# Patient Record
Sex: MALE | Race: WHITE | NOT HISPANIC OR LATINO | Employment: OTHER | URBAN - METROPOLITAN AREA
[De-identification: names, ages, dates, MRNs, and addresses within clinical notes are randomized per-mention and may not be internally consistent; named-entity substitution may affect disease eponyms.]

---

## 2017-05-08 ENCOUNTER — OFFICE VISIT (OUTPATIENT)
Dept: ORTHOPEDIC SURGERY | Facility: CLINIC | Age: 72
End: 2017-05-08

## 2017-05-08 VITALS
SYSTOLIC BLOOD PRESSURE: 128 MMHG | BODY MASS INDEX: 23.79 KG/M2 | WEIGHT: 157 LBS | HEART RATE: 75 BPM | HEIGHT: 68 IN | DIASTOLIC BLOOD PRESSURE: 81 MMHG

## 2017-05-08 DIAGNOSIS — M19.072 LOCALIZED, PRIMARY OSTEOARTHRITIS OF THE ANKLE AND FOOT, LEFT: Primary | ICD-10-CM

## 2017-05-08 PROCEDURE — 99213 OFFICE O/P EST LOW 20 MIN: CPT | Performed by: ORTHOPAEDIC SURGERY

## 2017-10-31 ENCOUNTER — HOSPITAL ENCOUNTER (INPATIENT)
Facility: HOSPITAL | Age: 72
LOS: 1 days | Discharge: HOME | End: 2017-11-03
Attending: EMERGENCY MEDICINE | Admitting: HOSPITALIST

## 2017-10-31 ENCOUNTER — APPOINTMENT (OUTPATIENT)
Dept: GENERAL RADIOLOGY | Facility: HOSPITAL | Age: 72
End: 2017-10-31

## 2017-10-31 DIAGNOSIS — D62 ACUTE BLOOD LOSS ANEMIA: ICD-10-CM

## 2017-10-31 DIAGNOSIS — K92.2 ACUTE UPPER GI BLEED: Primary | ICD-10-CM

## 2017-10-31 DIAGNOSIS — R53.1 GENERALIZED WEAKNESS: ICD-10-CM

## 2017-10-31 PROBLEM — M54.9 CHRONIC BACK PAIN: Status: ACTIVE | Noted: 2017-10-31

## 2017-10-31 PROBLEM — G47.33 OSA (OBSTRUCTIVE SLEEP APNEA): Status: ACTIVE | Noted: 2017-10-31

## 2017-10-31 PROBLEM — D64.9 ANEMIA: Chronic | Status: ACTIVE | Noted: 2017-10-31

## 2017-10-31 PROBLEM — G89.29 CHRONIC BACK PAIN: Status: ACTIVE | Noted: 2017-10-31

## 2017-10-31 PROBLEM — J44.9 COPD (CHRONIC OBSTRUCTIVE PULMONARY DISEASE): Status: ACTIVE | Noted: 2017-10-31

## 2017-10-31 PROBLEM — K22.70 BARRETT'S ESOPHAGUS: Chronic | Status: ACTIVE | Noted: 2017-10-31

## 2017-10-31 PROBLEM — K21.9 GERD (GASTROESOPHAGEAL REFLUX DISEASE): Chronic | Status: ACTIVE | Noted: 2017-10-31

## 2017-10-31 LAB
ABO GROUP BLD: NORMAL
ABO GROUP BLD: NORMAL
ALBUMIN SERPL-MCNC: 4 G/DL (ref 3.2–4.8)
ALBUMIN/GLOB SERPL: 1.4 G/DL (ref 1.5–2.5)
ALP SERPL-CCNC: 44 U/L (ref 25–100)
ALT SERPL W P-5'-P-CCNC: 12 U/L (ref 7–40)
ANION GAP SERPL CALCULATED.3IONS-SCNC: 5 MMOL/L (ref 3–11)
AST SERPL-CCNC: 17 U/L (ref 0–33)
BACTERIA UR QL AUTO: ABNORMAL /HPF
BASOPHILS # BLD AUTO: 0.04 10*3/MM3 (ref 0–0.2)
BASOPHILS NFR BLD AUTO: 0.4 % (ref 0–1)
BILIRUB SERPL-MCNC: 0.2 MG/DL (ref 0.3–1.2)
BILIRUB UR QL STRIP: ABNORMAL
BLD GP AB SCN SERPL QL: NEGATIVE
BNP SERPL-MCNC: 77 PG/ML (ref 0–100)
BUN BLD-MCNC: 8 MG/DL (ref 9–23)
BUN/CREAT SERPL: 8.9 (ref 7–25)
CALCIUM SPEC-SCNC: 9 MG/DL (ref 8.7–10.4)
CHLORIDE SERPL-SCNC: 105 MMOL/L (ref 99–109)
CLARITY UR: CLEAR
CO2 SERPL-SCNC: 26 MMOL/L (ref 20–31)
COLOR UR: YELLOW
CREAT BLD-MCNC: 0.9 MG/DL (ref 0.6–1.3)
D-LACTATE SERPL-SCNC: 1.9 MMOL/L (ref 0.5–2)
DEPRECATED RDW RBC AUTO: 47.6 FL (ref 37–54)
ELLIPTOCYTES BLD QL SMEAR: NORMAL
EOSINOPHIL # BLD AUTO: 0.21 10*3/MM3 (ref 0–0.3)
EOSINOPHIL NFR BLD AUTO: 2.3 % (ref 0–3)
ERYTHROCYTE [DISTWIDTH] IN BLOOD BY AUTOMATED COUNT: 20.7 % (ref 11.3–14.5)
GFR SERPL CREATININE-BSD FRML MDRD: 83 ML/MIN/1.73
GLOBULIN UR ELPH-MCNC: 2.8 GM/DL
GLUCOSE BLD-MCNC: 120 MG/DL (ref 70–100)
GLUCOSE UR STRIP-MCNC: NEGATIVE MG/DL
HBA1C MFR BLD: 5.6 % (ref 4.8–5.6)
HCT VFR BLD AUTO: 28.6 % (ref 38.9–50.9)
HGB BLD-MCNC: 8 G/DL (ref 13.1–17.5)
HGB UR QL STRIP.AUTO: NEGATIVE
HOLD SPECIMEN: NORMAL
HOLD SPECIMEN: NORMAL
HYALINE CASTS UR QL AUTO: ABNORMAL /LPF
HYPOCHROMIA BLD QL: NORMAL
IMM GRANULOCYTES # BLD: 0.04 10*3/MM3 (ref 0–0.03)
IMM GRANULOCYTES NFR BLD: 0.4 % (ref 0–0.6)
KETONES UR QL STRIP: NEGATIVE
LEUKOCYTE ESTERASE UR QL STRIP.AUTO: ABNORMAL
LYMPHOCYTES # BLD AUTO: 1.29 10*3/MM3 (ref 0.6–4.8)
LYMPHOCYTES NFR BLD AUTO: 14 % (ref 24–44)
MCH RBC QN AUTO: 18.3 PG (ref 27–31)
MCHC RBC AUTO-ENTMCNC: 28 G/DL (ref 32–36)
MCV RBC AUTO: 65.3 FL (ref 80–99)
MICROCYTES BLD QL: NORMAL
MONOCYTES # BLD AUTO: 0.74 10*3/MM3 (ref 0–1)
MONOCYTES NFR BLD AUTO: 8 % (ref 0–12)
NEUTROPHILS # BLD AUTO: 6.91 10*3/MM3 (ref 1.5–8.3)
NEUTROPHILS NFR BLD AUTO: 74.9 % (ref 41–71)
NITRITE UR QL STRIP: NEGATIVE
PH UR STRIP.AUTO: 6 [PH] (ref 5–8)
PLAT MORPH BLD: NORMAL
PLATELET # BLD AUTO: 473 10*3/MM3 (ref 150–450)
PMV BLD AUTO: 9.3 FL (ref 6–12)
POTASSIUM BLD-SCNC: 3.9 MMOL/L (ref 3.5–5.5)
PROT SERPL-MCNC: 6.8 G/DL (ref 5.7–8.2)
PROT UR QL STRIP: NEGATIVE
RBC # BLD AUTO: 4.38 10*6/MM3 (ref 4.2–5.76)
RBC # UR: ABNORMAL /HPF
REF LAB TEST METHOD: ABNORMAL
RH BLD: POSITIVE
RH BLD: POSITIVE
SODIUM BLD-SCNC: 136 MMOL/L (ref 132–146)
SP GR UR STRIP: 1.02 (ref 1–1.03)
SQUAMOUS #/AREA URNS HPF: ABNORMAL /HPF
TROPONIN I SERPL-MCNC: 0 NG/ML (ref 0–0.07)
UROBILINOGEN UR QL STRIP: ABNORMAL
WBC MORPH BLD: NORMAL
WBC NRBC COR # BLD: 9.23 10*3/MM3 (ref 3.5–10.8)
WBC UR QL AUTO: ABNORMAL /HPF
WHOLE BLOOD HOLD SPECIMEN: NORMAL
WHOLE BLOOD HOLD SPECIMEN: NORMAL

## 2017-10-31 PROCEDURE — 84484 ASSAY OF TROPONIN QUANT: CPT

## 2017-10-31 PROCEDURE — 94760 N-INVAS EAR/PLS OXIMETRY 1: CPT

## 2017-10-31 PROCEDURE — 86900 BLOOD TYPING SEROLOGIC ABO: CPT

## 2017-10-31 PROCEDURE — 25010000002 CEFTRIAXONE PER 250 MG: Performed by: NURSE PRACTITIONER

## 2017-10-31 PROCEDURE — 83605 ASSAY OF LACTIC ACID: CPT | Performed by: EMERGENCY MEDICINE

## 2017-10-31 PROCEDURE — G0378 HOSPITAL OBSERVATION PER HR: HCPCS

## 2017-10-31 PROCEDURE — 86901 BLOOD TYPING SEROLOGIC RH(D): CPT

## 2017-10-31 PROCEDURE — 83540 ASSAY OF IRON: CPT | Performed by: INTERNAL MEDICINE

## 2017-10-31 PROCEDURE — 82607 VITAMIN B-12: CPT | Performed by: INTERNAL MEDICINE

## 2017-10-31 PROCEDURE — 87040 BLOOD CULTURE FOR BACTERIA: CPT | Performed by: EMERGENCY MEDICINE

## 2017-10-31 PROCEDURE — 83036 HEMOGLOBIN GLYCOSYLATED A1C: CPT | Performed by: NURSE PRACTITIONER

## 2017-10-31 PROCEDURE — 99220 PR INITIAL OBSERVATION CARE/DAY 70 MINUTES: CPT | Performed by: HOSPITALIST

## 2017-10-31 PROCEDURE — 83550 IRON BINDING TEST: CPT | Performed by: INTERNAL MEDICINE

## 2017-10-31 PROCEDURE — 80053 COMPREHEN METABOLIC PANEL: CPT | Performed by: EMERGENCY MEDICINE

## 2017-10-31 PROCEDURE — 94640 AIRWAY INHALATION TREATMENT: CPT

## 2017-10-31 PROCEDURE — 93005 ELECTROCARDIOGRAM TRACING: CPT | Performed by: EMERGENCY MEDICINE

## 2017-10-31 PROCEDURE — 71010 HC CHEST PA OR AP: CPT

## 2017-10-31 PROCEDURE — 82746 ASSAY OF FOLIC ACID SERUM: CPT | Performed by: INTERNAL MEDICINE

## 2017-10-31 PROCEDURE — 86900 BLOOD TYPING SEROLOGIC ABO: CPT | Performed by: EMERGENCY MEDICINE

## 2017-10-31 PROCEDURE — 86901 BLOOD TYPING SEROLOGIC RH(D): CPT | Performed by: EMERGENCY MEDICINE

## 2017-10-31 PROCEDURE — 85025 COMPLETE CBC W/AUTO DIFF WBC: CPT | Performed by: EMERGENCY MEDICINE

## 2017-10-31 PROCEDURE — 86850 RBC ANTIBODY SCREEN: CPT | Performed by: EMERGENCY MEDICINE

## 2017-10-31 PROCEDURE — 82728 ASSAY OF FERRITIN: CPT | Performed by: INTERNAL MEDICINE

## 2017-10-31 PROCEDURE — 85007 BL SMEAR W/DIFF WBC COUNT: CPT | Performed by: EMERGENCY MEDICINE

## 2017-10-31 PROCEDURE — 86923 COMPATIBILITY TEST ELECTRIC: CPT

## 2017-10-31 PROCEDURE — 99284 EMERGENCY DEPT VISIT MOD MDM: CPT

## 2017-10-31 PROCEDURE — 81001 URINALYSIS AUTO W/SCOPE: CPT | Performed by: EMERGENCY MEDICINE

## 2017-10-31 PROCEDURE — 83880 ASSAY OF NATRIURETIC PEPTIDE: CPT | Performed by: EMERGENCY MEDICINE

## 2017-10-31 RX ORDER — CEFTRIAXONE SODIUM 1 G/50ML
1 INJECTION, SOLUTION INTRAVENOUS EVERY 24 HOURS
Status: DISCONTINUED | OUTPATIENT
Start: 2017-10-31 | End: 2017-11-02

## 2017-10-31 RX ORDER — CARBIDOPA AND LEVODOPA 25; 100 MG/1; MG/1
1 TABLET, EXTENDED RELEASE ORAL EVERY 8 HOURS
Status: DISCONTINUED | OUTPATIENT
Start: 2017-10-31 | End: 2017-11-02

## 2017-10-31 RX ORDER — CITALOPRAM 20 MG/1
10 TABLET ORAL DAILY
Status: DISCONTINUED | OUTPATIENT
Start: 2017-10-31 | End: 2017-11-01

## 2017-10-31 RX ORDER — FAMOTIDINE 10 MG/ML
20 INJECTION, SOLUTION INTRAVENOUS ONCE
Status: COMPLETED | OUTPATIENT
Start: 2017-10-31 | End: 2017-10-31

## 2017-10-31 RX ORDER — GUAIFENESIN 600 MG/1
1200 TABLET, EXTENDED RELEASE ORAL EVERY 12 HOURS
Status: DISCONTINUED | OUTPATIENT
Start: 2017-10-31 | End: 2017-11-03 | Stop reason: HOSPADM

## 2017-10-31 RX ORDER — SODIUM CHLORIDE 0.9 % (FLUSH) 0.9 %
1-10 SYRINGE (ML) INJECTION AS NEEDED
Status: DISCONTINUED | OUTPATIENT
Start: 2017-10-31 | End: 2017-11-03 | Stop reason: HOSPADM

## 2017-10-31 RX ORDER — BUDESONIDE AND FORMOTEROL FUMARATE DIHYDRATE 80; 4.5 UG/1; UG/1
2 AEROSOL RESPIRATORY (INHALATION)
Status: DISCONTINUED | OUTPATIENT
Start: 2017-10-31 | End: 2017-11-03 | Stop reason: HOSPADM

## 2017-10-31 RX ORDER — SODIUM CHLORIDE 0.9 % (FLUSH) 0.9 %
10 SYRINGE (ML) INJECTION AS NEEDED
Status: DISCONTINUED | OUTPATIENT
Start: 2017-10-31 | End: 2017-10-31

## 2017-10-31 RX ORDER — MONTELUKAST SODIUM 10 MG/1
10 TABLET ORAL NIGHTLY
Status: DISCONTINUED | OUTPATIENT
Start: 2017-10-31 | End: 2017-11-03 | Stop reason: HOSPADM

## 2017-10-31 RX ORDER — SENNA AND DOCUSATE SODIUM 50; 8.6 MG/1; MG/1
2 TABLET, FILM COATED ORAL 2 TIMES DAILY PRN
Status: DISCONTINUED | OUTPATIENT
Start: 2017-10-31 | End: 2017-11-03 | Stop reason: HOSPADM

## 2017-10-31 RX ORDER — PANTOPRAZOLE SODIUM 40 MG/10ML
40 INJECTION, POWDER, LYOPHILIZED, FOR SOLUTION INTRAVENOUS
Status: DISCONTINUED | OUTPATIENT
Start: 2017-11-01 | End: 2017-10-31

## 2017-10-31 RX ORDER — ONDANSETRON 2 MG/ML
4 INJECTION INTRAMUSCULAR; INTRAVENOUS EVERY 6 HOURS PRN
Status: DISCONTINUED | OUTPATIENT
Start: 2017-10-31 | End: 2017-11-03 | Stop reason: HOSPADM

## 2017-10-31 RX ORDER — MAGNESIUM SULFATE HEPTAHYDRATE 40 MG/ML
4 INJECTION, SOLUTION INTRAVENOUS AS NEEDED
Status: DISCONTINUED | OUTPATIENT
Start: 2017-10-31 | End: 2017-11-03 | Stop reason: HOSPADM

## 2017-10-31 RX ORDER — CALCIUM CARBONATE 200(500)MG
2 TABLET,CHEWABLE ORAL 2 TIMES DAILY PRN
Status: DISCONTINUED | OUTPATIENT
Start: 2017-10-31 | End: 2017-11-03 | Stop reason: HOSPADM

## 2017-10-31 RX ORDER — BENZONATATE 100 MG/1
100 CAPSULE ORAL 3 TIMES DAILY PRN
Status: DISCONTINUED | OUTPATIENT
Start: 2017-10-31 | End: 2017-11-03 | Stop reason: HOSPADM

## 2017-10-31 RX ORDER — MAGNESIUM SULFATE HEPTAHYDRATE 40 MG/ML
2 INJECTION, SOLUTION INTRAVENOUS AS NEEDED
Status: DISCONTINUED | OUTPATIENT
Start: 2017-10-31 | End: 2017-11-03 | Stop reason: HOSPADM

## 2017-10-31 RX ORDER — POTASSIUM CHLORIDE 750 MG/1
40 CAPSULE, EXTENDED RELEASE ORAL AS NEEDED
Status: DISCONTINUED | OUTPATIENT
Start: 2017-10-31 | End: 2017-11-03 | Stop reason: HOSPADM

## 2017-10-31 RX ORDER — PANTOPRAZOLE SODIUM 40 MG/1
40 TABLET, DELAYED RELEASE ORAL ONCE
Status: COMPLETED | OUTPATIENT
Start: 2017-10-31 | End: 2017-10-31

## 2017-10-31 RX ORDER — ONDANSETRON 4 MG/1
4 TABLET, FILM COATED ORAL EVERY 6 HOURS PRN
Status: DISCONTINUED | OUTPATIENT
Start: 2017-10-31 | End: 2017-11-03 | Stop reason: HOSPADM

## 2017-10-31 RX ORDER — BISACODYL 10 MG
10 SUPPOSITORY, RECTAL RECTAL DAILY PRN
Status: DISCONTINUED | OUTPATIENT
Start: 2017-10-31 | End: 2017-11-03 | Stop reason: HOSPADM

## 2017-10-31 RX ORDER — POTASSIUM CHLORIDE 1.5 G/1.77G
40 POWDER, FOR SOLUTION ORAL AS NEEDED
Status: DISCONTINUED | OUTPATIENT
Start: 2017-10-31 | End: 2017-11-03 | Stop reason: HOSPADM

## 2017-10-31 RX ORDER — POTASSIUM CHLORIDE 7.45 MG/ML
10 INJECTION INTRAVENOUS
Status: DISCONTINUED | OUTPATIENT
Start: 2017-10-31 | End: 2017-11-03 | Stop reason: HOSPADM

## 2017-10-31 RX ORDER — PANTOPRAZOLE SODIUM 40 MG/1
40 TABLET, DELAYED RELEASE ORAL
Status: DISCONTINUED | OUTPATIENT
Start: 2017-11-01 | End: 2017-11-03 | Stop reason: HOSPADM

## 2017-10-31 RX ORDER — ACETAMINOPHEN 325 MG/1
650 TABLET ORAL EVERY 4 HOURS PRN
Status: DISCONTINUED | OUTPATIENT
Start: 2017-10-31 | End: 2017-11-03 | Stop reason: HOSPADM

## 2017-10-31 RX ORDER — HEPARIN SODIUM 5000 [USP'U]/ML
5000 INJECTION, SOLUTION INTRAVENOUS; SUBCUTANEOUS EVERY 8 HOURS SCHEDULED
Status: CANCELLED | OUTPATIENT
Start: 2017-10-31

## 2017-10-31 RX ADMIN — CEFTRIAXONE SODIUM 1 G: 1 INJECTION, SOLUTION INTRAVENOUS at 20:49

## 2017-10-31 RX ADMIN — BUDESONIDE AND FORMOTEROL FUMARATE DIHYDRATE 2 PUFF: 80; 4.5 AEROSOL RESPIRATORY (INHALATION) at 20:58

## 2017-10-31 RX ADMIN — PANTOPRAZOLE SODIUM 40 MG: 40 TABLET, DELAYED RELEASE ORAL at 15:32

## 2017-10-31 RX ADMIN — FAMOTIDINE 20 MG: 10 INJECTION, SOLUTION INTRAVENOUS at 15:32

## 2017-10-31 RX ADMIN — MONTELUKAST SODIUM 10 MG: 10 TABLET, FILM COATED ORAL at 20:48

## 2017-10-31 RX ADMIN — CITALOPRAM 10 MG: 20 TABLET, FILM COATED ORAL at 20:49

## 2017-10-31 RX ADMIN — CARBIDOPA AND LEVODOPA 1 TABLET: 25; 100 TABLET, EXTENDED RELEASE ORAL at 20:49

## 2017-10-31 RX ADMIN — DOXYCYCLINE 100 MG: 100 INJECTION, POWDER, LYOPHILIZED, FOR SOLUTION INTRAVENOUS at 21:48

## 2017-10-31 RX ADMIN — GUAIFENESIN 1200 MG: 600 TABLET, EXTENDED RELEASE ORAL at 20:49

## 2017-11-01 ENCOUNTER — ANESTHESIA EVENT (OUTPATIENT)
Dept: GASTROENTEROLOGY | Facility: HOSPITAL | Age: 72
End: 2017-11-01

## 2017-11-01 ENCOUNTER — ANESTHESIA (OUTPATIENT)
Dept: GASTROENTEROLOGY | Facility: HOSPITAL | Age: 72
End: 2017-11-01

## 2017-11-01 LAB
ANION GAP SERPL CALCULATED.3IONS-SCNC: 6 MMOL/L (ref 3–11)
BUN BLD-MCNC: 6 MG/DL (ref 9–23)
BUN/CREAT SERPL: 7.5 (ref 7–25)
CALCIUM SPEC-SCNC: 8.2 MG/DL (ref 8.7–10.4)
CHLORIDE SERPL-SCNC: 106 MMOL/L (ref 99–109)
CO2 SERPL-SCNC: 26 MMOL/L (ref 20–31)
CREAT BLD-MCNC: 0.8 MG/DL (ref 0.6–1.3)
DEPRECATED RDW RBC AUTO: 48.3 FL (ref 37–54)
ERYTHROCYTE [DISTWIDTH] IN BLOOD BY AUTOMATED COUNT: 21 % (ref 11.3–14.5)
FERRITIN SERPL-MCNC: 8 NG/ML (ref 22–322)
FOLATE SERPL-MCNC: >24 NG/ML (ref 3.2–20)
GFR SERPL CREATININE-BSD FRML MDRD: 95 ML/MIN/1.73
GLUCOSE BLD-MCNC: 96 MG/DL (ref 70–100)
HCT VFR BLD AUTO: 27.8 % (ref 38.9–50.9)
HCT VFR BLD AUTO: 28.2 % (ref 38.9–50.9)
HGB BLD-MCNC: 7.6 G/DL (ref 13.1–17.5)
HGB BLD-MCNC: 7.7 G/DL (ref 13.1–17.5)
INR PPP: 1.01
IRON 24H UR-MRATE: 11 MCG/DL (ref 50–175)
IRON SATN MFR SERPL: 3 % (ref 20–50)
MAGNESIUM SERPL-MCNC: 1.9 MG/DL (ref 1.3–2.7)
MCH RBC QN AUTO: 17.8 PG (ref 27–31)
MCHC RBC AUTO-ENTMCNC: 27.3 G/DL (ref 32–36)
MCV RBC AUTO: 65.3 FL (ref 80–99)
PHOSPHATE SERPL-MCNC: 3.8 MG/DL (ref 2.4–5.1)
PLATELET # BLD AUTO: 412 10*3/MM3 (ref 150–450)
PMV BLD AUTO: 9.4 FL (ref 6–12)
POTASSIUM BLD-SCNC: 4 MMOL/L (ref 3.5–5.5)
PROTHROMBIN TIME: 11 SECONDS (ref 9.6–11.5)
RBC # BLD AUTO: 4.26 10*6/MM3 (ref 4.2–5.76)
RETICS/RBC NFR AUTO: 2.43 % (ref 0.5–1.5)
SODIUM BLD-SCNC: 138 MMOL/L (ref 132–146)
TIBC SERPL-MCNC: 357 MCG/DL (ref 250–450)
VIT B12 BLD-MCNC: 442 PG/ML (ref 211–911)
WBC NRBC COR # BLD: 6.43 10*3/MM3 (ref 3.5–10.8)

## 2017-11-01 PROCEDURE — 85610 PROTHROMBIN TIME: CPT | Performed by: HOSPITALIST

## 2017-11-01 PROCEDURE — 85018 HEMOGLOBIN: CPT | Performed by: HOSPITALIST

## 2017-11-01 PROCEDURE — 0DB48ZX EXCISION OF ESOPHAGOGASTRIC JUNCTION, VIA NATURAL OR ARTIFICIAL OPENING ENDOSCOPIC, DIAGNOSTIC: ICD-10-PCS | Performed by: INTERNAL MEDICINE

## 2017-11-01 PROCEDURE — 94640 AIRWAY INHALATION TREATMENT: CPT

## 2017-11-01 PROCEDURE — 85027 COMPLETE CBC AUTOMATED: CPT | Performed by: NURSE PRACTITIONER

## 2017-11-01 PROCEDURE — 25010000002 CEFTRIAXONE PER 250 MG: Performed by: NURSE PRACTITIONER

## 2017-11-01 PROCEDURE — 80048 BASIC METABOLIC PNL TOTAL CA: CPT | Performed by: NURSE PRACTITIONER

## 2017-11-01 PROCEDURE — 85045 AUTOMATED RETICULOCYTE COUNT: CPT | Performed by: INTERNAL MEDICINE

## 2017-11-01 PROCEDURE — G0378 HOSPITAL OBSERVATION PER HR: HCPCS

## 2017-11-01 PROCEDURE — 84100 ASSAY OF PHOSPHORUS: CPT | Performed by: HOSPITALIST

## 2017-11-01 PROCEDURE — 94760 N-INVAS EAR/PLS OXIMETRY 1: CPT

## 2017-11-01 PROCEDURE — 0DB68ZX EXCISION OF STOMACH, VIA NATURAL OR ARTIFICIAL OPENING ENDOSCOPIC, DIAGNOSTIC: ICD-10-PCS | Performed by: INTERNAL MEDICINE

## 2017-11-01 PROCEDURE — 83735 ASSAY OF MAGNESIUM: CPT | Performed by: HOSPITALIST

## 2017-11-01 PROCEDURE — 85014 HEMATOCRIT: CPT | Performed by: HOSPITALIST

## 2017-11-01 PROCEDURE — 25010000002 PROPOFOL 10 MG/ML EMULSION: Performed by: NURSE ANESTHETIST, CERTIFIED REGISTERED

## 2017-11-01 PROCEDURE — 99226 PR SBSQ OBSERVATION CARE/DAY 35 MINUTES: CPT | Performed by: HOSPITALIST

## 2017-11-01 PROCEDURE — 88305 TISSUE EXAM BY PATHOLOGIST: CPT | Performed by: INTERNAL MEDICINE

## 2017-11-01 RX ORDER — EPHEDRINE SULFATE 50 MG/ML
5 INJECTION, SOLUTION INTRAVENOUS ONCE AS NEEDED
Status: DISCONTINUED | OUTPATIENT
Start: 2017-11-01 | End: 2017-11-01 | Stop reason: HOSPADM

## 2017-11-01 RX ORDER — SODIUM CHLORIDE, SODIUM LACTATE, POTASSIUM CHLORIDE, CALCIUM CHLORIDE 600; 310; 30; 20 MG/100ML; MG/100ML; MG/100ML; MG/100ML
9 INJECTION, SOLUTION INTRAVENOUS CONTINUOUS
Status: DISCONTINUED | OUTPATIENT
Start: 2017-11-01 | End: 2017-11-03 | Stop reason: HOSPADM

## 2017-11-01 RX ORDER — SODIUM CHLORIDE 0.9 % (FLUSH) 0.9 %
1-10 SYRINGE (ML) INJECTION AS NEEDED
Status: DISCONTINUED | OUTPATIENT
Start: 2017-11-01 | End: 2017-11-01

## 2017-11-01 RX ORDER — PEG-3350, SODIUM SULFATE, SODIUM CHLORIDE, POTASSIUM CHLORIDE, SODIUM ASCORBATE AND ASCORBIC ACID 7.5-2.691G
1000 KIT ORAL ONCE
Status: DISCONTINUED | OUTPATIENT
Start: 2017-11-01 | End: 2017-11-01

## 2017-11-01 RX ORDER — ONDANSETRON 2 MG/ML
4 INJECTION INTRAMUSCULAR; INTRAVENOUS ONCE AS NEEDED
Status: DISCONTINUED | OUTPATIENT
Start: 2017-11-01 | End: 2017-11-01 | Stop reason: HOSPADM

## 2017-11-01 RX ORDER — PEG-3350, SODIUM SULFATE, SODIUM CHLORIDE, POTASSIUM CHLORIDE, SODIUM ASCORBATE AND ASCORBIC ACID 7.5-2.691G
1000 KIT ORAL ONCE
Status: COMPLETED | OUTPATIENT
Start: 2017-11-01 | End: 2017-11-01

## 2017-11-01 RX ORDER — FENTANYL CITRATE 50 UG/ML
50 INJECTION, SOLUTION INTRAMUSCULAR; INTRAVENOUS
Status: DISCONTINUED | OUTPATIENT
Start: 2017-11-01 | End: 2017-11-01 | Stop reason: HOSPADM

## 2017-11-01 RX ORDER — FAMOTIDINE 20 MG/1
20 TABLET, FILM COATED ORAL ONCE
Status: COMPLETED | OUTPATIENT
Start: 2017-11-01 | End: 2017-11-01

## 2017-11-01 RX ORDER — LIDOCAINE HYDROCHLORIDE 10 MG/ML
0.5 INJECTION, SOLUTION EPIDURAL; INFILTRATION; INTRACAUDAL; PERINEURAL ONCE AS NEEDED
Status: DISCONTINUED | OUTPATIENT
Start: 2017-11-01 | End: 2017-11-01

## 2017-11-01 RX ORDER — LIDOCAINE HYDROCHLORIDE 10 MG/ML
INJECTION, SOLUTION INFILTRATION; PERINEURAL AS NEEDED
Status: DISCONTINUED | OUTPATIENT
Start: 2017-11-01 | End: 2017-11-01 | Stop reason: SURG

## 2017-11-01 RX ORDER — FAMOTIDINE 10 MG/ML
20 INJECTION, SOLUTION INTRAVENOUS ONCE
Status: DISCONTINUED | OUTPATIENT
Start: 2017-11-01 | End: 2017-11-01

## 2017-11-01 RX ORDER — PROPOFOL 10 MG/ML
VIAL (ML) INTRAVENOUS AS NEEDED
Status: DISCONTINUED | OUTPATIENT
Start: 2017-11-01 | End: 2017-11-01 | Stop reason: SURG

## 2017-11-01 RX ORDER — PEG-3350, SODIUM SULFATE, SODIUM CHLORIDE, POTASSIUM CHLORIDE, SODIUM ASCORBATE AND ASCORBIC ACID 7.5-2.691G
1000 KIT ORAL ONCE
Status: COMPLETED | OUTPATIENT
Start: 2017-11-02 | End: 2017-11-02

## 2017-11-01 RX ADMIN — MONTELUKAST SODIUM 10 MG: 10 TABLET, FILM COATED ORAL at 21:30

## 2017-11-01 RX ADMIN — DOXYCYCLINE 100 MG: 100 INJECTION, POWDER, LYOPHILIZED, FOR SOLUTION INTRAVENOUS at 08:07

## 2017-11-01 RX ADMIN — BUDESONIDE AND FORMOTEROL FUMARATE DIHYDRATE 2 PUFF: 80; 4.5 AEROSOL RESPIRATORY (INHALATION) at 20:44

## 2017-11-01 RX ADMIN — GUAIFENESIN 1200 MG: 600 TABLET, EXTENDED RELEASE ORAL at 21:31

## 2017-11-01 RX ADMIN — DOXYCYCLINE 100 MG: 100 INJECTION, POWDER, LYOPHILIZED, FOR SOLUTION INTRAVENOUS at 21:30

## 2017-11-01 RX ADMIN — CEFTRIAXONE SODIUM 1 G: 1 INJECTION, SOLUTION INTRAVENOUS at 21:30

## 2017-11-01 RX ADMIN — POLYETHYLENE GLYCOL 3350, SODIUM SULFATE, SODIUM CHLORIDE, POTASSIUM CHLORIDE, ASCORBIC ACID, SODIUM ASCORBATE 1000 ML: KIT at 16:26

## 2017-11-01 RX ADMIN — PROPOFOL 50 MG: 10 INJECTION, EMULSION INTRAVENOUS at 10:42

## 2017-11-01 RX ADMIN — CARBIDOPA AND LEVODOPA 1 TABLET: 25; 100 TABLET, EXTENDED RELEASE ORAL at 05:41

## 2017-11-01 RX ADMIN — PROPOFOL 50 MG: 10 INJECTION, EMULSION INTRAVENOUS at 10:46

## 2017-11-01 RX ADMIN — ONDANSETRON 4 MG: 4 TABLET, FILM COATED ORAL at 03:23

## 2017-11-01 RX ADMIN — FAMOTIDINE 20 MG: 20 TABLET, FILM COATED ORAL at 13:10

## 2017-11-01 RX ADMIN — LIDOCAINE HYDROCHLORIDE 50 MG: 10 INJECTION, SOLUTION INFILTRATION; PERINEURAL at 10:42

## 2017-11-01 RX ADMIN — ACETAMINOPHEN 650 MG: 325 TABLET ORAL at 03:23

## 2017-11-01 RX ADMIN — CARBIDOPA AND LEVODOPA 1 TABLET: 25; 100 TABLET, EXTENDED RELEASE ORAL at 21:34

## 2017-11-01 RX ADMIN — PANTOPRAZOLE SODIUM 40 MG: 40 TABLET, DELAYED RELEASE ORAL at 17:42

## 2017-11-01 RX ADMIN — CARBIDOPA AND LEVODOPA 1 TABLET: 25; 100 TABLET, EXTENDED RELEASE ORAL at 15:03

## 2017-11-01 RX ADMIN — PANTOPRAZOLE SODIUM 40 MG: 40 TABLET, DELAYED RELEASE ORAL at 05:42

## 2017-11-01 RX ADMIN — GUAIFENESIN 1200 MG: 600 TABLET, EXTENDED RELEASE ORAL at 08:07

## 2017-11-01 NOTE — PLAN OF CARE
Problem: Patient Care Overview (Adult)  Goal: Plan of Care Review  Outcome: Ongoing (interventions implemented as appropriate)    11/01/17 0347   Coping/Psychosocial Response Interventions   Plan Of Care Reviewed With patient   Patient Care Overview   Progress no change   Outcome Evaluation   Outcome Summary/Follow up Plan Pt rested well. Complained of a mild headache with some nausea early this AM; Medicated. No other complaints. O2 sats greater than 94% on RA.          Problem: Pneumonia (Adult)  Goal: Signs and Symptoms of Listed Potential Problems Will be Absent or Manageable (Pneumonia)  Outcome: Ongoing (interventions implemented as appropriate)

## 2017-11-01 NOTE — PROGRESS NOTES
[facility] Medicine Services  PROGRESS NOTE    Patient Name: Flaco Roque II  : 1945  MRN: [id number]    Date of Admission: 10/31/2017  Length of Stay: 0  Primary Care Physician: Tayo Hendrix MD    Subjective   Subjective     CC: F/U SOA    HPI:  Patient seen this morning. He still has some SOA and fatigue.     Review of Systems  Gen-no fevers, no chills  CV-no chest pain, no palpitations  Resp-+cough, +dyspnea  GI-no N/V/D, no abd pain    Otherwise ROS is negative except as mentioned in the HPI.    Objective   Objective     Vital Signs:   Temp:  [96.8 °F (36 °C)-99.2 °F (37.3 °C)] 97 °F (36.1 °C)  Heart Rate:  [65-94] 65  Resp:  [14-20] 18  BP: ()/(46-77) 111/67        Physical Exam:  Gen-no acute distress  CV-RRR, S1 S2 normal, no m/r/g  Resp-CTAB, no wheezes or rales noted  Abd-soft, NT, ND, +BS  Ext-no edema  Neuro-A&Ox3, no focal deficits  Psych-appropriate mood      Results Reviewed:  I have personally reviewed current lab, radiology, and data and agree.      Results from last 7 days  Lab Units 17  0543 10/31/17  1352   WBC 10*3/mm3 6.43 9.23   HEMOGLOBIN g/dL 7.6* 8.0*   HEMATOCRIT % 27.8* 28.6*   PLATELETS 10*3/mm3 412 473*   INR  1.01  --        Results from last 7 days  Lab Units 17  0543 10/31/17  1353   SODIUM mmol/L 138 136   POTASSIUM mmol/L 4.0 3.9   CHLORIDE mmol/L 106 105   CO2 mmol/L 26.0 26.0   BUN mg/dL 6* 8*   CREATININE mg/dL 0.80 0.90   GLUCOSE mg/dL 96 120*   CALCIUM mg/dL 8.2* 9.0   ALT (SGPT) U/L  --  12   AST (SGOT) U/L  --  17     BNP   Date Value Ref Range Status   10/31/2017 77.0 0.0 - 100.0 pg/mL Final     No results found for: PHART    Microbiology Results Abnormal     Procedure Component Value - Date/Time    Blood Culture - Blood, [[id number]]  (Normal) Collected:  10/31/17 1425    Lab Status:  Preliminary result Specimen:  Blood from Arm, Left Updated:  17 0308     Blood Culture No growth at less than 24 hours     Blood Culture - Blood, [[id number]]  (Normal) Collected:  10/31/17 1432    Lab Status:  Preliminary result Specimen:  Blood from Arm, Right Updated:  11/01/17 0308     Blood Culture No growth at less than 24 hours          Imaging Results (last 24 hours)     Procedure Component Value Units Date/Time    XR Chest 1 View [[id number]] Collected:  10/31/17 1430     Updated:  10/31/17 1642    Narrative:       EXAMINATION: XR CHEST 1 VW-10/31/2017:      INDICATION: SOA, triage protocol.      COMPARISON: Chest x-ray 07/15/2016.     FINDINGS: Cardiac size borderline enlarged. Pulmonary vascularity within  normal limits. Background chronic lung changes, however, no focal  consolidative opacification. Moderate hiatal hernia with increased  prominence from prior noted overlying the midline and lower  cardiomediastinal structures. No pneumothorax or significant pleural  effusion.       Impression:       Background chronic lung changes without focal consolidative  opacification or acute parenchymal disease. Moderate hiatal hernia with  increased prominence from prior comparison.     D:  10/31/2017  E:  10/31/2017                      I have reviewed the medications.    Assessment/Plan   Assessment / Plan     Hospital Problem List     * (Principal)PNA (pneumonia)    Acute upper GI bleed    ABRIL (obstructive sleep apnea)    COPD (chronic obstructive pulmonary disease)    Chris's esophagus (Chronic)    Overview Signed 10/31/2017  4:06 PM by YAMILET Espinoza     Last EGD 1 year ago with Dr Kaye          GERD (gastroesophageal reflux disease) (Chronic)    Anemia (Chronic)             Brief Hospital Course to date:  Flaco Roque II is a 72 y.o. male with hx of COPD and GI bleed presents with worsening SOA and fatigue the for 2 days. He has had a 2 week hx of cough and SOA and took a course of Augmenting for PNA. On admission he was found to be anemic with +FOBT.      Assessment & Plan:  --Continue to monitor H&H closely. Transfuse  if Hb drops below 7.0.  --GI performed EGD showing ulceration and scarring but no active bleeding. Plan for colonoscopy in AM.   --Continue Rocephin and Doxycycline for now although CXR only shows chronic changes. Will consider d/c or de-escalation in 24 hours based on cultures. He is on room air.   --Outpatient follow up for continued pain pump management. Currently his pain is well controlled.     DVT Prophylaxis:  SCDs/TEDs (GI bleed)    CODE STATUS: Full Code    Disposition: I expect the patient to be discharged home in ~2 days    Marisabel Rice MD  11/01/17  1:09 PM

## 2017-11-01 NOTE — CONSULTS
[facility] Gastroenterology    Referring Provider: No ref. provider found    Reason for Consultation: GI bleed     Chief complaint  Weakness, short of air    History of present illness:  Flaco Roque II is a 72 y.o. male who is admitted with GIB.  Describes weakness and SOA.  H/O PUD. Is on Carafate and Protonix. Dark stool but started iron last week.  Strong family hx of colon cancer in father and 2 uncles.  Last colonoscopy about 3 yrs ago by Dr Kaye. Has constipation improved with fiber.  No chest pain or syncope.  Denies NSAIDS .Had UGI bleed earlier this yr at was treated at [facility]  Allergies:  Review of patient's allergies indicates no known allergies.    Scheduled Meds:    budesonide-formoterol 2 puff Inhalation BID - RT   carbidopa-levodopa ER 1 tablet Oral Q8H   ceftriaxone 1 g Intravenous Q24H   And      doxycycline 100 mg Intravenous Q12H   guaiFENesin 1,200 mg Oral Q12H   montelukast 10 mg Oral Nightly   pantoprazole 40 mg Oral BID AC        Infusions:       PRN Meds:  •  acetaminophen  •  albuterol  •  benzonatate  •  bisacodyl  •  calcium carbonate  •  magnesium sulfate **OR** magnesium sulfate **OR** magnesium sulfate  •  ondansetron **OR** ondansetron  •  potassium chloride **OR** potassium chloride **OR** potassium chloride  •  sennosides-docusate sodium  •  sodium chloride    Home Meds:  Prescriptions Prior to Admission   Medication Sig Dispense Refill Last Dose   • alendronate (FOSAMAX) 70 MG tablet Take 70 mg by mouth every 7 days.   Taking   • budesonide-formoterol (SYMBICORT) 80-4.5 MCG/ACT inhaler Inhale 2 puffs 2 (two) times a day.   Taking   • citalopram (CeleXA) 10 MG tablet Take 10 mg by mouth daily.   Taking   • fluticasone (FLONASE) 50 MCG/ACT nasal spray 2 sprays into each nostril daily. Administer 2 sprays in each nostril for each dose.   Taking   • gabapentin (NEURONTIN) 600 MG tablet Take 600 mg by mouth 3 (three) times a day.   Taking   • Glucosamine-Chondroitin (GLUCOSAMINE CHONDR  COMPLEX PO) Take 1 tablet by mouth daily.   Taking   • metaxalone (SKELAXIN) 800 MG tablet Take 800 mg by mouth 3 (three) times a day.   Taking   • montelukast (SINGULAIR) 10 MG tablet Take 10 mg by mouth every night.   Taking   • Multiple Vitamins-Minerals (MULTIVITAMIN ADULT PO) Take 1 tablet by mouth daily.   Taking   • oxybutynin XL (DITROPAN-XL) 5 MG 24 hr tablet Take 5 mg by mouth daily.   Taking   • pantoprazole (PROTONIX) 40 MG EC tablet Take 40 mg by mouth daily.   Taking   • tamsulosin (FLOMAX) 0.4 MG capsule 24 hr capsule Take 1 capsule by mouth every night.   Taking       ROS: Review of Systems   Constitutional: Negative for activity change and unexpected weight change.   Respiratory: Positive for shortness of breath. Negative for chest tightness.    Cardiovascular: Negative for chest pain and leg swelling.   Gastrointestinal: Positive for constipation. Negative for abdominal pain.       PAST MED HX: Pt  has a past medical history of Chris's esophagus; BPH (benign prostatic hyperplasia); Chronic low back pain; COPD (chronic obstructive pulmonary disease); GERD (gastroesophageal reflux disease); Injury of back; Lung abscess; Osteoarthritis; Osteoporosis; Rotator cuff tear, left; and Sleep apnea.  PAST SURG HX: Pt  has a past surgical history that includes Back surgery; Total hip arthroplasty (Left); Arthrodesis midtarsal / tarsometatarsal / tarsal navicular-cuneiform (Left, 05/10/2016); Cataract extraction; Back surgery; Gallbladder surgery; and Spine surgery. uro lift  FAM HX: family history includes Arthritis in his mother; Cancer in his father; Diabetes in his mother. Colon cancer in 2 uncles  SOC HX: Pt  reports that he quit smoking about 15 years ago. His smoking use included Cigarettes. He has a 50.00 pack-year smoking history. He has never used smokeless tobacco. He reports that he drinks about 1.2 oz of alcohol per week  He reports that he does not use illicit drugs.    /62  Pulse 74   "Temp 99.2 °F (37.3 °C) (Oral)   Resp 16  Ht 68\" (172.7 cm)  Wt 165 lb (74.8 kg)  SpO2 96%  BMI 25.09 kg/m2    Physical Exam  Wt Readings from Last 3 Encounters:   10/31/17 165 lb (74.8 kg)   05/08/17 157 lb (71.2 kg)   07/14/16 160 lb (72.6 kg)   ,body mass index is 25.09 kg/(m^2).    General Well developed; well nourished; no acute distress.   ENT Good dentition.  Oral mucosa pink & moist without thrush or lesions.    Neck Neck supple; trachea midline. No thyromegaly  Resp CTA; no rhonchi, rales, or wheezes.  Respiration effort normal  CV RRR; ; no M/R/G. No lower extremity edema  GI Abd soft, NT, ND, normal active bowel sounds.  No HSM.  No abd hernia  Skin No rash; no lesions; no bruises.  Skin turgor normal  Musc No clubbing; no cyanosis.    Psych Oriented to time, place, and person.  Appropriate affect      Results Review:   I reviewed the patient's new clinical results.    Lab Results   Component Value Date    WBC 6.43 11/01/2017    HGB 7.6 (L) 11/01/2017    HCT 27.8 (L) 11/01/2017    MCV 65.3 (L) 11/01/2017     11/01/2017       Lab Results   Component Value Date    GLUCOSE 96 11/01/2017    BUN 6 (L) 11/01/2017    CREATININE 0.80 11/01/2017    EGFRIFNONA 95 11/01/2017    BCR 7.5 11/01/2017    CO2 26.0 11/01/2017    CALCIUM 8.2 (L) 11/01/2017    ALBUMIN 4.00 10/31/2017    LABIL2 1.4 (L) 10/31/2017    AST 17 10/31/2017    ALT 12 10/31/2017       ASSESSMENTS/PLANS    Anemia acute/chronic blood loss  H/O PUD    Will plan EGD for today.  If neg will need colonoscopy in am.  Need recors from [facility].    I discussed the patients findings and my recommendations with patient    Porter Capellan MD  11/01/17  8:20 AM  "

## 2017-11-01 NOTE — ANESTHESIA PREPROCEDURE EVALUATION
Anesthesia Evaluation     Patient summary reviewed and Nursing notes reviewed   no history of anesthetic complications:  NPO Solid Status: > 8 hours  NPO Liquid Status: > 8 hours     Airway   Mallampati: II  TM distance: >3 FB  Neck ROM: full  no difficulty expected  Dental - normal exam     Pulmonary - normal exam    breath sounds clear to auscultation  (+) COPD mild, sleep apnea,   Cardiovascular - normal exam    ECG reviewed  Rhythm: regular  Rate: normal        Neuro/Psych- negative ROS  GI/Hepatic/Renal/Endo    (+)  GERD well controlled,     ROS Comment: Chris's esophagitis, GI bleed    Musculoskeletal     Abdominal    Substance History      OB/GYN          Other   (+) arthritis                                     Anesthesia Plan    ASA 3     general     intravenous induction   Anesthetic plan and risks discussed with patient.    Plan discussed with CRNA.

## 2017-11-01 NOTE — POST-PROCEDURE NOTE
EGD  GERD with ulceration and scarring.  5cm HH  No ulcers or bleeding site seen.    Colonoscopy in am

## 2017-11-01 NOTE — PLAN OF CARE
Problem: Patient Care Overview (Adult)  Goal: Plan of Care Review  Outcome: Ongoing (interventions implemented as appropriate)    11/01/17 8944   Coping/Psychosocial Response Interventions   Plan Of Care Reviewed With patient   Patient Care Overview   Progress improving   Outcome Evaluation   Outcome Summary/Follow up Plan verbalizes understanding plan of care. compledted egd today, colonoscopy tomorrow. H/H remain stable.         Problem: Pneumonia (Adult)  Goal: Signs and Symptoms of Listed Potential Problems Will be Absent or Manageable (Pneumonia)  Outcome: Ongoing (interventions implemented as appropriate)

## 2017-11-01 NOTE — ANESTHESIA POSTPROCEDURE EVALUATION
Patient: Flaco Roque II    Procedure Summary     Date Anesthesia Start Anesthesia Stop Room / Location    11/01/17 1038 1054  [facility] /  [facility]       Procedure Diagnosis Surgeon Provider    ESOPHAGOGASTRODUODENOSCOPY (N/A Esophagus) Acute upper GI bleed  (Acute upper GI bleed [K92.2]) MD Jason Mcclelland MD          Anesthesia Type: general  Last vitals  BP   128/77 (11/01/17 0922)   Temp   96.8 °F (36 °C) (11/01/17 0915)   Pulse   73 (11/01/17 0915)   Resp   14 (11/01/17 0915)     SpO2   97 % (11/01/17 0915)     Post Anesthesia Care and Evaluation    Patient location during evaluation: PACU  Patient participation: complete - patient participated  Level of consciousness: awake and alert  Pain score: 0  Pain management: adequate  Airway patency: patent  Anesthetic complications: No anesthetic complications  PONV Status: none  Cardiovascular status: hemodynamically stable and acceptable  Respiratory status: nonlabored ventilation, acceptable and nasal cannula  Hydration status: acceptable

## 2017-11-02 ENCOUNTER — ANESTHESIA (OUTPATIENT)
Dept: GASTROENTEROLOGY | Facility: HOSPITAL | Age: 72
End: 2017-11-02

## 2017-11-02 ENCOUNTER — ANESTHESIA EVENT (OUTPATIENT)
Dept: GASTROENTEROLOGY | Facility: HOSPITAL | Age: 72
End: 2017-11-02

## 2017-11-02 LAB
CYTO UR: NORMAL
HCT VFR BLD AUTO: 27.9 % (ref 38.9–50.9)
HCT VFR BLD AUTO: 28.7 % (ref 38.9–50.9)
HCT VFR BLD AUTO: 32 % (ref 38.9–50.9)
HGB BLD-MCNC: 7.6 G/DL (ref 13.1–17.5)
HGB BLD-MCNC: 7.9 G/DL (ref 13.1–17.5)
HGB BLD-MCNC: 9 G/DL (ref 13.1–17.5)
LAB AP CASE REPORT: NORMAL
LAB AP CLINICAL INFORMATION: NORMAL
Lab: NORMAL
PATH REPORT.FINAL DX SPEC: NORMAL
PATH REPORT.GROSS SPEC: NORMAL

## 2017-11-02 PROCEDURE — 85018 HEMOGLOBIN: CPT | Performed by: INTERNAL MEDICINE

## 2017-11-02 PROCEDURE — 85018 HEMOGLOBIN: CPT | Performed by: HOSPITALIST

## 2017-11-02 PROCEDURE — 0DJD8ZZ INSPECTION OF LOWER INTESTINAL TRACT, VIA NATURAL OR ARTIFICIAL OPENING ENDOSCOPIC: ICD-10-PCS | Performed by: INTERNAL MEDICINE

## 2017-11-02 PROCEDURE — 25010000002 PROPOFOL 1000 MG/ML EMULSION: Performed by: NURSE ANESTHETIST, CERTIFIED REGISTERED

## 2017-11-02 PROCEDURE — 94760 N-INVAS EAR/PLS OXIMETRY 1: CPT

## 2017-11-02 PROCEDURE — 94640 AIRWAY INHALATION TREATMENT: CPT

## 2017-11-02 PROCEDURE — 25010000002 ONDANSETRON PER 1 MG: Performed by: NURSE PRACTITIONER

## 2017-11-02 PROCEDURE — 86900 BLOOD TYPING SEROLOGIC ABO: CPT

## 2017-11-02 PROCEDURE — 85014 HEMATOCRIT: CPT | Performed by: INTERNAL MEDICINE

## 2017-11-02 PROCEDURE — 25010000002 NA FERRIC GLUC CPLX PER 12.5 MG: Performed by: INTERNAL MEDICINE

## 2017-11-02 PROCEDURE — 99226 PR SBSQ OBSERVATION CARE/DAY 35 MINUTES: CPT | Performed by: HOSPITALIST

## 2017-11-02 PROCEDURE — 36430 TRANSFUSION BLD/BLD COMPNT: CPT

## 2017-11-02 PROCEDURE — 85014 HEMATOCRIT: CPT | Performed by: HOSPITALIST

## 2017-11-02 PROCEDURE — P9016 RBC LEUKOCYTES REDUCED: HCPCS

## 2017-11-02 RX ORDER — SODIUM CHLORIDE 9 MG/ML
INJECTION, SOLUTION INTRAVENOUS CONTINUOUS PRN
Status: DISCONTINUED | OUTPATIENT
Start: 2017-11-02 | End: 2017-11-02 | Stop reason: SURG

## 2017-11-02 RX ORDER — SODIUM CHLORIDE 0.9 % (FLUSH) 0.9 %
1-10 SYRINGE (ML) INJECTION AS NEEDED
Status: DISCONTINUED | OUTPATIENT
Start: 2017-11-02 | End: 2017-11-02 | Stop reason: HOSPADM

## 2017-11-02 RX ORDER — LIDOCAINE HYDROCHLORIDE 10 MG/ML
INJECTION, SOLUTION INFILTRATION; PERINEURAL AS NEEDED
Status: DISCONTINUED | OUTPATIENT
Start: 2017-11-02 | End: 2017-11-02 | Stop reason: SURG

## 2017-11-02 RX ORDER — CARBIDOPA AND LEVODOPA 25; 100 MG/1; MG/1
1 TABLET, EXTENDED RELEASE ORAL EVERY 8 HOURS
Status: DISCONTINUED | OUTPATIENT
Start: 2017-11-02 | End: 2017-11-03 | Stop reason: HOSPADM

## 2017-11-02 RX ORDER — SODIUM CHLORIDE, SODIUM LACTATE, POTASSIUM CHLORIDE, CALCIUM CHLORIDE 600; 310; 30; 20 MG/100ML; MG/100ML; MG/100ML; MG/100ML
9 INJECTION, SOLUTION INTRAVENOUS CONTINUOUS
Status: CANCELLED | OUTPATIENT
Start: 2017-11-02

## 2017-11-02 RX ORDER — FAMOTIDINE 20 MG/1
20 TABLET, FILM COATED ORAL ONCE
Status: CANCELLED | OUTPATIENT
Start: 2017-11-02 | End: 2017-11-02

## 2017-11-02 RX ORDER — FENTANYL CITRATE 50 UG/ML
50 INJECTION, SOLUTION INTRAMUSCULAR; INTRAVENOUS
Status: DISCONTINUED | OUTPATIENT
Start: 2017-11-02 | End: 2017-11-02

## 2017-11-02 RX ORDER — ONDANSETRON 2 MG/ML
4 INJECTION INTRAMUSCULAR; INTRAVENOUS ONCE AS NEEDED
Status: DISCONTINUED | OUTPATIENT
Start: 2017-11-02 | End: 2017-11-02

## 2017-11-02 RX ORDER — FERROUS SULFATE 325(65) MG
325 TABLET ORAL
Status: DISCONTINUED | OUTPATIENT
Start: 2017-11-03 | End: 2017-11-03 | Stop reason: HOSPADM

## 2017-11-02 RX ORDER — LIDOCAINE HYDROCHLORIDE 10 MG/ML
0.5 INJECTION, SOLUTION EPIDURAL; INFILTRATION; INTRACAUDAL; PERINEURAL ONCE AS NEEDED
Status: CANCELLED | OUTPATIENT
Start: 2017-11-02

## 2017-11-02 RX ADMIN — BUDESONIDE AND FORMOTEROL FUMARATE DIHYDRATE 2 PUFF: 80; 4.5 AEROSOL RESPIRATORY (INHALATION) at 09:33

## 2017-11-02 RX ADMIN — BUDESONIDE AND FORMOTEROL FUMARATE DIHYDRATE 2 PUFF: 80; 4.5 AEROSOL RESPIRATORY (INHALATION) at 20:43

## 2017-11-02 RX ADMIN — PROPOFOL 125 MCG/KG/MIN: 10 INJECTION, EMULSION INTRAVENOUS at 11:57

## 2017-11-02 RX ADMIN — DOXYCYCLINE 100 MG: 100 INJECTION, POWDER, LYOPHILIZED, FOR SOLUTION INTRAVENOUS at 08:45

## 2017-11-02 RX ADMIN — LIDOCAINE HYDROCHLORIDE 100 MG: 10 INJECTION, SOLUTION INFILTRATION; PERINEURAL at 11:57

## 2017-11-02 RX ADMIN — SODIUM CHLORIDE: 9 INJECTION, SOLUTION INTRAVENOUS at 11:55

## 2017-11-02 RX ADMIN — CARBIDOPA AND LEVODOPA 1 TABLET: 25; 100 TABLET, EXTENDED RELEASE ORAL at 17:09

## 2017-11-02 RX ADMIN — POLYETHYLENE GLYCOL 3350, SODIUM SULFATE, SODIUM CHLORIDE, POTASSIUM CHLORIDE, ASCORBIC ACID, SODIUM ASCORBATE 1000 ML: KIT at 04:12

## 2017-11-02 RX ADMIN — GUAIFENESIN 1200 MG: 600 TABLET, EXTENDED RELEASE ORAL at 21:23

## 2017-11-02 RX ADMIN — ONDANSETRON 4 MG: 2 INJECTION INTRAMUSCULAR; INTRAVENOUS at 06:38

## 2017-11-02 RX ADMIN — PANTOPRAZOLE SODIUM 40 MG: 40 TABLET, DELAYED RELEASE ORAL at 17:08

## 2017-11-02 RX ADMIN — ONDANSETRON 4 MG: 2 INJECTION INTRAMUSCULAR; INTRAVENOUS at 23:18

## 2017-11-02 RX ADMIN — PANTOPRAZOLE SODIUM 40 MG: 40 TABLET, DELAYED RELEASE ORAL at 08:45

## 2017-11-02 RX ADMIN — CARBIDOPA AND LEVODOPA 1 TABLET: 25; 100 TABLET, EXTENDED RELEASE ORAL at 04:12

## 2017-11-02 RX ADMIN — MONTELUKAST SODIUM 10 MG: 10 TABLET, FILM COATED ORAL at 21:23

## 2017-11-02 RX ADMIN — CARBIDOPA AND LEVODOPA 1 TABLET: 25; 100 TABLET, EXTENDED RELEASE ORAL at 09:13

## 2017-11-02 RX ADMIN — SODIUM CHLORIDE 250 MG: 9 INJECTION, SOLUTION INTRAVENOUS at 21:23

## 2017-11-02 RX ADMIN — GUAIFENESIN 1200 MG: 600 TABLET, EXTENDED RELEASE ORAL at 08:44

## 2017-11-02 RX ADMIN — MAGNESIUM SULFATE HEPTAHYDRATE 4 G: 40 INJECTION, SOLUTION INTRAVENOUS at 16:33

## 2017-11-02 RX ADMIN — SODIUM CHLORIDE: 9 INJECTION, SOLUTION INTRAVENOUS at 12:36

## 2017-11-02 NOTE — PROGRESS NOTES
[facility] Medicine Services  PROGRESS NOTE    Patient Name: Flaco Roque II  : 1945  MRN: [id number]    Date of Admission: 10/31/2017  Length of Stay: 0  Primary Care Physician: Tayo Hendrix MD    Subjective   Subjective     CC: F/U SOA    HPI:  Patient seen this morning. No issues overnight. Going for colonoscopy today. SOA still present but no cough.    Review of Systems  Gen-no fevers, no chills  CV-no chest pain, no palpitations  Resp-no cough, +dyspnea  GI-no N/V/D, no abd pain    Otherwise ROS is negative except as mentioned in the HPI.    Objective   Objective     Vital Signs:   Temp:  [97 °F (36.1 °C)-98.4 °F (36.9 °C)] 98.2 °F (36.8 °C)  Heart Rate:  [65-90] 79  Resp:  [14-19] 18  BP: ()/(46-83) 118/75        Physical Exam:  Gen-no acute distress  CV-RRR, S1 S2 normal, no m/r/g  Resp-CTAB, no wheezes or rales noted  Abd-soft, NT, ND, +BS  Ext-no edema  Neuro-A&Ox3, no focal deficits  Psych-appropriate mood      Results Reviewed:  I have personally reviewed current lab, radiology, and data and agree.      Results from last 7 days  Lab Units 17  0841 17  2355 17  1551 17  0543 10/31/17  1352   WBC 10*3/mm3  --   --   --  6.43 9.23   HEMOGLOBIN g/dL 7.9* 7.6* 7.7* 7.6* 8.0*   HEMATOCRIT % 28.7* 27.9* 28.2* 27.8* 28.6*   PLATELETS 10*3/mm3  --   --   --  412 473*   INR   --   --   --  1.01  --        Results from last 7 days  Lab Units 17  0543 10/31/17  1353   SODIUM mmol/L 138 136   POTASSIUM mmol/L 4.0 3.9   CHLORIDE mmol/L 106 105   CO2 mmol/L 26.0 26.0   BUN mg/dL 6* 8*   CREATININE mg/dL 0.80 0.90   GLUCOSE mg/dL 96 120*   CALCIUM mg/dL 8.2* 9.0   ALT (SGPT) U/L  --  12   AST (SGOT) U/L  --  17     BNP   Date Value Ref Range Status   10/31/2017 77.0 0.0 - 100.0 pg/mL Final     No results found for: PHART    Microbiology Results Abnormal     Procedure Component Value - Date/Time    Blood Culture - Blood, [[id number]]  (Normal)  Collected:  10/31/17 1425    Lab Status:  Preliminary result Specimen:  Blood from Arm, Left Updated:  11/01/17 1501     Blood Culture No growth at 24 hours    Blood Culture - Blood, [[id number]]  (Normal) Collected:  10/31/17 1432    Lab Status:  Preliminary result Specimen:  Blood from Arm, Right Updated:  11/01/17 1501     Blood Culture No growth at 24 hours          Imaging Results (last 24 hours)     Procedure Component Value Units Date/Time    XR Chest 1 View [[id number]] Collected:  10/31/17 1430     Updated:  11/02/17 0924    Narrative:       EXAMINATION: XR CHEST 1 VW-10/31/2017:      INDICATION: SOA, triage protocol.      COMPARISON: Chest x-ray 07/15/2016.     FINDINGS: Cardiac size borderline enlarged. Pulmonary vascularity within  normal limits. Background chronic lung changes, however, no focal  consolidative opacification. Moderate hiatal hernia with increased  prominence from prior noted overlying the midline and lower  cardiomediastinal structures. No pneumothorax or significant pleural  effusion.       Impression:       Background chronic lung changes without focal consolidative  opacification or acute parenchymal disease. Moderate hiatal hernia with  increased prominence from prior comparison.     D:  10/31/2017  E:  10/31/2017     This report was finalized on 11/2/2017 9:22 AM by Dr. Jeremy Gordon.                I have reviewed the medications.    Assessment/Plan   Assessment / Plan     Hospital Problem List     * (Principal)Acute upper GI bleed    ABRIL (obstructive sleep apnea)    COPD (chronic obstructive pulmonary disease)    Chris's esophagus (Chronic)    Overview Signed 10/31/2017  4:06 PM by YAMILET Espinoza     Last EGD 1 year ago with Dr Kaye          GERD (gastroesophageal reflux disease) (Chronic)    Anemia (Chronic)             Brief Hospital Course to date:  Flaco Roque II is a 72 y.o. male with hx of COPD and GI bleed presents with worsening SOA and fatigue for 2 days. He has  had a 2 week hx of cough and SOA and took a course of Augmentin for PNA diagnosed by his PCP. On admission he was found to be anemic (more so from baseline) with +FOBT.      Assessment & Plan:  --Continue to monitor H&H closely. Stable but as he is still symptomatic I think he would benefit from a unit of blood. Transfuse 1 unit today.  --GI performed EGD showing ulceration and scarring with esophagitis but no active bleeding. Plan for colonoscopy today.  --Initially started on Rocephin and Doxycycline, however CXR only shows chronic changes, no active infiltrates, and I feel his SOA is more related to anemia. Remains on room air. Stop ATBx today.  --Outpatient follow up for continued pain pump management. Currently his pain is well controlled.     DVT Prophylaxis:  SCDs/TEDs (GI bleed)    CODE STATUS: Full Code    Disposition: I expect the patient to be discharged home in 1 day    Marisabel Rice MD  11/02/17  10:24 AM

## 2017-11-02 NOTE — PROGRESS NOTES
Continued Stay Note   Jaime     Patient Name: Flaco Roque II  MRN: [id number]  Today's Date: 11/2/2017    Admit Date: 10/31/2017          Discharge Plan       11/02/17 1428    Case Management/Social Work Plan    Plan HOME    Patient/Family In Agreement With Plan yes    Additional Comments Met with pt and wife at bedside to f/u DCP.  Goal remains to return home with wife upon DC.  No immediate needs identified/voiced.  CM will cont to follow.              Discharge Codes     None        Expected Discharge Date and Time     Expected Discharge Date Expected Discharge Time    Nov 3, 2017             Ariadne Roberson

## 2017-11-02 NOTE — ANESTHESIA POSTPROCEDURE EVALUATION
"Patient: Flaco Roque II    Procedure Summary     Date Anesthesia Start Anesthesia Stop Room / Location    11/02/17 1155   [facility]       Procedure Diagnosis Surgeon Provider    COLONOSCOPY (N/A ) Acute upper GI bleed  (Acute upper GI bleed [K92.2]) MD Jason Mcclelland MD          Anesthesia Type: general  Last vitals  BP   141/73 (11/02/17 1125)   Temp   97.4 °F (36.3 °C) (11/02/17 1125)   Pulse   80 (11/02/17 1125)   Resp   16 (11/02/17 1125)     SpO2   95 % (11/01/17 2044)     Post Anesthesia Care and Evaluation    Patient location during evaluation: PACU  Patient participation: complete - patient participated  Level of consciousness: awake and alert  Pain score: 0  Pain management: adequate  Airway patency: patent  Anesthetic complications: No anesthetic complications  PONV Status: none  Cardiovascular status: hemodynamically stable and acceptable  Respiratory status: nonlabored ventilation, acceptable and nasal cannula  Hydration status: acceptable    Comments: /66  Pulse 77  Temp 97.5 °F (36.3 °C) (Temporal Artery )   Resp 16  Ht 68\" (172.7 cm)  Wt 165 lb (74.8 kg)  SpO2 92%  BMI 25.09 kg/m2        "

## 2017-11-02 NOTE — PLAN OF CARE
Problem: Patient Care Overview (Adult)  Goal: Plan of Care Review  Outcome: Ongoing (interventions implemented as appropriate)    11/02/17 0311   Coping/Psychosocial Response Interventions   Plan Of Care Reviewed With patient   Patient Care Overview   Progress improving   Outcome Evaluation   Outcome Summary/Follow up Plan H/H remains stable. Colonoscopy in morning. No compliants         Problem: Pneumonia (Adult)  Goal: Signs and Symptoms of Listed Potential Problems Will be Absent or Manageable (Pneumonia)  Outcome: Ongoing (interventions implemented as appropriate)

## 2017-11-02 NOTE — PLAN OF CARE
Problem: Patient Care Overview (Adult)  Goal: Plan of Care Review  Outcome: Ongoing (interventions implemented as appropriate)    11/02/17 1650   Coping/Psychosocial Response Interventions   Plan Of Care Reviewed With patient   Patient Care Overview   Progress improving   Outcome Evaluation   Outcome Summary/Follow up Plan pt on room air and steve well sats above 92%, 1 unit of PRBC given today, iron gtt X1,        Goal: Adult Individualization and Mutuality  Outcome: Ongoing (interventions implemented as appropriate)  Goal: Discharge Needs Assessment  Outcome: Ongoing (interventions implemented as appropriate)    Problem: Pneumonia (Adult)  Goal: Signs and Symptoms of Listed Potential Problems Will be Absent or Manageable (Pneumonia)  Outcome: Ongoing (interventions implemented as appropriate)    Problem: GI Endoscopy (Adult)  Goal: Signs and Symptoms of Listed Potential Problems Will be Absent or Manageable (GI Endoscopy)  Outcome: Ongoing (interventions implemented as appropriate)

## 2017-11-02 NOTE — ANESTHESIA PREPROCEDURE EVALUATION
Anesthesia Evaluation     Patient summary reviewed and Nursing notes reviewed   no history of anesthetic complications:  NPO Solid Status: > 8 hours  NPO Liquid Status: > 8 hours     Airway   Mallampati: II  TM distance: >3 FB  Neck ROM: full  Dental - normal exam     Pulmonary - normal exam    breath sounds clear to auscultation  (+) COPD mild, sleep apnea,   Cardiovascular - normal exam  Exercise tolerance: good (4-7 METS)    ECG reviewed  Rhythm: regular  Rate: normal        Neuro/Psych    ROS Comment: Parkinson's disease  GI/Hepatic/Renal/Endo    (+)  GERD well controlled,     ROS Comment: Chris's esophagitis, GI bleed    Musculoskeletal     (+) back pain,       ROS comment: Pain pump  Abdominal    Substance History - negative use     OB/GYN          Other   (+) arthritis       Other Comment: anemia                                  Anesthesia Plan    ASA 2     general   (Labs/studies reviewed)  intravenous induction   Anesthetic plan and risks discussed with patient.    Plan discussed with CRNA.

## 2017-11-02 NOTE — POST-PROCEDURE NOTE
Colon-  Pan diverticulosis.  Normal mucosa .  No mass lesions.  Tortuous colon.        Rec PO iron on DC.  Needs OPT capsule endoscopy

## 2017-11-03 VITALS
HEART RATE: 67 BPM | OXYGEN SATURATION: 93 % | DIASTOLIC BLOOD PRESSURE: 70 MMHG | TEMPERATURE: 98.5 F | BODY MASS INDEX: 25.01 KG/M2 | HEIGHT: 68 IN | RESPIRATION RATE: 18 BRPM | WEIGHT: 165 LBS | SYSTOLIC BLOOD PRESSURE: 125 MMHG

## 2017-11-03 PROBLEM — K92.2 ACUTE UPPER GI BLEED: Status: RESOLVED | Noted: 2017-10-31 | Resolved: 2017-11-03

## 2017-11-03 LAB
ABO + RH BLD: NORMAL
ANION GAP SERPL CALCULATED.3IONS-SCNC: 3 MMOL/L (ref 3–11)
BH BB BLOOD EXPIRATION DATE: NORMAL
BH BB BLOOD TYPE BARCODE: 6200
BH BB DISPENSE STATUS: NORMAL
BH BB PRODUCT CODE: NORMAL
BH BB UNIT NUMBER: NORMAL
BUN BLD-MCNC: 5 MG/DL (ref 9–23)
BUN/CREAT SERPL: 7.1 (ref 7–25)
CALCIUM SPEC-SCNC: 8.3 MG/DL (ref 8.7–10.4)
CHLORIDE SERPL-SCNC: 105 MMOL/L (ref 99–109)
CO2 SERPL-SCNC: 28 MMOL/L (ref 20–31)
CREAT BLD-MCNC: 0.7 MG/DL (ref 0.6–1.3)
DEPRECATED RDW RBC AUTO: 51.7 FL (ref 37–54)
ERYTHROCYTE [DISTWIDTH] IN BLOOD BY AUTOMATED COUNT: 21.4 % (ref 11.3–14.5)
GFR SERPL CREATININE-BSD FRML MDRD: 111 ML/MIN/1.73
GLUCOSE BLD-MCNC: 98 MG/DL (ref 70–100)
HCT VFR BLD AUTO: 29.7 % (ref 38.9–50.9)
HCT VFR BLD AUTO: 34 % (ref 38.9–50.9)
HGB BLD-MCNC: 8.3 G/DL (ref 13.1–17.5)
HGB BLD-MCNC: 9.6 G/DL (ref 13.1–17.5)
MAGNESIUM SERPL-MCNC: 2.3 MG/DL (ref 1.3–2.7)
MCH RBC QN AUTO: 18.9 PG (ref 27–31)
MCHC RBC AUTO-ENTMCNC: 28.2 G/DL (ref 32–36)
MCV RBC AUTO: 67.1 FL (ref 80–99)
PLATELET # BLD AUTO: 428 10*3/MM3 (ref 150–450)
PMV BLD AUTO: 9.8 FL (ref 6–12)
POTASSIUM BLD-SCNC: 3.4 MMOL/L (ref 3.5–5.5)
RBC # BLD AUTO: 5.07 10*6/MM3 (ref 4.2–5.76)
SODIUM BLD-SCNC: 136 MMOL/L (ref 132–146)
UNIT  ABO: NORMAL
UNIT  RH: NORMAL
WBC NRBC COR # BLD: 9.04 10*3/MM3 (ref 3.5–10.8)

## 2017-11-03 PROCEDURE — 80048 BASIC METABOLIC PNL TOTAL CA: CPT | Performed by: HOSPITALIST

## 2017-11-03 PROCEDURE — 94799 UNLISTED PULMONARY SVC/PX: CPT

## 2017-11-03 PROCEDURE — 99232 SBSQ HOSP IP/OBS MODERATE 35: CPT | Performed by: INTERNAL MEDICINE

## 2017-11-03 PROCEDURE — 25010000002 ONDANSETRON PER 1 MG: Performed by: NURSE PRACTITIONER

## 2017-11-03 PROCEDURE — 85027 COMPLETE CBC AUTOMATED: CPT | Performed by: HOSPITALIST

## 2017-11-03 PROCEDURE — 94640 AIRWAY INHALATION TREATMENT: CPT

## 2017-11-03 PROCEDURE — 99239 HOSP IP/OBS DSCHRG MGMT >30: CPT | Performed by: NURSE PRACTITIONER

## 2017-11-03 PROCEDURE — 83735 ASSAY OF MAGNESIUM: CPT | Performed by: HOSPITALIST

## 2017-11-03 RX ORDER — CARBIDOPA AND LEVODOPA 25; 100 MG/1; MG/1
1 TABLET, EXTENDED RELEASE ORAL EVERY 8 HOURS
Status: ON HOLD
Start: 2017-11-03 | End: 2018-09-10

## 2017-11-03 RX ORDER — RIBOFLAVIN (VITAMIN B2) 100 MG
100 TABLET ORAL DAILY
Qty: 30 TABLET | Refills: 0 | Status: SHIPPED | OUTPATIENT
Start: 2017-11-03 | End: 2018-09-07

## 2017-11-03 RX ORDER — FERROUS SULFATE 325(65) MG
325 TABLET ORAL
Start: 2017-11-04 | End: 2019-04-10

## 2017-11-03 RX ADMIN — ONDANSETRON 4 MG: 2 INJECTION INTRAMUSCULAR; INTRAVENOUS at 09:53

## 2017-11-03 RX ADMIN — CARBIDOPA AND LEVODOPA 1 TABLET: 25; 100 TABLET, EXTENDED RELEASE ORAL at 09:53

## 2017-11-03 RX ADMIN — BUDESONIDE AND FORMOTEROL FUMARATE DIHYDRATE 2 PUFF: 80; 4.5 AEROSOL RESPIRATORY (INHALATION) at 08:36

## 2017-11-03 RX ADMIN — PANTOPRAZOLE SODIUM 40 MG: 40 TABLET, DELAYED RELEASE ORAL at 08:50

## 2017-11-03 RX ADMIN — POTASSIUM CHLORIDE 40 MEQ: 750 CAPSULE, EXTENDED RELEASE ORAL at 06:25

## 2017-11-03 RX ADMIN — FERROUS SULFATE TAB 325 MG (65 MG ELEMENTAL FE) 325 MG: 325 (65 FE) TAB at 08:50

## 2017-11-03 RX ADMIN — POTASSIUM CHLORIDE 40 MEQ: 750 CAPSULE, EXTENDED RELEASE ORAL at 15:12

## 2017-11-03 RX ADMIN — CARBIDOPA AND LEVODOPA 1 TABLET: 25; 100 TABLET, EXTENDED RELEASE ORAL at 02:27

## 2017-11-03 RX ADMIN — GUAIFENESIN 1200 MG: 600 TABLET, EXTENDED RELEASE ORAL at 08:51

## 2017-11-03 NOTE — PROGRESS NOTES
Continued Stay Note   Jaime     Patient Name: Flaco Roque II  MRN: [id number]  Today's Date: 11/3/2017    Admit Date: 10/31/2017          Discharge Plan       11/03/17 1003    Case Management/Social Work Plan    Plan Home    Patient/Family In Agreement With Plan yes    Additional Comments Plan remains home. Pt is currently on oxygen. He states he has previously tried CPAP and oxygen at home and he cannot wear it and does not want oxygen at home. Denies any further needs. CM will cont to follow.               Discharge Codes     None        Expected Discharge Date and Time     Expected Discharge Date Expected Discharge Time    Nov 3, 2017             Tata Hinojosa

## 2017-11-03 NOTE — PLAN OF CARE
Problem: Patient Care Overview (Adult)  Goal: Plan of Care Review  Outcome: Ongoing (interventions implemented as appropriate)    11/03/17 0418   Coping/Psychosocial Response Interventions   Plan Of Care Reviewed With patient   Patient Care Overview   Progress improving   Outcome Evaluation   Outcome Summary/Follow up Plan Pt on room air mostly, 2L at times during sleep. Recieved IV iron. No complaints of pain. Had mild nausea with vomiting during the night. Pt stated that if he eats past 7p, he throws up.          Problem: Pneumonia (Adult)  Goal: Signs and Symptoms of Listed Potential Problems Will be Absent or Manageable (Pneumonia)  Outcome: Ongoing (interventions implemented as appropriate)

## 2017-11-03 NOTE — PROGRESS NOTES
"GI Daily Progress Note  Subjective     Laurence Roque II is a 72 y.o. male who was admitted with Acute upper GI bleed. Had melena but had been on iron pills for last week.  Has some regurgitation. He is on Protonix 40 BID and Carafate TID    Objective     /70 (BP Location: Right arm, Patient Position: Lying)  Pulse 67  Temp 98.5 °F (36.9 °C) (Oral)   Resp 18  Ht 68\" (172.7 cm)  Wt 165 lb (74.8 kg)  SpO2 93%  BMI 25.09 kg/m2    Intake/Output last 3 shifts:  I/O last 3 completed shifts:  In: 2490 [P.O.:880; I.V.:1000; Blood:610]  Out: 1525 [Urine:1525]  Intake/Output this shift:  I/O this shift:  In: -   Out: 300 [Urine:300]      Physical Exam  Wt Readings from Last 3 Encounters:   10/31/17 165 lb (74.8 kg)   05/08/17 157 lb (71.2 kg)   07/14/16 160 lb (72.6 kg)   ,body mass index is 25.09 kg/(m^2).,@FLOWAMB(6)@,@FLOWAMB(5)@,@FLOWAMB(8)@   CONSTITUTIONAL:  Resp CTA; no rhonchi, rales, or wheezes.  Respiration effort normal  CV RRR; no M/R/G. No lower extremity edema  GI Abd soft, NT, ND, normal active bowel sounds.    Psych:     DATA:Results for LAURENCE ROQUE II (MRN [id number]) as of 11/3/2017 09:48   Ref. Range 11/1/2017 15:51 11/1/2017 23:55 11/2/2017 08:41 11/2/2017 16:19 11/2/2017 19:38 11/2/2017 23:39 11/3/2017 04:38   Hemoglobin Latest Ref Range: 13.1 - 17.5 g/dL 7.7 (L) 7.6 (L) 7.9 (L)  9.0 (L) 8.3 (L) 9.6 (L)   Hematocrit Latest Ref Range: 38.9 - 50.9 % 28.2 (L) 27.9 (L) 28.7 (L)  32.0 (L) 29.7 (L) 34.0 (L)     Results for LAURENCE ROQUE II (MRN [id number]) as of 11/3/2017 09:48   Ref. Range 10/31/2017 13:52   Iron Latest Ref Range: 50 - 175 mcg/dL 11 (L)   Ferritin Latest Ref Range: 22.00 - 322.00 ng/mL 8.00 (L)   Iron Saturation Latest Ref Range: 20 - 50 % 3 (L)   TIBC Latest Ref Range: 250 - 450 mcg/dL 357   Folate Latest Ref Range: 3.20 - 20.00 ng/mL >24.00 (H)     Assessment/Plan     #1 KIMBERLY  #2 GERD    EGD showed large HH with scarring  Bx did not show ulceration.  No bleeding site. "  Colon showed diverticulosis but no bleeding.  Suspect melena was from iron and he remains iiron def.  Had infusion yesterday. Ok for DC on home meds. Cont iron for 3 mo with Vit C.  FU [facility] in 3-4 weeks        Principal Problem:    Acute upper GI bleed  Active Problems:    ABRIL (obstructive sleep apnea)    COPD (chronic obstructive pulmonary disease)    Chris's esophagus    GERD (gastroesophageal reflux disease)    Anemia       LOS: 1 day     Porter Capellan MD  11/03/17  9:50 AM

## 2017-11-03 NOTE — DISCHARGE SUMMARY
[facility] Medicine Services  DISCHARGE SUMMARY    Patient Name: Flaco Roque II  : 1945  MRN: [id number]    Date of Admission: 10/31/2017  Date of Discharge:    Length of Stay: 1  Primary Care Physician: Tayo Hendrix MD    Consults     Date and Time Order Name Status Description    10/31/2017 2027 Inpatient Consult to Gastroenterology Completed         Hospital Course     Presenting Problem:   Acute upper GI bleed [K92.2]  Acute upper GI bleed [K92.2]    Active Hospital Problems (** Indicates Principal Problem)    Diagnosis Date Noted   • ABRIL (obstructive sleep apnea) [G47.33] 10/31/2017   • COPD (chronic obstructive pulmonary disease) [J44.9] 10/31/2017   • Chris's esophagus [K22.70] 10/31/2017   • GERD (gastroesophageal reflux disease) [K21.9] 10/31/2017   • Anemia [D64.9] 10/31/2017      Resolved Hospital Problems    Diagnosis Date Noted Date Resolved   • **Acute upper GI bleed [K92.2] 10/31/2017 2017      Hospital Course:  Flaco Roque II is a 72 y.o. male with past medical history of COPD.  He presented to [facility] ED on 10/31/17 with complaints of shortness of air and cough that began 2 weeks prior.  He saw his PCP and was started on Augmentin for pneumonia and Bactrim for UTI.  He presented to ED due to worsening of symptoms.  In ED, chest x-ray showed chronic lung changes, hemoglobin 8.0 down from 11.3 on 17.  Patient was admitted at [facility] for pneumonia in  but also had an upper GI bleed.  Patient reports receiving a total of 6 units of blood at that time.  Patient was admitted the hospital medicine service for further evaluation and management.  Patient was started on IV Rocephin and doxycycline but discontinued due to no active infiltrates.  It was felt that shortness of air was more related to anemia.  Gastroenterology was consulted and did an EGD showing large hiatal hernia and scarring of esophagitis but no active bleeding.   Patient also had colonoscopy that showed diverticulosis but no bleeding.  Suspect melena was from iron.  He received 2 units PRBCs this admission.  He has remained stable and is ready for discharge home today.  He will be discharged on iron for 3 months with vitamin C and will follow-up with [facility] [place] 3-4 weeks.  Discharge plans and instructions were reviewed with patient and he verbalized an understanding.      Procedure(s):  COLONOSCOPY   11/02 1122 COLONOSCOPY  11/01 1031 UPPER GI ENDOSCOPY   Day of Discharge     HPI:   Patient is resting in bed without any new complaints or issues.  He feels well and is ready to go home.  Denies chest pain, shortness of breath or abdominal pain.      Review of Systems  Gen- No fevers, chills  CV- No chest pain, palpitations  Resp- No cough, dyspnea  GI- No N/V/D, abd pain    Otherwise ROS is negative except as mentioned in the HPI.    Vital Signs:   Temp:  [97.7 °F (36.5 °C)-98.5 °F (36.9 °C)] 98.5 °F (36.9 °C)  Heart Rate:  [67-92] 67  Resp:  [16-20] 18  BP: (121-133)/(66-88) 125/70     Physical Exam:  Constitutional: No acute distress, awake, alert  Eyes: PERRLA, sclerae anicteric, no conjunctival injection  HENT: NCAT, mucous membranes moist  Neck: Supple, no thyromegaly, no lymphadenopathy, trachea midline  Respiratory: Clear to auscultation bilaterally, nonlabored respirations   Cardiovascular: RRR, no murmurs, rubs, or gallops, palpable pedal pulses bilaterally  Gastrointestinal: Positive bowel sounds, soft, nontender, nondistended  Musculoskeletal: No bilateral ankle edema, no clubbing or cyanosis to extremities  Psychiatric: Appropriate affect, cooperative  Neurologic: Oriented x 3, strength symmetric in all extremities, Cranial Nerves grossly intact to confrontation, speech clear  Skin: No rashes      Pertinent  and/or Most Recent Results         Results from last 7 days  Lab Units 11/03/17  0438 11/02/17  2339 11/02/17  1938 11/02/17  0841 11/01/17  2349  11/01/17  1551 11/01/17  0543 10/31/17  1353 10/31/17  1352   WBC 10*3/mm3 9.04  --   --   --   --   --  6.43  --  9.23   HEMOGLOBIN g/dL 9.6* 8.3* 9.0* 7.9* 7.6* 7.7* 7.6*  --  8.0*   HEMATOCRIT % 34.0* 29.7* 32.0* 28.7* 27.9* 28.2* 27.8*  --  28.6*   PLATELETS 10*3/mm3 428  --   --   --   --   --  412  --  473*   SODIUM mmol/L 136  --   --   --   --   --  138 136  --    POTASSIUM mmol/L 3.4*  --   --   --   --   --  4.0 3.9  --    CHLORIDE mmol/L 105  --   --   --   --   --  106 105  --    CO2 mmol/L 28.0  --   --   --   --   --  26.0 26.0  --    BUN mg/dL 5*  --   --   --   --   --  6* 8*  --    CREATININE mg/dL 0.70  --   --   --   --   --  0.80 0.90  --    GLUCOSE mg/dL 98  --   --   --   --   --  96 120*  --    CALCIUM mg/dL 8.3*  --   --   --   --   --  8.2* 9.0  --        Results from last 7 days  Lab Units 11/01/17  0543 10/31/17  1353   BILIRUBIN mg/dL  --  0.2*   ALK PHOS U/L  --  44   ALT (SGPT) U/L  --  12   AST (SGOT) U/L  --  17   PROTIME Seconds 11.0  --    INR  1.01  --      Lab Results   Component Value Date    HGBA1C 5.60 10/31/2017           Invalid input(s): TG, LDLREALC    Results from last 7 days  Lab Units 10/31/17  1352   HEMOGLOBIN A1C % 5.60   BNP pg/mL 77.0     Brief Urine Lab Results  (Last result in the past 365 days)      Color   Clarity   Blood   Leuk Est   Nitrite   Protein   CREAT   Urine HCG        10/31/17 1514 Yellow Clear Negative Trace(A) Negative Negative               Microbiology Results Abnormal     Procedure Component Value - Date/Time    Blood Culture - Blood, [[id number]]  (Normal) Collected:  10/31/17 1425    Lab Status:  Preliminary result Specimen:  Blood from Arm, Left Updated:  11/02/17 1501     Blood Culture No growth at 2 days    Blood Culture - Blood, [[id number]]  (Normal) Collected:  10/31/17 1432    Lab Status:  Preliminary result Specimen:  Blood from Arm, Right Updated:  11/02/17 1501     Blood Culture No growth at 2 days          Imaging Results (all)      Procedure Component Value Units Date/Time    XR Chest 1 View [[id number]] Collected:  10/31/17 1430     Updated:  11/02/17 0924    Narrative:       EXAMINATION: XR CHEST 1 VW-10/31/2017:      INDICATION: SOA, triage protocol.      COMPARISON: Chest x-ray 07/15/2016.     FINDINGS: Cardiac size borderline enlarged. Pulmonary vascularity within  normal limits. Background chronic lung changes, however, no focal  consolidative opacification. Moderate hiatal hernia with increased  prominence from prior noted overlying the midline and lower  cardiomediastinal structures. No pneumothorax or significant pleural  effusion.       Impression:       Background chronic lung changes without focal consolidative  opacification or acute parenchymal disease. Moderate hiatal hernia with  increased prominence from prior comparison.     D:  10/31/2017  E:  10/31/2017     This report was finalized on 11/2/2017 9:22 AM by Dr. Jeremy Gordon.                 Order Current Status    Blood Culture - Blood, Preliminary result    Blood Culture - Blood, Preliminary result        Discharge Details      Flaco Roque II   Home Medication Instructions THIAGO:[id number]    Printed on:11/03/17 5588   Medication Information                      budesonide-formoterol (SYMBICORT) 80-4.5 MCG/ACT inhaler  Inhale 2 puffs 2 (two) times a day.             carbidopa-levodopa ER (SINEMET CR)  MG per tablet  Take 1 tablet by mouth Every 8 (Eight) Hours.             ferrous sulfate 325 (65 FE) MG tablet  Take 1 tablet by mouth Daily With Breakfast.             fluticasone (FLONASE) 50 MCG/ACT nasal spray  2 sprays into each nostril daily. Administer 2 sprays in each nostril for each dose.             montelukast (SINGULAIR) 10 MG tablet  Take 10 mg by mouth every night.             Multiple Vitamins-Minerals (MULTIVITAMIN ADULT PO)  Take 1 tablet by mouth daily.             pantoprazole (PROTONIX) 40 MG EC tablet  Take 40 mg by mouth daily.                  Discharge Disposition:  Home or Self Care    Discharge Diet:  Diet Instructions     Diet: Regular       Discharge Diet:  Regular                 Discharge Activity:  Activity Instructions     Activity as Tolerated                     Special Instructions:    Future Appointments  Date Time Provider Department [place]   5/7/2018 8:40 AM Juhi Calle MD [facility] None       Additional Instructions for the Follow-ups that You Need to Schedule     Discharge Follow-up with PCP    As directed    Follow Up Details:  within 1 week for hospital follow up       Discharge Follow-up with Specialty    As directed    Specialty:  [facility] [place] [facility]   Follow Up:  3 Weeks                 Time Spent on Discharge:  35 minutes    Leola Coyle, APRN  11/03/17  1:30 PM

## 2017-11-05 LAB
BACTERIA SPEC AEROBE CULT: NORMAL
BACTERIA SPEC AEROBE CULT: NORMAL

## 2017-11-29 ENCOUNTER — OFFICE VISIT (OUTPATIENT)
Dept: GASTROENTEROLOGY | Facility: CLINIC | Age: 72
End: 2017-11-29

## 2017-11-29 VITALS
BODY MASS INDEX: 23.89 KG/M2 | HEIGHT: 68 IN | DIASTOLIC BLOOD PRESSURE: 68 MMHG | SYSTOLIC BLOOD PRESSURE: 124 MMHG | HEART RATE: 72 BPM | TEMPERATURE: 97.3 F | OXYGEN SATURATION: 91 % | WEIGHT: 157.6 LBS

## 2017-11-29 DIAGNOSIS — D50.9 IRON DEFICIENCY ANEMIA, UNSPECIFIED IRON DEFICIENCY ANEMIA TYPE: Primary | ICD-10-CM

## 2017-11-29 PROCEDURE — 99213 OFFICE O/P EST LOW 20 MIN: CPT | Performed by: INTERNAL MEDICINE

## 2017-11-29 RX ORDER — ALENDRONATE SODIUM 70 MG/1
TABLET ORAL WEEKLY
COMMUNITY
End: 2019-04-10

## 2017-11-29 NOTE — PROGRESS NOTES
PCP: Tayo Hendrix MD    Chief Complaint   Patient presents with   • Follow-up     Hosp       History of Present Illness:   HPI  Mr. Roque returns to the office for a follow-up visit from a recent hospitalization.  The patient had an upper endoscopy and colonoscopy performed by Dr. Capellan earlier this month.  He states that at this time he is feeling well.  The patient has improved energy.  Mr. Roque had his iron levels rechecked and apparently there was improvement.  He denies any signs of gastrointestinal bleeding.  The patient has a good appetite at this time.  He denies any abdominal pain.  There is no history of nausea or vomiting.  Past Medical History:   Diagnosis Date   • Chris's esophagus     Last EGD 1 year ago with Dr Kaye    • BPH (benign prostatic hyperplasia)    • Chronic low back pain    • COPD (chronic obstructive pulmonary disease)    • GERD (gastroesophageal reflux disease)    • Injury of back    • Lung abscess    • Osteoarthritis    • Osteoporosis    • Rotator cuff tear, left    • Sleep apnea        Past Surgical History:   Procedure Laterality Date   • ARTHRODESIS MIDTARSAL / TARSOMETATARSAL / TARSAL NAVICULAR-CUNEIFORM Left 05/10/2016   • BACK SURGERY     • BACK SURGERY      low back   • CATARACT EXTRACTION     • COLONOSCOPY N/A 11/2/2017    Procedure: COLONOSCOPY;  Surgeon: Porter Capellan MD;  Location:  [facility];  Service:    • ENDOSCOPY N/A 11/1/2017    Procedure: ESOPHAGOGASTRODUODENOSCOPY;  Surgeon: Porter Capellan MD;  Location:  [facility];  Service:    • ENDOSCOPY  11/02/2017    DR PORTER CAPELLAN   • GALLBLADDER SURGERY     • SPINE SURGERY     • TOTAL HIP ARTHROPLASTY Left          Current Outpatient Prescriptions:   •  Alendronate Sodium (FOSAMAX PO), Take  by mouth 1 (One) Time Per Week., Disp: , Rfl:   •  AMITRIPTYLINE HCL PO, Take 2 tablets by mouth Every Night., Disp: , Rfl:   •  ascorbic acid (VITAMIN C) 100 MG tablet, Take 1 tablet by mouth Daily., Disp: 30  tablet, Rfl: 0  •  budesonide-formoterol (SYMBICORT) 80-4.5 MCG/ACT inhaler, Inhale 2 puffs 2 (two) times a day., Disp: , Rfl:   •  carbidopa-levodopa ER (SINEMET CR)  MG per tablet, Take 1 tablet by mouth Every 8 (Eight) Hours., Disp: , Rfl:   •  ferrous sulfate 325 (65 FE) MG tablet, Take 1 tablet by mouth Daily With Breakfast., Disp: , Rfl:   •  fluticasone (FLONASE) 50 MCG/ACT nasal spray, 2 sprays into each nostril daily. Administer 2 sprays in each nostril for each dose., Disp: , Rfl:   •  montelukast (SINGULAIR) 10 MG tablet, Take 10 mg by mouth every night., Disp: , Rfl:   •  Multiple Vitamins-Minerals (MULTIVITAMIN ADULT PO), Take 1 tablet by mouth daily., Disp: , Rfl:   •  pantoprazole (PROTONIX) 40 MG EC tablet, Take 40 mg by mouth daily., Disp: , Rfl:     No Known Allergies    Family History   Problem Relation Age of Onset   • Arthritis Mother    • Diabetes Mother    • Colon cancer Father        Social History     Social History   • Marital status:      Spouse name: N/A   • Number of children: N/A   • Years of education: N/A     Occupational History   • Retired       Social History Main Topics   • Smoking status: Former Smoker     Packs/day: 2.00     Years: 25.00     Types: Cigarettes     Quit date: 12/14/2001   • Smokeless tobacco: Never Used   • Alcohol use 1.2 oz/week     2 Cans of beer per week   • Drug use: No   • Sexual activity: Defer     Other Topics Concern   • Not on file     Social History Narrative     and lives with wife    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       Review of Systems   Constitutional: Negative for activity change, appetite change, fatigue, fever and unexpected weight change.   HENT: Negative for hearing loss, trouble swallowing and voice change.    Eyes: Negative for visual disturbance.   Respiratory: Negative for cough, choking, chest tightness and shortness of breath.    Cardiovascular: Negative for chest pain.    Gastrointestinal: Negative for abdominal distention, abdominal pain, anal bleeding, blood in stool, constipation, diarrhea, nausea, rectal pain and vomiting.   Endocrine: Negative for polydipsia and polyphagia.   Genitourinary: Negative.    Musculoskeletal: Negative for gait problem and joint swelling.   Skin: Negative for color change and rash.   Allergic/Immunologic: Negative for food allergies.   Neurological: Negative for dizziness, seizures and speech difficulty.   Hematological: Negative for adenopathy.   Psychiatric/Behavioral: Negative for confusion.       Vitals:    11/29/17 1037   BP: 124/68   Pulse: 72   Temp: 97.3 °F (36.3 °C)   SpO2: 91%       Physical Exam   Constitutional: He is oriented to person, place, and time. He appears well-nourished. No distress.   HENT:   Head: Atraumatic.   Mouth/Throat: Oropharynx is clear and moist. No oropharyngeal exudate.   Eyes: EOM are normal. No scleral icterus.   Neck: Normal range of motion. Neck supple. No thyromegaly present.   Cardiovascular: Normal rate and regular rhythm.  Exam reveals no gallop.    Murmur heard.  Pulmonary/Chest: Effort normal and breath sounds normal. He has no wheezes. He has no rales.   Abdominal: Soft. Bowel sounds are normal. There is no tenderness. There is no rebound and no guarding.   Musculoskeletal: He exhibits no edema or tenderness.   Increased MCP joints   Lymphadenopathy:     He has no cervical adenopathy.   Neurological: He is alert and oriented to person, place, and time. He exhibits normal muscle tone.   Essential tremor   Skin: Skin is dry. No erythema.   Psychiatric: He has a normal mood and affect. His behavior is normal. Thought content normal.   Vitals reviewed.      Flaco was seen today for follow-up.    Diagnoses and all orders for this visit:    Iron deficiency anemia, unspecified iron deficiency anemia type  The had an endoscopic workup that was unrevealing except for hiatal hernia and esophagitis.  There was no  evidence for an active GI bleeding source.  The patient has improved with increased energy.      Plan: Follow up in the office as needed.      I spent over 50%  of the office visit counseling and answering questions from the patient.

## 2018-03-30 ENCOUNTER — OFFICE VISIT (OUTPATIENT)
Dept: ORTHOPEDIC SURGERY | Facility: CLINIC | Age: 73
End: 2018-03-30

## 2018-03-30 VITALS
HEART RATE: 78 BPM | BODY MASS INDEX: 23.49 KG/M2 | DIASTOLIC BLOOD PRESSURE: 80 MMHG | WEIGHT: 155 LBS | SYSTOLIC BLOOD PRESSURE: 135 MMHG | HEIGHT: 68 IN

## 2018-03-30 DIAGNOSIS — M20.62 ACQUIRED DEFORMITY OF LEFT TOE: Primary | ICD-10-CM

## 2018-03-30 DIAGNOSIS — G62.9 NEUROPATHY: ICD-10-CM

## 2018-03-30 DIAGNOSIS — Z98.890 HISTORY OF ORTHOPEDIC SURGERY: ICD-10-CM

## 2018-03-30 PROCEDURE — 99214 OFFICE O/P EST MOD 30 MIN: CPT | Performed by: PHYSICIAN ASSISTANT

## 2018-03-30 NOTE — PROGRESS NOTES
[facility] [place] [facility] Note    Subjective     Patient: Flaco Roque II  : 1945    Primary Care Provider: Tayo Hendrix MD    Requesting Provider: As above    Pain of the Left Foot      History    Chief Complaint: Left foot pain    History of Present Illness: This is a very pleasant 72-year-old male patient of Dr. Calle's here to discuss his left second and third toe pain as well as left lateral foot pain.  He is status post left triple fusion and first TMT fusion in 2016 with Dr. Calle.  He reports that his done very well.  He reports that the toes have been bothering him since he had surgery.  He reports they have progressively deformed over the past 2 years.  Pain is worse at the tip of the toes but also has pain at the DIP joints.  He also complains of pain over the fifth metatarsal head and base laterally where there are pressure sores.    Current Outpatient Prescriptions on File Prior to Visit   Medication Sig Dispense Refill   • Alendronate Sodium (FOSAMAX PO) Take  by mouth 1 (One) Time Per Week.     • AMITRIPTYLINE HCL PO Take 2 tablets by mouth Every Night.     • ascorbic acid (VITAMIN C) 100 MG tablet Take 1 tablet by mouth Daily. 30 tablet 0   • budesonide-formoterol (SYMBICORT) 80-4.5 MCG/ACT inhaler Inhale 2 puffs 2 (two) times a day.     • carbidopa-levodopa ER (SINEMET CR)  MG per tablet Take 1 tablet by mouth Every 8 (Eight) Hours.     • ferrous sulfate 325 (65 FE) MG tablet Take 1 tablet by mouth Daily With Breakfast.     • fluticasone (FLONASE) 50 MCG/ACT nasal spray 2 sprays into each nostril daily. Administer 2 sprays in each nostril for each dose.     • montelukast (SINGULAIR) 10 MG tablet Take 10 mg by mouth every night.     • Multiple Vitamins-Minerals (MULTIVITAMIN ADULT PO) Take 1 tablet by mouth daily.     • pantoprazole (PROTONIX) 40 MG EC tablet Take 40 mg by mouth daily.       No current facility-administered medications on file prior to visit.        No Known Allergies   Past Medical History:   Diagnosis Date   • Chris's esophagus     Last EGD 1 year ago with Dr Kaye    • BPH (benign prostatic hyperplasia)    • Chronic low back pain    • COPD (chronic obstructive pulmonary disease)    • GERD (gastroesophageal reflux disease)    • Injury of back    • Lung abscess    • Osteoarthritis    • Osteoporosis    • Rotator cuff tear, left    • Sleep apnea      Past Surgical History:   Procedure Laterality Date   • ARTHRODESIS MIDTARSAL / TARSOMETATARSAL / TARSAL NAVICULAR-CUNEIFORM Left 05/10/2016   • BACK SURGERY     • BACK SURGERY      low back   • CATARACT EXTRACTION     • COLONOSCOPY N/A 11/2/2017    Procedure: COLONOSCOPY;  Surgeon: Luis Eduardo Mayres MD;  Location:  [facility];  Service:    • ENDOSCOPY N/A 11/1/2017    Procedure: ESOPHAGOGASTRODUODENOSCOPY;  Surgeon: Luis Eduardo Mayers MD;  Location:  [facility];  Service:    • ENDOSCOPY  11/02/2017    DR LUIS EDUARDO MAYERS   • GALLBLADDER SURGERY     • SPINE SURGERY     • TOTAL HIP ARTHROPLASTY Left      Family History   Problem Relation Age of Onset   • Arthritis Mother    • Diabetes Mother    • Colon cancer Father       Social History     Social History   • Marital status:      Spouse name: N/A   • Number of children: N/A   • Years of education: N/A     Occupational History   • Retired       Social History Main Topics   • Smoking status: Former Smoker     Packs/day: 2.00     Years: 25.00     Types: Cigarettes     Quit date: 12/14/2001   • Smokeless tobacco: Never Used   • Alcohol use 1.2 oz/week     2 Cans of beer per week   • Drug use: No   • Sexual activity: Defer     Other Topics Concern   • Not on file     Social History Narrative     and lives with wife    Retired supervisor at [employer]    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        Review of Systems   Constitutional: Negative.    HENT: Negative.    Eyes: Negative.    Respiratory: Negative.    Cardiovascular: Negative.    Gastrointestinal: Negative.  "   Endocrine: Negative.    Genitourinary: Negative.    Musculoskeletal: Positive for arthralgias, back pain and gait problem.   Skin: Negative.    Allergic/Immunologic: Negative.    Hematological: Negative.    Psychiatric/Behavioral: Negative.        The following portions of the patient's history were reviewed and updated as appropriate: allergies, current medications, past family history, past medical history, past social history, past surgical history and problem list.      Objective      Physical Exam  /80   Pulse 78   Ht 172.7 cm (67.99\")   Wt 70.3 kg (155 lb)   BMI 23.57 kg/m²     Body mass index is 23.57 kg/m².    GENERAL: Body habitus: normal weight for height    Lower extremity edema: Left: none; Right: none    Varicose veins:  Left: moderate; Right: moderate    Gait: antalgic     Mental Status:  awake and alert; oriented to person, place, and time    Voice:  clear  SKIN:  Normal    Hair Growth:  Right:diminished; Left:  diminished  HEENT: Head: Normocephalic, atraumatic,  without obvious abnormality.  eye: normal external eye, no icterus   PULM:  Repiratory effort normal    Ortho Exam  V:  Dorsalis Pedis:  Right: 2+; Left:2+    Posterior Tibial: Right:2+; Left:2+    Capillary Refill:  Brisk  MSK:      Ankle:  Right: non tender; Left:  non tender      Foot:  Right:  non tender; Left:  tender Over the base and the head of the fifth metatarsal laterally where there is a pressure sore.  There is no sign of infection or drainage.  Scab over.  Claw toes 2-3 and 4 with callus at the tips of the second and third.  Tender at the tips as well as the DIP joint.      NEURO: [place] 5.07 monofilament test: Absent on the left and patchy on the right    Lower extremity sensation: intact     Calf Atrophy:none    Motor Function: all 5/5          Medical Decision Making    Data Review:   ordered and reviewed x-rays today    Assessment:  1. Acquired deformity of left toe    2. History of orthopedic surgery "    3. Neuropathy        Plan:  1.  Left claw toes with neuropathy.  Patient is status post left triple fusion and first TMT fusion with Dr. Ramos 2016.  X-rays today show intact hardware with solid fusions and no evidence of hardware failure.  Patient complains of pain in his second third and fourth toes.  He has pain at the tips and at the DIP joint.  He reports increased deformity since 2016.  He reports it is painful enough he would like to discuss with Dr. Hess surgical options.  I will have him return to see her for repeat evaluation and further discussion of surgical treatment options.    2.  Patient has pressure sores on the base and the head of the fifth metatarsal.  There are scab today with no evidence of infection.  I told him that these are areas of pressure points in his shoe.  He has a very thin feet.  I would recommend that he get some wider shoes.  He'll return to see Dr. Hess next week as above      Awilda Ross PA-C  03/30/18  12:52 PM

## 2018-04-06 ENCOUNTER — OFFICE VISIT (OUTPATIENT)
Dept: ORTHOPEDIC SURGERY | Facility: CLINIC | Age: 73
End: 2018-04-06

## 2018-04-06 VITALS
HEART RATE: 106 BPM | SYSTOLIC BLOOD PRESSURE: 119 MMHG | WEIGHT: 154.98 LBS | BODY MASS INDEX: 23.49 KG/M2 | HEIGHT: 68 IN | DIASTOLIC BLOOD PRESSURE: 73 MMHG

## 2018-04-06 DIAGNOSIS — M21.962 FOOT DEFORMITY, ACQUIRED, LEFT: Primary | ICD-10-CM

## 2018-04-06 DIAGNOSIS — Z79.899 POLYPHARMACY: ICD-10-CM

## 2018-04-06 PROCEDURE — 99213 OFFICE O/P EST LOW 20 MIN: CPT | Performed by: ORTHOPAEDIC SURGERY

## 2018-04-06 NOTE — PROGRESS NOTES
NEW PATIENT    Patient: Flaco Roque II  : 1945    Primary Care Provider: Tayo Hendrix MD    Requesting Provider: As above    Follow-up (1 week - Acquired deformity of left toe -pain)      History    Chief Complaint: left foot pain and deformity    History of Present Illness: He returns for follow-up of his left foot problems.  In 2016 I did a triple arthrodesis and first tarsometatarsal arthrodesis.  That has gone on to heal with good pain relief.  He has claw toes especially 2 and 3 and 4 on the left foot, he reports they're extremely painful.  He also has pain over the fifth metatarsal head laterally.  He does have some pain laterally over the base of the fifth metatarsal.  He would like to proceed with correction of the claw toes.  He reports that he's just miserable with pain in those toes.    Current Outpatient Prescriptions on File Prior to Visit   Medication Sig Dispense Refill   • Alendronate Sodium (FOSAMAX PO) Take  by mouth 1 (One) Time Per Week.     • AMITRIPTYLINE HCL PO Take 2 tablets by mouth Every Night.     • ascorbic acid (VITAMIN C) 100 MG tablet Take 1 tablet by mouth Daily. 30 tablet 0   • budesonide-formoterol (SYMBICORT) 80-4.5 MCG/ACT inhaler Inhale 2 puffs 2 (two) times a day.     • carbidopa-levodopa ER (SINEMET CR)  MG per tablet Take 1 tablet by mouth Every 8 (Eight) Hours.     • ferrous sulfate 325 (65 FE) MG tablet Take 1 tablet by mouth Daily With Breakfast.     • fluticasone (FLONASE) 50 MCG/ACT nasal spray 2 sprays into each nostril daily. Administer 2 sprays in each nostril for each dose.     • montelukast (SINGULAIR) 10 MG tablet Take 10 mg by mouth every night.     • Multiple Vitamins-Minerals (MULTIVITAMIN ADULT PO) Take 1 tablet by mouth daily.     • pantoprazole (PROTONIX) 40 MG EC tablet Take 40 mg by mouth daily.       No current facility-administered medications on file prior to visit.       No Known Allergies   Past Medical History:   Diagnosis Date    • Chris's esophagus     Last EGD 1 year ago with Dr Kaye    • BPH (benign prostatic hyperplasia)    • Chronic low back pain    • COPD (chronic obstructive pulmonary disease)    • GERD (gastroesophageal reflux disease)    • Injury of back    • Lung abscess    • Osteoarthritis    • Osteoporosis    • Rotator cuff tear, left    • Sleep apnea      Past Surgical History:   Procedure Laterality Date   • ARTHRODESIS MIDTARSAL / TARSOMETATARSAL / TARSAL NAVICULAR-CUNEIFORM Left 05/10/2016   • BACK SURGERY     • BACK SURGERY      low back   • CATARACT EXTRACTION     • COLONOSCOPY N/A 11/2/2017    Procedure: COLONOSCOPY;  Surgeon: Luis Eduardo Mayers MD;  Location:  [facility];  Service:    • ENDOSCOPY N/A 11/1/2017    Procedure: ESOPHAGOGASTRODUODENOSCOPY;  Surgeon: Luis Eduardo Mayers MD;  Location:  [facility];  Service:    • ENDOSCOPY  11/02/2017    DR LUIS EDUARDO MAYERS   • GALLBLADDER SURGERY     • SPINE SURGERY     • TOTAL HIP ARTHROPLASTY Left      Family History   Problem Relation Age of Onset   • Arthritis Mother    • Diabetes Mother    • Colon cancer Father       Social History     Social History   • Marital status:      Spouse name: N/A   • Number of children: N/A   • Years of education: N/A     Occupational History   • Retired       Social History Main Topics   • Smoking status: Former Smoker     Packs/day: 2.00     Years: 25.00     Types: Cigarettes     Quit date: 12/14/2001   • Smokeless tobacco: Never Used   • Alcohol use 1.2 oz/week     2 Cans of beer per week   • Drug use: No   • Sexual activity: Defer     Other Topics Concern   • Not on file     Social History Narrative     and lives with wife    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       The following portions of the patient's history were reviewed and updated as appropriate: allergies, current medications, past family history, past medical history,  "past social history, past surgical history and problem list.    Physical Exam:   /73   Pulse 106   Ht 172.7 cm (67.99\")   Wt 70.3 kg (154 lb 15.7 oz)   BMI 23.57 kg/m²   GENERAL: Body habitus: Very thin    Lower extremity edema: Left: none; Right: none    Varicose veins:  Left: mild; Right: mild    Gait: antalgic     Mental Status:  awake and alert; oriented to person, place, and time    Voice:  clear  SKIN:  warm and dry    Hair Growth:  Right:normal; Left:  normal  NAILS: Toenails: thick    CV:  Dorsalis Pedis:  Right: 2+; Left:2+    Posterior Tibial: Right:2+; Left:2+    Capillary Refill:  Brisk  MSK:  Left foot has significant fixed flexion deformity at the PIP joint of toes 2, 3, 4.  They claw into the ground when he walks.  The DIP joints are reasonably straight.  He has some fixed flexion of the great toe DIP joint, about 20°, but he reports it does not hurt.  He has mild flexion of the fifth toe PIP joint, but he reports no pain there.  He reports pain over the lateral fifth metatarsal head.  He has a pressure point here and over the lateral base of fifth metatarsal, but no open wounds.  Hindfoot incisions are healed, arch is in very good alignment, hindfoot is stiff as expected from the surgery.    Right foot has similar but not as severe deformity.           Medical Decision Making    Data Review:   reviewed radiology images and reviewed radiology results    Assessment and Plan/ Diagnosis/Treatment options:   1. Foot deformity, acquired, left  He has healed following the hindfoot fusion.  He reports the claw toes are making him miserable.  He has very thin atrophic fat pads on both feet.  He is at risk for ulcerations.  What I would do for the claw toes on the left foot is excise the PIP joints of toes 2, 3, 4.  I would do metatarsal capsulotomy's for those toes, and I would do open flexor tenotomies.  For the fifth metatarsal head I would do a Chevron osteotomy.  I would not recommend surgery on " the base of the fifth metatarsal, there is really nothing we can do for that type of pressure point.  I would do this as an outpatient, but in the main OR because of his medical risk factors.  I explained the pins in the toes and how I remove them in the office.  I explained the goal is stiffer, straighter toes with less pain.  I explained I cannot make the toes normal, I cannot make the multiple.  I explained that they will sometimes angle side to side and sometimes even been backwards after surgery due to scarring, the goal is less pain.  He will be in a postop shoe at least 6 weeks.  I reminded him that all foot and ankle surgery swells for a year or so. We discussed the risks including but not limited to: death, infection, neurovascular damage, strokes, heart attacks, blood clots, chronic pain, deformity, stiffness, malunion, nonunion, hardware failure, need for further surgery,  amputation, etc.  Questions were asked and answered in detail.

## 2018-05-07 ENCOUNTER — OFFICE VISIT (OUTPATIENT)
Dept: ORTHOPEDIC SURGERY | Facility: CLINIC | Age: 73
End: 2018-05-07

## 2018-05-07 VITALS
DIASTOLIC BLOOD PRESSURE: 83 MMHG | HEART RATE: 77 BPM | SYSTOLIC BLOOD PRESSURE: 140 MMHG | WEIGHT: 146.16 LBS | BODY MASS INDEX: 22.15 KG/M2 | HEIGHT: 68 IN

## 2018-05-07 DIAGNOSIS — M21.962 FOOT DEFORMITY, ACQUIRED, LEFT: Primary | ICD-10-CM

## 2018-05-07 PROCEDURE — 99213 OFFICE O/P EST LOW 20 MIN: CPT | Performed by: ORTHOPAEDIC SURGERY

## 2018-05-07 NOTE — PROGRESS NOTES
ESTABLISHED PATIENT    Patient: Flaco Roque II  : 1945    Primary Care Provider: Tayo Hendrix MD    Requesting Provider: As above    Follow-up (13 months - Left Foot deformity)      History    Chief Complaint: left foot deformity    History of Present Illness: he is here for left forefoot deformity, s/p left triple fusion    Current Outpatient Prescriptions on File Prior to Visit   Medication Sig Dispense Refill   • Alendronate Sodium (FOSAMAX PO) Take  by mouth 1 (One) Time Per Week.     • budesonide-formoterol (SYMBICORT) 80-4.5 MCG/ACT inhaler Inhale 2 puffs 2 (two) times a day.     • carbidopa-levodopa ER (SINEMET CR)  MG per tablet Take 1 tablet by mouth Every 8 (Eight) Hours.     • ferrous sulfate 325 (65 FE) MG tablet Take 1 tablet by mouth Daily With Breakfast.     • fluticasone (FLONASE) 50 MCG/ACT nasal spray 2 sprays into each nostril daily. Administer 2 sprays in each nostril for each dose.     • montelukast (SINGULAIR) 10 MG tablet Take 10 mg by mouth every night.     • Multiple Vitamins-Minerals (MULTIVITAMIN ADULT PO) Take 1 tablet by mouth daily.     • pantoprazole (PROTONIX) 40 MG EC tablet Take 40 mg by mouth daily.     • ascorbic acid (VITAMIN C) 100 MG tablet Take 1 tablet by mouth Daily. 30 tablet 0   • [DISCONTINUED] AMITRIPTYLINE HCL PO Take 2 tablets by mouth Every Night.       No current facility-administered medications on file prior to visit.       No Known Allergies   Past Medical History:   Diagnosis Date   • Chris's esophagus     Last EGD 1 year ago with Dr Kaye    • BPH (benign prostatic hyperplasia)    • Chronic low back pain    • COPD (chronic obstructive pulmonary disease)    • GERD (gastroesophageal reflux disease)    • Injury of back    • Lung abscess    • Osteoarthritis    • Osteoporosis    • Rotator cuff tear, left    • Sleep apnea      Past Surgical History:   Procedure Laterality Date   • ARTHRODESIS MIDTARSAL / TARSOMETATARSAL / TARSAL  NAVICULAR-CUNEIFORM Left 05/10/2016   • BACK SURGERY     • BACK SURGERY      low back   • CATARACT EXTRACTION     • COLONOSCOPY N/A 11/2/2017    Procedure: COLONOSCOPY;  Surgeon: Luis Eduardo Mayers MD;  Location:  [facility];  Service:    • ENDOSCOPY N/A 11/1/2017    Procedure: ESOPHAGOGASTRODUODENOSCOPY;  Surgeon: Luis Eduardo Mayers MD;  Location:  [facility];  Service:    • ENDOSCOPY  11/02/2017    DR LUIS EDUARDO MAYERS   • FOOT SURGERY     • GALLBLADDER SURGERY     • SPINE SURGERY     • TOTAL HIP ARTHROPLASTY Left      Family History   Problem Relation Age of Onset   • Arthritis Mother    • Diabetes Mother    • Osteoarthritis Mother    • Colon cancer Father    • Cancer Father       Social History     Social History   • Marital status:      Spouse name: N/A   • Number of children: N/A   • Years of education: N/A     Occupational History   • Retired       Social History Main Topics   • Smoking status: Former Smoker     Packs/day: 2.00     Years: 25.00     Types: Cigarettes     Start date: 1965     Quit date: 2005   • Smokeless tobacco: Never Used   • Alcohol use 1.2 oz/week     2 Cans of beer per week   • Drug use: No   • Sexual activity: Defer     Other Topics Concern   • Not on file     Social History Narrative     and lives with wife    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    HENT: Negative.    Eyes: Negative.    Respiratory: Negative.    Cardiovascular: Negative.    Gastrointestinal: Positive for constipation.   Endocrine: Negative.    Genitourinary: Negative.    Musculoskeletal: Positive for arthralgias.   Skin: Negative.    Allergic/Immunologic: Positive for environmental allergies.   Neurological: Positive for tremors.   Hematological: Bruises/bleeds easily.   Psychiatric/Behavioral: Negative.        The following portions of the patient's history were reviewed and updated as appropriate: allergies, current medications,  "past family history, past medical history, past social history, past surgical history and problem list.    Physical Exam:   /83   Pulse 77   Ht 171.5 cm (67.5\")   Wt 66.3 kg (146 lb 2.6 oz)   BMI 22.55 kg/m²   GENERAL: Body habitus: normal weight for height    Lower extremity edema: Left: none; Right: none    Gait: antalgic     Mental Status:  awake and alert; oriented to person, place, and time  MSK:   Left:  very tender over lateral 5th metatarsal head, claw toes 2,3,4, increased angle of DIP of great toe, now impinges on 2nd toe        Medical Decision Making    Data Review:   none    Assessment/Plan/Diagnosis/Treatment Options:   1. Foot deformity, acquired, left  He has increased valgus and flexion of the great toe DIP joint, we discussed all in detail.  I will add DIP fusion to the surgical procedure.  We again discussed the procedure on the small toes, the 5ht metatarsal osteotomy.  He is wearing some soft shoes, and that is good.  We discussed the risks including but not limited to: death, infection, neurovascular damage, strokes, heart attacks, blood clots, chronic pain, deformity, stiffness, malunion, nonunion, hardware failure, need for further surgery,  amputation, etc.  Questions were asked and answered in detail.                               "

## 2018-06-13 ENCOUNTER — TELEPHONE (OUTPATIENT)
Dept: ORTHOPEDIC SURGERY | Facility: CLINIC | Age: 73
End: 2018-06-13

## 2018-06-20 ENCOUNTER — APPOINTMENT (OUTPATIENT)
Dept: PREADMISSION TESTING | Facility: HOSPITAL | Age: 73
End: 2018-06-20

## 2018-06-20 VITALS — HEIGHT: 68 IN | WEIGHT: 153.66 LBS | BODY MASS INDEX: 23.29 KG/M2

## 2018-06-20 DIAGNOSIS — M21.962 FOOT DEFORMITY, ACQUIRED, LEFT: ICD-10-CM

## 2018-06-20 DIAGNOSIS — Z79.899 POLYPHARMACY: ICD-10-CM

## 2018-06-20 LAB
ANION GAP SERPL CALCULATED.3IONS-SCNC: 7 MMOL/L (ref 3–11)
BASOPHILS # BLD AUTO: 0.04 10*3/MM3 (ref 0–0.2)
BASOPHILS NFR BLD AUTO: 0.4 % (ref 0–1)
BILIRUB UR QL STRIP: NEGATIVE
BUN BLD-MCNC: 13 MG/DL (ref 9–23)
BUN/CREAT SERPL: 14 (ref 7–25)
CALCIUM SPEC-SCNC: 9 MG/DL (ref 8.7–10.4)
CHLORIDE SERPL-SCNC: 103 MMOL/L (ref 99–109)
CLARITY UR: CLEAR
CO2 SERPL-SCNC: 31 MMOL/L (ref 20–31)
COLOR UR: YELLOW
CREAT BLD-MCNC: 0.93 MG/DL (ref 0.6–1.3)
DEPRECATED RDW RBC AUTO: 50.7 FL (ref 37–54)
EOSINOPHIL # BLD AUTO: 0.14 10*3/MM3 (ref 0–0.3)
EOSINOPHIL NFR BLD AUTO: 1.6 % (ref 0–3)
ERYTHROCYTE [DISTWIDTH] IN BLOOD BY AUTOMATED COUNT: 17 % (ref 11.3–14.5)
GFR SERPL CREATININE-BSD FRML MDRD: 80 ML/MIN/1.73
GLUCOSE BLD-MCNC: 100 MG/DL (ref 70–100)
GLUCOSE UR STRIP-MCNC: NEGATIVE MG/DL
HBA1C MFR BLD: 5.6 % (ref 4.8–5.6)
HCT VFR BLD AUTO: 43.1 % (ref 38.9–50.9)
HGB BLD-MCNC: 13.1 G/DL (ref 13.1–17.5)
HGB UR QL STRIP.AUTO: NEGATIVE
IMM GRANULOCYTES # BLD: 0.03 10*3/MM3 (ref 0–0.03)
IMM GRANULOCYTES NFR BLD: 0.3 % (ref 0–0.6)
KETONES UR QL STRIP: NEGATIVE
LEUKOCYTE ESTERASE UR QL STRIP.AUTO: NEGATIVE
LYMPHOCYTES # BLD AUTO: 1.65 10*3/MM3 (ref 0.6–4.8)
LYMPHOCYTES NFR BLD AUTO: 18.5 % (ref 24–44)
MCH RBC QN AUTO: 24.8 PG (ref 27–31)
MCHC RBC AUTO-ENTMCNC: 30.4 G/DL (ref 32–36)
MCV RBC AUTO: 81.6 FL (ref 80–99)
MONOCYTES # BLD AUTO: 0.85 10*3/MM3 (ref 0–1)
MONOCYTES NFR BLD AUTO: 9.5 % (ref 0–12)
NEUTROPHILS # BLD AUTO: 6.24 10*3/MM3 (ref 1.5–8.3)
NEUTROPHILS NFR BLD AUTO: 70 % (ref 41–71)
NITRITE UR QL STRIP: NEGATIVE
PH UR STRIP.AUTO: 6 [PH] (ref 5–8)
PLATELET # BLD AUTO: 229 10*3/MM3 (ref 150–450)
PMV BLD AUTO: 11.5 FL (ref 6–12)
POTASSIUM BLD-SCNC: 3.8 MMOL/L (ref 3.5–5.5)
PROT UR QL STRIP: NEGATIVE
RBC # BLD AUTO: 5.28 10*6/MM3 (ref 4.2–5.76)
SODIUM BLD-SCNC: 141 MMOL/L (ref 132–146)
SP GR UR STRIP: 1.01 (ref 1–1.03)
UROBILINOGEN UR QL STRIP: NORMAL
WBC NRBC COR # BLD: 8.92 10*3/MM3 (ref 3.5–10.8)

## 2018-06-20 PROCEDURE — 85025 COMPLETE CBC W/AUTO DIFF WBC: CPT | Performed by: ORTHOPAEDIC SURGERY

## 2018-06-20 PROCEDURE — 36415 COLL VENOUS BLD VENIPUNCTURE: CPT

## 2018-06-20 PROCEDURE — 93010 ELECTROCARDIOGRAM REPORT: CPT | Performed by: INTERNAL MEDICINE

## 2018-06-20 PROCEDURE — 93005 ELECTROCARDIOGRAM TRACING: CPT

## 2018-06-20 PROCEDURE — 80048 BASIC METABOLIC PNL TOTAL CA: CPT | Performed by: ORTHOPAEDIC SURGERY

## 2018-06-20 PROCEDURE — 83036 HEMOGLOBIN GLYCOSYLATED A1C: CPT | Performed by: ORTHOPAEDIC SURGERY

## 2018-06-20 PROCEDURE — 81003 URINALYSIS AUTO W/O SCOPE: CPT | Performed by: ORTHOPAEDIC SURGERY

## 2018-06-20 RX ORDER — AMITRIPTYLINE HYDROCHLORIDE 50 MG/1
100 TABLET, FILM COATED ORAL NIGHTLY
COMMUNITY
End: 2019-04-10

## 2018-06-20 RX ORDER — TURMERIC ROOT EXTRACT 500 MG
500 TABLET ORAL DAILY
COMMUNITY
End: 2018-09-07

## 2018-06-20 NOTE — DISCHARGE INSTRUCTIONS

## 2018-07-02 RX ORDER — ONDANSETRON 4 MG/1
4 TABLET, FILM COATED ORAL EVERY 6 HOURS PRN
Qty: 30 TABLET | Refills: 0 | Status: SHIPPED | OUTPATIENT
Start: 2018-07-02 | End: 2018-09-07

## 2018-07-02 RX ORDER — HYDROCODONE BITARTRATE AND ACETAMINOPHEN 7.5; 325 MG/1; MG/1
1-2 TABLET ORAL EVERY 6 HOURS PRN
Qty: 60 TABLET | Refills: 0 | Status: SHIPPED | OUTPATIENT
Start: 2018-07-02 | End: 2018-09-11 | Stop reason: HOSPADM

## 2018-07-02 RX ORDER — OXYCODONE HYDROCHLORIDE AND ACETAMINOPHEN 5; 325 MG/1; MG/1
1-2 TABLET ORAL EVERY 6 HOURS PRN
Qty: 60 TABLET | Refills: 0 | Status: ON HOLD | OUTPATIENT
Start: 2018-07-02 | End: 2018-07-03

## 2018-07-03 ENCOUNTER — ANESTHESIA EVENT (OUTPATIENT)
Dept: PERIOP | Facility: HOSPITAL | Age: 73
End: 2018-07-03

## 2018-07-03 ENCOUNTER — HOSPITAL ENCOUNTER (OUTPATIENT)
Facility: HOSPITAL | Age: 73
Setting detail: HOSPITAL OUTPATIENT SURGERY
Discharge: HOME | End: 2018-07-05
Attending: ORTHOPAEDIC SURGERY | Admitting: ANESTHESIOLOGY

## 2018-07-03 ENCOUNTER — APPOINTMENT (OUTPATIENT)
Dept: GENERAL RADIOLOGY | Facility: HOSPITAL | Age: 73
End: 2018-07-03

## 2018-07-03 ENCOUNTER — ANESTHESIA (OUTPATIENT)
Dept: PERIOP | Facility: HOSPITAL | Age: 73
End: 2018-07-03

## 2018-07-03 VITALS
RESPIRATION RATE: 16 BRPM | OXYGEN SATURATION: 97 % | HEART RATE: 83 BPM | SYSTOLIC BLOOD PRESSURE: 122 MMHG | DIASTOLIC BLOOD PRESSURE: 76 MMHG | TEMPERATURE: 98.3 F

## 2018-07-03 DIAGNOSIS — M21.962 FOOT DEFORMITY, ACQUIRED, LEFT: ICD-10-CM

## 2018-07-03 PROCEDURE — 99212-NC PR NO CHARGE CBC OFFICE OUTPATIENT VISIT 10 MINUTES: Performed by: ORTHOPAEDIC SURGERY

## 2018-07-03 PROCEDURE — 73030 X-RAY EXAM OF SHOULDER: CPT

## 2018-07-03 PROCEDURE — 71100 X-RAY EXAM RIBS UNI 2 VIEWS: CPT

## 2018-07-03 RX ORDER — LIDOCAINE HYDROCHLORIDE 10 MG/ML
0.5 INJECTION, SOLUTION EPIDURAL; INFILTRATION; INTRACAUDAL; PERINEURAL ONCE AS NEEDED
Status: COMPLETED | OUTPATIENT
Start: 2018-07-03 | End: 2018-07-03

## 2018-07-03 RX ORDER — FAMOTIDINE 10 MG/ML
20 INJECTION, SOLUTION INTRAVENOUS ONCE
Status: DISCONTINUED | OUTPATIENT
Start: 2018-07-03 | End: 2018-07-03

## 2018-07-03 RX ORDER — CEFAZOLIN SODIUM 2 G/100ML
2 INJECTION, SOLUTION INTRAVENOUS ONCE
Status: DISCONTINUED | OUTPATIENT
Start: 2018-07-03 | End: 2018-07-13 | Stop reason: HOSPADM

## 2018-07-03 RX ORDER — FAMOTIDINE 20 MG/1
20 TABLET, FILM COATED ORAL ONCE
Status: COMPLETED | OUTPATIENT
Start: 2018-07-03 | End: 2018-07-03

## 2018-07-03 RX ORDER — SODIUM CHLORIDE, SODIUM LACTATE, POTASSIUM CHLORIDE, CALCIUM CHLORIDE 600; 310; 30; 20 MG/100ML; MG/100ML; MG/100ML; MG/100ML
9 INJECTION, SOLUTION INTRAVENOUS CONTINUOUS
Status: DISCONTINUED | OUTPATIENT
Start: 2018-07-03 | End: 2018-07-13 | Stop reason: HOSPADM

## 2018-07-03 RX ORDER — SODIUM CHLORIDE 0.9 % (FLUSH) 0.9 %
1-10 SYRINGE (ML) INJECTION AS NEEDED
Status: DISCONTINUED | OUTPATIENT
Start: 2018-07-03 | End: 2018-07-13 | Stop reason: HOSPADM

## 2018-07-03 RX ADMIN — LIDOCAINE HYDROCHLORIDE 0.5 ML: 10 INJECTION, SOLUTION EPIDURAL; INFILTRATION; INTRACAUDAL; PERINEURAL at 06:15

## 2018-07-03 RX ADMIN — SODIUM CHLORIDE, POTASSIUM CHLORIDE, SODIUM LACTATE AND CALCIUM CHLORIDE 9 ML/HR: 600; 310; 30; 20 INJECTION, SOLUTION INTRAVENOUS at 06:15

## 2018-07-03 RX ADMIN — FAMOTIDINE 20 MG: 20 TABLET ORAL at 06:30

## 2018-07-03 NOTE — ANESTHESIA PREPROCEDURE EVALUATION
Anesthesia Evaluation     NPO Solid Status: > 8 hours  NPO Liquid Status: > 8 hours           Airway   Mallampati: II  TM distance: >3 FB  Neck ROM: full  Possible difficult intubation  Dental      Pulmonary    (+) a smoker (quit 16 years ago) Former, COPD,   Cardiovascular     ECG reviewed  Rhythm: regular  Rate: normal    (-) pacemaker, hypertension, angina, murmur, cardiac stents, CABG      Neuro/Psych  (-) seizures, TIA  GI/Hepatic/Renal/Endo    (-) liver disease, no renal disease, diabetes    Musculoskeletal     Abdominal    Substance History      OB/GYN          Other        ROS/Med Hx Other: Parkinson's disease  Patient was up last night in the dark, fell and hit a trunk.  His shoulder hurts today and he feels he may have hurt it.                    Anesthesia Plan    ASA 3     general   (GA  Popliteal nerve block and catheter)  intravenous induction   Anesthetic plan and risks discussed with patient.    Plan discussed with CRNA.

## 2018-07-03 NOTE — PROGRESS NOTES
THe patient fell this morning at home, and has significant left shoulder and rib pain.  No obvious fracture on X-rays today.  However, he has had shoulder problems previously (left) and I do not think we should proceed with surgery.  I do not want to make the arm worse. I also do not want to put him to sleep, given the rib pain.  My concern would be increased atelectasis and possible pneumonia risk.  He has seen Dr Mortensen for this shoulder previously, and I recommend he call the office to make an appointment to be seen this week.  We will cancel surgery.  (also, he will not be able to keep partial weight off the left foot with left shoulder pain.) discussed with patient and wife in detail.  We will re-schedule the surgery when his arm is less painful.   I gave him the surgical prescription for lortab for the pain.

## 2018-07-03 NOTE — H&P
Patient Care Team:      Chief complaint  Painful claw toes, left    Subjective:    Patient is a 72 y.o.male presents with a history of a left triple arthrodesis in  2016.  He has developed painful left 2,3, and 4th claw toes, in addition to a painful 5th metatarsal head.   He was taking his Parkinsons medicine this morning and fell tearing skin on left arm and injuring left shoulder.  He has pain and difficulty raising arm.  Also has left rib pain  Review of Systems:  General ROS: fell this morning, injured left chest wall and shoulder, tore skin on left arm   Unable to lift left arm, wife had to dress him this moring  Cardiovascular ROS: no chest pain or dyspnea on exertion  Respiratory ROS: no cough, shortness of breath, or wheezing      Allergies: No Known Allergies       Latex: neg  Contrast Dye neg    Home Meds    Prescriptions Prior to Admission   Medication Sig Dispense Refill Last Dose   • alendronate (FOSAMAX) 70 MG tablet Take  by mouth 1 (One) Time Per Week.   6/25/2018   • amitriptyline (ELAVIL) 50 MG tablet Take 50 mg by mouth Every Night.   7/2/2018 at 2000   • ascorbic acid (VITAMIN C) 100 MG tablet Take 1 tablet by mouth Daily. 30 tablet 0 Past Week at Unknown time   • budesonide-formoterol (SYMBICORT) 80-4.5 MCG/ACT inhaler Inhale 2 puffs 3 (Three) Times a Day.   7/3/2018 at 0400   • carbidopa-levodopa ER (SINEMET CR)  MG per tablet Take 1 tablet by mouth Every 8 (Eight) Hours.   7/3/2018 at 0400   • montelukast (SINGULAIR) 10 MG tablet Take 10 mg by mouth every night.   7/2/2018 at 2000   • Multiple Vitamins-Minerals (MULTIVITAMIN ADULT PO) Take 1 tablet by mouth daily.   Past Week at Unknown time   • pantoprazole (PROTONIX) 40 MG EC tablet Take 40 mg by mouth daily.   7/2/2018 at 2000   • Turmeric 500 MG tablet Take 500 mg by mouth Daily.   7/2/2018 at 0800   • ferrous sulfate 325 (65 FE) MG tablet Take 1 tablet by mouth Daily With Breakfast.   More than a month at Unknown time   •  HYDROcodone-acetaminophen (NORCO) 7.5-325 MG per tablet Take 1-2 tablets by mouth Every 6 (Six) Hours As Needed for Moderate Pain  (as needed for pain). 60 tablet 0 More than a month at Unknown time   • ondansetron (ZOFRAN) 4 MG tablet Take 1 tablet by mouth Every 6 (Six) Hours As Needed for Nausea or Vomiting. 30 tablet 0 More than a month at Unknown time     PMH:   Past Medical History:   Diagnosis Date   • Chris's esophagus     Last EGD 1 year ago with Dr Kaye    • BPH (benign prostatic hyperplasia)    • Chronic low back pain    • COPD (chronic obstructive pulmonary disease) (CMS/HCC)    • Foot pain    • GERD (gastroesophageal reflux disease)    • History of transfusion    • Injury of back    • Lung abscess (CMS/HCC)    • Osteoarthritis    • Osteoporosis    • Rotator cuff tear, left    • Sleep apnea      PSH:    Past Surgical History:   Procedure Laterality Date   • ARTHRODESIS MIDTARSAL / TARSOMETATARSAL / TARSAL NAVICULAR-CUNEIFORM Left 05/10/2016   • BACK SURGERY     • BACK SURGERY      low back   • CATARACT EXTRACTION     • COLONOSCOPY N/A 11/2/2017    Procedure: COLONOSCOPY;  Surgeon: Luis Eduardo Mayers MD;  Location:  [facility];  Service:    • ENDOSCOPY N/A 11/1/2017    Procedure: ESOPHAGOGASTRODUODENOSCOPY;  Surgeon: Luis Eduardo Mayers MD;  Location:  [facility];  Service:    • ENDOSCOPY  11/02/2017    DR LUIS EDUARDO MAYERS   • FOOT SURGERY     • GALLBLADDER SURGERY     • PAIN PUMP INSERTION/REVISION     • SPINE SURGERY     • TOTAL HIP ARTHROPLASTY Left      Immunization History: pneumo up to date   Flu  2017   Tetanus 1 month ago  Social History:   Tobacco quit 15 years   Alcohol occasional      Physical Exam:/76 (BP Location: Right arm, Patient Position: Lying)   Pulse 83   Temp 98.3 °F (36.8 °C) (Temporal Artery )   Resp 16   SpO2 97%       General Appearance:    Alert, cooperative, no distress, appears stated age   Head:    Normocephalic, without obvious abnormality, atraumatic   Lungs:      Clear to auscultation bilaterally, respirations unlabored    Heart: Regular rate and rhythm, S1 and S2 normal, no murmur, rub    or gallop    Abdomen:    Soft without tenderness   Breast Exam:    deferred   Genitalia:    deferred   Extremities:   Extremities normal, atraumatic, no cyanosis or edema  Few skin tears left arm, unable to lift left arm    Skin:   Skin color, texture, turgor normal, no rashes or lesions   Neurologic:   Grossly intact         Cancer Patient: __ yes __no __unknown; If yes, clinical stage T:__ N:__M:__, stage group    Impression: left foot deformity    Plan: left, excision PIP 2,3,4, tenotomies 2,3,4, 5th metatarsal osteotomy  Zayda Nunez PA-C 7/3/2018 6:48 AM

## 2018-07-04 PROCEDURE — G0378 HOSPITAL OBSERVATION PER HR: HCPCS

## 2018-07-05 PROCEDURE — G0378 HOSPITAL OBSERVATION PER HR: HCPCS

## 2018-07-06 PROCEDURE — G0378 HOSPITAL OBSERVATION PER HR: HCPCS

## 2018-07-07 PROCEDURE — G0378 HOSPITAL OBSERVATION PER HR: HCPCS

## 2018-07-08 PROCEDURE — G0378 HOSPITAL OBSERVATION PER HR: HCPCS

## 2018-07-09 PROCEDURE — G0378 HOSPITAL OBSERVATION PER HR: HCPCS

## 2018-07-10 PROCEDURE — G0378 HOSPITAL OBSERVATION PER HR: HCPCS

## 2018-07-11 PROCEDURE — G0378 HOSPITAL OBSERVATION PER HR: HCPCS

## 2018-07-12 PROCEDURE — G0378 HOSPITAL OBSERVATION PER HR: HCPCS

## 2018-09-07 ENCOUNTER — APPOINTMENT (OUTPATIENT)
Dept: OTHER | Facility: HOSPITAL | Age: 73
End: 2018-09-07
Attending: ORTHOPAEDIC SURGERY

## 2018-09-07 ENCOUNTER — APPOINTMENT (OUTPATIENT)
Dept: PREADMISSION TESTING | Facility: HOSPITAL | Age: 73
End: 2018-09-07

## 2018-09-07 ENCOUNTER — ANESTHESIA EVENT (OUTPATIENT)
Dept: PERIOP | Facility: HOSPITAL | Age: 73
End: 2018-09-07

## 2018-09-07 VITALS — BODY MASS INDEX: 22.65 KG/M2 | HEIGHT: 68 IN | WEIGHT: 149.47 LBS

## 2018-09-07 LAB
ABO GROUP BLD: NORMAL
BLD GP AB SCN SERPL QL: NEGATIVE
DEPRECATED RDW RBC AUTO: 52.5 FL (ref 37–54)
ERYTHROCYTE [DISTWIDTH] IN BLOOD BY AUTOMATED COUNT: 17.5 % (ref 11.3–14.5)
HBA1C MFR BLD: 5.4 % (ref 4.8–5.6)
HCT VFR BLD AUTO: 37.9 % (ref 38.9–50.9)
HGB BLD-MCNC: 11.4 G/DL (ref 13.1–17.5)
MCH RBC QN AUTO: 24.8 PG (ref 27–31)
MCHC RBC AUTO-ENTMCNC: 30.1 G/DL (ref 32–36)
MCV RBC AUTO: 82.4 FL (ref 80–99)
PLATELET # BLD AUTO: 359 10*3/MM3 (ref 150–450)
PMV BLD AUTO: 10 FL (ref 6–12)
RBC # BLD AUTO: 4.6 10*6/MM3 (ref 4.2–5.76)
RH BLD: POSITIVE
WBC NRBC COR # BLD: 9.72 10*3/MM3 (ref 3.5–10.8)

## 2018-09-07 PROCEDURE — 36415 COLL VENOUS BLD VENIPUNCTURE: CPT

## 2018-09-07 PROCEDURE — 86850 RBC ANTIBODY SCREEN: CPT | Performed by: ORTHOPAEDIC SURGERY

## 2018-09-07 PROCEDURE — 85027 COMPLETE CBC AUTOMATED: CPT | Performed by: ANESTHESIOLOGY

## 2018-09-07 PROCEDURE — 86900 BLOOD TYPING SEROLOGIC ABO: CPT | Performed by: ORTHOPAEDIC SURGERY

## 2018-09-07 PROCEDURE — 86901 BLOOD TYPING SEROLOGIC RH(D): CPT | Performed by: ORTHOPAEDIC SURGERY

## 2018-09-07 PROCEDURE — 83036 HEMOGLOBIN GLYCOSYLATED A1C: CPT | Performed by: ANESTHESIOLOGY

## 2018-09-07 RX ORDER — IPRATROPIUM BROMIDE AND ALBUTEROL SULFATE 2.5; .5 MG/3ML; MG/3ML
3 SOLUTION RESPIRATORY (INHALATION) 3 TIMES DAILY
COMMUNITY

## 2018-09-07 RX ORDER — FAMOTIDINE 10 MG/ML
20 INJECTION, SOLUTION INTRAVENOUS ONCE
Status: CANCELLED | OUTPATIENT
Start: 2018-09-07 | End: 2018-09-07

## 2018-09-07 NOTE — DISCHARGE INSTRUCTIONS
The following information and instructions were given:    NPO after MN except sips of water with routine prescribed medication (except blood thinner, diabetes, or weight reducing medication) unless otherwise instructed by your physician.  Do not eat, drink, smoke or chew gum after midnight the night before surgery. This also includes no mints.    DO NOT shave for two days before your procedure.  Do not wear makeup.      DO NOT wear fingernail polish (gel/regular) and/or acrylic/artificial nails on the day of surgery.   If a patient had recent manicure and would rather not remove polish or artificial nails, then the minimum requirement is that the polish/artificial nails must be removed from the middle finger on each hand.      If patient is having surgery/procedure on an upper extremity, then the patient was instructed that fingernail polish/artificial fingernails must be removed for surgery.  NO EXCEPTIONS.      If patient is having surgery/procedure on a lower extremity, then the patient was instructed that toenail polish on both extremities must be removed for surgery.  NO EXCEPTIONS.    Remove all jewelry (advised to go to jeweler if unable to remove).  Jewelry, especially rings, can no longer be taped for surgery.    Leave anything you consider valuable at home.    Leave your suitcase in the car until after your surgery.    Bring the following with you (if applicable)       -picture ID and insurance cards       -Co-pay/deductible required by insurance       -Medications in the original bottles (not a list) including all over-the-counter  medications if not brought to PAT       -Copy of advance directive, living will or power of  documents if not  brought to [facility]       -CPAP or BIPAP mask and tubing (do not bring machine)       -Skin prep instructions sheet       -PAT Pass    Education booklet, brochure, handout or binder given to patient.    Pain Control After Surgery handout given to  patient.    Respirex use (handout given to patient) and pneumonia prevention.    Signs and Symptoms of infection discussed.    DVT Prevention education given.  Stressing the importance of ambulation.    Patient to apply Chlorhexadine wipes to surgical area (as instructed) the night before procedure and the AM of procedure.    When applicable for ERAS patients (colon, orthropedic), patients were instructed to drink 20 ounces of Gatorade or G2 for diabetics (or until full) the morning of surgery.  The Gatorade or G2 must be consumed at least 3 hours before surgery start time.  No RED Gatorade or G2.  Appropriated ERAS handout given to patient during PAT visit.

## 2018-09-07 NOTE — PAT
Patient to apply Chlorhexadine wipes  to surgical area (as instructed) the night before procedure and the AM of procedure. Wipes provided.    Patient instructed to drink 20 ounces (or until full) of Gatorade or 20 ounces of G2 (if diabetic) and complete 3 hours before your surgery start time. (NO RED Gatorade or G2)    Patient verbalized understanding.

## 2018-09-10 ENCOUNTER — APPOINTMENT (OUTPATIENT)
Dept: GENERAL RADIOLOGY | Facility: HOSPITAL | Age: 73
End: 2018-09-10

## 2018-09-10 ENCOUNTER — HOSPITAL ENCOUNTER (INPATIENT)
Facility: HOSPITAL | Age: 73
LOS: 1 days | Discharge: HOME | End: 2018-09-11
Attending: ORTHOPAEDIC SURGERY | Admitting: ORTHOPAEDIC SURGERY

## 2018-09-10 ENCOUNTER — ANESTHESIA (OUTPATIENT)
Dept: PERIOP | Facility: HOSPITAL | Age: 73
End: 2018-09-10

## 2018-09-10 DIAGNOSIS — Z74.09 IMPAIRED MOBILITY AND ADLS: ICD-10-CM

## 2018-09-10 DIAGNOSIS — Z78.9 IMPAIRED MOBILITY AND ADLS: ICD-10-CM

## 2018-09-10 DIAGNOSIS — Z74.09 IMPAIRED FUNCTIONAL MOBILITY, BALANCE, GAIT, AND ENDURANCE: Primary | ICD-10-CM

## 2018-09-10 PROBLEM — M19.019 ARTHROPATHY OF SHOULDER REGION: Status: ACTIVE | Noted: 2018-09-10

## 2018-09-10 PROBLEM — Z96.612 STATUS POST REVERSE TOTAL SHOULDER REPLACEMENT, LEFT: Status: ACTIVE | Noted: 2018-09-10

## 2018-09-10 PROCEDURE — 25010000003 CEFAZOLIN IN DEXTROSE 2-4 GM/100ML-% SOLUTION: Performed by: ORTHOPAEDIC SURGERY

## 2018-09-10 PROCEDURE — L3670 SO ACRO/CLAV CAN WEB PRE OTS: HCPCS | Performed by: ORTHOPAEDIC SURGERY

## 2018-09-10 PROCEDURE — C1776 JOINT DEVICE (IMPLANTABLE): HCPCS | Performed by: ORTHOPAEDIC SURGERY

## 2018-09-10 PROCEDURE — 25010000002 TOBRAMYCIN PER 80 MG: Performed by: ORTHOPAEDIC SURGERY

## 2018-09-10 PROCEDURE — 0RRK00Z REPLACEMENT OF LEFT SHOULDER JOINT WITH REVERSE BALL AND SOCKET SYNTHETIC SUBSTITUTE, OPEN APPROACH: ICD-10-PCS | Performed by: ORTHOPAEDIC SURGERY

## 2018-09-10 PROCEDURE — 25010000002 ROPIVACAINE HCL-NACL 0.2-0.9 % SOLUTION: Performed by: NURSE ANESTHETIST, CERTIFIED REGISTERED

## 2018-09-10 PROCEDURE — C1713 ANCHOR/SCREW BN/BN,TIS/BN: HCPCS | Performed by: ORTHOPAEDIC SURGERY

## 2018-09-10 PROCEDURE — 25010000002 DEXAMETHASONE PER 1 MG: Performed by: NURSE ANESTHETIST, CERTIFIED REGISTERED

## 2018-09-10 PROCEDURE — 25010000002 PROPOFOL 10 MG/ML EMULSION: Performed by: NURSE ANESTHETIST, CERTIFIED REGISTERED

## 2018-09-10 PROCEDURE — 25010000002 FENTANYL CITRATE (PF) 100 MCG/2ML SOLUTION: Performed by: NURSE ANESTHETIST, CERTIFIED REGISTERED

## 2018-09-10 PROCEDURE — 94799 UNLISTED PULMONARY SVC/PX: CPT

## 2018-09-10 PROCEDURE — 73020 X-RAY EXAM OF SHOULDER: CPT

## 2018-09-10 DEVICE — IMPLANTABLE DEVICE: Type: IMPLANTABLE DEVICE | Site: SHOULDER | Status: FUNCTIONAL

## 2018-09-10 DEVICE — LINER HUM UNIVERS REVERS MD 39  PLS6MM: Type: IMPLANTABLE DEVICE | Site: SHOULDER | Status: FUNCTIONAL

## 2018-09-10 DEVICE — CUP SUT UNIVERS REVERS 39 PLS2 LT: Type: IMPLANTABLE DEVICE | Site: SHOULDER | Status: FUNCTIONAL

## 2018-09-10 DEVICE — SCRW GLEN UNIVERS REVERS PERIPH 4.5X36MM: Type: IMPLANTABLE DEVICE | Site: SHOULDER | Status: FUNCTIONAL

## 2018-09-10 DEVICE — SCRW GLEN UNIVERS REVERS PERIPH 4.5X30MM: Type: IMPLANTABLE DEVICE | Site: SHOULDER | Status: FUNCTIONAL

## 2018-09-10 DEVICE — SCRW GLEN UNIVERS REVERS CENTRL NL 6.5X20MM: Type: IMPLANTABLE DEVICE | Site: SHOULDER | Status: FUNCTIONAL

## 2018-09-10 DEVICE — GLENOSPHERE UNIVERS REVERS M/39 PLS4 LAT: Type: IMPLANTABLE DEVICE | Site: SHOULDER | Status: FUNCTIONAL

## 2018-09-10 RX ORDER — CARBIDOPA AND LEVODOPA 25; 100 MG/1; MG/1
1 TABLET, EXTENDED RELEASE ORAL 4 TIMES DAILY
Status: DISCONTINUED | OUTPATIENT
Start: 2018-09-10 | End: 2018-09-10

## 2018-09-10 RX ORDER — LIDOCAINE HYDROCHLORIDE 10 MG/ML
INJECTION, SOLUTION EPIDURAL; INFILTRATION; INTRACAUDAL; PERINEURAL AS NEEDED
Status: DISCONTINUED | OUTPATIENT
Start: 2018-09-10 | End: 2018-09-10 | Stop reason: SURG

## 2018-09-10 RX ORDER — ACETAMINOPHEN 500 MG
1000 TABLET ORAL ONCE
Status: COMPLETED | OUTPATIENT
Start: 2018-09-10 | End: 2018-09-10

## 2018-09-10 RX ORDER — OXYCODONE HYDROCHLORIDE AND ACETAMINOPHEN 5; 325 MG/1; MG/1
2 TABLET ORAL EVERY 4 HOURS PRN
Status: DISCONTINUED | OUTPATIENT
Start: 2018-09-10 | End: 2018-09-11 | Stop reason: HOSPADM

## 2018-09-10 RX ORDER — OXYCODONE HYDROCHLORIDE AND ACETAMINOPHEN 5; 325 MG/1; MG/1
1 TABLET ORAL EVERY 4 HOURS PRN
Status: DISCONTINUED | OUTPATIENT
Start: 2018-09-10 | End: 2018-09-11 | Stop reason: HOSPADM

## 2018-09-10 RX ORDER — ATRACURIUM BESYLATE 10 MG/ML
INJECTION, SOLUTION INTRAVENOUS AS NEEDED
Status: DISCONTINUED | OUTPATIENT
Start: 2018-09-10 | End: 2018-09-10 | Stop reason: SURG

## 2018-09-10 RX ORDER — ONDANSETRON 4 MG/1
4 TABLET, FILM COATED ORAL EVERY 6 HOURS PRN
Status: DISCONTINUED | OUTPATIENT
Start: 2018-09-10 | End: 2018-09-11 | Stop reason: HOSPADM

## 2018-09-10 RX ORDER — CEFAZOLIN SODIUM 2 G/100ML
2 INJECTION, SOLUTION INTRAVENOUS ONCE
Status: COMPLETED | OUTPATIENT
Start: 2018-09-10 | End: 2018-09-10

## 2018-09-10 RX ORDER — BUDESONIDE AND FORMOTEROL FUMARATE DIHYDRATE 80; 4.5 UG/1; UG/1
2 AEROSOL RESPIRATORY (INHALATION)
Status: DISCONTINUED | OUTPATIENT
Start: 2018-09-10 | End: 2018-09-11 | Stop reason: HOSPADM

## 2018-09-10 RX ORDER — LIDOCAINE HYDROCHLORIDE 10 MG/ML
0.5 INJECTION, SOLUTION EPIDURAL; INFILTRATION; INTRACAUDAL; PERINEURAL ONCE AS NEEDED
Status: COMPLETED | OUTPATIENT
Start: 2018-09-10 | End: 2018-09-10

## 2018-09-10 RX ORDER — FENTANYL CITRATE 50 UG/ML
INJECTION, SOLUTION INTRAMUSCULAR; INTRAVENOUS AS NEEDED
Status: DISCONTINUED | OUTPATIENT
Start: 2018-09-10 | End: 2018-09-10 | Stop reason: SURG

## 2018-09-10 RX ORDER — LABETALOL HYDROCHLORIDE 5 MG/ML
10 INJECTION, SOLUTION INTRAVENOUS EVERY 4 HOURS PRN
Status: DISCONTINUED | OUTPATIENT
Start: 2018-09-10 | End: 2018-09-11 | Stop reason: HOSPADM

## 2018-09-10 RX ORDER — SODIUM CHLORIDE 9 MG/ML
INJECTION, SOLUTION INTRAVENOUS AS NEEDED
Status: DISCONTINUED | OUTPATIENT
Start: 2018-09-10 | End: 2018-09-10 | Stop reason: HOSPADM

## 2018-09-10 RX ORDER — PREGABALIN 75 MG/1
75 CAPSULE ORAL ONCE
Status: COMPLETED | OUTPATIENT
Start: 2018-09-10 | End: 2018-09-10

## 2018-09-10 RX ORDER — MONTELUKAST SODIUM 10 MG/1
10 TABLET ORAL NIGHTLY
Status: DISCONTINUED | OUTPATIENT
Start: 2018-09-10 | End: 2018-09-11 | Stop reason: HOSPADM

## 2018-09-10 RX ORDER — ROPIVACAINE IN 0.9% SOD CHL/PF 0.2% 545ML
6 ELASTOMERIC PUMP, HI VARIABLE RATE INJECTION CONTINUOUS
Status: DISCONTINUED | OUTPATIENT
Start: 2018-09-10 | End: 2018-09-11 | Stop reason: HOSPADM

## 2018-09-10 RX ORDER — FAMOTIDINE 20 MG/1
20 TABLET, FILM COATED ORAL ONCE
Status: COMPLETED | OUTPATIENT
Start: 2018-09-10 | End: 2018-09-10

## 2018-09-10 RX ORDER — AMITRIPTYLINE HYDROCHLORIDE 50 MG/1
100 TABLET, FILM COATED ORAL NIGHTLY
Status: DISCONTINUED | OUTPATIENT
Start: 2018-09-10 | End: 2018-09-11 | Stop reason: HOSPADM

## 2018-09-10 RX ORDER — MULTIPLE VITAMINS W/ MINERALS TAB 9MG-400MCG
1 TAB ORAL DAILY
Status: DISCONTINUED | OUTPATIENT
Start: 2018-09-10 | End: 2018-09-11 | Stop reason: HOSPADM

## 2018-09-10 RX ORDER — PANTOPRAZOLE SODIUM 40 MG/1
40 TABLET, DELAYED RELEASE ORAL
Status: DISCONTINUED | OUTPATIENT
Start: 2018-09-10 | End: 2018-09-11 | Stop reason: HOSPADM

## 2018-09-10 RX ORDER — PROPOFOL 10 MG/ML
VIAL (ML) INTRAVENOUS AS NEEDED
Status: DISCONTINUED | OUTPATIENT
Start: 2018-09-10 | End: 2018-09-10 | Stop reason: SURG

## 2018-09-10 RX ORDER — CEFAZOLIN SODIUM 2 G/100ML
2 INJECTION, SOLUTION INTRAVENOUS EVERY 8 HOURS
Status: COMPLETED | OUTPATIENT
Start: 2018-09-10 | End: 2018-09-10

## 2018-09-10 RX ORDER — SODIUM CHLORIDE 0.9 % (FLUSH) 0.9 %
1-10 SYRINGE (ML) INJECTION AS NEEDED
Status: DISCONTINUED | OUTPATIENT
Start: 2018-09-10 | End: 2018-09-10 | Stop reason: HOSPADM

## 2018-09-10 RX ORDER — DOCUSATE SODIUM 100 MG/1
100 CAPSULE, LIQUID FILLED ORAL 2 TIMES DAILY PRN
Status: DISCONTINUED | OUTPATIENT
Start: 2018-09-10 | End: 2018-09-11 | Stop reason: HOSPADM

## 2018-09-10 RX ORDER — NALOXONE HCL 0.4 MG/ML
0.1 VIAL (ML) INJECTION
Status: DISCONTINUED | OUTPATIENT
Start: 2018-09-10 | End: 2018-09-11 | Stop reason: HOSPADM

## 2018-09-10 RX ORDER — BUPIVACAINE HYDROCHLORIDE 2.5 MG/ML
INJECTION, SOLUTION EPIDURAL; INFILTRATION; INTRACAUDAL AS NEEDED
Status: DISCONTINUED | OUTPATIENT
Start: 2018-09-10 | End: 2018-09-10 | Stop reason: SURG

## 2018-09-10 RX ORDER — DEXAMETHASONE SODIUM PHOSPHATE 4 MG/ML
INJECTION, SOLUTION INTRA-ARTICULAR; INTRALESIONAL; INTRAMUSCULAR; INTRAVENOUS; SOFT TISSUE AS NEEDED
Status: DISCONTINUED | OUTPATIENT
Start: 2018-09-10 | End: 2018-09-10 | Stop reason: SURG

## 2018-09-10 RX ORDER — ONDANSETRON 2 MG/ML
4 INJECTION INTRAMUSCULAR; INTRAVENOUS EVERY 6 HOURS PRN
Status: DISCONTINUED | OUTPATIENT
Start: 2018-09-10 | End: 2018-09-11 | Stop reason: HOSPADM

## 2018-09-10 RX ORDER — HYDROMORPHONE HYDROCHLORIDE 1 MG/ML
0.5 INJECTION, SOLUTION INTRAMUSCULAR; INTRAVENOUS; SUBCUTANEOUS
Status: DISCONTINUED | OUTPATIENT
Start: 2018-09-10 | End: 2018-09-11 | Stop reason: HOSPADM

## 2018-09-10 RX ORDER — TOBRAMYCIN SULFATE 40 MG/ML
VIAL (ML) INJECTION AS NEEDED
Status: DISCONTINUED | OUTPATIENT
Start: 2018-09-10 | End: 2018-09-10 | Stop reason: HOSPADM

## 2018-09-10 RX ORDER — ASPIRIN 325 MG
325 TABLET, DELAYED RELEASE (ENTERIC COATED) ORAL DAILY
Status: DISCONTINUED | OUTPATIENT
Start: 2018-09-11 | End: 2018-09-11 | Stop reason: HOSPADM

## 2018-09-10 RX ORDER — CELECOXIB 200 MG/1
200 CAPSULE ORAL ONCE
Status: COMPLETED | OUTPATIENT
Start: 2018-09-10 | End: 2018-09-10

## 2018-09-10 RX ORDER — SODIUM CHLORIDE, SODIUM LACTATE, POTASSIUM CHLORIDE, CALCIUM CHLORIDE 600; 310; 30; 20 MG/100ML; MG/100ML; MG/100ML; MG/100ML
9 INJECTION, SOLUTION INTRAVENOUS CONTINUOUS
Status: DISCONTINUED | OUTPATIENT
Start: 2018-09-10 | End: 2018-09-11 | Stop reason: HOSPADM

## 2018-09-10 RX ADMIN — CELECOXIB 200 MG: 200 CAPSULE ORAL at 06:18

## 2018-09-10 RX ADMIN — PREGABALIN 75 MG: 75 CAPSULE ORAL at 06:15

## 2018-09-10 RX ADMIN — FAMOTIDINE 20 MG: 20 TABLET, FILM COATED ORAL at 06:15

## 2018-09-10 RX ADMIN — LIDOCAINE HYDROCHLORIDE 0.5 ML: 10 INJECTION, SOLUTION EPIDURAL; INFILTRATION; INTRACAUDAL; PERINEURAL at 06:15

## 2018-09-10 RX ADMIN — CARBIDOPA AND LEVODOPA 1 TABLET: 25; 100 TABLET ORAL at 22:41

## 2018-09-10 RX ADMIN — Medication 6 ML/HR: at 08:40

## 2018-09-10 RX ADMIN — BUDESONIDE AND FORMOTEROL FUMARATE DIHYDRATE 2 PUFF: 80; 4.5 AEROSOL RESPIRATORY (INHALATION) at 21:00

## 2018-09-10 RX ADMIN — AMITRIPTYLINE HYDROCHLORIDE 100 MG: 50 TABLET, FILM COATED ORAL at 22:41

## 2018-09-10 RX ADMIN — CARBIDOPA AND LEVODOPA 1 TABLET: 25; 100 TABLET ORAL at 18:03

## 2018-09-10 RX ADMIN — MONTELUKAST SODIUM 10 MG: 10 TABLET, FILM COATED ORAL at 22:41

## 2018-09-10 RX ADMIN — FENTANYL CITRATE 50 MCG: 50 INJECTION, SOLUTION INTRAMUSCULAR; INTRAVENOUS at 07:18

## 2018-09-10 RX ADMIN — ATRACURIUM BESYLATE 50 MG: 10 INJECTION, SOLUTION INTRAVENOUS at 07:45

## 2018-09-10 RX ADMIN — DEXAMETHASONE SODIUM PHOSPHATE 4 MG: 4 INJECTION, SOLUTION INTRAMUSCULAR; INTRAVENOUS at 08:00

## 2018-09-10 RX ADMIN — MULTIPLE VITAMINS W/ MINERALS TAB 1 TABLET: TAB ORAL at 12:14

## 2018-09-10 RX ADMIN — PROPOFOL 100 MG: 10 INJECTION, EMULSION INTRAVENOUS at 07:45

## 2018-09-10 RX ADMIN — MUPIROCIN 10 APPLICATION: 20 OINTMENT TOPICAL at 06:15

## 2018-09-10 RX ADMIN — PANTOPRAZOLE SODIUM 40 MG: 40 TABLET, DELAYED RELEASE ORAL at 12:15

## 2018-09-10 RX ADMIN — LIDOCAINE HYDROCHLORIDE 50 MG: 10 INJECTION, SOLUTION EPIDURAL; INFILTRATION; INTRACAUDAL; PERINEURAL at 07:45

## 2018-09-10 RX ADMIN — BUPIVACAINE HYDROCHLORIDE 12 ML: 2.5 INJECTION, SOLUTION EPIDURAL; INFILTRATION; INTRACAUDAL; PERINEURAL at 07:20

## 2018-09-10 RX ADMIN — FENTANYL CITRATE 50 MCG: 50 INJECTION, SOLUTION INTRAMUSCULAR; INTRAVENOUS at 07:14

## 2018-09-10 RX ADMIN — SODIUM CHLORIDE, POTASSIUM CHLORIDE, SODIUM LACTATE AND CALCIUM CHLORIDE: 600; 310; 30; 20 INJECTION, SOLUTION INTRAVENOUS at 07:37

## 2018-09-10 RX ADMIN — CEFAZOLIN SODIUM 2 G: 2 INJECTION, SOLUTION INTRAVENOUS at 22:42

## 2018-09-10 RX ADMIN — ACETAMINOPHEN 1000 MG: 500 TABLET, FILM COATED ORAL at 06:15

## 2018-09-10 RX ADMIN — CEFAZOLIN SODIUM 2 G: 2 INJECTION, SOLUTION INTRAVENOUS at 16:09

## 2018-09-10 RX ADMIN — CEFAZOLIN SODIUM 2 G: 2 INJECTION, SOLUTION INTRAVENOUS at 07:37

## 2018-09-10 NOTE — ANESTHESIA POSTPROCEDURE EVALUATION
Patient: Flaco Roque II    Procedure Summary     Date:  09/10/18 [facility]    Anesthesia Start:  0737 Anesthesia Stop:  0854    Procedure:  LEFT REVERSE TOTAL SHOULDER ARTHROPLASTY (Left Shoulder) Diagnosis:      Surgeon:  Abel Brennan MD Provider:  Keshav Cm MD    Anesthesia Type:  general, regional ASA Status:  3          Anesthesia Type: general, regional  Last vitals  BP   133/80 (09/10/18 0849)   Temp   97.7 °F (36.5 °C) (09/10/18 0849)   Pulse   68 (09/10/18 0849)   Resp   16 (09/10/18 0849)     SpO2   97 % (09/10/18 0849)     Post Anesthesia Care and Evaluation    Patient location during evaluation: PACU  Patient participation: complete - patient participated  Level of consciousness: awake and alert  Pain score: 0  Pain management: adequate  Airway patency: patent  Anesthetic complications: No anesthetic complications  PONV Status: none  Cardiovascular status: hemodynamically stable and acceptable  Respiratory status: nonlabored ventilation, acceptable and nasal cannula  Hydration status: acceptable

## 2018-09-10 NOTE — ANESTHESIA PROCEDURE NOTES
Airway  Urgency: elective    Date/Time: 9/10/2018 7:47 AM  Airway not difficult    General Information and Staff    Patient location during procedure: OR  CRNA: JESSICA DE LA FUENTE    Indications and Patient Condition  Indications for airway management: airway protection    Preoxygenated: yes  MILS not maintained throughout  Mask difficulty assessment: 1 - vent by mask    Final Airway Details  Final airway type: endotracheal airway      Successful airway: ETT  Cuffed: yes   Successful intubation technique: direct laryngoscopy  Endotracheal tube insertion site: oral  Blade: Valenzuela  Blade size: 2  ETT size: 7.5 mm  Cormack-Lehane Classification: grade I - full view of glottis  Placement verified by: chest auscultation and capnometry   Measured from: lips  ETT to lips (cm): 20  Number of attempts at approach: 1    Additional Comments  Negative epigastric sounds, Breath sound equal bilaterally with symmetric chest rise and fall

## 2018-09-10 NOTE — OP NOTE
Date; September 10, 2018    Preoperative diagnosis; left shoulder rotator cuff arthropathy    Postoperative diagnosis; same    Procedure; reverse total shoulder arthroplasty    Surgeon; Abel Brennan M.D.    Assistant; Stacy Weinberg physician assistant medically required    Anesthesia; general with endotracheal airway and interscalene block with catheter    Complications; none noted    Estimated blood loss; 200 mL    Implants; Arthrex; size 10 humeral stem, +6 polyethylene, posterior offset metaphyseal post, 39+4 glenoid sphere    Indications; 72-year-old gentleman with severely advanced rotator cuff arthropathy.  Deficiency of the superior posterior rotator cuff and superior migration of the proximal humerus.  Secondary degenerative changes.  Pseudoparalysis on exam.  Failed conservative treatment.  Counseled regarding his options.  Has chosen reverse shoulder arthroplasty.  Acknowledge the intrinsic risks.  Full informed consent was reviewed.    Procedure performed; identified and marked.  Consent confirmed.  After the induction of anesthesia the patient was carefully padded and positioned in the modified beachchair position at 40°.  Cervical spine stabilized.  Meticulous prep and drape.  Surgical timeout.  We utilized a pneumatic arm taylor, Ioban and togas.  Deltopectoral approach the shoulder was employed.  Meticulous hemostasis.  The subacromial, subdeltoid and subcoracoid spaces were bluntly developed and adhesions released.  Deep retractors were placed.  There was complete deficiency of the superior and posterior cuff.  Upper half the subscapularis was torn.  There were advanced degenerative changes on the superior aspect of the humeral head.  Standard shallow humeral head cut was made at 135°.  We turned our attention directly to the glenoid.  Circumferential labrum ectomy.  Good exposure.  We utilized the guide and central pin with a good inferior central placement in line with the glenoid axis.  We  performed our sequential reaming for the inset baseplate.  It was impacted firmly with a good press fit.  Was carefully secured with a central compression screw and inferior and superior locking screws.  The glenoid sphere was subsequently impacted with +4 lateralization.  The Lambert taper was carefully checked and fully engaged.  We turned our attention back to the humerus.  We reamed and broached until good axial stability was obtained.  We trialed and selected the appropriate polyethylene liner which provided an impingement free range of motion movement, good soft tissue tension and stability throughout a full range of motion movement.  We completed the procedure with copious irrigation with antibiotic infused saline.  We left the needle in the joint to inject postoperative antibiotics.  We had no significant rotator cuff or subscapularis to close.  We closed the deltopectoral interval as well as the subcutaneous and subcuticular layers.  We injected 40 mg of gentamicin diluted in saline into the joint for postoperative antibiotic prophylaxis.  We applied a adhesive covering dressing.  Sling was placed.  Anesthesia reversed and stable to the recovery room.    Stable to the PACU

## 2018-09-10 NOTE — H&P
Patient Name: Flaco Roque II  MRN: [id number]  : 1945  DOS: 9/10/2018    Attending: Abel Brennan MD    Primary Care Provider: Tayo Hendrix MD      Chief complaint:  Left shoulder pain    Subjective   Patient is a 72 y.o. male presented for left reverse total shoulder arthroplasty by Dr. Brennan under [place]. He tolerated surgery well and is admitted for further medical management. He reports a fall on  injuring the left shoulder. Has had severe pain and limited ROM since.    When seen postop he is doing well. His pain is well controlled. He denies nausea, shortness of breath or chest pain. No hx of DVT or PE.    He has chronic pain and indwelling morphine pump. He is followed by Dr. Kingston, pain management.      ( Above is noted/ agree. Doing well when seen later in his room. Good pain control. No sob, dysphagia, hoarseness. No n/vom. )wy    Allergies:  No Known Allergies    Meds:  Prescriptions Prior to Admission   Medication Sig Dispense Refill Last Dose   • alendronate (FOSAMAX) 70 MG tablet Take  by mouth 1 (One) Time Per Week.   2018 at Unknown time   • amitriptyline (ELAVIL) 50 MG tablet Take 100 mg by mouth Every Night.   2018 at Unknown time   • budesonide-formoterol (SYMBICORT) 80-4.5 MCG/ACT inhaler Inhale 2 puffs 2 (Two) Times a Day.   9/10/2018 at Unknown time   • carbidopa-levodopa ER (SINEMET CR)  MG per tablet Take 1 tablet by mouth Every 8 (Eight) Hours. (Patient taking differently: Take 1 tablet by mouth 4 (Four) Times a Day.)   9/10/2018 at Unknown time   • ferrous sulfate 325 (65 FE) MG tablet Take 1 tablet by mouth Daily With Breakfast.   2018 at Unknown time   • ipratropium-albuterol (DUO-NEB) 0.5-2.5 mg/3 ml nebulizer Take 3 mL by nebulization Every 4 (Four) Hours As Needed for Wheezing.   2018 at Unknown time   • montelukast (SINGULAIR) 10 MG tablet Take 10 mg by mouth every night.   2018 at Unknown time   • Multiple Vitamins-Minerals  (MULTIVITAMIN ADULT PO) Take 1 tablet by mouth daily.   9/9/2018 at Unknown time   • pantoprazole (PROTONIX) 40 MG EC tablet Take 40 mg by mouth daily.   9/9/2018 at Unknown time   • HYDROcodone-acetaminophen (NORCO) 7.5-325 MG per tablet Take 1-2 tablets by mouth Every 6 (Six) Hours As Needed for Moderate Pain  (as needed for pain). (Patient taking differently: Take 1-2 tablets by mouth Every 6 (Six) Hours As Needed for Moderate Pain  (as needed for pain). Patient states he does not use it) 60 tablet 0 More than a month at Unknown time       History:   Past Medical History:   Diagnosis Date   • Chris's esophagus     Last EGD 1 year ago with Dr Kaye    • BPH (benign prostatic hyperplasia)    • Chronic low back pain    • COPD (chronic obstructive pulmonary disease) (CMS/HCC)    • Foot pain    • GERD (gastroesophageal reflux disease)    • History of transfusion    • Injury of back    • Lung abscess (CMS/HCC)    • Osteoarthritis    • Osteoporosis    • Parkinson disease (CMS/HCC)    • Rotator cuff tear, left    • Sleep apnea      Past Surgical History:   Procedure Laterality Date   • ARTHRODESIS MIDTARSAL / TARSOMETATARSAL / TARSAL NAVICULAR-CUNEIFORM Left 05/10/2016   • BACK SURGERY     • BACK SURGERY      low back   • CATARACT EXTRACTION     • COLONOSCOPY N/A 11/2/2017    Procedure: COLONOSCOPY;  Surgeon: Luis Eduardo Mayers MD;  Location:  [facility];  Service:    • ENDOSCOPY N/A 11/1/2017    Procedure: ESOPHAGOGASTRODUODENOSCOPY;  Surgeon: Luis Eduardo Mayers MD;  Location:  [facility];  Service:    • ENDOSCOPY  11/02/2017    DR LUIS EDUARDO MAYERS   • FOOT SURGERY     • GALLBLADDER SURGERY     • KNEE ARTHROSCOPY Bilateral    • PAIN PUMP INSERTION/REVISION     • SPINE SURGERY     • TOTAL HIP ARTHROPLASTY Left    • ULNAR NERVE TRANSPOSITION       Family History   Problem Relation Age of Onset   • Arthritis Mother    • Diabetes Mother    • Osteoarthritis Mother    • Colon cancer Father    • Cancer Father      Social  "History   Substance Use Topics   • Smoking status: Former Smoker     Packs/day: 2.00     Years: 25.00     Types: Cigarettes     Start date: 1965     Quit date: 2005   • Smokeless tobacco: Never Used   • Alcohol use No   He is  with 2 children. He is retired from [employer].     Review of Systems  All systems were reviewed and negative except for:  Respiratory: positive for  shortness of air  Gastrointestinal: postitive for  constipation    Vital Signs  /77   Pulse 60   Temp 97.7 °F (36.5 °C) (Oral)   Resp 16   Ht 172.7 cm (68\")   Wt 67.6 kg (149 lb)   SpO2 96%   BMI 22.66 kg/m²     Physical Exam:    General Appearance:    Alert, cooperative, in no acute distress   Head:    Normocephalic, without obvious abnormality, atraumatic   Eyes:            Lids and lashes normal, conjunctivae and sclerae normal, no   icterus, no pallor, corneas clear, PERRLA   Ears:    Ears appear intact with no abnormalities noted   Neck:   No adenopathy, supple, trachea midline, no thyromegaly, no     carotid bruit, no JVD   Lungs:     Clear to auscultation,respirations regular, even and                   unlabored    Heart:    Regular rhythm and normal rate, normal S1 and S2, no            murmur, no gallop, no rub, no click   Abdomen:     Normal bowel sounds, no masses, no organomegaly, soft        non-tender, non-distended, no guarding, no rebound                 tenderness   Genitalia:    Deferred   Extremities:   LUE sling. Covaderm CDI. Nerve block present. Good cap refill and movement left digits.    Pulses:   Pulses palpable and equal bilaterally   Skin:   No bleeding, bruising or rash   Neurologic:   Cranial nerves 2 - 12 grossly intact, sensation intact (some numbness right hand)      I reviewed the patient's new clinical results.         Results from last 7 days  Lab Units 09/07/18  0852   WBC 10*3/mm3 9.72   HEMOGLOBIN g/dL 11.4*   HEMATOCRIT % 37.9*   PLATELETS 10*3/mm3 359       "     Invalid input(s): LABALBU, PROT  Lab Results   Component Value Date    HGBA1C 5.40 09/07/2018       Assessment and Plan:   Principal Problem:    Status post reverse total shoulder replacement, left  Active Problems:    Arthropathy of shoulder region    ABRIL (obstructive sleep apnea)    Chronic back pain    COPD (chronic obstructive pulmonary disease) (CMS/[facility])    Anemia      Plan  1. PT/OT- LUE sling, pendulum exercises  2. Pain control-prns, interscalene nerve block   3. IS-encourage  4. DVT proph- mechs/ASA  5. Bowel regimen  6. Resume home medications per Dr. Brnenan  7. Monitor post-op labs  8. DC planning for home, likely tomorrow    COPD, ABRIL  - Monitor O2 sats  - Continue home inhaler and Singulair     Anemia  - CBC in AM    I have personally performed the evaluation on this patient. My history is consistent  with HPI obtained. My exam findings are listed above. I have personally reviewed and discussed the above formulated treatment plan with pt and AH. YAMILET. wy.      YAMILET Craig  09/10/18  12:15 PM

## 2018-09-10 NOTE — ANESTHESIA PREPROCEDURE EVALUATION
Anesthesia Evaluation                  Airway   Mallampati: II  Dental      Pulmonary    (+) COPD,   Cardiovascular         Neuro/Psych  GI/Hepatic/Renal/Endo      Musculoskeletal     Abdominal    Substance History      OB/GYN          Other                        Anesthesia Plan    ASA 3     general and regional     intravenous induction   Anesthetic plan, all risks, benefits, and alternatives have been provided, discussed and informed consent has been obtained with: patient.

## 2018-09-10 NOTE — ANESTHESIA PROCEDURE NOTES
Peripheral Block    Patient location during procedure: pre-op  Reason for block: at surgeon's request and post-op pain management  Performed by  CRNA: JESSICA DE LA FUENTE  Assisted by: NIKOLAY YUSUF  Preanesthetic Checklist  Completed: patient identified, site marked, surgical consent, pre-op evaluation, timeout performed, IV checked, risks and benefits discussed and monitors and equipment checked  Prep:  Pt Position: right lateral decubitus  Sterile barriers:cap, gloves, mask and sterile barriers  Prep: ChloraPrep  Patient monitoring: blood pressure monitoring, continuous pulse oximetry and EKG  Procedure  Sedation:yes  Performed under: local infiltration  Guidance:ultrasound guided  Images:still images obtained    Laterality:left  Block Type:interscalene  Injection Technique:catheter  Needle Type:Tuohy and echogenic  Needle Gauge:18 G  Resistance on Injection: none  Catheter Size:20 G (20g)  Cath Depth at skin: 12 cm  Medications  Local Injected:bupivacaine 0.25% Local Amount Injected:15mL  Post Assessment  Injection Assessment: negative aspiration for heme, no paresthesia on injection and incremental injection  Patient Tolerance:comfortable throughout block  Complications:no  Additional Notes  Procedure:                 The pt was placed in semifowlers position with a slight tilt of the thorax contralateral to the insertion site.  The Insertion Site was prepped and draped in sterile fashion.  The pt was anesthetized with  IV Sedation( see meds) and  Skin and cutaneous tissue was infiltrated and anesthetized with 1% Lidocaine 3 mls via a 25g needle.  Utilizing ultrasound guidance, a BBraun 2 inch 18 g Contiplex echogenic touhy needle was advanced in-plane.  Hydro dissection of tissue was achieved with Normal saline. Major vessels(carotid and Internal Jugular) where visualized as the brachial plexus was approached at the approximate level of C-7/ T-1.  Cervical 5 and Branches of Cervical 6 nerve roots where  visualized and the needle tip was placed posterior at the level of C-6 roots.  LA spread was visualized and injection was made incrementally every 5 mls with aspiration. Injection pressure was normal or little, there was no intraneural injection, no vascular injection.      The BBraun 20 g wire stylet  catheter was then placed under US guidance on the posterior aspect of the Brachial Plexus.  Location of catheter was confirmed with NS injection visualized with US . The tuohy was then removed and the skin was sealed with Skin AFix at catheter insertion site.  Skin was prepped with mastisol and the labeled catheter  was secured with steristrips and a CHG tegaderm. Thank You.

## 2018-09-10 NOTE — H&P
Pre-Op H&P  Flaco Roque II  [id number]  1945    Chief complaint: Left shoulder pain    HPI:    Patient is a 72 y.o.male who presents with left shoulder post traumatic osteoarthritis and here today for left total shoulder arthroplasty.   Pt was here 7/3/18 for left foot surgery with Dr. Hess, but surgery was cancelled due to fall and left shoulder injury.      Review of Systems:  General ROS: negative for chills, fever or skin lesions;  No changes since last office visit  Cardiovascular ROS: no chest pain or dyspnea on exertion  Respiratory ROS: no cough,+Baseline shortness of breath, or wheezing.  +COPD    Allergies: No Known Allergies    Home Meds:    No current facility-administered medications on file prior to encounter.      Current Outpatient Prescriptions on File Prior to Encounter   Medication Sig Dispense Refill   • alendronate (FOSAMAX) 70 MG tablet Take  by mouth 1 (One) Time Per Week.     • amitriptyline (ELAVIL) 50 MG tablet Take 100 mg by mouth Every Night.     • budesonide-formoterol (SYMBICORT) 80-4.5 MCG/ACT inhaler Inhale 2 puffs 2 (Two) Times a Day.     • carbidopa-levodopa ER (SINEMET CR)  MG per tablet Take 1 tablet by mouth Every 8 (Eight) Hours. (Patient taking differently: Take 1 tablet by mouth 4 (Four) Times a Day.)     • ferrous sulfate 325 (65 FE) MG tablet Take 1 tablet by mouth Daily With Breakfast.     • montelukast (SINGULAIR) 10 MG tablet Take 10 mg by mouth every night.     • Multiple Vitamins-Minerals (MULTIVITAMIN ADULT PO) Take 1 tablet by mouth daily.     • pantoprazole (PROTONIX) 40 MG EC tablet Take 40 mg by mouth daily.     • HYDROcodone-acetaminophen (NORCO) 7.5-325 MG per tablet Take 1-2 tablets by mouth Every 6 (Six) Hours As Needed for Moderate Pain  (as needed for pain). (Patient taking differently: Take 1-2 tablets by mouth Every 6 (Six) Hours As Needed for Moderate Pain  (as needed for pain). Patient states he does not use it) 60 tablet 0       PMH:  "  Past Medical History:   Diagnosis Date   • Chris's esophagus     Last EGD 1 year ago with Dr Kaye    • BPH (benign prostatic hyperplasia)    • Chronic low back pain    • COPD (chronic obstructive pulmonary disease) (CMS/[facility])    • Foot pain    • GERD (gastroesophageal reflux disease)    • History of transfusion    • Injury of back    • Lung abscess (CMS/HCC)    • Osteoarthritis    • Osteoporosis    • Parkinson disease (CMS/HCC)    • Rotator cuff tear, left    • Sleep apnea      PSH:    Past Surgical History:   Procedure Laterality Date   • ARTHRODESIS MIDTARSAL / TARSOMETATARSAL / TARSAL NAVICULAR-CUNEIFORM Left 05/10/2016   • BACK SURGERY     • BACK SURGERY      low back   • CATARACT EXTRACTION     • COLONOSCOPY N/A 11/2/2017    Procedure: COLONOSCOPY;  Surgeon: Luis Eduardo Mayers MD;  Location:  [facility];  Service:    • ENDOSCOPY N/A 11/1/2017    Procedure: ESOPHAGOGASTRODUODENOSCOPY;  Surgeon: Luis Eduardo Mayers MD;  Location:  [facility];  Service:    • ENDOSCOPY  11/02/2017    DR LUIS EDUARDO MAYERS   • FOOT SURGERY     • GALLBLADDER SURGERY     • KNEE ARTHROSCOPY Bilateral    • PAIN PUMP INSERTION/REVISION     • SPINE SURGERY     • TOTAL HIP ARTHROPLASTY Left    • ULNAR NERVE TRANSPOSITION         Social History:   Tobacco:   History   Smoking Status   • Former Smoker   • Packs/day: 2.00   • Years: 25.00   • Types: Cigarettes   • Start date: 1965   • Quit date: 2005   Smokeless Tobacco   • Never Used      Alcohol:     History   Alcohol Use No       Vitals:           /91 (BP Location: Right arm, Patient Position: Lying)   Pulse 78   Temp 97.5 °F (36.4 °C) (Tympanic)   Resp 18   Ht 172.7 cm (68\")   Wt 67.6 kg (149 lb)   SpO2 100%   BMI 22.66 kg/m²     Physical Exam:  General Appearance:    Alert, cooperative, no distress, appears stated age   Head:    Normocephalic, without obvious abnormality, atraumatic   Lungs:     Clear to auscultation bilaterally, respirations unlabored    Heart:   Regular " rate and rhythm, S1 and S2 normal, no murmur, rub    or gallop    Abdomen:    Soft, non-tender.  +bowel sounds   Breast Exam:    deferred   Genitalia:    deferred   Extremities:   Extremities normal, atraumatic, no cyanosis or edema   Skin:   Skin color, texture, turgor normal, no rashes or lesions   Neurologic:   Grossly intact   Results Review  I reviewed the patient's new clinical results.    Cancer Staging (if applicable)  Cancer Patient: __ yes __no __unknown; If yes, clinical stage T:__ N:__M:__, stage group or __N/A    Impression: Left shoulder post-traumatic osteoarthritis    Plan: Left reverse total shoulder arthroplasty    Marcie Olmstead, APRJOSE ANTONIO   9/10/2018   6:38 AM

## 2018-09-10 NOTE — BRIEF OP NOTE
TOTAL SHOULDER REVERSE ARTHROPLASTY  Progress Note    Flaco CROOKS Mya BARRIOS  9/10/2018    Pre-op Diagnosis:   Rotator cuff arthropathy left shoulder       Post-Op Diagnosis Codes:   Same    Procedure/CPT® Codes:      Procedure(s):  LEFT SHOULDER REVERSE,TOTAL SHOULDER ARTHROPLASTY    Surgeon(s):  Abel Brennan MD    Anesthesia: General with Block    Staff:   Circulator: Sj Milner RN  Scrub Person: Michelle Marrero    Estimated Blood Loss: 200ml    Urine Voided: * No values recorded between 9/10/2018 12:00 AM and 9/10/2018  7:25 AM *    Specimens:                None      Drains:      Findings: Please see operative note    Complications: None noted      Abel Brennan MD     Date: 9/10/2018  Time: 7:25 AM

## 2018-09-11 VITALS
BODY MASS INDEX: 22.55 KG/M2 | SYSTOLIC BLOOD PRESSURE: 131 MMHG | OXYGEN SATURATION: 94 % | RESPIRATION RATE: 16 BRPM | HEART RATE: 79 BPM | DIASTOLIC BLOOD PRESSURE: 82 MMHG | TEMPERATURE: 98.4 F | HEIGHT: 68 IN | WEIGHT: 148.81 LBS

## 2018-09-11 PROBLEM — G89.18 ACUTE POSTOPERATIVE PAIN: Status: ACTIVE | Noted: 2018-09-11

## 2018-09-11 PROBLEM — D72.829 LEUKOCYTOSIS: Status: ACTIVE | Noted: 2018-09-11

## 2018-09-11 LAB
ANION GAP SERPL CALCULATED.3IONS-SCNC: 9 MMOL/L (ref 3–11)
BASOPHILS # BLD AUTO: 0.06 10*3/MM3 (ref 0–0.2)
BASOPHILS NFR BLD AUTO: 0.4 % (ref 0–1)
BUN BLD-MCNC: 10 MG/DL (ref 9–23)
BUN/CREAT SERPL: 16.1 (ref 7–25)
CALCIUM SPEC-SCNC: 8.3 MG/DL (ref 8.7–10.4)
CHLORIDE SERPL-SCNC: 100 MMOL/L (ref 99–109)
CO2 SERPL-SCNC: 26 MMOL/L (ref 20–31)
CREAT BLD-MCNC: 0.62 MG/DL (ref 0.6–1.3)
DEPRECATED RDW RBC AUTO: 52.7 FL (ref 37–54)
EOSINOPHIL # BLD AUTO: 0.19 10*3/MM3 (ref 0–0.3)
EOSINOPHIL NFR BLD AUTO: 1.2 % (ref 0–3)
ERYTHROCYTE [DISTWIDTH] IN BLOOD BY AUTOMATED COUNT: 17.5 % (ref 11.3–14.5)
GFR SERPL CREATININE-BSD FRML MDRD: 128 ML/MIN/1.73
GLUCOSE BLD-MCNC: 91 MG/DL (ref 70–100)
HCT VFR BLD AUTO: 37.7 % (ref 38.9–50.9)
HGB BLD-MCNC: 11.4 G/DL (ref 13.1–17.5)
IMM GRANULOCYTES # BLD: 0.05 10*3/MM3 (ref 0–0.03)
IMM GRANULOCYTES NFR BLD: 0.3 % (ref 0–0.6)
LYMPHOCYTES # BLD AUTO: 1.52 10*3/MM3 (ref 0.6–4.8)
LYMPHOCYTES NFR BLD AUTO: 9.6 % (ref 24–44)
MCH RBC QN AUTO: 24.8 PG (ref 27–31)
MCHC RBC AUTO-ENTMCNC: 30.2 G/DL (ref 32–36)
MCV RBC AUTO: 82.1 FL (ref 80–99)
MONOCYTES # BLD AUTO: 1.56 10*3/MM3 (ref 0–1)
MONOCYTES NFR BLD AUTO: 9.9 % (ref 0–12)
NEUTROPHILS # BLD AUTO: 12.5 10*3/MM3 (ref 1.5–8.3)
NEUTROPHILS NFR BLD AUTO: 78.9 % (ref 41–71)
PLATELET # BLD AUTO: 353 10*3/MM3 (ref 150–450)
PMV BLD AUTO: 10.3 FL (ref 6–12)
POTASSIUM BLD-SCNC: 3.9 MMOL/L (ref 3.5–5.5)
RBC # BLD AUTO: 4.59 10*6/MM3 (ref 4.2–5.76)
SODIUM BLD-SCNC: 135 MMOL/L (ref 132–146)
WBC NRBC COR # BLD: 15.83 10*3/MM3 (ref 3.5–10.8)

## 2018-09-11 PROCEDURE — 97110 THERAPEUTIC EXERCISES: CPT | Performed by: OCCUPATIONAL THERAPIST

## 2018-09-11 PROCEDURE — 97161 PT EVAL LOW COMPLEX 20 MIN: CPT

## 2018-09-11 PROCEDURE — 97166 OT EVAL MOD COMPLEX 45 MIN: CPT | Performed by: OCCUPATIONAL THERAPIST

## 2018-09-11 PROCEDURE — 94640 AIRWAY INHALATION TREATMENT: CPT

## 2018-09-11 PROCEDURE — 80048 BASIC METABOLIC PNL TOTAL CA: CPT | Performed by: NURSE PRACTITIONER

## 2018-09-11 PROCEDURE — 25010000002 HYDROMORPHONE PER 4 MG: Performed by: ORTHOPAEDIC SURGERY

## 2018-09-11 PROCEDURE — 97535 SELF CARE MNGMENT TRAINING: CPT | Performed by: OCCUPATIONAL THERAPIST

## 2018-09-11 PROCEDURE — 85025 COMPLETE CBC W/AUTO DIFF WBC: CPT | Performed by: ORTHOPAEDIC SURGERY

## 2018-09-11 RX ORDER — PSEUDOEPHEDRINE HCL 30 MG
100 TABLET ORAL 2 TIMES DAILY PRN
Qty: 60 CAPSULE | Refills: 0 | Status: SHIPPED | OUTPATIENT
Start: 2018-09-11 | End: 2019-04-10

## 2018-09-11 RX ORDER — OXYCODONE HYDROCHLORIDE AND ACETAMINOPHEN 5; 325 MG/1; MG/1
1 TABLET ORAL EVERY 4 HOURS PRN
Start: 2018-09-11 | End: 2018-09-20

## 2018-09-11 RX ORDER — ROPIVACAINE IN 0.9% SOD CHL/PF 0.2% 545ML
6 ELASTOMERIC PUMP, HI VARIABLE RATE INJECTION CONTINUOUS
Status: ON HOLD
Start: 2018-09-11 | End: 2019-04-23

## 2018-09-11 RX ADMIN — MULTIPLE VITAMINS W/ MINERALS TAB 1 TABLET: TAB ORAL at 09:54

## 2018-09-11 RX ADMIN — OXYCODONE HYDROCHLORIDE AND ACETAMINOPHEN 1 TABLET: 5; 325 TABLET ORAL at 06:05

## 2018-09-11 RX ADMIN — PANTOPRAZOLE SODIUM 40 MG: 40 TABLET, DELAYED RELEASE ORAL at 06:05

## 2018-09-11 RX ADMIN — Medication 5 MG: at 02:31

## 2018-09-11 RX ADMIN — CARBIDOPA AND LEVODOPA 1 TABLET: 25; 100 TABLET ORAL at 09:54

## 2018-09-11 RX ADMIN — HYDROMORPHONE HYDROCHLORIDE 0.5 MG: 1 INJECTION, SOLUTION INTRAMUSCULAR; INTRAVENOUS; SUBCUTANEOUS at 04:09

## 2018-09-11 RX ADMIN — OXYCODONE HYDROCHLORIDE AND ACETAMINOPHEN 2 TABLET: 5; 325 TABLET ORAL at 10:40

## 2018-09-11 RX ADMIN — ASPIRIN 325 MG: 325 TABLET, DELAYED RELEASE ORAL at 09:53

## 2018-09-11 RX ADMIN — BUDESONIDE AND FORMOTEROL FUMARATE DIHYDRATE 2 PUFF: 80; 4.5 AEROSOL RESPIRATORY (INHALATION) at 08:22

## 2018-09-11 NOTE — NURSING NOTE
Acute Pain Service:  Peripheral nerve catheter and disposable infusion device teaching completed with patient and spouse.  Video demonstration, handout and bracelet provided with [facility] on call central phone number.  Instructed to call with any questions or concerns.  Patient verbalized understanding.  Service will continue to follow until catheter DC'd.  Please contact patient at [phone] if needed.

## 2018-09-11 NOTE — PROGRESS NOTES
Jaime    Acute pain service Inpatient Progress Note    Patient Name: Flaco Roque II  :  1945  MRN:  [id number]        Acute Pain  Service Inpatient Progress Note:    Analgesia:Good  Pain Score:3/10  LOC: alert and awake  Side Effects:None  Catheter Site:clean, dry and dressing intact  Cath type: peripheral nerve cath with ON Q  Infusion rate: 6ml/hr  Catheter Plan:Catheter to remain Insitu and Patient to be discharged home

## 2018-09-11 NOTE — PLAN OF CARE
Problem: Pain, Acute (Adult)  Goal: Identify Related Risk Factors and Signs and Symptoms  Outcome: Ongoing (interventions implemented as appropriate)   09/11/18 5095   Pain, Acute (Adult)   Related Risk Factors (Acute Pain) patient perception;positioning;surgery   Signs and Symptoms (Acute Pain) verbalization of pain descriptors

## 2018-09-11 NOTE — THERAPY DISCHARGE NOTE
Acute Care - Physical Therapy Initial Eval/Discharge   Jaime     Patient Name: Flaco Roque II  : 1945  MRN: [id number]  Today's Date: 2018   Onset of Illness/Injury or Date of Surgery: 09/10/18  Date of Referral to PT: 09/10/18  Referring Physician: Mika      Admit Date: 9/10/2018    Visit Dx:    ICD-10-CM ICD-9-CM   1. Impaired functional mobility, balance, gait, and endurance Z74.09 V49.89     Patient Active Problem List   Diagnosis   • ABRIL (obstructive sleep apnea)   • Chronic back pain   • COPD (chronic obstructive pulmonary disease) (CMS/[facility])   • Chris's esophagus   • GERD (gastroesophageal reflux disease)   • Anemia   • Foot deformity, acquired, left   • Arthropathy of shoulder region   • Status post reverse total shoulder replacement, left     Past Medical History:   Diagnosis Date   • Chris's esophagus     Last EGD 1 year ago with Dr Kaye    • BPH (benign prostatic hyperplasia)    • Chronic low back pain    • COPD (chronic obstructive pulmonary disease) (CMS/[facility])    • Foot pain    • GERD (gastroesophageal reflux disease)    • History of transfusion    • Injury of back    • Lung abscess (CMS/[facility])    • Osteoarthritis    • Osteoporosis    • Parkinson disease (CMS/[facility])    • Rotator cuff tear, left    • Sleep apnea      Past Surgical History:   Procedure Laterality Date   • ARTHRODESIS MIDTARSAL / TARSOMETATARSAL / TARSAL NAVICULAR-CUNEIFORM Left 05/10/2016   • BACK SURGERY     • BACK SURGERY      low back   • CATARACT EXTRACTION     • COLONOSCOPY N/A 2017    Procedure: COLONOSCOPY;  Surgeon: Luis Eduardo Capellan MD;  Location:  [facility];  Service:    • ENDOSCOPY N/A 2017    Procedure: ESOPHAGOGASTRODUODENOSCOPY;  Surgeon: Luis Eduardo Capellan MD;  Location:  [facility];  Service:    • ENDOSCOPY  2017    DR LUIS EDUARDO CAPELLAN   • FOOT SURGERY     • GALLBLADDER SURGERY     • KNEE ARTHROSCOPY Bilateral    • PAIN PUMP INSERTION/REVISION     • SPINE SURGERY     • TOTAL HIP  ARTHROPLASTY Left    • TOTAL SHOULDER ARTHROPLASTY W/ DISTAL CLAVICLE EXCISION Left 9/10/2018    Procedure: REVERSE TOTAL SHOULDER ARTHROPLASTY LEFT;  Surgeon: Abel Brennan MD;  Location: [facility];  Service: Orthopedics   • ULNAR NERVE TRANSPOSITION            PT ASSESSMENT (last 12 hours)      Physical Therapy Evaluation     Row Name 09/11/18 0911          PT Evaluation Time/Intention    Subjective Information no complaints  -SC     Document Type evaluation  -SC     Mode of Treatment physical therapy  -SC     Patient Effort good  -SC     Symptoms Noted During/After Treatment fatigue  -SC     Row Name 09/11/18 0911          General Information    Patient Profile Reviewed? yes  -SC     Onset of Illness/Injury or Date of Surgery 09/10/18  -SC     Referring Physician Mika  -SC     Patient Observations alert;cooperative;agree to therapy  -SC     General Observations of Patient up in chair, Nerve cath intact  -SC     Prior Level of Function independent:;all household mobility;gait  -SC     Equipment Currently Used at Home none  -SC     Pertinent History of Current Functional Problem hx of shoulder DJD s/p Rev -TSA Left  -SC     Existing Precautions/Restrictions non-weight bearing  -SC     Risks Reviewed patient:;increased discomfort;change in vital signs;increased drainage  -SC     Benefits Reviewed patient:;improve function;increase independence;increase strength  -SC     Barriers to Rehab none identified  -SC     Row Name 09/11/18 0911          Relationship/Environment    Primary Source of Support/Comfort spouse  -SC     Row Name 09/11/18 0911          Resource/Environmental Concerns    Current Living Arrangements home/apartment/condo  -SC     Row Name 09/11/18 0911          Home Main Entrance    Number of Stairs, Main Entrance one  -SC     Row Name 09/11/18 0911          Cognitive Assessment/Intervention- PT/OT    Orientation Status (Cognition) oriented x 4  -SC     Follows Commands (Cognition) WNL  -SC     Row  Name 09/11/18 0911          Safety Issues, Functional Mobility    Impairments Affecting Function (Mobility) balance  -SC     Row Name 09/11/18 0911          Mobility Assessment/Treatment    Extremity Weight-bearing Status left upper extremity  -SC     Left Upper Extremity (Weight-bearing Status) non weight-bearing (NWB)  -SC     Row Name 09/11/18 0911          Bed Mobility Assessment/Treatment    Comment (Bed Mobility) uic  -SC     Row Name 09/11/18 0911          Transfer Assessment/Treatment    Transfer Assessment/Treatment sit-stand transfer;stand-sit transfer  -SC     Comment (Transfers) cues for hand placement  -SC     Sit-Stand [place] (Transfers) supervision;verbal cues  -SC     Stand-Sit [place] (Transfers) supervision;verbal cues  -SC     Row Name 09/11/18 0911          Sit-Stand Transfer    Assistive Device (Sit-Stand Transfers) other (see comments)   used IV pole on standing  -[facility] Name 09/11/18 0911          Gait/Stairs Assessment/Training    Gait/Stairs Assessment/Training gait/ambulation independence  -SC     [place] Level (Gait) supervision  -SC     Assistive Device (Gait) --   occational use of railing   -SC     Distance in Feet (Gait) 200  -SC     Pattern (Gait) swing-through  -SC     Deviations/Abnormal Patterns (Gait) krunal decreased  -SC     Bilateral Gait Deviations forward flexed posture  -SC     Comment (Gait/Stairs) Patient demonstrated slow careful gait with occational use of railing in hallway.  Recommended he use his cane at home  -SC     Row Name 09/11/18 0911          General ROM    LT Upper Ext --   in sling  -SC     GENERAL ROM COMMENTS other jts wfl  -SC     Row Name 09/11/18 0911          MMT (Manual Muscle Testing)    Left Hand --   moving fingers  -SC     General MMT Comments other extremities 4+/5  -SC     Row Name 09/11/18 0911          Motor Assessment/Intervention    Additional Documentation Balance (Group);Balance Interventions (Group);Therapeutic  Exercise (Group);Therapeutic Exercise Interventions (Group)  -SC     Row Name 09/11/18 0911          Therapeutic Exercise    Comment (Therapeutic Exercise) spirometer x10  -SC     Row Name 09/11/18 0911          Balance    Balance dynamic standing balance  -SC     Row Name 09/11/18 0911          Dynamic Standing Balance    Level of [place], Reaches Outside Midline (Standing, Dynamic Balance) supervision   recommend cane use  -SC     Row Name 09/11/18 0911          Sensory Assessment/Intervention    Sensory General Assessment light touch sensation deficits identified   L UE 2 to nerve cath  -SC     Row Name 09/11/18 0911          Pain Assessment    Additional Documentation Pain Scale: Numbers Pre/Post-Treatment (Group)  -SC     Row Name 09/11/18 0911          Pain Scale: Numbers Pre/Post-Treatment    Pain Scale: Numbers, Pretreatment 0/10 - no pain  -SC     Pain Scale: Numbers, Post-Treatment 0/10 - no pain  -SC     Pain Location - Side Left  -SC     Pain Location shoulder  -SC     Row Name             Wound 09/10/18 0713 Left shoulder incision    Wound - Properties Group Date first assessed: 09/10/18  - Time first assessed: 0713  -JA Side: Left  -JA Location: shoulder  -JA Type: incision  -JA    Row Name 09/11/18 0911          Coping    Observed Emotional State calm;cooperative;pleasant  -SC     Verbalized Emotional State acceptance  -SC     Row Name 09/11/18 0911          Plan of Care Review    Plan of Care Reviewed With patient  -SC     Row Name 09/11/18 0911          Physical Therapy Clinical Impression    Date of Referral to PT 09/10/18  -SC     PT Diagnosis (PT Clinical Impression) impaired mobility  -SC     Patient/Family Goals Statement (PT Clinical Impression) go home  -SC     Criteria for Skilled Interventions Met (PT Clinical Impression) yes;treatment indicated  -SC     Pathology/Pathophysiology Noted (Describe Specifically for Each System) musculoskeletal  -SC     Impairments Found (describe  specific impairments) gait, locomotion, and balance  -SC     Rehab Potential (PT Clinical Summary) good, to achieve stated therapy goals  -SC     Care Plan Review (PT) risks/benefits reviewed;care plan/treatment goals reviewed;evaluation/treatment results reviewed  -SC     Row Name 09/11/18 0911          Physical Therapy Goals    Transfer Goal Selection (PT) transfer, PT goal 1  -SC     Gait Training Goal Selection (PT) gait training, PT goal 1  -SC     Row Name 09/11/18 0911          Transfer Goal 1 (PT)    Activity/Assistive Device (Transfer Goal 1, PT) sit-to-stand/stand-to-sit  -SC     [place] Level/Cues Needed (Transfer Goal 1, PT) supervision required  -SC     Time Frame (Transfer Goal 1, PT) by discharge  -SC     Progress/Outcome (Transfer Goal 1, PT) goal met  -[facility] Name 09/11/18 0911          Gait Training Goal 1 (PT)    Activity/Assistive Device (Gait Training Goal 1, PT) gait (walking locomotion)   rail assist or cane  -SC     [place] Level (Gait Training Goal 1, PT) supervision required  -SC     Distance (Gait Goal 1, PT) 200  -SC     Time Frame (Gait Training Goal 1, PT) by discharge  -SC     Progress/Outcome (Gait Training Goal 1, PT) goal met  -[facility] Name 09/11/18 0911          Patient Education Goal (PT)    Activity (Patient Education Goal, PT) safety with mobility  -SC     [place]/Cues/Accuracy (Memory Goal 2, PT) independent;demonstrates adequately;verbalizes understanding  -SC     Time Frame (Patient Education Goal, PT) by discharge  -SC     Progress/Outcome (Patient Education Goal, PT) goal met  -[facility] Name 09/11/18 0911          Positioning and Restraints    Pre-Treatment Position sitting in chair/recliner  -SC     Post Treatment Position chair  -SC     In Chair sitting;call light within reach;encouraged to call for assist;exit alarm on  -[facility] Name 09/11/18 0911          Living Environment    Home Accessibility stairs to enter home  -SC       User Key  (r) =  Recorded By, (t) = Taken By, (c) = Cosigned By    Initials Name Provider Type    SC Day Chinchilla, PT Physical Therapist    Sj Martin RN Registered Nurse          Physical Therapy Education     Title: PT OT SLP Therapies (Done)     Topic: Physical Therapy (Done)     Point: Mobility training (Done)    Learning Progress Summary     Learner Status Readiness Method Response Comment Documented by    Patient Done SUGEY Yang reviewed saffety with mobility SC 09/11/18 1204          Point: Home exercise program (Done)    Learning Progress Summary     Learner Status Readiness Method Response Comment Documented by    Patient Done SUGEY Yang reviewed saffety with mobility SC 09/11/18 1204          Point: Body mechanics (Done)    Learning Progress Summary     Learner Status Readiness Method Response Comment Documented by    Patient Done SUGEY Yang reviewed saffety with mobility SC 09/11/18 1204          Point: Precautions (Done)    Learning Progress Summary     Learner Status Readiness Method Response Comment Documented by    Patient Done SUGEY Yang reviewed saffety with mobility SC 09/11/18 1204                      User Key     Initials Effective Dates Name Provider Type Discipline    SC 06/19/15 -  Day Chinchilla, PT Physical Therapist PT                PT Recommendation and Plan  Anticipated Discharge Disposition (PT): home with assist  Planned Therapy Interventions (PT Eval): gait training, home exercise program, patient/family education, strengthening, transfer training  Outcome Summary/Treatment Plan (PT)  Anticipated Discharge Disposition (PT): home with assist  Plan of Care Reviewed With: patient  Progress: improving  Outcome Summary: Patient demonstrated safe ambulation in bess today. Recommended he use his cane at home          Outcome Measures     Row Name 09/11/18 0911             How much help from another person do you currently need...    Turning from your back to your side while in  flat bed without using bedrails? 4  -SC      Moving from lying on back to sitting on the side of a flat bed without bedrails? 3  -SC      Moving to and from a bed to a chair (including a wheelchair)? 3  -SC      Standing up from a chair using your arms (e.g., wheelchair, bedside chair)? 3  -SC      Climbing 3-5 steps with a railing? 3  -SC      To walk in hospital room? 3  -SC      AM-PAC 6 Clicks Score 19  -SC         Functional Assessment    Outcome Measure Options AM-PAC 6 Clicks Basic Mobility (PT)  -SC        User Key  (r) = Recorded By, (t) = Taken By, (c) = Cosigned By    Initials Name Provider Type    SC Day Chinchilla PT Physical Therapist           Time Calculation:         PT Charges     Row Name 09/11/18 1206             Time Calculation    Start Time 0911  -SC      PT Received On 09/11/18  -SC        User Key  (r) = Recorded By, (t) = Taken By, (c) = Cosigned By    Initials Name Provider Type    SC Day Chinchilla PT Physical Therapist        Therapy Suggested Charges     Code   Minutes Charges    None           Therapy Charges for Today     Code Description Service Date Service Provider Modifiers Qty    [id number] HC PT EVAL LOW COMPLEXITY 4 9/11/2018 Day Chinchilla PT GP 1          PT G-Codes  Outcome Measure Options: AM-PAC 6 Clicks Basic Mobility (PT)  AM-PAC 6 Clicks Score: 19    PT Discharge Summary  Anticipated Discharge Disposition (PT): home with assist  Reason for Discharge: All goals achieved  Outcomes Achieved: Able to achieve all goals within established timeline  Discharge Destination: Home with assist    Day Chinchilla PT  9/11/2018

## 2018-09-11 NOTE — PROGRESS NOTES
Subjective:  The patient rested comfortably overnight with an expected amount of postoperative pain.  No events overnight. The patient denies any chest pain/shortness of breath/fever/chills or any other systemic symptoms.    Medications/Allergies:  Scheduled Meds:  amitriptyline 100 mg Oral Nightly   aspirin 325 mg Oral Daily   budesonide-formoterol 2 puff Inhalation BID - RT   carbidopa-levodopa 1 tablet Oral 4x Daily   montelukast 10 mg Oral Nightly   multivitamin with minerals 1 tablet Oral Daily   pantoprazole 40 mg Oral Q AM     Continuous Infusions:  lactated ringers 9 mL/hr    Ropivacaine HCl-NaCl 6 mL/hr Last Rate: 6 mL/hr (09/10/18 0840)     PRN Meds:.docusate sodium  •  HYDROmorphone **AND** naloxone  •  labetalol  •  melatonin  •  ondansetron **OR** ondansetron  •  ondansetron  •  oxyCODONE-acetaminophen  •  oxyCODONE-acetaminophen  •  sodium chloride  Patient has no known allergies.    Objective:  Vital Signs   Temp:  [97.6 °F (36.4 °C)-98.4 °F (36.9 °C)] 98.4 °F (36.9 °C)  Heart Rate:  [60-96] 79  Resp:  [15-16] 16  BP: (110-142)/(60-82) 131/82    Results Review:   I reviewed the patient's new clinical results.    Results from last 7 days  Lab Units 09/11/18  0725   WBC 10*3/mm3 15.83*   HEMOGLOBIN g/dL 11.4*   HEMATOCRIT % 37.7*   PLATELETS 10*3/mm3 353       Results from last 7 days  Lab Units 09/11/18  0725   SODIUM mmol/L 135   POTASSIUM mmol/L 3.9   CHLORIDE mmol/L 100   CO2 mmol/L 26.0   BUN mg/dL 10   CREATININE mg/dL 0.62   GLUCOSE mg/dL 91   CALCIUM mg/dL 8.3*           Results from last 7 days  Lab Units 09/11/18  0725   SODIUM mmol/L 135   POTASSIUM mmol/L 3.9   CHLORIDE mmol/L 100   CO2 mmol/L 26.0   BUN mg/dL 10   CREATININE mg/dL 0.62   CALCIUM mg/dL 8.3*   GLUCOSE mg/dL 91       Imaging Results (last 24 hours)     Procedure Component Value Units Date/Time    XR Shoulder 1 View Left [[id number]] Collected:  09/10/18 0951     Updated:  09/10/18 1351    Narrative:       EXAMINATION: XR  SHOULDER 1 VW LEFT- 09/10/2018     INDICATION: Shoulder pain, prior x-ray, rotator cuff tear / impingement  suspected      COMPARISON: NONE     FINDINGS: AP view of the left shoulder demonstrates a prosthetic device  which appears well-positioned in one projection with no fracture,  loosening or dislocation.       Impression:       Expected postoperative finding.     D:  09/10/2018  E:  09/10/2018     This report was finalized on 9/10/2018 1:49 PM by Dr. Keshav Kennedy MD.             Physical Exam:  General: comfortable  Vascular: 2+ radial pulses; digits are pink and well perfused  Operative Shoulder Exam: sling is in place  Neurologic: sensation to light touch is intact distally, wrist flexion/wrist extension/hand intrinsics intact, Intact sensation over the deltoid  Dermatologic: surgical dressing is well appearing - clean, dry, and intact; no obvious signs or symptoms of infection or DVT    Assessment/Plan:  The patient is comfortable and does desire to be discharged home today.  We will make arrangements for discharge.  I appreciate the medical management of the internal medicine team.  The patient is okay for discharge once cleared by the medical team.    POD #: 1  Pain control - well controlled  Dressing - clean and intact.  Okay for RN to replace prior to discharge if dressing is saturated  Sling - in place  PT/OT - sling teaching, non-weight bearing, elbow/wrist/hand exercises only, pendulums for showering on POD #3 at home and for dressing  Anesthesia - RN will arrange with anesthesia team for OnQ prior to discharge    I discussed the patients findings and my recommendations with patient    SHARON Bowman  09/11/18  9:35 AM

## 2018-09-11 NOTE — THERAPY DISCHARGE NOTE
Acute Care - Occupational Therapy Initial Eval/Discharge   Jaime     Patient Name: Flaco Roque II  : 1945  MRN: [id number]  Today's Date: 2018  Onset of Illness/Injury or Date of Surgery: 09/10/18  Date of Referral to OT: 18  Referring Physician: MD Mika      Admit Date: 9/10/2018       ICD-10-CM ICD-9-CM   1. Impaired functional mobility, balance, gait, and endurance Z74.09 V49.89   2. Impaired mobility and ADLs Z74.09 799.89     Patient Active Problem List   Diagnosis   • ABRIL (obstructive sleep apnea)   • Chronic back pain   • COPD (chronic obstructive pulmonary disease) (CMS/HCC)   • Chris's esophagus   • GERD (gastroesophageal reflux disease)   • Anemia   • Foot deformity, acquired, left   • Arthropathy of shoulder region   • Status post reverse total shoulder replacement, left     Past Medical History:   Diagnosis Date   • Chris's esophagus     Last EGD 1 year ago with Dr Kaye    • BPH (benign prostatic hyperplasia)    • Chronic low back pain    • COPD (chronic obstructive pulmonary disease) (CMS/HCC)    • Foot pain    • GERD (gastroesophageal reflux disease)    • History of transfusion    • Injury of back    • Lung abscess (CMS/HCC)    • Osteoarthritis    • Osteoporosis    • Parkinson disease (CMS/[facility])    • Rotator cuff tear, left    • Sleep apnea      Past Surgical History:   Procedure Laterality Date   • ARTHRODESIS MIDTARSAL / TARSOMETATARSAL / TARSAL NAVICULAR-CUNEIFORM Left 05/10/2016   • BACK SURGERY     • BACK SURGERY      low back   • CATARACT EXTRACTION     • COLONOSCOPY N/A 2017    Procedure: COLONOSCOPY;  Surgeon: Luis Eduardo Capellan MD;  Location:  [facility];  Service:    • ENDOSCOPY N/A 2017    Procedure: ESOPHAGOGASTRODUODENOSCOPY;  Surgeon: Luis Eduardo Capellan MD;  Location:  [facility];  Service:    • ENDOSCOPY  2017    DR LUIS EDUARDO CAPELLAN   • FOOT SURGERY     • GALLBLADDER SURGERY     • KNEE ARTHROSCOPY Bilateral    • PAIN PUMP  INSERTION/REVISION     • SPINE SURGERY     • TOTAL HIP ARTHROPLASTY Left    • TOTAL SHOULDER ARTHROPLASTY W/ DISTAL CLAVICLE EXCISION Left 9/10/2018    Procedure: REVERSE TOTAL SHOULDER ARTHROPLASTY LEFT;  Surgeon: Abel Brennan MD;  Location: [facility];  Service: Orthopedics   • ULNAR NERVE TRANSPOSITION            OT ASSESSMENT FLOWSHEET (last 72 hours)      Occupational Therapy Evaluation     Row Name 09/11/18 1114                   OT Evaluation Time/Intention    Subjective Information no complaints  -ST        Document Type evaluation;therapy note (daily note);discharge evaluation/summary  -ST        Mode of Treatment individual therapy;occupational therapy  -ST        Patient Effort good  -ST        Symptoms Noted During/After Treatment fatigue  -ST           General Information    Patient Profile Reviewed? yes  -ST        Onset of Illness/Injury or Date of Surgery 09/10/18  -ST        Referring Physician MD Mika  -ST        Patient Observations alert;cooperative;agree to therapy  -ST        Patient/Family Observations wife arrived for teaching/education  -ST        General Observations of Patient UIC upon arrival; on-Q running on 6 and sling intact on L UE  -ST        Prior Level of Function independent:;all household mobility;transfer;bed mobility;feeding;grooming;min assist:;dressing;bathing;mod assist:;home management;cooking;cleaning  -ST        Equipment Currently Used at Home none  -ST        Pertinent History of Current Functional Problem Pt with fall in July injuring L shoulder resulting in L shoulder RCT arthropathy. Pt is now POD 1 and s/p L reverse TSA. Noted that pt has PD and L foot deformity with claw deformity that may impact mobility and balance.   -ST        Existing Precautions/Restrictions shoulder;non-weight bearing;other (see comments)   sling, on-Q, PD  -ST        Risks Reviewed patient and family:;LOB;nausea/vomiting;dizziness;increased discomfort;change in vital signs  -ST         Benefits Reviewed patient and family:;improve function;increase independence;increase strength;increase balance;decrease pain;increase knowledge  -ST        Barriers to Rehab medically complex;previous functional deficit  -ST           Relationship/Environment    Primary Source of Support/Comfort spouse  -ST        Concerns About Impact on Relationships wife 24/7  -ST           Resource/Environmental Concerns    Current Living Arrangements home/apartment/condo  -ST           Cognitive Assessment/Interventions    Additional Documentation Cognitive Assessment/Intervention (Group)  -ST           Cognitive Assessment/Intervention- PT/OT    Affect/Mental Status (Cognitive) WNL  -ST        Orientation Status (Cognition) oriented x 4  -ST        Follows Commands (Cognition) WNL  -ST        Cognitive Function (Cognitive) safety deficit;memory deficit;attention deficit;executive function deficit  -ST        Attention Deficit (Cognitive) distractible in quiet environment;perseverates on recent task  -ST        Executive Function Deficit (Cognition) planning/decision making  -ST        Safety Deficit (Cognitive) mild deficit  -ST        Personal Safety Interventions fall prevention program maintained;gait belt;nonskid shoes/slippers when out of bed  -ST           Safety Issues, Functional Mobility    Impairments Affecting Function (Mobility) balance  -ST           Mobility Assessment/Treatment    Extremity Weight-bearing Status left upper extremity  -ST        Left Upper Extremity (Weight-bearing Status) non weight-bearing (NWB)  -ST           Bed Mobility Assessment/Treatment    Comment (Bed Mobility) UIC  -ST           Functional Mobility    Functional Mobility- Comment PT completed mobility   -ST           Sit-Stand Transfer    Sit-Stand [place] (Transfers) contact guard  -ST           Stand-Sit Transfer    Stand-Sit [place] (Transfers) supervision  -ST           ADL Assessment/Intervention    32178 - OT Self  Care/Mgmt Minutes 17  -ST        BADL Assessment/Intervention bathing;upper body dressing;lower body dressing  -ST           Bathing Assessment/Intervention    Bathing [place] Level upper body;set up  -ST        Assistive Devices (Bathing) one hand technique  -        Comment (Bathing) simulated axilla care in pendulum style technique for maintaining safety and ROM restrictions per MD protocol   -           Upper Body Dressing Assessment/Training    Upper Body Dressing [place] Level don;doff;front opening garment;pull-over garment;other (see comments)  -ST        Assistive Devices (Upper Body Dressing) one hand technique  -        Upper Body Dressing Position unsupported sitting  -ST        Comment (Upper Body Dressing) OT completed extensive teaching and education on sling donning/doffing and adjustment for comfort and safety for providing optimum support for shoulder; wife present to complete appropriate teach-back with pt verbalizing strap management  -           Lower Body Dressing Assessment/Training    Lower Body Dressing [place] Level shoes/slippers;pants/bottoms;minimum assist (75% patient effort)  -ST        Assistive Devices (Lower Body Dressing) one hand technique  -ST        Lower Body Dressing Position unsupported sitting;unsupported standing  -ST           BADL Safety/Performance    Impairments, BADL Safety/Performance balance  -ST           General ROM    GENERAL ROM COMMENTS L UE n/t d/t sx precautions; RUE fxnl for ADLs  -ST           MMT (Manual Muscle Testing)    General MMT Comments L UE n/t d/t sx precautions; RUE fxnl for ADLs  -ST           Motor Assessment/Interventions    Additional Documentation Balance (Group);Therapeutic Exercise (Group);Therapeutic Exercise Interventions (Group)  -ST           Therapeutic Exercise    72379 - OT Therapeutic Exercise Minutes 12  -ST           Therapeutic Exercise    Upper Extremity Range of Motion (Therapeutic Exercise) wrist  flexion/extension, left;forearm supination/pronation, left;elbow flexion/extension, left  -ST        Hand (Therapeutic Exercise) thumb finger opposition, left;finger flexion/extension, left  -ST        Exercise Type (Therapeutic Exercise) AROM (active range of motion)  -ST        Position (Therapeutic Exercise) seated  -ST        Sets/Reps (Therapeutic Exercise) 1X10  -ST        Comment (Therapeutic Exercise) completed HEP per MD preference/protocol; pt's wife present and pt/wife able to complete appropriate teach-back for correct technique and safety   -ST           Dynamic Standing Balance    Level of [place], Reaches Outside Midline (Standing, Dynamic Balance) supervision  -ST           Sensory Assessment/Intervention    Sensory General Assessment light touch sensation deficits identified  -ST           Light Touch Sensation Assessment    Left Upper Extremity: Light Touch Sensation Assessment mild impairment, 75% or more correct responses  -ST        Comment, Left Upper Extremity: Light Touch Sensation Assessment d/t nerve cath   -ST           Positioning and Restraints    Pre-Treatment Position sitting in chair/recliner  -ST        Post Treatment Position chair  -ST        In Chair notified nsg;reclined;call light within reach;encouraged to call for assist;with family/caregiver;ABHIJEET elevated  -ST           Pain Scale: Numbers Pre/Post-Treatment    Pain Scale: Numbers, Pretreatment 1/10  -ST        Pain Scale: Numbers, Post-Treatment 4/10  -ST        Pain Location - Side Left  -ST        Pain Location shoulder  -ST           Wound 09/10/18 0713 Left shoulder incision    Wound - Properties Group Date first assessed: 09/10/18  -JA Time first assessed: 0713  -JA Side: Left  -JA Location: shoulder  -JA Type: incision  -JA       Clinical Impression (OT)    Date of Referral to OT 09/11/18  -ST        OT Diagnosis impaired ADLs, OT to complete hand, wrist, elbow f/e, pronation/supination LUE  -ST        Prognosis  (OT Eval) good  -ST        Patient/Family Goals Statement (OT Eval) return home  -ST        Criteria for Skilled Therapeutic Interventions Met (OT Eval) yes  -ST        Rehab Potential (OT Eval) good, to achieve stated therapy goals  -ST        Therapy Frequency (OT Eval) evaluation only  -ST        Care Plan Review (OT) evaluation/treatment results reviewed;care plan/treatment goals reviewed;risks/benefits reviewed;current/potential barriers reviewed;patient/other agree to care plan  -ST        Care Plan Review, Other Participant (OT Eval) family  -ST        Anticipated Discharge Disposition (OT) home with assist  -ST           Discharge Summary (Occupational Therapy)    Additional Documentation Discharge Summary, OT Eval (Group)  -ST           Discharge Summary, OT Eval    Reason for Discharge (OT Discharge Summary) patient discharged from this facility  -ST        Outcomes Achieved Upon Discharge (OT Discharge Summary) discharge from facility occurred on same date as evaluation  -ST           Living Environment    Home Accessibility --   walk-in shower  -ST          User Key  (r) = Recorded By, (t) = Taken By, (c) = Cosigned By    Initials Name Effective Dates    Edie Velez OTR 06/10/18 -     Sj Martin RN 06/16/16 -           Occupational Therapy Education     Title: PT OT SLP Therapies (Done)     Topic: Occupational Therapy (Done)     Point: ADL training (Done)     Description: Instruct learner(s) on proper safety adaptation and remediation techniques during self care or transfers.   Instruct in proper use of assistive devices.   Learning Progress Summary     Learner Status Readiness Method Response Comment Documented by    Patient Done Acceptance E,TB,D,H VU,DU role of OT, benefits of activity, transfers, LBD/LBB, axilla care, nile techniques, sling donning/doffing and fit, nerve cath management and safety with UBD, home safety, home/act. mods, ADL retraining ST 09/11/18 1341    Family  Done Acceptance E,TB,NIKHIL,H VU,DU role of OT, benefits of activity, transfers, LBD/LBB, axilla care, nile techniques, sling donning/doffing and fit, nerve cath management and safety with UBD, home safety, home/act. mods, ADL retraining ST 09/11/18 1341          Point: Home exercise program (Done)     Description: Instruct learner(s) on appropriate technique for monitoring, assisting and/or progressing therapeutic exercises/activities.   Learning Progress Summary     Learner Status Readiness Method Response Comment Documented by    Patient Done Acceptance E,TB,NIKHIL,H VU,DU role of OT, benefits of activity, transfers, LBD/LBB, axilla care, nile techniques, sling donning/doffing and fit, nerve cath management and safety with UBD, home safety, home/act. mods, ADL retraining ST 09/11/18 1341    Family Done Acceptance E,TB,NIKHIL,H VU,DU role of OT, benefits of activity, transfers, LBD/LBB, axilla care, nile techniques, sling donning/doffing and fit, nerve cath management and safety with UBD, home safety, home/act. mods, ADL retraining ST 09/11/18 1341          Point: Precautions (Done)     Description: Instruct learner(s) on prescribed precautions during self-care and functional transfers.   Learning Progress Summary     Learner Status Readiness Method Response Comment Documented by    Patient Done Acceptance E,TB,NIKHIL,H VU,DU role of OT, benefits of activity, transfers, LBD/LBB, axilla care, nile techniques, sling donning/doffing and fit, nerve cath management and safety with UBD, home safety, home/act. mods, ADL retraining ST 09/11/18 1341    Family Done Acceptance E,TB,NIKHIL,H VU,DU role of OT, benefits of activity, transfers, LBD/LBB, axilla care, nile techniques, sling donning/doffing and fit, nerve cath management and safety with UBD, home safety, home/act. mods, ADL retraining ST 09/11/18 1341          Point: Body mechanics (Done)     Description: Instruct learner(s) on proper positioning and spine alignment during self-care,  functional mobility activities and/or exercises.   Learning Progress Summary     Learner Status Readiness Method Response Comment Documented by    Patient Done Acceptance DARRIAN,JAY JAY,NIKHIL,SUGEY VAZQUEZ role of OT, benefits of activity, transfers, LBD/LBB, axilla care, nile techniques, sling donning/doffing and fit, nerve cath management and safety with UBD, home safety, home/act. mods, ADL retraining ST 09/11/18 1341    Family Done Acceptance E,NIKHIL GOYAL,SUGEY VAZQUEZ role of OT, benefits of activity, transfers, LBD/LBB, axilla care, nile techniques, sling donning/doffing and fit, nerve cath management and safety with UBD, home safety, home/act. mods, ADL retraining ST 09/11/18 1341                      User Key     Initials Effective Dates Name Provider Type Discipline    ST 06/10/18 -  Edie Castillo OTR Occupational Therapist OT                OT Recommendation and Plan  Outcome Summary/Treatment Plan (OT)  Anticipated Discharge Disposition (OT): home with assist  Reason for Discharge (OT Discharge Summary): patient discharged from this facility  Therapy Frequency (OT Eval): evaluation only  Plan of Care Review  Plan of Care Reviewed With: patient  Plan of Care Reviewed With: patient  Outcome Summary: Pt s/p L reverse TSA and now with good overall pain control. Pt and wife with good teach-back and understanding of HEP ROM precautions and protocol, shoulder safety, ADL retraining, nile dressing techniques, nerve cath safety and management, home safety and axilla care. Plan for pt to d/c home this p.m. with family assist.                Outcome Measures     Row Name 09/11/18 1114 09/11/18 0911          How much help from another person do you currently need...    Turning from your back to your side while in flat bed without using bedrails?  -- 4  -SC     Moving from lying on back to sitting on the side of a flat bed without bedrails?  -- 3  -SC     Moving to and from a bed to a chair (including a wheelchair)?  -- 3  -SC     Standing up  from a chair using your arms (e.g., wheelchair, bedside chair)?  -- 3  -SC     Climbing 3-5 steps with a railing?  -- 3  -SC     To walk in hospital room?  -- 3  -SC     AM-PAC 6 Clicks Score  -- 19  -SC        How much help from another is currently needed...    Putting on and taking off regular lower body clothing? 2  -ST  --     Bathing (including washing, rinsing, and drying) 2  -ST  --     Toileting (which includes using toilet bed pan or urinal) 2  -ST  --     Putting on and taking off regular upper body clothing 2  -ST  --     Taking care of personal grooming (such as brushing teeth) 3  -ST  --     Eating meals 3  -ST  --     Score 14  -ST  --        Functional Assessment    Outcome Measure Options AM-PAC 6 Clicks Daily Activity (OT)  -ST AM-PAC 6 Clicks Basic Mobility (PT)  -SC       User Key  (r) = Recorded By, (t) = Taken By, (c) = Cosigned By    Initials Name Provider Type    SC Day Chinchilla, PT Physical Therapist    Edie Velez OTR Occupational Therapist          Time Calculation:         Time Calculation- OT     Row Name 09/11/18 1114             Time Calculation- OT    OT Start Time 1114  -ST      OT Received On 09/11/18  -ST         Timed Charges    69964 - OT Therapeutic Exercise Minutes 12  -ST      78537 - OT Self Care/Mgmt Minutes 17  -ST        User Key  (r) = Recorded By, (t) = Taken By, (c) = Cosigned By    Initials Name Provider Type    Edie Velez OTR Occupational Therapist        Therapy Suggested Charges     Code   Minutes Charges    15848 (CPT®) Hc Ot Neuromusc Re Education Ea 15 Min      89303 (CPT®) Hc Ot Ther Proc Ea 15 Min 12 1    57476 (CPT®) Hc Ot Therapeutic Act Ea 15 Min      55640 (CPT®) Hc Ot Manual Therapy Ea 15 Min      95535 (CPT®) Hc Ot Iontophoresis Ea 15 Min      72262 (CPT®) Hc Ot Elec Stim Ea-Per 15 Min      22207 (CPT®) Hc Ot Ultrasound Ea 15 Min      66765 (CPT®) Hc Ot Self Care/Mgmt/Train Ea 15 Min 17 1    Total  29 2        Therapy Charges for  Today     Code Description Service Date Service Provider Modifiers Qty    93073454769 HC OT THER PROC EA 15 MIN 9/11/2018 Edie Castillo OTR GO 1    07247451449 HC OT SELF CARE/MGMT/TRAIN EA 15 MIN 9/11/2018 Edie Castillo OTR GO 1    22400133249 HC OT EVAL MOD COMPLEXITY 3 9/11/2018 Edie Castillo OTR GO 1               OT Discharge Summary  Anticipated Discharge Disposition (OT): home with assist  Reason for Discharge: Discharge from facility  Outcomes Achieved: Discharge from facility occurred on same date as evluation  Discharge Destination: Home with assist    CHARISSA Deutsch  9/11/2018

## 2018-09-11 NOTE — PLAN OF CARE
Problem: Fall Risk (Adult)  Intervention: Monitor/Assist with Self Care   09/11/18 1120   Activity   Activity Assistance Provided assistance, 1 person   Daily Care Interventions   Self-Care Promotion independence encouraged;BADL personal objects within reach;BADL personal routines maintained;meal setup provided

## 2018-09-11 NOTE — DISCHARGE SUMMARY
Patient Name: Flaco Roque II  MRN: [id number]  : 1945  DOS: 2018    Attending: No att. providers found    Primary Care Provider: Tayo Hendrix MD    Date of Admission:.9/10/2018  5:27 AM    Date of Discharge:  2018    Discharge Diagnosis: Principal Problem:    Status post reverse total shoulder replacement, left  Active Problems:    Arthropathy of shoulder region    ABRIL (obstructive sleep apnea)    Chronic back pain    COPD (chronic obstructive pulmonary disease) (CMS/HCC)    Anemia    Leukocytosis, likely reactive    Acute postoperative pain      Hospital Course  Patient is a 72 y.o. male presented for left reverse total shoulder arthroplasty by Dr. Brennan under [place]. He tolerated surgery well and was admitted for further medical management. He reports a fall on  injuring the left shoulder. Has had severe pain and limited ROM since.     He has chronic pain and indwelling morphine pump. He is followed by Dr. Kingston, pain management.     Patient was provided pain medications as needed for pain control, along with interscalene nerve block infusion of Ropivacaine.    Adjustments were made to pain medications to optimize postop pain management. Risks and benefits of opiate medications discussed with patient.    The patient was seen by OT and was taught exercises for his left arm.  The patient used an IS for atelectasis prophylaxis and mechanicals for DVT prophylaxis.  Home medications were resumed as appropriate, and labs were monitored and remained fairly stable.     With the progress he has made, he is ready for DC home today.    The patient will have an On Q pump ( instructed on it during this admit)  Discussed with patient regarding plan and he shows understanding and agreement.          Procedures Performed  Date; September 10, 2018     Preoperative diagnosis; left shoulder rotator cuff arthropathy     Postoperative diagnosis; same     Procedure; reverse total shoulder  "arthroplasty     Surgeon; Abel Brennan M.D.       Pertinent Test Results:    I reviewed the patient's new clinical results.     Results from last 7 days  Lab Units 18  0725 18  0852   WBC 10*3/mm3 15.83* 9.72   HEMOGLOBIN g/dL 11.4* 11.4*   HEMATOCRIT % 37.7* 37.9*   PLATELETS 10*3/mm3 353 359       Results from last 7 days  Lab Units 18  0725   SODIUM mmol/L 135   POTASSIUM mmol/L 3.9   CHLORIDE mmol/L 100   CO2 mmol/L 26.0   BUN mg/dL 10   CREATININE mg/dL 0.62   CALCIUM mg/dL 8.3*   GLUCOSE mg/dL 91     I reviewed the patient's new imaging including images and reports.      Physical therapy: Patient demonstrated safe ambulation in bess today. Recommended he use his cane at home    Discharge Assessment:    Vital Signs  /82 (BP Location: Right arm, Patient Position: Lying)   Pulse 79   Temp 98.4 °F (36.9 °C) (Oral)   Resp 16   Ht 172.7 cm (67.99\")   Wt 67.5 kg (148 lb 13 oz)   SpO2 94%   BMI 22.63 kg/m²   Temp (24hrs), Av.9 °F (36.6 °C), Min:97.6 °F (36.4 °C), Max:98.4 °F (36.9 °C)      General Appearance:    Alert, cooperative, in no acute distress   Lungs:     Clear to auscultation,respirations regular, even and                   unlabored    Heart:    Regular rhythm and normal rate, normal S1 and S2   Abdomen:     Normal bowel sounds, no masses, no organomegaly, soft        non-tender, non-distended, no guarding, no rebound                 tenderness   Extremities:   LUE sling. Covaderm CDI. Nerve block present. Good cap refill and movement left digits.    Pulses:   Pulses palpable and equal bilaterally   Skin:   No bleeding, bruising or rash   Neurologic:   Cranial nerves 2 - 12 grossly intact, sensation intact       Discharge Disposition: Home    Discharge Medications     Discharge Medications      New Medications      Instructions Start Date   docusate sodium 100 MG capsule   100 mg, Oral, 2 Times Daily PRN      oxyCODONE-acetaminophen 5-325 MG per tablet  Commonly known " as:  PERCOCET   1 tablet, Oral, Every 4 Hours PRN      Ropivacaine HCl-NaCl 0.2-0.9 %  Commonly known as:  NAROPIN   6 mL/hr, Peripheral Nerve, Continuous         Continue These Medications      Instructions Start Date   amitriptyline 50 MG tablet  Commonly known as:  ELAVIL   100 mg, Oral, Nightly      budesonide-formoterol 80-4.5 MCG/ACT inhaler  Commonly known as:  SYMBICORT   2 puffs, Inhalation, 2 Times Daily - RT      ferrous sulfate 325 (65 FE) MG tablet   325 mg, Oral, Daily With Breakfast      FOSAMAX 70 MG tablet  Generic drug:  alendronate   Oral, Weekly      ipratropium-albuterol 0.5-2.5 mg/3 ml nebulizer  Commonly known as:  DUO-NEB   3 mL, Nebulization, Every 4 Hours PRN      montelukast 10 MG tablet  Commonly known as:  SINGULAIR   10 mg, Oral, Nightly      MULTIVITAMIN ADULT PO   1 tablet, Oral, Daily      pantoprazole 40 MG EC tablet  Commonly known as:  PROTONIX   40 mg, Oral, Daily      SINEMET  MG per tablet  Generic drug:  carbidopa-levodopa   1 tablet, Oral, 4 Times Daily         Stop These Medications    HYDROcodone-acetaminophen 7.5-325 MG per tablet  Commonly known as:  NORCO            Discharge Diet: regular diet    Activity at Discharge: LUE sling, pendulum exercises    Follow-up Appointments  Dr. Brennan per his orders    Discharge took over 30 min.    YAMILET Craig  09/11/18  3:28 PM

## 2018-09-11 NOTE — PLAN OF CARE
Problem: Fall Risk (Adult)  Goal: Identify Related Risk Factors and Signs and Symptoms  Outcome: Ongoing (interventions implemented as appropriate)   09/11/18 0650   Fall Risk (Adult)   Related Risk Factors (Fall Risk) culprit medication(s);history of falls   Signs and Symptoms (Fall Risk) presence of risk factors

## 2018-09-11 NOTE — PLAN OF CARE
Problem: Patient Care Overview  Goal: Plan of Care Review  Outcome: Outcome(s) achieved Date Met: 09/11/18 09/11/18 1205   Coping/Psychosocial   Plan of Care Reviewed With patient   Plan of Care Review   Progress improving   OTHER   Outcome Summary Patient demonstrated safe ambulation in bess today. Recommended he use his cane at home

## 2018-09-12 ENCOUNTER — DOCUMENTATION (OUTPATIENT)
Dept: SOCIAL WORK | Facility: HOSPITAL | Age: 73
End: 2018-09-12

## 2018-09-12 NOTE — PROGRESS NOTES
FERNANDO Sandoval    Nerve Cath Post Op Call    Patient Name: Flaco Roque II  :  1945  MRN:  [id number]  Date of Discharge: 2018    Nerve Cath Post Op Call:    Analgesia:Excellent  Pain Score:0/10  Side Effects:None  Catheter Site:clean  Patient Controlled ON Q pump infusion rate: 6ml/hr  Catheter Plan:Will continue with plan at home without changes and The patient was instructed to call ON CALL Anesthesia provider for any questions or problems

## 2018-09-13 NOTE — PROGRESS NOTES
FERNANDO Sandoval    Nerve Cath Post Op Call    Patient Name: Flaco Roque II  :  1945  MRN:  [id number]  Date of Discharge: 2018    Nerve Cath Post Op Call:    Catheter Plan:The patient was instructed to call ON CALL Anesthesia provider for any questions or problems and Patient called/No answer/Message left to call [facility] pain service for any questions or complaints

## 2018-09-14 NOTE — PROGRESS NOTES
FERNANDO Sandoval    Nerve Cath Post Op Call    Patient Name: Flaco Roque II  :  1945  MRN:  [id number]  Date of Discharge: 2018    Nerve Cath Post Op Call:    Analgesia:Excellent  Pain Score:0/10  Side Effects:None  Catheter Site:clean  Catheter Plan:Patient/Family member report nerve catheter previously discontinued, tip intact    Patient Happy with ON Q use  No  Pain medication taken

## 2019-04-10 ENCOUNTER — APPOINTMENT (OUTPATIENT)
Dept: PREADMISSION TESTING | Facility: HOSPITAL | Age: 74
End: 2019-04-10

## 2019-04-10 ENCOUNTER — OFFICE VISIT (OUTPATIENT)
Dept: ORTHOPEDIC SURGERY | Facility: CLINIC | Age: 74
End: 2019-04-10

## 2019-04-10 VITALS — WEIGHT: 150.57 LBS | HEART RATE: 85 BPM | OXYGEN SATURATION: 98 % | HEIGHT: 68 IN | BODY MASS INDEX: 22.82 KG/M2

## 2019-04-10 VITALS — BODY MASS INDEX: 22.85 KG/M2 | WEIGHT: 150.79 LBS | HEIGHT: 68 IN

## 2019-04-10 DIAGNOSIS — M21.962 DEFORMITY OF LEFT FOOT: Primary | ICD-10-CM

## 2019-04-10 DIAGNOSIS — M21.962 DEFORMITY OF LEFT FOOT: ICD-10-CM

## 2019-04-10 LAB
ANION GAP SERPL CALCULATED.3IONS-SCNC: 10 MMOL/L
BASOPHILS # BLD AUTO: 0.05 10*3/MM3 (ref 0–0.2)
BASOPHILS NFR BLD AUTO: 0.6 % (ref 0–1.5)
BUN BLD-MCNC: 10 MG/DL (ref 8–23)
BUN/CREAT SERPL: 14.3 (ref 7–25)
C3 FRG RBC-MCNC: NORMAL
CALCIUM SPEC-SCNC: 9.1 MG/DL (ref 8.6–10.5)
CHLORIDE SERPL-SCNC: 103 MMOL/L (ref 98–107)
CO2 SERPL-SCNC: 24 MMOL/L (ref 22–29)
CREAT BLD-MCNC: 0.7 MG/DL (ref 0.76–1.27)
DEPRECATED RDW RBC AUTO: 49.2 FL (ref 37–54)
ELLIPTOCYTES BLD QL SMEAR: NORMAL
EOSINOPHIL # BLD AUTO: 0.15 10*3/MM3 (ref 0–0.4)
EOSINOPHIL NFR BLD AUTO: 1.8 % (ref 0.3–6.2)
ERYTHROCYTE [DISTWIDTH] IN BLOOD BY AUTOMATED COUNT: 19.2 % (ref 12.3–15.4)
GFR SERPL CREATININE-BSD FRML MDRD: 111 ML/MIN/1.73
GLUCOSE BLD-MCNC: 111 MG/DL (ref 65–99)
HBA1C MFR BLD: 5.4 % (ref 4.8–5.6)
HCT VFR BLD AUTO: 35.7 % (ref 37.5–51)
HGB BLD-MCNC: 10.5 G/DL (ref 13–17.7)
HYPOCHROMIA BLD QL: NORMAL
IMM GRANULOCYTES # BLD AUTO: 0.01 10*3/MM3 (ref 0–0.05)
IMM GRANULOCYTES NFR BLD AUTO: 0.1 % (ref 0–0.5)
LYMPHOCYTES # BLD AUTO: 1.82 10*3/MM3 (ref 0.7–3.1)
LYMPHOCYTES NFR BLD AUTO: 21.6 % (ref 19.6–45.3)
MCH RBC QN AUTO: 20.5 PG (ref 26.6–33)
MCHC RBC AUTO-ENTMCNC: 29.4 G/DL (ref 31.5–35.7)
MCV RBC AUTO: 69.9 FL (ref 79–97)
MICROCYTES BLD QL: NORMAL
MONOCYTES # BLD AUTO: 0.75 10*3/MM3 (ref 0.1–0.9)
MONOCYTES NFR BLD AUTO: 8.9 % (ref 5–12)
NEUTROPHILS # BLD AUTO: 5.63 10*3/MM3 (ref 1.4–7)
NEUTROPHILS NFR BLD AUTO: 67 % (ref 42.7–76)
PLAT MORPH BLD: NORMAL
PLATELET # BLD AUTO: 465 10*3/MM3 (ref 140–450)
PMV BLD AUTO: 10.1 FL (ref 6–12)
POTASSIUM BLD-SCNC: 4.7 MMOL/L (ref 3.5–5.2)
RBC # BLD AUTO: 5.11 10*6/MM3 (ref 4.14–5.8)
SODIUM BLD-SCNC: 137 MMOL/L (ref 136–145)
TARGETS BLD QL SMEAR: NORMAL
WBC MORPH BLD: NORMAL
WBC NRBC COR # BLD: 8.41 10*3/MM3 (ref 3.4–10.8)

## 2019-04-10 PROCEDURE — 85007 BL SMEAR W/DIFF WBC COUNT: CPT | Performed by: ORTHOPAEDIC SURGERY

## 2019-04-10 PROCEDURE — 83036 HEMOGLOBIN GLYCOSYLATED A1C: CPT | Performed by: ORTHOPAEDIC SURGERY

## 2019-04-10 PROCEDURE — 99213 OFFICE O/P EST LOW 20 MIN: CPT | Performed by: ORTHOPAEDIC SURGERY

## 2019-04-10 PROCEDURE — 80048 BASIC METABOLIC PNL TOTAL CA: CPT | Performed by: ORTHOPAEDIC SURGERY

## 2019-04-10 PROCEDURE — 93005 ELECTROCARDIOGRAM TRACING: CPT

## 2019-04-10 PROCEDURE — 93010 ELECTROCARDIOGRAM REPORT: CPT | Performed by: INTERNAL MEDICINE

## 2019-04-10 PROCEDURE — 36415 COLL VENOUS BLD VENIPUNCTURE: CPT

## 2019-04-10 PROCEDURE — 85025 COMPLETE CBC W/AUTO DIFF WBC: CPT | Performed by: ORTHOPAEDIC SURGERY

## 2019-04-10 NOTE — PROGRESS NOTES
ESTABLISHED PATIENT    Patient: Flaco Roque II  : 1945    Primary Care Provider: Tayo Hendrix MD    Requesting Provider: As above    Follow-up and Pain of the Left Foot      History    Chief Complaint: Left foot pain    History of Present Illness: He returns for follow-up of his left forefoot deformity.  His wife is with him.  We had originally scheduled surgery on the left foot, but he fell and broke his shoulder.  He had undergo a shoulder replacement.  He is now here wishing to pursue surgery on the left foot.  He has significant pain with all activities.  He has pain in shoes.  He reports the worst pain is under the first metatarsal head and laterally over the fifth metatarsal head, he also has rubbing and pain from hammertoes 2, 3, 4.    Current Outpatient Medications on File Prior to Visit   Medication Sig Dispense Refill   • budesonide-formoterol (SYMBICORT) 80-4.5 MCG/ACT inhaler Inhale 2 puffs 2 (Two) Times a Day.     • carbidopa-levodopa (SINEMET)  MG per tablet Take 1 tablet by mouth 4 (Four) Times a Day.     • ipratropium-albuterol (DUO-NEB) 0.5-2.5 mg/3 ml nebulizer Take 3 mL by nebulization Every 4 (Four) Hours As Needed for Wheezing.     • montelukast (SINGULAIR) 10 MG tablet Take 10 mg by mouth every night.     • Multiple Vitamins-Minerals (MULTIVITAMIN ADULT PO) Take 1 tablet by mouth daily.     • pantoprazole (PROTONIX) 40 MG EC tablet Take 40 mg by mouth daily.     • Ropivacaine HCl-NaCl (NAROPIN) 0.2-0.9 % 12 mg/hr by Peripheral Nerve route Continuous.     • [DISCONTINUED] alendronate (FOSAMAX) 70 MG tablet Take  by mouth 1 (One) Time Per Week.     • [DISCONTINUED] amitriptyline (ELAVIL) 50 MG tablet Take 100 mg by mouth Every Night.     • [DISCONTINUED] docusate sodium 100 MG capsule Take 100 mg by mouth 2 (Two) Times a Day As Needed for Constipation. 60 capsule 0   • [DISCONTINUED] ferrous sulfate 325 (65 FE) MG tablet Take 1 tablet by mouth Daily With Breakfast.        No current facility-administered medications on file prior to visit.       No Known Allergies   Past Medical History:   Diagnosis Date   • Chris's esophagus     Last EGD 1 year ago with Dr Kaye    • BPH (benign prostatic hyperplasia)    • Chronic low back pain    • COPD (chronic obstructive pulmonary disease) (CMS/[facility])    • Foot pain    • GERD (gastroesophageal reflux disease)    • History of transfusion    • Injury of back    • Lung abscess (CMS/[facility])    • Osteoarthritis    • Osteoporosis    • Parkinson disease (CMS/[facility])    • Rotator cuff tear, left    • Sleep apnea      Past Surgical History:   Procedure Laterality Date   • ARTHRODESIS MIDTARSAL / TARSOMETATARSAL / TARSAL NAVICULAR-CUNEIFORM Left 05/10/2016   • BACK SURGERY     • BACK SURGERY      low back   • CATARACT EXTRACTION     • COLONOSCOPY N/A 11/2/2017    Procedure: COLONOSCOPY;  Surgeon: Luis Eduardo Capellan MD;  Location:  [facility];  Service:    • ENDOSCOPY N/A 11/1/2017    Procedure: ESOPHAGOGASTRODUODENOSCOPY;  Surgeon: Luis Eduardo Capellan MD;  Location:  [facility];  Service:    • ENDOSCOPY  11/02/2017    DR LUIS EDUARDO CAPELLAN   • FOOT SURGERY     • GALLBLADDER SURGERY     • KNEE ARTHROSCOPY Bilateral    • PAIN PUMP INSERTION/REVISION     • SPINE SURGERY     • TOTAL HIP ARTHROPLASTY Left    • TOTAL SHOULDER ARTHROPLASTY W/ DISTAL CLAVICLE EXCISION Left 9/10/2018    Procedure: REVERSE TOTAL SHOULDER ARTHROPLASTY LEFT;  Surgeon: Abel Brennan MD;  Location:  [facility];  Service: Orthopedics   • ULNAR NERVE TRANSPOSITION       Family History   Problem Relation Age of Onset   • Arthritis Mother    • Diabetes Mother    • Osteoarthritis Mother    • Colon cancer Father    • Cancer Father       Social History     Socioeconomic History   • Marital status:      Spouse name: Not on file   • Number of children: Not on file   • Years of education: Not on file   • Highest education level: Not on file   Occupational History   • Occupation: Retired   "  Tobacco Use   • Smoking status: Former Smoker     Packs/day: 2.00     Years: 25.00     Pack years: 50.00     Types: Cigarettes     Start date:      Last attempt to quit:      Years since quittin.2   • Smokeless tobacco: Never Used   Substance and Sexual Activity   • Alcohol use: No   • Drug use: No   • Sexual activity: Defer   Social History Narrative     and lives with wife    Retired supervisor at [employer]    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        Review of Systems   Constitutional: Negative.    HENT: Negative.    Eyes: Negative.    Respiratory: Negative.    Cardiovascular: Negative.    Gastrointestinal: Negative.    Endocrine: Negative.    Genitourinary: Negative.    Musculoskeletal: Positive for arthralgias and joint swelling.   Skin: Negative.    Allergic/Immunologic: Negative.    Neurological: Negative.    Hematological: Negative.    Psychiatric/Behavioral: Negative.        The following portions of the patient's history were reviewed and updated as appropriate: allergies, current medications, past family history, past medical history, past social history, past surgical history and problem list.    Physical Exam:   Pulse 85   Ht 172.7 cm (67.99\")   Wt 68.3 kg (150 lb 9.2 oz)   SpO2 98%   BMI 22.90 kg/m²   GENERAL: Body habitus: Thin    Lower extremity edema: Left: none; Right: none    Gait: antalgic and broad based     Mental Status:  awake and alert; oriented to person, place, and time  MSK:  Tibia:  Right:  non tender; Left:  non tender        Ankle:  Right: non tender; Left:  non tender        Foot:  Right:  non tender; Left:  Very thin padding on the left foot, tender to palpation under first metatarsal head and lateral fifth metatarsal head, tender over flexed rigid DIP joint of the great toe, tender over PIP joints of toes 2, 3, 4 with fixed contracture, some fixed dorsiflexion contracture of metatarsal phalangeal joints 2, 3, 4.  He has a healing blister on the lateral aspect of the " fifth metatarsal head, the skin is epithelialized, no signs of infection he has a callus under the first metatarsal head    NEURO Sensation:  Patchy decrease in sensation both feet     Medical Decision Making    Data Review:   none    Assessment/Plan/Diagnosis/Treatment Options:   1. Deformity of left foot  He has now recovered from his shoulder replacement.  He had injured it right before we were going to do surgery in July almost a year ago.  He now is much improved.  He would like to proceed with the left forefoot surgery.  What we will do is a DIP fusion of the great toe, excised PIP joints 2 through 4, do flexor tenotomies toes 2 through 4, do metatarsal capsulotomies 2 through 4 and extensor tendon lengthenings 2 through 4, and a chevron osteotomy on the fifth metatarsal.  I again explained the pins in the toes.  He will be allowed to weight-bear in the boot.  Because of his history of falling and medical problems we will do this as a 23-hour admit.  I think that safer.  He understands the plan and the pins in the toes, the goal is stiffer straighter toes with less pain.  His wife was with him and questions were asked and answered in great detail. We discussed the risks including but not limited to: death, infection, neurovascular damage, strokes, heart attacks, blood clots, chronic pain, deformity, stiffness, malunion, nonunion, hardware failure, need for further surgery,  amputation, etc.  Questions were asked and answered in detail.  We also discussed what would happen if we do nothing.     - Case Request; Standing  - ceFAZolin (ANCEF) 2 g in sodium chloride 0.9 % 100 mL IVPB  - CBC and Differential; Future  - Basic metabolic panel; Future  - Hemoglobin A1c; Future  - ECG 12 Lead; Future  - Case Request

## 2019-04-10 NOTE — DISCHARGE INSTRUCTIONS
Reason for Call:  Form, our goal is to have forms completed with 72 hours, however, some forms may require a visit or additional information.    Type of letter, form or note:      Who is the form from?: [facility] (if other please explain)    Where did the form come from: form was faxed in    What [place] was the [place] [facility]    Where the form was placed: [place]     What number is listed as a contact on the form?: [phone]       Additional comments:     Call taken on 1/19/2017 at 10:57 AM by Irma Quiroz         The following information and instructions were given:    Do not eat, drink, smoke or chew gum after midnight the night before surgery. This also includes no mints.  Take all routine, prescribed medications including heart and blood pressure medicines with a sip of water unless otherwise instructed by your physician.   Do NOT take diabetic medication unless instructed by your physician.    If you were instructed to drink 20 ounces (or until full) of Gatorade or G2 (if diabetic) the morning of surgery, the Gatorade or G2 must be completed 1 hour before arrival time on the day of surgery .  No RED Gatorade or G2.      DO NOT shave for two days before your procedure.  Do not wear makeup.      DO NOT wear fingernail polish (gel/regular) and/or acrylic/artificial nails on the day of surgery.   If you have had a recent manicure and would rather not remove polish or artificial nails, then the minimum requirement is that the polish/artificial nails must be removed from the middle finger on each hand.      If you are having surgery/procedure on an upper extremity, then fingernail polish/artificial fingernails must be removed for surgery.  NO EXCEPTIONS.      If you are having surgery/procedure on a lower extremity, then toenail polish on both extremities must be removed for surgery.  NO EXCEPTIONS.    Remove all jewelry (advise to go to jeweler if unable to remove).  Jewelry, especially rings, can no longer be taped for surgery.    Leave anything you consider valuable at home.    Leave your suitcase in the car until after your surgery.    Bring the following with you the day of your procedure (when applicable)       -picture ID and insurance cards       -Co-pay/deductible required by insurance       -Medications in the original bottles (not a list) including all over-the-counter medications if not brought to PAT       -Copy of advance directive, living will or power of  documents if not brought to PAT       -CPAP or  BIPAP mask and tubing (do not bring machine)       -Skin prep instruction(s) sheet       -PAT Pass    Education booklet, brochure, handout or binder related to procedure given to patient.    When applicable, an ERAS booklet was given to patient.    Pain Control After Surgery handout given to patient.    Respirex use (handout given to patient) and pneumonia prevention education provided.    Signs and Symptoms of infection discussed.    DVT Prevention education given.  Stressing the importance of ambulation.    Please apply Chlorhexadine wipes to surgical area (if instructed) the night before procedure and the AM of procedure and document date/time of applications on skin prep instruction sheet.

## 2019-04-10 NOTE — H&P (VIEW-ONLY)
ESTABLISHED PATIENT    Patient: Flaco Roque II  : 1945    Primary Care Provider: Tayo Hendrix MD    Requesting Provider: As above    Follow-up and Pain of the Left Foot      History    Chief Complaint: Left foot pain    History of Present Illness: He returns for follow-up of his left forefoot deformity.  His wife is with him.  We had originally scheduled surgery on the left foot, but he fell and broke his shoulder.  He had undergo a shoulder replacement.  He is now here wishing to pursue surgery on the left foot.  He has significant pain with all activities.  He has pain in shoes.  He reports the worst pain is under the first metatarsal head and laterally over the fifth metatarsal head, he also has rubbing and pain from hammertoes 2, 3, 4.    Current Outpatient Medications on File Prior to Visit   Medication Sig Dispense Refill   • budesonide-formoterol (SYMBICORT) 80-4.5 MCG/ACT inhaler Inhale 2 puffs 2 (Two) Times a Day.     • carbidopa-levodopa (SINEMET)  MG per tablet Take 1 tablet by mouth 4 (Four) Times a Day.     • ipratropium-albuterol (DUO-NEB) 0.5-2.5 mg/3 ml nebulizer Take 3 mL by nebulization Every 4 (Four) Hours As Needed for Wheezing.     • montelukast (SINGULAIR) 10 MG tablet Take 10 mg by mouth every night.     • Multiple Vitamins-Minerals (MULTIVITAMIN ADULT PO) Take 1 tablet by mouth daily.     • pantoprazole (PROTONIX) 40 MG EC tablet Take 40 mg by mouth daily.     • Ropivacaine HCl-NaCl (NAROPIN) 0.2-0.9 % 12 mg/hr by Peripheral Nerve route Continuous.     • [DISCONTINUED] alendronate (FOSAMAX) 70 MG tablet Take  by mouth 1 (One) Time Per Week.     • [DISCONTINUED] amitriptyline (ELAVIL) 50 MG tablet Take 100 mg by mouth Every Night.     • [DISCONTINUED] docusate sodium 100 MG capsule Take 100 mg by mouth 2 (Two) Times a Day As Needed for Constipation. 60 capsule 0   • [DISCONTINUED] ferrous sulfate 325 (65 FE) MG tablet Take 1 tablet by mouth Daily With Breakfast.        No current facility-administered medications on file prior to visit.       No Known Allergies   Past Medical History:   Diagnosis Date   • Chris's esophagus     Last EGD 1 year ago with Dr Kaye    • BPH (benign prostatic hyperplasia)    • Chronic low back pain    • COPD (chronic obstructive pulmonary disease) (CMS/[facility])    • Foot pain    • GERD (gastroesophageal reflux disease)    • History of transfusion    • Injury of back    • Lung abscess (CMS/[facility])    • Osteoarthritis    • Osteoporosis    • Parkinson disease (CMS/[facility])    • Rotator cuff tear, left    • Sleep apnea      Past Surgical History:   Procedure Laterality Date   • ARTHRODESIS MIDTARSAL / TARSOMETATARSAL / TARSAL NAVICULAR-CUNEIFORM Left 05/10/2016   • BACK SURGERY     • BACK SURGERY      low back   • CATARACT EXTRACTION     • COLONOSCOPY N/A 11/2/2017    Procedure: COLONOSCOPY;  Surgeon: Luis Eduardo Capellan MD;  Location:  [facility];  Service:    • ENDOSCOPY N/A 11/1/2017    Procedure: ESOPHAGOGASTRODUODENOSCOPY;  Surgeon: Luis Eduardo Capellan MD;  Location:  [facility];  Service:    • ENDOSCOPY  11/02/2017    DR LUIS EDUARDO CAPELLAN   • FOOT SURGERY     • GALLBLADDER SURGERY     • KNEE ARTHROSCOPY Bilateral    • PAIN PUMP INSERTION/REVISION     • SPINE SURGERY     • TOTAL HIP ARTHROPLASTY Left    • TOTAL SHOULDER ARTHROPLASTY W/ DISTAL CLAVICLE EXCISION Left 9/10/2018    Procedure: REVERSE TOTAL SHOULDER ARTHROPLASTY LEFT;  Surgeon: Abel Brennan MD;  Location:  [facility];  Service: Orthopedics   • ULNAR NERVE TRANSPOSITION       Family History   Problem Relation Age of Onset   • Arthritis Mother    • Diabetes Mother    • Osteoarthritis Mother    • Colon cancer Father    • Cancer Father       Social History     Socioeconomic History   • Marital status:      Spouse name: Not on file   • Number of children: Not on file   • Years of education: Not on file   • Highest education level: Not on file   Occupational History   • Occupation: Retired   "  Tobacco Use   • Smoking status: Former Smoker     Packs/day: 2.00     Years: 25.00     Pack years: 50.00     Types: Cigarettes     Start date:      Last attempt to quit:      Years since quittin.2   • Smokeless tobacco: Never Used   Substance and Sexual Activity   • Alcohol use: No   • Drug use: No   • Sexual activity: Defer   Social History Narrative     and lives with wife    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    HENT: Negative.    Eyes: Negative.    Respiratory: Negative.    Cardiovascular: Negative.    Gastrointestinal: Negative.    Endocrine: Negative.    Genitourinary: Negative.    Musculoskeletal: Positive for arthralgias and joint swelling.   Skin: Negative.    Allergic/Immunologic: Negative.    Neurological: Negative.    Hematological: Negative.    Psychiatric/Behavioral: Negative.        The following portions of the patient's history were reviewed and updated as appropriate: allergies, current medications, past family history, past medical history, past social history, past surgical history and problem list.    Physical Exam:   Pulse 85   Ht 172.7 cm (67.99\")   Wt 68.3 kg (150 lb 9.2 oz)   SpO2 98%   BMI 22.90 kg/m²   GENERAL: Body habitus: Thin    Lower extremity edema: Left: none; Right: none    Gait: antalgic and broad based     Mental Status:  awake and alert; oriented to person, place, and time  MSK:  Tibia:  Right:  non tender; Left:  non tender        Ankle:  Right: non tender; Left:  non tender        Foot:  Right:  non tender; Left:  Very thin padding on the left foot, tender to palpation under first metatarsal head and lateral fifth metatarsal head, tender over flexed rigid DIP joint of the great toe, tender over PIP joints of toes 2, 3, 4 with fixed contracture, some fixed dorsiflexion contracture of metatarsal phalangeal joints 2, 3, 4.  He has a healing blister on the lateral aspect of the " fifth metatarsal head, the skin is epithelialized, no signs of infection he has a callus under the first metatarsal head    NEURO Sensation:  Patchy decrease in sensation both feet     Medical Decision Making    Data Review:   none    Assessment/Plan/Diagnosis/Treatment Options:   1. Deformity of left foot  He has now recovered from his shoulder replacement.  He had injured it right before we were going to do surgery in July almost a year ago.  He now is much improved.  He would like to proceed with the left forefoot surgery.  What we will do is a DIP fusion of the great toe, excised PIP joints 2 through 4, do flexor tenotomies toes 2 through 4, do metatarsal capsulotomies 2 through 4 and extensor tendon lengthenings 2 through 4, and a chevron osteotomy on the fifth metatarsal.  I again explained the pins in the toes.  He will be allowed to weight-bear in the boot.  Because of his history of falling and medical problems we will do this as a 23-hour admit.  I think that safer.  He understands the plan and the pins in the toes, the goal is stiffer straighter toes with less pain.  His wife was with him and questions were asked and answered in great detail. We discussed the risks including but not limited to: death, infection, neurovascular damage, strokes, heart attacks, blood clots, chronic pain, deformity, stiffness, malunion, nonunion, hardware failure, need for further surgery,  amputation, etc.  Questions were asked and answered in detail.  We also discussed what would happen if we do nothing.     - Case Request; Standing  - ceFAZolin (ANCEF) 2 g in sodium chloride 0.9 % 100 mL IVPB  - CBC and Differential; Future  - Basic metabolic panel; Future  - Hemoglobin A1c; Future  - ECG 12 Lead; Future  - Case Request

## 2019-04-22 RX ORDER — OXYCODONE HYDROCHLORIDE AND ACETAMINOPHEN 5; 325 MG/1; MG/1
1-2 TABLET ORAL EVERY 6 HOURS PRN
Qty: 60 TABLET | Refills: 0 | Status: ON HOLD | OUTPATIENT
Start: 2019-04-22 | End: 2019-04-26

## 2019-04-22 RX ORDER — HYDROCODONE BITARTRATE AND ACETAMINOPHEN 7.5; 325 MG/1; MG/1
1-2 TABLET ORAL EVERY 6 HOURS PRN
Qty: 60 TABLET | Refills: 0 | Status: SHIPPED | OUTPATIENT
Start: 2019-04-22 | End: 2019-05-10

## 2019-04-22 RX ORDER — ONDANSETRON 4 MG/1
4 TABLET, FILM COATED ORAL EVERY 6 HOURS PRN
Qty: 30 TABLET | Refills: 0 | Status: SHIPPED | OUTPATIENT
Start: 2019-04-22 | End: 2019-05-10

## 2019-04-23 ENCOUNTER — ANESTHESIA EVENT (OUTPATIENT)
Dept: PERIOP | Facility: HOSPITAL | Age: 74
End: 2019-04-23

## 2019-04-23 ENCOUNTER — APPOINTMENT (OUTPATIENT)
Dept: GENERAL RADIOLOGY | Facility: HOSPITAL | Age: 74
End: 2019-04-23

## 2019-04-23 ENCOUNTER — HOSPITAL ENCOUNTER (OUTPATIENT)
Facility: HOSPITAL | Age: 74
Discharge: HOME | End: 2019-04-24
Attending: ORTHOPAEDIC SURGERY | Admitting: ORTHOPAEDIC SURGERY

## 2019-04-23 ENCOUNTER — ANESTHESIA (OUTPATIENT)
Dept: PERIOP | Facility: HOSPITAL | Age: 74
End: 2019-04-23

## 2019-04-23 DIAGNOSIS — M21.962 DEFORMITY OF LEFT FOOT: ICD-10-CM

## 2019-04-23 DIAGNOSIS — Z74.09 IMPAIRED FUNCTIONAL MOBILITY, BALANCE, GAIT, AND ENDURANCE: Primary | ICD-10-CM

## 2019-04-23 PROBLEM — M21.969 FOOT DEFORMITY: Status: ACTIVE | Noted: 2019-04-23

## 2019-04-23 PROBLEM — Z98.890 S/P FOOT SURGERY, LEFT: Status: ACTIVE | Noted: 2019-04-23

## 2019-04-23 PROCEDURE — 63710000001 MONTELUKAST 10 MG TABLET: Performed by: NURSE PRACTITIONER

## 2019-04-23 PROCEDURE — 25010000003 CEFAZOLIN IN DEXTROSE 2-4 GM/100ML-% SOLUTION: Performed by: ORTHOPAEDIC SURGERY

## 2019-04-23 PROCEDURE — 28285 REPAIR OF HAMMERTOE: CPT | Performed by: ORTHOPAEDIC SURGERY

## 2019-04-23 PROCEDURE — C1713 ANCHOR/SCREW BN/BN,TIS/BN: HCPCS | Performed by: ORTHOPAEDIC SURGERY

## 2019-04-23 PROCEDURE — 76000 FLUOROSCOPY <1 HR PHYS/QHP: CPT

## 2019-04-23 PROCEDURE — G0378 HOSPITAL OBSERVATION PER HR: HCPCS

## 2019-04-23 PROCEDURE — C1769 GUIDE WIRE: HCPCS | Performed by: ORTHOPAEDIC SURGERY

## 2019-04-23 PROCEDURE — A9270 NON-COVERED ITEM OR SERVICE: HCPCS | Performed by: NURSE PRACTITIONER

## 2019-04-23 PROCEDURE — 28270 RELEASE OF FOOT CONTRACTURE: CPT | Performed by: ORTHOPAEDIC SURGERY

## 2019-04-23 PROCEDURE — 25010000002 BUPRENORPHINE PER 0.1 MG: Performed by: ANESTHESIOLOGY

## 2019-04-23 PROCEDURE — 25010000002 PHENYLEPHRINE PER 1 ML: Performed by: NURSE ANESTHETIST, CERTIFIED REGISTERED

## 2019-04-23 PROCEDURE — 28308 INCISION OF METATARSAL: CPT | Performed by: ORTHOPAEDIC SURGERY

## 2019-04-23 PROCEDURE — 25010000002 FENTANYL CITRATE (PF) 100 MCG/2ML SOLUTION: Performed by: NURSE ANESTHETIST, CERTIFIED REGISTERED

## 2019-04-23 PROCEDURE — 25010000002 DEXAMETHASONE SODIUM PHOSPHATE 10 MG/ML SOLUTION: Performed by: ANESTHESIOLOGY

## 2019-04-23 PROCEDURE — 28755 FUSION OF BIG TOE JOINT: CPT | Performed by: ORTHOPAEDIC SURGERY

## 2019-04-23 PROCEDURE — 63710000001 CARBIDOPA-LEVODOPA 25-100 MG TABLET: Performed by: NURSE PRACTITIONER

## 2019-04-23 PROCEDURE — 25010000002 DEXAMETHASONE PER 1 MG: Performed by: NURSE ANESTHETIST, CERTIFIED REGISTERED

## 2019-04-23 PROCEDURE — 25010000002 ONDANSETRON PER 1 MG: Performed by: NURSE ANESTHETIST, CERTIFIED REGISTERED

## 2019-04-23 PROCEDURE — 97161 PT EVAL LOW COMPLEX 20 MIN: CPT

## 2019-04-23 PROCEDURE — 25010000002 PROPOFOL 10 MG/ML EMULSION: Performed by: NURSE ANESTHETIST, CERTIFIED REGISTERED

## 2019-04-23 DEVICE — SCRW CANN SHRT THRD 1/3 4X46MM: Type: IMPLANTABLE DEVICE | Site: TOE FIRST | Status: FUNCTIONAL

## 2019-04-23 DEVICE — KWIRE SMOTH DBL/TROC SS 9X.045IN: Type: IMPLANTABLE DEVICE | Site: FOOT | Status: FUNCTIONAL

## 2019-04-23 RX ORDER — DIPHENOXYLATE HYDROCHLORIDE AND ATROPINE SULFATE 2.5; .025 MG/1; MG/1
1 TABLET ORAL DAILY
Status: DISCONTINUED | OUTPATIENT
Start: 2019-04-23 | End: 2019-04-24 | Stop reason: HOSPADM

## 2019-04-23 RX ORDER — DEXAMETHASONE SODIUM PHOSPHATE 10 MG/ML
INJECTION, SOLUTION INTRAMUSCULAR; INTRAVENOUS
Status: DISCONTINUED | OUTPATIENT
Start: 2019-04-23 | End: 2019-04-23 | Stop reason: SURG

## 2019-04-23 RX ORDER — BUPRENORPHINE HYDROCHLORIDE 0.32 MG/ML
INJECTION INTRAMUSCULAR; INTRAVENOUS
Status: DISCONTINUED | OUTPATIENT
Start: 2019-04-23 | End: 2019-04-23 | Stop reason: SURG

## 2019-04-23 RX ORDER — NALOXONE HCL 0.4 MG/ML
0.4 VIAL (ML) INJECTION
Status: DISCONTINUED | OUTPATIENT
Start: 2019-04-23 | End: 2019-04-24 | Stop reason: HOSPADM

## 2019-04-23 RX ORDER — BUDESONIDE AND FORMOTEROL FUMARATE DIHYDRATE 80; 4.5 UG/1; UG/1
2 AEROSOL RESPIRATORY (INHALATION)
Status: DISCONTINUED | OUTPATIENT
Start: 2019-04-23 | End: 2019-04-24 | Stop reason: HOSPADM

## 2019-04-23 RX ORDER — BUPIVACAINE HYDROCHLORIDE 2.5 MG/ML
INJECTION, SOLUTION EPIDURAL; INFILTRATION; INTRACAUDAL
Status: DISCONTINUED | OUTPATIENT
Start: 2019-04-23 | End: 2019-04-23 | Stop reason: SURG

## 2019-04-23 RX ORDER — PROMETHAZINE HYDROCHLORIDE 25 MG/ML
12.5 INJECTION, SOLUTION INTRAMUSCULAR; INTRAVENOUS EVERY 4 HOURS PRN
Status: DISCONTINUED | OUTPATIENT
Start: 2019-04-23 | End: 2019-04-24 | Stop reason: HOSPADM

## 2019-04-23 RX ORDER — OXYCODONE HYDROCHLORIDE AND ACETAMINOPHEN 5; 325 MG/1; MG/1
2 TABLET ORAL EVERY 4 HOURS PRN
Status: DISCONTINUED | OUTPATIENT
Start: 2019-04-23 | End: 2019-04-24 | Stop reason: HOSPADM

## 2019-04-23 RX ORDER — FENTANYL CITRATE 50 UG/ML
50 INJECTION, SOLUTION INTRAMUSCULAR; INTRAVENOUS
Status: DISCONTINUED | OUTPATIENT
Start: 2019-04-23 | End: 2019-04-23 | Stop reason: HOSPADM

## 2019-04-23 RX ORDER — LABETALOL HYDROCHLORIDE 5 MG/ML
10 INJECTION, SOLUTION INTRAVENOUS EVERY 4 HOURS PRN
Status: DISCONTINUED | OUTPATIENT
Start: 2019-04-23 | End: 2019-04-24 | Stop reason: HOSPADM

## 2019-04-23 RX ORDER — ONDANSETRON 2 MG/ML
4 INJECTION INTRAMUSCULAR; INTRAVENOUS EVERY 6 HOURS PRN
Status: DISCONTINUED | OUTPATIENT
Start: 2019-04-23 | End: 2019-04-24 | Stop reason: HOSPADM

## 2019-04-23 RX ORDER — SODIUM CHLORIDE 0.9 % (FLUSH) 0.9 %
3 SYRINGE (ML) INJECTION EVERY 12 HOURS SCHEDULED
Status: DISCONTINUED | OUTPATIENT
Start: 2019-04-23 | End: 2019-04-23 | Stop reason: HOSPADM

## 2019-04-23 RX ORDER — PROPOFOL 10 MG/ML
VIAL (ML) INTRAVENOUS AS NEEDED
Status: DISCONTINUED | OUTPATIENT
Start: 2019-04-23 | End: 2019-04-23 | Stop reason: SURG

## 2019-04-23 RX ORDER — FAMOTIDINE 20 MG/1
20 TABLET, FILM COATED ORAL
Status: COMPLETED | OUTPATIENT
Start: 2019-04-23 | End: 2019-04-23

## 2019-04-23 RX ORDER — DIPHENHYDRAMINE HCL 25 MG
25 CAPSULE ORAL NIGHTLY PRN
Status: DISCONTINUED | OUTPATIENT
Start: 2019-04-23 | End: 2019-04-24 | Stop reason: HOSPADM

## 2019-04-23 RX ORDER — HYDROCODONE BITARTRATE AND ACETAMINOPHEN 7.5; 325 MG/1; MG/1
2 TABLET ORAL EVERY 4 HOURS PRN
Status: DISCONTINUED | OUTPATIENT
Start: 2019-04-23 | End: 2019-04-24 | Stop reason: HOSPADM

## 2019-04-23 RX ORDER — ONDANSETRON 2 MG/ML
4 INJECTION INTRAMUSCULAR; INTRAVENOUS ONCE AS NEEDED
Status: COMPLETED | OUTPATIENT
Start: 2019-04-23 | End: 2019-04-23

## 2019-04-23 RX ORDER — IPRATROPIUM BROMIDE AND ALBUTEROL SULFATE 2.5; .5 MG/3ML; MG/3ML
3 SOLUTION RESPIRATORY (INHALATION) EVERY 4 HOURS PRN
Status: DISCONTINUED | OUTPATIENT
Start: 2019-04-23 | End: 2019-04-24 | Stop reason: HOSPADM

## 2019-04-23 RX ORDER — HYDROCODONE BITARTRATE AND ACETAMINOPHEN 7.5; 325 MG/1; MG/1
1 TABLET ORAL EVERY 4 HOURS PRN
Status: DISCONTINUED | OUTPATIENT
Start: 2019-04-23 | End: 2019-04-24 | Stop reason: HOSPADM

## 2019-04-23 RX ORDER — SODIUM CHLORIDE 0.9 % (FLUSH) 0.9 %
3-10 SYRINGE (ML) INJECTION AS NEEDED
Status: DISCONTINUED | OUTPATIENT
Start: 2019-04-23 | End: 2019-04-23 | Stop reason: HOSPADM

## 2019-04-23 RX ORDER — LIDOCAINE HYDROCHLORIDE 10 MG/ML
0.5 INJECTION, SOLUTION EPIDURAL; INFILTRATION; INTRACAUDAL; PERINEURAL ONCE AS NEEDED
Status: COMPLETED | OUTPATIENT
Start: 2019-04-23 | End: 2019-04-23

## 2019-04-23 RX ORDER — CEFAZOLIN SODIUM 2 G/100ML
2 INJECTION, SOLUTION INTRAVENOUS ONCE
Status: COMPLETED | OUTPATIENT
Start: 2019-04-23 | End: 2019-04-23

## 2019-04-23 RX ORDER — MONTELUKAST SODIUM 10 MG/1
10 TABLET ORAL NIGHTLY
Status: DISCONTINUED | OUTPATIENT
Start: 2019-04-23 | End: 2019-04-24 | Stop reason: HOSPADM

## 2019-04-23 RX ORDER — LIDOCAINE HYDROCHLORIDE 10 MG/ML
INJECTION, SOLUTION EPIDURAL; INFILTRATION; INTRACAUDAL; PERINEURAL AS NEEDED
Status: DISCONTINUED | OUTPATIENT
Start: 2019-04-23 | End: 2019-04-23 | Stop reason: SURG

## 2019-04-23 RX ORDER — DIPHENHYDRAMINE HYDROCHLORIDE 50 MG/ML
25 INJECTION INTRAMUSCULAR; INTRAVENOUS NIGHTLY PRN
Status: DISCONTINUED | OUTPATIENT
Start: 2019-04-23 | End: 2019-04-24 | Stop reason: HOSPADM

## 2019-04-23 RX ORDER — PANTOPRAZOLE SODIUM 40 MG/1
40 TABLET, DELAYED RELEASE ORAL EVERY MORNING
Status: DISCONTINUED | OUTPATIENT
Start: 2019-04-24 | End: 2019-04-24 | Stop reason: HOSPADM

## 2019-04-23 RX ORDER — DEXTROSE, SODIUM CHLORIDE, AND POTASSIUM CHLORIDE 5; .45; .15 G/100ML; G/100ML; G/100ML
50 INJECTION INTRAVENOUS CONTINUOUS
Status: DISCONTINUED | OUTPATIENT
Start: 2019-04-23 | End: 2019-04-24 | Stop reason: HOSPADM

## 2019-04-23 RX ORDER — FENTANYL CITRATE 50 UG/ML
INJECTION, SOLUTION INTRAMUSCULAR; INTRAVENOUS AS NEEDED
Status: DISCONTINUED | OUTPATIENT
Start: 2019-04-23 | End: 2019-04-23 | Stop reason: SURG

## 2019-04-23 RX ORDER — DEXAMETHASONE SODIUM PHOSPHATE 4 MG/ML
INJECTION, SOLUTION INTRA-ARTICULAR; INTRALESIONAL; INTRAMUSCULAR; INTRAVENOUS; SOFT TISSUE AS NEEDED
Status: DISCONTINUED | OUTPATIENT
Start: 2019-04-23 | End: 2019-04-23 | Stop reason: SURG

## 2019-04-23 RX ORDER — OXYCODONE HYDROCHLORIDE AND ACETAMINOPHEN 5; 325 MG/1; MG/1
1 TABLET ORAL EVERY 4 HOURS PRN
Status: DISCONTINUED | OUTPATIENT
Start: 2019-04-23 | End: 2019-04-24 | Stop reason: HOSPADM

## 2019-04-23 RX ORDER — CEFAZOLIN SODIUM 2 G/100ML
2 INJECTION, SOLUTION INTRAVENOUS EVERY 8 HOURS
Status: COMPLETED | OUTPATIENT
Start: 2019-04-23 | End: 2019-04-24

## 2019-04-23 RX ORDER — SODIUM CHLORIDE, SODIUM LACTATE, POTASSIUM CHLORIDE, CALCIUM CHLORIDE 600; 310; 30; 20 MG/100ML; MG/100ML; MG/100ML; MG/100ML
9 INJECTION, SOLUTION INTRAVENOUS CONTINUOUS PRN
Status: DISCONTINUED | OUTPATIENT
Start: 2019-04-23 | End: 2019-04-23 | Stop reason: HOSPADM

## 2019-04-23 RX ADMIN — PHENYLEPHRINE HYDROCHLORIDE 80 MCG: 10 INJECTION INTRAVENOUS at 08:30

## 2019-04-23 RX ADMIN — LIDOCAINE HYDROCHLORIDE 50 MG: 10 INJECTION, SOLUTION EPIDURAL; INFILTRATION; INTRACAUDAL; PERINEURAL at 07:53

## 2019-04-23 RX ADMIN — POTASSIUM CHLORIDE, DEXTROSE MONOHYDRATE AND SODIUM CHLORIDE 50 ML/HR: 150; 5; 450 INJECTION, SOLUTION INTRAVENOUS at 12:47

## 2019-04-23 RX ADMIN — CEFAZOLIN SODIUM 2 G: 2 INJECTION, SOLUTION INTRAVENOUS at 23:50

## 2019-04-23 RX ADMIN — DEXAMETHASONE SODIUM PHOSPHATE 2 MG: 10 INJECTION INTRAMUSCULAR; INTRAVENOUS at 07:25

## 2019-04-23 RX ADMIN — BUPRENORPHINE HYDROCHLORIDE 0.3 MG: 0.32 INJECTION INTRAMUSCULAR; INTRAVENOUS at 07:25

## 2019-04-23 RX ADMIN — CEFAZOLIN SODIUM 2 G: 2 INJECTION, SOLUTION INTRAVENOUS at 07:49

## 2019-04-23 RX ADMIN — LIDOCAINE HYDROCHLORIDE 0.3 ML: 10 INJECTION, SOLUTION EPIDURAL; INFILTRATION; INTRACAUDAL; PERINEURAL at 06:42

## 2019-04-23 RX ADMIN — PHENYLEPHRINE HYDROCHLORIDE 80 MCG: 10 INJECTION INTRAVENOUS at 08:00

## 2019-04-23 RX ADMIN — MONTELUKAST SODIUM 10 MG: 10 TABLET, COATED ORAL at 20:41

## 2019-04-23 RX ADMIN — SODIUM CHLORIDE, POTASSIUM CHLORIDE, SODIUM LACTATE AND CALCIUM CHLORIDE 9 ML/HR: 600; 310; 30; 20 INJECTION, SOLUTION INTRAVENOUS at 06:42

## 2019-04-23 RX ADMIN — BUPIVACAINE HYDROCHLORIDE 20 ML: 2.5 INJECTION, SOLUTION EPIDURAL; INFILTRATION; INTRACAUDAL; PERINEURAL at 07:15

## 2019-04-23 RX ADMIN — CARBIDOPA AND LEVODOPA 1 TABLET: 25; 100 TABLET ORAL at 17:53

## 2019-04-23 RX ADMIN — SODIUM CHLORIDE, POTASSIUM CHLORIDE, SODIUM LACTATE AND CALCIUM CHLORIDE: 600; 310; 30; 20 INJECTION, SOLUTION INTRAVENOUS at 07:49

## 2019-04-23 RX ADMIN — CEFAZOLIN SODIUM 2 G: 2 INJECTION, SOLUTION INTRAVENOUS at 15:20

## 2019-04-23 RX ADMIN — CARBIDOPA AND LEVODOPA 1 TABLET: 25; 100 TABLET ORAL at 20:41

## 2019-04-23 RX ADMIN — FAMOTIDINE 20 MG: 20 TABLET ORAL at 06:42

## 2019-04-23 RX ADMIN — FENTANYL CITRATE 50 MCG: 50 INJECTION INTRAMUSCULAR; INTRAVENOUS at 10:41

## 2019-04-23 RX ADMIN — FENTANYL CITRATE 50 MCG: 50 INJECTION INTRAMUSCULAR; INTRAVENOUS at 10:46

## 2019-04-23 RX ADMIN — FENTANYL CITRATE 50 MCG: 50 INJECTION INTRAMUSCULAR; INTRAVENOUS at 10:36

## 2019-04-23 RX ADMIN — ONDANSETRON 4 MG: 2 INJECTION INTRAMUSCULAR; INTRAVENOUS at 09:57

## 2019-04-23 RX ADMIN — PHENYLEPHRINE HYDROCHLORIDE 80 MCG: 10 INJECTION INTRAVENOUS at 08:15

## 2019-04-23 RX ADMIN — PROPOFOL 200 MG: 10 INJECTION, EMULSION INTRAVENOUS at 07:53

## 2019-04-23 RX ADMIN — FENTANYL CITRATE 100 MCG: 50 INJECTION, SOLUTION INTRAMUSCULAR; INTRAVENOUS at 07:14

## 2019-04-23 RX ADMIN — DEXAMETHASONE SODIUM PHOSPHATE 8 MG: 4 INJECTION, SOLUTION INTRAMUSCULAR; INTRAVENOUS at 08:00

## 2019-04-23 RX ADMIN — BUPIVACAINE HYDROCHLORIDE 20 ML: 2.5 INJECTION, SOLUTION EPIDURAL; INFILTRATION; INTRACAUDAL; PERINEURAL at 07:25

## 2019-04-23 NOTE — ANESTHESIA PROCEDURE NOTES
Peripheral Block      Patient reassessed immediately prior to procedure    Patient location during procedure: post-op  Reason for block: at surgeon's request and post-op pain management  Performed by  CRNA: Parveen Denton CRNA  Preanesthetic Checklist  Completed: patient identified, site marked, surgical consent, pre-op evaluation, timeout performed, IV checked, risks and benefits discussed and monitors and equipment checked  Prep:  Sterile barriers:cap, gloves, mask and sterile barriers  Prep: ChloraPrep  Patient monitoring: blood pressure monitoring, continuous pulse oximetry and EKG  Procedure    Guidance:ultrasound guided  Images:still images obtained    Laterality:left  Block Type:adductor canal block  Injection Technique:single-shot  Needle Type:Tuohy and echogenic  Needle Gauge:18 G    Catheter Size:20 G (20g)  Medications Used: buprenorphine (BUPRENEX) injection, 0.3 mg  dexamethasone sodium phosphate injection, 2 mg  bupivacaine PF (MARCAINE) 0.25 % injection, 20 mL  Med admintered at 4/23/2019 7:25 AM  Post Assessment  Injection Assessment: negative aspiration for heme, incremental injection and no paresthesia on injection  Patient Tolerance:comfortable throughout block  Complications:no  Additional Notes  Procedure:             The pt was placed in the Supine position.  The Insertion site was  prepped and Draped in sterile fashion.  The pt was anesthetized with  IV Sedation( see meds).  Skin and cutaneous tissue was infiltrated and anesthetized with 1% Lidocaine 3 mls via a 25g needle.  A BBraun 4 inch 18g echogenic needle was then  inserted approximately midline, mid-thigh and advanced In-plane with Ultrasound guidance.  Normal Saline PSF was utilized for hydrodissection of tissue.  The Vastus medialis and Sartorius muscle where visualized and the needle tip was placed in the adductor canal,  lateral to the femoral artery.  LA injection spread was visualized, injection was incremental 1-5ml, injection  pressure was normal or little, no intraneural injection, no vascular injection.  LA dose was injected thru the needle(see dose above).  A BBraun 20g wire stylet catheter was placed via the needle with ultrasound visualization and confirmation with NS fluid bolus. The labeled Catheter was then secured to skin at insertion site with skin afix and steristrips to curled catheter and CHG transparent dressing.  Thank you.

## 2019-04-23 NOTE — ANESTHESIA PROCEDURE NOTES
Peripheral Block      Patient reassessed immediately prior to procedure    Patient location during procedure: pre-op  Reason for block: at surgeon's request and post-op pain management  Performed by  CRNA: Osiris Washington CRNA  Assisted by: Parveen Denton CRNA  Preanesthetic Checklist  Completed: patient identified, site marked, surgical consent, pre-op evaluation, timeout performed, IV checked, risks and benefits discussed and monitors and equipment checked  Prep:  Sterile barriers:cap, gloves, mask and sterile barriers  Prep: ChloraPrep  Patient monitoring: blood pressure monitoring, continuous pulse oximetry and EKG  Procedure  Sedation:yes    Guidance:ultrasound guided  Images:still images obtained    Laterality:left  Block Type:popliteal  Injection Technique:catheter  Needle Type:echogenic  Needle Gauge:18 G    Catheter Size:20 G  Cath Depth at skin: 10 cm  Medications Used: bupivacaine PF (MARCAINE) 0.25 % injection, 20 mL  Med admintered at 4/23/2019 7:15 AM  Post Assessment  Injection Assessment: negative aspiration for heme, no paresthesia on injection and incremental injection  Patient Tolerance:comfortable throughout block  Complications:no  Additional Notes  Procedure:                                                         The pt was placed in  lateral position.  The Insertion site was  prepped and Draped in sterile fashion.  The pt was anesthetized with  IV Sedation( see meds).  Skin and cutaneous tissue was infiltrated and anesthetized with 1% Lidocaine 3 mls via a 25g needle.  A BBraun 4 inch 18g echogenic needle was then  inserted approximately 3 cm proximal to the popliteal jasmin a at the lateral mid biceps femoris and advanced In-plane with Ultrasound guidance.  Normal Saline PSF was utilized for hydrodissection of tissue.  The popliteal artery was visualized and the common peroneal and tibial bifurcation was located.  LA injection spread was visualized, injection was incremental 1-5ml,  injection pressure was normal or little, no intraneural injection, no vascular injection.  .  A BBraun 20g wire stylet catheter was placed via the needle with ultrasound visualization and confirmation with NS fluid bolus. The labeled Catheter was then secured at insertions site with skin afix,  mastisol, steristrips  and a CHG transparent dressing was placed over. Thank you

## 2019-04-23 NOTE — PLAN OF CARE
Problem: Patient Care Overview  Goal: Plan of Care Review  Outcome: Ongoing (interventions implemented as appropriate)   04/23/19 7320   OTHER   Outcome Summary Pt ambulated 20 feet with CGA and RW, limited by increased bleeding from incision during mobility, RN aware. PT will follow to increase strength and progress functional mobility with WB status. Plan is home with assist at discharge   Coping/Psychosocial   Plan of Care Reviewed With patient;spouse

## 2019-04-23 NOTE — ANESTHESIA PREPROCEDURE EVALUATION
Anesthesia Evaluation     Patient summary reviewed and Nursing notes reviewed   NPO Solid Status: > 8 hours  NPO Liquid Status: > 2 hours           Airway   Mallampati: I  TM distance: >3 FB  Neck ROM: full  No difficulty expected  Dental - normal exam     Pulmonary    (+) a smoker Former Abstained day of surgery, COPD, sleep apnea, decreased breath sounds,   Cardiovascular     Rhythm: regular  Rate: normal        Neuro/Psych    ROS Comment: PARKINSON'S  GI/Hepatic/Renal/Endo    (+)  GERD,      Musculoskeletal     Abdominal   (-) obese    Abdomen: soft.   Substance History      OB/GYN          Other   (+) blood dyscrasia, arthritis     ROS/Med Hx Other: ANEMIA                  Anesthesia Plan    ASA 3     general with block     intravenous induction   Anesthetic plan, all risks, benefits, and alternatives have been provided, discussed and informed consent has been obtained with: patient.    Plan discussed with CRNA.

## 2019-04-23 NOTE — H&P
Patient Name: Flaco Roque II  MRN: [id number]  : 1945  DOS: 2019    Attending: Juhi Hess MD    Primary Care Provider: Tayo Hendrix MD      Chief complaint:  Left foot pain    Subjective   Patient is a 73 y.o. male presented for scheduled surgery listed below by Dr. Hess.    He underwent surgery under GA. He tolerated surgery well and is admitted for further medical management.     Operative procedure:   1. Great toe DIP fusion  2. Toes 2,3,4 PIP excision  3. Toes 2,3 metatarsal capsulotomy, with extensor tendon lengthening  4. Toes 2,3,4 open flexor tenotomies  5. Chevron osteotomy 5th metatarsal    He is known to us from previous shoulder surgery in 2018; which he recovered well.  He has chronic pain and indwelling morphine pump. He is followed by Dr. Kingston, pain management.     When seen postop he is doing ok. He denies pain. He did have some bleeding from the toe area after ambulating with PT. He denies nausea, dizziness, shortness of breath, palpitations or chest pain. No hx of DVT or PE.    (Above is noted, agree.  Doing well when seen in his room early evening.  Good pain control.  No nausea vomiting or shortness breath.  His COPD is stable and controlled on twice daily Symbicort and nebulized bronchodilators twice daily.  He tells me he does not like to his pain medication as it causes him constipation.)wy    Allergies:  No Known Allergies    Meds:  Medications Prior to Admission   Medication Sig Dispense Refill Last Dose   • budesonide-formoterol (SYMBICORT) 80-4.5 MCG/ACT inhaler Inhale 2 puffs 2 (Two) Times a Day.   2019 at 0430   • carbidopa-levodopa (SINEMET)  MG per tablet Take 1 tablet by mouth 4 (Four) Times a Day.   2019 at 0430   • COENZYME Q10 PO Take 1 tablet by mouth Daily.   2019 at 0800   • ipratropium-albuterol (DUO-NEB) 0.5-2.5 mg/3 ml nebulizer Take 3 mL by nebulization Every 4 (Four) Hours As Needed for Wheezing.   2019 at  2100   • montelukast (SINGULAIR) 10 MG tablet Take 10 mg by mouth every night.   4/23/2019 at 0430   • Multiple Vitamins-Minerals (MULTIVITAMIN ADULT PO) Take 1 tablet by mouth daily.   4/22/2019 at 0700   • pantoprazole (PROTONIX) 40 MG EC tablet Take 40 mg by mouth daily.   4/23/2019 at 0430   • HYDROcodone-acetaminophen (NORCO) 7.5-325 MG per tablet Take 1-2 tablets by mouth Every 6 (Six) Hours As Needed for Moderate Pain  (as needed for post surgical pain). 60 tablet 0 More than a month at Unknown time   • ondansetron (ZOFRAN) 4 MG tablet Take 1 tablet by mouth Every 6 (Six) Hours As Needed for Nausea or Vomiting. 30 tablet 0 More than a month at Unknown time   • oxyCODONE-acetaminophen (PERCOCET) 5-325 MG per tablet Take 1-2 tablets by mouth Every 6 (Six) Hours As Needed for Other (as needed for post surgical pain). 60 tablet 0 More than a month at Unknown time   • Ropivacaine HCl-NaCl (NAROPIN) 0.2-0.9 % 12 mg/hr by Peripheral Nerve route Continuous.   Taking       History:   Past Medical History:   Diagnosis Date   • Chris's esophagus     Last EGD 1 year ago with Dr Kaye    • BPH (benign prostatic hyperplasia)    • Chronic low back pain    • COPD (chronic obstructive pulmonary disease) (CMS/HCC)    • Foot pain    • GERD (gastroesophageal reflux disease)    • History of transfusion    • Injury of back    • Lung abscess (CMS/HCC)    • Osteoarthritis    • Osteoporosis    • Parkinson disease (CMS/HCC)    • Rotator cuff tear, left    • Sleep apnea      Past Surgical History:   Procedure Laterality Date   • ARTHRODESIS MIDTARSAL / TARSOMETATARSAL / TARSAL NAVICULAR-CUNEIFORM Left 05/10/2016   • BACK SURGERY     • BACK SURGERY      low back   • CATARACT EXTRACTION     • COLONOSCOPY N/A 11/2/2017    Procedure: COLONOSCOPY;  Surgeon: Porter Capellan MD;  Location:  [facility];  Service:    • ENDOSCOPY N/A 11/1/2017    Procedure: ESOPHAGOGASTRODUODENOSCOPY;  Surgeon: Porter Capellan MD;  Location:  [facility]  "[facility];  Service:    • ENDOSCOPY  2017    DR LUIS EDUARDO MAYERS   • FOOT SURGERY     • GALLBLADDER SURGERY     • KNEE ARTHROSCOPY Bilateral    • PAIN PUMP INSERTION/REVISION     • SPINE SURGERY     • TOTAL HIP ARTHROPLASTY Left    • TOTAL SHOULDER ARTHROPLASTY W/ DISTAL CLAVICLE EXCISION Left 9/10/2018    Procedure: REVERSE TOTAL SHOULDER ARTHROPLASTY LEFT;  Surgeon: Abel Brennan MD;  Location: [facility];  Service: Orthopedics   • ULNAR NERVE TRANSPOSITION       Family History   Problem Relation Age of Onset   • Arthritis Mother    • Diabetes Mother    • Osteoarthritis Mother    • Colon cancer Father    • Cancer Father      Social History     Tobacco Use   • Smoking status: Former Smoker     Packs/day: 2.00     Years: 25.00     Pack years: 50.00     Types: Cigarettes     Start date:      Last attempt to quit:      Years since quittin.3   • Smokeless tobacco: Never Used   Substance Use Topics   • Alcohol use: No   • Drug use: No   He is  with 2 children. He is retired from [employer].    Review of Systems  All systems were reviewed and negative except for:  Respiratory: positive for  shortness of air    Vital Signs  Visit Vitals  /62 (BP Location: Right arm, Patient Position: Lying)   Pulse 82   Temp 97.7 °F (36.5 °C) (Oral)   Resp 16   Ht 172.7 cm (68\")   Wt 68 kg (150 lb)   SpO2 98%   BMI 22.81 kg/m²       Physical Exam:    General Appearance:    Alert, cooperative, in no acute distress   Head:    Normocephalic, without obvious abnormality, atraumatic   Eyes:            Lids and lashes normal, conjunctivae and sclerae normal, no   icterus, no pallor, corneas clear   Ears:    Ears appear intact with no abnormalities noted   Neck:   No adenopathy, supple, trachea midline, no thyromegaly   Lungs:     Clear to auscultation,respirations regular, even and                   unlabored    Heart:    Regular rhythm and normal rate, normal S1 and S2, no            " murmur, no gallop   Abdomen:     Normal bowel sounds, no masses, no organomegaly, soft        non-tender, non-distended, no guarding, no rebound                 tenderness   Genitalia:    Deferred   Extremities:   Left foot dressing with bloody drainage on ACE and gauze around toes. Pins in toes. Nerve block present   Pulses:   Pulses palpable and equal bilaterally   Skin:   No bleeding, bruising or rash   Neurologic:   Cranial nerves 2 - 12 grossly intact      I reviewed the patient's new clinical results.     Results for ALURENCE SALEEM II (MRN [id number]) as of 4/23/2019 14:06   Ref. Range 4/10/2019 15:38   Glucose Latest Ref Range: 65 - 99 mg/dL 111 (H)   Sodium Latest Ref Range: 136 - 145 mmol/L 137   Potassium Latest Ref Range: 3.5 - 5.2 mmol/L 4.7   CO2 Latest Ref Range: 22.0 - 29.0 mmol/L 24.0   Chloride Latest Ref Range: 98 - 107 mmol/L 103   Anion Gap Latest Units: mmol/L 10.0   Creatinine Latest Ref Range: 0.76 - 1.27 mg/dL 0.70 (L)   BUN Latest Ref Range: 8 - 23 mg/dL 10   BUN/Creatinine Ratio Latest Ref Range: 7.0 - 25.0  14.3   Calcium Latest Ref Range: 8.6 - 10.5 mg/dL 9.1   eGFR Non  Am Latest Ref Range: >60 mL/min/1.73 111   Hemoglobin A1C Latest Ref Range: 4.80 - 5.60 % 5.40   WBC Latest Ref Range: 3.40 - 10.80 10*3/mm3 8.41   RBC Latest Ref Range: 4.14 - 5.80 10*6/mm3 5.11   Hemoglobin Latest Ref Range: 13.0 - 17.7 g/dL 10.5 (L)   Hematocrit Latest Ref Range: 37.5 - 51.0 % 35.7 (L)   RDW Latest Ref Range: 12.3 - 15.4 % 19.2 (H)   MCV Latest Ref Range: 79.0 - 97.0 fL 69.9 (L)   MCH Latest Ref Range: 26.6 - 33.0 pg 20.5 (L)   MCHC Latest Ref Range: 31.5 - 35.7 g/dL 29.4 (L)   MPV Latest Ref Range: 6.0 - 12.0 fL 10.1   Platelets Latest Ref Range: 140 - 450 10*3/mm3 465 (H)     Assessment and Plan:     S/P foot surgery, left    Deformity of left foot    ABRIL (obstructive sleep apnea)    COPD (chronic obstructive pulmonary disease) (CMS/HCC)    Anemia      Plan  1. PT/OT- weight bear on left  heel in boot  2. Pain control-prns, popliteal nerve block   3. IS-encourage  4. DVT proph- mechs/Lovenox  5. Bowel regimen  6. Resume home medications as appropriate  7. Monitor post-op labs  8. DC planning for home    COPD, ABRIL  - Monitor O2 sats  - Continue home inhaler and Singulair     Anemia  - CBC in AM      YAMILET Craig  04/23/19  2:07 PM   I have personally performed the evaluation on this patient. My history is consistent  with HPI obtained. My exam findings are listed above. I have personally reviewed and discussed the above formulated treatment plan with patient, family and .Gabriel

## 2019-04-23 NOTE — INTERVAL H&P NOTE
"H&P reviewed. The patient was examined and there are no changes to the H&P.   /78 (BP Location: Right arm, Patient Position: Lying)   Pulse 97   Temp 97.8 °F (36.6 °C) (Temporal)   Resp 18   Ht 172.7 cm (68\")   Wt 68 kg (150 lb)   SpO2 96%   BMI 22.81 kg/m²   Lungs clear   Heart regular rhythm without murmur, left foot deformity  Immunizations   pneumo up to date   Flu 2018  Tetanus 2018  Tobacco quit 19 years   ETOH occasional  Patient has no known allergies.  No allergy to latex or contrast dye  Zayda Nunez PA-C  4/23/19  06:54  "

## 2019-04-23 NOTE — ANESTHESIA POSTPROCEDURE EVALUATION
Patient: Flaco Roque II    Procedure Summary     Date:  04/23/19 [facility]    Anesthesia Start:  0749 Anesthesia Stop:      Procedure:  left foot excise PIP joints 2,3,4, tenotomies 2,3,4, metatarsal capsulotomy 2,3,4, chevron osteotomy 5th metatarsal, great toe DIP fusion LEFT (Left Foot) Diagnosis:       Deformity of left foot      (Deformity of left foot [M21.962])    Surgeon:  Juhi Calle MD Provider:  Reyes Tee MD    Anesthesia Type:  general with block ASA Status:  3          Anesthesia Type: general with block  Last vitals  BP   146/78 (04/23/19 0635)   Temp   97.8 °F (36.6 °C) (04/23/19 0635)   Pulse   97 (04/23/19 0635)   Resp   18 (04/23/19 0635)     SpO2   96 % (04/23/19 0635)     Post Anesthesia Care and Evaluation    Patient location during evaluation: PACU  Patient participation: complete - patient participated  Level of consciousness: awake and alert  Pain score: 0  Pain management: adequate  Airway patency: patent  Anesthetic complications: No anesthetic complications  PONV Status: none  Cardiovascular status: hemodynamically stable and acceptable  Respiratory status: nonlabored ventilation, acceptable and nasal cannula  Hydration status: acceptable

## 2019-04-23 NOTE — PLAN OF CARE
Problem: Patient Care Overview  Goal: Plan of Care Review  Outcome: Ongoing (interventions implemented as appropriate)  Patient doing well post-op.  No reports of pain.  Bleeding noted to operative foot upon ambulation with therapy.  Patient placed back in chair and leg elevated on pillows.  Arrow pump infusing at 6ml/hr.  VSS.  Continuous pulse ox in place monitoring heart rate.  Will continue to monitor.

## 2019-04-23 NOTE — BRIEF OP NOTE
Orthopedics left foot excise PIP joints 2,3,4, tenotomies 2,3,4, metatarsal capsulotomy 2,3,4, chevron osteotomy 5th metatarsal, great toe DIP fusion LEFT  Brief Op Note    Flaco Weaverkim BARRIOS  4/23/2019    Pre-op Diagnosis:   Deformity of left foot [M21.962] claw toes 1-5, bunionette    Post-op Diagnosis:  same       Post-Op Diagnosis Codes:     * Deformity of left foot [M21.962]    Procedure(s):  left foot excise PIP joints 2,3,4, tenotomies 2,3,4, metatarsal capsulotomy 2,3,4, chevron osteotomy 5th metatarsal, great toe DIP fusion LEFT    Surgeon(s):  Juhi Calle MD    Anesthesia:  General with Block    Staff:   Circulator: Sj Milner RN  Scrub Person: Symone Wolf  Nursing Assistant: Luanne Coburn  Assistant: Kimberly Neely PA-C    Estimated Blood Loss:5cc      Specimens: none      Drains:  none    Complications:  None    Tourniquet:: 92min      Dressing:soft, boot    Disposition:rr stable    Juhi Calle MD     Date: 4/23/2019  Time: 9:51 AM

## 2019-04-23 NOTE — OP NOTE
Operative Report    04/23/19  9:53 AM    Preoperative diagnosis: Left foot:  1. 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 Claw toes 2,3,4 with fixed contracture PIP joints    3. Contracture MTPJ 2,3,4    4. Flexor tendon contracture 2,3,4   5. Bunionette deformity 5th metatarsal    Postoperative diagnosis: Same    Anesthesia: Gen. with blocks for postop pain control    Surgeon: Juhi Calle M.D.    Assistant: chanell HERRERA, present for the entire procedure including prepping, draping, retraction, closure, dressing.    Operative procedure:   1. Great toe DIP fusion  2. Toes 2,3,4 PIP excision  3. Toes 2,3 metatarsal capsulotomy, with extensor tendon lengthening  4. Toes 2,3,4 open flexor tenotomies  5. Chevron osteotomy 5th metatarsal      Operative indications: This is a very pleasant 73-year-old gentleman with progressive pain and deformity in the left forefoot.  He is status post a left triple arthrodesis and midfoot arthrodesis with good result.  He has had progressive claw toe deformity toes 1-3 and 4.  He has a painful bunionette deformity.  He has tried extensive conservative treatment including orthotics padding etc.  He also has very fragile skin and thin fat pad.  He has pain with all activities.  He has fixed flexion contracture of the great toe DIP joint of about 60 degrees, PIP contractures 60 degrees toes 2, 3, 4.  Metatarsal phalangeal joint contractures and flexor tendon contractures.    Operative procedure: The patient was taken to the operating room where general anesthesia was induced without difficulty.  He was given preoperative antibiotics and blocks.  The left leg was prepped and draped in the usual sterile fashion with a well-padded tourniquet on the thigh.  The appropriate timeout was called.  The leg was elevated, wrapped with an Esmarch, and tourniquet inflated to 350 mmHg.  Tourniquet time was 90 minutes.  I started with the contracted PIP joints of  toes 2, 3, 4.  I made an elliptical incision over each joint and carried this down through soft tissue bluntly.  The distal aspect of the proximal phalanx and the base of the middle phalanx were removed, enough bone was removed until the toes could be placed in neutral.  I then proceeded to the contracted metatarsal phalangeal joints.  I made a 3 cm incision in the second webspace and carried this down through soft tissue bluntly.  Any hemostasis was made as needed throughout the case with electrocautery.  Neurovascular structures were protected.  I Z-lengthening extensor tendons and then did a complete capsulotomy.  I evaluated the fourth toe, I felt that it did not need a metatarsal capsulotomy at this time.  We went to the plantar surface of the toes 2, 3, 4 and it made a small transverse incision.  This is carried down through soft tissue bluntly.  The flexor tendon sheath was opened and on each toe open flexor tenotomy was carried out.  The toes could then be placed in neutral.  They were all pinned in neutral than with 0.045 K wires.  The incisions were irrigated with antibiotic solution.  The extensor tendons were repaired under the appropriate tension.  Incisions were closed with Monocryl and nylon.  I then addressed the fifth toe.  I made a 3 cm longitudinal incision on the lateral aspect of the fifth metatarsal phalangeal joint.  This was carried down through soft tissue bluntly.  At the level of the PIP joint I excised bone to correct that joint flexion, I then excised the lateral prominence of the fifth metatarsal head.  I created a 60 degrees chevron osteotomy and translated it medially 3 mm and impacted it.  The toe was then pinned in neutral the 0.045 K wire.  The remaining bone prominence was removed.  Everything was smoothed with a rasp and rondure.  The capsule was closed snugly with Monocryl.  Incision was then closed with nylon.  Attention was then directed to the great toe.  I made a 5 cm  incision dorsally over the DIP joint.  This is carried down through soft tissue bluntly.  The extensor tendon was retracted and protected.  This joint had a rigid flexion deformity.  I used a small oscillating saw to resect the distal aspect of the proximal phalanx.  I then resected the base of the distal phalanx.  Care was taken to make flush surfaces for the arthrodesis.  The toe was then pinned in neutral with the guidewire for the 4.0 cannulated screw system.  The appropriate length screw was then placed it was countersunk.  C arm in multiple planes showed anatomic alignment for walking and shoewear and excellent bone to bone contact.  Incisions were irrigated copiously with antibiotic solution.  They were closed in layers with Monocryl and nylon.  Tourniquet was released prior to final closure.  The foot was dressed sterilely.  He was placed in a tall boot.  He was awakened extubated and transferred to recovery room in stable condition.  Postop plan will be admission for observation.    Estimated blood loss: 5 cc    Specimens: None    Drains: None    Complications: None    Juhi Calle MD  04/23/19  9:53 AM

## 2019-04-23 NOTE — DISCHARGE INSTRUCTIONS
1. Weight bear on heel in boot  2. Elevate operated foot over heart  3. Change the dressing every 4 days, thin layer Bactroban cream (in chart) gauze and ace bandage, do not get the foot wet.    4. Call the office if any problems: [phone]  5. Take the Zofran with the pain meds if you have nausea/vomiting  6. Take 81mg aspirin to help prevent blood clots

## 2019-04-23 NOTE — PROGRESS NOTES
Orthopedic Foot/Ankle Progress Note    Subjective     Post-Op: s/p left foot reconstruction   Systemic or Specific Complaints: some blood on dressing- common, mild pain  Objective     Vital signs in last 24 hours:  Temp:  [97.7 °F (36.5 °C)-98.3 °F (36.8 °C)] 98.3 °F (36.8 °C)  Heart Rate:  [59-97] 81  Resp:  [16-18] 18  BP: (100-146)/(58-78) 136/62    Neurovascular: Left toes pink, decrease motor and sensory due to block   Wound: Some old blood on dressing         Data Review  Lab Results (last 24 hours)     ** No results found for the last 24 hours. **            Assessment/Plan     Status post- stable, elevate, wt bear on heel.             Juhi Calle MD    Date: 4/23/2019  Time: 4:51 PM

## 2019-04-23 NOTE — THERAPY EVALUATION
Acute Care - Physical Therapy Initial Evaluation   Jaime     Patient Name: Flaco Roque II  : 1945  MRN: [id number]  Today's Date: 2019   Onset of Illness/Injury or Date of Surgery: 19  Date of Referral to PT: 19  Referring Physician: MD Jimena      Admit Date: 2019    Visit Dx:     ICD-10-CM ICD-9-CM   1. Impaired functional mobility, balance, gait, and endurance Z74.09 V49.89   2. Deformity of left foot M21.962 736.70     Patient Active Problem List   Diagnosis   • ABRIL (obstructive sleep apnea)   • Chronic back pain   • COPD (chronic obstructive pulmonary disease) (CMS/HCC)   • Chris's esophagus   • GERD (gastroesophageal reflux disease)   • Anemia   • Foot deformity, acquired, left   • Arthropathy of shoulder region   • Status post reverse total shoulder replacement, left   • Leukocytosis, likely reactive   • Acute postoperative pain   • Deformity of left foot   • Foot deformity   • S/P foot surgery, left     Past Medical History:   Diagnosis Date   • Chris's esophagus     Last EGD 1 year ago with Dr Kaye    • BPH (benign prostatic hyperplasia)    • Chronic low back pain    • COPD (chronic obstructive pulmonary disease) (CMS/HCC)    • Foot pain    • GERD (gastroesophageal reflux disease)    • History of transfusion    • Injury of back    • Lung abscess (CMS/HCC)    • Osteoarthritis    • Osteoporosis    • Parkinson disease (CMS/HCC)    • Rotator cuff tear, left    • Sleep apnea      Past Surgical History:   Procedure Laterality Date   • ARTHRODESIS MIDTARSAL / TARSOMETATARSAL / TARSAL NAVICULAR-CUNEIFORM Left 05/10/2016   • BACK SURGERY     • BACK SURGERY      low back   • CATARACT EXTRACTION     • COLONOSCOPY N/A 2017    Procedure: COLONOSCOPY;  Surgeon: Porter Capellan MD;  Location:  [facility];  Service:    • ENDOSCOPY N/A 2017    Procedure: ESOPHAGOGASTRODUODENOSCOPY;  Surgeon: Porter Capellan MD;  Location:  [facility];  Service:    •  ENDOSCOPY  11/02/2017    DR LUIS EDUARDO MAYERS   • FOOT SURGERY     • GALLBLADDER SURGERY     • KNEE ARTHROSCOPY Bilateral    • PAIN PUMP INSERTION/REVISION     • SPINE SURGERY     • TOTAL HIP ARTHROPLASTY Left    • TOTAL SHOULDER ARTHROPLASTY W/ DISTAL CLAVICLE EXCISION Left 9/10/2018    Procedure: REVERSE TOTAL SHOULDER ARTHROPLASTY LEFT;  Surgeon: Abel Brennan MD;  Location: [facility] OR;  Service: Orthopedics   • ULNAR NERVE TRANSPOSITION          PT ASSESSMENT (last 12 hours)      Physical Therapy Evaluation     Row Name 04/23/19 1350          PT Evaluation Time/Intention    Subjective Information  no complaints  -MJ     Document Type  evaluation  -MJ     Mode of Treatment  physical therapy  -MJ     Patient Effort  good  -MJ     Symptoms Noted During/After Treatment  other (see comments)  (Significant)  Increased bleeding from incision  -MJ     Comment  RN present and aware  -MJ     Row Name 04/23/19 1350          General Information    Patient Profile Reviewed?  yes  -MJ     Onset of Illness/Injury or Date of Surgery  04/23/19  -     Referring Physician  MD Jimena  -     Patient Observations  alert;cooperative;agree to therapy  -MJ     General Observations of Patient  Pt supine in bed, L boot donned, popliteal nerve catheter, IV, L LE elevated  -MJ     Prior Level of Function  min assist:;all household mobility;community mobility;gait;transfer;bed mobility;cooking;cleaning;driving;shopping Constant foot pain  -MJ     Equipment Currently Used at Home  walker, rolling;commode, bedside;shower chair rolling knee walker  -MJ     Pertinent History of Current Functional Problem  Pt presents for surgical management of L foot pain and dysfunction that failed to improve with conservative management  -MJ     Existing Precautions/Restrictions  fall;other (see comments)  (Significant)  WB through L heel with boot; popliteal nerve catheter  -MJ     Risks Reviewed  patient and family:;LOB;increased discomfort  -MJ      Benefits Reviewed  patient and family:;improve function;increase independence;increase strength;increase balance;decrease pain  -MJ     Barriers to Rehab  previous functional deficit  -MJ     Row Name 04/23/19 1350          Relationship/Environment    Lives With  spouse  -MJ     Family Caregiver if Needed  spouse  -MJ     Concerns About Impact on Relationships  Wife to assist at discharge  -MJ     Row Name 04/23/19 1350          Resource/Environmental Concerns    Current Living Arrangements  home/apartment/condo  -MJ     Row Name 04/23/19 1350          Home Main Entrance    Number of Stairs, Main Entrance  one  -MJ     Row Name 04/23/19 1350          Cognitive Assessment/Intervention- PT/OT    Orientation Status (Cognition)  oriented x 4  -MJ     Follows Commands (Cognition)  WFL  -MJ     Row Name 04/23/19 1350          Safety Issues, Functional Mobility    Impairments Affecting Function (Mobility)  balance;sensation/sensory awareness  -     Row Name 04/23/19 1350          Mobility Assessment/Treatment    Extremity Weight-bearing Status  left lower extremity  -     Left Lower Extremity (Weight-bearing Status)  touch down weight-bearing (TDWB)  (Significant)  WB through L heel with boot  -     Row Name 04/23/19 1350          Bed Mobility Assessment/Treatment    Bed Mobility Assessment/Treatment  supine-sit  -MJ     Supine-Sit [place] (Bed Mobility)  supervision;verbal cues  -     Bed Mobility, Safety Issues  decreased use of legs for bridging/pushing  -     Assistive Device (Bed Mobility)  bed rails;head of bed elevated  -     Comment (Bed Mobility)  Verbal cues to move LEs off EOB and to use UEs to push trunk to sitting  -     Row Name 04/23/19 1350          Transfer Assessment/Treatment    Transfer Assessment/Treatment  sit-stand transfer;stand-sit transfer  -     Comment (Transfers)  Verbal cues to push up from bed with UEs to stand and to reach back for chair to lower into sitting. Cues to  step L LE out prior to t/f. Cues to WB through L heel  -MJ     Sit-Stand [place] (Transfers)  contact guard;verbal cues  -MJ     Stand-Sit [place] (Transfers)  contact guard;verbal cues  -MJ     Row Name 04/23/19 1350          Sit-Stand Transfer    Assistive Device (Sit-Stand Transfers)  walker, front-wheeled  -MJ     Row Name 04/23/19 1350          Stand-Sit Transfer    Assistive Device (Stand-Sit Transfers)  walker, front-wheeled  -MJ     Row Name 04/23/19 1350          Gait/Stairs Assessment/Training    10299 - Gait Training Minutes   5  -MJ     [place] Level (Gait)  contact guard;verbal cues  -MJ     Assistive Device (Gait)  walker, front-wheeled  -MJ     Distance in Feet (Gait)  20  -MJ     Pattern (Gait)  step-to  -MJ     Deviations/Abnormal Patterns (Gait)  left sided deviations;antalgic;bilateral deviations;krunal decreased;stride length decreased  -MJ     Bilateral Gait Deviations  forward flexed posture;weight shift ability decreased  -     Comment (Gait/Stairs)  Pt demo step to gait pattern at slow pace. Pt appropriately weight bears through L heel, does report some numbness in that leg. After 20 feet pt with increased bleeding from L toes, RN called and chair brought up behind him. Pt did not bump into anything or allow any part of the foot on the ground besides the heel during mobility  -     Row Name 04/23/19 1350          General ROM    GENERAL ROM COMMENTS  L LE AROM impaired 25% due to numbness (L ankle NT)  -     Row Name 04/23/19 1350          MMT (Manual Muscle Testing)    General MMT Comments  L LE grossly 3+/5; R LE 4/5  -[facility] Name 04/23/19 1350          Sensory Assessment/Intervention    Sensory General Assessment  light touch sensation deficits identified  -     Row Name 04/23/19 1350          Light Touch Sensation Assessment    Left Lower Extremity: Light Touch Sensation Assessment  mild impairment, 75% or more correct responses  -[facility] Name 04/23/19 2592           Pain Assessment    Additional Documentation  Pain Scale: Numbers Pre/Post-Treatment (Group)  -MJ     Marilia Name 04/23/19 5510          Pain Scale: Numbers Pre/Post-Treatment    Pain Scale: Numbers, Pretreatment  0/10 - no pain  -     Pain Scale: Numbers, Post-Treatment  0/10 - no pain  -MJ     Marilia Name             Wound 04/23/19 1011 Left foot incision    Wound - Properties Group Date first assessed: 04/23/19  -JA Time first assessed: 1011  -JA Side: Left  -JA Location: foot  -JA Type: incision  -JA    Row Name 04/23/19 0970          Plan of Care Review    Plan of Care Reviewed With  patient;spouse  -MAYCOL Capellan Name 04/23/19 6300          Physical Therapy Clinical Impression    Date of Referral to PT  04/23/19  -     PT Diagnosis (PT Clinical Impression)  s/p L great toe DIP fusion; toes 2,3,4 PIP excision; toes 2,3 metatarsal capsulotomy with extensor tencdon lengthening; toes 2,3,4 open tedon flexor tenotomies  -     Criteria for Skilled Interventions Met (PT Clinical Impression)  yes;treatment indicated  -     Care Plan Review (PT)  evaluation/treatment results reviewed;care plan/treatment goals reviewed;risks/benefits reviewed;patient/other agree to care plan  -     Care Plan Review, Other Participant (PT Clinical Impression)  spouse  -MAYCOL Capellan Name 04/23/19 1614          Physical Therapy Goals    Bed Mobility Goal Selection (PT)  bed mobility, PT goal 1  -MJ     Transfer Goal Selection (PT)  transfer, PT goal 1  -MJ     Gait Training Goal Selection (PT)  gait training, PT goal 1  -MJ     Stairs Goal Selection (PT)  stairs, PT goal 1  -MJ     Additional Documentation  Stairs Goal Selection (PT) (Row)  -     Marilia Name 04/23/19 2580          Bed Mobility Goal 1 (PT)    Activity/Assistive Device (Bed Mobility Goal 1, PT)  sit to supine/supine to sit  -     [place] Level/Cues Needed (Bed Mobility Goal 1, PT)  conditional independence  -     Time Frame (Bed Mobility Goal 1, PT)  5  days;long term goal (LTG)  -MJ     Progress/Outcomes (Bed Mobility Goal 1, PT)  goal ongoing  -MJ     Row Name 04/23/19 1350          Transfer Goal 1 (PT)    Activity/Assistive Device (Transfer Goal 1, PT)  sit-to-stand/stand-to-sit;walker, rolling  -MJ     [place] Level/Cues Needed (Transfer Goal 1, PT)  conditional independence  -MJ     Time Frame (Transfer Goal 1, PT)  5 days;long term goal (LTG)  -MJ     Progress/Outcome (Transfer Goal 1, PT)  goal ongoing  -MJ     Row Name 04/23/19 1350          Gait Training Goal 1 (PT)    Activity/Assistive Device (Gait Training Goal 1, PT)  gait (walking locomotion);walker, rolling  -MJ     [place] Level (Gait Training Goal 1, PT)  conditional independence  -MJ     Distance (Gait Goal 1, PT)  150 feet  -MJ     Time Frame (Gait Training Goal 1, PT)  5 days;long term goal (LTG)  -MJ     Barriers (Gait Training Goal 1, PT)  WB through L heel with boot  -MJ     Progress/Outcome (Gait Training Goal 1, PT)  goal ongoing  -MJ     Row Name 04/23/19 2320          Stairs Goal 1 (PT)    Activity/Assistive Device (Stairs Goal 1, PT)  ascending stairs;descending stairs;walker, rolling  -MJ     [place] Level/Cues Needed (Stairs Goal 1, PT)  contact guard assist  -MJ     Number of Stairs (Stairs Goal 1, PT)  1  -MJ     Time Frame (Stairs Goal 1, PT)  5 days;long term goal (LTG)  -MJ     Barriers (Stairs Goal 1, PT)  WB through L heel with boot  -MJ     Progress/Outcome (Stairs Goal 1, PT)  goal ongoing  -MJ     Row Name 04/23/19 8640          Positioning and Restraints    Pre-Treatment Position  in bed  -MJ     Post Treatment Position  chair  -MJ     In Chair  notified nsg;reclined;call light within reach;encouraged to call for assist;exit alarm on;with family/caregiver;LLE elevated boot donned  -MJ     Row Name 04/23/19 9000          Living Environment    Home Accessibility  stairs to enter home walk-in shower  -MJ       User Key  (r) = Recorded By, (t) = Taken By, (c) =  Cosigned By    Initials Name Provider Type    Sj Martin RN Registered Nurse    Jameson Alcala, PT Physical Therapist        Physical Therapy Education     Title: PT OT SLP Therapies (In Progress)     Topic: Physical Therapy (In Progress)     Point: Mobility training (Done)     Learning Progress Summary           Patient Acceptance, E,D, VU,NR by  at 4/23/2019  1:50 PM    Comment:  Reviewed WB through L heel with boot donned, safety with mobility, gait mechanics, effects of nerve block, equipment for home use   Significant Other Acceptance, E,D, VU,NR by  at 4/23/2019  1:50 PM    Comment:  Reviewed WB through L heel with boot donned, safety with mobility, gait mechanics, effects of nerve block, equipment for home use                   Point: Body mechanics (Done)     Learning Progress Summary           Patient Acceptance, E,D, VU,NR by  at 4/23/2019  1:50 PM    Comment:  Reviewed WB through L heel with boot donned, safety with mobility, gait mechanics, effects of nerve block, equipment for home use   Significant Other Acceptance, E,D, VU,NR by  at 4/23/2019  1:50 PM    Comment:  Reviewed WB through L heel with boot donned, safety with mobility, gait mechanics, effects of nerve block, equipment for home use                   Point: Precautions (Done)     Learning Progress Summary           Patient Acceptance, E,D, VU,NR by  at 4/23/2019  1:50 PM    Comment:  Reviewed WB through L heel with boot donned, safety with mobility, gait mechanics, effects of nerve block, equipment for home use   Significant Other Acceptance, E,D, VU,NR by  at 4/23/2019  1:50 PM    Comment:  Reviewed WB through L heel with boot donned, safety with mobility, gait mechanics, effects of nerve block, equipment for home use                               User Key     Initials Effective Dates Name Provider Type Discipline     04/03/18 -  Jameson العراقي, [place] Physical Therapist PT              PT Recommendation and  Plan  Anticipated Discharge Disposition (PT): home with assist  Planned Therapy Interventions (PT Eval): balance training, bed mobility training, gait training, home exercise program, patient/family education, stair training, strengthening, transfer training  Therapy Frequency (PT Clinical Impression): daily  Outcome Summary/Treatment Plan (PT)  Anticipated Equipment Needs at Discharge (PT): (none)  Anticipated Discharge Disposition (PT): home with assist  Plan of Care Reviewed With: patient, spouse  Outcome Summary: Pt ambulated 20 feet with CGA and RW, limited by increased bleeding from incision during mobility, RN aware. PT will follow to increase strength and progress functional mobility with WB status. Plan is home with assist at discharge  Outcome Measures     Row Name 04/23/19 1350             How much help from another person do you currently need...    Turning from your back to your side while in flat bed without using bedrails?  3  -MJ      Moving from lying on back to sitting on the side of a flat bed without bedrails?  3  -MJ      Moving to and from a bed to a chair (including a wheelchair)?  3  -MJ      Standing up from a chair using your arms (e.g., wheelchair, bedside chair)?  3  -MJ      Climbing 3-5 steps with a railing?  2  -MJ      To walk in hospital room?  3  -MJ      AM-PAC 6 Clicks Score  17  -MJ         Functional Assessment    Outcome Measure Options  AM-PAC 6 Clicks Basic Mobility (PT)  -MJ        User Key  (r) = Recorded By, (t) = Taken By, (c) = Cosigned By    Initials Name Provider Type    Jameson Alcala, PT Physical Therapist         Time Calculation:   PT Charges     Row Name 04/23/19 1350             Time Calculation    Start Time  1350  -MJ      PT Received On  04/23/19  -      PT Goal Re-Cert Due Date  05/03/19  -         Time Calculation- PT    Total Timed Code Minutes- PT  5 minute(s)  -MJ         Timed Charges    08708 - Gait Training Minutes   5  -MJ        User Key  (r) =  Recorded By, (t) = Taken By, (c) = Cosigned By    Initials Name Provider Type     Jameson العراقي, PT Physical Therapist        Therapy Charges for Today     Code Description Service Date Service Provider Modifiers Qty    46063787667 HC PT EVAL LOW COMPLEXITY 4 4/23/2019 Jameson العراقي, PT GP 1    34622520847 HC PT THER SUPP EA 15 MIN 4/23/2019 Jameson العراقي, PT GP 2          PT G-Codes  Outcome Measure Options: AM-PAC 6 Clicks Basic Mobility (PT)  AM-PAC 6 Clicks Score: 17      Jameson العراقي PT  4/23/2019

## 2019-04-24 VITALS
HEIGHT: 68 IN | BODY MASS INDEX: 22.73 KG/M2 | TEMPERATURE: 97.7 F | RESPIRATION RATE: 16 BRPM | OXYGEN SATURATION: 98 % | HEART RATE: 85 BPM | SYSTOLIC BLOOD PRESSURE: 119 MMHG | WEIGHT: 150 LBS | DIASTOLIC BLOOD PRESSURE: 62 MMHG

## 2019-04-24 LAB
ANION GAP SERPL CALCULATED.3IONS-SCNC: 9 MMOL/L
BUN BLD-MCNC: 11 MG/DL (ref 8–23)
BUN/CREAT SERPL: 15.7 (ref 7–25)
CALCIUM SPEC-SCNC: 8.3 MG/DL (ref 8.6–10.5)
CHLORIDE SERPL-SCNC: 103 MMOL/L (ref 98–107)
CO2 SERPL-SCNC: 25 MMOL/L (ref 22–29)
CREAT BLD-MCNC: 0.7 MG/DL (ref 0.76–1.27)
DEPRECATED RDW RBC AUTO: 47.7 FL (ref 37–54)
ERYTHROCYTE [DISTWIDTH] IN BLOOD BY AUTOMATED COUNT: 18.6 % (ref 12.3–15.4)
GFR SERPL CREATININE-BSD FRML MDRD: 111 ML/MIN/1.73
GLUCOSE BLD-MCNC: 121 MG/DL (ref 65–99)
HCT VFR BLD AUTO: 30.1 % (ref 37.5–51)
HGB BLD-MCNC: 8.6 G/DL (ref 13–17.7)
MCH RBC QN AUTO: 20 PG (ref 26.6–33)
MCHC RBC AUTO-ENTMCNC: 28.6 G/DL (ref 31.5–35.7)
MCV RBC AUTO: 69.8 FL (ref 79–97)
PLATELET # BLD AUTO: 299 10*3/MM3 (ref 140–450)
PMV BLD AUTO: 9.8 FL (ref 6–12)
POTASSIUM BLD-SCNC: 4.1 MMOL/L (ref 3.5–5.2)
RBC # BLD AUTO: 4.31 10*6/MM3 (ref 4.14–5.8)
SODIUM BLD-SCNC: 137 MMOL/L (ref 136–145)
WBC NRBC COR # BLD: 12.65 10*3/MM3 (ref 3.4–10.8)

## 2019-04-24 PROCEDURE — 97116 GAIT TRAINING THERAPY: CPT

## 2019-04-24 PROCEDURE — A9270 NON-COVERED ITEM OR SERVICE: HCPCS | Performed by: NURSE PRACTITIONER

## 2019-04-24 PROCEDURE — 85027 COMPLETE CBC AUTOMATED: CPT | Performed by: NURSE PRACTITIONER

## 2019-04-24 PROCEDURE — 80048 BASIC METABOLIC PNL TOTAL CA: CPT | Performed by: NURSE PRACTITIONER

## 2019-04-24 PROCEDURE — 63710000001 BUDESONIDE-FORMOTEROL 80-4.5 MCG/ACT AEROSOL 6.9 G INHALER: Performed by: NURSE PRACTITIONER

## 2019-04-24 PROCEDURE — 63710000001 MULTIVITAMIN TABLET: Performed by: ORTHOPAEDIC SURGERY

## 2019-04-24 PROCEDURE — G0378 HOSPITAL OBSERVATION PER HR: HCPCS

## 2019-04-24 PROCEDURE — 94640 AIRWAY INHALATION TREATMENT: CPT

## 2019-04-24 PROCEDURE — 99024 POSTOP FOLLOW-UP VISIT: CPT | Performed by: ORTHOPAEDIC SURGERY

## 2019-04-24 PROCEDURE — 25010000002 ENOXAPARIN PER 10 MG: Performed by: ORTHOPAEDIC SURGERY

## 2019-04-24 PROCEDURE — 63710000001 CARBIDOPA-LEVODOPA 25-100 MG TABLET: Performed by: NURSE PRACTITIONER

## 2019-04-24 PROCEDURE — 63710000001 PANTOPRAZOLE 40 MG TABLET DELAYED-RELEASE: Performed by: NURSE PRACTITIONER

## 2019-04-24 PROCEDURE — 63710000001 MAGNESIUM CITRATE 1.745 GM/30ML SOLUTION 300 ML BOTTLE: Performed by: NURSE PRACTITIONER

## 2019-04-24 PROCEDURE — 25010000003 CEFAZOLIN IN DEXTROSE 2-4 GM/100ML-% SOLUTION: Performed by: ORTHOPAEDIC SURGERY

## 2019-04-24 PROCEDURE — A9270 NON-COVERED ITEM OR SERVICE: HCPCS | Performed by: ORTHOPAEDIC SURGERY

## 2019-04-24 RX ORDER — MAGNESIUM CARB/ALUMINUM HYDROX 105-160MG
296 TABLET,CHEWABLE ORAL ONCE
Status: COMPLETED | OUTPATIENT
Start: 2019-04-24 | End: 2019-04-24

## 2019-04-24 RX ORDER — DOCUSATE SODIUM 100 MG/1
100 CAPSULE, LIQUID FILLED ORAL 2 TIMES DAILY
Qty: 60 CAPSULE | Refills: 0 | Status: SHIPPED | OUTPATIENT
Start: 2019-04-24 | End: 2019-09-09

## 2019-04-24 RX ADMIN — BUDESONIDE AND FORMOTEROL FUMARATE DIHYDRATE 2 PUFF: 80; 4.5 AEROSOL RESPIRATORY (INHALATION) at 08:19

## 2019-04-24 RX ADMIN — PANTOPRAZOLE SODIUM 40 MG: 40 TABLET, DELAYED RELEASE ORAL at 05:29

## 2019-04-24 RX ADMIN — Medication 1 TABLET: at 08:55

## 2019-04-24 RX ADMIN — Medication 296 ML: at 09:49

## 2019-04-24 RX ADMIN — CEFAZOLIN SODIUM 2 G: 2 INJECTION, SOLUTION INTRAVENOUS at 08:55

## 2019-04-24 RX ADMIN — ENOXAPARIN SODIUM 40 MG: 40 INJECTION SUBCUTANEOUS at 08:55

## 2019-04-24 RX ADMIN — CARBIDOPA AND LEVODOPA 1 TABLET: 25; 100 TABLET ORAL at 11:51

## 2019-04-24 RX ADMIN — CARBIDOPA AND LEVODOPA 1 TABLET: 25; 100 TABLET ORAL at 08:55

## 2019-04-24 NOTE — PLAN OF CARE
Problem: Patient Care Overview  Goal: Plan of Care Review  Outcome: Ongoing (interventions implemented as appropriate)   04/24/19 1036   OTHER   Outcome Summary Pt increased gait distance to 40 feet with CGA and RW, maintaing WB status through L heel with boot. No bleeding noted this date. Pt progressed to stair training x1 step to mimic home environment. Pt is discharging home today with assist, made good progress toward goals during inpatient stay   Coping/Psychosocial   Plan of Care Reviewed With patient   Plan of Care Review   Progress improving

## 2019-04-24 NOTE — THERAPY DISCHARGE NOTE
Acute Care - Physical Therapy Treatment Note/Discharge  [facility]     Patient Name: Flaco Roque II  : 1945  MRN: [id number]  Today's Date: 2019  Onset of Illness/Injury or Date of Surgery: 19  Date of Referral to PT: 19  Referring Physician: MD Jimena    Admit Date: 2019    Visit Dx:    ICD-10-CM ICD-9-CM   1. Impaired functional mobility, balance, gait, and endurance Z74.09 V49.89   2. Deformity of left foot M21.962 736.70     Patient Active Problem List   Diagnosis   • ABRIL (obstructive sleep apnea)   • Chronic back pain   • COPD (chronic obstructive pulmonary disease) (CMS/HCC)   • Chris's esophagus   • GERD (gastroesophageal reflux disease)   • Anemia   • Foot deformity, acquired, left   • Arthropathy of shoulder region   • Status post reverse total shoulder replacement, left   • Leukocytosis, likely reactive   • Acute postoperative pain   • Deformity of left foot   • Foot deformity   • S/P foot surgery, left       Physical Therapy Education     Title: PT OT SLP Therapies (In Progress)     Topic: Physical Therapy (Done)     Point: Mobility training (Done)     Learning Progress Summary           Patient Acceptance, E,D, VU by  at 2019 10:36 AM    Comment:  Reviewed WB status, gait mechanics, stair sequencing, equipment for home use    Acceptance, E,D, VU,NR by  at 2019  1:50 PM    Comment:  Reviewed WB through L heel with boot donned, safety with mobility, gait mechanics, effects of nerve block, equipment for home use   Significant Other Acceptance, E,D, VU,NR by  at 2019  1:50 PM    Comment:  Reviewed WB through L heel with boot donned, safety with mobility, gait mechanics, effects of nerve block, equipment for home use                   Point: Home exercise program (Done)     Learning Progress Summary           Patient Acceptance, E,D, VU by  at 2019 10:36 AM    Comment:  Reviewed WB status, gait mechanics, stair sequencing, equipment for  home use                   Point: Body mechanics (Done)     Learning Progress Summary           Patient Acceptance, E,D, VU by  at 4/24/2019 10:36 AM    Comment:  Reviewed WB status, gait mechanics, stair sequencing, equipment for home use    Acceptance, E,D, VU,NR by  at 4/23/2019  1:50 PM    Comment:  Reviewed WB through L heel with boot donned, safety with mobility, gait mechanics, effects of nerve block, equipment for home use   Significant Other Acceptance, E,D, VU,NR by  at 4/23/2019  1:50 PM    Comment:  Reviewed WB through L heel with boot donned, safety with mobility, gait mechanics, effects of nerve block, equipment for home use                   Point: Precautions (Done)     Learning Progress Summary           Patient Acceptance, E,D, VU by  at 4/24/2019 10:36 AM    Comment:  Reviewed WB status, gait mechanics, stair sequencing, equipment for home use    Acceptance, E,D, VU,NR by  at 4/23/2019  1:50 PM    Comment:  Reviewed WB through L heel with boot donned, safety with mobility, gait mechanics, effects of nerve block, equipment for home use   Significant Other Acceptance, E,D, VU,NR by  at 4/23/2019  1:50 PM    Comment:  Reviewed WB through L heel with boot donned, safety with mobility, gait mechanics, effects of nerve block, equipment for home use                               User Key     Initials Effective Dates Name Provider Type Discipline     04/03/18 -  Jameson العراقي PT Physical Therapist PT              Rehab Goal Summary     Row Name 04/24/19 1036             Physical Therapy Goals    Bed Mobility Goal Selection (PT)  bed mobility, PT goal 1  -MJ      Transfer Goal Selection (PT)  transfer, PT goal 1  -MJ      Gait Training Goal Selection (PT)  gait training, PT goal 1  -MJ      Stairs Goal Selection (PT)  stairs, PT goal 1  -MJ         Bed Mobility Goal 1 (PT)    Activity/Assistive Device (Bed Mobility Goal 1, PT)  sit to supine/supine to sit  -MJ      [place] Level/Cues  Needed (Bed Mobility Goal 1, PT)  conditional independence  -MJ      Time Frame (Bed Mobility Goal 1, PT)  5 days;long term goal (LTG)  -MJ      Progress/Outcomes (Bed Mobility Goal 1, PT)  goal partially met achieved supine to sit  -MJ         Transfer Goal 1 (PT)    Activity/Assistive Device (Transfer Goal 1, PT)  sit-to-stand/stand-to-sit;walker, rolling  -MJ      [place] Level/Cues Needed (Transfer Goal 1, PT)  conditional independence  -MJ      Time Frame (Transfer Goal 1, PT)  5 days;long term goal (LTG)  -MJ      Progress/Outcome (Transfer Goal 1, PT)  goal not met;discharged from facility  -MJ         Gait Training Goal 1 (PT)    Activity/Assistive Device (Gait Training Goal 1, PT)  gait (walking locomotion);walker, rolling  -MJ      [place] Level (Gait Training Goal 1, PT)  conditional independence  -MJ      Distance (Gait Goal 1, PT)  150 feet  -MJ      Time Frame (Gait Training Goal 1, PT)  5 days;long term goal (LTG)  -MJ      Barriers (Gait Training Goal 1, PT)  WB through L heel with boot  -MJ      Progress/Outcome (Gait Training Goal 1, PT)  goal not met;discharged from facility  -MJ         Stairs Goal 1 (PT)    Activity/Assistive Device (Stairs Goal 1, PT)  ascending stairs;descending stairs;walker, rolling  -MJ      [place] Level/Cues Needed (Stairs Goal 1, PT)  contact guard assist  -MJ      Number of Stairs (Stairs Goal 1, PT)  1  -MJ      Time Frame (Stairs Goal 1, PT)  5 days;long term goal (LTG)  -MJ      Barriers (Stairs Goal 1, PT)  WB through L heel with boot  -MJ      Progress/Outcome (Stairs Goal 1, PT)  goal met  -MJ        User Key  (r) = Recorded By, (t) = Taken By, (c) = Cosigned By    Initials Name Provider Type Discipline    Jameson Alcala, PT Physical Therapist PT        Therapy Treatment  Rehabilitation Treatment Summary     Row Name 04/24/19 1036             Treatment Time/Intention    Discipline  physical therapist  -MJ      Document Type  therapy note (daily  note)  -MJ      Subjective Information  no complaints  -MJ      Mode of Treatment  physical therapy  -MJ      Patient/Family Observations  Pt supine in bed, boot to L foot, popliteal nerve catheter  -MJ      Care Plan Review  patient/other agree to care plan  -MJ      Patient Effort  good  -MJ      Existing Precautions/Restrictions  fall;other (see comments)  (Significant)  WB through L heel with boot; popliteal nerve catheter  -MJ      Recorded by [MJ] Jameson العراقي, PT 04/24/19 1100      Row Name 04/24/19 1036             Cognitive Assessment/Intervention- PT/OT    Orientation Status (Cognition)  oriented x 4  -MJ      Follows Commands (Cognition)  WFL  -MJ      Recorded by [MJ] Jameson العراقي, PT 04/24/19 1100      Row Name 04/24/19 1036             Safety Issues, Functional Mobility    Impairments Affecting Function (Mobility)  balance  -MJ      Recorded by [MJ] Jameson العراقي, PT 04/24/19 1100      Row Name 04/24/19 1036             Mobility Assessment/Intervention    Extremity Weight-bearing Status  left lower extremity  -MJ      Left Lower Extremity (Weight-bearing Status)  touch down weight-bearing (TDWB)  (Significant)  WB through L heel with boot  -MJ      Recorded by [MJ] Jameson العراقي, PT 04/24/19 1100      Row Name 04/24/19 1036             Bed Mobility Assessment/Treatment    Supine-Sit [place] (Bed Mobility)  conditional independence  -MJ      Bed Mobility, Safety Issues  decreased use of legs for bridging/pushing  -MJ      Assistive Device (Bed Mobility)  bed rails;head of bed elevated  -MJ      Recorded by [MJ] Jameson العراقي, PT 04/24/19 1100      Row Name 04/24/19 1036             Transfer Assessment/Treatment    Transfer Assessment/Treatment  sit-stand transfer;stand-sit transfer  -MJ      Comment (Transfers)  Verbal cues for correct hand placement and to WB through L heel   -MJ      Recorded by [MJ] Jameson العراقي, PT 04/24/19 1100      Row Name 04/24/19 1036             Sit-Stand Transfer     Sit-Stand [place] (Transfers)  supervision;verbal cues  -MJ      Assistive Device (Sit-Stand Transfers)  walker, front-wheeled  -MJ      Recorded by [MJ] Jameson العراقي, PT 04/24/19 1100      Row Name 04/24/19 1036             Stand-Sit Transfer    Stand-Sit [place] (Transfers)  supervision;verbal cues  -MJ      Assistive Device (Stand-Sit Transfers)  walker, front-wheeled  -MJ      Recorded by [MJ] Jameson العراقي, PT 04/24/19 1100      Row Name 04/24/19 1036             Gait/Stairs Assessment/Training    00754 - Gait Training Minutes   15  -MJ      [place] Level (Gait)  stand by assist;verbal cues  -MJ      Assistive Device (Gait)  walker, front-wheeled  -MJ      Distance in Feet (Gait)  40  -MJ      Pattern (Gait)  step-to  -MJ      Deviations/Abnormal Patterns (Gait)  left sided deviations;antalgic;bilateral deviations;krunal decreased;stride length decreased  -MJ      Bilateral Gait Deviations  forward flexed posture;weight shift ability decreased  -MJ      Negotiation (Stairs)  stairs independence;stairs assistive device;number of steps;ascending technique;descending technique  -MJ      [place] Level (Stairs)  contact guard;verbal cues  -MJ      Assistive Device (Stairs)  walker, front-wheeled  -MJ      Handrail Location (Stairs)  none  -MJ      Number of Steps (Stairs)  1  -MJ      Ascending Technique (Stairs)  step-to-step  -MJ      Descending Technique (Stairs)  step-to-step  -MJ      Stairs, Safety Issues  sequencing ability decreased;weight-shifting ability decreased  -MJ      Stairs, Impairments  impaired balance  -MJ      Comment (Gait/Stairs)  Pt demo step to gait pattern at slow pace, maintaining WB status through heel of boot. Cues for upright posture. Pt requires a few standing rest breaks due to shoulder fatigue. Pt performed 1 step backward with RW. Cues to ascend with R LE, while weight bearing through L heel and to descend forward with L LE  -MJ      Recorded by [MJ] Malcom  Jameson, PT 04/24/19 1100      Row Name 04/24/19 1036             Positioning and Restraints    Pre-Treatment Position  in bed  -MJ      Post Treatment Position  chair  -MJ      In Chair  notified nsg;reclined;call light within reach;encouraged to call for assist;exit alarm on  -MJ      Recorded by [MAYCOL] Jameson العراقي, PT 04/24/19 1100      Row Name 04/24/19 1036             Pain Scale: Numbers Pre/Post-Treatment    Pain Scale: Numbers, Pretreatment  0/10 - no pain  -MJ      Pain Scale: Numbers, Post-Treatment  0/10 - no pain  -MJ      Recorded by [MAYCOL] Jameson العراقي, PT 04/24/19 1100      Row Name                Wound 04/23/19 1011 Left foot incision    Wound - Properties Group Date first assessed: 04/23/19 [JA] Time first assessed: 1011 [JA] Side: Left [JA] Location: foot [JA] Type: incision [JA] Recorded by:  [JA] Sj Milner RN 04/23/19 1011    Row Name 04/24/19 1036             Plan of Care Review    Plan of Care Reviewed With  patient  -MJ      Recorded by [MAYCOL] Jameson العراقي, PT 04/24/19 1100      Row Name 04/24/19 1036             Outcome Summary/Treatment Plan (PT)    Daily Summary of Progress (PT)  progress toward functional goals is good  -MJ      Recorded by [MAYCOL] Jameson العراقي, PT 04/24/19 1100        User Key  (r) = Recorded By, (t) = Taken By, (c) = Cosigned By    Initials Name Effective Dates Discipline    JA Sj Milner RN 06/16/16 -  Nurse    Jameson Alcala, PT 04/03/18 -  PT        Wound 04/23/19 1011 Left foot incision (Active)   Dressing Appearance dried drainage 4/24/2019  8:55 AM   Drainage Characteristics/Odor sanguineous 4/24/2019  8:55 AM   Drainage Amount moderate 4/24/2019  8:55 AM   Dressing Care, Wound elastic bandage 4/24/2019  8:55 AM       PT Recommendation and Plan  Anticipated Discharge Disposition (PT): home with assist  Planned Therapy Interventions (PT Eval): balance training, bed mobility training, gait training, home exercise program, patient/family education, stair  training, strengthening, transfer training  Therapy Frequency (PT Clinical Impression): daily  Outcome Summary/Treatment Plan (PT)  Daily Summary of Progress (PT): progress toward functional goals is good  Anticipated Equipment Needs at Discharge (PT): (none)  Anticipated Discharge Disposition (PT): home with assist  Plan of Care Reviewed With: patient  Progress: improving  Outcome Summary: Pt increased gait distance to 40 feet with CGA and RW, maintaing WB status through L heel with boot. No bleeding noted this date. Pt progressed to stair training x1 step to mimic home environment. Pt is discharging home today with assist, made good progress toward goals during inpatient stay    Outcome Measures     Row Name 04/24/19 1036 04/23/19 1350          How much help from another person do you currently need...    Turning from your back to your side while in flat bed without using bedrails?  4  -MJ  3  -MJ     Moving from lying on back to sitting on the side of a flat bed without bedrails?  4  -MJ  3  -MJ     Moving to and from a bed to a chair (including a wheelchair)?  3  -MJ  3  -MJ     Standing up from a chair using your arms (e.g., wheelchair, bedside chair)?  3  -MJ  3  -MJ     Climbing 3-5 steps with a railing?  3  -MJ  2  -MJ     To walk in hospital room?  3  -MJ  3  -MJ     AM-PAC 6 Clicks Score  20  -MJ  17  -MJ        Functional Assessment    Outcome Measure Options  AM-PAC 6 Clicks Basic Mobility (PT)  -MJ  AM-PAC 6 Clicks Basic Mobility (PT)  -MJ       User Key  (r) = Recorded By, (t) = Taken By, (c) = Cosigned By    Initials Name Provider Type    Jameson Alcala, PT Physical Therapist           Time Calculation:   PT Charges     Row Name 04/24/19 1036             Time Calculation    Start Time  1036  -MJ      PT Received On  04/24/19  -      PT Goal Re-Cert Due Date  05/03/19  -         Time Calculation- PT    Total Timed Code Minutes- PT  15 minute(s)  -         Timed Charges    23709 - Gait Training  Minutes   15  -MJ        User Key  (r) = Recorded By, (t) = Taken By, (c) = Cosigned By    Initials Name Provider Type    Jameson Alcala, PT Physical Therapist        Therapy Charges for Today     Code Description Service Date Service Provider Modifiers Qty    36991790870 HC PT EVAL LOW COMPLEXITY 4 4/23/2019 Jameson العراقي, PT GP 1    90743010016 HC PT THER SUPP EA 15 MIN 4/23/2019 Jameson العراقي, PT GP 2    66899858609 HC GAIT TRAINING EA 15 MIN 4/24/2019 Jameson العراقي, PT GP 1          PT G-Codes  Outcome Measure Options: AM-PAC 6 Clicks Basic Mobility (PT)  AM-PAC 6 Clicks Score: 20    PT Discharge Summary  Anticipated Discharge Disposition (PT): home with assist  Reason for Discharge: Discharge from facility  Outcomes Achieved: Patient able to partially acheive established goals  Discharge Destination: Home with assist    Jameson العراقي PT  4/24/2019

## 2019-04-24 NOTE — PLAN OF CARE
Problem: Patient Care Overview  Goal: Plan of Care Review   04/24/19 0356   OTHER   Outcome Summary Pt has rested well around nursing care. Arrow pump remains @ 6ml/hr, pt has no c/o pain. Dried bloody drainage noted to operative foot, has remained elevated on pillows throughout the night. Pt has numbness r/t nerve cath only. Voiding spontaneously. VSS, room air. No noted issues throughout this shift.    Coping/Psychosocial   Plan of Care Reviewed With patient   Plan of Care Review   Progress no change

## 2019-04-24 NOTE — DISCHARGE SUMMARY
Patient Name: Flaco Roque II  MRN: [id number]  : 1945  DOS: 2019    Attending: Juhi Hess MD    Primary Care Provider: Tayo Hendrix MD    Date of Admission:.2019  5:32 AM    Date of Discharge:  2019    Discharge Diagnosis:     S/P foot surgery, left    Deformity of left foot    ABRIL (obstructive sleep apnea)    COPD (chronic obstructive pulmonary disease) (CMS/HCC)    Anemia    Leukocytosis, likely reactive    Acute postoperative pain      Hospital Course  Patient is a 73 y.o. male presented for scheduled surgery listed below by Dr. Hess.     He underwent surgery under GA. He tolerated surgery well and was admitted for further medical management.      He is known to us from previous shoulder surgery in 2018; which he recovered well.  He has chronic pain and indwelling morphine pump. He is followed by Dr. Kingston, pain management.     Patient was provided pain medications as needed for pain control, along with popliteal nerve block infusion of Ropivacaine.    Adjustments were made to pain medications to optimize postop pain management. Risks and benefits of opiate medications discussed with patient.    He was seen by PT has progressed well over his stay.    He used an IS for atelectasis prophylaxis and Lovenox along with mechanicals for DVT prophylaxis.  Home medications were resumed as appropriate, and labs were monitored and remained fairly stable.     With the progress he has made, he is ready for DC home today.    He will have home health for dressing changes.    He will have an On Q pump ( instructed on it during this admit)  Discussed with patient regarding plan and he shows understanding and agreement.         Procedures Performed  19 9:53 AM     Preoperative diagnosis: Left foot:  1. 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 Claw toes 2,3,4 with fixed contracture PIP joints    3. Contracture MTPJ 2,3,4    4. Flexor tendon  "contracture 2,3,4   5. Bunionette deformity 5th metatarsal     Postoperative diagnosis: Same     Anesthesia: Gen. with blocks for postop pain control     Surgeon: Juhi Calle M.D.    Operative procedure:   1. Great toe DIP fusion  2. Toes 2,3,4 PIP excision  3. Toes 2,3 metatarsal capsulotomy, with extensor tendon lengthening  4. Toes 2,3,4 open flexor tenotomies  5. Chevron osteotomy 5th metatarsal      Pertinent Test Results:    I reviewed the patient's new clinical results.   Results from last 7 days   Lab Units 19  0417   WBC 10*3/mm3 12.65*   HEMOGLOBIN g/dL 8.6*   HEMATOCRIT % 30.1*   PLATELETS 10*3/mm3 299   Results for LAURENCE SALEEM II (MRN [id number]) as of 2019 11:03   Ref. Range 4/10/2019 15:38   Hemoglobin Latest Ref Range: 13.0 - 17.7 g/dL 10.5 (L)   Hematocrit Latest Ref Range: 37.5 - 51.0 % 35.7 (L)     Results from last 7 days   Lab Units 19  0417   SODIUM mmol/L 137   POTASSIUM mmol/L 4.1   CHLORIDE mmol/L 103   CO2 mmol/L 25.0   BUN mg/dL 11   CREATININE mg/dL 0.70*   CALCIUM mg/dL 8.3*   GLUCOSE mg/dL 121*     I reviewed the patient's new imaging including images and reports.      Physical therapy: Pt ambulated 20 feet with CGA and RW, limited by increased bleeding from incision during mobility, RN aware. PT will follow to increase strength and progress functional mobility with WB status. Plan is home with assist at discharge      Discharge Assessment:    Vital Signs  Visit Vitals  /62 (BP Location: Right arm, Patient Position: Sitting)   Pulse 85   Temp 97.7 °F (36.5 °C) (Oral)   Resp 16   Ht 172.7 cm (68\")   Wt 68 kg (150 lb)   SpO2 98%   BMI 22.81 kg/m²     Temp (24hrs), Av.9 °F (36.6 °C), Min:97.6 °F (36.4 °C), Max:98.3 °F (36.8 °C)      General Appearance:    Alert, cooperative, in no acute distress   Lungs:     Clear to auscultation,respirations regular, even and                   unlabored    Heart:    Regular rhythm and normal rate, normal S1 and S2 "   Abdomen:     Normal bowel sounds, no masses, no organomegaly, soft        non-tender, non-distended, no guarding, no rebound                 tenderness   Extremities:   Left foot dressing with bloody drainage on ACE and gauze around toes. Pins in toes. Nerve block present   Pulses:   Pulses palpable and equal bilaterally   Skin:   No bleeding, bruising or rash   Neurologic:   Cranial nerves 2 - 12 grossly intact, sensation intact       Discharge Disposition: Home    Discharge Medications     Discharge Medications      New Medications      Instructions Start Date   docusate sodium 100 MG capsule  Commonly known as:  COLACE   100 mg, Oral, 2 Times Daily         Continue These Medications      Instructions Start Date   budesonide-formoterol 80-4.5 MCG/ACT inhaler  Commonly known as:  SYMBICORT   2 puffs, Inhalation, 2 Times Daily - RT      COENZYME Q10 PO   1 tablet, Oral, Daily      HYDROcodone-acetaminophen 7.5-325 MG per tablet  Commonly known as:  NORCO   1-2 tablets, Oral, Every 6 Hours PRN      ipratropium-albuterol 0.5-2.5 mg/3 ml nebulizer  Commonly known as:  DUO-NEB   3 mL, Nebulization, Every 4 Hours PRN      montelukast 10 MG tablet  Commonly known as:  SINGULAIR   10 mg, Oral, Nightly      MULTIVITAMIN ADULT PO   1 tablet, Oral, Daily      ondansetron 4 MG tablet  Commonly known as:  ZOFRAN   4 mg, Oral, Every 6 Hours PRN      oxyCODONE-acetaminophen 5-325 MG per tablet  Commonly known as:  PERCOCET   1-2 tablets, Oral, Every 6 Hours PRN      pantoprazole 40 MG EC tablet  Commonly known as:  PROTONIX   40 mg, Oral, Daily      SINEMET  MG per tablet  Generic drug:  carbidopa-levodopa   1 tablet, Oral, 4 Times Daily             Discharge Diet: Regular diet    Activity at Discharge: weight bear on left heel in boot    Follow-up Appointments  Dr. Hess per her orders      YAMILET Craig  04/24/19  11:03 AM     I have personally performed the evaluation on this patient. My history is consistent   with HPI obtained. My exam findings are listed above. I have personally reviewed and discussed the above formulated treatment plan with pt and AH. Gabriel.

## 2019-04-24 NOTE — PROGRESS NOTES
Jaime    Acute pain service Inpatient Progress Note    Patient Name: Flaco Roque II  :  1945  MRN:  [id number]        Acute Pain  Service Inpatient Progress Note:    Analgesia:Excellent  Pain Score:1/10  LOC: alert and awake  Resp Status: room air  Cardiac: VS stable  Side Effects:None  Catheter Site:clean, dressing intact and dry  Cath type: peripheral nerve cath with ON Q  Infusion rate: 6ml/hr  Catheter Plan:Catheter to remain Insitu and Continue catheter infusion rate unchanged

## 2019-04-24 NOTE — NURSING NOTE
Acute Pain Service:  Peripheral nerve catheter and disposable infusion device teaching completed with patient. He has had a peripheral nerve catheter and disposable infusion pump in the past. Discussion,  handout and bracelet provided with [facility] on call central phone number.  Instructed to call with any questions or concerns.  Patient verbalized understanding.  Service will continue to follow until catheter DC'd.  Please contact patient at H: [phone] or C: [phone] if needed.

## 2019-04-24 NOTE — PROGRESS NOTES
Discharge Planning Assessment  [facility]     Patient Name: lFaco Roque II  MRN: [id number]  Today's Date: 4/24/2019    Admit Date: 4/23/2019    Discharge Needs Assessment     Row Name 04/24/19 1222       Living Environment    Lives With  spouse Pt resides in [facility]    Name(s) of Who Lives With Patient  wife- Cheryl    Current Living Arrangements  home/apartment/condo    Primary Care Provided by  self    Provides Primary Care For  no one    Family Caregiver if Needed  spouse    Family Caregiver Names  Cheryl    Quality of Family Relationships  helpful;involved;supportive    Able to Return to Prior Arrangements  yes       Resource/Environmental Concerns    Resource/Environmental Concerns  none       Transition Planning    Patient/Family Anticipates Transition to  home with help/services    Patient/Family Anticipated Services at Transition  home health care       Discharge Needs Assessment    Readmission Within the Last 30 Days  no previous admission in last 30 days    Concerns to be Addressed  discharge planning    Equipment Currently Used at Home  none    Equipment Needed After Discharge  walker, rolling;shower chair;other (see comments) knee scooter, elevated toilet seat    Outpatient/Agency/Support Group Needs  homecare agency    Discharge Facility/Level of Care Needs  home with home health    Offered/Gave Vendor List  yes    Patient's Choice of Community Agency(s)  [facility]        Discharge Plan     Row Name 04/24/19 1210       Plan    Plan  home with  [facility] home [facility]    Plan Comments  CM spoke with pt at bedside. Pt resides in [facility] with his wife. Pt has a rolling walker, knee scooter, shower chair and elevated toilet seat at home. [facility] however will be put on hold following current surgery. Pt will have [facility] for skilled nursing, for dressing changes.  CM called referral to Howard with [facility]. Pt  denies additional discharge needs     Final Discharge Disposition Code  06 - home with home health care        Destination      No service coordination in this encounter.      Durable Medical Equipment      No service coordination in this encounter.      Dialysis/Infusion      No service coordination in this encounter.      Home Medical Care - Selection Complete      Service Provider Request Status Selected Services Address Phone Number Fax Number    [facility] Selected Home Health Services [address] , [facility] [address] [phone]      Therapy      No service coordination in this encounter.      Community Resources      No service coordination in this encounter.        Expected Discharge Date and Time     Expected Discharge Date Expected Discharge Time    Apr 24, 2019         Demographic Summary     Row Name 04/24/19 1216       General Information    Required Notices Provided  Observation Status Notice    Referral Source  admission list    Reason for Consult  discharge planning    Preferred Language  English    General Information Comments  PCP- Tayo Hnedrix       Contact Information    Permission Granted to Share Info With      Contact Information Comments  [phone]        Functional Status     Row Name 04/24/19 1217       Functional Status    Usual Activity Tolerance  good    Current Activity Tolerance  moderate       Functional Status, IADL    Medications  independent    Meal Preparation  independent    Housekeeping  independent    Laundry  independent    Shopping  independent       Employment/    Employment/ Comments  Pt confirms he has Medicare and [employer], denies concerns or disruption in coverage. Pt confirms he has prescription drug coverage and has no issues obtaining or affording current medications.         Psychosocial    No documentation.       Abuse/Neglect    No documentation.       Legal    No documentation.        Substance Abuse    No documentation.       Patient Forms    No documentation.           Deysi Laird

## 2019-04-25 ENCOUNTER — TELEPHONE (OUTPATIENT)
Dept: ORTHOPEDIC SURGERY | Facility: CLINIC | Age: 74
End: 2019-04-25

## 2019-04-25 NOTE — TELEPHONE ENCOUNTER
Called patient back- he is having quite a bit of pain- he has not been elevating enough to keep his foot higher than his heart. He is taking the percocet- 2 pills every 6 hours. I explained about how to elevate properly. He is going to try this and see if it helps. He understood. He will call back if he continues to not get any relief.     Anita

## 2019-04-25 NOTE — TELEPHONE ENCOUNTER
This patient had surgery with Dr. Calle on Tuesday 4/23/2019. He was discharged from the hospital yesterday and during the night he woke up with extreme pain throughout his entire foot. He was wondering what he needs to do. He can be called back at [phone]. Thanks.

## 2019-04-25 NOTE — PROGRESS NOTES
FERNANDO Sandoval    Nerve Cath Post Op Call    Patient Name: Flaco Roque II  :  1945  MRN:  [id number]  Date of Discharge: 2019    Nerve Cath Post Op Call:    Catheter Plan:Patient called/No answer/Message left to call [facility] pain service for any questions or complaints

## 2019-04-26 ENCOUNTER — APPOINTMENT (OUTPATIENT)
Dept: GENERAL RADIOLOGY | Facility: HOSPITAL | Age: 74
End: 2019-04-26

## 2019-04-26 ENCOUNTER — HOSPITAL ENCOUNTER (INPATIENT)
Facility: HOSPITAL | Age: 74
LOS: 11 days | Discharge: HOME | End: 2019-05-07
Attending: EMERGENCY MEDICINE | Admitting: INTERNAL MEDICINE

## 2019-04-26 ENCOUNTER — APPOINTMENT (OUTPATIENT)
Dept: CT IMAGING | Facility: HOSPITAL | Age: 74
End: 2019-04-26

## 2019-04-26 DIAGNOSIS — J96.01 ACUTE RESPIRATORY FAILURE WITH HYPOXIA (HCC): ICD-10-CM

## 2019-04-26 DIAGNOSIS — D50.9 IRON DEFICIENCY ANEMIA, UNSPECIFIED IRON DEFICIENCY ANEMIA TYPE: Primary | ICD-10-CM

## 2019-04-26 DIAGNOSIS — K59.03 THERAPEUTIC OPIOID INDUCED CONSTIPATION: ICD-10-CM

## 2019-04-26 DIAGNOSIS — J18.9 PNEUMONIA DUE TO INFECTIOUS ORGANISM, UNSPECIFIED LATERALITY, UNSPECIFIED PART OF LUNG: ICD-10-CM

## 2019-04-26 DIAGNOSIS — Z74.09 IMPAIRED FUNCTIONAL MOBILITY, BALANCE, GAIT, AND ENDURANCE: ICD-10-CM

## 2019-04-26 DIAGNOSIS — T40.2X5A THERAPEUTIC OPIOID INDUCED CONSTIPATION: ICD-10-CM

## 2019-04-26 DIAGNOSIS — A41.9 SEPSIS, DUE TO UNSPECIFIED ORGANISM: ICD-10-CM

## 2019-04-26 DIAGNOSIS — J18.9 PNEUMONIA OF BOTH LOWER LOBES DUE TO INFECTIOUS ORGANISM: ICD-10-CM

## 2019-04-26 DIAGNOSIS — J44.9 CHRONIC OBSTRUCTIVE PULMONARY DISEASE, UNSPECIFIED COPD TYPE (HCC): ICD-10-CM

## 2019-04-26 PROBLEM — G20 PARKINSON DISEASE (HCC): Status: ACTIVE | Noted: 2019-04-26

## 2019-04-26 PROBLEM — G20.A1 PARKINSON DISEASE: Status: ACTIVE | Noted: 2019-04-26

## 2019-04-26 PROBLEM — E86.1 HYPOTENSION DUE TO HYPOVOLEMIA: Status: ACTIVE | Noted: 2019-04-26

## 2019-04-26 PROBLEM — N17.9 AKI (ACUTE KIDNEY INJURY) (HCC): Status: ACTIVE | Noted: 2019-04-26

## 2019-04-26 PROBLEM — I95.89 HYPOTENSION DUE TO HYPOVOLEMIA: Status: ACTIVE | Noted: 2019-04-26

## 2019-04-26 PROBLEM — K92.2 GI BLEED: Status: ACTIVE | Noted: 2019-04-26

## 2019-04-26 LAB
ABO GROUP BLD: NORMAL
ALBUMIN SERPL-MCNC: 3.1 G/DL (ref 3.5–5.2)
ALBUMIN/GLOB SERPL: 1 G/DL
ALP SERPL-CCNC: 55 U/L (ref 39–117)
ALT SERPL W P-5'-P-CCNC: 9 U/L (ref 1–41)
ANION GAP SERPL CALCULATED.3IONS-SCNC: 11 MMOL/L
ARTERIAL PATENCY WRIST A: ABNORMAL
AST SERPL-CCNC: 27 U/L (ref 1–40)
ATMOSPHERIC PRESS: ABNORMAL MMHG
BACTERIA UR QL AUTO: ABNORMAL /HPF
BASE EXCESS BLDA CALC-SCNC: -2.3 MMOL/L (ref 0–2)
BASOPHILS # BLD MANUAL: 0 10*3/MM3 (ref 0–0.2)
BASOPHILS NFR BLD AUTO: 0 % (ref 0–1.5)
BDY SITE: ABNORMAL
BILIRUB SERPL-MCNC: 0.5 MG/DL (ref 0.2–1.2)
BILIRUB UR QL STRIP: NEGATIVE
BLD GP AB SCN SERPL QL: NEGATIVE
BODY TEMPERATURE: 37 C
BUN BLD-MCNC: 19 MG/DL (ref 8–23)
BUN/CREAT SERPL: 14 (ref 7–25)
CA-I SERPL ISE-MCNC: 1.22 MMOL/L (ref 1.12–1.32)
CALCIUM SPEC-SCNC: 8.5 MG/DL (ref 8.6–10.5)
CHLORIDE SERPL-SCNC: 99 MMOL/L (ref 98–107)
CLARITY UR: ABNORMAL
CO2 BLDA-SCNC: 23.7 MMOL/L (ref 23–27)
CO2 SERPL-SCNC: 25 MMOL/L (ref 22–29)
COHGB MFR BLD: 2.2 % (ref 0–2)
COLOR UR: ABNORMAL
CREAT BLD-MCNC: 1.36 MG/DL (ref 0.76–1.27)
CRP SERPL-MCNC: 3.42 MG/DL (ref 0–0.5)
D-LACTATE SERPL-SCNC: 1.9 MMOL/L (ref 0.5–2)
DEPRECATED RDW RBC AUTO: 48.1 FL (ref 37–54)
EOSINOPHIL # BLD MANUAL: 0 10*3/MM3 (ref 0–0.4)
EOSINOPHIL NFR BLD MANUAL: 0 % (ref 0.3–6.2)
ERYTHROCYTE [DISTWIDTH] IN BLOOD BY AUTOMATED COUNT: 19 % (ref 12.3–15.4)
GFR SERPL CREATININE-BSD FRML MDRD: 51 ML/MIN/1.73
GLOBULIN UR ELPH-MCNC: 3 GM/DL
GLUCOSE BLD-MCNC: 116 MG/DL (ref 65–99)
GLUCOSE UR STRIP-MCNC: NEGATIVE MG/DL
HCO3 BLDA-SCNC: 22.5 MMOL/L (ref 20–26)
HCT VFR BLD AUTO: 29.8 % (ref 37.5–51)
HCT VFR BLD CALC: 23.6 %
HGB BLD-MCNC: 8.5 G/DL (ref 13–17.7)
HGB BLDA-MCNC: 7.7 G/DL (ref 13.5–17.5)
HGB UR QL STRIP.AUTO: NEGATIVE
HOLD SPECIMEN: NORMAL
HOLD SPECIMEN: NORMAL
HOROWITZ INDEX BLD+IHG-RTO: 80 %
HYALINE CASTS UR QL AUTO: ABNORMAL /LPF
KETONES UR QL STRIP: ABNORMAL
LEUKOCYTE ESTERASE UR QL STRIP.AUTO: ABNORMAL
LYMPHOCYTES # BLD MANUAL: 0.2 10*3/MM3 (ref 0.7–3.1)
LYMPHOCYTES NFR BLD MANUAL: 1 % (ref 19.6–45.3)
LYMPHOCYTES NFR BLD MANUAL: 3 % (ref 5–12)
MACROCYTES BLD QL SMEAR: ABNORMAL
MAGNESIUM SERPL-MCNC: 3 MG/DL (ref 1.6–2.4)
MCH RBC QN AUTO: 20 PG (ref 26.6–33)
MCHC RBC AUTO-ENTMCNC: 28.5 G/DL (ref 31.5–35.7)
MCV RBC AUTO: 70.3 FL (ref 79–97)
METHGB BLD QL: 0.7 % (ref 0–1.5)
MICROCYTES BLD QL: ABNORMAL
MODALITY: ABNORMAL
MONOCYTES # BLD AUTO: 0.61 10*3/MM3 (ref 0.1–0.9)
NEUTROPHILS # BLD AUTO: 19.4 10*3/MM3 (ref 1.7–7)
NEUTROPHILS NFR BLD MANUAL: 77 % (ref 42.7–76)
NEUTS BAND NFR BLD MANUAL: 19 % (ref 0–5)
NITRITE UR QL STRIP: NEGATIVE
NOTE: ABNORMAL
NT-PROBNP SERPL-MCNC: 220 PG/ML (ref 5–900)
OXYHGB MFR BLDV: 92.3 % (ref 94–99)
PCO2 BLDA: 37.6 MM HG
PCO2 TEMP ADJ BLD: 37.6 MM HG (ref 35–48)
PH BLDA: 7.39 PH UNITS (ref 7.35–7.45)
PH UR STRIP.AUTO: 6.5 [PH] (ref 5–8)
PH, TEMP CORRECTED: 7.39 PH UNITS
PHOSPHATE SERPL-MCNC: 2.8 MG/DL (ref 2.5–4.5)
PLAT MORPH BLD: NORMAL
PLATELET # BLD AUTO: 422 10*3/MM3 (ref 140–450)
PMV BLD AUTO: 11 FL (ref 6–12)
PO2 BLDA: 72.7 MM HG (ref 83–108)
PO2 TEMP ADJ BLD: 72.7 MM HG (ref 83–108)
POTASSIUM BLD-SCNC: 4.5 MMOL/L (ref 3.5–5.2)
PROT SERPL-MCNC: 6.1 G/DL (ref 6–8.5)
PROT UR QL STRIP: ABNORMAL
RBC # BLD AUTO: 4.24 10*6/MM3 (ref 4.14–5.8)
RBC # UR: ABNORMAL /HPF
REF LAB TEST METHOD: ABNORMAL
RH BLD: POSITIVE
SODIUM BLD-SCNC: 135 MMOL/L (ref 136–145)
SP GR UR STRIP: 1.02 (ref 1–1.03)
SQUAMOUS #/AREA URNS HPF: ABNORMAL /HPF
T&S EXPIRATION DATE: NORMAL
UROBILINOGEN UR QL STRIP: ABNORMAL
VENTILATOR MODE: ABNORMAL
WBC MORPH BLD: NORMAL
WBC NRBC COR # BLD: 20.21 10*3/MM3 (ref 3.4–10.8)
WBC UR QL AUTO: ABNORMAL /HPF
WHOLE BLOOD HOLD SPECIMEN: NORMAL
WHOLE BLOOD HOLD SPECIMEN: NORMAL

## 2019-04-26 PROCEDURE — 83605 ASSAY OF LACTIC ACID: CPT | Performed by: EMERGENCY MEDICINE

## 2019-04-26 PROCEDURE — 86923 COMPATIBILITY TEST ELECTRIC: CPT

## 2019-04-26 PROCEDURE — 94799 UNLISTED PULMONARY SVC/PX: CPT

## 2019-04-26 PROCEDURE — 86850 RBC ANTIBODY SCREEN: CPT | Performed by: EMERGENCY MEDICINE

## 2019-04-26 PROCEDURE — 86901 BLOOD TYPING SEROLOGIC RH(D): CPT | Performed by: EMERGENCY MEDICINE

## 2019-04-26 PROCEDURE — 74176 CT ABD & PELVIS W/O CONTRAST: CPT

## 2019-04-26 PROCEDURE — 80053 COMPREHEN METABOLIC PANEL: CPT | Performed by: EMERGENCY MEDICINE

## 2019-04-26 PROCEDURE — 82330 ASSAY OF CALCIUM: CPT | Performed by: EMERGENCY MEDICINE

## 2019-04-26 PROCEDURE — 85007 BL SMEAR W/DIFF WBC COUNT: CPT | Performed by: EMERGENCY MEDICINE

## 2019-04-26 PROCEDURE — 25010000002 VANCOMYCIN 10 G RECONSTITUTED SOLUTION: Performed by: EMERGENCY MEDICINE

## 2019-04-26 PROCEDURE — 83735 ASSAY OF MAGNESIUM: CPT | Performed by: EMERGENCY MEDICINE

## 2019-04-26 PROCEDURE — 36600 WITHDRAWAL OF ARTERIAL BLOOD: CPT

## 2019-04-26 PROCEDURE — 73630 X-RAY EXAM OF FOOT: CPT

## 2019-04-26 PROCEDURE — 85025 COMPLETE CBC W/AUTO DIFF WBC: CPT | Performed by: EMERGENCY MEDICINE

## 2019-04-26 PROCEDURE — 81001 URINALYSIS AUTO W/SCOPE: CPT | Performed by: EMERGENCY MEDICINE

## 2019-04-26 PROCEDURE — 25010000002 PIPERACILLIN SOD-TAZOBACTAM PER 1 G: Performed by: NURSE PRACTITIONER

## 2019-04-26 PROCEDURE — 83880 ASSAY OF NATRIURETIC PEPTIDE: CPT | Performed by: EMERGENCY MEDICINE

## 2019-04-26 PROCEDURE — 87040 BLOOD CULTURE FOR BACTERIA: CPT | Performed by: EMERGENCY MEDICINE

## 2019-04-26 PROCEDURE — 84100 ASSAY OF PHOSPHORUS: CPT | Performed by: EMERGENCY MEDICINE

## 2019-04-26 PROCEDURE — 25010000002 ONDANSETRON PER 1 MG: Performed by: EMERGENCY MEDICINE

## 2019-04-26 PROCEDURE — 25010000002 PIPERACILLIN SOD-TAZOBACTAM PER 1 G: Performed by: EMERGENCY MEDICINE

## 2019-04-26 PROCEDURE — 94640 AIRWAY INHALATION TREATMENT: CPT

## 2019-04-26 PROCEDURE — 85027 COMPLETE CBC AUTOMATED: CPT | Performed by: NURSE PRACTITIONER

## 2019-04-26 PROCEDURE — 86900 BLOOD TYPING SEROLOGIC ABO: CPT | Performed by: EMERGENCY MEDICINE

## 2019-04-26 PROCEDURE — 99291 CRITICAL CARE FIRST HOUR: CPT | Performed by: INTERNAL MEDICINE

## 2019-04-26 PROCEDURE — 82805 BLOOD GASES W/O2 SATURATION: CPT

## 2019-04-26 PROCEDURE — 71045 X-RAY EXAM CHEST 1 VIEW: CPT

## 2019-04-26 PROCEDURE — 82272 OCCULT BLD FECES 1-3 TESTS: CPT | Performed by: NURSE PRACTITIONER

## 2019-04-26 PROCEDURE — 99285 EMERGENCY DEPT VISIT HI MDM: CPT

## 2019-04-26 PROCEDURE — 86140 C-REACTIVE PROTEIN: CPT | Performed by: EMERGENCY MEDICINE

## 2019-04-26 PROCEDURE — 99024 POSTOP FOLLOW-UP VISIT: CPT | Performed by: ORTHOPAEDIC SURGERY

## 2019-04-26 RX ORDER — OXYCODONE HYDROCHLORIDE AND ACETAMINOPHEN 5; 325 MG/1; MG/1
1 TABLET ORAL EVERY 4 HOURS PRN
Status: DISCONTINUED | OUTPATIENT
Start: 2019-04-26 | End: 2019-05-07 | Stop reason: HOSPADM

## 2019-04-26 RX ORDER — PANTOPRAZOLE SODIUM 40 MG/10ML
40 INJECTION, POWDER, LYOPHILIZED, FOR SOLUTION INTRAVENOUS EVERY 12 HOURS
Status: DISCONTINUED | OUTPATIENT
Start: 2019-04-26 | End: 2019-04-30

## 2019-04-26 RX ORDER — IPRATROPIUM BROMIDE AND ALBUTEROL SULFATE 2.5; .5 MG/3ML; MG/3ML
3 SOLUTION RESPIRATORY (INHALATION)
Status: DISCONTINUED | OUTPATIENT
Start: 2019-04-26 | End: 2019-05-07 | Stop reason: HOSPADM

## 2019-04-26 RX ORDER — METAXALONE 800 MG/1
800 TABLET ORAL 3 TIMES DAILY PRN
COMMUNITY
End: 2019-05-10

## 2019-04-26 RX ORDER — BUDESONIDE AND FORMOTEROL FUMARATE DIHYDRATE 80; 4.5 UG/1; UG/1
2 AEROSOL RESPIRATORY (INHALATION)
Status: DISCONTINUED | OUTPATIENT
Start: 2019-04-26 | End: 2019-05-06

## 2019-04-26 RX ORDER — SODIUM CHLORIDE, SODIUM LACTATE, POTASSIUM CHLORIDE, CALCIUM CHLORIDE 600; 310; 30; 20 MG/100ML; MG/100ML; MG/100ML; MG/100ML
50 INJECTION, SOLUTION INTRAVENOUS CONTINUOUS
Status: DISCONTINUED | OUTPATIENT
Start: 2019-04-26 | End: 2019-04-30

## 2019-04-26 RX ORDER — OXYCODONE HYDROCHLORIDE AND ACETAMINOPHEN 5; 325 MG/1; MG/1
1 TABLET ORAL EVERY 6 HOURS PRN
Status: CANCELLED | OUTPATIENT
Start: 2019-04-26 | End: 2019-05-06

## 2019-04-26 RX ORDER — MUPIROCIN CALCIUM 20 MG/G
CREAM TOPICAL EVERY 12 HOURS SCHEDULED
Status: DISCONTINUED | OUTPATIENT
Start: 2019-04-26 | End: 2019-04-27

## 2019-04-26 RX ORDER — AMITRIPTYLINE HYDROCHLORIDE 50 MG/1
50 TABLET, FILM COATED ORAL NIGHTLY
COMMUNITY
End: 2020-08-12

## 2019-04-26 RX ORDER — ONDANSETRON 2 MG/ML
4 INJECTION INTRAMUSCULAR; INTRAVENOUS ONCE
Status: COMPLETED | OUTPATIENT
Start: 2019-04-26 | End: 2019-04-26

## 2019-04-26 RX ORDER — IPRATROPIUM BROMIDE AND ALBUTEROL SULFATE 2.5; .5 MG/3ML; MG/3ML
3 SOLUTION RESPIRATORY (INHALATION) EVERY 6 HOURS PRN
Status: DISCONTINUED | OUTPATIENT
Start: 2019-04-26 | End: 2019-05-07 | Stop reason: HOSPADM

## 2019-04-26 RX ORDER — SODIUM CHLORIDE 0.9 % (FLUSH) 0.9 %
10 SYRINGE (ML) INJECTION AS NEEDED
Status: DISCONTINUED | OUTPATIENT
Start: 2019-04-26 | End: 2019-05-07 | Stop reason: HOSPADM

## 2019-04-26 RX ORDER — PANTOPRAZOLE SODIUM 40 MG/10ML
80 INJECTION, POWDER, LYOPHILIZED, FOR SOLUTION INTRAVENOUS ONCE
Status: COMPLETED | OUTPATIENT
Start: 2019-04-26 | End: 2019-04-26

## 2019-04-26 RX ADMIN — ONDANSETRON 4 MG: 2 INJECTION INTRAMUSCULAR; INTRAVENOUS at 13:08

## 2019-04-26 RX ADMIN — CARBIDOPA AND LEVODOPA 1 TABLET: 25; 100 TABLET ORAL at 20:47

## 2019-04-26 RX ADMIN — IPRATROPIUM BROMIDE AND ALBUTEROL SULFATE 3 ML: 2.5; .5 SOLUTION RESPIRATORY (INHALATION) at 20:11

## 2019-04-26 RX ADMIN — VANCOMYCIN HYDROCHLORIDE 1250 MG: 10 INJECTION, POWDER, LYOPHILIZED, FOR SOLUTION INTRAVENOUS at 14:57

## 2019-04-26 RX ADMIN — SODIUM CHLORIDE, POTASSIUM CHLORIDE, SODIUM LACTATE AND CALCIUM CHLORIDE 50 ML/HR: 600; 310; 30; 20 INJECTION, SOLUTION INTRAVENOUS at 17:59

## 2019-04-26 RX ADMIN — TAZOBACTAM SODIUM AND PIPERACILLIN SODIUM 3.38 G: 375; 3 INJECTION, SOLUTION INTRAVENOUS at 21:27

## 2019-04-26 RX ADMIN — PANTOPRAZOLE SODIUM 40 MG: 40 INJECTION, POWDER, FOR SOLUTION INTRAVENOUS at 20:48

## 2019-04-26 RX ADMIN — OXYCODONE HYDROCHLORIDE AND ACETAMINOPHEN 1 TABLET: 5; 325 TABLET ORAL at 17:59

## 2019-04-26 RX ADMIN — BUDESONIDE AND FORMOTEROL FUMARATE DIHYDRATE 2 PUFF: 80; 4.5 AEROSOL RESPIRATORY (INHALATION) at 20:11

## 2019-04-26 RX ADMIN — SODIUM CHLORIDE 1000 ML: 9 INJECTION, SOLUTION INTRAVENOUS at 14:45

## 2019-04-26 RX ADMIN — CARBIDOPA AND LEVODOPA 1 TABLET: 25; 100 TABLET ORAL at 17:59

## 2019-04-26 RX ADMIN — OXYCODONE HYDROCHLORIDE AND ACETAMINOPHEN 1 TABLET: 5; 325 TABLET ORAL at 22:16

## 2019-04-26 RX ADMIN — TAZOBACTAM SODIUM AND PIPERACILLIN SODIUM 3.38 G: 375; 3 INJECTION, SOLUTION INTRAVENOUS at 14:28

## 2019-04-26 RX ADMIN — PANTOPRAZOLE SODIUM 80 MG: 40 INJECTION, POWDER, FOR SOLUTION INTRAVENOUS at 13:07

## 2019-04-26 RX ADMIN — SODIUM CHLORIDE 2000 ML: 9 INJECTION, SOLUTION INTRAVENOUS at 12:30

## 2019-04-27 LAB
DEPRECATED RDW RBC AUTO: 48.3 FL (ref 37–54)
DEPRECATED RDW RBC AUTO: 50.8 FL (ref 37–54)
DEPRECATED RDW RBC AUTO: 50.9 FL (ref 37–54)
DEPRECATED RDW RBC AUTO: 51.1 FL (ref 37–54)
ERYTHROCYTE [DISTWIDTH] IN BLOOD BY AUTOMATED COUNT: 18.8 % (ref 12.3–15.4)
ERYTHROCYTE [DISTWIDTH] IN BLOOD BY AUTOMATED COUNT: 19.2 % (ref 12.3–15.4)
FERRITIN SERPL-MCNC: 40.43 NG/ML (ref 30–400)
HCT VFR BLD AUTO: 23.8 % (ref 37.5–51)
HCT VFR BLD AUTO: 26.5 % (ref 37.5–51)
HCT VFR BLD AUTO: 28.4 % (ref 37.5–51)
HCT VFR BLD AUTO: 28.6 % (ref 37.5–51)
HEMOCCULT STL QL: POSITIVE
HGB BLD-MCNC: 6.9 G/DL (ref 13–17.7)
HGB BLD-MCNC: 7.8 G/DL (ref 13–17.7)
HGB BLD-MCNC: 8.1 G/DL (ref 13–17.7)
HGB BLD-MCNC: 8.3 G/DL (ref 13–17.7)
IRON 24H UR-MRATE: 12 MCG/DL (ref 59–158)
IRON SATN MFR SERPL: 4 % (ref 20–50)
MCH RBC QN AUTO: 20.1 PG (ref 26.6–33)
MCH RBC QN AUTO: 20.6 PG (ref 26.6–33)
MCH RBC QN AUTO: 20.8 PG (ref 26.6–33)
MCH RBC QN AUTO: 20.9 PG (ref 26.6–33)
MCHC RBC AUTO-ENTMCNC: 28.5 G/DL (ref 31.5–35.7)
MCHC RBC AUTO-ENTMCNC: 29 G/DL (ref 31.5–35.7)
MCHC RBC AUTO-ENTMCNC: 29 G/DL (ref 31.5–35.7)
MCHC RBC AUTO-ENTMCNC: 29.4 G/DL (ref 31.5–35.7)
MCV RBC AUTO: 69.4 FL (ref 79–97)
MCV RBC AUTO: 71 FL (ref 79–97)
MCV RBC AUTO: 71.7 FL (ref 79–97)
MCV RBC AUTO: 72.1 FL (ref 79–97)
PLATELET # BLD AUTO: 249 10*3/MM3 (ref 140–450)
PLATELET # BLD AUTO: 272 10*3/MM3 (ref 140–450)
PLATELET # BLD AUTO: 313 10*3/MM3 (ref 140–450)
PLATELET # BLD AUTO: 321 10*3/MM3 (ref 140–450)
PMV BLD AUTO: 10.1 FL (ref 6–12)
PMV BLD AUTO: 10.5 FL (ref 6–12)
PMV BLD AUTO: 10.6 FL (ref 6–12)
PMV BLD AUTO: 9.6 FL (ref 6–12)
RBC # BLD AUTO: 3.43 10*6/MM3 (ref 4.14–5.8)
RBC # BLD AUTO: 3.73 10*6/MM3 (ref 4.14–5.8)
RBC # BLD AUTO: 3.94 10*6/MM3 (ref 4.14–5.8)
RBC # BLD AUTO: 3.99 10*6/MM3 (ref 4.14–5.8)
TIBC SERPL-MCNC: 323 MCG/DL (ref 298–536)
TRANSFERRIN SERPL-MCNC: 217 MG/DL (ref 200–360)
WBC NRBC COR # BLD: 11.2 10*3/MM3 (ref 3.4–10.8)
WBC NRBC COR # BLD: 12.64 10*3/MM3 (ref 3.4–10.8)
WBC NRBC COR # BLD: 12.9 10*3/MM3 (ref 3.4–10.8)
WBC NRBC COR # BLD: 13.37 10*3/MM3 (ref 3.4–10.8)

## 2019-04-27 PROCEDURE — 85027 COMPLETE CBC AUTOMATED: CPT | Performed by: NURSE PRACTITIONER

## 2019-04-27 PROCEDURE — 83540 ASSAY OF IRON: CPT | Performed by: NURSE PRACTITIONER

## 2019-04-27 PROCEDURE — P9016 RBC LEUKOCYTES REDUCED: HCPCS

## 2019-04-27 PROCEDURE — 86900 BLOOD TYPING SEROLOGIC ABO: CPT

## 2019-04-27 PROCEDURE — 84466 ASSAY OF TRANSFERRIN: CPT | Performed by: NURSE PRACTITIONER

## 2019-04-27 PROCEDURE — 94799 UNLISTED PULMONARY SVC/PX: CPT

## 2019-04-27 PROCEDURE — 97116 GAIT TRAINING THERAPY: CPT

## 2019-04-27 PROCEDURE — 99024 POSTOP FOLLOW-UP VISIT: CPT | Performed by: ORTHOPAEDIC SURGERY

## 2019-04-27 PROCEDURE — 25010000002 PIPERACILLIN SOD-TAZOBACTAM PER 1 G: Performed by: NURSE PRACTITIONER

## 2019-04-27 PROCEDURE — 82728 ASSAY OF FERRITIN: CPT | Performed by: NURSE PRACTITIONER

## 2019-04-27 PROCEDURE — 36430 TRANSFUSION BLD/BLD COMPNT: CPT

## 2019-04-27 PROCEDURE — 99233 SBSQ HOSP IP/OBS HIGH 50: CPT | Performed by: INTERNAL MEDICINE

## 2019-04-27 PROCEDURE — 97162 PT EVAL MOD COMPLEX 30 MIN: CPT

## 2019-04-27 PROCEDURE — 99222 1ST HOSP IP/OBS MODERATE 55: CPT | Performed by: INTERNAL MEDICINE

## 2019-04-27 RX ORDER — MUPIROCIN CALCIUM 20 MG/G
CREAM TOPICAL ONCE
Status: DISCONTINUED | OUTPATIENT
Start: 2019-04-27 | End: 2019-04-27 | Stop reason: SDUPTHER

## 2019-04-27 RX ADMIN — IPRATROPIUM BROMIDE AND ALBUTEROL SULFATE 3 ML: 2.5; .5 SOLUTION RESPIRATORY (INHALATION) at 11:33

## 2019-04-27 RX ADMIN — BUDESONIDE AND FORMOTEROL FUMARATE DIHYDRATE 2 PUFF: 80; 4.5 AEROSOL RESPIRATORY (INHALATION) at 08:29

## 2019-04-27 RX ADMIN — OXYCODONE HYDROCHLORIDE AND ACETAMINOPHEN 1 TABLET: 5; 325 TABLET ORAL at 08:08

## 2019-04-27 RX ADMIN — CARBIDOPA AND LEVODOPA 1 TABLET: 25; 100 TABLET ORAL at 21:16

## 2019-04-27 RX ADMIN — CARBIDOPA AND LEVODOPA 1 TABLET: 25; 100 TABLET ORAL at 08:08

## 2019-04-27 RX ADMIN — OXYCODONE HYDROCHLORIDE AND ACETAMINOPHEN 1 TABLET: 5; 325 TABLET ORAL at 16:58

## 2019-04-27 RX ADMIN — SODIUM CHLORIDE, POTASSIUM CHLORIDE, SODIUM LACTATE AND CALCIUM CHLORIDE 50 ML/HR: 600; 310; 30; 20 INJECTION, SOLUTION INTRAVENOUS at 05:00

## 2019-04-27 RX ADMIN — IPRATROPIUM BROMIDE AND ALBUTEROL SULFATE 3 ML: 2.5; .5 SOLUTION RESPIRATORY (INHALATION) at 19:39

## 2019-04-27 RX ADMIN — OXYCODONE HYDROCHLORIDE AND ACETAMINOPHEN 1 TABLET: 5; 325 TABLET ORAL at 12:28

## 2019-04-27 RX ADMIN — PANTOPRAZOLE SODIUM 40 MG: 40 INJECTION, POWDER, FOR SOLUTION INTRAVENOUS at 21:16

## 2019-04-27 RX ADMIN — TAZOBACTAM SODIUM AND PIPERACILLIN SODIUM 3.38 G: 375; 3 INJECTION, SOLUTION INTRAVENOUS at 05:00

## 2019-04-27 RX ADMIN — IPRATROPIUM BROMIDE AND ALBUTEROL SULFATE 3 ML: 2.5; .5 SOLUTION RESPIRATORY (INHALATION) at 15:39

## 2019-04-27 RX ADMIN — TAZOBACTAM SODIUM AND PIPERACILLIN SODIUM 3.38 G: 375; 3 INJECTION, SOLUTION INTRAVENOUS at 13:43

## 2019-04-27 RX ADMIN — BUDESONIDE AND FORMOTEROL FUMARATE DIHYDRATE 2 PUFF: 80; 4.5 AEROSOL RESPIRATORY (INHALATION) at 19:39

## 2019-04-27 RX ADMIN — IPRATROPIUM BROMIDE AND ALBUTEROL SULFATE 3 ML: 2.5; .5 SOLUTION RESPIRATORY (INHALATION) at 08:29

## 2019-04-27 RX ADMIN — PANTOPRAZOLE SODIUM 40 MG: 40 INJECTION, POWDER, FOR SOLUTION INTRAVENOUS at 08:08

## 2019-04-27 RX ADMIN — TAZOBACTAM SODIUM AND PIPERACILLIN SODIUM 3.38 G: 375; 3 INJECTION, SOLUTION INTRAVENOUS at 21:16

## 2019-04-27 RX ADMIN — CARBIDOPA AND LEVODOPA 1 TABLET: 25; 100 TABLET ORAL at 12:31

## 2019-04-27 RX ADMIN — OXYCODONE HYDROCHLORIDE AND ACETAMINOPHEN 1 TABLET: 5; 325 TABLET ORAL at 03:04

## 2019-04-27 RX ADMIN — MUPIROCIN: 2 CREAM TOPICAL at 17:04

## 2019-04-27 RX ADMIN — CARBIDOPA AND LEVODOPA 1 TABLET: 25; 100 TABLET ORAL at 17:06

## 2019-04-28 ENCOUNTER — APPOINTMENT (OUTPATIENT)
Dept: GENERAL RADIOLOGY | Facility: HOSPITAL | Age: 74
End: 2019-04-28

## 2019-04-28 LAB
ABO + RH BLD: NORMAL
ALBUMIN SERPL-MCNC: 2.8 G/DL (ref 3.5–5.2)
ALBUMIN/GLOB SERPL: 1.2 G/DL
ALP SERPL-CCNC: 66 U/L (ref 39–117)
ALT SERPL W P-5'-P-CCNC: 5 U/L (ref 1–41)
ANION GAP SERPL CALCULATED.3IONS-SCNC: 13 MMOL/L
AST SERPL-CCNC: 25 U/L (ref 1–40)
BASOPHILS # BLD AUTO: 0.02 10*3/MM3 (ref 0–0.2)
BASOPHILS NFR BLD AUTO: 0.2 % (ref 0–1.5)
BH BB BLOOD EXPIRATION DATE: NORMAL
BH BB BLOOD TYPE BARCODE: 6200
BH BB DISPENSE STATUS: NORMAL
BH BB PRODUCT CODE: NORMAL
BH BB UNIT NUMBER: NORMAL
BILIRUB SERPL-MCNC: 0.6 MG/DL (ref 0.2–1.2)
BUN BLD-MCNC: 8 MG/DL (ref 8–23)
BUN/CREAT SERPL: 11.9 (ref 7–25)
CALCIUM SPEC-SCNC: 8.1 MG/DL (ref 8.6–10.5)
CHLORIDE SERPL-SCNC: 99 MMOL/L (ref 98–107)
CO2 SERPL-SCNC: 20 MMOL/L (ref 22–29)
CREAT BLD-MCNC: 0.67 MG/DL (ref 0.76–1.27)
DEPRECATED RDW RBC AUTO: 50.8 FL (ref 37–54)
EOSINOPHIL # BLD AUTO: 0.08 10*3/MM3 (ref 0–0.4)
EOSINOPHIL NFR BLD AUTO: 0.7 % (ref 0.3–6.2)
ERYTHROCYTE [DISTWIDTH] IN BLOOD BY AUTOMATED COUNT: 19.4 % (ref 12.3–15.4)
GFR SERPL CREATININE-BSD FRML MDRD: 116 ML/MIN/1.73
GLOBULIN UR ELPH-MCNC: 2.4 GM/DL
GLUCOSE BLD-MCNC: 93 MG/DL (ref 65–99)
HCT VFR BLD AUTO: 27.1 % (ref 37.5–51)
HGB BLD-MCNC: 8.1 G/DL (ref 13–17.7)
IMM GRANULOCYTES # BLD AUTO: 0.04 10*3/MM3 (ref 0–0.05)
IMM GRANULOCYTES NFR BLD AUTO: 0.3 % (ref 0–0.5)
LYMPHOCYTES # BLD AUTO: 0.72 10*3/MM3 (ref 0.7–3.1)
LYMPHOCYTES NFR BLD AUTO: 5.9 % (ref 19.6–45.3)
MCH RBC QN AUTO: 21 PG (ref 26.6–33)
MCHC RBC AUTO-ENTMCNC: 29.9 G/DL (ref 31.5–35.7)
MCV RBC AUTO: 70.4 FL (ref 79–97)
MONOCYTES # BLD AUTO: 1.13 10*3/MM3 (ref 0.1–0.9)
MONOCYTES NFR BLD AUTO: 9.3 % (ref 5–12)
NEUTROPHILS # BLD AUTO: 10.22 10*3/MM3 (ref 1.7–7)
NEUTROPHILS NFR BLD AUTO: 83.6 % (ref 42.7–76)
PLATELET # BLD AUTO: 306 10*3/MM3 (ref 140–450)
PMV BLD AUTO: 10.6 FL (ref 6–12)
POTASSIUM BLD-SCNC: 4.2 MMOL/L (ref 3.5–5.2)
PROT SERPL-MCNC: 5.2 G/DL (ref 6–8.5)
RBC # BLD AUTO: 3.85 10*6/MM3 (ref 4.14–5.8)
SODIUM BLD-SCNC: 132 MMOL/L (ref 136–145)
UNIT  ABO: NORMAL
UNIT  RH: NORMAL
WBC NRBC COR # BLD: 12.21 10*3/MM3 (ref 3.4–10.8)

## 2019-04-28 PROCEDURE — 99231 SBSQ HOSP IP/OBS SF/LOW 25: CPT | Performed by: INTERNAL MEDICINE

## 2019-04-28 PROCEDURE — 93010 ELECTROCARDIOGRAM REPORT: CPT | Performed by: INTERNAL MEDICINE

## 2019-04-28 PROCEDURE — 94799 UNLISTED PULMONARY SVC/PX: CPT

## 2019-04-28 PROCEDURE — 93005 ELECTROCARDIOGRAM TRACING: CPT | Performed by: INTERNAL MEDICINE

## 2019-04-28 PROCEDURE — 80053 COMPREHEN METABOLIC PANEL: CPT | Performed by: INTERNAL MEDICINE

## 2019-04-28 PROCEDURE — 85025 COMPLETE CBC W/AUTO DIFF WBC: CPT | Performed by: INTERNAL MEDICINE

## 2019-04-28 PROCEDURE — 71045 X-RAY EXAM CHEST 1 VIEW: CPT

## 2019-04-28 PROCEDURE — 97530 THERAPEUTIC ACTIVITIES: CPT

## 2019-04-28 PROCEDURE — 99233 SBSQ HOSP IP/OBS HIGH 50: CPT | Performed by: INTERNAL MEDICINE

## 2019-04-28 PROCEDURE — 97110 THERAPEUTIC EXERCISES: CPT

## 2019-04-28 PROCEDURE — 25010000002 PIPERACILLIN SOD-TAZOBACTAM PER 1 G: Performed by: NURSE PRACTITIONER

## 2019-04-28 RX ORDER — ACETAMINOPHEN 325 MG/1
650 TABLET ORAL EVERY 6 HOURS PRN
Status: DISCONTINUED | OUTPATIENT
Start: 2019-04-28 | End: 2019-05-07 | Stop reason: HOSPADM

## 2019-04-28 RX ADMIN — PANTOPRAZOLE SODIUM 40 MG: 40 INJECTION, POWDER, FOR SOLUTION INTRAVENOUS at 08:34

## 2019-04-28 RX ADMIN — TAZOBACTAM SODIUM AND PIPERACILLIN SODIUM 3.38 G: 375; 3 INJECTION, SOLUTION INTRAVENOUS at 06:02

## 2019-04-28 RX ADMIN — CARBIDOPA AND LEVODOPA 1 TABLET: 25; 100 TABLET ORAL at 18:10

## 2019-04-28 RX ADMIN — CARBIDOPA AND LEVODOPA 1 TABLET: 25; 100 TABLET ORAL at 08:34

## 2019-04-28 RX ADMIN — BUDESONIDE AND FORMOTEROL FUMARATE DIHYDRATE 2 PUFF: 80; 4.5 AEROSOL RESPIRATORY (INHALATION) at 07:38

## 2019-04-28 RX ADMIN — BUDESONIDE AND FORMOTEROL FUMARATE DIHYDRATE 2 PUFF: 80; 4.5 AEROSOL RESPIRATORY (INHALATION) at 19:06

## 2019-04-28 RX ADMIN — OXYCODONE HYDROCHLORIDE AND ACETAMINOPHEN 1 TABLET: 5; 325 TABLET ORAL at 08:34

## 2019-04-28 RX ADMIN — ACETAMINOPHEN 650 MG: 325 TABLET, FILM COATED ORAL at 17:07

## 2019-04-28 RX ADMIN — IPRATROPIUM BROMIDE AND ALBUTEROL SULFATE 3 ML: 2.5; .5 SOLUTION RESPIRATORY (INHALATION) at 16:14

## 2019-04-28 RX ADMIN — OXYCODONE HYDROCHLORIDE AND ACETAMINOPHEN 1 TABLET: 5; 325 TABLET ORAL at 14:23

## 2019-04-28 RX ADMIN — IPRATROPIUM BROMIDE AND ALBUTEROL SULFATE 3 ML: 2.5; .5 SOLUTION RESPIRATORY (INHALATION) at 12:44

## 2019-04-28 RX ADMIN — ACETAMINOPHEN 650 MG: 325 TABLET, FILM COATED ORAL at 00:46

## 2019-04-28 RX ADMIN — TAZOBACTAM SODIUM AND PIPERACILLIN SODIUM 3.38 G: 375; 3 INJECTION, SOLUTION INTRAVENOUS at 14:19

## 2019-04-28 RX ADMIN — IPRATROPIUM BROMIDE AND ALBUTEROL SULFATE 3 ML: 2.5; .5 SOLUTION RESPIRATORY (INHALATION) at 19:06

## 2019-04-28 RX ADMIN — CARBIDOPA AND LEVODOPA 1 TABLET: 25; 100 TABLET ORAL at 20:20

## 2019-04-28 RX ADMIN — TAZOBACTAM SODIUM AND PIPERACILLIN SODIUM 3.38 G: 375; 3 INJECTION, SOLUTION INTRAVENOUS at 21:53

## 2019-04-28 RX ADMIN — PANTOPRAZOLE SODIUM 40 MG: 40 INJECTION, POWDER, FOR SOLUTION INTRAVENOUS at 20:20

## 2019-04-28 RX ADMIN — IPRATROPIUM BROMIDE AND ALBUTEROL SULFATE 3 ML: 2.5; .5 SOLUTION RESPIRATORY (INHALATION) at 07:38

## 2019-04-28 RX ADMIN — SODIUM CHLORIDE, POTASSIUM CHLORIDE, SODIUM LACTATE AND CALCIUM CHLORIDE 50 ML/HR: 600; 310; 30; 20 INJECTION, SOLUTION INTRAVENOUS at 18:10

## 2019-04-28 RX ADMIN — CARBIDOPA AND LEVODOPA 1 TABLET: 25; 100 TABLET ORAL at 11:56

## 2019-04-28 RX ADMIN — SODIUM CHLORIDE, POTASSIUM CHLORIDE, SODIUM LACTATE AND CALCIUM CHLORIDE 50 ML/HR: 600; 310; 30; 20 INJECTION, SOLUTION INTRAVENOUS at 00:07

## 2019-04-29 LAB
ANION GAP SERPL CALCULATED.3IONS-SCNC: 10 MMOL/L
BASOPHILS # BLD AUTO: 0.01 10*3/MM3 (ref 0–0.2)
BASOPHILS NFR BLD AUTO: 0.1 % (ref 0–1.5)
BUN BLD-MCNC: 5 MG/DL (ref 8–23)
BUN/CREAT SERPL: 10.6 (ref 7–25)
CALCIUM SPEC-SCNC: 8 MG/DL (ref 8.6–10.5)
CHLORIDE SERPL-SCNC: 98 MMOL/L (ref 98–107)
CO2 SERPL-SCNC: 24 MMOL/L (ref 22–29)
CREAT BLD-MCNC: 0.47 MG/DL (ref 0.76–1.27)
DEPRECATED RDW RBC AUTO: 48.4 FL (ref 37–54)
EOSINOPHIL # BLD AUTO: 0.13 10*3/MM3 (ref 0–0.4)
EOSINOPHIL NFR BLD AUTO: 1.2 % (ref 0.3–6.2)
ERYTHROCYTE [DISTWIDTH] IN BLOOD BY AUTOMATED COUNT: 19.3 % (ref 12.3–15.4)
GFR SERPL CREATININE-BSD FRML MDRD: >150 ML/MIN/1.73
GLUCOSE BLD-MCNC: 108 MG/DL (ref 65–99)
HCT VFR BLD AUTO: 23.5 % (ref 37.5–51)
HCT VFR BLD AUTO: 26.9 % (ref 37.5–51)
HGB BLD-MCNC: 7.3 G/DL (ref 13–17.7)
HGB BLD-MCNC: 8.1 G/DL (ref 13–17.7)
IMM GRANULOCYTES # BLD AUTO: 0.03 10*3/MM3 (ref 0–0.05)
IMM GRANULOCYTES NFR BLD AUTO: 0.3 % (ref 0–0.5)
LYMPHOCYTES # BLD AUTO: 0.79 10*3/MM3 (ref 0.7–3.1)
LYMPHOCYTES NFR BLD AUTO: 7.3 % (ref 19.6–45.3)
MCH RBC QN AUTO: 21.2 PG (ref 26.6–33)
MCHC RBC AUTO-ENTMCNC: 31.1 G/DL (ref 31.5–35.7)
MCV RBC AUTO: 68.3 FL (ref 79–97)
MONOCYTES # BLD AUTO: 1.12 10*3/MM3 (ref 0.1–0.9)
MONOCYTES NFR BLD AUTO: 10.4 % (ref 5–12)
NEUTROPHILS # BLD AUTO: 8.74 10*3/MM3 (ref 1.7–7)
NEUTROPHILS NFR BLD AUTO: 80.7 % (ref 42.7–76)
PLATELET # BLD AUTO: 315 10*3/MM3 (ref 140–450)
PMV BLD AUTO: 10.1 FL (ref 6–12)
POTASSIUM BLD-SCNC: 3.6 MMOL/L (ref 3.5–5.2)
RBC # BLD AUTO: 3.44 10*6/MM3 (ref 4.14–5.8)
SODIUM BLD-SCNC: 132 MMOL/L (ref 136–145)
WBC NRBC COR # BLD: 10.82 10*3/MM3 (ref 3.4–10.8)

## 2019-04-29 PROCEDURE — 80048 BASIC METABOLIC PNL TOTAL CA: CPT | Performed by: INTERNAL MEDICINE

## 2019-04-29 PROCEDURE — 97530 THERAPEUTIC ACTIVITIES: CPT

## 2019-04-29 PROCEDURE — 85014 HEMATOCRIT: CPT | Performed by: INTERNAL MEDICINE

## 2019-04-29 PROCEDURE — 94799 UNLISTED PULMONARY SVC/PX: CPT

## 2019-04-29 PROCEDURE — 99232 SBSQ HOSP IP/OBS MODERATE 35: CPT | Performed by: INTERNAL MEDICINE

## 2019-04-29 PROCEDURE — 85025 COMPLETE CBC W/AUTO DIFF WBC: CPT | Performed by: INTERNAL MEDICINE

## 2019-04-29 PROCEDURE — 25010000002 PIPERACILLIN SOD-TAZOBACTAM PER 1 G: Performed by: NURSE PRACTITIONER

## 2019-04-29 PROCEDURE — 99024 POSTOP FOLLOW-UP VISIT: CPT | Performed by: ORTHOPAEDIC SURGERY

## 2019-04-29 PROCEDURE — 85018 HEMOGLOBIN: CPT | Performed by: INTERNAL MEDICINE

## 2019-04-29 RX ORDER — MAGNESIUM SULFATE HEPTAHYDRATE 40 MG/ML
2 INJECTION, SOLUTION INTRAVENOUS AS NEEDED
Status: DISCONTINUED | OUTPATIENT
Start: 2019-04-29 | End: 2019-05-07 | Stop reason: HOSPADM

## 2019-04-29 RX ORDER — POTASSIUM CHLORIDE 7.45 MG/ML
10 INJECTION INTRAVENOUS
Status: DISCONTINUED | OUTPATIENT
Start: 2019-04-29 | End: 2019-05-07 | Stop reason: HOSPADM

## 2019-04-29 RX ORDER — MAGNESIUM SULFATE HEPTAHYDRATE 40 MG/ML
4 INJECTION, SOLUTION INTRAVENOUS AS NEEDED
Status: DISCONTINUED | OUTPATIENT
Start: 2019-04-29 | End: 2019-05-07 | Stop reason: HOSPADM

## 2019-04-29 RX ORDER — POTASSIUM CHLORIDE 750 MG/1
40 CAPSULE, EXTENDED RELEASE ORAL AS NEEDED
Status: DISCONTINUED | OUTPATIENT
Start: 2019-04-29 | End: 2019-05-07 | Stop reason: HOSPADM

## 2019-04-29 RX ORDER — POTASSIUM CHLORIDE 1.5 G/1.77G
40 POWDER, FOR SOLUTION ORAL AS NEEDED
Status: DISCONTINUED | OUTPATIENT
Start: 2019-04-29 | End: 2019-05-07 | Stop reason: HOSPADM

## 2019-04-29 RX ADMIN — OXYCODONE HYDROCHLORIDE AND ACETAMINOPHEN 1 TABLET: 5; 325 TABLET ORAL at 16:09

## 2019-04-29 RX ADMIN — BUDESONIDE AND FORMOTEROL FUMARATE DIHYDRATE 2 PUFF: 80; 4.5 AEROSOL RESPIRATORY (INHALATION) at 20:01

## 2019-04-29 RX ADMIN — IPRATROPIUM BROMIDE AND ALBUTEROL SULFATE 3 ML: 2.5; .5 SOLUTION RESPIRATORY (INHALATION) at 12:35

## 2019-04-29 RX ADMIN — POTASSIUM CHLORIDE 40 MEQ: 750 CAPSULE, EXTENDED RELEASE ORAL at 14:05

## 2019-04-29 RX ADMIN — CARBIDOPA AND LEVODOPA 1 TABLET: 25; 100 TABLET ORAL at 12:25

## 2019-04-29 RX ADMIN — CARBIDOPA AND LEVODOPA 1 TABLET: 25; 100 TABLET ORAL at 08:00

## 2019-04-29 RX ADMIN — ACETAMINOPHEN 650 MG: 325 TABLET, FILM COATED ORAL at 10:09

## 2019-04-29 RX ADMIN — TAZOBACTAM SODIUM AND PIPERACILLIN SODIUM 3.38 G: 375; 3 INJECTION, SOLUTION INTRAVENOUS at 22:59

## 2019-04-29 RX ADMIN — TAZOBACTAM SODIUM AND PIPERACILLIN SODIUM 3.38 G: 375; 3 INJECTION, SOLUTION INTRAVENOUS at 06:02

## 2019-04-29 RX ADMIN — POTASSIUM CHLORIDE 40 MEQ: 750 CAPSULE, EXTENDED RELEASE ORAL at 09:21

## 2019-04-29 RX ADMIN — PANTOPRAZOLE SODIUM 40 MG: 40 INJECTION, POWDER, FOR SOLUTION INTRAVENOUS at 20:33

## 2019-04-29 RX ADMIN — IPRATROPIUM BROMIDE AND ALBUTEROL SULFATE 3 ML: 2.5; .5 SOLUTION RESPIRATORY (INHALATION) at 20:01

## 2019-04-29 RX ADMIN — TAZOBACTAM SODIUM AND PIPERACILLIN SODIUM 3.38 G: 375; 3 INJECTION, SOLUTION INTRAVENOUS at 14:05

## 2019-04-29 RX ADMIN — CARBIDOPA AND LEVODOPA 1 TABLET: 25; 100 TABLET ORAL at 17:15

## 2019-04-29 RX ADMIN — BUDESONIDE AND FORMOTEROL FUMARATE DIHYDRATE 2 PUFF: 80; 4.5 AEROSOL RESPIRATORY (INHALATION) at 08:00

## 2019-04-29 RX ADMIN — PANTOPRAZOLE SODIUM 40 MG: 40 INJECTION, POWDER, FOR SOLUTION INTRAVENOUS at 08:00

## 2019-04-29 RX ADMIN — IPRATROPIUM BROMIDE AND ALBUTEROL SULFATE 3 ML: 2.5; .5 SOLUTION RESPIRATORY (INHALATION) at 08:00

## 2019-04-29 RX ADMIN — IPRATROPIUM BROMIDE AND ALBUTEROL SULFATE 3 ML: 2.5; .5 SOLUTION RESPIRATORY (INHALATION) at 16:46

## 2019-04-29 RX ADMIN — CARBIDOPA AND LEVODOPA 1 TABLET: 25; 100 TABLET ORAL at 20:33

## 2019-04-30 ENCOUNTER — APPOINTMENT (OUTPATIENT)
Dept: GENERAL RADIOLOGY | Facility: HOSPITAL | Age: 74
End: 2019-04-30

## 2019-04-30 PROBLEM — M21.969 FOOT DEFORMITY: Status: RESOLVED | Noted: 2019-04-23 | Resolved: 2019-04-30

## 2019-04-30 PROBLEM — K22.70 BARRETT'S ESOPHAGUS: Chronic | Status: RESOLVED | Noted: 2017-10-31 | Resolved: 2019-04-30

## 2019-04-30 PROBLEM — Z96.612 STATUS POST REVERSE TOTAL SHOULDER REPLACEMENT, LEFT: Status: RESOLVED | Noted: 2018-09-10 | Resolved: 2019-04-30

## 2019-04-30 PROBLEM — K21.9 GERD (GASTROESOPHAGEAL REFLUX DISEASE): Chronic | Status: RESOLVED | Noted: 2017-10-31 | Resolved: 2019-04-30

## 2019-04-30 PROBLEM — M21.962 FOOT DEFORMITY, ACQUIRED, LEFT: Status: RESOLVED | Noted: 2018-04-06 | Resolved: 2019-04-30

## 2019-04-30 PROBLEM — M54.9 CHRONIC BACK PAIN: Status: RESOLVED | Noted: 2017-10-31 | Resolved: 2019-04-30

## 2019-04-30 PROBLEM — M19.019 ARTHROPATHY OF SHOULDER REGION: Status: RESOLVED | Noted: 2018-09-10 | Resolved: 2019-04-30

## 2019-04-30 PROBLEM — G89.29 CHRONIC BACK PAIN: Status: RESOLVED | Noted: 2017-10-31 | Resolved: 2019-04-30

## 2019-04-30 LAB
ABO + RH BLD: NORMAL
ANION GAP SERPL CALCULATED.3IONS-SCNC: 11 MMOL/L
BH BB BLOOD EXPIRATION DATE: NORMAL
BH BB BLOOD TYPE BARCODE: 6200
BH BB DISPENSE STATUS: NORMAL
BH BB PRODUCT CODE: NORMAL
BH BB UNIT NUMBER: NORMAL
BUN BLD-MCNC: 4 MG/DL (ref 8–23)
BUN/CREAT SERPL: 7.7 (ref 7–25)
CALCIUM SPEC-SCNC: 8.1 MG/DL (ref 8.6–10.5)
CHLORIDE SERPL-SCNC: 99 MMOL/L (ref 98–107)
CO2 SERPL-SCNC: 23 MMOL/L (ref 22–29)
CREAT BLD-MCNC: 0.52 MG/DL (ref 0.76–1.27)
DEPRECATED RDW RBC AUTO: 49.2 FL (ref 37–54)
ERYTHROCYTE [DISTWIDTH] IN BLOOD BY AUTOMATED COUNT: 19.5 % (ref 12.3–15.4)
GFR SERPL CREATININE-BSD FRML MDRD: >150 ML/MIN/1.73
GLUCOSE BLD-MCNC: 112 MG/DL (ref 65–99)
HCT VFR BLD AUTO: 24.7 % (ref 37.5–51)
HGB BLD-MCNC: 7.6 G/DL (ref 13–17.7)
MAGNESIUM SERPL-MCNC: 1.7 MG/DL (ref 1.6–2.4)
MCH RBC QN AUTO: 21 PG (ref 26.6–33)
MCHC RBC AUTO-ENTMCNC: 30.8 G/DL (ref 31.5–35.7)
MCV RBC AUTO: 68.2 FL (ref 79–97)
PLATELET # BLD AUTO: 369 10*3/MM3 (ref 140–450)
PMV BLD AUTO: 9.9 FL (ref 6–12)
POTASSIUM BLD-SCNC: 3.8 MMOL/L (ref 3.5–5.2)
RBC # BLD AUTO: 3.62 10*6/MM3 (ref 4.14–5.8)
SODIUM BLD-SCNC: 133 MMOL/L (ref 136–145)
UNIT  ABO: NORMAL
UNIT  RH: NORMAL
WBC NRBC COR # BLD: 9.27 10*3/MM3 (ref 3.4–10.8)

## 2019-04-30 PROCEDURE — 25010000002 PIPERACILLIN SOD-TAZOBACTAM PER 1 G: Performed by: NURSE PRACTITIONER

## 2019-04-30 PROCEDURE — 25010000002 NA FERRIC GLUC CPLX PER 12.5 MG: Performed by: PHYSICIAN ASSISTANT

## 2019-04-30 PROCEDURE — 85027 COMPLETE CBC AUTOMATED: CPT | Performed by: INTERNAL MEDICINE

## 2019-04-30 PROCEDURE — 71045 X-RAY EXAM CHEST 1 VIEW: CPT

## 2019-04-30 PROCEDURE — 80048 BASIC METABOLIC PNL TOTAL CA: CPT | Performed by: INTERNAL MEDICINE

## 2019-04-30 PROCEDURE — 99024 POSTOP FOLLOW-UP VISIT: CPT | Performed by: ORTHOPAEDIC SURGERY

## 2019-04-30 PROCEDURE — 83735 ASSAY OF MAGNESIUM: CPT | Performed by: INTERNAL MEDICINE

## 2019-04-30 PROCEDURE — 97116 GAIT TRAINING THERAPY: CPT

## 2019-04-30 PROCEDURE — 94799 UNLISTED PULMONARY SVC/PX: CPT

## 2019-04-30 PROCEDURE — 97110 THERAPEUTIC EXERCISES: CPT

## 2019-04-30 PROCEDURE — 99232 SBSQ HOSP IP/OBS MODERATE 35: CPT | Performed by: PHYSICIAN ASSISTANT

## 2019-04-30 PROCEDURE — 99232 SBSQ HOSP IP/OBS MODERATE 35: CPT | Performed by: INTERNAL MEDICINE

## 2019-04-30 RX ORDER — PANTOPRAZOLE SODIUM 40 MG/1
40 TABLET, DELAYED RELEASE ORAL
Status: DISCONTINUED | OUTPATIENT
Start: 2019-05-01 | End: 2019-05-07 | Stop reason: HOSPADM

## 2019-04-30 RX ORDER — BISACODYL 10 MG
10 SUPPOSITORY, RECTAL RECTAL DAILY
Status: DISCONTINUED | OUTPATIENT
Start: 2019-04-30 | End: 2019-05-01

## 2019-04-30 RX ORDER — TAMSULOSIN HYDROCHLORIDE 0.4 MG/1
0.4 CAPSULE ORAL DAILY
Status: DISCONTINUED | OUTPATIENT
Start: 2019-04-30 | End: 2019-05-07 | Stop reason: HOSPADM

## 2019-04-30 RX ADMIN — Medication 10 MG: at 17:10

## 2019-04-30 RX ADMIN — CARBIDOPA AND LEVODOPA 1 TABLET: 25; 100 TABLET ORAL at 11:49

## 2019-04-30 RX ADMIN — POLYETHYLENE GLYCOL 3350 17 G: 17 POWDER, FOR SOLUTION ORAL at 17:11

## 2019-04-30 RX ADMIN — TAMSULOSIN HYDROCHLORIDE 0.4 MG: 0.4 CAPSULE ORAL at 11:49

## 2019-04-30 RX ADMIN — IPRATROPIUM BROMIDE AND ALBUTEROL SULFATE 3 ML: 2.5; .5 SOLUTION RESPIRATORY (INHALATION) at 20:12

## 2019-04-30 RX ADMIN — SODIUM CHLORIDE, POTASSIUM CHLORIDE, SODIUM LACTATE AND CALCIUM CHLORIDE 50 ML/HR: 600; 310; 30; 20 INJECTION, SOLUTION INTRAVENOUS at 06:10

## 2019-04-30 RX ADMIN — IPRATROPIUM BROMIDE AND ALBUTEROL SULFATE 3 ML: 2.5; .5 SOLUTION RESPIRATORY (INHALATION) at 15:39

## 2019-04-30 RX ADMIN — MAGNESIUM SULFATE HEPTAHYDRATE 4 G: 40 INJECTION, SOLUTION INTRAVENOUS at 08:21

## 2019-04-30 RX ADMIN — TAZOBACTAM SODIUM AND PIPERACILLIN SODIUM 3.38 G: 375; 3 INJECTION, SOLUTION INTRAVENOUS at 13:57

## 2019-04-30 RX ADMIN — BUDESONIDE AND FORMOTEROL FUMARATE DIHYDRATE 2 PUFF: 80; 4.5 AEROSOL RESPIRATORY (INHALATION) at 07:07

## 2019-04-30 RX ADMIN — SODIUM CHLORIDE 125 MG: 9 INJECTION, SOLUTION INTRAVENOUS at 17:10

## 2019-04-30 RX ADMIN — TAZOBACTAM SODIUM AND PIPERACILLIN SODIUM 3.38 G: 375; 3 INJECTION, SOLUTION INTRAVENOUS at 06:09

## 2019-04-30 RX ADMIN — CARBIDOPA AND LEVODOPA 1 TABLET: 25; 100 TABLET ORAL at 20:26

## 2019-04-30 RX ADMIN — CARBIDOPA AND LEVODOPA 1 TABLET: 25; 100 TABLET ORAL at 17:10

## 2019-04-30 RX ADMIN — MUPIROCIN: 20 OINTMENT TOPICAL at 08:31

## 2019-04-30 RX ADMIN — IPRATROPIUM BROMIDE AND ALBUTEROL SULFATE 3 ML: 2.5; .5 SOLUTION RESPIRATORY (INHALATION) at 07:07

## 2019-04-30 RX ADMIN — IPRATROPIUM BROMIDE AND ALBUTEROL SULFATE 3 ML: 2.5; .5 SOLUTION RESPIRATORY (INHALATION) at 12:09

## 2019-04-30 RX ADMIN — BUDESONIDE AND FORMOTEROL FUMARATE DIHYDRATE 2 PUFF: 80; 4.5 AEROSOL RESPIRATORY (INHALATION) at 20:12

## 2019-04-30 RX ADMIN — CARBIDOPA AND LEVODOPA 1 TABLET: 25; 100 TABLET ORAL at 08:21

## 2019-04-30 RX ADMIN — OXYCODONE HYDROCHLORIDE AND ACETAMINOPHEN 1 TABLET: 5; 325 TABLET ORAL at 10:55

## 2019-04-30 RX ADMIN — TAZOBACTAM SODIUM AND PIPERACILLIN SODIUM 3.38 G: 375; 3 INJECTION, SOLUTION INTRAVENOUS at 21:43

## 2019-04-30 RX ADMIN — PANTOPRAZOLE SODIUM 40 MG: 40 INJECTION, POWDER, FOR SOLUTION INTRAVENOUS at 08:21

## 2019-05-01 LAB
ANION GAP SERPL CALCULATED.3IONS-SCNC: 14 MMOL/L
BACTERIA SPEC AEROBE CULT: NORMAL
BACTERIA SPEC AEROBE CULT: NORMAL
BUN BLD-MCNC: 5 MG/DL (ref 8–23)
BUN/CREAT SERPL: 9.8 (ref 7–25)
CALCIUM SPEC-SCNC: 8.2 MG/DL (ref 8.6–10.5)
CHLORIDE SERPL-SCNC: 97 MMOL/L (ref 98–107)
CO2 SERPL-SCNC: 21 MMOL/L (ref 22–29)
CREAT BLD-MCNC: 0.51 MG/DL (ref 0.76–1.27)
DEPRECATED RDW RBC AUTO: 49.9 FL (ref 37–54)
ERYTHROCYTE [DISTWIDTH] IN BLOOD BY AUTOMATED COUNT: 19.7 % (ref 12.3–15.4)
GFR SERPL CREATININE-BSD FRML MDRD: >150 ML/MIN/1.73
GLUCOSE BLD-MCNC: 102 MG/DL (ref 65–99)
HCT VFR BLD AUTO: 25.1 % (ref 37.5–51)
HGB BLD-MCNC: 7.7 G/DL (ref 13–17.7)
MAGNESIUM SERPL-MCNC: 1.9 MG/DL (ref 1.6–2.4)
MCH RBC QN AUTO: 21 PG (ref 26.6–33)
MCHC RBC AUTO-ENTMCNC: 30.7 G/DL (ref 31.5–35.7)
MCV RBC AUTO: 68.6 FL (ref 79–97)
PLATELET # BLD AUTO: 390 10*3/MM3 (ref 140–450)
PMV BLD AUTO: 9.5 FL (ref 6–12)
POTASSIUM BLD-SCNC: 4.4 MMOL/L (ref 3.5–5.2)
RBC # BLD AUTO: 3.66 10*6/MM3 (ref 4.14–5.8)
SODIUM BLD-SCNC: 132 MMOL/L (ref 136–145)
WBC NRBC COR # BLD: 10.55 10*3/MM3 (ref 3.4–10.8)

## 2019-05-01 PROCEDURE — 97116 GAIT TRAINING THERAPY: CPT

## 2019-05-01 PROCEDURE — 25010000002 NA FERRIC GLUC CPLX PER 12.5 MG

## 2019-05-01 PROCEDURE — 99232 SBSQ HOSP IP/OBS MODERATE 35: CPT | Performed by: PHYSICIAN ASSISTANT

## 2019-05-01 PROCEDURE — 87205 SMEAR GRAM STAIN: CPT | Performed by: INTERNAL MEDICINE

## 2019-05-01 PROCEDURE — 83735 ASSAY OF MAGNESIUM: CPT | Performed by: INTERNAL MEDICINE

## 2019-05-01 PROCEDURE — 25010000002 PIPERACILLIN SOD-TAZOBACTAM PER 1 G: Performed by: NURSE PRACTITIONER

## 2019-05-01 PROCEDURE — 97110 THERAPEUTIC EXERCISES: CPT

## 2019-05-01 PROCEDURE — 94799 UNLISTED PULMONARY SVC/PX: CPT

## 2019-05-01 PROCEDURE — 85027 COMPLETE CBC AUTOMATED: CPT | Performed by: INTERNAL MEDICINE

## 2019-05-01 PROCEDURE — 87070 CULTURE OTHR SPECIMN AEROBIC: CPT | Performed by: INTERNAL MEDICINE

## 2019-05-01 PROCEDURE — 99024 POSTOP FOLLOW-UP VISIT: CPT | Performed by: ORTHOPAEDIC SURGERY

## 2019-05-01 PROCEDURE — 99233 SBSQ HOSP IP/OBS HIGH 50: CPT | Performed by: INTERNAL MEDICINE

## 2019-05-01 PROCEDURE — 80048 BASIC METABOLIC PNL TOTAL CA: CPT | Performed by: INTERNAL MEDICINE

## 2019-05-01 RX ADMIN — SODIUM CHLORIDE 125 MG: 9 INJECTION, SOLUTION INTRAVENOUS at 17:44

## 2019-05-01 RX ADMIN — CARBIDOPA AND LEVODOPA 1 TABLET: 25; 100 TABLET ORAL at 12:09

## 2019-05-01 RX ADMIN — CARBIDOPA AND LEVODOPA 1 TABLET: 25; 100 TABLET ORAL at 17:44

## 2019-05-01 RX ADMIN — POLYETHYLENE GLYCOL 3350 17 G: 17 POWDER, FOR SOLUTION ORAL at 08:29

## 2019-05-01 RX ADMIN — IPRATROPIUM BROMIDE AND ALBUTEROL SULFATE 3 ML: 2.5; .5 SOLUTION RESPIRATORY (INHALATION) at 11:40

## 2019-05-01 RX ADMIN — TAZOBACTAM SODIUM AND PIPERACILLIN SODIUM 3.38 G: 375; 3 INJECTION, SOLUTION INTRAVENOUS at 20:00

## 2019-05-01 RX ADMIN — BUDESONIDE AND FORMOTEROL FUMARATE DIHYDRATE 2 PUFF: 80; 4.5 AEROSOL RESPIRATORY (INHALATION) at 08:03

## 2019-05-01 RX ADMIN — CARBIDOPA AND LEVODOPA 1 TABLET: 25; 100 TABLET ORAL at 08:29

## 2019-05-01 RX ADMIN — TAZOBACTAM SODIUM AND PIPERACILLIN SODIUM 3.38 G: 375; 3 INJECTION, SOLUTION INTRAVENOUS at 13:45

## 2019-05-01 RX ADMIN — IPRATROPIUM BROMIDE AND ALBUTEROL SULFATE 3 ML: 2.5; .5 SOLUTION RESPIRATORY (INHALATION) at 16:09

## 2019-05-01 RX ADMIN — IPRATROPIUM BROMIDE AND ALBUTEROL SULFATE 3 ML: 2.5; .5 SOLUTION RESPIRATORY (INHALATION) at 08:03

## 2019-05-01 RX ADMIN — CARBIDOPA AND LEVODOPA 1 TABLET: 25; 100 TABLET ORAL at 19:59

## 2019-05-01 RX ADMIN — IPRATROPIUM BROMIDE AND ALBUTEROL SULFATE 3 ML: 2.5; .5 SOLUTION RESPIRATORY (INHALATION) at 18:49

## 2019-05-01 RX ADMIN — BUDESONIDE AND FORMOTEROL FUMARATE DIHYDRATE 2 PUFF: 80; 4.5 AEROSOL RESPIRATORY (INHALATION) at 18:49

## 2019-05-01 RX ADMIN — TAZOBACTAM SODIUM AND PIPERACILLIN SODIUM 3.38 G: 375; 3 INJECTION, SOLUTION INTRAVENOUS at 06:20

## 2019-05-01 RX ADMIN — PANTOPRAZOLE SODIUM 40 MG: 40 TABLET, DELAYED RELEASE ORAL at 08:29

## 2019-05-01 RX ADMIN — Medication 10 MG: at 08:30

## 2019-05-01 RX ADMIN — TAMSULOSIN HYDROCHLORIDE 0.4 MG: 0.4 CAPSULE ORAL at 08:29

## 2019-05-01 NOTE — PLAN OF CARE
Problem: Patient Care Overview  Goal: Plan of Care Review   05/01/19 2893   OTHER   Outcome Summary Pt calm and cooperative, alert and oriented. Pt has been up to chair, O2 @ 6L NC, sputum sent for culture. Iron IVPB administered, BM today, VSS

## 2019-05-01 NOTE — PROGRESS NOTES
[facility] Medicine Services  PROGRESS NOTE    Patient Name: Flaco Roque II  : 1945  MRN: [id number]    Date of Admission: 2019  Length of Stay: 5  Primary Care Physician: Tayo Hendrix MD    Subjective   Subjective     CC:  F/u respiratory failure and hypovolemia    HPI:  Patient resting in bed. On HFNC this morning. Continues to cough up significant amount of sputum. Son at bedside. Has not had BM in a couple days so does not know if melena resolved.    Review of Systems  Gen- No fevers, chills  CV- No chest pain, palpitations  Resp- + for cough and dypsnea  GI- No N/V/D, abd pain      Otherwise ROS is negative except as mentioned in the HPI.    Objective   Objective     Vital Signs:   Temp:  [97.8 °F (36.6 °C)-98.5 °F (36.9 °C)] 97.8 °F (36.6 °C)  Heart Rate:  [] 103  Resp:  [14-20] 16  BP: (106-146)/(59-76) 119/66        Physical Exam:  Constitutional: No acute distress, awake, alert  HENT: NCAT, mucous membranes moist  Respiratory: Clear to auscultation bilaterally, respiratory effort normal   Cardiovascular: RRR, no murmurs, rubs, or gallops, palpable pedal pulses bilaterally  Gastrointestinal: Positive bowel sounds, soft, nontender, nondistended  Musculoskeletal: left foot in post-op boot  Psychiatric: Appropriate affect, cooperative  Neurologic: Oriented x 3, strength symmetric in all extremities, Cranial Nerves grossly intact to confrontation, speech clear  Skin: No rashes      Results Reviewed:  I have personally reviewed current lab, radiology, and data and agree.    Results from last 7 days   Lab Units 19  1205 19  0439   WBC 10*3/mm3 10.55 9.27  --  10.82*   HEMOGLOBIN g/dL 7.7* 7.6* 8.1* 7.3*   HEMATOCRIT % 25.1* 24.7* 26.9* 23.5*   PLATELETS 10*3/mm3 390 369  --  315     Results from last 7 days   Lab Units 19  0455 19  0439 19  0554 19  1229   SODIUM mmol/L 132*  133* 132* 132* 135*   POTASSIUM mmol/L 4.4 3.8 3.6 4.2 4.5   CHLORIDE mmol/L 97* 99 98 99 99   CO2 mmol/L 21.0* 23.0 24.0 20.0* 25.0   BUN mg/dL 5* 4* 5* 8 19   CREATININE mg/dL 0.51* 0.52* 0.47* 0.67* 1.36*   GLUCOSE mg/dL 102* 112* 108* 93 116*   CALCIUM mg/dL 8.2* 8.1* 8.0* 8.1* 8.5*   ALT (SGPT) U/L  --   --   --  5 9   AST (SGOT) U/L  --   --   --  25 27     Estimated Creatinine Clearance: 79.1 mL/min (A) (by C-G formula based on SCr of 0.51 mg/dL (L)).    No results found for: BNP    Microbiology Results Abnormal     Procedure Component Value - Date/Time    Blood Culture - Blood, Arm, Left [[id number]] Collected:  04/26/19 1320    Lab Status:  Preliminary result Specimen:  Blood from Arm, Left Updated:  04/30/19 1401     Blood Culture No growth at 4 days    Blood Culture - Blood, Arm, Right [[id number]] Collected:  04/26/19 1310    Lab Status:  Preliminary result Specimen:  Blood from Arm, Right Updated:  04/30/19 1401     Blood Culture No growth at 4 days          Imaging Results (last 24 hours)     Procedure Component Value Units Date/Time    XR Chest 1 View [[id number]] Collected:  04/30/19 1013     Updated:  04/30/19 2151    Narrative:       EXAMINATION: XR CHEST 1 VW-04/30/2019:      INDICATION: Pneumonia; J18.1-Lobar pneumonia, unspecified organism;  A41.9-Sepsis, unspecified organism; J96.01-Acute respiratory failure  with hypoxia; Z74.09-Other reduced mobility.      COMPARISON: 04/28/2019.     FINDINGS: The heart is enlarged. The vasculature appears upper limits of  normal. There is coarse bilateral pulmonary interstitial disease,  slightly improved in appearance overall compared to yesterday's study.  There is significantly improved aeration of the left base, with patchy  remaining atelectasis, and re-aeration of the lateral left lower lobe.  No pneumothorax or effusion is seen.           Impression:       Extensive but improving pulmonary interstitial disease,  significantly improved left basilar  atelectasis compared to 04/28/2019  study. No new chest pathology.     D:  04/30/2019  E:  04/30/2019     This report was finalized on 4/30/2019 9:48 PM by DR. Vlad Blake MD.                  I have reviewed the medications:    Current Facility-Administered Medications:   •  acetaminophen (TYLENOL) tablet 650 mg, 650 mg, Oral, Q6H PRN, Milton Sow, APRN, 650 mg at 04/29/19 1009  •  budesonide-formoterol (SYMBICORT) 80-4.5 MCG/ACT inhaler 2 puff, 2 puff, Inhalation, BID - RT, Sunshine Loyd APRN, 2 puff at 05/01/19 0803  •  carbidopa-levodopa (SINEMET)  MG per tablet 1 tablet, 1 tablet, Oral, 4x Daily, Sunshine Loyd APRN, 1 tablet at 05/01/19 1209  •  ipratropium-albuterol (DUO-NEB) nebulizer solution 3 mL, 3 mL, Nebulization, Q6H PRN, Sunshine Loyd APRN  •  ipratropium-albuterol (DUO-NEB) nebulizer solution 3 mL, 3 mL, Nebulization, 4x Daily - RT, Sunshine Loyd APRN, 3 mL at 05/01/19 1140  •  Magnesium Sulfate 2 gram Bolus, followed by 8 gram infusion (total Mg dose 10 grams)- Mg less than or equal to 1mg/dL, 2 g, Intravenous, PRN **OR** Magnesium Sulfate 2 gram / 50mL Infusion (GIVE X 3 BAGS TO EQUAL 6GM TOTAL DOSE) - Mg 1.1 - 1.5 mg/dl, 2 g, Intravenous, PRN **OR** Magnesium Sulfate 4 gram infusion- Mg 1.6-1.9 mg/dL, 4 g, Intravenous, PRN, Marjorie Simon MD, Last Rate: 25 mL/hr at 04/30/19 0821, 4 g at 04/30/19 0821  •  mupirocin (BACTROBAN) 2 % ointment, , Topical, Q4 Days, Juhi Calle MD  •  oxyCODONE-acetaminophen (PERCOCET) 5-325 MG per tablet 1 tablet, 1 tablet, Oral, Q4H PRN, Simon Pereira MD, 1 tablet at 04/30/19 1055  •  pantoprazole (PROTONIX) EC tablet 40 mg, 40 mg, Oral, QAM AC, Marjorie Simon MD, 40 mg at 05/01/19 0829  •  piperacillin-tazobactam (ZOSYN) 3.375 g in iso-osmotic dextrose 50 ml (premix), 3.375 g, Intravenous, Q8H, Sunshine Loyd, APRN, 3.375 g at 05/01/19 0620  •  polyethylene glycol 3350 powder (packet), 17 g, Oral, Daily, Eduardo,  SHARON Bush, 17 g at 05/01/19 0829  •  potassium chloride (MICRO-K) CR capsule 40 mEq, 40 mEq, Oral, PRN, 40 mEq at 04/29/19 1405 **OR** potassium chloride (KLOR-CON) packet 40 mEq, 40 mEq, Oral, PRN **OR** potassium chloride 10 mEq in 100 mL IVPB, 10 mEq, Intravenous, Q1H PRN, Marjorie Simon MD  •  sodium chloride 0.9 % flush 10 mL, 10 mL, Intravenous, PRN, Alfonzo Monzon MD  •  tamsulosin (FLOMAX) 24 hr capsule 0.4 mg, 0.4 mg, Oral, Daily, Marjorie Simon MD, 0.4 mg at 05/01/19 0829      Assessment/Plan   Assessment / Plan     Active Hospital Problems    Diagnosis POA   • **Hypotension due to hypovolemia [I95.89, E86.1] Yes   • ROSALIA (acute kidney injury) (CMS/[facility]) [N17.9] Yes   • Parkinson disease (CMS/[facility]) [G20] Yes   • GI bleed [K92.2] Yes   • S/P foot surgery, left [Z98.890] Not Applicable   • Anemia [D64.9] Yes   • ABRIL (obstructive sleep apnea) [G47.33] Yes   • COPD (chronic obstructive pulmonary disease) (CMS/[facility]) [J44.9] Yes          Brief Hospital Course to date:  Flaco Roque II is a 73 y.o. male with PMHx significant for COPD, ABRIL, chronic pain, GERD, parkinson's disease and recent left foot surgery (d/c on 4/24) who presented with vomiting and diarrhea. Initially admitted to the ICU for hypotension and hypoxia.    Acute Hypoxic Respiratory Failure  LLL PNA  COPD  - continue HFNC, wean to NC as able, may need to go home with oxygen  - continue Zosyn for possible aspiration pneumonia  - continue duonbs and symbicort  - will get sputum culture  - if no improvement consider CT chest    Melena:  Acute blood Loss Anemia  - GI following, defer EGD for now given degree of hypoxia  - IV Iron  - transfuse PRN Hgb <7, s/p 1unit PRBCs on 4/27    Hypotension  ROSALIA  - resolved with hydration    Parkinson's Disease:  - Sinemet    S/p Left Foot Surgery  - Dr. Calle following    DVT Prophylaxis: Mechanical given GIB    Disposition: I expect the patient to be discharged TBD    CODE STATUS:   Code  Status and Medical Interventions:   Ordered at: 04/26/19 2111     Code Status:    CPR     Medical Interventions (Level of Support Prior to Arrest):    Full         Electronically signed by Yanira Salazar DO, 05/01/19, 12:46 PM.

## 2019-05-01 NOTE — PROGRESS NOTES
Continued Stay Note  [facility]     Patient Name: Flaco Roque II  MRN: [id number]  Today's Date: 5/1/2019    Admit Date: 4/26/2019    Discharge Plan     Row Name 05/01/19 0928       Plan    Plan  Home w/ HH    Patient/Family in Agreement with Plan  yes    Plan Comments  Per Howard w/ [facility], they were supposed to see patient for HH/SN- wound care, prior to patient being admitted to [facility]. Updated HH orders will be placed once patient closer to being medically ready for dc. May also benefit from HH/PT and SN for O2 instruction if unable to wean prior to dc. CM following.         Discharge Codes    No documentation.       Expected Discharge Date and Time     Expected Discharge Date Expected Discharge Time    May 4, 2019             Jo-Ann Greco RN

## 2019-05-01 NOTE — PLAN OF CARE
Problem: Patient Care Overview  Goal: Plan of Care Review  Outcome: Ongoing (interventions implemented as appropriate)   05/01/19 0526   Coping/Psychosocial   Plan of Care Reviewed With patient   OTHER   Outcome Summary pt tranfered from the unit tonight, VSS, on tele NSR, pain managable, on high flow NC to maintain 02 sats, will continue to monitor for changes.    Plan of Care Review   Progress no change       Problem: Gastrointestinal Bleeding (Adult)  Goal: Signs and Symptoms of Listed Potential Problems Will be Absent, Minimized or Managed (Gastrointestinal Bleeding)  Outcome: Ongoing (interventions implemented as appropriate)      Problem: Pain, Chronic (Adult)  Goal: Identify Related Risk Factors and Signs and Symptoms  Outcome: Ongoing (interventions implemented as appropriate)      Problem: Skin Injury Risk (Adult)  Goal: Identify Related Risk Factors and Signs and Symptoms  Outcome: Ongoing (interventions implemented as appropriate)

## 2019-05-01 NOTE — PLAN OF CARE
Problem: Patient Care Overview  Goal: Plan of Care Review  Outcome: Ongoing (interventions implemented as appropriate)   05/01/19 1109   Coping/Psychosocial   Plan of Care Reviewed With patient   OTHER   Outcome Summary patient ambulated 84 feet with rolling walker for support, distance limited by fatigue. O2 dropped to 87% on 6 liters per NC, recovered once sitting <1 minute to 91%. Nsg notified and aware.   Plan of Care Review   Progress improving   Coping/Psychosocial   Patient Agreement with Plan of Care agrees

## 2019-05-01 NOTE — PROGRESS NOTES
Orthopedic  Progress Note    Subjective     Post-Operative Day: 8, s/p left forefoot recon    Systemic or Specific Complaints: no pain in foot, feeling better  Objective     Vital signs in last 24 hours:  Temp:  [97.9 °F (36.6 °C)-98.7 °F (37.1 °C)] 98.4 °F (36.9 °C)  Heart Rate:  [79-97] 79  Resp:  [14-22] 16  BP: (106-146)/(59-88) 110/61    Neurovascular: Left foot toes pink   Wound: Pin sites clean left foot, dressing intact         Data Review  Lab Results (last 24 hours)     Procedure Component Value Units Date/Time    Basic Metabolic Panel [[id number]]  (Abnormal) Collected:  05/01/19 0526    Specimen:  Blood Updated:  05/01/19 0705     Glucose 102 mg/dL      BUN 5 mg/dL      Creatinine 0.51 mg/dL      Sodium 132 mmol/L      Potassium 4.4 mmol/L      Chloride 97 mmol/L      CO2 21.0 mmol/L      Calcium 8.2 mg/dL      eGFR Non African Amer >150 mL/min/1.73      BUN/Creatinine Ratio 9.8     Anion Gap 14.0 mmol/L     Narrative:       GFR Normal >60  Chronic Kidney Disease <60  Kidney Failure <15    Magnesium [[id number]]  (Normal) Collected:  05/01/19 0526    Specimen:  Blood Updated:  05/01/19 0705     Magnesium 1.9 mg/dL     CBC (No Diff) [[id number]]  (Abnormal) Collected:  05/01/19 0526    Specimen:  Blood Updated:  05/01/19 0643     WBC 10.55 10*3/mm3      RBC 3.66 10*6/mm3      Hemoglobin 7.7 g/dL      Hematocrit 25.1 %      MCV 68.6 fL      MCH 21.0 pg      MCHC 30.7 g/dL      RDW 19.7 %      RDW-SD 49.9 fl      MPV 9.5 fL      Platelets 390 10*3/mm3     Blood Culture - Blood, Arm, Left [[id number]] Collected:  04/26/19 1320    Specimen:  Blood from Arm, Left Updated:  04/30/19 1401     Blood Culture No growth at 4 days    Blood Culture - Blood, Arm, Right [[id number]] Collected:  04/26/19 1310    Specimen:  Blood from Arm, Right Updated:  04/30/19 1401     Blood Culture No growth at 4 days            Assessment/Plan     Status post- improving with respect to pneumonia, but HCT is still low.    -foot is stable,  continue wt bear on heel- change dressing every 4-5 mehnaz Calle MD    Date: 5/1/2019  Time: 10:44 AM

## 2019-05-01 NOTE — THERAPY TREATMENT NOTE
Acute Care - Physical Therapy Treatment Note  [facility]     Patient Name: Flaco Roque II  : 1945  MRN: [id number]  Today's Date: 2019  Onset of Illness/Injury or Date of Surgery: 19  Date of Referral to PT: 19  Referring Physician: MD Luc    Admit Date: 2019    Visit Dx:    ICD-10-CM ICD-9-CM   1. Pneumonia of both lower lobes due to infectious organism (CMS/[facility]) J18.1 483.8   2. Sepsis, due to unspecified organism (CMS/[facility]) A41.9 038.9     995.91   3. Acute respiratory failure with hypoxia (CMS/[facility]) J96.01 518.81   4. Impaired functional mobility, balance, gait, and endurance Z74.09 V49.89     Patient Active Problem List   Diagnosis   • ABRIL (obstructive sleep apnea)   • COPD (chronic obstructive pulmonary disease) (CMS/[facility])   • Anemia   • Leukocytosis, likely reactive   • Acute postoperative pain   • Deformity of left foot   • S/P foot surgery, left   • Hypotension due to hypovolemia   • ROSALIA (acute kidney injury) (CMS/[facility])   • Parkinson disease (CMS/[facility])   • GI bleed       Therapy Treatment    Rehabilitation Treatment Summary     Row Name 19 1019             Treatment Time/Intention    Discipline  physical therapy assistant  -AS      Document Type  therapy note (daily note)  -AS      Subjective Information  complains of;dyspnea  -AS      Mode of Treatment  physical therapy  -AS      Patient/Family Observations  no family at bedside, patient supine and agreed to therapy. on high flow mask but nsg changed to 6L per NC.  -AS      Patient Effort  good  -AS      Existing Precautions/Restrictions  fall;other (see comments);oxygen therapy device and L/min WBAT L LE (through heel)  -AS      Recorded by [AS] Dina Coon, PTA 19 1109      Row Name 19 1019             Vital Signs    Pre SpO2 (%)  93  -AS      O2 Delivery Pre Treatment  supplemental O2  -AS      Intra SpO2 (%)  87  -AS      O2 Delivery Intra Treatment  supplemental O2  -AS      Post SpO2 (%)   91  -AS      O2 Delivery Post Treatment  supplemental O2  -AS      Pre Patient Position  Supine  -AS      Intra Patient Position  Sitting  -AS      Post Patient Position  Sitting  -AS      Recorded by [AS] Dina Coon, RAVI 05/01/19 1109      Row Name 05/01/19 1019             Cognitive Assessment/Intervention- PT/OT    Affect/Mental Status (Cognitive)  WFL  -AS      Orientation Status (Cognition)  oriented x 4  -AS      Follows Commands (Cognition)  follows one step commands;over 90% accuracy;verbal cues/prompting required  -AS      Safety Deficit (Cognitive)  mild deficit;awareness of need for assistance;insight into deficits/self awareness;safety precautions follow-through/compliance  -AS      Personal Safety Interventions  fall prevention program maintained;gait belt;nonskid shoes/slippers when out of bed;other (see comments) exit alarm  -AS      Recorded by [AS] Dina Coon PTA 05/01/19 1109      Row Name 05/01/19 1019             Bed Mobility Assessment/Treatment    Supine-Sit [place] (Bed Mobility)  set up;supervision  -AS      Bed Mobility, Safety Issues  decreased use of legs for bridging/pushing  -AS      Assistive Device (Bed Mobility)  bed rails;head of bed elevated  -AS      Comment (Bed Mobility)  patient demonstrated safe technique  -AS      Recorded by [AS] Dina Coon PTA 05/01/19 1109      Row Name 05/01/19 1019             Sit-Stand Transfer    Sit-Stand [place] (Transfers)  verbal cues;minimum assist (75% patient effort)  -AS      Assistive Device (Sit-Stand Transfers)  walker, front-wheeled  -AS      Recorded by [AS] Dina Coon PTA 05/01/19 1109      Row Name 05/01/19 1019             Stand-Sit Transfer    Stand-Sit [place] (Transfers)  verbal cues;minimum assist (75% patient effort)  -AS      Assistive Device (Stand-Sit Transfers)  walker, front-wheeled  -AS      Recorded by [AS] Dina Coon PTA 05/01/19 1109      Row Name 05/01/19 1019              Gait/Stairs Assessment/Training    11201 - Gait Training Minutes   15  -AS      Gait/Stairs Assessment/Training  gait/ambulation assistive device  -AS      [place] Level (Gait)  verbal cues;1 person to manage equipment;contact guard  -AS      Assistive Device (Gait)  walker, front-wheeled  -AS      Distance in Feet (Gait)  84  -AS      Pattern (Gait)  step-to  -AS      Comment (Gait/Stairs)  patient ambulated 84 feet with rolling walker for support, distance limited by fatigue. O2 dropped to 87% on 6 liters per NC, recovered once sitting <1 minute to 91%. Nsg notified and aware.  -AS      Recorded by [AS] Dina Coon, [facility] 05/01/19 1109      Row Name 05/01/19 1019             Motor Skills Assessment/Interventions    Additional Documentation  Therapeutic Exercise (Group)  -AS      Recorded by [AS] Dina Coon [facility] 05/01/19 1109      Row Name 05/01/19 1019             Therapeutic Exercise    19043 - PT Therapeutic Exercise Minutes  8  -AS      Recorded by [AS] Dina Coon [facility] 05/01/19 1109      Row Name 05/01/19 1019             Therapeutic Exercise    Lower Extremity Range of Motion (Therapeutic Exercise)  hip flexion/extension, bilateral;knee flexion/extension, bilateral  -AS      Exercise Type (Therapeutic Exercise)  AROM (active range of motion)  -AS      Position (Therapeutic Exercise)  seated  -AS      Sets/Reps (Therapeutic Exercise)  1/10  -AS      Recorded by [AS] Dina Coon [facility] 05/01/19 1109      Row Name 05/01/19 1019             Positioning and Restraints    Pre-Treatment Position  in bed  -AS      Post Treatment Position  chair  -AS      In Chair  reclined;call light within reach;encouraged to call for assist;exit alarm on;LLE elevated  -AS      Recorded by [AS] Dina Coon [facility] 05/01/19 1109      Row Name 05/01/19 1019             Pain Scale: Numbers Pre/Post-Treatment    Pain Scale: Numbers, Pretreatment  0/10 - no pain  -AS      Pain Scale:  Numbers, Post-Treatment  0/10 - no pain  -AS      Recorded by [AS] PascualDago muñoznghia Corey, PTA 05/01/19 1109      Row Name                Wound 04/23/19 1011 Left foot incision    Wound - Properties Group Date first assessed: 04/23/19 [JA] Time first assessed: 1011 [JA] Side: Left [JA] Location: foot [JA] Type: incision [JA] Recorded by:  [JA] Sj Milner RN 04/23/19 1011      User Key  (r) = Recorded By, (t) = Taken By, (c) = Cosigned By    Initials Name Effective Dates Discipline    AS Dina Coon, PTA 06/22/15 -  PT    Sj Martin RN 06/16/16 -  Nurse          Wound 04/23/19 1011 Left foot incision (Active)   Dressing Appearance dry;intact 5/1/2019 10:20 AM   Closure FUENTES 5/1/2019 10:20 AM   Base clean;red 5/1/2019  6:00 AM   Periwound dry;ecchymotic;redness 5/1/2019  6:00 AM   Drainage Amount none 5/1/2019 10:20 AM   Periwound Care, Wound dry periwound area maintained 4/30/2019  8:00 PM           Physical Therapy Education     Title: PT OT SLP Therapies (In Progress)     Topic: Physical Therapy (In Progress)     Point: Mobility training (In Progress)     Learning Progress Summary           Patient Acceptance, E, NR by AS at 5/1/2019 11:09 AM    Acceptance, E,D, VU,NR by SHANNAN at 4/30/2019 10:10 AM    Acceptance, E, NR by LETICIA at 4/29/2019  1:35 PM    Acceptance, E,D, VU,NR by SHANNAN at 4/28/2019  8:20 AM    Acceptance, E,D, NR by SHANNAN at 4/27/2019 10:31 AM   Family Acceptance, E,D, NR by SHANNAN at 4/27/2019 10:31 AM                   Point: Home exercise program (In Progress)     Learning Progress Summary           Patient Acceptance, E, NR by AS at 5/1/2019 11:09 AM    Acceptance, E,D, VU,NR by SHANNAN at 4/30/2019 10:10 AM    Acceptance, E, NR by LETICIA at 4/29/2019  1:35 PM    Acceptance, E,D, VU,NR by SHANNAN at 4/28/2019  8:20 AM                   Point: Body mechanics (In Progress)     Learning Progress Summary           Patient Acceptance, E, NR by AS at 5/1/2019 11:09 AM    Acceptance, E,D, VU,NR by SHANNAN at  4/30/2019 10:10 AM    Acceptance, E, NR by KR at 4/29/2019  1:35 PM    Acceptance, E,D, VU,NR by LS at 4/28/2019  8:20 AM    Acceptance, E,D, NR by LS at 4/27/2019 10:31 AM   Family Acceptance, E,D, NR by LS at 4/27/2019 10:31 AM                   Point: Precautions (In Progress)     Learning Progress Summary           Patient Acceptance, E, NR by AS at 5/1/2019 11:09 AM    Acceptance, E,D, VU,NR by LS at 4/30/2019 10:10 AM    Acceptance, E, NR by KR at 4/29/2019  1:35 PM    Acceptance, E,D, VU,NR by LS at 4/28/2019  8:20 AM    Acceptance, E,D, NR by LS at 4/27/2019 10:31 AM   Family Acceptance, E,D, NR by LS at 4/27/2019 10:31 AM                               User Key     Initials Effective Dates Name Provider Type Discipline    AS 06/22/15 -  Dina Coon, PTA Physical Therapy Assistant PT     06/19/15 -  Delia Crandall, PT Physical Therapist PT    KR 04/03/18 -  Malathi Mchugh, PT Physical Therapist PT                PT Recommendation and Plan     Plan of Care Reviewed With: patient  Progress: improving  Outcome Summary: patient ambulated 84 feet with rolling walker for support, distance limited by fatigue. O2 dropped to 87% on 6 liters per NC, recovered once sitting <1 minute to 91%. Nsg notified and aware.  Outcome Measures     Row Name 05/01/19 1019 04/30/19 1010 04/29/19 1335       How much help from another person do you currently need...    Turning from your back to your side while in flat bed without using bedrails?  4  -AS  3  -LS  3  -KR    Moving from lying on back to sitting on the side of a flat bed without bedrails?  4  -AS  3  -LS  3  -KR    Moving to and from a bed to a chair (including a wheelchair)?  3  -AS  3  -LS  3  -KR    Standing up from a chair using your arms (e.g., wheelchair, bedside chair)?  3  -AS  3  -LS  3  -KR    Climbing 3-5 steps with a railing?  2  -AS  3  -LS  2  -KR    To walk in hospital room?  3  -AS  3  -LS  3  -KR    AM-PAC 6 Clicks Score  19  -AS  18  -LS  17   -KR       Functional Assessment    Outcome Measure Options  AM-PAC 6 Clicks Basic Mobility (PT)  -AS  AM-PAC 6 Clicks Basic Mobility (PT)  -LS  AM-PAC 6 Clicks Basic Mobility (PT)  -KR      User Key  (r) = Recorded By, (t) = Taken By, (c) = Cosigned By    Initials Name Provider Type    AS Dina Coon, RAVI Physical Therapy Assistant    Delia Chavez, PT Physical Therapist    KR Malathi Mchugh, PT Physical Therapist         Time Calculation:   PT Charges     Row Name 05/01/19 1019             Time Calculation    Start Time  1019  -AS      PT Received On  05/01/19  -AS      PT Goal Re-Cert Due Date  05/07/19  -AS         Timed Charges    42560 - PT Therapeutic Exercise Minutes  8  -AS      14748 - Gait Training Minutes   15  -AS        User Key  (r) = Recorded By, (t) = Taken By, (c) = Cosigned By    Initials Name Provider Type    AS Dina Coon, RAVI Physical Therapy Assistant        Therapy Charges for Today     Code Description Service Date Service Provider Modifiers Qty    32596024030 HC GAIT TRAINING EA 15 MIN 5/1/2019 Dina Coon, PTA GP 1    22523286553 HC PT THER PROC EA 15 MIN 5/1/2019 Dina Coon, PTA GP 1    20984377632 HC PT THER SUPP EA 15 MIN 5/1/2019 Dina Coon, RAVI GP 2          PT G-Codes  Outcome Measure Options: AM-PAC 6 Clicks Basic Mobility (PT)  AM-PAC 6 Clicks Score: 19    Dina Coon PTA  5/1/2019

## 2019-05-01 NOTE — PROGRESS NOTES
"GI Daily Progress Note  Subjective:    Chief Complaint:  Follow up melena     No bowel movement after Dulocolax suppository but feels he will have a bowel movement soon.   H&H is unchanged.   Still complaining of significant dyspnea.       Objective:    /66 (BP Location: Left arm, Patient Position: Sitting)   Pulse 103   Temp 97.8 °F (36.6 °C) (Axillary)   Resp 16   Ht 172.7 cm (68\")   Wt 68 kg (150 lb)   SpO2 91%   BMI 22.81 kg/m²     Physical Exam   Constitutional: He is oriented to person, place, and time.   Cardiovascular: Normal rate and regular rhythm.   Pulmonary/Chest: Effort normal. No respiratory distress.   Abdominal: Soft. Bowel sounds are normal. He exhibits no distension. There is no tenderness.   Neurological: He is alert and oriented to person, place, and time.   Skin: There is pallor.   Psychiatric: His behavior is normal.   Nursing note and vitals reviewed.      Lab  Lab Results   Component Value Date    WBC 10.55 05/01/2019    HGB 7.7 (L) 05/01/2019    HGB 7.6 (L) 04/30/2019    HGB 8.1 (L) 04/29/2019    MCV 68.6 (L) 05/01/2019     05/01/2019    INR 1.01 11/01/2017       Lab Results   Component Value Date    GLUCOSE 102 (H) 05/01/2019    BUN 5 (L) 05/01/2019    CREATININE 0.51 (L) 05/01/2019    EGFRIFNONA >150 05/01/2019    BCR 9.8 05/01/2019    CO2 21.0 (L) 05/01/2019    CALCIUM 8.2 (L) 05/01/2019    ALBUMIN 2.80 (L) 04/28/2019    ALKPHOS 66 04/28/2019    BILITOT 0.6 04/28/2019    ALT 5 04/28/2019    AST 25 04/28/2019       Assessment:    Microcytic anemia  Iron deficiency  Melena, resolved  Acute hypoxic respiratory failure     Plan:    >>> If no bowel movement today, will consider addition of Relistor.   He is s/p recent left foot surgery and has been on po narcotics though has weaned his use.    >>> IV Iron  >>> BID PPI     SHARON Lamb  05/01/19  1:51 PM    "

## 2019-05-02 ENCOUNTER — APPOINTMENT (OUTPATIENT)
Dept: CT IMAGING | Facility: HOSPITAL | Age: 74
End: 2019-05-02

## 2019-05-02 LAB
ANION GAP SERPL CALCULATED.3IONS-SCNC: 12 MMOL/L
BASOPHILS # BLD AUTO: 0.07 10*3/MM3 (ref 0–0.2)
BASOPHILS NFR BLD AUTO: 0.7 % (ref 0–1.5)
BUN BLD-MCNC: 5 MG/DL (ref 8–23)
BUN/CREAT SERPL: 9.1 (ref 7–25)
CALCIUM SPEC-SCNC: 8.8 MG/DL (ref 8.6–10.5)
CHLORIDE SERPL-SCNC: 98 MMOL/L (ref 98–107)
CO2 SERPL-SCNC: 23 MMOL/L (ref 22–29)
CREAT BLD-MCNC: 0.55 MG/DL (ref 0.76–1.27)
DEPRECATED RDW RBC AUTO: 49.9 FL (ref 37–54)
EOSINOPHIL # BLD AUTO: 0.4 10*3/MM3 (ref 0–0.4)
EOSINOPHIL NFR BLD AUTO: 3.9 % (ref 0.3–6.2)
ERYTHROCYTE [DISTWIDTH] IN BLOOD BY AUTOMATED COUNT: 19.8 % (ref 12.3–15.4)
GFR SERPL CREATININE-BSD FRML MDRD: 146 ML/MIN/1.73
GLUCOSE BLD-MCNC: 103 MG/DL (ref 65–99)
HCT VFR BLD AUTO: 27.9 % (ref 37.5–51)
HGB BLD-MCNC: 8.2 G/DL (ref 13–17.7)
IMM GRANULOCYTES # BLD AUTO: 0.06 10*3/MM3 (ref 0–0.05)
IMM GRANULOCYTES NFR BLD AUTO: 0.6 % (ref 0–0.5)
LYMPHOCYTES # BLD AUTO: 1.36 10*3/MM3 (ref 0.7–3.1)
LYMPHOCYTES NFR BLD AUTO: 13.2 % (ref 19.6–45.3)
MCH RBC QN AUTO: 20.4 PG (ref 26.6–33)
MCHC RBC AUTO-ENTMCNC: 29.4 G/DL (ref 31.5–35.7)
MCV RBC AUTO: 69.6 FL (ref 79–97)
MONOCYTES # BLD AUTO: 1.23 10*3/MM3 (ref 0.1–0.9)
MONOCYTES NFR BLD AUTO: 11.9 % (ref 5–12)
NEUTROPHILS # BLD AUTO: 7.27 10*3/MM3 (ref 1.7–7)
NEUTROPHILS NFR BLD AUTO: 70.3 % (ref 42.7–76)
PLATELET # BLD AUTO: 431 10*3/MM3 (ref 140–450)
PMV BLD AUTO: 10 FL (ref 6–12)
POTASSIUM BLD-SCNC: 4.1 MMOL/L (ref 3.5–5.2)
RBC # BLD AUTO: 4.01 10*6/MM3 (ref 4.14–5.8)
SODIUM BLD-SCNC: 133 MMOL/L (ref 136–145)
WBC NRBC COR # BLD: 10.33 10*3/MM3 (ref 3.4–10.8)

## 2019-05-02 PROCEDURE — 0 IOPAMIDOL PER 1 ML: Performed by: INTERNAL MEDICINE

## 2019-05-02 PROCEDURE — 99233 SBSQ HOSP IP/OBS HIGH 50: CPT | Performed by: INTERNAL MEDICINE

## 2019-05-02 PROCEDURE — 99024 POSTOP FOLLOW-UP VISIT: CPT | Performed by: ORTHOPAEDIC SURGERY

## 2019-05-02 PROCEDURE — 99232 SBSQ HOSP IP/OBS MODERATE 35: CPT | Performed by: NURSE PRACTITIONER

## 2019-05-02 PROCEDURE — 25010000002 PIPERACILLIN SOD-TAZOBACTAM PER 1 G: Performed by: NURSE PRACTITIONER

## 2019-05-02 PROCEDURE — 71275 CT ANGIOGRAPHY CHEST: CPT

## 2019-05-02 PROCEDURE — 25010000002 METHYLNALTREXONE 12 MG/0.6ML SOLUTION: Performed by: NURSE PRACTITIONER

## 2019-05-02 PROCEDURE — 80048 BASIC METABOLIC PNL TOTAL CA: CPT | Performed by: INTERNAL MEDICINE

## 2019-05-02 PROCEDURE — 94799 UNLISTED PULMONARY SVC/PX: CPT

## 2019-05-02 PROCEDURE — 25010000002 NA FERRIC GLUC CPLX PER 12.5 MG: Performed by: NURSE PRACTITIONER

## 2019-05-02 PROCEDURE — 85025 COMPLETE CBC W/AUTO DIFF WBC: CPT | Performed by: INTERNAL MEDICINE

## 2019-05-02 RX ADMIN — IPRATROPIUM BROMIDE AND ALBUTEROL SULFATE 3 ML: 2.5; .5 SOLUTION RESPIRATORY (INHALATION) at 16:26

## 2019-05-02 RX ADMIN — IOPAMIDOL 72.5 ML: 755 INJECTION, SOLUTION INTRAVENOUS at 17:45

## 2019-05-02 RX ADMIN — POLYETHYLENE GLYCOL 3350 17 G: 17 POWDER, FOR SOLUTION ORAL at 08:29

## 2019-05-02 RX ADMIN — TAZOBACTAM SODIUM AND PIPERACILLIN SODIUM 3.38 G: 375; 3 INJECTION, SOLUTION INTRAVENOUS at 14:00

## 2019-05-02 RX ADMIN — IPRATROPIUM BROMIDE AND ALBUTEROL SULFATE 3 ML: 2.5; .5 SOLUTION RESPIRATORY (INHALATION) at 13:06

## 2019-05-02 RX ADMIN — SODIUM CHLORIDE 250 MG: 9 INJECTION, SOLUTION INTRAVENOUS at 18:21

## 2019-05-02 RX ADMIN — CARBIDOPA AND LEVODOPA 1 TABLET: 25; 100 TABLET ORAL at 21:21

## 2019-05-02 RX ADMIN — BUDESONIDE AND FORMOTEROL FUMARATE DIHYDRATE 2 PUFF: 80; 4.5 AEROSOL RESPIRATORY (INHALATION) at 07:26

## 2019-05-02 RX ADMIN — TAZOBACTAM SODIUM AND PIPERACILLIN SODIUM 3.38 G: 375; 3 INJECTION, SOLUTION INTRAVENOUS at 21:21

## 2019-05-02 RX ADMIN — PANTOPRAZOLE SODIUM 40 MG: 40 TABLET, DELAYED RELEASE ORAL at 08:29

## 2019-05-02 RX ADMIN — CARBIDOPA AND LEVODOPA 1 TABLET: 25; 100 TABLET ORAL at 08:29

## 2019-05-02 RX ADMIN — BUDESONIDE AND FORMOTEROL FUMARATE DIHYDRATE 2 PUFF: 80; 4.5 AEROSOL RESPIRATORY (INHALATION) at 19:35

## 2019-05-02 RX ADMIN — CARBIDOPA AND LEVODOPA 1 TABLET: 25; 100 TABLET ORAL at 18:21

## 2019-05-02 RX ADMIN — TAZOBACTAM SODIUM AND PIPERACILLIN SODIUM 3.38 G: 375; 3 INJECTION, SOLUTION INTRAVENOUS at 05:35

## 2019-05-02 RX ADMIN — TAMSULOSIN HYDROCHLORIDE 0.4 MG: 0.4 CAPSULE ORAL at 08:29

## 2019-05-02 RX ADMIN — IPRATROPIUM BROMIDE AND ALBUTEROL SULFATE 3 ML: 2.5; .5 SOLUTION RESPIRATORY (INHALATION) at 07:26

## 2019-05-02 RX ADMIN — METHYLNALTREXONE BROMIDE 12 MG: 12 INJECTION, SOLUTION SUBCUTANEOUS at 14:00

## 2019-05-02 RX ADMIN — IPRATROPIUM BROMIDE AND ALBUTEROL SULFATE 3 ML: 2.5; .5 SOLUTION RESPIRATORY (INHALATION) at 19:35

## 2019-05-02 RX ADMIN — CARBIDOPA AND LEVODOPA 1 TABLET: 25; 100 TABLET ORAL at 11:57

## 2019-05-02 NOTE — PLAN OF CARE
Problem: Patient Care Overview  Goal: Plan of Care Review   05/02/19 1631   OTHER   Outcome Summary Pt has been calm and cooperative this shift, did have to return to the high flow NC d/t low saturations levels, pt on 6L O2 per NC @ 1610. CT with Angiogram w/wo contrast ordered. LLE remains in boot, elevated. No s/s of infection. VSS

## 2019-05-02 NOTE — PLAN OF CARE
Problem: Patient Care Overview  Goal: Plan of Care Review  Outcome: Ongoing (interventions implemented as appropriate)   05/01/19 1109 05/01/19 1833 05/02/19 0524   Coping/Psychosocial   Plan of Care Reviewed With --  --  patient   OTHER   Outcome Summary --  Pt calm and cooperative, alert and oriented. Pt has been up to chair, O2 @ 6L NC, sputum sent for culture. , BM today, VSS --    Plan of Care Review   Progress --  --  no change   Coping/Psychosocial   Patient Agreement with Plan of Care agrees --  --        Problem: Gastrointestinal Bleeding (Adult)  Goal: Signs and Symptoms of Listed Potential Problems Will be Absent, Minimized or Managed (Gastrointestinal Bleeding)  Outcome: Ongoing (interventions implemented as appropriate)      Problem: Pain, Chronic (Adult)  Goal: Identify Related Risk Factors and Signs and Symptoms  Outcome: Ongoing (interventions implemented as appropriate)      Problem: Skin Injury Risk (Adult)  Goal: Identify Related Risk Factors and Signs and Symptoms  Outcome: Ongoing (interventions implemented as appropriate)

## 2019-05-02 NOTE — PROGRESS NOTES
[facility]  PROGRESS NOTE    Patient Name: Flaco Roque II  : 1945  MRN: [id number]    Date of Admission: 2019  Length of Stay: 6  Primary Care Physician: Tayo Hendrix MD    Subjective   Subjective     CC:  F/u respiratory failure and hypovolemia    HPI:  Patient resting in bed, weaned to NC yesterday, now back on HF this morning. Denies chest pain. Continues to cough of purulent appearing sputum. No wheezing.    Review of Systems  Gen- No fevers, chills  CV- No chest pain, palpitations  Resp- + for cough and dypsnea  GI- No N/V/D, abd pain    Otherwise ROS is negative except as mentioned in the HPI.    Objective   Objective     Vital Signs:   Temp:  [97.8 °F (36.6 °C)-98.5 °F (36.9 °C)] 97.8 °F (36.6 °C)  Heart Rate:  [] 79  Resp:  [16-20] 20  BP: (106-130)/(50-79) 113/73        Physical Exam:  Constitutional: No acute distress, awake, alert  HENT: NCAT, mucous membranes moist  Respiratory: Clear to auscultation bilaterally, poor air movement, on HFNC  Cardiovascular: RRR, no murmurs, rubs, or gallops, palpable pedal pulses bilaterally  Gastrointestinal: Positive bowel sounds, soft, nontender, nondistended  Musculoskeletal: left foot in post-op boot  Psychiatric: Appropriate affect, cooperative  Neurologic: Oriented x 3, strength symmetric in all extremities, Cranial Nerves grossly intact to confrontation, speech clear  Skin: No rashes      Results Reviewed:  I have personally reviewed current lab, radiology, and data and agree.    Results from last 7 days   Lab Units 19  0557 19  0519  0455   WBC 10*3/mm3 10.33 10.55 9.27   HEMOGLOBIN g/dL 8.2* 7.7* 7.6*   HEMATOCRIT % 27.9* 25.1* 24.7*   PLATELETS 10*3/mm3 431 390 369     Results from last 7 days   Lab Units 19  0557 19  0526 19  0455  19  0554 19  1229   SODIUM mmol/L 133* 132* 133*   < > 132* 135*   POTASSIUM mmol/L 4.1 4.4 3.8   < > 4.2  4.5   CHLORIDE mmol/L 98 97* 99   < > 99 99   CO2 mmol/L 23.0 21.0* 23.0   < > 20.0* 25.0   BUN mg/dL 5* 5* 4*   < > 8 19   CREATININE mg/dL 0.55* 0.51* 0.52*   < > 0.67* 1.36*   GLUCOSE mg/dL 103* 102* 112*   < > 93 116*   CALCIUM mg/dL 8.8 8.2* 8.1*   < > 8.1* 8.5*   ALT (SGPT) U/L  --   --   --   --  5 9   AST (SGOT) U/L  --   --   --   --  25 27    < > = values in this interval not displayed.     Estimated Creatinine Clearance: 79.1 mL/min (A) (by C-G formula based on SCr of 0.55 mg/dL (L)).    No results found for: BNP    Microbiology Results Abnormal     Procedure Component Value - Date/Time    Respiratory Culture - Sputum, Cough [[id number]] Collected:  05/01/19 1750    Lab Status:  Preliminary result Specimen:  Sputum from Cough Updated:  05/02/19 1034     Respiratory Culture Scant growth (1+) Normal Respiratory Yolanda     Gram Stain Few (2+) WBCs per low power field      Occasional Epithelial cells per low power field      Few (2+) Gram positive cocci      Few (2+) Gram variable bacilli    Blood Culture - Blood, Arm, Left [[id number]] Collected:  04/26/19 1320    Lab Status:  Final result Specimen:  Blood from Arm, Left Updated:  05/01/19 1401     Blood Culture No growth at 5 days    Blood Culture - Blood, Arm, Right [[id number]] Collected:  04/26/19 1310    Lab Status:  Final result Specimen:  Blood from Arm, Right Updated:  05/01/19 1401     Blood Culture No growth at 5 days          Imaging Results (last 24 hours)     ** No results found for the last 24 hours. **               I have reviewed the medications:    Current Facility-Administered Medications:   •  acetaminophen (TYLENOL) tablet 650 mg, 650 mg, Oral, Q6H PRN, Milton Sow, APRN, 650 mg at 04/29/19 1009  •  budesonide-formoterol (SYMBICORT) 80-4.5 MCG/ACT inhaler 2 puff, 2 puff, Inhalation, BID - RT, Sunshine Loyd, YAMILET, 2 puff at 05/02/19 0726  •  carbidopa-levodopa (SINEMET)  MG per tablet 1 tablet, 1 tablet, Oral, 4x Daily, Evert,  YAMILET Muñoz, 1 tablet at 05/02/19 1157  •  ipratropium-albuterol (DUO-NEB) nebulizer solution 3 mL, 3 mL, Nebulization, Q6H PRN, Sunshine Loyd APRN  •  ipratropium-albuterol (DUO-NEB) nebulizer solution 3 mL, 3 mL, Nebulization, 4x Daily - RT, Sunshine Loyd APRN, 3 mL at 05/02/19 0726  •  Magnesium Sulfate 2 gram Bolus, followed by 8 gram infusion (total Mg dose 10 grams)- Mg less than or equal to 1mg/dL, 2 g, Intravenous, PRN **OR** Magnesium Sulfate 2 gram / 50mL Infusion (GIVE X 3 BAGS TO EQUAL 6GM TOTAL DOSE) - Mg 1.1 - 1.5 mg/dl, 2 g, Intravenous, PRN **OR** Magnesium Sulfate 4 gram infusion- Mg 1.6-1.9 mg/dL, 4 g, Intravenous, PRN, Marjorie Simon MD, Last Rate: 25 mL/hr at 04/30/19 0821, 4 g at 04/30/19 0821  •  methylnaltrexone (RELISTOR) injection 12 mg, 12 mg, Subcutaneous, Once, Betty Felipe APRN  •  mupirocin (BACTROBAN) 2 % ointment, , Topical, Q4 Days, Juhi Calle MD  •  oxyCODONE-acetaminophen (PERCOCET) 5-325 MG per tablet 1 tablet, 1 tablet, Oral, Q4H PRN, Simon Pereira MD, 1 tablet at 04/30/19 1055  •  pantoprazole (PROTONIX) EC tablet 40 mg, 40 mg, Oral, QAM AC, Marjorie Simon MD, 40 mg at 05/02/19 0829  •  piperacillin-tazobactam (ZOSYN) 3.375 g in iso-osmotic dextrose 50 ml (premix), 3.375 g, Intravenous, Q8H, Sunshine Loyd, APRN, 3.375 g at 05/02/19 0535  •  polyethylene glycol 3350 powder (packet), 17 g, Oral, Daily, Rachelle Clark PA, 17 g at 05/02/19 0829  •  potassium chloride (MICRO-K) CR capsule 40 mEq, 40 mEq, Oral, PRN, 40 mEq at 04/29/19 1405 **OR** potassium chloride (KLOR-CON) packet 40 mEq, 40 mEq, Oral, PRN **OR** potassium chloride 10 mEq in 100 mL IVPB, 10 mEq, Intravenous, Q1H PRN, Marjorie Simon MD  •  sodium chloride 0.9 % flush 10 mL, 10 mL, Intravenous, PRN, Alfonzo Monzon MD  •  tamsulosin (FLOMAX) 24 hr capsule 0.4 mg, 0.4 mg, Oral, Daily, Marjorie Simon MD, 0.4 mg at 05/02/19 0829      Assessment/Plan    Assessment / Plan     Active Hospital Problems    Diagnosis POA   • **Hypotension due to hypovolemia [I95.89, E86.1] Yes   • ROSALIA (acute kidney injury) (CMS/[facility]) [N17.9] Yes   • Parkinson disease (CMS/[facility]) [G20] Yes   • GI bleed [K92.2] Yes   • S/P foot surgery, left [Z98.890] Not Applicable   • Anemia [D64.9] Yes   • ABRIL (obstructive sleep apnea) [G47.33] Yes   • COPD (chronic obstructive pulmonary disease) (CMS/[facility]) [J44.9] Yes          Brief Hospital Course to date:  Flaco Roque II is a 73 y.o. male with PMHx significant for COPD, ABRIL, chronic pain, GERD, parkinson's disease and recent left foot surgery (d/c on 4/24) who presented with vomiting and diarrhea. Initially admitted to the ICU for hypotension and hypoxia.    Acute Hypoxic Respiratory Failure  LLL PNA  COPD  - on HFNC this morning, was weaned to NC overnight  - continue Zosyn for possible aspiration pneumonia  - continue duonbs and symbicort  - sputum cultures pending  - will get CT angiogram chest today    Melena:  Acute blood Loss Anemia  - GI following, defer EGD for now given degree of hypoxia  - IV Iron   - transfuse PRN Hgb <7, s/p 1unit PRBCs on 4/27    Hypotension  ROSALIA  - resolved with hydration    Parkinson's Disease:  - Sinemet    S/p Left Foot Surgery  - Dr. Calle following    DVT Prophylaxis: Mechanical given GIB    Disposition: I expect the patient to be discharged TBD    CODE STATUS:   Code Status and Medical Interventions:   Ordered at: 04/26/19 2111     Code Status:    CPR     Medical Interventions (Level of Support Prior to Arrest):    Full         Electronically signed by Yanira Salazar DO, 05/02/19, 1:02 PM.

## 2019-05-02 NOTE — PROGRESS NOTES
"GI Daily Progress Note  Subjective:    Chief Complaint:  Follow up melena     Pt had a BM yesterday after Dulocolax suppository, but he reports of lots of straining and having hard/lumpy stools, taking \"45-min\" before stool evacuation. Denies rectal bleeding or melena    H&H improving after IV iron yesterday.   Still complaining of significant dyspnea, but improving.       Objective:    /73 (BP Location: Left arm, Patient Position: Lying)   Pulse 79   Temp 97.8 °F (36.6 °C) (Oral)   Resp 20   Ht 172.7 cm (68\")   Wt 68 kg (150 lb)   SpO2 93%   BMI 22.81 kg/m²     Physical Exam   Constitutional: He is oriented to person, place, and time.   Cardiovascular: Normal rate and regular rhythm.   Pulmonary/Chest: Effort normal. No respiratory distress.   Abdominal: Soft. Bowel sounds are normal. He exhibits no distension. There is no tenderness.   Neurological: He is alert and oriented to person, place, and time.   Skin: There is pallor.   Psychiatric: His behavior is normal.   Nursing note and vitals reviewed.      budesonide-formoterol 2 puff Inhalation BID - RT   carbidopa-levodopa 1 tablet Oral 4x Daily   ferric gluconate 250 mg Intravenous Once   ipratropium-albuterol 3 mL Nebulization 4x Daily - RT   mupirocin  Topical Q4 Days   pantoprazole 40 mg Oral QAM AC   piperacillin-tazobactam 3.375 g Intravenous Q8H   polyethylene glycol 17 g Oral Daily   tamsulosin 0.4 mg Oral Daily     •  acetaminophen  •  ipratropium-albuterol  •  magnesium sulfate **OR** magnesium sulfate **OR** magnesium sulfate  •  oxyCODONE-acetaminophen  •  potassium chloride **OR** potassium chloride **OR** potassium chloride  •  sodium chloride    Lab  Lab Results   Component Value Date    WBC 10.33 05/02/2019    HGB 8.2 (L) 05/02/2019    HGB 7.7 (L) 05/01/2019    HGB 7.6 (L) 04/30/2019    MCV 69.6 (L) 05/02/2019     05/02/2019    INR 1.01 11/01/2017     Lab Results   Component Value Date    LABIRON 4 (L) 04/27/2019    LABIRON 3 " (L) 10/31/2017     Lab Results   Component Value Date    IRON 12 (L) 04/27/2019    TIBC 323 04/27/2019    FERRITIN 40.43 04/27/2019     Lab Results   Component Value Date    GLUCOSE 103 (H) 05/02/2019    BUN 5 (L) 05/02/2019    CREATININE 0.55 (L) 05/02/2019    EGFRIFNONA 146 05/02/2019    BCR 9.1 05/02/2019    CO2 23.0 05/02/2019    CALCIUM 8.8 05/02/2019    ALBUMIN 2.80 (L) 04/28/2019    ALKPHOS 66 04/28/2019    BILITOT 0.6 04/28/2019    ALT 5 04/28/2019    AST 25 04/28/2019     Wt Readings from Last 3 Encounters:   04/26/19 68 kg (150 lb)   04/23/19 68 kg (150 lb)   04/10/19 68.4 kg (150 lb 12.7 oz)       Assessment:    Microcytic anemia  Iron deficiency  Melena, resolved  Acute hypoxic respiratory failure   Pain pump in placed for over 10 yrs for chronic back pain    Plan:    >>>Relistor 12 mg SQ x 1.   He is s/p recent left foot surgery and has been on po narcotics though has weaned his use.    >>> Repeat IV Iron today  >>> BID PPI     YAMILET Morris  05/02/19  12:50 PM

## 2019-05-02 NOTE — PROGRESS NOTES
Orthopedic  Progress Note    Subjective     Post-Operative Day: 9, s/p left forefoot recon    Systemic or Specific Complaints: feeling better, going for chest CT today  Objective     Vital signs in last 24 hours:  Temp:  [97.8 °F (36.6 °C)-98.5 °F (36.9 °C)] 98.5 °F (36.9 °C)  Heart Rate:  [] 92  Resp:  [16-20] 20  BP: (106-130)/(50-79) 109/79    Neurovascular: Left foot toes pink, war,mm   Wound: Left foot pin sites clean, incisions clean         Data Review  Lab Results (last 24 hours)     Procedure Component Value Units Date/Time    Basic Metabolic Panel [[id number]]  (Abnormal) Collected:  05/02/19 0557    Specimen:  Blood Updated:  05/02/19 0735     Glucose 103 mg/dL      BUN 5 mg/dL      Creatinine 0.55 mg/dL      Sodium 133 mmol/L      Potassium 4.1 mmol/L      Chloride 98 mmol/L      CO2 23.0 mmol/L      Calcium 8.8 mg/dL      eGFR Non African Amer 146 mL/min/1.73      BUN/Creatinine Ratio 9.1     Anion Gap 12.0 mmol/L     Narrative:       GFR Normal >60  Chronic Kidney Disease <60  Kidney Failure <15    CBC & Differential [[id number]] Collected:  05/02/19 0557    Specimen:  Blood Updated:  05/02/19 0648    Narrative:       The following orders were created for panel order CBC & Differential.  Procedure                               Abnormality         Status                     ---------                               -----------         ------                     Scan Slide[[id number]]                                                                  CBC Auto Differential[[id number]]        Abnormal            Final result                 Please view results for these tests on the individual orders.    CBC Auto Differential [[id number]]  (Abnormal) Collected:  05/02/19 0557    Specimen:  Blood Updated:  05/02/19 0648     WBC 10.33 10*3/mm3      RBC 4.01 10*6/mm3      Hemoglobin 8.2 g/dL      Hematocrit 27.9 %      MCV 69.6 fL      MCH 20.4 pg      MCHC 29.4 g/dL      RDW 19.8 %      RDW-SD 49.9 fl      MPV  10.0 fL      Platelets 431 10*3/mm3      Neutrophil % 70.3 %      Lymphocyte % 13.2 %      Monocyte % 11.9 %      Eosinophil % 3.9 %      Basophil % 0.7 %      Immature Grans % 0.6 %      Neutrophils, Absolute 7.27 10*3/mm3      Lymphocytes, Absolute 1.36 10*3/mm3      Monocytes, Absolute 1.23 10*3/mm3      Eosinophils, Absolute 0.40 10*3/mm3      Basophils, Absolute 0.07 10*3/mm3      Immature Grans, Absolute 0.06 10*3/mm3     Respiratory Culture - Sputum, Cough [[id number]] Collected:  05/01/19 1750    Specimen:  Sputum from Cough Updated:  05/01/19 2150     Gram Stain Few (2+) WBCs per low power field      Occasional Epithelial cells per low power field      Few (2+) Gram positive cocci      Few (2+) Gram variable bacilli    Blood Culture - Blood, Arm, Left [[id number]] Collected:  04/26/19 1320    Specimen:  Blood from Arm, Left Updated:  05/01/19 1401     Blood Culture No growth at 5 days    Blood Culture - Blood, Arm, Right [[id number]] Collected:  04/26/19 1310    Specimen:  Blood from Arm, Right Updated:  05/01/19 1401     Blood Culture No growth at 5 days            Assessment/Plan     Status post- foot is stable, lungs improving but not cleared, CT today           Juhi Calle MD    Date: 5/2/2019  Time: 10:28 AM

## 2019-05-03 LAB
ANION GAP SERPL CALCULATED.3IONS-SCNC: 13 MMOL/L
BACTERIA SPEC RESP CULT: NORMAL
BASOPHILS # BLD AUTO: 0.08 10*3/MM3 (ref 0–0.2)
BASOPHILS NFR BLD AUTO: 0.8 % (ref 0–1.5)
BUN BLD-MCNC: 5 MG/DL (ref 8–23)
BUN/CREAT SERPL: 9.8 (ref 7–25)
CALCIUM SPEC-SCNC: 8.9 MG/DL (ref 8.6–10.5)
CHLORIDE SERPL-SCNC: 99 MMOL/L (ref 98–107)
CO2 SERPL-SCNC: 23 MMOL/L (ref 22–29)
CREAT BLD-MCNC: 0.51 MG/DL (ref 0.76–1.27)
DEPRECATED RDW RBC AUTO: 51.4 FL (ref 37–54)
EOSINOPHIL # BLD AUTO: 0.42 10*3/MM3 (ref 0–0.4)
EOSINOPHIL NFR BLD AUTO: 4 % (ref 0.3–6.2)
ERYTHROCYTE [DISTWIDTH] IN BLOOD BY AUTOMATED COUNT: 20.3 % (ref 12.3–15.4)
GFR SERPL CREATININE-BSD FRML MDRD: >150 ML/MIN/1.73
GLUCOSE BLD-MCNC: 103 MG/DL (ref 65–99)
GRAM STN SPEC: NORMAL
HCT VFR BLD AUTO: 29.7 % (ref 37.5–51)
HGB BLD-MCNC: 8.7 G/DL (ref 13–17.7)
IMM GRANULOCYTES # BLD AUTO: 0.09 10*3/MM3 (ref 0–0.05)
IMM GRANULOCYTES NFR BLD AUTO: 0.9 % (ref 0–0.5)
LYMPHOCYTES # BLD AUTO: 1.23 10*3/MM3 (ref 0.7–3.1)
LYMPHOCYTES NFR BLD AUTO: 11.7 % (ref 19.6–45.3)
MCH RBC QN AUTO: 20.7 PG (ref 26.6–33)
MCHC RBC AUTO-ENTMCNC: 29.3 G/DL (ref 31.5–35.7)
MCV RBC AUTO: 70.5 FL (ref 79–97)
MONOCYTES # BLD AUTO: 1.05 10*3/MM3 (ref 0.1–0.9)
MONOCYTES NFR BLD AUTO: 10 % (ref 5–12)
NEUTROPHILS # BLD AUTO: 7.74 10*3/MM3 (ref 1.7–7)
NEUTROPHILS NFR BLD AUTO: 73.5 % (ref 42.7–76)
NT-PROBNP SERPL-MCNC: 216.5 PG/ML (ref 5–900)
PLATELET # BLD AUTO: 497 10*3/MM3 (ref 140–450)
PMV BLD AUTO: 10.2 FL (ref 6–12)
POTASSIUM BLD-SCNC: 4 MMOL/L (ref 3.5–5.2)
RBC # BLD AUTO: 4.21 10*6/MM3 (ref 4.14–5.8)
SODIUM BLD-SCNC: 135 MMOL/L (ref 136–145)
WBC NRBC COR # BLD: 10.52 10*3/MM3 (ref 3.4–10.8)

## 2019-05-03 PROCEDURE — 99233 SBSQ HOSP IP/OBS HIGH 50: CPT | Performed by: INTERNAL MEDICINE

## 2019-05-03 PROCEDURE — 97530 THERAPEUTIC ACTIVITIES: CPT

## 2019-05-03 PROCEDURE — 85025 COMPLETE CBC W/AUTO DIFF WBC: CPT | Performed by: INTERNAL MEDICINE

## 2019-05-03 PROCEDURE — 25010000002 FUROSEMIDE PER 20 MG: Performed by: INTERNAL MEDICINE

## 2019-05-03 PROCEDURE — 83880 ASSAY OF NATRIURETIC PEPTIDE: CPT | Performed by: INTERNAL MEDICINE

## 2019-05-03 PROCEDURE — 80048 BASIC METABOLIC PNL TOTAL CA: CPT | Performed by: INTERNAL MEDICINE

## 2019-05-03 PROCEDURE — 94799 UNLISTED PULMONARY SVC/PX: CPT

## 2019-05-03 PROCEDURE — 99024 POSTOP FOLLOW-UP VISIT: CPT | Performed by: ORTHOPAEDIC SURGERY

## 2019-05-03 PROCEDURE — 25010000002 PIPERACILLIN SOD-TAZOBACTAM PER 1 G: Performed by: NURSE PRACTITIONER

## 2019-05-03 PROCEDURE — 25010000002 ONDANSETRON PER 1 MG: Performed by: PHYSICIAN ASSISTANT

## 2019-05-03 RX ORDER — FUROSEMIDE 10 MG/ML
40 INJECTION INTRAMUSCULAR; INTRAVENOUS ONCE
Status: COMPLETED | OUTPATIENT
Start: 2019-05-03 | End: 2019-05-03

## 2019-05-03 RX ORDER — ONDANSETRON 2 MG/ML
4 INJECTION INTRAMUSCULAR; INTRAVENOUS EVERY 6 HOURS PRN
Status: DISCONTINUED | OUTPATIENT
Start: 2019-05-03 | End: 2019-05-07 | Stop reason: HOSPADM

## 2019-05-03 RX ADMIN — TAZOBACTAM SODIUM AND PIPERACILLIN SODIUM 3.38 G: 375; 3 INJECTION, SOLUTION INTRAVENOUS at 06:19

## 2019-05-03 RX ADMIN — FUROSEMIDE 40 MG: 10 INJECTION, SOLUTION INTRAMUSCULAR; INTRAVENOUS at 08:24

## 2019-05-03 RX ADMIN — IPRATROPIUM BROMIDE AND ALBUTEROL SULFATE 3 ML: 2.5; .5 SOLUTION RESPIRATORY (INHALATION) at 12:07

## 2019-05-03 RX ADMIN — ONDANSETRON 4 MG: 2 INJECTION INTRAMUSCULAR; INTRAVENOUS at 21:43

## 2019-05-03 RX ADMIN — TAZOBACTAM SODIUM AND PIPERACILLIN SODIUM 3.38 G: 375; 3 INJECTION, SOLUTION INTRAVENOUS at 21:17

## 2019-05-03 RX ADMIN — IPRATROPIUM BROMIDE AND ALBUTEROL SULFATE 3 ML: 2.5; .5 SOLUTION RESPIRATORY (INHALATION) at 19:35

## 2019-05-03 RX ADMIN — IPRATROPIUM BROMIDE AND ALBUTEROL SULFATE 3 ML: 2.5; .5 SOLUTION RESPIRATORY (INHALATION) at 15:37

## 2019-05-03 RX ADMIN — BUDESONIDE AND FORMOTEROL FUMARATE DIHYDRATE 2 PUFF: 80; 4.5 AEROSOL RESPIRATORY (INHALATION) at 19:35

## 2019-05-03 RX ADMIN — POLYETHYLENE GLYCOL 3350 17 G: 17 POWDER, FOR SOLUTION ORAL at 08:24

## 2019-05-03 RX ADMIN — CARBIDOPA AND LEVODOPA 1 TABLET: 25; 100 TABLET ORAL at 12:30

## 2019-05-03 RX ADMIN — OXYCODONE HYDROCHLORIDE AND ACETAMINOPHEN 1 TABLET: 5; 325 TABLET ORAL at 12:30

## 2019-05-03 RX ADMIN — CARBIDOPA AND LEVODOPA 1 TABLET: 25; 100 TABLET ORAL at 08:24

## 2019-05-03 RX ADMIN — CARBIDOPA AND LEVODOPA 1 TABLET: 25; 100 TABLET ORAL at 21:17

## 2019-05-03 RX ADMIN — IPRATROPIUM BROMIDE AND ALBUTEROL SULFATE 3 ML: 2.5; .5 SOLUTION RESPIRATORY (INHALATION) at 07:09

## 2019-05-03 RX ADMIN — TAMSULOSIN HYDROCHLORIDE 0.4 MG: 0.4 CAPSULE ORAL at 08:23

## 2019-05-03 RX ADMIN — BUDESONIDE AND FORMOTEROL FUMARATE DIHYDRATE 2 PUFF: 80; 4.5 AEROSOL RESPIRATORY (INHALATION) at 07:10

## 2019-05-03 RX ADMIN — TAZOBACTAM SODIUM AND PIPERACILLIN SODIUM 3.38 G: 375; 3 INJECTION, SOLUTION INTRAVENOUS at 14:48

## 2019-05-03 RX ADMIN — PANTOPRAZOLE SODIUM 40 MG: 40 TABLET, DELAYED RELEASE ORAL at 08:23

## 2019-05-03 RX ADMIN — CARBIDOPA AND LEVODOPA 1 TABLET: 25; 100 TABLET ORAL at 17:38

## 2019-05-03 NOTE — PROGRESS NOTES
Orthopedic  Progress Note    Subjective     Post-Operative Day: 10, s/p left forefoot recon    Systemic or Specific Complaints: feeling better, no pain in toe  Objective     Vital signs in last 24 hours:  Temp:  [98 °F (36.7 °C)-98.5 °F (36.9 °C)] 98 °F (36.7 °C)  Heart Rate:  [] 93  Resp:  [16-20] 18  BP: (101-124)/(64-68) 101/68    Neurovascular: Left foot n-v intact   Wound: Dressing changed left foot, no erythema, no sign of infection, incisions healing, pin sites clean         Data Review  Lab Results (last 24 hours)     Procedure Component Value Units Date/Time    Respiratory Culture - Sputum, Cough [[id number]] Collected:  05/01/19 1750    Specimen:  Sputum from Cough Updated:  05/03/19 0855     Respiratory Culture Scant growth (1+) Normal Respiratory Yolanda     Gram Stain Few (2+) WBCs per low power field      Occasional Epithelial cells per low power field      Few (2+) Gram positive cocci      Few (2+) Gram variable bacilli    BNP [[id number]]  (Normal) Collected:  05/03/19 0623    Specimen:  Blood Updated:  05/03/19 0814     proBNP 216.5 pg/mL     Narrative:       Among patients with dyspnea, NT-proBNP is highly sensitive for the detection of acute congestive heart failure. In addition NT-proBNP of <300 pg/ml effectively rules out acute congestive heart failure with 99% negative predictive value.    Basic Metabolic Panel [[id number]]  (Abnormal) Collected:  05/03/19 0623    Specimen:  Blood Updated:  05/03/19 0653     Glucose 103 mg/dL      BUN 5 mg/dL      Creatinine 0.51 mg/dL      Sodium 135 mmol/L      Potassium 4.0 mmol/L      Chloride 99 mmol/L      CO2 23.0 mmol/L      Calcium 8.9 mg/dL      eGFR Non African Amer >150 mL/min/1.73      BUN/Creatinine Ratio 9.8     Anion Gap 13.0 mmol/L     Narrative:       GFR Normal >60  Chronic Kidney Disease <60  Kidney Failure <15    CBC & Differential [[id number]] Collected:  05/03/19 0531    Specimen:  Blood Updated:  05/03/19 0649    Narrative:       The  following orders were created for panel order CBC & Differential.  Procedure                               Abnormality         Status                     ---------                               -----------         ------                     Scan Slide[[id number]]                                                                  CBC Auto Differential[[id number]]        Abnormal            Final result                 Please view results for these tests on the individual orders.    CBC Auto Differential [[id number]]  (Abnormal) Collected:  05/03/19 0531    Specimen:  Blood Updated:  05/03/19 0649     WBC 10.52 10*3/mm3      RBC 4.21 10*6/mm3      Hemoglobin 8.7 g/dL      Hematocrit 29.7 %      MCV 70.5 fL      MCH 20.7 pg      MCHC 29.3 g/dL      RDW 20.3 %      RDW-SD 51.4 fl      MPV 10.2 fL      Platelets 497 10*3/mm3      Neutrophil % 73.5 %      Lymphocyte % 11.7 %      Monocyte % 10.0 %      Eosinophil % 4.0 %      Basophil % 0.8 %      Immature Grans % 0.9 %      Neutrophils, Absolute 7.74 10*3/mm3      Lymphocytes, Absolute 1.23 10*3/mm3      Monocytes, Absolute 1.05 10*3/mm3      Eosinophils, Absolute 0.42 10*3/mm3      Basophils, Absolute 0.08 10*3/mm3      Immature Grans, Absolute 0.09 10*3/mm3             Assessment/Plan     Status post- foot doing well, lungs improving.  I change the dressing.  I will see him again on Monday, call ortho doctor on call if any problems,  Discussed with patient and wife           Juhi Calle MD    Date: 5/3/2019  Time: 3:11 PM

## 2019-05-03 NOTE — PROGRESS NOTES
[facility] Medicine Services  PROGRESS NOTE    Patient Name: Flaco Roque II  : 1945  MRN: [id number]    Date of Admission: 2019  Length of Stay: 7  Primary Care Physician: Tayo Hendrix MD    Subjective   Subjective     CC:  F/u respiratory failure and hypovolemia    HPI:  Patient weaned to NC overnight, feels better this morning. Breathing improved.    Review of Systems  Gen- No fevers, chills  CV- No chest pain, palpitations  Resp- + for cough and dypsnea  GI- No N/V/D, abd pain    Otherwise ROS is negative except as mentioned in the HPI.    Objective   Objective     Vital Signs:   Temp:  [98 °F (36.7 °C)-98.5 °F (36.9 °C)] 98 °F (36.7 °C)  Heart Rate:  [] 93  Resp:  [16-20] 18  BP: (101-124)/(64-68) 101/68        Physical Exam:  Constitutional: No acute distress, awake, alert  HENT: NCAT, mucous membranes moist  Respiratory: Clear to auscultation bilaterally, poor air movement,weaned to nasal cannula  Cardiovascular: RRR, no murmurs, rubs, or gallops, palpable pedal pulses bilaterally  Gastrointestinal: Positive bowel sounds, soft, nontender, nondistended  Musculoskeletal: left foot in post-op boot  Psychiatric: Appropriate affect, cooperative  Neurologic: Oriented x 3, strength symmetric in all extremities, Cranial Nerves grossly intact to confrontation, speech clear  Skin: No rashes      Results Reviewed:  I have personally reviewed current lab, radiology, and data and agree.    Results from last 7 days   Lab Units 19  0531 19  0557 19  0526   WBC 10*3/mm3 10.52 10.33 10.55   HEMOGLOBIN g/dL 8.7* 8.2* 7.7*   HEMATOCRIT % 29.7* 27.9* 25.1*   PLATELETS 10*3/mm3 497* 431 390     Results from last 7 days   Lab Units 19  0623 19  0557 19  0526  19  0554   SODIUM mmol/L 135* 133* 132*   < > 132*   POTASSIUM mmol/L 4.0 4.1 4.4   < > 4.2   CHLORIDE mmol/L 99 98 97*   < > 99   CO2 mmol/L 23.0 23.0 21.0*   < > 20.0*   BUN  mg/dL 5* 5* 5*   < > 8   CREATININE mg/dL 0.51* 0.55* 0.51*   < > 0.67*   GLUCOSE mg/dL 103* 103* 102*   < > 93   CALCIUM mg/dL 8.9 8.8 8.2*   < > 8.1*   ALT (SGPT) U/L  --   --   --   --  5   AST (SGOT) U/L  --   --   --   --  25    < > = values in this interval not displayed.     Estimated Creatinine Clearance: 79.1 mL/min (A) (by C-G formula based on SCr of 0.51 mg/dL (L)).    No results found for: BNP    Microbiology Results Abnormal     Procedure Component Value - Date/Time    Respiratory Culture - Sputum, Cough [[id number]] Collected:  05/01/19 1750    Lab Status:  Final result Specimen:  Sputum from Cough Updated:  05/03/19 0855     Respiratory Culture Scant growth (1+) Normal Respiratory Yolanda     Gram Stain Few (2+) WBCs per low power field      Occasional Epithelial cells per low power field      Few (2+) Gram positive cocci      Few (2+) Gram variable bacilli    Blood Culture - Blood, Arm, Left [[id number]] Collected:  04/26/19 1320    Lab Status:  Final result Specimen:  Blood from Arm, Left Updated:  05/01/19 1401     Blood Culture No growth at 5 days    Blood Culture - Blood, Arm, Right [[id number]] Collected:  04/26/19 1310    Lab Status:  Final result Specimen:  Blood from Arm, Right Updated:  05/01/19 1401     Blood Culture No growth at 5 days          Imaging Results (last 24 hours)     Procedure Component Value Units Date/Time    CT Angiogram Chest With & Without Contrast [[id number]] Collected:  05/02/19 1719     Updated:  05/02/19 1728    Narrative:       EXAMINATION: CT ANGIOGRAM CHEST W WO CONTRAST-      INDICATION: Pneumonia in diseases classified elsewhere     TECHNIQUE: Spiral acquisition 3 mm post-IV contrast images the chest  with sagittal and coronal 10 mm 2-D reconstructions.     The radiation dose reduction device was turned on for each scan per the  ALARA (As Low as Reasonably Achievable) protocol.     COMPARISON: Angiographic chest CT scan 07/14/2016. Portable chest  04/30/2018      FINDINGS: There is good contrast opacification of the pulmonary  arteries. No filling defects are identified to suggest pulmonary embolic  disease. There is no evidence of thoracic aortic aneurysm or dissection,  no mediastinal adenopathy or other mass, and only very small pericardial  effusion. There are very small pleural effusions. There is a large  hiatal hernia. Lung window images show diffuse pulmonary interstitial  disease, most of which is finely reticular or groundglass disease  similar to the 07/14/2016 exam but more extensive and bilateral. Overall  pattern resembles interstitial edema. There is much denser disease in  the right lower lobe, resembling pneumonia. There is discoid atelectasis  in the left base.     Included images of the upper abdomen show no significant abnormalities  of the included portions of the spleen, pancreas, adrenal glands, or  kidneys. There is a small right lobe liver cyst.             Impression:       1. No evidence of pulmonary embolus.  2. Extensive and diffuse pulmonary interstitial disease as described,  some of which may be chronic, and generally increased since 2016.  Overall pattern resembles interstitial edema.  3. Extensive right lower lung disease suggesting pneumonia. Milder left  basilar disease more typical for discoid atelectasis.  4. Very small pleural effusions and minimal pericardial effusion.     5 large hiatal hernia.        This report was finalized on 5/2/2019 5:24 PM by DR. Vlad Blake MD.                  I have reviewed the medications:    Current Facility-Administered Medications:   •  acetaminophen (TYLENOL) tablet 650 mg, 650 mg, Oral, Q6H PRN, Milton Sow, APRN, 650 mg at 04/29/19 1009  •  budesonide-formoterol (SYMBICORT) 80-4.5 MCG/ACT inhaler 2 puff, 2 puff, Inhalation, BID - RT, Sunshine Loyd, APRN, 2 puff at 05/03/19 0710  •  carbidopa-levodopa (SINEMET)  MG per tablet 1 tablet, 1 tablet, Oral, 4x Daily, Sunshine Loyd, APRJOSE ANTONIO, 1  tablet at 05/03/19 1230  •  ipratropium-albuterol (DUO-NEB) nebulizer solution 3 mL, 3 mL, Nebulization, Q6H PRN, Sunshine Loyd APRN  •  ipratropium-albuterol (DUO-NEB) nebulizer solution 3 mL, 3 mL, Nebulization, 4x Daily - RT, Sunshine Loyd, YAMILET, 3 mL at 05/03/19 1207  •  Magnesium Sulfate 2 gram Bolus, followed by 8 gram infusion (total Mg dose 10 grams)- Mg less than or equal to 1mg/dL, 2 g, Intravenous, PRN **OR** Magnesium Sulfate 2 gram / 50mL Infusion (GIVE X 3 BAGS TO EQUAL 6GM TOTAL DOSE) - Mg 1.1 - 1.5 mg/dl, 2 g, Intravenous, PRN **OR** Magnesium Sulfate 4 gram infusion- Mg 1.6-1.9 mg/dL, 4 g, Intravenous, PRN, Marjorie Simon MD, Last Rate: 25 mL/hr at 04/30/19 0821, 4 g at 04/30/19 0821  •  mupirocin (BACTROBAN) 2 % ointment, , Topical, Q4 Days, Juhi Calle MD  •  oxyCODONE-acetaminophen (PERCOCET) 5-325 MG per tablet 1 tablet, 1 tablet, Oral, Q4H PRN, Simon Pereira MD, 1 tablet at 05/03/19 1230  •  pantoprazole (PROTONIX) EC tablet 40 mg, 40 mg, Oral, QAM AC, Marjorie Simon MD, 40 mg at 05/03/19 0823  •  piperacillin-tazobactam (ZOSYN) 3.375 g in iso-osmotic dextrose 50 ml (premix), 3.375 g, Intravenous, Q8H, Sunshine Loyd APRN, 3.375 g at 05/03/19 0619  •  polyethylene glycol 3350 powder (packet), 17 g, Oral, Daily, Rachelle Clark PA, 17 g at 05/03/19 0824  •  potassium chloride (MICRO-K) CR capsule 40 mEq, 40 mEq, Oral, PRN, 40 mEq at 04/29/19 1405 **OR** potassium chloride (KLOR-CON) packet 40 mEq, 40 mEq, Oral, PRN **OR** potassium chloride 10 mEq in 100 mL IVPB, 10 mEq, Intravenous, Q1H PRN, Marjorie Simon MD  •  sodium chloride 0.9 % flush 10 mL, 10 mL, Intravenous, PRN, Alfonzo Monzon MD  •  tamsulosin (FLOMAX) 24 hr capsule 0.4 mg, 0.4 mg, Oral, Daily, Marjorie Simon MD, 0.4 mg at 05/03/19 0823      Assessment/Plan   Assessment / Plan     Active Hospital Problems    Diagnosis POA   • **Hypotension due to hypovolemia [I95.89, E86.1]  Yes   • ROSALIA (acute kidney injury) (CMS/HCC) [N17.9] Yes   • Parkinson disease (CMS/HCC) [G20] Yes   • GI bleed [K92.2] Yes   • S/P foot surgery, left [Z98.890] Not Applicable   • Anemia [D64.9] Yes   • ABRIL (obstructive sleep apnea) [G47.33] Yes   • COPD (chronic obstructive pulmonary disease) (CMS/HCC) [J44.9] Yes          Brief Hospital Course to date:  Flaco Roque II is a 73 y.o. male with PMHx significant for COPD, ABRIL, chronic pain, GERD, parkinson's disease and recent left foot surgery (d/c on 4/24) who presented with vomiting and diarrhea. Initially admitted to the ICU for hypotension and hypoxia.    Acute Hypoxic Respiratory Failure  LLL PNA  COPD  - weaned to 6L NC this morning, continue to wean as able  - CT reviewed, negative for PE. Shows extensive and diffuse pulmonary interstitial disease, some of which is chronic. Overall concerning for increased edema. BNP WNL. Will trial one time dose of IV lasix. If no improvement will have pulmonary re-evaluate.   - continue Zosyn for possible aspiration pneumonia  - continue duonbs and symbicort  - sputum cultures pending     Melena:  Acute blood Loss Anemia  - GI following, defer EGD for now given degree of hypoxia  - IV Iron   - continue BID PPI  - transfuse PRN Hgb <7, s/p 1unit PRBCs on 4/27  - BM with relistor    Hypotension  ROSALIA  - resolved with hydration    Parkinson's Disease:  - Sinemet    S/p Left Foot Surgery  - Dr. Calle following    DVT Prophylaxis: Mechanical given GIB    Disposition: I expect the patient to be discharged TBD    CODE STATUS:   Code Status and Medical Interventions:   Ordered at: 04/26/19 2111     Code Status:    CPR     Medical Interventions (Level of Support Prior to Arrest):    Full         Electronically signed by Yanira Salazar DO, 05/03/19, 12:59 PM.

## 2019-05-03 NOTE — PROGRESS NOTES
Clinical Nutrition     Nutrition Assessment  Reason for Visit:   [facility]      Patient Name: Flaco Roque II  YOB: 1945  MRN: [id number]  Date of Encounter: 05/03/19 1:42 PM  Admission date: 4/26/2019    Nutrition Assessment   Assessment       Hospital Problem List    Hypotension due to hypovolemia    ABRIL (obstructive sleep apnea)    COPD (chronic obstructive pulmonary disease) (CMS/[facility])    Anemia    S/P foot surgery, left    ROSALIA (acute kidney injury) (CMS/[facility])    Parkinson disease (CMS/[facility])    GI bleed      PMH: He  has a past medical history of 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   PSxH: He  has a past surgical history that includes Back surgery; Total hip arthroplasty (Left); Arthrodesis midtarsal / tarsometatarsal / tarsal navicular-cuneiform (Left, 05/10/2016); Cataract extraction; Back surgery; Gallbladder surgery; Spine surgery; Esophagogastroduodenoscopy (N/A, 11/1/2017); Colonoscopy (N/A, 11/2/2017); Esophagogastroduodenoscopy (11/02/2017); Foot surgery; Pain Pump Insertion/Revision; Knee arthroscopy (Bilateral); Ulnar nerve transposition; Total shoulder arthroplasty w/ distal clavicle excision (Left, 9/10/2018); and Bunionectomy (Left, 4/23/2019).       Reported/Observed/Food/Nutrition Related History:     Pt reported not eating much, reported never eating a lot, pt unsure if he is consuming his typical amount. Pt reported he was drinking boost breeze supplements however is becoming burnt out of them- willing to try boost  "plus BID.      Anthropometrics     Height: 172.7 cm (68\")  Last filed wt: Weight: 68 kg (150 lb) (04/26/19 1223)  Weight Method: Stated    BMI: BMI (Calculated): 22.8  Normal: 18.5-24.9kg/m2    Ideal Body Weight (IBW) (kg): 70.89    Medications reviewed   Pertinent:      Applicable medical tests/Procedures since admission:      Current Nutrition Prescription     PO: Diet Soft Texture; [employer]  Orders Placed This Encounter      Dietary Nutrition Supplements Boost Breeze (Clear Liquid) 4x per day    Intake:  Per nursing documentation pt consuming 46% of 7 meal (4/30-5/2)         Nutrition Diagnosis     5/3  Problem Inadequate oral intake   Etiology Clinical condition- appetite   Signs/Symptoms 46% of 7 meals        Nutrition Intervention   1.  Follow treatment progress, Care plan reviewed, Adjusted supplement, Encourage intake   2. Will change supplement from boost breeze to boost plus     Goal:   General: Nutrition support treatment  PO: Increase intake    Monitoring/Evaluation:   Per protocol, PO intake, Supplement intake, Symptoms      Will Continue to follow per protocol      Dina Noel  Time Spent: 25 minutes      "

## 2019-05-03 NOTE — PLAN OF CARE
Problem: Patient Care Overview  Goal: Plan of Care Review  Outcome: Ongoing (interventions implemented as appropriate)   05/03/19 0509   Coping/Psychosocial   Plan of Care Reviewed With patient   OTHER   Outcome Summary pt calm and cooperative, VSS, 6L NC, voids well, no S/S of infections, will continue to monitor for changes.   Plan of Care Review   Progress no change       Problem: Gastrointestinal Bleeding (Adult)  Goal: Signs and Symptoms of Listed Potential Problems Will be Absent, Minimized or Managed (Gastrointestinal Bleeding)  Outcome: Ongoing (interventions implemented as appropriate)      Problem: Pain, Chronic (Adult)  Goal: Identify Related Risk Factors and Signs and Symptoms  Outcome: Ongoing (interventions implemented as appropriate)      Problem: Skin Injury Risk (Adult)  Goal: Identify Related Risk Factors and Signs and Symptoms  Outcome: Ongoing (interventions implemented as appropriate)

## 2019-05-03 NOTE — THERAPY TREATMENT NOTE
Acute Care - Physical Therapy Treatment Note  [facility]     Patient Name: Flaco Roque II  : 1945  MRN: [id number]  Today's Date: 5/3/2019  Onset of Illness/Injury or Date of Surgery: 19  Date of Referral to PT: 19  Referring Physician: MD Luc    Admit Date: 2019    Visit Dx:    ICD-10-CM ICD-9-CM   1. Iron deficiency anemia, unspecified iron deficiency anemia type D50.9 280.9   2. Pneumonia of both lower lobes due to infectious organism (CMS/[facility]) J18.1 483.8   3. Sepsis, due to unspecified organism (CMS/[facility]) A41.9 038.9     995.91   4. Acute respiratory failure with hypoxia (CMS/[facility]) J96.01 518.81   5. Impaired functional mobility, balance, gait, and endurance Z74.09 V49.89   6. Therapeutic opioid induced constipation K59.03 564.09    T40.2X5A E935.2     Patient Active Problem List   Diagnosis   • ABRIL (obstructive sleep apnea)   • COPD (chronic obstructive pulmonary disease) (CMS/[facility])   • Anemia   • Leukocytosis, likely reactive   • Acute postoperative pain   • Deformity of left foot   • S/P foot surgery, left   • Hypotension due to hypovolemia   • ROSALIA (acute kidney injury) (CMS/[facility])   • Parkinson disease (CMS/H[facility])   • GI bleed       Therapy Treatment    Rehabilitation Treatment Summary     Row Name 19 1057             Treatment Time/Intention    Discipline  physical therapist  -LM      Document Type  therapy note (daily note)  -LM      Subjective Information  no complaints  -LM      Mode of Treatment  physical therapy;individual therapy  -LM      Patient/Family Observations  Family arrived nursing home through session. Pt received supine in bed, O2 via nasal cannula, telemetry.  -LM      Care Plan Review  care plan/treatment goals reviewed;patient/other agree to care plan  -LM      Therapy Frequency (PT Clinical Impression)  daily  -LM      Patient Effort  good  -LM      Existing Precautions/Restrictions  fall;oxygen therapy device and L/min  (Significant)  LLE WBAT through  heel with boot, monitor O2 sats  -LM      Recorded by [LM] Kamryn Murray, PT 05/03/19 1353      Row Name 05/03/19 1057             Vital Signs    Pre SpO2 (%)  94  -LM      O2 Delivery Pre Treatment  supplemental O2 6L  -LM      Intra SpO2 (%)  84  -LM      O2 Delivery Intra Treatment  supplemental O2  -LM      Post SpO2 (%)  93  -LM      O2 Delivery Post Treatment  supplemental O2  -LM      Pre Patient Position  Supine  -LM      Intra Patient Position  Standing  -LM      Post Patient Position  Sitting  -LM      Recorded by [LM] Kamryn Murray, PT 05/03/19 1353      Row Name 05/03/19 1057             Cognitive Assessment/Intervention    Additional Documentation  Cognitive Assessment/Intervention (Group)  -LM      Recorded by [LM] Kamryn Murray, PT 05/03/19 1353      Row Name 05/03/19 1057             Cognitive Assessment/Intervention- PT/OT    Affect/Mental Status (Cognitive)  WFL  -LM      Orientation Status (Cognition)  oriented x 4  -LM      Follows Commands (Cognition)  WFL  -LM      Safety Deficit (Cognitive)  mild deficit;safety precautions awareness;insight into deficits/self awareness;awareness of need for assistance  -LM      Recorded by [LM] Kamryn Murray, PT 05/03/19 1353      Row Name 05/03/19 1057             Safety Issues, Functional Mobility    Safety Issues Affecting Function (Mobility)  awareness of need for assistance;insight into deficits/self awareness;safety precaution awareness;safety precautions follow-through/compliance  -LM      Impairments Affecting Function (Mobility)  balance;shortness of breath;endurance/activity tolerance  -LM      Recorded by [LM] Kamryn Murray, PT 05/03/19 1353      Row Name 05/03/19 1057             Mobility Assessment/Intervention    Extremity Weight-bearing Status  left lower extremity  -LM      Left Lower Extremity (Weight-bearing Status)  weight-bearing as tolerated (WBAT)  (Significant)  through heel  -LM      Recorded by [LM] Kamryn Murray, PT  05/03/19 1353      Row Name 05/03/19 1057             Bed Mobility Assessment/Treatment    Supine-Sit [place] (Bed Mobility)  conditional independence  -LM      Assistive Device (Bed Mobility)  head of bed elevated  -LM      Comment (Bed Mobility)  Pt demonstrated good safety.  -LM      Recorded by [LM] Terri Kamryn KAVITA, PT 05/03/19 1353      Row Name 05/03/19 1057             Transfer Assessment/Treatment    Transfer Assessment/Treatment  sit-stand transfer;stand-sit transfer  -LM      Comment (Transfers)  Verbal cues for correct hand placement, reviewed WB status.  -LM      Recorded by [LM] Terri Kamryn KAVITA, PT 05/03/19 1353      Row Name 05/03/19 1057             Sit-Stand Transfer    Sit-Stand [place] (Transfers)  contact guard;verbal cues  -LM      Assistive Device (Sit-Stand Transfers)  walker, front-wheeled  -LM      Recorded by [LM] Terri Kamryn KAVITA, PT 05/03/19 1353      Row Name 05/03/19 1057             Stand-Sit Transfer    Stand-Sit [place] (Transfers)  contact guard;verbal cues  -LM      Assistive Device (Stand-Sit Transfers)  walker, front-wheeled  -LM      Recorded by [LM] Terri Kamryn KAVITA, PT 05/03/19 1353      Row Name 05/03/19 1057             Gait/Stairs Assessment/Training    32723 - Gait Training Minutes   8  -LM      [place] Level (Gait)  contact guard;verbal cues  -LM      Assistive Device (Gait)  walker, front-wheeled  -LM      Distance in Feet (Gait)  100  -LM      Pattern (Gait)  step-to  -LM      Deviations/Abnormal Patterns (Gait)  left sided deviations;antalgic;bilateral deviations;krunal decreased;stride length decreased  -LM      Comment (Gait/Stairs)  Pt ambulated with step-to pattern at slow pace, able to maintain weight-bearing status through heel only LLE. Cues for upright posture, forward gaze, and remaining within middle of RW. Occasional standing rest breaks to monitor O2 saturation. Lowest O2 desat to 84% immediately after ambulation. Limited by fatigue.   -LM      Recorded by [LM] Kamryn Murray, PT 05/03/19 1353      Row Name 05/03/19 1057             Therapeutic Exercise    78779 - PT Therapeutic Activity Minutes  12  -LM      Recorded by [LM] Kamryn Murray, PT 05/03/19 1353      Row Name 05/03/19 1057             Positioning and Restraints    Pre-Treatment Position  in bed  -LM      Post Treatment Position  chair  -LM      In Chair  notified nsg;reclined;sitting;call light within reach;encouraged to call for assist;exit alarm on;with family/caregiver;legs elevated  -LM      Recorded by [LM] Kamryn Murray, PT 05/03/19 1353      Row Name 05/03/19 1057             Pain Scale: Numbers Pre/Post-Treatment    Pain Scale: Numbers, Pretreatment  0/10 - no pain  -LM      Pain Scale: Numbers, Post-Treatment  0/10 - no pain  -LM      Recorded by [LM] Kamryn Murray, PT 05/03/19 1353      Row Name                Wound 04/23/19 1011 Left foot incision    Wound - Properties Group Date first assessed: 04/23/19 [JA] Time first assessed: 1011 [JA] Side: Left [JA] Location: foot [JA] Type: incision [JA] Recorded by:  [JA] Sj Milner RN 04/23/19 1011    Row Name 05/03/19 1057             Plan of Care Review    Plan of Care Reviewed With  patient  -LM      Recorded by [LM] Kamryn Murray, PT 05/03/19 1353      Row Name 05/03/19 1057             Outcome Summary/Treatment Plan (PT)    Daily Summary of Progress (PT)  progress toward functional goals is good  -LM      Recorded by [LM] Kamryn Murray, PT 05/03/19 1353        User Key  (r) = Recorded By, (t) = Taken By, (c) = Cosigned By    Initials Name Effective Dates Discipline    Sj Martin RN 06/16/16 -  Nurse    Kamryn Cho, PT 04/03/18 -  PT          Wound 04/23/19 1011 Left foot incision (Active)   Dressing Appearance dry;intact 5/3/2019 12:00 PM   Closure FUENTES 5/3/2019  8:00 AM   Base clean;red 5/3/2019  5:56 AM   Periwound dry;ecchymotic;redness 5/3/2019  5:56 AM   Drainage Amount none  5/3/2019  8:00 AM           Physical Therapy Education     Title: PT OT SLP Therapies (In Progress)     Topic: Physical Therapy (In Progress)     Point: Mobility training (In Progress)     Learning Progress Summary           Patient Acceptance, E,D, VU,NR by LM at 5/3/2019 10:57 AM    Comment:  Reviewed benefits of activity, indications for taking standing/sitting rest breaks with ambulation, safety with mobility, pursed-lip breathing.    Acceptance, E, NR by AS at 5/1/2019 11:09 AM    Acceptance, E,D, VU,NR by LS at 4/30/2019 10:10 AM    Acceptance, E, NR by KR at 4/29/2019  1:35 PM    Acceptance, E,D, VU,NR by LS at 4/28/2019  8:20 AM    Acceptance, E,D, NR by LS at 4/27/2019 10:31 AM   Family Acceptance, E,D, NR by LS at 4/27/2019 10:31 AM                   Point: Home exercise program (Done)     Learning Progress Summary           Patient Acceptance, E,D, VU,NR by LM at 5/3/2019 10:57 AM    Comment:  Reviewed benefits of activity, indications for taking standing/sitting rest breaks with ambulation, safety with mobility, pursed-lip breathing.    Acceptance, E, NR by AS at 5/1/2019 11:09 AM    Acceptance, E,D, VU,NR by LS at 4/30/2019 10:10 AM    Acceptance, E, NR by KR at 4/29/2019  1:35 PM    Acceptance, E,D, VU,NR by LS at 4/28/2019  8:20 AM                   Point: Body mechanics (In Progress)     Learning Progress Summary           Patient Acceptance, E,D, VU,NR by LM at 5/3/2019 10:57 AM    Comment:  Reviewed benefits of activity, indications for taking standing/sitting rest breaks with ambulation, safety with mobility, pursed-lip breathing.    Acceptance, E, NR by AS at 5/1/2019 11:09 AM    Acceptance, E,D, VU,NR by LS at 4/30/2019 10:10 AM    Acceptance, E, NR by KR at 4/29/2019  1:35 PM    Acceptance, E,D, VU,NR by LS at 4/28/2019  8:20 AM    Acceptance, E,D, NR by LS at 4/27/2019 10:31 AM   Family Acceptance, E,D, NR by SHANNAN at 4/27/2019 10:31 AM                   Point: Precautions (In Progress)      Learning Progress Summary           Patient Acceptance, E,D, VU,NR by LM at 5/3/2019 10:57 AM    Comment:  Reviewed benefits of activity, indications for taking standing/sitting rest breaks with ambulation, safety with mobility, pursed-lip breathing.    Acceptance, E, NR by AS at 5/1/2019 11:09 AM    Acceptance, E,D, VU,NR by LS at 4/30/2019 10:10 AM    Acceptance, E, NR by KR at 4/29/2019  1:35 PM    Acceptance, E,D, VU,NR by LS at 4/28/2019  8:20 AM    Acceptance, E,D, NR by LS at 4/27/2019 10:31 AM   Family Acceptance, E,D, NR by LS at 4/27/2019 10:31 AM                               User Key     Initials Effective Dates Name Provider Type Discipline    AS 06/22/15 -  Dina Coon, PTA Physical Therapy Assistant PT    LS 06/19/15 -  Delia Crandall, PT Physical Therapist PT    KR 04/03/18 -  Malathi Mchugh, PT Physical Therapist PT    LM 04/03/18 -  Kamryn Murray, PT Physical Therapist PT                PT Recommendation and Plan  Therapy Frequency (PT Clinical Impression): daily  Outcome Summary/Treatment Plan (PT)  Daily Summary of Progress (PT): progress toward functional goals is good  Plan of Care Reviewed With: patient  Outcome Summary: Pt increased ambulation distance to 100 feet with CGA, able to maintain WB status throughout. Pt desat to 84% on 6L via NC during activity, able to recover quickly >90% at rest. Pt demonstrated improved independence with bed mobility. Will continue to progress with mobility as able.   Outcome Measures     Row Name 05/03/19 1057 05/01/19 1019          How much help from another person do you currently need...    Turning from your back to your side while in flat bed without using bedrails?  4  -LM  4  -AS     Moving from lying on back to sitting on the side of a flat bed without bedrails?  4  -LM  4  -AS     Moving to and from a bed to a chair (including a wheelchair)?  3  -LM  3  -AS     Standing up from a chair using your arms (e.g., wheelchair, bedside chair)?  3   -LM  3  -AS     Climbing 3-5 steps with a railing?  2  -LM  2  -AS     To walk in hospital room?  3  -LM  3  -AS     AM-PAC 6 Clicks Score  19  -LM  19  -AS        Functional Assessment    Outcome Measure Options  AM-PAC 6 Clicks Basic Mobility (PT)  -LM  AM-PAC 6 Clicks Basic Mobility (PT)  -AS       User Key  (r) = Recorded By, (t) = Taken By, (c) = Cosigned By    Initials Name Provider Type    AS Dina Coon, PTA Physical Therapy Assistant    Kamryn Cho, PT Physical Therapist         Time Calculation:   PT Charges     Row Name 05/03/19 1057             Time Calculation    Start Time  1057  -LM      PT Received On  05/03/19  -LM      PT Goal Re-Cert Due Date  05/07/19  -LM         Time Calculation- PT    Total Timed Code Minutes- PT  20 minute(s)  -LM         Timed Charges    26906 - Gait Training Minutes   8  -LM      11288 - PT Therapeutic Activity Minutes  12  -LM        User Key  (r) = Recorded By, (t) = Taken By, (c) = Cosigned By    Initials Name Provider Type    Kamryn Cho, PT Physical Therapist        Therapy Charges for Today     Code Description Service Date Service Provider Modifiers Qty    28679316702 HC PT THERAPEUTIC ACT EA 15 MIN 5/3/2019 Kamryn Murray, PT GP 1    24114174281 HC PT THER SUPP EA 15 MIN 5/3/2019 Kamryn Murray, PT GP 1          PT G-Codes  Outcome Measure Options: AM-PAC 6 Clicks Basic Mobility (PT)  AM-PAC 6 Clicks Score: 19    Kamryn Murray PT  5/3/2019

## 2019-05-03 NOTE — PLAN OF CARE
Problem: Patient Care Overview  Goal: Plan of Care Review  Outcome: Ongoing (interventions implemented as appropriate)   05/03/19 5714   Coping/Psychosocial   Plan of Care Reviewed With patient   OTHER   Outcome Summary Patient doing better today. Maintaining O2 sat >90% on 6L nasal cannula. Will desat with exertion, however he easily recovers. Walked with therapy today and sat up in the chair most of the afternoon. Dr. Hess changed dressing on left foot. VSS. Will continue to monitor.   Plan of Care Review   Progress improving

## 2019-05-03 NOTE — PROGRESS NOTES
Continued Stay Note  [facility]     Patient Name: Flaco Roque II  MRN: [id number]  Today's Date: 5/3/2019    Admit Date: 4/26/2019    Discharge Plan     Row Name 05/03/19 0920       Plan    Plan  Home w/     Patient/Family in Agreement with Plan  yes    Plan Comments  Patient on 6L of O2, CM will arrange home oxygen closer to dc if unable to wean. Plan remains to return home w/ [facility] for SN, possibly PT, when medically ready. Resume orders will be placed once patient's needs are determined. CM following.     Final Discharge Disposition Code  06 - home with home health care        Discharge Codes    No documentation.       Expected Discharge Date and Time     Expected Discharge Date Expected Discharge Time    May 4, 2019             Jo-Ann Greco RN

## 2019-05-03 NOTE — PLAN OF CARE
Problem: Patient Care Overview  Goal: Plan of Care Review  Outcome: Ongoing (interventions implemented as appropriate)   05/03/19 7467   Coping/Psychosocial   Plan of Care Reviewed With patient   OTHER   Outcome Summary Pt increased ambulation distance to 100 feet with CGA, able to maintain WB status throughout. Pt desat to 84% on 6L via NC during activity, able to recover quickly >90% at rest. Pt demonstrated improved independence with bed mobility. Will continue to progress with mobility as able.

## 2019-05-04 PROCEDURE — 25010000002 METHYLNALTREXONE 12 MG/0.6ML SOLUTION: Performed by: INTERNAL MEDICINE

## 2019-05-04 PROCEDURE — 94799 UNLISTED PULMONARY SVC/PX: CPT

## 2019-05-04 PROCEDURE — 25010000002 ONDANSETRON PER 1 MG: Performed by: PHYSICIAN ASSISTANT

## 2019-05-04 PROCEDURE — 99233 SBSQ HOSP IP/OBS HIGH 50: CPT | Performed by: INTERNAL MEDICINE

## 2019-05-04 PROCEDURE — 25010000002 FUROSEMIDE PER 20 MG: Performed by: INTERNAL MEDICINE

## 2019-05-04 PROCEDURE — 97110 THERAPEUTIC EXERCISES: CPT

## 2019-05-04 PROCEDURE — 25010000002 PIPERACILLIN SOD-TAZOBACTAM PER 1 G: Performed by: NURSE PRACTITIONER

## 2019-05-04 PROCEDURE — 97116 GAIT TRAINING THERAPY: CPT

## 2019-05-04 RX ORDER — FUROSEMIDE 10 MG/ML
40 INJECTION INTRAMUSCULAR; INTRAVENOUS ONCE
Status: COMPLETED | OUTPATIENT
Start: 2019-05-04 | End: 2019-05-04

## 2019-05-04 RX ORDER — BISACODYL 10 MG
10 SUPPOSITORY, RECTAL RECTAL DAILY PRN
Status: DISCONTINUED | OUTPATIENT
Start: 2019-05-04 | End: 2019-05-07 | Stop reason: HOSPADM

## 2019-05-04 RX ORDER — GUAIFENESIN 600 MG/1
1200 TABLET, EXTENDED RELEASE ORAL EVERY 12 HOURS SCHEDULED
Status: DISCONTINUED | OUTPATIENT
Start: 2019-05-04 | End: 2019-05-07 | Stop reason: HOSPADM

## 2019-05-04 RX ADMIN — TAMSULOSIN HYDROCHLORIDE 0.4 MG: 0.4 CAPSULE ORAL at 08:28

## 2019-05-04 RX ADMIN — TAZOBACTAM SODIUM AND PIPERACILLIN SODIUM 3.38 G: 375; 3 INJECTION, SOLUTION INTRAVENOUS at 06:44

## 2019-05-04 RX ADMIN — GUAIFENESIN 1200 MG: 600 TABLET, EXTENDED RELEASE ORAL at 10:31

## 2019-05-04 RX ADMIN — Medication 10 MG: at 17:04

## 2019-05-04 RX ADMIN — FUROSEMIDE 40 MG: 10 INJECTION, SOLUTION INTRAMUSCULAR; INTRAVENOUS at 10:31

## 2019-05-04 RX ADMIN — PANTOPRAZOLE SODIUM 40 MG: 40 TABLET, DELAYED RELEASE ORAL at 08:28

## 2019-05-04 RX ADMIN — CARBIDOPA AND LEVODOPA 1 TABLET: 25; 100 TABLET ORAL at 08:28

## 2019-05-04 RX ADMIN — IPRATROPIUM BROMIDE AND ALBUTEROL SULFATE 3 ML: 2.5; .5 SOLUTION RESPIRATORY (INHALATION) at 13:07

## 2019-05-04 RX ADMIN — TAZOBACTAM SODIUM AND PIPERACILLIN SODIUM 3.38 G: 375; 3 INJECTION, SOLUTION INTRAVENOUS at 20:41

## 2019-05-04 RX ADMIN — IPRATROPIUM BROMIDE AND ALBUTEROL SULFATE 3 ML: 2.5; .5 SOLUTION RESPIRATORY (INHALATION) at 15:30

## 2019-05-04 RX ADMIN — CARBIDOPA AND LEVODOPA 1 TABLET: 25; 100 TABLET ORAL at 20:41

## 2019-05-04 RX ADMIN — METHYLNALTREXONE BROMIDE 12 MG: 12 INJECTION, SOLUTION SUBCUTANEOUS at 15:11

## 2019-05-04 RX ADMIN — BUDESONIDE AND FORMOTEROL FUMARATE DIHYDRATE 2 PUFF: 80; 4.5 AEROSOL RESPIRATORY (INHALATION) at 19:14

## 2019-05-04 RX ADMIN — ONDANSETRON 4 MG: 2 INJECTION INTRAMUSCULAR; INTRAVENOUS at 16:18

## 2019-05-04 RX ADMIN — CARBIDOPA AND LEVODOPA 1 TABLET: 25; 100 TABLET ORAL at 17:04

## 2019-05-04 RX ADMIN — TAZOBACTAM SODIUM AND PIPERACILLIN SODIUM 3.38 G: 375; 3 INJECTION, SOLUTION INTRAVENOUS at 13:58

## 2019-05-04 RX ADMIN — IPRATROPIUM BROMIDE AND ALBUTEROL SULFATE 3 ML: 2.5; .5 SOLUTION RESPIRATORY (INHALATION) at 19:14

## 2019-05-04 RX ADMIN — CARBIDOPA AND LEVODOPA 1 TABLET: 25; 100 TABLET ORAL at 12:06

## 2019-05-04 RX ADMIN — POLYETHYLENE GLYCOL 3350 17 G: 17 POWDER, FOR SOLUTION ORAL at 08:28

## 2019-05-04 RX ADMIN — BUDESONIDE AND FORMOTEROL FUMARATE DIHYDRATE 2 PUFF: 80; 4.5 AEROSOL RESPIRATORY (INHALATION) at 07:36

## 2019-05-04 RX ADMIN — GUAIFENESIN 1200 MG: 600 TABLET, EXTENDED RELEASE ORAL at 20:41

## 2019-05-04 RX ADMIN — IPRATROPIUM BROMIDE AND ALBUTEROL SULFATE 3 ML: 2.5; .5 SOLUTION RESPIRATORY (INHALATION) at 07:36

## 2019-05-04 NOTE — PROGRESS NOTES
[facility] Medicine Services  PROGRESS NOTE    Patient Name: Flaco Roque II  : 1945  MRN: [id number]    Date of Admission: 2019  Length of Stay: 8  Primary Care Physician: Tayo Hendrix MD    Subjective   Subjective     CC:  F/u respiratory failure and hypovolemia    HPI:  Patient sitting up in bed. Still hasn't had bowel movement. Breathing is unchanged, having pain in right side.    Review of Systems  Gen- No fevers, chills  CV- No chest pain, palpitations  Resp- + for cough and dypsnea  GI- No N/V/D, abd pain    Otherwise ROS is negative except as mentioned in the HPI.    Objective   Objective     Vital Signs:   Temp:  [98.1 °F (36.7 °C)-98.4 °F (36.9 °C)] 98.4 °F (36.9 °C)  Heart Rate:  [] 88  Resp:  [16-20] 20  BP: ()/(46-70) 116/69        Physical Exam:  Constitutional: No acute distress, awake, alert  HENT: NCAT, mucous membranes moist  Respiratory: fine crackles in bases, poor air movement,weaned to nasal cannula, still on 6L  Cardiovascular: RRR, no murmurs, rubs, or gallops, palpable pedal pulses bilaterally  Gastrointestinal: Positive bowel sounds, soft, nontender, nondistended  Musculoskeletal: left foot in post-op boot  Psychiatric: Appropriate affect, cooperative  Neurologic: Oriented x 3, strength symmetric in all extremities, Cranial Nerves grossly intact to confrontation, speech clear  Skin: No rashes      Results Reviewed:  I have personally reviewed current lab, radiology, and data and agree.    Results from last 7 days   Lab Units 19  0531 19  0557 19  0526   WBC 10*3/mm3 10.52 10.33 10.55   HEMOGLOBIN g/dL 8.7* 8.2* 7.7*   HEMATOCRIT % 29.7* 27.9* 25.1*   PLATELETS 10*3/mm3 497* 431 390     Results from last 7 days   Lab Units 19  0623 19  0557 19  0526  19  0554   SODIUM mmol/L 135* 133* 132*   < > 132*   POTASSIUM mmol/L 4.0 4.1 4.4   < > 4.2   CHLORIDE mmol/L 99 98 97*   < > 99   CO2  mmol/L 23.0 23.0 21.0*   < > 20.0*   BUN mg/dL 5* 5* 5*   < > 8   CREATININE mg/dL 0.51* 0.55* 0.51*   < > 0.67*   GLUCOSE mg/dL 103* 103* 102*   < > 93   CALCIUM mg/dL 8.9 8.8 8.2*   < > 8.1*   ALT (SGPT) U/L  --   --   --   --  5   AST (SGOT) U/L  --   --   --   --  25    < > = values in this interval not displayed.     Estimated Creatinine Clearance: 79.1 mL/min (A) (by C-G formula based on SCr of 0.51 mg/dL (L)).    No results found for: BNP    Microbiology Results Abnormal     Procedure Component Value - Date/Time    Respiratory Culture - Sputum, Cough [[id number]] Collected:  05/01/19 1750    Lab Status:  Final result Specimen:  Sputum from Cough Updated:  05/03/19 0855     Respiratory Culture Scant growth (1+) Normal Respiratory Yolanda     Gram Stain Few (2+) WBCs per low power field      Occasional Epithelial cells per low power field      Few (2+) Gram positive cocci      Few (2+) Gram variable bacilli    Blood Culture - Blood, Arm, Left [[id number]] Collected:  04/26/19 1320    Lab Status:  Final result Specimen:  Blood from Arm, Left Updated:  05/01/19 1401     Blood Culture No growth at 5 days    Blood Culture - Blood, Arm, Right [[id number]] Collected:  04/26/19 1310    Lab Status:  Final result Specimen:  Blood from Arm, Right Updated:  05/01/19 1401     Blood Culture No growth at 5 days          Imaging Results (last 24 hours)     ** No results found for the last 24 hours. **               I have reviewed the medications:    Current Facility-Administered Medications:   •  acetaminophen (TYLENOL) tablet 650 mg, 650 mg, Oral, Q6H PRN, Milton Sow APRN, 650 mg at 04/29/19 1009  •  budesonide-formoterol (SYMBICORT) 80-4.5 MCG/ACT inhaler 2 puff, 2 puff, Inhalation, BID - RT, Sunshine Loyd APRN, 2 puff at 05/04/19 0736  •  carbidopa-levodopa (SINEMET)  MG per tablet 1 tablet, 1 tablet, Oral, 4x Daily, Sunshine Loyd, YAMILET, 1 tablet at 05/04/19 1209  •  guaiFENesin (MUCINEX) 12 hr tablet 1,200  mg, 1,200 mg, Oral, Q12H, Ynaira Salazar, DO, 1,200 mg at 05/04/19 1031  •  ipratropium-albuterol (DUO-NEB) nebulizer solution 3 mL, 3 mL, Nebulization, Q6H PRN, Sunshine Loyd APRN  •  ipratropium-albuterol (DUO-NEB) nebulizer solution 3 mL, 3 mL, Nebulization, 4x Daily - RT, Sunshine Loyd, APRN, 3 mL at 05/04/19 0736  •  Magnesium Sulfate 2 gram Bolus, followed by 8 gram infusion (total Mg dose 10 grams)- Mg less than or equal to 1mg/dL, 2 g, Intravenous, PRN **OR** Magnesium Sulfate 2 gram / 50mL Infusion (GIVE X 3 BAGS TO EQUAL 6GM TOTAL DOSE) - Mg 1.1 - 1.5 mg/dl, 2 g, Intravenous, PRN **OR** Magnesium Sulfate 4 gram infusion- Mg 1.6-1.9 mg/dL, 4 g, Intravenous, PRN, Marjorie Simon MD, Last Rate: 25 mL/hr at 04/30/19 0821, 4 g at 04/30/19 0821  •  mupirocin (BACTROBAN) 2 % ointment, , Topical, Q4 Days, Juhi Calle MD  •  ondansetron (ZOFRAN) injection 4 mg, 4 mg, Intravenous, Q6H PRN, Dalton Almodovar PA-C, 4 mg at 05/03/19 2143  •  oxyCODONE-acetaminophen (PERCOCET) 5-325 MG per tablet 1 tablet, 1 tablet, Oral, Q4H PRN, Simon Pereira MD, 1 tablet at 05/03/19 1230  •  pantoprazole (PROTONIX) EC tablet 40 mg, 40 mg, Oral, QA AC, Marjorie Simon MD, 40 mg at 05/04/19 0828  •  piperacillin-tazobactam (ZOSYN) 3.375 g in iso-osmotic dextrose 50 ml (premix), 3.375 g, Intravenous, Q8H, Sunshine Loyd, APRN, 3.375 g at 05/04/19 0644  •  polyethylene glycol 3350 powder (packet), 17 g, Oral, Daily, Rachelle Clark PA, 17 g at 05/04/19 0828  •  potassium chloride (MICRO-K) CR capsule 40 mEq, 40 mEq, Oral, PRN, 40 mEq at 04/29/19 1405 **OR** potassium chloride (KLOR-CON) packet 40 mEq, 40 mEq, Oral, PRN **OR** potassium chloride 10 mEq in 100 mL IVPB, 10 mEq, Intravenous, Q1H PRN, Marjorie Simon MD  •  sodium chloride 0.9 % flush 10 mL, 10 mL, Intravenous, PRN, Alfonzo Monzon MD  •  tamsulosin (FLOMAX) 24 hr capsule 0.4 mg, 0.4 mg, Oral, Daily, Marjorie Simon MD,  0.4 mg at 05/04/19 0828      Assessment/Plan   Assessment / Plan     Active Hospital Problems    Diagnosis POA   • **Hypotension due to hypovolemia [I95.89, E86.1] Yes   • ROSALIA (acute kidney injury) (CMS/[facility]) [N17.9] Yes   • Parkinson disease (CMS/[facility]) [G20] Yes   • GI bleed [K92.2] Yes   • S/P foot surgery, left [Z98.890] Not Applicable   • Anemia [D64.9] Yes   • ABRIL (obstructive sleep apnea) [G47.33] Yes   • COPD (chronic obstructive pulmonary disease) (CMS/[facility]) [J44.9] Yes          Brief Hospital Course to date:  Flaco Roque II is a 73 y.o. male with PMHx significant for COPD, ABRIL, chronic pain, GERD, parkinson's disease and recent left foot surgery (d/c on 4/24) who presented with vomiting and diarrhea. Initially admitted to the ICU for hypotension and hypoxia.    Acute Hypoxic Respiratory Failure  LLL PNA  COPD  - still on 6L NC, wean as able  - CT reviewed, negative for PE. Shows extensive and diffuse pulmonary interstitial disease, some of which is chronic. Overall concerning for increased edema. BNP WNL. Repeat IV lasix today. If no improvement will have pulmonary re-evaluate.   - continue Zosyn for possible aspiration pneumonia  - continue duonbs and symbicort  - final sputum culture pending  - Echo ordered today  - add mucinex     Melena:  Acute blood Loss Anemia  - GI following, defer EGD for now given degree of hypoxia  - IV Iron   - continue BID PPI  - transfuse PRN Hgb <7, s/p 1unit PRBCs on 4/27  - BM with relistor    Hypotension  ROSALIA  - resolved with hydration    Parkinson's Disease:  - Sinemet    S/p Left Foot Surgery  - Dr. Calle following    DVT Prophylaxis: Mechanical given GIB    Disposition: I expect the patient to be discharged TBD    CODE STATUS:   Code Status and Medical Interventions:   Ordered at: 04/26/19 2111     Code Status:    CPR     Medical Interventions (Level of Support Prior to Arrest):    Full         Electronically signed by Yanira Salazar DO, 05/04/19, 12:30 PM.

## 2019-05-04 NOTE — PLAN OF CARE
Problem: Patient Care Overview  Goal: Plan of Care Review  Outcome: Ongoing (interventions implemented as appropriate)   05/04/19 3270   Coping/Psychosocial   Plan of Care Reviewed With patient   OTHER   Outcome Summary pt. ambulated 100 feet with step to gait pattern, 1 standing rest break due to SOA. SaO2 88-94% on 6 liter per NC.   Plan of Care Review   Progress improving

## 2019-05-04 NOTE — THERAPY TREATMENT NOTE
Acute Care - Physical Therapy Treatment Note  [facility]     Patient Name: Flaco Roque II  : 1945  MRN: [id number]  Today's Date: 2019  Onset of Illness/Injury or Date of Surgery: 19  Date of Referral to PT: 19  Referring Physician: MD Luc    Admit Date: 2019    Visit Dx:    ICD-10-CM ICD-9-CM   1. Iron deficiency anemia, unspecified iron deficiency anemia type D50.9 280.9   2. Pneumonia of both lower lobes due to infectious organism (CMS/[facility]) J18.1 483.8   3. Sepsis, due to unspecified organism (CMS/[facility]) A41.9 038.9     995.91   4. Acute respiratory failure with hypoxia (CMS/[facility]) J96.01 518.81   5. Impaired functional mobility, balance, gait, and endurance Z74.09 V49.89   6. Therapeutic opioid induced constipation K59.03 564.09    T40.2X5A E935.2     Patient Active Problem List   Diagnosis   • ABRIL (obstructive sleep apnea)   • COPD (chronic obstructive pulmonary disease) (CMS/[facility])   • Anemia   • Leukocytosis, likely reactive   • Acute postoperative pain   • Deformity of left foot   • S/P foot surgery, left   • Hypotension due to hypovolemia   • ROSALIA (acute kidney injury) (CMS/[facility])   • Parkinson disease (CMS/HCC)   • GI bleed       Therapy Treatment    Rehabilitation Treatment Summary     Row Name 19 0830             Treatment Time/Intention    Discipline  physical therapy assistant  -AS      Document Type  therapy note (daily note)  -AS      Subjective Information  no complaints  -AS      Mode of Treatment  physical therapy  -AS      Patient/Family Observations  no family present, patient agreed to therapy.  -AS      Patient Effort  good  -AS      Existing Precautions/Restrictions  fall;oxygen therapy device and L/min;other (see comments) LLE WBAT through heel with boot  -AS      Recorded by [AS] Dina Coon, RAVI 19 0978      Row Name 19 0895             Cognitive Assessment/Intervention- PT/OT    Affect/Mental Status (Cognitive)  WFL  -AS       Orientation Status (Cognition)  oriented x 4  -AS      Follows Commands (Cognition)  WFL  -AS      Safety Deficit (Cognitive)  mild deficit;safety precautions awareness;safety precautions follow-through/compliance  -AS      Personal Safety Interventions  fall prevention program maintained;gait belt;nonskid shoes/slippers when out of bed;other (see comments) exit alarm  -AS      Recorded by [AS] Dina Coon, PTA 05/04/19 0922      Row Name 05/04/19 0830             Bed Mobility Assessment/Treatment    Supine-Sit [place] (Bed Mobility)  conditional independence  -AS      Comment (Bed Mobility)  patient demonstrated safe technique  -AS      Recorded by [AS] Dina Coon, PTA 05/04/19 0922      Row Name 05/04/19 0830             Sit-Stand Transfer    Sit-Stand [place] (Transfers)  verbal cues;contact guard  -AS      Assistive Device (Sit-Stand Transfers)  walker, front-wheeled  -AS      Recorded by [AS] Dina Coon, [facility] 05/04/19 0922      Row Name 05/04/19 0830             Stand-Sit Transfer    Stand-Sit [place] (Transfers)  verbal cues;contact guard  -AS      Assistive Device (Stand-Sit Transfers)  walker, front-wheeled  -AS      Recorded by [AS] Dina Coon, PTA 05/04/19 0922      Row Name 05/04/19 0830             Gait/Stairs Assessment/Training    53019 - Gait Training Minutes   15  -AS      Gait/Stairs Assessment/Training  gait/ambulation assistive device  -AS      [place] Level (Gait)  verbal cues;contact guard  -AS      Assistive Device (Gait)  walker, front-wheeled  -AS      Distance in Feet (Gait)  100  -AS      Pattern (Gait)  step-to  -AS      Deviations/Abnormal Patterns (Gait)  left sided deviations;antalgic;bilateral deviations;krunal decreased;gait speed decreased  -AS      Comment (Gait/Stairs)  patient ambulated 100 feet with step to gait pattern, 1 standing rest break due to SOA. SaO2 88-94% on 6 liter per NC.  -AS      Recorded by [AS] Dina Coon  [place], [facility] 05/04/19 0922      Row Name 05/04/19 0830             Motor Skills Assessment/Interventions    Additional Documentation  Therapeutic Exercise (Group)  -AS      Recorded by [AS] Dina Coon, [facility] 05/04/19 0922      Row Name 05/04/19 0830             Therapeutic Exercise    16580 - PT Therapeutic Exercise Minutes  8  -AS      Recorded by [AS] Dina Coon, [facility] 05/04/19 0922      Row Name 05/04/19 0830             Therapeutic Exercise    Upper Extremity Range of Motion (Therapeutic Exercise)  shoulder flexion/extension, bilateral;shoulder abduction/adduction, bilateral;elbow flexion/extension, bilateral  -AS      Lower Extremity Range of Motion (Therapeutic Exercise)  hip flexion/extension, bilateral;knee flexion/extension, bilateral  -AS      Exercise Type (Therapeutic Exercise)  AROM (active range of motion)  -AS      Position (Therapeutic Exercise)  seated  -AS      Sets/Reps (Therapeutic Exercise)  1/10  -AS      Recorded by [AS] Dina Coon, [facility] 05/04/19 0922      Row Name 05/04/19 0830             Positioning and Restraints    Pre-Treatment Position  in bed  -AS      Post Treatment Position  chair  -AS      In Chair  reclined;call light within reach;encouraged to call for assist;exit alarm on  -AS      Recorded by [AS] Dina Coon, [facility] 05/04/19 0922      Row Name 05/04/19 0830             Pain Scale: Numbers Pre/Post-Treatment    Pain Scale: Numbers, Pretreatment  0/10 - no pain  -AS      Pain Scale: Numbers, Post-Treatment  0/10 - no pain  -AS      Recorded by [AS] Dina Coon, [facility] 05/04/19 0922      Row Name                Wound 04/23/19 1011 Left foot incision    Wound - Properties Group Date first assessed: 04/23/19 [JA] Time first assessed: 1011 [JA] Side: Left [JA] Location: foot [JA] Type: incision [JA] Recorded by:  [JA] Sj Milner RN 04/23/19 1011      User Key  (r) = Recorded By, (t) = Taken By, (c) = Cosigned By    Initials Name Effective Dates  Discipline    AS Dina Coon, RAVI 06/22/15 -  PT    Sj Martin RN 06/16/16 -  Nurse          Wound 04/23/19 1011 Left foot incision (Active)   Dressing Appearance dry;intact 5/4/2019  8:30 AM   Closure FUENTES 5/4/2019  8:30 AM   Base clean 5/3/2019  3:27 PM   Drainage Amount none 5/4/2019  8:30 AM   Dressing Care, Wound gauze;elastic bandage 5/4/2019  8:30 AM           Physical Therapy Education     Title: PT OT SLP Therapies (In Progress)     Topic: Physical Therapy (In Progress)     Point: Mobility training (In Progress)     Learning Progress Summary           Patient Acceptance, E, NR by AS at 5/4/2019  9:22 AM    Acceptance, E,D, VU,NR by LM at 5/3/2019 10:57 AM    Comment:  Reviewed benefits of activity, indications for taking standing/sitting rest breaks with ambulation, safety with mobility, pursed-lip breathing.    Acceptance, E, NR by AS at 5/1/2019 11:09 AM    Acceptance, E,D, VU,NR by LS at 4/30/2019 10:10 AM    Acceptance, E, NR by KR at 4/29/2019  1:35 PM    Acceptance, E,D, VU,NR by LS at 4/28/2019  8:20 AM    Acceptance, E,D, NR by LS at 4/27/2019 10:31 AM   Family Acceptance, E,D, NR by LS at 4/27/2019 10:31 AM                   Point: Home exercise program (In Progress)     Learning Progress Summary           Patient Acceptance, E, NR by AS at 5/4/2019  9:22 AM    Acceptance, E,D, VU,NR by LM at 5/3/2019 10:57 AM    Comment:  Reviewed benefits of activity, indications for taking standing/sitting rest breaks with ambulation, safety with mobility, pursed-lip breathing.    Acceptance, E, NR by AS at 5/1/2019 11:09 AM    Acceptance, E,D, VU,NR by LS at 4/30/2019 10:10 AM    Acceptance, E, NR by KR at 4/29/2019  1:35 PM    Acceptance, E,D, VU,NR by LS at 4/28/2019  8:20 AM                   Point: Body mechanics (In Progress)     Learning Progress Summary           Patient Acceptance, E, NR by AS at 5/4/2019  9:22 AM    Acceptance, NIKHIL COLMENARES VU,NR by SALMA at 5/3/2019 10:57 AM    Comment:   Reviewed benefits of activity, indications for taking standing/sitting rest breaks with ambulation, safety with mobility, pursed-lip breathing.    Acceptance, E, NR by AS at 5/1/2019 11:09 AM    Acceptance, E,D, VU,NR by LS at 4/30/2019 10:10 AM    Acceptance, E, NR by KR at 4/29/2019  1:35 PM    Acceptance, E,D, VU,NR by LS at 4/28/2019  8:20 AM    Acceptance, E,D, NR by LS at 4/27/2019 10:31 AM   Family Acceptance, E,D, NR by LS at 4/27/2019 10:31 AM                   Point: Precautions (In Progress)     Learning Progress Summary           Patient Acceptance, E, NR by AS at 5/4/2019  9:22 AM    Acceptance, E,D, VU,NR by LM at 5/3/2019 10:57 AM    Comment:  Reviewed benefits of activity, indications for taking standing/sitting rest breaks with ambulation, safety with mobility, pursed-lip breathing.    Acceptance, E, NR by AS at 5/1/2019 11:09 AM    Acceptance, E,D, VU,NR by LS at 4/30/2019 10:10 AM    Acceptance, E, NR by KR at 4/29/2019  1:35 PM    Acceptance, E,D, VU,NR by LS at 4/28/2019  8:20 AM    Acceptance, E,D, NR by LS at 4/27/2019 10:31 AM   Family Acceptance, E,D, NR by LS at 4/27/2019 10:31 AM                               User Key     Initials Effective Dates Name Provider Type Discipline    AS 06/22/15 -  Dina Coon, PTA Physical Therapy Assistant PT     06/19/15 -  Delia Crandall, PT Physical Therapist PT    KR 04/03/18 -  Malathi Mchugh, PT Physical Therapist PT     04/03/18 -  Kamryn Murray, PT Physical Therapist PT                PT Recommendation and Plan     Plan of Care Reviewed With: patient  Progress: improving  Outcome Summary: pt. ambulated 100 feet with step to gait pattern, 1 standing rest break due to SOA. SaO2 88-94% on 6 liter per NC.  Outcome Measures     Row Name 05/03/19 1057 05/01/19 1019          How much help from another person do you currently need...    Turning from your back to your side while in flat bed without using bedrails?  4  -LM  4  -AS     Moving  from lying on back to sitting on the side of a flat bed without bedrails?  4  -LM  4  -AS     Moving to and from a bed to a chair (including a wheelchair)?  3  -LM  3  -AS     Standing up from a chair using your arms (e.g., wheelchair, bedside chair)?  3  -LM  3  -AS     Climbing 3-5 steps with a railing?  2  -LM  2  -AS     To walk in hospital room?  3  -LM  3  -AS     AM-PAC 6 Clicks Score  19  -LM  19  -AS        Functional Assessment    Outcome Measure Options  AM-PAC 6 Clicks Basic Mobility (PT)  -LM  AM-PAC 6 Clicks Basic Mobility (PT)  -AS       User Key  (r) = Recorded By, (t) = Taken By, (c) = Cosigned By    Initials Name Provider Type    AS Dina Coon PTA Physical Therapy Assistant    Kamryn Cho, PT Physical Therapist         Time Calculation:   PT Charges     Row Name 05/04/19 0830             Time Calculation    Start Time  0830  -AS      PT Received On  05/04/19  -AS      PT Goal Re-Cert Due Date  05/07/19  -AS         Timed Charges    50400 - PT Therapeutic Exercise Minutes  8  -AS      22301 - Gait Training Minutes   15  -AS        User Key  (r) = Recorded By, (t) = Taken By, (c) = Cosigned By    Initials Name Provider Type    AS Dina Coon PTA Physical Therapy Assistant        Therapy Charges for Today     Code Description Service Date Service Provider Modifiers Qty    89990143969 HC PT THER PROC EA 15 MIN 5/4/2019 Dina Coon, RAVI GP 1    60981675793 HC GAIT TRAINING EA 15 MIN 5/4/2019 Dina Coon PTA GP 1          PT G-Codes  Outcome Measure Options: AM-PAC 6 Clicks Basic Mobility (PT)  AM-PAC 6 Clicks Score: 19    Dina Coon PTA  5/4/2019

## 2019-05-05 ENCOUNTER — APPOINTMENT (OUTPATIENT)
Dept: CARDIOLOGY | Facility: HOSPITAL | Age: 74
End: 2019-05-05

## 2019-05-05 LAB
ANION GAP SERPL CALCULATED.3IONS-SCNC: 13 MMOL/L
BASOPHILS # BLD AUTO: 0.06 10*3/MM3 (ref 0–0.2)
BASOPHILS NFR BLD AUTO: 0.6 % (ref 0–1.5)
BH CV ECHO MEAS - AO MAX PG (FULL): 4.8 MMHG
BH CV ECHO MEAS - AO MAX PG: 8.1 MMHG
BH CV ECHO MEAS - AO MEAN PG (FULL): 2.9 MMHG
BH CV ECHO MEAS - AO MEAN PG: 4.7 MMHG
BH CV ECHO MEAS - AO ROOT AREA (BSA CORRECTED): 2
BH CV ECHO MEAS - AO ROOT AREA: 10.8 CM^2
BH CV ECHO MEAS - AO ROOT DIAM: 3.7 CM
BH CV ECHO MEAS - AO V2 MAX: 142.5 CM/SEC
BH CV ECHO MEAS - AO V2 MEAN: 102.6 CM/SEC
BH CV ECHO MEAS - AO V2 VTI: 27.5 CM
BH CV ECHO MEAS - AVA(I,A): 2.7 CM^2
BH CV ECHO MEAS - AVA(I,D): 2.7 CM^2
BH CV ECHO MEAS - AVA(V,A): 2.6 CM^2
BH CV ECHO MEAS - AVA(V,D): 2.6 CM^2
BH CV ECHO MEAS - BSA(HAYCOCK): 1.8 M^2
BH CV ECHO MEAS - BSA: 1.8 M^2
BH CV ECHO MEAS - BZI_BMI: 22.8 KILOGRAMS/M^2
BH CV ECHO MEAS - BZI_METRIC_HEIGHT: 172.7 CM
BH CV ECHO MEAS - BZI_METRIC_WEIGHT: 68 KG
BH CV ECHO MEAS - EDV(CUBED): 121.5 ML
BH CV ECHO MEAS - EDV(MOD-SP2): 69 ML
BH CV ECHO MEAS - EDV(MOD-SP4): 111 ML
BH CV ECHO MEAS - EDV(TEICH): 115.7 ML
BH CV ECHO MEAS - EF(CUBED): 79.1 %
BH CV ECHO MEAS - EF(MOD-BP): 65 %
BH CV ECHO MEAS - EF(MOD-SP2): 60.9 %
BH CV ECHO MEAS - EF(MOD-SP4): 70.3 %
BH CV ECHO MEAS - EF(TEICH): 71.3 %
BH CV ECHO MEAS - ESV(CUBED): 25.3 ML
BH CV ECHO MEAS - ESV(MOD-SP2): 27 ML
BH CV ECHO MEAS - ESV(MOD-SP4): 33 ML
BH CV ECHO MEAS - ESV(TEICH): 33.2 ML
BH CV ECHO MEAS - FS: 40.7 %
BH CV ECHO MEAS - IVS/LVPW: 1
BH CV ECHO MEAS - IVSD: 0.98 CM
BH CV ECHO MEAS - LAD MAJOR: 5.1 CM
BH CV ECHO MEAS - LAT PEAK E' VEL: 8.6 CM/SEC
BH CV ECHO MEAS - LV DIASTOLIC VOL/BSA (35-75): 61.4 ML/M^2
BH CV ECHO MEAS - LV MASS(C)D: 172.4 GRAMS
BH CV ECHO MEAS - LV MASS(C)DI: 95.3 GRAMS/M^2
BH CV ECHO MEAS - LV MAX PG: 3.4 MMHG
BH CV ECHO MEAS - LV MEAN PG: 1.8 MMHG
BH CV ECHO MEAS - LV SYSTOLIC VOL/BSA (12-30): 18.2 ML/M^2
BH CV ECHO MEAS - LV V1 MAX: 91.8 CM/SEC
BH CV ECHO MEAS - LV V1 MEAN: 62.8 CM/SEC
BH CV ECHO MEAS - LV V1 VTI: 18.1 CM
BH CV ECHO MEAS - LVIDD: 5 CM
BH CV ECHO MEAS - LVIDS: 2.9 CM
BH CV ECHO MEAS - LVLD AP2: 7.9 CM
BH CV ECHO MEAS - LVLD AP4: 7.7 CM
BH CV ECHO MEAS - LVLS AP2: 6.4 CM
BH CV ECHO MEAS - LVLS AP4: 6 CM
BH CV ECHO MEAS - LVOT AREA (M): 4.2 CM^2
BH CV ECHO MEAS - LVOT AREA: 4.1 CM^2
BH CV ECHO MEAS - LVOT DIAM: 2.3 CM
BH CV ECHO MEAS - LVPWD: 0.96 CM
BH CV ECHO MEAS - MED PEAK E' VEL: 7.5 CM/SEC
BH CV ECHO MEAS - MV MAX PG: 3.2 MMHG
BH CV ECHO MEAS - MV MEAN PG: 1.7 MMHG
BH CV ECHO MEAS - MV V2 MAX: 89.6 CM/SEC
BH CV ECHO MEAS - MV V2 MEAN: 63.4 CM/SEC
BH CV ECHO MEAS - MV V2 VTI: 28.6 CM
BH CV ECHO MEAS - MVA(VTI): 2.6 CM^2
BH CV ECHO MEAS - PA ACC SLOPE: 629 CM/SEC^2
BH CV ECHO MEAS - PA ACC TIME: 0.14 SEC
BH CV ECHO MEAS - PA MAX PG: 3.7 MMHG
BH CV ECHO MEAS - PA PR(ACCEL): 17.2 MMHG
BH CV ECHO MEAS - PA V2 MAX: 95.7 CM/SEC
BH CV ECHO MEAS - RVSP: 20 MMHG
BH CV ECHO MEAS - SI(AO): 163.5 ML/M^2
BH CV ECHO MEAS - SI(CUBED): 53.2 ML/M^2
BH CV ECHO MEAS - SI(LVOT): 40.7 ML/M^2
BH CV ECHO MEAS - SI(MOD-SP2): 23.2 ML/M^2
BH CV ECHO MEAS - SI(MOD-SP4): 43.1 ML/M^2
BH CV ECHO MEAS - SI(TEICH): 45.6 ML/M^2
BH CV ECHO MEAS - SV(AO): 295.8 ML
BH CV ECHO MEAS - SV(CUBED): 96.2 ML
BH CV ECHO MEAS - SV(LVOT): 73.7 ML
BH CV ECHO MEAS - SV(MOD-SP2): 42 ML
BH CV ECHO MEAS - SV(MOD-SP4): 78 ML
BH CV ECHO MEAS - SV(TEICH): 82.5 ML
BH CV ECHO MEAS - TAPSE (>1.6): 3.1 CM2
BH CV ECHO MEAS - TR MAX PG: 17 MMHG
BH CV ECHO MEAS - TR MAX VEL: 202.2 CM/SEC
BH CV VAS BP RIGHT ARM: NORMAL MMHG
BH CV XLRA - RV BASE: 2 CM
BH CV XLRA - RV LENGTH: 6.8 CM
BH CV XLRA - RV MID: 2 CM
BH CV XLRA - TDI S': 15.2 CM/SEC
BUN BLD-MCNC: 8 MG/DL (ref 8–23)
BUN/CREAT SERPL: 12.5 (ref 7–25)
CALCIUM SPEC-SCNC: 8.6 MG/DL (ref 8.6–10.5)
CHLORIDE SERPL-SCNC: 94 MMOL/L (ref 98–107)
CO2 SERPL-SCNC: 25 MMOL/L (ref 22–29)
CREAT BLD-MCNC: 0.64 MG/DL (ref 0.76–1.27)
DEPRECATED RDW RBC AUTO: 52.6 FL (ref 37–54)
EOSINOPHIL # BLD AUTO: 0.29 10*3/MM3 (ref 0–0.4)
EOSINOPHIL NFR BLD AUTO: 2.9 % (ref 0.3–6.2)
ERYTHROCYTE [DISTWIDTH] IN BLOOD BY AUTOMATED COUNT: 21.9 % (ref 12.3–15.4)
GFR SERPL CREATININE-BSD FRML MDRD: 123 ML/MIN/1.73
GLUCOSE BLD-MCNC: 95 MG/DL (ref 65–99)
HCT VFR BLD AUTO: 29.1 % (ref 37.5–51)
HGB BLD-MCNC: 8.6 G/DL (ref 13–17.7)
IMM GRANULOCYTES # BLD AUTO: 0.07 10*3/MM3 (ref 0–0.05)
IMM GRANULOCYTES NFR BLD AUTO: 0.7 % (ref 0–0.5)
LEFT ATRIUM VOLUME INDEX: 37.6 ML/M^2
LEFT ATRIUM VOLUME: 68 ML
LYMPHOCYTES # BLD AUTO: 1.16 10*3/MM3 (ref 0.7–3.1)
LYMPHOCYTES NFR BLD AUTO: 11.6 % (ref 19.6–45.3)
MAXIMAL PREDICTED HEART RATE: 147 BPM
MCH RBC QN AUTO: 21.2 PG (ref 26.6–33)
MCHC RBC AUTO-ENTMCNC: 29.6 G/DL (ref 31.5–35.7)
MCV RBC AUTO: 71.9 FL (ref 79–97)
MONOCYTES # BLD AUTO: 1.03 10*3/MM3 (ref 0.1–0.9)
MONOCYTES NFR BLD AUTO: 10.3 % (ref 5–12)
NEUTROPHILS # BLD AUTO: 7.39 10*3/MM3 (ref 1.7–7)
NEUTROPHILS NFR BLD AUTO: 73.9 % (ref 42.7–76)
PLATELET # BLD AUTO: 409 10*3/MM3 (ref 140–450)
PMV BLD AUTO: 9.6 FL (ref 6–12)
POTASSIUM BLD-SCNC: 3.9 MMOL/L (ref 3.5–5.2)
RBC # BLD AUTO: 4.05 10*6/MM3 (ref 4.14–5.8)
SODIUM BLD-SCNC: 132 MMOL/L (ref 136–145)
STRESS TARGET HR: 125 BPM
WBC NRBC COR # BLD: 10 10*3/MM3 (ref 3.4–10.8)

## 2019-05-05 PROCEDURE — 93306 TTE W/DOPPLER COMPLETE: CPT

## 2019-05-05 PROCEDURE — 94799 UNLISTED PULMONARY SVC/PX: CPT

## 2019-05-05 PROCEDURE — 93306 TTE W/DOPPLER COMPLETE: CPT | Performed by: INTERNAL MEDICINE

## 2019-05-05 PROCEDURE — 85025 COMPLETE CBC W/AUTO DIFF WBC: CPT | Performed by: INTERNAL MEDICINE

## 2019-05-05 PROCEDURE — 99233 SBSQ HOSP IP/OBS HIGH 50: CPT | Performed by: INTERNAL MEDICINE

## 2019-05-05 PROCEDURE — 97116 GAIT TRAINING THERAPY: CPT

## 2019-05-05 PROCEDURE — 25010000002 PIPERACILLIN SOD-TAZOBACTAM PER 1 G: Performed by: NURSE PRACTITIONER

## 2019-05-05 PROCEDURE — 80048 BASIC METABOLIC PNL TOTAL CA: CPT | Performed by: INTERNAL MEDICINE

## 2019-05-05 RX ADMIN — CARBIDOPA AND LEVODOPA 1 TABLET: 25; 100 TABLET ORAL at 08:27

## 2019-05-05 RX ADMIN — GUAIFENESIN 1200 MG: 600 TABLET, EXTENDED RELEASE ORAL at 08:27

## 2019-05-05 RX ADMIN — IPRATROPIUM BROMIDE AND ALBUTEROL SULFATE 3 ML: 2.5; .5 SOLUTION RESPIRATORY (INHALATION) at 20:09

## 2019-05-05 RX ADMIN — GUAIFENESIN 1200 MG: 600 TABLET, EXTENDED RELEASE ORAL at 21:36

## 2019-05-05 RX ADMIN — IPRATROPIUM BROMIDE AND ALBUTEROL SULFATE 3 ML: 2.5; .5 SOLUTION RESPIRATORY (INHALATION) at 12:48

## 2019-05-05 RX ADMIN — IPRATROPIUM BROMIDE AND ALBUTEROL SULFATE 3 ML: 2.5; .5 SOLUTION RESPIRATORY (INHALATION) at 15:20

## 2019-05-05 RX ADMIN — BUDESONIDE AND FORMOTEROL FUMARATE DIHYDRATE 2 PUFF: 80; 4.5 AEROSOL RESPIRATORY (INHALATION) at 20:09

## 2019-05-05 RX ADMIN — CARBIDOPA AND LEVODOPA 1 TABLET: 25; 100 TABLET ORAL at 11:48

## 2019-05-05 RX ADMIN — CARBIDOPA AND LEVODOPA 1 TABLET: 25; 100 TABLET ORAL at 17:49

## 2019-05-05 RX ADMIN — TAMSULOSIN HYDROCHLORIDE 0.4 MG: 0.4 CAPSULE ORAL at 08:27

## 2019-05-05 RX ADMIN — BUDESONIDE AND FORMOTEROL FUMARATE DIHYDRATE 2 PUFF: 80; 4.5 AEROSOL RESPIRATORY (INHALATION) at 07:49

## 2019-05-05 RX ADMIN — POLYETHYLENE GLYCOL 3350 17 G: 17 POWDER, FOR SOLUTION ORAL at 08:27

## 2019-05-05 RX ADMIN — TAZOBACTAM SODIUM AND PIPERACILLIN SODIUM 3.38 G: 375; 3 INJECTION, SOLUTION INTRAVENOUS at 21:36

## 2019-05-05 RX ADMIN — PANTOPRAZOLE SODIUM 40 MG: 40 TABLET, DELAYED RELEASE ORAL at 08:27

## 2019-05-05 RX ADMIN — TAZOBACTAM SODIUM AND PIPERACILLIN SODIUM 3.38 G: 375; 3 INJECTION, SOLUTION INTRAVENOUS at 13:42

## 2019-05-05 RX ADMIN — IPRATROPIUM BROMIDE AND ALBUTEROL SULFATE 3 ML: 2.5; .5 SOLUTION RESPIRATORY (INHALATION) at 07:49

## 2019-05-05 RX ADMIN — CARBIDOPA AND LEVODOPA 1 TABLET: 25; 100 TABLET ORAL at 21:36

## 2019-05-05 RX ADMIN — TAZOBACTAM SODIUM AND PIPERACILLIN SODIUM 3.38 G: 375; 3 INJECTION, SOLUTION INTRAVENOUS at 06:27

## 2019-05-05 NOTE — PROGRESS NOTES
[facility] Medicine Services  PROGRESS NOTE    Patient Name: Flaco Roque II  : 1945  MRN: [id number]    Date of Admission: 2019  Length of Stay: 9  Primary Care Physician: Tayo Hendrix MD    Subjective   Subjective     CC:  F/u respiratory failure and hypovolemia    HPI:  Patient resting in bed. Says he feels like his breathing is much improved today despite still requiring 6L O2. Cough improved but not bringing as much with each cough.    Review of Systems  Gen- No fevers, chills  CV- No chest pain, palpitations  Resp- + for cough and dypsnea  GI- No N/V/D, abd pain    Otherwise ROS is negative except as mentioned in the HPI.    Objective   Objective     Vital Signs:   Temp:  [97.9 °F (36.6 °C)-98.4 °F (36.9 °C)] 97.9 °F (36.6 °C)  Heart Rate:  [] 80  Resp:  [16-20] 18  BP: (103-122)/(63-79) 107/71        Physical Exam:  Constitutional: No acute distress, awake, alert  HENT: NCAT, mucous membranes moist  Respiratory: fine crackles in bases, poor air movement,weaned to nasal cannula, still on 6L  Cardiovascular: RRR, no murmurs, rubs, or gallops, palpable pedal pulses bilaterally  Gastrointestinal: Positive bowel sounds, soft, nontender, nondistended  Musculoskeletal: left foot in post-op boot  Psychiatric: Appropriate affect, cooperative  Neurologic: Oriented x 3, strength symmetric in all extremities, Cranial Nerves grossly intact to confrontation, speech clear  Skin: No rashes      Results Reviewed:  I have personally reviewed current lab, radiology, and data and agree.    Results from last 7 days   Lab Units 19  0649 19  0531 19  0557   WBC 10*3/mm3 10.00 10.52 10.33   HEMOGLOBIN g/dL 8.6* 8.7* 8.2*   HEMATOCRIT % 29.1* 29.7* 27.9*   PLATELETS 10*3/mm3 409 497* 431     Results from last 7 days   Lab Units 19  0649 19  0623 19  0557   SODIUM mmol/L 132* 135* 133*   POTASSIUM mmol/L 3.9 4.0 4.1   CHLORIDE mmol/L 94* 99 98    CO2 mmol/L 25.0 23.0 23.0   BUN mg/dL 8 5* 5*   CREATININE mg/dL 0.64* 0.51* 0.55*   GLUCOSE mg/dL 95 103* 103*   CALCIUM mg/dL 8.6 8.9 8.8     Estimated Creatinine Clearance: 79.1 mL/min (A) (by C-G formula based on SCr of 0.64 mg/dL (L)).    No results found for: BNP    Microbiology Results Abnormal     Procedure Component Value - Date/Time    Respiratory Culture - Sputum, Cough [[id number]] Collected:  05/01/19 1750    Lab Status:  Final result Specimen:  Sputum from Cough Updated:  05/03/19 0855     Respiratory Culture Scant growth (1+) Normal Respiratory Yolanda     Gram Stain Few (2+) WBCs per low power field      Occasional Epithelial cells per low power field      Few (2+) Gram positive cocci      Few (2+) Gram variable bacilli    Blood Culture - Blood, Arm, Left [[id number]] Collected:  04/26/19 1320    Lab Status:  Final result Specimen:  Blood from Arm, Left Updated:  05/01/19 1401     Blood Culture No growth at 5 days    Blood Culture - Blood, Arm, Right [[id number]] Collected:  04/26/19 1310    Lab Status:  Final result Specimen:  Blood from Arm, Right Updated:  05/01/19 1401     Blood Culture No growth at 5 days          Imaging Results (last 24 hours)     ** No results found for the last 24 hours. **               I have reviewed the medications:    Current Facility-Administered Medications:   •  acetaminophen (TYLENOL) tablet 650 mg, 650 mg, Oral, Q6H PRN, Milton Sow, APRN, 650 mg at 04/29/19 1009  •  bisacodyl (DULCOLAX) suppository 10 mg, 10 mg, Rectal, Daily PRN, Yanira Salazar, DO, 10 mg at 05/04/19 1704  •  budesonide-formoterol (SYMBICORT) 80-4.5 MCG/ACT inhaler 2 puff, 2 puff, Inhalation, BID - RT, Sunshine Loyd APRN, 2 puff at 05/05/19 0734  •  carbidopa-levodopa (SINEMET)  MG per tablet 1 tablet, 1 tablet, Oral, 4x Daily, Sunshine Loyd, APRN, 1 tablet at 05/05/19 0827  •  guaiFENesin (MUCINEX) 12 hr tablet 1,200 mg, 1,200 mg, Oral, Q12H, Yanira Salazar, , 1,200 mg at  05/05/19 0827  •  ipratropium-albuterol (DUO-NEB) nebulizer solution 3 mL, 3 mL, Nebulization, Q6H PRN, Sunshine Loyd APRN  •  ipratropium-albuterol (DUO-NEB) nebulizer solution 3 mL, 3 mL, Nebulization, 4x Daily - RT, Sunshine Loyd, APRN, 3 mL at 05/05/19 0749  •  Magnesium Sulfate 2 gram Bolus, followed by 8 gram infusion (total Mg dose 10 grams)- Mg less than or equal to 1mg/dL, 2 g, Intravenous, PRN **OR** Magnesium Sulfate 2 gram / 50mL Infusion (GIVE X 3 BAGS TO EQUAL 6GM TOTAL DOSE) - Mg 1.1 - 1.5 mg/dl, 2 g, Intravenous, PRN **OR** Magnesium Sulfate 4 gram infusion- Mg 1.6-1.9 mg/dL, 4 g, Intravenous, PRN, Marjorie Simon MD, Last Rate: 25 mL/hr at 04/30/19 0821, 4 g at 04/30/19 0821  •  mupirocin (BACTROBAN) 2 % ointment, , Topical, Q4 Days, Juhi Calle MD  •  ondansetron (ZOFRAN) injection 4 mg, 4 mg, Intravenous, Q6H PRN, Dalton Almodovar PA-C, 4 mg at 05/04/19 1618  •  oxyCODONE-acetaminophen (PERCOCET) 5-325 MG per tablet 1 tablet, 1 tablet, Oral, Q4H PRN, Simon Pereira MD, 1 tablet at 05/03/19 1230  •  pantoprazole (PROTONIX) EC tablet 40 mg, 40 mg, Oral, QAM AC, Marjorie Simon MD, 40 mg at 05/05/19 0827  •  piperacillin-tazobactam (ZOSYN) 3.375 g in iso-osmotic dextrose 50 ml (premix), 3.375 g, Intravenous, Q8H, Sunshine Loyd, APRN, 3.375 g at 05/05/19 0627  •  polyethylene glycol 3350 powder (packet), 17 g, Oral, Daily, Rachelle Clark PA, 17 g at 05/05/19 0827  •  potassium chloride (MICRO-K) CR capsule 40 mEq, 40 mEq, Oral, PRN, 40 mEq at 04/29/19 1405 **OR** potassium chloride (KLOR-CON) packet 40 mEq, 40 mEq, Oral, PRN **OR** potassium chloride 10 mEq in 100 mL IVPB, 10 mEq, Intravenous, Q1H PRN, Marjorie Simon MD  •  sodium chloride 0.9 % flush 10 mL, 10 mL, Intravenous, PRN, Alfonzo Monzon MD  •  tamsulosin (FLOMAX) 24 hr capsule 0.4 mg, 0.4 mg, Oral, Daily, Marjorie Simon MD, 0.4 mg at 05/05/19 0827      Assessment/Plan   Assessment /  Plan     Active Hospital Problems    Diagnosis POA   • **Hypotension due to hypovolemia [I95.89, E86.1] Yes   • ROSALIA (acute kidney injury) (CMS/[facility]) [N17.9] Yes   • Parkinson disease (CMS/[facility]) [G20] Yes   • GI bleed [K92.2] Yes   • S/P foot surgery, left [Z98.890] Not Applicable   • Anemia [D64.9] Yes   • ABRIL (obstructive sleep apnea) [G47.33] Yes   • COPD (chronic obstructive pulmonary disease) (CMS/[facility]) [J44.9] Yes          Brief Hospital Course to date:  Flaco Roque II is a 73 y.o. male with PMHx significant for COPD, ABRIL, chronic pain, GERD, parkinson's disease and recent left foot surgery (d/c on 4/24) who presented with vomiting and diarrhea. Initially admitted to the ICU for hypotension and hypoxia.    Acute Hypoxic Respiratory Failure  LLL PNA  COPD  - still on 6L NC,have been unable to wean. Was not on oxygen prior to admission.  - CT reviewed, negative for PE. Shows extensive and diffuse pulmonary interstitial disease, some of which is chronic. Overall concerning for increased edema. BNP WNL.   - s/p IV lasix x2, still unable to wean O2 despite aggressive pulmonary toilet, Abx and lasix. Will have pulmonary evaluate  - continue duonbs and symbicort  - final sputum culture pending  - Echo ordered and pending  - continue mucinex, OPEP and IS     Melena:  Acute blood Loss Anemia  - GI following, defer EGD for now given degree of hypoxia  - IV Iron   - continue BID PPI  - transfuse PRN Hgb <7, s/p 1unit PRBCs on 4/27  - BM with relistor    Hypotension  ROSALIA  - resolved with hydration    Parkinson's Disease:  - Sinemet    S/p Left Foot Surgery  - Dr. Calle following    DVT Prophylaxis: Mechanical given GIB    Disposition: I expect the patient to be discharged TBD    CODE STATUS:   Code Status and Medical Interventions:   Ordered at: 04/26/19 2111     Code Status:    CPR     Medical Interventions (Level of Support Prior to Arrest):    Full         Electronically signed by Yanira Salazar DO, 05/05/19,  11:15 AM.

## 2019-05-05 NOTE — THERAPY TREATMENT NOTE
Acute Care - Physical Therapy Treatment Note  [facility]     Patient Name: Flaco Roque II  : 1945  MRN: [id number]  Today's Date: 2019  Onset of Illness/Injury or Date of Surgery: 19  Date of Referral to PT: 19  Referring Physician: MD Luc    Admit Date: 2019    Visit Dx:    ICD-10-CM ICD-9-CM   1. Iron deficiency anemia, unspecified iron deficiency anemia type D50.9 280.9   2. Pneumonia of both lower lobes due to infectious organism (CMS/HCC) J18.1 483.8   3. Sepsis, due to unspecified organism (CMS/HCC) A41.9 038.9     995.91   4. Acute respiratory failure with hypoxia (CMS/HCC) J96.01 518.81   5. Impaired functional mobility, balance, gait, and endurance Z74.09 V49.89   6. Therapeutic opioid induced constipation K59.03 564.09    T40.2X5A E935.2     Patient Active Problem List   Diagnosis   • ABRIL (obstructive sleep apnea)   • COPD (chronic obstructive pulmonary disease) (CMS/[facility])   • Anemia   • Leukocytosis, likely reactive   • Acute postoperative pain   • Deformity of left foot   • S/P foot surgery, left   • Hypotension due to hypovolemia   • ROSALIA (acute kidney injury) (CMS/[facility])   • Parkinson disease (CMS/HCC)   • GI bleed       Therapy Treatment    Rehabilitation Treatment Summary     Row Name 19 0936             Treatment Time/Intention    Discipline  physical therapist  -SC      Document Type  therapy note (daily note)  -SC      Subjective Information  no complaints  -SC      Mode of Treatment  physical therapy  -SC      Therapy Frequency (PT Clinical Impression)  daily  -SC      Patient Effort  good  -SC      Existing Precautions/Restrictions  fall;oxygen therapy device and L/min  -SC      Treatment Considerations/Comments  NEEDS O2  (Significant)   -SC      Patient Response to Treatment  good effort  -SC      Recorded by [SC] Day Chinchilla, PT 19 1012      Row Name 19 0936             Vital Signs    Posttreatment Heart Rate (beats/min)  108  -SC       O2 Delivery Intra Treatment  supplemental O2  -SC      Post SpO2 (%)  93  -SC      Recorded by [SC] Day Chinchilla, PT 05/05/19 1012      Row Name 05/05/19 0936             Cognitive Assessment/Intervention- PT/OT    Affect/Mental Status (Cognitive)  WFL  -SC      Orientation Status (Cognition)  oriented x 4  -SC      Follows Commands (Cognition)  WFL  -SC      Personal Safety Interventions  gait belt;fall prevention program maintained  -SC      Recorded by [SC] Day Chinchilla, PT 05/05/19 1012      Row Name 05/05/19 0936             Safety Issues, Functional Mobility    Impairments Affecting Function (Mobility)  balance  -SC      Recorded by [SC] Day Chinchilla, PT 05/05/19 1012      Row Name 05/05/19 0936             Mobility Assessment/Intervention    Extremity Weight-bearing Status  left lower extremity  -SC      Left Lower Extremity (Weight-bearing Status)  weight-bearing as tolerated (WBAT)  (Significant)  HEEL WT WITH BOOT  -SC      Recorded by [SC] Day Chinchilla, PT 05/05/19 1012      Row Name 05/05/19 0936             Bed Mobility Assessment/Treatment    Bed Mobility Assessment/Treatment  sit-supine  -SC      Supine-Sit [place] (Bed Mobility)  conditional independence  -SC      Sit-Supine [place] (Bed Mobility)  conditional independence  -SC      Assistive Device (Bed Mobility)  bed rails  -SC      Recorded by [SC] Day Chinchilla, PT 05/05/19 1012      Row Name 05/05/19 0936             Transfer Assessment/Treatment    Comment (Transfers)  demonstrate good technique  -SC      Recorded by [SC] Day Chinchilla, PT 05/05/19 1012      Row Name 05/05/19 0936             Sit-Stand Transfer    Sit-Stand [place] (Transfers)  verbal cues;supervision  -SC      Assistive Device (Sit-Stand Transfers)  walker, front-wheeled  -SC      Recorded by [SC] Day Chinchilla, PT 05/05/19 1012      Row Name 05/05/19 0936             Stand-Sit Transfer    Stand-Sit [place] (Transfers)   supervision;verbal cues  -SC      Assistive Device (Stand-Sit Transfers)  walker, front-wheeled  -SC      Recorded by [SC] Day Chinchilla, PT 05/05/19 1012      Row Name 05/05/19 0936             Gait/Stairs Assessment/Training    72654 - Gait Training Minutes   24  -SC      Gait/Stairs Assessment/Training  gait/ambulation independence  -SC      [place] Level (Gait)  verbal cues;contact guard  -SC      Assistive Device (Gait)  walker, front-wheeled  -SC      Distance in Feet (Gait)  120  -SC      Pattern (Gait)  step-to  -SC      Comment (Gait/Stairs)  repeted cues to avoid L leg full wt bearig. Heel wt reminders needed and patient able to demonstrate good technique  -SC      Recorded by [SC] Day Chinchilla, PT 05/05/19 1012      Row Name 05/05/19 0936             Therapeutic Exercise    Comment (Therapeutic Exercise)  spirometer with cues for technique x10  -SC      Recorded by [SC] Day Chincihlla, PT 05/05/19 1012      Row Name 05/05/19 0936             Balance    Balance  dynamic standing balance  -SC      Recorded by [SC] Day Chinchilla, PT 05/05/19 1012      Row Name 05/05/19 0936             Dynamic Standing Balance    Level of [place], Reaches Outside Midline (Standing, Dynamic Balance)  contact guard assist  -SC      Time Able to Maintain Position, Reaches Outside Midline (Standing, Dynamic Balance)  more than 5 minutes  -SC      Assistive Device Utilized (Supported Standing, Dynamic Balance)  walker, rolling  -SC      Recorded by [SC] Day Chinchilla, PT 05/05/19 1012      Row Name 05/05/19 0936             Positioning and Restraints    Pre-Treatment Position  in bed  -SC      Post Treatment Position  bed  -SC      In Bed  supine;notified nsg;exit alarm on;encouraged to call for assist  -SC      Recorded by [SC] Day Chinchilla, PT 05/05/19 1012      Row Name 05/05/19 0936             Pain Assessment    Additional Documentation  Pain Scale: FACES Pre/Post-Treatment (Group)  -SC      Recorded  by [SC] Day Chinchilla, PT 05/05/19 1012      Row Name 05/05/19 0936             Pain Scale: Numbers Pre/Post-Treatment    Pain Location - Side  Right  -SC      Pain Location  chest  -SC      Pre/Post Treatment Pain Comment  during spirometer use  -SC      Recorded by [SC] Day Chinchilla, PT 05/05/19 1012      Row Name 05/05/19 0936             Pain Scale: FACES Pre/Post-Treatment    Pain: FACES Scale, Pretreatment  2-->hurts little bit  -SC      Pain: FACES Scale, Post-Treatment  2-->hurts little bit  -SC      Recorded by [SC] Day Chinchilla, PT 05/05/19 1012      Row Name                Wound 04/23/19 1011 Left foot incision    Wound - Properties Group Date first assessed: 04/23/19 [JA] Time first assessed: 1011 [JA] Side: Left [JA] Location: foot [JA] Type: incision [JA] Recorded by:  [JA] Sj Milner, RN 04/23/19 1011    Row Name 05/05/19 0936             Coping    Observed Emotional State  calm;cooperative  -SC      Verbalized Emotional State  acceptance  -SC      Recorded by [SC] Day Chinchilla, PT 05/05/19 1012      Row Name 05/05/19 0936             Outcome Summary/Treatment Plan (PT)    Daily Summary of Progress (PT)  progress toward functional goals as expected  -SC      Recorded by [SC] Day Chinchilla, PT 05/05/19 1012        User Key  (r) = Recorded By, (t) = Taken By, (c) = Cosigned By    Initials Name Effective Dates Discipline    SC Day Chinchilla, PT 06/19/15 -  PT    Sj Martin, RN 06/16/16 -  Nurse          Wound 04/23/19 1011 Left foot incision (Active)   Dressing Appearance dry;intact 5/5/2019  8:10 AM   Closure FUENTES 5/5/2019  8:10 AM   Drainage Amount none 5/5/2019  8:10 AM   Dressing Care, Wound gauze;elastic bandage 5/5/2019  8:10 AM           Physical Therapy Education     Title: PT OT SLP Therapies (In Progress)     Topic: Physical Therapy (In Progress)     Point: Mobility training (In Progress)     Learning Progress Summary           Patient Wyatt, NIKHIL COLMENARES, MIGUEL,DU,NR by SC  at 5/5/2019  9:36 AM    Comment:  Reviewed heel wt bearing    Acceptance, E, NR by AS at 5/4/2019  9:22 AM    Acceptance, E,D, VU,NR by LM at 5/3/2019 10:57 AM    Comment:  Reviewed benefits of activity, indications for taking standing/sitting rest breaks with ambulation, safety with mobility, pursed-lip breathing.    Acceptance, E, NR by AS at 5/1/2019 11:09 AM    Acceptance, E,D, VU,NR by LS at 4/30/2019 10:10 AM    Acceptance, E, NR by KR at 4/29/2019  1:35 PM    Acceptance, E,D, VU,NR by LS at 4/28/2019  8:20 AM    Acceptance, E,D, NR by LS at 4/27/2019 10:31 AM   Family Acceptance, E,D, NR by LS at 4/27/2019 10:31 AM                   Point: Home exercise program (Done)     Learning Progress Summary           Patient Eager, E,D, VU,DU,NR by SC at 5/5/2019  9:36 AM    Comment:  Reviewed heel wt bearing    Acceptance, E, NR by AS at 5/4/2019  9:22 AM    Acceptance, E,D, VU,NR by LM at 5/3/2019 10:57 AM    Comment:  Reviewed benefits of activity, indications for taking standing/sitting rest breaks with ambulation, safety with mobility, pursed-lip breathing.    Acceptance, E, NR by AS at 5/1/2019 11:09 AM    Acceptance, E,D, VU,NR by LS at 4/30/2019 10:10 AM    Acceptance, E, NR by KR at 4/29/2019  1:35 PM    Acceptance, E,D, VU,NR by LS at 4/28/2019  8:20 AM                   Point: Body mechanics (In Progress)     Learning Progress Summary           Patient Eager, E,D, VU,DU,NR by SC at 5/5/2019  9:36 AM    Comment:  Reviewed heel wt bearing    Acceptance, E, NR by AS at 5/4/2019  9:22 AM    Acceptance, E,D, VU,NR by LM at 5/3/2019 10:57 AM    Comment:  Reviewed benefits of activity, indications for taking standing/sitting rest breaks with ambulation, safety with mobility, pursed-lip breathing.    Acceptance, E, NR by AS at 5/1/2019 11:09 AM    Acceptance, NIKHIL COLMENARES VUNR by SHANNAN at 4/30/2019 10:10 AM    Acceptance, MERCED COLMENARES by LETICIA at 4/29/2019  1:35 PM    Acceptance, NIKHIL COLMENARES VU, NR by SHANNAN at 4/28/2019  8:20 AM    Acceptance,  E,D, NR by LS at 4/27/2019 10:31 AM   Family Acceptance, E,D, NR by LS at 4/27/2019 10:31 AM                   Point: Precautions (In Progress)     Learning Progress Summary           Patient Eager, E,D, VU,DU,NR by SC at 5/5/2019  9:36 AM    Comment:  Reviewed heel wt bearing    Acceptance, E, NR by AS at 5/4/2019  9:22 AM    Acceptance, E,D, VU,NR by LM at 5/3/2019 10:57 AM    Comment:  Reviewed benefits of activity, indications for taking standing/sitting rest breaks with ambulation, safety with mobility, pursed-lip breathing.    Acceptance, E, NR by AS at 5/1/2019 11:09 AM    Acceptance, E,D, VU,NR by LS at 4/30/2019 10:10 AM    Acceptance, E, NR by KR at 4/29/2019  1:35 PM    Acceptance, E,D, VU,NR by LS at 4/28/2019  8:20 AM    Acceptance, E,D, NR by LS at 4/27/2019 10:31 AM   Family Acceptance, E,D, NR by LS at 4/27/2019 10:31 AM                               User Key     Initials Effective Dates Name Provider Type Discipline    SC 06/19/15 -  Day Chinchilla, PT Physical Therapist PT    AS 06/22/15 -  Dina Coon, PTA Physical Therapy Assistant PT     06/19/15 -  Delia Crandall, PT Physical Therapist PT    KR 04/03/18 -  Malathi Mchugh, PT Physical Therapist PT    LM 04/03/18 -  Kamryn Murray, PT Physical Therapist PT                PT Recommendation and Plan  Therapy Frequency (PT Clinical Impression): daily  Outcome Summary/Treatment Plan (PT)  Daily Summary of Progress (PT): progress toward functional goals as expected  Outcome Summary: Patient ambulating 120 feet with walker. Sats remains stable on 6 L oxygen. Good participating  Outcome Measures     Row Name 05/05/19 0936 05/03/19 1057          How much help from another person do you currently need...    Turning from your back to your side while in flat bed without using bedrails?  4  -SC  4  -LM     Moving from lying on back to sitting on the side of a flat bed without bedrails?  4  -SC  4  -LM     Moving to and from a bed to a chair  (including a wheelchair)?  3  -SC  3  -LM     Standing up from a chair using your arms (e.g., wheelchair, bedside chair)?  3  -SC  3  -LM     Climbing 3-5 steps with a railing?  3  -SC  2  -LM     To walk in hospital room?  3  -SC  3  -LM     AM-PAC 6 Clicks Score  20  -SC  19  -LM        Functional Assessment    Outcome Measure Options  AM-PAC 6 Clicks Basic Mobility (PT)  -SC  AM-PAC 6 Clicks Basic Mobility (PT)  -LM       User Key  (r) = Recorded By, (t) = Taken By, (c) = Cosigned By    Initials Name Provider Type    SC Day Chinchilla, PT Physical Therapist    LM Kamryn Murray, PT Physical Therapist         Time Calculation:   PT Charges     Row Name 05/05/19 0936             Time Calculation    Start Time  0936  -SC      PT Received On  05/05/19  -SC      PT Goal Re-Cert Due Date  05/07/19  -SC         Time Calculation- PT    Total Timed Code Minutes- PT  24 minute(s)  -SC         Timed Charges    29593 - Gait Training Minutes   24  -SC        User Key  (r) = Recorded By, (t) = Taken By, (c) = Cosigned By    Initials Name Provider Type    SC Day Chinchilla A, PT Physical Therapist        Therapy Charges for Today     Code Description Service Date Service Provider Modifiers Qty    23850534468 HC GAIT TRAINING EA 15 MIN 5/5/2019 Day Chinchilla A, PT GP 2    30117264424 HC PT THER SUPP EA 15 MIN 5/5/2019 Johnson Chinchillaon A, PT GP 2          PT G-Codes  Outcome Measure Options: AM-PAC 6 Clicks Basic Mobility (PT)  AM-PAC 6 Clicks Score: 20    Day NOGUEIRA Renée, PT  5/5/2019

## 2019-05-05 NOTE — PLAN OF CARE
Problem: Patient Care Overview  Goal: Plan of Care Review  Outcome: Ongoing (interventions implemented as appropriate)   05/05/19 6323   OTHER   Outcome Summary Patient ambulating 120 feet with walker. SATs remains stable on 6 L oxygen. Good participating   Coping/Psychosocial   Patient Agreement with Plan of Care agrees

## 2019-05-06 PROBLEM — N17.9 AKI (ACUTE KIDNEY INJURY): Status: RESOLVED | Noted: 2019-04-26 | Resolved: 2019-05-06

## 2019-05-06 PROBLEM — J18.9 PNEUMONIA DUE TO INFECTIOUS ORGANISM: Status: ACTIVE | Noted: 2019-05-06

## 2019-05-06 PROBLEM — J96.01 ACUTE RESPIRATORY FAILURE WITH HYPOXIA: Status: ACTIVE | Noted: 2019-05-06

## 2019-05-06 LAB
ANION GAP SERPL CALCULATED.3IONS-SCNC: 11 MMOL/L
BASOPHILS # BLD AUTO: 0.05 10*3/MM3 (ref 0–0.2)
BASOPHILS NFR BLD AUTO: 0.5 % (ref 0–1.5)
BUN BLD-MCNC: 8 MG/DL (ref 8–23)
BUN/CREAT SERPL: 12.5 (ref 7–25)
CALCIUM SPEC-SCNC: 9 MG/DL (ref 8.6–10.5)
CHLORIDE SERPL-SCNC: 97 MMOL/L (ref 98–107)
CO2 SERPL-SCNC: 27 MMOL/L (ref 22–29)
CREAT BLD-MCNC: 0.64 MG/DL (ref 0.76–1.27)
DEPRECATED RDW RBC AUTO: 53.8 FL (ref 37–54)
EOSINOPHIL # BLD AUTO: 0.35 10*3/MM3 (ref 0–0.4)
EOSINOPHIL NFR BLD AUTO: 3.7 % (ref 0.3–6.2)
ERYTHROCYTE [DISTWIDTH] IN BLOOD BY AUTOMATED COUNT: 22.6 % (ref 12.3–15.4)
GFR SERPL CREATININE-BSD FRML MDRD: 123 ML/MIN/1.73
GLUCOSE BLD-MCNC: 107 MG/DL (ref 65–99)
HCT VFR BLD AUTO: 27.8 % (ref 37.5–51)
HGB BLD-MCNC: 8.4 G/DL (ref 13–17.7)
IMM GRANULOCYTES # BLD AUTO: 0.08 10*3/MM3 (ref 0–0.05)
IMM GRANULOCYTES NFR BLD AUTO: 0.9 % (ref 0–0.5)
LYMPHOCYTES # BLD AUTO: 1.35 10*3/MM3 (ref 0.7–3.1)
LYMPHOCYTES NFR BLD AUTO: 14.4 % (ref 19.6–45.3)
MCH RBC QN AUTO: 21.6 PG (ref 26.6–33)
MCHC RBC AUTO-ENTMCNC: 30.2 G/DL (ref 31.5–35.7)
MCV RBC AUTO: 71.6 FL (ref 79–97)
MONOCYTES # BLD AUTO: 0.92 10*3/MM3 (ref 0.1–0.9)
MONOCYTES NFR BLD AUTO: 9.8 % (ref 5–12)
NEUTROPHILS # BLD AUTO: 6.62 10*3/MM3 (ref 1.7–7)
NEUTROPHILS NFR BLD AUTO: 70.7 % (ref 42.7–76)
PLATELET # BLD AUTO: 397 10*3/MM3 (ref 140–450)
PMV BLD AUTO: 9.7 FL (ref 6–12)
POTASSIUM BLD-SCNC: 4 MMOL/L (ref 3.5–5.2)
RBC # BLD AUTO: 3.88 10*6/MM3 (ref 4.14–5.8)
SODIUM BLD-SCNC: 135 MMOL/L (ref 136–145)
WBC NRBC COR # BLD: 9.37 10*3/MM3 (ref 3.4–10.8)

## 2019-05-06 PROCEDURE — 85025 COMPLETE CBC W/AUTO DIFF WBC: CPT | Performed by: INTERNAL MEDICINE

## 2019-05-06 PROCEDURE — 97110 THERAPEUTIC EXERCISES: CPT

## 2019-05-06 PROCEDURE — 25010000002 METHYLPREDNISOLONE PER 40 MG: Performed by: INTERNAL MEDICINE

## 2019-05-06 PROCEDURE — 25010000002 PIPERACILLIN SOD-TAZOBACTAM PER 1 G: Performed by: INTERNAL MEDICINE

## 2019-05-06 PROCEDURE — 97116 GAIT TRAINING THERAPY: CPT

## 2019-05-06 PROCEDURE — 99233 SBSQ HOSP IP/OBS HIGH 50: CPT | Performed by: INTERNAL MEDICINE

## 2019-05-06 PROCEDURE — 99024 POSTOP FOLLOW-UP VISIT: CPT | Performed by: ORTHOPAEDIC SURGERY

## 2019-05-06 PROCEDURE — 25010000002 PIPERACILLIN SOD-TAZOBACTAM PER 1 G: Performed by: NURSE PRACTITIONER

## 2019-05-06 PROCEDURE — 94799 UNLISTED PULMONARY SVC/PX: CPT

## 2019-05-06 PROCEDURE — 80048 BASIC METABOLIC PNL TOTAL CA: CPT | Performed by: INTERNAL MEDICINE

## 2019-05-06 RX ORDER — METHYLPREDNISOLONE SODIUM SUCCINATE 40 MG/ML
40 INJECTION, POWDER, LYOPHILIZED, FOR SOLUTION INTRAMUSCULAR; INTRAVENOUS EVERY 12 HOURS
Status: DISCONTINUED | OUTPATIENT
Start: 2019-05-06 | End: 2019-05-07 | Stop reason: HOSPADM

## 2019-05-06 RX ORDER — BUDESONIDE AND FORMOTEROL FUMARATE DIHYDRATE 80; 4.5 UG/1; UG/1
2 AEROSOL RESPIRATORY (INHALATION)
Status: DISCONTINUED | OUTPATIENT
Start: 2019-05-07 | End: 2019-05-07 | Stop reason: HOSPADM

## 2019-05-06 RX ADMIN — CARBIDOPA AND LEVODOPA 1 TABLET: 25; 100 TABLET ORAL at 08:37

## 2019-05-06 RX ADMIN — TAMSULOSIN HYDROCHLORIDE 0.4 MG: 0.4 CAPSULE ORAL at 08:37

## 2019-05-06 RX ADMIN — GUAIFENESIN 1200 MG: 600 TABLET, EXTENDED RELEASE ORAL at 20:29

## 2019-05-06 RX ADMIN — PANTOPRAZOLE SODIUM 40 MG: 40 TABLET, DELAYED RELEASE ORAL at 08:37

## 2019-05-06 RX ADMIN — TAZOBACTAM SODIUM AND PIPERACILLIN SODIUM 3.38 G: 375; 3 INJECTION, SOLUTION INTRAVENOUS at 14:45

## 2019-05-06 RX ADMIN — TAZOBACTAM SODIUM AND PIPERACILLIN SODIUM 3.38 G: 375; 3 INJECTION, SOLUTION INTRAVENOUS at 05:57

## 2019-05-06 RX ADMIN — IPRATROPIUM BROMIDE AND ALBUTEROL SULFATE 3 ML: 2.5; .5 SOLUTION RESPIRATORY (INHALATION) at 20:58

## 2019-05-06 RX ADMIN — TAZOBACTAM SODIUM AND PIPERACILLIN SODIUM 3.38 G: 375; 3 INJECTION, SOLUTION INTRAVENOUS at 20:29

## 2019-05-06 RX ADMIN — GUAIFENESIN 1200 MG: 600 TABLET, EXTENDED RELEASE ORAL at 08:37

## 2019-05-06 RX ADMIN — BUDESONIDE AND FORMOTEROL FUMARATE DIHYDRATE 2 PUFF: 80; 4.5 AEROSOL RESPIRATORY (INHALATION) at 21:00

## 2019-05-06 RX ADMIN — CARBIDOPA AND LEVODOPA 1 TABLET: 25; 100 TABLET ORAL at 18:21

## 2019-05-06 RX ADMIN — IPRATROPIUM BROMIDE AND ALBUTEROL SULFATE 3 ML: 2.5; .5 SOLUTION RESPIRATORY (INHALATION) at 15:57

## 2019-05-06 RX ADMIN — IPRATROPIUM BROMIDE AND ALBUTEROL SULFATE 3 ML: 2.5; .5 SOLUTION RESPIRATORY (INHALATION) at 07:15

## 2019-05-06 RX ADMIN — BUDESONIDE AND FORMOTEROL FUMARATE DIHYDRATE 2 PUFF: 80; 4.5 AEROSOL RESPIRATORY (INHALATION) at 07:16

## 2019-05-06 RX ADMIN — METHYLPREDNISOLONE SODIUM SUCCINATE 40 MG: 40 INJECTION, POWDER, FOR SOLUTION INTRAMUSCULAR; INTRAVENOUS at 14:45

## 2019-05-06 RX ADMIN — CARBIDOPA AND LEVODOPA 1 TABLET: 25; 100 TABLET ORAL at 12:12

## 2019-05-06 RX ADMIN — CARBIDOPA AND LEVODOPA 1 TABLET: 25; 100 TABLET ORAL at 20:30

## 2019-05-06 NOTE — THERAPY TREATMENT NOTE
Acute Care - Physical Therapy Treatment Note  [facility]     Patient Name: Flaco Roque II  : 1945  MRN: [id number]  Today's Date: 2019  Onset of Illness/Injury or Date of Surgery: 19  Date of Referral to PT: 19  Referring Physician: MD Luc    Admit Date: 2019    Visit Dx:    ICD-10-CM ICD-9-CM   1. Iron deficiency anemia, unspecified iron deficiency anemia type D50.9 280.9   2. Pneumonia of both lower lobes due to infectious organism (CMS/[facility]) J18.1 483.8   3. Sepsis, due to unspecified organism (CMS/[facility]) A41.9 038.9     995.91   4. Acute respiratory failure with hypoxia (CMS/[facility]) J96.01 518.81   5. Impaired functional mobility, balance, gait, and endurance Z74.09 V49.89   6. Therapeutic opioid induced constipation K59.03 564.09    T40.2X5A E935.2     Patient Active Problem List   Diagnosis   • ABRIL (obstructive sleep apnea)   • COPD (chronic obstructive pulmonary disease) (CMS/[facility])   • Anemia   • Leukocytosis, likely reactive   • Acute postoperative pain   • Deformity of left foot   • S/P foot surgery, left   • Hypotension due to hypovolemia   • ROSALIA (acute kidney injury) (CMS/[facility])   • Parkinson disease (CMS/H[facility])   • GI bleed       Therapy Treatment    Rehabilitation Treatment Summary     Row Name 19 0948             Treatment Time/Intention    Discipline  physical therapist  -      Document Type  therapy note (daily note)  -MJ      Subjective Information  no complaints  -MJ      Mode of Treatment  physical therapy  -MJ      Patient/Family Observations  Pt supine in bed  -      Care Plan Review  patient/other agree to care plan  -      Patient Effort  excellent  -      Existing Precautions/Restrictions  fall;oxygen therapy device and L/min;brace worn when out of bed;other (see comments)  (Significant)  weight bear through L heel with boot  -MJ      Recorded by [MJ] Jameson العراقي, PT 19 1027      Row Name 19 0962             Vital Signs    Pre SpO2 (%)   96  -MJ      O2 Delivery Pre Treatment  supplemental O2 6L  -MJ      Post SpO2 (%)  98  -MJ      O2 Delivery Post Treatment  supplemental O2  -MJ      Pre Patient Position  Supine  -MJ      Post Patient Position  Sitting  -MJ      Recorded by [MJ] Jameson العراقي, PT 05/06/19 1027      Row Name 05/06/19 0948             Cognitive Assessment/Intervention- PT/OT    Affect/Mental Status (Cognitive)  WFL  -MJ      Orientation Status (Cognition)  oriented x 4  -MJ      Follows Commands (Cognition)  WFL  -MJ      Recorded by [MJ] Jameson العراقي, PT 05/06/19 1027      Row Name 05/06/19 0948             Safety Issues, Functional Mobility    Impairments Affecting Function (Mobility)  balance  -MJ      Recorded by [MJ] Jameson العراقي, PT 05/06/19 1027      Row Name 05/06/19 0948             Mobility Assessment/Intervention    Extremity Weight-bearing Status  left lower extremity  -MJ      Left Lower Extremity (Weight-bearing Status)  weight-bearing as tolerated (WBAT)  (Significant)  through L heel with boot  -MJ      Recorded by [MJ] Jameson العراقي, PT 05/06/19 1027      Row Name 05/06/19 0948             Bed Mobility Assessment/Treatment    Supine-Sit [place] (Bed Mobility)  conditional independence  -MJ      Sit-Supine [place] (Bed Mobility)  not tested Pt UIC  -MJ      Bed Mobility, Safety Issues  decreased use of legs for bridging/pushing  -MJ      Assistive Device (Bed Mobility)  bed rails;head of bed elevated  -MJ      Comment (Bed Mobility)  Boot donned prior to OOB activity  -MJ      Recorded by [MJ] Jameson العراقي, PT 05/06/19 1027      Row Name 05/06/19 0948             Transfer Assessment/Treatment    Transfer Assessment/Treatment  sit-stand transfer;stand-sit transfer  -MJ      Comment (Transfers)  Pt demo proper sequencing without verbal cues and with good safety awareness  -MJ      Recorded by [MJ] Jameson العراقي, PT 05/06/19 1027      Row Name 05/06/19 0948             Sit-Stand Transfer    Sit-Stand  [place] (Transfers)  supervision  -MJ      Assistive Device (Sit-Stand Transfers)  walker, front-wheeled  -MJ      Recorded by [MJ] Jameson العراقي, PT 05/06/19 1027      Row Name 05/06/19 0948             Stand-Sit Transfer    Stand-Sit [place] (Transfers)  supervision  -MJ      Assistive Device (Stand-Sit Transfers)  walker, front-wheeled  -MJ      Recorded by [MJ] Jameson العراقي, PT 05/06/19 1027      Row Name 05/06/19 0948             Gait/Stairs Assessment/Training    56740 - Gait Training Minutes   15  -MJ      [place] Level (Gait)  contact guard;verbal cues  -MJ      Assistive Device (Gait)  walker, front-wheeled  -MJ      Distance in Feet (Gait)  120  -MJ      Pattern (Gait)  step-to;step-through  -MJ      Deviations/Abnormal Patterns (Gait)  left sided deviations;antalgic;bilateral deviations;krunal decreased;stride length decreased  -MJ      Bilateral Gait Deviations  weight shift ability decreased  -MJ      Comment (Gait/Stairs)  Pt demo step to gait pattern with moments of progression to step through maintaining WB status through heel. Cues for upright posture and to relax shoulders. Gait limited by fatigue  -MJ      Recorded by [MJ] Jameson العراقي, PT 05/06/19 1027      Row Name 05/06/19 0948             Therapeutic Exercise    29033 - PT Therapeutic Exercise Minutes  9  -MJ      Recorded by [MJ] Jameson العراقي, PT 05/06/19 1027      Row Name 05/06/19 0948             Therapeutic Exercise    Lower Extremity (Therapeutic Exercise)  gluteal sets;LAQ (long arc quad), left;quad sets, left;SLR (straight leg raise), left  -MJ      Lower Extremity Range of Motion (Therapeutic Exercise)  hip flexion/extension, left;hip abduction/adduction, left;ankle dorsiflexion/plantar flexion, bilateral  -MJ      Position (Therapeutic Exercise)  seated  -MJ      Sets/Reps (Therapeutic Exercise)  15x each  -MJ      Comment (Therapeutic Exercise)  Cues for technique  -MJ      Recorded by [MJ] Jameson العراقي, PT 05/06/19  1027      Row Name 05/06/19 0948             Positioning and Restraints    Pre-Treatment Position  in bed  -      Post Treatment Position  chair  -      In Chair  notified nsg;reclined;call light within reach;encouraged to call for assist;exit alarm on;LLE elevated boot donned  -MJ      Recorded by [MAYCOL] Jameson العراقي, PT 05/06/19 1027      Row Name                Wound 04/23/19 1011 Left foot incision    Wound - Properties Group Date first assessed: 04/23/19 [JA] Time first assessed: 1011 [JA] Side: Left [JA] Location: foot [JA] Type: incision [JA] Recorded by:  [STEPHANIE] Sj Milner RN 04/23/19 1011      User Key  (r) = Recorded By, (t) = Taken By, (c) = Cosigned By    Initials Name Effective Dates Discipline    Sj Martin, RN 06/16/16 -  Nurse    Jameson Alcala, PT 04/03/18 -  PT          Wound 04/23/19 1011 Left foot incision (Active)   Dressing Appearance dry;intact 5/6/2019  8:00 AM   Closure FUENTES 5/6/2019  8:00 AM   Base clean 5/6/2019  8:00 AM   Drainage Amount none 5/6/2019  8:00 AM           Physical Therapy Education     Title: PT OT SLP Therapies (In Progress)     Topic: Physical Therapy (In Progress)     Point: Mobility training (In Progress)     Learning Progress Summary           Patient Acceptance, E,D, VU,NR by MAYCOL at 5/6/2019  9:48 AM    Comment:  Reviewed WB status, HEP, gait mechanics    Eager, E,D, VU,DU,NR by SC at 5/5/2019  9:36 AM    Comment:  Reviewed heel wt bearing    Acceptance, E, NR by AS at 5/4/2019  9:22 AM    Acceptance, E,D, VU,NR by SALMA at 5/3/2019 10:57 AM    Comment:  Reviewed benefits of activity, indications for taking standing/sitting rest breaks with ambulation, safety with mobility, pursed-lip breathing.    Acceptance, E, NR by AS at 5/1/2019 11:09 AM    Acceptance, E,D, VU,NR by SHANNAN at 4/30/2019 10:10 AM    Acceptance, E, NR by LETICIA at 4/29/2019  1:35 PM    Acceptance, E,D, VU,NR by SHANNAN at 4/28/2019  8:20 AM    Acceptance, NIKHIL COLMENARES, NR by SHANNAN at 4/27/2019 10:31 AM    Family Acceptance, E,D, NR by SHANNAN at 4/27/2019 10:31 AM                   Point: Home exercise program (Done)     Learning Progress Summary           Patient Acceptance, E,D, VU,NR by MAYCOL at 5/6/2019  9:48 AM    Comment:  Reviewed WB status, HEP, gait mechanics    Eager, E,D, VU,DU,NR by SC at 5/5/2019  9:36 AM    Comment:  Reviewed heel wt bearing    Acceptance, E, NR by AS at 5/4/2019  9:22 AM    Acceptance, E,D, VU,NR by SALMA at 5/3/2019 10:57 AM    Comment:  Reviewed benefits of activity, indications for taking standing/sitting rest breaks with ambulation, safety with mobility, pursed-lip breathing.    Acceptance, E, NR by AS at 5/1/2019 11:09 AM    Acceptance, E,D, VU,NR by SHANNAN at 4/30/2019 10:10 AM    Acceptance, E, NR by LETICIA at 4/29/2019  1:35 PM    Acceptance, E,D, VU,NR by SHANNAN at 4/28/2019  8:20 AM                   Point: Body mechanics (In Progress)     Learning Progress Summary           Patient Acceptance, E,D, VU,NR by MAYCOL at 5/6/2019  9:48 AM    Comment:  Reviewed WB status, HEP, gait mechanics    Eager, E,D, VU,DU,NR by SC at 5/5/2019  9:36 AM    Comment:  Reviewed heel wt bearing    Acceptance, E, NR by AS at 5/4/2019  9:22 AM    Acceptance, E,D, VU,NR by SALMA at 5/3/2019 10:57 AM    Comment:  Reviewed benefits of activity, indications for taking standing/sitting rest breaks with ambulation, safety with mobility, pursed-lip breathing.    Acceptance, E, NR by AS at 5/1/2019 11:09 AM    Acceptance, E,D, VU,NR by SHANNAN at 4/30/2019 10:10 AM    Acceptance, E, NR by LETICIA at 4/29/2019  1:35 PM    Acceptance, E,D, VU,NR by SHANNAN at 4/28/2019  8:20 AM    Acceptance, E,D, NR by SHANNAN at 4/27/2019 10:31 AM   Family Acceptance, E,D, NR by SHANNAN at 4/27/2019 10:31 AM                   Point: Precautions (In Progress)     Learning Progress Summary           Patient Acceptance, E,D, VU,NR by  at 5/6/2019  9:48 AM    Comment:  Reviewed WB status, HEP, gait mechanics    Eager, NIKHIL COLMENARES VU, DU, NR by SC at 5/5/2019  9:36 AM    Comment:   Reviewed heel wt bearing    Acceptance, E, NR by AS at 5/4/2019  9:22 AM    Acceptance, E,D, VU,NR by LM at 5/3/2019 10:57 AM    Comment:  Reviewed benefits of activity, indications for taking standing/sitting rest breaks with ambulation, safety with mobility, pursed-lip breathing.    Acceptance, E, NR by AS at 5/1/2019 11:09 AM    Acceptance, E,D, VU,NR by LS at 4/30/2019 10:10 AM    Acceptance, E, NR by KR at 4/29/2019  1:35 PM    Acceptance, E,D, VU,NR by LS at 4/28/2019  8:20 AM    Acceptance, E,D, NR by LS at 4/27/2019 10:31 AM   Family Acceptance, E,D, NR by LS at 4/27/2019 10:31 AM                               User Key     Initials Effective Dates Name Provider Type Discipline    SC 06/19/15 -  Day Chinchilla, PT Physical Therapist PT    AS 06/22/15 -  Dina Coon, PTA Physical Therapy Assistant PT    LS 06/19/15 -  Delia Crandall, PT Physical Therapist PT    MJ 04/03/18 -  Jameson العراقي, PT Physical Therapist PT    KR 04/03/18 -  Malathi Mchugh, PT Physical Therapist PT    LM 04/03/18 -  Kamryn Murray, PT Physical Therapist PT                PT Recommendation and Plan     Plan of Care Reviewed With: patient  Progress: improving  Outcome Summary: Pt ambulated 120 feet with CGA, RW and boot, maintaining WB status throughout. Pt progressed ther ex. Will progress to stair training tomorrow and continue to progress mobility as able.  Outcome Measures     Row Name 05/06/19 0948 05/05/19 0936 05/03/19 1057       How much help from another person do you currently need...    Turning from your back to your side while in flat bed without using bedrails?  4  -MJ  4  -SC  4  -LM    Moving from lying on back to sitting on the side of a flat bed without bedrails?  4  -MJ  4  -SC  4  -LM    Moving to and from a bed to a chair (including a wheelchair)?  3  -MJ  3  -SC  3  -LM    Standing up from a chair using your arms (e.g., wheelchair, bedside chair)?  3  -MJ  3  -SC  3  -LM    Climbing 3-5 steps with a  railing?  3  -MJ  3  -SC  2  -LM    To walk in hospital room?  3  -MJ  3  -SC  3  -LM    AM-PAC 6 Clicks Score  20  -MJ  20  -SC  19  -LM       Functional Assessment    Outcome Measure Options  AM-PAC 6 Clicks Basic Mobility (PT)  -MJ  AM-PAC 6 Clicks Basic Mobility (PT)  -SC  AM-PAC 6 Clicks Basic Mobility (PT)  -LM      User Key  (r) = Recorded By, (t) = Taken By, (c) = Cosigned By    Initials Name Provider Type    SC Day Chinchilla, PT Physical Therapist    Jameson Alcala, PT Physical Therapist    LM Kamryn Murray, PT Physical Therapist         Time Calculation:   PT Charges     Row Name 05/06/19 0948             Time Calculation    Start Time  0948  -MJ      PT Received On  05/06/19  -      PT Goal Re-Cert Due Date  05/07/19  -         Time Calculation- PT    Total Timed Code Minutes- PT  24 minute(s)  -         Timed Charges    97129 - PT Therapeutic Exercise Minutes  9  -MJ      07861 - Gait Training Minutes   15  -MJ        User Key  (r) = Recorded By, (t) = Taken By, (c) = Cosigned By    Initials Name Provider Type    Jameson Alcala, PT Physical Therapist        Therapy Charges for Today     Code Description Service Date Service Provider Modifiers Qty    10934112122 HC PT THER PROC EA 15 MIN 5/6/2019 Jameson العراقي, PT GP 1    50651875725 HC GAIT TRAINING EA 15 MIN 5/6/2019 Jameson العراقي, PT GP 1          PT G-Codes  Outcome Measure Options: AM-PAC 6 Clicks Basic Mobility (PT)  AM-PAC 6 Clicks Score: 20    Jameson العراقي PT  5/6/2019

## 2019-05-06 NOTE — PROGRESS NOTES
Constipation resolved. Hgb stable. Still on high flow oxygen.    Will defer EGD.    Will sign off. Please call with overt bleeding or any other concerns.

## 2019-05-06 NOTE — PROGRESS NOTES
Orthopedic  Progress Note    Subjective     Post-Operative Day: 13 days post left forefoot recon    Systemic or Specific Complaints: no problems with foot, weaning oxygen  Objective     Vital signs in last 24 hours:  Temp:  [97.8 °F (36.6 °C)-98.7 °F (37.1 °C)] 98.7 °F (37.1 °C)  Heart Rate:  [77-89] 81  Resp:  [16-18] 16  BP: (107-121)/(59-68) 116/62    Neurovascular: Left foot toes pink   Wound: Left foot pin sites clean         Data Review  Lab Results (last 24 hours)     Procedure Component Value Units Date/Time    CBC & Differential [[id number]] Collected:  05/06/19 0420    Specimen:  Blood Updated:  05/06/19 0514    Narrative:       The following orders were created for panel order CBC & Differential.  Procedure                               Abnormality         Status                     ---------                               -----------         ------                     Scan Slide[[id number]]                                                                  CBC Auto Differential[[id number]]        Abnormal            Final result                 Please view results for these tests on the individual orders.    CBC Auto Differential [[id number]]  (Abnormal) Collected:  05/06/19 0420    Specimen:  Blood Updated:  05/06/19 0514     WBC 9.37 10*3/mm3      RBC 3.88 10*6/mm3      Hemoglobin 8.4 g/dL      Hematocrit 27.8 %      MCV 71.6 fL      MCH 21.6 pg      MCHC 30.2 g/dL      RDW 22.6 %      RDW-SD 53.8 fl      MPV 9.7 fL      Platelets 397 10*3/mm3      Neutrophil % 70.7 %      Lymphocyte % 14.4 %      Monocyte % 9.8 %      Eosinophil % 3.7 %      Basophil % 0.5 %      Immature Grans % 0.9 %      Neutrophils, Absolute 6.62 10*3/mm3      Lymphocytes, Absolute 1.35 10*3/mm3      Monocytes, Absolute 0.92 10*3/mm3      Eosinophils, Absolute 0.35 10*3/mm3      Basophils, Absolute 0.05 10*3/mm3      Immature Grans, Absolute 0.08 10*3/mm3     Basic Metabolic Panel [[id number]]  (Abnormal) Collected:  05/06/19 0420     Specimen:  Blood Updated:  05/06/19 0507     Glucose 107 mg/dL      BUN 8 mg/dL      Creatinine 0.64 mg/dL      Sodium 135 mmol/L      Potassium 4.0 mmol/L      Chloride 97 mmol/L      CO2 27.0 mmol/L      Calcium 9.0 mg/dL      eGFR Non African Amer 123 mL/min/1.73      BUN/Creatinine Ratio 12.5     Anion Gap 11.0 mmol/L     Narrative:       GFR Normal >60  Chronic Kidney Disease <60  Kidney Failure <15            Assessment/Plan     Status post- left forefoot recon doing well- if he is still here Friday we will remove sutures, otherwise he has an appointment Friday in my office           Juhi Calle MD    Date: 5/6/2019  Time: 6:59 PM

## 2019-05-06 NOTE — PROGRESS NOTES
" PULMONARY NOTE     Hospital Day: 10    Mr. Flaco Roque II, 73 y.o. male is followed for:   Hypoxia and pneumonia        SUBJECTIVE     73-year-old male admitted on 4/26 initially to the ICU and seen by pulmonary at that time.  He had pulmonary infiltrates consistent with pneumonia as well as hypotension with vomiting and diarrhea.  He was recently discharged 2 days prior after left foot surgery.  He also has a history of COPD, chronic pain, GERD, and Parkinson's disease.  He was able to be weaned to high flow nasal cannula and was transferred to telemetry on 4/30.  Cultures have been negative and he has been treated with broad-spectrum antimicrobial therapy consisting of Zosyn and vancomycin.  Zosyn is ongoing.    Interval history:    He is comfortable at rest.  Sputum is mostly clear where as previously it was purulent.  He denies any pleurisy or hemoptysis.  CT angiogram on 5/2 revealed no evidence of pulmonary embolus but evidence of interstitial infiltrates more extensive in the right lower lobe.  Currently on 6 L at rest but with oxygen saturations of 100%.    The patient's relevant past medical, surgical and social history were reviewed and updated in Epic as appropriate.        OBJECTIVE     Vital Sign Min/Max for last 24 hours  Temp  Min: 97.8 °F (36.6 °C)  Max: 98.7 °F (37.1 °C)   BP  Min: 107/59  Max: 121/65   Pulse  Min: 77  Max: 91   Resp  Min: 16  Max: 18   SpO2  Min: 92 %  Max: 98 %   No Data Recorded   No Data Recorded      Intake/Output Summary (Last 24 hours) at 5/6/2019 1539  Last data filed at 5/6/2019 0900  Gross per 24 hour   Intake 100 ml   Output 875 ml   Net -775 ml      Flowsheet Rows      First Filed Value   Admission Height  172.7 cm (68\") Documented at 04/26/2019 1223   Admission Weight  68 kg (150 lb) Documented at 04/26/2019 1223        Body mass index is 22.81 kg/m².     04/26/19  1223 05/05/19  1253   Weight: 68 kg (150 lb) 68 kg (150 lb)             Objective:  General " "Appearance:  In no acute distress.    Vital signs: (most recent): Blood pressure 107/59, pulse 87, temperature 97.8 °F (36.6 °C), temperature source Oral, resp. rate 16, height 172.7 cm (67.99\"), weight 68 kg (150 lb), SpO2 92 %.    HEENT: Normal HEENT exam.    Lungs:  Normal effort and normal respiratory rate.  He is not in respiratory distress.  There are rales and decreased breath sounds.  No wheezes or rhonchi.  (Bibasilar crackles)  Heart: Normal rate.  Regular rhythm.  S1 normal and S2 normal.  No murmur, gallop or friction rub.   Chest: Symmetric chest wall expansion.   Abdomen: Abdomen is soft and non-distended.  Bowel sounds are normal.   There is no abdominal tenderness.   There is no mass. There is no splenomegaly. There is no hepatomegaly.   Extremities: There is no deformity or dependent edema.    Neurological: Patient is alert and oriented to person, place and time.    Pupils:  Pupils are equal, round, and reactive to light.    Skin:  Warm and dry.                      I reviewed the patient's results and images, including reviewing images and reports.    Medications reviewed.      Scheduled Meds:    budesonide-formoterol 2 puff Inhalation BID - RT   carbidopa-levodopa 1 tablet Oral 4x Daily   guaiFENesin 1,200 mg Oral Q12H   ipratropium-albuterol 3 mL Nebulization 4x Daily - RT   methylPREDNISolone sodium succinate 40 mg Intravenous Q12H   mupirocin  Topical Q4 Days   pantoprazole 40 mg Oral QAM AC   piperacillin-tazobactam 3.375 g Intravenous Q8H   polyethylene glycol 17 g Oral Daily   tamsulosin 0.4 mg Oral Daily         Assessment/Plan   ASSESSMENT      Active Hospital Problems    Diagnosis   • **Hypotension due to hypovolemia   • Pneumonia due to infectious organism   • Acute respiratory failure with hypoxia (CMS/HCC)   • COPD (chronic obstructive pulmonary disease) (CMS/HCC)   • Parkinson disease (CMS/HCC)   • GI bleed   • S/P foot surgery, left   • Anemia   • ABRIL (obstructive sleep apnea) "         73-year-old male with underlying COPD presents with acute pneumonia and hypoxemic respiratory failure.  Has continued evidence of interstitial infiltrates by CT angiogram but no evidence of thromboembolic disease.  He has improved.  He still has desaturation with exercise but today on 6 L his oxygen saturations were 100% at rest.  He has been placed on a brief steroid taper.        RECOMMENDATIONS     1. Agree with brief steroid taper.  2. Complete empiric antibiotics  3. O2 wean  4. Suspect he will need oxygen on discharge  5. Bronchodilators  6. Mobilize  7. Will follow         I discussed the patient's findings and my recommendations with patient and nursing staff     High level of risk due to:  severe exacerbation of chronic illness and illness with threat to life or bodily function.    Alf Edwards MD  Pulmonary and Critical Care Medicine

## 2019-05-06 NOTE — PLAN OF CARE
Problem: Patient Care Overview  Goal: Plan of Care Review  Outcome: Ongoing (interventions implemented as appropriate)   05/06/19 2730   Coping/Psychosocial   Plan of Care Reviewed With patient   OTHER   Outcome Summary Pt ambulated 120 feet with CGA, RW and boot, maintaining WB status throughout. Pt progressed ther ex. Will progress to stair training tomorrow and continue to progress mobility as able.   Plan of Care Review   Progress improving

## 2019-05-06 NOTE — PLAN OF CARE
"Problem: Patient Care Overview  Goal: Plan of Care Review  Outcome: Ongoing (interventions implemented as appropriate)   05/06/19 8592   Coping/Psychosocial   Plan of Care Reviewed With patient   OTHER   Outcome Summary received report from previous shift. patient AAO and VSS. resting comfortably in bed watching TV in No Apparent Distress and no complaints of pain or discomfort. patient monitored and maintaining O2 SATS of 96% on 6L NC. patient had a good night with >6hrs sleep/rest. patient requested boot/walking splint be removed to get \"air\" for his foot. will reapply. will continue to monitor patient   Plan of Care Review   Progress improving       Problem: Skin Injury Risk (Adult)  Goal: Identify Related Risk Factors and Signs and Symptoms  Outcome: Ongoing (interventions implemented as appropriate)    Goal: Skin Health and Integrity  Outcome: Ongoing (interventions implemented as appropriate)      Problem: Fall Risk (Adult)  Goal: Identify Related Risk Factors and Signs and Symptoms  Outcome: Ongoing (interventions implemented as appropriate)    Goal: Absence of Fall  Outcome: Ongoing (interventions implemented as appropriate)        "

## 2019-05-06 NOTE — PLAN OF CARE
Problem: Patient Care Overview  Goal: Plan of Care Review  Outcome: Ongoing (interventions implemented as appropriate)   05/06/19 4924   Coping/Psychosocial   Plan of Care Reviewed With patient   OTHER   Outcome Summary Patient doing well this shift. Worked well with therapy. Was able to wean O2 from 6L to 3L nasal cannula. On 3L patient is maintaing O2 sat 90-95%. Will continue to wean as the patient tolerates. IV abx and steroids continued. VSS. Will continue to monitor.    Plan of Care Review   Progress improving

## 2019-05-06 NOTE — PROGRESS NOTES
[facility] Medicine Services  PROGRESS NOTE    Patient Name: Flaco Roque II  : 1945  MRN: [id number]    Date of Admission: 2019  Length of Stay: 10  Primary Care Physician: Tayo Hendrix MD    Subjective   Subjective     CC: f/u SOA    HPI: Up in bed without family in room. Feels a great deal better than he did upon arrival but still SOA.    Review of Systems  Gen- No fevers, chills  CV- No chest pain, palpitations  GI- No N/V/D, abd pain    Otherwise ROS is negative except as mentioned in the HPI.    Objective   Objective     Vital Signs:   Temp:  [97.8 °F (36.6 °C)-98.6 °F (37 °C)] 97.8 °F (36.6 °C)  Heart Rate:  [77-91] 87  Resp:  [16-20] 16  BP: (107-121)/(59-69) 107/59        Physical Exam:  Constitutional: No acute distress, awake, alert, cachectic  HENT: NCAT, mucous membranes moist  Respiratory: Normal WOB, poor air entry  Cardiovascular: RRR, no murmurs, rubs, or gallops, palpable pedal pulses bilaterally  Gastrointestinal: Positive bowel sounds, soft, nontender, nondistended  Musculoskeletal: Left foot in boot and dressed, pins in place in toes  Psychiatric: Appropriate affect, cooperative  Neurologic: Oriented x 3, strength symmetric in all extremities, Cranial Nerves grossly intact to confrontation, speech clear  Skin: No rashes    Results Reviewed:  I have personally reviewed current lab, radiology, and data and agree.    Results from last 7 days   Lab Units 19  0649 19  0531   WBC 10*3/mm3 9.37 10.00 10.52   HEMOGLOBIN g/dL 8.4* 8.6* 8.7*   HEMATOCRIT % 27.8* 29.1* 29.7*   PLATELETS 10*3/mm3 397 409 497*     Results from last 7 days   Lab Units 19  0649 19  0623   SODIUM mmol/L 135* 132* 135*   POTASSIUM mmol/L 4.0 3.9 4.0   CHLORIDE mmol/L 97* 94* 99   CO2 mmol/L 27.0 25.0 23.0   BUN mg/dL 8 8 5*   CREATININE mg/dL 0.64* 0.64* 0.51*   GLUCOSE mg/dL 107* 95 103*   CALCIUM mg/dL 9.0 8.6 8.9      Estimated Creatinine Clearance: 79.1 mL/min (A) (by C-G formula based on SCr of 0.64 mg/dL (L)).    No results found for: BNP    Microbiology Results Abnormal     Procedure Component Value - Date/Time    Respiratory Culture - Sputum, Cough [[id number]] Collected:  05/01/19 1750    Lab Status:  Final result Specimen:  Sputum from Cough Updated:  05/03/19 0855     Respiratory Culture Scant growth (1+) Normal Respiratory Yolanda     Gram Stain Few (2+) WBCs per low power field      Occasional Epithelial cells per low power field      Few (2+) Gram positive cocci      Few (2+) Gram variable bacilli    Blood Culture - Blood, Arm, Left [[id number]] Collected:  04/26/19 1320    Lab Status:  Final result Specimen:  Blood from Arm, Left Updated:  05/01/19 1401     Blood Culture No growth at 5 days    Blood Culture - Blood, Arm, Right [[id number]] Collected:  04/26/19 1310    Lab Status:  Final result Specimen:  Blood from Arm, Right Updated:  05/01/19 1401     Blood Culture No growth at 5 days        Personally reviewed CTA chest with R>L airspace disease. Agree with interpretation.    Results for orders placed during the hospital encounter of 04/26/19   Adult Transthoracic Echo Complete W/ Cont if Necessary Per Protocol    Narrative · Left ventricular systolic function is normal.  · Estimated EF appears to be in the range of 66 - 70%.          I have reviewed the medications:    Current Facility-Administered Medications:   •  acetaminophen (TYLENOL) tablet 650 mg, 650 mg, Oral, Q6H PRN, Milton Sow, APRN, 650 mg at 04/29/19 1009  •  bisacodyl (DULCOLAX) suppository 10 mg, 10 mg, Rectal, Daily PRN, Yanira Salazar, DO, 10 mg at 05/04/19 1704  •  budesonide-formoterol (SYMBICORT) 80-4.5 MCG/ACT inhaler 2 puff, 2 puff, Inhalation, BID - RT, Sunshine Loyd APRN, 2 puff at 05/06/19 0716  •  carbidopa-levodopa (SINEMET)  MG per tablet 1 tablet, 1 tablet, Oral, 4x Daily, Sunshine Loyd APRN, 1 tablet at 05/06/19  1212  •  guaiFENesin (MUCINEX) 12 hr tablet 1,200 mg, 1,200 mg, Oral, Q12H, Yanira Salazar DO, 1,200 mg at 05/06/19 0837  •  ipratropium-albuterol (DUO-NEB) nebulizer solution 3 mL, 3 mL, Nebulization, Q6H PRN, Sunshine Loyd, APRN  •  ipratropium-albuterol (DUO-NEB) nebulizer solution 3 mL, 3 mL, Nebulization, 4x Daily - RT, Sunshine Loyd W, APRN, 3 mL at 05/06/19 0715  •  Magnesium Sulfate 2 gram Bolus, followed by 8 gram infusion (total Mg dose 10 grams)- Mg less than or equal to 1mg/dL, 2 g, Intravenous, PRN **OR** Magnesium Sulfate 2 gram / 50mL Infusion (GIVE X 3 BAGS TO EQUAL 6GM TOTAL DOSE) - Mg 1.1 - 1.5 mg/dl, 2 g, Intravenous, PRN **OR** Magnesium Sulfate 4 gram infusion- Mg 1.6-1.9 mg/dL, 4 g, Intravenous, PRN, Marjorie Simon MD, Last Rate: 25 mL/hr at 04/30/19 0821, 4 g at 04/30/19 0821  •  mupirocin (BACTROBAN) 2 % ointment, , Topical, Q4 Days, Juhi Calle MD  •  ondansetron (ZOFRAN) injection 4 mg, 4 mg, Intravenous, Q6H PRN, Dalton Almodovar PA-C, 4 mg at 05/04/19 1618  •  oxyCODONE-acetaminophen (PERCOCET) 5-325 MG per tablet 1 tablet, 1 tablet, Oral, Q4H PRN, Yanira Salazar DO, 1 tablet at 05/03/19 1230  •  pantoprazole (PROTONIX) EC tablet 40 mg, 40 mg, Oral, QAM AC, Marjorie Simon MD, 40 mg at 05/06/19 0837  •  piperacillin-tazobactam (ZOSYN) 3.375 g in iso-osmotic dextrose 50 ml (premix), 3.375 g, Intravenous, Q8H, Sunshine Loyd, APRN, Last Rate: 12.5 mL/hr at 05/06/19 0557, 3.375 g at 05/06/19 0557  •  polyethylene glycol 3350 powder (packet), 17 g, Oral, Daily, Rachelle Clark PA, 17 g at 05/05/19 0827  •  potassium chloride (MICRO-K) CR capsule 40 mEq, 40 mEq, Oral, PRN, 40 mEq at 04/29/19 1405 **OR** potassium chloride (KLOR-CON) packet 40 mEq, 40 mEq, Oral, PRN **OR** potassium chloride 10 mEq in 100 mL IVPB, 10 mEq, Intravenous, Q1H PRN, Marjorie Simon MD  •  sodium chloride 0.9 % flush 10 mL, 10 mL, Intravenous, PRN, Alfonzo Monzon MD  •   tamsulosin (FLOMAX) 24 hr capsule 0.4 mg, 0.4 mg, Oral, Daily, Marjorie Simon MD, 0.4 mg at 05/06/19 0837      Assessment/Plan   Assessment / Plan     Active Hospital Problems    Diagnosis POA   • **Hypotension due to hypovolemia [I95.89, E86.1] Yes   • ROSALIA (acute kidney injury) (CMS/[facility]) [N17.9] Yes   • Parkinson disease (CMS/[facility]) [G20] Yes   • GI bleed [K92.2] Yes   • S/P foot surgery, left [Z98.890] Not Applicable   • Anemia [D64.9] Yes   • ABRIL (obstructive sleep apnea) [G47.33] Yes   • COPD (chronic obstructive pulmonary disease) (CMS/[facility]) [J44.9] Yes       Brief Hospital Course to date:  Flaco Roque II is a 73 y.o. male with PMHx significant for COPD, ABRIL, chronic pain, GERD, parkinson's disease and recent left foot surgery (d/c on 4/24) who presented with vomiting and diarrhea. Initially admitted to the ICU for hypotension and hypoxia.     Acute Hypoxic Respiratory Failure  LLL PNA  COPD  - Remains on 6L NC, have been unable to wean. Was not on oxygen prior to admission but suspect he will need lifelong O2 at d/c.  - CT reviewed, negative for PE. Shows extensive and diffuse pulmonary interstitial disease, some of which is chronic but acute process likely infectious in nature. Will prolong course of abx given appearance on CT and failure to improve. Add steroids.  - S/P IV lasix x2, still unable to wean O2 despite aggressive pulmonary toilet/  - continue duonbs and symbicort  - Echo unremarkable.  - Continue mucinex, OPEP and IS     Melena:  Acute blood Loss Anemia  - GI following, defer EGD for now given degree of hypoxia  - IV Iron   - continue BID PPI  - transfuse PRN Hgb <7, s/p 1unit PRBCs on 4/27  - BM with relistor     Hypotension  ROSALIA  - resolved with hydration     Parkinson's Disease:  - Sinemet     S/p Left Foot Surgery  - Dr. Calle following     DVT Prophylaxis: Mechanical given GIB     Disposition: I expect the patient to be discharged once hypoxia improved.      CODE STATUS:   Code  Status and Medical Interventions:   Ordered at: 04/26/19 2111     Code Status:    CPR     Medical Interventions (Level of Support Prior to Arrest):    Full         Electronically signed by Awilda Dickson II, DO, 05/06/19, 1:29 PM.

## 2019-05-06 NOTE — PROGRESS NOTES
Continued Stay Note   [place]     Patient Name: Flaco Roque II  MRN: [id number]  Today's Date: 5/6/2019    Admit Date: 4/26/2019    Discharge Plan     Row Name 05/06/19 1645       Plan    Plan  Home with     Patient/Family in Agreement with Plan  yes    Plan Comments  Case mgt con't to follow. D/C plan is still to return home,wife is available to assist with care,discussed that he will likely need home O2. case mgt will f/u in am for final d/c arrangements        Discharge Codes    No documentation.       Expected Discharge Date and Time     Expected Discharge Date Expected Discharge Time    May 4, 2019             Sonja C Kellerman, RN

## 2019-05-07 ENCOUNTER — TELEPHONE (OUTPATIENT)
Dept: ORTHOPEDIC SURGERY | Facility: CLINIC | Age: 74
End: 2019-05-07

## 2019-05-07 VITALS
HEART RATE: 96 BPM | BODY MASS INDEX: 22.73 KG/M2 | OXYGEN SATURATION: 93 % | WEIGHT: 150 LBS | DIASTOLIC BLOOD PRESSURE: 67 MMHG | HEIGHT: 68 IN | TEMPERATURE: 97.9 F | RESPIRATION RATE: 16 BRPM | SYSTOLIC BLOOD PRESSURE: 126 MMHG

## 2019-05-07 PROBLEM — D62 ACUTE BLOOD LOSS ANEMIA: Status: ACTIVE | Noted: 2017-10-31

## 2019-05-07 PROCEDURE — 25010000002 METHYLPREDNISOLONE PER 40 MG: Performed by: INTERNAL MEDICINE

## 2019-05-07 PROCEDURE — 97116 GAIT TRAINING THERAPY: CPT

## 2019-05-07 PROCEDURE — 94799 UNLISTED PULMONARY SVC/PX: CPT

## 2019-05-07 PROCEDURE — 25010000002 PIPERACILLIN SOD-TAZOBACTAM PER 1 G: Performed by: INTERNAL MEDICINE

## 2019-05-07 PROCEDURE — 99239 HOSP IP/OBS DSCHRG MGMT >30: CPT | Performed by: INTERNAL MEDICINE

## 2019-05-07 RX ORDER — TAMSULOSIN HYDROCHLORIDE 0.4 MG/1
0.4 CAPSULE ORAL DAILY
Qty: 30 CAPSULE | Refills: 0 | Status: SHIPPED | OUTPATIENT
Start: 2019-05-08 | End: 2020-08-12

## 2019-05-07 RX ORDER — PREDNISONE 20 MG/1
40 TABLET ORAL DAILY
Qty: 10 TABLET | Refills: 0 | Status: SHIPPED | OUTPATIENT
Start: 2019-05-07 | End: 2019-07-28

## 2019-05-07 RX ADMIN — TAZOBACTAM SODIUM AND PIPERACILLIN SODIUM 3.38 G: 375; 3 INJECTION, SOLUTION INTRAVENOUS at 06:15

## 2019-05-07 RX ADMIN — TAMSULOSIN HYDROCHLORIDE 0.4 MG: 0.4 CAPSULE ORAL at 08:07

## 2019-05-07 RX ADMIN — PANTOPRAZOLE SODIUM 40 MG: 40 TABLET, DELAYED RELEASE ORAL at 06:15

## 2019-05-07 RX ADMIN — IPRATROPIUM BROMIDE AND ALBUTEROL SULFATE 3 ML: 2.5; .5 SOLUTION RESPIRATORY (INHALATION) at 06:35

## 2019-05-07 RX ADMIN — METHYLPREDNISOLONE SODIUM SUCCINATE 40 MG: 40 INJECTION, POWDER, FOR SOLUTION INTRAMUSCULAR; INTRAVENOUS at 03:07

## 2019-05-07 RX ADMIN — IPRATROPIUM BROMIDE AND ALBUTEROL SULFATE 3 ML: 2.5; .5 SOLUTION RESPIRATORY (INHALATION) at 10:38

## 2019-05-07 RX ADMIN — BUDESONIDE AND FORMOTEROL FUMARATE DIHYDRATE 2 PUFF: 80; 4.5 AEROSOL RESPIRATORY (INHALATION) at 06:35

## 2019-05-07 RX ADMIN — CARBIDOPA AND LEVODOPA 1 TABLET: 25; 100 TABLET ORAL at 12:12

## 2019-05-07 RX ADMIN — GUAIFENESIN 1200 MG: 600 TABLET, EXTENDED RELEASE ORAL at 08:07

## 2019-05-07 RX ADMIN — CARBIDOPA AND LEVODOPA 1 TABLET: 25; 100 TABLET ORAL at 08:07

## 2019-05-07 NOTE — DISCHARGE INSTRUCTIONS
[facility] will provide you with home oxygen.     [facility] will continue to provide wound care for your left foot.

## 2019-05-07 NOTE — THERAPY TREATMENT NOTE
Acute Care - Physical Therapy Treatment Note  [facility]     Patient Name: Flaco Roque II  : 1945  MRN: [id number]  Today's Date: 2019  Onset of Illness/Injury or Date of Surgery: 19  Date of Referral to PT: 19  Referring Physician: MD Luc    Admit Date: 2019    Visit Dx:    ICD-10-CM ICD-9-CM   1. Iron deficiency anemia, unspecified iron deficiency anemia type D50.9 280.9   2. Pneumonia of both lower lobes due to infectious organism (CMS/[facility]) J18.1 483.8   3. Sepsis, due to unspecified organism (CMS/[facility]) A41.9 038.9     995.91   4. Acute respiratory failure with hypoxia (CMS/[facility]) J96.01 518.81   5. Impaired functional mobility, balance, gait, and endurance Z74.09 V49.89   6. Therapeutic opioid induced constipation K59.03 564.09    T40.2X5A E935.2     Patient Active Problem List   Diagnosis   • ABRIL (obstructive sleep apnea)   • COPD (chronic obstructive pulmonary disease) (CMS/[facility])   • Anemia   • Leukocytosis, likely reactive   • Acute postoperative pain   • Deformity of left foot   • S/P foot surgery, left   • Hypotension due to hypovolemia   • Parkinson disease (CMS/[facility])   • GI bleed   • Pneumonia due to infectious organism   • Acute respiratory failure with hypoxia (CMS/[facility])       Therapy Treatment    Rehabilitation Treatment Summary     Row Name 19 0835             Treatment Time/Intention    Patient/Family Observations  pt supine in bed  -EJ      Care Plan Review  patient/other agree to care plan;evaluation/treatment results reviewed;care plan/treatment goals reviewed;risks/benefits reviewed  -EJ      Existing Precautions/Restrictions  fall;oxygen therapy device and L/min;other (see comments)  (Significant)  Weight bearing through L heel with boot  -EJ      Patient Response to Treatment  good effort  -EJ      Recorded by [EJ] Zayda Sam PT 19 0932      Row Name 19 0835             Vital Signs    Pre SpO2 (%)  91  -EJ      O2 Delivery Pre  Treatment  supplemental O2  -EJ      O2 Delivery Intra Treatment  supplemental O2  -EJ      O2 Delivery Post Treatment  supplemental O2  -EJ      Pre Patient Position  Supine  -EJ      Intra Patient Position  Standing  -EJ      Post Patient Position  Sitting  -EJ      Recorded by [EJ] Zayda Sam, PT 05/07/19 0932      Row Name 05/07/19 0835             Cognitive Assessment/Intervention- PT/OT    Affect/Mental Status (Cognitive)  WFL  -EJ      Orientation Status (Cognition)  oriented x 4  -EJ      Follows Commands (Cognition)  WFL  -EJ      Cognitive Function (Cognitive)  safety deficit  -EJ      Safety Deficit (Cognitive)  mild deficit  -EJ      Personal Safety Interventions  fall prevention program maintained;gait belt;nonskid shoes/slippers when out of bed  -EJ      Recorded by [EJ] Zayda Sam, PT 05/07/19 0932      Row Name 05/07/19 0835             Safety Issues, Functional Mobility    Impairments Affecting Function (Mobility)  balance  -EJ      Recorded by [EJ] Zayda Sam, PT 05/07/19 0932      Row Name 05/07/19 0835             Mobility Assessment/Intervention    Extremity Weight-bearing Status  left lower extremity  -EJ      Left Lower Extremity (Weight-bearing Status)  weight-bearing as tolerated (WBAT)  (Significant)  through L heel with boot   -EJ      Recorded by [EJ] Zayda Sam, PT 05/07/19 0932      Row Name 05/07/19 0835             Bed Mobility Assessment/Treatment    Bed Mobility Assessment/Treatment  supine-sit  -EJ      Supine-Sit [place] (Bed Mobility)  conditional independence  -EJ      Sit-Supine [place] (Bed Mobility)  not tested  -EJ      Assistive Device (Bed Mobility)  bed rails;head of bed elevated  -EJ      Comment (Bed Mobility)  boot donned prior to OOB activity  -EJ      Recorded by [EJ] Zayda Sam, PT 05/07/19 0932      Row Name 05/07/19 0835             Transfer Assessment/Treatment    Transfer Assessment/Treatment  sit-stand  transfer;stand-sit transfer  -EJ      Recorded by [EJ] Zayda Sma, PT 05/07/19 0932      Row Name 05/07/19 0835             Sit-Stand Transfer    Sit-Stand [place] (Transfers)  supervision  -EJ      Assistive Device (Sit-Stand Transfers)  walker, front-wheeled  -EJ      Recorded by [EJ] Zayda Sam, PT 05/07/19 0932      Row Name 05/07/19 0835             Stand-Sit Transfer    Stand-Sit [place] (Transfers)  supervision  -EJ      Assistive Device (Stand-Sit Transfers)  walker, front-wheeled  -EJ      Recorded by [EJ] Zayda Sam, PT 05/07/19 0932      Row Name 05/07/19 0835             Gait/Stairs Assessment/Training    28961 - Gait Training Minutes   25  -EJ      Gait/Stairs Assessment/Training  gait/ambulation independence  -EJ      [place] Level (Gait)  contact guard  -EJ      Assistive Device (Gait)  walker, front-wheeled  -EJ      Distance in Feet (Gait)  150  -EJ      Pattern (Gait)  step-to  -EJ      Deviations/Abnormal Patterns (Gait)  left sided deviations;antalgic;bilateral deviations;krunal decreased;stride length decreased  -EJ      Bilateral Gait Deviations  weight shift ability decreased  -EJ      Negotiation (Stairs)  stairs independence  -EJ      [place] Level (Stairs)  contact guard  -EJ      Handrail Location (Stairs)  none  -EJ      Number of Steps (Stairs)  1, 2x  -EJ      Ascending Technique (Stairs)  step-to-step  -EJ      Descending Technique (Stairs)  step-to-step  -EJ      Comment (Gait/Stairs)  Pt demos step to gait pattern with occasional moment of progress toward step through pattern. Appears to have more difficulty maintaining NWB through heel and balance with step through. Pt cued for step to gait. PT demos stairs for pt then pt performs 2x.  -EJ      Recorded by [EJ] Zayda Sam, PT 05/07/19 0932      Row Name 05/07/19 0835             Therapeutic Exercise    Therapeutic Exercise  supine, lower extremities  -EJ      Additional  Documentation  Therapeutic Exercise (Row)  -EJ      96971 - PT Therapeutic Exercise Minutes  9  -EJ      Recorded by [EJ] Zayda Sam, PT 05/07/19 0932      Row Name 05/07/19 0835             Therapeutic Exercise    Lower Extremity (Therapeutic Exercise)  gluteal sets;LAQ (long arc quad), bilateral;SLR (straight leg raise), left  -EJ      Lower Extremity Range of Motion (Therapeutic Exercise)  hip abduction/adduction, left  -EJ      Exercise Type (Therapeutic Exercise)  AROM (active range of motion)  -EJ      Position (Therapeutic Exercise)  seated  -EJ      Sets/Reps (Therapeutic Exercise)  20x each  -EJ      Comment (Therapeutic Exercise)  cues for technique  -EJ      Recorded by [EJ] Zayda Sam, PT 05/07/19 0932      Row Name 05/07/19 0835             Static Standing Balance    Level of [place] (Supported Standing, Static Balance)  independent  -EJ      Recorded by [EJ] Zayda Sam, PT 05/07/19 0932      Row Name 05/07/19 0835             Positioning and Restraints    Pre-Treatment Position  in bed  -EJ      Post Treatment Position  bsc  -EJ      On BS commode  notified nsg;sitting;call light within reach;encouraged to call for assist  -EJ      Recorded by [EJ] Zayda Sam, PT 05/07/19 0932      Row Name                Wound 04/23/19 1011 Left foot incision    Wound - Properties Group Date first assessed: 04/23/19 [JA] Time first assessed: 1011 [JA] Side: Left [JA] Location: foot [JA] Type: incision [JA] Recorded by:  [JA] Sj Milner RN 04/23/19 1011      User Key  (r) = Recorded By, (t) = Taken By, (c) = Cosigned By    Initials Name Effective Dates Discipline    EJ Zayda Sam, PT 11/20/18 -  PT    Sj Martin, RN 06/16/16 -  Nurse          Wound 04/23/19 1011 Left foot incision (Active)   Dressing Appearance dry;intact;no drainage 5/7/2019  8:07 AM   Closure FUENTES 5/6/2019  8:00 PM   Base clean 5/6/2019  8:00 PM   Drainage Amount none 5/7/2019  8:07 AM    Dressing Care, Wound elastic bandage;gauze 5/7/2019  8:07 AM       Rehab Goal Summary     Row Name 05/07/19 0835             Physical Therapy Goals    Bed Mobility Goal Selection (PT)  bed mobility, PT goal 1  -EJ      Transfer Goal Selection (PT)  transfer, PT goal 1  -EJ      Gait Training Goal Selection (PT)  gait training, PT goal 1  -EJ      Stairs Goal Selection (PT)  stairs, PT goal 1  -EJ         Bed Mobility Goal 1 (PT)    Activity/Assistive Device (Bed Mobility Goal 1, PT)  sit to supine/supine to sit  -EJ      [place] Level/Cues Needed (Bed Mobility Goal 1, PT)  independent  -EJ      Time Frame (Bed Mobility Goal 1, PT)  2 weeks  -EJ      Progress/Outcomes (Bed Mobility Goal 1, PT)  goal partially met  -EJ         Transfer Goal 1 (PT)    Activity/Assistive Device (Transfer Goal 1, PT)  sit-to-stand/stand-to-sit;walker, rolling  -EJ      [place] Level/Cues Needed (Transfer Goal 1, PT)  supervision required  -EJ      Time Frame (Transfer Goal 1, PT)  2 weeks  -EJ      Progress/Outcome (Transfer Goal 1, PT)  continuing progress toward goal  -EJ         Gait Training Goal 1 (PT)    Activity/Assistive Device (Gait Training Goal 1, PT)  gait (walking locomotion);walker, rolling  -EJ      [place] Level (Gait Training Goal 1, PT)  supervision required  -EJ      Distance (Gait Goal 1, PT)  100  -EJ      Time Frame (Gait Training Goal 1, PT)  2 weeks  -EJ      Progress/Outcome (Gait Training Goal 1, PT)  goal ongoing;continuing progress toward goal  -EJ         Stairs Goal 1 (PT)    Activity/Assistive Device (Stairs Goal 1, PT)  ascending stairs;descending stairs  -EJ      [place] Level/Cues Needed (Stairs Goal 1, PT)  contact guard assist  -EJ      Number of Stairs (Stairs Goal 1, PT)  1  -EJ      Time Frame (Stairs Goal 1, PT)  2 weeks  -EJ      Progress/Outcome (Stairs Goal 1, PT)  goal met  -EJ        User Key  (r) = Recorded By, (t) = Taken By, (c) = Cosigned By    Initials Name Provider  Type Discipline    Zayda Carpio PT Physical Therapist PT          Physical Therapy Education     Title: PT OT SLP Therapies (In Progress)     Topic: Physical Therapy (In Progress)     Point: Mobility training (In Progress)     Learning Progress Summary           Patient Acceptance, E,TB, VU,DU by MOUSTAPHA at 5/7/2019  8:35 AM    Acceptance, E,D, VU,NR by MAYCOL at 5/6/2019  9:48 AM    Comment:  Reviewed WB status, HEP, gait mechanics    Eager, E,D, VU,DU,NR by SC at 5/5/2019  9:36 AM    Comment:  Reviewed heel wt bearing    Acceptance, E, NR by AS at 5/4/2019  9:22 AM    Acceptance, E,D, VU,NR by SALMA at 5/3/2019 10:57 AM    Comment:  Reviewed benefits of activity, indications for taking standing/sitting rest breaks with ambulation, safety with mobility, pursed-lip breathing.    Acceptance, E, NR by AS at 5/1/2019 11:09 AM    Acceptance, E,D, VU,NR by SHANNAN at 4/30/2019 10:10 AM    Acceptance, E, NR by LETICIA at 4/29/2019  1:35 PM    Acceptance, E,D, VU,NR by SHANNAN at 4/28/2019  8:20 AM    Acceptance, E,D, NR by LS at 4/27/2019 10:31 AM   Family Acceptance, E,D, NR by LS at 4/27/2019 10:31 AM                   Point: Home exercise program (Done)     Learning Progress Summary           Patient Acceptance, E,TB, VU,DU by MOUSTAPHA at 5/7/2019  8:35 AM    Acceptance, E,D, VU,NR by MAYCOL at 5/6/2019  9:48 AM    Comment:  Reviewed WB status, HEP, gait mechanics    Eager, E,D, VU,DU,NR by SC at 5/5/2019  9:36 AM    Comment:  Reviewed heel wt bearing    Acceptance, E, NR by AS at 5/4/2019  9:22 AM    Acceptance, E,D, VU,NR by SALMA at 5/3/2019 10:57 AM    Comment:  Reviewed benefits of activity, indications for taking standing/sitting rest breaks with ambulation, safety with mobility, pursed-lip breathing.    Acceptance, E, NR by AS at 5/1/2019 11:09 AM    Acceptance, E,D, VU,NR by SHANNAN at 4/30/2019 10:10 AM    Acceptance, DARRIAN NR by LETICIA at 4/29/2019  1:35 PM    Acceptance, NIKHIL COLMENARES, MIGUEL,NR by SHANNAN at 4/28/2019  8:20 AM                   Point: Body mechanics  (In Progress)     Learning Progress Summary           Patient Acceptance, E,TB, VU,DU by MOUSTAPHA at 5/7/2019  8:35 AM    Acceptance, E,D, VU,NR by MAYCOL at 5/6/2019  9:48 AM    Comment:  Reviewed WB status, HEP, gait mechanics    Eager, E,D, VU,DU,NR by SC at 5/5/2019  9:36 AM    Comment:  Reviewed heel wt bearing    Acceptance, E, NR by AS at 5/4/2019  9:22 AM    Acceptance, E,D, VU,NR by SALMA at 5/3/2019 10:57 AM    Comment:  Reviewed benefits of activity, indications for taking standing/sitting rest breaks with ambulation, safety with mobility, pursed-lip breathing.    Acceptance, E, NR by AS at 5/1/2019 11:09 AM    Acceptance, E,D, VU,NR by SHANNAN at 4/30/2019 10:10 AM    Acceptance, E, NR by LETICIA at 4/29/2019  1:35 PM    Acceptance, E,D, VU,NR by SHANNAN at 4/28/2019  8:20 AM    Acceptance, E,D, NR by SHANNAN at 4/27/2019 10:31 AM   Family Acceptance, E,D, NR by LS at 4/27/2019 10:31 AM                   Point: Precautions (In Progress)     Learning Progress Summary           Patient Acceptance, E,TB, VU,DU by MOUSTAPHA at 5/7/2019  8:35 AM    Acceptance, E,D, VU,NR by MAYCOL at 5/6/2019  9:48 AM    Comment:  Reviewed WB status, HEP, gait mechanics    Eager, E,D, VU,DU,NR by SC at 5/5/2019  9:36 AM    Comment:  Reviewed heel wt bearing    Acceptance, E, NR by AS at 5/4/2019  9:22 AM    Acceptance, E,D, VU,NR by SALMA at 5/3/2019 10:57 AM    Comment:  Reviewed benefits of activity, indications for taking standing/sitting rest breaks with ambulation, safety with mobility, pursed-lip breathing.    Acceptance, E, NR by AS at 5/1/2019 11:09 AM    Acceptance, E,D, VU,NR by SHANNAN at 4/30/2019 10:10 AM    Acceptance, E, NR by LETICIA at 4/29/2019  1:35 PM    Acceptance, E,D, VU,NR by SHANNAN at 4/28/2019  8:20 AM    Acceptance, NIKHIL COLMENARES, NR by LS at 4/27/2019 10:31 AM   Family Acceptance, ED, NR by SHANNAN at 4/27/2019 10:31 AM                               User Key     Initials Effective Dates Name Provider Type Discipline    SC 06/19/15 -  Day Chinchilla, PT Physical Therapist  PT    EJ 11/20/18 -  Zayda Sam, PT Physical Therapist PT    AS 06/22/15 -  Dina Coon, PTA Physical Therapy Assistant PT    LS 06/19/15 -  Delia Crandall, PT Physical Therapist PT    MJ 04/03/18 -  Jameson العراقي, PT Physical Therapist PT    KR 04/03/18 -  Malathi Mchugh, PT Physical Therapist PT    LM 04/03/18 -  Kamryn Murray, PT Physical Therapist PT                PT Recommendation and Plan     Plan of Care Reviewed With: patient  Progress: improving  Outcome Summary: Pt ambulates 150 ft in bess and up/down 1 step with RWx, boot donned, maintaining weightbearing through heel throughout. Pt tolerates ther ex. Pt expects to d/c home today, possibly with supplemental oxygen.  Outcome Measures     Row Name 05/07/19 0835 05/06/19 0948 05/05/19 0936       How much help from another person do you currently need...    Turning from your back to your side while in flat bed without using bedrails?  4  -EJ  4  -MJ  4  -SC    Moving from lying on back to sitting on the side of a flat bed without bedrails?  4  -EJ  4  -MJ  4  -SC    Moving to and from a bed to a chair (including a wheelchair)?  3  -EJ  3  -MJ  3  -SC    Standing up from a chair using your arms (e.g., wheelchair, bedside chair)?  4  -EJ  3  -MJ  3  -SC    Climbing 3-5 steps with a railing?  3  -EJ  3  -MJ  3  -SC    To walk in hospital room?  3  -EJ  3  -MJ  3  -SC    AM-PAC 6 Clicks Score  21  -EJ  20  -MJ  20  -SC       Functional Assessment    Outcome Measure Options  AM-PAC 6 Clicks Basic Mobility (PT)  -EJ  AM-PAC 6 Clicks Basic Mobility (PT)  -MJ  AM-PAC 6 Clicks Basic Mobility (PT)  -SC      User Key  (r) = Recorded By, (t) = Taken By, (c) = Cosigned By    Initials Name Provider Type    Day Freeman, PT Physical Therapist    EJ Zayda Sam, PT Physical Therapist    MJ Jameson العراقي, PT Physical Therapist         Time Calculation:   PT Charges     Row Name 05/07/19 0835             Time Calculation    Start Time  0835  -       PT Received On  05/07/19  -EJ      PT Goal Re-Cert Due Date  05/17/19  -EJ         Time Calculation- PT    Total Timed Code Minutes- PT  34 minute(s)  -EJ         Timed Charges    93305 - PT Therapeutic Exercise Minutes  9  -EJ      35850 - Gait Training Minutes   25  -EJ        User Key  (r) = Recorded By, (t) = Taken By, (c) = Cosigned By    Initials Name Provider Type     Zayda Sam, PT Physical Therapist        Therapy Charges for Today     Code Description Service Date Service Provider Modifiers Qty    89977259240 HC GAIT TRAINING EA 15 MIN 5/7/2019 Zayda Sam PT GP 2          PT G-Codes  Outcome Measure Options: AM-PAC 6 Clicks Basic Mobility (PT)  AM-PAC 6 Clicks Score: 21    Zayda Anthony, PT  5/7/2019

## 2019-05-07 NOTE — PLAN OF CARE
Problem: Patient Care Overview  Goal: Plan of Care Review  Outcome: Ongoing (interventions implemented as appropriate)   05/07/19 0316   Coping/Psychosocial   Plan of Care Reviewed With patient   OTHER   Outcome Summary patient had a good night. rested/slept >7hrs. no complaints of pain or discomfort. Oxygen decreased from 2 to 1.5 lpm to wean from O2. O2 sats have remained greater than90% on 2lpm and will monitor for saturation when sleeping on lower rate. VSS. IV antibiotics and steroid regimen continues. will continue to monitor patient   Plan of Care Review   Progress improving   Coping/Psychosocial   Patient Agreement with Plan of Care agrees       Problem: Skin Injury Risk (Adult)  Goal: Identify Related Risk Factors and Signs and Symptoms  Outcome: Ongoing (interventions implemented as appropriate)    Goal: Skin Health and Integrity  Outcome: Ongoing (interventions implemented as appropriate)      Problem: Fall Risk (Adult)  Goal: Identify Related Risk Factors and Signs and Symptoms  Outcome: Ongoing (interventions implemented as appropriate)    Goal: Absence of Fall  Outcome: Ongoing (interventions implemented as appropriate)

## 2019-05-07 NOTE — TELEPHONE ENCOUNTER
Howard from  home care calling to see if patient needs to perform wound care today on patient. He is in hospital and Dr. Hess went to see him yesterday. He also has an appointment on Friday. Her number is [phone]

## 2019-05-07 NOTE — PROGRESS NOTES
Case Management Discharge Note    Final Note: Discussed home O2 with Mr Roque,he is in agreement with plan. Arrangements made for [facility] to provide home O2 concentrator and a portable O2 tank for transport home. [facility] notifed of d/c today. they had a referral to see him for wound care prior to this admission.     05/07/19 1117  --  --  --  --  nasal cannula  93   05/07/19 1113  --  --  --  --  room air  84 Abnormal    05/07/19 1041                     Destination      No service has been selected for the patient.      Durable Medical Equipment      No service has been selected for the patient.      Dialysis/Infusion      No service has been selected for the patient.      Home Medical Care      No service has been selected for the patient.      Therapy      No service has been selected for the patient.      Community Resources      No service has been selected for the patient.             Final Discharge Disposition Code: 06 - home with home health care

## 2019-05-07 NOTE — PROGRESS NOTES
"                  Clinical Nutrition     Nutrition Assessment  Reason for Visit:   Follow-up protocol      Patient Name: Flaco Roque II  YOB: 1945  MRN: [id number]  Date of Encounter: 05/07/19 1:37 PM  Admission date: 4/26/2019    Nutrition Assessment   Assessment       Hospital Problem List    Hypotension due to hypovolemia    ABRIL (obstructive sleep apnea)    COPD (chronic obstructive pulmonary disease) (CMS/[facility])    Acute blood loss anemia    S/P foot surgery, left    Parkinson disease (CMS/[facility])    GI bleed    Pneumonia due to infectious organism    Acute respiratory failure with hypoxia (CMS/[facility])      PMH: He  has a past medical history of 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   PSxH: He  has a past surgical history that includes Back surgery; Total hip arthroplasty (Left); Arthrodesis midtarsal / tarsometatarsal / tarsal navicular-cuneiform (Left, 05/10/2016); Cataract extraction; Back surgery; Gallbladder surgery; Spine surgery; Esophagogastroduodenoscopy (N/A, 11/1/2017); Colonoscopy (N/A, 11/2/2017); Esophagogastroduodenoscopy (11/02/2017); Foot surgery; Pain Pump Insertion/Revision; Knee arthroscopy (Bilateral); Ulnar nerve transposition; Total shoulder arthroplasty w/ distal clavicle excision (Left, 9/10/2018); and Bunionectomy (Left, 4/23/2019).       Reported/Observed/Food/Nutrition Related History:         Anthropometrics     Height: 172.7 cm (67.99\")  Last filed wt: Weight: 68 kg (150 lb) (05/05/19 1253)  Weight Method: Stated    BMI: " BMI (Calculated): 22.8  Normal: 18.5-24.9kg/m2    Ideal Body Weight (IBW) (kg): 70.87    Medications reviewed   Yes    Applicable medical tests/Procedures since admission:      Current Nutrition Prescription     PO: Diet Soft Texture; [employer]  Orders Placed This Encounter  Supplements: Boost Plus 2x/day  Boost Breeze 1x/day    Intake:  Per nursing documentation pt consuming 81% of 4 meals         Nutrition Diagnosis     5/7  Problem Nutrition appropriate for condition   Etiology Hypotension, hypovolemia   Signs/Symptoms 81% of 4 meals        Nutrition Intervention   1.  Follow treatment progress, Care plan reviewed       Goal:   General: Nutrition support treatment  PO: Maintain intake    Monitoring/Evaluation:   Per protocol, PO intake, Supplement intake, Symptoms      Will Continue to follow per protocol      Ariadne Jimenez RD  Time Spent: 20 minutes

## 2019-05-07 NOTE — NURSING NOTE
Attempted to wean pt to RA. Upon removal of 2 L O2 via NC, pt dropped to 84% on RA. Pt returned to 93% on 2 L O2 via NC.

## 2019-05-07 NOTE — PLAN OF CARE
Problem: Patient Care Overview  Goal: Plan of Care Review  Outcome: Ongoing (interventions implemented as appropriate)   05/07/19 0853   Coping/Psychosocial   Plan of Care Reviewed With patient   OTHER   Outcome Summary Pt ambulates 150 ft in bess and up/down 1 step with RWx, boot donned, maintaining weightbearing through heel throughout. Pt tolerates ther ex. Pt expects to d/c home today, possibly with supplemental oxygen.   Plan of Care Review   Progress improving

## 2019-05-07 NOTE — PLAN OF CARE
Problem: Skin Injury Risk (Adult)  Goal: Skin Health and Integrity  Outcome: Outcome(s) achieved Date Met: 05/07/19 05/07/19 1312   Skin Injury Risk (Adult)   Skin Health and Integrity achieves outcome

## 2019-05-07 NOTE — PLAN OF CARE
Problem: Gastrointestinal Bleeding (Adult)  Goal: Signs and Symptoms of Listed Potential Problems Will be Absent, Minimized or Managed (Gastrointestinal Bleeding)  Outcome: Outcome(s) achieved Date Met: 05/07/19 05/07/19 1304   Goal/Outcome Evaluation   Problems Assessed (GI Bleeding) all   Problems Present (GI Bleeding) hypoxia/hypoxemia       Problem: Pain, Chronic (Adult)  Goal: Acceptable Pain/Comfort Level and Functional Ability  Outcome: Outcome(s) achieved Date Met: 05/07/19 05/07/19 1304   Pain, Chronic (Adult)   Acceptable Pain/Comfort Level and Functional Ability achieves outcome       Problem: Skin Injury Risk (Adult)  Goal: Identify Related Risk Factors and Signs and Symptoms  Outcome: Outcome(s) achieved Date Met: 05/07/19 05/07/19 1304   Skin Injury Risk (Adult)   Related Risk Factors (Skin Injury Risk) infection;mobility impaired     Goal: Skin Health and Integrity  Outcome: Ongoing (interventions implemented as appropriate)   05/07/19 1304   Skin Injury Risk (Adult)   Skin Health and Integrity making progress toward outcome       Problem: Fall Risk (Adult)  Goal: Identify Related Risk Factors and Signs and Symptoms  Outcome: Outcome(s) achieved Date Met: 05/07/19 05/07/19 1304   Fall Risk (Adult)   Related Risk Factors (Fall Risk) gait/mobility problems;environment unfamiliar   Signs and Symptoms (Fall Risk) presence of risk factors     Goal: Absence of Fall  Outcome: Outcome(s) achieved Date Met: 05/07/19 05/07/19 1304   Fall Risk (Adult)   Absence of Fall achieves outcome

## 2019-05-07 NOTE — PLAN OF CARE
Problem: Patient Care Overview  Goal: Plan of Care Review  Outcome: Outcome(s) achieved Date Met: 05/07/19 05/07/19 6371   Coping/Psychosocial   Plan of Care Reviewed With patient   OTHER   Outcome Summary Attempted to wean pt off O2. Pt sats 84% on RA. Oxygen saturation increased to 93% once placed back on 2 L NC. CM notified and O2 ordered for home use. NSR to sinus tach on telemetry. Other vitals WNL. Pt voids spontaneously. Tolerates ambulation with assist X 1 and walker. AFO on when ambulatory. LLE elevated while pt in bed or chair. Pt denies pain, numbness, and tingling. Ace wrap is clean, dry, and intact. Dressing change is due 05/08. HH notified by  of pt's D/C today so that HH may be resumed. Pt to be D/C'd home once home oxygen arrives.    Plan of Care Review   Progress improving

## 2019-05-07 NOTE — DISCHARGE INSTR - ACTIVITY
Keep your left foot elevated.     Wear your brace when up.     Use ice as needed for pain and swelling.

## 2019-05-07 NOTE — DISCHARGE SUMMARY
[facility] Medicine Services  DISCHARGE SUMMARY    Patient Name: Flaco Roque II  : 1945  MRN: [id number]    Date of Admission: 2019  Date of Discharge: 2019  Primary Care Physician: Tayo Hendrix MD    Consults     Date and Time Order Name Status Description    2019 1115 Inpatient Pulmonology Consult Completed     2019 0030 Inpatient Orthopedic Surgery Consult      2019 0030 Inpatient Gastroenterology Consult Completed           Hospital Course     Presenting Problem:   GI bleed [K92.2]    Active Hospital Problems    Diagnosis  POA   • **Hypotension due to hypovolemia [I95.89, E86.1]  Yes   • Pneumonia due to infectious organism [J18.9]  Yes   • Acute respiratory failure with hypoxia (CMS/[facility]) [J96.01]  Yes   • Parkinson disease (CMS/[facility]) [G20]  Yes   • GI bleed [K92.2]  Yes   • S/P foot surgery, left [Z98.890]  Not Applicable   • Anemia [D64.9]  Yes   • ABRIL (obstructive sleep apnea) [G47.33]  Yes   • COPD (chronic obstructive pulmonary disease) (CMS/[facility]) [J44.9]  Yes      Resolved Hospital Problems    Diagnosis Date Resolved POA   • ROSALIA (acute kidney injury) (CMS/[facility]) [N17.9] 2019 Yes          Hospital Course:  Flaco Roque II is a 73 y.o. male admitted initially to ICU with hypotension and acute hypoxic respiratory failure due to bacterial pneumonia.    Acute hypoxic respiratory failure due to bacterial pneumonia w/ underlying lung disease: 74 y/o male admitted with above. Upon arrival patient was hypotensive so he did require brief stay in ICU. He was initially placed on broad spectrum IV antibiotics and resuscitated with IV fluids to which he responded well. He was ultimately transferred to floor but did remain significantly hypoxic. He was started on IV steroids in addition to abx, nebs, pulmonary toilet and he ultimately did respond well to this. He had completed course of antibiotics prior to discharge but will continue PO  prednisone for 5 more days upon discharge. He will continue supplemental O2 until directed otherwise. He will follow up with his PCP in 1 week and will establish with [facility] in 2 months.    Mild acute kidney injury: Patient was noted to have elevated creatinine to 1.36 upon arrival which was likely due to hypotension and volume depletion. This normalized with improvement in his blood pressure and after IV fluids.    Acute blood loss anemia due to suspected upper GI bleed: Patient had reported melena w/ hemoglobin of 6.9 upon arrival. He was started on BID protonix and transfused packed red blood cells and responded appropriately w/ stabilization of his hemoglobin. GI evaluated the patient who deferred EGD due to his poor respiratory status and due to no further evidence of blood loss. He will continue BID PPI and can follow up as needed as outpatient should this recur.    Discharge Follow Up Recommendations for labs/diagnostics:   Dr. Hess in 3 days.   PCP in 1-2 weeks   [facility] in 2 months to establish as new patient.    Day of Discharge     HPI: Up in bed. Feels a great deal better. Wants to go home.    Review of Systems  Gen- No fevers, chills  CV- No chest pain, palpitations  Resp- No cough, dyspnea  GI- No N/V/D, abd pain    Otherwise ROS is negative except as mentioned in the HPI.    Vital Signs:   Temp:  [97.5 °F (36.4 °C)-98.7 °F (37.1 °C)] 97.9 °F (36.6 °C)  Heart Rate:  [71-96] 96  Resp:  [16-18] 16  BP: (107-134)/(59-73) 126/67     Physical Exam:  Constitutional: No acute distress, awake, alert, looks much better  HENT: NCAT, mucous membranes moist  Respiratory: Normal WOB, poor air entry  Cardiovascular: RRR, no murmurs, rubs, or gallops, palpable pedal pulses bilaterally  Gastrointestinal: Positive bowel sounds, soft, nontender, nondistended  Musculoskeletal: Left foot dressed with boot in place, sutures noted.  Psychiatric: Appropriate affect, cooperative  Neurologic:  Oriented x 3, strength symmetric in all extremities, Cranial Nerves grossly intact to confrontation, speech clear  Skin: No rashes    Pertinent  and/or Most Recent Results     Results from last 7 days   Lab Units 05/06/19  0420 05/05/19  0649 05/03/19  0623 05/03/19  0531 05/02/19  0557 05/01/19  0526   WBC 10*3/mm3 9.37 10.00  --  10.52 10.33 10.55   HEMOGLOBIN g/dL 8.4* 8.6*  --  8.7* 8.2* 7.7*   HEMATOCRIT % 27.8* 29.1*  --  29.7* 27.9* 25.1*   PLATELETS 10*3/mm3 397 409  --  497* 431 390   SODIUM mmol/L 135* 132* 135*  --  133* 132*   POTASSIUM mmol/L 4.0 3.9 4.0  --  4.1 4.4   CHLORIDE mmol/L 97* 94* 99  --  98 97*   CO2 mmol/L 27.0 25.0 23.0  --  23.0 21.0*   BUN mg/dL 8 8 5*  --  5* 5*   CREATININE mg/dL 0.64* 0.64* 0.51*  --  0.55* 0.51*   GLUCOSE mg/dL 107* 95 103*  --  103* 102*   CALCIUM mg/dL 9.0 8.6 8.9  --  8.8 8.2*           Invalid input(s): PROT, LABALBU        Invalid input(s): TG, LDLCALC, LDLREALC        Brief Urine Lab Results  (Last result in the past 365 days)      Color   Clarity   Blood   Leuk Est   Nitrite   Protein   CREAT   Urine HCG        04/26/19 1259 Dark Yellow Cloudy Negative Trace Negative Trace               Microbiology Results Abnormal     Procedure Component Value - Date/Time    Respiratory Culture - Sputum, Cough [[id number]] Collected:  05/01/19 1750    Lab Status:  Final result Specimen:  Sputum from Cough Updated:  05/03/19 0808     Respiratory Culture Scant growth (1+) Normal Respiratory Yolanda     Gram Stain Few (2+) WBCs per low power field      Occasional Epithelial cells per low power field      Few (2+) Gram positive cocci      Few (2+) Gram variable bacilli    Blood Culture - Blood, Arm, Left [[id number]] Collected:  04/26/19 1320    Lab Status:  Final result Specimen:  Blood from Arm, Left Updated:  05/01/19 1401     Blood Culture No growth at 5 days    Blood Culture - Blood, Arm, Right [[id number]] Collected:  04/26/19 1310    Lab Status:  Final result Specimen:  Blood  from Arm, Right Updated:  05/01/19 1401     Blood Culture No growth at 5 days          Imaging Results (all)     Procedure Component Value Units Date/Time    CT Angiogram Chest With & Without Contrast [[id number]] Collected:  05/02/19 1719     Updated:  05/02/19 1728    Narrative:       EXAMINATION: CT ANGIOGRAM CHEST W WO CONTRAST-      INDICATION: Pneumonia in diseases classified elsewhere     TECHNIQUE: Spiral acquisition 3 mm post-IV contrast images the chest  with sagittal and coronal 10 mm 2-D reconstructions.     The radiation dose reduction device was turned on for each scan per the  ALARA (As Low as Reasonably Achievable) protocol.     COMPARISON: Angiographic chest CT scan 07/14/2016. Portable chest  04/30/2018     FINDINGS: There is good contrast opacification of the pulmonary  arteries. No filling defects are identified to suggest pulmonary embolic  disease. There is no evidence of thoracic aortic aneurysm or dissection,  no mediastinal adenopathy or other mass, and only very small pericardial  effusion. There are very small pleural effusions. There is a large  hiatal hernia. Lung window images show diffuse pulmonary interstitial  disease, most of which is finely reticular or groundglass disease  similar to the 07/14/2016 exam but more extensive and bilateral. Overall  pattern resembles interstitial edema. There is much denser disease in  the right lower lobe, resembling pneumonia. There is discoid atelectasis  in the left base.     Included images of the upper abdomen show no significant abnormalities  of the included portions of the spleen, pancreas, adrenal glands, or  kidneys. There is a small right lobe liver cyst.             Impression:       1. No evidence of pulmonary embolus.  2. Extensive and diffuse pulmonary interstitial disease as described,  some of which may be chronic, and generally increased since 2016.  Overall pattern resembles interstitial edema.  3. Extensive right lower lung disease  suggesting pneumonia. Milder left  basilar disease more typical for discoid atelectasis.  4. Very small pleural effusions and minimal pericardial effusion.     5 large hiatal hernia.        This report was finalized on 5/2/2019 5:24 PM by DR. Vlad Blake MD.       XR Chest 1 View [[id number]] Collected:  04/30/19 1013     Updated:  04/30/19 2151    Narrative:       EXAMINATION: XR CHEST 1 VW-04/30/2019:      INDICATION: Pneumonia; J18.1-Lobar pneumonia, unspecified organism;  A41.9-Sepsis, unspecified organism; J96.01-Acute respiratory failure  with hypoxia; Z74.09-Other reduced mobility.      COMPARISON: 04/28/2019.     FINDINGS: The heart is enlarged. The vasculature appears upper limits of  normal. There is coarse bilateral pulmonary interstitial disease,  slightly improved in appearance overall compared to yesterday's study.  There is significantly improved aeration of the left base, with patchy  remaining atelectasis, and re-aeration of the lateral left lower lobe.  No pneumothorax or effusion is seen.           Impression:       Extensive but improving pulmonary interstitial disease,  significantly improved left basilar atelectasis compared to 04/28/2019  study. No new chest pathology.     D:  04/30/2019  E:  04/30/2019     This report was finalized on 4/30/2019 9:48 PM by DR. Vlad Blake MD.       XR Chest 1 View [[id number]] Collected:  04/28/19 1246     Updated:  04/28/19 1826    Narrative:          EXAMINATION: XR CHEST 1 VW - 04/28/2019     INDICATION: J18.1-Lobar pneumonia, unspecified organism; A41.9-Sepsis,  unspecified organism; J96.01-Acute respiratory failure with hypoxia;  Z74.09-Other reduced mobility.      COMPARISON: 04/26/2019     FINDINGS: Patient is rotated to the left creating cardiac prominence in  the left hemithorax, however, grossly unchanged cardiac silhouette from  prior. Increase in bibasilar predominant pulmonary opacifications  superimposed upon chronic change. No pneumothorax or  significant  effusion component.           Impression:       Patient is noted to be rotated with increased opacifications  in the bibasilar segments primarily of airspace disease  increase/worsening superimposed upon chronic change.     DICTATED:   04/28/2019  EDITED/ls :   04/28/2019      This report was finalized on 4/28/2019 6:23 PM by Dr. Jeremy Gordon.       XR Foot 3+ View Left [[id number]] Collected:  04/26/19 1951     Updated:  04/27/19 0939    Narrative:          EXAMINATION: XR FOOT 3 VIEWS LEFT - 04/26/2019      INDICATION: Post-op exam.      COMPARISON: 03/30/2018     FINDINGS: Postoperative appearance of the left foot with interval  partially threaded screw placed at the great toe level distal phalanx  and proximal phalanx arthrodesis along with stable appearance of the  midfoot and forefoot hardware. K wires placed in the second through  fifth digits with postoperative changes of the interphalangeal joints.            Impression:       Postoperative appearance with multiple K wires second  through fifth digits and partially threaded screw within the distal and  proximal phalanx great toe along with expected postsurgical changes.     DICTATED:   04/26/2019  EDITED/ls :   04/26/2019      This report was finalized on 4/27/2019 9:36 AM by Dr. Jeremy Gordon.       CT Abdomen Pelvis Without Contrast [[id number]] Collected:  04/26/19 1502     Updated:  04/26/19 1841    Narrative:       EXAMINATION: CT ABDOMEN PELVIS WO CONTRAST- 04/26/2019      INDICATION: Abdominal pain, low back pain, vomiting, and melena      TECHNIQUE: CT abdomen and pelvis without intravenous contrast  administration     The radiation dose reduction device was turned on for each scan per the  ALARA (As Low as Reasonably Achievable) protocol.     COMPARISON: Chest x-ray earlier same day     FINDINGS: Lung bases demonstrate confluent opacifications throughout the  visualized lung fields greatest within the lower lobes right greater  than left  consistent with acute airspace disease upon chronic changes.  No significant effusion. Liver without focal lesion. Gallbladder  surgically absent. No gross biliary dilatation. Pancreas and spleen  grossly unremarkable. Adrenals without distinct nodule. Kidneys without  hydronephrosis or hydroureter. No bulky retroperitoneal adenopathy.  Atherosclerotic nonaneurysmal abdominal aorta. GI tract evaluation  demonstrates large nearly entirely intrathoracic stomach hiatal hernia  configuration with fluid distention. No mechanical obstructive findings  with sigmoid diverticulosis however no evidence for acute  diverticulitis. No free fluid or intra-abdominal free air. Pelvic  viscera grossly unremarkable without bulky pelvic adenopathy or free  fluid. Degenerative changes of the spine without aggressive osseous  findings demonstrating levoscoliotic curvature of the lumbar spine and  degenerative changes of the lumbar spine along with left hip  arthroplasty in place. No soft tissue body wall findings of concern with  medical electronic device left lower quadrant soft tissues.       Impression:       1. Confluent opacifications throughout the bilateral lungs indicating  acute bronchopneumonia without significant effusion.  2. No mechanical obstructive findings of the bowel with large hiatal  hernia with essentially intrathoracic stomach.  3. Sigmoid diverticulosis without evidence for acute diverticulitis. No  free fluid or intraabdominal free air.     D:  04/26/2019  E:  04/26/2019     This report was finalized on 4/26/2019 6:38 PM by Dr. Jeremy Gordon.       XR Chest 1 View [[id number]] Collected:  04/26/19 1349     Updated:  04/26/19 1741    Narrative:       EXAMINATION: XR CHEST 1 VW- 04/26/2019      INDICATION: low sats      COMPARISON: NONE     FINDINGS: Portable chest reveals increased markings seen within the mid  and lower lung fields bilaterally with increased pulmonary vascularity  bilaterally. Degenerative changes  seen within the spine. Vascular  calcifications seen within the thoracic aorta. There is tortuosity of  the thoracic aorta. No definite pleural effusion or pneumothorax.  Moderate size hiatal hernia.       Impression:       Increased pulmonary vascularity bilaterally with borderline  enlargement of the heart. Increased markings seen at the lung bases.     D:  04/26/2019  E:  04/26/2019     This report was finalized on 4/26/2019 5:38 PM by Dr. Zita Pretty MD.                       Results for orders placed during the hospital encounter of 04/26/19   Adult Transthoracic Echo Complete W/ Cont if Necessary Per Protocol    Narrative · Left ventricular systolic function is normal.  · Estimated EF appears to be in the range of 66 - 70%.            Discharge Details        Discharge Medications      New Medications      Instructions Start Date   predniSONE 20 MG tablet  Commonly known as:  DELTASONE   40 mg, Oral, Daily      tamsulosin 0.4 MG capsule 24 hr capsule  Commonly known as:  FLOMAX   0.4 mg, Oral, Daily   Start Date:  5/8/2019        Continue These Medications      Instructions Start Date   amitriptyline 50 MG tablet  Commonly known as:  ELAVIL    mg, Oral, Nightly      budesonide-formoterol 80-4.5 MCG/ACT inhaler  Commonly known as:  SYMBICORT   2 puffs, Inhalation, 2 Times Daily - RT      COENZYME Q10 PO   1 tablet, Oral, Daily      docusate sodium 100 MG capsule  Commonly known as:  COLACE   100 mg, Oral, 2 Times Daily      HYDROcodone-acetaminophen 7.5-325 MG per tablet  Commonly known as:  NORCO   1-2 tablets, Oral, Every 6 Hours PRN      ipratropium-albuterol 0.5-2.5 mg/3 ml nebulizer  Commonly known as:  DUO-NEB   3 mL, Nebulization, Every 4 Hours PRN      metaxalone 800 MG tablet  Commonly known as:  SKELAXIN   800 mg, Oral, 3 Times Daily PRN      montelukast 10 MG tablet  Commonly known as:  SINGULAIR   10 mg, Oral, Nightly      MULTIVITAMIN ADULT PO   1 tablet, Oral, Daily      ondansetron  4 MG tablet  Commonly known as:  ZOFRAN   4 mg, Oral, Every 6 Hours PRN      pantoprazole 40 MG EC tablet  Commonly known as:  PROTONIX   40 mg, Oral, 2 Times Daily      SINEMET  MG per tablet  Generic drug:  carbidopa-levodopa   1 tablet, Oral, 4 Times Daily             No Known Allergies      Discharge Disposition:  Home or Self Care    Discharge Diet:  Diet Order   Procedures   • Diet Soft Texture; [employer]         Discharge Activity:   Activity Instructions     Keep your left foot elevated.     Wear your brace when up.     Use ice as needed for pain and swelling.                      CODE STATUS:    Code Status and Medical Interventions:   Ordered at: 04/26/19 2111     Code Status:    CPR     Medical Interventions (Level of Support Prior to Arrest):    Full         Future Appointments   Date Time Provider Department Center   5/10/2019 10:20 AM Juhi Calle MD [facility] None       Additional Instructions for the Follow-ups that You Need to Schedule     Discharge Follow-up with PCP   As directed       Currently Documented PCP:    Tayo Hendrix MD    PCP Phone Number:    [phone]     Follow Up Details:  1-2 week hospital follow up         Discharge Follow-up with Specialty: [facility]; 6 Weeks   As directed      Specialty:  [facility]    Follow Up:  6 Weeks               Time Spent on Discharge:  41 minutes    Electronically signed by Awilda Dickson II, DO, 05/07/19, 11:18 AM.

## 2019-05-08 ENCOUNTER — READMISSION MANAGEMENT (OUTPATIENT)
Dept: CALL CENTER | Facility: HOSPITAL | Age: 74
End: 2019-05-08

## 2019-05-08 NOTE — OUTREACH NOTE
Prep Survey      Responses   Facility patient discharged from?  [facility]   Is patient eligible?  Yes   Discharge diagnosis  Pneumonia,    Hypotension,   Acute blood loss anemia due to suspected upper GI bleed   Does the patient have one of the following disease processes/diagnoses(primary or secondary)?  COPD/Pneumonia   Does the patient have Home health ordered?  Yes   What is the Home health agency?   [facility]    Is there a DME ordered?  Yes   What DME was ordered?  [employer] to provide home O2 concentrator    Prep survey completed?  Yes          Jazmín Hayden RN

## 2019-05-10 ENCOUNTER — READMISSION MANAGEMENT (OUTPATIENT)
Dept: CALL CENTER | Facility: HOSPITAL | Age: 74
End: 2019-05-10

## 2019-05-10 ENCOUNTER — OFFICE VISIT (OUTPATIENT)
Dept: ORTHOPEDIC SURGERY | Facility: CLINIC | Age: 74
End: 2019-05-10

## 2019-05-10 DIAGNOSIS — Z98.890 POST-OPERATIVE STATE: Primary | ICD-10-CM

## 2019-05-10 DIAGNOSIS — M21.962 FOOT DEFORMITY, ACQUIRED, LEFT: ICD-10-CM

## 2019-05-10 PROCEDURE — 99024 POSTOP FOLLOW-UP VISIT: CPT | Performed by: ORTHOPAEDIC SURGERY

## 2019-05-10 NOTE — OUTREACH NOTE
COPD/PN Week 1 Survey      Responses   Facility patient discharged from?  [facility]   Does the patient have one of the following disease processes/diagnoses(primary or secondary)?  COPD/Pneumonia   Is there a successful TCM telephone encounter documented?  No   Was the primary reason for admission:  COPD exacerbation   Week 1 attempt successful?  Yes   Call start time  1222   Call end time  1225   Discharge diagnosis  Pneumonia,    Hypotension,   Acute blood loss anemia due to suspected upper GI bleed   Is patient permission given to speak with other caregiver?  No   Meds reviewed with patient/caregiver?  Yes   Is the patient having any side effects they believe may be caused by any medication additions or changes?  No   Does the patient have all medications ordered at discharge?  Yes   Is the patient taking all medications as directed (includes completed medication regime)?  Yes   Does the patient have a primary care provider?   Yes   Comments regarding PCP  Dr. Hendrix- May 16th 2019 10 45 am    Has the patient kept scheduled appointments due by today?  N/A   What is the Home health agency?   [facility]    Has home health visited the patient within 72 hours of discharge?  Yes   What DME was ordered?  [employer] to provide home O2 concentrator    Has all DME been delivered?  Yes   Did the patient receive a copy of their discharge instructions?  Yes   Nursing interventions  Reviewed instructions with patient, Educated on MyChart   What is the patient's perception of their health status since discharge?  Improving   Nursing Interventions  Nurse provided patient education   Are the patient's immunizations up to date?   Yes   Nursing interventions  Educated on importance of maintaining up to date immunizations as advised by provider   Is the patient/caregiver able to teach back the hierarchy of who to call/visit for symptoms/problems? PCP, Specialist, Home health nurse, Urgent Care, ED, 911   Yes   Is the patient able to teach back COPD zones?  Yes   Nursing interventions  Education provided on various zones   Patient reports what zone on this call?  Green Zone   Green Zone  Appetite is good, Sleeping well, Usual amount of phlegm/mucus without difficulty coughing up, Usual activity and exercise level, Breathing without shortness of breath, Reports doing well   Green Zone interventions:  Take daily medications, Continue regular exercise/diet plan, Do not smoke, Use oxygen as prescribed, Avoid indoor/outdoor triggers   Week 1 call completed?  Yes          Roxanne Ch RN

## 2019-05-10 NOTE — PROGRESS NOTES
Post-op of the Left Foot (3 weeks post/left foot excise PIP joints 2,3,4, tenotomies 2,3,4, metatarsal capsulotomy 2,3,4, chevron osteotomy 5th metatarsal, great toe DIP fusion LEFT 4/23/19)      Flaco Roque II is 2 weeks status post left forefoot correction, fusion great toe DIP, correct claw toes 2,3,4, 4/23/19. He reports no fever, chills.  He reports pain is well controlled.  He is not on meds for DVT prophylaxis as he is weight bearing.  They have been weight bearing in boot.  He was admitted to the ICU Friday 4/26/19 with pneumonia and GI bleed.  I followed him in the hospital, foot remained stable and healing.  He is now at home on home oxygen    Past Surgical History:   Procedure Laterality Date   • ARTHRODESIS MIDTARSAL / TARSOMETATARSAL / TARSAL NAVICULAR-CUNEIFORM Left 05/10/2016   • BACK SURGERY     • BACK SURGERY      low back   • BUNIONECTOMY Left 4/23/2019    Procedure: left foot excise PIP joints 2,3,4, tenotomies 2,3,4, metatarsal capsulotomy 2,3,4, chevron osteotomy 5th metatarsal, great toe DIP fusion LEFT;  Surgeon: Juhi Calle MD;  Location:  [facility];  Service: Orthopedics   • CATARACT EXTRACTION     • COLONOSCOPY N/A 11/2/2017    Procedure: COLONOSCOPY;  Surgeon: Luis Eduardo Capellan MD;  Location:  [facility];  Service:    • ENDOSCOPY N/A 11/1/2017    Procedure: ESOPHAGOGASTRODUODENOSCOPY;  Surgeon: Luis Eduardo Capellan MD;  Location:  [facility];  Service:    • ENDOSCOPY  11/02/2017    DR LUIS EDUARDO CAPELLAN   • FOOT SURGERY     • GALLBLADDER SURGERY     • KNEE ARTHROSCOPY Bilateral    • PAIN PUMP INSERTION/REVISION     • SPINE SURGERY     • TOTAL HIP ARTHROPLASTY Left    • TOTAL SHOULDER ARTHROPLASTY W/ DISTAL CLAVICLE EXCISION Left 9/10/2018    Procedure: REVERSE TOTAL SHOULDER ARTHROPLASTY LEFT;  Surgeon: Abel Brennan MD;  Location:  [facility];  Service: Orthopedics   • ULNAR NERVE TRANSPOSITION         There were no vitals taken for this visit.        No erythema, no drainage, no  sign of infection, normal post op swelling. Left foto pin sites clean    ordered and reviewed x-rays today    Assessment and Plan:   1. Foot deformity, acquired, left  His foot is doing well, and lungs improving.  We removed sutures today, and replaced dressing, continue wt bearing in boot.  I will see him in 4 weeks to likely remove the pins, xray at that time

## 2019-05-15 ENCOUNTER — TELEPHONE (OUTPATIENT)
Dept: ORTHOPEDIC SURGERY | Facility: CLINIC | Age: 74
End: 2019-05-15

## 2019-05-15 NOTE — TELEPHONE ENCOUNTER
Patient states that the pin is getting caught on the covers and hung up on his clothing, so we will bring him in tomorrow when he is here in [place] for [place] to take a look at.     Briana

## 2019-05-15 NOTE — TELEPHONE ENCOUNTER
PATIENT CALLED AND SAID PIN IN TOE WAS COMING OUT AND WOULD LIKE TO HAVE IT LOOKED AT TOMORROW IF POSSIBLE BECAUSE HE WILL BE IN [place]. PLEASE CALL [phone].

## 2019-05-16 ENCOUNTER — OFFICE VISIT (OUTPATIENT)
Dept: ORTHOPEDIC SURGERY | Facility: CLINIC | Age: 74
End: 2019-05-16

## 2019-05-16 DIAGNOSIS — Z98.890 POST-OPERATIVE STATE: Primary | ICD-10-CM

## 2019-05-16 PROCEDURE — 99024 POSTOP FOLLOW-UP VISIT: CPT | Performed by: PHYSICIAN ASSISTANT

## 2019-05-16 NOTE — PROGRESS NOTES
[facility] Note    Subjective     Patient: Flaco Roque II  : 1945    Primary Care Provider: Tayo Hendrix MD    Requesting Provider: As above    Post-op Follow-up (1 week follow up - 4 weeks post/left foot excise PIP joints 2,3,4, tenotomies 2,3,4, metatarsal capsulotomy 2,3,4, chevron osteotomy 5th metatarsal, great toe DIP fusion LEFT 19)      History    History of Present Illness: Patient returns today because he reports that the pins are getting caught on his pants while putting them on and caught on the bed sheets.  Otherwise, he is doing well he is weightbearing in a boot with very little pain.    Current Outpatient Medications on File Prior to Visit   Medication Sig Dispense Refill   • amitriptyline (ELAVIL) 50 MG tablet Take  mg by mouth Every Night.     • budesonide-formoterol (SYMBICORT) 80-4.5 MCG/ACT inhaler Inhale 2 puffs 2 (Two) Times a Day.     • carbidopa-levodopa (SINEMET)  MG per tablet Take 1 tablet by mouth 4 (Four) Times a Day.     • COENZYME Q10 PO Take 1 tablet by mouth Daily.     • docusate sodium (COLACE) 100 MG capsule Take 1 capsule by mouth 2 (Two) Times a Day. 60 capsule 0   • ipratropium-albuterol (DUO-NEB) 0.5-2.5 mg/3 ml nebulizer Take 3 mL by nebulization Every 4 (Four) Hours As Needed for Wheezing.     • montelukast (SINGULAIR) 10 MG tablet Take 10 mg by mouth every night.     • Multiple Vitamins-Minerals (MULTIVITAMIN ADULT PO) Take 1 tablet by mouth daily.     • pantoprazole (PROTONIX) 40 MG EC tablet Take 40 mg by mouth 2 (Two) Times a Day.     • predniSONE (DELTASONE) 20 MG tablet Take 2 tablets by mouth Daily. 10 tablet 0   • tamsulosin (FLOMAX) 0.4 MG capsule 24 hr capsule Take 1 capsule by mouth Daily. 30 capsule 0     No current facility-administered medications on file prior to visit.       No Known Allergies   Past Medical History:   Diagnosis Date   • Arthropathy of shoulder region 9/10/2018   • Chris's  esophagus     Last EGD 1 year ago with Dr Kaye    • BPH (benign prostatic hyperplasia)    • Chronic back pain 10/31/2017   • Chronic low back pain    • COPD (chronic obstructive pulmonary disease) (CMS/[facility])    • Foot pain    • GERD (gastroesophageal reflux disease)    • History of transfusion    • Injury of back    • Lung abscess (CMS/[facility])    • Osteoarthritis    • Osteoporosis    • Parkinson disease (CMS/[facility])    • Rotator cuff tear, left    • Sleep apnea    • Status post reverse total shoulder replacement, 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     Past Surgical History:   Procedure Laterality Date   • ARTHRODESIS MIDTARSAL / TARSOMETATARSAL / TARSAL NAVICULAR-CUNEIFORM Left 05/10/2016   • BACK SURGERY     • BACK SURGERY      low back   • BUNIONECTOMY Left 4/23/2019    Procedure: left foot excise PIP joints 2,3,4, tenotomies 2,3,4, metatarsal capsulotomy 2,3,4, chevron osteotomy 5th metatarsal, great toe DIP fusion LEFT;  Surgeon: Juhi Calle MD;  Location:  [facility];  Service: Orthopedics   • CATARACT EXTRACTION     • COLONOSCOPY N/A 11/2/2017    Procedure: COLONOSCOPY;  Surgeon: Luis Eduardo Capellan MD;  Location: [facility];  Service:    • ENDOSCOPY N/A 11/1/2017    Procedure: ESOPHAGOGASTRODUODENOSCOPY;  Surgeon: Luis Eduardo Capellan MD;  Location:  [facility];  Service:    • ENDOSCOPY  11/02/2017    DR LUIS EDUARDO CAPELLAN   • FOOT SURGERY     • GALLBLADDER SURGERY     • KNEE ARTHROSCOPY Bilateral    • PAIN PUMP INSERTION/REVISION     • SPINE SURGERY     • TOTAL HIP ARTHROPLASTY Left    • TOTAL SHOULDER ARTHROPLASTY W/ DISTAL CLAVICLE EXCISION Left 9/10/2018    Procedure: REVERSE TOTAL SHOULDER ARTHROPLASTY LEFT;  Surgeon: Abel Brennan MD;  Location:  [facility];  Service: Orthopedics   • ULNAR NERVE TRANSPOSITION       Family History   Problem Relation Age of Onset   • Arthritis Mother    • Diabetes Mother    • Osteoarthritis Mother    • Colon cancer Father    • Cancer Father       Social History     Socioeconomic History   • Marital  status:      Spouse name: Not on file   • Number of children: Not on file   • Years of education: Not on file   • Highest education level: Not on file   Occupational History   • Occupation: Retired    Tobacco Use   • Smoking status: Former Smoker     Packs/day: 2.00     Years: 25.00     Pack years: 50.00     Types: Cigarettes     Start date:      Last attempt to quit:      Years since quittin.3   • Smokeless tobacco: Never Used   Substance and Sexual Activity   • Alcohol use: No   • Drug use: No   • Sexual activity: Defer   Social History Narrative     and lives with wife    Retired supervisor at [employer]    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        Review of Systems    The following portions of the patient's history were reviewed and updated as appropriate: allergies, current medications, past family history, past medical history, past social history, past surgical history and problem list.      Objective      Physical Exam  There were no vitals taken for this visit.    There is no height or weight on file to calculate BMI.    Ortho Exam  Left foot exam:  Incisions are healing well with minimal postop swelling  Pin sites are clean  Pinballs on toes 2 and 4 have slid down exposing the tip of the pin.    Medical Decision Making    Data Review:   none    Assessment:  1. Post-operative state        Plan:  Patient returns today early secondary to the pinballs sliding down and exposing the tip of the pin.  The pins themselves have not backed out.  Otherwise he is doing very well.  I have returned to the pins to to the proper position so they will need no longer catch on clothing and bed sheets.  He will return as scheduled to see Dr. Calle or sooner if needed.      Awilda Ross PA-C  19  11:04 AM

## 2019-05-17 ENCOUNTER — READMISSION MANAGEMENT (OUTPATIENT)
Dept: CALL CENTER | Facility: HOSPITAL | Age: 74
End: 2019-05-17

## 2019-05-17 NOTE — OUTREACH NOTE
COPD/PN Week 2 Survey      Responses   Facility patient discharged from?  [facility]   Does the patient have one of the following disease processes/diagnoses(primary or secondary)?  COPD/Pneumonia   Was the primary reason for admission:  COPD exacerbation   Week 2 attempt successful?  Yes   Call start time  1236   Call end time  1239   Discharge diagnosis  Pneumonia,    Hypotension,   Acute blood loss anemia due to suspected upper GI bleed   Meds reviewed with patient/caregiver?  Yes   Is the patient having any side effects they believe may be caused by any medication additions or changes?  No   Does the patient have all medications ordered at discharge?  Yes   Is the patient taking all medications as directed (includes completed medication regime)?  Yes   Does the patient have a primary care provider?   Yes   Does the patient have an appointment with their PCP or pulmonologist within 7 days of discharge?  Yes   Comments regarding PCP  Dr. Hendrix- May 16th 2019 10 45 am    Has the patient kept scheduled appointments due by today?  Yes   What is the Home health agency?   [facility]    Has home health visited the patient within 72 hours of discharge?  Yes   What DME was ordered?  [employer] to provide home O2 concentrator    Has all DME been delivered?  Yes   Psychosocial issues?  No   Did the patient receive a copy of their discharge instructions?  Yes   Nursing interventions  Reviewed instructions with patient, Educated on MyChart   What is the patient's perception of their health status since discharge?  Improving   Nursing Interventions  Nurse provided patient education   Are the patient's immunizations up to date?   Yes   Nursing interventions  Educated on importance of maintaining up to date immunizations as advised by provider   Is the patient/caregiver able to teach back the hierarchy of who to call/visit for symptoms/problems? PCP, Specialist, Home health nurse, Urgent Care, ED, 911   Yes   Is the patient able to teach back COPD zones?  Yes   Nursing interventions  Education provided on various zones   Patient reports what zone on this call?  Green Zone   Green Zone  Appetite is good, Sleeping well, Usual amount of phlegm/mucus without difficulty coughing up, Usual activity and exercise level, Breathing without shortness of breath, Reports doing well   Green Zone interventions:  Take daily medications, Continue regular exercise/diet plan, Do not smoke, Use oxygen as prescribed, Avoid indoor/outdoor triggers   Is the patient/caregiver able to teach back signs and symptoms of worsening condition:  Fever/chills, Shortness of breath, Chest pain   Is the patient/caregiver able to teach back importance of completing antibiotic course of treatment?  Yes   Week 2 call completed?  Yes          Javier Ovalle RN

## 2019-05-24 ENCOUNTER — READMISSION MANAGEMENT (OUTPATIENT)
Dept: CALL CENTER | Facility: HOSPITAL | Age: 74
End: 2019-05-24

## 2019-05-24 NOTE — OUTREACH NOTE
COPD/PN Week 3 Survey      Responses   Facility patient discharged from?  [place]   Does the patient have one of the following disease processes/diagnoses(primary or secondary)?  COPD/Pneumonia   Was the primary reason for admission:  COPD exacerbation   Week 3 attempt successful?  Yes   Call start time  1648   Call end time  1649   Discharge diagnosis  Pneumonia,    Hypotension,   Acute blood loss anemia due to suspected upper GI bleed   Is patient permission given to speak with other caregiver?  No   Meds reviewed with patient/caregiver?  Yes   Is the patient having any side effects they believe may be caused by any medication additions or changes?  No   Does the patient have all medications ordered at discharge?  Yes   Is the patient taking all medications as directed (includes completed medication regime)?  Yes   Does the patient have a primary care provider?   Yes   Does the patient have an appointment with their PCP or pulmonologist within 7 days of discharge?  Yes   Comments regarding PCP  Dr. Hendrix- May 16th 2019 10 45 am    Has the patient kept scheduled appointments due by today?  Yes   What is the Home health agency?   [facility]    Has home health visited the patient within 72 hours of discharge?  Yes   What DME was ordered?  [employer] to provide home O2 concentrator    Has all DME been delivered?  Yes   Psychosocial issues?  No   Comments   Will seek medical treatment if develops worsening SOB, chest pain, fever, chills.    Did the patient receive a copy of their discharge instructions?  Yes   Nursing interventions  Reviewed instructions with patient, Educated on MyChart   What is the patient's perception of their health status since discharge?  Improving   Nursing Interventions  Nurse provided patient education   Are the patient's immunizations up to date?   Yes   Nursing interventions  Educated on importance of maintaining up to date immunizations as advised by provider    Is the patient/caregiver able to teach back the hierarchy of who to call/visit for symptoms/problems? PCP, Specialist, Home health nurse, Urgent Care, ED, 911  Yes   Is the patient/caregiver able to teach back signs and symptoms of worsening condition:  Fever/chills, Shortness of breath, Chest pain   Is the patient/caregiver able to teach back importance of completing antibiotic course of treatment?  Yes   Week 3 call completed?  Yes          Javier Ovalle RN

## 2019-06-02 ENCOUNTER — READMISSION MANAGEMENT (OUTPATIENT)
Dept: CALL CENTER | Facility: HOSPITAL | Age: 74
End: 2019-06-02

## 2019-06-02 NOTE — OUTREACH NOTE
COPD/PN Week 4 Survey      Responses   Facility patient discharged from?  [place]   Does the patient have one of the following disease processes/diagnoses(primary or secondary)?  COPD/Pneumonia   Week 4 attempt successful?  Yes   Call start time  1724   Call end time  1726   Meds reviewed with patient/caregiver?  Yes   Is the patient taking all medications as directed (includes completed medication regime)?  Yes   Has the patient kept scheduled appointments due by today?  Yes   Is the patient still receiving Home Health Services?  No   What is the patient's perception of their health status since discharge?  Improving   Is the patient able to teach back COPD zones?  Yes   Patient reports what zone on this call?  Green Zone   Week 4 call completed?  Yes   Would the patient like one additional call?  No   Graduated  Yes   Did the patient feel the follow up calls were helpful during their recovery period?  Yes   Was the number of calls appropriate?  Yes   Wrap up additional comments  He feels well, no new issues          Sophia Canales RN

## 2019-06-05 ENCOUNTER — OFFICE VISIT (OUTPATIENT)
Dept: PULMONOLOGY | Facility: CLINIC | Age: 74
End: 2019-06-05

## 2019-06-05 VITALS
HEIGHT: 68 IN | HEART RATE: 61 BPM | BODY MASS INDEX: 22.2 KG/M2 | WEIGHT: 146.5 LBS | SYSTOLIC BLOOD PRESSURE: 110 MMHG | DIASTOLIC BLOOD PRESSURE: 58 MMHG | OXYGEN SATURATION: 93 % | TEMPERATURE: 97.3 F

## 2019-06-05 DIAGNOSIS — G47.33 OSA (OBSTRUCTIVE SLEEP APNEA): ICD-10-CM

## 2019-06-05 DIAGNOSIS — J18.9 PNEUMONIA DUE TO INFECTIOUS ORGANISM, UNSPECIFIED LATERALITY, UNSPECIFIED PART OF LUNG: ICD-10-CM

## 2019-06-05 DIAGNOSIS — J44.9 CHRONIC OBSTRUCTIVE PULMONARY DISEASE, UNSPECIFIED COPD TYPE (HCC): Primary | ICD-10-CM

## 2019-06-05 DIAGNOSIS — J96.01 ACUTE RESPIRATORY FAILURE WITH HYPOXIA (HCC): ICD-10-CM

## 2019-06-05 PROCEDURE — 99214 OFFICE O/P EST MOD 30 MIN: CPT | Performed by: NURSE PRACTITIONER

## 2019-06-05 PROCEDURE — 94375 RESPIRATORY FLOW VOLUME LOOP: CPT | Performed by: NURSE PRACTITIONER

## 2019-06-05 PROCEDURE — 94618 PULMONARY STRESS TESTING: CPT | Performed by: NURSE PRACTITIONER

## 2019-06-05 NOTE — PROGRESS NOTES
[facility] Pulmonary Follow up    CHIEF COMPLAINT    Hospital follow-up    HISTORY OF PRESENT ILLNESS    Flaco Roque II is a 73 y.o.male here today for a hospital follow-up.  He was hospitalized at [facility] on 4/26 to 5/7 for hypotension and acute hypoxic respiratory failure due to bacterial pneumonia.  He was placed on IV antibiotics and responded well to IV fluids.  He remained hypoxic while in the hospital and was started on IV steroids and nebulizers.  He responded well to these and was ultimately discharged on oxygen and a prednisone taper.  It was felt that his hypotension was related to a suspected upper GI bleed however due to his poor respiratory status they deferred an EGD and was discharged on a PPI twice a day.  He had had foot surgery 2 days prior to this hospitalization.    He states overall that he is almost back to normal from a pulmonary standpoint.  We were asked to see him while he was hospitalized as well.    He denies being diagnosed with asthma or having any recurrent pneumonias or bronchitis as a child.  He denies any exposure to TB.  He denies any family history of lung cancer.  He denies any chemical or environmental exposures in the past.    He is up-to-date on his current vaccinations.    He has a history of obstructive sleep apnea however he was trialed on CPAP therapy but was intolerant to this.  He states that he has nonrestorative sleep.  He does have some daytime somnolence and fatigue.    He was a heavy smoker with a 50 pack history.  He quit in 2001.  He occasionally drinks beer.    He denies fever, chills, hemoptysis, night sweats, weight loss, chest pain or palpitations.  He denies any lower extremity edema.  He has noticed an increase in his sputum since his hospitalization however it is clear to light-colored.  He denies any allergy or sinus symptoms.  He remains on Protonix twice a day and denies reflux symptoms.  He denies any wheezing.    He was discharged  home with oxygen but has been off all oxygen for the last 2 weeks.  Prior to his hospitalization he was very active and was participating in a local gym 7 days a week.  He states that due to his foot surgery he has been unable to work out frequently but hopes to be released to work out soon.  He follows up with his foot doctor this week.      He denies any shortness of breath prior to his hospitalization.  He denies any shortness of breath with any exertion currently.  He has been on his Symbicort 82 puffs twice a day for a couple of years.  He does not seem to notice any difference in his breathing since being on this inhaler.  He does not have a rescue inhaler at home currently.    Patient Active Problem List   Diagnosis   • ABRIL (obstructive sleep apnea)   • COPD (chronic obstructive pulmonary disease) (CMS/[facility])   • Acute blood loss anemia   • Foot deformity, acquired, left   • Leukocytosis, likely reactive   • Acute postoperative pain   • Deformity of left foot   • S/P foot surgery, left   • Hypotension due to hypovolemia   • Parkinson disease (CMS/[facility])   • GI bleed   • Pneumonia due to infectious organism   • Acute respiratory failure with hypoxia (CMS/[facility])       No Known Allergies    Current Outpatient Medications:   •  amitriptyline (ELAVIL) 50 MG tablet, Take  mg by mouth Every Night., Disp: , Rfl:   •  budesonide-formoterol (SYMBICORT) 80-4.5 MCG/ACT inhaler, Inhale 2 puffs 2 (Two) Times a Day., Disp: , Rfl:   •  carbidopa-levodopa (SINEMET)  MG per tablet, Take 1 tablet by mouth 4 (Four) Times a Day., Disp: , Rfl:   •  COENZYME Q10 PO, Take 1 tablet by mouth Daily., Disp: , Rfl:   •  docusate sodium (COLACE) 100 MG capsule, Take 1 capsule by mouth 2 (Two) Times a Day., Disp: 60 capsule, Rfl: 0  •  ipratropium-albuterol (DUO-NEB) 0.5-2.5 mg/3 ml nebulizer, Take 3 mL by nebulization Every 4 (Four) Hours As Needed for Wheezing., Disp: , Rfl:   •  montelukast (SINGULAIR) 10 MG tablet, Take 10 mg by  mouth every night., Disp: , Rfl:   •  Multiple Vitamins-Minerals (MULTIVITAMIN ADULT PO), Take 1 tablet by mouth daily., Disp: , Rfl:   •  pantoprazole (PROTONIX) 40 MG EC tablet, Take 40 mg by mouth 2 (Two) Times a Day., Disp: , Rfl:   •  predniSONE (DELTASONE) 20 MG tablet, Take 2 tablets by mouth Daily., Disp: 10 tablet, Rfl: 0  •  tamsulosin (FLOMAX) 0.4 MG capsule 24 hr capsule, Take 1 capsule by mouth Daily., Disp: 30 capsule, Rfl: 0  MEDICATION LIST AND ALLERGIES REVIEWED.    Social History     Tobacco Use   • Smoking status: Former Smoker     Packs/day: 2.00     Years: 25.00     Pack years: 50.00     Types: Cigarettes     Start date:      Last attempt to quit:      Years since quittin.4   • Smokeless tobacco: Never Used   Substance Use Topics   • Alcohol use: No   • Drug use: No       FAMILY AND SOCIAL HISTORY REVIEWED.    Review of Systems   Constitutional: Negative for activity change, appetite change, fatigue, fever and unexpected weight change.   HENT: Positive for congestion. Negative for postnasal drip, rhinorrhea, sinus pressure, sore throat and voice change.    Eyes: Negative for visual disturbance.   Respiratory: Positive for cough. Negative for chest tightness, shortness of breath and wheezing.    Cardiovascular: Negative for chest pain, palpitations and leg swelling.   Gastrointestinal: Negative for abdominal distention, abdominal pain, nausea and vomiting.   Endocrine: Negative for cold intolerance and heat intolerance.   Genitourinary: Negative for difficulty urinating and urgency.   Musculoskeletal: Negative for arthralgias, back pain and neck pain.   Skin: Negative for color change and pallor.   Allergic/Immunologic: Negative for environmental allergies and food allergies.   Neurological: Negative for dizziness, syncope, weakness and light-headedness.   Hematological: Negative for adenopathy. Does not bruise/bleed easily.   Psychiatric/Behavioral: Negative for agitation and  "behavioral problems.   .    /58 (BP Location: Left arm, Patient Position: Sitting)   Pulse 61   Temp 97.3 °F (36.3 °C)   Ht 172.5 cm (67.9\")   Wt 66.5 kg (146 lb 8 oz)   SpO2 93% Comment: RESTING AT ROOM AIR  BMI 22.34 kg/m²     Immunization History   Administered Date(s) Administered   • Flu Vaccine High Dose PF 65YR+ 09/01/2017, 09/23/2018   • Pneumococcal Conjugate 13-Valent (PCV13) 02/23/2015   • Pneumococcal Polysaccharide (PPSV23) 08/22/2011, 09/05/2016   • Pneumococcal, Unspecified 09/14/2016   • TD Preservative Free 05/08/2018   • Zostavax 07/01/2013       Physical Exam   Constitutional: He is oriented to person, place, and time. He appears well-developed and well-nourished.   HENT:   Head: Normocephalic and atraumatic.   Eyes: Pupils are equal, round, and reactive to light.   Neck: Normal range of motion. Neck supple. No thyromegaly present.   Cardiovascular: Normal rate, regular rhythm, normal heart sounds and intact distal pulses. Exam reveals no gallop and no friction rub.   No murmur heard.  Pulmonary/Chest: Effort normal and breath sounds normal. No respiratory distress. He has no wheezes. He has no rales. He exhibits no tenderness.   Abdominal: Soft. Bowel sounds are normal. There is no tenderness.   Musculoskeletal: Normal range of motion.   Lymphadenopathy:     He has no cervical adenopathy.   Neurological: He is alert and oriented to person, place, and time.   Skin: Skin is warm and dry. Capillary refill takes less than 2 seconds. He is not diaphoretic.   Psychiatric: He has a normal mood and affect. His behavior is normal.   Nursing note and vitals reviewed.        RESULTS    PFTS in the office today, read by me.  FVC 3.04 79% predicted, FEV1 2.21 76% predicted, FEV1/FVC 73% predicted, no obstruction noted.    6-minute walk test: Patient ambulated 450 feet for entire testing he did not desaturate below 95% for entire walk, he had very minimal dyspnea throughout the test.  He does not " qualify for oxygen with activity at this time.    Chest PA/lateral: Reviewed by me in the office today, appears to have some chronic interstitial lung disease improved when compared to previous exam, official report pending.    Ct Angiogram Chest With & Without Contrast    Result Date: 5/2/2019  1. No evidence of pulmonary embolus. 2. Extensive and diffuse pulmonary interstitial disease as described, some of which may be chronic, and generally increased since 2016. Overall pattern resembles interstitial edema. 3. Extensive right lower lung disease suggesting pneumonia. Milder left basilar disease more typical for discoid atelectasis. 4. Very small pleural effusions and minimal pericardial effusion.  5 large hiatal hernia.   This report was finalized on 5/2/2019 5:24 PM by DR. Vlad Blake MD.      PROBLEM LIST    Problem List Items Addressed This Visit        Respiratory    ABRIL (obstructive sleep apnea)    COPD (chronic obstructive pulmonary disease) (CMS/HCC) - Primary    Relevant Orders    XR Chest PA & Lateral    Spirometry Without Bronchodilator (Completed)    Walking Oximetry (Completed)    Pneumonia due to infectious organism    Relevant Orders    CT Chest Without Contrast    Acute respiratory failure with hypoxia (CMS/[facility])            DISCUSSION    Mr. Roque was here for follow-up after he was hospitalized at [facility] for pneumonia.  He seems to have improved since his hospitalization.  He has been off oxygen for 2 weeks and denies any shortness of breath with exertion.  We reviewed his 6-minute walk test and he does not qualify for oxygen with activity.  His oxygenation remained above 95% throughout the testing.    He did not have PFT scheduled today and due to time constraints we could only do a spirometry, he currently does not have any obstruction noted on his PFTs.  I would like to perform a full PFTs upon return to the office.  He will remain on Symbicort 80 2 puffs twice a day for  now.    I would like to get a repeat CT scan of the chest 6 weeks after his initial CT scan for follow-up of his pneumonia. This will be due towards the end of this month.  He is agreeable to have this CT scan completed I did advise him to have this performed at a [facility] [place], operated by [facility] facility so that we can review his images and compare to his previous studies.  We will call and get this set up for him.  We also reviewed his CT scan that was performed in May at the hospital which revealed some extensive and diffuse pulmonary interstitial disease that has gradually increased since 2016.       Based on his history of obstructive sleep apnea and being intolerant to CPAP I will order an overnight oximetry to be completed to see if he is oxygenating well at night on room air.  I will call him after I receive the results of this.    I did encourage him once he is able to continue exercising to resume this.    He will follow-up in 3 months or sooner with full PFTs.  I spent 25 minutes with the patient and family. I spent > 50% percent of this time counseling and discussing diagnosis, prognosis, diagnostic testing, evaluation, current status, treatment options and management.    Mary Lou Oseguera, YAMILET  06/05/201911:58 AM  Electronically signed     Please note that portions of this note were completed with a voice recognition program. Efforts were made to edit the dictations, but occasionally words are mistranscribed.      CC: Tayo Hendrix MD

## 2019-06-10 ENCOUNTER — OFFICE VISIT (OUTPATIENT)
Dept: ORTHOPEDIC SURGERY | Facility: CLINIC | Age: 74
End: 2019-06-10

## 2019-06-10 DIAGNOSIS — Z09 SURGERY FOLLOW-UP: Primary | ICD-10-CM

## 2019-06-10 PROCEDURE — 99024 POSTOP FOLLOW-UP VISIT: CPT | Performed by: ORTHOPAEDIC SURGERY

## 2019-06-10 NOTE — PROGRESS NOTES
Post-op (3.5 weeks - status post/left foot excise PIP joints 2,3,4, tenotomies 2,3,4, metatarsal capsulotomy 2,3,4, chevron osteotomy 5th metatarsal, great toe DIP fusion LEFT 4/23/19)      Flaco Roque II is 6 weeks status post left foot correction, 4/23/19. He reports no fever, chills.  He reports pain is well controlled.  They have been taking aspirin for DVT prophylaxis.  They have been weight bearing in boot.      Past Surgical History:   Procedure Laterality Date   • ARTHRODESIS MIDTARSAL / TARSOMETATARSAL / TARSAL NAVICULAR-CUNEIFORM Left 05/10/2016   • BACK SURGERY     • BACK SURGERY      low back   • BUNIONECTOMY Left 4/23/2019    Procedure: left foot excise PIP joints 2,3,4, tenotomies 2,3,4, metatarsal capsulotomy 2,3,4, chevron osteotomy 5th metatarsal, great toe DIP fusion LEFT;  Surgeon: Juhi Calle MD;  Location:  [facility] OR;  Service: Orthopedics   • CATARACT EXTRACTION     • COLONOSCOPY N/A 11/2/2017    Procedure: COLONOSCOPY;  Surgeon: Luis Eduardo Capellan MD;  Location:  [facility] ENDOSCOPY;  Service:    • ENDOSCOPY N/A 11/1/2017    Procedure: ESOPHAGOGASTRODUODENOSCOPY;  Surgeon: Luis Eduardo Capellan MD;  Location:  [facility] ENDOSCOPY;  Service:    • ENDOSCOPY  11/02/2017    DR LUIS EDUARDO CAPELLAN   • FOOT SURGERY     • GALLBLADDER SURGERY     • KNEE ARTHROSCOPY Bilateral    • PAIN PUMP INSERTION/REVISION     • SPINE SURGERY     • TOTAL HIP ARTHROPLASTY Left    • TOTAL SHOULDER ARTHROPLASTY W/ DISTAL CLAVICLE EXCISION Left 9/10/2018    Procedure: REVERSE TOTAL SHOULDER ARTHROPLASTY LEFT;  Surgeon: Abel Brennan MD;  Location: [facility] OR;  Service: Orthopedics   • ULNAR NERVE TRANSPOSITION         There were no vitals taken for this visit.        No erythema, no drainage, no sign of infection, normal post op swelling. Good alignment left foot, pin sites clean    ordered and reviewed x-rays today    Assessment and Plan:   1. Surgery follow-up  Doing well, he may get it wet but not soak it until all scabs are gone,  I remove the pins.  He may walk in the boot.  In about 3 weeks he may graduate to a wide comfortable shoe.  I will see him in 8 to 12 weeks for standing 2 views of the foot  - XR Foot 2 View Left

## 2019-06-11 DIAGNOSIS — R06.02 SHORTNESS OF BREATH: ICD-10-CM

## 2019-06-11 DIAGNOSIS — J96.01 ACUTE RESPIRATORY FAILURE WITH HYPOXIA (HCC): Primary | ICD-10-CM

## 2019-06-14 ENCOUNTER — TELEPHONE (OUTPATIENT)
Dept: ORTHOPEDIC SURGERY | Facility: CLINIC | Age: 74
End: 2019-06-14

## 2019-06-14 ENCOUNTER — OFFICE VISIT (OUTPATIENT)
Dept: ORTHOPEDIC SURGERY | Facility: CLINIC | Age: 74
End: 2019-06-14

## 2019-06-14 DIAGNOSIS — Z09 SURGERY FOLLOW-UP: Primary | ICD-10-CM

## 2019-06-14 PROCEDURE — 99024 POSTOP FOLLOW-UP VISIT: CPT | Performed by: ORTHOPAEDIC SURGERY

## 2019-06-14 RX ORDER — AMOXICILLIN 500 MG/1
CAPSULE ORAL
COMMUNITY
Start: 2019-06-13 | End: 2019-07-28

## 2019-06-14 NOTE — TELEPHONE ENCOUNTER
"Patient is concerned about the pins in his toes. He said they're bothering him and his toes are \"going crooked\" again. Please return his call at [phone].  "

## 2019-06-14 NOTE — TELEPHONE ENCOUNTER
I called Mr. Roque and he stated that since the pins were removed it feels like rubber in his toes.  He also said that they are starting to turn again.  Please advise.  Thanks.  Naya

## 2019-06-14 NOTE — PROGRESS NOTES
"Post-op (4 day f/u-  7 weeks status post/left foot excise PIP joints 2,3,4, tenotomies 2,3,4, metatarsal capsulotomy 2,3,4, chevron osteotomy 5th metatarsal, great toe DIP fusion LEFT 4/23/19)      Flaco Roque II is 7 weeks status post left foot correct toes 2, 3, 4 and great toe DIP fusion, 4/23/2019. He reports no fever, chills.  He is here early because he was worried that the toes were \"floppy.\".      Past Surgical History:   Procedure Laterality Date   • ARTHRODESIS MIDTARSAL / TARSOMETATARSAL / TARSAL NAVICULAR-CUNEIFORM Left 05/10/2016   • BACK SURGERY     • BACK SURGERY      low back   • BUNIONECTOMY Left 4/23/2019    Procedure: left foot excise PIP joints 2,3,4, tenotomies 2,3,4, metatarsal capsulotomy 2,3,4, chevron osteotomy 5th metatarsal, great toe DIP fusion LEFT;  Surgeon: Juhi Calle MD;  Location:  [facility] OR;  Service: Orthopedics   • CATARACT EXTRACTION     • COLONOSCOPY N/A 11/2/2017    Procedure: COLONOSCOPY;  Surgeon: Luis Eduardo Capellan MD;  Location:  [facility] ENDOSCOPY;  Service:    • ENDOSCOPY N/A 11/1/2017    Procedure: ESOPHAGOGASTRODUODENOSCOPY;  Surgeon: Luis Eduardo Capellan MD;  Location:  [facility] ENDOSCOPY;  Service:    • ENDOSCOPY  11/02/2017    DR LUIS EDUARDO CAPELLAN   • FOOT SURGERY     • GALLBLADDER SURGERY     • KNEE ARTHROSCOPY Bilateral    • PAIN PUMP INSERTION/REVISION     • SPINE SURGERY     • TOTAL HIP ARTHROPLASTY Left    • TOTAL SHOULDER ARTHROPLASTY W/ DISTAL CLAVICLE EXCISION Left 9/10/2018    Procedure: REVERSE TOTAL SHOULDER ARTHROPLASTY LEFT;  Surgeon: Abel Brennan MD;  Location:  [facility] OR;  Service: Orthopedics   • ULNAR NERVE TRANSPOSITION         There were no vitals taken for this visit.        No erythema, no drainage, no sign of infection, normal post op swelling.  Excellent alignment left foot, the second, third, fourth toes are in the a little \"floppy\" I explained this is normal.  We removed bone in order to straighten the toes.  They are nice and straight.  I explained " that they will be like this, its normal.  The goal is to straighten them so they did not hurt in his shoes.  I explained that we cannot give him normal toes that bend and move normally.  The trade off is stiffer straighter toes, they are not totally stiff.  He understands, he was just worried.  He feels much better.    none    Assessment and Plan:   There are no diagnoses linked to this encounter.    I reassured him that the toes are normal for the procedure.  He had just been worried.  They are in excellent alignment.  Continue walking in the boot.  I will see him as already scheduled with standing 2 views of the foot

## 2019-06-17 ENCOUNTER — TELEPHONE (OUTPATIENT)
Dept: PULMONOLOGY | Facility: CLINIC | Age: 74
End: 2019-06-17

## 2019-06-17 ENCOUNTER — HOSPITAL ENCOUNTER (OUTPATIENT)
Dept: CT IMAGING | Facility: HOSPITAL | Age: 74
Discharge: HOME | End: 2019-06-17
Admitting: NURSE PRACTITIONER

## 2019-06-17 DIAGNOSIS — R06.02 SHORTNESS OF BREATH: ICD-10-CM

## 2019-06-17 PROCEDURE — 71250 CT THORAX DX C-: CPT

## 2019-06-25 ENCOUNTER — APPOINTMENT (OUTPATIENT)
Dept: CT IMAGING | Facility: HOSPITAL | Age: 74
End: 2019-06-25

## 2019-07-01 ENCOUNTER — TELEPHONE (OUTPATIENT)
Dept: ORTHOPEDIC SURGERY | Facility: CLINIC | Age: 74
End: 2019-07-01

## 2019-07-01 NOTE — TELEPHONE ENCOUNTER
Patient said he stumped his toe and it is a little pink and a little swollen. He said he can hardly walk and is in pain.

## 2019-07-01 NOTE — TELEPHONE ENCOUNTER
Talked to patient. He is in [place] and will not be back until Thursday. He is going to come in Friday morning to see Dr Calle.

## 2019-07-02 NOTE — PAT
Dwan Dubin is a 43 y.o. female that presents today to follow up post  LGBP  preformed on 7/2/2018. Pt says pain level is at a 0 on a scale from 1-10. Pt is drinking  64-75 oz of fluid daily. Pt is currently eating chicken,fish,turkey . Pt says the amount of time spent exercising is cardio 3-4 days per week. Body mass index is 19.66 kg/m². 1. Have you been to the ER, urgent care clinic since your last visit? Hospitalized since your last visit? No    2. Have you seen or consulted any other health care providers outside of the [address] [place] [address] since your last visit? Include any pap smears or colon screening.  No The following information and instructions were given:    NPO after MN except sips of water with routine prescribed medication (except blood thinner, diabetes, or weight reducing medication) unless otherwise instructed by your physician.  Do not eat, drink, smoke or chew gum after midnight the night before surgery. This also includes no mints.    DO NOT shave for two days before your procedure.  Do not wear makeup.      DO NOT wear fingernail polish (gel/regular) and/or acrylic/artificial nails on the day of surgery.   If a patient had recent manicure and would rather not remove polish or artificial nails, then the minimum requirement is that the polish/artificial nails must be removed from the middle finger on each hand.      If patient is having surgery/procedure on an upper extremity, then the patient was instructed that fingernail polish/artificial fingernails must be removed for surgery.  NO EXCEPTIONS.      If patient is having surgery/procedure on a lower extremity, then the patient was instructed that toenail polish on both extremities must be removed for surgery.  NO EXCEPTIONS.    Remove all jewelry (advised to go to jeweler if unable to remove).  Jewelry, especially rings, can no longer be taped for surgery.    Leave anything you consider valuable at home.    Leave your suitcase in the car until after your surgery.    Bring the following with you (if applicable)       -picture ID and insurance cards       -Co-pay/deductible required by insurance       -Medications in the original bottles (not a list) including all over-the-counter  medications if not brought to PAT       -Copy of advance directive, living will or power of  documents if not  brought to [facility]       -CPAP or BIPAP mask and tubing (do not bring machine)       -Skin prep instructions sheet       -PAT Pass    Education booklet, brochure, handout or binder given to patient.    Pain Control After Surgery handout given to  patient.    Respirex use (handout given to patient) and pneumonia prevention.    Signs and Symptoms of infection discussed.    DVT Prevention education given.  Stressing the importance of ambulation.    Patient to apply Chlorhexadine wipes to surgical area (as instructed) the night before procedure and the AM of procedure.    When applicable for ERAS patients (colon, orthropedic), patients were instructed to drink 20 ounces of Gatorade or G2 for diabetics (or until full) the morning of surgery.  The Gatorade or G2 must be consumed at least 3 hours before surgery start time.  No RED Gatorade or G2.  Appropriated ERAS handout given to patient during PAT visit.        Info given regarding wipes and Gatorade patient did not know surgery start time, instructed him to ask when they call with arrival time.

## 2019-07-05 ENCOUNTER — OFFICE VISIT (OUTPATIENT)
Dept: ORTHOPEDIC SURGERY | Facility: CLINIC | Age: 74
End: 2019-07-05

## 2019-07-05 DIAGNOSIS — Z09 SURGERY FOLLOW-UP: Primary | ICD-10-CM

## 2019-07-05 PROCEDURE — 99024 POSTOP FOLLOW-UP VISIT: CPT | Performed by: ORTHOPAEDIC SURGERY

## 2019-07-05 NOTE — PROGRESS NOTES
[facility] Orth[facility]edic Surgery Clinic Note    Subjective     Post-op (10 weeks status post/left foot excise PIP joints 2,3,4, tenotomies 2,3,4, metatarsal capsulotomy 2,3,4, chevron osteotomy 5th metatarsal, great toe DIP fusion LEFT 4/23/19)       ESTIVEN Roque II is a 73 y.o. male. ***         Objective      Physical Exam  There were no vitals taken for this visit.    There is no height or weight on file to calculate BMI.        Ortho Exam  ***      Assessment:  1. Surgery follow-up        Plan:  1. ***      Evette Villasenor MA  07/05/19  9:26 AM

## 2019-07-05 NOTE — PROGRESS NOTES
Post-op (10 weeks status post/left foot excise PIP joints 2,3,4, tenotomies 2,3,4, metatarsal capsulotomy 2,3,4, chevron osteotomy 5th metatarsal, great toe DIP fusion LEFT 4/23/19)      Flaco Roque II is 10 weeks status post left toe reconstruction, 1 through 5, 4/23/2019. He reports no fever, chills.  He reports pain is well controlled.  They have been taking aspirin for DVT prophylaxis.  They have been weight bearing in boot.  He is here early because he was in [place] and stubbed his toe on the escalator, it was the left great toe.  He wanted to make sure nothing happened to the surgery.    Past Surgical History:   Procedure Laterality Date   • ARTHRODESIS MIDTARSAL / TARSOMETATARSAL / TARSAL NAVICULAR-CUNEIFORM Left 05/10/2016   • BACK SURGERY     • BACK SURGERY      low back   • BUNIONECTOMY Left 4/23/2019    Procedure: left foot excise PIP joints 2,3,4, tenotomies 2,3,4, metatarsal capsulotomy 2,3,4, chevron osteotomy 5th metatarsal, great toe DIP fusion LEFT;  Surgeon: Juhi Calle MD;  Location:  [facility] OR;  Service: Orthopedics   • CATARACT EXTRACTION     • COLONOSCOPY N/A 11/2/2017    Procedure: COLONOSCOPY;  Surgeon: Luis Eduardo Capellan MD;  Location:  [facility] ENDOSCOPY;  Service:    • ENDOSCOPY N/A 11/1/2017    Procedure: ESOPHAGOGASTRODUODENOSCOPY;  Surgeon: Luis Eduardo Capellan MD;  Location:  [facility];  Service:    • ENDOSCOPY  11/02/2017    DR LUIS EDUARDO CAPELLAN   • FOOT SURGERY     • GALLBLADDER SURGERY     • KNEE ARTHROSCOPY Bilateral    • PAIN PUMP INSERTION/REVISION     • SPINE SURGERY     • TOTAL HIP ARTHROPLASTY Left    • TOTAL SHOULDER ARTHROPLASTY W/ DISTAL CLAVICLE EXCISION Left 9/10/2018    Procedure: REVERSE TOTAL SHOULDER ARTHROPLASTY LEFT;  Surgeon: Abel Brennan MD;  Location:  [facility] OR;  Service: Orthopedics   • ULNAR NERVE TRANSPOSITION         There were no vitals taken for this visit.        No erythema, no drainage, no sign of infection, normal post op swelling.  Left great  toe has ecchymosis and slight swelling, all incisions are healed, alignment of all toes is good, no change in alignment of the great toe    ordered and reviewed x-rays today    Assessment and Plan:   1. Surgery follow-up  No sign on x-ray or clinically of any significant damage to the great toe fusion.  Continue to weight-bear in the boot and I will see him as already scheduled with standing 2 views of the foot  - XR Foot 2 View Left

## 2019-07-28 ENCOUNTER — APPOINTMENT (OUTPATIENT)
Dept: GENERAL RADIOLOGY | Facility: HOSPITAL | Age: 74
End: 2019-07-28

## 2019-07-28 ENCOUNTER — HOSPITAL ENCOUNTER (INPATIENT)
Facility: HOSPITAL | Age: 74
LOS: 4 days | Discharge: HOME HEALTH | End: 2019-08-01
Attending: EMERGENCY MEDICINE | Admitting: INTERNAL MEDICINE

## 2019-07-28 DIAGNOSIS — Z74.09 IMPAIRED MOBILITY AND ADLS: ICD-10-CM

## 2019-07-28 DIAGNOSIS — Z78.9 IMPAIRED MOBILITY AND ADLS: ICD-10-CM

## 2019-07-28 DIAGNOSIS — J96.01 ACUTE HYPOXEMIC RESPIRATORY FAILURE (HCC): ICD-10-CM

## 2019-07-28 DIAGNOSIS — Z74.09 IMPAIRED FUNCTIONAL MOBILITY, BALANCE, GAIT, AND ENDURANCE: ICD-10-CM

## 2019-07-28 DIAGNOSIS — J44.9 CHRONIC OBSTRUCTIVE PULMONARY DISEASE, UNSPECIFIED COPD TYPE (HCC): ICD-10-CM

## 2019-07-28 DIAGNOSIS — J18.9 PNEUMONIA OF LEFT LUNG DUE TO INFECTIOUS ORGANISM, UNSPECIFIED PART OF LUNG: Primary | ICD-10-CM

## 2019-07-28 PROBLEM — A41.9 SEPSIS (HCC): Status: ACTIVE | Noted: 2019-07-28

## 2019-07-28 PROBLEM — E87.20 LACTIC ACIDOSIS: Status: ACTIVE | Noted: 2019-07-28

## 2019-07-28 PROBLEM — D64.9 ANEMIA: Status: ACTIVE | Noted: 2019-07-28

## 2019-07-28 PROBLEM — E87.6 HYPOKALEMIA: Status: ACTIVE | Noted: 2019-07-28

## 2019-07-28 LAB
ALBUMIN SERPL-MCNC: 3.4 G/DL (ref 3.5–5.2)
ALBUMIN/GLOB SERPL: 1 G/DL
ALP SERPL-CCNC: 19 U/L (ref 39–117)
ALT SERPL W P-5'-P-CCNC: <5 U/L (ref 1–41)
ANION GAP SERPL CALCULATED.3IONS-SCNC: 13 MMOL/L (ref 5–15)
AST SERPL-CCNC: 14 U/L (ref 1–40)
BASOPHILS # BLD AUTO: 0.02 10*3/MM3 (ref 0–0.2)
BASOPHILS NFR BLD AUTO: 0.1 % (ref 0–1.5)
BILIRUB SERPL-MCNC: 0.7 MG/DL (ref 0.2–1.2)
BUN BLD-MCNC: 16 MG/DL (ref 8–23)
BUN/CREAT SERPL: 20.8 (ref 7–25)
CALCIUM SPEC-SCNC: 8.5 MG/DL (ref 8.6–10.5)
CHLORIDE SERPL-SCNC: 99 MMOL/L (ref 98–107)
CO2 SERPL-SCNC: 23 MMOL/L (ref 22–29)
CREAT BLD-MCNC: 0.77 MG/DL (ref 0.76–1.27)
D-LACTATE SERPL-SCNC: 2.5 MMOL/L (ref 0.5–2)
D-LACTATE SERPL-SCNC: 3 MMOL/L (ref 0.5–2)
DEPRECATED RDW RBC AUTO: 48.3 FL (ref 37–54)
EOSINOPHIL # BLD AUTO: 0.01 10*3/MM3 (ref 0–0.4)
EOSINOPHIL NFR BLD AUTO: 0.1 % (ref 0.3–6.2)
ERYTHROCYTE [DISTWIDTH] IN BLOOD BY AUTOMATED COUNT: 18.7 % (ref 12.3–15.4)
FERRITIN SERPL-MCNC: 47.97 NG/ML (ref 30–400)
GFR SERPL CREATININE-BSD FRML MDRD: 99 ML/MIN/1.73
GLOBULIN UR ELPH-MCNC: 3.4 GM/DL
GLUCOSE BLD-MCNC: 153 MG/DL (ref 65–99)
HCT VFR BLD AUTO: 32.5 % (ref 37.5–51)
HGB BLD-MCNC: 9.3 G/DL (ref 13–17.7)
HOLD SPECIMEN: NORMAL
IMM GRANULOCYTES # BLD AUTO: 0.04 10*3/MM3 (ref 0–0.05)
IMM GRANULOCYTES NFR BLD AUTO: 0.3 % (ref 0–0.5)
IRON 24H UR-MRATE: 13 MCG/DL (ref 59–158)
IRON SATN MFR SERPL: 3 % (ref 20–50)
LYMPHOCYTES # BLD AUTO: 0.44 10*3/MM3 (ref 0.7–3.1)
LYMPHOCYTES NFR BLD AUTO: 3 % (ref 19.6–45.3)
MCH RBC QN AUTO: 20.6 PG (ref 26.6–33)
MCHC RBC AUTO-ENTMCNC: 28.6 G/DL (ref 31.5–35.7)
MCV RBC AUTO: 71.9 FL (ref 79–97)
MONOCYTES # BLD AUTO: 0.65 10*3/MM3 (ref 0.1–0.9)
MONOCYTES NFR BLD AUTO: 4.5 % (ref 5–12)
NEUTROPHILS # BLD AUTO: 13.41 10*3/MM3 (ref 1.7–7)
NEUTROPHILS NFR BLD AUTO: 92 % (ref 42.7–76)
NRBC BLD AUTO-RTO: 0 /100 WBC (ref 0–0.2)
NT-PROBNP SERPL-MCNC: 307.5 PG/ML (ref 5–900)
PLATELET # BLD AUTO: 278 10*3/MM3 (ref 140–450)
PMV BLD AUTO: 11.1 FL (ref 6–12)
POTASSIUM BLD-SCNC: 3.2 MMOL/L (ref 3.5–5.2)
PROCALCITONIN SERPL-MCNC: 11.04 NG/ML (ref 0.1–0.25)
PROT SERPL-MCNC: 6.8 G/DL (ref 6–8.5)
RBC # BLD AUTO: 4.52 10*6/MM3 (ref 4.14–5.8)
RETICS # AUTO: 0.05 10*6/MM3 (ref 0.02–0.13)
RETICS/RBC NFR AUTO: 1.21 % (ref 0.7–1.9)
SODIUM BLD-SCNC: 135 MMOL/L (ref 136–145)
TIBC SERPL-MCNC: 422 MCG/DL (ref 298–536)
TRANSFERRIN SERPL-MCNC: 283 MG/DL (ref 200–360)
TROPONIN T SERPL-MCNC: <0.01 NG/ML (ref 0–0.03)
WBC NRBC COR # BLD: 14.57 10*3/MM3 (ref 3.4–10.8)
WHOLE BLOOD HOLD SPECIMEN: NORMAL
WHOLE BLOOD HOLD SPECIMEN: NORMAL

## 2019-07-28 PROCEDURE — 82728 ASSAY OF FERRITIN: CPT | Performed by: NURSE PRACTITIONER

## 2019-07-28 PROCEDURE — 93005 ELECTROCARDIOGRAM TRACING: CPT | Performed by: EMERGENCY MEDICINE

## 2019-07-28 PROCEDURE — 87040 BLOOD CULTURE FOR BACTERIA: CPT | Performed by: EMERGENCY MEDICINE

## 2019-07-28 PROCEDURE — 25010000002 LEVOFLOXACIN PER 250 MG: Performed by: EMERGENCY MEDICINE

## 2019-07-28 PROCEDURE — 25010000002 METHYLPREDNISOLONE PER 125 MG: Performed by: EMERGENCY MEDICINE

## 2019-07-28 PROCEDURE — 83540 ASSAY OF IRON: CPT | Performed by: NURSE PRACTITIONER

## 2019-07-28 PROCEDURE — 84145 PROCALCITONIN (PCT): CPT | Performed by: EMERGENCY MEDICINE

## 2019-07-28 PROCEDURE — 83880 ASSAY OF NATRIURETIC PEPTIDE: CPT | Performed by: EMERGENCY MEDICINE

## 2019-07-28 PROCEDURE — 84466 ASSAY OF TRANSFERRIN: CPT | Performed by: NURSE PRACTITIONER

## 2019-07-28 PROCEDURE — 85045 AUTOMATED RETICULOCYTE COUNT: CPT | Performed by: NURSE PRACTITIONER

## 2019-07-28 PROCEDURE — 99223 1ST HOSP IP/OBS HIGH 75: CPT | Performed by: HOSPITALIST

## 2019-07-28 PROCEDURE — 80053 COMPREHEN METABOLIC PANEL: CPT | Performed by: EMERGENCY MEDICINE

## 2019-07-28 PROCEDURE — 94640 AIRWAY INHALATION TREATMENT: CPT

## 2019-07-28 PROCEDURE — 83605 ASSAY OF LACTIC ACID: CPT | Performed by: EMERGENCY MEDICINE

## 2019-07-28 PROCEDURE — 99285 EMERGENCY DEPT VISIT HI MDM: CPT

## 2019-07-28 PROCEDURE — 82607 VITAMIN B-12: CPT | Performed by: NURSE PRACTITIONER

## 2019-07-28 PROCEDURE — 84484 ASSAY OF TROPONIN QUANT: CPT | Performed by: EMERGENCY MEDICINE

## 2019-07-28 PROCEDURE — 85025 COMPLETE CBC W/AUTO DIFF WBC: CPT | Performed by: EMERGENCY MEDICINE

## 2019-07-28 PROCEDURE — 82746 ASSAY OF FOLIC ACID SERUM: CPT | Performed by: NURSE PRACTITIONER

## 2019-07-28 PROCEDURE — 94799 UNLISTED PULMONARY SVC/PX: CPT

## 2019-07-28 PROCEDURE — 71045 X-RAY EXAM CHEST 1 VIEW: CPT

## 2019-07-28 RX ORDER — SODIUM CHLORIDE 0.9 % (FLUSH) 0.9 %
10 SYRINGE (ML) INJECTION AS NEEDED
Status: DISCONTINUED | OUTPATIENT
Start: 2019-07-28 | End: 2019-08-01 | Stop reason: HOSPADM

## 2019-07-28 RX ORDER — ONDANSETRON 4 MG/1
4 TABLET, FILM COATED ORAL EVERY 6 HOURS PRN
Status: DISCONTINUED | OUTPATIENT
Start: 2019-07-28 | End: 2019-08-01 | Stop reason: HOSPADM

## 2019-07-28 RX ORDER — AMITRIPTYLINE HYDROCHLORIDE 50 MG/1
50 TABLET, FILM COATED ORAL NIGHTLY
Status: DISCONTINUED | OUTPATIENT
Start: 2019-07-28 | End: 2019-08-01 | Stop reason: HOSPADM

## 2019-07-28 RX ORDER — TAMSULOSIN HYDROCHLORIDE 0.4 MG/1
0.4 CAPSULE ORAL DAILY
Status: DISCONTINUED | OUTPATIENT
Start: 2019-07-29 | End: 2019-08-01 | Stop reason: HOSPADM

## 2019-07-28 RX ORDER — SODIUM CHLORIDE 0.9 % (FLUSH) 0.9 %
3 SYRINGE (ML) INJECTION EVERY 12 HOURS SCHEDULED
Status: DISCONTINUED | OUTPATIENT
Start: 2019-07-28 | End: 2019-08-01 | Stop reason: HOSPADM

## 2019-07-28 RX ORDER — ONDANSETRON 2 MG/ML
4 INJECTION INTRAMUSCULAR; INTRAVENOUS EVERY 6 HOURS PRN
Status: DISCONTINUED | OUTPATIENT
Start: 2019-07-28 | End: 2019-08-01 | Stop reason: HOSPADM

## 2019-07-28 RX ORDER — SODIUM CHLORIDE 9 MG/ML
100 INJECTION, SOLUTION INTRAVENOUS ONCE
Status: COMPLETED | OUTPATIENT
Start: 2019-07-28 | End: 2019-07-28

## 2019-07-28 RX ORDER — ACETAMINOPHEN 325 MG/1
650 TABLET ORAL EVERY 4 HOURS PRN
Status: DISCONTINUED | OUTPATIENT
Start: 2019-07-28 | End: 2019-08-01 | Stop reason: HOSPADM

## 2019-07-28 RX ORDER — BUDESONIDE AND FORMOTEROL FUMARATE DIHYDRATE 80; 4.5 UG/1; UG/1
2 AEROSOL RESPIRATORY (INHALATION)
Status: DISCONTINUED | OUTPATIENT
Start: 2019-07-28 | End: 2019-08-01 | Stop reason: HOSPADM

## 2019-07-28 RX ORDER — IPRATROPIUM BROMIDE AND ALBUTEROL SULFATE 2.5; .5 MG/3ML; MG/3ML
3 SOLUTION RESPIRATORY (INHALATION) ONCE
Status: COMPLETED | OUTPATIENT
Start: 2019-07-28 | End: 2019-07-28

## 2019-07-28 RX ORDER — METHYLPREDNISOLONE SODIUM SUCCINATE 125 MG/2ML
125 INJECTION, POWDER, LYOPHILIZED, FOR SOLUTION INTRAMUSCULAR; INTRAVENOUS ONCE
Status: COMPLETED | OUTPATIENT
Start: 2019-07-28 | End: 2019-07-28

## 2019-07-28 RX ORDER — MONTELUKAST SODIUM 10 MG/1
10 TABLET ORAL NIGHTLY
Status: DISCONTINUED | OUTPATIENT
Start: 2019-07-28 | End: 2019-08-01 | Stop reason: HOSPADM

## 2019-07-28 RX ORDER — BISACODYL 10 MG
10 SUPPOSITORY, RECTAL RECTAL DAILY PRN
Status: DISCONTINUED | OUTPATIENT
Start: 2019-07-28 | End: 2019-08-01 | Stop reason: HOSPADM

## 2019-07-28 RX ORDER — MULTIPLE VITAMINS W/ MINERALS TAB 9MG-400MCG
1 TAB ORAL DAILY
Status: DISCONTINUED | OUTPATIENT
Start: 2019-07-29 | End: 2019-08-01 | Stop reason: HOSPADM

## 2019-07-28 RX ORDER — IPRATROPIUM BROMIDE AND ALBUTEROL SULFATE 2.5; .5 MG/3ML; MG/3ML
3 SOLUTION RESPIRATORY (INHALATION)
Status: DISCONTINUED | OUTPATIENT
Start: 2019-07-28 | End: 2019-08-01 | Stop reason: HOSPADM

## 2019-07-28 RX ORDER — SODIUM CHLORIDE 0.9 % (FLUSH) 0.9 %
3-10 SYRINGE (ML) INJECTION AS NEEDED
Status: DISCONTINUED | OUTPATIENT
Start: 2019-07-28 | End: 2019-08-01 | Stop reason: HOSPADM

## 2019-07-28 RX ORDER — DOCUSATE SODIUM 100 MG/1
100 CAPSULE, LIQUID FILLED ORAL 2 TIMES DAILY
Status: DISCONTINUED | OUTPATIENT
Start: 2019-07-28 | End: 2019-08-01 | Stop reason: HOSPADM

## 2019-07-28 RX ORDER — POTASSIUM CHLORIDE 1.5 G/1.77G
40 POWDER, FOR SOLUTION ORAL AS NEEDED
Status: DISCONTINUED | OUTPATIENT
Start: 2019-07-28 | End: 2019-08-01 | Stop reason: HOSPADM

## 2019-07-28 RX ORDER — IPRATROPIUM BROMIDE AND ALBUTEROL SULFATE 2.5; .5 MG/3ML; MG/3ML
3 SOLUTION RESPIRATORY (INHALATION) EVERY 4 HOURS PRN
Status: DISCONTINUED | OUTPATIENT
Start: 2019-07-28 | End: 2019-08-01 | Stop reason: HOSPADM

## 2019-07-28 RX ORDER — LEVOFLOXACIN 5 MG/ML
750 INJECTION, SOLUTION INTRAVENOUS EVERY 24 HOURS
Status: DISCONTINUED | OUTPATIENT
Start: 2019-07-29 | End: 2019-07-31

## 2019-07-28 RX ORDER — POTASSIUM CHLORIDE 750 MG/1
40 CAPSULE, EXTENDED RELEASE ORAL AS NEEDED
Status: DISCONTINUED | OUTPATIENT
Start: 2019-07-28 | End: 2019-08-01 | Stop reason: HOSPADM

## 2019-07-28 RX ORDER — LEVOFLOXACIN 5 MG/ML
750 INJECTION, SOLUTION INTRAVENOUS ONCE
Status: COMPLETED | OUTPATIENT
Start: 2019-07-28 | End: 2019-07-28

## 2019-07-28 RX ADMIN — CARBIDOPA AND LEVODOPA 1 TABLET: 25; 100 TABLET ORAL at 21:42

## 2019-07-28 RX ADMIN — IPRATROPIUM BROMIDE AND ALBUTEROL SULFATE 3 ML: 2.5; .5 SOLUTION RESPIRATORY (INHALATION) at 23:23

## 2019-07-28 RX ADMIN — METHYLPREDNISOLONE SODIUM SUCCINATE 125 MG: 125 INJECTION, POWDER, FOR SOLUTION INTRAMUSCULAR; INTRAVENOUS at 19:20

## 2019-07-28 RX ADMIN — Medication 10 MG: at 21:42

## 2019-07-28 RX ADMIN — BUDESONIDE AND FORMOTEROL FUMARATE DIHYDRATE 2 PUFF: 80; 4.5 AEROSOL RESPIRATORY (INHALATION) at 23:23

## 2019-07-28 RX ADMIN — MONTELUKAST SODIUM 10 MG: 10 TABLET, COATED ORAL at 21:43

## 2019-07-28 RX ADMIN — IPRATROPIUM BROMIDE AND ALBUTEROL SULFATE 3 ML: .5; 3 SOLUTION RESPIRATORY (INHALATION) at 17:28

## 2019-07-28 RX ADMIN — DOCUSATE SODIUM 100 MG: 100 CAPSULE, LIQUID FILLED ORAL at 21:43

## 2019-07-28 RX ADMIN — AMITRIPTYLINE HYDROCHLORIDE 50 MG: 50 TABLET, FILM COATED ORAL at 21:43

## 2019-07-28 RX ADMIN — SODIUM CHLORIDE 100 ML/HR: 9 INJECTION, SOLUTION INTRAVENOUS at 21:46

## 2019-07-28 RX ADMIN — POTASSIUM CHLORIDE 40 MEQ: 750 CAPSULE, EXTENDED RELEASE ORAL at 21:42

## 2019-07-28 RX ADMIN — LEVOFLOXACIN 750 MG: 5 INJECTION, SOLUTION INTRAVENOUS at 19:21

## 2019-07-29 LAB
ANION GAP SERPL CALCULATED.3IONS-SCNC: 11 MMOL/L (ref 5–15)
BASOPHILS # BLD AUTO: 0 10*3/MM3 (ref 0–0.2)
BASOPHILS NFR BLD AUTO: 0 % (ref 0–1.5)
BUN BLD-MCNC: 11 MG/DL (ref 8–23)
BUN/CREAT SERPL: 19.3 (ref 7–25)
CALCIUM SPEC-SCNC: 8.2 MG/DL (ref 8.6–10.5)
CHLORIDE SERPL-SCNC: 102 MMOL/L (ref 98–107)
CO2 SERPL-SCNC: 23 MMOL/L (ref 22–29)
CREAT BLD-MCNC: 0.57 MG/DL (ref 0.76–1.27)
DEPRECATED RDW RBC AUTO: 47.8 FL (ref 37–54)
EOSINOPHIL # BLD AUTO: 0.09 10*3/MM3 (ref 0–0.4)
EOSINOPHIL NFR BLD AUTO: 1.8 % (ref 0.3–6.2)
ERYTHROCYTE [DISTWIDTH] IN BLOOD BY AUTOMATED COUNT: 18.6 % (ref 12.3–15.4)
FOLATE SERPL-MCNC: 14.4 NG/ML (ref 4.78–24.2)
GFR SERPL CREATININE-BSD FRML MDRD: 140 ML/MIN/1.73
GLUCOSE BLD-MCNC: 190 MG/DL (ref 65–99)
HCT VFR BLD AUTO: 31.3 % (ref 37.5–51)
HGB BLD-MCNC: 8.9 G/DL (ref 13–17.7)
IMM GRANULOCYTES # BLD AUTO: 0.01 10*3/MM3 (ref 0–0.05)
IMM GRANULOCYTES NFR BLD AUTO: 0.2 % (ref 0–0.5)
L PNEUMO1 AG UR QL IA: NEGATIVE
LYMPHOCYTES # BLD AUTO: 0.23 10*3/MM3 (ref 0.7–3.1)
LYMPHOCYTES NFR BLD AUTO: 4.5 % (ref 19.6–45.3)
MCH RBC QN AUTO: 20.4 PG (ref 26.6–33)
MCHC RBC AUTO-ENTMCNC: 28.4 G/DL (ref 31.5–35.7)
MCV RBC AUTO: 71.8 FL (ref 79–97)
MONOCYTES # BLD AUTO: 0.1 10*3/MM3 (ref 0.1–0.9)
MONOCYTES NFR BLD AUTO: 2 % (ref 5–12)
NEUTROPHILS # BLD AUTO: 4.69 10*3/MM3 (ref 1.7–7)
NEUTROPHILS NFR BLD AUTO: 91.5 % (ref 42.7–76)
NRBC BLD AUTO-RTO: 0 /100 WBC (ref 0–0.2)
PLATELET # BLD AUTO: 271 10*3/MM3 (ref 140–450)
PMV BLD AUTO: 10.9 FL (ref 6–12)
POTASSIUM BLD-SCNC: 3.7 MMOL/L (ref 3.5–5.2)
RBC # BLD AUTO: 4.36 10*6/MM3 (ref 4.14–5.8)
S PNEUM AG SPEC QL LA: NEGATIVE
SODIUM BLD-SCNC: 136 MMOL/L (ref 136–145)
VIT B12 BLD-MCNC: 434 PG/ML (ref 211–946)
WBC NRBC COR # BLD: 5.12 10*3/MM3 (ref 3.4–10.8)

## 2019-07-29 PROCEDURE — 87205 SMEAR GRAM STAIN: CPT | Performed by: NURSE PRACTITIONER

## 2019-07-29 PROCEDURE — 25010000002 LEVOFLOXACIN PER 250 MG: Performed by: NURSE PRACTITIONER

## 2019-07-29 PROCEDURE — 87899 AGENT NOS ASSAY W/OPTIC: CPT | Performed by: NURSE PRACTITIONER

## 2019-07-29 PROCEDURE — 94799 UNLISTED PULMONARY SVC/PX: CPT

## 2019-07-29 PROCEDURE — 80048 BASIC METABOLIC PNL TOTAL CA: CPT | Performed by: NURSE PRACTITIONER

## 2019-07-29 PROCEDURE — 87070 CULTURE OTHR SPECIMN AEROBIC: CPT | Performed by: NURSE PRACTITIONER

## 2019-07-29 PROCEDURE — 85025 COMPLETE CBC W/AUTO DIFF WBC: CPT | Performed by: NURSE PRACTITIONER

## 2019-07-29 PROCEDURE — 25010000002 ENOXAPARIN PER 10 MG: Performed by: NURSE PRACTITIONER

## 2019-07-29 PROCEDURE — 99233 SBSQ HOSP IP/OBS HIGH 50: CPT | Performed by: INTERNAL MEDICINE

## 2019-07-29 RX ORDER — PANTOPRAZOLE SODIUM 40 MG/1
40 TABLET, DELAYED RELEASE ORAL 2 TIMES DAILY
COMMUNITY
End: 2020-08-12

## 2019-07-29 RX ADMIN — MONTELUKAST SODIUM 10 MG: 10 TABLET, COATED ORAL at 21:23

## 2019-07-29 RX ADMIN — TAMSULOSIN HYDROCHLORIDE 0.4 MG: 0.4 CAPSULE ORAL at 08:33

## 2019-07-29 RX ADMIN — CARBIDOPA AND LEVODOPA 1 TABLET: 25; 100 TABLET ORAL at 17:16

## 2019-07-29 RX ADMIN — IPRATROPIUM BROMIDE AND ALBUTEROL SULFATE 3 ML: 2.5; .5 SOLUTION RESPIRATORY (INHALATION) at 18:53

## 2019-07-29 RX ADMIN — IPRATROPIUM BROMIDE AND ALBUTEROL SULFATE 3 ML: 2.5; .5 SOLUTION RESPIRATORY (INHALATION) at 23:09

## 2019-07-29 RX ADMIN — CARBIDOPA AND LEVODOPA 1 TABLET: 25; 100 TABLET ORAL at 21:23

## 2019-07-29 RX ADMIN — IPRATROPIUM BROMIDE AND ALBUTEROL SULFATE 3 ML: 2.5; .5 SOLUTION RESPIRATORY (INHALATION) at 15:26

## 2019-07-29 RX ADMIN — CARBIDOPA AND LEVODOPA 1 TABLET: 25; 100 TABLET ORAL at 11:50

## 2019-07-29 RX ADMIN — SODIUM CHLORIDE, PRESERVATIVE FREE 3 ML: 5 INJECTION INTRAVENOUS at 08:32

## 2019-07-29 RX ADMIN — IPRATROPIUM BROMIDE AND ALBUTEROL SULFATE 3 ML: 2.5; .5 SOLUTION RESPIRATORY (INHALATION) at 12:29

## 2019-07-29 RX ADMIN — BUDESONIDE AND FORMOTEROL FUMARATE DIHYDRATE 2 PUFF: 80; 4.5 AEROSOL RESPIRATORY (INHALATION) at 18:53

## 2019-07-29 RX ADMIN — DOCUSATE SODIUM 100 MG: 100 CAPSULE, LIQUID FILLED ORAL at 08:32

## 2019-07-29 RX ADMIN — ENOXAPARIN SODIUM 40 MG: 40 INJECTION SUBCUTANEOUS at 05:52

## 2019-07-29 RX ADMIN — IPRATROPIUM BROMIDE AND ALBUTEROL SULFATE 3 ML: 2.5; .5 SOLUTION RESPIRATORY (INHALATION) at 02:44

## 2019-07-29 RX ADMIN — IPRATROPIUM BROMIDE AND ALBUTEROL SULFATE 3 ML: 2.5; .5 SOLUTION RESPIRATORY (INHALATION) at 07:27

## 2019-07-29 RX ADMIN — POTASSIUM CHLORIDE 40 MEQ: 750 CAPSULE, EXTENDED RELEASE ORAL at 02:39

## 2019-07-29 RX ADMIN — CARBIDOPA AND LEVODOPA 1 TABLET: 25; 100 TABLET ORAL at 08:31

## 2019-07-29 RX ADMIN — AMITRIPTYLINE HYDROCHLORIDE 50 MG: 50 TABLET, FILM COATED ORAL at 21:23

## 2019-07-29 RX ADMIN — LEVOFLOXACIN 750 MG: 5 INJECTION, SOLUTION INTRAVENOUS at 17:15

## 2019-07-29 RX ADMIN — BUDESONIDE AND FORMOTEROL FUMARATE DIHYDRATE 2 PUFF: 80; 4.5 AEROSOL RESPIRATORY (INHALATION) at 07:28

## 2019-07-29 RX ADMIN — MULTIPLE VITAMINS W/ MINERALS TAB 1 TABLET: TAB ORAL at 08:32

## 2019-07-30 LAB
ANION GAP SERPL CALCULATED.3IONS-SCNC: 10 MMOL/L (ref 5–15)
BUN BLD-MCNC: 15 MG/DL (ref 8–23)
BUN/CREAT SERPL: 31.9 (ref 7–25)
CALCIUM SPEC-SCNC: 8.6 MG/DL (ref 8.6–10.5)
CHLORIDE SERPL-SCNC: 105 MMOL/L (ref 98–107)
CO2 SERPL-SCNC: 20 MMOL/L (ref 22–29)
CREAT BLD-MCNC: 0.47 MG/DL (ref 0.76–1.27)
D-LACTATE SERPL-SCNC: 1.3 MMOL/L (ref 0.5–2)
DEPRECATED RDW RBC AUTO: 49.8 FL (ref 37–54)
ERYTHROCYTE [DISTWIDTH] IN BLOOD BY AUTOMATED COUNT: 18.8 % (ref 12.3–15.4)
GFR SERPL CREATININE-BSD FRML MDRD: >150 ML/MIN/1.73
GLUCOSE BLD-MCNC: 101 MG/DL (ref 65–99)
HCT VFR BLD AUTO: 31.6 % (ref 37.5–51)
HGB BLD-MCNC: 8.5 G/DL (ref 13–17.7)
MAGNESIUM SERPL-MCNC: 2.2 MG/DL (ref 1.6–2.4)
MCH RBC QN AUTO: 20.1 PG (ref 26.6–33)
MCHC RBC AUTO-ENTMCNC: 26.9 G/DL (ref 31.5–35.7)
MCV RBC AUTO: 74.9 FL (ref 79–97)
PLATELET # BLD AUTO: 277 10*3/MM3 (ref 140–450)
PMV BLD AUTO: 11 FL (ref 6–12)
POTASSIUM BLD-SCNC: 4.3 MMOL/L (ref 3.5–5.2)
PROCALCITONIN SERPL-MCNC: 3.6 NG/ML (ref 0.1–0.25)
RBC # BLD AUTO: 4.22 10*6/MM3 (ref 4.14–5.8)
SODIUM BLD-SCNC: 135 MMOL/L (ref 136–145)
WBC NRBC COR # BLD: 11.92 10*3/MM3 (ref 3.4–10.8)

## 2019-07-30 PROCEDURE — 25010000002 ENOXAPARIN PER 10 MG: Performed by: NURSE PRACTITIONER

## 2019-07-30 PROCEDURE — 85027 COMPLETE CBC AUTOMATED: CPT | Performed by: INTERNAL MEDICINE

## 2019-07-30 PROCEDURE — 99233 SBSQ HOSP IP/OBS HIGH 50: CPT | Performed by: INTERNAL MEDICINE

## 2019-07-30 PROCEDURE — 94799 UNLISTED PULMONARY SVC/PX: CPT

## 2019-07-30 PROCEDURE — 80048 BASIC METABOLIC PNL TOTAL CA: CPT | Performed by: INTERNAL MEDICINE

## 2019-07-30 PROCEDURE — 97161 PT EVAL LOW COMPLEX 20 MIN: CPT

## 2019-07-30 PROCEDURE — 83605 ASSAY OF LACTIC ACID: CPT | Performed by: NURSE PRACTITIONER

## 2019-07-30 PROCEDURE — 83735 ASSAY OF MAGNESIUM: CPT

## 2019-07-30 PROCEDURE — 84145 PROCALCITONIN (PCT): CPT | Performed by: INTERNAL MEDICINE

## 2019-07-30 PROCEDURE — 25010000002 LEVOFLOXACIN PER 250 MG: Performed by: NURSE PRACTITIONER

## 2019-07-30 RX ORDER — GUAIFENESIN AND DEXTROMETHORPHAN HYDROBROMIDE 600; 30 MG/1; MG/1
1 TABLET, EXTENDED RELEASE ORAL 2 TIMES DAILY
Status: DISCONTINUED | OUTPATIENT
Start: 2019-07-30 | End: 2019-08-01 | Stop reason: HOSPADM

## 2019-07-30 RX ADMIN — MULTIPLE VITAMINS W/ MINERALS TAB 1 TABLET: TAB ORAL at 09:11

## 2019-07-30 RX ADMIN — IPRATROPIUM BROMIDE AND ALBUTEROL SULFATE 3 ML: 2.5; .5 SOLUTION RESPIRATORY (INHALATION) at 22:51

## 2019-07-30 RX ADMIN — BUDESONIDE AND FORMOTEROL FUMARATE DIHYDRATE 2 PUFF: 80; 4.5 AEROSOL RESPIRATORY (INHALATION) at 06:58

## 2019-07-30 RX ADMIN — MONTELUKAST SODIUM 10 MG: 10 TABLET, COATED ORAL at 22:02

## 2019-07-30 RX ADMIN — IPRATROPIUM BROMIDE AND ALBUTEROL SULFATE 3 ML: 2.5; .5 SOLUTION RESPIRATORY (INHALATION) at 10:47

## 2019-07-30 RX ADMIN — IPRATROPIUM BROMIDE AND ALBUTEROL SULFATE 3 ML: 2.5; .5 SOLUTION RESPIRATORY (INHALATION) at 18:28

## 2019-07-30 RX ADMIN — ENOXAPARIN SODIUM 40 MG: 40 INJECTION SUBCUTANEOUS at 05:58

## 2019-07-30 RX ADMIN — AMITRIPTYLINE HYDROCHLORIDE 50 MG: 50 TABLET, FILM COATED ORAL at 22:02

## 2019-07-30 RX ADMIN — GUAIFENESIN AND DEXTROMETHORPHAN HYDROBROMIDE 1 TABLET: 600; 30 TABLET, EXTENDED RELEASE ORAL at 10:06

## 2019-07-30 RX ADMIN — IPRATROPIUM BROMIDE AND ALBUTEROL SULFATE 3 ML: 2.5; .5 SOLUTION RESPIRATORY (INHALATION) at 03:20

## 2019-07-30 RX ADMIN — CARBIDOPA AND LEVODOPA 1 TABLET: 25; 100 TABLET ORAL at 22:02

## 2019-07-30 RX ADMIN — LEVOFLOXACIN 750 MG: 5 INJECTION, SOLUTION INTRAVENOUS at 16:23

## 2019-07-30 RX ADMIN — CARBIDOPA AND LEVODOPA 1 TABLET: 25; 100 TABLET ORAL at 09:11

## 2019-07-30 RX ADMIN — TAMSULOSIN HYDROCHLORIDE 0.4 MG: 0.4 CAPSULE ORAL at 09:11

## 2019-07-30 RX ADMIN — IPRATROPIUM BROMIDE AND ALBUTEROL SULFATE 3 ML: 2.5; .5 SOLUTION RESPIRATORY (INHALATION) at 06:58

## 2019-07-30 RX ADMIN — CARBIDOPA AND LEVODOPA 1 TABLET: 25; 100 TABLET ORAL at 17:53

## 2019-07-30 RX ADMIN — IPRATROPIUM BROMIDE AND ALBUTEROL SULFATE 3 ML: 2.5; .5 SOLUTION RESPIRATORY (INHALATION) at 15:41

## 2019-07-30 RX ADMIN — SODIUM CHLORIDE, PRESERVATIVE FREE 3 ML: 5 INJECTION INTRAVENOUS at 09:12

## 2019-07-30 RX ADMIN — SODIUM CHLORIDE, PRESERVATIVE FREE 3 ML: 5 INJECTION INTRAVENOUS at 22:03

## 2019-07-30 RX ADMIN — GUAIFENESIN AND DEXTROMETHORPHAN HYDROBROMIDE 1 TABLET: 600; 30 TABLET, EXTENDED RELEASE ORAL at 22:02

## 2019-07-30 RX ADMIN — BUDESONIDE AND FORMOTEROL FUMARATE DIHYDRATE 2 PUFF: 80; 4.5 AEROSOL RESPIRATORY (INHALATION) at 18:28

## 2019-07-31 LAB
BACTERIA SPEC RESP CULT: NORMAL
DEPRECATED RDW RBC AUTO: 47.8 FL (ref 37–54)
ERYTHROCYTE [DISTWIDTH] IN BLOOD BY AUTOMATED COUNT: 18.8 % (ref 12.3–15.4)
GRAM STN SPEC: NORMAL
HCT VFR BLD AUTO: 35.6 % (ref 37.5–51)
HGB BLD-MCNC: 10.1 G/DL (ref 13–17.7)
MCH RBC QN AUTO: 20.4 PG (ref 26.6–33)
MCHC RBC AUTO-ENTMCNC: 28.4 G/DL (ref 31.5–35.7)
MCV RBC AUTO: 71.9 FL (ref 79–97)
PLATELET # BLD AUTO: 298 10*3/MM3 (ref 140–450)
PMV BLD AUTO: 10.2 FL (ref 6–12)
RBC # BLD AUTO: 4.95 10*6/MM3 (ref 4.14–5.8)
WBC NRBC COR # BLD: 9.25 10*3/MM3 (ref 3.4–10.8)

## 2019-07-31 PROCEDURE — 85027 COMPLETE CBC AUTOMATED: CPT | Performed by: INTERNAL MEDICINE

## 2019-07-31 PROCEDURE — 94799 UNLISTED PULMONARY SVC/PX: CPT

## 2019-07-31 PROCEDURE — 97530 THERAPEUTIC ACTIVITIES: CPT

## 2019-07-31 PROCEDURE — 25010000002 ONDANSETRON PER 1 MG: Performed by: NURSE PRACTITIONER

## 2019-07-31 PROCEDURE — 97165 OT EVAL LOW COMPLEX 30 MIN: CPT

## 2019-07-31 PROCEDURE — 25010000002 ENOXAPARIN PER 10 MG: Performed by: NURSE PRACTITIONER

## 2019-07-31 PROCEDURE — 99233 SBSQ HOSP IP/OBS HIGH 50: CPT | Performed by: INTERNAL MEDICINE

## 2019-07-31 RX ORDER — LEVOFLOXACIN 750 MG/1
750 TABLET ORAL EVERY 24 HOURS
Status: DISCONTINUED | OUTPATIENT
Start: 2019-07-31 | End: 2019-08-01 | Stop reason: HOSPADM

## 2019-07-31 RX ADMIN — SODIUM CHLORIDE, PRESERVATIVE FREE 3 ML: 5 INJECTION INTRAVENOUS at 20:26

## 2019-07-31 RX ADMIN — CARBIDOPA AND LEVODOPA 1 TABLET: 25; 100 TABLET ORAL at 07:55

## 2019-07-31 RX ADMIN — BUDESONIDE AND FORMOTEROL FUMARATE DIHYDRATE 2 PUFF: 80; 4.5 AEROSOL RESPIRATORY (INHALATION) at 07:25

## 2019-07-31 RX ADMIN — ONDANSETRON 4 MG: 2 INJECTION INTRAMUSCULAR; INTRAVENOUS at 08:55

## 2019-07-31 RX ADMIN — IPRATROPIUM BROMIDE AND ALBUTEROL SULFATE 3 ML: 2.5; .5 SOLUTION RESPIRATORY (INHALATION) at 23:00

## 2019-07-31 RX ADMIN — DOCUSATE SODIUM 100 MG: 100 CAPSULE, LIQUID FILLED ORAL at 20:26

## 2019-07-31 RX ADMIN — AMITRIPTYLINE HYDROCHLORIDE 50 MG: 50 TABLET, FILM COATED ORAL at 20:25

## 2019-07-31 RX ADMIN — IPRATROPIUM BROMIDE AND ALBUTEROL SULFATE 3 ML: 2.5; .5 SOLUTION RESPIRATORY (INHALATION) at 07:25

## 2019-07-31 RX ADMIN — LEVOFLOXACIN 750 MG: 750 TABLET, FILM COATED ORAL at 12:07

## 2019-07-31 RX ADMIN — TAMSULOSIN HYDROCHLORIDE 0.4 MG: 0.4 CAPSULE ORAL at 07:55

## 2019-07-31 RX ADMIN — CARBIDOPA AND LEVODOPA 1 TABLET: 25; 100 TABLET ORAL at 17:01

## 2019-07-31 RX ADMIN — IPRATROPIUM BROMIDE AND ALBUTEROL SULFATE 3 ML: 2.5; .5 SOLUTION RESPIRATORY (INHALATION) at 18:23

## 2019-07-31 RX ADMIN — IPRATROPIUM BROMIDE AND ALBUTEROL SULFATE 3 ML: 2.5; .5 SOLUTION RESPIRATORY (INHALATION) at 12:52

## 2019-07-31 RX ADMIN — GUAIFENESIN AND DEXTROMETHORPHAN HYDROBROMIDE 1 TABLET: 600; 30 TABLET, EXTENDED RELEASE ORAL at 20:25

## 2019-07-31 RX ADMIN — ENOXAPARIN SODIUM 40 MG: 40 INJECTION SUBCUTANEOUS at 05:46

## 2019-07-31 RX ADMIN — IPRATROPIUM BROMIDE AND ALBUTEROL SULFATE 3 ML: 2.5; .5 SOLUTION RESPIRATORY (INHALATION) at 16:05

## 2019-07-31 RX ADMIN — GUAIFENESIN AND DEXTROMETHORPHAN HYDROBROMIDE 1 TABLET: 600; 30 TABLET, EXTENDED RELEASE ORAL at 07:55

## 2019-07-31 RX ADMIN — MULTIPLE VITAMINS W/ MINERALS TAB 1 TABLET: TAB ORAL at 07:55

## 2019-07-31 RX ADMIN — CARBIDOPA AND LEVODOPA 1 TABLET: 25; 100 TABLET ORAL at 11:05

## 2019-07-31 RX ADMIN — BUDESONIDE AND FORMOTEROL FUMARATE DIHYDRATE 2 PUFF: 80; 4.5 AEROSOL RESPIRATORY (INHALATION) at 18:23

## 2019-07-31 RX ADMIN — MONTELUKAST SODIUM 10 MG: 10 TABLET, COATED ORAL at 20:25

## 2019-07-31 RX ADMIN — CARBIDOPA AND LEVODOPA 1 TABLET: 25; 100 TABLET ORAL at 20:25

## 2019-08-01 VITALS
RESPIRATION RATE: 18 BRPM | DIASTOLIC BLOOD PRESSURE: 70 MMHG | HEIGHT: 68 IN | TEMPERATURE: 97.6 F | SYSTOLIC BLOOD PRESSURE: 111 MMHG | WEIGHT: 154.98 LBS | OXYGEN SATURATION: 93 % | HEART RATE: 83 BPM | BODY MASS INDEX: 23.49 KG/M2

## 2019-08-01 PROBLEM — J18.9 PNEUMONIA DUE TO INFECTIOUS ORGANISM: Status: RESOLVED | Noted: 2019-05-06 | Resolved: 2019-08-01

## 2019-08-01 PROBLEM — J96.01 ACUTE RESPIRATORY FAILURE WITH HYPOXIA (HCC): Status: RESOLVED | Noted: 2019-05-06 | Resolved: 2019-08-01

## 2019-08-01 PROBLEM — E87.20 LACTIC ACIDOSIS: Status: RESOLVED | Noted: 2019-07-28 | Resolved: 2019-08-01

## 2019-08-01 PROBLEM — A41.9 SEPSIS (HCC): Status: RESOLVED | Noted: 2019-07-28 | Resolved: 2019-08-01

## 2019-08-01 PROBLEM — E87.6 HYPOKALEMIA: Status: RESOLVED | Noted: 2019-07-28 | Resolved: 2019-08-01

## 2019-08-01 PROCEDURE — 94799 UNLISTED PULMONARY SVC/PX: CPT

## 2019-08-01 PROCEDURE — 25010000002 ENOXAPARIN PER 10 MG: Performed by: NURSE PRACTITIONER

## 2019-08-01 PROCEDURE — 99239 HOSP IP/OBS DSCHRG MGMT >30: CPT | Performed by: NURSE PRACTITIONER

## 2019-08-01 RX ORDER — LEVOFLOXACIN 750 MG/1
750 TABLET ORAL EVERY 24 HOURS
Qty: 3 TABLET | Refills: 0 | Status: SHIPPED | OUTPATIENT
Start: 2019-08-01 | End: 2019-08-04

## 2019-08-01 RX ADMIN — LEVOFLOXACIN 750 MG: 750 TABLET, FILM COATED ORAL at 10:59

## 2019-08-01 RX ADMIN — BUDESONIDE AND FORMOTEROL FUMARATE DIHYDRATE 2 PUFF: 80; 4.5 AEROSOL RESPIRATORY (INHALATION) at 07:25

## 2019-08-01 RX ADMIN — IPRATROPIUM BROMIDE AND ALBUTEROL SULFATE 3 ML: 2.5; .5 SOLUTION RESPIRATORY (INHALATION) at 07:25

## 2019-08-01 RX ADMIN — ENOXAPARIN SODIUM 40 MG: 40 INJECTION SUBCUTANEOUS at 05:03

## 2019-08-01 RX ADMIN — TAMSULOSIN HYDROCHLORIDE 0.4 MG: 0.4 CAPSULE ORAL at 07:14

## 2019-08-01 RX ADMIN — CARBIDOPA AND LEVODOPA 1 TABLET: 25; 100 TABLET ORAL at 07:14

## 2019-08-01 RX ADMIN — GUAIFENESIN AND DEXTROMETHORPHAN HYDROBROMIDE 1 TABLET: 600; 30 TABLET, EXTENDED RELEASE ORAL at 07:14

## 2019-08-01 RX ADMIN — ONDANSETRON HYDROCHLORIDE 4 MG: 4 TABLET, FILM COATED ORAL at 05:02

## 2019-08-01 RX ADMIN — CARBIDOPA AND LEVODOPA 1 TABLET: 25; 100 TABLET ORAL at 10:58

## 2019-08-01 RX ADMIN — IPRATROPIUM BROMIDE AND ALBUTEROL SULFATE 3 ML: 2.5; .5 SOLUTION RESPIRATORY (INHALATION) at 03:44

## 2019-08-01 RX ADMIN — MULTIPLE VITAMINS W/ MINERALS TAB 1 TABLET: TAB ORAL at 07:15

## 2019-08-01 NOTE — PLAN OF CARE
Problem: Patient Care Overview  Goal: Plan of Care Review  Outcome: Ongoing (interventions implemented as appropriate)   08/01/19 0454   Coping/Psychosocial   Plan of Care Reviewed With patient   Plan of Care Review   Progress improving       Problem: Pain, Chronic (Adult)  Goal: Acceptable Pain/Comfort Level and Functional Ability  Outcome: Ongoing (interventions implemented as appropriate)   08/01/19 0454   Pain, Chronic (Adult)   Acceptable Pain/Comfort Level and Functional Ability making progress toward outcome

## 2019-08-01 NOTE — PLAN OF CARE
Problem: Patient Care Overview  Goal: Plan of Care Review  Outcome: Ongoing (interventions implemented as appropriate)   08/01/19 0832   Coping/Psychosocial   Plan of Care Reviewed With patient   Plan of Care Review   Progress improving   OTHER   Outcome Summary possible D/C home today       Problem: Sepsis/Septic Shock (Adult)  Goal: Signs and Symptoms of Listed Potential Problems Will be Absent, Minimized or Managed (Sepsis/Septic Shock)  Outcome: Ongoing (interventions implemented as appropriate)   08/01/19 0832   Goal/Outcome Evaluation   Problems Assessed (Sepsis) all   Problems Present (Sepsis) none       Problem: Chronic Obstructive Pulmonary Disease (Adult)  Goal: Signs and Symptoms of Listed Potential Problems Will be Absent, Minimized or Managed (Chronic Obstructive Pulmonary Disease)  Outcome: Ongoing (interventions implemented as appropriate)   08/01/19 0832   Goal/Outcome Evaluation   Problems Assessed (Chronic Obstructive Pulmonary Disease (COPD)) all   Problems Present (COPD, Bronch/Emphy) respiratory compromise

## 2019-08-01 NOTE — DISCHARGE SUMMARY
[facility] Medicine Services  DISCHARGE SUMMARY    Patient Name: Flaco Roque II  : 1945  MRN: [id number]    Date of Admission: 2019  Date of Discharge:  2019  Primary Care Physician: Tayo Hendrix MD    Consults     No orders found from 2019 to 2019.          Hospital Course     Presenting Problem:   Pneumonia of left lung due to infectious organism, unspecified part of lung [J18.9]    Active Hospital Problems    Diagnosis  POA   • Anemia [D64.9]  Yes   • Parkinson disease (CMS/[facility]) [G20]  Yes   • COPD (chronic obstructive pulmonary disease) (CMS/[facility]) [J44.9]  Yes   • ABRIL (obstructive sleep apnea) [G47.33]  Yes      Resolved Hospital Problems    Diagnosis Date Resolved POA   • **Acute respiratory failure with hypoxia (CMS/[facility]) [J96.01] 2019 Yes   • Sepsis (CMS/[facility]) [A41.9] 2019 Yes   • Hypokalemia [E87.6] 2019 Yes   • Lactic acidosis [E87.2] 2019 Yes   • Pneumonia due to infectious organism [J18.9] 2019 Yes          Hospital Course:  Flaco Roque II is a 73 y.o. male with a history of COPD, ABRIL, GERD, chronic back pain, Parkinson disease, presents to the ED with complaints of worsening shortness of air.  Patient also reported a productive cough with brown sputum for the  three days, fatigue, constipation with no bowel movement for four days, and myalgias.  He denies fever, chest pain, nausea, vomiting, diarrhea, abdominal pain, or any other complaints at this time.  Patient states that his dyspnea is worse with exertion, and he had difficulty walking to his mailbox today which prompted him to come to the ED today.  Upon arrival to the ED his oxygen saturation was 85% on room air.  Patient was started on Levaquin and Solu-Medrol in the ED, and was admitted to the Hospitalist for further evaluation and management.  Chest x-ray showed left lower lobe pneumonia.  Sending patient home on Levaquin for 3 more days.  Patient  is getting home O2 set up patient to follow-up with primary care first available and is stable and ready for discharge.    Discharge Follow Up Recommendations for labs/diagnostics:  PCP first available.    Day of Discharge     HPI:   Patient was sitting up in bed with no complaints of pain.  Patient stated that he feels better and feels like his breathing has improved significantly.  Patient's wife is going to pick him up.  Discussed discharge plan with him.  He said he was getting home O2.    Review of Systems     Gen- No fevers, chills  CV- No chest pain, palpitations  Resp- No cough, dyspnea  GI- No N/V/D, abd pain      Otherwise ROS is negative except as mentioned in the HPI.    Vital Signs:   Temp:  [97.6 °F (36.4 °C)-98.1 °F (36.7 °C)] 97.6 °F (36.4 °C)  Heart Rate:  [68-95] 83  Resp:  [16-20] 18  BP: (110-113)/(70-75) 111/70     Physical Exam:  Constitutional: No acute distress, awake, alert  HENT: NCAT, mucous membranes moist  Respiratory:Coarse breath sounds, respiratory effort normal, O2 sat 92% on @2L NC.   Cardiovascular: RRR, no murmurs, rubs, or gallops, palpable pedal pulses bilaterally  Gastrointestinal: Positive bowel sounds, + BM,soft, nontender, nondistended  Musculoskeletal: No bilateral ankle edema  Psychiatric: Appropriate affect, cooperative, pleasant  Neurologic: Oriented x 3, strength symmetric in all extremities, WHITE, Cranial Nerves grossly intact to confrontation, speech clear  Skin: No rashes    Pertinent  and/or Most Recent Results     Results from last 7 days   Lab Units 07/31/19  0841 07/30/19  0447 07/29/19  0720 07/28/19  1715   WBC 10*3/mm3 9.25 11.92* 5.12 14.57*   HEMOGLOBIN g/dL 10.1* 8.5* 8.9* 9.3*   HEMATOCRIT % 35.6* 31.6* 31.3* 32.5*   PLATELETS 10*3/mm3 298 277 271 278   SODIUM mmol/L  --  135* 136 135*   POTASSIUM mmol/L  --  4.3 3.7 3.2*   CHLORIDE mmol/L  --  105 102 99   CO2 mmol/L  --  20.0* 23.0 23.0   BUN mg/dL  --  15 11 16   CREATININE mg/dL  --  0.47* 0.57* 0.77    GLUCOSE mg/dL  --  101* 190* 153*   CALCIUM mg/dL  --  8.6 8.2* 8.5*     Results from last 7 days   Lab Units 07/28/19  1715   BILIRUBIN mg/dL 0.7   ALK PHOS U/L 19*   ALT (SGPT) U/L <5   AST (SGOT) U/L 14           Invalid input(s): TG, LDLCALC, LDLREALC  Results from last 7 days   Lab Units 07/30/19  0629 07/30/19  0447 07/28/19  2108 07/28/19  1715   PROBNP pg/mL  --   --   --  307.5   TROPONIN T ng/mL  --   --   --  <0.010   PROCALCITONIN ng/mL  --  3.60*  --  11.04*   LACTATE mmol/L 1.3  --  3.0* 2.5*       Brief Urine Lab Results  (Last result in the past 365 days)      Color   Clarity   Blood   Leuk Est   Nitrite   Protein   CREAT   Urine HCG        04/26/19 1259 Dark Yellow Cloudy Negative Trace Negative Trace               Microbiology Results Abnormal     Procedure Component Value - Date/Time    Blood Culture - Blood, Hand, Left [[id number]] Collected:  07/28/19 1715    Lab Status:  Preliminary result Specimen:  Blood from Hand, Left Updated:  07/31/19 1730     Blood Culture No growth at 3 days    Blood Culture - Blood, Arm, Left [[id number]] Collected:  07/28/19 1710    Lab Status:  Preliminary result Specimen:  Blood from Arm, Left Updated:  07/31/19 1730     Blood Culture No growth at 3 days    Respiratory Culture - Sputum, Cough [[id number]] Collected:  07/29/19 1011    Lab Status:  Final result Specimen:  Sputum from Cough Updated:  07/31/19 1252     Respiratory Culture Scant growth (1+) Normal Respiratory Yolanda     Gram Stain Rare (1+) Epithelial cells per low power field      Few (2+) WBCs per low power field      Few (2+) Mixed bacterial morphotypes seen on Gram Stain    S. Pneumo Ag Urine or CSF - Urine, Urine, Clean Catch [[id number]]  (Normal) Collected:  07/29/19 0246    Lab Status:  Final result Specimen:  Urine, Clean Catch Updated:  07/29/19 1820     Strep Pneumo Ag Negative    Legionella Antigen, Urine - Urine, Urine, Clean Catch [[id number]]  (Normal) Collected:  07/29/19 0246    Lab Status:   Final result Specimen:  Urine, Clean Catch Updated:  07/29/19 1820     LEGIONELLA ANTIGEN, URINE Negative          Imaging Results (all)     Procedure Component Value Units Date/Time    XR Chest 1 View [[id number]] Collected:  07/28/19 1741     Updated:  07/28/19 1829    Narrative:          EXAMINATION: XR CHEST 1 VW - 07/28/2019     INDICATION: Shortness of air.     COMPARISON: Chest x-ray 06/05/2019.     FINDINGS:      Cardiac silhouette borderline enlarged with mild increase in central  pulmonary vascularity. Patchy opacifications throughout the left lung of  a midlung or perihilar predominance concerning for interstitial edema  pattern versus airspace disease with bronchopneumonia not excluded. No  significant pleural effusion. No pneumothorax. Degenerative changes of  the spine with left shoulder arthroplasty in place.           Impression:          Asymmetric increase in pulmonary lung markings and opacifications in the  left midlung with a borderline cardiomegaly finding concerning for  interstitial edema pattern or pulmonary edema pattern given the central  predominance however airspace disease such as bronchopneumonia not  excluded. No significant pleural effusion.     DICTATED:   07/28/2019  EDITED/ls :   07/28/2019                           Results for orders placed during the hospital encounter of 04/26/19   Adult Transthoracic Echo Complete W/ Cont if Necessary Per Protocol    Narrative · Left ventricular systolic function is normal.  · Estimated EF appears to be in the range of 66 - 70%.           Order Current Status    Blood Culture - Blood, Arm, Left Preliminary result    Blood Culture - Blood, Hand, Left Preliminary result        Discharge Details        Discharge Medications      New Medications      Instructions Start Date   levoFLOXacin 750 MG tablet  Commonly known as:  LEVAQUIN   750 mg, Oral, Every 24 Hours         Continue These Medications      Instructions Start Date   amitriptyline 50 MG  tablet  Commonly known as:  ELAVIL    mg, Oral, Nightly      budesonide-formoterol 80-4.5 MCG/ACT inhaler  Commonly known as:  SYMBICORT   2 puffs, Inhalation, 2 Times Daily - RT      COENZYME Q10 PO   1 tablet, Oral, Daily      docusate sodium 100 MG capsule  Commonly known as:  COLACE   100 mg, Oral, 2 Times Daily      ipratropium-albuterol 0.5-2.5 mg/3 ml nebulizer  Commonly known as:  DUO-NEB   3 mL, Nebulization, Every 4 Hours PRN      montelukast 10 MG tablet  Commonly known as:  SINGULAIR   10 mg, Oral, Nightly      MULTIVITAMIN ADULT PO   1 tablet, Oral, Daily      pantoprazole 40 MG EC tablet  Commonly known as:  PROTONIX   40 mg, Oral, 2 Times Daily      SINEMET  MG per tablet  Generic drug:  carbidopa-levodopa   1.5 tablets, Oral, 4 Times Daily, 1.5 tabs qid      tamsulosin 0.4 MG capsule 24 hr capsule  Commonly known as:  FLOMAX   0.4 mg, Oral, Daily             No Known Allergies      Discharge Disposition:  Home-Health Care Svc    Discharge Diet:  Diet Order   Procedures   • Diet Regular; Cardiac         Discharge Activity:   Activity Instructions     Activity as Tolerated              CODE STATUS:    Code Status and Medical Interventions:   Ordered at: 07/28/19 2015     Level Of Support Discussed With:    Patient     Code Status:    CPR     Medical Interventions (Level of Support Prior to Arrest):    Full         Future Appointments   Date Time Provider Department Center   9/9/2019  9:10 AM Juhi Calle MD [facility] None   10/2/2019  8:50 AM Juhi Calle MD [facility] None       Additional Instructions for the Follow-ups that You Need to Schedule     Ambulatory Referral to Home Health   As directed      Face to Face Visit Date:  7/31/2019    Follow-up Provider for Plan of Care?:  I treated the patient in an acute care facility and will not continue treatment after discharge.    Follow-up Provider:  RACHEAL CARLOS [[id number]]    Reason/Clinical Findings:  s/p resp fail    Describe  mobility limitations that make leaving home difficult:  impaired functional mobility, balance, gait, and endurance    Nursing/Therapeutic Services Requested:  Skilled Nursing Physical Therapy    Skilled nursing orders:  COPD management O2 instruction    PT orders:  Strengthening    Frequency:  1 Week 1         Discharge Follow-up with PCP   As directed       Currently Documented PCP:    Tayo Hendrix MD    PCP Phone Number:    [phone]     Follow Up Details:  PCP first available               Time Spent on Discharge:  35 minutes    Electronically signed by YAMILET Dean, 08/01/19, 12:46 PM.

## 2019-08-01 NOTE — PROGRESS NOTES
Case Management Discharge Note    Final Note: home with [facility]    Destination      No service has been selected for the patient.      Durable Medical Equipment - Selection Complete      Service Provider Request Status Selected Services Address Phone Number Fax Number    [facility] Selected Durable Medical Equipment [address] [address] [phone]      Dialysis/Infusion      No service has been selected for the patient.      Home Medical Care - Selection Complete      Service Provider Request Status Selected Services Address Phone Number Fax Number    [facility] Selected Home Health Services [address] [address] [phone]      Therapy      No service has been selected for the patient.      Community Resources      No service has been selected for the patient.             Final Discharge Disposition Code: 06 - home with home health care

## 2019-08-02 ENCOUNTER — READMISSION MANAGEMENT (OUTPATIENT)
Dept: CALL CENTER | Facility: HOSPITAL | Age: 74
End: 2019-08-02

## 2019-08-02 LAB
BACTERIA SPEC AEROBE CULT: NORMAL
BACTERIA SPEC AEROBE CULT: NORMAL

## 2019-08-02 NOTE — OUTREACH NOTE
Prep Survey      Responses   Facility patient discharged from?  [facility]   Is patient eligible?  Yes   Discharge diagnosis  Acute respiratory failure with hypoxia lower lobe pneumonia sepsis   Does the patient have one of the following disease processes/diagnoses(primary or secondary)?  Sepsis   Does the patient have Home health ordered?  Yes   What is the Home health agency?   [facility]   Is there a DME ordered?  Yes   [facility]   Prep survey completed?  Yes          Araceli Long RN

## 2019-08-03 ENCOUNTER — READMISSION MANAGEMENT (OUTPATIENT)
Dept: CALL CENTER | Facility: HOSPITAL | Age: 74
End: 2019-08-03

## 2019-08-05 ENCOUNTER — READMISSION MANAGEMENT (OUTPATIENT)
Dept: CALL CENTER | Facility: HOSPITAL | Age: 74
End: 2019-08-05

## 2019-08-05 NOTE — OUTREACH NOTE
Sepsis Week 1 Survey      Responses   Facility patient discharged from?  [facility]   Does the patient have one of the following disease processes/diagnoses(primary or secondary)?  Sepsis   Is there a successful TCM telephone encounter documented?  No   Week 1 attempt successful?  Yes   Call start time  0941   Call end time  0945   Meds reviewed with patient/caregiver?  Yes   Is the patient having any side effects they believe may be caused by any medication additions or changes?  No   Does the patient have all medications related to this admission filled (includes all antibiotics, inhalers, nebulizers,steroids,etc.)  Yes   Is the patient taking all medications as directed (includes completed medication regime)?  Yes   Does the patient have a primary care provider?   Yes   Does the patient have an appointment with their PCP within 7 days of discharge?  Yes   Has the patient kept scheduled appointments due by today?  N/A   What is the Home health agency?   [facility]   [facility]   Has all DME been delivered?  Yes   Psychosocial issues?  No   Comments  Patient told  when they called that he does not need them.  He is doing well and goes to the gym everyday.   Did the patient receive a copy of their discharge instructions?  Yes   Nursing interventions  Reviewed instructions with patient   What is the patient's perception of their health status since discharge?  Improving   Nursing interventions  Nurse provided patient education   Is the patient/caregiver able to teach back Sepsis?  S - Shivering,fever or very cold, E - Extreme pain or generalized discomfort (worst ever,especially abdomen), P - Pale or discolored skin, S - Sleepy, difficult to arouse,confused, I -   I feel like I might die-a feeling of hopelessness, S - Short of breath   Nursing interventions  Nurse provided patient education   Is patient/caregiver able to teach back steps to recovery at home?  Set small,  achievable goals for return to baseline health, Rest and regain strength, Talk about feelings with family/friends, Record milestones and struggles in a journal, Learn about sepsis(sepsis.org), Eat a balanced diet, Exercise as tolerated, Make a list of questions for PCP appoinment   Is the patient/caregiver able to teach back signs and symptoms of worsening condition:  Fever, Rapid heart rate (>90), Altered mental status(confusion/coma), High blood glucose without diabetes, Shortness of breath/rapid respiratory rate, Hyperthermia, Edema   Is the patient/caregiver able to teach back the hierarchy of who to call/visit for symptoms/problems? PCP, Specialist, Home health nurse, Urgent Care, ED, 911  Yes   Week 1 call completed?  Yes          Luh Simmons, RN

## 2019-08-13 ENCOUNTER — READMISSION MANAGEMENT (OUTPATIENT)
Dept: CALL CENTER | Facility: HOSPITAL | Age: 74
End: 2019-08-13

## 2019-08-13 NOTE — OUTREACH NOTE
Sepsis Week 2 Survey      Responses   Facility patient discharged from?  [facility]   Does the patient have one of the following disease processes/diagnoses(primary or secondary)?  Sepsis   Week 2 attempt successful?  Yes   Call start time  0803   Call end time  0808   Discharge diagnosis  Acute respiratory failure with hypoxia lower lobe pneumonia sepsis   Medication alerts for this patient  Pt has completed his antibiotics.   Meds reviewed with patient/caregiver?  Yes   Is the patient taking all medications as directed (includes completed medication regime)?  Yes   Comments regarding appointments  Pt has seen pcp. Pt will follow up again 2 weeks.   Has the patient kept scheduled appointments due by today?  Yes   What is the Home health agency?   Pt states he does not need HH.   What is the patient's perception of their health status since discharge?  Improving   Week 2 call completed?  Yes   Wrap up additional comments  Pt continues to feel good with no issues.          Claudia Burns RN

## 2019-08-20 ENCOUNTER — HOSPITAL ENCOUNTER (EMERGENCY)
Facility: HOSPITAL | Age: 74
Discharge: HOME | End: 2019-08-20
Attending: EMERGENCY MEDICINE | Admitting: EMERGENCY MEDICINE

## 2019-08-20 ENCOUNTER — APPOINTMENT (OUTPATIENT)
Dept: GENERAL RADIOLOGY | Facility: HOSPITAL | Age: 74
End: 2019-08-20

## 2019-08-20 ENCOUNTER — READMISSION MANAGEMENT (OUTPATIENT)
Dept: CALL CENTER | Facility: HOSPITAL | Age: 74
End: 2019-08-20

## 2019-08-20 VITALS
OXYGEN SATURATION: 95 % | WEIGHT: 152 LBS | DIASTOLIC BLOOD PRESSURE: 64 MMHG | HEIGHT: 68 IN | HEART RATE: 72 BPM | RESPIRATION RATE: 18 BRPM | SYSTOLIC BLOOD PRESSURE: 122 MMHG | TEMPERATURE: 98.4 F | BODY MASS INDEX: 23.04 KG/M2

## 2019-08-20 DIAGNOSIS — J40 BRONCHITIS: Primary | ICD-10-CM

## 2019-08-20 DIAGNOSIS — J44.9 CHRONIC OBSTRUCTIVE PULMONARY DISEASE, UNSPECIFIED COPD TYPE (HCC): ICD-10-CM

## 2019-08-20 LAB
ALBUMIN SERPL-MCNC: 3.7 G/DL (ref 3.5–5.2)
ALBUMIN/GLOB SERPL: 1.2 G/DL
ALP SERPL-CCNC: 17 U/L (ref 39–117)
ALT SERPL W P-5'-P-CCNC: <5 U/L (ref 1–41)
ANION GAP SERPL CALCULATED.3IONS-SCNC: 13 MMOL/L (ref 5–15)
AST SERPL-CCNC: 17 U/L (ref 1–40)
BASOPHILS # BLD AUTO: 0.03 10*3/MM3 (ref 0–0.2)
BASOPHILS NFR BLD AUTO: 0.2 % (ref 0–1.5)
BILIRUB SERPL-MCNC: 0.4 MG/DL (ref 0.2–1.2)
BUN BLD-MCNC: 17 MG/DL (ref 8–23)
BUN/CREAT SERPL: 21.5 (ref 7–25)
CALCIUM SPEC-SCNC: 8.2 MG/DL (ref 8.6–10.5)
CHLORIDE SERPL-SCNC: 100 MMOL/L (ref 98–107)
CO2 SERPL-SCNC: 22 MMOL/L (ref 22–29)
CREAT BLD-MCNC: 0.79 MG/DL (ref 0.76–1.27)
D-LACTATE SERPL-SCNC: 0.7 MMOL/L (ref 0.5–2)
DEPRECATED RDW RBC AUTO: 48.5 FL (ref 37–54)
EOSINOPHIL # BLD AUTO: 0.09 10*3/MM3 (ref 0–0.4)
EOSINOPHIL NFR BLD AUTO: 0.6 % (ref 0.3–6.2)
ERYTHROCYTE [DISTWIDTH] IN BLOOD BY AUTOMATED COUNT: 19 % (ref 12.3–15.4)
GFR SERPL CREATININE-BSD FRML MDRD: 96 ML/MIN/1.73
GLOBULIN UR ELPH-MCNC: 3 GM/DL
GLUCOSE BLD-MCNC: 114 MG/DL (ref 65–99)
HCT VFR BLD AUTO: 32.7 % (ref 37.5–51)
HGB BLD-MCNC: 9.3 G/DL (ref 13–17.7)
HOLD SPECIMEN: NORMAL
HOLD SPECIMEN: NORMAL
IMM GRANULOCYTES # BLD AUTO: 0.08 10*3/MM3 (ref 0–0.05)
IMM GRANULOCYTES NFR BLD AUTO: 0.5 % (ref 0–0.5)
LYMPHOCYTES # BLD AUTO: 1.3 10*3/MM3 (ref 0.7–3.1)
LYMPHOCYTES NFR BLD AUTO: 8.7 % (ref 19.6–45.3)
MCH RBC QN AUTO: 20.4 PG (ref 26.6–33)
MCHC RBC AUTO-ENTMCNC: 28.4 G/DL (ref 31.5–35.7)
MCV RBC AUTO: 71.6 FL (ref 79–97)
MONOCYTES # BLD AUTO: 1.03 10*3/MM3 (ref 0.1–0.9)
MONOCYTES NFR BLD AUTO: 6.9 % (ref 5–12)
NEUTROPHILS # BLD AUTO: 12.4 10*3/MM3 (ref 1.7–7)
NEUTROPHILS NFR BLD AUTO: 83.1 % (ref 42.7–76)
NRBC BLD AUTO-RTO: 0 /100 WBC (ref 0–0.2)
NT-PROBNP SERPL-MCNC: 226.2 PG/ML (ref 5–900)
PLATELET # BLD AUTO: 326 10*3/MM3 (ref 140–450)
PMV BLD AUTO: 10.8 FL (ref 6–12)
POTASSIUM BLD-SCNC: 3.8 MMOL/L (ref 3.5–5.2)
PROCALCITONIN SERPL-MCNC: 0.09 NG/ML (ref 0.1–0.25)
PROT SERPL-MCNC: 6.7 G/DL (ref 6–8.5)
RBC # BLD AUTO: 4.57 10*6/MM3 (ref 4.14–5.8)
SODIUM BLD-SCNC: 135 MMOL/L (ref 136–145)
TROPONIN T SERPL-MCNC: <0.01 NG/ML (ref 0–0.03)
WBC NRBC COR # BLD: 14.93 10*3/MM3 (ref 3.4–10.8)
WHOLE BLOOD HOLD SPECIMEN: NORMAL
WHOLE BLOOD HOLD SPECIMEN: NORMAL

## 2019-08-20 PROCEDURE — 99284 EMERGENCY DEPT VISIT MOD MDM: CPT

## 2019-08-20 PROCEDURE — 87040 BLOOD CULTURE FOR BACTERIA: CPT | Performed by: NURSE PRACTITIONER

## 2019-08-20 PROCEDURE — 96374 THER/PROPH/DIAG INJ IV PUSH: CPT

## 2019-08-20 PROCEDURE — 84145 PROCALCITONIN (PCT): CPT | Performed by: NURSE PRACTITIONER

## 2019-08-20 PROCEDURE — 80053 COMPREHEN METABOLIC PANEL: CPT | Performed by: EMERGENCY MEDICINE

## 2019-08-20 PROCEDURE — 94640 AIRWAY INHALATION TREATMENT: CPT

## 2019-08-20 PROCEDURE — 83605 ASSAY OF LACTIC ACID: CPT | Performed by: NURSE PRACTITIONER

## 2019-08-20 PROCEDURE — 25010000002 METHYLPREDNISOLONE PER 125 MG: Performed by: NURSE PRACTITIONER

## 2019-08-20 PROCEDURE — 71045 X-RAY EXAM CHEST 1 VIEW: CPT

## 2019-08-20 PROCEDURE — 93005 ELECTROCARDIOGRAM TRACING: CPT | Performed by: EMERGENCY MEDICINE

## 2019-08-20 PROCEDURE — 85025 COMPLETE CBC W/AUTO DIFF WBC: CPT | Performed by: EMERGENCY MEDICINE

## 2019-08-20 PROCEDURE — 84484 ASSAY OF TROPONIN QUANT: CPT | Performed by: EMERGENCY MEDICINE

## 2019-08-20 PROCEDURE — 83880 ASSAY OF NATRIURETIC PEPTIDE: CPT | Performed by: EMERGENCY MEDICINE

## 2019-08-20 RX ORDER — METHYLPREDNISOLONE 4 MG/1
TABLET ORAL
Qty: 21 TABLET | Refills: 0 | Status: SHIPPED | OUTPATIENT
Start: 2019-08-20 | End: 2019-09-09

## 2019-08-20 RX ORDER — AZITHROMYCIN 250 MG/1
TABLET, FILM COATED ORAL
Qty: 6 TABLET | Refills: 0 | Status: SHIPPED | OUTPATIENT
Start: 2019-08-20 | End: 2019-09-09

## 2019-08-20 RX ORDER — METHYLPREDNISOLONE SODIUM SUCCINATE 125 MG/2ML
125 INJECTION, POWDER, LYOPHILIZED, FOR SOLUTION INTRAMUSCULAR; INTRAVENOUS ONCE
Status: COMPLETED | OUTPATIENT
Start: 2019-08-20 | End: 2019-08-20

## 2019-08-20 RX ORDER — IPRATROPIUM BROMIDE AND ALBUTEROL SULFATE 2.5; .5 MG/3ML; MG/3ML
3 SOLUTION RESPIRATORY (INHALATION) ONCE
Status: COMPLETED | OUTPATIENT
Start: 2019-08-20 | End: 2019-08-20

## 2019-08-20 RX ORDER — SODIUM CHLORIDE 0.9 % (FLUSH) 0.9 %
10 SYRINGE (ML) INJECTION AS NEEDED
Status: DISCONTINUED | OUTPATIENT
Start: 2019-08-20 | End: 2019-08-20 | Stop reason: HOSPADM

## 2019-08-20 RX ORDER — AZITHROMYCIN 250 MG/1
500 TABLET, FILM COATED ORAL ONCE
Status: COMPLETED | OUTPATIENT
Start: 2019-08-20 | End: 2019-08-20

## 2019-08-20 RX ADMIN — METHYLPREDNISOLONE SODIUM SUCCINATE 125 MG: 125 INJECTION, POWDER, FOR SOLUTION INTRAMUSCULAR; INTRAVENOUS at 19:50

## 2019-08-20 RX ADMIN — SODIUM CHLORIDE 500 ML: 9 INJECTION, SOLUTION INTRAVENOUS at 19:50

## 2019-08-20 RX ADMIN — IPRATROPIUM BROMIDE AND ALBUTEROL SULFATE 3 ML: 2.5; .5 SOLUTION RESPIRATORY (INHALATION) at 19:06

## 2019-08-20 RX ADMIN — AZITHROMYCIN 500 MG: 250 TABLET, FILM COATED ORAL at 21:32

## 2019-08-20 NOTE — ED PROVIDER NOTES
"Subjective   Flaco Roque is a 73 y.o male who presents to the ED with complaints of cough. He reports he began experiencing a cough with an onset two weeks ago. He was seen by Dr. Hendrix, family medicine, today and was instructed to present to the ED after having an abnormal chest X-ray. He was recently diagnosed on 07/28 with pneumonia and states his symptoms feel similar. He also complains of sore throat. He denies shortness of breath, fever, and chills. He has a past medical history of COPD and Parkinson's disease. He currently uses nasal cannula at home as needed. He is a non-smoker. There are no other acute symptoms at this time.        History provided by:  Patient  Cough   Cough characteristics:  Productive  Sputum characteristics: \"dark\"  Severity:  Moderate  Onset quality:  Sudden  Duration:  2 weeks  Timing:  Constant  Progression:  Unchanged  Smoker: no    Associated symptoms: sore throat    Associated symptoms: no chills, no fever and no shortness of breath    Sore throat:     Severity:  Mild    Onset quality:  Sudden    Timing:  Constant    Progression:  Unchanged  Risk factors: recent infection        Review of Systems   Constitutional: Negative for chills and fever.   HENT: Positive for sore throat.    Respiratory: Positive for cough. Negative for shortness of breath.    All other systems reviewed and are negative.      Past Medical History:   Diagnosis Date   • Arthropathy of shoulder region 9/10/2018   • Chris's esophagus     Last EGD 1 year ago with Dr Kaye    • BPH (benign prostatic hyperplasia)    • Chronic back pain 10/31/2017   • Chronic low back pain    • COPD (chronic obstructive pulmonary disease) (CMS/HCC)    • Foot pain    • GERD (gastroesophageal reflux disease)    • History of transfusion    • Injury of back    • Lung abscess (CMS/HCC)    • Osteoarthritis    • Osteoporosis    • Parkinson disease (CMS/HCC)    • Rotator cuff tear, left    • Sleep apnea    • Status post reverse total " shoulder replacement, 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       No Known Allergies    Past Surgical History:   Procedure Laterality Date   • ARTHRODESIS MIDTARSAL / TARSOMETATARSAL / TARSAL NAVICULAR-CUNEIFORM Left 05/10/2016   • BACK SURGERY     • BACK SURGERY      low back   • BUNIONECTOMY Left 2019    Procedure: left foot excise PIP joints 2,3,4, tenotomies 2,3,4, metatarsal capsulotomy 2,3,4, chevron osteotomy 5th metatarsal, great toe DIP fusion LEFT;  Surgeon: Juhi Calle MD;  Location:  [facility];  Service: Orthopedics   • CATARACT EXTRACTION     • COLONOSCOPY N/A 2017    Procedure: COLONOSCOPY;  Surgeon: Luis Eduardo Mayers MD;  Location:  [facility];  Service:    • ENDOSCOPY N/A 2017    Procedure: ESOPHAGOGASTRODUODENOSCOPY;  Surgeon: Luis Eduardo Mayers MD;  Location:  [facility];  Service:    • ENDOSCOPY  2017    DR LUIS EDUARDO MAYERS   • FOOT SURGERY     • GALLBLADDER SURGERY     • KNEE ARTHROSCOPY Bilateral    • PAIN PUMP INSERTION/REVISION     • SPINE SURGERY     • TOTAL HIP ARTHROPLASTY Left    • TOTAL SHOULDER ARTHROPLASTY W/ DISTAL CLAVICLE EXCISION Left 9/10/2018    Procedure: REVERSE TOTAL SHOULDER ARTHROPLASTY LEFT;  Surgeon: Abel Brennan MD;  Location:  [facility];  Service: Orthopedics   • ULNAR NERVE TRANSPOSITION         Family History   Problem Relation Age of Onset   • Arthritis Mother    • Diabetes Mother    • Osteoarthritis Mother    • Colon cancer Father    • Cancer Father        Social History     Socioeconomic History   • Marital status:      Spouse name: Not on file   • Number of children: Not on file   • Years of education: Not on file   • Highest education level: Not on file   Occupational History   • Occupation: Retired    Tobacco Use   • Smoking status: Former Smoker     Packs/day: 2.00     Years: 25.00     Pack years: 50.00     Types: Cigarettes     Start date:      Last attempt to quit:      Years since quittin.6   • Smokeless tobacco: Never Used   Substance  and Sexual Activity   • Alcohol use: No   • Drug use: No   • Sexual activity: Defer   Social History Narrative     and lives with wife    Retired supervisor at [employer]    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         Objective   Physical Exam   Constitutional: He is oriented to person, place, and time. He appears well-developed and well-nourished. No distress.   HENT:   Head: Normocephalic and atraumatic.   Nose: Nose normal.   Eyes: Conjunctivae are normal. No scleral icterus.   Neck: Normal range of motion. Neck supple. No JVD present.   Cardiovascular: Normal rate, regular rhythm and normal heart sounds.   No murmur heard.  No tachycardia.   Pulmonary/Chest: Effort normal. No respiratory distress. He has rhonchi.   Rhonchi in left posterior.   Abdominal: Soft. Bowel sounds are normal. There is no tenderness.   Musculoskeletal: Normal range of motion. He exhibits no edema.   Extremities are atraumatic and unremarkable.   Neurological: He is alert and oriented to person, place, and time.   Skin: Skin is warm and dry.   Psychiatric: He has a normal mood and affect. His behavior is normal.   Nursing note and vitals reviewed.      Procedures         ED Course  ED Course as of Aug 20 2125   Tue Aug 20, 2019   1840 WBC: (!) 14.93 [RS]   1856 WBC: (!) 14.93 [MS]   1856 Hemoglobin: (!) 9.3 [MS]   1856 Hematocrit: (!) 32.7 [MS]   2122 Patient's work-up is reassuring.  Patient's oxygenation is satisfactory.  He has validated together with his wife that he does have oxygen at home readily available with the machine in good repair.  I have discussed chest physiotherapy and use of a pulmonary spirometer.  I have conferred with Dr. Deal.  Will initiate Zithromax by mouth with follow-up outpatient.  Patient and family understand and concur with this outpatient plan of care.    [MS]      ED Course User Index  [MS] Geno Serrano APRN  [RS] Jona Deal MD     Recent Results (from the past 24 hour(s))    Comprehensive Metabolic Panel    Collection Time: 08/20/19  5:55 PM   Result Value Ref Range    Glucose 114 (H) 65 - 99 mg/dL    BUN 17 8 - 23 mg/dL    Creatinine 0.79 0.76 - 1.27 mg/dL    Sodium 135 (L) 136 - 145 mmol/L    Potassium 3.8 3.5 - 5.2 mmol/L    Chloride 100 98 - 107 mmol/L    CO2 22.0 22.0 - 29.0 mmol/L    Calcium 8.2 (L) 8.6 - 10.5 mg/dL    Total Protein 6.7 6.0 - 8.5 g/dL    Albumin 3.70 3.50 - 5.20 g/dL    ALT (SGPT) <5 1 - 41 U/L    AST (SGOT) 17 1 - 40 U/L    Alkaline Phosphatase 17 (L) 39 - 117 U/L    Total Bilirubin 0.4 0.2 - 1.2 mg/dL    eGFR Non African Amer 96 >60 mL/min/1.73    Globulin 3.0 gm/dL    A/G Ratio 1.2 g/dL    BUN/Creatinine Ratio 21.5 7.0 - 25.0    Anion Gap 13.0 5.0 - 15.0 mmol/L   BNP    Collection Time: 08/20/19  5:55 PM   Result Value Ref Range    proBNP 226.2 5.0 - 900.0 pg/mL   Troponin    Collection Time: 08/20/19  5:55 PM   Result Value Ref Range    Troponin T <0.010 0.000 - 0.030 ng/mL   Light Blue Top    Collection Time: 08/20/19  5:55 PM   Result Value Ref Range    Extra Tube hold for add-on    Green Top (Gel)    Collection Time: 08/20/19  5:55 PM   Result Value Ref Range    Extra Tube Hold for add-ons.    Lavender Top    Collection Time: 08/20/19  5:55 PM   Result Value Ref Range    Extra Tube hold for add-on    Gold Top - SST    Collection Time: 08/20/19  5:55 PM   Result Value Ref Range    Extra Tube Hold for add-ons.    CBC Auto Differential    Collection Time: 08/20/19  5:55 PM   Result Value Ref Range    WBC 14.93 (H) 3.40 - 10.80 10*3/mm3    RBC 4.57 4.14 - 5.80 10*6/mm3    Hemoglobin 9.3 (L) 13.0 - 17.7 g/dL    Hematocrit 32.7 (L) 37.5 - 51.0 %    MCV 71.6 (L) 79.0 - 97.0 fL    MCH 20.4 (L) 26.6 - 33.0 pg    MCHC 28.4 (L) 31.5 - 35.7 g/dL    RDW 19.0 (H) 12.3 - 15.4 %    RDW-SD 48.5 37.0 - 54.0 fl    MPV 10.8 6.0 - 12.0 fL    Platelets 326 140 - 450 10*3/mm3    Neutrophil % 83.1 (H) 42.7 - 76.0 %    Lymphocyte % 8.7 (L) 19.6 - 45.3 %    Monocyte % 6.9 5.0 -  "12.0 %    Eosinophil % 0.6 0.3 - 6.2 %    Basophil % 0.2 0.0 - 1.5 %    Immature Grans % 0.5 0.0 - 0.5 %    Neutrophils, Absolute 12.40 (H) 1.70 - 7.00 10*3/mm3    Lymphocytes, Absolute 1.30 0.70 - 3.10 10*3/mm3    Monocytes, Absolute 1.03 (H) 0.10 - 0.90 10*3/mm3    Eosinophils, Absolute 0.09 0.00 - 0.40 10*3/mm3    Basophils, Absolute 0.03 0.00 - 0.20 10*3/mm3    Immature Grans, Absolute 0.08 (H) 0.00 - 0.05 10*3/mm3    nRBC 0.0 0.0 - 0.2 /100 WBC   Procalcitonin    Collection Time: 08/20/19  5:55 PM   Result Value Ref Range    Procalcitonin 0.09 (L) 0.10 - 0.25 ng/mL   Lactic Acid, Plasma    Collection Time: 08/20/19  7:48 PM   Result Value Ref Range    Lactate 0.7 0.5 - 2.0 mmol/L     Note: In addition to lab results from this visit, the labs listed above may include labs taken at another facility or during a different encounter within the last 24 hours. Please correlate lab times with ED admission and discharge times for further clarification of the services performed during this visit.    XR Chest 1 View   Preliminary Result   Improving aeration with decreased opacifications left   midlung from prior comparison 07/28/2019 along with interstitial   prominence opacities noted in the background may represent trace   interstitial edema pattern versus residual airspace disease without new   consolidative component or pleural effusion and a persistent moderate   hiatal hernia projecting over the lower chest.                Vitals:    08/20/19 1725 08/20/19 1906 08/20/19 2000 08/20/19 2100   BP: 130/61  114/60 115/63   BP Location: Left arm      Patient Position: Sitting      Pulse: 50 60 62 66   Resp: 18 18 18 18   Temp: 98 °F (36.7 °C)      TempSrc: Oral      SpO2: 94% 92% 93% 94%   Weight: 68.9 kg (152 lb)      Height: 172.7 cm (68\")        Medications   sodium chloride 0.9 % flush 10 mL (not administered)   azithromycin (ZITHROMAX) tablet 500 mg (not administered)   sodium chloride 0.9 % bolus 500 mL (0 mL " Intravenous Stopped 8/20/19 2020)   methylPREDNISolone sodium succinate (SOLU-Medrol) injection 125 mg (125 mg Intravenous Given 8/20/19 1950)   ipratropium-albuterol (DUO-NEB) nebulizer solution 3 mL (3 mL Nebulization Given 8/20/19 1906)     ECG/EMG Results (last 24 hours)     Procedure Component Value Units Date/Time    ECG 12 Lead [[id number]] Collected:  08/20/19 1759     Updated:  08/20/19 1829        ECG 12 Lead                           MDM    Final diagnoses:   Bronchitis   Chronic obstructive pulmonary disease, unspecified COPD type (CMS/[facility])       Documentation assistance provided by mallika Jones.  Information recorded by the scribe was done at my direction and has been verified and validated by me.     Cristo Jones  08/20/19 1914       Geno Serrano APRN  08/20/19 2125

## 2019-08-20 NOTE — OUTREACH NOTE
Sepsis Week 3 Survey      Responses   Facility patient discharged from?  [facility]   Does the patient have one of the following disease processes/diagnoses(primary or secondary)?  Sepsis   Week 3 attempt successful?  No   Unsuccessful attempts  Attempt 1          Cheri Richards RN

## 2019-08-21 ENCOUNTER — READMISSION MANAGEMENT (OUTPATIENT)
Dept: CALL CENTER | Facility: HOSPITAL | Age: 74
End: 2019-08-21

## 2019-08-21 NOTE — OUTREACH NOTE
Sepsis Week 3 Survey      Responses   Facility patient discharged from?  [facility]   Does the patient have one of the following disease processes/diagnoses(primary or secondary)?  Sepsis   Week 3 attempt successful?  Yes   Call start time  1605   Call end time  1611   Discharge diagnosis  Acute respiratory failure with hypoxia lower lobe pneumonia sepsis   Meds reviewed with patient/caregiver?  Yes   Is the patient having any side effects they believe may be caused by any medication additions or changes?  No   Does the patient have all medications related to this admission filled (includes all antibiotics, inhalers, nebulizers,steroids,etc.)  Yes   Prescription comments  Prednisone reordered and Azithromycin added in ED on 8/21/19   Is the patient taking all medications as directed (includes completed medication regime)?  Yes   Does the patient have a primary care provider?   Yes   Does the patient have an appointment with their PCP within 7 days of discharge?  Yes   Has the patient kept scheduled appointments due by today?  Yes   Has home health visited the patient within 72 hours of discharge?  N/A   Psychosocial issues?  No   Comments  pt states had symptoms of pneumonia which had returned and was treated in ED on 8/21/19   Did the patient receive a copy of their discharge instructions?  Yes   Nursing interventions  Reviewed instructions with patient   What is the patient's perception of their health status since discharge?  Improving   Nursing interventions  Nurse provided patient education   Is the patient/caregiver able to teach back Sepsis?  S - Shivering,fever or very cold, E - Extreme pain or generalized discomfort (worst ever,especially abdomen), P - Pale or discolored skin, S - Sleepy, difficult to arouse,confused, I -   I feel like I might die-a feeling of hopelessness, S - Short of breath   Nursing interventions  Nurse provided reassurance to patient, Nurse provided patient education   Is  patient/caregiver able to teach back steps to recovery at home?  Set small, achievable goals for return to baseline health   Is the patient/caregiver able to teach back signs and symptoms of worsening condition:  Fever, Rapid heart rate (>90), Altered mental status(confusion/coma), Hyperthermia, Shortness of breath/rapid respiratory rate, Edema   Is the patient/caregiver able to teach back the hierarchy of who to call/visit for symptoms/problems? PCP, Specialist, Home health nurse, Urgent Care, ED, 911  Yes   Week 3 call completed?  Yes          Fernanda Whitney RN

## 2019-08-21 NOTE — DISCHARGE INSTRUCTIONS
Medications as directed.  Follow-up with your primary care provider to monitor your recovery.  Continue to turn, cough and deep breathe using your incentive spirometer 6 times a day.  Thank you

## 2019-08-25 LAB
BACTERIA SPEC AEROBE CULT: NORMAL
BACTERIA SPEC AEROBE CULT: NORMAL

## 2019-08-28 ENCOUNTER — READMISSION MANAGEMENT (OUTPATIENT)
Dept: CALL CENTER | Facility: HOSPITAL | Age: 74
End: 2019-08-28

## 2019-08-28 NOTE — OUTREACH NOTE
Sepsis Week 4 Survey      Responses   Facility patient discharged from?  [facility]   Does the patient have one of the following disease processes/diagnoses(primary or secondary)?  Sepsis   Week 4 attempt successful?  Yes   Call start time  1743   Call end time  1746   Discharge diagnosis  Acute respiratory failure with hypoxia lower lobe pneumonia sepsis   Is patient permission given to speak with other caregiver?  No   Meds reviewed with patient/caregiver?  Yes   Is the patient taking all medications as directed (includes completed medication regime)?  Yes   Has the patient kept scheduled appointments due by today?  Yes   Is the patient still receiving Home Health Services?  No   Psychosocial issues?  No   What is the patient's perception of their health status since discharge?  Improving   Nursing interventions  Nurse provided patient education   Is the patient/caregiver able to teach back Sepsis?  S - Shivering,fever or very cold, E - Extreme pain or generalized discomfort (worst ever,especially abdomen), P - Pale or discolored skin, S - Sleepy, difficult to arouse,confused, I -   I feel like I might die-a feeling of hopelessness, S - Short of breath   Nursing interventions  Nurse provided reassurance to patient, Nurse provided patient education   Is patient/caregiver able to teach back steps to recovery at home?  Set small, achievable goals for return to baseline health, Rest and regain strength, Eat a balanced diet, Exercise as tolerated   Is the patient/caregiver able to teach back signs and symptoms of worsening condition:  Fever, Rapid heart rate (>90), Altered mental status(confusion/coma), Hyperthermia, Shortness of breath/rapid respiratory rate, Edema   Is the patient/caregiver able to teach back the hierarchy of who to call/visit for symptoms/problems? PCP, Specialist, Home health nurse, Urgent Care, ED, 911  Yes   Week 4 call completed?  Yes   Would the patient like one additional call?  No   Graduated   Yes   Did the patient feel the follow up calls were helpful during their recovery period?  Yes   Was the number of calls appropriate?  Yes          Araceli Vogel RN

## 2019-09-09 ENCOUNTER — OFFICE VISIT (OUTPATIENT)
Dept: ORTHOPEDIC SURGERY | Facility: CLINIC | Age: 74
End: 2019-09-09

## 2019-09-09 VITALS — OXYGEN SATURATION: 98 % | BODY MASS INDEX: 23.02 KG/M2 | HEART RATE: 40 BPM | HEIGHT: 68 IN | WEIGHT: 151.9 LBS

## 2019-09-09 DIAGNOSIS — Z09 SURGERY FOLLOW-UP: Primary | ICD-10-CM

## 2019-09-09 PROCEDURE — 99212 OFFICE O/P EST SF 10 MIN: CPT | Performed by: ORTHOPAEDIC SURGERY

## 2019-09-09 NOTE — PROGRESS NOTES
"Follow-up (9 week f/u- 19 weeks status post/left foot excise PIP joints 2,3,4, tenotomies 2,3,4, metatarsal capsulotomy 2,3,4, chevron osteotomy 5th metatarsal, great toe DIP fusion LEFT 4/23/19)      Flaco Roque II is 5 months status post left forefoot recon, 4/23/19. He reports no fever, chills.  He reports pain is resolved.  They have been taking off meds now for DVT prophylaxis.  They have been weight bearing as tolerated.      Past Surgical History:   Procedure Laterality Date   • ARTHRODESIS MIDTARSAL / TARSOMETATARSAL / TARSAL NAVICULAR-CUNEIFORM Left 05/10/2016   • BACK SURGERY     • BACK SURGERY      low back   • BUNIONECTOMY Left 4/23/2019    Procedure: left foot excise PIP joints 2,3,4, tenotomies 2,3,4, metatarsal capsulotomy 2,3,4, chevron osteotomy 5th metatarsal, great toe DIP fusion LEFT;  Surgeon: Juhi Calle MD;  Location:  [facility];  Service: Orthopedics   • CATARACT EXTRACTION     • COLONOSCOPY N/A 11/2/2017    Procedure: COLONOSCOPY;  Surgeon: Luis Eduardo Capellan MD;  Location:  [facility];  Service:    • ENDOSCOPY N/A 11/1/2017    Procedure: ESOPHAGOGASTRODUODENOSCOPY;  Surgeon: Luis Eduardo Capellan MD;  Location:  [facility];  Service:    • ENDOSCOPY  11/02/2017    DR LUIS EDUARDO CAPELLAN   • FOOT SURGERY     • GALLBLADDER SURGERY     • KNEE ARTHROSCOPY Bilateral    • PAIN PUMP INSERTION/REVISION     • SPINE SURGERY     • TOTAL HIP ARTHROPLASTY Left    • TOTAL SHOULDER ARTHROPLASTY W/ DISTAL CLAVICLE EXCISION Left 9/10/2018    Procedure: REVERSE TOTAL SHOULDER ARTHROPLASTY LEFT;  Surgeon: Abel Brennan MD;  Location:  [facility];  Service: Orthopedics   • ULNAR NERVE TRANSPOSITION         Pulse (!) 40   Ht 172.7 cm (67.99\")   Wt 68.9 kg (151 lb 14.4 oz)   SpO2 98%   BMI 23.10 kg/m²         Good alignment, no erythema, no drainage, no sign of infection, normal post op swelling left foot, small callus under 1st metatarsal head, we discussed callus care    ordered and reviewed x-rays " today    Assessment and Plan:   1. Surgery follow-up  He has done very well, he is happy with the pain relief and position of the toes,  We discussed callus care as above, I will be happy to see him any time  - XR Foot 2 View Left

## 2019-10-28 ENCOUNTER — OFFICE VISIT (OUTPATIENT)
Dept: PULMONOLOGY | Facility: CLINIC | Age: 74
End: 2019-10-28

## 2019-10-28 VITALS
WEIGHT: 151 LBS | DIASTOLIC BLOOD PRESSURE: 60 MMHG | BODY MASS INDEX: 25.16 KG/M2 | HEART RATE: 93 BPM | OXYGEN SATURATION: 95 % | SYSTOLIC BLOOD PRESSURE: 108 MMHG | HEIGHT: 65 IN | TEMPERATURE: 98.1 F

## 2019-10-28 DIAGNOSIS — G47.33 OSA (OBSTRUCTIVE SLEEP APNEA): ICD-10-CM

## 2019-10-28 DIAGNOSIS — R06.02 SHORTNESS OF BREATH: ICD-10-CM

## 2019-10-28 DIAGNOSIS — R94.31 ABNORMAL FINDING ON EKG: ICD-10-CM

## 2019-10-28 DIAGNOSIS — J44.9 CHRONIC OBSTRUCTIVE PULMONARY DISEASE, UNSPECIFIED COPD TYPE (HCC): Primary | ICD-10-CM

## 2019-10-28 DIAGNOSIS — G20 PARKINSON DISEASE (HCC): ICD-10-CM

## 2019-10-28 PROCEDURE — 94060 EVALUATION OF WHEEZING: CPT | Performed by: NURSE PRACTITIONER

## 2019-10-28 PROCEDURE — 99214 OFFICE O/P EST MOD 30 MIN: CPT | Performed by: NURSE PRACTITIONER

## 2019-10-28 RX ORDER — ALBUTEROL SULFATE 90 UG/1
4 AEROSOL, METERED RESPIRATORY (INHALATION) ONCE
Status: COMPLETED | OUTPATIENT
Start: 2019-10-28 | End: 2019-10-28

## 2019-10-28 RX ORDER — BUDESONIDE AND FORMOTEROL FUMARATE DIHYDRATE 160; 4.5 UG/1; UG/1
2 AEROSOL RESPIRATORY (INHALATION) 2 TIMES DAILY
Qty: 1 INHALER | Refills: 5 | Status: SHIPPED | OUTPATIENT
Start: 2019-10-28 | End: 2019-12-20

## 2019-10-28 RX ADMIN — ALBUTEROL SULFATE 4 PUFF: 90 AEROSOL, METERED RESPIRATORY (INHALATION) at 08:25

## 2019-10-28 NOTE — PROGRESS NOTES
[facility] Pulmonary Follow up    CHIEF COMPLAINT    Hospital follow-up    HISTORY OF PRESENT ILLNESS    Flaco Roque II is a 74 y.o.male here today for follow-up after he was treated for pneumonia at the end of July.  He was seen by me in June after a hospital follow-up for pneumonia as well.  He was treated with antibiotics and steroids and discharged home.  He states that he returned to [facility] ED at the end of August for bronchitis.  He was treated with another round of antibiotics and steroids.  He states that he is slowly returned back to baseline.    He continues to take Symbicort 80 2 puffs in the morning and 2 puffs at night.  He does not notice a significant difference in his breathing when using Symbicort.  He continues to use his DuoNeb's every 6 hours.  He denies any significant shortness of air with activity.  He continues to do 5 miles on a stationary bike every morning.    At his last visit I had ordered an overnight oximetry to be performed to rule out nocturnal hypoxemia.  He was found to be less than 88% greater than 5 minutes.  I had started him on 2 L nasal cannula at nighttime.  He states that he has not been wearing his oxygen at night he will wear it occasionally during the day.  He has been intolerant to CPAP in the past as well.    He denies fever, chills, sputum production, hemoptysis, night sweats, weight loss, chest pain or palpitations.  During his ED visit at [facility] at the end of August he was found to have sinus rhythm with frequent PVCs and a prolonged QT.  I do not see any follow-up on this EKG in the chart.    He denies any sinus or allergy symptoms.  He denies reflux symptoms currently.    He received his influenza vaccination last week.  He is up-to-date on his pneumonia vaccinations.    Patient Active Problem List   Diagnosis   • ABRIL (obstructive sleep apnea)   • COPD (chronic obstructive pulmonary disease) (CMS/[facility])   • Acute blood loss  anemia   • Foot deformity, acquired, left   • Leukocytosis, likely reactive   • Acute postoperative pain   • Deformity of left foot   • S/P foot surgery, left   • Hypotension due to hypovolemia   • Parkinson disease (CMS/HCC)   • GI bleed   • Anemia       No Known Allergies    Current Outpatient Medications:   •  amitriptyline (ELAVIL) 50 MG tablet, Take  mg by mouth Every Night., Disp: , Rfl:   •  carbidopa-levodopa (SINEMET)  MG per tablet, Take 1.5 tablets by mouth 4 (Four) Times a Day. 1.5 tabs qid, Disp: , Rfl:   •  ipratropium-albuterol (DUO-NEB) 0.5-2.5 mg/3 ml nebulizer, Take 3 mL by nebulization Every 4 (Four) Hours As Needed for Wheezing., Disp: , Rfl:   •  montelukast (SINGULAIR) 10 MG tablet, Take 10 mg by mouth every night., Disp: , Rfl:   •  Multiple Vitamins-Minerals (MULTIVITAMIN ADULT PO), Take 1 tablet by mouth daily., Disp: , Rfl:   •  pantoprazole (PROTONIX) 40 MG EC tablet, Take 40 mg by mouth 2 (Two) Times a Day., Disp: , Rfl:   •  tamsulosin (FLOMAX) 0.4 MG capsule 24 hr capsule, Take 1 capsule by mouth Daily., Disp: 30 capsule, Rfl: 0  •  budesonide-formoterol (SYMBICORT) 160-4.5 MCG/ACT inhaler, Inhale 2 puffs 2 (Two) Times a Day., Disp: 1 inhaler, Rfl: 5  No current facility-administered medications for this visit.   MEDICATION LIST AND ALLERGIES REVIEWED.    Social History     Tobacco Use   • Smoking status: Former Smoker     Packs/day: 2.00     Years: 25.00     Pack years: 50.00     Types: Cigarettes     Start date:      Last attempt to quit:      Years since quittin.8   • Smokeless tobacco: Never Used   Substance Use Topics   • Alcohol use: No   • Drug use: No       FAMILY AND SOCIAL HISTORY REVIEWED.    Review of Systems   Constitutional: Negative for activity change, appetite change, fatigue, fever and unexpected weight change.   HENT: Negative for congestion, postnasal drip, rhinorrhea, sinus pressure, sore throat and voice change.    Eyes: Negative for visual  "disturbance.   Respiratory: Negative for cough, chest tightness, shortness of breath and wheezing.    Cardiovascular: Negative for chest pain, palpitations and leg swelling.   Gastrointestinal: Negative for abdominal distention, abdominal pain, nausea and vomiting.   Endocrine: Negative for cold intolerance and heat intolerance.   Genitourinary: Negative for difficulty urinating and urgency.   Musculoskeletal: Positive for arthralgias and back pain. Negative for neck pain.   Skin: Negative for color change and pallor.   Allergic/Immunologic: Negative for environmental allergies and food allergies.   Neurological: Negative for dizziness, syncope, weakness and light-headedness.   Hematological: Negative for adenopathy. Does not bruise/bleed easily.   Psychiatric/Behavioral: Negative for agitation and behavioral problems.   .    /60 (BP Location: Left arm, Patient Position: Sitting)   Pulse 93   Temp 98.1 °F (36.7 °C)   Ht 165.1 cm (65\")   Wt 68.5 kg (151 lb)   SpO2 95% Comment: resting at room air  BMI 25.13 kg/m²      Immunization History   Administered Date(s) Administered   • Flu Vaccine High Dose PF 65YR+ 09/01/2017, 09/23/2018, 10/23/2019   • Pneumococcal Conjugate 13-Valent (PCV13) 02/23/2015   • Pneumococcal Polysaccharide (PPSV23) 08/22/2011, 09/05/2016   • Pneumococcal, Unspecified 09/14/2016   • TD Preservative Free 05/08/2018   • Zostavax 07/01/2013       Physical Exam   Constitutional: He is oriented to person, place, and time. He appears well-developed and well-nourished.   HENT:   Head: Normocephalic and atraumatic.   Eyes: Pupils are equal, round, and reactive to light.   Neck: Normal range of motion. Neck supple. No thyromegaly present.   Cardiovascular: Normal rate, normal heart sounds and intact distal pulses. Exam reveals no gallop and no friction rub.   No murmur heard.  Irregular rhythm   Pulmonary/Chest: Effort normal and breath sounds normal. No respiratory distress. He has no " wheezes. He has no rales. He exhibits no tenderness.   Crackles in the left lower lobe   Abdominal: Soft. Bowel sounds are normal. There is no tenderness.   Musculoskeletal: Normal range of motion.   Lymphadenopathy:     He has no cervical adenopathy.   Neurological: He is alert and oriented to person, place, and time.   Skin: Skin is warm and dry. Capillary refill takes less than 2 seconds. He is not diaphoretic.   Psychiatric: He has a normal mood and affect. His behavior is normal.   Nursing note and vitals reviewed.        RESULTS    PFTS in the office today, read by me.  FVC 3.33 96% predicted, FEV1 2.21 89% predicted, FEV1/FVC 66% predicted, mild obstruction with no postbronchodilator response.    PROBLEM LIST    Problem List Items Addressed This Visit        Respiratory    ABRIL (obstructive sleep apnea)    COPD (chronic obstructive pulmonary disease) (CMS/[facility]) - Primary    Relevant Medications    albuterol sulfate HFA (PROVENTIL HFA;VENTOLIN HFA;PROAIR HFA) inhaler 4 puff (Completed)    budesonide-formoterol (SYMBICORT) 160-4.5 MCG/ACT inhaler    Other Relevant Orders    Spirometry With Bronchodilator (Completed)       Nervous and Auditory    Parkinson disease (CMS/[facility])            DISCUSSION    Mr. Roque was here for follow-up after he was seen by me in June for a hospital follow-up.  He was hospitalized again in July for pneumonia and states that he is slowly recovering from this.  He was also seen in August for bronchitis and was treated with antibiotics and steroids.  He states that slowly his breathing has returned back to baseline.    We reviewed his PFTs today in the office and he has some mild obstruction noted he will continue Symbicort 160 2 puffs twice a day.  He will continue to use his DuoNeb's every 6 hours as needed for shortness of breath.    I did encourage him to wear his oxygen at nighttime at 2 L for his obstructive sleep apnea.  He has been intolerant to CPAP in the past.  He states that  he is not able to wear his oxygen that much at night.    He states he has some mild shortness of air with activity and had an abnormal EKG while he was in the ED at [facility] in August.  I am going to refer him to cardiology for this abnormal EKG.    He will follow back up in the office with Dr. Edwards at his next available to discuss possible treatment for his interstitial changes on his CT scan that was performed earlier this year.  He will call with any worsening symptoms.  I spent 25 minutes with the patient. I spent > 50% percent of this time counseling and discussing diagnosis, prognosis, diagnostic testing, evaluation, current status, treatment options and management.    Mary Lou Oseguera, APRN  10/28/65785:05 AM  Electronically signed     Please note that portions of this note were completed with a voice recognition program. Efforts were made to edit the dictations, but occasionally words are mistranscribed.      CC: Tayo Hendrix MD

## 2019-11-12 ENCOUNTER — CONSULT (OUTPATIENT)
Dept: CARDIOLOGY | Facility: CLINIC | Age: 74
End: 2019-11-12

## 2019-11-12 VITALS
HEART RATE: 92 BPM | WEIGHT: 151.2 LBS | BODY MASS INDEX: 22.91 KG/M2 | HEIGHT: 68 IN | SYSTOLIC BLOOD PRESSURE: 108 MMHG | OXYGEN SATURATION: 93 % | DIASTOLIC BLOOD PRESSURE: 64 MMHG

## 2019-11-12 DIAGNOSIS — E78.5 HYPERLIPIDEMIA, UNSPECIFIED HYPERLIPIDEMIA TYPE: Primary | ICD-10-CM

## 2019-11-12 DIAGNOSIS — R06.09 DYSPNEA ON EXERTION: ICD-10-CM

## 2019-11-12 PROCEDURE — 99204 OFFICE O/P NEW MOD 45 MIN: CPT | Performed by: INTERNAL MEDICINE

## 2019-11-12 PROCEDURE — 93000 ELECTROCARDIOGRAM COMPLETE: CPT | Performed by: INTERNAL MEDICINE

## 2019-11-12 NOTE — PROGRESS NOTES
[facility] at [facility]  Consultation History and Physical  Flaco Roque II  1945    VISIT DATE:  11/12/19    PCP:   Tayo Hendrix MD  [address] [place], [place] [id number]      CC:  Shortness of Breath (Consult )      Problem List:  1.  COPD on O2, mild obstruction on last PFTs  2.  Parkinsons    Echocardiogram May 2019  Ejection fraction 66 to 70% indeterminate diastolic function, mild tricuspid regurgitation with RVSP less than 30 5 aortic valve sclerosis without stenosis    History of Present Illness:  Flaco Roque II  Is a 74 y.o. male with pertinent cardiac history detailed above.  He is referred by Lolis WOODARD in our pulmonary office for further evaluation of dyspnea on exertion.  He does have known COPD but recently has had some more symptoms of shortness of breath with less activity.  He also does have a cough which is occasionally productive.  He denies any chest pain or angina symptoms.  He denies history of prior heart catheterization or stress test.  An echo done in May showed normal ejection fraction and no significant valve disease, no evidence of pulmonary hypertension.  He does not get complete relief of his dyspnea with his inhalers.  His EKG in the office show some nonspecific ST changes which have been present on priors.  At present he has no other acute complaints.  His mobility is decent but he does not exert himself heavily due to Parkinson's disease      Patient Active Problem List    Diagnosis Date Noted   • Anemia 07/28/2019   • Hypotension due to hypovolemia 04/26/2019   • Parkinson disease (CMS/[facility]) 04/26/2019   • GI bleed 04/26/2019   • S/P foot surgery, left 04/23/2019   • Deformity of left foot 04/10/2019     Note Last Updated: 4/10/2019     Added automatically from request for surgery [id number]     • Leukocytosis, likely reactive 09/11/2018   • Acute postoperative pain 09/11/2018   • Foot deformity, acquired, left 04/06/2018      Note Last Updated: 2018     Added automatically from request for surgery [id number]     • ABRIL (obstructive sleep apnea) 10/31/2017   • COPD (chronic obstructive pulmonary disease) (CMS/[facility]) 10/31/2017   • Acute blood loss anemia 10/31/2017       No Known Allergies    Social History     Socioeconomic History   • Marital status:      Spouse name: Not on file   • Number of children: Not on file   • Years of education: Not on file   • Highest education level: Not on file   Occupational History   • Occupation: Retired    Tobacco Use   • Smoking status: Former Smoker     Packs/day: 2.00     Years: 25.00     Pack years: 50.00     Types: Cigarettes     Start date:      Last attempt to quit:      Years since quittin.8   • Smokeless tobacco: Never Used   Substance and Sexual Activity   • Alcohol use: Yes     Drinks per session: 1 or 2     Binge frequency: Monthly   • Drug use: No   • Sexual activity: Defer   Social History Narrative     and lives with wife    Retired supervisor at [employer]    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       Family History   Problem Relation Age of Onset   • Arthritis Mother    • Diabetes Mother    • Osteoarthritis Mother    • Colon cancer Father    • Cancer Father        Current Medications:    Current Outpatient Medications:   •  amitriptyline (ELAVIL) 50 MG tablet, Take  mg by mouth Every Night., Disp: , Rfl:   •  budesonide-formoterol (SYMBICORT) 160-4.5 MCG/ACT inhaler, Inhale 2 puffs 2 (Two) Times a Day., Disp: 1 inhaler, Rfl: 5  •  carbidopa-levodopa (SINEMET)  MG per tablet, Take 1.5 tablets by mouth 4 (Four) Times a Day., Disp: , Rfl:   •  ipratropium-albuterol (DUO-NEB) 0.5-2.5 mg/3 ml nebulizer, Take 3 mL by nebulization Every 4 (Four) Hours As Needed for Wheezing., Disp: , Rfl:   •  montelukast (SINGULAIR) 10 MG tablet, Take 10 mg by mouth every night., Disp: , Rfl:   •  Multiple Vitamins-Minerals (MULTIVITAMIN ADULT PO), Take 1 tablet by mouth daily., Disp:  ", Rfl:   •  pantoprazole (PROTONIX) 40 MG EC tablet, Take 40 mg by mouth 2 (Two) Times a Day., Disp: , Rfl:   •  tamsulosin (FLOMAX) 0.4 MG capsule 24 hr capsule, Take 1 capsule by mouth Daily., Disp: 30 capsule, Rfl: 0     Review of Systems   Cardiovascular: Positive for dyspnea on exertion. Negative for chest pain, leg swelling, near-syncope, orthopnea, palpitations and syncope.   Respiratory: Positive for cough, shortness of breath and sputum production. Negative for wheezing.    All other systems reviewed and are negative.      Vitals:    11/12/19 0951   BP: 108/64   BP Location: Left arm   Patient Position: Sitting   Pulse: 92   SpO2: 93%   Weight: 68.6 kg (151 lb 3.2 oz)   Height: 172.7 cm (68\")       Physical Exam   Constitutional: He is oriented to person, place, and time. He appears well-developed and well-nourished.   HENT:   Head: Normocephalic and atraumatic.   Eyes: EOM are normal.   Neck: Neck supple.   No carotid bruits   Cardiovascular: Normal rate, regular rhythm, normal heart sounds and intact distal pulses. Exam reveals no gallop and no friction rub.   No murmur heard.  Pulmonary/Chest: Effort normal and breath sounds normal. He has no wheezes. He has no rales.   Abdominal: Soft.   Musculoskeletal: He exhibits no edema.   Neurological: He is alert and oriented to person, place, and time.   Skin: Skin is warm and dry.       Diagnostic Data:    ECG 12 Lead  Date/Time: 11/12/2019 10:15 AM  Performed by: Von Kilpatrick MD  Authorized by: Von Kilpatrick MD   Comparison: compared with previous ECG from 8/20/2019  Similar to previous ECG  Rhythm: sinus rhythm  Ectopy: unifocal PVCs  Rate: normal  BPM: 92  QRS axis: normal  Other findings: non-specific ST-T wave changes          No results found for: CHLPL, TRIG, HDL, LDLDIRECT  Lab Results   Component Value Date    GLUCOSE 114 (H) 08/20/2019    BUN 17 08/20/2019    CREATININE 0.79 08/20/2019     (L) 08/20/2019    K 3.8 08/20/2019 "     08/20/2019    CO2 22.0 08/20/2019     Lab Results   Component Value Date    HGBA1C 5.40 04/10/2019     Lab Results   Component Value Date    WBC 14.93 (H) 08/20/2019    HGB 9.3 (L) 08/20/2019    HCT 32.7 (L) 08/20/2019     08/20/2019       Assessment:   Diagnosis Plan   1. Hyperlipidemia, unspecified hyperlipidemia type  Lipid Panel   2. Dyspnea on exertion  Stress Test With Myocardial Perfusion One Day       Plan:      1  Dyspnea on exertion  -Echo earlier this year showed ejection fraction 66 to 70% with no evidence of pulmonary hypertension and no significant valve disease  -It may be that his symptoms are predominantly pulmonary however he has not had any prior testing done to evaluate for underlying heart disease we will evaluate with a stress myocardial perfusion study  -Also check lipids to evaluate for any underlying CAD      2.  COPD  -If above is unrevealing we will have him follow back up with pulmonary to see if anything else needs to be altered in his inhaler regimen    Follow-up in 4 weeks            Von Kilpatrick MD

## 2019-12-03 ENCOUNTER — HOSPITAL ENCOUNTER (OUTPATIENT)
Dept: CARDIOLOGY | Facility: HOSPITAL | Age: 74
Discharge: HOME | End: 2019-12-03

## 2019-12-03 LAB
BH CV STRESS BP STAGE 1: NORMAL
BH CV STRESS BP STAGE 3: NORMAL
BH CV STRESS COMMENTS STAGE 1: NORMAL
BH CV STRESS DOSE REGADENOSON STAGE 1: 0.4
BH CV STRESS DURATION MIN STAGE 1: 1
BH CV STRESS DURATION MIN STAGE 2: 1
BH CV STRESS DURATION MIN STAGE 3: 1
BH CV STRESS DURATION MIN STAGE 4: 1
BH CV STRESS DURATION SEC STAGE 1: 10
BH CV STRESS DURATION SEC STAGE 2: 0
BH CV STRESS HR STAGE 1: 98
BH CV STRESS HR STAGE 2: 101
BH CV STRESS HR STAGE 3: 101
BH CV STRESS HR STAGE 4: 104
BH CV STRESS PROTOCOL 1: NORMAL
BH CV STRESS RECOVERY BP: NORMAL MMHG
BH CV STRESS RECOVERY HR: 104 BPM
BH CV STRESS STAGE 1: 1
BH CV STRESS STAGE 2: 2
BH CV STRESS STAGE 3: 3
BH CV STRESS STAGE 4: 4
LV EF NUC BP: 70 %
MAXIMAL PREDICTED HEART RATE: 146 BPM
PERCENT MAX PREDICTED HR: 71.23 %
STRESS BASELINE BP: NORMAL MMHG
STRESS BASELINE HR: 95 BPM
STRESS PERCENT HR: 84 %
STRESS POST PEAK BP: NORMAL MMHG
STRESS POST PEAK HR: 104 BPM
STRESS TARGET HR: 124 BPM

## 2019-12-03 PROCEDURE — 78452 HT MUSCLE IMAGE SPECT MULT: CPT | Performed by: INTERNAL MEDICINE

## 2019-12-03 PROCEDURE — A9500 TC99M SESTAMIBI: HCPCS | Performed by: INTERNAL MEDICINE

## 2019-12-03 PROCEDURE — 93017 CV STRESS TEST TRACING ONLY: CPT

## 2019-12-03 PROCEDURE — 0 TECHNETIUM SESTAMIBI: Performed by: INTERNAL MEDICINE

## 2019-12-03 PROCEDURE — 25010000002 REGADENOSON 0.4 MG/5ML SOLUTION: Performed by: INTERNAL MEDICINE

## 2019-12-03 PROCEDURE — 93018 CV STRESS TEST I&R ONLY: CPT | Performed by: INTERNAL MEDICINE

## 2019-12-03 PROCEDURE — 78452 HT MUSCLE IMAGE SPECT MULT: CPT

## 2019-12-03 RX ADMIN — TECHNETIUM TC 99M SESTAMIBI 1 DOSE: 1 INJECTION INTRAVENOUS at 08:10

## 2019-12-03 RX ADMIN — REGADENOSON 0.4 MG: 0.08 INJECTION, SOLUTION INTRAVENOUS at 10:40

## 2019-12-03 RX ADMIN — TECHNETIUM TC 99M SESTAMIBI 1 DOSE: 1 INJECTION INTRAVENOUS at 10:40

## 2019-12-06 ENCOUNTER — TELEPHONE (OUTPATIENT)
Dept: CARDIOLOGY | Facility: CLINIC | Age: 74
End: 2019-12-06

## 2019-12-06 NOTE — TELEPHONE ENCOUNTER
----- Message from Von Kilpatrick MD sent at 12/4/2019  8:36 AM EST -----  Can we let Mr. Roque know that his stress test did not show any signs of heart blockages.  I think this shortness of breath is more coming from his lung disease.

## 2019-12-06 NOTE — TELEPHONE ENCOUNTER
Called patient to let them know results and recommendations per NSK (see NSK note).    Left voicemail

## 2019-12-20 ENCOUNTER — OFFICE VISIT (OUTPATIENT)
Dept: PULMONOLOGY | Facility: CLINIC | Age: 74
End: 2019-12-20

## 2019-12-20 VITALS
BODY MASS INDEX: 22.49 KG/M2 | HEART RATE: 47 BPM | DIASTOLIC BLOOD PRESSURE: 58 MMHG | SYSTOLIC BLOOD PRESSURE: 108 MMHG | WEIGHT: 148.4 LBS | TEMPERATURE: 97.9 F | HEIGHT: 68 IN | OXYGEN SATURATION: 91 %

## 2019-12-20 DIAGNOSIS — J44.9 CHRONIC OBSTRUCTIVE PULMONARY DISEASE, UNSPECIFIED COPD TYPE (HCC): Primary | ICD-10-CM

## 2019-12-20 DIAGNOSIS — J84.9 INTERSTITIAL LUNG DISEASE (HCC): ICD-10-CM

## 2019-12-20 DIAGNOSIS — G47.33 OSA (OBSTRUCTIVE SLEEP APNEA): ICD-10-CM

## 2019-12-20 DIAGNOSIS — J43.2 CENTRILOBULAR EMPHYSEMA (HCC): ICD-10-CM

## 2019-12-20 PROBLEM — E86.1 HYPOTENSION DUE TO HYPOVOLEMIA: Status: RESOLVED | Noted: 2019-04-26 | Resolved: 2019-12-20

## 2019-12-20 PROBLEM — K92.2 GI BLEED: Status: RESOLVED | Noted: 2019-04-26 | Resolved: 2019-12-20

## 2019-12-20 PROBLEM — J43.9 PULMONARY EMPHYSEMA (HCC): Status: ACTIVE | Noted: 2017-10-31

## 2019-12-20 PROBLEM — I95.89 HYPOTENSION DUE TO HYPOVOLEMIA: Status: RESOLVED | Noted: 2019-04-26 | Resolved: 2019-12-20

## 2019-12-20 PROCEDURE — 99214 OFFICE O/P EST MOD 30 MIN: CPT | Performed by: INTERNAL MEDICINE

## 2019-12-20 PROCEDURE — 94375 RESPIRATORY FLOW VOLUME LOOP: CPT | Performed by: INTERNAL MEDICINE

## 2019-12-20 PROCEDURE — 94729 DIFFUSING CAPACITY: CPT | Performed by: INTERNAL MEDICINE

## 2019-12-20 PROCEDURE — 94726 PLETHYSMOGRAPHY LUNG VOLUMES: CPT | Performed by: INTERNAL MEDICINE

## 2019-12-20 NOTE — PROGRESS NOTES
Subjective:     Chief Complaint:   Chief Complaint   Patient presents with   • COPD       HPI:    Flaco Roque II is a 74 y.o. male here for follow-up of emphysema and interstitial infiltrates    I last saw him when he was hospitalized in May of this year.  He had evidence of emphysema as well as interstitial infiltrates and was treated with antibiotics and steroids.  He has been treated for possible pneumonia since then and an episode of bronchitis in August.  A high resolution CT last summer revealed improvement in the interstitial changes and evidence of emphysema.  He saw our nurse practitioner in October with instructions to follow-up with me.  He has had a subsequent cardiac evaluation which was negative.    He notes that he is overall much improved since earlier this year.    He smoked for 42 years but quit 18 years ago    Further details:    General symptoms:  - no fever  - weight stable  - no edema    Exercise tolerance:  - exercise limitation at 2 flight(s) of stairs    Chest symptoms:  - no chest pain  - mild cough    Sputum:  - expectorates yellow sputum  - denies hemoptysis    Upper airway:  - no sinus congestion or drainage    Recent imaging:  - CXR:  August chronic changes    Current medications are:   Current Outpatient Medications:   •  amitriptyline (ELAVIL) 50 MG tablet, Take  mg by mouth Every Night., Disp: , Rfl:   •  carbidopa-levodopa (SINEMET)  MG per tablet, Take 1.5 tablets by mouth 4 (Four) Times a Day., Disp: , Rfl:   •  ipratropium-albuterol (DUO-NEB) 0.5-2.5 mg/3 ml nebulizer, Take 3 mL by nebulization Every 4 (Four) Hours As Needed for Wheezing., Disp: , Rfl:   •  montelukast (SINGULAIR) 10 MG tablet, Take 10 mg by mouth every night., Disp: , Rfl:   •  Multiple Vitamins-Minerals (MULTIVITAMIN ADULT PO), Take 1 tablet by mouth daily., Disp: , Rfl:   •  pantoprazole (PROTONIX) 40 MG EC tablet, Take 40 mg by mouth 2 (Two) Times a Day., Disp: , Rfl:   •  tamsulosin (FLOMAX)  0.4 MG capsule 24 hr capsule, Take 1 capsule by mouth Daily., Disp: 30 capsule, Rfl: 0  •  umeclidinium-vilanterol (ANORO ELLIPTA) 62.5-25 MCG/INH aerosol powder  inhaler, Inhale 1 puff Daily., Disp: 1 each, Rfl: 11.      The patient's relevant past medical, surgical, family and social history were reviewed and updated in Epic as appropriate.     ROS:    Review of Systems  ROS as documented in patient questionnaire unless as noted otherwise    Objective:    Physical Exam   Constitutional: He is oriented to person, place, and time. He appears well-developed and well-nourished.   HENT:   Head: Normocephalic and atraumatic.   Mouth/Throat: Oropharynx is clear and moist.   Neck: Neck supple. No thyromegaly present.   Cardiovascular: Normal rate and regular rhythm. Exam reveals no gallop and no friction rub.   No murmur heard.  Pulmonary/Chest: Effort normal. No respiratory distress. He has no wheezes. He has no rales.   Decreased BS   Musculoskeletal: He exhibits no edema.   Neurological: He is alert and oriented to person, place, and time.   Skin: Skin is warm and dry.   Psychiatric: He has a normal mood and affect. His behavior is normal.   Vitals reviewed.      Diagnostics:     PFT:  -Borderline airway obstruction but significant air trapping and hyperinflation with a mild decrease in diffusing capacity.  No change over priors.    CXR:  - no additional    Assessment/Plan:    Problem List Items Addressed This Visit        Pulmonary Problems    ABRIL (obstructive sleep apnea)    Overview     CPAP intolerant         Pulmonary emphysema (CMS/HCC) - Primary    Relevant Medications    umeclidinium-vilanterol (ANORO ELLIPTA) 62.5-25 MCG/INH aerosol powder  inhaler    Interstitial lung disease (CMS/HCC)    Relevant Medications    umeclidinium-vilanterol (ANORO ELLIPTA) 62.5-25 MCG/INH aerosol powder  inhaler    Other Relevant Orders    CT Chest Hi Resolution            1. Pulmonary emphysema: Discontinue Symbicort and initiate  Anoro 1 inhalation daily.  He will use nebulized therapy as needed and not on a scheduled basis  2. Question of interstitial lung disease: The pattern on prior CAT scans does not suggest UIP.  It was improving.  This may have been related to an acute pneumonia as well as some interstitial scarring related to his emphysema.  I will repeat his HRCT in 6 months prior to his follow-up visit  3. Hypoxemia: Continue oxygen at night and as needed during the day  4. 6-month follow-up    Level of Risk Moderate due to: two stable chronic illnesses and prescription drug management    Discussed in detail with the patient.  He will call prior to his follow up visit for any new problems.    Signed by  Alf Edwards MD

## 2020-01-27 ENCOUNTER — OFFICE VISIT (OUTPATIENT)
Dept: ORTHOPEDIC SURGERY | Facility: CLINIC | Age: 75
End: 2020-01-27

## 2020-01-27 VITALS — BODY MASS INDEX: 22.13 KG/M2 | OXYGEN SATURATION: 89 % | HEART RATE: 75 BPM | HEIGHT: 68 IN | WEIGHT: 146 LBS

## 2020-01-27 DIAGNOSIS — Z09 SURGERY FOLLOW-UP: Primary | ICD-10-CM

## 2020-01-27 DIAGNOSIS — L97.521 SKIN ULCER OF LEFT FOOT, LIMITED TO BREAKDOWN OF SKIN (HCC): ICD-10-CM

## 2020-01-27 PROCEDURE — 99213 OFFICE O/P EST LOW 20 MIN: CPT | Performed by: ORTHOPAEDIC SURGERY

## 2020-01-27 NOTE — PROGRESS NOTES
ESTABLISHED PATIENT    Patient: Flaco Roque II  : 1945    Primary Care Provider: Tayo Hendrix MD    Requesting Provider: As above    Follow-up (4 month f/u; 9 months status post/left foot excise PIP joints 2,3,4, tenotomies 2,3,4, metatarsal capsulotomy 2,3,4, chevron osteotomy 5th metatarsal, great toe DIP fusion LEFT 19)      History    Chief Complaint: Left foot pain    History of Present Illness: He is here with a new problem he has developed a pressure sore on the lateral base of the left fifth metatarsal and a small scab over the fifth metatarsal head, no signs of infection.  He reports they are both extremely sore.  He is wearing some house slippers.    Current Outpatient Medications on File Prior to Visit   Medication Sig Dispense Refill   • amitriptyline (ELAVIL) 50 MG tablet Take  mg by mouth Every Night.     • carbidopa-levodopa (SINEMET)  MG per tablet Take 1.5 tablets by mouth 4 (Four) Times a Day.     • ipratropium-albuterol (DUO-NEB) 0.5-2.5 mg/3 ml nebulizer Take 3 mL by nebulization Every 4 (Four) Hours As Needed for Wheezing.     • montelukast (SINGULAIR) 10 MG tablet Take 10 mg by mouth every night.     • Multiple Vitamins-Minerals (MULTIVITAMIN ADULT PO) Take 1 tablet by mouth daily.     • pantoprazole (PROTONIX) 40 MG EC tablet Take 40 mg by mouth 2 (Two) Times a Day.     • tamsulosin (FLOMAX) 0.4 MG capsule 24 hr capsule Take 1 capsule by mouth Daily. 30 capsule 0   • umeclidinium-vilanterol (ANORO ELLIPTA) 62.5-25 MCG/INH aerosol powder  inhaler Inhale 1 puff Daily. 1 each 11     No current facility-administered medications on file prior to visit.       No Known Allergies   Past Medical History:   Diagnosis Date   • Arthropathy of shoulder region 9/10/2018   • Chris's esophagus     Last EGD 1 year ago with Dr Kaye    • BPH (benign prostatic hyperplasia)    • Chronic back pain 10/31/2017   • Chronic low back pain    • COPD (chronic obstructive pulmonary  disease) (CMS/[facility])    • Foot pain    • GERD (gastroesophageal reflux disease)    • History of transfusion    • Injury of back    • Lung abscess (CMS/[facility])    • Osteoarthritis    • Osteoporosis    • Parkinson disease (CMS/[facility])    • Rotator cuff tear, left    • Sleep apnea    • Status post reverse total shoulder replacement, 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     Past Surgical History:   Procedure Laterality Date   • ARTHRODESIS MIDTARSAL / TARSOMETATARSAL / TARSAL NAVICULAR-CUNEIFORM Left 05/10/2016   • BACK SURGERY     • BACK SURGERY      low back   • BUNIONECTOMY Left 4/23/2019    Procedure: left foot excise PIP joints 2,3,4, tenotomies 2,3,4, metatarsal capsulotomy 2,3,4, chevron osteotomy 5th metatarsal, great toe DIP fusion LEFT;  Surgeon: Juhi Calle MD;  Location:  [facility];  Service: Orthopedics   • CATARACT EXTRACTION     • COLONOSCOPY N/A 11/2/2017    Procedure: COLONOSCOPY;  Surgeon: Luis Eduardo Mayers MD;  Location:  [facility];  Service:    • ENDOSCOPY N/A 11/1/2017    Procedure: ESOPHAGOGASTRODUODENOSCOPY;  Surgeon: Luis Eduardo Mayers MD;  Location:  [facility];  Service:    • ENDOSCOPY  11/02/2017    DR LUIS EDUARDO MAYERS   • FOOT SURGERY     • GALLBLADDER SURGERY     • KNEE ARTHROSCOPY Bilateral    • PAIN PUMP INSERTION/REVISION     • SPINE SURGERY     • TOTAL HIP ARTHROPLASTY Left    • TOTAL SHOULDER ARTHROPLASTY W/ DISTAL CLAVICLE EXCISION Left 9/10/2018    Procedure: REVERSE TOTAL SHOULDER ARTHROPLASTY LEFT;  Surgeon: Abel Brennan MD;  Location:  [facility];  Service: Orthopedics   • ULNAR NERVE TRANSPOSITION       Family History   Problem Relation Age of Onset   • Arthritis Mother    • Diabetes Mother    • Osteoarthritis Mother    • Colon cancer Father    • Cancer Father       Social History     Socioeconomic History   • Marital status:      Spouse name: Not on file   • Number of children: Not on file   • Years of education: Not on file   • Highest education level: Not on file   Occupational History   •  "Occupation: Retired    Tobacco Use   • Smoking status: Former Smoker     Packs/day: 2.00     Years: 25.00     Pack years: 50.00     Types: Cigarettes     Start date:      Last attempt to quit:      Years since quittin.0   • Smokeless tobacco: Never Used   Substance and Sexual Activity   • Alcohol use: Yes     Drinks per session: 1 or 2     Binge frequency: Monthly   • Drug use: No   • Sexual activity: Defer   Social History Narrative     and lives with wife    Retired supervisor at [employer]    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        Review of Systems   Constitutional: Negative.    HENT: Negative.    Eyes: Negative.    Respiratory: Negative.    Cardiovascular: Negative.    Gastrointestinal: Negative.    Endocrine: Negative.    Genitourinary: Negative.    Musculoskeletal: Positive for arthralgias.   Skin: Negative.    Allergic/Immunologic: Negative.    Neurological: Negative.    Hematological: Negative.    Psychiatric/Behavioral: Negative.        The following portions of the patient's history were reviewed and updated as appropriate: allergies, current medications, past family history, past medical history, past social history, past surgical history and problem list.    Physical Exam:   Pulse 75   Ht 172.7 cm (67.99\")   Wt 66.2 kg (146 lb)   SpO2 (!) 89%   BMI 22.20 kg/m²   GENERAL: Body habitus: normal weight for height    Lower extremity edema: Left: trace; Right: 1+ pitting        Gait: antalgic     Mental Status:  awake and alert; oriented to person, place, and time    Voice:  clear    MSK:          Foot:  Right:  Superficial ulcer over the lateral aspect of the base of the fifth metatarsal, 8 mm in diameter, small scab over the fifth metatarsal head but no open tissue there, both are very tender to palpation, very thin skin with no padding throughout the foot and ankle.;         Medical Decision Making    Data Review:   ordered and reviewed x-rays today    Assessment/Plan/Diagnosis/Treatment " Options:   1. Skin ulcer of left foot, limited to breakdown of skin (CMS/HCC)  He has 2 pressure ulcers, one is a scab and one is an actual open ulcer on the left foot.  They are due to shoe pressure, he has no padding at all.  I want him to put Bactroban on the ulcer daily, gauze, and a loosely wrapped Ace bandage.  I showed him how to do this.  I will see him in 2 weeks for repeat exam.  No signs of any bone involvement on his x-ray today and the alignment from the prior surgery is excellent.

## 2020-02-06 ENCOUNTER — TELEPHONE (OUTPATIENT)
Dept: ORTHOPEDIC SURGERY | Facility: CLINIC | Age: 75
End: 2020-02-06

## 2020-02-06 RX ORDER — CEPHALEXIN 500 MG/1
CAPSULE ORAL
Qty: 84 CAPSULE | Refills: 0 | Status: SHIPPED | OUTPATIENT
Start: 2020-02-06 | End: 2020-03-11

## 2020-02-06 NOTE — TELEPHONE ENCOUNTER
I called the patient in regards to his foot. I let him know that we want to get him in her first thing tomorrow morning and I have placed him on the schedule for 8:40.    Evette

## 2020-02-06 NOTE — TELEPHONE ENCOUNTER
PATIENTS LEFT FOOT IS VERY RED,  SWOLLEN AND PAINFUL .  IT IS HURTING UP HIS LEG.  HE THINKS IT MIGHT BE INFECTED.  HE IS UNABLE TO TOUCH HIS FOOT BECAUSE OF THE PAIN.  HE WANTS TO KNOW WHAT TO DO.

## 2020-02-06 NOTE — TELEPHONE ENCOUNTER
I called the patient to advise that Dr. Hess wanted him to start on Keflex and I called in the prescription to his pharmacy on file. He understood.     Evette

## 2020-02-07 ENCOUNTER — OFFICE VISIT (OUTPATIENT)
Dept: ORTHOPEDIC SURGERY | Facility: CLINIC | Age: 75
End: 2020-02-07

## 2020-02-07 VITALS — HEIGHT: 68 IN | WEIGHT: 145.94 LBS | OXYGEN SATURATION: 98 % | BODY MASS INDEX: 22.12 KG/M2 | HEART RATE: 90 BPM

## 2020-02-07 DIAGNOSIS — L97.521 SKIN ULCER OF LEFT FOOT, LIMITED TO BREAKDOWN OF SKIN (HCC): Primary | ICD-10-CM

## 2020-02-07 PROCEDURE — 99213 OFFICE O/P EST LOW 20 MIN: CPT | Performed by: ORTHOPAEDIC SURGERY

## 2020-02-07 NOTE — PROGRESS NOTES
ESTABLISHED PATIENT    Patient: Flaco Roque II  : 1945    Primary Care Provider: Tayo Hendrix MD    Requesting Provider: As above    Follow-up of the Left Foot (1 week f/u/Skin ulcer of left foot, limited to breakdown of skin)      History    Chief Complaint: Left lateral foot ulcer    History of Present Illness: He returns earlier than expected noting more redness around the lateral fifth metatarsal ulcer.  No fever no chills.  He has had more pain.    Current Outpatient Medications on File Prior to Visit   Medication Sig Dispense Refill   • amitriptyline (ELAVIL) 50 MG tablet Take  mg by mouth Every Night.     • carbidopa-levodopa (SINEMET)  MG per tablet Take 1.5 tablets by mouth 4 (Four) Times a Day.     • cephalexin (KEFLEX) 500 MG capsule Take 1 capsule po QID for 14 days 84 capsule 0   • ipratropium-albuterol (DUO-NEB) 0.5-2.5 mg/3 ml nebulizer Take 3 mL by nebulization Every 4 (Four) Hours As Needed for Wheezing.     • montelukast (SINGULAIR) 10 MG tablet Take 10 mg by mouth every night.     • Multiple Vitamins-Minerals (MULTIVITAMIN ADULT PO) Take 1 tablet by mouth daily.     • mupirocin (BACTROBAN) 2 % ointment Apply  topically to the appropriate area as directed Daily. 30 g 5   • pantoprazole (PROTONIX) 40 MG EC tablet Take 40 mg by mouth 2 (Two) Times a Day.     • tamsulosin (FLOMAX) 0.4 MG capsule 24 hr capsule Take 1 capsule by mouth Daily. 30 capsule 0   • umeclidinium-vilanterol (ANORO ELLIPTA) 62.5-25 MCG/INH aerosol powder  inhaler Inhale 1 puff Daily. 1 each 11     No current facility-administered medications on file prior to visit.       No Known Allergies   Past Medical History:   Diagnosis Date   • Arthropathy of shoulder region 9/10/2018   • Chris's esophagus     Last EGD 1 year ago with Dr Kaye    • BPH (benign prostatic hyperplasia)    • Chronic back pain 10/31/2017   • Chronic low back pain    • COPD (chronic obstructive pulmonary disease) (CMS/[facility])    •  Foot pain    • GERD (gastroesophageal reflux disease)    • History of transfusion    • Injury of back    • Lung abscess (CMS/[facility])    • Osteoarthritis    • Osteoporosis    • Parkinson disease (CMS/[facility])    • Rotator cuff tear, left    • Sleep apnea    • Status post reverse total shoulder replacement, 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     Past Surgical History:   Procedure Laterality Date   • ARTHRODESIS MIDTARSAL / TARSOMETATARSAL / TARSAL NAVICULAR-CUNEIFORM Left 05/10/2016   • BACK SURGERY     • BACK SURGERY      low back   • BUNIONECTOMY Left 4/23/2019    Procedure: left foot excise PIP joints 2,3,4, tenotomies 2,3,4, metatarsal capsulotomy 2,3,4, chevron osteotomy 5th metatarsal, great toe DIP fusion LEFT;  Surgeon: Juhi Calle MD;  Location:  [facility];  Service: Orthopedics   • CATARACT EXTRACTION     • COLONOSCOPY N/A 11/2/2017    Procedure: COLONOSCOPY;  Surgeon: Luis Eduardo Mayers MD;  Location:  [facility];  Service:    • ENDOSCOPY N/A 11/1/2017    Procedure: ESOPHAGOGASTRODUODENOSCOPY;  Surgeon: Luis Eduardo Mayers MD;  Location:  [facility];  Service:    • ENDOSCOPY  11/02/2017    DR LUIS EDUARDO MAYERS   • FOOT SURGERY     • GALLBLADDER SURGERY     • KNEE ARTHROSCOPY Bilateral    • PAIN PUMP INSERTION/REVISION     • SPINE SURGERY     • TOTAL HIP ARTHROPLASTY Left    • TOTAL SHOULDER ARTHROPLASTY W/ DISTAL CLAVICLE EXCISION Left 9/10/2018    Procedure: REVERSE TOTAL SHOULDER ARTHROPLASTY LEFT;  Surgeon: Abel Brennan MD;  Location:  [facility];  Service: Orthopedics   • ULNAR NERVE TRANSPOSITION       Family History   Problem Relation Age of Onset   • Arthritis Mother    • Diabetes Mother    • Osteoarthritis Mother    • Colon cancer Father    • Cancer Father       Social History     Socioeconomic History   • Marital status:      Spouse name: Not on file   • Number of children: Not on file   • Years of education: Not on file   • Highest education level: Not on file   Occupational History   • Occupation: Retired   "  Tobacco Use   • Smoking status: Former Smoker     Packs/day: 2.00     Years: 25.00     Pack years: 50.00     Types: Cigarettes     Start date:      Last attempt to quit:      Years since quittin.1   • Smokeless tobacco: Never Used   Substance and Sexual Activity   • Alcohol use: Yes     Drinks per session: 1 or 2     Binge frequency: Monthly   • Drug use: No   • Sexual activity: Defer   Social History Narrative     and lives with wife    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    HENT: Negative.    Eyes: Negative.    Respiratory: Negative.    Cardiovascular: Negative.    Gastrointestinal: Negative.    Endocrine: Negative.    Genitourinary: Negative.    Musculoskeletal: Positive for arthralgias.   Skin: Negative.    Allergic/Immunologic: Negative.    Neurological: Negative.    Hematological: Negative.    Psychiatric/Behavioral: Negative.        The following portions of the patient's history were reviewed and updated as appropriate: allergies, current medications, past family history, past medical history, past social history, past surgical history and problem list.    Physical Exam:   Pulse 90   Ht 172.7 cm (67.99\")   Wt 66.2 kg (145 lb 15.1 oz)   SpO2 98%   BMI 22.20 kg/m²     Left foot ulcer remains superficial but has a small ring of erythema, no purulence, no fluctuance..              Medical Decision Making    Data Review:   none    Assessment/Plan/Diagnosis/Treatment Options:   1. Skin ulcer of left foot, limited to breakdown of skin (CMS/HCC)  More erythema around the ulcer on the left foot.  There is no fluid nothing to culture.  We started him on Keflex.  Continue to use Bactroban.  Stay out of any shoe that puts pressure on the area.  I will see him again on Monday to see if he is making progress.                            "

## 2020-02-10 ENCOUNTER — OFFICE VISIT (OUTPATIENT)
Dept: ORTHOPEDIC SURGERY | Facility: CLINIC | Age: 75
End: 2020-02-10

## 2020-02-10 ENCOUNTER — LAB (OUTPATIENT)
Dept: LAB | Facility: HOSPITAL | Age: 75
End: 2020-02-10

## 2020-02-10 VITALS — OXYGEN SATURATION: 97 % | HEIGHT: 68 IN | BODY MASS INDEX: 22.12 KG/M2 | HEART RATE: 105 BPM | WEIGHT: 145.94 LBS

## 2020-02-10 DIAGNOSIS — L97.521 SKIN ULCER OF LEFT FOOT, LIMITED TO BREAKDOWN OF SKIN (HCC): ICD-10-CM

## 2020-02-10 DIAGNOSIS — L97.521 SKIN ULCER OF LEFT FOOT, LIMITED TO BREAKDOWN OF SKIN (HCC): Primary | ICD-10-CM

## 2020-02-10 PROCEDURE — 87205 SMEAR GRAM STAIN: CPT

## 2020-02-10 PROCEDURE — 87075 CULTR BACTERIA EXCEPT BLOOD: CPT

## 2020-02-10 PROCEDURE — 99212 OFFICE O/P EST SF 10 MIN: CPT | Performed by: ORTHOPAEDIC SURGERY

## 2020-02-10 PROCEDURE — 87070 CULTURE OTHR SPECIMN AEROBIC: CPT

## 2020-02-11 ENCOUNTER — TELEPHONE (OUTPATIENT)
Dept: ORTHOPEDIC SURGERY | Facility: CLINIC | Age: 75
End: 2020-02-11

## 2020-02-11 NOTE — TELEPHONE ENCOUNTER
I called [facility] in regards to the Rocephin that the patient called in regards. They advised me that the patient is going to be getting IV antibiotics with them. I was unable to get in contact with the patient to advise him of this. I will attempt to contact him again.    Evette

## 2020-02-11 NOTE — TELEPHONE ENCOUNTER
PATIENT REVIEWED AFTER VISIT SUMMARY AND CONCLUDED HE WAS SUPPOSED TO GET A PRESCRIPTION FOR ROCEPHINE 2MG. PATIENT CALLED PHARMACY AND THE MEDICATION WAS NOT THERE. PLEASE CLARIFY IF THIS WAS INCLUDED ON THE AFTER VISIT SUMMARY DUE TO HIM TAKING THIS MEDICATION IN THE PAST OR IF HE NEEDS TO TAKE.

## 2020-02-12 ENCOUNTER — OFFICE VISIT (OUTPATIENT)
Dept: ORTHOPEDIC SURGERY | Facility: CLINIC | Age: 75
End: 2020-02-12

## 2020-02-12 VITALS — WEIGHT: 145.94 LBS | HEART RATE: 105 BPM | OXYGEN SATURATION: 99 % | BODY MASS INDEX: 22.12 KG/M2 | HEIGHT: 68 IN

## 2020-02-12 DIAGNOSIS — L97.521 SKIN ULCER OF LEFT FOOT, LIMITED TO BREAKDOWN OF SKIN (HCC): Primary | ICD-10-CM

## 2020-02-12 LAB
BACTERIA SPEC AEROBE CULT: NORMAL
GRAM STN SPEC: NORMAL
GRAM STN SPEC: NORMAL

## 2020-02-12 PROCEDURE — 99212 OFFICE O/P EST SF 10 MIN: CPT | Performed by: ORTHOPAEDIC SURGERY

## 2020-02-12 RX ORDER — BUDESONIDE AND FORMOTEROL FUMARATE DIHYDRATE 160; 4.5 UG/1; UG/1
AEROSOL RESPIRATORY (INHALATION) EVERY 12 HOURS SCHEDULED
COMMUNITY
End: 2020-02-18

## 2020-02-12 NOTE — PROGRESS NOTES
ESTABLISHED PATIENT    Patient: Flaco Roque II  : 1945    Primary Care Provider: Tayo Hendrix MD    Requesting Provider: As above    Follow-up (2 day follow up for Skin ulcer of left foot)      History    Chief Complaint: left foot ulcer     History of Present Illness: less pain now that he got IV antibiotics at [facility], continuing.      Current Outpatient Medications on File Prior to Visit   Medication Sig Dispense Refill   • amitriptyline (ELAVIL) 50 MG tablet Take  mg by mouth Every Night.     • budesonide-formoterol (SYMBICORT) 160-4.5 MCG/ACT inhaler Every 12 (Twelve) Hours.     • carbidopa-levodopa (SINEMET)  MG per tablet Take 1.5 tablets by mouth 4 (Four) Times a Day.     • cephalexin (KEFLEX) 500 MG capsule Take 1 capsule po QID for 14 days 84 capsule 0   • ipratropium-albuterol (DUO-NEB) 0.5-2.5 mg/3 ml nebulizer Take 3 mL by nebulization Every 4 (Four) Hours As Needed for Wheezing.     • montelukast (SINGULAIR) 10 MG tablet Take 10 mg by mouth every night.     • Multiple Vitamins-Minerals (MULTIVITAMIN ADULT PO) Take 1 tablet by mouth daily.     • mupirocin (BACTROBAN) 2 % ointment Apply  topically to the appropriate area as directed Daily. 30 g 5   • pantoprazole (PROTONIX) 40 MG EC tablet Take 40 mg by mouth 2 (Two) Times a Day.     • tamsulosin (FLOMAX) 0.4 MG capsule 24 hr capsule Take 1 capsule by mouth Daily. 30 capsule 0   • umeclidinium-vilanterol (ANORO ELLIPTA) 62.5-25 MCG/INH aerosol powder  inhaler Inhale 1 puff Daily. 1 each 11     No current facility-administered medications on file prior to visit.       No Known Allergies   Past Medical History:   Diagnosis Date   • Arthropathy of shoulder region 9/10/2018   • Chris's esophagus     Last EGD 1 year ago with Dr Kaye    • BPH (benign prostatic hyperplasia)    • Chronic back pain 10/31/2017   • Chronic low back pain    • COPD (chronic obstructive pulmonary disease) (CMS/HCC)    • Foot pain    • GERD  (gastroesophageal reflux disease)    • History of transfusion    • Injury of back    • Lung abscess (CMS/[facility])    • Osteoarthritis    • Osteoporosis    • Parkinson disease (CMS/HCC)    • Rotator cuff tear, left    • Sleep apnea    • Status post reverse total shoulder replacement, 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     Past Surgical History:   Procedure Laterality Date   • ARTHRODESIS MIDTARSAL / TARSOMETATARSAL / TARSAL NAVICULAR-CUNEIFORM Left 05/10/2016   • BACK SURGERY     • BACK SURGERY      low back   • BUNIONECTOMY Left 4/23/2019    Procedure: left foot excise PIP joints 2,3,4, tenotomies 2,3,4, metatarsal capsulotomy 2,3,4, chevron osteotomy 5th metatarsal, great toe DIP fusion LEFT;  Surgeon: Juhi Calle MD;  Location:  [facility];  Service: Orthopedics   • CATARACT EXTRACTION     • COLONOSCOPY N/A 11/2/2017    Procedure: COLONOSCOPY;  Surgeon: Luis Eduardo Mayers MD;  Location:  [facility];  Service:    • ENDOSCOPY N/A 11/1/2017    Procedure: ESOPHAGOGASTRODUODENOSCOPY;  Surgeon: Luis Eduardo Mayers MD;  Location:  [facility];  Service:    • ENDOSCOPY  11/02/2017    DR LUIS EDUARDO MAYERS   • FOOT SURGERY     • GALLBLADDER SURGERY     • KNEE ARTHROSCOPY Bilateral    • PAIN PUMP INSERTION/REVISION     • SPINE SURGERY     • TOTAL HIP ARTHROPLASTY Left    • TOTAL SHOULDER ARTHROPLASTY W/ DISTAL CLAVICLE EXCISION Left 9/10/2018    Procedure: REVERSE TOTAL SHOULDER ARTHROPLASTY LEFT;  Surgeon: Abel Brennan MD;  Location:  [facility];  Service: Orthopedics   • ULNAR NERVE TRANSPOSITION       Family History   Problem Relation Age of Onset   • Arthritis Mother    • Diabetes Mother    • Osteoarthritis Mother    • Colon cancer Father    • Cancer Father       Social History     Socioeconomic History   • Marital status:      Spouse name: Not on file   • Number of children: Not on file   • Years of education: Not on file   • Highest education level: Not on file   Occupational History   • Occupation: Retired    Tobacco Use   • Smoking  "status: Former Smoker     Packs/day: 2.00     Years: 25.00     Pack years: 50.00     Types: Cigarettes     Start date:      Last attempt to quit:      Years since quittin.1   • Smokeless tobacco: Never Used   Substance and Sexual Activity   • Alcohol use: Yes     Drinks per session: 1 or 2     Binge frequency: Monthly   • Drug use: No   • Sexual activity: Defer   Social History Narrative     and lives with wife    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    HENT: Negative.    Eyes: Negative.    Respiratory: Negative.    Cardiovascular: Negative.    Gastrointestinal: Negative.    Endocrine: Negative.    Genitourinary: Negative.    Musculoskeletal: Positive for arthralgias.   Skin: Negative.    Allergic/Immunologic: Negative.    Neurological: Negative.    Hematological: Negative.    Psychiatric/Behavioral: Negative.        The following portions of the patient's history were reviewed and updated as appropriate: allergies, current medications, past family history, past medical history, past social history, past surgical history and problem list.    Physical Exam:   Pulse 105   Ht 172.7 cm (67.99\")   Wt 66.2 kg (145 lb 15.1 oz)   SpO2 99%   BMI 22.20 kg/m²       Left foot ulcer still has ring of faint erythema, but no purulence, less tender, no fluctuance    Medical Decision Making    Data Review:   reviewed prior lab results- culture only grew rare skin santiago    Assessment/Plan/Diagnosis/Treatment Options:   1. Skin ulcer of left foot, limited to breakdown of skin (CMS/[facility])  Appreciate Dr Beach and [facility] input.  See Ms Cody in 2 weeks, continue Bactroban, no pressure on it.                              "

## 2020-02-12 NOTE — TELEPHONE ENCOUNTER
Mr. Roque came in for his appointment today with Dr. Hess. I advised Briana who was working with Dr. Hess to let the patient know that his Rocephin medication is actually his IV anti-biotics.     Evette

## 2020-02-15 LAB — BACTERIA SPEC ANAEROBE CULT: NORMAL

## 2020-02-17 ENCOUNTER — TRANSCRIBE ORDERS (OUTPATIENT)
Dept: ADMINISTRATIVE | Facility: HOSPITAL | Age: 75
End: 2020-02-17

## 2020-02-17 DIAGNOSIS — L03.116 CELLULITIS OF LEFT FOOT: ICD-10-CM

## 2020-02-17 DIAGNOSIS — L03.115 CELLULITIS OF RIGHT FOOT: Primary | ICD-10-CM

## 2020-02-17 NOTE — PROGRESS NOTES
[facility] at [facility]  Follow Up Visit  Flaco Roque II  1945    VISIT DATE:  02/18/20    PCP:   Tayo Hendrix MD  [address] [place], [place] [id number]      CC:  Shortness of Breath      Problem List:  1. COPD  2. Dyspnea on Exertion  3.  Parkinson's disease    Regadenoson Stress Test With Myocardial Perfusion SPECT (Multi Study) December 2019  · Patient denied chest pain  · There were no ischemic EKG changes with Lexiscan. There were occasional PVCs and periods of ventricular bigeminy  · Myocardial perfusion imaging indicates a normal myocardial perfusion study with no evidence of ischemia. No TID  · Left ventricular ejection fraction is normal (Calculated EF = 70%).  · There is mild coronary artery calcification present  · Impressions are consistent with a low risk study.    Echocardiogram May 2019  · Left ventricular systolic function is normal.  · Estimated EF appears to be in the range of 66 - 70%.  · Indeterminate diastolic function  · Mild tricuspid regurgitation   · Aortic valve sclerosis without stenosis    History of Present Illness:  Flaco Roque II  Is a 74 y.o. male with pertinent cardiac history detailed above.  Following up after stress test.  This was ordered to evaluate dyspnea on exertion.  He does have known COPD.  Stress test showed no ischemia and post recent echo showed normal LV function, indeterminate diastolic function, normal RVSP less than 35.    Since last visit the patient states his dyspnea on exertion seems somewhat improved.  He uses an exercise bike every morning.  Main problem recently has been a painful ulcer on the left side of his foot.  He has seen infectious disease and orthopedics for this.  Has completed a round of IV antibiotics.  He states he had an attempted drainage but not much pus was expressed.  He does have new bilateral lower extremity edema which he states is just new over the last 4 to 5 days.  Denies orthopnea.   Has no known history of peripheral arterial disease.  Nondiabetic.      Patient Active Problem List    Diagnosis Date Noted   • Skin ulcer of left foot, limited to breakdown of skin (CMS/HCC) 2020   • Interstitial lung disease (CMS/HCC) 2019   • Anemia 2019   • Parkinson disease (CMS/HCC) 2019   • S/P foot surgery, left 2019   • Deformity of left foot 04/10/2019     Note Last Updated: 4/10/2019     Added automatically from request for surgery [id number]     • Leukocytosis, likely reactive 2018   • Acute postoperative pain 2018   • Foot deformity, acquired, left 2018     Note Last Updated: 2018     Added automatically from request for surgery [id number]     • ABRIL (obstructive sleep apnea) 10/31/2017     Note Last Updated: 2019     CPAP intolerant     • Pulmonary emphysema (CMS/HCC) 10/31/2017   • Acute blood loss anemia 10/31/2017       No Known Allergies    Social History     Socioeconomic History   • Marital status:      Spouse name: Not on file   • Number of children: Not on file   • Years of education: Not on file   • Highest education level: Not on file   Occupational History   • Occupation: Retired    Tobacco Use   • Smoking status: Former Smoker     Packs/day: 2.00     Years: 25.00     Pack years: 50.00     Types: Cigarettes     Start date:      Last attempt to quit:      Years since quittin.1   • Smokeless tobacco: Never Used   Substance and Sexual Activity   • Alcohol use: Yes     Drinks per session: 1 or 2     Binge frequency: Monthly   • Drug use: No   • Sexual activity: Defer   Social History Narrative     and lives with wife    Retired supervisor at [employer]    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       Family History   Problem Relation Age of Onset   • Arthritis Mother    • Diabetes Mother    • Osteoarthritis Mother    • Colon cancer Father    • Cancer Father        Current Medications:    Current Outpatient Medications:   •   "amitriptyline (ELAVIL) 50 MG tablet, Take  mg by mouth Every Night., Disp: , Rfl:   •  carbidopa-levodopa (SINEMET)  MG per tablet, Take 1.5 tablets by mouth 4 (Four) Times a Day., Disp: , Rfl:   •  cephalexin (KEFLEX) 500 MG capsule, Take 1 capsule po QID for 14 days, Disp: 84 capsule, Rfl: 0  •  ipratropium-albuterol (DUO-NEB) 0.5-2.5 mg/3 ml nebulizer, Take 3 mL by nebulization Every 4 (Four) Hours As Needed for Wheezing., Disp: , Rfl:   •  montelukast (SINGULAIR) 10 MG tablet, Take 10 mg by mouth every night., Disp: , Rfl:   •  Multiple Vitamins-Minerals (MULTIVITAMIN ADULT PO), Take 1 tablet by mouth daily., Disp: , Rfl:   •  mupirocin (BACTROBAN) 2 % ointment, Apply  topically to the appropriate area as directed Daily., Disp: 30 g, Rfl: 5  •  pantoprazole (PROTONIX) 40 MG EC tablet, Take 40 mg by mouth 2 (Two) Times a Day., Disp: , Rfl:   •  tamsulosin (FLOMAX) 0.4 MG capsule 24 hr capsule, Take 1 capsule by mouth Daily., Disp: 30 capsule, Rfl: 0  •  umeclidinium-vilanterol (ANORO ELLIPTA) 62.5-25 MCG/INH aerosol powder  inhaler, Inhale 1 puff Daily., Disp: 1 each, Rfl: 11  •  doxycycline (MONODOX) 100 MG capsule, 2 (Two) Times a Day., Disp: , Rfl:   •  furosemide (LASIX) 20 MG tablet, Take 1 tablet by mouth Daily., Disp: 90 tablet, Rfl: 3     Review of Systems   Cardiovascular: Positive for leg swelling. Negative for chest pain, dyspnea on exertion, irregular heartbeat, near-syncope, palpitations and syncope.   Skin: Positive for poor wound healing (Left-sided foot ulcer).   All other systems reviewed and are negative.      Vitals:    02/18/20 1048   BP: 120/60   BP Location: Left arm   Patient Position: Sitting   Pulse: 93   SpO2: 97%   Weight: 70.3 kg (155 lb)   Height: 172.7 cm (68\")       Physical Exam   Constitutional: He is oriented to person, place, and time. He appears well-developed and well-nourished. No distress.   Neck: Neck supple.   Cardiovascular: Normal rate and regular rhythm.  " Occasional extrasystoles are present.   No murmur heard.  2+ pedal pulses bilaterally   Pulmonary/Chest: Effort normal. He has no wheezes. He has no rales.   Abdominal: Soft.   Musculoskeletal: He exhibits edema (1+ lower extremity edema bilaterally).   Neurological: He is alert and oriented to person, place, and time.       Diagnostic Data:  Procedures  No results found for: CHLPL, TRIG, HDL, LDLDIRECT  Lab Results   Component Value Date    GLUCOSE 114 (H) 08/20/2019    BUN 17 08/20/2019    CREATININE 0.79 08/20/2019     (L) 08/20/2019    K 3.8 08/20/2019     08/20/2019    CO2 22.0 08/20/2019     Lab Results   Component Value Date    HGBA1C 5.40 04/10/2019     Lab Results   Component Value Date    WBC 14.93 (H) 08/20/2019    HGB 9.3 (L) 08/20/2019    HCT 32.7 (L) 08/20/2019     08/20/2019     LIPID PANEL 12/11/2019   Total Cholesterol 103   HDL 63   Triglycerides 71   LDL 26   VLDL Not listed         Assessment:   Diagnosis Plan   1. Ulcer of left foot, unspecified ulcer stage (CMS/HCC)  Doppler Arterial Multi Level Lower Extremity - Bilateral CAR   2. Other specified symptoms and signs involving the circulatory and respiratory systems   Doppler Arterial Multi Level Lower Extremity - Bilateral CAR       Plan:    1  Dyspnea on exertion, with lower extremity edema  -Echo earlier this year showed ejection fraction 66 to 70% with no evidence of pulmonary hypertension and no significant valve disease, indeterminant diastolic dysfunction  -It may be that his symptoms are predominantly pulmonary however currently appears improved  -Stress test with no evidence of ischemia  -Total cholesterol 103 with LDL of 26, can hold off on statin therapy at this time  -We will add Lasix 20 mg daily given his new onset lower extremity edema which might be exacerbating his foot ulcer        2.  COPD  -At present it appears his symptoms are mostly pulmonary in nature without evidence of ischemia on stress test     3.   Foot ulcer  -Patient has palpable pulses bilaterally but will check KAMRYN to look for any PAD  -LDL is very well controlled    Von Kilpatrick MD

## 2020-02-18 ENCOUNTER — OFFICE VISIT (OUTPATIENT)
Dept: CARDIOLOGY | Facility: CLINIC | Age: 75
End: 2020-02-18

## 2020-02-18 VITALS
HEIGHT: 68 IN | DIASTOLIC BLOOD PRESSURE: 60 MMHG | SYSTOLIC BLOOD PRESSURE: 120 MMHG | HEART RATE: 93 BPM | BODY MASS INDEX: 23.49 KG/M2 | WEIGHT: 155 LBS | OXYGEN SATURATION: 97 %

## 2020-02-18 DIAGNOSIS — R09.89 OTHER SPECIFIED SYMPTOMS AND SIGNS INVOLVING THE CIRCULATORY AND RESPIRATORY SYSTEMS: ICD-10-CM

## 2020-02-18 DIAGNOSIS — L97.529 ULCER OF LEFT FOOT, UNSPECIFIED ULCER STAGE (HCC): Primary | ICD-10-CM

## 2020-02-18 PROCEDURE — 99213 OFFICE O/P EST LOW 20 MIN: CPT | Performed by: INTERNAL MEDICINE

## 2020-02-18 RX ORDER — FUROSEMIDE 20 MG/1
20 TABLET ORAL DAILY
Qty: 90 TABLET | Refills: 3 | Status: SHIPPED | OUTPATIENT
Start: 2020-02-18 | End: 2020-08-12

## 2020-02-18 RX ORDER — DOXYCYCLINE 100 MG/1
CAPSULE ORAL 2 TIMES DAILY
COMMUNITY
Start: 2020-02-17 | End: 2020-03-11

## 2020-02-21 ENCOUNTER — HOSPITAL ENCOUNTER (OUTPATIENT)
Dept: NUCLEAR MEDICINE | Facility: HOSPITAL | Age: 75
Discharge: HOME | End: 2020-02-21

## 2020-02-21 DIAGNOSIS — L03.116 CELLULITIS OF LEFT FOOT: ICD-10-CM

## 2020-02-21 DIAGNOSIS — L03.115 CELLULITIS OF RIGHT FOOT: ICD-10-CM

## 2020-02-21 PROCEDURE — 78315 BONE IMAGING 3 PHASE: CPT

## 2020-02-21 PROCEDURE — 0 TECHNETIUM MEDRONATE KIT: Performed by: INTERNAL MEDICINE

## 2020-02-21 PROCEDURE — A9503 TC99M MEDRONATE: HCPCS | Performed by: INTERNAL MEDICINE

## 2020-02-21 RX ORDER — TC 99M MEDRONATE 20 MG/10ML
21.8 INJECTION, POWDER, LYOPHILIZED, FOR SOLUTION INTRAVENOUS
Status: COMPLETED | OUTPATIENT
Start: 2020-02-21 | End: 2020-02-21

## 2020-02-21 RX ADMIN — Medication 21.8 MILLICURIE: at 11:30

## 2020-02-26 ENCOUNTER — OFFICE VISIT (OUTPATIENT)
Dept: ORTHOPEDIC SURGERY | Facility: CLINIC | Age: 75
End: 2020-02-26

## 2020-02-26 VITALS — HEART RATE: 88 BPM | OXYGEN SATURATION: 94 % | WEIGHT: 154.98 LBS | HEIGHT: 68 IN | BODY MASS INDEX: 23.49 KG/M2

## 2020-02-26 DIAGNOSIS — L97.521 SKIN ULCER OF LEFT FOOT, LIMITED TO BREAKDOWN OF SKIN (HCC): Primary | ICD-10-CM

## 2020-02-26 PROCEDURE — 99212 OFFICE O/P EST SF 10 MIN: CPT | Performed by: PHYSICIAN ASSISTANT

## 2020-02-26 NOTE — PROGRESS NOTES
[facility]    Subjective     Patient: Flaco Roque II  : 1945    Primary Care Provider: Tayo Hendrix MD    Requesting Provider: As above    Follow-up of the Left Foot (2 week f/u/Skin ulcer of left foot)      History    Chief Complaint: Follow-up left foot ulcer    History of Present Illness: Patient returns today for follow-up of his left lateral foot ulcer.  He continues to wear the slipper.  He has an appointment with ID today.    Current Outpatient Medications on File Prior to Visit   Medication Sig Dispense Refill   • amitriptyline (ELAVIL) 50 MG tablet Take  mg by mouth Every Night.     • carbidopa-levodopa (SINEMET)  MG per tablet Take 1.5 tablets by mouth 4 (Four) Times a Day.     • cephalexin (KEFLEX) 500 MG capsule Take 1 capsule po QID for 14 days 84 capsule 0   • doxycycline (MONODOX) 100 MG capsule 2 (Two) Times a Day.     • furosemide (LASIX) 20 MG tablet Take 1 tablet by mouth Daily. 90 tablet 3   • ipratropium-albuterol (DUO-NEB) 0.5-2.5 mg/3 ml nebulizer Take 3 mL by nebulization Every 4 (Four) Hours As Needed for Wheezing.     • montelukast (SINGULAIR) 10 MG tablet Take 10 mg by mouth every night.     • Multiple Vitamins-Minerals (MULTIVITAMIN ADULT PO) Take 1 tablet by mouth daily.     • mupirocin (BACTROBAN) 2 % ointment Apply  topically to the appropriate area as directed Daily. 30 g 5   • pantoprazole (PROTONIX) 40 MG EC tablet Take 40 mg by mouth 2 (Two) Times a Day.     • tamsulosin (FLOMAX) 0.4 MG capsule 24 hr capsule Take 1 capsule by mouth Daily. 30 capsule 0   • umeclidinium-vilanterol (ANORO ELLIPTA) 62.5-25 MCG/INH aerosol powder  inhaler Inhale 1 puff Daily. 1 each 11     No current facility-administered medications on file prior to visit.       No Known Allergies   Past Medical History:   Diagnosis Date   • Arthropathy of shoulder region 9/10/2018   • Chris's esophagus     Last EGD 1 year ago with Dr Kaye    • BPH  (benign prostatic hyperplasia)    • Chronic back pain 10/31/2017   • Chronic low back pain    • COPD (chronic obstructive pulmonary disease) (CMS/[facility])    • Foot pain    • GERD (gastroesophageal reflux disease)    • History of transfusion    • Injury of back    • Lung abscess (CMS/[facility])    • Osteoarthritis    • Osteoporosis    • Parkinson disease (CMS/[facility])    • Rotator cuff tear, left    • Sleep apnea    • Status post reverse total shoulder replacement, 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     Past Surgical History:   Procedure Laterality Date   • ARTHRODESIS MIDTARSAL / TARSOMETATARSAL / TARSAL NAVICULAR-CUNEIFORM Left 05/10/2016   • BACK SURGERY     • BACK SURGERY      low back   • BUNIONECTOMY Left 4/23/2019    Procedure: left foot excise PIP joints 2,3,4, tenotomies 2,3,4, metatarsal capsulotomy 2,3,4, chevron osteotomy 5th metatarsal, great toe DIP fusion LEFT;  Surgeon: Juhi Calle MD;  Location:  [facility];  Service: Orthopedics   • CATARACT EXTRACTION     • COLONOSCOPY N/A 11/2/2017    Procedure: COLONOSCOPY;  Surgeon: Luis Eduardo Mayers MD;  Location:  [facility];  Service:    • ENDOSCOPY N/A 11/1/2017    Procedure: ESOPHAGOGASTRODUODENOSCOPY;  Surgeon: Luis Eduardo Mayers MD;  Location:  [facility];  Service:    • ENDOSCOPY  11/02/2017    DR LUIS EDUARDO MAYERS   • FOOT SURGERY     • GALLBLADDER SURGERY     • KNEE ARTHROSCOPY Bilateral    • PAIN PUMP INSERTION/REVISION     • SPINE SURGERY     • TOTAL HIP ARTHROPLASTY Left    • TOTAL SHOULDER ARTHROPLASTY W/ DISTAL CLAVICLE EXCISION Left 9/10/2018    Procedure: REVERSE TOTAL SHOULDER ARTHROPLASTY LEFT;  Surgeon: Abel Brennan MD;  Location:  [facility];  Service: Orthopedics   • ULNAR NERVE TRANSPOSITION       Family History   Problem Relation Age of Onset   • Arthritis Mother    • Diabetes Mother    • Osteoarthritis Mother    • Colon cancer Father    • Cancer Father       Social History     Socioeconomic History   • Marital status:      Spouse name: Not on file   • Number  "of children: Not on file   • Years of education: Not on file   • Highest education level: Not on file   Occupational History   • Occupation: Retired    Tobacco Use   • Smoking status: Former Smoker     Packs/day: 2.00     Years: 25.00     Pack years: 50.00     Types: Cigarettes     Start date:      Last attempt to quit:      Years since quittin.1   • Smokeless tobacco: Never Used   Substance and Sexual Activity   • Alcohol use: Yes     Drinks per session: 1 or 2     Binge frequency: Monthly   • Drug use: No   • Sexual activity: Defer   Social History Narrative     and lives with wife    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    HENT: Negative.    Eyes: Negative.    Respiratory: Negative.    Cardiovascular: Negative.    Gastrointestinal: Negative.    Endocrine: Negative.    Genitourinary: Negative.    Musculoskeletal: Positive for arthralgias.   Skin: Negative.    Allergic/Immunologic: Negative.    Neurological: Negative.    Hematological: Negative.    Psychiatric/Behavioral: Negative.        The following portions of the patient's history were reviewed and updated as appropriate: allergies, current medications, past family history, past medical history, past social history, past surgical history and problem list.      Objective      Physical Exam  Pulse 88   Ht 172.7 cm (67.99\")   Wt 70.3 kg (154 lb 15.7 oz)   SpO2 94%   BMI 23.57 kg/m²     Body mass index is 23.57 kg/m².    Patient is well developed, well nourished and in no acute distress.  Alert and oriented x 3.    Ortho Exam    Left lateral skin ulcer continues to heal.  He has a small ulcer now at the fifth metatarsal head.  With just mild erythema.  No sign of infection in the foot.  Still very tender.  **    *    Medical Decision Making    Data Review:   none    Assessment:  1. Skin ulcer of left foot, limited to breakdown of skin (CMS/[facility])        Plan:  Left " lateral foot skin ulcers continue to heal.  There is what looks to be a new ulcer at the fifth metatarsal head laterally.  Patient will continue with Bactroban dressings and keeping pressure off of it.  He will see ID today.  I will see him back in 2 weeks or sooner if needed.      Awilda Ross PA-C  02/27/20  11:15 AM

## 2020-03-04 ENCOUNTER — HOSPITAL ENCOUNTER (OUTPATIENT)
Dept: INFUSION THERAPY | Facility: HOSPITAL | Age: 75
Discharge: HOME | End: 2020-03-04
Admitting: INTERNAL MEDICINE

## 2020-03-04 ENCOUNTER — TRANSCRIBE ORDERS (OUTPATIENT)
Dept: ADMINISTRATIVE | Facility: HOSPITAL | Age: 75
End: 2020-03-04

## 2020-03-04 VITALS
DIASTOLIC BLOOD PRESSURE: 70 MMHG | WEIGHT: 154.98 LBS | HEART RATE: 87 BPM | BODY MASS INDEX: 23.49 KG/M2 | TEMPERATURE: 97.6 F | OXYGEN SATURATION: 94 % | SYSTOLIC BLOOD PRESSURE: 108 MMHG | RESPIRATION RATE: 16 BRPM | HEIGHT: 68 IN

## 2020-03-04 DIAGNOSIS — M86.172 ACUTE OSTEOMYELITIS OF LEFT ANKLE OR FOOT (HCC): ICD-10-CM

## 2020-03-04 DIAGNOSIS — M86.172 ACUTE OSTEOMYELITIS OF LEFT ANKLE OR FOOT (HCC): Primary | ICD-10-CM

## 2020-03-04 PROCEDURE — C1894 INTRO/SHEATH, NON-LASER: HCPCS

## 2020-03-04 PROCEDURE — C1751 CATH, INF, PER/CENT/MIDLINE: HCPCS

## 2020-03-04 RX ORDER — SODIUM CHLORIDE 0.9 % (FLUSH) 0.9 %
10 SYRINGE (ML) INJECTION AS NEEDED
Status: DISCONTINUED | OUTPATIENT
Start: 2020-03-04 | End: 2020-03-06 | Stop reason: HOSPADM

## 2020-03-04 RX ORDER — SODIUM CHLORIDE 0.9 % (FLUSH) 0.9 %
10 SYRINGE (ML) INJECTION EVERY 12 HOURS SCHEDULED
Status: DISCONTINUED | OUTPATIENT
Start: 2020-03-04 | End: 2020-03-06 | Stop reason: HOSPADM

## 2020-03-04 NOTE — NURSING NOTE
1230-  Here for PICC placement. Denies c/o's, very pleasant. History reviewed.    1310-  PICC placed rt upper arm, occlusive dressing intact, CDI. Pt  Denies c/o's. Discharged to home.

## 2020-03-11 ENCOUNTER — OFFICE VISIT (OUTPATIENT)
Dept: ORTHOPEDIC SURGERY | Facility: CLINIC | Age: 75
End: 2020-03-11

## 2020-03-11 VITALS — BODY MASS INDEX: 23.49 KG/M2 | WEIGHT: 154.98 LBS | HEIGHT: 68 IN

## 2020-03-11 DIAGNOSIS — L97.521 SKIN ULCER OF LEFT FOOT, LIMITED TO BREAKDOWN OF SKIN (HCC): Primary | ICD-10-CM

## 2020-03-11 PROCEDURE — 99212 OFFICE O/P EST SF 10 MIN: CPT | Performed by: PHYSICIAN ASSISTANT

## 2020-03-11 NOTE — PROGRESS NOTES
[facility] Note    Subjective     Patient: Flaco Roque II  : 1945    Primary Care Provider: Tayo Hendrix MD    Requesting Provider: As above    Follow-up (2 week follow up; Skin ulcer of left foot, limited to breakdown of skin )      History    Chief Complaint: Follow-up left foot ulcer    History of Present Illness: Patient returns today for his left lateral foot ulcer.  He is currently on IV antibiotics prescribed by Dr. Beach.  He reports no change.  He denies any fever chills or constitutional symptoms.  He continues in his slipper.    Current Outpatient Medications on File Prior to Visit   Medication Sig Dispense Refill   • amitriptyline (ELAVIL) 50 MG tablet Take  mg by mouth Every Night.     • carbidopa-levodopa (SINEMET)  MG per tablet Take 1.5 tablets by mouth 4 (Four) Times a Day.     • furosemide (LASIX) 20 MG tablet Take 1 tablet by mouth Daily. 90 tablet 3   • ipratropium-albuterol (DUO-NEB) 0.5-2.5 mg/3 ml nebulizer Take 3 mL by nebulization Every 4 (Four) Hours As Needed for Wheezing.     • montelukast (SINGULAIR) 10 MG tablet Take 10 mg by mouth every night.     • Multiple Vitamins-Minerals (MULTIVITAMIN ADULT PO) Take 1 tablet by mouth daily.     • mupirocin (BACTROBAN) 2 % ointment Apply  topically to the appropriate area as directed Daily. 30 g 5   • pantoprazole (PROTONIX) 40 MG EC tablet Take 40 mg by mouth 2 (Two) Times a Day.     • tamsulosin (FLOMAX) 0.4 MG capsule 24 hr capsule Take 1 capsule by mouth Daily. 30 capsule 0   • umeclidinium-vilanterol (ANORO ELLIPTA) 62.5-25 MCG/INH aerosol powder  inhaler Inhale 1 puff Daily. 1 each 11   • [DISCONTINUED] cephalexin (KEFLEX) 500 MG capsule Take 1 capsule po QID for 14 days 84 capsule 0   • [DISCONTINUED] doxycycline (MONODOX) 100 MG capsule 2 (Two) Times a Day.       No current facility-administered medications on file prior to visit.       No Known Allergies   Past Medical History:    Diagnosis Date   • Arthropathy of shoulder region 9/10/2018   • Chris's esophagus     Last EGD 1 year ago with Dr Kaye    • BPH (benign prostatic hyperplasia)    • Chronic back pain 10/31/2017   • Chronic low back pain    • COPD (chronic obstructive pulmonary disease) (CMS/[facility])    • Foot pain    • GERD (gastroesophageal reflux disease)    • History of transfusion    • Injury of back    • Lung abscess (CMS/[facility])    • Osteoarthritis    • Osteoporosis    • Parkinson disease (CMS/[facility])    • Rotator cuff tear, left    • Sleep apnea    • Status post reverse total shoulder replacement, 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     Past Surgical History:   Procedure Laterality Date   • ARTHRODESIS MIDTARSAL / TARSOMETATARSAL / TARSAL NAVICULAR-CUNEIFORM Left 05/10/2016   • BACK SURGERY     • BACK SURGERY      low back   • BUNIONECTOMY Left 4/23/2019    Procedure: left foot excise PIP joints 2,3,4, tenotomies 2,3,4, metatarsal capsulotomy 2,3,4, chevron osteotomy 5th metatarsal, great toe DIP fusion LEFT;  Surgeon: Juhi Calle MD;  Location:  [facility];  Service: Orthopedics   • CATARACT EXTRACTION     • COLONOSCOPY N/A 11/2/2017    Procedure: COLONOSCOPY;  Surgeon: Luis Eduardo Capellan MD;  Location:  [facility];  Service:    • ENDOSCOPY N/A 11/1/2017    Procedure: ESOPHAGOGASTRODUODENOSCOPY;  Surgeon: Luis Eduardo Capellan MD;  Location:  [facility];  Service:    • ENDOSCOPY  11/02/2017    DR LUIS EDUARDO CAPELLAN   • FOOT SURGERY     • GALLBLADDER SURGERY     • KNEE ARTHROSCOPY Bilateral    • PAIN PUMP INSERTION/REVISION     • SPINE SURGERY     • TOTAL HIP ARTHROPLASTY Left    • TOTAL SHOULDER ARTHROPLASTY W/ DISTAL CLAVICLE EXCISION Left 9/10/2018    Procedure: REVERSE TOTAL SHOULDER ARTHROPLASTY LEFT;  Surgeon: Abel Brennan MD;  Location:  [facility];  Service: Orthopedics   • ULNAR NERVE TRANSPOSITION       Family History   Problem Relation Age of Onset   • Arthritis Mother    • Diabetes Mother    • Osteoarthritis Mother    • Colon cancer Father   "  • Cancer Father       Social History     Socioeconomic History   • Marital status:      Spouse name: Not on file   • Number of children: Not on file   • Years of education: Not on file   • Highest education level: Not on file   Occupational History   • Occupation: Retired    Tobacco Use   • Smoking status: Former Smoker     Packs/day: 2.00     Years: 25.00     Pack years: 50.00     Types: Cigarettes     Start date:      Last attempt to quit:      Years since quittin.2   • Smokeless tobacco: Never Used   Substance and Sexual Activity   • Alcohol use: Yes     Drinks per session: 1 or 2     Binge frequency: Monthly   • Drug use: No   • Sexual activity: Defer   Social History Narrative     and lives with wife    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    HENT: Negative.    Eyes: Negative.    Respiratory: Negative.    Cardiovascular: Negative.    Gastrointestinal: Negative.    Endocrine: Negative.    Genitourinary: Negative.    Musculoskeletal: Positive for arthralgias and back pain.   Skin: Negative.    Allergic/Immunologic: Negative.    Neurological: Negative.    Hematological: Negative.    Psychiatric/Behavioral: Negative.        The following portions of the patient's history were reviewed and updated as appropriate: allergies, current medications, past family history, past medical history, past social history, past surgical history and problem list.      Objective      Physical Exam  Ht 172.7 cm (67.99\")   Wt 70.3 kg (154 lb 15.7 oz)   BMI 23.57 kg/m²     Body mass index is 23.57 kg/m².    Patient is well developed, well nourished and in no acute distress.  Alert and oriented x 3.    Ortho Exam  Left foot exam:  Lateral ulcer at the base of the fifth metatarsal is essentially unchanged.  There is mild erythema surrounding it.  It is superficial.  There is no drainage.  Exquisitely tender.  Small ulcer at the head " of the fifth metatarsal is healed.        Medical Decision Making    Data Review:   none    Assessment:  1. Skin ulcer of left foot, limited to breakdown of skin (CMS/[facility])        Plan:  Left lateral foot ulcer.  The ulcer is essentially unchanged from last visit.  He is currently on IV antibiotics per Dr. Beach.  I looked at his bone scan from 2/21/2020 which does light up at the lateral fifth metatarsal as well as the midfoot.  However, there will be some signal that are secondary to the ulcer itself and I spoke with Dr. Calle regarding this.  Recommendation today is that he continue with his his Bactroban dressing changes.  He will return to see me in 2 weeks or sooner if needed.      Awilda Ross PA-C  03/11/20  09:21

## 2020-03-31 ENCOUNTER — OFFICE VISIT (OUTPATIENT)
Dept: ORTHOPEDIC SURGERY | Facility: CLINIC | Age: 75
End: 2020-03-31

## 2020-03-31 VITALS — HEART RATE: 100 BPM | HEIGHT: 68 IN | BODY MASS INDEX: 23.49 KG/M2 | WEIGHT: 154.98 LBS | OXYGEN SATURATION: 98 %

## 2020-03-31 DIAGNOSIS — L97.521 SKIN ULCER OF LEFT FOOT, LIMITED TO BREAKDOWN OF SKIN (HCC): Primary | ICD-10-CM

## 2020-03-31 PROCEDURE — 99212 OFFICE O/P EST SF 10 MIN: CPT | Performed by: ORTHOPAEDIC SURGERY

## 2020-03-31 NOTE — PROGRESS NOTES
ESTABLISHED PATIENT    Patient: Flaco Roque II  : 1945    Primary Care Provider: Tayo Hendrix MD    Requesting Provider: As above    Follow-up (2 week follow up; Skin ulcer of left foot)      History    Chief Complaint: ulcer left ganga    History of Present Illness: continued ulcer lateral base of 5th metatarsal left foot, the ulcer on the 5th metatarsal head is healed    Current Outpatient Medications on File Prior to Visit   Medication Sig Dispense Refill   • amitriptyline (ELAVIL) 50 MG tablet Take  mg by mouth Every Night.     • carbidopa-levodopa (SINEMET)  MG per tablet Take 1.5 tablets by mouth 4 (Four) Times a Day.     • furosemide (LASIX) 20 MG tablet Take 1 tablet by mouth Daily. 90 tablet 3   • ipratropium-albuterol (DUO-NEB) 0.5-2.5 mg/3 ml nebulizer Take 3 mL by nebulization Every 4 (Four) Hours As Needed for Wheezing.     • montelukast (SINGULAIR) 10 MG tablet Take 10 mg by mouth every night.     • Multiple Vitamins-Minerals (MULTIVITAMIN ADULT PO) Take 1 tablet by mouth daily.     • mupirocin (BACTROBAN) 2 % ointment Apply  topically to the appropriate area as directed Daily. 30 g 5   • pantoprazole (PROTONIX) 40 MG EC tablet Take 40 mg by mouth 2 (Two) Times a Day.     • tamsulosin (FLOMAX) 0.4 MG capsule 24 hr capsule Take 1 capsule by mouth Daily. 30 capsule 0   • umeclidinium-vilanterol (ANORO ELLIPTA) 62.5-25 MCG/INH aerosol powder  inhaler Inhale 1 puff Daily. 1 each 11     No current facility-administered medications on file prior to visit.       No Known Allergies   Past Medical History:   Diagnosis Date   • Arthropathy of shoulder region 9/10/2018   • Chris's esophagus     Last EGD 1 year ago with Dr Kaye    • BPH (benign prostatic hyperplasia)    • Chronic back pain 10/31/2017   • Chronic low back pain    • COPD (chronic obstructive pulmonary disease) (CMS/[facility])    • Foot pain    • GERD (gastroesophageal reflux disease)    • History of transfusion    • Injury  of back    • Lung abscess (CMS/[facility])    • Osteoarthritis    • Osteoporosis    • Parkinson disease (CMS/[facility])    • Rotator cuff tear, left    • Sleep apnea    • Status post reverse total shoulder replacement, 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     Past Surgical History:   Procedure Laterality Date   • ARTHRODESIS MIDTARSAL / TARSOMETATARSAL / TARSAL NAVICULAR-CUNEIFORM Left 05/10/2016   • BACK SURGERY     • BACK SURGERY      low back   • BUNIONECTOMY Left 4/23/2019    Procedure: left foot excise PIP joints 2,3,4, tenotomies 2,3,4, metatarsal capsulotomy 2,3,4, chevron osteotomy 5th metatarsal, great toe DIP fusion LEFT;  Surgeon: Juhi Calle MD;  Location:  [facility];  Service: Orthopedics   • CATARACT EXTRACTION     • COLONOSCOPY N/A 11/2/2017    Procedure: COLONOSCOPY;  Surgeon: Luis Eduardo Mayers MD;  Location:  [facility];  Service:    • ENDOSCOPY N/A 11/1/2017    Procedure: ESOPHAGOGASTRODUODENOSCOPY;  Surgeon: Luis Eduardo Mayers MD;  Location:  [facility];  Service:    • ENDOSCOPY  11/02/2017    DR LUIS EDUARDO MAYERS   • FOOT SURGERY     • GALLBLADDER SURGERY     • KNEE ARTHROSCOPY Bilateral    • PAIN PUMP INSERTION/REVISION     • SPINE SURGERY     • TOTAL HIP ARTHROPLASTY Left    • TOTAL SHOULDER ARTHROPLASTY W/ DISTAL CLAVICLE EXCISION Left 9/10/2018    Procedure: REVERSE TOTAL SHOULDER ARTHROPLASTY LEFT;  Surgeon: Abel Brennan MD;  Location:  [facility];  Service: Orthopedics   • ULNAR NERVE TRANSPOSITION       Family History   Problem Relation Age of Onset   • Arthritis Mother    • Diabetes Mother    • Osteoarthritis Mother    • Colon cancer Father    • Cancer Father       Social History     Socioeconomic History   • Marital status:      Spouse name: Not on file   • Number of children: Not on file   • Years of education: Not on file   • Highest education level: Not on file   Occupational History   • Occupation: Retired    Tobacco Use   • Smoking status: Former Smoker     Packs/day: 2.00     Years: 25.00     Pack years:  "50.00     Types: Cigarettes     Start date:      Last attempt to quit:      Years since quittin.2   • Smokeless tobacco: Never Used   Substance and Sexual Activity   • Alcohol use: Yes     Drinks per session: 1 or 2     Binge frequency: Monthly   • Drug use: No   • Sexual activity: Defer   Social History Narrative     and lives with wife    Retired supervisor at [employer]    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        Review of Systems   Constitutional: Negative.    HENT: Negative.    Eyes: Negative.    Respiratory: Positive for shortness of breath.    Cardiovascular: Negative.    Gastrointestinal: Negative.    Endocrine: Negative.    Genitourinary: Negative.    Musculoskeletal: Positive for arthralgias.   Skin: Negative.    Allergic/Immunologic: Negative.    Neurological: Negative.    Hematological: Negative.    Psychiatric/Behavioral: Negative.        The following portions of the patient's history were reviewed and updated as appropriate: allergies, current medications, past family history, past medical history, past social history, past surgical history and problem list.    Physical Exam:   Pulse 100   Ht 172.7 cm (67.99\")   Wt 70.3 kg (154 lb 15.7 oz)   SpO2 98%   BMI 23.57 kg/m²   GENERAL: Body habitus: very thin      MSK: left foot ulcer on lateral base of 5th metatarsal still present, thin area of erythema around it, no purulence, the ulcer on the 5th metatarsal head is healed.        Medical Decision Making    Data Review:   phone conversation with physician    Assessment/Plan/Diagnosis/Treatment Options:   1. Skin ulcer of left foot, limited to breakdown of skin (CMS/HCC)  Ulcer is still present at the base of the 5th metatarsal, pulses are diminished, he has long-standing vascular calcification on xray.  He healed the previous surgeries, but now the presence of the chronic ulcer and diminished pulses make me concerned that his blood flow is much less now.  We will obtain dopplers, I will see " him back Friday.  Discussed with Dr Beach.  Continue Bactroban dressing.  continue soft slipper  - Doppler Arterial Multi Level Lower Extremity - Bilateral CAR; Future

## 2020-04-02 DIAGNOSIS — L97.521 SKIN ULCER OF LEFT FOOT, LIMITED TO BREAKDOWN OF SKIN (HCC): ICD-10-CM

## 2020-04-03 ENCOUNTER — OFFICE VISIT (OUTPATIENT)
Dept: ORTHOPEDIC SURGERY | Facility: CLINIC | Age: 75
End: 2020-04-03

## 2020-04-03 VITALS — OXYGEN SATURATION: 90 % | WEIGHT: 154.98 LBS | HEIGHT: 68 IN | HEART RATE: 90 BPM | BODY MASS INDEX: 23.49 KG/M2

## 2020-04-03 DIAGNOSIS — L97.521 SKIN ULCER OF LEFT FOOT, LIMITED TO BREAKDOWN OF SKIN (HCC): Primary | ICD-10-CM

## 2020-04-03 PROCEDURE — 29580 STRAPPING UNNA BOOT: CPT | Performed by: ORTHOPAEDIC SURGERY

## 2020-04-03 NOTE — PROGRESS NOTES
ESTABLISHED PATIENT    Patient: Flaco Roque II  : 1945    Primary Care Provider: Tayo Hendrix MD    Requesting Provider: As above    Follow-up (3 day follow up - Skin ulcer of left foot, limited to breakdown of skin ; KAMRYN check )      History    Chief Complaint: foot ulcer    History of Present Illness: the KAMRYN's show small vessel disease - worse in the left foot, which I think is a significant factor in the persistence of the ulcer.  I am doubtful that there is a surgical answer for the ulcer- I would like to try an unna boot over the weekend, to see if it makes any difference    Current Outpatient Medications on File Prior to Visit   Medication Sig Dispense Refill   • amitriptyline (ELAVIL) 50 MG tablet Take  mg by mouth Every Night.     • carbidopa-levodopa (SINEMET)  MG per tablet Take 1.5 tablets by mouth 4 (Four) Times a Day.     • furosemide (LASIX) 20 MG tablet Take 1 tablet by mouth Daily. 90 tablet 3   • ipratropium-albuterol (DUO-NEB) 0.5-2.5 mg/3 ml nebulizer Take 3 mL by nebulization Every 4 (Four) Hours As Needed for Wheezing.     • montelukast (SINGULAIR) 10 MG tablet Take 10 mg by mouth every night.     • Multiple Vitamins-Minerals (MULTIVITAMIN ADULT PO) Take 1 tablet by mouth daily.     • mupirocin (BACTROBAN) 2 % ointment Apply  topically to the appropriate area as directed Daily. 30 g 5   • pantoprazole (PROTONIX) 40 MG EC tablet Take 40 mg by mouth 2 (Two) Times a Day.     • tamsulosin (FLOMAX) 0.4 MG capsule 24 hr capsule Take 1 capsule by mouth Daily. 30 capsule 0   • umeclidinium-vilanterol (ANORO ELLIPTA) 62.5-25 MCG/INH aerosol powder  inhaler Inhale 1 puff Daily. 1 each 11     No current facility-administered medications on file prior to visit.       No Known Allergies   Past Medical History:   Diagnosis Date   • Arthropathy of shoulder region 9/10/2018   • Chris's esophagus     Last EGD 1 year ago with Dr Kaye    • BPH (benign prostatic hyperplasia)    •  Chronic back pain 10/31/2017   • Chronic low back pain    • COPD (chronic obstructive pulmonary disease) (CMS/[facility])    • Foot pain    • GERD (gastroesophageal reflux disease)    • History of transfusion    • Injury of back    • Lung abscess (CMS/[facility])    • Osteoarthritis    • Osteoporosis    • Parkinson disease (CMS/[facility])    • Rotator cuff tear, left    • Sleep apnea    • Status post reverse total shoulder replacement, 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     Past Surgical History:   Procedure Laterality Date   • ARTHRODESIS MIDTARSAL / TARSOMETATARSAL / TARSAL NAVICULAR-CUNEIFORM Left 05/10/2016   • BACK SURGERY     • BACK SURGERY      low back   • BUNIONECTOMY Left 4/23/2019    Procedure: left foot excise PIP joints 2,3,4, tenotomies 2,3,4, metatarsal capsulotomy 2,3,4, chevron osteotomy 5th metatarsal, great toe DIP fusion LEFT;  Surgeon: Juhi Calle MD;  Location:  [facility];  Service: Orthopedics   • CATARACT EXTRACTION     • COLONOSCOPY N/A 11/2/2017    Procedure: COLONOSCOPY;  Surgeon: Luis Eduardo Mayers MD;  Location:  [facility];  Service:    • ENDOSCOPY N/A 11/1/2017    Procedure: ESOPHAGOGASTRODUODENOSCOPY;  Surgeon: Luis Eduardo Mayers MD;  Location:  [facility];  Service:    • ENDOSCOPY  11/02/2017    DR LUIS EDUARDO MAYERS   • FOOT SURGERY     • GALLBLADDER SURGERY     • KNEE ARTHROSCOPY Bilateral    • PAIN PUMP INSERTION/REVISION     • SPINE SURGERY     • TOTAL HIP ARTHROPLASTY Left    • TOTAL SHOULDER ARTHROPLASTY W/ DISTAL CLAVICLE EXCISION Left 9/10/2018    Procedure: REVERSE TOTAL SHOULDER ARTHROPLASTY LEFT;  Surgeon: Abel Brennan MD;  Location:  [facility];  Service: Orthopedics   • ULNAR NERVE TRANSPOSITION       Family History   Problem Relation Age of Onset   • Arthritis Mother    • Diabetes Mother    • Osteoarthritis Mother    • Colon cancer Father    • Cancer Father       Social History     Socioeconomic History   • Marital status:      Spouse name: Not on file   • Number of children: Not on file   • Years  "of education: Not on file   • Highest education level: Not on file   Occupational History   • Occupation: Retired    Tobacco Use   • Smoking status: Former Smoker     Packs/day: 2.00     Years: 25.00     Pack years: 50.00     Types: Cigarettes     Start date:      Last attempt to quit:      Years since quittin.2   • Smokeless tobacco: Never Used   Substance and Sexual Activity   • Alcohol use: Yes     Drinks per session: 1 or 2     Binge frequency: Monthly   • Drug use: No   • Sexual activity: Defer   Social History Narrative     and lives with wife    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    HENT: Negative.    Eyes: Negative.    Respiratory: Negative.    Cardiovascular: Negative.    Gastrointestinal: Negative.    Endocrine: Negative.    Genitourinary: Negative.    Musculoskeletal: Positive for arthralgias and joint swelling.   Skin: Negative.    Allergic/Immunologic: Negative.    Neurological: Negative.    Hematological: Negative.    Psychiatric/Behavioral: Negative.        The following portions of the patient's history were reviewed and updated as appropriate: allergies, current medications, past family history, past medical history, past social history, past surgical history and problem list.    Physical Exam:   Pulse 90   Ht 172.7 cm (67.99\")   Wt 70.3 kg (154 lb 15.7 oz)   SpO2 90%   BMI 23.57 kg/m²       Medical Decision Making    Data Review:   phone conversation with physician    Assessment/Plan/Diagnosis/Treatment Options:   1. Skin ulcer of left foot, limited to breakdown of skin (CMS/HCC)  The KAMRYN's show small vessel disease as noted above, I want to try an unna boot over the weekend, I will see him Monday, discussed with Dr Beach    -we put him in the unna boot                        "

## 2020-04-06 ENCOUNTER — OFFICE VISIT (OUTPATIENT)
Dept: ORTHOPEDIC SURGERY | Facility: CLINIC | Age: 75
End: 2020-04-06

## 2020-04-06 VITALS — HEIGHT: 68 IN | OXYGEN SATURATION: 90 % | WEIGHT: 154.98 LBS | BODY MASS INDEX: 23.49 KG/M2 | HEART RATE: 94 BPM

## 2020-04-06 DIAGNOSIS — L97.521 SKIN ULCER OF LEFT FOOT, LIMITED TO BREAKDOWN OF SKIN (HCC): Primary | ICD-10-CM

## 2020-04-06 PROCEDURE — 29580 STRAPPING UNNA BOOT: CPT | Performed by: ORTHOPAEDIC SURGERY

## 2020-04-06 NOTE — PROGRESS NOTES
ESTABLISHED PATIENT    Patient: Flaco Roque II  : 1945    Primary Care Provider: Tayo Hendrix MD    Requesting Provider: As above    Follow-up of the Left Foot (4 day f/u ,Skin ulcer of left foot, limited to breakdown of skin )      History    Chief Complaint: left foot ulcer    History of Present Illness: no fever, no chills, ulcer a little better with unna boot    Current Outpatient Medications on File Prior to Visit   Medication Sig Dispense Refill   • amitriptyline (ELAVIL) 50 MG tablet Take  mg by mouth Every Night.     • carbidopa-levodopa (SINEMET)  MG per tablet Take 1.5 tablets by mouth 4 (Four) Times a Day.     • furosemide (LASIX) 20 MG tablet Take 1 tablet by mouth Daily. 90 tablet 3   • ipratropium-albuterol (DUO-NEB) 0.5-2.5 mg/3 ml nebulizer Take 3 mL by nebulization Every 4 (Four) Hours As Needed for Wheezing.     • montelukast (SINGULAIR) 10 MG tablet Take 10 mg by mouth every night.     • Multiple Vitamins-Minerals (MULTIVITAMIN ADULT PO) Take 1 tablet by mouth daily.     • mupirocin (BACTROBAN) 2 % ointment Apply  topically to the appropriate area as directed Daily. 30 g 5   • pantoprazole (PROTONIX) 40 MG EC tablet Take 40 mg by mouth 2 (Two) Times a Day.     • tamsulosin (FLOMAX) 0.4 MG capsule 24 hr capsule Take 1 capsule by mouth Daily. 30 capsule 0   • umeclidinium-vilanterol (ANORO ELLIPTA) 62.5-25 MCG/INH aerosol powder  inhaler Inhale 1 puff Daily. 1 each 11     No current facility-administered medications on file prior to visit.       No Known Allergies   Past Medical History:   Diagnosis Date   • Arthropathy of shoulder region 9/10/2018   • Chris's esophagus     Last EGD 1 year ago with Dr Kaye    • BPH (benign prostatic hyperplasia)    • Chronic back pain 10/31/2017   • Chronic low back pain    • COPD (chronic obstructive pulmonary disease) (CMS/[facility])    • Foot pain    • GERD (gastroesophageal reflux disease)    • History of transfusion    • Injury of  back    • Lung abscess (CMS/[facility])    • Osteoarthritis    • Osteoporosis    • Parkinson disease (CMS/[facility])    • Rotator cuff tear, left    • Sleep apnea    • Status post reverse total shoulder replacement, 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     Past Surgical History:   Procedure Laterality Date   • ARTHRODESIS MIDTARSAL / TARSOMETATARSAL / TARSAL NAVICULAR-CUNEIFORM Left 05/10/2016   • BACK SURGERY     • BACK SURGERY      low back   • BUNIONECTOMY Left 4/23/2019    Procedure: left foot excise PIP joints 2,3,4, tenotomies 2,3,4, metatarsal capsulotomy 2,3,4, chevron osteotomy 5th metatarsal, great toe DIP fusion LEFT;  Surgeon: Juhi Calle MD;  Location:  [facility];  Service: Orthopedics   • CATARACT EXTRACTION     • COLONOSCOPY N/A 11/2/2017    Procedure: COLONOSCOPY;  Surgeon: Luis Eduardo Mayers MD;  Location:  [facility];  Service:    • ENDOSCOPY N/A 11/1/2017    Procedure: ESOPHAGOGASTRODUODENOSCOPY;  Surgeon: Luis Eduardo Mayers MD;  Location:  [facility];  Service:    • ENDOSCOPY  11/02/2017    DR LUIS EDUARDO MAYERS   • FOOT SURGERY     • GALLBLADDER SURGERY     • KNEE ARTHROSCOPY Bilateral    • PAIN PUMP INSERTION/REVISION     • SPINE SURGERY     • TOTAL HIP ARTHROPLASTY Left    • TOTAL SHOULDER ARTHROPLASTY W/ DISTAL CLAVICLE EXCISION Left 9/10/2018    Procedure: REVERSE TOTAL SHOULDER ARTHROPLASTY LEFT;  Surgeon: Abel Brennan MD;  Location:  [facility];  Service: Orthopedics   • ULNAR NERVE TRANSPOSITION       Family History   Problem Relation Age of Onset   • Arthritis Mother    • Diabetes Mother    • Osteoarthritis Mother    • Colon cancer Father    • Cancer Father       Social History     Socioeconomic History   • Marital status:      Spouse name: Not on file   • Number of children: Not on file   • Years of education: Not on file   • Highest education level: Not on file   Occupational History   • Occupation: Retired    Tobacco Use   • Smoking status: Former Smoker     Packs/day: 2.00     Years: 25.00     Pack years:  "50.00     Types: Cigarettes     Start date:      Last attempt to quit:      Years since quittin.2   • Smokeless tobacco: Never Used   Substance and Sexual Activity   • Alcohol use: Yes     Drinks per session: 1 or 2     Binge frequency: Monthly   • Drug use: No   • Sexual activity: Defer   Social History Narrative     and lives with wife    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    HENT: Negative.    Eyes: Negative.    Respiratory: Negative.    Cardiovascular: Negative.    Gastrointestinal: Negative.    Endocrine: Negative.    Genitourinary: Negative.    Musculoskeletal: Positive for arthralgias.   Skin: Negative.    Allergic/Immunologic: Negative.    Neurological: Negative.    Hematological: Negative.    Psychiatric/Behavioral: Negative.        The following portions of the patient's history were reviewed and updated as appropriate: allergies, current medications, past family history, past medical history, past social history, past surgical history and problem list.    Physical Exam:   Pulse 94   Ht 172.7 cm (67.99\")   Wt 70.3 kg (154 lb 15.7 oz)   SpO2 90%   BMI 23.57 kg/m²               Medical Decision Making    Data Review:   none    Assessment/Plan/Diagnosis/Treatment Options:   1. Skin ulcer of left foot, limited to breakdown of skin (CMS/HCC)  A little better with unna boot, we will try this for one more week.  Given the current pandemic, his fragility, the small blood vessel disease, I think this is the safest option wheighing all choices.  I am not certain that a surgical intervention (partial resection of 5th metatarsal base) would heal.  I will see him in a week, xray of foot 3 views at that time.  Discussed with Dr Beach 4/3/2020    We put him in the unna boot in the office today                        "

## 2020-04-13 ENCOUNTER — ANESTHESIA EVENT (OUTPATIENT)
Dept: PERIOP | Facility: HOSPITAL | Age: 75
End: 2020-04-13

## 2020-04-13 ENCOUNTER — OFFICE VISIT (OUTPATIENT)
Dept: ORTHOPEDIC SURGERY | Facility: CLINIC | Age: 75
End: 2020-04-13

## 2020-04-13 ENCOUNTER — PRE-ADMISSION TESTING (OUTPATIENT)
Dept: PREADMISSION TESTING | Facility: HOSPITAL | Age: 75
End: 2020-04-13

## 2020-04-13 VITALS — BODY MASS INDEX: 23.49 KG/M2 | WEIGHT: 154.98 LBS | HEIGHT: 68 IN | HEART RATE: 55 BPM

## 2020-04-13 VITALS — WEIGHT: 144.18 LBS | BODY MASS INDEX: 21.85 KG/M2 | HEIGHT: 68 IN

## 2020-04-13 DIAGNOSIS — L97.521 SKIN ULCER OF LEFT FOOT, LIMITED TO BREAKDOWN OF SKIN (HCC): Primary | ICD-10-CM

## 2020-04-13 DIAGNOSIS — L97.521 SKIN ULCER OF LEFT FOOT, LIMITED TO BREAKDOWN OF SKIN (HCC): ICD-10-CM

## 2020-04-13 LAB
ANION GAP SERPL CALCULATED.3IONS-SCNC: 14 MMOL/L (ref 5–15)
BASOPHILS # BLD AUTO: 0.08 10*3/MM3 (ref 0–0.2)
BASOPHILS NFR BLD AUTO: 1.1 % (ref 0–1.5)
BUN BLD-MCNC: 17 MG/DL (ref 8–23)
BUN/CREAT SERPL: 19.1 (ref 7–25)
CALCIUM SPEC-SCNC: 9.5 MG/DL (ref 8.6–10.5)
CHLORIDE SERPL-SCNC: 101 MMOL/L (ref 98–107)
CO2 SERPL-SCNC: 26 MMOL/L (ref 22–29)
CREAT BLD-MCNC: 0.89 MG/DL (ref 0.76–1.27)
CRP SERPL-MCNC: 0.23 MG/DL (ref 0–0.5)
DEPRECATED RDW RBC AUTO: 49.5 FL (ref 37–54)
ELLIPTOCYTES BLD QL SMEAR: NORMAL
EOSINOPHIL # BLD AUTO: 0.07 10*3/MM3 (ref 0–0.4)
EOSINOPHIL NFR BLD AUTO: 0.9 % (ref 0.3–6.2)
ERYTHROCYTE [DISTWIDTH] IN BLOOD BY AUTOMATED COUNT: 21.2 % (ref 12.3–15.4)
ERYTHROCYTE [SEDIMENTATION RATE] IN BLOOD: 16 MM/HR (ref 0–20)
GFR SERPL CREATININE-BSD FRML MDRD: 84 ML/MIN/1.73
GLUCOSE BLD-MCNC: 136 MG/DL (ref 65–99)
HCT VFR BLD AUTO: 30.7 % (ref 37.5–51)
HGB BLD-MCNC: 8.1 G/DL (ref 13–17.7)
HYPOCHROMIA BLD QL: NORMAL
IMM GRANULOCYTES # BLD AUTO: 0.02 10*3/MM3 (ref 0–0.05)
IMM GRANULOCYTES NFR BLD AUTO: 0.3 % (ref 0–0.5)
LYMPHOCYTES # BLD AUTO: 0.94 10*3/MM3 (ref 0.7–3.1)
LYMPHOCYTES NFR BLD AUTO: 12.7 % (ref 19.6–45.3)
MCH RBC QN AUTO: 17.8 PG (ref 26.6–33)
MCHC RBC AUTO-ENTMCNC: 26.4 G/DL (ref 31.5–35.7)
MCV RBC AUTO: 67.3 FL (ref 79–97)
MICROCYTES BLD QL: NORMAL
MONOCYTES # BLD AUTO: 0.78 10*3/MM3 (ref 0.1–0.9)
MONOCYTES NFR BLD AUTO: 10.6 % (ref 5–12)
NEUTROPHILS # BLD AUTO: 5.49 10*3/MM3 (ref 1.7–7)
NEUTROPHILS NFR BLD AUTO: 74.4 % (ref 42.7–76)
NRBC BLD AUTO-RTO: 0 /100 WBC (ref 0–0.2)
OVALOCYTES BLD QL SMEAR: NORMAL
PLAT MORPH BLD: NORMAL
PLATELET # BLD AUTO: 494 10*3/MM3 (ref 140–450)
PMV BLD AUTO: 9.9 FL (ref 6–12)
POLYCHROMASIA BLD QL SMEAR: NORMAL
POTASSIUM BLD-SCNC: 4.2 MMOL/L (ref 3.5–5.2)
RBC # BLD AUTO: 4.56 10*6/MM3 (ref 4.14–5.8)
SODIUM BLD-SCNC: 141 MMOL/L (ref 136–145)
WBC MORPH BLD: NORMAL
WBC NRBC COR # BLD: 7.38 10*3/MM3 (ref 3.4–10.8)

## 2020-04-13 PROCEDURE — 93005 ELECTROCARDIOGRAM TRACING: CPT

## 2020-04-13 PROCEDURE — 86140 C-REACTIVE PROTEIN: CPT | Performed by: ORTHOPAEDIC SURGERY

## 2020-04-13 PROCEDURE — 83036 HEMOGLOBIN GLYCOSYLATED A1C: CPT | Performed by: ORTHOPAEDIC SURGERY

## 2020-04-13 PROCEDURE — 85652 RBC SED RATE AUTOMATED: CPT | Performed by: ORTHOPAEDIC SURGERY

## 2020-04-13 PROCEDURE — 93010 ELECTROCARDIOGRAM REPORT: CPT | Performed by: INTERNAL MEDICINE

## 2020-04-13 PROCEDURE — 99214 OFFICE O/P EST MOD 30 MIN: CPT | Performed by: ORTHOPAEDIC SURGERY

## 2020-04-13 PROCEDURE — 36415 COLL VENOUS BLD VENIPUNCTURE: CPT

## 2020-04-13 PROCEDURE — 80048 BASIC METABOLIC PNL TOTAL CA: CPT | Performed by: ORTHOPAEDIC SURGERY

## 2020-04-13 PROCEDURE — 85025 COMPLETE CBC W/AUTO DIFF WBC: CPT | Performed by: ORTHOPAEDIC SURGERY

## 2020-04-13 PROCEDURE — 85007 BL SMEAR W/DIFF WBC COUNT: CPT | Performed by: ORTHOPAEDIC SURGERY

## 2020-04-13 RX ORDER — FAMOTIDINE 10 MG/ML
20 INJECTION, SOLUTION INTRAVENOUS ONCE
Status: CANCELLED | OUTPATIENT
Start: 2020-04-13 | End: 2020-04-13

## 2020-04-13 NOTE — PROGRESS NOTES
ESTABLISHED PATIENT    Patient: Flaco Roque II  : 1945    Primary Care Provider: Tayo Hendrix MD    Requesting Provider: As above    Follow-up (1 week follow up;  Skin ulcer of left foot, limited to breakdown of skin )      History    Chief Complaint: left foot ulcer    History of Present Illness: he returns after 1 week in the unna boot, however he took the wrap off 3 days ago.  Lots of pain, no fever, no chills    Current Outpatient Medications on File Prior to Visit   Medication Sig Dispense Refill   • amitriptyline (ELAVIL) 50 MG tablet Take  mg by mouth Every Night.     • carbidopa-levodopa (SINEMET)  MG per tablet Take 1.5 tablets by mouth 4 (Four) Times a Day.     • furosemide (LASIX) 20 MG tablet Take 1 tablet by mouth Daily. 90 tablet 3   • ipratropium-albuterol (DUO-NEB) 0.5-2.5 mg/3 ml nebulizer Take 3 mL by nebulization Every 4 (Four) Hours As Needed for Wheezing.     • montelukast (SINGULAIR) 10 MG tablet Take 10 mg by mouth every night.     • Multiple Vitamins-Minerals (MULTIVITAMIN ADULT PO) Take 1 tablet by mouth daily.     • mupirocin (BACTROBAN) 2 % ointment Apply  topically to the appropriate area as directed Daily. 30 g 5   • pantoprazole (PROTONIX) 40 MG EC tablet Take 40 mg by mouth 2 (Two) Times a Day.     • tamsulosin (FLOMAX) 0.4 MG capsule 24 hr capsule Take 1 capsule by mouth Daily. 30 capsule 0   • umeclidinium-vilanterol (ANORO ELLIPTA) 62.5-25 MCG/INH aerosol powder  inhaler Inhale 1 puff Daily. 1 each 11     No current facility-administered medications on file prior to visit.       No Known Allergies   Past Medical History:   Diagnosis Date   • Arthropathy of shoulder region 9/10/2018   • Chris's esophagus     Last EGD 1 year ago with Dr Kaye    • BPH (benign prostatic hyperplasia)    • Chronic back pain 10/31/2017   • Chronic low back pain    • COPD (chronic obstructive pulmonary disease) (CMS/[facility])    • Foot pain    • GERD (gastroesophageal reflux  disease)    • History of transfusion    • Injury of back    • Lung abscess (CMS/HCC)    • Osteoarthritis    • Osteoporosis    • Parkinson disease (CMS/HCC)    • Rotator cuff tear, left    • Sleep apnea    • Status post reverse total shoulder replacement, 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     Past Surgical History:   Procedure Laterality Date   • ARTHRODESIS MIDTARSAL / TARSOMETATARSAL / TARSAL NAVICULAR-CUNEIFORM Left 05/10/2016   • BACK SURGERY     • BACK SURGERY      low back   • BUNIONECTOMY Left 4/23/2019    Procedure: left foot excise PIP joints 2,3,4, tenotomies 2,3,4, metatarsal capsulotomy 2,3,4, chevron osteotomy 5th metatarsal, great toe DIP fusion LEFT;  Surgeon: Juhi Calle MD;  Location:  [facility];  Service: Orthopedics   • CATARACT EXTRACTION     • COLONOSCOPY N/A 11/2/2017    Procedure: COLONOSCOPY;  Surgeon: Luis Eduardo Mayers MD;  Location:  [facility];  Service:    • ENDOSCOPY N/A 11/1/2017    Procedure: ESOPHAGOGASTRODUODENOSCOPY;  Surgeon: Luis Eduardo Mayers MD;  Location:  [facility];  Service:    • ENDOSCOPY  11/02/2017    DR LUIS EDUARDO MAYERS   • FOOT SURGERY     • GALLBLADDER SURGERY     • KNEE ARTHROSCOPY Bilateral    • PAIN PUMP INSERTION/REVISION     • SPINE SURGERY     • TOTAL HIP ARTHROPLASTY Left    • TOTAL SHOULDER ARTHROPLASTY W/ DISTAL CLAVICLE EXCISION Left 9/10/2018    Procedure: REVERSE TOTAL SHOULDER ARTHROPLASTY LEFT;  Surgeon: Abel Brennan MD;  Location:  [facility];  Service: Orthopedics   • ULNAR NERVE TRANSPOSITION       Family History   Problem Relation Age of Onset   • Arthritis Mother    • Diabetes Mother    • Osteoarthritis Mother    • Colon cancer Father    • Cancer Father       Social History     Socioeconomic History   • Marital status:      Spouse name: Not on file   • Number of children: Not on file   • Years of education: Not on file   • Highest education level: Not on file   Occupational History   • Occupation: Retired    Tobacco Use   • Smoking status: Former Smoker      "Packs/day: 2.00     Years: 25.00     Pack years: 50.00     Types: Cigarettes     Start date:      Last attempt to quit:      Years since quittin.2   • Smokeless tobacco: Never Used   Substance and Sexual Activity   • Alcohol use: Yes     Drinks per session: 1 or 2     Binge frequency: Monthly   • Drug use: No   • Sexual activity: Defer   Social History Narrative     and lives with wife    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    HENT: Negative.    Eyes: Negative.    Respiratory: Negative.    Cardiovascular: Negative.    Gastrointestinal: Negative.    Endocrine: Negative.    Genitourinary: Negative.    Musculoskeletal: Positive for arthralgias.   Skin: Negative.    Allergic/Immunologic: Negative.    Neurological: Negative.    Hematological: Negative.    Psychiatric/Behavioral: Negative.        The following portions of the patient's history were reviewed and updated as appropriate: allergies, current medications, past family history, past medical history, past social history, past surgical history and problem list.    Physical Exam:   Pulse 55   Ht 172.7 cm (67.99\")   Wt 70.3 kg (154 lb 15.7 oz)   BMI 23.57 kg/m²   GEN- very thin  Gait-antalgic  PULM- no labored breathing  CV-pulses palpable both feet, fair cap refill  FOOT: ulcer does not probe to bone, but is larger than last week, 1cm, no purulence, cap refill fair, pulses are palpable - small vessel disease.  No change in stifness, old incisions healed, motors intact          Medical Decision Making    Data Review:   phone conversation with physician, xray today    Assessment/Plan/Diagnosis/Treatment Options:   1. Skin ulcer of left foot, limited to breakdown of skin (CMS/HCC)  The ulcer is worse- we have exhausted all the conservative treatment that I know.  I recommend proceeding with surgery.  The other option is to continue dressing changes which has not been " successful.  He agrees, he is high risk from a healing and medical standpoint, but I do not have any other good options.  I discussed this with Dr Beach.  MR Roque agrees, he reports being very tired of having the ulcer and pain.  We discussed the risks including but not limited to: death, infection, neurovascular damage, strokes, heart attacks, blood clots, chronic pain, deformity, stiffness, need for  further surgery,  amputation, etc.  Questions asked and answered in detail.  Plan for surgery in the morning.        - XR Foot 3+ View Left

## 2020-04-13 NOTE — PAT
Patient instructed to drink 20 ounces (or until full) of Gatorade and it needs to be completed 1 hour before given arrival time for procedure (NO RED Gatorade)    Patient verbalized understanding.        ekg reviewed and cleared by dr polanco.

## 2020-04-13 NOTE — H&P (VIEW-ONLY)
ESTABLISHED PATIENT    Patient: Flaco Roque II  : 1945    Primary Care Provider: Tayo Hendrix MD    Requesting Provider: As above    Follow-up (1 week follow up;  Skin ulcer of left foot, limited to breakdown of skin )      History    Chief Complaint: left foot ulcer    History of Present Illness: he returns after 1 week in the unna boot, however he took the wrap off 3 days ago.  Lots of pain, no fever, no chills    Current Outpatient Medications on File Prior to Visit   Medication Sig Dispense Refill   • amitriptyline (ELAVIL) 50 MG tablet Take  mg by mouth Every Night.     • carbidopa-levodopa (SINEMET)  MG per tablet Take 1.5 tablets by mouth 4 (Four) Times a Day.     • furosemide (LASIX) 20 MG tablet Take 1 tablet by mouth Daily. 90 tablet 3   • ipratropium-albuterol (DUO-NEB) 0.5-2.5 mg/3 ml nebulizer Take 3 mL by nebulization Every 4 (Four) Hours As Needed for Wheezing.     • montelukast (SINGULAIR) 10 MG tablet Take 10 mg by mouth every night.     • Multiple Vitamins-Minerals (MULTIVITAMIN ADULT PO) Take 1 tablet by mouth daily.     • mupirocin (BACTROBAN) 2 % ointment Apply  topically to the appropriate area as directed Daily. 30 g 5   • pantoprazole (PROTONIX) 40 MG EC tablet Take 40 mg by mouth 2 (Two) Times a Day.     • tamsulosin (FLOMAX) 0.4 MG capsule 24 hr capsule Take 1 capsule by mouth Daily. 30 capsule 0   • umeclidinium-vilanterol (ANORO ELLIPTA) 62.5-25 MCG/INH aerosol powder  inhaler Inhale 1 puff Daily. 1 each 11     No current facility-administered medications on file prior to visit.       No Known Allergies   Past Medical History:   Diagnosis Date   • Arthropathy of shoulder region 9/10/2018   • Chris's esophagus     Last EGD 1 year ago with Dr Kaye    • BPH (benign prostatic hyperplasia)    • Chronic back pain 10/31/2017   • Chronic low back pain    • COPD (chronic obstructive pulmonary disease) (CMS/[facility])    • Foot pain    • GERD (gastroesophageal reflux  disease)    • History of transfusion    • Injury of back    • Lung abscess (CMS/HCC)    • Osteoarthritis    • Osteoporosis    • Parkinson disease (CMS/HCC)    • Rotator cuff tear, left    • Sleep apnea    • Status post reverse total shoulder replacement, 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     Past Surgical History:   Procedure Laterality Date   • ARTHRODESIS MIDTARSAL / TARSOMETATARSAL / TARSAL NAVICULAR-CUNEIFORM Left 05/10/2016   • BACK SURGERY     • BACK SURGERY      low back   • BUNIONECTOMY Left 4/23/2019    Procedure: left foot excise PIP joints 2,3,4, tenotomies 2,3,4, metatarsal capsulotomy 2,3,4, chevron osteotomy 5th metatarsal, great toe DIP fusion LEFT;  Surgeon: Juhi Calle MD;  Location:  [facility];  Service: Orthopedics   • CATARACT EXTRACTION     • COLONOSCOPY N/A 11/2/2017    Procedure: COLONOSCOPY;  Surgeon: Luis Eduardo Mayers MD;  Location:  [facility];  Service:    • ENDOSCOPY N/A 11/1/2017    Procedure: ESOPHAGOGASTRODUODENOSCOPY;  Surgeon: Luis Eduardo Mayers MD;  Location:  [facility];  Service:    • ENDOSCOPY  11/02/2017    DR LUIS EDUARDO MAYERS   • FOOT SURGERY     • GALLBLADDER SURGERY     • KNEE ARTHROSCOPY Bilateral    • PAIN PUMP INSERTION/REVISION     • SPINE SURGERY     • TOTAL HIP ARTHROPLASTY Left    • TOTAL SHOULDER ARTHROPLASTY W/ DISTAL CLAVICLE EXCISION Left 9/10/2018    Procedure: REVERSE TOTAL SHOULDER ARTHROPLASTY LEFT;  Surgeon: Abel Brennan MD;  Location:  [facility];  Service: Orthopedics   • ULNAR NERVE TRANSPOSITION       Family History   Problem Relation Age of Onset   • Arthritis Mother    • Diabetes Mother    • Osteoarthritis Mother    • Colon cancer Father    • Cancer Father       Social History     Socioeconomic History   • Marital status:      Spouse name: Not on file   • Number of children: Not on file   • Years of education: Not on file   • Highest education level: Not on file   Occupational History   • Occupation: Retired    Tobacco Use   • Smoking status: Former Smoker      "Packs/day: 2.00     Years: 25.00     Pack years: 50.00     Types: Cigarettes     Start date:      Last attempt to quit:      Years since quittin.2   • Smokeless tobacco: Never Used   Substance and Sexual Activity   • Alcohol use: Yes     Drinks per session: 1 or 2     Binge frequency: Monthly   • Drug use: No   • Sexual activity: Defer   Social History Narrative     and lives with wife    Retired supervisor at [employer]    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        Review of Systems   Constitutional: Negative.    HENT: Negative.    Eyes: Negative.    Respiratory: Negative.    Cardiovascular: Negative.    Gastrointestinal: Negative.    Endocrine: Negative.    Genitourinary: Negative.    Musculoskeletal: Positive for arthralgias.   Skin: Negative.    Allergic/Immunologic: Negative.    Neurological: Negative.    Hematological: Negative.    Psychiatric/Behavioral: Negative.        The following portions of the patient's history were reviewed and updated as appropriate: allergies, current medications, past family history, past medical history, past social history, past surgical history and problem list.    Physical Exam:   Pulse 55   Ht 172.7 cm (67.99\")   Wt 70.3 kg (154 lb 15.7 oz)   BMI 23.57 kg/m²               Medical Decision Making    Data Review:   phone conversation with physician, xray today    Assessment/Plan/Diagnosis/Treatment Options:   1. Skin ulcer of left foot, limited to breakdown of skin (CMS/HCC)  The ulcer is worse- we have exhausted all the conservative treatment that I know.  I recommend proceeding with surgery.  The other option is to continue dressing changes which has not been successful.  He agrees, he is high risk from a healing and medical standpoint, but I do not have any other good options.  I discussed this with Dr Beach.  MR Roque agrees, he reports being very tired of having the ulcer and pain.  We discussed the risks including but not limited to: death, infection, " neurovascular damage, strokes, heart attacks, blood clots, chronic pain, deformity, stiffness, need for  further surgery,  amputation, etc.  Questions asked and answered in detail.        - XR Foot 3+ View Left

## 2020-04-14 ENCOUNTER — ANESTHESIA (OUTPATIENT)
Dept: PERIOP | Facility: HOSPITAL | Age: 75
End: 2020-04-14

## 2020-04-14 ENCOUNTER — HOSPITAL ENCOUNTER (OUTPATIENT)
Facility: HOSPITAL | Age: 75
Discharge: HOME | End: 2020-04-17
Attending: ORTHOPAEDIC SURGERY | Admitting: ORTHOPAEDIC SURGERY

## 2020-04-14 DIAGNOSIS — L97.521 SKIN ULCER OF LEFT FOOT, LIMITED TO BREAKDOWN OF SKIN (HCC): ICD-10-CM

## 2020-04-14 PROBLEM — Z99.81 ON HOME O2: Status: ACTIVE | Noted: 2020-04-14

## 2020-04-14 PROBLEM — Z98.890 S/P DEBRIDEMENT: Status: ACTIVE | Noted: 2020-04-14

## 2020-04-14 LAB
EST. AVERAGE GLUCOSE BLD GHB EST-MCNC: 103 MG/DL
HBA1C MFR BLD: 5.2 % (ref 4.8–5.6)

## 2020-04-14 PROCEDURE — 87075 CULTR BACTERIA EXCEPT BLOOD: CPT | Performed by: ORTHOPAEDIC SURGERY

## 2020-04-14 PROCEDURE — 25010000002 ONDANSETRON PER 1 MG: Performed by: NURSE ANESTHETIST, CERTIFIED REGISTERED

## 2020-04-14 PROCEDURE — 87206 SMEAR FLUORESCENT/ACID STAI: CPT | Performed by: ORTHOPAEDIC SURGERY

## 2020-04-14 PROCEDURE — 63710000001 TAB-A-VITE/BETA CAROTENE TABLET: Performed by: ORTHOPAEDIC SURGERY

## 2020-04-14 PROCEDURE — A9270 NON-COVERED ITEM OR SERVICE: HCPCS | Performed by: NURSE PRACTITIONER

## 2020-04-14 PROCEDURE — 87116 MYCOBACTERIA CULTURE: CPT | Performed by: ORTHOPAEDIC SURGERY

## 2020-04-14 PROCEDURE — 87186 SC STD MICRODIL/AGAR DIL: CPT | Performed by: ORTHOPAEDIC SURGERY

## 2020-04-14 PROCEDURE — 28122 PARTIAL REMOVAL OF FOOT BONE: CPT | Performed by: PHYSICIAN ASSISTANT

## 2020-04-14 PROCEDURE — 88304 TISSUE EXAM BY PATHOLOGIST: CPT | Performed by: ORTHOPAEDIC SURGERY

## 2020-04-14 PROCEDURE — 87070 CULTURE OTHR SPECIMN AEROBIC: CPT | Performed by: ORTHOPAEDIC SURGERY

## 2020-04-14 PROCEDURE — 28122 PARTIAL REMOVAL OF FOOT BONE: CPT | Performed by: ORTHOPAEDIC SURGERY

## 2020-04-14 PROCEDURE — 25010000002 HYDROMORPHONE 1 MG/ML SOLUTION: Performed by: ORTHOPAEDIC SURGERY

## 2020-04-14 PROCEDURE — 25010000002 DEXAMETHASONE SODIUM PHOSPHATE 10 MG/ML SOLUTION: Performed by: NURSE ANESTHETIST, CERTIFIED REGISTERED

## 2020-04-14 PROCEDURE — 63710000001 TAMSULOSIN 0.4 MG CAPSULE: Performed by: NURSE PRACTITIONER

## 2020-04-14 PROCEDURE — 25010000002 FENTANYL CITRATE (PF) 100 MCG/2ML SOLUTION: Performed by: NURSE ANESTHETIST, CERTIFIED REGISTERED

## 2020-04-14 PROCEDURE — 94799 UNLISTED PULMONARY SVC/PX: CPT

## 2020-04-14 PROCEDURE — 25010000002 VANCOMYCIN: Performed by: ORTHOPAEDIC SURGERY

## 2020-04-14 PROCEDURE — 25010000002 CEFTRIAXONE PER 250 MG: Performed by: ORTHOPAEDIC SURGERY

## 2020-04-14 PROCEDURE — 25010000002 PHENYLEPHRINE PER 1 ML: Performed by: NURSE ANESTHETIST, CERTIFIED REGISTERED

## 2020-04-14 PROCEDURE — 63710000001 AMITRIPTYLINE 50 MG TABLET: Performed by: NURSE PRACTITIONER

## 2020-04-14 PROCEDURE — 88312 SPECIAL STAINS GROUP 1: CPT | Performed by: ORTHOPAEDIC SURGERY

## 2020-04-14 PROCEDURE — 87205 SMEAR GRAM STAIN: CPT | Performed by: ORTHOPAEDIC SURGERY

## 2020-04-14 PROCEDURE — 63710000001 CARBIDOPA-LEVODOPA 25-100 MG TABLET: Performed by: NURSE PRACTITIONER

## 2020-04-14 PROCEDURE — 25010000002 DEXAMETHASONE PER 1 MG: Performed by: NURSE ANESTHETIST, CERTIFIED REGISTERED

## 2020-04-14 PROCEDURE — 63710000001 PANTOPRAZOLE 40 MG TABLET DELAYED-RELEASE: Performed by: NURSE PRACTITIONER

## 2020-04-14 PROCEDURE — 63710000001 MONTELUKAST 10 MG TABLET: Performed by: NURSE PRACTITIONER

## 2020-04-14 PROCEDURE — 25010000002 PROPOFOL 10 MG/ML EMULSION: Performed by: NURSE ANESTHETIST, CERTIFIED REGISTERED

## 2020-04-14 PROCEDURE — 94640 AIRWAY INHALATION TREATMENT: CPT

## 2020-04-14 PROCEDURE — 87176 TISSUE HOMOGENIZATION CULTR: CPT | Performed by: ORTHOPAEDIC SURGERY

## 2020-04-14 PROCEDURE — A9270 NON-COVERED ITEM OR SERVICE: HCPCS | Performed by: ORTHOPAEDIC SURGERY

## 2020-04-14 PROCEDURE — 87102 FUNGUS ISOLATION CULTURE: CPT | Performed by: ORTHOPAEDIC SURGERY

## 2020-04-14 PROCEDURE — 25010000002 VANCOMYCIN PER 500 MG: Performed by: INTERNAL MEDICINE

## 2020-04-14 RX ORDER — MAGNESIUM HYDROXIDE 1200 MG/15ML
LIQUID ORAL AS NEEDED
Status: DISCONTINUED | OUTPATIENT
Start: 2020-04-14 | End: 2020-04-14 | Stop reason: HOSPADM

## 2020-04-14 RX ORDER — IPRATROPIUM BROMIDE AND ALBUTEROL SULFATE 2.5; .5 MG/3ML; MG/3ML
3 SOLUTION RESPIRATORY (INHALATION) 3 TIMES DAILY
Status: DISCONTINUED | OUTPATIENT
Start: 2020-04-14 | End: 2020-04-17 | Stop reason: HOSPADM

## 2020-04-14 RX ORDER — BISACODYL 10 MG
10 SUPPOSITORY, RECTAL RECTAL DAILY PRN
Status: DISCONTINUED | OUTPATIENT
Start: 2020-04-14 | End: 2020-04-17 | Stop reason: HOSPADM

## 2020-04-14 RX ORDER — PANTOPRAZOLE SODIUM 40 MG/1
40 TABLET, DELAYED RELEASE ORAL 2 TIMES DAILY
Status: DISCONTINUED | OUTPATIENT
Start: 2020-04-14 | End: 2020-04-17 | Stop reason: HOSPADM

## 2020-04-14 RX ORDER — LIDOCAINE HYDROCHLORIDE 10 MG/ML
0.5 INJECTION, SOLUTION EPIDURAL; INFILTRATION; INTRACAUDAL; PERINEURAL ONCE AS NEEDED
Status: COMPLETED | OUTPATIENT
Start: 2020-04-14 | End: 2020-04-14

## 2020-04-14 RX ORDER — NALOXONE HCL 0.4 MG/ML
0.4 VIAL (ML) INJECTION
Status: DISCONTINUED | OUTPATIENT
Start: 2020-04-14 | End: 2020-04-17 | Stop reason: HOSPADM

## 2020-04-14 RX ORDER — DIPHENHYDRAMINE HYDROCHLORIDE 50 MG/ML
25 INJECTION INTRAMUSCULAR; INTRAVENOUS NIGHTLY PRN
Status: DISCONTINUED | OUTPATIENT
Start: 2020-04-14 | End: 2020-04-17 | Stop reason: HOSPADM

## 2020-04-14 RX ORDER — MONTELUKAST SODIUM 10 MG/1
10 TABLET ORAL NIGHTLY
Status: DISCONTINUED | OUTPATIENT
Start: 2020-04-14 | End: 2020-04-17 | Stop reason: HOSPADM

## 2020-04-14 RX ORDER — PROPOFOL 10 MG/ML
VIAL (ML) INTRAVENOUS AS NEEDED
Status: DISCONTINUED | OUTPATIENT
Start: 2020-04-14 | End: 2020-04-14 | Stop reason: SURG

## 2020-04-14 RX ORDER — LABETALOL HYDROCHLORIDE 5 MG/ML
10 INJECTION, SOLUTION INTRAVENOUS EVERY 4 HOURS PRN
Status: DISCONTINUED | OUTPATIENT
Start: 2020-04-14 | End: 2020-04-17 | Stop reason: HOSPADM

## 2020-04-14 RX ORDER — ONDANSETRON 2 MG/ML
4 INJECTION INTRAMUSCULAR; INTRAVENOUS ONCE AS NEEDED
Status: DISCONTINUED | OUTPATIENT
Start: 2020-04-14 | End: 2020-04-14 | Stop reason: HOSPADM

## 2020-04-14 RX ORDER — SODIUM CHLORIDE, SODIUM LACTATE, POTASSIUM CHLORIDE, CALCIUM CHLORIDE 600; 310; 30; 20 MG/100ML; MG/100ML; MG/100ML; MG/100ML
9 INJECTION, SOLUTION INTRAVENOUS CONTINUOUS
Status: DISCONTINUED | OUTPATIENT
Start: 2020-04-14 | End: 2020-04-14 | Stop reason: SDUPTHER

## 2020-04-14 RX ORDER — ONDANSETRON 4 MG/1
4 TABLET, FILM COATED ORAL EVERY 6 HOURS PRN
Qty: 30 TABLET | Refills: 0 | Status: SHIPPED | OUTPATIENT
Start: 2020-04-14 | End: 2020-05-27

## 2020-04-14 RX ORDER — OXYCODONE HYDROCHLORIDE AND ACETAMINOPHEN 5; 325 MG/1; MG/1
1-2 TABLET ORAL EVERY 6 HOURS PRN
Qty: 45 TABLET | Refills: 0 | Status: SHIPPED | OUTPATIENT
Start: 2020-04-14 | End: 2020-05-27

## 2020-04-14 RX ORDER — HYDROCODONE BITARTRATE AND ACETAMINOPHEN 7.5; 325 MG/1; MG/1
1 TABLET ORAL EVERY 4 HOURS PRN
Status: DISCONTINUED | OUTPATIENT
Start: 2020-04-14 | End: 2020-04-17 | Stop reason: HOSPADM

## 2020-04-14 RX ORDER — OXYCODONE HYDROCHLORIDE AND ACETAMINOPHEN 5; 325 MG/1; MG/1
1 TABLET ORAL EVERY 4 HOURS PRN
Status: DISCONTINUED | OUTPATIENT
Start: 2020-04-14 | End: 2020-04-17 | Stop reason: HOSPADM

## 2020-04-14 RX ORDER — DEXAMETHASONE SODIUM PHOSPHATE 4 MG/ML
INJECTION, SOLUTION INTRA-ARTICULAR; INTRALESIONAL; INTRAMUSCULAR; INTRAVENOUS; SOFT TISSUE AS NEEDED
Status: DISCONTINUED | OUTPATIENT
Start: 2020-04-14 | End: 2020-04-14 | Stop reason: SURG

## 2020-04-14 RX ORDER — HYDROCODONE BITARTRATE AND ACETAMINOPHEN 7.5; 325 MG/1; MG/1
2 TABLET ORAL EVERY 4 HOURS PRN
Status: DISCONTINUED | OUTPATIENT
Start: 2020-04-14 | End: 2020-04-17 | Stop reason: HOSPADM

## 2020-04-14 RX ORDER — DIPHENHYDRAMINE HCL 25 MG
25 CAPSULE ORAL NIGHTLY PRN
Status: DISCONTINUED | OUTPATIENT
Start: 2020-04-14 | End: 2020-04-17 | Stop reason: HOSPADM

## 2020-04-14 RX ORDER — AMITRIPTYLINE HYDROCHLORIDE 50 MG/1
50 TABLET, FILM COATED ORAL NIGHTLY
Status: DISCONTINUED | OUTPATIENT
Start: 2020-04-14 | End: 2020-04-17 | Stop reason: HOSPADM

## 2020-04-14 RX ORDER — SODIUM CHLORIDE 0.9 % (FLUSH) 0.9 %
10 SYRINGE (ML) INJECTION AS NEEDED
Status: DISCONTINUED | OUTPATIENT
Start: 2020-04-14 | End: 2020-04-14 | Stop reason: HOSPADM

## 2020-04-14 RX ORDER — LIDOCAINE HYDROCHLORIDE 10 MG/ML
INJECTION, SOLUTION EPIDURAL; INFILTRATION; INTRACAUDAL; PERINEURAL AS NEEDED
Status: DISCONTINUED | OUTPATIENT
Start: 2020-04-14 | End: 2020-04-14 | Stop reason: SURG

## 2020-04-14 RX ORDER — OXYCODONE HYDROCHLORIDE AND ACETAMINOPHEN 5; 325 MG/1; MG/1
2 TABLET ORAL EVERY 4 HOURS PRN
Status: DISCONTINUED | OUTPATIENT
Start: 2020-04-14 | End: 2020-04-17 | Stop reason: HOSPADM

## 2020-04-14 RX ORDER — HYDROCODONE BITARTRATE AND ACETAMINOPHEN 7.5; 325 MG/1; MG/1
1-2 TABLET ORAL EVERY 6 HOURS PRN
Qty: 45 TABLET | Refills: 0 | Status: SHIPPED | OUTPATIENT
Start: 2020-04-14 | End: 2020-05-27

## 2020-04-14 RX ORDER — FENTANYL CITRATE 50 UG/ML
50 INJECTION, SOLUTION INTRAMUSCULAR; INTRAVENOUS
Status: DISCONTINUED | OUTPATIENT
Start: 2020-04-14 | End: 2020-04-14 | Stop reason: HOSPADM

## 2020-04-14 RX ORDER — DEXAMETHASONE SODIUM PHOSPHATE 10 MG/ML
INJECTION, SOLUTION INTRAMUSCULAR; INTRAVENOUS
Status: COMPLETED | OUTPATIENT
Start: 2020-04-14 | End: 2020-04-14

## 2020-04-14 RX ORDER — DIPHENOXYLATE HYDROCHLORIDE AND ATROPINE SULFATE 2.5; .025 MG/1; MG/1
1 TABLET ORAL DAILY
Status: DISCONTINUED | OUTPATIENT
Start: 2020-04-14 | End: 2020-04-17 | Stop reason: HOSPADM

## 2020-04-14 RX ORDER — TAMSULOSIN HYDROCHLORIDE 0.4 MG/1
0.4 CAPSULE ORAL DAILY
Status: DISCONTINUED | OUTPATIENT
Start: 2020-04-14 | End: 2020-04-17 | Stop reason: HOSPADM

## 2020-04-14 RX ORDER — FAMOTIDINE 20 MG/1
20 TABLET, FILM COATED ORAL ONCE
Status: DISCONTINUED | OUTPATIENT
Start: 2020-04-14 | End: 2020-04-14 | Stop reason: HOSPADM

## 2020-04-14 RX ORDER — DEXTROSE, SODIUM CHLORIDE, AND POTASSIUM CHLORIDE 5; .45; .15 G/100ML; G/100ML; G/100ML
50 INJECTION INTRAVENOUS CONTINUOUS
Status: DISCONTINUED | OUTPATIENT
Start: 2020-04-14 | End: 2020-04-17 | Stop reason: HOSPADM

## 2020-04-14 RX ORDER — ONDANSETRON 2 MG/ML
4 INJECTION INTRAMUSCULAR; INTRAVENOUS EVERY 6 HOURS PRN
Status: DISCONTINUED | OUTPATIENT
Start: 2020-04-14 | End: 2020-04-17 | Stop reason: HOSPADM

## 2020-04-14 RX ORDER — ONDANSETRON 2 MG/ML
INJECTION INTRAMUSCULAR; INTRAVENOUS AS NEEDED
Status: DISCONTINUED | OUTPATIENT
Start: 2020-04-14 | End: 2020-04-14 | Stop reason: SURG

## 2020-04-14 RX ORDER — PROMETHAZINE HYDROCHLORIDE 25 MG/ML
12.5 INJECTION, SOLUTION INTRAMUSCULAR; INTRAVENOUS EVERY 4 HOURS PRN
Status: DISCONTINUED | OUTPATIENT
Start: 2020-04-14 | End: 2020-04-17 | Stop reason: HOSPADM

## 2020-04-14 RX ORDER — IPRATROPIUM BROMIDE AND ALBUTEROL SULFATE 2.5; .5 MG/3ML; MG/3ML
3 SOLUTION RESPIRATORY (INHALATION) ONCE AS NEEDED
Status: DISCONTINUED | OUTPATIENT
Start: 2020-04-14 | End: 2020-04-14 | Stop reason: HOSPADM

## 2020-04-14 RX ORDER — FENTANYL CITRATE 50 UG/ML
INJECTION, SOLUTION INTRAMUSCULAR; INTRAVENOUS AS NEEDED
Status: DISCONTINUED | OUTPATIENT
Start: 2020-04-14 | End: 2020-04-14 | Stop reason: SURG

## 2020-04-14 RX ORDER — BUPIVACAINE HYDROCHLORIDE 2.5 MG/ML
INJECTION, SOLUTION EPIDURAL; INFILTRATION; INTRACAUDAL
Status: COMPLETED | OUTPATIENT
Start: 2020-04-14 | End: 2020-04-14

## 2020-04-14 RX ORDER — SODIUM CHLORIDE 0.9 % (FLUSH) 0.9 %
10 SYRINGE (ML) INJECTION EVERY 12 HOURS SCHEDULED
Status: DISCONTINUED | OUTPATIENT
Start: 2020-04-14 | End: 2020-04-14 | Stop reason: HOSPADM

## 2020-04-14 RX ADMIN — SODIUM CHLORIDE, POTASSIUM CHLORIDE, SODIUM LACTATE AND CALCIUM CHLORIDE 9 ML/HR: 600; 310; 30; 20 INJECTION, SOLUTION INTRAVENOUS at 11:57

## 2020-04-14 RX ADMIN — CARBIDOPA AND LEVODOPA 1.5 TABLET: 25; 100 TABLET ORAL at 17:19

## 2020-04-14 RX ADMIN — VANCOMYCIN HYDROCHLORIDE 500 MG: 500 INJECTION, POWDER, LYOPHILIZED, FOR SOLUTION INTRAVENOUS at 16:36

## 2020-04-14 RX ADMIN — VANCOMYCIN HYDROCHLORIDE 1 G: 1 INJECTION, POWDER, LYOPHILIZED, FOR SOLUTION INTRAVENOUS at 13:07

## 2020-04-14 RX ADMIN — PHENYLEPHRINE HYDROCHLORIDE 80 MCG: 10 INJECTION INTRAVENOUS at 12:57

## 2020-04-14 RX ADMIN — FENTANYL CITRATE 50 MCG: 50 INJECTION, SOLUTION INTRAMUSCULAR; INTRAVENOUS at 12:45

## 2020-04-14 RX ADMIN — TAMSULOSIN HYDROCHLORIDE 0.4 MG: 0.4 CAPSULE ORAL at 15:26

## 2020-04-14 RX ADMIN — EPHEDRINE SULFATE 5 MG: 50 INJECTION INTRAMUSCULAR; INTRAVENOUS; SUBCUTANEOUS at 13:09

## 2020-04-14 RX ADMIN — HYDROMORPHONE HYDROCHLORIDE 1 MG: 1 INJECTION, SOLUTION INTRAMUSCULAR; INTRAVENOUS; SUBCUTANEOUS at 20:12

## 2020-04-14 RX ADMIN — DEXAMETHASONE SODIUM PHOSPHATE 2 MG: 10 INJECTION INTRAMUSCULAR; INTRAVENOUS at 12:10

## 2020-04-14 RX ADMIN — IPRATROPIUM BROMIDE AND ALBUTEROL SULFATE 3 ML: 2.5; .5 SOLUTION RESPIRATORY (INHALATION) at 19:43

## 2020-04-14 RX ADMIN — LIDOCAINE HYDROCHLORIDE 0.5 ML: 10 INJECTION, SOLUTION EPIDURAL; INFILTRATION; INTRACAUDAL; PERINEURAL at 11:57

## 2020-04-14 RX ADMIN — FENTANYL CITRATE 50 MCG: 50 INJECTION, SOLUTION INTRAMUSCULAR; INTRAVENOUS at 13:11

## 2020-04-14 RX ADMIN — PANTOPRAZOLE SODIUM 40 MG: 40 TABLET, DELAYED RELEASE ORAL at 20:11

## 2020-04-14 RX ADMIN — AMITRIPTYLINE HYDROCHLORIDE 50 MG: 50 TABLET, FILM COATED ORAL at 20:11

## 2020-04-14 RX ADMIN — DEXAMETHASONE SODIUM PHOSPHATE 4 MG: 4 INJECTION, SOLUTION INTRAMUSCULAR; INTRAVENOUS at 12:45

## 2020-04-14 RX ADMIN — MULTIVITAMIN TABLET 1 TABLET: TABLET at 15:26

## 2020-04-14 RX ADMIN — ONDANSETRON 4 MG: 2 INJECTION INTRAMUSCULAR; INTRAVENOUS at 13:10

## 2020-04-14 RX ADMIN — POTASSIUM CHLORIDE, DEXTROSE MONOHYDRATE AND SODIUM CHLORIDE 50 ML/HR: 150; 5; 450 INJECTION, SOLUTION INTRAVENOUS at 15:26

## 2020-04-14 RX ADMIN — IPRATROPIUM BROMIDE AND ALBUTEROL SULFATE 3 ML: 2.5; .5 SOLUTION RESPIRATORY (INHALATION) at 16:37

## 2020-04-14 RX ADMIN — CARBIDOPA AND LEVODOPA 1.5 TABLET: 25; 100 TABLET ORAL at 20:11

## 2020-04-14 RX ADMIN — EPHEDRINE SULFATE 10 MG: 50 INJECTION INTRAMUSCULAR; INTRAVENOUS; SUBCUTANEOUS at 12:54

## 2020-04-14 RX ADMIN — MONTELUKAST SODIUM 10 MG: 10 TABLET, COATED ORAL at 20:12

## 2020-04-14 RX ADMIN — PROPOFOL 100 MG: 10 INJECTION, EMULSION INTRAVENOUS at 12:42

## 2020-04-14 RX ADMIN — BUPIVACAINE HYDROCHLORIDE 30 ML: 2.5 INJECTION, SOLUTION EPIDURAL; INFILTRATION; INTRACAUDAL; PERINEURAL at 12:10

## 2020-04-14 RX ADMIN — EPHEDRINE SULFATE 10 MG: 50 INJECTION INTRAMUSCULAR; INTRAVENOUS; SUBCUTANEOUS at 12:57

## 2020-04-14 RX ADMIN — CEFTRIAXONE 2 G: 1 INJECTION, POWDER, FOR SOLUTION INTRAMUSCULAR; INTRAVENOUS at 13:03

## 2020-04-14 RX ADMIN — PHENYLEPHRINE HYDROCHLORIDE 80 MCG: 10 INJECTION INTRAVENOUS at 13:09

## 2020-04-14 RX ADMIN — LIDOCAINE HYDROCHLORIDE 50 MG: 10 INJECTION, SOLUTION EPIDURAL; INFILTRATION; INTRACAUDAL; PERINEURAL at 12:42

## 2020-04-14 NOTE — H&P
Patient Name: Flaco Roque II  MRN: [id number]  : 1945  DOS: 2020    Attending: Juhi Hess MD    Primary Care Provider: Tayo Hendrix MD      Chief complaint:  chronic ulcer lateral left foot    Subjective   Patient is a 74 y.o. male presented for Irrigation debridement left foot with excision of base of fifth metatarsal and chronic ulcer by Dr. Hess.    He underwent surgery under [facility]. He tolerated surgery well and is admitted for further medical management. He states the ulcer started about a year ago after previous foot surgery. He has been seeing Dr. Beach, [facility], for abx infusions.      He is known to us from previous admissions, most recently 2019 for left foot surgery; which he recovered fairly well.     He has chronic pain and indwelling morphine pump that he had refilled last Friday. He is followed by Dr. Kingston, pain management.   He has empysema and is followed by Dr. Ocampo, pulmonary. He uses home O2.    When seen postop he is doing well. He denies pain. He denies nausea, shortness of breath or chest pain. No hx of DVT or PE.      Allergies:  No Known Allergies    Meds:  Medications Prior to Admission   Medication Sig Dispense Refill Last Dose   • amitriptyline (ELAVIL) 50 MG tablet Take 50 mg by mouth Every Night.   2020 at 1900   • carbidopa-levodopa (SINEMET)  MG per tablet Take 1.5 tablets by mouth 4 (Four) Times a Day.   2020 at 0700   • furosemide (LASIX) 20 MG tablet Take 1 tablet by mouth Daily. 90 tablet 3 2020 at 0700   • ipratropium-albuterol (DUO-NEB) 0.5-2.5 mg/3 ml nebulizer Take 3 mL by nebulization 3 (Three) Times a Day.   2020 at 1900   • montelukast (SINGULAIR) 10 MG tablet Take 10 mg by mouth every night.   2020 at 0700   • Multiple Vitamins-Minerals (MULTIVITAMIN ADULT PO) Take 1 tablet by mouth daily.   2020 at 0700   • pantoprazole (PROTONIX) 40 MG EC tablet Take 40 mg by mouth 2 (Two) Times a Day.    4/14/2020 at 0700   • tamsulosin (FLOMAX) 0.4 MG capsule 24 hr capsule Take 1 capsule by mouth Daily. 30 capsule 0 4/13/2020 at 1900   • umeclidinium-vilanterol (ANORO ELLIPTA) 62.5-25 MCG/INH aerosol powder  inhaler Inhale 1 puff Daily. 1 each 11 4/14/2020 at 0700   • HYDROcodone-acetaminophen (NORCO) 7.5-325 MG per tablet Take 1-2 tablets by mouth Every 6 (Six) Hours As Needed for Moderate Pain  (as needed for pain). 45 tablet 0 More than a month at Unknown time   • mupirocin (BACTROBAN) 2 % ointment Apply  topically to the appropriate area as directed Daily. (Patient taking differently: Apply 1 application topically to the appropriate area as directed Daily.) 30 g 5 pt states doesnt take   • ondansetron (Zofran) 4 MG tablet Take 1 tablet by mouth Every 6 (Six) Hours As Needed for Nausea or Vomiting. 30 tablet 0 More than a month at Unknown time   • oxyCODONE-acetaminophen (PERCOCET) 5-325 MG per tablet Take 1-2 tablets by mouth Every 6 (Six) Hours As Needed for Severe Pain  (as needed for severe pain). 45 tablet 0 More than a month at Unknown time       History:   Past Medical History:   Diagnosis Date   • Arthropathy of shoulder region 9/10/2018   • Chris's esophagus     Last EGD 1 year ago with Dr Kaye    • BPH (benign prostatic hyperplasia)    • Chronic back pain 10/31/2017   • Chronic low back pain    • COPD (chronic obstructive pulmonary disease) (CMS/[facility])    • Foot pain    • GERD (gastroesophageal reflux disease)    • History of transfusion     h/o- no reaction    • Injury of back    • Lung abscess (CMS/HCC)    • Osteoarthritis    • Osteoporosis    • Parkinson disease (CMS/HCC)    • Rotator cuff tear, left    • Sleep apnea     doesnt use machine- cant tolerate    • Status post reverse total shoulder replacement, 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     Past Surgical History:   Procedure Laterality Date   • ARTHRODESIS MIDTARSAL / TARSOMETATARSAL / TARSAL NAVICULAR-CUNEIFORM Left 05/10/2016   • BACK SURGERY     • BACK SURGERY       low back   • BUNIONECTOMY Left 2019    Procedure: left foot excise PIP joints 2,3,4, tenotomies 2,3,4, metatarsal capsulotomy 2,3,4, chevron osteotomy 5th metatarsal, great toe DIP fusion LEFT;  Surgeon: Juhi Calle MD;  Location:  [facility];  Service: Orthopedics   • CATARACT EXTRACTION      bilat cataract     and lasik on right eye only    • CHOLECYSTECTOMY     • COLONOSCOPY N/A 2017    Procedure: COLONOSCOPY;  Surgeon: Luis Eduardo Mayers MD;  Location:  [facility];  Service:    • ENDOSCOPY N/A 2017    Procedure: ESOPHAGOGASTRODUODENOSCOPY;  Surgeon: Luis Eduardo Mayers MD;  Location:  [facility];  Service:    • ENDOSCOPY  2017    DR LUIS EDUARDO MAYERS   • FOOT SURGERY     • KNEE ARTHROSCOPY Bilateral    • PAIN PUMP INSERTION/REVISION     • SPINE SURGERY     • TOTAL HIP ARTHROPLASTY Left    • TOTAL SHOULDER ARTHROPLASTY W/ DISTAL CLAVICLE EXCISION Left 9/10/2018    Procedure: REVERSE TOTAL SHOULDER ARTHROPLASTY LEFT;  Surgeon: Abel Brennan MD;  Location:  [facility];  Service: Orthopedics   • ULNAR NERVE TRANSPOSITION       Family History   Problem Relation Age of Onset   • Arthritis Mother    • Diabetes Mother    • Osteoarthritis Mother    • Colon cancer Father    • Cancer Father      Social History     Tobacco Use   • Smoking status: Former Smoker     Packs/day: 2.00     Years: 42.00     Pack years: 84.00     Types: Cigarettes     Start date:      Last attempt to quit:      Years since quittin.2   • Smokeless tobacco: Never Used   Substance Use Topics   • Alcohol use: Yes     Drinks per session: 1 or 2     Binge frequency: Monthly     Comment: beer occasional    • Drug use: No   He is  with 2 children. He is retired from [employer] and [employer].    Review of Systems  All systems were reviewed and negative except for:  Respiratory: positive for  shortness of air    Vital Signs  Visit Vitals  /68 (BP Location: Right arm, Patient Position: Lying)  "  Pulse 83   Temp 97.7 °F (36.5 °C) (Oral)   Resp 18   Ht 172.7 cm (68\")   Wt 65.8 kg (145 lb)   SpO2 92%   BMI 22.05 kg/m²       Physical Exam:    General Appearance:    Alert, cooperative, in no acute distress   Head:    Normocephalic, without obvious abnormality, atraumatic   Eyes:            Lids and lashes normal, conjunctivae and sclerae normal, no   icterus, no pallor, corneas clear   Ears:    Ears appear intact with no abnormalities noted   Neck:   No adenopathy, supple, trachea midline, no thyromegaly   Lungs:     Clear to auscultation, respirations regular, even and                   unlabored    Heart:    Regular rhythm and normal rate, normal S1 and S2, no            murmur, no gallop   Abdomen:     Normal bowel sounds, no masses, no organomegaly, soft        non-tender, non-distended, no guarding, no rebound                 tenderness   Genitalia:    Deferred   Extremities:   Left foot spline CDI. Minimal movement of toes (baseline per pt) Good cap refill.   Pulses:   Pulses palpable and equal bilaterally   Skin:   No bleeding, bruising or rash   Neurologic:   Cranial nerves 2 - 12 grossly intact, Left toes numb (some baseline numbness, likely worse d/t nerve block shot)      I reviewed the patient's new clinical results.       Results from last 7 days   Lab Units 04/13/20  0957   WBC 10*3/mm3 7.38   HEMOGLOBIN g/dL 8.1*   HEMATOCRIT % 30.7*   PLATELETS 10*3/mm3 494*     Results from last 7 days   Lab Units 04/13/20  0957   SODIUM mmol/L 141   POTASSIUM mmol/L 4.2   CHLORIDE mmol/L 101   CO2 mmol/L 26.0   BUN mg/dL 17   CREATININE mg/dL 0.89   CALCIUM mg/dL 9.5   GLUCOSE mg/dL 136*     Lab Results   Component Value Date    HGBA1C 5.2 04/13/2020       Assessment and Plan:     S/P Irrigation debridement left foot with excision of base of fifth metatarsal and chronic ulcer    Skin ulcer of left foot, limited to breakdown of skin (CMS/HCC)    ABRIL (obstructive sleep apnea)    Pulmonary emphysema (CMS/HCC)    " Parkinson disease (CMS/HCC)    Anemia    On home O2      Plan  1. PT/OT- NWB LLE  2. Pain control-prns, single shot popliteal block   3. IS-encourage  4. DVT proph- mechs/Lovenox  5. Bowel regimen  6. Resume home medications as appropriate  7. Monitor post-op labs  8. Discharge planning     [facility] consult, abx management    Emphysema, ABRIL, Home O2  - O2 PRN  - continue home nebs and inhalers    Anemia  - CBC in AM    Parkinson's  - continue home carbidopa/levadopa       Anita Gan, APRN  04/14/20  15:23

## 2020-04-14 NOTE — ANESTHESIA PROCEDURE NOTES
Airway  Urgency: elective    Date/Time: 4/14/2020 12:43 PM  Airway not difficult    General Information and Staff    Patient location during procedure: OR  CRNA: Kobe Yan CRNA    Indications and Patient Condition  Indications for airway management: airway protection    Preoxygenated: yes  Mask difficulty assessment: 0 - not attempted    Final Airway Details  Final airway type: supraglottic airway      Successful airway: I-gel  Size 4    Number of attempts at approach: 1  Assessment: lips, teeth, and gum same as pre-op and atraumatic intubation    Additional Comments  LMA placed without difficulty, ventilation with assist, equal breath sounds and symmetric chest rise and fall. Atraumatic.

## 2020-04-14 NOTE — BRIEF OP NOTE
Orthopedics I&D left foot  Brief Op Note    Flaco Weaverkim BARRIOS  4/14/2020    Pre-op Diagnosis:   Skin ulcer of left foot, limited to breakdown of skin (CMS/[facility]) [L97.521], possible osteomyelitis    Post-op Diagnosis:  same       Post-Op Diagnosis Codes:     * Skin ulcer of left foot, limited to breakdown of skin (CMS/[facility]) [L97.521]    Procedure(s):  I&D left foot    Surgeon(s):  Juhi Calle MD    Anesthesia:  General with Block    Staff:   Circulator: Zeenat Rey RN  Scrub Person: Basilio Worthington Adrian  Assistant: Awilda Ross PA-C    Estimated Blood Loss:5cc      Specimens: cultures and permanent      Drains:  none    Complications:  None    Tourniquet:: 9min    Dressing:soft    Disposition:rr stable    Juhi Calle MD     Date: 4/14/2020  Time: 12:44

## 2020-04-14 NOTE — INTERVAL H&P NOTE
"Pre-Op H&P (See Recent Office Note Attached for Full H&P)    Chief complaint: Skin ulcer of left foot    Review of Systems:  General ROS:  no fever, chills, rashes, No change since last office visit  Cardiovascular ROS: no chest pain or dyspnea on exertion  Respiratory ROS: no cough, shortness of breath, or wheezing    Meds:    No current facility-administered medications on file prior to encounter.      Current Outpatient Medications on File Prior to Encounter   Medication Sig Dispense Refill   • amitriptyline (ELAVIL) 50 MG tablet Take 50 mg by mouth Every Night.     • carbidopa-levodopa (SINEMET)  MG per tablet Take 1.5 tablets by mouth 4 (Four) Times a Day.     • furosemide (LASIX) 20 MG tablet Take 1 tablet by mouth Daily. 90 tablet 3   • ipratropium-albuterol (DUO-NEB) 0.5-2.5 mg/3 ml nebulizer Take 3 mL by nebulization 3 (Three) Times a Day.     • montelukast (SINGULAIR) 10 MG tablet Take 10 mg by mouth every night.     • Multiple Vitamins-Minerals (MULTIVITAMIN ADULT PO) Take 1 tablet by mouth daily.     • pantoprazole (PROTONIX) 40 MG EC tablet Take 40 mg by mouth 2 (Two) Times a Day.     • tamsulosin (FLOMAX) 0.4 MG capsule 24 hr capsule Take 1 capsule by mouth Daily. 30 capsule 0   • umeclidinium-vilanterol (ANORO ELLIPTA) 62.5-25 MCG/INH aerosol powder  inhaler Inhale 1 puff Daily. 1 each 11   • mupirocin (BACTROBAN) 2 % ointment Apply  topically to the appropriate area as directed Daily. (Patient taking differently: Apply 1 application topically to the appropriate area as directed Daily.) 30 g 5       Vital Signs:  /78 (BP Location: Right arm, Patient Position: Lying)   Pulse 85   Temp 98.6 °F (37 °C) (Temporal)   Ht 172.7 cm (68\")   Wt 65.8 kg (145 lb)   SpO2 100%   BMI 22.05 kg/m²     Physical Exam:    CV:  S1S2 regular rate and rhythm, no murmur               Resp:  Clear to auscultation; respirations regular, even and unlabored    Results Review:     Lab Results   Component Value " Date    WBC 7.38 04/13/2020    HGB 8.1 (L) 04/13/2020    HCT 30.7 (L) 04/13/2020    MCV 67.3 (L) 04/13/2020     (H) 04/13/2020    NEUTROABS 5.49 04/13/2020    GLUCOSE 136 (H) 04/13/2020    BUN 17 04/13/2020    CREATININE 0.89 04/13/2020    EGFRIFNONA 84 04/13/2020     04/13/2020    K 4.2 04/13/2020     04/13/2020    CO2 26.0 04/13/2020    MG 2.2 07/30/2019    PHOS 2.8 04/26/2019    CALCIUM 9.5 04/13/2020    ALBUMIN 3.70 08/20/2019    AST 17 08/20/2019    ALT <5 08/20/2019    BILITOT 0.4 08/20/2019        I reviewed the patient's new clinical results.    Cancer Staging (if applicable)  Cancer Patient: __ yes _x_no __unknown; If yes, clinical stage T:__ N:__M:__, stage group or __N/A    Assessment/Plan:    1. Skin ulcer of left foot, limited to breakdown of skin (CMS/HCC)  The ulcer is worse- we have exhausted all the conservative treatment that I know.  I recommend proceeding with surgery.  The other option is to continue dressing changes which has not been successful.  He agrees, he is high risk from a healing and medical standpoint, but I do not have any other good options.  I discussed this with Dr Beach.  MR Roque agrees, he reports being very tired of having the ulcer and pain.  We discussed the risks including but not limited to: death, infection, neurovascular damage, strokes, heart attacks, blood clots, chronic pain, deformity, stiffness, need for  further surgery,  amputation, etc.  Questions asked and answered in detail.      Marcie Olmstead, APRN  4/14/2020   11:57

## 2020-04-14 NOTE — OP NOTE
Operative Report    04/14/20  13:15    Preoperative diagnosis: chronic ulcer lateral left foot over base of 5th metatarsal    Postoperative diagnosis: Same    Anesthesia: General with blocks for postop pain control    Surgeon: Juhi Calle M.D.    Assistant: Awilda HERRERA, present for the entire procedure including prepping, draping, retraction, closure, dressing.    Operative procedure: Irrigation debridement left foot with excision of base of fifth metatarsal and chronic ulcer    Operative indications: This is a very pleasant 74-year-old gentleman with a chronic ulcer over the lateral base of the fifth metatarsal that developed in January 2020.  He has had extensive conservative treatment.  He has had IV antibiotics, multiple different dressings, pressure relief, and the ulcer has failed to heal.  A bone scan showed a faint increased uptake in the base of the fifth metatarsal, not definitive for osteomyelitis.  The ulcer has failed to heal despite months of treatment.  It is quite painful for him.  He had a similar ulcer on the fifth metatarsal head distally that did heal.  He has small vessel disease but reasonable large vessel blood flow.  He does not have any fevers or chills.  However the painful ulcer has not improved.  We discussed the various options.  At this point we have exhausted all of the conservative treatment that I know.  We plan to proceed with excision of the base of the fifth metatarsal and the ulcer with closure.  Discussed with Dr. Zayda Ruth of infectious disease.    Operative procedure: The patient was taken to the operating room where general anesthesia was induced without difficulty.  He was given preoperative antibiotics.  Preoperative blocks were placed for postop pain control.  The left leg was prepped and draped in the usual sterile fashion with a well-padded tourniquet on the thigh.  The appropriate timeout was called, the leg was elevated and the tourniquet inflated to 350  mmHg tourniquet time was 9 minutes.  I made an elliptical incision along the lateral fifth metatarsal base and excised the ulcer.  The ulcer communicated with the periosteum.  The bone was very soft underneath the ulcer.  I removed all of the soft bone at the base of the fifth metatarsal and excised everything back to healthy-appearing tissue.  Deep cultures were obtained per routine.  When there was no further soft bone and no further abnormal tissue the tourniquet was released.  The incision was irrigated copiously with antibiotic solution using pulsatile lavage.  It was closed loosely with nylon.  The foot was dressed sterilely, he was awakened, extubated and transferred to recovery room in stable condition.  Postop plan will be admission for IV antibiotics and nonweightbearing.    Estimated blood loss: 5 cc    Specimens: Cultures and permanent    Drains: None    Complications: None    Juhi Calle MD  04/14/20  13:15

## 2020-04-14 NOTE — CONSULTS
INFECTIOUS DISEASE CONSULT/INITIAL HOSPITAL VISIT    Flaco Roque II  1945  [id number]    Date of Consult: 4/14/2020    Admission Date: 4/14/2020      Requesting Provider: Juhi Calle MD  Evaluating Physician: Zayda Beach MD    Reason for Consultation: osteomyelitis left 5th MT    History of present illness:    Patient is a 74 y.o. male with emphysema, interstitial lung disease and Parkinsons whom I saw 2 minths ago for left foot swelling, pain and erythema at the site of a nonhealing ulcer. Approximately 1 year ago he underwent  Excision of  PIP joints 2,3,4, tenotomies 2,3,4, metatarsal capsulotomy 2,3,4, chevron osteotomy 5th metatarsal, great toe DIP fusion of the left foot. All wounds healed and he did well until several months ago when he developed a non healing ulcer over the lateral midportion of the 5th MT. Culture of the ulcer was unremarkable.  Inflammatory markers were normal but bone scan showed delayed uptake at the 5th MT  He was treated with a 6 week course of daptomycin and rocephin. He had some improvement in pain and erythema but not resolution. These symptoms became a bit worse after the antibiotics were stopped but he never developed a fulminant cellulitis or abscess       Past Medical History:   Diagnosis Date   • Arthropathy of shoulder region 9/10/2018   • Chris's esophagus     Last EGD 1 year ago with Dr Kaye    • BPH (benign prostatic hyperplasia)    • Chronic back pain 10/31/2017   • Chronic low back pain    • COPD (chronic obstructive pulmonary disease) (CMS/formerly [facility])    • Foot pain    • GERD (gastroesophageal reflux disease)    • History of transfusion     h/o- no reaction    • Injury of back    • Lung abscess (CMS/HCC)    • Osteoarthritis    • Osteoporosis    • Parkinson disease (CMS/formerly [facility])    • Rotator cuff tear, left    • Sleep apnea     doesnt use machine- cant tolerate    • Status post reverse total shoulder replacement, 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       Past Surgical History:    Procedure Laterality Date   • ARTHRODESIS MIDTARSAL / TARSOMETATARSAL / TARSAL NAVICULAR-CUNEIFORM Left 05/10/2016   • BACK SURGERY     • BACK SURGERY      low back   • BUNIONECTOMY Left 2019    Procedure: left foot excise PIP joints 2,3,4, tenotomies 2,3,4, metatarsal capsulotomy 2,3,4, chevron osteotomy 5th metatarsal, great toe DIP fusion LEFT;  Surgeon: Juhi Calle MD;  Location:  [facility];  Service: Orthopedics   • CATARACT EXTRACTION      bilat cataract     and lasik on right eye only    • CHOLECYSTECTOMY     • COLONOSCOPY N/A 2017    Procedure: COLONOSCOPY;  Surgeon: Luis Eduardo Mayers MD;  Location:  [facility];  Service:    • ENDOSCOPY N/A 2017    Procedure: ESOPHAGOGASTRODUODENOSCOPY;  Surgeon: Luis Eduardo Mayers MD;  Location:  [facility];  Service:    • ENDOSCOPY  2017    DR LUIS EDUARDO MAYERS   • FOOT SURGERY     • KNEE ARTHROSCOPY Bilateral    • PAIN PUMP INSERTION/REVISION     • SPINE SURGERY     • TOTAL HIP ARTHROPLASTY Left    • TOTAL SHOULDER ARTHROPLASTY W/ DISTAL CLAVICLE EXCISION Left 9/10/2018    Procedure: REVERSE TOTAL SHOULDER ARTHROPLASTY LEFT;  Surgeon: Abel Brennan MD;  Location:  [facility];  Service: Orthopedics   • ULNAR NERVE TRANSPOSITION         Family History   Problem Relation Age of Onset   • Arthritis Mother    • Diabetes Mother    • Osteoarthritis Mother    • Colon cancer Father    • Cancer Father        Social History     Socioeconomic History   • Marital status:      Spouse name: Not on file   • Number of children: Not on file   • Years of education: Not on file   • Highest education level: Not on file   Occupational History   • Occupation: Retired    Tobacco Use   • Smoking status: Former Smoker     Packs/day: 2.00     Years: 42.00     Pack years: 84.00     Types: Cigarettes     Start date:      Last attempt to quit:      Years since quittin.2   • Smokeless tobacco: Never Used   Substance and Sexual Activity   • Alcohol use: Yes      Drinks per session: 1 or 2     Binge frequency: Monthly     Comment: beer occasional    • Drug use: No   • Sexual activity: Defer   Social History Narrative     and lives with wife    Retired supervisor at [employer]    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       No Known Allergies      Medication:    Current Facility-Administered Medications:   •  bisacodyl (DULCOLAX) suppository 10 mg, 10 mg, Rectal, Daily PRN, Juhi Calle MD  •  [START ON 4/15/2020] cefTRIAXone (ROCEPHIN) 2 g/100 mL 0.9% NS VTB (SHIRLEY), 2 g, Intravenous, Q24H, Zayda Beach MD  •  dextrose 5 % and sodium chloride 0.45 % with KCl 20 mEq/L infusion, 50 mL/hr, Intravenous, Continuous, Juhi Calle MD  •  diphenhydrAMINE (BENADRYL) capsule 25 mg, 25 mg, Oral, Nightly PRN **OR** diphenhydrAMINE (BENADRYL) injection 25 mg, 25 mg, Intravenous, Nightly PRN, Juhi Calle MD  •  [START ON 4/15/2020] enoxaparin (LOVENOX) syringe 40 mg, 40 mg, Subcutaneous, Daily, Juhi Calle MD  •  HYDROcodone-acetaminophen (NORCO) 7.5-325 MG per tablet 1 tablet, 1 tablet, Oral, Q4H PRN, Juhi Calle MD  •  HYDROcodone-acetaminophen (NORCO) 7.5-325 MG per tablet 2 tablet, 2 tablet, Oral, Q4H PRN, Juhi Calle MD  •  HYDROmorphone (DILAUDID) injection 1 mg, 1 mg, Intravenous, Q2H PRN **AND** naloxone (NARCAN) injection 0.4 mg, 0.4 mg, Intravenous, Q5 Min PRN, Juhi Calle MD  •  magnesium hydroxide (MILK OF MAGNESIA) suspension 2400 mg/10mL 10 mL, 10 mL, Oral, Daily PRN, Juhi Calle MD  •  multivitamin (THERAGRAN) tablet 1 tablet, 1 tablet, Oral, Daily, Juhi Calle MD  •  ondansetron (ZOFRAN) injection 4 mg, 4 mg, Intravenous, Q6H PRN, Juhi Calle MD  •  oxyCODONE-acetaminophen (PERCOCET) 5-325 MG per tablet 1 tablet, 1 tablet, Oral, Q4H PRN, Juhi Calle MD  •  oxyCODONE-acetaminophen (PERCOCET) 5-325 MG per tablet 2 tablet, 2 tablet, Oral, Q4H PRN, Juhi Calle MD  •  Pharmacy to dose vancomycin, , Does not apply,  Continuous PRN, Zayda Beach MD  •  promethazine (PHENERGAN) injection 12.5 mg, 12.5 mg, Intramuscular, Q4H PRN, Juhi Calle MD  •  promethazine (PHENERGAN) injection 12.5 mg, 12.5 mg, Intravenous, Q4H PRN, Juhi Calle MD    Antibiotics:  Anti-Infectives (From admission, onward)    Ordered     Dose/Rate Route Frequency Start Stop    04/14/20 1509  cefTRIAXone (ROCEPHIN) 2 g/100 mL 0.9% NS VTB (SHIRLEY)     Ordering Provider:  Zayda Beach MD    2 g  over 30 Minutes Intravenous Every 24 Hours 04/15/20 0900 05/07/20 0859    04/14/20 1509  Pharmacy to dose vancomycin     Ordering Provider:  Zayda Beach MD     Does not apply Continuous PRN 04/14/20 1507 05/06/20 1506    04/14/20 1155  vancomycin 1000 mg/250 mL 0.9% NS IVPB ([facility])     Ordering Provider:  Juhi Calle MD    15 mg/kg × 65.8 kg Intravenous Once 04/14/20 1157 04/14/20 1307    04/14/20 1155  cefTRIAXone (ROCEPHIN) 2 g in sodium chloride 0.9 % 100 mL IVPB     Ordering Provider:  Juhi Calle MD    2 g  200 mL/hr over 30 Minutes Intravenous Once 04/14/20 1157 04/14/20 1303            Review of Systems:  Constitutional-- No Fever, chills or sweats.  Appetite good, and no malaise. No fatigue.  HEENT-- No new vision, hearing or throat complaints.  No epistaxis or oral sores.  Denies odynophagia or dysphagia. No headache, photophobia or neck stiffness.  CV-- No chest pain, palpitation or syncope  Resp-- chronic  SOB is unchanged; denies cough/hemoptysis  GI- No nausea, vomiting, or diarrhea.  No hematochezia, melena, or hematemesis. Denies jaundice or chronic liver disease.  -- No dysuria, hematuria, or flank pain.  Denies hesitancy, urgency or flank pain.  Lymph- no swollen lymph nodes in neck/axilla or groin.   Heme- No active bruising or bleeding; no Hx of DVT or PE.  MS-- erythema and pain of the lateral left foot .  No new back pain.  Neuro-- No acute focal weakness or numbness in the arms or legs.  No  seizures.  Skin--No rashes or lesions      Physical Exam:   Vital Signs  Temp (24hrs), Av.8 °F (36.6 °C), Min:97.5 °F (36.4 °C), Max:98.6 °F (37 °C)    Temp  Min: 97.5 °F (36.4 °C)  Max: 98.6 °F (37 °C)  BP  Min: 132/75  Max: 151/78  Pulse  Min: 73  Max: 85  Resp  Min: 18  Max: 20  SpO2  Min: 92 %  Max: 100 %    GENERAL: Awake and alert, in no acute distress in PACU  HEENT: Normocephalic, atraumatic. EOMI. No conjunctival injection. No icterus. Oropharynx clear without evidence of thrush .  NECK: Supple without nuchal rigidity. No mass.  LYMPH: No cervical lymphadenopathy.  HEART: RRR; No murmur, rubs, gallops.   LUNGS: Clear to auscultation bilaterally without wheezing, rales, rhonchi. Normal respiratory effort. Nonlabored. No dullness.  ABDOMEN: Soft, nontender, nondistended. Positive bowel sounds. No rebound or guarding. NO mass or HSM.  EXT:  No cyanosis, clubbing or edema. No cord.  :  Without Pinto catheter.  MSK: postop dressing not removed from left foot  SKIN: Warm and dry without cutaneous eruptions on Inspection/palpation.    NEURO: Oriented to PPT  PSYCHIATRIC: Normal insight and judgement. Cooperative with PE    Laboratory Data    Results from last 7 days   Lab Units 20  0957   WBC 10*3/mm3 7.38   HEMOGLOBIN g/dL 8.1*   HEMATOCRIT % 30.7*   PLATELETS 10*3/mm3 494*     Results from last 7 days   Lab Units 20  0957   SODIUM mmol/L 141   POTASSIUM mmol/L 4.2   CHLORIDE mmol/L 101   CO2 mmol/L 26.0   BUN mg/dL 17   CREATININE mg/dL 0.89   GLUCOSE mg/dL 136*   CALCIUM mg/dL 9.5         Results from last 7 days   Lab Units 20  0957   SED RATE mm/hr 16     Results from last 7 days   Lab Units 20  0957   CRP mg/dL 0.23                 Estimated Creatinine Clearance: 67.8 mL/min (by C-G formula based on SCr of 0.89 mg/dL).      Microbiology:  No results found for: ACANTHNAEG, AFBCX, BPERTUSSISCX, BLOODCX  No results found for: BCIDPCR, CXREFLEX, CSFCX, CULTURETIS  No results found  for: CULTURES, HSVCX, URCX  No results found for: EYECULTURE, GCCX, LABHSV  No results found for: LEGIONELLA, MRSACX, MUMPSCX, MYCOPLASCX  No results found for: NOCARDIACX, STOOLCX  No results found for: THROATCX, UNSTIMCULT, URINECX, CULTURE, VZVCULTUR  No results found for: VIRALCULTU, WOUNDCX        Radiology:  Imaging Results (Last 72 Hours)     ** No results found for the last 72 hours. **            Impression:       -- Nonhealing ulcer with concern for osteomyelitis left 5th MT S/P debridement; cultures pending  Limited response to 6 week course of daptomycin and rocephin    -- COPD    -- Parkinson's      -- Interstitial Lung Disease    -- ABRIL    PLAN/RECOMMENDATIONS:   Thank you for asking us to see Flaco Roque II, I recommend the following:    -- vancomycin and rocephin while cultures pending    -- if cultures are + I would like to arrange to have him treated at home     -- probiotic    Discussed with Dr Jimena Beach MD  4/14/2020  15:10

## 2020-04-14 NOTE — ANESTHESIA PROCEDURE NOTES
Peripheral Block      Patient reassessed immediately prior to procedure    Patient location during procedure: pre-op  Reason for block: at surgeon's request and post-op pain management  Performed by  Anesthesiologist: Skinny Manriquez MD  CRNA: Kobe Yan CRNA  Preanesthetic Checklist  Completed: patient identified, site marked, surgical consent, pre-op evaluation, timeout performed, IV checked, risks and benefits discussed and monitors and equipment checked  Prep:  Pt Position: supine  Sterile barriers:cap, gloves, mask and sterile barriers  Prep: ChloraPrep  Patient monitoring: blood pressure monitoring, continuous pulse oximetry and EKG  Procedure  Sedation:no  Performed under: local infiltration  Guidance:ultrasound guided  ULTRASOUND INTERPRETATION. Using ultrasound guidance a 20 G gauge needle was placed in close proximity to the nerve, at which point, under ultrasound guidance anesthetic was injected in the area of the nerve and spread of the anesthesia was seen on ultrasound in close proximity thereto.  There were no abnormalities seen on ultrasound; a digital image was taken; and the patient tolerated the procedure with no complications. Images:still images not obtained    Laterality:left  Block Type:popliteal  Injection Technique:single-shot  Needle Type:echogenic  Needle Gauge:18 G  Resistance on Injection: none  Catheter Size:20 G    Medications Used: dexamethasone sodium phosphate injection, 2 mg  bupivacaine PF (MARCAINE) 0.25 % injection, 30 mL      Post Assessment  Injection Assessment: negative aspiration for heme, no paresthesia on injection and incremental injection  Patient Tolerance:comfortable throughout block  Complications:no  Additional Notes  Procedure:                                                         The pt was placed in  lateral position.  The Insertion site was  prepped and Draped in sterile fashion.  The pt was anesthetized with  IV Sedation( see meds).  Skin and cutaneous  tissue was infiltrated and anesthetized with 1% Lidocaine 3 mls via a 25g needle.  A BBraun 4 inch 18g echogenic needle was then  inserted approximately 3 cm proximal to the popliteal jasmin a at the lateral mid biceps femoris and advanced In-plane with Ultrasound guidance.  Normal Saline PSF was utilized for hydrodissection of tissue.  The popliteal artery was visualized and the common peroneal and tibial bifurcation was located.  LA injection spread was visualized, injection was incremental 1-5ml, injection pressure was normal or little, no intraneural injection, no vascular injection. Thank you

## 2020-04-14 NOTE — PLAN OF CARE
Problem: Patient Care Overview  Goal: Plan of Care Review  Outcome: Ongoing (interventions implemented as appropriate)  Flowsheets (Taken 4/14/2020 9164)  Progress: no change  Plan of Care Reviewed With: patient  Outcome Summary: Pt. alert, oriented x 4, and pleasant. VSS. Tolerating PO well. Left foot elevated on pillows, no drainage noted. No c/o pain. IV antibiotics started per orders. Will continue to monitor.

## 2020-04-14 NOTE — PROGRESS NOTES
Orthopedic Foot/Ankle Progress Note    Subjective     Post-Op:Day of Surgery  Procedure(s):  I&D left foot  Laterality:Left      Systemic or Specific Complaints: NO COMPLAINTS  Objective     Vital signs in last 24 hours:  Temp:  [97.5 °F (36.4 °C)-98.6 °F (37 °C)] 97.7 °F (36.5 °C)  Heart Rate:  [73-85] 83  Resp:  [18-20] 18  BP: (132-151)/(64-78) 147/68    Neurovascular: toes pink left foot               Wound: dressing dry    Data Review  Lab Results (last 24 hours)     Procedure Component Value Units Date/Time    Tissue / Bone Culture - Tissue, Foot, Left [[id number]] Collected:  04/14/20 1313    Specimen:  Tissue from Foot, Left Updated:  04/14/20 1521     Gram Stain No WBCs or organisms seen    Fungus Culture - Tissue, Foot, Left [[id number]] Collected:  04/14/20 1313    Specimen:  Tissue from Foot, Left Updated:  04/14/20 1420    AFB Culture - Tissue, Foot, Left [[id number]] Collected:  04/14/20 1313    Specimen:  Tissue from Foot, Left Updated:  04/14/20 1420    Anaerobic Culture 10 Day Incubation - Tissue, Foot, Left [[id number]] Collected:  04/14/20 1313    Specimen:  Tissue from Foot, Left Updated:  04/14/20 1420    Tissue Pathology Exam [[id number]] Collected:  04/14/20 1316    Specimen:  Tissue from Foot, Left Updated:  04/14/20 1357            Assessment/Plan     Status post- stable s/p I&D left foot chronic ulcer, possible osteomyelitis.  Non-wt bearing, IV antibiotics.             Juhi Calle MD    Date: 4/14/2020  Time: 15:38

## 2020-04-14 NOTE — ANESTHESIA PREPROCEDURE EVALUATION
Anesthesia Evaluation                  Airway   Mallampati: I  TM distance: >3 FB  Neck ROM: full  No difficulty expected  Dental      Pulmonary    (+) COPD, sleep apnea,   Cardiovascular     ECG reviewed      ROS comment: Good echo good stress    Neuro/Psych  GI/Hepatic/Renal/Endo    (+)  GERD,      Musculoskeletal     Abdominal    Substance History      OB/GYN          Other   arthritis,                      Anesthesia Plan    ASA 4     general   (Single shot pop)  intravenous induction     Anesthetic plan, all risks, benefits, and alternatives have been provided, discussed and informed consent has been obtained with: patient.    Plan discussed with CRNA.

## 2020-04-14 NOTE — PROGRESS NOTES
Pharmacokinetic Consult - Vancomycin Dosing    Flaco Roque II is a 74 y.o. male who has been consulted for vancomycin dosing for bone/joint infection.    Relevant clinical data and objective history reviewed:  Lab Results   Component Value Date/Time    CREATININE 0.89 04/13/2020 09:57 AM    CREATININE 0.79 08/20/2019 05:55 PM    CREATININE 0.47 (L) 07/30/2019 04:47 AM    CREATININE 0.80 07/14/2016 11:05 AM    BUN 17 04/13/2020 09:57 AM    BUN 17 08/20/2019 05:55 PM    BUN 15 07/30/2019 04:47 AM     Estimated Creatinine Clearance: 67.8 mL/min (by C-G formula based on SCr of 0.89 mg/dL).  No intake/output data recorded.  Lab Results   Component Value Date/Time    WBC 7.38 04/13/2020 09:57 AM    HGB 8.1 (L) 04/13/2020 09:57 AM    HCT 30.7 (L) 04/13/2020 09:57 AM    MCV 67.3 (L) 04/13/2020 09:57 AM     (H) 04/13/2020 09:57 AM     Temp Readings from Last 3 Encounters:   04/14/20 97.7 °F (36.5 °C) (Oral)   03/04/20 97.6 °F (36.4 °C)   12/20/19 97.9 °F (36.6 °C)     Weight: 65.8 kg (145 lb)  Baseline culture/source/susceptibility: Pending.    Assessment/Plan  Patient received a dose of 1000mg (15mg/kg) at 1300 today; will order a onetime dose of 500mg today to complete the loading dose of 1500mg (22mg/kg)  Will initiate maintenance dose at 1000mg IV Every 12 hours starting tomorrow AM  Plan for trough as patient approaches steady state, prior to the 4th dose.  Due to infection severity, will target a trough of 15-20.    Pharmacy will continue to follow the patient’s culture results and clinical progress daily.    Dina Matos [facility]

## 2020-04-14 NOTE — ANESTHESIA POSTPROCEDURE EVALUATION
Patient: Flaco Roque II    Procedure Summary     Date:  04/14/20 [facility] / [facility]:   [facility]    Anesthesia Start:  1236 Anesthesia Stop:      Procedure:  I&D left foot (Left Foot) Diagnosis:       Skin ulcer of left foot, limited to breakdown of skin (CMS/HCC)      (Skin ulcer of left foot, limited to breakdown of skin (CMS/HCC) [L97.521])    Surgeon:  Juhi Calle MD Provider:  Skinny Manriquez MD    Anesthesia Type:  general ASA Status:  4          Anesthesia Type: general    Vitals  No vitals data found for the desired time range.          Post Anesthesia Care and Evaluation    Patient location during evaluation: PACU  Patient participation: complete - patient participated  Level of consciousness: awake and alert  Pain score: 0  Pain management: adequate  Airway patency: patent  Anesthetic complications: No anesthetic complications  PONV Status: none  Cardiovascular status: hemodynamically stable and acceptable  Respiratory status: nonlabored ventilation, acceptable and nasal cannula  Hydration status: acceptable

## 2020-04-15 LAB
ANION GAP SERPL CALCULATED.3IONS-SCNC: 10 MMOL/L (ref 5–15)
BUN BLD-MCNC: 11 MG/DL (ref 8–23)
BUN/CREAT SERPL: 15.3 (ref 7–25)
CALCIUM SPEC-SCNC: 8.2 MG/DL (ref 8.6–10.5)
CHLORIDE SERPL-SCNC: 99 MMOL/L (ref 98–107)
CO2 SERPL-SCNC: 26 MMOL/L (ref 22–29)
CREAT BLD-MCNC: 0.72 MG/DL (ref 0.76–1.27)
DEPRECATED RDW RBC AUTO: 48 FL (ref 37–54)
ERYTHROCYTE [DISTWIDTH] IN BLOOD BY AUTOMATED COUNT: 21.1 % (ref 12.3–15.4)
GFR SERPL CREATININE-BSD FRML MDRD: 107 ML/MIN/1.73
GLUCOSE BLD-MCNC: 172 MG/DL (ref 65–99)
HCT VFR BLD AUTO: 27.8 % (ref 37.5–51)
HGB BLD-MCNC: 7.5 G/DL (ref 13–17.7)
MCH RBC QN AUTO: 17.9 PG (ref 26.6–33)
MCHC RBC AUTO-ENTMCNC: 27 G/DL (ref 31.5–35.7)
MCV RBC AUTO: 66.2 FL (ref 79–97)
PLATELET # BLD AUTO: 416 10*3/MM3 (ref 140–450)
PMV BLD AUTO: 10.6 FL (ref 6–12)
POTASSIUM BLD-SCNC: 4.1 MMOL/L (ref 3.5–5.2)
RBC # BLD AUTO: 4.2 10*6/MM3 (ref 4.14–5.8)
SODIUM BLD-SCNC: 135 MMOL/L (ref 136–145)
WBC NRBC COR # BLD: 8.29 10*3/MM3 (ref 3.4–10.8)

## 2020-04-15 PROCEDURE — 63710000001 TAMSULOSIN 0.4 MG CAPSULE: Performed by: NURSE PRACTITIONER

## 2020-04-15 PROCEDURE — 97162 PT EVAL MOD COMPLEX 30 MIN: CPT

## 2020-04-15 PROCEDURE — 63710000001 TAB-A-VITE/BETA CAROTENE TABLET: Performed by: ORTHOPAEDIC SURGERY

## 2020-04-15 PROCEDURE — 25010000002 HYDROMORPHONE 1 MG/ML SOLUTION: Performed by: ORTHOPAEDIC SURGERY

## 2020-04-15 PROCEDURE — 94799 UNLISTED PULMONARY SVC/PX: CPT

## 2020-04-15 PROCEDURE — 63710000001 CARBIDOPA-LEVODOPA 25-100 MG TABLET: Performed by: NURSE PRACTITIONER

## 2020-04-15 PROCEDURE — 85027 COMPLETE CBC AUTOMATED: CPT | Performed by: NURSE PRACTITIONER

## 2020-04-15 PROCEDURE — 25010000002 CEFTRIAXONE PER 250 MG: Performed by: INTERNAL MEDICINE

## 2020-04-15 PROCEDURE — A9270 NON-COVERED ITEM OR SERVICE: HCPCS | Performed by: ORTHOPAEDIC SURGERY

## 2020-04-15 PROCEDURE — A9270 NON-COVERED ITEM OR SERVICE: HCPCS | Performed by: NURSE PRACTITIONER

## 2020-04-15 PROCEDURE — 63710000001 MONTELUKAST 10 MG TABLET: Performed by: NURSE PRACTITIONER

## 2020-04-15 PROCEDURE — 25010000002 ENOXAPARIN PER 10 MG: Performed by: ORTHOPAEDIC SURGERY

## 2020-04-15 PROCEDURE — 63710000001 AMITRIPTYLINE 50 MG TABLET: Performed by: NURSE PRACTITIONER

## 2020-04-15 PROCEDURE — 63710000001 HYDROCODONE-ACETAMINOPHEN 7.5-325 MG TABLET: Performed by: ORTHOPAEDIC SURGERY

## 2020-04-15 PROCEDURE — 80048 BASIC METABOLIC PNL TOTAL CA: CPT | Performed by: INTERNAL MEDICINE

## 2020-04-15 PROCEDURE — 63710000001 OXYCODONE-ACETAMINOPHEN 5-325 MG TABLET: Performed by: ORTHOPAEDIC SURGERY

## 2020-04-15 PROCEDURE — 25010000002 VANCOMYCIN 10 G RECONSTITUTED SOLUTION: Performed by: INTERNAL MEDICINE

## 2020-04-15 PROCEDURE — 63710000001 PANTOPRAZOLE 40 MG TABLET DELAYED-RELEASE: Performed by: NURSE PRACTITIONER

## 2020-04-15 PROCEDURE — 99024 POSTOP FOLLOW-UP VISIT: CPT | Performed by: ORTHOPAEDIC SURGERY

## 2020-04-15 RX ADMIN — CARBIDOPA AND LEVODOPA 1.5 TABLET: 25; 100 TABLET ORAL at 12:20

## 2020-04-15 RX ADMIN — IPRATROPIUM BROMIDE AND ALBUTEROL SULFATE 3 ML: 2.5; .5 SOLUTION RESPIRATORY (INHALATION) at 20:02

## 2020-04-15 RX ADMIN — SODIUM CHLORIDE 1000 MG: 9 INJECTION, SOLUTION INTRAVENOUS at 05:25

## 2020-04-15 RX ADMIN — CEFTRIAXONE 2 G: 2 INJECTION, POWDER, FOR SOLUTION INTRAMUSCULAR; INTRAVENOUS at 12:20

## 2020-04-15 RX ADMIN — ENOXAPARIN SODIUM 40 MG: 40 INJECTION SUBCUTANEOUS at 08:52

## 2020-04-15 RX ADMIN — AMITRIPTYLINE HYDROCHLORIDE 50 MG: 50 TABLET, FILM COATED ORAL at 20:29

## 2020-04-15 RX ADMIN — SODIUM CHLORIDE 1000 MG: 9 INJECTION, SOLUTION INTRAVENOUS at 17:46

## 2020-04-15 RX ADMIN — PANTOPRAZOLE SODIUM 40 MG: 40 TABLET, DELAYED RELEASE ORAL at 20:29

## 2020-04-15 RX ADMIN — HYDROMORPHONE HYDROCHLORIDE 1 MG: 1 INJECTION, SOLUTION INTRAMUSCULAR; INTRAVENOUS; SUBCUTANEOUS at 00:43

## 2020-04-15 RX ADMIN — MULTIVITAMIN TABLET 1 TABLET: TABLET at 08:51

## 2020-04-15 RX ADMIN — PANTOPRAZOLE SODIUM 40 MG: 40 TABLET, DELAYED RELEASE ORAL at 08:51

## 2020-04-15 RX ADMIN — HYDROCODONE BITARTRATE AND ACETAMINOPHEN 1 TABLET: 7.5; 325 TABLET ORAL at 20:29

## 2020-04-15 RX ADMIN — TAMSULOSIN HYDROCHLORIDE 0.4 MG: 0.4 CAPSULE ORAL at 08:52

## 2020-04-15 RX ADMIN — IPRATROPIUM BROMIDE AND ALBUTEROL SULFATE 3 ML: 2.5; .5 SOLUTION RESPIRATORY (INHALATION) at 07:51

## 2020-04-15 RX ADMIN — CARBIDOPA AND LEVODOPA 1.5 TABLET: 25; 100 TABLET ORAL at 17:46

## 2020-04-15 RX ADMIN — MONTELUKAST SODIUM 10 MG: 10 TABLET, COATED ORAL at 20:29

## 2020-04-15 RX ADMIN — CARBIDOPA AND LEVODOPA 1.5 TABLET: 25; 100 TABLET ORAL at 20:29

## 2020-04-15 RX ADMIN — OXYCODONE HYDROCHLORIDE AND ACETAMINOPHEN 1 TABLET: 5; 325 TABLET ORAL at 14:24

## 2020-04-15 RX ADMIN — CARBIDOPA AND LEVODOPA 1.5 TABLET: 25; 100 TABLET ORAL at 08:51

## 2020-04-15 NOTE — PROGRESS NOTES
Discharge Planning Assessment  [facility]     Patient Name: Flaco Roque II  MRN: [id number]  Today's Date: 4/15/2020    Admit Date: 4/14/2020    Discharge Needs Assessment     Row Name 04/15/20 1414       Living Environment    Lives With  -- pt resides in [facility]    Name(s) of Who Lives With Patient  wife- Cheryl    Current Living Arrangements  home/apartment/condo    Primary Care Provided by  self    Provides Primary Care For  no one    Family Caregiver if Needed  spouse    Family Caregiver Names  Cheryl    Quality of Family Relationships  helpful;involved;supportive    Able to Return to Prior Arrangements  yes       Resource/Environmental Concerns    Resource/Environmental Concerns  none       Transition Planning    Patient/Family Anticipates Transition to  home with family    Transportation Anticipated  family or friend will provide       Discharge Needs Assessment    Readmission Within the Last 30 Days  no previous admission in last 30 days    Concerns to be Addressed  discharge planning    Equipment Currently Used at Home  cane, straight;nebulizer;oxygen;shower chair;walker, rolling;other (see comments) pt has a knee scooter, pt has elevated toilet seats and home oxygen he uses as needed through [employer]    Anticipated Changes Related to Illness  none    Equipment Needed After Discharge  none    Provided Post Acute Provider List?  N/A        Discharge Plan     Row Name 04/15/20 1417       Plan    Plan  home    Provided Post Acute Provider Quality & Resource List?  N/A    Patient/Family in Agreement with Plan  yes    Plan Comments  CM spoke with pt at bedside. Pt resides in [facility] with his wife Cheryl and is independent with use of DME as needed. Pt has a knee scooter, canes, rolling walker, elevated toilet seat, shower chair and home oxygen (used as needed) through [employer].  Pt reports he has had home health services in the past and does not feel he will need services at time of discharge. Pt  has had a PICC line previously and reported to outpt infusion office for antibiotics, pt feels if needed at time of discharge he and his wife could do this at home. CM will follow and assist with discharge needs once needs are established.     Final Discharge Disposition Code  01 - home or self-care        Destination      Coordination has not been started for this encounter.      Durable Medical Equipment      Coordination has not been started for this encounter.      Dialysis/Infusion      Coordination has not been started for this encounter.      Home Medical Care      Coordination has not been started for this encounter.      Therapy      Coordination has not been started for this encounter.      Community Resources      Coordination has not been started for this encounter.          Demographic Summary     Row Name 04/15/20 1410       General Information    Referral Source  admission list    Reason for Consult  discharge planning    Preferred Language  English    General Information Comments  PCP- Tayo Mcbride       Contact Information    Permission Granted to Share Info With          Functional Status     Row Name 04/15/20 1410       Functional Status    Usual Activity Tolerance  good    Current Activity Tolerance  moderate       Functional Status, IADL    Medications  independent    Meal Preparation  independent    Housekeeping  independent    Laundry  independent    Shopping  independent    IADL Comments  pt has mulitple DME and uses as needed       Employment/    Employment/ Comments  Pt has Medicare and [employer], denies concerns or disruption in coverage. Pt has prescription drug coverage and denies issues obtaining or affording current medications..         Psychosocial    No documentation.       Abuse/Neglect    No documentation.       Legal    No documentation.       Substance Abuse    No documentation.       Patient Forms    No documentation.           Deysi Laird,  RN

## 2020-04-15 NOTE — PLAN OF CARE
Problem: Patient Care Overview  Goal: Plan of Care Review  Flowsheets  Taken 4/14/2020 1621 by Emily Conrad RN  Progress: no change  Outcome Summary: Pt. alert, oriented x 4, and pleasant. VSS. Tolerating PO well. Left foot elevated on pillows, no drainage noted. No c/o pain. IV antibiotics started per orders. Will continue to monitor.  Taken 4/14/2020 2015 by Nawaf Zee RN  Plan of Care Reviewed With: patient  Note:   VSS.  Abx given per order.  O2 per nc put on pt while sleeping.  Pain medication given twice this shift.  Pt comfortable but requested medication due to pain as nerve block wore off.  Neuro checks within normal limits.  No c/o pain or discomfort this am.  Will continue to monitor.      Problem: Fall Risk (Adult)  Goal: Identify Related Risk Factors and Signs and Symptoms  Flowsheets (Taken 4/14/2020 1621 by Emily Conrad RN)  Related Risk Factors (Fall Risk): gait/mobility problems;environment unfamiliar  Signs and Symptoms (Fall Risk): presence of risk factors     Problem: Surgery Nonspecified (Adult)  Goal: Signs and Symptoms of Listed Potential Problems Will be Absent, Minimized or Managed (Surgery Nonspecified)  Flowsheets (Taken 4/14/2020 1621 by Emily Conrad RN)  Problems Assessed (Surgery): all  Problems Present (Surgery): pain;situational response

## 2020-04-15 NOTE — THERAPY EVALUATION
Patient Name: Flaco Roque II  : 1945    MRN: [id number]                              Today's Date: 4/15/2020       Admit Date: 2020    Visit Dx:     ICD-10-CM ICD-9-CM   1. Skin ulcer of left foot, limited to breakdown of skin (CMS/[facility]) L97.521 707.15     Patient Active Problem List   Diagnosis   • ABRIL (obstructive sleep apnea)   • Pulmonary emphysema (CMS/[facility])   • Acute blood loss anemia   • Foot deformity, acquired, left   • Leukocytosis, likely reactive   • Acute postoperative pain   • Deformity of left foot   • S/P foot surgery, left   • Parkinson disease (CMS/[facility])   • Anemia   • Interstitial lung disease (CMS/[facility])   • Skin ulcer of left foot, limited to breakdown of skin (CMS/[facility])   • S/P Irrigation debridement left foot with excision of base of fifth metatarsal and chronic ulcer   • On home O2     Past Medical History:   Diagnosis Date   • Arthropathy of shoulder region 9/10/2018   • Chris's esophagus     Last EGD 1 year ago with Dr Kaye    • BPH (benign prostatic hyperplasia)    • Chronic back pain 10/31/2017   • Chronic low back pain    • COPD (chronic obstructive pulmonary disease) (CMS/[facility])    • Foot pain    • GERD (gastroesophageal reflux disease)    • History of transfusion     h/o- no reaction    • Injury of back    • Lung abscess (CMS/[facility])    • Osteoarthritis    • Osteoporosis    • Parkinson disease (CMS/[facility])    • Rotator cuff tear, left    • Sleep apnea     doesnt use machine- cant tolerate    • Status post reverse total shoulder replacement, 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     Past Surgical History:   Procedure Laterality Date   • ARTHRODESIS MIDTARSAL / TARSOMETATARSAL / TARSAL NAVICULAR-CUNEIFORM Left 05/10/2016   • BACK SURGERY     • BACK SURGERY      low back   • BUNIONECTOMY Left 2019    Procedure: left foot excise PIP joints 2,3,4, tenotomies 2,3,4, metatarsal capsulotomy 2,3,4, chevron osteotomy 5th metatarsal, great toe DIP fusion LEFT;  Surgeon: Juhi Calle MD;  Location:   [facility];  Service: Orthopedics   • CATARACT EXTRACTION      bilat cataract     and lasik on right eye only    • CHOLECYSTECTOMY     • COLONOSCOPY N/A 11/2/2017    Procedure: COLONOSCOPY;  Surgeon: Luis Eduardo Mayers MD;  Location:  [facility];  Service:    • ENDOSCOPY N/A 11/1/2017    Procedure: ESOPHAGOGASTRODUODENOSCOPY;  Surgeon: Luis Eduardo Mayers MD;  Location:  [facility];  Service:    • ENDOSCOPY  11/02/2017    DR LUIS EDUARDO MAYERS   • FOOT SURGERY     • KNEE ARTHROSCOPY Bilateral    • LEG DEBRIDEMENT Left 4/14/2020    Procedure: I&D left foot;  Surgeon: Juhi Calle MD;  Location:  [facility];  Service: Orthopedics;  Laterality: Left;   • PAIN PUMP INSERTION/REVISION     • SPINE SURGERY     • TOTAL HIP ARTHROPLASTY Left    • TOTAL SHOULDER ARTHROPLASTY W/ DISTAL CLAVICLE EXCISION Left 9/10/2018    Procedure: REVERSE TOTAL SHOULDER ARTHROPLASTY LEFT;  Surgeon: Abel Brennan MD;  Location:  [facility];  Service: Orthopedics   • ULNAR NERVE TRANSPOSITION       General Information     Row Name 04/15/20 1010          PT Evaluation Time/Intention    Document Type  evaluation  -SC     Mode of Treatment  physical therapy  -SC     Row Name 04/15/20 1010          General Information    Patient Profile Reviewed?  yes  -SC     Prior Level of Function  independent:;gait  -SC     Existing Precautions/Restrictions  non-weight bearing  -SC     Barriers to Rehab  none identified  -SC     Row Name 04/15/20 1010          Relationship/Environment    Lives With  spouse  -SC     Row Name 04/15/20 1010          Resource/Environmental Concerns    Current Living Arrangements  home/apartment/condo  -SC     Row Name 04/15/20 1010          Home Main Entrance    Number of Stairs, Main Entrance  one  -SC     Row Name 04/15/20 1010          Stairs Within Home, Primary    Number of Stairs, Within Home, Primary  none  -SC     Row Name 04/15/20 1010          Cognitive Assessment/Intervention- PT/OT    Orientation Status (Cognition)  oriented  x 4  -SC     Cognitive Assessment/Intervention Comment  alert, following all commands  -SC     Row Name 04/15/20 1010          Safety Issues, Functional Mobility    Impairments Affecting Function (Mobility)  endurance/activity tolerance  -SC     Comment, Safety Issues/Impairments (Mobility)  needs o2 to walk  -SC       User Key  (r) = Recorded By, (t) = Taken By, (c) = Cosigned By    Initials Name Provider Type    SC Day Chinchilla, PT Physical Therapist        Mobility     Row Name 04/15/20 1014          Bed Mobility Assessment/Treatment    Bed Mobility Assessment/Treatment  supine-sit;scooting/bridging  -SC     Scooting/Bridging [place] (Bed Mobility)  independent  -SC     Supine-Sit [place] (Bed Mobility)  independent  -[facility] Name 04/15/20 1014          Transfer Assessment/Treatment    Comment (Transfers)  stood and transrfered to knee scooter with cues. Demonstrated good technique  -[facility] Name 04/15/20 1014          Bed-Chair Transfer    Bed-Chair [place] (Transfers)  supervision;verbal cues  -SC     Row Name 04/15/20 1014          Sit-Stand Transfer    Sit-Stand [place] (Transfers)  supervision;verbal cues  -SC     Assistive Device (Sit-Stand Transfers)  other (see comments) up to knee scooter  -[facility] Name 04/15/20 1014          Gait/Stairs Assessment/Training    Gait/Stairs Assessment/Training  gait/ambulation independence;maintains weight-bearing status  -SC     [place] Level (Gait)  verbal cues;supervision  -SC     Assistive Device (Gait)  walker, knee scooter  -SC     Distance in Feet (Gait)  350  -SC     Pattern (Gait)  step-through  -SC     Deviations/Abnormal Patterns (Gait)  gait speed decreased  -SC     Comment (Gait/Stairs)  demonstrated good control of walker. Ambulated slowly to keep SOA down  -[facility] Name 04/15/20 1014          Mobility Assessment/Intervention    Extremity Weight-bearing Status  left lower extremity  -SC     Right Lower Extremity  (Weight-bearing Status)  non weight-bearing (NWB)  -SC       User Key  (r) = Recorded By, (t) = Taken By, (c) = Cosigned By    Initials Name Provider Type    SC Day Chinchilla, PT Physical Therapist        Obj/Interventions     Row Name 04/15/20 1016          General ROM    GENERAL ROM COMMENTS  wfl  -SC     Row Name 04/15/20 1016          MMT (Manual Muscle Testing)    General MMT Comments  L LE: tib ant 3/5, quads 4/5  -SC     Row Name 04/15/20 1016          Therapeutic Exercise    Lower Extremity Range of Motion (Therapeutic Exercise)  ankle dorsiflexion/plantar flexion, bilateral  -SC     Row Name 04/15/20 1016          Dynamic Standing Balance    Level of [place], Reaches Outside Midline (Standing, Dynamic Balance)  supervision  -SC     Assistive Device Utilized (Supported Standing, Dynamic Balance)  (S) other (see comments) scooter  -SC     Row Name 04/15/20 1016          Sensory Assessment/Intervention    Sensory General Assessment  (S) -- some numbness L ankle  -SC       User Key  (r) = Recorded By, (t) = Taken By, (c) = Cosigned By    Initials Name Provider Type    SC Day Chinchilla, PT Physical Therapist        Goals/Plan     Row Name 04/15/20 1019          Gait Training Goal 1 (PT)    Activity/Assistive Device (Gait Training Goal 1, PT)  gait (walking locomotion);other (see comments) scooter  -SC     [place] Level (Gait Training Goal 1, PT)  conditional independence  -SC     Distance (Gait Goal 1, PT)  500  -SC     Time Frame (Gait Training Goal 1, PT)  long term goal (LTG)  -SC       User Key  (r) = Recorded By, (t) = Taken By, (c) = Cosigned By    Initials Name Provider Type    SC Day Chinchilla, PT Physical Therapist        Clinical Impression     Row Name 04/15/20 1017          Pain Assessment    Additional Documentation  Pain Scale: FACES Pre/Post-Treatment (Group)  -SC     Row Name 04/15/20 1017          Pain Scale: Numbers Pre/Post-Treatment    Pain Location - Side  Left  -SC     Pain  Location  ankle  -SC     Pain Intervention(s)  Repositioned  -[facility] Name 04/15/20 1017          Pain Scale: FACES Pre/Post-Treatment    Pain: FACES Scale, Pretreatment  0-->no hurt  -SC     Pain: FACES Scale, Post-Treatment  0-->no hurt  -SC     Row Name 04/15/20 1017          Plan of Care Review    Plan of Care Reviewed With  patient  -SC     Progress  improving  -SC     Outcome Summary  Patient demonstrated safe use of scooter to ambulate in hallway with ability to control scooter safely. He has all equiptment and will bed going home with help of spouse  -SC     Row Name 04/15/20 1017          Physical Therapy Clinical Impression    Criteria for Skilled Interventions Met (PT Clinical Impression)  yes;treatment indicated  -SC     Rehab Potential (PT Clinical Summary)  good, to achieve stated therapy goals  -SC     Row Name 04/15/20 1017          Vital Signs    Pre SpO2 (%)  98  -SC     O2 Delivery Pre Treatment  supplemental O2  -SC     Intra SpO2 (%)  77 when off o2  -SC     Post SpO2 (%)  92  -SC     O2 Delivery Post Treatment  supplemental O2  -SC     Row Name 04/15/20 1017          Positioning and Restraints    Pre-Treatment Position  in bed  -SC     Post Treatment Position  chair  -SC     In Chair  exit alarm on;notified nsg;reclined;sitting;call light within reach  -SC       User Key  (r) = Recorded By, (t) = Taken By, (c) = Cosigned By    Initials Name Provider Type    Day Freeman, PT Physical Therapist        Outcome Measures     [facility] Name 04/15/20 1020          How much help from another person do you currently need...    Turning from your back to your side while in flat bed without using bedrails?  4  -SC     Moving from lying on back to sitting on the side of a flat bed without bedrails?  4  -SC     Moving to and from a bed to a chair (including a wheelchair)?  3  -SC     Standing up from a chair using your arms (e.g., wheelchair, bedside chair)?  3  -SC     Climbing 3-5 steps with a railing?   2  -SC     To walk in hospital room?  3  -SC     AM-PAC 6 Clicks Score (PT)  19  -SC     Row Name 04/15/20 1020          Functional Assessment    Outcome Measure Options  AM-PAC 6 Clicks Basic Mobility (PT)  -SC       User Key  (r) = Recorded By, (t) = Taken By, (c) = Cosigned By    Initials Name Provider Type    Day Freeman PT Physical Therapist          PT Recommendation and Plan     Outcome Summary/Treatment Plan (PT)  Anticipated Discharge Disposition (PT): home with assist  Plan of Care Reviewed With: patient  Progress: improving  Outcome Summary: Patient demonstrated safe use of scooter to ambulate in hallway with ability to control scooter safely. He has all equiptment and will bed going home with help of spouse     Time Calculation:   PT Charges     Row Name 04/15/20 0933             Time Calculation    Start Time  0933  -SC      PT Received On  04/15/20  -SC      PT Goal Re-Cert Due Date  04/25/20  -SC        User Key  (r) = Recorded By, (t) = Taken By, (c) = Cosigned By    Initials Name Provider Type    SC Day Chinchilla PT Physical Therapist        Therapy Charges for Today     Code Description Service Date Service Provider Modifiers Qty    38704179839 HC PT EVAL MOD COMPLEXITY 4 4/15/2020 Day Chinchilla PT GP 1          PT G-Codes  Outcome Measure Options: AM-PAC 6 Clicks Basic Mobility (PT)  AM-PAC 6 Clicks Score (PT): 19    Day Chinchilla PT  4/15/2020

## 2020-04-15 NOTE — PROGRESS NOTES
"IM progress note      Flaco Roque II  [id number]  1945     LOS: 0 days     Attending: Juhi Calle MD    Primary Care Provider: Tayo Hendrix MD      Chief Complaint/Reason for visit:  chronic ulcer lateral left foot    Subjective   Doing ok. Pain well controlled. Denies f/c/n/v/sob/cp.    Objective     Vital Signs  Visit Vitals  /55 (BP Location: Right arm, Patient Position: Lying)   Pulse 66   Temp 98.2 °F (36.8 °C) (Oral)   Resp 16   Ht 172.7 cm (68\")   Wt 65.8 kg (145 lb)   SpO2 95%   BMI 22.05 kg/m²     Temp (24hrs), Av.9 °F (36.6 °C), Min:97.5 °F (36.4 °C), Max:98.6 °F (37 °C)      Nutrition: PO    Respiratory: RA    Physical Therapy: pending    Physical Exam:     General Appearance:    Alert, cooperative, in no acute distress   Head:    Normocephalic, without obvious abnormality, atraumatic    Lungs:     Normal effort, symmetric chest rise, no crepitus, clear to      auscultation bilaterally             Heart:    Regular rhythm and normal rate, normal S1 and S2   Abdomen:     Normal bowel sounds, no masses, no organomegaly, soft        non-tender, non-distended, no guarding, no rebound                tenderness   Extremities:   Left foot spline CDI. Minimal movement of toes (baseline per pt) Good cap refill.   Pulses:   Pulses palpable and equal bilaterally   Skin:   No bleeding, bruising or rash   Neurologic:    Cranial nerves 2 - 12 grossly intact     Results Review:     I reviewed the patient's new clinical results.   Results from last 7 days   Lab Units 04/15/20  0534 20  0957   WBC 10*3/mm3 8.29 7.38   HEMOGLOBIN g/dL 7.5* 8.1*   HEMATOCRIT % 27.8* 30.7*   PLATELETS 10*3/mm3 416 494*     Results from last 7 days   Lab Units 04/15/20  0534 20  0957   SODIUM mmol/L 135* 141   POTASSIUM mmol/L 4.1 4.2   CHLORIDE mmol/L 99 101   CO2 mmol/L 26.0 26.0   BUN mg/dL 11 17   CREATININE mg/dL 0.72* 0.89   CALCIUM mg/dL 8.2* 9.5   GLUCOSE mg/dL 172* 136*     Microbiology " Results (last 21 days)     Collected Updated Procedure Result Status    04/14/2020 1313 04/14/2020 1420 Fungus Culture - Tissue, Foot, Left [[id number]]   Tissue from Foot, Left    In process Component Value   No component results              04/14/2020 1313 04/15/2020 0758 Tissue / Bone Culture - Tissue, Foot, Left [[id number]]   Tissue from Foot, Left    Preliminary result Component Value   Tissue Culture No growth P   Gram Stain No WBCs or organisms seen P              04/14/2020 1313 04/14/2020 1420 AFB Culture - Tissue, Foot, Left [[id number]]   Tissue from Foot, Left    In process Component Value   No component results              04/14/2020 1313 04/14/2020 1420 Anaerobic Culture 10 Day Incubation - Tissue, Foot, Left [[id number]]   Tissue from Foot, Left    In process Component Value   No component results          I reviewed the patient's new imaging including images and reports.    All medications reviewed.     [START ON 4/16/2020] !Vancomycin Level Draw Needed  Does not apply Once   amitriptyline 50 mg Oral Nightly   carbidopa-levodopa 1.5 tablet Oral 4x Daily   cefTRIAXone 2 g Intravenous Q24H   enoxaparin 40 mg Subcutaneous Daily   ipratropium-albuterol 3 mL Nebulization TID   montelukast 10 mg Oral Nightly   multivitamin 1 tablet Oral Daily   mupirocin  Topical Q24H   pantoprazole 40 mg Oral BID   tamsulosin 0.4 mg Oral Daily   vancomycin 1,000 mg Intravenous Q12H       Assessment/Plan     S/P Irrigation debridement left foot with excision of base of fifth metatarsal and chronic ulcer    Skin ulcer of left foot, limited to breakdown of skin (CMS/HCC)    ABRIL (obstructive sleep apnea)    Pulmonary emphysema (CMS/HCC)    Parkinson disease (CMS/HCC)    Anemia    On home O2      Plan  1. PT/OT- NWB LLE  2. Pain control-prns   3. IS-encouraged  4. DVT proph- mechs/Lovenox  5. Bowel regimen  6. Monitor post-op labs  7. DC planning for home    [facility], Dr. Beach  - continue vanc and rocephin  - probiotics  - follow  cultures     Emphysema, ABRIL, Home O2  - O2 PRN  - continue home nebs and inhalers     Anemia  - Repeat CBC in AM     Parkinson's  - continue home carbidopa/levadopa       Anita Gan, YAMILET  04/15/20  10:16

## 2020-04-15 NOTE — PROGRESS NOTES
[facility] Progress Note    Admission Date: 4/14/2020    Flaco Roque II  1945  [id number]    Date: 4/15/2020    Antibiotics:  Anti-Infectives (From admission, onward)    Ordered     Dose/Rate Route Frequency Start Stop    04/14/20 1535  !Nursing: Vancomycin trough due on 4/16 at 0500; please hold 4/16 0600 dose until notified by pharmacy     Ordering Provider:  Zayda Beach MD     Does not apply Once 04/16/20 0500      04/14/20 1509  cefTRIAXone (ROCEPHIN) 2 g/100 mL 0.9% NS VTB ([facility])  Review   Ordering Provider:  Zayda Beach MD    2 g  over 30 Minutes Intravenous Every 24 Hours 04/15/20 1200 05/07/20 1159    04/14/20 1535  vancomycin 1000 mg/250 mL 0.9% NS IVPB ([facility])  Review   Ordering Provider:  Zayda Beach MD    1,000 mg Intravenous Every 12 Hours 04/15/20 0600 04/25/20 0559    04/14/20 1535  vancomycin 500 mg/100 mL 0.9% NS IVPB (mbp)     Ordering Provider:  Zayda Beach MD    500 mg Intravenous Once 04/14/20 1630 04/14/20 1636    04/14/20 1509  Pharmacy to dose vancomycin  Review   Ordering Provider:  Zayda Beach MD     Does not apply Continuous PRN 04/14/20 1507 05/06/20 1506    04/14/20 1155  vancomycin 1000 mg/250 mL 0.9% NS IVPB ([facility])     Ordering Provider:  Juhi Calle MD    15 mg/kg × 65.8 kg Intravenous Once 04/14/20 1157 04/14/20 1307    04/14/20 1155  cefTRIAXone (ROCEPHIN) 2 g in sodium chloride 0.9 % 100 mL IVPB     Ordering Provider:  Juhi Calle MD    2 g  200 mL/hr over 30 Minutes Intravenous Once 04/14/20 1157 04/14/20 1303          Reason for Consultation: osteomyelitis left 5th MT     History of present illness:    Patient is a 74 y.o. male with emphysema, interstitial lung disease and Parkinsons whom I saw 2 minths ago for left foot swelling, pain and erythema at the site of a nonhealing ulcer. Approximately 1 year ago he underwent  Excision of  PIP joints 2,3,4, tenotomies 2,3,4, metatarsal capsulotomy 2,3,4, chevron osteotomy 5th  metatarsal, great toe DIP fusion of the left foot. All wounds healed and he did well until several months ago when he developed a non healing ulcer over the lateral midportion of the 5th MT. Culture of the ulcer was unremarkable.  Inflammatory markers were normal but bone scan showed delayed uptake at the 5th MT  He was treated with a 6 week course of daptomycin and rocephin. He had some improvement in pain and erythema but not resolution. These symptoms became a bit worse after the antibiotics were stopped but he never developed a fulminant cellulitis or abscess          Constitutional-- No Fever, chills or sweats.  Appetite good, and no malaise. No fatigue.  Heent-- No new vision, hearing or throat complaints.  No epistaxis or oral sores.  Denies odynophagia or dysphagia.  No flashers, floaters or eye pain. No odynophagia or dysphagia. No headache, photophobia or neck stiffness.  CV-- No chest pain, palpitation or syncope  Resp-- No SOB/cough/Hemoptysis  GI- No nausea, vomiting, or diarrhea.  No hematochezia, melena, or hematemesis. Denies jaundice or chronic liver disease.  -- No dysuria, hematuria, or flank pain.  Denies hesitancy, urgency or flank pain.  Lymph- no swollen lymph nodes in neck/axilla or groin.   Heme- No active bruising or bleeding; no Hx of DVT or PE.  MS-- no swelling or pain in the bones or joints of arms/legs.  No new back pain.  Neuro-- No acute focal weakness or numbness in the arms or legs.  No seizures.    Full 12 point review of systems reviewed and negative otherwise for acute complaints,       PE:  Vital Signs  Temp  Min: 97.6 °F (36.4 °C)  Max: 98.8 °F (37.1 °C)  BP  Min: 103/57  Max: 140/68  Pulse  Min: 66  Max: 90  Resp  Min: 14  Max: 18  SpO2  Min: 91 %  Max: 99 %    GENERAL: Awake and alert, in no acute distress.   HEENT: Normocephalic, atraumatic.  PERRL. EOMI. No conjunctival injection. No icterus. Oropharynx clear without evidence of thrush or exudate. No evidence of  peridontal disease.    NECK: Supple without nuchal rigidity. No mass.  LYMPH: No cervical, axillary or inguinal lymphadenopathy.  HEART: RRR; No murmur, rubs, gallops.   LUNGS: Clear to auscultation bilaterally without wheezing, rales, rhonchi. Normal respiratory effort. Nonlabored. No dullness.  ABDOMEN: Soft, nontender, nondistended. Positive bowel sounds. No rebound or guarding. NO mass or HSM.  EXT:  No cyanosis, clubbing or edema. No cord.  : Genitalia generally unremarkable.  Without Pinto catheter.  MSK: FROM without joint effusions noted arms/legs.    SKIN: Warm and dry without cutaneous eruptions on Inspection/palpation.    NEURO: Oriented to PPT. No focal deficits on motor/sensory exam at arms/legs.    Laboratory Data    Results from last 7 days   Lab Units 04/15/20  0534 04/13/20  0957   WBC 10*3/mm3 8.29 7.38   HEMOGLOBIN g/dL 7.5* 8.1*   HEMATOCRIT % 27.8* 30.7*   PLATELETS 10*3/mm3 416 494*     Results from last 7 days   Lab Units 04/15/20  0534   SODIUM mmol/L 135*   POTASSIUM mmol/L 4.1   CHLORIDE mmol/L 99   CO2 mmol/L 26.0   BUN mg/dL 11   CREATININE mg/dL 0.72*   GLUCOSE mg/dL 172*   CALCIUM mg/dL 8.2*         Results from last 7 days   Lab Units 04/13/20  0957   SED RATE mm/hr 16     Results from last 7 days   Lab Units 04/13/20  0957   CRP mg/dL 0.23       Estimated Creatinine Clearance: 75.4 mL/min (A) (by C-G formula based on SCr of 0.72 mg/dL (L)).      Microbiology:  Cultures negative 1 day    Radiology:  Imaging Results (Last 72 Hours)     ** No results found for the last 72 hours. **          I personally reviewed the radiographic studies      Impression:         -- Nonhealing ulcer with concern for osteomyelitis left 5th MT S/P debridement; cultures pending  Limited response to 6 week course of daptomycin and rocephin     -- COPD     -- Parkinson's       -- Interstitial Lung Disease     -- ABRIL     PLAN/RECOMMENDATIONS:   Thank you for asking us to see Flaco Roque II, I recommend  the following:     -- vancomycin and rocephin while cultures pending     -- discussed with the patient that even if cultures are negative he would probably benefit from 1-2 weeks iv antibiotics at home    -- picc    -- home on 4/17 with abx determined by cultures     -- probiotic     Zayda Beach MD  4/15/2020

## 2020-04-15 NOTE — PROGRESS NOTES
Orthopedic  Progress Note    Subjective     Post-Operative Day: 1 Day Post-Op  Procedure(s):  I&D left foot  Laterality:Left      Systemic or Specific Complaints: no complaints  Objective     Vital signs in last 24 hours:  Temp:  [97.5 °F (36.4 °C)-98.6 °F (37 °C)] 98.2 °F (36.8 °C)  Heart Rate:  [66-90] 66  Resp:  [14-20] 16  BP: (121-151)/(55-83) 123/55    Neurovascular: left foot toes pink,\               Wound: left foot dressing dry    Data Review  Lab Results (last 24 hours)     Procedure Component Value Units Date/Time    Basic Metabolic Panel [[id number]]  (Abnormal) Collected:  04/15/20 0534    Specimen:  Blood Updated:  04/15/20 0822     Glucose 172 mg/dL      BUN 11 mg/dL      Creatinine 0.72 mg/dL      Sodium 135 mmol/L      Potassium 4.1 mmol/L      Chloride 99 mmol/L      CO2 26.0 mmol/L      Calcium 8.2 mg/dL      eGFR Non African Amer 107 mL/min/1.73      BUN/Creatinine Ratio 15.3     Anion Gap 10.0 mmol/L     Narrative:       GFR Normal >60  Chronic Kidney Disease <60  Kidney Failure <15      Tissue / Bone Culture - Tissue, Foot, Left [[id number]] Collected:  04/14/20 1313    Specimen:  Tissue from Foot, Left Updated:  04/15/20 0758     Tissue Culture No growth     Gram Stain No WBCs or organisms seen    CBC (No Diff) [[id number]]  (Abnormal) Collected:  04/15/20 0534    Specimen:  Blood Updated:  04/15/20 0645     WBC 8.29 10*3/mm3      RBC 4.20 10*6/mm3      Hemoglobin 7.5 g/dL      Hematocrit 27.8 %      MCV 66.2 fL      MCH 17.9 pg      MCHC 27.0 g/dL      RDW 21.1 %      RDW-SD 48.0 fl      MPV 10.6 fL      Platelets 416 10*3/mm3     Fungus Culture - Tissue, Foot, Left [[id number]] Collected:  04/14/20 1313    Specimen:  Tissue from Foot, Left Updated:  04/14/20 1420    AFB Culture - Tissue, Foot, Left [[id number]] Collected:  04/14/20 1313    Specimen:  Tissue from Foot, Left Updated:  04/14/20 1420    Anaerobic Culture 10 Day Incubation - Tissue, Foot, Left [[id number]] Collected:  04/14/20 1318     Specimen:  Tissue from Foot, Left Updated:  04/14/20 1420    Tissue Pathology Exam [[id number]] Collected:  04/14/20 1316    Specimen:  Tissue from Foot, Left Updated:  04/14/20 1357        Microbiology Results (last 10 days)     Procedure Component Value - Date/Time    Tissue / Bone Culture - Tissue, Foot, Left [[id number]] Collected:  04/14/20 1313    Lab Status:  Preliminary result Specimen:  Tissue from Foot, Left Updated:  04/15/20 0758     Tissue Culture No growth     Gram Stain No WBCs or organisms seen              Assessment/Plan     Status post- I&D left foot- non wt bearing, I will change dressing tomorrow.  Await cultures.            Juhi Calle MD    Date: 4/15/2020  Time: 10:16

## 2020-04-15 NOTE — PLAN OF CARE
Patient alert, oriented and pleasant. VSS. Few complaints of pain. One dose oral Oxycodone 5 mg given. Dressing change supplies at bedside. Dressing remains dry and intact.

## 2020-04-16 LAB
ABO GROUP BLD: NORMAL
ANION GAP SERPL CALCULATED.3IONS-SCNC: 8 MMOL/L (ref 5–15)
BLD GP AB SCN SERPL QL: NEGATIVE
BUN BLD-MCNC: 13 MG/DL (ref 8–23)
BUN/CREAT SERPL: 21 (ref 7–25)
CALCIUM SPEC-SCNC: 8.2 MG/DL (ref 8.6–10.5)
CHLORIDE SERPL-SCNC: 103 MMOL/L (ref 98–107)
CO2 SERPL-SCNC: 26 MMOL/L (ref 22–29)
CREAT BLD-MCNC: 0.62 MG/DL (ref 0.76–1.27)
DEPRECATED RDW RBC AUTO: 48.9 FL (ref 37–54)
ERYTHROCYTE [DISTWIDTH] IN BLOOD BY AUTOMATED COUNT: 21.2 % (ref 12.3–15.4)
GFR SERPL CREATININE-BSD FRML MDRD: 127 ML/MIN/1.73
GLUCOSE BLD-MCNC: 84 MG/DL (ref 65–99)
HCT VFR BLD AUTO: 26.1 % (ref 37.5–51)
HGB BLD-MCNC: 6.9 G/DL (ref 13–17.7)
MCH RBC QN AUTO: 17.6 PG (ref 26.6–33)
MCHC RBC AUTO-ENTMCNC: 26.4 G/DL (ref 31.5–35.7)
MCV RBC AUTO: 66.6 FL (ref 79–97)
PLATELET # BLD AUTO: 328 10*3/MM3 (ref 140–450)
PMV BLD AUTO: 10.8 FL (ref 6–12)
POTASSIUM BLD-SCNC: 4.1 MMOL/L (ref 3.5–5.2)
RBC # BLD AUTO: 3.92 10*6/MM3 (ref 4.14–5.8)
RH BLD: POSITIVE
SODIUM BLD-SCNC: 137 MMOL/L (ref 136–145)
T&S EXPIRATION DATE: NORMAL
VANCOMYCIN TROUGH SERPL-MCNC: 7.6 MCG/ML (ref 5–20)
WBC NRBC COR # BLD: 8.34 10*3/MM3 (ref 3.4–10.8)

## 2020-04-16 PROCEDURE — G0378 HOSPITAL OBSERVATION PER HR: HCPCS

## 2020-04-16 PROCEDURE — 94799 UNLISTED PULMONARY SVC/PX: CPT

## 2020-04-16 PROCEDURE — 86923 COMPATIBILITY TEST ELECTRIC: CPT

## 2020-04-16 PROCEDURE — 99024 POSTOP FOLLOW-UP VISIT: CPT | Performed by: ORTHOPAEDIC SURGERY

## 2020-04-16 PROCEDURE — 86850 RBC ANTIBODY SCREEN: CPT | Performed by: NURSE PRACTITIONER

## 2020-04-16 PROCEDURE — C1751 CATH, INF, PER/CENT/MIDLINE: HCPCS

## 2020-04-16 PROCEDURE — A9270 NON-COVERED ITEM OR SERVICE: HCPCS | Performed by: NURSE PRACTITIONER

## 2020-04-16 PROCEDURE — 25010000002 CEFTRIAXONE PER 250 MG: Performed by: INTERNAL MEDICINE

## 2020-04-16 PROCEDURE — 63710000001 MONTELUKAST 10 MG TABLET: Performed by: NURSE PRACTITIONER

## 2020-04-16 PROCEDURE — 97110 THERAPEUTIC EXERCISES: CPT

## 2020-04-16 PROCEDURE — 97116 GAIT TRAINING THERAPY: CPT

## 2020-04-16 PROCEDURE — 36430 TRANSFUSION BLD/BLD COMPNT: CPT

## 2020-04-16 PROCEDURE — 80048 BASIC METABOLIC PNL TOTAL CA: CPT | Performed by: NURSE PRACTITIONER

## 2020-04-16 PROCEDURE — 63710000001 TAB-A-VITE/BETA CAROTENE TABLET: Performed by: ORTHOPAEDIC SURGERY

## 2020-04-16 PROCEDURE — 80202 ASSAY OF VANCOMYCIN: CPT | Performed by: INTERNAL MEDICINE

## 2020-04-16 PROCEDURE — 63710000001 PANTOPRAZOLE 40 MG TABLET DELAYED-RELEASE: Performed by: NURSE PRACTITIONER

## 2020-04-16 PROCEDURE — 85027 COMPLETE CBC AUTOMATED: CPT | Performed by: NURSE PRACTITIONER

## 2020-04-16 PROCEDURE — 63710000001 HYDROCODONE-ACETAMINOPHEN 7.5-325 MG TABLET: Performed by: ORTHOPAEDIC SURGERY

## 2020-04-16 PROCEDURE — 25010000002 ENOXAPARIN PER 10 MG: Performed by: ORTHOPAEDIC SURGERY

## 2020-04-16 PROCEDURE — 25010000002 VANCOMYCIN 10 G RECONSTITUTED SOLUTION

## 2020-04-16 PROCEDURE — 86901 BLOOD TYPING SEROLOGIC RH(D): CPT | Performed by: NURSE PRACTITIONER

## 2020-04-16 PROCEDURE — 86900 BLOOD TYPING SEROLOGIC ABO: CPT

## 2020-04-16 PROCEDURE — A9270 NON-COVERED ITEM OR SERVICE: HCPCS | Performed by: ORTHOPAEDIC SURGERY

## 2020-04-16 PROCEDURE — 63710000001 LACTOBACILLUS ACIDOPHILUS CAPSULE: Performed by: INTERNAL MEDICINE

## 2020-04-16 PROCEDURE — 63710000001 TAMSULOSIN 0.4 MG CAPSULE: Performed by: NURSE PRACTITIONER

## 2020-04-16 PROCEDURE — C1894 INTRO/SHEATH, NON-LASER: HCPCS

## 2020-04-16 PROCEDURE — 63710000001 AMITRIPTYLINE 50 MG TABLET: Performed by: NURSE PRACTITIONER

## 2020-04-16 PROCEDURE — P9016 RBC LEUKOCYTES REDUCED: HCPCS

## 2020-04-16 PROCEDURE — 25010000002 MICAFUNGIN SODIUM 100 MG RECONSTITUTED SOLUTION: Performed by: INTERNAL MEDICINE

## 2020-04-16 PROCEDURE — A9270 NON-COVERED ITEM OR SERVICE: HCPCS | Performed by: INTERNAL MEDICINE

## 2020-04-16 PROCEDURE — 86900 BLOOD TYPING SEROLOGIC ABO: CPT | Performed by: NURSE PRACTITIONER

## 2020-04-16 PROCEDURE — 63710000001 MUPIROCIN 2 % OINTMENT 22 G TUBE: Performed by: ORTHOPAEDIC SURGERY

## 2020-04-16 PROCEDURE — 63710000001 CARBIDOPA-LEVODOPA 25-100 MG TABLET: Performed by: NURSE PRACTITIONER

## 2020-04-16 RX ORDER — SODIUM CHLORIDE 0.9 % (FLUSH) 0.9 %
10 SYRINGE (ML) INJECTION EVERY 12 HOURS SCHEDULED
Status: DISCONTINUED | OUTPATIENT
Start: 2020-04-16 | End: 2020-04-17 | Stop reason: HOSPADM

## 2020-04-16 RX ORDER — L.ACID,PARA/B.BIFIDUM/S.THERM 8B CELL
1 CAPSULE ORAL DAILY
Status: DISCONTINUED | OUTPATIENT
Start: 2020-04-16 | End: 2020-04-17 | Stop reason: HOSPADM

## 2020-04-16 RX ORDER — SODIUM CHLORIDE 0.9 % (FLUSH) 0.9 %
10 SYRINGE (ML) INJECTION AS NEEDED
Status: DISCONTINUED | OUTPATIENT
Start: 2020-04-16 | End: 2020-04-17 | Stop reason: HOSPADM

## 2020-04-16 RX ORDER — SODIUM CHLORIDE 0.9 % (FLUSH) 0.9 %
20 SYRINGE (ML) INJECTION AS NEEDED
Status: DISCONTINUED | OUTPATIENT
Start: 2020-04-16 | End: 2020-04-17 | Stop reason: HOSPADM

## 2020-04-16 RX ADMIN — MUPIROCIN: 20 OINTMENT TOPICAL at 10:18

## 2020-04-16 RX ADMIN — SODIUM CHLORIDE, PRESERVATIVE FREE 10 ML: 5 INJECTION INTRAVENOUS at 15:44

## 2020-04-16 RX ADMIN — HYDROCODONE BITARTRATE AND ACETAMINOPHEN 1 TABLET: 7.5; 325 TABLET ORAL at 14:44

## 2020-04-16 RX ADMIN — CARBIDOPA AND LEVODOPA 1.5 TABLET: 25; 100 TABLET ORAL at 17:45

## 2020-04-16 RX ADMIN — HYDROCODONE BITARTRATE AND ACETAMINOPHEN 1 TABLET: 7.5; 325 TABLET ORAL at 20:39

## 2020-04-16 RX ADMIN — CARBIDOPA AND LEVODOPA 1.5 TABLET: 25; 100 TABLET ORAL at 20:26

## 2020-04-16 RX ADMIN — CARBIDOPA AND LEVODOPA 1.5 TABLET: 25; 100 TABLET ORAL at 08:38

## 2020-04-16 RX ADMIN — CARBIDOPA AND LEVODOPA 1.5 TABLET: 25; 100 TABLET ORAL at 12:23

## 2020-04-16 RX ADMIN — PANTOPRAZOLE SODIUM 40 MG: 40 TABLET, DELAYED RELEASE ORAL at 20:27

## 2020-04-16 RX ADMIN — IPRATROPIUM BROMIDE AND ALBUTEROL SULFATE 3 ML: 2.5; .5 SOLUTION RESPIRATORY (INHALATION) at 15:11

## 2020-04-16 RX ADMIN — MULTIVITAMIN TABLET 1 TABLET: TABLET at 08:38

## 2020-04-16 RX ADMIN — IPRATROPIUM BROMIDE AND ALBUTEROL SULFATE 3 ML: 2.5; .5 SOLUTION RESPIRATORY (INHALATION) at 07:53

## 2020-04-16 RX ADMIN — VANCOMYCIN HYDROCHLORIDE 1250 MG: 10 INJECTION, POWDER, LYOPHILIZED, FOR SOLUTION INTRAVENOUS at 08:38

## 2020-04-16 RX ADMIN — VANCOMYCIN HYDROCHLORIDE 1250 MG: 10 INJECTION, POWDER, LYOPHILIZED, FOR SOLUTION INTRAVENOUS at 20:26

## 2020-04-16 RX ADMIN — PANTOPRAZOLE SODIUM 40 MG: 40 TABLET, DELAYED RELEASE ORAL at 08:38

## 2020-04-16 RX ADMIN — CEFTRIAXONE 2 G: 2 INJECTION, POWDER, FOR SOLUTION INTRAMUSCULAR; INTRAVENOUS at 14:38

## 2020-04-16 RX ADMIN — AMITRIPTYLINE HYDROCHLORIDE 50 MG: 50 TABLET, FILM COATED ORAL at 20:27

## 2020-04-16 RX ADMIN — IPRATROPIUM BROMIDE AND ALBUTEROL SULFATE 3 ML: 2.5; .5 SOLUTION RESPIRATORY (INHALATION) at 20:48

## 2020-04-16 RX ADMIN — MICAFUNGIN SODIUM 100 MG: 20 INJECTION, POWDER, LYOPHILIZED, FOR SOLUTION INTRAVENOUS at 15:41

## 2020-04-16 RX ADMIN — MONTELUKAST SODIUM 10 MG: 10 TABLET, COATED ORAL at 20:27

## 2020-04-16 RX ADMIN — HYDROCODONE BITARTRATE AND ACETAMINOPHEN 1 TABLET: 7.5; 325 TABLET ORAL at 02:10

## 2020-04-16 RX ADMIN — Medication 1 CAPSULE: at 14:38

## 2020-04-16 RX ADMIN — HYDROCODONE BITARTRATE AND ACETAMINOPHEN 1 TABLET: 7.5; 325 TABLET ORAL at 07:21

## 2020-04-16 RX ADMIN — ENOXAPARIN SODIUM 40 MG: 40 INJECTION SUBCUTANEOUS at 08:39

## 2020-04-16 RX ADMIN — TAMSULOSIN HYDROCHLORIDE 0.4 MG: 0.4 CAPSULE ORAL at 08:38

## 2020-04-16 RX ADMIN — SODIUM CHLORIDE, PRESERVATIVE FREE 10 ML: 5 INJECTION INTRAVENOUS at 20:27

## 2020-04-16 NOTE — PROGRESS NOTES
Continued Stay Note  [facility]     Patient Name: Flaco Roque II  MRN: [id number]  Today's Date: 4/16/2020    Admit Date: 4/14/2020    Discharge Plan     Row Name 04/16/20 1239       Plan    Plan  Home with wife's assistance and IV abx to be infused at [facility]    Provided Post Acute Provider List?  Yes    Post Acute Provider List  Outpatient Therapy    Provided Post Acute Provider Quality & Resource List?  Yes    Delivered To  Patient    Method of Delivery  In person    Patient/Family in Agreement with Plan  yes    Plan Comments  Followed up with Mr. Roque at the bedside for discharge planning.  Mr. Roque will be going home with IV abx.  PICC ordered.  The patient preferred to infuse his IV abx at home.  Referred [facility] to [facility], and copay was over $150 per week.  Mr. Roque is agreeable to going to [facility] office q day for infusion due to high copay for home infusion.  Left message with Zulema at [facility] to notify her of plan.  PICC care and labs will be drawn at [facility] office.  He denies any home health needs.  Mr. Roque has been evaluated by PT and he is non weightbearing and ambulating well with rest breaks on kneewalker.  Requested to patient to please have his wife go to information desk [facility] he comes to [facility] appointments and request a transport chair to bring him to office.  He denies any additional DME needs.  He has a kneewalker at home.   Mr. Roque states that his wife will be available to assist him at home as needed and will be transporting her  home when discharged.    CM will continue to follow.    Final Discharge Disposition Code  01 - home or self-care        Discharge Codes    No documentation.             Amrita Bender RN

## 2020-04-16 NOTE — PROGRESS NOTES
"Pharmacy Consult-Vancomycin Dosing  Flaco Roque II is a  74 y.o. male receiving vancomycin therapy.     Indication: Osteomyelitis  Consulting Provider: ID  ID Consult: yes    Goal Trough: 15-20mcg/ml    Current Antimicrobial Therapy  Vancomycin Pharmacy to Dose  Ceftriaxone 2g IV q24h      Allergies  Allergies as of 04/13/2020    (No Known Allergies)       Labs    Results from last 7 days   Lab Units 04/16/20  0402 04/15/20  0534 04/13/20  0957   BUN mg/dL 13 11 17   CREATININE mg/dL 0.62* 0.72* 0.89       Results from last 7 days   Lab Units 04/16/20  0402 04/15/20  0534 04/13/20  0957   WBC 10*3/mm3 8.34 8.29 7.38       Evaluation of Dosing   Ht - 172.7 cm (68\")  Wt - 65.8 kg (145 lb)    Estimated Creatinine Clearance: 75.4 mL/min (A) (by C-G formula based on SCr of 0.62 mg/dL (L)).    Intake & Output (last 3 days)         04/13 0701 - 04/14 0700 04/14 0701 - 04/15 0700 04/15 0701 - 04/16 0700 04/16 0701 - 04/17 0700    P.O.   840     I.V. (mL/kg)  800 (12.2)      IV Piggyback  250      Total Intake(mL/kg)  1050 (16) 840 (12.8)     Urine (mL/kg/hr)  900 1550 (1)     Blood  5      Total Output  905 1550     Net  +145 -710             Urine Unmeasured Occurrence  2 x              Microbiology and Radiology  Microbiology Results (last 10 days)       Procedure Component Value - Date/Time    Tissue / Bone Culture - Tissue, Foot, Left [[id number]] Collected:  04/14/20 1313    Lab Status:  Preliminary result Specimen:  Tissue from Foot, Left Updated:  04/15/20 0754     Tissue Culture No growth     Gram Stain No WBCs or organisms seen    AFB Culture - Tissue, Foot, Left [[id number]] Collected:  04/14/20 1313    Lab Status:  Preliminary result Specimen:  Tissue from Foot, Left Updated:  04/15/20 1108     AFB Stain No acid fast bacilli seen on concentrated smear            Evaluation of Level    Lab Results   Component Value Date    [facility] 7.60 04/16/2020         Assessment/Plan:     Pharmacy dosing Vancomycin for " osteomyelitis. Goal trough 15-20 mcg/ml.  Patient received a dose of 1000mg (15mg/kg)at 1300 4/14; additional 500mg given to complete the loading dose of 1500mg (22mg/kg)  Maintenance dose was started at Vancomycin 1000mg IV q12h.  Trough on 4/16 was sub therapeutic at 7.6 mcg/mL (~10.5 hr level).  Will increase to Vancomycin 1250 mg IV q12H.    Next trough 4/18 @ 0830 to assess new dosing regimen.  Pharmacy will continue to follow the patient’s culture results and clinical progress daily.    Thanks,  Maricarmen Bain, PharmD  Pharmacy Resident  4/16/2020  07:35

## 2020-04-16 NOTE — PROGRESS NOTES
[facility] Progress Note    Admission Date: 4/14/2020    Flaco Roque II  1945  [id number]    Date: 4/16/2020    Antibiotics:  Anti-Infectives (From admission, onward)    Ordered     Dose/Rate Route Frequency Start Stop    04/16/20 1130  micafungin 100 mg/100 mL 0.9% NS IVPB (mbp)     Ordering Provider:  Zayda Beach MD    100 mg  over 60 Minutes Intravenous Every 24 Hours 04/16/20 1300 05/08/20 1259    04/16/20 0732  vancomycin 1250 mg/250 mL 0.9% NS IVPB ([facility])  Review   Ordering Provider:  Maricarmen Bain RPH    1,250 mg  over 90 Minutes Intravenous Every 12 Hours 04/16/20 0900 04/23/20 0859    04/14/20 1509  cefTRIAXone (ROCEPHIN) 2 g/100 mL 0.9% NS VTB ([facility])     Maricarmen Bain RPH reviewed the order on 04/16/20 0731.   Ordering Provider:  Zayda Beach MD    2 g  over 30 Minutes Intravenous Every 24 Hours 04/15/20 1200 05/07/20 1159    04/14/20 1535  vancomycin 500 mg/100 mL 0.9% NS IVPB (mbp)     Ordering Provider:  Zayda Beach MD    500 mg Intravenous Once 04/14/20 1630 04/14/20 1636    04/14/20 1509  Pharmacy to dose vancomycin     Maricarmen Bain RPH reviewed the order on 04/16/20 0731.   Ordering Provider:  Zayda Beach MD     Does not apply Continuous PRN 04/14/20 1507 05/06/20 1506    04/14/20 1155  vancomycin 1000 mg/250 mL 0.9% NS IVPB ([facility])     Ordering Provider:  Juhi Calle MD    15 mg/kg × 65.8 kg Intravenous Once 04/14/20 1157 04/14/20 1307    04/14/20 1155  cefTRIAXone (ROCEPHIN) 2 g in sodium chloride 0.9 % 100 mL IVPB     Ordering Provider:  Juhi Calle MD    2 g  200 mL/hr over 30 Minutes Intravenous Once 04/14/20 1157 04/14/20 1303          Reason for Consultation: osteomyelitis left 5th MT     History of present illness:    Patient is a 74 y.o. male with emphysema, interstitial lung disease and Parkinsons whom I saw 2 minths ago for left foot swelling, pain and erythema at the site of a nonhealing ulcer. Approximately 1 year ago he underwent   Excision of  PIP joints 2,3,4, tenotomies 2,3,4, metatarsal capsulotomy 2,3,4, chevron osteotomy 5th metatarsal, great toe DIP fusion of the left foot. All wounds healed and he did well until several months ago when he developed a non healing ulcer over the lateral midportion of the 5th MT. Culture of the ulcer was unremarkable.  Inflammatory markers were normal but bone scan showed delayed uptake at the 5th MT  He was treated with a 6 week course of daptomycin and rocephin. He had some improvement in pain and erythema but not resolution. These symptoms became a bit worse after the antibiotics were stopped but he never developed a fulminant cellulitis or abscess  4/16 alert, afebrile, no new c/o; discouraged that insurance will not fund home antibiotics          Constitutional-- No Fever, chills or sweats.  Appetite good, and no malaise. No fatigue.  Heent-- No new vision, hearing or throat complaints.  No epistaxis or oral sores.  Denies odynophagia or dysphagia.  No flashers, floaters or eye pain. No odynophagia or dysphagia. No headache, photophobia or neck stiffness.  CV-- No chest pain, palpitation or syncope  Resp-- No SOB/cough/Hemoptysis  GI- No nausea, vomiting, or diarrhea.  No hematochezia, melena, or hematemesis. Denies jaundice or chronic liver disease.  -- No dysuria, hematuria, or flank pain.  Denies hesitancy, urgency or flank pain.  Lymph- no swollen lymph nodes in neck/axilla or groin.   Heme- No active bruising or bleeding; no Hx of DVT or PE.  MS-- no swelling or pain in the bones or joints of arms/legs.  No new back pain.  Neuro-- No acute focal weakness or numbness in the arms or legs.  No seizures.    Full 12 point review of systems reviewed and negative otherwise for acute complaints,       PE:  Vital Signs  Temp  Min: 97.4 °F (36.3 °C)  Max: 98.4 °F (36.9 °C)  BP  Min: 101/73  Max: 142/68  Pulse  Min: 66  Max: 89  Resp  Min: 16  Max: 17  SpO2  Min: 97 %  Max: 100 %    GENERAL: Awake and  alert, in no acute distress.   HEENT: Normocephalic, atraumatic.  PERRL. EOMI. No conjunctival injection. No icterus. Oropharynx clear without evidence of thrush or exudate. No evidence of peridontal disease.    NECK: Supple without nuchal rigidity. No mass.  LYMPH: No cervical, axillary or inguinal lymphadenopathy.  HEART: RRR; No murmur, rubs, gallops.   LUNGS: Clear to auscultation bilaterally without wheezing, rales, rhonchi. Normal respiratory effort. Nonlabored. No dullness.  ABDOMEN: Soft, nontender, nondistended. Positive bowel sounds. No rebound or guarding. NO mass or HSM.  EXT:  No cyanosis, clubbing or edema. No cord.  : Genitalia generally unremarkable.  Without Pinto catheter.  MSK: FROM without joint effusions noted arms/legs.    SKIN: Warm and dry without cutaneous eruptions on Inspection/palpation.    NEURO: Oriented to PPT. No focal deficits on motor/sensory exam at arms/legs.    Laboratory Data    Results from last 7 days   Lab Units 04/16/20  0402 04/15/20  0534 04/13/20  0957   WBC 10*3/mm3 8.34 8.29 7.38   HEMOGLOBIN g/dL 6.9* 7.5* 8.1*   HEMATOCRIT % 26.1* 27.8* 30.7*   PLATELETS 10*3/mm3 328 416 494*     Results from last 7 days   Lab Units 04/16/20  0402   SODIUM mmol/L 137   POTASSIUM mmol/L 4.1   CHLORIDE mmol/L 103   CO2 mmol/L 26.0   BUN mg/dL 13   CREATININE mg/dL 0.62*   GLUCOSE mg/dL 84   CALCIUM mg/dL 8.2*         Results from last 7 days   Lab Units 04/13/20  0957   SED RATE mm/hr 16     Results from last 7 days   Lab Units 04/13/20  0957   CRP mg/dL 0.23       Estimated Creatinine Clearance: 75.4 mL/min (A) (by C-G formula based on SCr of 0.62 mg/dL (L)).      Microbiology:  Cultures negative 1 day    Radiology:  Imaging Results (Last 72 Hours)     ** No results found for the last 72 hours. **          I personally reviewed the radiographic studies      Impression:         -- Nonhealing ulcer with concern for osteomyelitis left 5th MT S/P debridement; cultures pending  Limited  response to 6 week course of daptomycin and rocephin     -- COPD     -- Parkinson's       -- Interstitial Lung Disease     -- ABRIL     PLAN/RECOMMENDATIONS:   Thank you for asking us to see Flaco Roque II, I recommend the following:     -- vancomycin and rocephin while cultures pending     -- discussed with the patient that even if cultures are negative he would probably benefit from 1-2 weeks iv antibiotics at home    -- picc    -- home on 4/17 with abx determined by cultures     -- probiotic     Zayda Beach MD  4/16/2020

## 2020-04-16 NOTE — PLAN OF CARE
Problem: Patient Care Overview  Goal: Plan of Care Review  Flowsheets  Taken 4/16/2020 1200  Progress: improving  Plan of Care Reviewed With: patient  Taken 4/16/2020 1157  Outcome Summary: Patient able to demonstrated good control of knee walker. Some need to go slow and take rest breakes noted.

## 2020-04-16 NOTE — PROGRESS NOTES
"IM progress note      Flaco Roque II  [id number]  1945     LOS: 0 days     Attending: Juhi Calle MD    Primary Care Provider: Tayo Hendrix MD      Chief Complaint/Reason for visit:  chronic ulcer lateral left foot    Subjective   Feels good. Adequate pain control. Denies f/c/n/v/sob/cp.  ( seen, agree. Doing well. Had PICC placed today. Worked with PT) wy    Objective      Visit Vitals  /61 (BP Location: Right arm, Patient Position: Lying)   Pulse 83   Temp 97.4 °F (36.3 °C) (Oral)   Resp 16   Ht 172.7 cm (68\")   Wt 65.8 kg (145 lb)   SpO2 98%   BMI 22.05 kg/m²     Temp (24hrs), Av.1 °F (36.7 °C), Min:97.4 °F (36.3 °C), Max:98.8 °F (37.1 °C)      Nutrition: PO    Respiratory: RA    Physical Therapy: Patient demonstrated safe use of scooter to ambulate in hallway with ability to control scooter safely. He has all equiptment and will bed going home with help of spouse    Physical Exam:     General Appearance:    Alert, cooperative, in no acute distress   Head:    Normocephalic, without obvious abnormality, atraumatic    Lungs:     Normal effort, symmetric chest rise, no crepitus, clear to      auscultation bilaterally             Heart:    Regular rhythm and normal rate, normal S1 and S2   Abdomen:     Normal bowel sounds, no masses, no organomegaly, soft        non-tender, non-distended, no guarding, no rebound                tenderness   Extremities:   Left foot splint CDI. Minimal movement of toes (baseline per pt) Good cap refill.   Pulses:   Pulses palpable and equal bilaterally   Skin:   No bleeding, bruising or rash   Neurologic:    Cranial nerves 2 - 12 grossly intact     Results Review:     I reviewed the patient's new clinical results.   Results from last 7 days   Lab Units 20  0402 04/15/20  0534 20  0957   WBC 10*3/mm3 8.34 8.29 7.38   HEMOGLOBIN g/dL 6.9* 7.5* 8.1*   HEMATOCRIT % 26.1* 27.8* 30.7*   PLATELETS 10*3/mm3 328 416 494*     Results from last 7 days "   Lab Units 04/16/20  0402 04/15/20  0534 04/13/20  0957   SODIUM mmol/L 137 135* 141   POTASSIUM mmol/L 4.1 4.1 4.2   CHLORIDE mmol/L 103 99 101   CO2 mmol/L 26.0 26.0 26.0   BUN mg/dL 13 11 17   CREATININE mg/dL 0.62* 0.72* 0.89   CALCIUM mg/dL 8.2* 8.2* 9.5   GLUCOSE mg/dL 84 172* 136*     Microbiology Results (last 21 days)     Collected Updated Procedure Result Status    04/14/2020 1313 04/14/2020 1420 Fungus Culture - Tissue, Foot, Left [[id number]]   Tissue from Foot, Left    In process Component Value   No component results              04/14/2020 1313 04/15/2020 0758 Tissue / Bone Culture - Tissue, Foot, Left [[id number]]   Tissue from Foot, Left    Preliminary result Component Value   Tissue Culture No growth P   Gram Stain No WBCs or organisms seen P              04/14/2020 1313 04/15/2020 1108 AFB Culture - Tissue, Foot, Left [[id number]]   Tissue from Foot, Left    Preliminary result Component Value   AFB Stain No acid fast bacilli seen on concentrated smear P              04/14/2020 1313 04/14/2020 1420 Anaerobic Culture 10 Day Incubation - Tissue, Foot, Left [[id number]]   Tissue from Foot, Left    In process Component Value   No component results          I reviewed the patient's new imaging including images and reports.    All medications reviewed.     [START ON 4/18/2020] Pharmacy Consult  Does not apply Once   amitriptyline 50 mg Oral Nightly   carbidopa-levodopa 1.5 tablet Oral 4x Daily   cefTRIAXone 2 g Intravenous Q24H   enoxaparin 40 mg Subcutaneous Daily   ipratropium-albuterol 3 mL Nebulization TID   montelukast 10 mg Oral Nightly   multivitamin 1 tablet Oral Daily   mupirocin  Topical Q24H   pantoprazole 40 mg Oral BID   tamsulosin 0.4 mg Oral Daily   vancomycin 1,250 mg Intravenous Q12H       Assessment/Plan     S/P Irrigation debridement left foot with excision of base of fifth metatarsal and chronic ulcer    Skin ulcer of left foot, limited to breakdown of skin (CMS/HCC)    ABRIL (obstructive  sleep apnea)    Pulmonary emphysema (CMS/HCC)    Parkinson disease (CMS/HCC)    Anemia, chronic , worse.    On home O2       Plan  1. PT/OT- NWB LLE  2. Pain control-prns   3. IS-encouraged  4. DVT proph- mechs/Lovenox  5. Bowel regimen  6. Monitor post-op labs  7. DC planning for home, likely tomorrow    Dr. Baylee CAMACHO  - continue vanc and rocephin  - probiotics  - follow cultures( yeast so far)wy  S/p PICC placement. 4/16.     Emphysema, ABRIL, Home O2  - O2 PRN  - continue home nebs and inhalers     Anemia  - CBC in AM  - 1 unit PRBC today     Parkinson's  - continue home carbidopa/levadopa       YAMILET Craig  04/16/20  09:56     I have personally performed the evaluation on this patient. My history is consistent  with HPI obtained. My exam findings are listed above. I have personally reviewed and discussed the above formulated treatment plan with Mick

## 2020-04-16 NOTE — PROGRESS NOTES
Adult Nutrition  Assessment/PES    Patient Name:  Flaco Roque II  YOB: 1945  MRN: [id number]  Admit Date:  4/14/2020    Assessment Date:  4/15/2020    Comments:  No food issues identified.    Reason for Assessment     Row Name 04/15/20 2025          Reason for Assessment    Reason For Assessment  diagnosis/disease state 15 min     Diagnosis  other (see comments) foot ulcer s/p debridement, excision of toe, Chris's esophagus, GERD, Parkinson's, Lung abscess, ABRIL, osteoporosis            Nutrition/Diet History     Row Name 04/15/20 2027          Nutrition/Diet History    Factors Affecting Nutritional Intake  other (see comments) Pt allows eating well Requests suppl as at home. No intake issues.                  Nutrition Prescription Ordered     Row Name 04/15/20 2027          Nutrition Prescription PO    Current PO Diet  Regular         Evaluation of Received Nutrient/Fluid Intake     Row Name 04/15/20 2027          PO Evaluation    Number of Meals  2     % PO Intake  88               Problem/Interventions:  Problem 1     Row Name 04/15/20 2028          Nutrition Diagnoses Problem 1    Problem 1  No Nutrition Diagnosis at this Time               Intervention Goal     Row Name 04/15/20 2028          Intervention Goal    General  Maintain nutrition         Nutrition Intervention     Row Name 04/15/20 2028          Nutrition Intervention    RD/Tech Action  Advise alternate selection;Supplement provided         Nutrition Prescription     Row Name 04/15/20 2028          Nutrition Prescription PO    PO Prescription  Begin/change supplement     Supplement  Boost Plus     Supplement Frequency  2 times a day     New PO Prescription Ordered?  Yes         Education/Evaluation     Row Name 04/15/20 2029          Monitor/Evaluation    Monitor  Per protocol           Electronically signed by:  Liliana Garnett RD  04/15/20 20:29

## 2020-04-16 NOTE — PROGRESS NOTES
Orthopedic  Progress Note    Subjective     Post-Operative Day: 2 Days Post-Op  Procedure(s):  I&D left foot  Laterality:Left      Systemic or Specific Complaints: NO COMPLAINTS, pain controlled  Objective     Vital signs in last 24 hours:  Temp:  [97.4 °F (36.3 °C)-98.4 °F (36.9 °C)] 97.9 °F (36.6 °C)  Heart Rate:  [66-89] 81  Resp:  [16-17] 16  BP: (101-142)/(61-73) 127/66    Neurovascular: left foot toes pink               Wound: dressing changed left foot, wound is closed, only faint erythema, no draiange    Data Review  Lab Results (last 24 hours)     Procedure Component Value Units Date/Time    Tissue / Bone Culture - Tissue, Foot, Left [[id number]]  (Abnormal) Collected:  04/14/20 1313    Specimen:  Tissue from Foot, Left Updated:  04/16/20 1007     Tissue Culture Rare Yeast isolated     Gram Stain No WBCs or organisms seen    CBC (No Diff) [[id number]]  (Abnormal) Collected:  04/16/20 0402    Specimen:  Blood Updated:  04/16/20 0540     WBC 8.34 10*3/mm3      RBC 3.92 10*6/mm3      Hemoglobin 6.9 g/dL      Hematocrit 26.1 %      MCV 66.6 fL      MCH 17.6 pg      MCHC 26.4 g/dL      RDW 21.2 %      RDW-SD 48.9 fl      MPV 10.8 fL      Platelets 328 10*3/mm3     Vancomycin, Trough [[id number]]  (Normal) Collected:  04/16/20 0402    Specimen:  Blood Updated:  04/16/20 0540     Vancomycin Trough 7.60 mcg/mL     Basic Metabolic Panel [[id number]]  (Abnormal) Collected:  04/16/20 0402    Specimen:  Blood Updated:  04/16/20 0535     Glucose 84 mg/dL      BUN 13 mg/dL      Creatinine 0.62 mg/dL      Sodium 137 mmol/L      Potassium 4.1 mmol/L      Chloride 103 mmol/L      CO2 26.0 mmol/L      Calcium 8.2 mg/dL      eGFR Non African Amer 127 mL/min/1.73      BUN/Creatinine Ratio 21.0     Anion Gap 8.0 mmol/L     Narrative:       GFR Normal >60  Chronic Kidney Disease <60  Kidney Failure <15          Microbiology Results (last 10 days)     Procedure Component Value - Date/Time    Tissue / Bone Culture - Tissue, Foot, Left  [[id number]]  (Abnormal) Collected:  04/14/20 1313    Lab Status:  Preliminary result Specimen:  Tissue from Foot, Left Updated:  04/16/20 1007     Tissue Culture Rare Yeast isolated     Gram Stain No WBCs or organisms seen    AFB Culture - Tissue, Foot, Left [[id number]] Collected:  04/14/20 1313    Lab Status:  Preliminary result Specimen:  Tissue from Foot, Left Updated:  04/15/20 1108     AFB Stain No acid fast bacilli seen on concentrated smear              Assessment/Plan     Status post- I&D left foot  1. Cultures growing yeast, so far- discussed with Dr Beach  2. Non-wt bearing  3. Path pending   4. Anemia- pre op anemic, minimal blood loss in surgery, non oon dressing, likely chronic.  Transfusion today          Juhi Calle MD    Date: 4/16/2020  Time: 14:16

## 2020-04-16 NOTE — PLAN OF CARE
Problem: Patient Care Overview  Goal: Plan of Care Review  Outcome: Ongoing (interventions implemented as appropriate)  Flowsheets (Taken 4/16/2020 1615)  Progress: improving  Plan of Care Reviewed With: patient  Outcome Summary: Pt. alert, oriented x 4, and pleasant. VSS. Left foot elevated on pillows. PICC line placed today per PICC Nurse, tolerated well. C/O pain, PO medication given x 2. Voiding well per urinal. Will continue to monitor.

## 2020-04-16 NOTE — THERAPY TREATMENT NOTE
Patient Name: Flaco Roque II  : 1945    MRN: [id number]                              Today's Date: 2020       Admit Date: 2020    Visit Dx:     ICD-10-CM ICD-9-CM   1. Skin ulcer of left foot, limited to breakdown of skin (CMS/[facility]) L97.521 707.15     Patient Active Problem List   Diagnosis   • ABRIL (obstructive sleep apnea)   • Pulmonary emphysema (CMS/[facility])   • Acute blood loss anemia   • Foot deformity, acquired, left   • Leukocytosis, likely reactive   • Acute postoperative pain   • Deformity of left foot   • S/P foot surgery, left   • Parkinson disease (CMS/[facility])   • Anemia   • Interstitial lung disease (CMS/[facility])   • Skin ulcer of left foot, limited to breakdown of skin (CMS/[facility])   • S/P Irrigation debridement left foot with excision of base of fifth metatarsal and chronic ulcer   • On home O2     Past Medical History:   Diagnosis Date   • Arthropathy of shoulder region 9/10/2018   • Chris's esophagus     Last EGD 1 year ago with Dr Kaye    • BPH (benign prostatic hyperplasia)    • Chronic back pain 10/31/2017   • Chronic low back pain    • COPD (chronic obstructive pulmonary disease) (CMS/[facility])    • Foot pain    • GERD (gastroesophageal reflux disease)    • History of transfusion     h/o- no reaction    • Injury of back    • Lung abscess (CMS/[facility])    • Osteoarthritis    • Osteoporosis    • Parkinson disease (CMS/[facility])    • Rotator cuff tear, left    • Sleep apnea     doesnt use machine- cant tolerate    • Status post reverse total shoulder replacement, 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     Past Surgical History:   Procedure Laterality Date   • ARTHRODESIS MIDTARSAL / TARSOMETATARSAL / TARSAL NAVICULAR-CUNEIFORM Left 05/10/2016   • BACK SURGERY     • BACK SURGERY      low back   • BUNIONECTOMY Left 2019    Procedure: left foot excise PIP joints 2,3,4, tenotomies 2,3,4, metatarsal capsulotomy 2,3,4, chevron osteotomy 5th metatarsal, great toe DIP fusion LEFT;  Surgeon: Juhi Calle MD;  Location:   [facility];  Service: Orthopedics   • CATARACT EXTRACTION      bilat cataract     and lasik on right eye only    • CHOLECYSTECTOMY     • COLONOSCOPY N/A 11/2/2017    Procedure: COLONOSCOPY;  Surgeon: Luis Eduardo Capellan MD;  Location:  [facility];  Service:    • ENDOSCOPY N/A 11/1/2017    Procedure: ESOPHAGOGASTRODUODENOSCOPY;  Surgeon: Luis Eduardo Capellan MD;  Location:  [facility];  Service:    • ENDOSCOPY  11/02/2017    DR LUIS EDUARDO CAPELLAN   • FOOT SURGERY     • KNEE ARTHROSCOPY Bilateral    • LEG DEBRIDEMENT Left 4/14/2020    Procedure: I&D left foot;  Surgeon: Juhi Calle MD;  Location:  [facility];  Service: Orthopedics;  Laterality: Left;   • PAIN PUMP INSERTION/REVISION     • SPINE SURGERY     • TOTAL HIP ARTHROPLASTY Left    • TOTAL SHOULDER ARTHROPLASTY W/ DISTAL CLAVICLE EXCISION Left 9/10/2018    Procedure: REVERSE TOTAL SHOULDER ARTHROPLASTY LEFT;  Surgeon: Abel Brennan MD;  Location:  [facility];  Service: Orthopedics   • ULNAR NERVE TRANSPOSITION       General Information     Row Name 04/16/20 1150          PT Evaluation Time/Intention    Document Type  therapy note (daily note)  -SC     Mode of Treatment  physical therapy;individual therapy  -SC     Row Name 04/16/20 1150          General Information    Patient Profile Reviewed?  yes  -SC     Existing Precautions/Restrictions  non-weight bearing  -SC     Row Name 04/16/20 1150          Cognitive Assessment/Intervention- PT/OT    Orientation Status (Cognition)  oriented x 4  -SC     Cognitive Assessment/Intervention Comment  alert, following all commands  -SC     Row Name 04/16/20 1150          Safety Issues, Functional Mobility    Impairments Affecting Function (Mobility)  other (see comments) hx of copd, walked on oxygen  -SC     Comment, Safety Issues/Impairments (Mobility)  needs 02 to walk  -SC       User Key  (r) = Recorded By, (t) = Taken By, (c) = Cosigned By    Initials Name Provider Type    SC Day Chinchilla, PT Physical Therapist         Mobility     Row Name 04/16/20 1152          Bed Mobility Assessment/Treatment    Bed Mobility Assessment/Treatment  scooting/bridging;supine-sit;sit-supine  -SC     Scooting/Bridging [place] (Bed Mobility)  independent  -SC     Supine-Sit [place] (Bed Mobility)  independent  -SC     Sit-Supine [place] (Bed Mobility)  independent  -SC     Assistive Device (Bed Mobility)  head of bed elevated  -SC     Row Name 04/16/20 1152          Transfer Assessment/Treatment    Comment (Transfers)  transfered on/off knee scooter with supervision and cues for sequencing. Demonstrated good technique  -SC     Row Name 04/16/20 1152          Sit-Stand Transfer    Sit-Stand [place] (Transfers)  supervision;verbal cues  -SC     Assistive Device (Sit-Stand Transfers)  -- knee scooter  -[facility] Name 04/16/20 1152          Gait/Stairs Assessment/Training    Gait/Stairs Assessment/Training  gait/ambulation independence  -SC     [place] Level (Gait)  verbal cues;supervision  -SC     Assistive Device (Gait)  walker, knee scooter  -SC     Distance in Feet (Gait)  350- x3 standing breaks to stretch out his back and L hip  -SC     Pattern (Gait)  step-through  -SC     Deviations/Abnormal Patterns (Gait)  gait speed decreased  -SC     Right Sided Gait Deviations  forward flexed posture  -SC     Comment (Gait/Stairs)  Demonstrated good control with walker. Cues for stopping to take breathing breaks and stretch out his back and hip.   -SC     Row Name 04/16/20 1152          Mobility Assessment/Intervention    Extremity Weight-bearing Status  left lower extremity  -SC     Right Lower Extremity (Weight-bearing Status)  non weight-bearing (NWB)  -SC       User Key  (r) = Recorded By, (t) = Taken By, (c) = Cosigned By    Initials Name Provider Type    SC Day Chinchilla PT Physical Therapist        Obj/Interventions     Row Name 04/16/20 1155          Therapeutic Exercise    Lower Extremity (Therapeutic Exercise)   gastroc stretch, bilateral;gluteal sets;quad sets, bilateral;heel slides, bilateral;SLR (straight leg raise), left  -SC     Lower Extremity Range of Motion (Therapeutic Exercise)  hip abduction/adduction, bilateral standing holding to bess railing: L hip flex/ext, trunk extension  x5  -SC     Exercise Type (Therapeutic Exercise)  AROM (active range of motion);active stretching;isotonic contraction, concentric  -SC     Position (Therapeutic Exercise)  seated;standing;supine  -SC     Sets/Reps (Therapeutic Exercise)  10  -SC     Row Name 04/16/20 1155          Dynamic Standing Balance    Level of [place], Reaches Outside Midline (Standing, Dynamic Balance)  supervision  -SC     Assistive Device Utilized (Supported Standing, Dynamic Balance)  -- knee scooter  -SC       User Key  (r) = Recorded By, (t) = Taken By, (c) = Cosigned By    Initials Name Provider Type    SC Day Chinchilla, PT Physical Therapist        Goals/Plan    No documentation.       Clinical Impression     [facility] Name 04/16/20 1480          Pain Assessment    Additional Documentation  Pain Scale: FACES Pre/Post-Treatment (Group)  -SC     Row Name 04/16/20 7485          Pain Scale: Numbers Pre/Post-Treatment    Pain Location - Side  Left  -SC     Pain Location - Orientation  lower  -SC     Pain Location  extremity  -SC     Pain Intervention(s)  Repositioned  -[facility] Name 04/16/20 2451          Pain Scale: FACES Pre/Post-Treatment    Pain: FACES Scale, Pretreatment  2-->hurts little bit  -SC     Pain: FACES Scale, Post-Treatment  2-->hurts little bit  -SC     Row Name 04/16/20 6185          Plan of Care Review    Plan of Care Reviewed With  patient  -SC     Progress  improving  -SC     Outcome Summary  Patient able to demonstrated good control of knee walker. Some need to go slow and take rest breakes noted.  -SC     Row Name 04/16/20 0748          Physical Therapy Clinical Impression    Criteria for Skilled Interventions Met (PT Clinical  Impression)  yes;treatment indicated  -SC     Rehab Potential (PT Clinical Summary)  good, to achieve stated therapy goals  -SC     Row Name 04/16/20 1157          Vital Signs    Post SpO2 (%)  92  -SC     O2 Delivery Post Treatment  supplemental O2  -SC     Row Name 04/16/20 1157          Positioning and Restraints    Pre-Treatment Position  in bed  -SC     Post Treatment Position  bed  -SC     In Bed  supine;notified nsg;call light within reach  -SC       User Key  (r) = Recorded By, (t) = Taken By, (c) = Cosigned By    Initials Name Provider Type    Day Freeman, PT Physical Therapist        Outcome Measures     Row Name 04/16/20 1159          How much help from another person do you currently need...    Turning from your back to your side while in flat bed without using bedrails?  4  -SC     Moving from lying on back to sitting on the side of a flat bed without bedrails?  4  -SC     Moving to and from a bed to a chair (including a wheelchair)?  3  -SC     Standing up from a chair using your arms (e.g., wheelchair, bedside chair)?  3  -SC     Climbing 3-5 steps with a railing?  2  -SC     To walk in hospital room?  3  -SC     AM-PAC 6 Clicks Score (PT)  19  -SC     Row Name 04/16/20 1159          Functional Assessment    Outcome Measure Options  AM-PAC 6 Clicks Basic Mobility (PT)  -SC       User Key  (r) = Recorded By, (t) = Taken By, (c) = Cosigned By    Initials Name Provider Type    Day Freeman, PT Physical Therapist          PT Recommendation and Plan     Outcome Summary/Treatment Plan (PT)  Anticipated Discharge Disposition (PT): home with assist  Plan of Care Reviewed With: patient  Progress: improving  Outcome Summary: Patient able to demonstrated good control of knee walker. Some need to go slow and take rest breakes noted.     Time Calculation:   PT Charges     Row Name 04/16/20 1022             Time Calculation    Start Time  1022  -SC      PT Received On  04/16/20  -SC      PT Goal  Re-Cert Due Date  04/25/20  -SC         Time Calculation- PT    Total Timed Code Minutes- PT  25 minute(s)  -SC         Timed Charges    70249 - PT Therapeutic Exercise Minutes  15  -SC      59027 - Gait Training Minutes   10  -SC        User Key  (r) = Recorded By, (t) = Taken By, (c) = Cosigned By    Initials Name Provider Type    SC Day Chinchilla, PT Physical Therapist        Therapy Charges for Today     Code Description Service Date Service Provider Modifiers Qty    26882754436 HC PT EVAL MOD COMPLEXITY 4 4/15/2020 Day Chinchilla, PT GP 1    15645409224 HC PT THER PROC EA 15 MIN 4/16/2020 Day Chinchilla, PT GP 1    68965283928 HC GAIT TRAINING EA 15 MIN 4/16/2020 Day Chinchilla, PT GP 1          PT G-Codes  Outcome Measure Options: AM-PAC 6 Clicks Basic Mobility (PT)  AM-PAC 6 Clicks Score (PT): 19    Day Chinchilla, PT  4/16/2020

## 2020-04-16 NOTE — PLAN OF CARE
Problem: Patient Care Overview  Goal: Plan of Care Review  Flowsheets  Taken 4/16/2020 0445  Progress: improving  Outcome Summary: Pt had uneventful night.  LLE pain controlled w/norco.  Denies LLE numbness/tingling.  Ace wrap CDI.  Voids without problems.  Pt alert, cooperative.  VSS  Taken 4/15/2020 2028  Plan of Care Reviewed With: patient     Problem: Surgery Nonspecified (Adult)  Goal: Signs and Symptoms of Listed Potential Problems Will be Absent, Minimized or Managed (Surgery Nonspecified)  Flowsheets (Taken 4/14/2020 1621 by Emily Conrad, RN)  Problems Assessed (Surgery): all  Problems Present (Surgery): pain;situational response

## 2020-04-17 VITALS
TEMPERATURE: 97.5 F | HEART RATE: 62 BPM | RESPIRATION RATE: 17 BRPM | SYSTOLIC BLOOD PRESSURE: 126 MMHG | HEIGHT: 68 IN | BODY MASS INDEX: 21.98 KG/M2 | OXYGEN SATURATION: 96 % | DIASTOLIC BLOOD PRESSURE: 78 MMHG | WEIGHT: 145 LBS

## 2020-04-17 LAB
ANION GAP SERPL CALCULATED.3IONS-SCNC: 9 MMOL/L (ref 5–15)
BH BB BLOOD EXPIRATION DATE: NORMAL
BH BB BLOOD TYPE BARCODE: 6200
BH BB DISPENSE STATUS: NORMAL
BH BB PRODUCT CODE: NORMAL
BH BB UNIT NUMBER: NORMAL
BUN BLD-MCNC: 11 MG/DL (ref 8–23)
BUN/CREAT SERPL: 18.3 (ref 7–25)
CALCIUM SPEC-SCNC: 8.4 MG/DL (ref 8.6–10.5)
CHLORIDE SERPL-SCNC: 103 MMOL/L (ref 98–107)
CO2 SERPL-SCNC: 26 MMOL/L (ref 22–29)
CREAT BLD-MCNC: 0.6 MG/DL (ref 0.76–1.27)
CROSSMATCH INTERPRETATION: NORMAL
CYTO UR: NORMAL
DEPRECATED RDW RBC AUTO: 53.2 FL (ref 37–54)
ERYTHROCYTE [DISTWIDTH] IN BLOOD BY AUTOMATED COUNT: 22.7 % (ref 12.3–15.4)
GFR SERPL CREATININE-BSD FRML MDRD: 132 ML/MIN/1.73
GLUCOSE BLD-MCNC: 84 MG/DL (ref 65–99)
HCT VFR BLD AUTO: 30.2 % (ref 37.5–51)
HGB BLD-MCNC: 8.3 G/DL (ref 13–17.7)
LAB AP CASE REPORT: NORMAL
LAB AP CLINICAL INFORMATION: NORMAL
MCH RBC QN AUTO: 18.8 PG (ref 26.6–33)
MCHC RBC AUTO-ENTMCNC: 27.5 G/DL (ref 31.5–35.7)
MCV RBC AUTO: 68.5 FL (ref 79–97)
PATH REPORT.FINAL DX SPEC: NORMAL
PATH REPORT.GROSS SPEC: NORMAL
PLATELET # BLD AUTO: 297 10*3/MM3 (ref 140–450)
POTASSIUM BLD-SCNC: 4.1 MMOL/L (ref 3.5–5.2)
RBC # BLD AUTO: 4.41 10*6/MM3 (ref 4.14–5.8)
SODIUM BLD-SCNC: 138 MMOL/L (ref 136–145)
UNIT  ABO: NORMAL
UNIT  RH: NORMAL
WBC NRBC COR # BLD: 7.87 10*3/MM3 (ref 3.4–10.8)

## 2020-04-17 PROCEDURE — G0378 HOSPITAL OBSERVATION PER HR: HCPCS

## 2020-04-17 PROCEDURE — 25010000002 CEFTRIAXONE PER 250 MG: Performed by: INTERNAL MEDICINE

## 2020-04-17 PROCEDURE — 25010000002 VANCOMYCIN 10 G RECONSTITUTED SOLUTION

## 2020-04-17 PROCEDURE — 25010000002 ENOXAPARIN PER 10 MG: Performed by: ORTHOPAEDIC SURGERY

## 2020-04-17 PROCEDURE — 63710000001 LACTOBACILLUS ACIDOPHILUS CAPSULE: Performed by: INTERNAL MEDICINE

## 2020-04-17 PROCEDURE — A9270 NON-COVERED ITEM OR SERVICE: HCPCS | Performed by: ORTHOPAEDIC SURGERY

## 2020-04-17 PROCEDURE — 63710000001 TAMSULOSIN 0.4 MG CAPSULE: Performed by: NURSE PRACTITIONER

## 2020-04-17 PROCEDURE — A9270 NON-COVERED ITEM OR SERVICE: HCPCS | Performed by: INTERNAL MEDICINE

## 2020-04-17 PROCEDURE — 99024 POSTOP FOLLOW-UP VISIT: CPT | Performed by: ORTHOPAEDIC SURGERY

## 2020-04-17 PROCEDURE — 63710000001 CARBIDOPA-LEVODOPA 25-100 MG TABLET: Performed by: NURSE PRACTITIONER

## 2020-04-17 PROCEDURE — 63710000001 TAB-A-VITE/BETA CAROTENE TABLET: Performed by: ORTHOPAEDIC SURGERY

## 2020-04-17 PROCEDURE — 25010000002 HYDROMORPHONE 1 MG/ML SOLUTION: Performed by: ORTHOPAEDIC SURGERY

## 2020-04-17 PROCEDURE — A9270 NON-COVERED ITEM OR SERVICE: HCPCS | Performed by: NURSE PRACTITIONER

## 2020-04-17 PROCEDURE — 63710000001 HYDROCODONE-ACETAMINOPHEN 7.5-325 MG TABLET: Performed by: ORTHOPAEDIC SURGERY

## 2020-04-17 PROCEDURE — 94799 UNLISTED PULMONARY SVC/PX: CPT

## 2020-04-17 PROCEDURE — 63710000001 PANTOPRAZOLE 40 MG TABLET DELAYED-RELEASE: Performed by: NURSE PRACTITIONER

## 2020-04-17 PROCEDURE — 25010000002 MICAFUNGIN SODIUM 100 MG RECONSTITUTED SOLUTION: Performed by: INTERNAL MEDICINE

## 2020-04-17 PROCEDURE — 80048 BASIC METABOLIC PNL TOTAL CA: CPT

## 2020-04-17 PROCEDURE — 85027 COMPLETE CBC AUTOMATED: CPT | Performed by: NURSE PRACTITIONER

## 2020-04-17 RX ORDER — L.ACID,PARA/B.BIFIDUM/S.THERM 8B CELL
1 CAPSULE ORAL DAILY
Qty: 30 CAPSULE | Refills: 1 | Status: SHIPPED | OUTPATIENT
Start: 2020-04-18

## 2020-04-17 RX ORDER — DOCUSATE SODIUM 100 MG/1
100 CAPSULE, LIQUID FILLED ORAL 2 TIMES DAILY
Qty: 60 CAPSULE | Refills: 0 | Status: SHIPPED | OUTPATIENT
Start: 2020-04-17 | End: 2021-10-27

## 2020-04-17 RX ADMIN — SODIUM CHLORIDE, PRESERVATIVE FREE 10 ML: 5 INJECTION INTRAVENOUS at 08:33

## 2020-04-17 RX ADMIN — MULTIVITAMIN TABLET 1 TABLET: TABLET at 08:32

## 2020-04-17 RX ADMIN — CARBIDOPA AND LEVODOPA 1.5 TABLET: 25; 100 TABLET ORAL at 12:07

## 2020-04-17 RX ADMIN — MICAFUNGIN SODIUM 100 MG: 20 INJECTION, POWDER, LYOPHILIZED, FOR SOLUTION INTRAVENOUS at 13:10

## 2020-04-17 RX ADMIN — VANCOMYCIN HYDROCHLORIDE 1250 MG: 10 INJECTION, POWDER, LYOPHILIZED, FOR SOLUTION INTRAVENOUS at 08:31

## 2020-04-17 RX ADMIN — IPRATROPIUM BROMIDE AND ALBUTEROL SULFATE 3 ML: 2.5; .5 SOLUTION RESPIRATORY (INHALATION) at 08:49

## 2020-04-17 RX ADMIN — HYDROMORPHONE HYDROCHLORIDE 1 MG: 1 INJECTION, SOLUTION INTRAMUSCULAR; INTRAVENOUS; SUBCUTANEOUS at 06:08

## 2020-04-17 RX ADMIN — PANTOPRAZOLE SODIUM 40 MG: 40 TABLET, DELAYED RELEASE ORAL at 08:33

## 2020-04-17 RX ADMIN — MUPIROCIN: 20 OINTMENT TOPICAL at 08:35

## 2020-04-17 RX ADMIN — Medication 1 CAPSULE: at 08:32

## 2020-04-17 RX ADMIN — TAMSULOSIN HYDROCHLORIDE 0.4 MG: 0.4 CAPSULE ORAL at 08:32

## 2020-04-17 RX ADMIN — HYDROCODONE BITARTRATE AND ACETAMINOPHEN 2 TABLET: 7.5; 325 TABLET ORAL at 09:50

## 2020-04-17 RX ADMIN — ENOXAPARIN SODIUM 40 MG: 40 INJECTION SUBCUTANEOUS at 08:32

## 2020-04-17 RX ADMIN — HYDROCODONE BITARTRATE AND ACETAMINOPHEN 1 TABLET: 7.5; 325 TABLET ORAL at 03:12

## 2020-04-17 RX ADMIN — CEFTRIAXONE 2 G: 2 INJECTION, POWDER, FOR SOLUTION INTRAMUSCULAR; INTRAVENOUS at 12:07

## 2020-04-17 RX ADMIN — CARBIDOPA AND LEVODOPA 1.5 TABLET: 25; 100 TABLET ORAL at 08:32

## 2020-04-17 NOTE — PROGRESS NOTES
Only C albicans from culture so far    Will plan to discharge on micafungin 100mg iv daily and rocephin 2g iv daily

## 2020-04-17 NOTE — PLAN OF CARE
Patient prepared for discharge. PICC to right upper arm , CDI with cap in place. Education provided to patient verbal and written including times of appointments and infusion dates. Dressing to left foot CDI. Patient understands order  NWB to left foot.Patient also to receive lovenox sq at home, voiced using med in the past at home and comfortable with the process. Prescriptions ordered and waiting at personal pharmacy, patient to  on way home. Transported home by  wife in personal vehicle and all personal belongings taken at discharge.

## 2020-04-17 NOTE — PROGRESS NOTES
Case Management Discharge Note      Final Note: Followed up with Mr. Roque at the bedside for discharge plannning.  Mr. Roque is being discharged to home today with his wife's assistance.  He will be going to [facility] for IV abx administration q day, with first appointments tomorrow, Saturday, 4/18, and Sunday, 4/19, at 9:15 am.  Follow up appointment with Dr. Beach is on Monday, 4/20, at 9:30am.  All appoinments are on AVS.  Mr. Roque denies any additional DME or home health.  The patient's wife is transporting him home today and will be transporting him to his infusions.      Provided Post Acute Provider List?: Yes  Post Acute Provider List: Outpatient Therapy  Provided Post Acute Provider Quality & Resource List?: Yes  Delivered To: Patient  Method of Delivery: In person    Destination      No service has been selected for the patient.      Durable Medical Equipment      No service has been selected for the patient.      Dialysis/Infusion - Selection Complete      Service Provider Request Status Selected Services Address Phone Number Fax Number    [place] [facility] [address], [facility] [address] [phone]      Home Medical Care      No service has been selected for the patient.      Therapy      No service has been selected for the patient.      Community Resources      No service has been selected for the patient.             Final Discharge Disposition Code: 01 - home or self-care

## 2020-04-17 NOTE — DISCHARGE SUMMARY
Patient Name: Flaco Roque II  MRN: [id number]  : 1945  DOS: 2020    Attending: Juhi Hess MD    Primary Care Provider: Tayo Hendrix MD    Date of Admission:.2020 10:53 AM    Date of Discharge:  2020    Discharge Diagnosis:   S/P Irrigation debridement left foot with excision of base of fifth metatarsal and chronic ulcer    Skin ulcer of left foot, limited to breakdown of skin (CMS/HCC)    ABRIL (obstructive sleep apnea)    Pulmonary emphysema (CMS/HCC)    Acute postoperative pain    Parkinson disease (CMS/HCC)    Anemia, 1 unit PRBC     On home O2      Hospital Course  Patient is a 74 y.o. male presented for Irrigation debridement left foot with excision of base of fifth metatarsal and chronic ulcer by Dr. Hess.     He is known to us from previous admissions, most recently 2019 for left foot surgery; which he recovered fairly well.     He has chronic pain and indwelling morphine pump that he had refilled last Friday. He is followed by Dr. Kingston, pain management.   He has empysema and is followed by Dr. Ocampo, pulmonary. He uses home O2.    He underwent surgery under [facility]. He tolerated surgery well and was admitted for further medical management. He states the ulcer started about a year ago after previous foot surgery. He has been seeing Dr. Beach, [facility], for abx infusions.   He was seen by Dr. Beach while inpatient. He received a PICC line and will be discharged on IV abx. He will follow-up outpatient.    Patient was provided pain medications as needed for pain control.  Adjustments were made to pain medications to optimize postop pain management. Risks and benefits of opiate medications discussed with patient.    He was seen by PT has progressed well over his stay.  He used an IS for atelectasis prophylaxis and Lovenox along with mechanicals for DVT prophylaxis.  Home medications were resumed as appropriate, and labs were monitored and remained fairly  stable. He did require a transfusion of PRBC which he tolerated well.     With the progress he has made, he is ready for DC home today.    Keep dressing clean and dry until follow up appointment with Dr. Hess.  Discussed with patient regarding plan and he shows understanding and agreement.          Procedures Performed  04/14/20 13:15     Preoperative diagnosis: chronic ulcer lateral left foot over base of 5th metatarsal     Postoperative diagnosis: Same     Anesthesia: General with blocks for postop pain control     Surgeon: Juhi Hess M.D.    Pertinent Test Results:    I reviewed the patient's new clinical results.   Results from last 7 days   Lab Units 04/17/20  0458 04/16/20  0402 04/15/20  0534   WBC 10*3/mm3 7.87 8.34 8.29   HEMOGLOBIN g/dL 8.3* 6.9* 7.5*   HEMATOCRIT % 30.2* 26.1* 27.8*   PLATELETS 10*3/mm3 297 328 416     Results from last 7 days   Lab Units 04/17/20  0458 04/16/20  0402 04/15/20  0534   SODIUM mmol/L 138 137 135*   POTASSIUM mmol/L 4.1 4.1 4.1   CHLORIDE mmol/L 103 103 99   CO2 mmol/L 26.0 26.0 26.0   BUN mg/dL 11 13 11   CREATININE mg/dL 0.60* 0.62* 0.72*   CALCIUM mg/dL 8.4* 8.2* 8.2*   GLUCOSE mg/dL 84 84 172*     Microbiology Results (last 21 days)     Collected Updated Procedure Result Status    04/14/2020 1313 04/14/2020 1420 Fungus Culture - Tissue, Foot, Left [[id number]]   Tissue from Foot, Left    In process Component Value   No component results              04/14/2020 1313 04/16/2020 1007 Tissue / Bone Culture - Tissue, Foot, Left [[id number]]   (Abnormal)   Tissue from Foot, Left    Preliminary result Component Value   Tissue Culture Rare Yeast isolatedAbnormal  P   Gram Stain No WBCs or organisms seen P              04/14/2020 1313 04/15/2020 1108 AFB Culture - Tissue, Foot, Left [[id number]]   Tissue from Foot, Left    Preliminary result Component Value   AFB Stain No acid fast bacilli seen on concentrated smear P              04/14/2020 1313 04/17/2020 0705 Anaerobic  "Culture 10 Day Incubation - Tissue, Foot, Left [[id number]]   Tissue from Foot, Left    Preliminary result Component Value   Anaerobic Culture No anaerobes isolated at 3 days P          I reviewed the patient's new imaging including images and reports.      Physical therapy: Patient able to demonstrated good control of knee walker. Some need to go slow and take rest breakes noted.    Discharge Assessment:    Vital Signs  Visit Vitals  /78 (BP Location: Left arm, Patient Position: Lying)   Pulse 62   Temp 97.5 °F (36.4 °C) (Oral)   Resp 17   Ht 172.7 cm (68\")   Wt 65.8 kg (145 lb)   SpO2 96%   BMI 22.05 kg/m²     Temp (24hrs), Av.8 °F (36.6 °C), Min:97.5 °F (36.4 °C), Max:98.2 °F (36.8 °C)      General Appearance:    Alert, cooperative, in no acute distress   Lungs:     Clear to auscultation,respirations regular, even and                   unlabored    Heart:    Regular rhythm and normal rate, normal S1 and S2   Abdomen:     Normal bowel sounds, no masses, no organomegaly, soft        non-tender, non-distended, no guarding, no rebound                 tenderness   Extremities:   Left foot ACE wrap CDI. Minimal movement of toes (baseline per pt) Good cap refill.   Pulses:   Pulses palpable and equal bilaterally   Skin:   No bleeding, bruising or rash   Neurologic:   Cranial nerves 2 - 12 grossly intact, sensation intact       Discharge Disposition: Home    Discharge Medications     Discharge Medications      New Medications      Instructions Start Date   cefTRIAXone  Commonly known as:  ROCEPHIN   2 g, Intravenous, Every 24 Hours      docusate sodium 100 MG capsule  Commonly known as:  Colace   100 mg, Oral, 2 Times Daily      enoxaparin 40 MG/0.4ML solution syringe  Commonly known as:  LOVENOX   40 mg, Subcutaneous, Daily, For 2 weeks   Start Date:  2020     lactobacillus acidophilus capsule capsule   1 capsule, Oral, Daily   Start Date:  2020     micafungin  Commonly known as:  MYCAMINE   " 100 mg, Intravenous, Every 24 Hours         Changes to Medications      Instructions Start Date   mupirocin 2 % ointment  Commonly known as:  Bactroban  What changed:  how much to take   Topical, Daily         Continue These Medications      Instructions Start Date   amitriptyline 50 MG tablet  Commonly known as:  ELAVIL   50 mg, Oral, Nightly      furosemide 20 MG tablet  Commonly known as:  LASIX   20 mg, Oral, Daily      HYDROcodone-acetaminophen 7.5-325 MG per tablet  Commonly known as:  NORCO   1-2 tablets, Oral, Every 6 Hours PRN      ipratropium-albuterol 0.5-2.5 mg/3 ml nebulizer  Commonly known as:  DUO-NEB   3 mL, Nebulization, 3 Times Daily      montelukast 10 MG tablet  Commonly known as:  SINGULAIR   10 mg, Oral, Nightly      MULTIVITAMIN ADULT PO   1 tablet, Oral, Daily      ondansetron 4 MG tablet  Commonly known as:  Zofran   4 mg, Oral, Every 6 Hours PRN      oxyCODONE-acetaminophen 5-325 MG per tablet  Commonly known as:  PERCOCET   1-2 tablets, Oral, Every 6 Hours PRN      pantoprazole 40 MG EC tablet  Commonly known as:  PROTONIX   40 mg, Oral, 2 Times Daily      Sinemet  MG per tablet  Generic drug:  carbidopa-levodopa   1.5 tablets, Oral, 4 Times Daily      tamsulosin 0.4 MG capsule 24 hr capsule  Commonly known as:  FLOMAX   0.4 mg, Oral, Daily      umeclidinium-vilanterol 62.5-25 MCG/INH aerosol powder  inhaler  Commonly known as:  Anoro Ellipta   1 puff, Inhalation, Daily - RT             Discharge Diet: Regular diet    Activity at Discharge: MARISEL HOLT    Follow-up Appointments  Dr. Hess per her orders  Dr. Beach per her orders      Anita Gan, YAMILET  04/17/20  15:01

## 2020-04-17 NOTE — PLAN OF CARE
Problem: Patient Care Overview  Goal: Plan of Care Review  Outcome: Ongoing (interventions implemented as appropriate)  Flowsheets  Taken 4/17/2020 0612  Progress: improving  Outcome Summary: Pt. had uneventful night. Minimal c/o pain until this AM. Pain increased and pt. complained of constant burning pain in LLE. PRNs given per orders. Pt. voiding appropriately. LLE elevated. 2L NC at HS. VSS.  Taken 4/16/2020 2039  Plan of Care Reviewed With: patient     Problem: Fall Risk (Adult)  Goal: Identify Related Risk Factors and Signs and Symptoms  Outcome: Ongoing (interventions implemented as appropriate)  Flowsheets (Taken 4/16/2020 1615 by Emily Conrad RN)  Related Risk Factors (Fall Risk): gait/mobility problems;environment unfamiliar  Signs and Symptoms (Fall Risk): presence of risk factors     Problem: Surgery Nonspecified (Adult)  Goal: Signs and Symptoms of Listed Potential Problems Will be Absent, Minimized or Managed (Surgery Nonspecified)  Outcome: Ongoing (interventions implemented as appropriate)  Flowsheets (Taken 4/16/2020 1615 by Emily Conrad RN)  Problems Assessed (Surgery): all  Problems Present (Surgery): pain;situational response

## 2020-04-17 NOTE — PROGRESS NOTES
Orthopedic  Progress Note    Subjective     Post-Operative Day: 3 Days Post-Op  Procedure(s):  I&D left foot  Laterality:Left      Systemic or Specific Complaints: no complaints  Objective     Vital signs in last 24 hours:  Temp:  [97.5 °F (36.4 °C)-98.2 °F (36.8 °C)] 97.5 °F (36.4 °C)  Heart Rate:  [62-87] 62  Resp:  [16-17] 17  BP: (121-134)/(72-78) 126/78    Neurovascular: left foot toes pink               Wound: left foot incision clean, no drainage    Data Review  Lab Results (last 24 hours)     Procedure Component Value Units Date/Time    Tissue / Bone Culture - Tissue, Foot, Left [[id number]]  (Abnormal) Collected:  04/14/20 1313    Specimen:  Tissue from Foot, Left Updated:  04/17/20 1019     Tissue Culture Rare Candida albicans     Gram Stain No WBCs or organisms seen    Anaerobic Culture 10 Day Incubation - Tissue, Foot, Left [[id number]] Collected:  04/14/20 1313    Specimen:  Tissue from Foot, Left Updated:  04/17/20 0705     Anaerobic Culture No anaerobes isolated at 3 days    Basic Metabolic Panel [[id number]]  (Abnormal) Collected:  04/17/20 0458    Specimen:  Blood Updated:  04/17/20 0647     Glucose 84 mg/dL      BUN 11 mg/dL      Creatinine 0.60 mg/dL      Sodium 138 mmol/L      Potassium 4.1 mmol/L      Chloride 103 mmol/L      CO2 26.0 mmol/L      Calcium 8.4 mg/dL      eGFR Non African Amer 132 mL/min/1.73      BUN/Creatinine Ratio 18.3     Anion Gap 9.0 mmol/L     Narrative:       GFR Normal >60  Chronic Kidney Disease <60  Kidney Failure <15      CBC (No Diff) [[id number]]  (Abnormal) Collected:  04/17/20 0458    Specimen:  Blood Updated:  04/17/20 0635     WBC 7.87 10*3/mm3      RBC 4.41 10*6/mm3      Hemoglobin 8.3 g/dL      Hematocrit 30.2 %      MCV 68.5 fL      MCH 18.8 pg      MCHC 27.5 g/dL      RDW 22.7 %      RDW-SD 53.2 fl      Platelets 297 10*3/mm3     Narrative:       Verified by repeat analysis.  MPV not calculated by analyzer            Microbiology Results (last 10 days)      Procedure Component Value - Date/Time    Tissue / Bone Culture - Tissue, Foot, Left [[id number]]  (Abnormal) Collected:  04/14/20 1313    Lab Status:  Final result Specimen:  Tissue from Foot, Left Updated:  04/17/20 1019     Tissue Culture Rare Candida albicans     Gram Stain No WBCs or organisms seen    AFB Culture - Tissue, Foot, Left [[id number]] Collected:  04/14/20 1313    Lab Status:  Preliminary result Specimen:  Tissue from Foot, Left Updated:  04/15/20 1108     AFB Stain No acid fast bacilli seen on concentrated smear    Anaerobic Culture 10 Day Incubation - Tissue, Foot, Left [[id number]] Collected:  04/14/20 1313    Lab Status:  Preliminary result Specimen:  Tissue from Foot, Left Updated:  04/17/20 0705     Anaerobic Culture No anaerobes isolated at 3 days              Assessment/Plan     Status post- I&D left foot  -cultures candida so far  -ok to go home, non-wt bearing  -change dressing ever 5 days, thin layer Bactroban, gauze and ace  -I explained to him and showed him how to keep all pressure off the incision in bed  -return 2 weeks or sooner if any problems           Juhi Calle MD    Date: 4/17/2020  Time: 14:08

## 2020-04-18 ENCOUNTER — NURSE TRIAGE (OUTPATIENT)
Dept: CALL CENTER | Facility: HOSPITAL | Age: 75
End: 2020-04-18

## 2020-04-18 NOTE — TELEPHONE ENCOUNTER
"States he is supposed to have three IV medications according to his paperwork and he didn't get the probiotic either. States he had his infusion today and the only got two.     Appears in chart that the vancomycin was cancelled. Discussed this with patient. Also discussed that it apperas his probiotic was sent to the pharmacy today. He states he will check with the pharmacy and will go for his infusion again in the AM. Denies any further questions/concerns at this time.     Reason for Disposition  • Caller has medication question about med not prescribed by PCP and triager unable to answer question (e.g., compatibility with other med, storage)    Additional Information  • Negative: Drug overdose and nurse unable to answer question  • Negative: Caller requesting information not related to medicine  • Negative: Caller requesting a prescription for Strep throat and has a positive culture result  • Negative: Rash while taking a medication or within 3 days of stopping it  • Negative: Immunization reaction suspected  • Negative: [1] Asthma and [2] having symptoms of asthma (cough, wheezing, etc)  • Negative: MORE THAN A DOUBLE DOSE of a prescription or over-the-counter (OTC) drug  • Negative: [1] DOUBLE DOSE (an extra dose or lesser amount) of over-the-counter (OTC) drug AND [2] any symptoms (e.g., dizziness, nausea, pain, sleepiness)  • Negative: [1] DOUBLE DOSE (an extra dose or lesser amount) of prescription drug AND [2] any symptoms (e.g., dizziness, nausea, pain, sleepiness)  • Negative: Took another person's prescription drug  • Negative: [1] DOUBLE DOSE (an extra dose or lesser amount) of prescription drug AND [2] NO symptoms (Exception: a double dose of antibiotics)  • Negative: Diabetes drug error or overdose (e.g., insulin or extra dose)  • Negative: [1] Request for URGENT new prescription or refill of \"essential\" medication (i.e., likelihood of harm to patient if not taken) AND [2] triager unable to fill per " "unit policy  • Negative: [1] Prescription not at pharmacy AND [2] was prescribed today by PCP  • Negative: Pharmacy calling with prescription questions and triager unable to answer question  • Negative: Caller has URGENT medication question about med that PCP prescribed and triager unable to answer question  • Negative: Caller has NON-URGENT medication question about med that PCP prescribed and triager unable to answer question  • Negative: Caller requesting a NON-URGENT new prescription or refill and triager unable to refill per unit policy  • Negative: [1] DOUBLE DOSE (an extra dose or lesser amount) of over-the-counter (OTC) drug AND [2] NO symptoms    Answer Assessment - Initial Assessment Questions  1. SYMPTOMS: \"Do you have any symptoms?\"      See note  2. SEVERITY: If symptoms are present, ask \"Are they mild, moderate or severe?\"      n/a    Protocols used: MEDICATION QUESTION CALL-ADULT-      "

## 2020-04-21 LAB
BACTERIA SPEC AEROBE CULT: ABNORMAL
GRAM STN SPEC: ABNORMAL

## 2020-04-24 LAB — BACTERIA SPEC ANAEROBE CULT: NORMAL

## 2020-04-29 ENCOUNTER — OFFICE VISIT (OUTPATIENT)
Dept: ORTHOPEDIC SURGERY | Facility: CLINIC | Age: 75
End: 2020-04-29

## 2020-04-29 DIAGNOSIS — Z09 SURGERY FOLLOW-UP: Primary | ICD-10-CM

## 2020-04-29 PROCEDURE — 99024 POSTOP FOLLOW-UP VISIT: CPT | Performed by: ORTHOPAEDIC SURGERY

## 2020-04-29 NOTE — PROGRESS NOTES
[facility] at [facility]  TeleHealth Follow Up Visit  Flaco Roque II  1945    VISIT DATE:  04/30/20    PCP:   Tayo Hendrix MD  [address] [place] [address]    This patient has consented to a telehealth visit via telephone The visit was scheduled as a telephone visit to comply with patient safety concerns in accordance with CDC recommendations.  All vitals recorded within this visit are reported by the patient.  I spent  11 minutes in total including but not limited to the 6 minutes spent in direct conversation with this patient.        CC:  No chief complaint on file.      Problem List:  1. COPD  2. Dyspnea on Exertion  3.  Parkinson's disease  4.  Chronic left foot ulcer     Regadenoson Stress Test With Myocardial Perfusion SPECT (Multi Study) December 2019  · Patient denied chest pain  · There were no ischemic EKG changes with Lexiscan. There were occasional PVCs and periods of ventricular bigeminy  · Myocardial perfusion imaging indicates a normal myocardial perfusion study with no evidence of ischemia. No TID  · Left ventricular ejection fraction is normal (Calculated EF = 70%).  · There is mild coronary artery calcification present  · Impressions are consistent with a low risk study.     Echocardiogram May 2019  · Left ventricular systolic function is normal.  · Estimated EF appears to be in the range of 66 - 70%.  · Indeterminate diastolic function  · Mild tricuspid regurgitation   · Aortic valve sclerosis without stenosis    KAMRYN March 2020  Right: Normal waveforms from groin to ankle at rest without significant stenosis.  Normal KAMRYN 1.34.  Diminished toe brachial index and toe pressure 0.47, 54 mmHg with moderate forefoot arterial insufficiency    Left: Normal waveforms from groin to ankle at rest with no evidence of stenosis.  KAMRYN not reliable due to vessel calcification.  Diminished toe pressure and severely reduced TBI 0.18 and 21 mmHg with severe  forefoot arterial insufficiency    History of Present Illness:  Flaco Roque II  Is a 74 y.o. male with pertinent cardiac history detailed above.  Since last visit he did have to undergo debridement and excision of left fifth metatarsal for a chronic ulcer.  He is on continued antibiotics.  He does have evidence of small vessel peripheral arterial disease in the feet.  He is following with Dr. Calle for postoperative management.  Patient states he is continue to follow with infectious disease and may be able to transition to oral antibiotics next week.  He states his lower extremity edema resolved with Lasix.  Advised him he can stop taking on a daily basis monitor for recurrence of edema.  If edema does recur resume it at 20 mg a day.  Otherwise continue on a as needed basis.  Also advised him to start taking aspirin and even though his cholesterol is well controlled to add a low-dose of atorvastatin given the KAMRYN findings showing significant forefoot arterial insufficiency bilaterally.  Patient denies any chest pain or dyspnea.  Overall feeling generally well.          Patient Active Problem List    Diagnosis Date Noted   • Peripheral arterial disease (CMS/[facility]) 04/30/2020   • S/P Irrigation debridement left foot with excision of base of fifth metatarsal and chronic ulcer 04/14/2020   • On home O2 04/14/2020   • Skin ulcer of left foot, limited to breakdown of skin (CMS/[facility]) 01/27/2020   • Interstitial lung disease (CMS/[facility]) 12/20/2019   • Anemia, 1 unit PRBC 4/16 07/28/2019   • Parkinson disease (CMS/[facility]) 04/26/2019   • S/P foot surgery, left 04/23/2019   • Deformity of left foot 04/10/2019     Note Last Updated: 4/10/2019     Added automatically from request for surgery [id number]     • Leukocytosis, likely reactive 09/11/2018   • Acute postoperative pain 09/11/2018   • Foot deformity, acquired, left 04/06/2018     Note Last Updated: 4/6/2018     Added automatically from request for surgery [id number]     • ABRIL  (obstructive sleep apnea) 10/31/2017     Note Last Updated: 2019     CPAP intolerant     • Pulmonary emphysema (CMS/HCC) 10/31/2017   • Acute blood loss anemia 10/31/2017       No Known Allergies    Social History     Socioeconomic History   • Marital status:      Spouse name: Not on file   • Number of children: Not on file   • Years of education: Not on file   • Highest education level: Not on file   Occupational History   • Occupation: Retired    Tobacco Use   • Smoking status: Former Smoker     Packs/day: 2.00     Years: 42.00     Pack years: 84.00     Types: Cigarettes     Start date:      Last attempt to quit:      Years since quittin.3   • Smokeless tobacco: Never Used   Substance and Sexual Activity   • Alcohol use: Yes     Drinks per session: 1 or 2     Binge frequency: Monthly     Comment: beer occasional    • Drug use: No   • Sexual activity: Defer   Social History Narrative     and lives with wife    Retired supervisor at [employer]    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       Family History   Problem Relation Age of Onset   • Arthritis Mother    • Diabetes Mother    • Osteoarthritis Mother    • Colon cancer Father    • Cancer Father        Current Medications:    Current Outpatient Medications:   •  amitriptyline (ELAVIL) 50 MG tablet, Take 50 mg by mouth Every Night., Disp: , Rfl:   •  carbidopa-levodopa (SINEMET)  MG per tablet, Take 1.5 tablets by mouth 4 (Four) Times a Day., Disp: , Rfl:   •  cefTRIAXone (ROCEPHIN) 2 g/100 mL 0.9% NS VTB (SHIRLEY), Infuse 100 mL into a venous catheter Daily for 20 doses. Indications: Bone and/or Joint Infection, Disp: 2000 mL, Rfl: 0  •  docusate sodium (Colace) 100 MG capsule, Take 1 capsule by mouth 2 (Two) Times a Day., Disp: 60 capsule, Rfl: 0  •  enoxaparin (LOVENOX) 40 MG/0.4ML solution syringe, Inject 0.4 mL under the skin into the appropriate area as directed Daily. For 2 weeks  Indications: Prevention of Unwanted Clot in Veins, Disp:  5.6 mL, Rfl: 0  •  furosemide (LASIX) 20 MG tablet, Take 1 tablet by mouth Daily., Disp: 90 tablet, Rfl: 3  •  HYDROcodone-acetaminophen (NORCO) 7.5-325 MG per tablet, Take 1-2 tablets by mouth Every 6 (Six) Hours As Needed for Moderate Pain  (as needed for pain)., Disp: 45 tablet, Rfl: 0  •  ipratropium-albuterol (DUO-NEB) 0.5-2.5 mg/3 ml nebulizer, Take 3 mL by nebulization 3 (Three) Times a Day., Disp: , Rfl:   •  lactobacillus acidophilus (RISAQUAD) capsule capsule, Take 1 capsule by mouth Daily., Disp: 30 capsule, Rfl: 1  •  micafungin 100 mg/100 mL 0.9% NS IVPB (mbp), Infuse 100 mL into a venous catheter Daily for 21 doses. Indications: Bone and/or Joint Infection, Disp: 2100 mL, Rfl: 0  •  montelukast (SINGULAIR) 10 MG tablet, Take 10 mg by mouth every night., Disp: , Rfl:   •  Multiple Vitamins-Minerals (MULTIVITAMIN ADULT PO), Take 1 tablet by mouth daily., Disp: , Rfl:   •  mupirocin (BACTROBAN) 2 % ointment, Apply  topically to the appropriate area as directed Daily. (Patient taking differently: Apply 1 application topically to the appropriate area as directed Daily.), Disp: 30 g, Rfl: 5  •  ondansetron (Zofran) 4 MG tablet, Take 1 tablet by mouth Every 6 (Six) Hours As Needed for Nausea or Vomiting., Disp: 30 tablet, Rfl: 0  •  oxyCODONE-acetaminophen (PERCOCET) 5-325 MG per tablet, Take 1-2 tablets by mouth Every 6 (Six) Hours As Needed for Severe Pain  (as needed for severe pain)., Disp: 45 tablet, Rfl: 0  •  pantoprazole (PROTONIX) 40 MG EC tablet, Take 40 mg by mouth 2 (Two) Times a Day., Disp: , Rfl:   •  tamsulosin (FLOMAX) 0.4 MG capsule 24 hr capsule, Take 1 capsule by mouth Daily., Disp: 30 capsule, Rfl: 0  •  umeclidinium-vilanterol (ANORO ELLIPTA) 62.5-25 MCG/INH aerosol powder  inhaler, Inhale 1 puff Daily., Disp: 1 each, Rfl: 11  •  aspirin 81 MG EC tablet, Take 1 tablet by mouth Daily., Disp: 30 tablet, Rfl: 11     Review of Systems   Cardiovascular: Negative for chest pain, dyspnea on  "exertion and leg swelling.   Respiratory: Negative for cough and shortness of breath.    Skin: Positive for poor wound healing (Left foot wound undergoing continued treatment).   All other systems reviewed and are negative.      Vitals:    04/30/20 1455   BP: 115/72   BP Location: Left arm   Patient Position: Sitting   Pulse: 76   Resp: 14   Temp: 98.2 °F (36.8 °C)   Weight: 66.7 kg (147 lb)   Height: 172 cm (67.72\")       Physical Exam   Constitutional: He is oriented to person, place, and time. No distress.   Pulmonary/Chest: Effort normal.   Neurological: He is alert and oriented to person, place, and time.   Psychiatric: He has a normal mood and affect.       Diagnostic Data:  Procedures  No results found for: CHLPL, TRIG, HDL, LDLDIRECT  Lab Results   Component Value Date    GLUCOSE 84 04/17/2020    BUN 11 04/17/2020    CREATININE 0.60 (L) 04/17/2020     04/17/2020    K 4.1 04/17/2020     04/17/2020    CO2 26.0 04/17/2020     Lab Results   Component Value Date    HGBA1C 5.2 04/13/2020     Lab Results   Component Value Date    WBC 7.87 04/17/2020    HGB 8.3 (L) 04/17/2020    HCT 30.2 (L) 04/17/2020     04/17/2020       Assessment:   Diagnosis Plan   1. Peripheral arterial disease (CMS/[facility])         Plan:    1  Dyspnea on exertion, with lower extremity edema  -The symptoms are currently resolved  -Echo earlier this year showed ejection fraction 66 to 70% with no evidence of pulmonary hypertension and no significant valve disease, indeterminant diastolic dysfunction  -Stress test with no evidence of ischemia  -Lower extremity edema resolved on Lasix 20 mg daily.  Advised patient at this time post surgery he can try holding Lasix and evaluate for recurrence of edema.  If edema does recur resume Lasix at same dose      2.  COPD  -At present it appears his symptoms are mostly pulmonary in nature without evidence of ischemia on stress test  -Denies acute/active shortness of breath      3.  Foot " ulcer/PAD  -Evidence of bilateral small vessel to feet on KAMRYN  -LDL is very well controlled at 26, add atorvastatin 10mg to prevent further progression  -Add aspirin 81 mg daily  -Patient is not diabetic, last A1c 5.2    Follow-up 6 months, sooner as needed    Von Kilpatrick MD

## 2020-04-29 NOTE — PROGRESS NOTES
Post-op (2 weeks status post I&D left foot 04/14/20)      Flaco Roque II is 2 weeks status post I&D left foot, 4/14/2020. He reports no fever, chills.  He reports pain is resolved.  They have been taking aspirin for DVT prophylaxis.  They have been non weight bearing.      Past Surgical History:   Procedure Laterality Date   • ARTHRODESIS MIDTARSAL / TARSOMETATARSAL / TARSAL NAVICULAR-CUNEIFORM Left 05/10/2016   • BACK SURGERY     • BACK SURGERY      low back   • BUNIONECTOMY Left 4/23/2019    Procedure: left foot excise PIP joints 2,3,4, tenotomies 2,3,4, metatarsal capsulotomy 2,3,4, chevron osteotomy 5th metatarsal, great toe DIP fusion LEFT;  Surgeon: Juhi Calle MD;  Location:  [facility];  Service: Orthopedics   • CATARACT EXTRACTION      bilat cataract     and lasik on right eye only    • CHOLECYSTECTOMY     • COLONOSCOPY N/A 11/2/2017    Procedure: COLONOSCOPY;  Surgeon: Luis Eduardo Capellan MD;  Location:  [facility];  Service:    • ENDOSCOPY N/A 11/1/2017    Procedure: ESOPHAGOGASTRODUODENOSCOPY;  Surgeon: Luis Eduardo Capellan MD;  Location:  [facility];  Service:    • ENDOSCOPY  11/02/2017    DR LUIS EDUARDO CAPELLAN   • FOOT SURGERY     • KNEE ARTHROSCOPY Bilateral    • LEG DEBRIDEMENT Left 4/14/2020    Procedure: I&D left foot;  Surgeon: Juhi Calle MD;  Location:  [facility];  Service: Orthopedics;  Laterality: Left;   • PAIN PUMP INSERTION/REVISION     • SPINE SURGERY     • TOTAL HIP ARTHROPLASTY Left    • TOTAL SHOULDER ARTHROPLASTY W/ DISTAL CLAVICLE EXCISION Left 9/10/2018    Procedure: REVERSE TOTAL SHOULDER ARTHROPLASTY LEFT;  Surgeon: Abel Brennan MD;  Location:  [facility];  Service: Orthopedics   • ULNAR NERVE TRANSPOSITION         There were no vitals taken for this visit.        Good alignment, no erythema, no drainage, no sign of infection, normal post op swelling left foot, he reports pain Is resolved    reviewed prior lab results    Assessment and Plan:   1. Surgery  follow-up  Improving- he reports the pain resolved.  Path did not show definite osteomyelitis, but persistence of the ulcer is suspicious for osteo and there may be sampling error.  Continue non-wt bearing, dressing change every 4-5 days, antibiotics.  Not ready for suture removal.  I will see him in 2 weeks for non-wt bearing xray

## 2020-04-30 ENCOUNTER — OFFICE VISIT (OUTPATIENT)
Dept: CARDIOLOGY | Facility: CLINIC | Age: 75
End: 2020-04-30

## 2020-04-30 VITALS
BODY MASS INDEX: 22.28 KG/M2 | SYSTOLIC BLOOD PRESSURE: 115 MMHG | TEMPERATURE: 98.2 F | WEIGHT: 147 LBS | DIASTOLIC BLOOD PRESSURE: 72 MMHG | HEART RATE: 76 BPM | RESPIRATION RATE: 14 BRPM | HEIGHT: 68 IN

## 2020-04-30 DIAGNOSIS — I73.9 PERIPHERAL ARTERIAL DISEASE (HCC): Primary | ICD-10-CM

## 2020-04-30 PROCEDURE — 99441 PR PHYS/QHP TELEPHONE EVALUATION 5-10 MIN: CPT | Performed by: INTERNAL MEDICINE

## 2020-04-30 RX ORDER — ASPIRIN 81 MG/1
81 TABLET ORAL DAILY
Qty: 30 TABLET | Refills: 11 | Status: SHIPPED | OUTPATIENT
Start: 2020-04-30 | End: 2021-10-27

## 2020-05-01 RX ORDER — ATORVASTATIN CALCIUM 10 MG/1
10 TABLET, FILM COATED ORAL DAILY
Qty: 30 TABLET | Refills: 11 | Status: SHIPPED | OUTPATIENT
Start: 2020-05-01

## 2020-05-13 ENCOUNTER — OFFICE VISIT (OUTPATIENT)
Dept: ORTHOPEDIC SURGERY | Facility: CLINIC | Age: 75
End: 2020-05-13

## 2020-05-13 DIAGNOSIS — Z09 SURGERY FOLLOW-UP: Primary | ICD-10-CM

## 2020-05-13 PROCEDURE — 99024 POSTOP FOLLOW-UP VISIT: CPT | Performed by: ORTHOPAEDIC SURGERY

## 2020-05-13 NOTE — PROGRESS NOTES
ESTABLISHED PATIENT    Patient: Flaco Roque II  : 1945    Primary Care Provider: Tayo Hendrix MD    Requesting Provider: As above    Post-op (2 week recheck -  status post I&D left foot 20)      History    Chief Complaint: Status post I&D left foot on 2020    History of Present Illness: He returns for follow-up of I&D of his left foot with excision of bone and chronic ulcer on 2020.  He reports no fever no chills.  He did run his knee scooter over his great toe and has a small abrasion.  He comes in today with his shoe and I advised him not to do that.  He needs to have no pressure on the lateral incision on the left foot.  It is not healed enough yet for suture removal.    Current Outpatient Medications on File Prior to Visit   Medication Sig Dispense Refill   • amitriptyline (ELAVIL) 50 MG tablet Take 50 mg by mouth Every Night.     • aspirin 81 MG EC tablet Take 1 tablet by mouth Daily. 30 tablet 11   • atorvastatin (LIPITOR) 10 MG tablet Take 1 tablet by mouth Daily. 30 tablet 11   • carbidopa-levodopa (SINEMET)  MG per tablet Take 1.5 tablets by mouth 4 (Four) Times a Day.     • docusate sodium (Colace) 100 MG capsule Take 1 capsule by mouth 2 (Two) Times a Day. 60 capsule 0   • enoxaparin (LOVENOX) 40 MG/0.4ML solution syringe Inject 0.4 mL under the skin into the appropriate area as directed Daily. For 2 weeks  Indications: Prevention of Unwanted Clot in Veins 5.6 mL 0   • furosemide (LASIX) 20 MG tablet Take 1 tablet by mouth Daily. 90 tablet 3   • HYDROcodone-acetaminophen (NORCO) 7.5-325 MG per tablet Take 1-2 tablets by mouth Every 6 (Six) Hours As Needed for Moderate Pain  (as needed for pain). 45 tablet 0   • ipratropium-albuterol (DUO-NEB) 0.5-2.5 mg/3 ml nebulizer Take 3 mL by nebulization 3 (Three) Times a Day.     • lactobacillus acidophilus (RISAQUAD) capsule capsule Take 1 capsule by mouth Daily. 30 capsule 1   • montelukast (SINGULAIR) 10 MG tablet Take  10 mg by mouth every night.     • Multiple Vitamins-Minerals (MULTIVITAMIN ADULT PO) Take 1 tablet by mouth daily.     • mupirocin (BACTROBAN) 2 % ointment Apply  topically to the appropriate area as directed Daily. (Patient taking differently: Apply 1 application topically to the appropriate area as directed Daily.) 30 g 5   • ondansetron (Zofran) 4 MG tablet Take 1 tablet by mouth Every 6 (Six) Hours As Needed for Nausea or Vomiting. 30 tablet 0   • oxyCODONE-acetaminophen (PERCOCET) 5-325 MG per tablet Take 1-2 tablets by mouth Every 6 (Six) Hours As Needed for Severe Pain  (as needed for severe pain). 45 tablet 0   • pantoprazole (PROTONIX) 40 MG EC tablet Take 40 mg by mouth 2 (Two) Times a Day.     • tamsulosin (FLOMAX) 0.4 MG capsule 24 hr capsule Take 1 capsule by mouth Daily. 30 capsule 0   • umeclidinium-vilanterol (ANORO ELLIPTA) 62.5-25 MCG/INH aerosol powder  inhaler Inhale 1 puff Daily. 1 each 11     No current facility-administered medications on file prior to visit.       No Known Allergies   Past Medical History:   Diagnosis Date   • Arthropathy of shoulder region 9/10/2018   • Chris's esophagus     Last EGD 1 year ago with Dr Kaye    • BPH (benign prostatic hyperplasia)    • Chronic back pain 10/31/2017   • Chronic low back pain    • COPD (chronic obstructive pulmonary disease) (CMS/[facility])    • Foot pain    • GERD (gastroesophageal reflux disease)    • History of transfusion     h/o- no reaction    • Injury of back    • Lung abscess (CMS/HCC)    • Osteoarthritis    • Osteoporosis    • Parkinson disease (CMS/HCC)    • Rotator cuff tear, left    • Sleep apnea     doesnt use machine- cant tolerate    • Status post reverse total shoulder replacement, 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     Past Surgical History:   Procedure Laterality Date   • ARTHRODESIS MIDTARSAL / TARSOMETATARSAL / TARSAL NAVICULAR-CUNEIFORM Left 05/10/2016   • BACK SURGERY     • BACK SURGERY      low back   • BUNIONECTOMY Left 4/23/2019    Procedure:  left foot excise PIP joints 2,3,4, tenotomies 2,3,4, metatarsal capsulotomy 2,3,4, chevron osteotomy 5th metatarsal, great toe DIP fusion LEFT;  Surgeon: Juhi Calle MD;  Location:  [facility];  Service: Orthopedics   • CATARACT EXTRACTION      bilat cataract     and lasik on right eye only    • CHOLECYSTECTOMY     • COLONOSCOPY N/A 2017    Procedure: COLONOSCOPY;  Surgeon: Luis Eduardo Mayers MD;  Location:  [facility];  Service:    • ENDOSCOPY N/A 2017    Procedure: ESOPHAGOGASTRODUODENOSCOPY;  Surgeon: Luis Eduardo Mayers MD;  Location:  [facility];  Service:    • ENDOSCOPY  2017    DR LUIS EDUARDO MAYERS   • FOOT SURGERY     • KNEE ARTHROSCOPY Bilateral    • LEG DEBRIDEMENT Left 2020    Procedure: I&D left foot;  Surgeon: Juhi Calle MD;  Location:  [facility];  Service: Orthopedics;  Laterality: Left;   • PAIN PUMP INSERTION/REVISION     • SPINE SURGERY     • TOTAL HIP ARTHROPLASTY Left    • TOTAL SHOULDER ARTHROPLASTY W/ DISTAL CLAVICLE EXCISION Left 9/10/2018    Procedure: REVERSE TOTAL SHOULDER ARTHROPLASTY LEFT;  Surgeon: Abel Brennan MD;  Location:  [facility];  Service: Orthopedics   • ULNAR NERVE TRANSPOSITION       Family History   Problem Relation Age of Onset   • Arthritis Mother    • Diabetes Mother    • Osteoarthritis Mother    • Colon cancer Father    • Cancer Father       Social History     Socioeconomic History   • Marital status:      Spouse name: Not on file   • Number of children: Not on file   • Years of education: Not on file   • Highest education level: Not on file   Occupational History   • Occupation: Retired    Tobacco Use   • Smoking status: Former Smoker     Packs/day: 2.00     Years: 42.00     Pack years: 84.00     Types: Cigarettes     Start date:      Last attempt to quit:      Years since quittin.3   • Smokeless tobacco: Never Used   Substance and Sexual Activity   • Alcohol use: Yes     Drinks per session: 1 or 2     Binge frequency:  Monthly     Comment: beer occasional    • Drug use: No   • Sexual activity: Defer   Social History Narrative     and lives with wife    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    HENT: Negative.    Eyes: Negative.    Respiratory: Negative.    Cardiovascular: Negative.    Gastrointestinal: Negative.    Endocrine: Negative.    Genitourinary: Negative.    Musculoskeletal: Positive for arthralgias.   Skin:        Ulcer   Allergic/Immunologic: Negative.    Neurological: Negative.    Hematological: Negative.    Psychiatric/Behavioral: Negative.        The following portions of the patient's history were reviewed and updated as appropriate: allergies, current medications, past family history, past medical history, past social history, past surgical history and problem list.    Physical Exam:   There were no vitals taken for this visit.  Left foot incision is healing very slowly we were able to remove the most distal and most proximal suture but none of the others are ready.  Small superficial abrasion on the great toe no signs of infection    Medical Decision Making    Data Review:   ordered and reviewed x-rays today    Assessment/Plan/Diagnosis/Treatment Options:   1. Surgery follow-up  X-rays today show the area of bone resection but nothing that looks definitively like osteomyelitis.  He needs to keep all pressure off that area and remain nonweightbearing.  It still tenuous, it is not healed enough to remove the sutures.  I will see him again in 2 weeks, no x-ray needed at that time unless he is having more problems  - XR Foot 3+ View Right

## 2020-05-26 LAB
MYCOBACTERIUM SPEC CULT: NORMAL
NIGHT BLUE STAIN TISS: NORMAL

## 2020-05-27 ENCOUNTER — OFFICE VISIT (OUTPATIENT)
Dept: ORTHOPEDIC SURGERY | Facility: CLINIC | Age: 75
End: 2020-05-27

## 2020-05-27 DIAGNOSIS — Z09 SURGERY FOLLOW-UP: Primary | ICD-10-CM

## 2020-05-27 PROCEDURE — 99024 POSTOP FOLLOW-UP VISIT: CPT | Performed by: ORTHOPAEDIC SURGERY

## 2020-05-27 RX ORDER — FLUCONAZOLE 200 MG/1
TABLET ORAL DAILY
COMMUNITY
Start: 2020-05-20 | End: 2020-08-12

## 2020-05-27 NOTE — PROGRESS NOTES
Post-op (6 weeks status post I&D left foot 04/14/20)      Flaco Roque II is 5 weeks status post I&D elft foot, 4/14/2020. He reports no fever, chills.  He reports pain is well controlled.  They have been taking aspirin for DVT prophylaxis.  They have been non weight bearing.      Past Surgical History:   Procedure Laterality Date   • ARTHRODESIS MIDTARSAL / TARSOMETATARSAL / TARSAL NAVICULAR-CUNEIFORM Left 05/10/2016   • BACK SURGERY     • BACK SURGERY      low back   • BUNIONECTOMY Left 4/23/2019    Procedure: left foot excise PIP joints 2,3,4, tenotomies 2,3,4, metatarsal capsulotomy 2,3,4, chevron osteotomy 5th metatarsal, great toe DIP fusion LEFT;  Surgeon: Juhi Calle MD;  Location:  [facility];  Service: Orthopedics   • CATARACT EXTRACTION      bilat cataract     and lasik on right eye only    • CHOLECYSTECTOMY     • COLONOSCOPY N/A 11/2/2017    Procedure: COLONOSCOPY;  Surgeon: Luis Eduardo Capellan MD;  Location:  [facility];  Service:    • ENDOSCOPY N/A 11/1/2017    Procedure: ESOPHAGOGASTRODUODENOSCOPY;  Surgeon: Luis Eduardo Capellan MD;  Location:  [facility];  Service:    • ENDOSCOPY  11/02/2017    DR LUIS EDUARDO CAPELLAN   • FOOT SURGERY     • KNEE ARTHROSCOPY Bilateral    • LEG DEBRIDEMENT Left 4/14/2020    Procedure: I&D left foot;  Surgeon: Juhi Calle MD;  Location:  [facility];  Service: Orthopedics;  Laterality: Left;   • PAIN PUMP INSERTION/REVISION     • SPINE SURGERY     • TOTAL HIP ARTHROPLASTY Left    • TOTAL SHOULDER ARTHROPLASTY W/ DISTAL CLAVICLE EXCISION Left 9/10/2018    Procedure: REVERSE TOTAL SHOULDER ARTHROPLASTY LEFT;  Surgeon: Abel Brennan MD;  Location:  [facility];  Service: Orthopedics   • ULNAR NERVE TRANSPOSITION         There were no vitals taken for this visit.       Left foot incision healing ve3ry slowly, no drainage, no erythema, central aspect not ready for sutures out    none    Assessment and Plan:   1. Surgery follow-up  Very slowly healing, not ready for central  sutures to be removed.  I recommend he be non-wt bearing, high risk for amputation.  I will see him in 2 weeks

## 2020-05-28 LAB — FUNGUS WND CULT: ABNORMAL

## 2020-06-04 ENCOUNTER — HOSPITAL ENCOUNTER (OUTPATIENT)
Dept: CT IMAGING | Facility: HOSPITAL | Age: 75
Discharge: HOME | End: 2020-06-04
Admitting: INTERNAL MEDICINE

## 2020-06-04 DIAGNOSIS — J84.9 INTERSTITIAL LUNG DISEASE (HCC): ICD-10-CM

## 2020-06-04 PROCEDURE — 71250 CT THORAX DX C-: CPT

## 2020-06-05 ENCOUNTER — OFFICE VISIT (OUTPATIENT)
Dept: PULMONOLOGY | Facility: CLINIC | Age: 75
End: 2020-06-05

## 2020-06-05 VITALS
SYSTOLIC BLOOD PRESSURE: 138 MMHG | BODY MASS INDEX: 21.64 KG/M2 | TEMPERATURE: 97.8 F | DIASTOLIC BLOOD PRESSURE: 78 MMHG | HEART RATE: 82 BPM | OXYGEN SATURATION: 91 % | HEIGHT: 68 IN | WEIGHT: 142.8 LBS

## 2020-06-05 DIAGNOSIS — G47.33 OSA (OBSTRUCTIVE SLEEP APNEA): Primary | ICD-10-CM

## 2020-06-05 DIAGNOSIS — J43.2 CENTRILOBULAR EMPHYSEMA (HCC): ICD-10-CM

## 2020-06-05 DIAGNOSIS — J84.9 INTERSTITIAL LUNG DISEASE (HCC): ICD-10-CM

## 2020-06-05 PROCEDURE — 99214 OFFICE O/P EST MOD 30 MIN: CPT | Performed by: INTERNAL MEDICINE

## 2020-06-05 NOTE — PROGRESS NOTES
Subjective:     Chief Complaint:   Chief Complaint   Patient presents with   • COPD       HPI:    Flaco Roque II is a 74 y.o. male here for follow-up of emphysema and interstitial infiltrates    He was hospitalized in May 2019.  He had evidence of emphysema as well as interstitial infiltrates and was treated with antibiotics and steroids.  He has been treated for possible pneumonia since then and an episode of bronchitis in August 2019.  A high resolution in June 2019 revealed improvement in the interstitial changes and evidence of emphysema.  He has had a subsequent cardiac evaluation which was negative.    He smoked for 42 years but quit 18 years ago    He is doing about the same from a respiratory standpoint.  His level of exercise tolerance is roughly the same.  He is not limited with activities of daily living.  A follow-up CAT scan in May 2020 revealed stable nonspecific scarring on the left side as well as some bronchiectasis.  Emphysema was noted.    Further details:    General symptoms:  - no fever  - weight stable  - no edema    Exercise tolerance:  - exercise limitation at 2 flight(s) of stairs    Chest symptoms:  - no chest pain  - mild cough    Sputum:  - expectorates yellow sputum  - denies hemoptysis    Upper airway:  - no sinus congestion or drainage    Recent imaging:  -As noted above    Current medications are:   Current Outpatient Medications:   •  amitriptyline (ELAVIL) 50 MG tablet, Take 50 mg by mouth Every Night., Disp: , Rfl:   •  aspirin 81 MG EC tablet, Take 1 tablet by mouth Daily., Disp: 30 tablet, Rfl: 11  •  atorvastatin (LIPITOR) 10 MG tablet, Take 1 tablet by mouth Daily., Disp: 30 tablet, Rfl: 11  •  carbidopa-levodopa (SINEMET)  MG per tablet, Take 1.5 tablets by mouth 4 (Four) Times a Day., Disp: , Rfl:   •  docusate sodium (Colace) 100 MG capsule, Take 1 capsule by mouth 2 (Two) Times a Day., Disp: 60 capsule, Rfl: 0  •  fluconazole (DIFLUCAN) 200 MG tablet, Daily.,  Disp: , Rfl:   •  furosemide (LASIX) 20 MG tablet, Take 1 tablet by mouth Daily., Disp: 90 tablet, Rfl: 3  •  ipratropium-albuterol (DUO-NEB) 0.5-2.5 mg/3 ml nebulizer, Take 3 mL by nebulization 3 (Three) Times a Day., Disp: , Rfl:   •  lactobacillus acidophilus (RISAQUAD) capsule capsule, Take 1 capsule by mouth Daily., Disp: 30 capsule, Rfl: 1  •  montelukast (SINGULAIR) 10 MG tablet, Take 10 mg by mouth every night., Disp: , Rfl:   •  Multiple Vitamins-Minerals (MULTIVITAMIN ADULT PO), Take 1 tablet by mouth daily., Disp: , Rfl:   •  mupirocin (BACTROBAN) 2 % ointment, Apply  topically to the appropriate area as directed Daily. (Patient taking differently: Apply 1 application topically to the appropriate area as directed Daily.), Disp: 30 g, Rfl: 5  •  pantoprazole (PROTONIX) 40 MG EC tablet, Take 40 mg by mouth 2 (Two) Times a Day., Disp: , Rfl:   •  tamsulosin (FLOMAX) 0.4 MG capsule 24 hr capsule, Take 1 capsule by mouth Daily., Disp: 30 capsule, Rfl: 0  •  umeclidinium-vilanterol (ANORO ELLIPTA) 62.5-25 MCG/INH aerosol powder  inhaler, Inhale 1 puff Daily., Disp: 1 each, Rfl: 11.      The patient's relevant past medical, surgical, family and social history were reviewed and updated in Epic as appropriate.     ROS:    Review of Systems   Constitutional: Negative for fever.   HENT: Negative for congestion.    Respiratory: Positive for cough and shortness of breath.      ROS as documented in patient questionnaire unless as noted otherwise    Objective:    Physical Exam   Constitutional: He is oriented to person, place, and time. He appears well-developed and well-nourished.   HENT:   Head: Normocephalic and atraumatic.   Mouth/Throat: Oropharynx is clear and moist.   Neck: Neck supple. No thyromegaly present.   Cardiovascular: Normal rate and regular rhythm. Exam reveals no gallop and no friction rub.   No murmur heard.  Pulmonary/Chest: Effort normal. No respiratory distress. He has no wheezes. He has no rales.    Decreased BS   Musculoskeletal: He exhibits no edema.   Neurological: He is alert and oriented to person, place, and time.   Skin: Skin is warm and dry.   Psychiatric: He has a normal mood and affect. His behavior is normal.   Vitals reviewed.      Diagnostics:     PFT:  -No additional    CXR:  - no additional    Assessment/Plan:    Problem List Items Addressed This Visit        Pulmonary Problems    ABRIL (obstructive sleep apnea) - Primary    Overview     CPAP intolerant         Pulmonary emphysema (CMS/HCC)    Interstitial lung disease (CMS/HCC)            1. Pulmonary emphysema: Continue Anoro and nebulized therapy as needed  2. Question of interstitial lung disease: This is a nonspecific pattern.  It is associate with bronchiectasis primarily in the left lung and I think is related to chronic postinflammatory changes.  There is been no progression since last year.  3. Hypoxemia: Continue oxygen at night and as needed during the day  4. 1 year follow-up    Level of Risk Moderate due to: two stable chronic illnesses and prescription drug management    Discussed in detail with the patient.  He will call prior to his follow up visit for any new problems.    Signed by  Alf Edwards MD

## 2020-06-08 ENCOUNTER — TRANSCRIBE ORDERS (OUTPATIENT)
Dept: LAB | Facility: HOSPITAL | Age: 75
End: 2020-06-08

## 2020-06-08 ENCOUNTER — LAB (OUTPATIENT)
Dept: LAB | Facility: HOSPITAL | Age: 75
End: 2020-06-08

## 2020-06-08 DIAGNOSIS — L03.116 CELLULITIS OF LEFT FOOT: ICD-10-CM

## 2020-06-08 DIAGNOSIS — M86.672 CHRONIC OSTEOMYELITIS OF HINDFOOT, LEFT (HCC): ICD-10-CM

## 2020-06-08 DIAGNOSIS — B37.49 GENITAL CANDIDIASIS: Primary | ICD-10-CM

## 2020-06-08 DIAGNOSIS — B37.49 GENITAL CANDIDIASIS: ICD-10-CM

## 2020-06-08 PROCEDURE — 87205 SMEAR GRAM STAIN: CPT

## 2020-06-08 PROCEDURE — 87070 CULTURE OTHR SPECIMN AEROBIC: CPT

## 2020-06-10 ENCOUNTER — OFFICE VISIT (OUTPATIENT)
Dept: ORTHOPEDIC SURGERY | Facility: CLINIC | Age: 75
End: 2020-06-10

## 2020-06-10 DIAGNOSIS — Z09 SURGERY FOLLOW-UP: Primary | ICD-10-CM

## 2020-06-10 LAB
BACTERIA SPEC AEROBE CULT: NORMAL
GRAM STN SPEC: NORMAL
GRAM STN SPEC: NORMAL

## 2020-06-10 PROCEDURE — 99024 POSTOP FOLLOW-UP VISIT: CPT | Performed by: ORTHOPAEDIC SURGERY

## 2020-06-10 NOTE — PROGRESS NOTES
Post-op (2 week recheck - 8 weeks status post I&D left foot 04/14/20)      Flaco Roque II is 8 weeks status post I&D left foot, 4/14/2020. He reports no fever, chills.  He reports pain is well controlled.  They have been taking aspirin for DVT prophylaxis.  They have been limited weight bearing, using scooter much of the time.      Past Surgical History:   Procedure Laterality Date   • ARTHRODESIS MIDTARSAL / TARSOMETATARSAL / TARSAL NAVICULAR-CUNEIFORM Left 05/10/2016   • BACK SURGERY     • BACK SURGERY      low back   • BUNIONECTOMY Left 4/23/2019    Procedure: left foot excise PIP joints 2,3,4, tenotomies 2,3,4, metatarsal capsulotomy 2,3,4, chevron osteotomy 5th metatarsal, great toe DIP fusion LEFT;  Surgeon: Juhi Calle MD;  Location:  [facility];  Service: Orthopedics   • CATARACT EXTRACTION      bilat cataract     and lasik on right eye only    • CHOLECYSTECTOMY     • COLONOSCOPY N/A 11/2/2017    Procedure: COLONOSCOPY;  Surgeon: Luis Eduardo Capellan MD;  Location:  [facility];  Service:    • ENDOSCOPY N/A 11/1/2017    Procedure: ESOPHAGOGASTRODUODENOSCOPY;  Surgeon: Luis Eduardo Capellan MD;  Location:  [facility];  Service:    • ENDOSCOPY  11/02/2017    DR LUIS EDUARDO CAPELLAN   • FOOT SURGERY     • KNEE ARTHROSCOPY Bilateral    • LEG DEBRIDEMENT Left 4/14/2020    Procedure: I&D left foot;  Surgeon: Juhi Calle MD;  Location:  [facility];  Service: Orthopedics;  Laterality: Left;   • PAIN PUMP INSERTION/REVISION     • SPINE SURGERY     • TOTAL HIP ARTHROPLASTY Left    • TOTAL SHOULDER ARTHROPLASTY W/ DISTAL CLAVICLE EXCISION Left 9/10/2018    Procedure: REVERSE TOTAL SHOULDER ARTHROPLASTY LEFT;  Surgeon: Abel Brennan MD;  Location:  [facility];  Service: Orthopedics   • ULNAR NERVE TRANSPOSITION         There were no vitals taken for this visit.       Left foot incision healing, faintly pink but it is closed, no eschar, no drainage    phone conversation with physician    Assessment and Plan:   1.  Surgery follow-up  Slowly healing, I removed the remaining sutures.  Dr Ruth and I discussed the candida, he is to continue on the antifungal med.  I will see him in 2 weeks, keep it dry, limited weight bearing- I am concerned about dehiscence, due to the poor local tissue

## 2020-06-24 ENCOUNTER — OFFICE VISIT (OUTPATIENT)
Dept: ORTHOPEDIC SURGERY | Facility: CLINIC | Age: 75
End: 2020-06-24

## 2020-06-24 DIAGNOSIS — Z09 SURGERY FOLLOW-UP: ICD-10-CM

## 2020-06-24 DIAGNOSIS — L97.521 SKIN ULCER OF LEFT FOOT, LIMITED TO BREAKDOWN OF SKIN (HCC): Primary | ICD-10-CM

## 2020-06-24 PROCEDURE — 99024 POSTOP FOLLOW-UP VISIT: CPT | Performed by: ORTHOPAEDIC SURGERY

## 2020-06-24 NOTE — PROGRESS NOTES
Post-op (2 week recheck - 8 weeks status post I&D left foot 04/14/20))      Flaco Roque II is 9 weeks status post I&D left foot with bone excision 4/14/2020. He reports no fever, chills.  He reports pain is resolved.  They have been taking off meds now for DVT prophylaxis.  They have been non weight bearing.      Past Surgical History:   Procedure Laterality Date   • ARTHRODESIS MIDTARSAL / TARSOMETATARSAL / TARSAL NAVICULAR-CUNEIFORM Left 05/10/2016   • BACK SURGERY     • BACK SURGERY      low back   • BUNIONECTOMY Left 4/23/2019    Procedure: left foot excise PIP joints 2,3,4, tenotomies 2,3,4, metatarsal capsulotomy 2,3,4, chevron osteotomy 5th metatarsal, great toe DIP fusion LEFT;  Surgeon: Juhi Calle MD;  Location:  [facility];  Service: Orthopedics   • CATARACT EXTRACTION      bilat cataract     and lasik on right eye only    • CHOLECYSTECTOMY     • COLONOSCOPY N/A 11/2/2017    Procedure: COLONOSCOPY;  Surgeon: Luis Eduardo Capellan MD;  Location:  [facility];  Service:    • ENDOSCOPY N/A 11/1/2017    Procedure: ESOPHAGOGASTRODUODENOSCOPY;  Surgeon: Luis Eduardo Capellan MD;  Location:  [facility];  Service:    • ENDOSCOPY  11/02/2017    DR LUIS EDUARDO CAPELLAN   • FOOT SURGERY     • KNEE ARTHROSCOPY Bilateral    • LEG DEBRIDEMENT Left 4/14/2020    Procedure: I&D left foot;  Surgeon: Juhi Calle MD;  Location: [facility];  Service: Orthopedics;  Laterality: Left;   • PAIN PUMP INSERTION/REVISION     • SPINE SURGERY     • TOTAL HIP ARTHROPLASTY Left    • TOTAL SHOULDER ARTHROPLASTY W/ DISTAL CLAVICLE EXCISION Left 9/10/2018    Procedure: REVERSE TOTAL SHOULDER ARTHROPLASTY LEFT;  Surgeon: Abel Brennan MD;  Location:  [facility];  Service: Orthopedics   • ULNAR NERVE TRANSPOSITION         There were no vitals taken for this visit.       The left fifth metatarsal incision is finally healed.  No signs of infection.  On the fifth metatarsal head however he has a small superficial abrasion, about 4 mm in  diameter no signs of infection.    none    Assessment and Plan:   1. Skin ulcer of left foot, limited to breakdown of skin (CMS/HCC)  He needs to be very careful with that small abrasion on the fifth metatarsal head so that it does not become a another problem.  Bactroban daily.    2. Surgery follow-up  His incision is finally healed.  He may do short distance weightbearing on the foot.  Again he needs to be very careful with that small abrasion.  I will see him again in 6 to 8 weeks or sooner with any problems

## 2020-08-12 ENCOUNTER — OFFICE VISIT (OUTPATIENT)
Dept: ORTHOPEDIC SURGERY | Facility: CLINIC | Age: 75
End: 2020-08-12

## 2020-08-12 VITALS — WEIGHT: 139 LBS | OXYGEN SATURATION: 96 % | HEART RATE: 73 BPM | HEIGHT: 68 IN | BODY MASS INDEX: 21.07 KG/M2

## 2020-08-12 DIAGNOSIS — Z09 SURGERY FOLLOW-UP: Primary | ICD-10-CM

## 2020-08-12 PROCEDURE — 99212 OFFICE O/P EST SF 10 MIN: CPT | Performed by: ORTHOPAEDIC SURGERY
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
## 2020-10-14 ENCOUNTER — OFFICE VISIT (OUTPATIENT)
Dept: CARDIOLOGY | Facility: CLINIC | Age: 75
End: 2020-10-14

## 2020-10-14 VITALS
WEIGHT: 136 LBS | HEIGHT: 68 IN | HEART RATE: 64 BPM | SYSTOLIC BLOOD PRESSURE: 98 MMHG | DIASTOLIC BLOOD PRESSURE: 44 MMHG | BODY MASS INDEX: 20.61 KG/M2

## 2020-10-14 DIAGNOSIS — I73.9 PERIPHERAL ARTERIAL DISEASE (HCC): Primary | ICD-10-CM

## 2020-10-14 DIAGNOSIS — I49.3 PVC'S (PREMATURE VENTRICULAR CONTRACTIONS): ICD-10-CM

## 2020-10-14 PROCEDURE — 99213 OFFICE O/P EST LOW 20 MIN: CPT | Performed by: INTERNAL MEDICINE

## 2020-10-14 RX ORDER — METOPROLOL SUCCINATE 25 MG/1
25 TABLET, EXTENDED RELEASE ORAL DAILY
Qty: 30 TABLET | Refills: 11 | Status: SHIPPED | OUTPATIENT
Start: 2020-10-14 | End: 2020-11-12
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
## 2020-10-30 ENCOUNTER — HOSPITAL ENCOUNTER (OUTPATIENT)
Dept: CARDIOLOGY | Facility: HOSPITAL | Age: 75
Discharge: HOME | End: 2020-10-30
Admitting: INTERNAL MEDICINE

## 2020-10-30 DIAGNOSIS — I73.9 PERIPHERAL ARTERIAL DISEASE (HCC): ICD-10-CM

## 2020-10-30 LAB
BH CV GRAFT BRACHIAL PRESSURE LEFT: 112 MMHG
BH CV GRAFT BRACHIAL PRESSURE RIGHT: 127 MMHG
BH CV LEA LEFT ANT TIBIAL A DISTAL EDV: 0 CM/S
BH CV LEA LEFT ANT TIBIAL A DISTAL PSV: 73.5 CM/S
BH CV LEA LEFT ANT TIBIAL A MID EDV: 0 CM/S
BH CV LEA LEFT ANT TIBIAL A MID PSV: 64.1 CM/S
BH CV LEA LEFT ANT TIBIAL A PROX EDV: 0 CM/S
BH CV LEA LEFT ANT TIBIAL A PROX PSV: 169 CM/S
BH CV LEA LEFT CFA DISTAL EDV: 0 CM/S
BH CV LEA LEFT CFA DISTAL PSV: 75.5 CM/S
BH CV LEA LEFT CFA PROX EDV: 0 CM/S
BH CV LEA LEFT CFA PROX PSV: 127 CM/S
BH CV LEA LEFT DFA PROX EDV: 0 CM/S
BH CV LEA LEFT DFA PROX PSV: 95.9 CM/S
BH CV LEA LEFT DPA PRESSURE: 71 MMHG
BH CV LEA LEFT POPITEAL A  DISTAL EDV: 0 CM/S
BH CV LEA LEFT POPITEAL A  DISTAL PSV: 44.4 CM/S
BH CV LEA LEFT POPITEAL A  PROX EDV: 0 CM/S
BH CV LEA LEFT POPITEAL A  PROX PSV: 63.8 CM/S
BH CV LEA LEFT PTA PRESSURE: 120 MMHG
BH CV LEA LEFT SFA DISTAL EDV: 0 CM/S
BH CV LEA LEFT SFA DISTAL PSV: 81 CM/S
BH CV LEA LEFT SFA MID EDV: 0 CM/S
BH CV LEA LEFT SFA MID PSV: 129 CM/S
BH CV LEA LEFT SFA PROX EDV: 0 CM/S
BH CV LEA LEFT SFA PROX PSV: 86.6 CM/S

## 2020-10-30 PROCEDURE — 93926 LOWER EXTREMITY STUDY: CPT | Performed by: INTERNAL MEDICINE

## 2020-10-30 PROCEDURE — 93926 LOWER EXTREMITY STUDY: CPT

## 2020-11-12 RX ORDER — METOPROLOL SUCCINATE 50 MG/1
50 TABLET, EXTENDED RELEASE ORAL DAILY
Qty: 30 TABLET | Refills: 6 | Status: SHIPPED | OUTPATIENT
Start: 2020-11-12 | End: 2021-06-22

## 2020-11-12 NOTE — TELEPHONE ENCOUNTER
----- Message from Von Kilpatrick MD sent at 11/12/2020  1:49 PM EST -----  Can we asked this patient to increase his Toprol-XL to 50mg daily.  His Holter monitor still had extra beats.  Better than the previous one but I think increasing the metoprolol will help reduce them further

## 2020-11-12 NOTE — TELEPHONE ENCOUNTER
Called patient to let them know results and recommendations per NSK (see NSK note).    Patient agrees to plan and verbalized understanding.

## 2021-01-22 ENCOUNTER — OFFICE VISIT (OUTPATIENT)
Dept: ORTHOPEDIC SURGERY | Facility: CLINIC | Age: 76
End: 2021-01-22

## 2021-01-22 VITALS — HEART RATE: 85 BPM | WEIGHT: 137.6 LBS | BODY MASS INDEX: 20.85 KG/M2 | OXYGEN SATURATION: 95 % | HEIGHT: 68 IN

## 2021-01-22 DIAGNOSIS — M79.672 LEFT FOOT PAIN: Primary | ICD-10-CM

## 2021-01-22 DIAGNOSIS — L97.521 SKIN ULCER OF LEFT FOOT, LIMITED TO BREAKDOWN OF SKIN (HCC): ICD-10-CM

## 2021-01-22 PROCEDURE — 99213 OFFICE O/P EST LOW 20 MIN: CPT | Performed by: ORTHOPAEDIC SURGERY

## 2021-01-22 RX ORDER — CLONAZEPAM 0.5 MG/1
0.5 TABLET ORAL 2 TIMES DAILY PRN
COMMUNITY
Start: 2020-12-30 | End: 2021-10-27
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
## 2021-02-08 ENCOUNTER — TELEPHONE (OUTPATIENT)
Dept: PULMONOLOGY | Facility: CLINIC | Age: 76
End: 2021-02-08

## 2021-02-08 ENCOUNTER — OFFICE VISIT (OUTPATIENT)
Dept: ORTHOPEDIC SURGERY | Facility: CLINIC | Age: 76
End: 2021-02-08

## 2021-02-08 VITALS — HEIGHT: 68 IN | BODY MASS INDEX: 20.76 KG/M2 | HEART RATE: 70 BPM | OXYGEN SATURATION: 98 % | WEIGHT: 137 LBS

## 2021-02-08 DIAGNOSIS — J43.2 CENTRILOBULAR EMPHYSEMA (HCC): Primary | ICD-10-CM

## 2021-02-08 DIAGNOSIS — L97.521 SKIN ULCER OF LEFT FOOT, LIMITED TO BREAKDOWN OF SKIN (HCC): Primary | ICD-10-CM

## 2021-02-08 PROCEDURE — 99212 OFFICE O/P EST SF 10 MIN: CPT | Performed by: ORTHOPAEDIC SURGERY
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
## 2021-02-15 NOTE — TELEPHONE ENCOUNTER
PA for Rx Anoro denied. Insurance is requesting for pt to try Stiolto Respimat or Bevespi HFA. Please send alternate to [facility].

## 2021-03-04 ENCOUNTER — OFFICE VISIT (OUTPATIENT)
Dept: ORTHOPEDIC SURGERY | Facility: CLINIC | Age: 76
End: 2021-03-04

## 2021-03-04 VITALS — HEART RATE: 89 BPM | BODY MASS INDEX: 20.92 KG/M2 | WEIGHT: 138 LBS | HEIGHT: 68 IN | OXYGEN SATURATION: 93 %

## 2021-03-04 DIAGNOSIS — G25.89 SCAPULAR DYSKINESIS: ICD-10-CM

## 2021-03-04 DIAGNOSIS — S46.812A STRAIN OF DELTOID MUSCLE, LEFT, INITIAL ENCOUNTER: ICD-10-CM

## 2021-03-04 DIAGNOSIS — Z96.612 STATUS POST REVERSE ARTHROPLASTY OF SHOULDER, LEFT: ICD-10-CM

## 2021-03-04 DIAGNOSIS — M25.512 LEFT SHOULDER PAIN, UNSPECIFIED CHRONICITY: Primary | ICD-10-CM

## 2021-03-04 DIAGNOSIS — M75.42 IMPINGEMENT SYNDROME OF LEFT SHOULDER: ICD-10-CM

## 2021-03-04 PROCEDURE — 99214 OFFICE O/P EST MOD 30 MIN: CPT | Performed by: ORTHOPAEDIC SURGERY
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
## 2021-03-10 ENCOUNTER — OFFICE VISIT (OUTPATIENT)
Dept: ORTHOPEDIC SURGERY | Facility: CLINIC | Age: 76
End: 2021-03-10

## 2021-03-10 VITALS
SYSTOLIC BLOOD PRESSURE: 137 MMHG | WEIGHT: 138.01 LBS | HEART RATE: 82 BPM | DIASTOLIC BLOOD PRESSURE: 73 MMHG | HEIGHT: 68 IN | BODY MASS INDEX: 20.92 KG/M2

## 2021-03-10 DIAGNOSIS — L97.521 SKIN ULCER OF LEFT FOOT, LIMITED TO BREAKDOWN OF SKIN (HCC): Primary | ICD-10-CM

## 2021-03-10 PROCEDURE — 99212 OFFICE O/P EST SF 10 MIN: CPT | Performed by: PHYSICIAN ASSISTANT
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
## 2021-03-19 ENCOUNTER — TELEPHONE (OUTPATIENT)
Dept: PULMONOLOGY | Facility: CLINIC | Age: 76
End: 2021-03-19

## 2021-03-19 NOTE — TELEPHONE ENCOUNTER
Patient has called and spoke with several people in the office asking us to get him Oxygen. He is in fact supposed to be on oxygen. He has signed AMA with all of the DME we have set him up with. I am asking to make an Appt. asap with [facility]. I have also told the patient he will require new testing upon his next visit.

## 2021-03-22 DIAGNOSIS — Z01.812 BLOOD TESTS PRIOR TO TREATMENT OR PROCEDURE: Primary | ICD-10-CM

## 2021-03-23 ENCOUNTER — CLINICAL SUPPORT NO REQUIREMENTS (OUTPATIENT)
Dept: PULMONOLOGY | Facility: CLINIC | Age: 76
End: 2021-03-23

## 2021-03-23 DIAGNOSIS — Z01.812 BLOOD TESTS PRIOR TO TREATMENT OR PROCEDURE: ICD-10-CM

## 2021-03-23 PROCEDURE — U0005 INFEC AGEN DETEC AMPLI PROBE: HCPCS | Performed by: NURSE PRACTITIONER

## 2021-03-23 PROCEDURE — 99211 OFF/OP EST MAY X REQ PHY/QHP: CPT | Performed by: NURSE PRACTITIONER

## 2021-03-23 PROCEDURE — U0004 COV-19 TEST NON-CDC HGH THRU: HCPCS | Performed by: NURSE PRACTITIONER

## 2021-03-24 LAB — SARS-COV-2 RNA NOSE QL NAA+PROBE: NOT DETECTED

## 2021-03-25 ENCOUNTER — OFFICE VISIT (OUTPATIENT)
Dept: PULMONOLOGY | Facility: CLINIC | Age: 76
End: 2021-03-25

## 2021-03-25 VITALS
HEART RATE: 74 BPM | WEIGHT: 141 LBS | TEMPERATURE: 97.8 F | OXYGEN SATURATION: 96 % | RESPIRATION RATE: 16 BRPM | HEIGHT: 68 IN | SYSTOLIC BLOOD PRESSURE: 116 MMHG | DIASTOLIC BLOOD PRESSURE: 84 MMHG | BODY MASS INDEX: 21.37 KG/M2

## 2021-03-25 DIAGNOSIS — G47.33 OSA (OBSTRUCTIVE SLEEP APNEA): ICD-10-CM

## 2021-03-25 DIAGNOSIS — J84.9 INTERSTITIAL LUNG DISEASE (HCC): ICD-10-CM

## 2021-03-25 DIAGNOSIS — J43.2 CENTRILOBULAR EMPHYSEMA (HCC): Primary | ICD-10-CM

## 2021-03-25 PROCEDURE — 99214 OFFICE O/P EST MOD 30 MIN: CPT | Performed by: NURSE PRACTITIONER

## 2021-03-25 PROCEDURE — 94729 DIFFUSING CAPACITY: CPT | Performed by: NURSE PRACTITIONER

## 2021-03-25 PROCEDURE — 94375 RESPIRATORY FLOW VOLUME LOOP: CPT | Performed by: NURSE PRACTITIONER

## 2021-03-25 PROCEDURE — 94726 PLETHYSMOGRAPHY LUNG VOLUMES: CPT | Performed by: NURSE PRACTITIONER

## 2021-03-25 PROCEDURE — 94618 PULMONARY STRESS TESTING: CPT | Performed by: NURSE PRACTITIONER

## 2021-03-25 RX ORDER — NALOXONE HYDROCHLORIDE 4 MG/.1ML
1 SPRAY NASAL AS NEEDED
COMMUNITY
Start: 2021-03-08 | End: 2021-10-27

## 2021-03-26 NOTE — PROGRESS NOTES
[facility] Pulmonary Follow up    CHIEF COMPLAINT    Dyspnea    HISTORY OF PRESENT ILLNESS    Flaco Roque II is a 75 y.o.male here today for follow-up.  He feels that he needs oxygen during the day and at night and wanted to be seen in the office to get this set up.  He has noticed more shortness of breath over the last couple of weeks.  He is especially more short of breath when he first wakes up in the morning.    He denies any respiratory illnesses or exacerbations since his last appointment in June by Dr. Edwards.      He does have a history of ABRIL but did turn in his equipment a while ago.  He did an overnight with his PCP last night and is waiting for the results of the testing.  He does have daytime somnolence, fatigue and nonrestorative sleep.    He had a CT scan in May 2020 that showed stable nonspecific scarring on the left side and some bronchiectasis.  He has a 42-pack-year history and quit roughly 18 years ago.    He denies fever, chills, sputum production, hemoptysis, night sweats, weight loss, chest pain or palpitations.  He denies any lower extremity edema or calf tenderness.  He denies any sinus or allergy symptoms.  He denies reflux symptoms.  He denies any swallowing or difficulty eating.    He is up-to-date on his current vaccinations.    Patient Active Problem List   Diagnosis   • ABRIL (obstructive sleep apnea)   • Pulmonary emphysema (CMS/HCC)   • Acute blood loss anemia   • Foot deformity, acquired, left   • Leukocytosis, likely reactive   • Acute postoperative pain   • Deformity of left foot   • S/P foot surgery, left   • Parkinson disease (CMS/HCC)   • Anemia, 1 unit PRBC 4/16   • Interstitial lung disease (CMS/HCC)   • Skin ulcer of left foot, limited to breakdown of skin (CMS/HCC)   • S/P Irrigation debridement left foot with excision of base of fifth metatarsal and chronic ulcer   • On home O2   • Peripheral arterial disease (CMS/HCC)   • Status post reverse arthroplasty of shoulder,  left   • Strain of deltoid muscle, left, initial encounter   • Impingement syndrome of left shoulder   • Scapular dyskinesis       No Known Allergies    Current Outpatient Medications:   •  aspirin 81 MG EC tablet, Take 1 tablet by mouth Daily., Disp: 30 tablet, Rfl: 11  •  atorvastatin (LIPITOR) 10 MG tablet, Take 1 tablet by mouth Daily., Disp: 30 tablet, Rfl: 11  •  carbidopa-levodopa (SINEMET)  MG per tablet, Take 1.5 tablets by mouth 4 (Four) Times a Day., Disp: , Rfl:   •  clonazePAM (KlonoPIN) 0.5 MG tablet, Take 0.5 mg by mouth 2 (Two) Times a Day As Needed., Disp: , Rfl:   •  docusate sodium (Colace) 100 MG capsule, Take 1 capsule by mouth 2 (Two) Times a Day., Disp: 60 capsule, Rfl: 0  •  ipratropium-albuterol (DUO-NEB) 0.5-2.5 mg/3 ml nebulizer, Take 3 mL by nebulization 3 (Three) Times a Day., Disp: , Rfl:   •  lactobacillus acidophilus (RISAQUAD) capsule capsule, Take 1 capsule by mouth Daily., Disp: 30 capsule, Rfl: 1  •  metoprolol succinate XL (TOPROL-XL) 50 MG 24 hr tablet, Take 1 tablet by mouth Daily., Disp: 30 tablet, Rfl: 6  •  montelukast (SINGULAIR) 10 MG tablet, Take 10 mg by mouth every night., Disp: , Rfl:   •  Multiple Vitamins-Minerals (MULTIVITAMIN ADULT PO), Take 1 tablet by mouth daily., Disp: , Rfl:   •  Narcan 4 MG/0.1ML nasal spray, 1 spray into the nostril(s) as directed by provider As Needed., Disp: , Rfl:   •  silver sulfadiazine (SILVADENE, SSD) 1 % cream, APPLY OVER ULCER EVERY DAY UNTIL HEALED, Disp: , Rfl:   •  tiotropium bromide-olodaterol (STIOLTO RESPIMAT) 2.5-2.5 MCG/ACT aerosol solution inhaler, Inhale 2 puffs Daily., Disp: 4 g, Rfl: 6  MEDICATION LIST AND ALLERGIES REVIEWED.    Social History     Tobacco Use   • Smoking status: Former Smoker     Packs/day: 2.00     Years: 42.00     Pack years: 84.00     Types: Cigarettes     Start date:      Quit date:      Years since quittin.2   • Smokeless tobacco: Never Used   Substance Use Topics   • Alcohol use:  "Yes     Comment: beer occasional    • Drug use: No       FAMILY AND SOCIAL HISTORY REVIEWED.    Review of Systems   Constitutional: Positive for fatigue. Negative for activity change, appetite change, fever and unexpected weight change.   HENT: Negative for congestion, postnasal drip, rhinorrhea, sinus pressure, sore throat and voice change.    Eyes: Negative for visual disturbance.   Respiratory: Positive for shortness of breath. Negative for cough, chest tightness and wheezing.    Cardiovascular: Negative for chest pain, palpitations and leg swelling.   Gastrointestinal: Negative for abdominal distention, abdominal pain, nausea and vomiting.   Endocrine: Negative for cold intolerance and heat intolerance.   Genitourinary: Negative for difficulty urinating and urgency.   Musculoskeletal: Negative for arthralgias, back pain and neck pain.   Skin: Negative for color change and pallor.   Allergic/Immunologic: Negative for environmental allergies and food allergies.   Neurological: Negative for dizziness, syncope, weakness and light-headedness.   Hematological: Negative for adenopathy. Does not bruise/bleed easily.   Psychiatric/Behavioral: Negative for agitation and behavioral problems.   .    /84 (BP Location: Left arm, Patient Position: Sitting, Cuff Size: Adult)   Pulse 74   Temp 97.8 °F (36.6 °C)   Resp 16   Ht 172.7 cm (67.99\")   Wt 64 kg (141 lb)   SpO2 96% Comment: room air at rest  BMI 21.45 kg/m²     Immunization History   Administered Date(s) Administered   • Flu Vaccine Intradermal Quad 18-64YR 09/25/2014, 09/05/2016   • Flucelvax Quad Vial =>4yrs 10/21/2019   • Fluzone High Dose =>65 Years ([employer] ONLY) 10/01/2015, 09/01/2017, 09/23/2018, 10/01/2019   • Pneumococcal Conjugate 13-Valent (PCV13) 02/23/2015   • Pneumococcal Polysaccharide (PPSV23) 08/22/2011, 09/05/2016   • Pneumococcal, Unspecified 09/14/2016   • TD Preservative Free 05/08/2018   • Zostavax 07/01/2013       Physical " Exam  Vitals and nursing note reviewed.   Constitutional:       Appearance: He is well-developed. He is not diaphoretic.   HENT:      Head: Normocephalic and atraumatic.   Eyes:      Pupils: Pupils are equal, round, and reactive to light.   Neck:      Thyroid: No thyromegaly.   Cardiovascular:      Rate and Rhythm: Normal rate and regular rhythm.      Heart sounds: Normal heart sounds. No murmur heard.   No friction rub. No gallop.    Pulmonary:      Effort: Pulmonary effort is normal. No respiratory distress.      Breath sounds: Normal breath sounds. No wheezing or rales.   Chest:      Chest wall: No tenderness.   Abdominal:      General: Bowel sounds are normal.      Palpations: Abdomen is soft.      Tenderness: There is no abdominal tenderness.   Musculoskeletal:         General: No swelling. Normal range of motion.      Cervical back: Normal range of motion and neck supple.   Lymphadenopathy:      Cervical: No cervical adenopathy.   Skin:     General: Skin is warm and dry.      Capillary Refill: Capillary refill takes less than 2 seconds.   Neurological:      Mental Status: He is alert and oriented to person, place, and time.   Psychiatric:         Mood and Affect: Mood normal.         Behavior: Behavior normal.           RESULTS    PFTS in the office today, read by me.  FVC 3.28 96% predicted, FEV1 2.32 95% predicted, FEV1/FVC 71% predicted, TLC 6.37 122% predicted, DLCO 104% predicted, no obstruction, no restriction and normal diffusion.    6-minute walk test: Patient ambulated 750 feet and never desaturated below 93%.  He does not meet qualifications for oxygen with activity.    PROBLEM LIST    Problem List Items Addressed This Visit        Pulmonary and Pneumonias    Pulmonary emphysema (CMS/HCC) - Primary    Relevant Orders    Pulmonary Function Test (Completed)    6 Minute Walk Test (Completed)    Interstitial lung disease (CMS/HCC)       Sleep    ABRIL (obstructive sleep apnea)    Overview     CPAP  intolerant                 DISCUSSION  Mr. Roque was here for follow-up of his dyspnea.  We did review his PFTs in detail today and he has normal lung function.  I do suspect that some of his dyspnea is due to worsening sleep apnea.  He is agreeable to do a home sleep study.  He does have daytime somnolence, fatigue and nonrestorative sleep.  He would be interested in the nasal pillow if he does meet qualifications for CPAP therapy.  He has been intolerant to CPAP in the past.    We reviewed his 6-minute walk test in the office today and is was normal as well.    Some of his dyspnea could be related to deconditioning as well.  He states he has been fairly inactive over the last 6 months.    I will call him once I receive the results of his home sleep study.  He will follow-up with Dr. Edwards in 3 to 4 months.    Level of service justified based on 32 minutes spent in patient care on this date of service including, but not limited to: preparing to see the patient, obtaining and/or reviewing history, performing medically appropriate examination, ordering tests/medicine/procedures, independently interpreting results, documenting clinical information in EHR, and counseling/education of patient/family/caregiver. (Level 4 30-39 minutes; Level 5 40-54 minutes)      Mary Lou Oseguera, YAMILET  03/25/[id number] EDT  Electronically signed     Please note that portions of this note were completed with a voice recognition program. Efforts were made to edit the dictations, but occasionally words are mistranscribed.      CC: Azar Stern MD

## 2021-04-07 ENCOUNTER — OFFICE VISIT (OUTPATIENT)
Dept: ORTHOPEDIC SURGERY | Facility: CLINIC | Age: 76
End: 2021-04-07

## 2021-04-07 VITALS
HEIGHT: 68 IN | BODY MASS INDEX: 21.38 KG/M2 | DIASTOLIC BLOOD PRESSURE: 83 MMHG | HEART RATE: 76 BPM | SYSTOLIC BLOOD PRESSURE: 136 MMHG | WEIGHT: 141.09 LBS

## 2021-04-07 DIAGNOSIS — L97.521 SKIN ULCER OF LEFT FOOT, LIMITED TO BREAKDOWN OF SKIN (HCC): Primary | ICD-10-CM

## 2021-04-07 PROCEDURE — 99212 OFFICE O/P EST SF 10 MIN: CPT | Performed by: PHYSICIAN ASSISTANT

## 2021-04-19 ENCOUNTER — OFFICE VISIT (OUTPATIENT)
Dept: PULMONOLOGY | Facility: CLINIC | Age: 76
End: 2021-04-19

## 2021-04-19 VITALS
SYSTOLIC BLOOD PRESSURE: 136 MMHG | OXYGEN SATURATION: 98 % | BODY MASS INDEX: 21.52 KG/M2 | HEIGHT: 68 IN | RESPIRATION RATE: 16 BRPM | TEMPERATURE: 97.8 F | DIASTOLIC BLOOD PRESSURE: 70 MMHG | HEART RATE: 54 BPM | WEIGHT: 142 LBS

## 2021-04-19 DIAGNOSIS — G47.33 OSA (OBSTRUCTIVE SLEEP APNEA): Primary | ICD-10-CM

## 2021-04-19 PROCEDURE — 99213 OFFICE O/P EST LOW 20 MIN: CPT | Performed by: NURSE PRACTITIONER

## 2021-04-19 NOTE — PROGRESS NOTES
[facility] Pulmonary Follow up    CHIEF COMPLAINT    ABRIL    HISTORY OF PRESENT ILLNESS    Flaco Roque II is a 75 y.o.male here today for follow-up of his ABRIL.  He was last seen in the office by me a couple of weeks ago.  At that time I had set him up for a home sleep study but he has never had this completed.  He called the office and wanted to be seen today.    He continues to have difficulty sleeping at night.  He does feel short of breath when he lays down.  He has also noticed some apnea at night.  He has daytime somnolence, fatigue and nonrestorative sleep.  A home sleep study has been approved but not been set up.    He denies fever, chills, sputum production, hemoptysis, night sweats, weight loss, chest pain or palpitations.  He denies any lower extremity edema or calf tenderness.  He denies any sinus or allergy symptoms.  He denies reflux symptoms.    He continues to use Stiolto 2 puffs daily.  He does state that this inhaler is fairly expensive.  He continues to have some shortness of breath with exertion but does recover fairly quickly at rest.    He is up-to-date on his current vaccinations.    Patient Active Problem List   Diagnosis   • ABRIL (obstructive sleep apnea)   • Pulmonary emphysema (CMS/HCC)   • Acute blood loss anemia   • Foot deformity, acquired, left   • Leukocytosis, likely reactive   • Acute postoperative pain   • Deformity of left foot   • S/P foot surgery, left   • Parkinson disease (CMS/HCC)   • Anemia, 1 unit PRBC 4/16   • Interstitial lung disease (CMS/HCC)   • Skin ulcer of left foot, limited to breakdown of skin (CMS/HCC)   • S/P Irrigation debridement left foot with excision of base of fifth metatarsal and chronic ulcer   • On home O2   • Peripheral arterial disease (CMS/HCC)   • Status post reverse arthroplasty of shoulder, left   • Strain of deltoid muscle, left, initial encounter   • Impingement syndrome of left shoulder   • Scapular dyskinesis       No Known  Allergies    Current Outpatient Medications:   •  aspirin 81 MG EC tablet, Take 1 tablet by mouth Daily., Disp: 30 tablet, Rfl: 11  •  atorvastatin (LIPITOR) 10 MG tablet, Take 1 tablet by mouth Daily., Disp: 30 tablet, Rfl: 11  •  carbidopa-levodopa (SINEMET)  MG per tablet, Take 1.5 tablets by mouth 4 (Four) Times a Day., Disp: , Rfl:   •  clonazePAM (KlonoPIN) 0.5 MG tablet, Take 0.5 mg by mouth 2 (Two) Times a Day As Needed., Disp: , Rfl:   •  docusate sodium (Colace) 100 MG capsule, Take 1 capsule by mouth 2 (Two) Times a Day., Disp: 60 capsule, Rfl: 0  •  ipratropium-albuterol (DUO-NEB) 0.5-2.5 mg/3 ml nebulizer, Take 3 mL by nebulization 3 (Three) Times a Day., Disp: , Rfl:   •  lactobacillus acidophilus (RISAQUAD) capsule capsule, Take 1 capsule by mouth Daily., Disp: 30 capsule, Rfl: 1  •  metoprolol succinate XL (TOPROL-XL) 50 MG 24 hr tablet, Take 1 tablet by mouth Daily., Disp: 30 tablet, Rfl: 6  •  montelukast (SINGULAIR) 10 MG tablet, Take 10 mg by mouth every night., Disp: , Rfl:   •  Multiple Vitamins-Minerals (MULTIVITAMIN ADULT PO), Take 1 tablet by mouth daily., Disp: , Rfl:   •  Narcan 4 MG/0.1ML nasal spray, 1 spray into the nostril(s) as directed by provider As Needed., Disp: , Rfl:   •  silver sulfadiazine (SILVADENE, SSD) 1 % cream, APPLY OVER ULCER EVERY DAY UNTIL HEALED, Disp: , Rfl:   •  tiotropium bromide-olodaterol (STIOLTO RESPIMAT) 2.5-2.5 MCG/ACT aerosol solution inhaler, Inhale 2 puffs Daily., Disp: 4 g, Rfl: 6  MEDICATION LIST AND ALLERGIES REVIEWED.    Social History     Tobacco Use   • Smoking status: Former Smoker     Packs/day: 2.00     Years: 42.00     Pack years: 84.00     Types: Cigarettes     Start date:      Quit date:      Years since quittin.3   • Smokeless tobacco: Never Used   Substance Use Topics   • Alcohol use: Yes     Comment: beer occasional    • Drug use: No       FAMILY AND SOCIAL HISTORY REVIEWED.    Review of Systems   Constitutional:  "Positive for fatigue. Negative for activity change, appetite change, fever and unexpected weight change.   HENT: Negative for congestion, postnasal drip, rhinorrhea, sinus pressure, sore throat and voice change.    Eyes: Negative for visual disturbance.   Respiratory: Positive for shortness of breath. Negative for cough, chest tightness and wheezing.    Cardiovascular: Negative for chest pain, palpitations and leg swelling.   Gastrointestinal: Negative for abdominal distention, abdominal pain, nausea and vomiting.   Endocrine: Negative for cold intolerance and heat intolerance.   Genitourinary: Negative for difficulty urinating and urgency.   Musculoskeletal: Negative for arthralgias, back pain and neck pain.   Skin: Negative for color change and pallor.   Allergic/Immunologic: Negative for environmental allergies and food allergies.   Neurological: Negative for dizziness, syncope, weakness and light-headedness.   Hematological: Negative for adenopathy. Does not bruise/bleed easily.   Psychiatric/Behavioral: Negative for agitation and behavioral problems.   .    /70 (BP Location: Left arm, Patient Position: Sitting, Cuff Size: Adult)   Pulse 54   Temp 97.8 °F (36.6 °C)   Resp 16   Ht 172.7 cm (67.99\")   Wt 64.4 kg (142 lb)   SpO2 98% Comment: room air at rest  BMI 21.60 kg/m²     Immunization History   Administered Date(s) Administered   • Flu Vaccine Intradermal Quad 18-64YR 09/25/2014, 09/05/2016   • Flucelvax Quad Vial =>4yrs 10/21/2019   • Fluzone High Dose =>65 Years ([employer] ONLY) 10/01/2015, 09/01/2017, 09/23/2018, 10/01/2019   • Pneumococcal Conjugate 13-Valent (PCV13) 02/23/2015   • Pneumococcal Polysaccharide (PPSV23) 08/22/2011, 09/05/2016   • Pneumococcal, Unspecified 09/14/2016   • TD Preservative Free 05/08/2018   • Zostavax 07/01/2013       Physical Exam  Vitals and nursing note reviewed.   Constitutional:       Appearance: He is well-developed. He is not diaphoretic.   HENT:      Head: " Normocephalic and atraumatic.   Eyes:      Pupils: Pupils are equal, round, and reactive to light.   Neck:      Thyroid: No thyromegaly.   Cardiovascular:      Rate and Rhythm: Normal rate and regular rhythm.      Heart sounds: Normal heart sounds. No murmur heard.   No friction rub. No gallop.    Pulmonary:      Effort: Pulmonary effort is normal. No respiratory distress.      Breath sounds: Normal breath sounds. No wheezing or rales.   Chest:      Chest wall: No tenderness.   Abdominal:      General: Bowel sounds are normal.      Palpations: Abdomen is soft.      Tenderness: There is no abdominal tenderness.   Musculoskeletal:         General: No swelling. Normal range of motion.      Cervical back: Normal range of motion and neck supple.   Lymphadenopathy:      Cervical: No cervical adenopathy.   Skin:     General: Skin is warm and dry.      Capillary Refill: Capillary refill takes less than 2 seconds.   Neurological:      Mental Status: He is alert and oriented to person, place, and time.   Psychiatric:         Mood and Affect: Mood normal.         Behavior: Behavior normal.           RESULTS      PROBLEM LIST    Problem List Items Addressed This Visit        Sleep    ABRIL (obstructive sleep apnea) - Primary    Overview     CPAP intolerant                 DISCUSSION    Mr. Roque was here for follow-up of his ABRIL.  He does have a home sleep study approved but it has not been set up.  I did give him the number to the sleep center to have this scheduled.  I did advise him that he would have to have a sleep study before starting CPAP therapy.  He is going to call the sleep center today to have his home sleep study performed.    We did discuss that if he does have ABRIL and needs CPAP therapy he would be agreeable to do a nasal pillow.  I did advise him that I would arrange this once his sleep study has been completed.    He has a follow-up in June with Dr. Edwards and advised him to keep this appointment.  He  will call with any additional concerns or questions.    Level of service justified based on 15 minutes spent in patient care on this date of service including, but not limited to: preparing to see the patient, obtaining and/or reviewing history, performing medically appropriate examination, ordering tests/medicine/procedures, independently interpreting results, documenting clinical information in EHR, and counseling/education of patient/family/caregiver. (Level 4 30-39 minutes; Level 5 40-54 minutes)      Mary Lou Oseguera, APRN  04/19/202109:22 EDT  Electronically signed     Please note that portions of this note were completed with a voice recognition program. Efforts were made to edit the dictations, but occasionally words are mistranscribed.      CC: Azar Stern MD

## 2021-04-23 ENCOUNTER — HOSPITAL ENCOUNTER (OUTPATIENT)
Dept: SLEEP MEDICINE | Facility: HOSPITAL | Age: 76
Discharge: HOME | End: 2021-04-23
Admitting: NURSE PRACTITIONER

## 2021-04-23 VITALS — BODY MASS INDEX: 21.52 KG/M2 | WEIGHT: 141.98 LBS | HEIGHT: 68 IN

## 2021-04-23 DIAGNOSIS — G47.33 OSA (OBSTRUCTIVE SLEEP APNEA): ICD-10-CM

## 2021-04-23 PROCEDURE — 95806 SLEEP STUDY UNATT&RESP EFFT: CPT

## 2021-04-23 PROCEDURE — 95806 SLEEP STUDY UNATT&RESP EFFT: CPT | Performed by: INTERNAL MEDICINE

## 2021-06-02 ENCOUNTER — OFFICE VISIT (OUTPATIENT)
Dept: ORTHOPEDIC SURGERY | Facility: CLINIC | Age: 76
End: 2021-06-02

## 2021-06-02 VITALS
WEIGHT: 141.98 LBS | DIASTOLIC BLOOD PRESSURE: 81 MMHG | BODY MASS INDEX: 21.52 KG/M2 | HEART RATE: 72 BPM | SYSTOLIC BLOOD PRESSURE: 138 MMHG | HEIGHT: 68 IN

## 2021-06-02 DIAGNOSIS — I73.9 PERIPHERAL ARTERIAL DISEASE (HCC): ICD-10-CM

## 2021-06-02 DIAGNOSIS — L97.521 SKIN ULCER OF LEFT FOOT, LIMITED TO BREAKDOWN OF SKIN (HCC): Primary | ICD-10-CM

## 2021-06-02 PROCEDURE — 99213 OFFICE O/P EST LOW 20 MIN: CPT | Performed by: PHYSICIAN ASSISTANT
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
## 2021-06-04 NOTE — PROGRESS NOTES
ESTABLISHED PATIENT    Patient: Flaco Roque II  : 1945    Primary Care Provider: Tayo Hendrix MD    Requesting Provider: As above    Follow-up (3 day follow up - Skin ulcer of left foot, limited to breakdown of skin)      History    Chief Complaint: Left foot ulcer    History of Present Illness: He returns reporting he is still having a lot of pain.  The Keflex has not changed the small ring of erythema.    Current Outpatient Medications on File Prior to Visit   Medication Sig Dispense Refill   • amitriptyline (ELAVIL) 50 MG tablet Take  mg by mouth Every Night.     • carbidopa-levodopa (SINEMET)  MG per tablet Take 1.5 tablets by mouth 4 (Four) Times a Day.     • cephalexin (KEFLEX) 500 MG capsule Take 1 capsule po QID for 14 days 84 capsule 0   • ipratropium-albuterol (DUO-NEB) 0.5-2.5 mg/3 ml nebulizer Take 3 mL by nebulization Every 4 (Four) Hours As Needed for Wheezing.     • montelukast (SINGULAIR) 10 MG tablet Take 10 mg by mouth every night.     • Multiple Vitamins-Minerals (MULTIVITAMIN ADULT PO) Take 1 tablet by mouth daily.     • mupirocin (BACTROBAN) 2 % ointment Apply  topically to the appropriate area as directed Daily. 30 g 5   • pantoprazole (PROTONIX) 40 MG EC tablet Take 40 mg by mouth 2 (Two) Times a Day.     • tamsulosin (FLOMAX) 0.4 MG capsule 24 hr capsule Take 1 capsule by mouth Daily. 30 capsule 0   • umeclidinium-vilanterol (ANORO ELLIPTA) 62.5-25 MCG/INH aerosol powder  inhaler Inhale 1 puff Daily. 1 each 11     No current facility-administered medications on file prior to visit.       No Known Allergies   Past Medical History:   Diagnosis Date   • Arthropathy of shoulder region 9/10/2018   • Chris's esophagus     Last EGD 1 year ago with Dr Kaye    • BPH (benign prostatic hyperplasia)    • Chronic back pain 10/31/2017   • Chronic low back pain    • COPD (chronic obstructive pulmonary disease) (CMS/[facility])    • Foot pain    • GERD (gastroesophageal reflux  disease)    • History of transfusion    • Injury of back    • Lung abscess (CMS/HCC)    • Osteoarthritis    • Osteoporosis    • Parkinson disease (CMS/HCC)    • Rotator cuff tear, left    • Sleep apnea    • Status post reverse total shoulder replacement, 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     Past Surgical History:   Procedure Laterality Date   • ARTHRODESIS MIDTARSAL / TARSOMETATARSAL / TARSAL NAVICULAR-CUNEIFORM Left 05/10/2016   • BACK SURGERY     • BACK SURGERY      low back   • BUNIONECTOMY Left 4/23/2019    Procedure: left foot excise PIP joints 2,3,4, tenotomies 2,3,4, metatarsal capsulotomy 2,3,4, chevron osteotomy 5th metatarsal, great toe DIP fusion LEFT;  Surgeon: Juhi Calle MD;  Location:  [facility];  Service: Orthopedics   • CATARACT EXTRACTION     • COLONOSCOPY N/A 11/2/2017    Procedure: COLONOSCOPY;  Surgeon: Luis Eduardo Mayesr MD;  Location:  [facility];  Service:    • ENDOSCOPY N/A 11/1/2017    Procedure: ESOPHAGOGASTRODUODENOSCOPY;  Surgeon: Luis Eduardo Mayers MD;  Location:  [facility];  Service:    • ENDOSCOPY  11/02/2017    DR LUIS EDUARDO MAYERS   • FOOT SURGERY     • GALLBLADDER SURGERY     • KNEE ARTHROSCOPY Bilateral    • PAIN PUMP INSERTION/REVISION     • SPINE SURGERY     • TOTAL HIP ARTHROPLASTY Left    • TOTAL SHOULDER ARTHROPLASTY W/ DISTAL CLAVICLE EXCISION Left 9/10/2018    Procedure: REVERSE TOTAL SHOULDER ARTHROPLASTY LEFT;  Surgeon: Abel Brennan MD;  Location:  [facility];  Service: Orthopedics   • ULNAR NERVE TRANSPOSITION       Family History   Problem Relation Age of Onset   • Arthritis Mother    • Diabetes Mother    • Osteoarthritis Mother    • Colon cancer Father    • Cancer Father       Social History     Socioeconomic History   • Marital status:      Spouse name: Not on file   • Number of children: Not on file   • Years of education: Not on file   • Highest education level: Not on file   Occupational History   • Occupation: Retired    Tobacco Use   • Smoking status: Former Smoker      "Packs/day: 2.00     Years: 25.00     Pack years: 50.00     Types: Cigarettes     Start date:      Last attempt to quit:      Years since quittin.1   • Smokeless tobacco: Never Used   Substance and Sexual Activity   • Alcohol use: Yes     Drinks per session: 1 or 2     Binge frequency: Monthly   • Drug use: No   • Sexual activity: Defer   Social History Narrative     and lives with wife    Retired supervisor at [employer]    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        Review of Systems   Constitutional: Negative.    HENT: Negative.    Eyes: Negative.    Respiratory: Negative.    Cardiovascular: Negative.    Gastrointestinal: Negative.    Endocrine: Negative.    Genitourinary: Negative.    Musculoskeletal: Positive for arthralgias and joint swelling.   Skin: Negative.    Allergic/Immunologic: Negative.    Neurological: Negative.    Hematological: Negative.    Psychiatric/Behavioral: Negative.        The following portions of the patient's history were reviewed and updated as appropriate: allergies, current medications, past family history, past medical history, past social history, past surgical history and problem list.    Physical Exam:   Pulse 105   Ht 172.7 cm (67.99\")   Wt 66.2 kg (145 lb 15.1 oz)   SpO2 97%   BMI 22.20 kg/m²   GENERAL: Body habitus: normal weight for height    Lower extremity edema: Left: none; Right: none    Gait: antalgic     Mental Status:  awake and alert; oriented to person, place, and time  MSK  no change in the lateral fifth metatarsal base ulcer.  It still dry there is no fluctuance, I carefully probed it it does not go down to bone there is no change in the 1 cm ring of erythema.  I went ahead and cultured it since it is not improved.  NEURO Sensation:  diminished     Medical Decision Making    Data Review:   phone conversation with physician    Assessment/Plan/Diagnosis/Treatment Options:   1. Skin ulcer of left foot, limited to breakdown of skin (CMS/HCC)  Its not " improved, I did cultures of the surface of the wound.  There is no purulence.  I spoke with Dr. Zayda Ruth by phone.  She will see the patient in the office today to possibly give him some different antibiotics.  I want him to continue the Bactroban.  I will see him on Wednesday for repeat exam  - Anaerobic Culture - Swab, Foot, Left; Future  - Wound Culture - Wound, Foot, Left; Future                             Post-Care Instructions: Patient instructed to not lie down for 4 hours and limit physical activity for 24 hours. Patient instructed not to travel by airplane for 48 hours.

## 2021-06-16 ENCOUNTER — OFFICE VISIT (OUTPATIENT)
Dept: PULMONOLOGY | Facility: CLINIC | Age: 76
End: 2021-06-16

## 2021-06-16 VITALS
HEART RATE: 68 BPM | BODY MASS INDEX: 22.13 KG/M2 | TEMPERATURE: 98 F | HEIGHT: 68 IN | DIASTOLIC BLOOD PRESSURE: 80 MMHG | SYSTOLIC BLOOD PRESSURE: 120 MMHG | WEIGHT: 146 LBS | OXYGEN SATURATION: 95 %

## 2021-06-16 DIAGNOSIS — G47.33 OSA (OBSTRUCTIVE SLEEP APNEA): ICD-10-CM

## 2021-06-16 DIAGNOSIS — J84.9 INTERSTITIAL LUNG DISEASE (HCC): Primary | ICD-10-CM

## 2021-06-16 DIAGNOSIS — J43.2 CENTRILOBULAR EMPHYSEMA (HCC): ICD-10-CM

## 2021-06-16 PROCEDURE — 99214 OFFICE O/P EST MOD 30 MIN: CPT | Performed by: INTERNAL MEDICINE

## 2021-06-16 RX ORDER — MONTELUKAST SODIUM 10 MG/1
10 TABLET ORAL NIGHTLY
Qty: 90 TABLET | Refills: 3 | Status: SHIPPED | OUTPATIENT
Start: 2021-06-16 | End: 2022-07-11

## 2021-06-16 NOTE — PROGRESS NOTES
Subjective:     Chief Complaint:   Chief Complaint   Patient presents with   • COPD   • Follow-up       HPI:    Flaco Roque II is a 75 y.o. male here for follow-up of COPD    He is here for yearly follow-up.  I last saw him in June 2020.  He has a history of emphysema as well as some interstitial scarring but this was focal and associated bronchiectasis primary in the left lung and was felt to represent chronic postinflammatory changes.  There was no progression when I saw him last year.    He saw Mary Lou WOODARD in March and April of this year for follow-up.  Work-up included PFTs which revealed normal studies as well as exercise oximetry which was normal.    He has a longstanding history of CPAP intolerance but was amenable to possibly trying CPAP again so a home sleep apnea test was done which revealed a mild to moderate degree of ABRIL with an AHI of 14.  He was prescribed CPAP with a nasal pillow mask but did not tolerate this.  In fact he has already turned it back in and is not interested in considering its ongoing use.    His level of dyspnea on exertion is about at his recent baseline.  He continues on inhaled therapy consisting of Stiolto and as needed duo nebs.    Current medications are:   Current Outpatient Medications:   •  aspirin 81 MG EC tablet, Take 1 tablet by mouth Daily., Disp: 30 tablet, Rfl: 11  •  atorvastatin (LIPITOR) 10 MG tablet, Take 1 tablet by mouth Daily., Disp: 30 tablet, Rfl: 11  •  carbidopa-levodopa (SINEMET)  MG per tablet, Take 1.5 tablets by mouth 4 (Four) Times a Day., Disp: , Rfl:   •  clonazePAM (KlonoPIN) 0.5 MG tablet, Take 0.5 mg by mouth 2 (Two) Times a Day As Needed., Disp: , Rfl:   •  docusate sodium (Colace) 100 MG capsule, Take 1 capsule by mouth 2 (Two) Times a Day., Disp: 60 capsule, Rfl: 0  •  ipratropium-albuterol (DUO-NEB) 0.5-2.5 mg/3 ml nebulizer, Take 3 mL by nebulization 3 (Three) Times a Day., Disp: , Rfl:   •  lactobacillus acidophilus  (RISAQUAD) capsule capsule, Take 1 capsule by mouth Daily., Disp: 30 capsule, Rfl: 1  •  metoprolol succinate XL (TOPROL-XL) 50 MG 24 hr tablet, Take 1 tablet by mouth Daily., Disp: 30 tablet, Rfl: 6  •  montelukast (SINGULAIR) 10 MG tablet, Take 1 tablet by mouth Every Night., Disp: 90 tablet, Rfl: 3  •  Multiple Vitamins-Minerals (MULTIVITAMIN ADULT PO), Take 1 tablet by mouth daily., Disp: , Rfl:   •  Narcan 4 MG/0.1ML nasal spray, 1 spray into the nostril(s) as directed by provider As Needed., Disp: , Rfl:   •  silver sulfadiazine (SILVADENE, SSD) 1 % cream, APPLY OVER ULCER EVERY DAY UNTIL HEALED, Disp: , Rfl:   •  tiotropium bromide-olodaterol (STIOLTO RESPIMAT) 2.5-2.5 MCG/ACT aerosol solution inhaler, Inhale 2 puffs Daily., Disp: 4 g, Rfl: 6.      The patient's relevant past medical, surgical, family and social history were reviewed and updated in Epic as appropriate.     ROS:    Review of Systems  ROS as documented in patient questionnaire unless as noted otherwise    Objective:    Physical Exam  Vitals reviewed.   Constitutional:       Appearance: He is well-developed.   HENT:      Head: Normocephalic and atraumatic.      Mouth/Throat:      Mouth: Mucous membranes are moist.      Pharynx: Oropharynx is clear.   Neck:      Thyroid: No thyromegaly.   Cardiovascular:      Rate and Rhythm: Normal rate and regular rhythm.      Heart sounds: No murmur heard.   No friction rub. No gallop.    Pulmonary:      Effort: Pulmonary effort is normal. No respiratory distress.      Breath sounds: No wheezing or rales.   Musculoskeletal:      Cervical back: Neck supple.   Skin:     General: Skin is warm and dry.   Neurological:      Mental Status: He is alert and oriented to person, place, and time.   Psychiatric:         Behavior: Behavior normal.         Thought Content: Thought content normal.         Data:    CXR: No additional    PFT: No obstruction, no restriction, and normal diffusion on study dated  3/25/2021    Assessment:    Problem List Items Addressed This Visit        Pulmonary Problems    ABRIL (obstructive sleep apnea)    Overview     CPAP intolerant         Pulmonary emphysema (CMS/HCC)    Relevant Medications    montelukast (SINGULAIR) 10 MG tablet    tiotropium bromide-olodaterol (STIOLTO RESPIMAT) 2.5-2.5 MCG/ACT aerosol solution inhaler    Interstitial lung disease (CMS/HCC) - Primary    Overview     Findings most consistent with postinflammatory scarring         Relevant Medications    montelukast (SINGULAIR) 10 MG tablet    tiotropium bromide-olodaterol (STIOLTO RESPIMAT) 2.5-2.5 MCG/ACT aerosol solution inhaler            1. COPD: Emphysematous changes by CAT scan but very little obstruction by PFTs.  He remains on Stiolto with as needed nebulized therapy with good effect.  2. Question of interstitial lung disease: Findings most consistent with postinflammatory scarring and are not consistent with generalized ILD  3. ABRIL: Again intolerant of CPAP therapy and is not interested in reconsidering treatment.  He has already turned his CPAP back in.    Plan:    1. No change in inhaled therapy.  2. Renewed Singulair as he thinks this has helped him  3. Routine follow-up with PFTs on return to clinic in 1 year or earlier if any new pulmonary symptoms.    Level of Risk Moderate due to: prescription drug management    Discussed in detail with the patient.  He will call prior to his follow up visit for any new problems.    Signed by  Alf Edwards MD

## 2021-06-22 RX ORDER — METOPROLOL SUCCINATE 50 MG/1
TABLET, EXTENDED RELEASE ORAL
Qty: 30 TABLET | Refills: 6 | Status: SHIPPED | OUTPATIENT
Start: 2021-06-22 | End: 2022-02-25

## 2021-07-16 ENCOUNTER — HOSPITAL ENCOUNTER (OUTPATIENT)
Dept: CARDIOLOGY | Facility: HOSPITAL | Age: 76
Discharge: HOME | End: 2021-07-16
Admitting: PHYSICIAN ASSISTANT

## 2021-07-16 VITALS — WEIGHT: 146 LBS | BODY MASS INDEX: 22.13 KG/M2 | HEIGHT: 68 IN

## 2021-07-16 DIAGNOSIS — I73.9 PERIPHERAL ARTERIAL DISEASE (HCC): ICD-10-CM

## 2021-07-16 LAB
BH CV LOWER ARTERIAL LEFT ABI RATIO: 0.86
BH CV LOWER ARTERIAL LEFT POST TIBIAL SYS MAX: 131 MMHG
BH CV LOWER ARTERIAL RIGHT ABI RATIO: 1.02
BH CV LOWER ARTERIAL RIGHT DORSALIS PEDIS SYS MAX: 155 MMHG
BH CV LOWER ARTERIAL RIGHT HIGH THIGH SYS MAX: 169 MMHG
BH CV LOWER ARTERIAL RIGHT LOW THIGH SYS MAX: 129 MMHG
BH CV LOWER ARTERIAL RIGHT POST TIBIAL SYS MAX: 147 MMHG
UPPER ARTERIAL LEFT ARM BRACHIAL SYS MAX: 147 MMHG
UPPER ARTERIAL RIGHT ARM BRACHIAL SYS MAX: 152 MMHG

## 2021-07-16 PROCEDURE — 93923 UPR/LXTR ART STDY 3+ LVLS: CPT

## 2021-07-16 PROCEDURE — 93923 UPR/LXTR ART STDY 3+ LVLS: CPT | Performed by: INTERNAL MEDICINE

## 2021-07-26 ENCOUNTER — OFFICE VISIT (OUTPATIENT)
Dept: ORTHOPEDIC SURGERY | Facility: CLINIC | Age: 76
End: 2021-07-26

## 2021-07-26 VITALS
DIASTOLIC BLOOD PRESSURE: 81 MMHG | BODY MASS INDEX: 22.12 KG/M2 | SYSTOLIC BLOOD PRESSURE: 137 MMHG | HEIGHT: 68 IN | HEART RATE: 75 BPM | WEIGHT: 145.94 LBS

## 2021-07-26 DIAGNOSIS — I73.9 PERIPHERAL ARTERIAL DISEASE (HCC): Primary | ICD-10-CM

## 2021-07-26 PROCEDURE — 99212 OFFICE O/P EST SF 10 MIN: CPT | Performed by: ORTHOPAEDIC SURGERY

## 2021-07-26 RX ORDER — AMANTADINE HYDROCHLORIDE 100 MG/1
100 CAPSULE, GELATIN COATED ORAL 2 TIMES DAILY
COMMUNITY
Start: 2021-06-30

## 2021-07-26 RX ORDER — HYDROMORPHONE HCL 110MG/55ML
PATIENT CONTROLLED ANALGESIA SYRINGE INTRAVENOUS
COMMUNITY
Start: 2021-05-06 | End: 2022-05-31
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
## 2021-10-27 ENCOUNTER — OFFICE VISIT (OUTPATIENT)
Dept: ORTHOPEDIC SURGERY | Facility: CLINIC | Age: 76
End: 2021-10-27

## 2021-10-27 ENCOUNTER — TELEPHONE (OUTPATIENT)
Dept: ORTHOPEDIC SURGERY | Facility: CLINIC | Age: 76
End: 2021-10-27

## 2021-10-27 VITALS
SYSTOLIC BLOOD PRESSURE: 143 MMHG | HEART RATE: 69 BPM | BODY MASS INDEX: 20.31 KG/M2 | HEIGHT: 68 IN | DIASTOLIC BLOOD PRESSURE: 78 MMHG | WEIGHT: 134 LBS

## 2021-10-27 DIAGNOSIS — L97.521 SKIN ULCER OF LEFT FOOT, LIMITED TO BREAKDOWN OF SKIN (HCC): Primary | ICD-10-CM

## 2021-10-27 PROCEDURE — 99213 OFFICE O/P EST LOW 20 MIN: CPT | Performed by: ORTHOPAEDIC SURGERY

## 2021-10-27 RX ORDER — MUPIROCIN CALCIUM 20 MG/G
1 CREAM TOPICAL DAILY
Qty: 30 G | Refills: 5 | Status: SHIPPED | OUTPATIENT
Start: 2021-10-27

## 2021-10-27 NOTE — TELEPHONE ENCOUNTER
Pt called stated he was seen today and has questions for ma about his bandage and dressings please call [phone]
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
## 2021-10-29 NOTE — TELEPHONE ENCOUNTER
Patient called back. He was confused on when he needed to change his bandage. I explained that he needs to change it once a day. He understood. He will call back with any further problems or questions.     Anita

## 2021-11-22 ENCOUNTER — OFFICE VISIT (OUTPATIENT)
Dept: ORTHOPEDIC SURGERY | Facility: CLINIC | Age: 76
End: 2021-11-22

## 2021-11-22 VITALS
DIASTOLIC BLOOD PRESSURE: 80 MMHG | BODY MASS INDEX: 20.31 KG/M2 | HEIGHT: 68 IN | WEIGHT: 134 LBS | SYSTOLIC BLOOD PRESSURE: 142 MMHG

## 2021-11-22 DIAGNOSIS — L97.521 SKIN ULCER OF LEFT FOOT, LIMITED TO BREAKDOWN OF SKIN (HCC): Primary | ICD-10-CM

## 2021-11-22 DIAGNOSIS — I73.9 PERIPHERAL ARTERIAL DISEASE (HCC): ICD-10-CM

## 2021-11-22 PROCEDURE — 99212 OFFICE O/P EST SF 10 MIN: CPT | Performed by: ORTHOPAEDIC SURGERY
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
## 2022-02-25 RX ORDER — METOPROLOL SUCCINATE 50 MG/1
TABLET, EXTENDED RELEASE ORAL
Qty: 30 TABLET | Refills: 5 | Status: SHIPPED | OUTPATIENT
Start: 2022-02-25 | End: 2022-05-31 | Stop reason: ALTCHOICE

## 2022-03-29 DIAGNOSIS — J43.2 CENTRILOBULAR EMPHYSEMA: ICD-10-CM

## 2022-03-29 RX ORDER — TIOTROPIUM BROMIDE AND OLODATEROL 3.124; 2.736 UG/1; UG/1
SPRAY, METERED RESPIRATORY (INHALATION)
Qty: 4 G | Refills: 5 | Status: SHIPPED | OUTPATIENT
Start: 2022-03-29 | End: 2022-06-03

## 2022-04-18 NOTE — PROGRESS NOTES
Continued Stay Note       Patient Name: Flaco Roque II  MRN: [id number]  Today's Date: 9/12/2018    Admit Date: (Not on file)          Discharge Plan     Row Name 09/12/18 1131       Plan    Final Discharge Disposition Code 01 - home or self-care    Final Note The patient wants to have outpatient physical therapy and does not want to have home health at this time. He will follow up with the doctor for an outpatient therapy order at [facility].              Discharge Codes    No documentation.           AZALEA Peng    
18-Apr-2022 16:00

## 2022-05-25 DIAGNOSIS — Z01.812 BLOOD TESTS PRIOR TO TREATMENT OR PROCEDURE: Primary | ICD-10-CM

## 2022-05-25 NOTE — PROGRESS NOTES
[facility]  Follow Up Visit  Flaco Roque II  1945    VISIT DATE:  05/31/22    PCP:   Azar Stern MD  [address] [place] [id number]          CC:  Peripheral arterial disease       Problem List:  1. COPD  2.  Possible interstitial lung disease  3.  Parkinson's disease  4.  Chronic left foot ulcer  5.  Hiatal hernia  6.  left shoulder injury (hx replacement)     Regadenoson Stress Test With Myocardial Perfusion SPECT (Multi Study) December 2019  · Patient denied chest pain  · There were no ischemic EKG changes with Lexiscan. There were occasional PVCs and periods of ventricular bigeminy  · Myocardial perfusion imaging indicates a normal myocardial perfusion study with no evidence of ischemia. No TID  · Left ventricular ejection fraction is normal (Calculated EF = 70%).  · There is mild coronary artery calcification present  · Impressions are consistent with a low risk study.     Echocardiogram May 2019  · Left ventricular systolic function is normal.  · Estimated EF appears to be in the range of 66 - 70%.  · Indeterminate diastolic function  · Mild tricuspid regurgitation   · Aortic valve sclerosis without stenosis  · Normal RVSP     KAMRYN July 2021  · Normal right KAMRYN of 1.02.  · Left femoral-popliteal arteries noncompressible. There are biphasic and monophasic waveforms noted in the left lower extremity  · The left posterior tibial artery was compressible with mildly reduced left KAMRYN of 0.86      Left arterial duplex October 2020  · There is atherosclerotic plaque with biphasic waveforms in the common femoral, SFA, popliteal arteries.  · The anterior tibial artery is patent with biphasic flow  · Posterior tibial artery and peroneal artery are occluded with some mild reconstitution distally via collaterals  · Biphasic flow within the dorsalis pedis artery  · Left KAMRYN 0.56    Holter October 2020:  · An abnormal monitor study.  · Occasional PACs were 4.2%  · Frequent  PVCs 8.4%  · Nonsustained ventricular tachycardia episodes. 11 total. The longest lasted 6 beats  · Occasional runs of SVT the longest lasting 11 beats         History of Present Illness:  Flaco Roque II  Is a 76 y.o. male with pertinent cardiac history detailed above.  Patient last seen in October 2020.  He needs right shoulder surgery in June in a few weeks for a rotator cuff injury..  His EKG shows frequent PVCs which he has had in the past.  He denies CP.  He does have some dyspnea on exertion  He  Has  Stable LE PAD.  He is currently off ASA 81mg but I advised to him to restart this in addition to his Pletal.  He follows with Dr. Sawyer for this.  No new ischemic changes on EKG      Patient Active Problem List    Diagnosis Date Noted   • Status post reverse arthroplasty of shoulder, left 03/04/2021   • Strain of deltoid muscle, left, initial encounter 03/04/2021   • Impingement syndrome of left shoulder 03/04/2021   • Scapular dyskinesis 03/04/2021   • Peripheral arterial disease ([facility]) 04/30/2020   • S/P Irrigation debridement left foot with excision of base of fifth metatarsal and chronic ulcer 04/14/2020   • On home O2 04/14/2020   • Skin ulcer of left foot, limited to breakdown of skin ([facility]) 01/27/2020   • Interstitial lung disease ([facility]) 12/20/2019     Note Last Updated: 6/16/2021     Findings most consistent with postinflammatory scarring     • Anemia, 1 unit PRBC 4/16 07/28/2019   • Parkinson disease ([facility]) 04/26/2019   • S/P foot surgery, left 04/23/2019   • Deformity of left foot 04/10/2019     Note Last Updated: 4/10/2019     Added automatically from request for surgery [id number]     • Leukocytosis, likely reactive 09/11/2018   • Acute postoperative pain 09/11/2018   • Foot deformity, acquired, left 04/06/2018     Note Last Updated: 4/6/2018     Added automatically from request for surgery [id number]     • ABRIL (obstructive sleep apnea) 10/31/2017     Note Last Updated: 12/20/2019     CPAP intolerant     •  Pulmonary emphysema (HCC) 10/31/2017   • Acute blood loss anemia 10/31/2017       No Known Allergies    Social History     Socioeconomic History   • Marital status:    Tobacco Use   • Smoking status: Former Smoker     Packs/day: 2.00     Years: 42.00     Pack years: 84.00     Types: Cigarettes     Start date:      Quit date:      Years since quittin.4   • Smokeless tobacco: Never Used   Substance and Sexual Activity   • Alcohol use: Yes     Comment: beer occasional    • Drug use: No   • Sexual activity: Defer       Family History   Problem Relation Age of Onset   • Arthritis Mother    • Diabetes Mother    • Osteoarthritis Mother    • Colon cancer Father    • Cancer Father        Current Medications:    Current Outpatient Medications:   •  amantadine (SYMMETREL) 100 MG capsule, , Disp: , Rfl:   •  atorvastatin (LIPITOR) 10 MG tablet, Take 1 tablet by mouth Daily., Disp: 30 tablet, Rfl: 11  •  carbidopa-levodopa (SINEMET)  MG per tablet, Take 1.5 tablets by mouth 4 (Four) Times a Day., Disp: , Rfl:   •  clonazePAM (KlonoPIN) 0.5 MG tablet, Take 5 mg by mouth 2 (Two) Times a Day., Disp: , Rfl:   •  Coenzyme Q10 10 MG capsule, coenzyme Q10  200MG, Disp: , Rfl:   •  fentaNYL (SUBLIMAZE) 0.05 MG/ML injection, Infuse 250 mcg into a venous catheter. PAIN PUMP, Disp: , Rfl:   •  ipratropium-albuterol (DUO-NEB) 0.5-2.5 mg/3 ml nebulizer, Take 3 mL by nebulization 3 (Three) Times a Day., Disp: , Rfl:   •  lactobacillus acidophilus (RISAQUAD) capsule capsule, Take 1 capsule by mouth Daily., Disp: 30 capsule, Rfl: 1  •  montelukast (SINGULAIR) 10 MG tablet, Take 1 tablet by mouth Every Night., Disp: 90 tablet, Rfl: 3  •  Multiple Vitamins-Minerals (MULTIVITAMIN ADULT PO), Take 1 tablet by mouth daily., Disp: , Rfl:   •  mupirocin (Bactroban) 2 % cream, Apply 1 application topically to the appropriate area as directed Daily., Disp: 30 g, Rfl: 5  •  silver sulfadiazine (SILVADENE, SSD) 1 % cream, APPLY  "OVER ULCER EVERY DAY UNTIL HEALED, Disp: , Rfl:   •  Stiolto Respimat 2.5-2.5 MCG/ACT aerosol solution inhaler, INHALE 2 PUFFS DAILY., Disp: 4 g, Rfl: 5  •  tamsulosin (FLOMAX) 0.4 MG capsule 24 hr capsule, Take 4 mg by mouth 3 (Three) Times a Day., Disp: , Rfl:   •  cilostazol (PLETAL) 100 MG tablet, Take 1 tablet by mouth 2 (Two) Times a Day., Disp: 60 tablet, Rfl: 6  •  metoprolol succinate XL (TOPROL-XL) 100 MG 24 hr tablet, Take 0.5 tablets by mouth Daily., Disp: 30 tablet, Rfl: 6     Review of Systems   Cardiovascular: Positive for dyspnea on exertion. Negative for chest pain, leg swelling, near-syncope and palpitations.   Respiratory: Positive for shortness of breath.    Musculoskeletal: Positive for arthritis.   Neurological: Positive for numbness.       Vitals:    05/31/22 0840   BP: 126/82   BP Location: Left arm   Patient Position: Sitting   Pulse: 96   SpO2: 97%   Weight: 60.8 kg (134 lb)   Height: 172.7 cm (68\")       Physical Exam  Constitutional:       Appearance: Normal appearance.   Neck:      Vascular: No carotid bruit.   Cardiovascular:      Rate and Rhythm: Normal rate. Rhythm irregular.      Comments: Frequent extrasystoles  Pulmonary:      Effort: Pulmonary effort is normal.      Breath sounds: Normal breath sounds.   Abdominal:      Palpations: Abdomen is soft.   Musculoskeletal:      Right lower leg: No edema.      Left lower leg: No edema.   Neurological:      Mental Status: He is alert.   Psychiatric:         Mood and Affect: Mood normal.         Diagnostic Data:    ECG 12 Lead    Date/Time: 5/31/2022 9:08 AM  Performed by: Von Kilpatrick MD  Authorized by: Von Kilpatrick MD   Comparison: compared with previous ECG from 4/13/2020  Comparison to previous ECG: PVCs again present  Rhythm: sinus rhythm  Ectopy: unifocal PVCs  Rate: normal  BPM: 96  Conduction: incomplete right bundle branch block and left anterior fascicular block  QRS axis: left    Clinical impression: " abnormal EKG          No results found for: CHLPL, TRIG, HDL, LDLDIRECT  Lab Results   Component Value Date    GLUCOSE 84 04/17/2020    BUN 11 04/17/2020    CREATININE 0.60 (L) 04/17/2020     04/17/2020    K 4.1 04/17/2020     04/17/2020    CO2 26.0 04/17/2020     Lab Results   Component Value Date    HGBA1C 5.2 04/13/2020     Lab Results   Component Value Date    WBC 7.87 04/17/2020    HGB 8.3 (L) 04/17/2020    HCT 30.2 (L) 04/17/2020     04/17/2020       Assessment:   Diagnosis Plan   1. Peripheral arterial disease (HCC)  ECG 12 Lead       Plan:      1.  Frequent PACs and PVCs,  -Heart rate and blood pressure looks like they will tolerate an increase in Toprol.  Will increase to 100 mg daily  -Prior Holter monitor showed PVC burden 8% with short runs of nonsustained ventricular tachycardia  -Currently asymptomatic, previous stress testing with no evidence of ischemia and he has a normal LVEF of 66 to 70%      2.   Dyspnea on exertion  -Prior echo Echo showed ejection fraction 66 to 70% with no evidence of pulmonary hypertension and no significant valve disease, indeterminant diastolic dysfunction  -Stress test 2019 evidence of ischemia  -SPECT secondary secondary to his lung disease     3  COPD and interstitial lung disease  -At present it appears his symptoms are mostly pulmonary in nature without evidence of ischemia on stress test  -Normal RVSP on last echo      4.  Foot ulcer/PAD  -Foot ulcer is resolved  -Occluded posterior tibial and peroneal arteries on duplex and on angiography  -He is on cilostazol with symptomatic improvement  -Most recent KAMRYN 0.86 on the left which is improved compared with pretreatment  -Follows with Dr. Sawyer  -Advised him to resume aspirin 81 mg  -Continue atorvastatin    5.  Right rotator cuff injury  -Patient with with no current exertional anginal symptoms  -Revised cardiac risk index of 0, low risk, to proceed with planned orthopedic surgery.  -Estimated 3.9%  chance of perioperative complication.  -Discussed with Dr. Stern    F/u 6 months    Von Kilpatrick MD [facility]

## 2022-05-31 ENCOUNTER — OFFICE VISIT (OUTPATIENT)
Dept: CARDIOLOGY | Facility: CLINIC | Age: 77
End: 2022-05-31

## 2022-05-31 VITALS
HEIGHT: 68 IN | OXYGEN SATURATION: 97 % | HEART RATE: 96 BPM | DIASTOLIC BLOOD PRESSURE: 82 MMHG | SYSTOLIC BLOOD PRESSURE: 126 MMHG | WEIGHT: 134 LBS | BODY MASS INDEX: 20.31 KG/M2

## 2022-05-31 DIAGNOSIS — I73.9 PERIPHERAL ARTERIAL DISEASE: Primary | ICD-10-CM

## 2022-05-31 PROCEDURE — 99214 OFFICE O/P EST MOD 30 MIN: CPT | Performed by: INTERNAL MEDICINE

## 2022-05-31 PROCEDURE — 93000 ELECTROCARDIOGRAM COMPLETE: CPT | Performed by: INTERNAL MEDICINE

## 2022-05-31 RX ORDER — TAMSULOSIN HYDROCHLORIDE 0.4 MG/1
4 CAPSULE ORAL 3 TIMES DAILY
COMMUNITY

## 2022-05-31 RX ORDER — METOPROLOL SUCCINATE 100 MG/1
50 TABLET, EXTENDED RELEASE ORAL DAILY
Qty: 30 TABLET | Refills: 6 | Status: SHIPPED | OUTPATIENT
Start: 2022-05-31 | End: 2022-09-07 | Stop reason: SDUPTHER

## 2022-05-31 RX ORDER — CILOSTAZOL 100 MG/1
100 TABLET ORAL 2 TIMES DAILY
Qty: 60 TABLET | Refills: 6
Start: 2022-05-31

## 2022-05-31 RX ORDER — CLONAZEPAM 0.5 MG/1
5 TABLET ORAL 2 TIMES DAILY
COMMUNITY
Start: 2022-03-08

## 2022-05-31 RX ORDER — FENTANYL CITRATE 50 UG/ML
250 INJECTION, SOLUTION INTRAMUSCULAR; INTRAVENOUS
COMMUNITY

## 2022-06-01 ENCOUNTER — CLINICAL SUPPORT NO REQUIREMENTS (OUTPATIENT)
Dept: PULMONOLOGY | Facility: CLINIC | Age: 77
End: 2022-06-01

## 2022-06-01 DIAGNOSIS — Z01.812 BLOOD TESTS PRIOR TO TREATMENT OR PROCEDURE: ICD-10-CM

## 2022-06-01 PROCEDURE — 99211 OFF/OP EST MAY X REQ PHY/QHP: CPT | Performed by: INTERNAL MEDICINE

## 2022-06-01 PROCEDURE — U0004 COV-19 TEST NON-CDC HGH THRU: HCPCS | Performed by: INTERNAL MEDICINE

## 2022-06-01 PROCEDURE — U0005 INFEC AGEN DETEC AMPLI PROBE: HCPCS | Performed by: INTERNAL MEDICINE

## 2022-06-02 LAB — SARS-COV-2 RNA NOSE QL NAA+PROBE: NOT DETECTED

## 2022-06-03 ENCOUNTER — OFFICE VISIT (OUTPATIENT)
Dept: PULMONOLOGY | Facility: CLINIC | Age: 77
End: 2022-06-03

## 2022-06-03 VITALS
OXYGEN SATURATION: 98 % | HEART RATE: 58 BPM | DIASTOLIC BLOOD PRESSURE: 70 MMHG | WEIGHT: 135 LBS | RESPIRATION RATE: 16 BRPM | TEMPERATURE: 98 F | SYSTOLIC BLOOD PRESSURE: 132 MMHG | BODY MASS INDEX: 20.46 KG/M2 | HEIGHT: 68 IN

## 2022-06-03 DIAGNOSIS — G47.33 OSA (OBSTRUCTIVE SLEEP APNEA): ICD-10-CM

## 2022-06-03 DIAGNOSIS — J84.9 INTERSTITIAL LUNG DISEASE: ICD-10-CM

## 2022-06-03 DIAGNOSIS — J43.2 CENTRILOBULAR EMPHYSEMA: Primary | ICD-10-CM

## 2022-06-03 PROCEDURE — 94726 PLETHYSMOGRAPHY LUNG VOLUMES: CPT | Performed by: INTERNAL MEDICINE

## 2022-06-03 PROCEDURE — 99214 OFFICE O/P EST MOD 30 MIN: CPT | Performed by: INTERNAL MEDICINE

## 2022-06-03 PROCEDURE — 94060 EVALUATION OF WHEEZING: CPT | Performed by: INTERNAL MEDICINE

## 2022-06-03 RX ORDER — AMMONIUM LACTATE 12 G/100G
LOTION TOPICAL AS NEEDED
COMMUNITY
Start: 2022-02-28

## 2022-06-03 RX ORDER — ALBUTEROL SULFATE 90 UG/1
4 AEROSOL, METERED RESPIRATORY (INHALATION) ONCE
Status: SHIPPED | OUTPATIENT
Start: 2022-06-03

## 2022-06-03 RX ORDER — ATORVASTATIN CALCIUM 10 MG/1
10 TABLET, FILM COATED ORAL DAILY
COMMUNITY
Start: 2022-03-05

## 2022-06-03 RX ORDER — CLONAZEPAM 0.5 MG/1
TABLET ORAL
COMMUNITY
Start: 2022-01-05

## 2022-06-03 RX ORDER — ASPIRIN 81 MG/1
81 TABLET, CHEWABLE ORAL DAILY
COMMUNITY

## 2022-06-03 RX ORDER — HYDROCODONE BITARTRATE AND ACETAMINOPHEN 5; 325 MG/1; MG/1
TABLET ORAL EVERY 6 HOURS
COMMUNITY

## 2022-06-03 RX ORDER — METOPROLOL SUCCINATE 50 MG/1
50 TABLET, EXTENDED RELEASE ORAL DAILY
COMMUNITY
End: 2022-09-07

## 2022-06-03 RX ORDER — CARBIDOPA AND LEVODOPA 50; 200 MG/1; MG/1
2 TABLET, EXTENDED RELEASE ORAL NIGHTLY
COMMUNITY
Start: 2022-05-30

## 2022-06-03 NOTE — PROGRESS NOTES
Subjective:     Chief Complaint:   Chief Complaint   Patient presents with   • Interstitial Lung Disease     F/u       HPI:    Flaco Roque II is a 76 y.o. male here for follow-up of COPD    He is here for yearly follow-up.  I last saw him in June 2021.  He has a history of emphysema as well as some interstitial scarring but this was focal and associated bronchiectasis primary in the left lung and was felt to represent chronic postinflammatory changes.  There was no progression when I saw him last year.      He has a longstanding history of CPAP intolerance but was amenable to possibly trying CPAP again so a home sleep apnea test was done which revealed a mild to moderate degree of ABRIL with an AHI of 14.  He was prescribed CPAP with a nasal pillow mask but did not tolerate this.  He turned this back in and is not interested in reconsidering treatment with CPAP.    He reports that he had COVID in March 2022 and was admitted to [facility] for 6 days.  He said he felt pretty bad but was not prescribed oxygen on discharge.    He notes no change in his level of dyspnea.  He does not think the Stiolto helps him any and it is expensive and he would like to discontinue it.  He likes to use the duo nebs several times a day.    Current medications are:   Current Outpatient Medications:   •  amantadine (SYMMETREL) 100 MG capsule, Take 100 mg by mouth 2 (Two) Times a Day., Disp: , Rfl:   •  ammonium lactate (LAC-HYDRIN) 12 % lotion, Apply  topically to the appropriate area as directed As Needed., Disp: , Rfl:   •  aspirin 81 MG chewable tablet, Chew 81 mg Daily., Disp: , Rfl:   •  atorvastatin (LIPITOR) 10 MG tablet, Take 1 tablet by mouth Daily., Disp: 30 tablet, Rfl: 11  •  atorvastatin (LIPITOR) 10 MG tablet, Take 10 mg by mouth Daily., Disp: , Rfl:   •  carbidopa-levodopa (SINEMET)  MG per tablet, Take 1.5 tablets by mouth 4 (Four) Times a Day., Disp: , Rfl:   •  carbidopa-levodopa CR (SINEMET CR)   MG per CR tablet, Take 2 tablets by mouth Every Night., Disp: , Rfl:   •  cilostazol (PLETAL) 100 MG tablet, Take 1 tablet by mouth 2 (Two) Times a Day., Disp: 60 tablet, Rfl: 6  •  clonazePAM (KlonoPIN) 0.5 MG tablet, Take 5 mg by mouth 2 (Two) Times a Day., Disp: , Rfl:   •  clonazePAM (KlonoPIN) 0.5 MG tablet, , Disp: , Rfl:   •  Coenzyme Q10 10 MG capsule, Take 10 mg by mouth Daily., Disp: , Rfl:   •  fentaNYL (SUBLIMAZE) 0.05 MG/ML injection, Infuse 250 mcg into a venous catheter. PAIN PUMP, Disp: , Rfl:   •  HYDROcodone-acetaminophen (NORCO) 5-325 MG per tablet, Every 6 (Six) Hours., Disp: , Rfl:   •  ipratropium-albuterol (DUO-NEB) 0.5-2.5 mg/3 ml nebulizer, Take 3 mL by nebulization 3 (Three) Times a Day., Disp: , Rfl:   •  lactobacillus acidophilus (RISAQUAD) capsule capsule, Take 1 capsule by mouth Daily., Disp: 30 capsule, Rfl: 1  •  metoprolol succinate XL (TOPROL-XL) 100 MG 24 hr tablet, Take 0.5 tablets by mouth Daily., Disp: 30 tablet, Rfl: 6  •  metoprolol succinate XL (TOPROL-XL) 50 MG 24 hr tablet, Take 50 mg by mouth Daily., Disp: , Rfl:   •  montelukast (SINGULAIR) 10 MG tablet, Take 1 tablet by mouth Every Night., Disp: 90 tablet, Rfl: 3  •  Multiple Vitamins-Minerals (MULTIVITAMIN ADULT PO), Take 1 tablet by mouth daily., Disp: , Rfl:   •  mupirocin (Bactroban) 2 % cream, Apply 1 application topically to the appropriate area as directed Daily., Disp: 30 g, Rfl: 5  •  silver sulfadiazine (SILVADENE, SSD) 1 % cream, APPLY OVER ULCER EVERY DAY UNTIL HEALED, Disp: , Rfl:   •  Stiolto Respimat 2.5-2.5 MCG/ACT aerosol solution inhaler, INHALE 2 PUFFS DAILY., Disp: 4 g, Rfl: 5  •  tamsulosin (FLOMAX) 0.4 MG capsule 24 hr capsule, Take 4 mg by mouth 3 (Three) Times a Day., Disp: , Rfl:     Current Facility-Administered Medications:   •  albuterol sulfate HFA (PROVENTIL HFA;VENTOLIN HFA;PROAIR HFA) inhaler 4 puff, 4 puff, Inhalation, Once, Alf Edwards MD.      The patient's relevant past  medical, surgical, family and social history were reviewed and updated in Epic as appropriate.     ROS:    Review of Systems  ROS as documented in patient questionnaire unless as noted otherwise    Objective:    Physical Exam  Vitals reviewed.   Constitutional:       Appearance: He is well-developed.   HENT:      Head: Normocephalic and atraumatic.      Mouth/Throat:      Mouth: Mucous membranes are moist.      Pharynx: Oropharynx is clear.   Neck:      Thyroid: No thyromegaly.   Cardiovascular:      Rate and Rhythm: Normal rate and regular rhythm.      Heart sounds: No murmur heard.    No friction rub. No gallop.   Pulmonary:      Effort: Pulmonary effort is normal. No respiratory distress.      Breath sounds: No wheezing or rales.   Musculoskeletal:      Cervical back: Neck supple.   Skin:     General: Skin is warm and dry.   Neurological:      Mental Status: He is alert and oriented to person, place, and time.   Psychiatric:         Behavior: Behavior normal.         Thought Content: Thought content normal.         Data:    CXR: No additional    PFT: No obstruction, no restriction, and mild decrease in diffusion.  Diffusion is slightly worse than priors.    Assessment:    Problem List Items Addressed This Visit        Pulmonary Problems    ABRIL (obstructive sleep apnea)    Overview     CPAP intolerant           Pulmonary emphysema (HCC) - Primary    Relevant Medications    albuterol sulfate HFA (PROVENTIL HFA;VENTOLIN HFA;PROAIR HFA) inhaler 4 puff    Other Relevant Orders    Pulmonary Function Test (Completed)    Interstitial lung disease (HCC)    Overview     Findings most consistent with postinflammatory scarring                   1. COPD: Emphysematous changes by CAT scan but very little obstruction by PFTs.  He does not think the Stiolto helps and would prefer to use the DuoNebs instead.  He can go ahead and discontinue that.  2. Question of interstitial lung disease: Findings most consistent with  postinflammatory scarring and are not consistent with generalized ILD  3. ABRIL: Intolerant of CPAP therapy and is not interested in reconsidering treatment.      Plan:    1. No change in inhaled therapy.  His decrease in diffusing capacity on his PFTs today might be related to his recent COVID infection.  Otherwise they are normal.  2. Continue Singulair and DuoNebs  3. Routine follow-up with PFTs on return to clinic in 1 year or earlier if any new pulmonary symptoms.    Level of Risk Moderate due to: two stable chronic illnesses and prescription drug management    Discussed in detail with the patient.  He will call prior to his follow up visit for any new problems.    Signed by  Alf Edwards MD

## 2022-07-11 RX ORDER — MONTELUKAST SODIUM 10 MG/1
10 TABLET ORAL NIGHTLY
Qty: 90 TABLET | Refills: 2 | Status: SHIPPED | OUTPATIENT
Start: 2022-07-11

## 2022-07-21 ENCOUNTER — TELEPHONE (OUTPATIENT)
Dept: PULMONOLOGY | Facility: CLINIC | Age: 77
End: 2022-07-21

## 2022-07-21 NOTE — TELEPHONE ENCOUNTER
Patient was diagnosed with pneumonia last week by his PCP. He was prescribed doxycycline 100 mg BID by PCP. He is having sever SOB and coughing up clear to brown sputum.     Suggested he go to his nearest ER to seek treatment.

## 2022-09-07 RX ORDER — METOPROLOL SUCCINATE 50 MG/1
50 TABLET, EXTENDED RELEASE ORAL DAILY
Qty: 90 TABLET | Refills: 3 | Status: SHIPPED | OUTPATIENT
Start: 2022-09-07

## 2022-09-07 NOTE — TELEPHONE ENCOUNTER
Pts BP running 116-120/70s, systolic as low as 90s at times.     Patient did not increase metoprolol succinate to 100 mg as directed at LOV 5/31/22 with NSK. Patient has been taking metoprolol succinate 50 mg daily. Will send in refill of strength patient has been taking.

## 2022-09-20 NOTE — TELEPHONE ENCOUNTER
Medication Refill Request    Medication: metoprolol succinate XL (TOPROL-XL) 50 MG 24 hr tablet daily      Pertinent Labs:  Lab Results   Component Value Date    GLUCOSE 84 04/17/2020    BUN 11 04/17/2020    CREATININE 0.60 (L) 04/17/2020    EGFRIFNONA 132 04/17/2020    BCR 18.3 04/17/2020    K 4.1 04/17/2020    CO2 26.0 04/17/2020    CALCIUM 8.4 (L) 04/17/2020    ALBUMIN 3.70 08/20/2019    ALKPHOS 17 (L) 08/20/2019    AST 17 08/20/2019    ALT <5 08/20/2019      No results found for: CHOL, CHLPL, TRIG, HDL, LDL, LDLDIRECT  Lab Results   Component Value Date    HGBA1C 5.2 04/13/2020     Lab Results   Component Value Date    WBC 7.87 04/17/2020    HGB 8.3 (L) 04/17/2020    HCT 30.2 (L) 04/17/2020    MCV 68.5 (L) 04/17/2020     04/17/2020     No results found for: TSH     Reason:: Patient not eligible

## 2023-02-17 NOTE — PROGRESS NOTES
INTERVAL HPI/OVERNIGHT EVENTS:  On cbi w/ NS and PO amicar. Irrigated x 1 overnight for small clot    MEDICATIONS  (STANDING):  aminocaproic acid Tablet 1000 milliGRAM(s) Oral every 6 hours  lidocaine 2% Jelly 5 milliLiter(s) IntraUrethral once  lidocaine 2% Jelly 5 milliLiter(s) IntraUrethral once  loratadine 10 milliGRAM(s) Oral daily  metoprolol succinate ER 50 milliGRAM(s) Oral daily  Orgovyx (relugolix) 120mg tab 1 Tablet(s) 1 Tablet(s) Oral daily  oxybutynin 5 milliGRAM(s) Oral daily  polyethylene glycol 3350 17 Gram(s) Oral daily    MEDICATIONS  (PRN):  acetaminophen     Tablet .. 650 milliGRAM(s) Oral every 6 hours PRN Temp greater or equal to 38C (100.4F), Mild Pain (1 - 3)  aluminum hydroxide/magnesium hydroxide/simethicone Suspension 30 milliLiter(s) Oral every 4 hours PRN Dyspepsia  melatonin 3 milliGRAM(s) Oral at bedtime PRN Insomnia  ondansetron Injectable 4 milliGRAM(s) IV Push every 8 hours PRN Nausea and/or Vomiting        Vital Signs Last 24 Hrs  T(C): 36.9 (17 Feb 2023 05:19), Max: 36.9 (17 Feb 2023 05:19)  T(F): 98.5 (17 Feb 2023 05:19), Max: 98.5 (17 Feb 2023 05:19)  HR: 75 (17 Feb 2023 05:19) (73 - 84)  BP: 119/64 (17 Feb 2023 05:19) (119/64 - 138/76)  BP(mean): --  RR: 18 (17 Feb 2023 05:19) (18 - 20)  SpO2: 94% (17 Feb 2023 05:19) (94% - 97%)    Parameters below as of 17 Feb 2023 05:19  Patient On (Oxygen Delivery Method): room air        PHYSICAL EXAM:    ABDOMEN: benign  GENITALIA: bowers - clear cbi    LABS:                        8.4    6.18  )-----------( 324      ( 17 Feb 2023 06:30 )             25.4     02-16    136  |  104  |  16  ----------------------------<  91  4.0   |  26  |  0.73    Ca    8.6      16 Feb 2023 06:25            Blood Cultures:    RADIOLOGY & ADDITIONAL TESTS: PICC placed by FLORINDA Pepper RN

## 2023-04-15 ENCOUNTER — HOSPITAL ENCOUNTER (INPATIENT)
Facility: HOSPITAL | Age: 78
LOS: 10 days | Discharge: NURSING, REHAB OR LONG-TERM CARE | End: 2023-04-25
Attending: EMERGENCY MEDICINE | Admitting: INTERNAL MEDICINE
Payer: MEDICARE

## 2023-04-15 ENCOUNTER — APPOINTMENT (OUTPATIENT)
Dept: CT IMAGING | Facility: HOSPITAL | Age: 78
End: 2023-04-15
Payer: MEDICARE

## 2023-04-15 ENCOUNTER — APPOINTMENT (OUTPATIENT)
Dept: GENERAL RADIOLOGY | Facility: HOSPITAL | Age: 78
End: 2023-04-15
Payer: MEDICARE

## 2023-04-15 DIAGNOSIS — G20 PARKINSON DISEASE: ICD-10-CM

## 2023-04-15 DIAGNOSIS — G20 PARKINSON'S DISEASE: ICD-10-CM

## 2023-04-15 DIAGNOSIS — Z99.81 HYPOXEMIA REQUIRING SUPPLEMENTAL OXYGEN: ICD-10-CM

## 2023-04-15 DIAGNOSIS — R50.9 ACUTE FEBRILE ILLNESS: ICD-10-CM

## 2023-04-15 DIAGNOSIS — R13.12 OROPHARYNGEAL DYSPHAGIA: ICD-10-CM

## 2023-04-15 DIAGNOSIS — I48.91 ATRIAL FIBRILLATION WITH RAPID VENTRICULAR RESPONSE: ICD-10-CM

## 2023-04-15 DIAGNOSIS — U07.1 COVID-19: Primary | ICD-10-CM

## 2023-04-15 DIAGNOSIS — R09.02 HYPOXEMIA REQUIRING SUPPLEMENTAL OXYGEN: ICD-10-CM

## 2023-04-15 PROBLEM — I48.20 CHRONIC ATRIAL FIBRILLATION WITH RAPID VENTRICULAR RESPONSE: Status: ACTIVE | Noted: 2023-04-15

## 2023-04-15 PROBLEM — K21.9 GERD WITHOUT ESOPHAGITIS: Status: ACTIVE | Noted: 2017-10-31

## 2023-04-15 LAB
ALBUMIN SERPL-MCNC: 3.6 G/DL (ref 3.5–5.2)
ALBUMIN SERPL-MCNC: 3.8 G/DL (ref 3.5–5.2)
ALBUMIN/GLOB SERPL: 1.6 G/DL
ALP SERPL-CCNC: 59 U/L (ref 39–117)
ALP SERPL-CCNC: 59 U/L (ref 39–117)
ALT SERPL W P-5'-P-CCNC: 5 U/L (ref 1–41)
ALT SERPL W P-5'-P-CCNC: <5 U/L (ref 1–41)
ANION GAP SERPL CALCULATED.3IONS-SCNC: 11 MMOL/L (ref 5–15)
AST SERPL-CCNC: 12 U/L (ref 1–40)
AST SERPL-CCNC: 14 U/L (ref 1–40)
B PARAPERT DNA SPEC QL NAA+PROBE: NOT DETECTED
B PERT DNA SPEC QL NAA+PROBE: NOT DETECTED
BASOPHILS # BLD AUTO: 0.02 10*3/MM3 (ref 0–0.2)
BASOPHILS NFR BLD AUTO: 0.2 % (ref 0–1.5)
BILIRUB CONJ SERPL-MCNC: 0.3 MG/DL (ref 0–0.3)
BILIRUB INDIRECT SERPL-MCNC: 0.6 MG/DL
BILIRUB SERPL-MCNC: 0.8 MG/DL (ref 0–1.2)
BILIRUB SERPL-MCNC: 0.9 MG/DL (ref 0–1.2)
BUN SERPL-MCNC: 14 MG/DL (ref 8–23)
BUN/CREAT SERPL: 20.3 (ref 7–25)
C PNEUM DNA NPH QL NAA+NON-PROBE: NOT DETECTED
CALCIUM SPEC-SCNC: 8.5 MG/DL (ref 8.6–10.5)
CHLORIDE SERPL-SCNC: 102 MMOL/L (ref 98–107)
CO2 SERPL-SCNC: 24 MMOL/L (ref 22–29)
CREAT SERPL-MCNC: 0.69 MG/DL (ref 0.76–1.27)
CREAT SERPL-MCNC: 0.82 MG/DL (ref 0.76–1.27)
D DIMER PPP FEU-MCNC: 0.63 MCGFEU/ML (ref 0–0.77)
D-LACTATE SERPL-SCNC: 1.3 MMOL/L (ref 0.5–2)
DEPRECATED RDW RBC AUTO: 44.6 FL (ref 37–54)
EGFRCR SERPLBLD CKD-EPI 2021: 90.5 ML/MIN/1.73
EGFRCR SERPLBLD CKD-EPI 2021: 95.3 ML/MIN/1.73
EOSINOPHIL # BLD AUTO: 0.01 10*3/MM3 (ref 0–0.4)
EOSINOPHIL NFR BLD AUTO: 0.1 % (ref 0.3–6.2)
ERYTHROCYTE [DISTWIDTH] IN BLOOD BY AUTOMATED COUNT: 15.9 % (ref 12.3–15.4)
FLUAV SUBTYP SPEC NAA+PROBE: NOT DETECTED
FLUBV RNA ISLT QL NAA+PROBE: NOT DETECTED
GEN 5 2HR TROPONIN T REFLEX: 23 NG/L
GLOBULIN UR ELPH-MCNC: 2.2 GM/DL
GLUCOSE SERPL-MCNC: 115 MG/DL (ref 65–99)
HADV DNA SPEC NAA+PROBE: NOT DETECTED
HCOV 229E RNA SPEC QL NAA+PROBE: NOT DETECTED
HCOV HKU1 RNA SPEC QL NAA+PROBE: NOT DETECTED
HCOV NL63 RNA SPEC QL NAA+PROBE: NOT DETECTED
HCOV OC43 RNA SPEC QL NAA+PROBE: NOT DETECTED
HCT VFR BLD AUTO: 40.1 % (ref 37.5–51)
HGB BLD-MCNC: 12.2 G/DL (ref 13–17.7)
HMPV RNA NPH QL NAA+NON-PROBE: NOT DETECTED
HOLD SPECIMEN: NORMAL
HPIV1 RNA ISLT QL NAA+PROBE: NOT DETECTED
HPIV2 RNA SPEC QL NAA+PROBE: NOT DETECTED
HPIV3 RNA NPH QL NAA+PROBE: NOT DETECTED
HPIV4 P GENE NPH QL NAA+PROBE: NOT DETECTED
IMM GRANULOCYTES # BLD AUTO: 0.03 10*3/MM3 (ref 0–0.05)
IMM GRANULOCYTES NFR BLD AUTO: 0.3 % (ref 0–0.5)
LIPASE SERPL-CCNC: 27 U/L (ref 13–60)
LYMPHOCYTES # BLD AUTO: 0.24 10*3/MM3 (ref 0.7–3.1)
LYMPHOCYTES NFR BLD AUTO: 2 % (ref 19.6–45.3)
M PNEUMO IGG SER IA-ACNC: NOT DETECTED
MCH RBC QN AUTO: 24.1 PG (ref 26.6–33)
MCHC RBC AUTO-ENTMCNC: 30.4 G/DL (ref 31.5–35.7)
MCV RBC AUTO: 79.1 FL (ref 79–97)
MONOCYTES # BLD AUTO: 0.81 10*3/MM3 (ref 0.1–0.9)
MONOCYTES NFR BLD AUTO: 6.8 % (ref 5–12)
NEUTROPHILS NFR BLD AUTO: 10.83 10*3/MM3 (ref 1.7–7)
NEUTROPHILS NFR BLD AUTO: 90.6 % (ref 42.7–76)
NRBC BLD AUTO-RTO: 0 /100 WBC (ref 0–0.2)
NT-PROBNP SERPL-MCNC: 440.8 PG/ML (ref 0–1800)
PLATELET # BLD AUTO: 190 10*3/MM3 (ref 140–450)
PMV BLD AUTO: 10.5 FL (ref 6–12)
POTASSIUM SERPL-SCNC: 3.1 MMOL/L (ref 3.5–5.2)
PROCALCITONIN SERPL-MCNC: 0.63 NG/ML (ref 0–0.25)
PROT SERPL-MCNC: 5.7 G/DL (ref 6–8.5)
PROT SERPL-MCNC: 5.8 G/DL (ref 6–8.5)
QT INTERVAL: 338 MS
QTC INTERVAL: 528 MS
RBC # BLD AUTO: 5.07 10*6/MM3 (ref 4.14–5.8)
RHINOVIRUS RNA SPEC NAA+PROBE: NOT DETECTED
RSV RNA NPH QL NAA+NON-PROBE: NOT DETECTED
SARS-COV-2 RNA NPH QL NAA+NON-PROBE: DETECTED
SODIUM SERPL-SCNC: 137 MMOL/L (ref 136–145)
TROPONIN T DELTA: 1 NG/L
TROPONIN T SERPL HS-MCNC: 22 NG/L
WBC NRBC COR # BLD: 11.94 10*3/MM3 (ref 3.4–10.8)
WHOLE BLOOD HOLD COAG: NORMAL
WHOLE BLOOD HOLD SPECIMEN: NORMAL

## 2023-04-15 PROCEDURE — 99285 EMERGENCY DEPT VISIT HI MDM: CPT

## 2023-04-15 PROCEDURE — 82248 BILIRUBIN DIRECT: CPT | Performed by: EMERGENCY MEDICINE

## 2023-04-15 PROCEDURE — 83605 ASSAY OF LACTIC ACID: CPT | Performed by: EMERGENCY MEDICINE

## 2023-04-15 PROCEDURE — 87040 BLOOD CULTURE FOR BACTERIA: CPT | Performed by: EMERGENCY MEDICINE

## 2023-04-15 PROCEDURE — 25010000002 PIPERACILLIN SOD-TAZOBACTAM PER 1 G: Performed by: EMERGENCY MEDICINE

## 2023-04-15 PROCEDURE — 25510000001 IOPAMIDOL PER 1 ML: Performed by: HOSPITALIST

## 2023-04-15 PROCEDURE — 36415 COLL VENOUS BLD VENIPUNCTURE: CPT

## 2023-04-15 PROCEDURE — 93005 ELECTROCARDIOGRAM TRACING: CPT | Performed by: EMERGENCY MEDICINE

## 2023-04-15 PROCEDURE — 82565 ASSAY OF CREATININE: CPT | Performed by: HOSPITALIST

## 2023-04-15 PROCEDURE — 99223 1ST HOSP IP/OBS HIGH 75: CPT | Performed by: HOSPITALIST

## 2023-04-15 PROCEDURE — 71045 X-RAY EXAM CHEST 1 VIEW: CPT

## 2023-04-15 PROCEDURE — 25010000002 ENOXAPARIN PER 10 MG: Performed by: HOSPITALIST

## 2023-04-15 PROCEDURE — 25010000002 KETOROLAC TROMETHAMINE PER 15 MG: Performed by: EMERGENCY MEDICINE

## 2023-04-15 PROCEDURE — 83690 ASSAY OF LIPASE: CPT | Performed by: EMERGENCY MEDICINE

## 2023-04-15 PROCEDURE — 85379 FIBRIN DEGRADATION QUANT: CPT | Performed by: HOSPITALIST

## 2023-04-15 PROCEDURE — 80053 COMPREHEN METABOLIC PANEL: CPT | Performed by: EMERGENCY MEDICINE

## 2023-04-15 PROCEDURE — 0202U NFCT DS 22 TRGT SARS-COV-2: CPT | Performed by: EMERGENCY MEDICINE

## 2023-04-15 PROCEDURE — 25010000002 REMDESIVIR 100 MG/20ML SOLUTION 1 EACH VIAL: Performed by: HOSPITALIST

## 2023-04-15 PROCEDURE — 25010000002 METHYLPREDNISOLONE PER 40 MG: Performed by: EMERGENCY MEDICINE

## 2023-04-15 PROCEDURE — 85025 COMPLETE CBC W/AUTO DIFF WBC: CPT | Performed by: EMERGENCY MEDICINE

## 2023-04-15 PROCEDURE — 93010 ELECTROCARDIOGRAM REPORT: CPT | Performed by: INTERNAL MEDICINE

## 2023-04-15 PROCEDURE — 84145 PROCALCITONIN (PCT): CPT | Performed by: EMERGENCY MEDICINE

## 2023-04-15 PROCEDURE — 84484 ASSAY OF TROPONIN QUANT: CPT | Performed by: EMERGENCY MEDICINE

## 2023-04-15 PROCEDURE — 83880 ASSAY OF NATRIURETIC PEPTIDE: CPT | Performed by: EMERGENCY MEDICINE

## 2023-04-15 PROCEDURE — 71275 CT ANGIOGRAPHY CHEST: CPT

## 2023-04-15 RX ORDER — KETOROLAC TROMETHAMINE 15 MG/ML
15 INJECTION, SOLUTION INTRAMUSCULAR; INTRAVENOUS ONCE
Status: COMPLETED | OUTPATIENT
Start: 2023-04-15 | End: 2023-04-15

## 2023-04-15 RX ORDER — METOPROLOL TARTRATE 5 MG/5ML
5 INJECTION INTRAVENOUS EVERY 6 HOURS PRN
Status: DISCONTINUED | OUTPATIENT
Start: 2023-04-15 | End: 2023-04-25 | Stop reason: HOSPADM

## 2023-04-15 RX ORDER — METHYLPREDNISOLONE SODIUM SUCCINATE 40 MG/ML
40 INJECTION, POWDER, LYOPHILIZED, FOR SOLUTION INTRAMUSCULAR; INTRAVENOUS ONCE
Status: COMPLETED | OUTPATIENT
Start: 2023-04-15 | End: 2023-04-15

## 2023-04-15 RX ORDER — CLONAZEPAM 1 MG/1
5 TABLET ORAL 2 TIMES DAILY
Status: DISCONTINUED | OUTPATIENT
Start: 2023-04-15 | End: 2023-04-17

## 2023-04-15 RX ORDER — ONDANSETRON 4 MG/1
4 TABLET, FILM COATED ORAL EVERY 6 HOURS PRN
Status: DISCONTINUED | OUTPATIENT
Start: 2023-04-15 | End: 2023-04-25 | Stop reason: HOSPADM

## 2023-04-15 RX ORDER — ACETAMINOPHEN 500 MG
1000 TABLET ORAL ONCE
Status: DISCONTINUED | OUTPATIENT
Start: 2023-04-15 | End: 2023-04-17 | Stop reason: HOSPADM

## 2023-04-15 RX ORDER — ENOXAPARIN SODIUM 100 MG/ML
40 INJECTION SUBCUTANEOUS EVERY 24 HOURS
Status: DISCONTINUED | OUTPATIENT
Start: 2023-04-15 | End: 2023-04-17

## 2023-04-15 RX ORDER — ATORVASTATIN CALCIUM 10 MG/1
10 TABLET, FILM COATED ORAL DAILY
Status: DISCONTINUED | OUTPATIENT
Start: 2023-04-15 | End: 2023-04-25 | Stop reason: HOSPADM

## 2023-04-15 RX ORDER — SODIUM CHLORIDE 9 MG/ML
40 INJECTION, SOLUTION INTRAVENOUS AS NEEDED
Status: DISCONTINUED | OUTPATIENT
Start: 2023-04-15 | End: 2023-04-25 | Stop reason: HOSPADM

## 2023-04-15 RX ORDER — ACETAMINOPHEN 325 MG/1
650 TABLET ORAL EVERY 4 HOURS PRN
Status: DISCONTINUED | OUTPATIENT
Start: 2023-04-15 | End: 2023-04-25 | Stop reason: HOSPADM

## 2023-04-15 RX ORDER — SODIUM CHLORIDE 0.9 % (FLUSH) 0.9 %
10 SYRINGE (ML) INJECTION AS NEEDED
Status: DISCONTINUED | OUTPATIENT
Start: 2023-04-15 | End: 2023-04-24 | Stop reason: SDUPTHER

## 2023-04-15 RX ORDER — ACETAMINOPHEN 650 MG/1
650 SUPPOSITORY RECTAL EVERY 4 HOURS PRN
Status: DISCONTINUED | OUTPATIENT
Start: 2023-04-15 | End: 2023-04-25 | Stop reason: HOSPADM

## 2023-04-15 RX ORDER — MONTELUKAST SODIUM 10 MG/1
10 TABLET ORAL NIGHTLY
Status: DISCONTINUED | OUTPATIENT
Start: 2023-04-15 | End: 2023-04-25 | Stop reason: HOSPADM

## 2023-04-15 RX ORDER — MORPHINE SULFATE 2 MG/ML
2 INJECTION, SOLUTION INTRAMUSCULAR; INTRAVENOUS EVERY 4 HOURS PRN
Status: DISPENSED | OUTPATIENT
Start: 2023-04-15 | End: 2023-04-22

## 2023-04-15 RX ORDER — AMMONIUM LACTATE 12 G/100G
LOTION TOPICAL AS NEEDED
Status: DISCONTINUED | OUTPATIENT
Start: 2023-04-15 | End: 2023-04-17

## 2023-04-15 RX ORDER — POTASSIUM CHLORIDE 750 MG/1
40 CAPSULE, EXTENDED RELEASE ORAL EVERY 4 HOURS
Status: COMPLETED | OUTPATIENT
Start: 2023-04-15 | End: 2023-04-16

## 2023-04-15 RX ORDER — SODIUM CHLORIDE 0.9 % (FLUSH) 0.9 %
10 SYRINGE (ML) INJECTION EVERY 12 HOURS SCHEDULED
Status: DISCONTINUED | OUTPATIENT
Start: 2023-04-15 | End: 2023-04-25 | Stop reason: HOSPADM

## 2023-04-15 RX ORDER — METOPROLOL SUCCINATE 50 MG/1
50 TABLET, EXTENDED RELEASE ORAL DAILY
Status: DISCONTINUED | OUTPATIENT
Start: 2023-04-15 | End: 2023-04-18

## 2023-04-15 RX ORDER — TAMSULOSIN HYDROCHLORIDE 0.4 MG/1
0.4 CAPSULE ORAL NIGHTLY
Status: DISCONTINUED | OUTPATIENT
Start: 2023-04-15 | End: 2023-04-25 | Stop reason: HOSPADM

## 2023-04-15 RX ORDER — IPRATROPIUM BROMIDE AND ALBUTEROL SULFATE 2.5; .5 MG/3ML; MG/3ML
3 SOLUTION RESPIRATORY (INHALATION) 3 TIMES DAILY
Status: DISCONTINUED | OUTPATIENT
Start: 2023-04-15 | End: 2023-04-16

## 2023-04-15 RX ORDER — AMANTADINE HYDROCHLORIDE 100 MG/1
100 CAPSULE, GELATIN COATED ORAL 2 TIMES DAILY
Status: DISCONTINUED | OUTPATIENT
Start: 2023-04-15 | End: 2023-04-25 | Stop reason: HOSPADM

## 2023-04-15 RX ORDER — ONDANSETRON 2 MG/ML
4 INJECTION INTRAMUSCULAR; INTRAVENOUS EVERY 6 HOURS PRN
Status: DISCONTINUED | OUTPATIENT
Start: 2023-04-15 | End: 2023-04-25 | Stop reason: HOSPADM

## 2023-04-15 RX ORDER — L.ACID,PARA/B.BIFIDUM/S.THERM 8B CELL
1 CAPSULE ORAL DAILY
Status: DISCONTINUED | OUTPATIENT
Start: 2023-04-16 | End: 2023-04-25 | Stop reason: HOSPADM

## 2023-04-15 RX ORDER — DEXAMETHASONE SODIUM PHOSPHATE 4 MG/ML
6 INJECTION, SOLUTION INTRA-ARTICULAR; INTRALESIONAL; INTRAMUSCULAR; INTRAVENOUS; SOFT TISSUE DAILY
Status: COMPLETED | OUTPATIENT
Start: 2023-04-16 | End: 2023-04-25

## 2023-04-15 RX ORDER — SODIUM CHLORIDE 0.9 % (FLUSH) 0.9 %
10 SYRINGE (ML) INJECTION AS NEEDED
Status: DISCONTINUED | OUTPATIENT
Start: 2023-04-15 | End: 2023-04-25 | Stop reason: HOSPADM

## 2023-04-15 RX ORDER — HYDROCODONE BITARTRATE AND ACETAMINOPHEN 5; 325 MG/1; MG/1
1 TABLET ORAL EVERY 6 HOURS PRN
Status: DISCONTINUED | OUTPATIENT
Start: 2023-04-15 | End: 2023-04-25 | Stop reason: HOSPADM

## 2023-04-15 RX ORDER — ASPIRIN 81 MG/1
81 TABLET, CHEWABLE ORAL DAILY
Status: DISCONTINUED | OUTPATIENT
Start: 2023-04-16 | End: 2023-04-25 | Stop reason: HOSPADM

## 2023-04-15 RX ORDER — ACETAMINOPHEN 160 MG/5ML
650 SOLUTION ORAL EVERY 4 HOURS PRN
Status: DISCONTINUED | OUTPATIENT
Start: 2023-04-15 | End: 2023-04-25 | Stop reason: HOSPADM

## 2023-04-15 RX ORDER — CILOSTAZOL 50 MG/1
100 TABLET ORAL 2 TIMES DAILY
Status: DISCONTINUED | OUTPATIENT
Start: 2023-04-15 | End: 2023-04-17

## 2023-04-15 RX ORDER — ASPIRIN 81 MG/1
324 TABLET, CHEWABLE ORAL ONCE
Status: DISCONTINUED | OUTPATIENT
Start: 2023-04-15 | End: 2023-04-17

## 2023-04-15 RX ADMIN — METOPROLOL SUCCINATE 50 MG: 50 TABLET, EXTENDED RELEASE ORAL at 18:07

## 2023-04-15 RX ADMIN — TAZOBACTAM SODIUM AND PIPERACILLIN SODIUM 3.38 G: 375; 3 INJECTION, SOLUTION INTRAVENOUS at 12:43

## 2023-04-15 RX ADMIN — IOPAMIDOL 84 ML: 755 INJECTION, SOLUTION INTRAVENOUS at 14:56

## 2023-04-15 RX ADMIN — SODIUM CHLORIDE, POTASSIUM CHLORIDE, SODIUM LACTATE AND CALCIUM CHLORIDE 500 ML: 600; 310; 30; 20 INJECTION, SOLUTION INTRAVENOUS at 14:18

## 2023-04-15 RX ADMIN — MONTELUKAST 10 MG: 10 TABLET, FILM COATED ORAL at 21:03

## 2023-04-15 RX ADMIN — Medication 10 ML: at 21:05

## 2023-04-15 RX ADMIN — CARBIDOPA AND LEVODOPA 1.5 TABLET: 25; 100 TABLET ORAL at 21:03

## 2023-04-15 RX ADMIN — CARBIDOPA AND LEVODOPA 1.5 TABLET: 25; 100 TABLET ORAL at 18:04

## 2023-04-15 RX ADMIN — METHYLPREDNISOLONE SODIUM SUCCINATE 40 MG: 40 INJECTION, POWDER, FOR SOLUTION INTRAMUSCULAR; INTRAVENOUS at 14:18

## 2023-04-15 RX ADMIN — TAMSULOSIN HYDROCHLORIDE 0.4 MG: 0.4 CAPSULE ORAL at 21:03

## 2023-04-15 RX ADMIN — AMANTADINE HYDROCHLORIDE 100 MG: 100 CAPSULE ORAL at 21:04

## 2023-04-15 RX ADMIN — KETOROLAC TROMETHAMINE 15 MG: 15 INJECTION, SOLUTION INTRAMUSCULAR; INTRAVENOUS at 12:43

## 2023-04-15 RX ADMIN — SODIUM CHLORIDE, POTASSIUM CHLORIDE, SODIUM LACTATE AND CALCIUM CHLORIDE 1000 ML: 600; 310; 30; 20 INJECTION, SOLUTION INTRAVENOUS at 12:42

## 2023-04-15 RX ADMIN — POTASSIUM CHLORIDE 40 MEQ: 750 CAPSULE, EXTENDED RELEASE ORAL at 23:57

## 2023-04-15 RX ADMIN — ATORVASTATIN CALCIUM 10 MG: 10 TABLET, FILM COATED ORAL at 18:04

## 2023-04-15 RX ADMIN — POTASSIUM CHLORIDE 40 MEQ: 750 CAPSULE, EXTENDED RELEASE ORAL at 21:02

## 2023-04-15 RX ADMIN — ENOXAPARIN SODIUM 40 MG: 40 INJECTION SUBCUTANEOUS at 18:04

## 2023-04-15 RX ADMIN — CILOSTAZOL 100 MG: 50 TABLET ORAL at 21:03

## 2023-04-15 RX ADMIN — REMDESIVIR 200 MG: 100 INJECTION, POWDER, LYOPHILIZED, FOR SOLUTION INTRAVENOUS at 21:05

## 2023-04-15 RX ADMIN — CLONAZEPAM 5 MG: 1 TABLET ORAL at 21:02
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
## 2023-04-16 ENCOUNTER — APPOINTMENT (OUTPATIENT)
Dept: GENERAL RADIOLOGY | Facility: HOSPITAL | Age: 78
End: 2023-04-16
Payer: MEDICARE

## 2023-04-16 LAB
ALBUMIN SERPL-MCNC: 3.2 G/DL (ref 3.5–5.2)
ALBUMIN/GLOB SERPL: 1.5 G/DL
ALP SERPL-CCNC: 56 U/L (ref 39–117)
ALT SERPL W P-5'-P-CCNC: <5 U/L (ref 1–41)
ANION GAP SERPL CALCULATED.3IONS-SCNC: 7 MMOL/L (ref 5–15)
AST SERPL-CCNC: 12 U/L (ref 1–40)
BASOPHILS # BLD AUTO: 0.02 10*3/MM3 (ref 0–0.2)
BASOPHILS NFR BLD AUTO: 0.1 % (ref 0–1.5)
BILIRUB CONJ SERPL-MCNC: 0.2 MG/DL (ref 0–0.3)
BILIRUB SERPL-MCNC: 0.5 MG/DL (ref 0–1.2)
BUN SERPL-MCNC: 13 MG/DL (ref 8–23)
BUN/CREAT SERPL: 20 (ref 7–25)
CALCIUM SPEC-SCNC: 8.4 MG/DL (ref 8.6–10.5)
CHLORIDE SERPL-SCNC: 108 MMOL/L (ref 98–107)
CO2 SERPL-SCNC: 28 MMOL/L (ref 22–29)
CREAT SERPL-MCNC: 0.65 MG/DL (ref 0.76–1.27)
D DIMER PPP FEU-MCNC: 0.38 MCGFEU/ML (ref 0–0.77)
DEPRECATED RDW RBC AUTO: 45.5 FL (ref 37–54)
EGFRCR SERPLBLD CKD-EPI 2021: 97 ML/MIN/1.73
EOSINOPHIL # BLD AUTO: 0 10*3/MM3 (ref 0–0.4)
EOSINOPHIL NFR BLD AUTO: 0 % (ref 0.3–6.2)
ERYTHROCYTE [DISTWIDTH] IN BLOOD BY AUTOMATED COUNT: 16 % (ref 12.3–15.4)
GLOBULIN UR ELPH-MCNC: 2.2 GM/DL
GLUCOSE SERPL-MCNC: 128 MG/DL (ref 65–99)
HCT VFR BLD AUTO: 35.3 % (ref 37.5–51)
HGB BLD-MCNC: 10.9 G/DL (ref 13–17.7)
IMM GRANULOCYTES # BLD AUTO: 0.06 10*3/MM3 (ref 0–0.05)
IMM GRANULOCYTES NFR BLD AUTO: 0.4 % (ref 0–0.5)
LYMPHOCYTES # BLD AUTO: 0.64 10*3/MM3 (ref 0.7–3.1)
LYMPHOCYTES NFR BLD AUTO: 4.2 % (ref 19.6–45.3)
MCH RBC QN AUTO: 24.4 PG (ref 26.6–33)
MCHC RBC AUTO-ENTMCNC: 30.9 G/DL (ref 31.5–35.7)
MCV RBC AUTO: 79.1 FL (ref 79–97)
MONOCYTES # BLD AUTO: 0.84 10*3/MM3 (ref 0.1–0.9)
MONOCYTES NFR BLD AUTO: 5.6 % (ref 5–12)
NEUTROPHILS NFR BLD AUTO: 13.56 10*3/MM3 (ref 1.7–7)
NEUTROPHILS NFR BLD AUTO: 89.7 % (ref 42.7–76)
NRBC BLD AUTO-RTO: 0 /100 WBC (ref 0–0.2)
PLATELET # BLD AUTO: 227 10*3/MM3 (ref 140–450)
PMV BLD AUTO: 11.6 FL (ref 6–12)
POTASSIUM SERPL-SCNC: 3.7 MMOL/L (ref 3.5–5.2)
PROT SERPL-MCNC: 5.4 G/DL (ref 6–8.5)
RBC # BLD AUTO: 4.46 10*6/MM3 (ref 4.14–5.8)
SODIUM SERPL-SCNC: 143 MMOL/L (ref 136–145)
WBC NRBC COR # BLD: 15.12 10*3/MM3 (ref 3.4–10.8)

## 2023-04-16 PROCEDURE — 3E0333Z INTRODUCTION OF ANTI-INFLAMMATORY INTO PERIPHERAL VEIN, PERCUTANEOUS APPROACH: ICD-10-PCS | Performed by: INTERNAL MEDICINE

## 2023-04-16 PROCEDURE — 25010000002 CEFTRIAXONE PER 250 MG: Performed by: FAMILY MEDICINE

## 2023-04-16 PROCEDURE — 94799 UNLISTED PULMONARY SVC/PX: CPT

## 2023-04-16 PROCEDURE — 80053 COMPREHEN METABOLIC PANEL: CPT | Performed by: HOSPITALIST

## 2023-04-16 PROCEDURE — 93005 ELECTROCARDIOGRAM TRACING: CPT | Performed by: FAMILY MEDICINE

## 2023-04-16 PROCEDURE — 25010000002 DEXAMETHASONE PER 1 MG: Performed by: HOSPITALIST

## 2023-04-16 PROCEDURE — 82248 BILIRUBIN DIRECT: CPT | Performed by: HOSPITALIST

## 2023-04-16 PROCEDURE — 99232 SBSQ HOSP IP/OBS MODERATE 35: CPT | Performed by: FAMILY MEDICINE

## 2023-04-16 PROCEDURE — 25010000002 ENOXAPARIN PER 10 MG: Performed by: HOSPITALIST

## 2023-04-16 PROCEDURE — 99221 1ST HOSP IP/OBS SF/LOW 40: CPT | Performed by: INTERNAL MEDICINE

## 2023-04-16 PROCEDURE — 94640 AIRWAY INHALATION TREATMENT: CPT

## 2023-04-16 PROCEDURE — XW033E5 INTRODUCTION OF REMDESIVIR ANTI-INFECTIVE INTO PERIPHERAL VEIN, PERCUTANEOUS APPROACH, NEW TECHNOLOGY GROUP 5: ICD-10-PCS | Performed by: INTERNAL MEDICINE

## 2023-04-16 PROCEDURE — 25010000002 REMDESIVIR 100 MG/20ML SOLUTION 1 EACH VIAL: Performed by: HOSPITALIST

## 2023-04-16 PROCEDURE — 85025 COMPLETE CBC W/AUTO DIFF WBC: CPT | Performed by: HOSPITALIST

## 2023-04-16 PROCEDURE — 94664 DEMO&/EVAL PT USE INHALER: CPT

## 2023-04-16 PROCEDURE — 85379 FIBRIN DEGRADATION QUANT: CPT | Performed by: HOSPITALIST

## 2023-04-16 RX ORDER — DOXYCYCLINE 100 MG/1
100 CAPSULE ORAL EVERY 12 HOURS SCHEDULED
Status: COMPLETED | OUTPATIENT
Start: 2023-04-16 | End: 2023-04-20

## 2023-04-16 RX ORDER — PANTOPRAZOLE SODIUM 40 MG/1
40 TABLET, DELAYED RELEASE ORAL
Status: DISCONTINUED | OUTPATIENT
Start: 2023-04-16 | End: 2023-04-25 | Stop reason: HOSPADM

## 2023-04-16 RX ORDER — ALBUTEROL SULFATE 90 UG/1
2 AEROSOL, METERED RESPIRATORY (INHALATION)
Status: DISCONTINUED | OUTPATIENT
Start: 2023-04-16 | End: 2023-04-25 | Stop reason: HOSPADM

## 2023-04-16 RX ADMIN — CARBIDOPA AND LEVODOPA 1.5 TABLET: 25; 100 TABLET ORAL at 20:58

## 2023-04-16 RX ADMIN — CILOSTAZOL 100 MG: 50 TABLET ORAL at 08:37

## 2023-04-16 RX ADMIN — DOXYCYCLINE 100 MG: 100 CAPSULE ORAL at 12:09

## 2023-04-16 RX ADMIN — ASPIRIN 81 MG CHEWABLE TABLET 81 MG: 81 TABLET CHEWABLE at 08:36

## 2023-04-16 RX ADMIN — ACETAMINOPHEN 325MG 650 MG: 325 TABLET ORAL at 12:08

## 2023-04-16 RX ADMIN — ENOXAPARIN SODIUM 40 MG: 40 INJECTION SUBCUTANEOUS at 18:23

## 2023-04-16 RX ADMIN — CEFTRIAXONE 1 G: 1 INJECTION, POWDER, FOR SOLUTION INTRAMUSCULAR; INTRAVENOUS at 12:05

## 2023-04-16 RX ADMIN — ALBUTEROL SULFATE 2 PUFF: 90 AEROSOL, METERED RESPIRATORY (INHALATION) at 21:00

## 2023-04-16 RX ADMIN — IPRATROPIUM BROMIDE 2 PUFF: 17 AEROSOL, METERED RESPIRATORY (INHALATION) at 21:00

## 2023-04-16 RX ADMIN — CARBIDOPA AND LEVODOPA 1.5 TABLET: 25; 100 TABLET ORAL at 12:09

## 2023-04-16 RX ADMIN — METOPROLOL SUCCINATE 50 MG: 50 TABLET, EXTENDED RELEASE ORAL at 08:37

## 2023-04-16 RX ADMIN — AMANTADINE HYDROCHLORIDE 100 MG: 100 CAPSULE ORAL at 20:58

## 2023-04-16 RX ADMIN — REMDESIVIR 100 MG: 100 INJECTION, POWDER, LYOPHILIZED, FOR SOLUTION INTRAVENOUS at 18:23

## 2023-04-16 RX ADMIN — CLONAZEPAM 5 MG: 1 TABLET ORAL at 21:11

## 2023-04-16 RX ADMIN — AMANTADINE HYDROCHLORIDE 100 MG: 100 CAPSULE ORAL at 09:52

## 2023-04-16 RX ADMIN — Medication 1 CAPSULE: at 08:35

## 2023-04-16 RX ADMIN — CILOSTAZOL 100 MG: 50 TABLET ORAL at 20:58

## 2023-04-16 RX ADMIN — CARBIDOPA AND LEVODOPA 1.5 TABLET: 25; 100 TABLET ORAL at 08:34

## 2023-04-16 RX ADMIN — SILVER SULFADIAZINE: 10 CREAM TOPICAL at 08:42

## 2023-04-16 RX ADMIN — IPRATROPIUM BROMIDE 2 PUFF: 17 AEROSOL, METERED RESPIRATORY (INHALATION) at 09:16

## 2023-04-16 RX ADMIN — ALBUTEROL SULFATE 2 PUFF: 90 AEROSOL, METERED RESPIRATORY (INHALATION) at 15:34

## 2023-04-16 RX ADMIN — MONTELUKAST 10 MG: 10 TABLET, FILM COATED ORAL at 21:05

## 2023-04-16 RX ADMIN — DOXYCYCLINE 100 MG: 100 CAPSULE ORAL at 20:58

## 2023-04-16 RX ADMIN — POTASSIUM CHLORIDE 40 MEQ: 750 CAPSULE, EXTENDED RELEASE ORAL at 04:24

## 2023-04-16 RX ADMIN — Medication 10 ML: at 21:05

## 2023-04-16 RX ADMIN — CLONAZEPAM 5 MG: 1 TABLET ORAL at 08:47

## 2023-04-16 RX ADMIN — IPRATROPIUM BROMIDE 2 PUFF: 17 AEROSOL, METERED RESPIRATORY (INHALATION) at 15:38

## 2023-04-16 RX ADMIN — CARBIDOPA AND LEVODOPA 1.5 TABLET: 25; 100 TABLET ORAL at 18:23

## 2023-04-16 RX ADMIN — TAMSULOSIN HYDROCHLORIDE 0.4 MG: 0.4 CAPSULE ORAL at 20:58

## 2023-04-16 RX ADMIN — ALBUTEROL SULFATE 2 PUFF: 90 AEROSOL, METERED RESPIRATORY (INHALATION) at 09:15

## 2023-04-16 RX ADMIN — ATORVASTATIN CALCIUM 10 MG: 10 TABLET, FILM COATED ORAL at 08:37

## 2023-04-16 RX ADMIN — DEXAMETHASONE SODIUM PHOSPHATE 6 MG: 4 INJECTION, SOLUTION INTRAMUSCULAR; INTRAVENOUS at 08:34

## 2023-04-16 RX ADMIN — PANTOPRAZOLE SODIUM 40 MG: 40 TABLET, DELAYED RELEASE ORAL at 12:08

## 2023-04-16 RX ADMIN — Medication 10 ML: at 08:42

## 2023-04-16 NOTE — CONSULTS
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 99% negative predictive value.    Results may be falsely decreased if patient taking Biotin.      Lactic Acid, Plasma [[id number]]  (Normal) Collected: 04/15/23 1207    Specimen: Blood Updated: 04/15/23 1239     Lactate 1.3 mmol/L      Comment: Falsely depressed results may occur on samples drawn from patients receiving N-Acetylcysteine (NAC) or Metamizole.       CBC & Differential [[id number]]  (Abnormal) Collected: 04/15/23 1207    Specimen: Blood Updated: 04/15/23 1237    Narrative:      The following orders were created for panel order CBC & Differential.  Procedure                               Abnormality         Status                     ---------                               -----------         ------                     CBC Auto Differential[[id number]]        Abnormal            Final result               Scan Slide[[id number]]                                                                    Please view results for these tests on the individual  orders.    CBC Auto Differential [[id number]]  (Abnormal) Collected: 04/15/23 1207    Specimen: Blood Updated: 04/15/23 1237     WBC 11.94 10*3/mm3      RBC 5.07 10*6/mm3      Hemoglobin 12.2 g/dL      Hematocrit 40.1 %      MCV 79.1 fL      MCH 24.1 pg      MCHC 30.4 g/dL      RDW 15.9 %      RDW-SD 44.6 fl      MPV 10.5 fL      Platelets 190 10*3/mm3      Neutrophil % 90.6 %      Lymphocyte % 2.0 %      Monocyte % 6.8 %      Eosinophil % 0.1 %      Basophil % 0.2 %      Immature Grans % 0.3 %      Neutrophils, Absolute 10.83 10*3/mm3      Lymphocytes, Absolute 0.24 10*3/mm3      Monocytes, Absolute 0.81 10*3/mm3      Eosinophils, Absolute 0.01 10*3/mm3      Basophils, Absolute 0.02 10*3/mm3      Immature Grans, Absolute 0.03 10*3/mm3      nRBC 0.0 /100 WBC     Narrative:      Verified by repeat analysis.          ECG/EMG Results (last 24 hours)     Procedure Component Value Units Date/Time    ECG 12 Lead ED Triage Standing Order; Chest Pain [[id number]] Collected: 04/15/23 1200     Updated: 04/15/23 1207     QT Interval 338 ms      QTC Interval 528 ms     Narrative:      Test Reason : ED Triage Standing Order~  Blood Pressure :   */*   mmHG  Vent. Rate : 147 BPM     Atrial Rate : 166 BPM     P-R Int :   * ms          QRS Dur :  98 ms      QT Int : 338 ms       P-R-T Axes :   * -49  36 degrees     QTc Int : 528 ms    Atrial fibrillation with rapid ventricular response with premature ventricular or aberrantly conducted complexes  Left anterior fascicular block  Nonspecific ST and T wave abnormality  Abnormal ECG  When compared with ECG of 13-APR-2020 10:02,  Atrial fibrillation has replaced Sinus rhythm  Vent. rate has increased BY  57 BPM  Left anterior fascicular block is now present  ST now depressed in Anterior leads  Nonspecific T wave abnormality now evident in Lateral leads  Confirmed by KAELA DAMICO MD ([id number]) on 4/15/2023 12:07:07 PM    Referred By: DR DAMICO           Confirmed By: KAELA DAMICO MD    ECG  12 Lead ED Triage Standing Order; Chest Pain [[id number]] Collected: 04/15/23 1507     Updated: 04/15/23 1507     QT Interval 268 ms      QTC Interval 389 ms     Narrative:      Test Reason : 2ND SET  Blood Pressure :   */*   mmHG  Vent. Rate : 127 BPM     Atrial Rate : 147 BPM     P-R Int :   * ms          QRS Dur :  96 ms      QT Int : 268 ms       P-R-T Axes :   * -46 -14 degrees     QTc Int : 389 ms    ** Poor data quality, interpretation may be adversely affected  Atrial fibrillation with rapid ventricular response with premature ventricular or aberrantly conducted complexes  Left anterior fascicular block  Nonspecific ST and T wave abnormality  Abnormal ECG  When compared with ECG of 15-APR-2023 12:00,  Nonspecific T wave abnormality, worse in Anterior leads    Referred By: ED MD           Confirmed By:                COVID-19    ABRIL (obstructive sleep apnea)    Pulmonary emphysema    GERD without esophagitis    Parkinson disease    Acute respiratory failure with hypoxia    Hypokalemia    Peripheral arterial disease    Chronic atrial fibrillation with rapid ventricular response        ASSESSMENT/PLAN:  Paf- spont conversion to SR w pac/pvc    Rx covid/ pneumonia ? Aspiration.    Ultimately NOAC , concomitant DAPT to be revisited.  Increased risk for GI complication with glucocorticoids.    Given frailty of patient and pulmonary status close observation warranted.      Flaco Ravi MD   11:00 EDT 4/16/2023

## 2023-04-16 NOTE — NURSING NOTE
CALLED WIFES NUMBERS AND NO ANSWER ON ANY OF THEM TO  NOTIFY HER OF HUSBaNDS FALL... I  Had gone over the numbers. Wife is not ansering

## 2023-04-16 NOTE — NURSING NOTE
Patient was found in the floor upon entry to room. Patient had been sitting in the chair at bedside. Patient was moved from the floor to the bed with this RN, Nay KRISHNA, and NA. Patient had a full head-to-toe assessment was examined for any injuries, and I noticed some slight bruising on right upper gluteal area. Back and bottom were red and blanchable. Patient stated he wasn't in any pain. All VSS and continues to require 4LNC. Attempted to notify family, but no answer when ERENDIRA Tee called. On Call physician paged, awaiting response.

## 2023-04-16 NOTE — PROGRESS NOTES
[facility]  PROGRESS NOTE    Patient Name: Flaco Roque II  : 1945  MRN: [id number]    Date of Admission: 4/15/2023  Primary Care Physician: Azar Stern MD    Subjective   Subjective     CC:  F/u fever and weakness     HPI:  Pt states he feels weak, he is having trouble sitting up in bed. He is having trouble talking.     ROS:  Difficult to assess     Objective   Objective     Vital Signs:   Temp:  [97.5 °F (36.4 °C)-100.3 °F (37.9 °C)] 98.2 °F (36.8 °C)  Heart Rate:  [] 81  Resp:  [18-28] 20  BP: ()/(61-95) 102/61  Flow (L/min):  [2-3] 2     Physical Exam:  Constitutional: No acute distress, awake, chronically ill, cachetic   HENT: NCAT, mucous membranes moist  Respiratory: Clear to auscultation bilaterally, respiratory effort normal   Cardiovascular: RRR, no murmurs, rubs, or gallops  Gastrointestinal:nondistended  Musculoskeletal: No bilateral ankle edema  Psychiatric: cooperative  Neurologic: Oriented x 3, speech slightly slurred   Skin: No rashes      Results Reviewed:  LAB RESULTS:      Lab 23  0419 04/15/23  1207   WBC 15.12* 11.94*   HEMOGLOBIN 10.9* 12.2*   HEMATOCRIT 35.3* 40.1   PLATELETS 227 190   NEUTROS ABS 13.56* 10.83*   IMMATURE GRANS (ABS) 0.06* 0.03   LYMPHS ABS 0.64* 0.24*   MONOS ABS 0.84 0.81   EOS ABS 0.00 0.01   MCV 79.1 79.1   PROCALCITONIN  --  0.63*   LACTATE  --  1.3   D DIMER QUANT 0.38 0.63         Lab 23  0419 04/15/23  1815 04/15/23  1207   SODIUM 143  --  137   POTASSIUM 3.7  --  3.1*   CHLORIDE 108*  --  102   CO2 28.0  --  24.0   ANION GAP 7.0  --  11.0   BUN 13  --  14   CREATININE 0.65* 0.82 0.69*   EGFR 97.0 90.5 95.3   GLUCOSE 128*  --  115*   CALCIUM 8.4*  --  8.5*         Lab 23  0419 04/15/23  1815 04/15/23  1207   TOTAL PROTEIN 5.4* 5.7* 5.8*   ALBUMIN 3.2* 3.8 3.6   GLOBULIN 2.2  --  2.2   ALT (SGPT) <5 5 <5   AST (SGOT) 12 12 14   BILIRUBIN 0.5 0.9 0.8   INDIRECT BILIRUBIN  --   0.6  --    BILIRUBIN DIRECT 0.2 0.3  --    ALK PHOS 56 59 59   LIPASE  --   --  27         Lab 04/15/23  1511 04/15/23  1207   PROBNP  --  440.8   HSTROP T 23* 22*                 Brief Urine Lab Results     None          Microbiology Results Abnormal     None          XR Chest 1 View    Result Date: 4/15/2023  XR CHEST 1 VW Date of Exam: 4/15/2023 11:52 AM EDT Indication: Chest Pain Triage Protocol. Comparison: 6/4/2020 chest CT, 8/20/2019 chest radiograph Findings: Cardiomediastinal silhouette is within normal limits. Diffuse hazy opacities in the left mid to lower left lung. No pneumothorax. Possible trace left pleural effusion. Bilateral shoulder replacements. No evidence of acute osseous abnormalities. Visualized upper abdomen is unremarkable.     Impression: Impression: Diffuse hazy opacities in the left mid to lower lung concerning for infection. Possible trace left pleural effusion. Electronically Signed: Rome Erazo  4/15/2023 1:13 PM EDT  Workstation ID: [id number]    CT Angiogram Chest    Result Date: 4/15/2023  CT ANGIOGRAM CHEST Date of Exam: 4/15/2023 2:52 PM EDT Indication: covid with hypoxemia. Comparison: CT chest 6/4/2020, same day chest radiograph Technique: CTA of the chest was performed after the uneventful intravenous administration of 75 mL Isovue-370. Reconstructed coronal and sagittal images were also obtained. In addition, a 3-D volume rendered image was created for interpretation. Automated exposure control and iterative reconstruction methods were used. Findings: Diagnostic quality: Contrast opacification of the pulmonary arterial circulation is adequate for assessment of pulmonary embolism. Study is markedly limited by respiratory motion artifact. Pulmonary arteries: No evidence of pulmonary embolus to the proximal segmental arteries. Main pulmonary artery is enlarged measuring 3.2 cm. Heart and pericardium:No flattening of the interventricular septum. Coronary artery calcification is  seen. No substantial pericardial effusion. Enlargement of the right ventricle. Vessels:Normal caliber aorta. Atherosclerotic calcification of the aorta. No evidence of acute aortic injury. Venous structures including the superior vena cava and inferior vena cava appear grossly patent. Mediastinum: Large hiatal hernia with intrathoracic stomach. There is fluid and debris within the lower esophagus. Few mildly prominent mediastinal lymph nodes, presumably reactive. No anterior mediastinal masses. Lower neck:No suspicious or enlarged supraclavicular or axillary lymphadenopathy. Limited evaluation due to streak from bilateral shoulder hardware. Pulmonary parenchyma: Emphysematous changes in the lungs. Markedly limited evaluation due to respiratory motion. Calcified granulomas. Intralobular septal thickening throughout. Groundglass and consolidative opacities throughout the majority of the left lung predominantly in the lingula and left lower lobe. Minimal dependent atelectasis. No obvious suspicious pulmonary lesions. Pleura: Trace left pleural effusion. No pneumothorax. Airways: Likely debris and mucus plugging in the left lower lobe airways. Chest wall and bones:No acute osseous abnormality. Degenerative changes of thoracic spine. Bilateral gynecomastia. Upper abdomen: A few hepatic hypodensities are too small to characterize. Upper abdomen is unremarkable.     Impression: Impression: Markedly limited examination due to respiratory motion. No evidence of pulmonary embolus through the proximal segmental arteries. There is diffuse groundglass and consolidative opacity throughout the left lung predominantly in the left lower lobe and lingula. While infection is most likely, given the presence of a large hiatal hernia with fluid and debris in the distal esophagus, aspiration should also be considered. Enlarged main pulmonary artery and enlarged appearance of the right heart shadow is seen in the setting of pulmonary  hypertension. No evidence of intraventricular septum flattening. Interlobular septal thickening seen throughout the lungs may represent a background of mild edema. Trace left pleural effusion. Electronically Signed: Rome Erazo  4/15/2023 3:35 PM EDT  Workstation ID: [id number]      Results for orders placed during the hospital encounter of 04/26/19    Adult Transthoracic Echo Complete W/ Cont if Necessary Per Protocol    Interpretation Summary  · Left ventricular systolic function is normal.  · Estimated EF appears to be in the range of 66 - 70%.      Current medications:  Scheduled Meds:acetaminophen, 1,000 mg, Oral, Once  albuterol sulfate HFA, 2 puff, Inhalation, TID - RT   And  ipratropium, 2 puff, Inhalation, TID - RT  amantadine, 100 mg, Oral, BID  aspirin, 324 mg, Oral, Once  aspirin, 81 mg, Oral, Daily  atorvastatin, 10 mg, Oral, Daily  carbidopa-levodopa, 1.5 tablet, Oral, 4x Daily  cilostazol, 100 mg, Oral, BID  clonazePAM, 5 mg, Oral, BID  dexamethasone, 6 mg, Oral, Daily   Or  dexamethasone, 6 mg, Intravenous, Daily  enoxaparin, 40 mg, Subcutaneous, Q24H  lactobacillus acidophilus, 1 capsule, Oral, Daily  metoprolol succinate XL, 50 mg, Oral, Daily  montelukast, 10 mg, Oral, Nightly  remdesivir, 100 mg, Intravenous, Q24H  silver sulfadiazine, , Topical, Q24H  sodium chloride, 10 mL, Intravenous, Q12H  tamsulosin, 0.4 mg, Oral, Nightly      Continuous Infusions:Pharmacy Consult - Remdesivir,       PRN Meds:.•  acetaminophen **OR** acetaminophen **OR** acetaminophen  •  ammonium lactate  •  Calcium Replacement - Follow Nurse / BPA Driven Protocol  •  HYDROcodone-acetaminophen  •  Magnesium Standard Dose Replacement - Follow Nurse / BPA Driven Protocol  •  metoprolol tartrate  •  Morphine  •  ondansetron **OR** ondansetron  •  Pharmacy Consult - Remdesivir  •  Phosphorus Replacement - Follow Nurse / BPA Driven Protocol  •  Potassium Replacement - Follow Nurse / BPA Driven Protocol  •  sodium chloride  •   sodium chloride  •  sodium chloride    Assessment & Plan   Assessment & Plan     Active Hospital Problems    Diagnosis  POA   • **COVID-19 [U07.1]  Yes   • Chronic atrial fibrillation with rapid ventricular response [I48.20]  Yes   • Peripheral arterial disease [I73.9]  Yes   • Hypokalemia [E87.6]  Yes   • Acute respiratory failure with hypoxia [J96.01]  Yes   • Parkinson disease [G20]  Yes   • ABRIL (obstructive sleep apnea) [G47.33]  Yes   • Pulmonary emphysema [J43.9]  Yes   • GERD without esophagitis [K21.9]  Yes      Resolved Hospital Problems   No resolved problems to display.        Brief Hospital Course to date:  Flaco Roque II is a 77 y.o. male  with a past medical history of atrial fibrillation, ABRIL, GERD, emphysema and Parkinson's disease that presented to the ED complaining of fever/chills, rigors and chest pain with palpitations.      COVID-19  Acute Respiratory Failure  H/o pulmonary emphysema  Possible Aspiration Pneumonia   - patient covid  positive in the ED on day of admission- 4/15/23  - CTA chest -no PE, hiatal hernia and concern for aspiration pneumonia, pulmonary hTN   - Remdesivir/Decadron ordered per protocol  - Lovenox 40mg subq daily  -given elevated procal, will add doxy/rocephin      Atrial Fibrillation with RVR  Chest Pain  - home metoprolol continued  - prn Metoprolol ordered  - cardiology consulted       Hypokalemia  - 3.1 on admission  - replace per protocol     Parkinsons disease  - continue home carbidopa/levadopa     ABRIL  - cpap intolerant     GERD          Expected Discharge Location and Transportation: home vs rehab  Expected Discharge   Expected Discharge Date and Time     Expected Discharge Date Expected Discharge Time    Apr 21, 2023            DVT prophylaxis:  Medical and mechanical DVT prophylaxis orders are present.     AM-PAC 6 Clicks Score (PT): 20 (04/15/23 7820)    CODE STATUS:   Code Status and Medical Interventions:   Ordered at: 04/15/23 6870     Level Of  Support Discussed With:    Patient     Code Status (Patient has no pulse and is not breathing):    CPR (Attempt to Resuscitate)     Medical Interventions (Patient has pulse or is breathing):    Full Support     Release to patient:    Routine Release       Kylah Esteban,   04/16/23

## 2023-04-17 ENCOUNTER — APPOINTMENT (OUTPATIENT)
Dept: GENERAL RADIOLOGY | Facility: HOSPITAL | Age: 78
End: 2023-04-17
Payer: MEDICARE

## 2023-04-17 LAB
ALBUMIN SERPL-MCNC: 3.1 G/DL (ref 3.5–5.2)
ALBUMIN/GLOB SERPL: 1.4 G/DL
ALP SERPL-CCNC: 53 U/L (ref 39–117)
ALT SERPL W P-5'-P-CCNC: <5 U/L (ref 1–41)
ANION GAP SERPL CALCULATED.3IONS-SCNC: 6 MMOL/L (ref 5–15)
AST SERPL-CCNC: 12 U/L (ref 1–40)
BASOPHILS # BLD AUTO: 0.02 10*3/MM3 (ref 0–0.2)
BASOPHILS NFR BLD AUTO: 0.2 % (ref 0–1.5)
BILIRUB CONJ SERPL-MCNC: <0.2 MG/DL (ref 0–0.3)
BILIRUB SERPL-MCNC: 0.3 MG/DL (ref 0–1.2)
BUN SERPL-MCNC: 17 MG/DL (ref 8–23)
BUN/CREAT SERPL: 26.6 (ref 7–25)
CALCIUM SPEC-SCNC: 8.4 MG/DL (ref 8.6–10.5)
CHLORIDE SERPL-SCNC: 108 MMOL/L (ref 98–107)
CO2 SERPL-SCNC: 27 MMOL/L (ref 22–29)
CREAT SERPL-MCNC: 0.64 MG/DL (ref 0.76–1.27)
D DIMER PPP FEU-MCNC: 0.4 MCGFEU/ML (ref 0–0.77)
DEPRECATED RDW RBC AUTO: 46.1 FL (ref 37–54)
EGFRCR SERPLBLD CKD-EPI 2021: 97.5 ML/MIN/1.73
EOSINOPHIL # BLD AUTO: 0.02 10*3/MM3 (ref 0–0.4)
EOSINOPHIL NFR BLD AUTO: 0.2 % (ref 0.3–6.2)
ERYTHROCYTE [DISTWIDTH] IN BLOOD BY AUTOMATED COUNT: 15.9 % (ref 12.3–15.4)
GLOBULIN UR ELPH-MCNC: 2.2 GM/DL
GLUCOSE SERPL-MCNC: 144 MG/DL (ref 65–99)
HCT VFR BLD AUTO: 35 % (ref 37.5–51)
HGB BLD-MCNC: 10.5 G/DL (ref 13–17.7)
IMM GRANULOCYTES # BLD AUTO: 0.03 10*3/MM3 (ref 0–0.05)
IMM GRANULOCYTES NFR BLD AUTO: 0.3 % (ref 0–0.5)
LYMPHOCYTES # BLD AUTO: 0.49 10*3/MM3 (ref 0.7–3.1)
LYMPHOCYTES NFR BLD AUTO: 4.1 % (ref 19.6–45.3)
MCH RBC QN AUTO: 24.3 PG (ref 26.6–33)
MCHC RBC AUTO-ENTMCNC: 30 G/DL (ref 31.5–35.7)
MCV RBC AUTO: 81 FL (ref 79–97)
MONOCYTES # BLD AUTO: 0.7 10*3/MM3 (ref 0.1–0.9)
MONOCYTES NFR BLD AUTO: 5.9 % (ref 5–12)
NEUTROPHILS NFR BLD AUTO: 10.67 10*3/MM3 (ref 1.7–7)
NEUTROPHILS NFR BLD AUTO: 89.3 % (ref 42.7–76)
NRBC BLD AUTO-RTO: 0 /100 WBC (ref 0–0.2)
PLATELET # BLD AUTO: 230 10*3/MM3 (ref 140–450)
PMV BLD AUTO: 11.5 FL (ref 6–12)
POTASSIUM SERPL-SCNC: 3.7 MMOL/L (ref 3.5–5.2)
PROT SERPL-MCNC: 5.3 G/DL (ref 6–8.5)
QT INTERVAL: 268 MS
QT INTERVAL: 398 MS
QTC INTERVAL: 389 MS
QTC INTERVAL: 447 MS
RBC # BLD AUTO: 4.32 10*6/MM3 (ref 4.14–5.8)
SODIUM SERPL-SCNC: 141 MMOL/L (ref 136–145)
WBC NRBC COR # BLD: 11.93 10*3/MM3 (ref 3.4–10.8)

## 2023-04-17 PROCEDURE — 25010000002 MORPHINE PER 10 MG: Performed by: HOSPITALIST

## 2023-04-17 PROCEDURE — 99231 SBSQ HOSP IP/OBS SF/LOW 25: CPT | Performed by: FAMILY MEDICINE

## 2023-04-17 PROCEDURE — 94799 UNLISTED PULMONARY SVC/PX: CPT

## 2023-04-17 PROCEDURE — 82248 BILIRUBIN DIRECT: CPT | Performed by: HOSPITALIST

## 2023-04-17 PROCEDURE — 25010000002 DEXAMETHASONE PER 1 MG: Performed by: HOSPITALIST

## 2023-04-17 PROCEDURE — 25010000002 REMDESIVIR 100 MG/20ML SOLUTION 1 EACH VIAL: Performed by: HOSPITALIST

## 2023-04-17 PROCEDURE — 80053 COMPREHEN METABOLIC PANEL: CPT | Performed by: HOSPITALIST

## 2023-04-17 PROCEDURE — 92610 EVALUATE SWALLOWING FUNCTION: CPT

## 2023-04-17 PROCEDURE — 85025 COMPLETE CBC W/AUTO DIFF WBC: CPT | Performed by: HOSPITALIST

## 2023-04-17 PROCEDURE — 25010000002 CEFTRIAXONE PER 250 MG: Performed by: FAMILY MEDICINE

## 2023-04-17 PROCEDURE — 99232 SBSQ HOSP IP/OBS MODERATE 35: CPT | Performed by: INTERNAL MEDICINE

## 2023-04-17 PROCEDURE — 73030 X-RAY EXAM OF SHOULDER: CPT

## 2023-04-17 PROCEDURE — 94664 DEMO&/EVAL PT USE INHALER: CPT

## 2023-04-17 PROCEDURE — 25010000002 ENOXAPARIN PER 10 MG: Performed by: INTERNAL MEDICINE

## 2023-04-17 PROCEDURE — 97162 PT EVAL MOD COMPLEX 30 MIN: CPT | Performed by: PHYSICAL THERAPIST

## 2023-04-17 PROCEDURE — 97166 OT EVAL MOD COMPLEX 45 MIN: CPT

## 2023-04-17 PROCEDURE — 85379 FIBRIN DEGRADATION QUANT: CPT | Performed by: HOSPITALIST

## 2023-04-17 RX ORDER — CLONAZEPAM 1 MG/1
1 TABLET ORAL NIGHTLY
Status: DISCONTINUED | OUTPATIENT
Start: 2023-04-17 | End: 2023-04-25 | Stop reason: HOSPADM

## 2023-04-17 RX ORDER — POLYETHYLENE GLYCOL 3350 17 G/17G
17 POWDER, FOR SOLUTION ORAL DAILY PRN
Status: DISCONTINUED | OUTPATIENT
Start: 2023-04-17 | End: 2023-04-25 | Stop reason: HOSPADM

## 2023-04-17 RX ORDER — OMEPRAZOLE 20 MG/1
20 CAPSULE, DELAYED RELEASE ORAL DAILY
COMMUNITY

## 2023-04-17 RX ORDER — AMOXICILLIN 250 MG
2 CAPSULE ORAL 2 TIMES DAILY
Status: DISCONTINUED | OUTPATIENT
Start: 2023-04-17 | End: 2023-04-25 | Stop reason: HOSPADM

## 2023-04-17 RX ORDER — BISACODYL 10 MG
10 SUPPOSITORY, RECTAL RECTAL DAILY PRN
Status: DISCONTINUED | OUTPATIENT
Start: 2023-04-17 | End: 2023-04-25 | Stop reason: HOSPADM

## 2023-04-17 RX ORDER — BISACODYL 5 MG/1
5 TABLET, DELAYED RELEASE ORAL DAILY PRN
Status: DISCONTINUED | OUTPATIENT
Start: 2023-04-17 | End: 2023-04-25 | Stop reason: HOSPADM

## 2023-04-17 RX ORDER — ENOXAPARIN SODIUM 100 MG/ML
1 INJECTION SUBCUTANEOUS EVERY 24 HOURS
Status: DISCONTINUED | OUTPATIENT
Start: 2023-04-17 | End: 2023-04-17

## 2023-04-17 RX ORDER — ENOXAPARIN SODIUM 100 MG/ML
1 INJECTION SUBCUTANEOUS EVERY 12 HOURS
Status: DISCONTINUED | OUTPATIENT
Start: 2023-04-17 | End: 2023-04-19

## 2023-04-17 RX ADMIN — CARBIDOPA AND LEVODOPA 1 TABLET: 25; 100 TABLET ORAL at 15:01

## 2023-04-17 RX ADMIN — IPRATROPIUM BROMIDE 2 PUFF: 17 AEROSOL, METERED RESPIRATORY (INHALATION) at 09:22

## 2023-04-17 RX ADMIN — ALBUTEROL SULFATE 2 PUFF: 90 AEROSOL, METERED RESPIRATORY (INHALATION) at 22:26

## 2023-04-17 RX ADMIN — MORPHINE SULFATE 2 MG: 2 INJECTION, SOLUTION INTRAMUSCULAR; INTRAVENOUS at 04:32

## 2023-04-17 RX ADMIN — DEXAMETHASONE SODIUM PHOSPHATE 6 MG: 4 INJECTION, SOLUTION INTRAMUSCULAR; INTRAVENOUS at 08:18

## 2023-04-17 RX ADMIN — TAMSULOSIN HYDROCHLORIDE 0.4 MG: 0.4 CAPSULE ORAL at 22:01

## 2023-04-17 RX ADMIN — Medication 10 ML: at 22:05

## 2023-04-17 RX ADMIN — CARBIDOPA AND LEVODOPA 1 TABLET: 25; 100 TABLET ORAL at 22:01

## 2023-04-17 RX ADMIN — SENNOSIDES AND DOCUSATE SODIUM 2 TABLET: 8.6; 5 TABLET ORAL at 22:02

## 2023-04-17 RX ADMIN — Medication 10 ML: at 08:19

## 2023-04-17 RX ADMIN — ATORVASTATIN CALCIUM 10 MG: 10 TABLET, FILM COATED ORAL at 08:18

## 2023-04-17 RX ADMIN — IPRATROPIUM BROMIDE 2 PUFF: 17 AEROSOL, METERED RESPIRATORY (INHALATION) at 15:28

## 2023-04-17 RX ADMIN — AMANTADINE HYDROCHLORIDE 100 MG: 100 CAPSULE ORAL at 22:00

## 2023-04-17 RX ADMIN — ASPIRIN 81 MG CHEWABLE TABLET 81 MG: 81 TABLET CHEWABLE at 08:18

## 2023-04-17 RX ADMIN — CLONAZEPAM 1 MG: 1 TABLET ORAL at 22:02

## 2023-04-17 RX ADMIN — Medication 1 CAPSULE: at 08:19

## 2023-04-17 RX ADMIN — METOPROLOL SUCCINATE 50 MG: 50 TABLET, EXTENDED RELEASE ORAL at 08:18

## 2023-04-17 RX ADMIN — ENOXAPARIN SODIUM 60 MG: 60 INJECTION SUBCUTANEOUS at 22:01

## 2023-04-17 RX ADMIN — CARBIDOPA AND LEVODOPA 1.5 TABLET: 25; 100 TABLET ORAL at 08:18

## 2023-04-17 RX ADMIN — ENOXAPARIN SODIUM 60 MG: 60 INJECTION SUBCUTANEOUS at 15:01

## 2023-04-17 RX ADMIN — MONTELUKAST 10 MG: 10 TABLET, FILM COATED ORAL at 22:04

## 2023-04-17 RX ADMIN — ALBUTEROL SULFATE 2 PUFF: 90 AEROSOL, METERED RESPIRATORY (INHALATION) at 15:28

## 2023-04-17 RX ADMIN — IPRATROPIUM BROMIDE 2 PUFF: 17 AEROSOL, METERED RESPIRATORY (INHALATION) at 22:26

## 2023-04-17 RX ADMIN — PANTOPRAZOLE SODIUM 40 MG: 40 TABLET, DELAYED RELEASE ORAL at 04:31

## 2023-04-17 RX ADMIN — AMANTADINE HYDROCHLORIDE 100 MG: 100 CAPSULE ORAL at 08:18

## 2023-04-17 RX ADMIN — DOXYCYCLINE 100 MG: 100 CAPSULE ORAL at 08:19

## 2023-04-17 RX ADMIN — CILOSTAZOL 100 MG: 50 TABLET ORAL at 08:18

## 2023-04-17 RX ADMIN — REMDESIVIR 100 MG: 100 INJECTION, POWDER, LYOPHILIZED, FOR SOLUTION INTRAVENOUS at 11:26

## 2023-04-17 RX ADMIN — ALBUTEROL SULFATE 2 PUFF: 90 AEROSOL, METERED RESPIRATORY (INHALATION) at 09:22

## 2023-04-17 RX ADMIN — CARBIDOPA AND LEVODOPA 1 TABLET: 25; 100 TABLET ORAL at 11:26

## 2023-04-17 RX ADMIN — CEFTRIAXONE 1 G: 1 INJECTION, POWDER, FOR SOLUTION INTRAMUSCULAR; INTRAVENOUS at 09:20

## 2023-04-17 RX ADMIN — DOXYCYCLINE 100 MG: 100 CAPSULE ORAL at 22:02

## 2023-04-17 NOTE — CASE MANAGEMENT/SOCIAL WORK
Discharge Planning Assessment  [facility]     Patient Name: Flaco Roque II  MRN: [id number]  Today's Date: 4/17/2023    Admit Date: 4/15/2023    Plan: discharge plan   Discharge Needs Assessment     Row Name 04/17/23 1009       Living Environment    People in Home spouse    Name(s) of People in Home Cheryl(spouse)    Current Living Arrangements home    Primary Care Provided by self    Provides Primary Care For no one, unable/limited ability to care for self    Family Caregiver if Needed spouse    Family Caregiver Names Cheryl(spouse)    Quality of Family Relationships helpful;involved;supportive    Able to Return to Prior Arrangements yes    Living Arrangement Comments I spoke with pt's spouse on room phone due to pt being in covid isolation. Pt resides in [facility] [place] with spouse, Cheryl.       Resource/Environmental Concerns    Resource/Environmental Concerns none       Transition Planning    Patient/Family Anticipates Transition to home with family    Patient/Family Anticipated Services at Transition     Transportation Anticipated family or friend will provide       Discharge Needs Assessment    Readmission Within the Last 30 Days no previous admission in last 30 days    Equipment Currently Used at Home none    Concerns to be Addressed discharge planning    Equipment Needed After Discharge other (see comments)  transport wheelchair    Discharge Coordination/Progress Pt's spouse confirmed that pt has Medicare and Human insurance with prescription coverage. Pt uses [facility] in [place]. Pt has history of Afib and Parkisons and here with covid. Spouse states pt is usually independent with ADLS. Plan is home at discharge. Spouse is requesting a transport chair at discharge, requesting referral to be made to [facility] in [facility] or [place]. CM will cont to follow               Discharge Plan     Row Name 04/17/23 3417       Plan    Plan discharge plan    Plan Comments Plan is home at discharge.  Spouse is requesting a transport chair at discharge, requesting referral to be made to [facility] in [facility] or [place]. CM will cont to follow    Final Discharge Disposition Code 01 - home or self-care              Continued Care and Services - Admitted Since 4/15/2023    Coordination has not been started for this encounter.       Expected Discharge Date and Time     Expected Discharge Date Expected Discharge Time    Apr 21, 2023          Demographic Summary     Row Name 04/17/23 8468       General Information    General Information Comments PCP is SALAS GONCALVES       Contact Information    Permission Granted to Share Info With     Contact Information Obtained for     Contact Information Comments Cheryl Roque(spouse) [phone]               Functional Status    No documentation.                Psychosocial    No documentation.                Abuse/Neglect    No documentation.                Legal    No documentation.                Substance Abuse    No documentation.                Patient Forms    No documentation.                   Nelda Grier RN
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
## 2023-04-17 NOTE — PLAN OF CARE
Problem: Adult Inpatient Plan of Care  Goal: Plan of Care Review  Outcome: Ongoing, Progressing  Goal: Patient-Specific Goal (Individualized)  Outcome: Ongoing, Progressing  Goal: Absence of Hospital-Acquired Illness or Injury  Outcome: Ongoing, Progressing  Intervention: Identify and Manage Fall Risk  Recent Flowsheet Documentation  Taken 4/16/2023 1930 by Prieto Pierce RN  Safety Promotion/Fall Prevention:   activity supervised   assistive device/personal items within reach   clutter free environment maintained   elopement precautions   fall prevention program maintained   muscle strengthening facilitated   mobility aid in reach   lighting adjusted   nonskid shoes/slippers when out of bed   safety round/check completed   room organization consistent  Intervention: Prevent and Manage VTE (Venous Thromboembolism) Risk  Recent Flowsheet Documentation  Taken 4/16/2023 1930 by Prieto Pierce RN  Range of Motion: ROM (range of motion) performed  Intervention: Prevent Infection  Recent Flowsheet Documentation  Taken 4/16/2023 1930 by Prieto Pierce RN  Infection Prevention:   single patient room provided   visitors restricted/screened   rest/sleep promoted   environmental surveillance performed   hand hygiene promoted   personal protective equipment utilized   equipment surfaces disinfected  Goal: Optimal Comfort and Wellbeing  Outcome: Ongoing, Progressing  Intervention: Monitor Pain and Promote Comfort  Recent Flowsheet Documentation  Taken 4/16/2023 1950 by Prieto Pierce RN  Pain Management Interventions:   medication offered but refused   other (see comments)  Intervention: Provide Person-Centered Care  Recent Flowsheet Documentation  Taken 4/16/2023 1930 by Prieto Pierce RN  Trust Relationship/Rapport:   care explained   choices provided   emotional support provided   empathic listening provided   questions answered   questions encouraged   thoughts/feelings acknowledged   reassurance provided  Goal:  Readiness for Transition of Care  Outcome: Ongoing, Progressing     Problem: COPD (Chronic Obstructive Pulmonary Disease) Comorbidity  Goal: Maintenance of COPD Symptom Control  Outcome: Ongoing, Progressing  Intervention: Maintain COPD-Symptom Control  Recent Flowsheet Documentation  Taken 4/16/2023 1930 by Prieto Pierce RN  Medication Review/Management:   medications reviewed   high-risk medications identified     Problem: Hypertension Comorbidity  Goal: Blood Pressure in Desired Range  Outcome: Ongoing, Progressing  Intervention: Maintain Blood Pressure Management  Recent Flowsheet Documentation  Taken 4/16/2023 1930 by Prieto Pierce RN  Medication Review/Management:   medications reviewed   high-risk medications identified     Problem: Obstructive Sleep Apnea Risk or Actual Comorbidity Management  Goal: Unobstructed Breathing During Sleep  Outcome: Ongoing, Progressing     Problem: Pain Chronic (Persistent) (Comorbidity Management)  Goal: Acceptable Pain Control and Functional Ability  Outcome: Ongoing, Progressing  Intervention: Manage Persistent Pain  Recent Flowsheet Documentation  Taken 4/16/2023 1930 by Prieto Pierce RN  Medication Review/Management:   medications reviewed   high-risk medications identified  Intervention: Develop Pain Management Plan  Recent Flowsheet Documentation  Taken 4/16/2023 1950 by Prieto Pierce RN  Pain Management Interventions:   medication offered but refused   other (see comments)     Problem: Skin Injury Risk Increased  Goal: Skin Health and Integrity  Outcome: Ongoing, Progressing  Intervention: Optimize Skin Protection  Recent Flowsheet Documentation  Taken 4/16/2023 1930 by Prieto Pierce RN  Pressure Reduction Techniques:   frequent weight shift encouraged   weight shift assistance provided  Pressure Reduction Devices: positioning supports utilized     Problem: Fall Injury Risk  Goal: Absence of Fall and Fall-Related Injury  Outcome: Ongoing,  Progressing  Intervention: Identify and Manage Contributors  Recent Flowsheet Documentation  Taken 4/16/2023 1930 by Prieto Pierce, RN  Medication Review/Management:   medications reviewed   high-risk medications identified  Intervention: Promote Injury-Free Environment  Recent Flowsheet Documentation  Taken 4/16/2023 1930 by Prieto Pierce, RN  Safety Promotion/Fall Prevention:   activity supervised   assistive device/personal items within reach   clutter free environment maintained   elopement precautions   fall prevention program maintained   muscle strengthening facilitated   mobility aid in reach   lighting adjusted   nonskid shoes/slippers when out of bed   safety round/check completed   room organization consistent   Goal Outcome Evaluation:

## 2023-04-17 NOTE — CONSULTS
"                    Clinical Nutrition       Patient Name: Flaco Roque II  YOB: 1945  MRN: [id number]  Date of Encounter: 04/17/23 12:51 EDT  Admission date: 4/15/2023      Reason for Visit   Nurse practioner/physician assistant consult  Supplement request     EMR Reviewed     EMR Reviewed: yes     Admission Diagnosis:  COVID-19 [U07.1]    Problem List:    COVID-19    ABRIL (obstructive sleep apnea)    Pulmonary emphysema    GERD without esophagitis    Parkinson disease    Acute respiratory failure with hypoxia    Hypokalemia    Peripheral arterial disease    Chronic atrial fibrillation with rapid ventricular response    Anthropometric      Flowsheet Rows    Flowsheet Row First Filed Value   Admission Height 172.7 cm (68\") Documented at 04/15/2023 1200   Admission Weight 62.6 kg (138 lb) Documented at 04/15/2023 1200          Height: 172.7 cm (68\")  Last Filed Weight: Weight: 62.6 kg (138 lb) (04/15/23 1200)  Weight Method: Stated  BMI: BMI (Calculated): 21  BMI classification: Normal: 18.5-24.9kg/m2   IBW:  154lb    Weight change: no significant changes per EMR      Reported/Observed/Food/Nutrition Related - Comments     Consult per RN patient requesting chocolate supplement.  RD started order for Boost + TID with meals.  Patient in COVID isolation, RD not able to reach patient over the phone.  Per MD note this morning, patient is confused, ? Issues with swallowing.  SLP eval ordered for today.      Current Nutrition Prescription     Diet: Regular/House Diet; Texture: Regular Texture (IDDSI 7); Fluid Consistency: Thin (IDDSI 0)  Orders Placed This Encounter      Dietary Nutrition Supplements Boost Plus; chocolate    Average Intake from Charting: 3 Days:  45% x 5 meals      Nutrition Diagnosis   4/17  Problem Predicted suboptimal energy intake   Etiology Clinical condition    Signs/Symptoms COVID virus, confusion, ? Dysphagia    Status:    Actions     Follow treatment progress, Care plan reviewed, " Supplement provided   - Will start Boost plus TID with meals   - Monitor SLP eval   - At risk for malnutrition; monitor clinical course for further recs/ interventions.     Monitor Per Protocol      Melissa Hoyt RD,   Time Spent: 25min

## 2023-04-17 NOTE — PROGRESS NOTES
[facility] Medicine Services  PROGRESS NOTE    Patient Name: Flaco Roque II  : 1945  MRN: [id number]    Date of Admission: 4/15/2023  Primary Care Physician: Azar Stern MD    Subjective   Subjective     CC:  F/u fever and weakness     HPI:  PT states he feels much better today     ROS:  Difficult to assess - denies pain or shortness of breath     Objective   Objective     Vital Signs:   Temp:  [96.3 °F (35.7 °C)-97.9 °F (36.6 °C)] 96.3 °F (35.7 °C)  Heart Rate:  [] 102  Resp:  [18-22] 20  BP: (101-127)/(58-76) 127/65  Flow (L/min):  [3-40] 4     Physical Exam:  Constitutional: No acute distress, awake, chronically ill, cachetic   HENT: NCAT, mucous membranes moist  Respiratory: Clear to auscultation bilaterally, respiratory effort normal   Cardiovascular: RRR, no murmurs, rubs, or gallops  Gastrointestinal:nondistended  Musculoskeletal: No bilateral ankle edema  Psychiatric: cooperative  Neurologic: Oriented x 3, speech slightly slurred   Skin: No rashes      Results Reviewed:  LAB RESULTS:      Lab 23  0536 23  0419 04/15/23  1207   WBC 11.93* 15.12* 11.94*   HEMOGLOBIN 10.5* 10.9* 12.2*   HEMATOCRIT 35.0* 35.3* 40.1   PLATELETS 230 227 190   NEUTROS ABS 10.67* 13.56* 10.83*   IMMATURE GRANS (ABS) 0.03 0.06* 0.03   LYMPHS ABS 0.49* 0.64* 0.24*   MONOS ABS 0.70 0.84 0.81   EOS ABS 0.02 0.00 0.01   MCV 81.0 79.1 79.1   PROCALCITONIN  --   --  0.63*   LACTATE  --   --  1.3   D DIMER QUANT 0.40 0.38 0.63         Lab 23  0536 23  0419 04/15/23  1815 04/15/23  1207   SODIUM 141 143  --  137   POTASSIUM 3.7 3.7  --  3.1*   CHLORIDE 108* 108*  --  102   CO2 27.0 28.0  --  24.0   ANION GAP 6.0 7.0  --  11.0   BUN 17 13  --  14   CREATININE 0.64* 0.65* 0.82 0.69*   EGFR 97.5 97.0 90.5 95.3   GLUCOSE 144* 128*  --  115*   CALCIUM 8.4* 8.4*  --  8.5*         Lab 23  0536 23  0419 04/15/23  1815 04/15/23  1207   TOTAL PROTEIN 5.3* 5.4*  5.7* 5.8*   ALBUMIN 3.1* 3.2* 3.8 3.6   GLOBULIN 2.2 2.2  --  2.2   ALT (SGPT) <5 <5 5 <5   AST (SGOT) 12 12 12 14   BILIRUBIN 0.3 0.5 0.9 0.8   INDIRECT BILIRUBIN  --   --  0.6  --    BILIRUBIN DIRECT <0.2 0.2 0.3  --    ALK PHOS 53 56 59 59   LIPASE  --   --   --  27         Lab 04/15/23  1511 04/15/23  1207   PROBNP  --  440.8   HSTROP T 23* 22*                 Brief Urine Lab Results     None          Microbiology Results Abnormal     Procedure Component Value - Date/Time    Blood Culture - Blood, Arm, Left [[id number]]  (Normal) Collected: 04/15/23 1219    Lab Status: Preliminary result Specimen: Blood from Arm, Left Updated: 04/16/23 1406     Blood Culture No growth at 24 hours    Blood Culture - Blood, Hand, Left [[id number]]  (Normal) Collected: 04/15/23 1219    Lab Status: Preliminary result Specimen: Blood from Hand, Left Updated: 04/16/23 1316     Blood Culture No growth at 24 hours          XR Shoulder 2+ View Right    Result Date: 4/17/2023  3 views right shoulder. DATE: 4/17/2023. COMPARISON: None available. CLINICAL HISTORY: Right shoulder pain.     Impression: There is a right shoulder arthroplasty which appears anatomically aligned. No acute osseous abnormalities are seen. Mild subacromial spurring is seen. There is scattered interstitial changes seen throughout the visualized portions of the right lung which is likely chronic. Electronically signed by:  Andre Salazar D.O.  4/16/2023 11:44 PM Mountain Time    XR Chest 1 View    Result Date: 4/15/2023  XR CHEST 1 VW Date of Exam: 4/15/2023 11:52 AM EDT Indication: Chest Pain Triage Protocol. Comparison: 6/4/2020 chest CT, 8/20/2019 chest radiograph Findings: Cardiomediastinal silhouette is within normal limits. Diffuse hazy opacities in the left mid to lower left lung. No pneumothorax. Possible trace left pleural effusion. Bilateral shoulder replacements. No evidence of acute osseous abnormalities. Visualized upper abdomen is unremarkable.      Impression: Impression: Diffuse hazy opacities in the left mid to lower lung concerning for infection. Possible trace left pleural effusion. Electronically Signed: Rome AdenKacey  4/15/2023 1:13 PM EDT  Workstation ID: [id number]    CT Angiogram Chest    Result Date: 4/15/2023  CT ANGIOGRAM CHEST Date of Exam: 4/15/2023 2:52 PM EDT Indication: covid with hypoxemia. Comparison: CT chest 6/4/2020, same day chest radiograph Technique: CTA of the chest was performed after the uneventful intravenous administration of 75 mL Isovue-370. Reconstructed coronal and sagittal images were also obtained. In addition, a 3-D volume rendered image was created for interpretation. Automated exposure control and iterative reconstruction methods were used. Findings: Diagnostic quality: Contrast opacification of the pulmonary arterial circulation is adequate for assessment of pulmonary embolism. Study is markedly limited by respiratory motion artifact. Pulmonary arteries: No evidence of pulmonary embolus to the proximal segmental arteries. Main pulmonary artery is enlarged measuring 3.2 cm. Heart and pericardium:No flattening of the interventricular septum. Coronary artery calcification is seen. No substantial pericardial effusion. Enlargement of the right ventricle. Vessels:Normal caliber aorta. Atherosclerotic calcification of the aorta. No evidence of acute aortic injury. Venous structures including the superior vena cava and inferior vena cava appear grossly patent. Mediastinum: Large hiatal hernia with intrathoracic stomach. There is fluid and debris within the lower esophagus. Few mildly prominent mediastinal lymph nodes, presumably reactive. No anterior mediastinal masses. Lower neck:No suspicious or enlarged supraclavicular or axillary lymphadenopathy. Limited evaluation due to streak from bilateral shoulder hardware. Pulmonary parenchyma: Emphysematous changes in the lungs. Markedly limited evaluation due to respiratory  motion. Calcified granulomas. Intralobular septal thickening throughout. Groundglass and consolidative opacities throughout the majority of the left lung predominantly in the lingula and left lower lobe. Minimal dependent atelectasis. No obvious suspicious pulmonary lesions. Pleura: Trace left pleural effusion. No pneumothorax. Airways: Likely debris and mucus plugging in the left lower lobe airways. Chest wall and bones:No acute osseous abnormality. Degenerative changes of thoracic spine. Bilateral gynecomastia. Upper abdomen: A few hepatic hypodensities are too small to characterize. Upper abdomen is unremarkable.     Impression: Impression: Markedly limited examination due to respiratory motion. No evidence of pulmonary embolus through the proximal segmental arteries. There is diffuse groundglass and consolidative opacity throughout the left lung predominantly in the left lower lobe and lingula. While infection is most likely, given the presence of a large hiatal hernia with fluid and debris in the distal esophagus, aspiration should also be considered. Enlarged main pulmonary artery and enlarged appearance of the right heart shadow is seen in the setting of pulmonary hypertension. No evidence of intraventricular septum flattening. Interlobular septal thickening seen throughout the lungs may represent a background of mild edema. Trace left pleural effusion. Electronically Signed: Rome Erazo  4/15/2023 3:35 PM EDT  Workstation ID: [id number]      Results for orders placed during the hospital encounter of 04/26/19    Adult Transthoracic Echo Complete W/ Cont if Necessary Per Protocol    Interpretation Summary  · Left ventricular systolic function is normal.  · Estimated EF appears to be in the range of 66 - 70%.      Current medications:  Scheduled Meds:albuterol sulfate HFA, 2 puff, Inhalation, TID - RT   And  ipratropium, 2 puff, Inhalation, TID - RT  amantadine, 100 mg, Oral, BID  aspirin, 81 mg, Oral,  Daily  atorvastatin, 10 mg, Oral, Daily  carbidopa-levodopa, 1 tablet, Oral, 4x Daily  cefTRIAXone, 1 g, Intravenous, Q24H  clonazePAM, 1 mg, Oral, Nightly  dexamethasone, 6 mg, Oral, Daily   Or  dexamethasone, 6 mg, Intravenous, Daily  doxycycline, 100 mg, Oral, Q12H  enoxaparin, 40 mg, Subcutaneous, Q24H  lactobacillus acidophilus, 1 capsule, Oral, Daily  metoprolol succinate XL, 50 mg, Oral, Daily  montelukast, 10 mg, Oral, Nightly  pantoprazole, 40 mg, Oral, Q AM  remdesivir, 100 mg, Intravenous, Q24H  sodium chloride, 10 mL, Intravenous, Q12H  tamsulosin, 0.4 mg, Oral, Nightly      Continuous Infusions:Pharmacy Consult - Remdesivir,       PRN Meds:.•  acetaminophen **OR** acetaminophen **OR** acetaminophen  •  Calcium Replacement - Follow Nurse / BPA Driven Protocol  •  HYDROcodone-acetaminophen  •  Magnesium Standard Dose Replacement - Follow Nurse / BPA Driven Protocol  •  metoprolol tartrate  •  Morphine  •  ondansetron **OR** ondansetron  •  Pharmacy Consult - Remdesivir  •  Phosphorus Replacement - Follow Nurse / BPA Driven Protocol  •  Potassium Replacement - Follow Nurse / BPA Driven Protocol  •  sodium chloride  •  sodium chloride  •  sodium chloride    Assessment & Plan   Assessment & Plan     Active Hospital Problems    Diagnosis  POA   • **COVID-19 [U07.1]  Yes   • Chronic atrial fibrillation with rapid ventricular response [I48.20]  Yes   • Peripheral arterial disease [I73.9]  Yes   • Hypokalemia [E87.6]  Yes   • Acute respiratory failure with hypoxia [J96.01]  Yes   • Parkinson disease [G20]  Yes   • ABRIL (obstructive sleep apnea) [G47.33]  Yes   • Pulmonary emphysema [J43.9]  Yes   • GERD without esophagitis [K21.9]  Yes      Resolved Hospital Problems   No resolved problems to display.        Brief Hospital Course to date:  Flaco Roque II is a 77 y.o. male  with a past medical history of atrial fibrillation, ABRIL, GERD, emphysema and Parkinson's disease that presented to the ED complaining of  fever/chills, rigors and chest pain with palpitations.      COVID-19  Acute Respiratory Failure  H/o pulmonary emphysema  Possible Aspiration Pneumonia   - patient covid  positive in the ED on day of admission- 4/15/23  - CTA chest -no PE, hiatal hernia and concern for aspiration pneumonia, pulmonary hTN   - Remdesivir/Decadron ordered per protocol  - Lovenox 40mg subq daily  -given elevated procal, will add doxy/rocephin   -SLP eval      Atrial Fibrillation with RVR  - home metoprolol continued, currently rate controlled   - cardiology following       Hypokalemia  - replace per protocol     Parkinsons disease  - continue home carbidopa/levadopa     ABRIL  - cpap intolerant     GERD          Expected Discharge Location and Transportation: home vs rehab  Expected Discharge   Expected Discharge Date and Time     Expected Discharge Date Expected Discharge Time    Apr 21, 2023            DVT prophylaxis:  Medical and mechanical DVT prophylaxis orders are present.     AM-PAC 6 Clicks Score (PT): 18 (04/16/23 1930)    CODE STATUS:   Code Status and Medical Interventions:   Ordered at: 04/15/23 1433     Level Of Support Discussed With:    Patient     Code Status (Patient has no pulse and is not breathing):    CPR (Attempt to Resuscitate)     Medical Interventions (Patient has pulse or is breathing):    Full Support     Release to patient:    Routine Release       Kylah Esteban,   04/17/23

## 2023-04-17 NOTE — PROGRESS NOTES
"[facility] Progress Note     LOS: 2 days   Patient Care Team:  Azar Stern MD as PCP - General (Family Medicine)  Zayda Beach MD as Consulting Physician (Infectious Diseases)  Alf Edwards MD as Consulting Physician (Pulmonary Disease)  Von Kilpatrick MD as Consulting Physician (Cardiology)  PCP:  Azar Stern MD    Chief Complaint: Follow-up atrial fibrillation, COVID-19 pneumonia    Subjective: Patient currently in sinus rhythm but with frequent PACs and ectopy.  Has altered mental status secondary to acuity of illness.  Concern for possible dysphagia as well.      Review of Systems:   All systems have been reviewed and are negative with the exception of those mentioned above.      Objective:    Vital Sign Min/Max for last 24 hours  Temp  Min: 96.3 °F (35.7 °C)  Max: 97.9 °F (36.6 °C)   BP  Min: 101/58  Max: 127/65   Pulse  Min: 71  Max: 107   Resp  Min: 18  Max: 22   SpO2  Min: 90 %  Max: 99 %   No data recorded   No data recorded     Flowsheet Rows    Flowsheet Row First Filed Value   Admission Height 172.7 cm (68\") Documented at 04/15/2023 1200   Admission Weight 62.6 kg (138 lb) Documented at 04/15/2023 1200          Telemetry: Sinus rhythm with frequent ectopy      Intake/Output Summary (Last 24 hours) at 4/17/2023 1206  Last data filed at 4/17/2023 0920  Gross per 24 hour   Intake 520 ml   Output 250 ml   Net 270 ml     Intake & Output (last 3 days)       04/14 0701  04/15 0700 04/15 0701  04/16 0700 04/16 0701  04/17 0700 04/17 0701  04/18 0700    P.O.   400 240    IV Piggyback  1050      Total Intake(mL/kg)  1050 (16.8) 400 (6.4) 240 (3.8)    Urine (mL/kg/hr)  550 250 (0.2)     Total Output  550 250     Net  +500 +150 +240            Urine Unmeasured Occurrence   1 x 2 x           Physical Exam:  Constitutional:       Appearance: Acutely ill-appearing.   Pulmonary:      Breath sounds: Rhonchi present.   Cardiovascular: "      Frequent ectopic beats. Irregular rhythm.      Murmurs: There is no murmur.   Edema:     Peripheral edema absent.   Neurological:      Mental Status: Exhibits altered mental status.          LABS/DIAGNOSTIC DATA:  Results from last 7 days   Lab Units 04/17/23  0536 04/16/23  0419 04/15/23  1207   WBC 10*3/mm3 11.93* 15.12* 11.94*   HEMOGLOBIN g/dL 10.5* 10.9* 12.2*   HEMATOCRIT % 35.0* 35.3* 40.1   PLATELETS 10*3/mm3 230 227 190     Lab Results   Lab Value Date/Time    TROPONINT 23 (H) 04/15/2023 1511    TROPONINT 22 (H) 04/15/2023 1207    TROPONINT <0.010 08/20/2019 1755    TROPONINT <0.010 07/28/2019 1715         Results from last 7 days   Lab Units 04/17/23  0536 04/16/23  0419 04/15/23  1815 04/15/23  1207   SODIUM mmol/L 141 143  --  137   POTASSIUM mmol/L 3.7 3.7  --  3.1*   CHLORIDE mmol/L 108* 108*  --  102   CO2 mmol/L 27.0 28.0  --  24.0   BUN mg/dL 17 13  --  14   CREATININE mg/dL 0.64* 0.65* 0.82 0.69*   CALCIUM mg/dL 8.4* 8.4*  --  8.5*   BILIRUBIN mg/dL 0.3 0.5 0.9 0.8   ALK PHOS U/L 53 56 59 59   ALT (SGPT) U/L <5 <5 5 <5   AST (SGOT) U/L 12 12 12 14   GLUCOSE mg/dL 144* 128*  --  115*                       Medication Review:   albuterol sulfate HFA, 2 puff, Inhalation, TID - RT   And  ipratropium, 2 puff, Inhalation, TID - RT  amantadine, 100 mg, Oral, BID  aspirin, 81 mg, Oral, Daily  atorvastatin, 10 mg, Oral, Daily  carbidopa-levodopa, 1 tablet, Oral, 4x Daily  cefTRIAXone, 1 g, Intravenous, Q24H  clonazePAM, 1 mg, Oral, Nightly  dexamethasone, 6 mg, Oral, Daily   Or  dexamethasone, 6 mg, Intravenous, Daily  doxycycline, 100 mg, Oral, Q12H  enoxaparin, 1 mg/kg, Subcutaneous, Q12H  lactobacillus acidophilus, 1 capsule, Oral, Daily  metoprolol succinate XL, 50 mg, Oral, Daily  montelukast, 10 mg, Oral, Nightly  pantoprazole, 40 mg, Oral, Q AM  remdesivir, 100 mg, Intravenous, Q24H  sodium chloride, 10 mL, Intravenous, Q12H  tamsulosin, 0.4 mg, Oral, Nightly       Pharmacy Consult - Remdesivir,             COVID-19    ABRIL (obstructive sleep apnea)    Pulmonary emphysema    GERD without esophagitis    Parkinson disease    Acute respiratory failure with hypoxia    Hypokalemia    Peripheral arterial disease    Chronic atrial fibrillation with rapid ventricular response      Assessment/Plan:    1.  New onset atrial fibrillation with RVR  -Currently in sinus rhythm with frequent ectopy  -Continue Toprol-XL 50 mg daily  -Continue aspirin 81 mg daily  -Trial of full anticoagulation with Lovenox 1 mg/kilogram every 12 hours.  If tolerating will discuss Eliquis 5 mg twice daily at discharge  -Discussed rationale for anticoagulation with patient's son      2.  COVID-19 pneumonia  -Receiving antiviral treatment as well as antibiotics, steroid  -Management per primary team    3.  History of PAD  -Discontinue cilostazol with initiation of Lovenox    We will plan to reevaluate the patient on Wednesday.  Guarded prognosis currently.      Von Kilpatrick MD [facility]  04/17/23

## 2023-04-17 NOTE — PLAN OF CARE
Goal Outcome Evaluation:  Plan of Care Reviewed With: patient           Outcome Evaluation: PT evaluation completed.  Pt required MinAx1 for all mobility including ambulating 20 feet with HHA.  Pt presents below baseline function d/t decreased activity tolerance, gait instability, cognition, and balance deficits.  Pt was very independent with all mobility prior to admission - recommend inpt rehab at d/c.

## 2023-04-17 NOTE — PLAN OF CARE
Goal Outcome Evaluation:  Plan of Care Reviewed With: patient           Outcome Evaluation: Pt presents below baseline function with self care and mobility d/t cognition, decreased activity tolerance and balance.  OT will follow to advance pt toward PLOF.  Recommend IP rehab upon d/c.

## 2023-04-17 NOTE — PLAN OF CARE
Goal Outcome Evaluation:  Plan of Care Reviewed With: patient, spouse      SLP evaluation completed. Will continue to address dysphagia via MBS tomorrow. Please see note for further details and recommendations.

## 2023-04-17 NOTE — NURSING NOTE
Patient now stating right shoulder is hurting and was not hurting prior to his fall. Notified Luis M Pepper PA-C and got a right shoulder xray ordered. Patient voices no other complaints at this time.
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
## 2023-04-18 ENCOUNTER — APPOINTMENT (OUTPATIENT)
Dept: GENERAL RADIOLOGY | Facility: HOSPITAL | Age: 78
End: 2023-04-18
Payer: MEDICARE

## 2023-04-18 LAB
ALBUMIN SERPL-MCNC: 3.2 G/DL (ref 3.5–5.2)
ALBUMIN/GLOB SERPL: 1.8 G/DL
ALP SERPL-CCNC: 55 U/L (ref 39–117)
ALT SERPL W P-5'-P-CCNC: <5 U/L (ref 1–41)
ANION GAP SERPL CALCULATED.3IONS-SCNC: 6 MMOL/L (ref 5–15)
AST SERPL-CCNC: 10 U/L (ref 1–40)
BASOPHILS # BLD AUTO: 0.01 10*3/MM3 (ref 0–0.2)
BASOPHILS NFR BLD AUTO: 0.1 % (ref 0–1.5)
BILIRUB CONJ SERPL-MCNC: <0.2 MG/DL (ref 0–0.3)
BILIRUB SERPL-MCNC: 0.3 MG/DL (ref 0–1.2)
BUN SERPL-MCNC: 23 MG/DL (ref 8–23)
BUN/CREAT SERPL: 40.4 (ref 7–25)
CALCIUM SPEC-SCNC: 8.7 MG/DL (ref 8.6–10.5)
CHLORIDE SERPL-SCNC: 107 MMOL/L (ref 98–107)
CO2 SERPL-SCNC: 26 MMOL/L (ref 22–29)
CREAT SERPL-MCNC: 0.57 MG/DL (ref 0.76–1.27)
D DIMER PPP FEU-MCNC: 0.32 MCGFEU/ML (ref 0–0.77)
DEPRECATED RDW RBC AUTO: 45.9 FL (ref 37–54)
EGFRCR SERPLBLD CKD-EPI 2021: 101 ML/MIN/1.73
EOSINOPHIL # BLD AUTO: 0 10*3/MM3 (ref 0–0.4)
EOSINOPHIL NFR BLD AUTO: 0 % (ref 0.3–6.2)
ERYTHROCYTE [DISTWIDTH] IN BLOOD BY AUTOMATED COUNT: 16.1 % (ref 12.3–15.4)
GLOBULIN UR ELPH-MCNC: 1.8 GM/DL
GLUCOSE SERPL-MCNC: 114 MG/DL (ref 65–99)
HCT VFR BLD AUTO: 34.1 % (ref 37.5–51)
HGB BLD-MCNC: 10.4 G/DL (ref 13–17.7)
IMM GRANULOCYTES # BLD AUTO: 0.04 10*3/MM3 (ref 0–0.05)
IMM GRANULOCYTES NFR BLD AUTO: 0.3 % (ref 0–0.5)
LYMPHOCYTES # BLD AUTO: 0.49 10*3/MM3 (ref 0.7–3.1)
LYMPHOCYTES NFR BLD AUTO: 4.2 % (ref 19.6–45.3)
MCH RBC QN AUTO: 24.2 PG (ref 26.6–33)
MCHC RBC AUTO-ENTMCNC: 30.5 G/DL (ref 31.5–35.7)
MCV RBC AUTO: 79.3 FL (ref 79–97)
MONOCYTES # BLD AUTO: 0.8 10*3/MM3 (ref 0.1–0.9)
MONOCYTES NFR BLD AUTO: 6.8 % (ref 5–12)
NEUTROPHILS NFR BLD AUTO: 10.44 10*3/MM3 (ref 1.7–7)
NEUTROPHILS NFR BLD AUTO: 88.6 % (ref 42.7–76)
NRBC BLD AUTO-RTO: 0 /100 WBC (ref 0–0.2)
PLATELET # BLD AUTO: 265 10*3/MM3 (ref 140–450)
PMV BLD AUTO: 12 FL (ref 6–12)
POTASSIUM SERPL-SCNC: 4.4 MMOL/L (ref 3.5–5.2)
PROT SERPL-MCNC: 5 G/DL (ref 6–8.5)
RBC # BLD AUTO: 4.3 10*6/MM3 (ref 4.14–5.8)
SODIUM SERPL-SCNC: 139 MMOL/L (ref 136–145)
WBC NRBC COR # BLD: 11.78 10*3/MM3 (ref 3.4–10.8)

## 2023-04-18 PROCEDURE — 94799 UNLISTED PULMONARY SVC/PX: CPT

## 2023-04-18 PROCEDURE — 25010000002 REMDESIVIR 100 MG/20ML SOLUTION 1 EACH VIAL: Performed by: HOSPITALIST

## 2023-04-18 PROCEDURE — 25010000002 CEFTRIAXONE PER 250 MG: Performed by: FAMILY MEDICINE

## 2023-04-18 PROCEDURE — 92611 MOTION FLUOROSCOPY/SWALLOW: CPT

## 2023-04-18 PROCEDURE — 80053 COMPREHEN METABOLIC PANEL: CPT | Performed by: HOSPITALIST

## 2023-04-18 PROCEDURE — 82248 BILIRUBIN DIRECT: CPT | Performed by: HOSPITALIST

## 2023-04-18 PROCEDURE — 97530 THERAPEUTIC ACTIVITIES: CPT

## 2023-04-18 PROCEDURE — 74230 X-RAY XM SWLNG FUNCJ C+: CPT

## 2023-04-18 PROCEDURE — 94664 DEMO&/EVAL PT USE INHALER: CPT

## 2023-04-18 PROCEDURE — 63710000001 DEXAMETHASONE PER 0.25 MG: Performed by: HOSPITALIST

## 2023-04-18 PROCEDURE — 85025 COMPLETE CBC W/AUTO DIFF WBC: CPT | Performed by: HOSPITALIST

## 2023-04-18 PROCEDURE — 85379 FIBRIN DEGRADATION QUANT: CPT | Performed by: HOSPITALIST

## 2023-04-18 PROCEDURE — 99232 SBSQ HOSP IP/OBS MODERATE 35: CPT | Performed by: HOSPITALIST

## 2023-04-18 PROCEDURE — 25010000002 ENOXAPARIN PER 10 MG: Performed by: INTERNAL MEDICINE

## 2023-04-18 RX ORDER — METOPROLOL SUCCINATE 100 MG/1
100 TABLET, EXTENDED RELEASE ORAL DAILY
Status: DISCONTINUED | OUTPATIENT
Start: 2023-04-18 | End: 2023-04-23

## 2023-04-18 RX ORDER — QUETIAPINE FUMARATE 25 MG/1
25 TABLET, FILM COATED ORAL EVERY EVENING
Status: DISCONTINUED | OUTPATIENT
Start: 2023-04-18 | End: 2023-04-25 | Stop reason: HOSPADM

## 2023-04-18 RX ADMIN — BARIUM SULFATE 50 ML: 400 SUSPENSION ORAL at 11:45

## 2023-04-18 RX ADMIN — CARBIDOPA AND LEVODOPA 1 TABLET: 25; 100 TABLET ORAL at 09:31

## 2023-04-18 RX ADMIN — SENNOSIDES AND DOCUSATE SODIUM 2 TABLET: 8.6; 5 TABLET ORAL at 09:30

## 2023-04-18 RX ADMIN — TAMSULOSIN HYDROCHLORIDE 0.4 MG: 0.4 CAPSULE ORAL at 20:13

## 2023-04-18 RX ADMIN — BARIUM SULFATE 20 ML: 400 PASTE ORAL at 11:45

## 2023-04-18 RX ADMIN — DEXAMETHASONE 6 MG: 4 TABLET ORAL at 09:30

## 2023-04-18 RX ADMIN — ALBUTEROL SULFATE 2 PUFF: 90 AEROSOL, METERED RESPIRATORY (INHALATION) at 09:14

## 2023-04-18 RX ADMIN — ENOXAPARIN SODIUM 60 MG: 60 INJECTION SUBCUTANEOUS at 00:52

## 2023-04-18 RX ADMIN — ALBUTEROL SULFATE 2 PUFF: 90 AEROSOL, METERED RESPIRATORY (INHALATION) at 20:19

## 2023-04-18 RX ADMIN — REMDESIVIR 100 MG: 100 INJECTION, POWDER, LYOPHILIZED, FOR SOLUTION INTRAVENOUS at 12:12

## 2023-04-18 RX ADMIN — ATORVASTATIN CALCIUM 10 MG: 10 TABLET, FILM COATED ORAL at 09:30

## 2023-04-18 RX ADMIN — Medication 1 CAPSULE: at 09:30

## 2023-04-18 RX ADMIN — METOPROLOL SUCCINATE 100 MG: 100 TABLET, EXTENDED RELEASE ORAL at 09:31

## 2023-04-18 RX ADMIN — POLYETHYLENE GLYCOL 3350 17 G: 17 POWDER, FOR SOLUTION ORAL at 17:02

## 2023-04-18 RX ADMIN — BARIUM SULFATE 100 ML: 0.81 POWDER, FOR SUSPENSION ORAL at 11:45

## 2023-04-18 RX ADMIN — BISACODYL 5 MG: 5 TABLET, COATED ORAL at 09:30

## 2023-04-18 RX ADMIN — SENNOSIDES AND DOCUSATE SODIUM 2 TABLET: 8.6; 5 TABLET ORAL at 20:13

## 2023-04-18 RX ADMIN — IPRATROPIUM BROMIDE 2 PUFF: 17 AEROSOL, METERED RESPIRATORY (INHALATION) at 20:19

## 2023-04-18 RX ADMIN — CARBIDOPA AND LEVODOPA 1 TABLET: 25; 100 TABLET ORAL at 12:12

## 2023-04-18 RX ADMIN — DOXYCYCLINE 100 MG: 100 CAPSULE ORAL at 09:30

## 2023-04-18 RX ADMIN — AMANTADINE HYDROCHLORIDE 100 MG: 100 CAPSULE ORAL at 09:29

## 2023-04-18 RX ADMIN — MONTELUKAST 10 MG: 10 TABLET, FILM COATED ORAL at 20:13

## 2023-04-18 RX ADMIN — ENOXAPARIN SODIUM 60 MG: 60 INJECTION SUBCUTANEOUS at 12:13

## 2023-04-18 RX ADMIN — DOXYCYCLINE 100 MG: 100 CAPSULE ORAL at 20:13

## 2023-04-18 RX ADMIN — CLONAZEPAM 1 MG: 1 TABLET ORAL at 20:13

## 2023-04-18 RX ADMIN — ASPIRIN 81 MG CHEWABLE TABLET 81 MG: 81 TABLET CHEWABLE at 09:30

## 2023-04-18 RX ADMIN — PANTOPRAZOLE SODIUM 40 MG: 40 TABLET, DELAYED RELEASE ORAL at 05:47

## 2023-04-18 RX ADMIN — IPRATROPIUM BROMIDE 2 PUFF: 17 AEROSOL, METERED RESPIRATORY (INHALATION) at 09:13

## 2023-04-18 RX ADMIN — CEFTRIAXONE 1 G: 1 INJECTION, POWDER, FOR SOLUTION INTRAMUSCULAR; INTRAVENOUS at 09:29

## 2023-04-18 RX ADMIN — AMANTADINE HYDROCHLORIDE 100 MG: 100 CAPSULE ORAL at 20:12

## 2023-04-18 RX ADMIN — CARBIDOPA AND LEVODOPA 1 TABLET: 25; 100 TABLET ORAL at 20:13

## 2023-04-18 RX ADMIN — CARBIDOPA AND LEVODOPA 1 TABLET: 25; 100 TABLET ORAL at 17:02

## 2023-04-18 RX ADMIN — QUETIAPINE FUMARATE 25 MG: 25 TABLET ORAL at 17:02

## 2023-04-18 RX ADMIN — Medication 10 ML: at 09:31
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
## 2023-04-18 NOTE — CASE MANAGEMENT/SOCIAL WORK
Continued Stay Note  [facility]     Patient Name: Flaco Roque II  MRN: [id number]  Today's Date: 4/18/2023    Admit Date: 4/15/2023    Plan: discharge plan   Discharge Plan     Row Name 04/18/23 1728       Plan    Plan discharge plan    Plan Comments Pt currently has a sitter and not medically ready for discharge. I spoke with pt's spouse on phone an discharge plan pending hospital course. Pt/spouse asking for tranport w/c. I attempted to make a referral to [facility] in [place] and [place]. Both facilities do not have transport chairs in stock and state they have a hard time getting insurance to pay for tranport wheel chairs. I made a referral to [facility] for transport wheelchair. Arpan with [facility] is aware pt is not medically ready for discharge today, but will deliver one to pts room prior to discharge.  CM will cont to follow    Final Discharge Disposition Code 30 - still a patient               Discharge Codes    No documentation.               Expected Discharge Date and Time     Expected Discharge Date Expected Discharge Time    Apr 21, 2023             Nelda Grier RN

## 2023-04-18 NOTE — PROGRESS NOTES
[facility] Medicine Services  PROGRESS NOTE    Patient Name: Flaco Roque II  : 1945  MRN: [id number]    Date of Admission: 4/15/2023  Primary Care Physician: Azar Stern MD    Subjective   Subjective     CC: Weakness    HPI: Up in chair. Tired. Notes ache in RLE. No f/c. Cough with brown sputum.     ROS:  Limited    Objective   Objective     Vital Signs:   Temp:  [95.1 °F (35.1 °C)-98.2 °F (36.8 °C)] 97.4 °F (36.3 °C)  Heart Rate:  [] 80  Resp:  [18-26] 20  BP: (119-148)/(71-81) 132/74  Flow (L/min):  [4] 4     Physical Exam:  NAD, alert  OP clear, dry MM  Neck supple  No LAD  Irregular, tachycardia to 120s  Decreased R base  +BS, soft  No c/c/e  No rashes  WHITE    Results Reviewed:  LAB RESULTS:      Lab 23  0406 23  0536 23  0419 04/15/23  1207   WBC 11.78* 11.93* 15.12* 11.94*   HEMOGLOBIN 10.4* 10.5* 10.9* 12.2*   HEMATOCRIT 34.1* 35.0* 35.3* 40.1   PLATELETS 265 230 227 190   NEUTROS ABS 10.44* 10.67* 13.56* 10.83*   IMMATURE GRANS (ABS) 0.04 0.03 0.06* 0.03   LYMPHS ABS 0.49* 0.49* 0.64* 0.24*   MONOS ABS 0.80 0.70 0.84 0.81   EOS ABS 0.00 0.02 0.00 0.01   MCV 79.3 81.0 79.1 79.1   PROCALCITONIN  --   --   --  0.63*   LACTATE  --   --   --  1.3   D DIMER QUANT 0.32 0.40 0.38 0.63         Lab 23  0406 23  0536 23  0419 04/15/23  1815 04/15/23  1207   SODIUM 139 141 143  --  137   POTASSIUM 4.4 3.7 3.7  --  3.1*   CHLORIDE 107 108* 108*  --  102   CO2 26.0 27.0 28.0  --  24.0   ANION GAP 6.0 6.0 7.0  --  11.0   BUN  17 13  --  14   CREATININE 0.57* 0.64* 0.65* 0.82 0.69*   EGFR 101.0 97.5 97.0 90.5 95.3   GLUCOSE 114* 144* 128*  --  115*   CALCIUM 8.7 8.4* 8.4*  --  8.5*         Lab 23  0406 23  0536 23  0419 04/15/23  1815 04/15/23  1207   TOTAL PROTEIN 5.0* 5.3* 5.4* 5.7* 5.8*   ALBUMIN 3.2* 3.1* 3.2* 3.8 3.6   GLOBULIN 1.8 2.2 2.2  --  2.2   ALT (SGPT) <5 <5 <5 5 <5   AST (SGOT) 10 12 12 12 14    BILIRUBIN 0.3 0.3 0.5 0.9 0.8   INDIRECT BILIRUBIN  --   --   --  0.6  --    BILIRUBIN DIRECT <0.2 <0.2 0.2 0.3  --    ALK PHOS 55 53 56 59 59   LIPASE  --   --   --   --  27         Lab 04/15/23  1511 04/15/23  1207   PROBNP  --  440.8   HSTROP T 23* 22*                 Brief Urine Lab Results     None          Microbiology Results Abnormal     Procedure Component Value - Date/Time    Blood Culture - Blood, Arm, Left [[id number]]  (Normal) Collected: 04/15/23 1219    Lab Status: Preliminary result Specimen: Blood from Arm, Left Updated: 04/17/23 1315     Blood Culture No growth at 2 days    Blood Culture - Blood, Hand, Left [[id number]]  (Normal) Collected: 04/15/23 1219    Lab Status: Preliminary result Specimen: Blood from Hand, Left Updated: 04/17/23 1315     Blood Culture No growth at 2 days          XR Shoulder 2+ View Right    Result Date: 4/17/2023  3 views right shoulder. DATE: 4/17/2023. COMPARISON: None available. CLINICAL HISTORY: Right shoulder pain.     Impression: There is a right shoulder arthroplasty which appears anatomically aligned. No acute osseous abnormalities are seen. Mild subacromial spurring is seen. There is scattered interstitial changes seen throughout the visualized portions of the right lung which is likely chronic. Electronically signed by:  Andre Salazar D.O.  4/16/2023 11:44 PM Mountain Time      Results for orders placed during the hospital encounter of 04/26/19    Adult Transthoracic Echo Complete W/ Cont if Necessary Per Protocol    Interpretation Summary  · Left ventricular systolic function is normal.  · Estimated EF appears to be in the range of 66 - 70%.      Current medications:  Scheduled Meds:albuterol sulfate HFA, 2 puff, Inhalation, TID - RT   And  ipratropium, 2 puff, Inhalation, TID - RT  amantadine, 100 mg, Oral, BID  aspirin, 81 mg, Oral, Daily  atorvastatin, 10 mg, Oral, Daily  carbidopa-levodopa, 1 tablet, Oral, 4x Daily  cefTRIAXone, 1 g, Intravenous,  Q24H  clonazePAM, 1 mg, Oral, Nightly  dexamethasone, 6 mg, Oral, Daily   Or  dexamethasone, 6 mg, Intravenous, Daily  doxycycline, 100 mg, Oral, Q12H  enoxaparin, 1 mg/kg, Subcutaneous, Q12H  lactobacillus acidophilus, 1 capsule, Oral, Daily  metoprolol succinate XL, 100 mg, Oral, Daily  montelukast, 10 mg, Oral, Nightly  pantoprazole, 40 mg, Oral, Q AM  remdesivir, 100 mg, Intravenous, Q24H  senna-docusate sodium, 2 tablet, Oral, BID  sodium chloride, 10 mL, Intravenous, Q12H  tamsulosin, 0.4 mg, Oral, Nightly      Continuous Infusions:Pharmacy Consult - Remdesivir,       PRN Meds:.•  acetaminophen **OR** acetaminophen **OR** acetaminophen  •  senna-docusate sodium **AND** polyethylene glycol **AND** bisacodyl **AND** bisacodyl  •  Calcium Replacement - Follow Nurse / BPA Driven Protocol  •  HYDROcodone-acetaminophen  •  Magnesium Standard Dose Replacement - Follow Nurse / BPA Driven Protocol  •  metoprolol tartrate  •  Morphine  •  ondansetron **OR** ondansetron  •  Pharmacy Consult - Remdesivir  •  Phosphorus Replacement - Follow Nurse / BPA Driven Protocol  •  Potassium Replacement - Follow Nurse / BPA Driven Protocol  •  sodium chloride  •  sodium chloride  •  sodium chloride    Assessment & Plan   Assessment & Plan     Active Hospital Problems    Diagnosis  POA   • **COVID-19 [U07.1]  Yes   • Chronic atrial fibrillation with rapid ventricular response [I48.20]  Yes   • Peripheral arterial disease [I73.9]  Yes   • Hypokalemia [E87.6]  Yes   • Acute respiratory failure with hypoxia [J96.01]  Yes   • Parkinson disease [G20]  Yes   • ABRIL (obstructive sleep apnea) [G47.33]  Yes   • Pulmonary emphysema [J43.9]  Yes   • GERD without esophagitis [K21.9]  Yes      Resolved Hospital Problems   No resolved problems to display.        Brief Hospital Course to date:  Flaco Roque II is a 77 y.o. male  with a past medical history of atrial fibrillation, ABRIL, GERD, emphysema and Parkinson's disease that presented to the  ED complaining of fever/chills, rigors and chest pain with palpitations.      COVID-19  Acute Respiratory Failure  H/o pulmonary emphysema  Possible Aspiration Pneumonia   -on dex/remdesivir  -98% on 4L, continue to wean  -antibiotics  -pulmonary toilet  -speech following, adjustments made     Atrial Fibrillation with RVR  -increase BB, lovenox added by cardiology     Hypokalemia  -replace per protocol     Parkinsons disease  -continue home carbidopa/levadopa     ABRIL  -CPAP intolerant     GERD  Expected Discharge Location and Transportation: home vs rehab  Expected Discharge   Expected Discharge Date and Time     Expected Discharge Date Expected Discharge Time    Apr 21, 2023            DVT prophylaxis:  Medical and mechanical DVT prophylaxis orders are present.     AM-PAC 6 Clicks Score (PT): 18 (04/17/23 1327)    CODE STATUS:   Code Status and Medical Interventions:   Ordered at: 04/15/23 1433     Level Of Support Discussed With:    Patient     Code Status (Patient has no pulse and is not breathing):    CPR (Attempt to Resuscitate)     Medical Interventions (Patient has pulse or is breathing):    Full Support     Release to patient:    Routine Release       Vlad Oliver MD  04/18/23

## 2023-04-18 NOTE — PLAN OF CARE
Goal Outcome Evaluation:  Plan of Care Reviewed With: patient, spouse        Progress: improving  Outcome Evaluation: Physical therapy treatment complete. The patient increased ambulation distance compared to previous treatment session. Patient with improved stability during ambulation with use of RW. Patient ambulated on room air with oxygen saturation 93-94%. Patient without complaints of dyspnea. The patient continues to presents below baseline for mobility. The patient would continue to benefit from skilled PT to address balance and activity tolerance deficits. Continue to recommend IPR at discharge.

## 2023-04-19 LAB
ALBUMIN SERPL-MCNC: 3.2 G/DL (ref 3.5–5.2)
ALBUMIN/GLOB SERPL: 1.9 G/DL
ALP SERPL-CCNC: 44 U/L (ref 39–117)
ALT SERPL W P-5'-P-CCNC: <5 U/L (ref 1–41)
ANION GAP SERPL CALCULATED.3IONS-SCNC: 10 MMOL/L (ref 5–15)
AST SERPL-CCNC: 15 U/L (ref 1–40)
BASOPHILS # BLD AUTO: 0.01 10*3/MM3 (ref 0–0.2)
BASOPHILS NFR BLD AUTO: 0.1 % (ref 0–1.5)
BILIRUB CONJ SERPL-MCNC: <0.2 MG/DL (ref 0–0.3)
BILIRUB SERPL-MCNC: 0.3 MG/DL (ref 0–1.2)
BUN SERPL-MCNC: 18 MG/DL (ref 8–23)
BUN/CREAT SERPL: 31.6 (ref 7–25)
CALCIUM SPEC-SCNC: 8.6 MG/DL (ref 8.6–10.5)
CHLORIDE SERPL-SCNC: 107 MMOL/L (ref 98–107)
CO2 SERPL-SCNC: 25 MMOL/L (ref 22–29)
CREAT SERPL-MCNC: 0.57 MG/DL (ref 0.76–1.27)
D DIMER PPP FEU-MCNC: <0.27 MCGFEU/ML (ref 0–0.77)
DEPRECATED RDW RBC AUTO: 45.8 FL (ref 37–54)
EGFRCR SERPLBLD CKD-EPI 2021: 101 ML/MIN/1.73
EOSINOPHIL # BLD AUTO: 0 10*3/MM3 (ref 0–0.4)
EOSINOPHIL NFR BLD AUTO: 0 % (ref 0.3–6.2)
ERYTHROCYTE [DISTWIDTH] IN BLOOD BY AUTOMATED COUNT: 16.1 % (ref 12.3–15.4)
GLOBULIN UR ELPH-MCNC: 1.7 GM/DL
GLUCOSE SERPL-MCNC: 111 MG/DL (ref 65–99)
HCT VFR BLD AUTO: 33.5 % (ref 37.5–51)
HGB BLD-MCNC: 10.1 G/DL (ref 13–17.7)
IMM GRANULOCYTES # BLD AUTO: 0.04 10*3/MM3 (ref 0–0.05)
IMM GRANULOCYTES NFR BLD AUTO: 0.5 % (ref 0–0.5)
LYMPHOCYTES # BLD AUTO: 0.74 10*3/MM3 (ref 0.7–3.1)
LYMPHOCYTES NFR BLD AUTO: 9.6 % (ref 19.6–45.3)
MCH RBC QN AUTO: 23.8 PG (ref 26.6–33)
MCHC RBC AUTO-ENTMCNC: 30.1 G/DL (ref 31.5–35.7)
MCV RBC AUTO: 78.8 FL (ref 79–97)
MONOCYTES # BLD AUTO: 0.6 10*3/MM3 (ref 0.1–0.9)
MONOCYTES NFR BLD AUTO: 7.8 % (ref 5–12)
NEUTROPHILS NFR BLD AUTO: 6.28 10*3/MM3 (ref 1.7–7)
NEUTROPHILS NFR BLD AUTO: 82 % (ref 42.7–76)
NRBC BLD AUTO-RTO: 0 /100 WBC (ref 0–0.2)
PLATELET # BLD AUTO: 264 10*3/MM3 (ref 140–450)
PMV BLD AUTO: 11.9 FL (ref 6–12)
POTASSIUM SERPL-SCNC: 4.1 MMOL/L (ref 3.5–5.2)
PROCALCITONIN SERPL-MCNC: 0.58 NG/ML (ref 0–0.25)
PROT SERPL-MCNC: 4.9 G/DL (ref 6–8.5)
RBC # BLD AUTO: 4.25 10*6/MM3 (ref 4.14–5.8)
SODIUM SERPL-SCNC: 142 MMOL/L (ref 136–145)
WBC NRBC COR # BLD: 7.67 10*3/MM3 (ref 3.4–10.8)

## 2023-04-19 PROCEDURE — 99232 SBSQ HOSP IP/OBS MODERATE 35: CPT | Performed by: INTERNAL MEDICINE

## 2023-04-19 PROCEDURE — 80053 COMPREHEN METABOLIC PANEL: CPT | Performed by: HOSPITALIST

## 2023-04-19 PROCEDURE — 85025 COMPLETE CBC W/AUTO DIFF WBC: CPT | Performed by: HOSPITALIST

## 2023-04-19 PROCEDURE — 25010000002 ENOXAPARIN PER 10 MG: Performed by: INTERNAL MEDICINE

## 2023-04-19 PROCEDURE — 63710000001 DEXAMETHASONE PER 0.25 MG: Performed by: HOSPITALIST

## 2023-04-19 PROCEDURE — 85379 FIBRIN DEGRADATION QUANT: CPT | Performed by: HOSPITALIST

## 2023-04-19 PROCEDURE — 25010000002 REMDESIVIR 100 MG/20ML SOLUTION 1 EACH VIAL: Performed by: HOSPITALIST

## 2023-04-19 PROCEDURE — 25010000002 CEFTRIAXONE PER 250 MG: Performed by: FAMILY MEDICINE

## 2023-04-19 PROCEDURE — 94799 UNLISTED PULMONARY SVC/PX: CPT

## 2023-04-19 PROCEDURE — 99232 SBSQ HOSP IP/OBS MODERATE 35: CPT | Performed by: HOSPITALIST

## 2023-04-19 PROCEDURE — 82248 BILIRUBIN DIRECT: CPT | Performed by: HOSPITALIST

## 2023-04-19 PROCEDURE — 84145 PROCALCITONIN (PCT): CPT | Performed by: HOSPITALIST

## 2023-04-19 RX ADMIN — CARBIDOPA AND LEVODOPA 1 TABLET: 25; 100 TABLET ORAL at 12:20

## 2023-04-19 RX ADMIN — CARBIDOPA AND LEVODOPA 1 TABLET: 25; 100 TABLET ORAL at 09:06

## 2023-04-19 RX ADMIN — ATORVASTATIN CALCIUM 10 MG: 10 TABLET, FILM COATED ORAL at 09:06

## 2023-04-19 RX ADMIN — CEFTRIAXONE 1 G: 1 INJECTION, POWDER, FOR SOLUTION INTRAMUSCULAR; INTRAVENOUS at 09:05

## 2023-04-19 RX ADMIN — SENNOSIDES AND DOCUSATE SODIUM 2 TABLET: 8.6; 5 TABLET ORAL at 20:11

## 2023-04-19 RX ADMIN — DEXAMETHASONE 6 MG: 4 TABLET ORAL at 09:06

## 2023-04-19 RX ADMIN — POLYETHYLENE GLYCOL 3350 17 G: 17 POWDER, FOR SOLUTION ORAL at 17:17

## 2023-04-19 RX ADMIN — BISACODYL 5 MG: 5 TABLET, COATED ORAL at 09:06

## 2023-04-19 RX ADMIN — APIXABAN 5 MG: 5 TABLET, FILM COATED ORAL at 20:11

## 2023-04-19 RX ADMIN — CLONAZEPAM 1 MG: 1 TABLET ORAL at 20:11

## 2023-04-19 RX ADMIN — Medication 10 ML: at 20:12

## 2023-04-19 RX ADMIN — CARBIDOPA AND LEVODOPA 1 TABLET: 25; 100 TABLET ORAL at 15:11

## 2023-04-19 RX ADMIN — IPRATROPIUM BROMIDE 2 PUFF: 17 AEROSOL, METERED RESPIRATORY (INHALATION) at 14:12

## 2023-04-19 RX ADMIN — AMANTADINE HYDROCHLORIDE 100 MG: 100 CAPSULE ORAL at 09:06

## 2023-04-19 RX ADMIN — CARBIDOPA AND LEVODOPA 1 TABLET: 25; 100 TABLET ORAL at 20:11

## 2023-04-19 RX ADMIN — ALBUTEROL SULFATE 2 PUFF: 90 AEROSOL, METERED RESPIRATORY (INHALATION) at 14:12

## 2023-04-19 RX ADMIN — Medication 10 ML: at 09:05

## 2023-04-19 RX ADMIN — ENOXAPARIN SODIUM 60 MG: 60 INJECTION SUBCUTANEOUS at 01:29

## 2023-04-19 RX ADMIN — REMDESIVIR 100 MG: 100 INJECTION, POWDER, LYOPHILIZED, FOR SOLUTION INTRAVENOUS at 12:20

## 2023-04-19 RX ADMIN — ASPIRIN 81 MG CHEWABLE TABLET 81 MG: 81 TABLET CHEWABLE at 09:06

## 2023-04-19 RX ADMIN — QUETIAPINE FUMARATE 25 MG: 25 TABLET ORAL at 17:17

## 2023-04-19 RX ADMIN — IPRATROPIUM BROMIDE 2 PUFF: 17 AEROSOL, METERED RESPIRATORY (INHALATION) at 08:47

## 2023-04-19 RX ADMIN — ALBUTEROL SULFATE 2 PUFF: 90 AEROSOL, METERED RESPIRATORY (INHALATION) at 08:47

## 2023-04-19 RX ADMIN — SENNOSIDES AND DOCUSATE SODIUM 2 TABLET: 8.6; 5 TABLET ORAL at 09:06

## 2023-04-19 RX ADMIN — BISACODYL 10 MG: 10 SUPPOSITORY RECTAL at 17:17

## 2023-04-19 RX ADMIN — DOXYCYCLINE 100 MG: 100 CAPSULE ORAL at 09:06

## 2023-04-19 RX ADMIN — Medication 1 CAPSULE: at 09:06

## 2023-04-19 RX ADMIN — TAMSULOSIN HYDROCHLORIDE 0.4 MG: 0.4 CAPSULE ORAL at 20:11

## 2023-04-19 RX ADMIN — METOPROLOL SUCCINATE 100 MG: 100 TABLET, EXTENDED RELEASE ORAL at 11:09

## 2023-04-19 RX ADMIN — MONTELUKAST 10 MG: 10 TABLET, FILM COATED ORAL at 20:11

## 2023-04-19 RX ADMIN — DOXYCYCLINE 100 MG: 100 CAPSULE ORAL at 20:11

## 2023-04-19 RX ADMIN — PANTOPRAZOLE SODIUM 40 MG: 40 TABLET, DELAYED RELEASE ORAL at 06:09

## 2023-04-19 RX ADMIN — AMANTADINE HYDROCHLORIDE 100 MG: 100 CAPSULE ORAL at 20:11

## 2023-04-19 NOTE — PROGRESS NOTES
"[facility] at [facility] Progress Note     LOS: 4 days   Patient Care Team:  Azar Stern MD as PCP - General (Family Medicine)  Zayda Beach MD as Consulting Physician (Infectious Diseases)  Alf Edwards MD as Consulting Physician (Pulmonary Disease)  Von Kilpatrick MD as Consulting Physician (Cardiology)  PCP:  Azar Stern MD    Chief Complaint: Follow-up atrial fibrillation, COVID-pneumonia    Subjective: Patient is on room air.  More alert and interactive does have a sitter at bedside.  Current rhythm is sinus with frequent ectopy      Review of Systems:   All systems have been reviewed and are negative with the exception of those mentioned above.      Objective:    Vital Sign Min/Max for last 24 hours  Temp  Min: 97 °F (36.1 °C)  Max: 98.4 °F (36.9 °C)   BP  Min: 107/76  Max: 116/79   Pulse  Min: 63  Max: 116   Resp  Min: 20  Max: 28   SpO2  Min: 90 %  Max: 97 %   No data recorded   No data recorded     Flowsheet Rows    Flowsheet Row First Filed Value   Admission Height 172.7 cm (68\") Documented at 04/15/2023 1200   Admission Weight 62.6 kg (138 lb) Documented at 04/15/2023 1200          Telemetry: Sinus with frequent PACs.      Intake/Output Summary (Last 24 hours) at 4/19/2023 1024  Last data filed at 4/19/2023 0900  Gross per 24 hour   Intake 657 ml   Output 2420 ml   Net -1763 ml     Intake & Output (last 3 days)       04/16 0701  04/17 0700 04/17 0701  04/18 0700 04/18 0701  04/19 0700 04/19 0701  04/20 0700    P.O.  120    IV Piggyback        Total Intake(mL/kg) 400 (6.4) 460 (7.3) 1092 (17.4) 120 (1.9)    Urine (mL/kg/hr) 250 (0.2) 600 (0.4) 2320 (1.5) 200 (0.9)    Stool    0    Total Output  200    Net +150 -140 -1228 -80            Urine Unmeasured Occurrence 1 x 5 x      Stool Unmeasured Occurrence    1 x           Physical Exam:  Constitutional:       Appearance: Not in distress.   Pulmonary:      " Effort: Pulmonary effort is normal.      Comments: Coarse left base  Cardiovascular:      Frequent ectopic beats. Irregular rhythm.      Murmurs: There is no murmur.   Edema:     Peripheral edema absent.          LABS/DIAGNOSTIC DATA:  Results from last 7 days   Lab Units 04/19/23 0413 04/18/23  0406 04/17/23  0536   WBC 10*3/mm3 7.67 11.78* 11.93*   HEMOGLOBIN g/dL 10.1* 10.4* 10.5*   HEMATOCRIT % 33.5* 34.1* 35.0*   PLATELETS 10*3/mm3 264 265 230     Lab Results   Lab Value Date/Time    TROPONINT 23 (H) 04/15/2023 1511    TROPONINT 22 (H) 04/15/2023 1207    TROPONINT <0.010 08/20/2019 1755    TROPONINT <0.010 07/28/2019 1715         Results from last 7 days   Lab Units 04/19/23 0413 04/18/23  0406 04/17/23  0536   SODIUM mmol/L 142 139 141   POTASSIUM mmol/L 4.1 4.4 3.7   CHLORIDE mmol/L 107 107 108*   CO2 mmol/L 25.0 26.0 27.0   BUN mg/dL 18 23 17   CREATININE mg/dL 0.57* 0.57* 0.64*   CALCIUM mg/dL 8.6 8.7 8.4*   BILIRUBIN mg/dL 0.3 0.3 0.3   ALK PHOS U/L 44 55 53   ALT (SGPT) U/L <5 <5 <5   AST (SGOT) U/L 15 10 12   GLUCOSE mg/dL 111* 114* 144*                       Medication Review:   albuterol sulfate HFA, 2 puff, Inhalation, TID - RT   And  ipratropium, 2 puff, Inhalation, TID - RT  amantadine, 100 mg, Oral, BID  apixaban, 5 mg, Oral, Q12H  aspirin, 81 mg, Oral, Daily  atorvastatin, 10 mg, Oral, Daily  carbidopa-levodopa, 1 tablet, Oral, 4x Daily  cefTRIAXone, 1 g, Intravenous, Q24H  clonazePAM, 1 mg, Oral, Nightly  dexamethasone, 6 mg, Oral, Daily   Or  dexamethasone, 6 mg, Intravenous, Daily  doxycycline, 100 mg, Oral, Q12H  lactobacillus acidophilus, 1 capsule, Oral, Daily  metoprolol succinate XL, 100 mg, Oral, Daily  montelukast, 10 mg, Oral, Nightly  pantoprazole, 40 mg, Oral, Q AM  QUEtiapine, 25 mg, Oral, Q PM  remdesivir, 100 mg, Intravenous, Q24H  senna-docusate sodium, 2 tablet, Oral, BID  sodium chloride, 10 mL, Intravenous, Q12H  tamsulosin, 0.4 mg, Oral, Nightly               COVID-19    ABRIL  (obstructive sleep apnea)    Pulmonary emphysema    GERD without esophagitis    Parkinson disease    Acute respiratory failure with hypoxia    Hypokalemia    Peripheral arterial disease    Chronic atrial fibrillation with rapid ventricular response      Assessment/Plan:    1.  New onset atrial fibrillation with RVR  -Currently in sinus rhythm with frequent ectopy  -Continue Toprol- mg daily  -Continue aspirin 81 mg daily  -Transition from Lovenox to Eliquis 5 mg twice daily starting this evening  -Discussed rationale for anticoagulation with patient's son previously        2.  COVID-19 pneumonia  -Receiving antiviral treatment as well as antibiotics, steroid  -Management per primary team  -Improving     3.  History of PAD  -Discontinue cilostazol with initiation of Lovenox      We will follow peripherally.  Please call with questions or concerns.  We will request follow-up in about 6 weeks in cardiology office    Discussed plan of care with Dr. Benito Kilpatrick MD [facility]  04/19/23

## 2023-04-19 NOTE — CASE MANAGEMENT/SOCIAL WORK
Continued Stay Note  [facility]     Patient Name: Flaco Roque II  MRN: [id number]  Today's Date: 4/19/2023    Admit Date: 4/15/2023    Plan: [facility]   Discharge Plan     Row Name 04/19/23 1006       Plan    Plan [facility]    Patient/Family in Agreement with Plan yes    Plan Comments Spoke with Cheryl, spouse by phone. Cheryl is agreeable to rehab for her  and would like to try [facility]. Patient currently has a sitter, so CM will hold off on making the referral until sitter is discontinued. CM will continue to follow.    Final Discharge Disposition Code 03 - skilled nursing facility (SNF)               Discharge Codes    No documentation.               Expected Discharge Date and Time     Expected Discharge Date Expected Discharge Time    Apr 21, 2023             Mukesh Delcid RN

## 2023-04-19 NOTE — PLAN OF CARE
Goal Outcome Evaluation:  Plan of Care Reviewed With: patient        Progress: improving  Outcome Evaluation: titrated off oxygen; ambulated multiple times overnight in the room, has denies SOA: VSS; pleasantly confused; urinary urgency makes continues to make patient a high falls risk; no acute changes to report; will continue POC

## 2023-04-19 NOTE — PROGRESS NOTES
[facility] Medicine Services  PROGRESS NOTE    Patient Name: Flaco Roque II  : 1945  MRN: [id number]    Date of Admission: 4/15/2023  Primary Care Physician: Azar Stern MD    Subjective   Subjective     CC: Weakness    HPI: Up in bed. More energy. Less SOA. Off oxygen. Better overall.    Review of Systems   Constitutional: Positive for activity change, appetite change and fatigue.   Respiratory: Positive for cough and shortness of breath.    Cardiovascular: Negative.    Gastrointestinal: Negative.    Neurological: Positive for weakness.         Objective   Objective     Vital Signs:   Temp:  [97 °F (36.1 °C)-98.4 °F (36.9 °C)] 98.4 °F (36.9 °C)  Heart Rate:  [] 86  Resp:  [20-28] 20  BP: (107-116)/(55-79) 112/55  Flow (L/min):  [1-3] 1     Physical Exam:  NAD, alert, oriented and conversant  OP clear, dry MM  Neck supple  No LAD  No tachycardia today  Improved BS at both bases today  +BS, soft  No c/c/e  No rashes  WHITE  No change from  otherwise    Results Reviewed:  LAB RESULTS:      Lab 23  0413 23  0406 23  0536 23  0419 04/15/23  1207   WBC 7.67 11.78* 11.93* 15.12* 11.94*   HEMOGLOBIN 10.1* 10.4* 10.5* 10.9* 12.2*   HEMATOCRIT 33.5* 34.1* 35.0* 35.3* 40.1   PLATELETS 264 265 230 227 190   NEUTROS ABS 6.28 10.44* 10.67* 13.56* 10.83*   IMMATURE GRANS (ABS) 0.04 0.04 0.03 0.06* 0.03   LYMPHS ABS 0.74 0.49* 0.49* 0.64* 0.24*   MONOS ABS 0.60 0.80 0.70 0.84 0.81   EOS ABS 0.00 0.00 0.02 0.00 0.01   MCV 78.8* 79.3 81.0 79.1 79.1   PROCALCITONIN 0.58*  --   --   --  0.63*   LACTATE  --   --   --   --  1.3   D DIMER QUANT <0.27 0.32 0.40 0.38 0.63         Lab 23  0413 23  0406 23  0536 23  0419 04/15/23  1815 04/15/23  1207   SODIUM 142 139 141 143  --  137   POTASSIUM 4.1 4.4 3.7 3.7  --  3.1*   CHLORIDE 107 107 108* 108*  --  102   CO2 25.0 26.0 27.0 28.0  --  24.0   ANION GAP 10.0 6.0 6.0 7.0  --  11.0   BUN 18  23 17 13  --  14   CREATININE 0.57* 0.57* 0.64* 0.65* 0.82 0.69*   EGFR 101.0 101.0 97.5 97.0 90.5 95.3   GLUCOSE 111* 114* 144* 128*  --  115*   CALCIUM 8.6 8.7 8.4* 8.4*  --  8.5*         Lab 04/19/23  0413 04/18/23  0406 04/17/23  0536 04/16/23  0419 04/15/23  1815 04/15/23  1207   TOTAL PROTEIN 4.9* 5.0* 5.3* 5.4* 5.7* 5.8*   ALBUMIN 3.2* 3.2* 3.1* 3.2* 3.8 3.6   GLOBULIN 1.7 1.8 2.2 2.2  --  2.2   ALT (SGPT) <5 <5 <5 <5 5 <5   AST (SGOT) 15 10 12 12 12 14   BILIRUBIN 0.3 0.3 0.3 0.5 0.9 0.8   INDIRECT BILIRUBIN  --   --   --   --  0.6  --    BILIRUBIN DIRECT <0.2 <0.2 <0.2 0.2 0.3  --    ALK PHOS 44 55 53 56 59 59   LIPASE  --   --   --   --   --  27         Lab 04/15/23  1511 04/15/23  1207   PROBNP  --  440.8   HSTROP T 23* 22*                 Brief Urine Lab Results     None          Microbiology Results Abnormal     Procedure Component Value - Date/Time    Blood Culture - Blood, Arm, Left [[id number]]  (Normal) Collected: 04/15/23 1219    Lab Status: Preliminary result Specimen: Blood from Arm, Left Updated: 04/18/23 1316     Blood Culture No growth at 3 days    Blood Culture - Blood, Hand, Left [[id number]]  (Normal) Collected: 04/15/23 1219    Lab Status: Preliminary result Specimen: Blood from Hand, Left Updated: 04/18/23 1316     Blood Culture No growth at 3 days          No radiology results from the last 24 hrs    Results for orders placed during the hospital encounter of 04/26/19    Adult Transthoracic Echo Complete W/ Cont if Necessary Per Protocol    Interpretation Summary  · Left ventricular systolic function is normal.  · Estimated EF appears to be in the range of 66 - 70%.      Current medications:  Scheduled Meds:albuterol sulfate HFA, 2 puff, Inhalation, TID - RT   And  ipratropium, 2 puff, Inhalation, TID - RT  amantadine, 100 mg, Oral, BID  aspirin, 81 mg, Oral, Daily  atorvastatin, 10 mg, Oral, Daily  carbidopa-levodopa, 1 tablet, Oral, 4x Daily  cefTRIAXone, 1 g, Intravenous,  Q24H  clonazePAM, 1 mg, Oral, Nightly  dexamethasone, 6 mg, Oral, Daily   Or  dexamethasone, 6 mg, Intravenous, Daily  doxycycline, 100 mg, Oral, Q12H  enoxaparin, 1 mg/kg, Subcutaneous, Q12H  lactobacillus acidophilus, 1 capsule, Oral, Daily  metoprolol succinate XL, 100 mg, Oral, Daily  montelukast, 10 mg, Oral, Nightly  pantoprazole, 40 mg, Oral, Q AM  QUEtiapine, 25 mg, Oral, Q PM  remdesivir, 100 mg, Intravenous, Q24H  senna-docusate sodium, 2 tablet, Oral, BID  sodium chloride, 10 mL, Intravenous, Q12H  tamsulosin, 0.4 mg, Oral, Nightly      Continuous Infusions:   PRN Meds:.•  acetaminophen **OR** acetaminophen **OR** acetaminophen  •  senna-docusate sodium **AND** polyethylene glycol **AND** bisacodyl **AND** bisacodyl  •  Calcium Replacement - Follow Nurse / BPA Driven Protocol  •  HYDROcodone-acetaminophen  •  Magnesium Standard Dose Replacement - Follow Nurse / BPA Driven Protocol  •  metoprolol tartrate  •  Morphine  •  ondansetron **OR** ondansetron  •  Phosphorus Replacement - Follow Nurse / BPA Driven Protocol  •  Potassium Replacement - Follow Nurse / BPA Driven Protocol  •  sodium chloride  •  sodium chloride  •  sodium chloride    Assessment & Plan   Assessment & Plan     Active Hospital Problems    Diagnosis  POA   • **COVID-19 [U07.1]  Yes   • Chronic atrial fibrillation with rapid ventricular response [I48.20]  Yes   • Peripheral arterial disease [I73.9]  Yes   • Hypokalemia [E87.6]  Yes   • Acute respiratory failure with hypoxia [J96.01]  Yes   • Parkinson disease [G20]  Yes   • ABRIL (obstructive sleep apnea) [G47.33]  Yes   • Pulmonary emphysema [J43.9]  Yes   • GERD without esophagitis [K21.9]  Yes      Resolved Hospital Problems   No resolved problems to display.        Brief Hospital Course to date:  Flaco Roque II is a 77 y.o. male  with a past medical history of atrial fibrillation, ABRIL, GERD, emphysema and Parkinson's disease that presented to the ED complaining of fever/chills,  rigors and chest pain with palpitations.      COVID-19  Acute Respiratory Failure  H/o pulmonary emphysema  Possible Aspiration Pneumonia   -on dex/remdesivir  -on RA this AM 92%  -antibiotics  -pulmonary toilet  -speech following, adjustments made     Atrial Fibrillation with RVR  -rate control per cardiology, transition to PO anticoagulation     Hypokalemia  -replace per protocol     Parkinsons disease  -continue home carbidopa/levadopa     ABRIL  -CPAP intolerant     GERD  Expected Discharge Location and Transportation:Rehab?  Expected Discharge   Expected Discharge Date and Time     Expected Discharge Date Expected Discharge Time    Apr 21, 2023            DVT prophylaxis:  Medical and mechanical DVT prophylaxis orders are present.     AM-PAC 6 Clicks Score (PT): 18 (04/18/23 1543)    CODE STATUS:   Code Status and Medical Interventions:   Ordered at: 04/15/23 1433     Level Of Support Discussed With:    Patient     Code Status (Patient has no pulse and is not breathing):    CPR (Attempt to Resuscitate)     Medical Interventions (Patient has pulse or is breathing):    Full Support     Release to patient:    Routine Release       Vlad Oliver MD  04/19/23

## 2023-04-20 LAB
BACTERIA SPEC AEROBE CULT: NORMAL
BACTERIA SPEC AEROBE CULT: NORMAL

## 2023-04-20 PROCEDURE — 92526 ORAL FUNCTION THERAPY: CPT

## 2023-04-20 PROCEDURE — 99231 SBSQ HOSP IP/OBS SF/LOW 25: CPT | Performed by: HOSPITALIST

## 2023-04-20 PROCEDURE — 97116 GAIT TRAINING THERAPY: CPT

## 2023-04-20 PROCEDURE — 97535 SELF CARE MNGMENT TRAINING: CPT

## 2023-04-20 PROCEDURE — 25010000002 MORPHINE PER 10 MG: Performed by: HOSPITALIST

## 2023-04-20 PROCEDURE — 94799 UNLISTED PULMONARY SVC/PX: CPT

## 2023-04-20 PROCEDURE — 97530 THERAPEUTIC ACTIVITIES: CPT

## 2023-04-20 PROCEDURE — 25010000002 CEFTRIAXONE PER 250 MG: Performed by: FAMILY MEDICINE

## 2023-04-20 PROCEDURE — 63710000001 DEXAMETHASONE PER 0.25 MG: Performed by: HOSPITALIST

## 2023-04-20 PROCEDURE — 97110 THERAPEUTIC EXERCISES: CPT

## 2023-04-20 RX ADMIN — DOXYCYCLINE 100 MG: 100 CAPSULE ORAL at 20:16

## 2023-04-20 RX ADMIN — IPRATROPIUM BROMIDE 2 PUFF: 17 AEROSOL, METERED RESPIRATORY (INHALATION) at 13:39

## 2023-04-20 RX ADMIN — AMANTADINE HYDROCHLORIDE 100 MG: 100 CAPSULE ORAL at 08:17

## 2023-04-20 RX ADMIN — CEFTRIAXONE 1 G: 1 INJECTION, POWDER, FOR SOLUTION INTRAMUSCULAR; INTRAVENOUS at 09:37

## 2023-04-20 RX ADMIN — ALBUTEROL SULFATE 2 PUFF: 90 AEROSOL, METERED RESPIRATORY (INHALATION) at 08:34

## 2023-04-20 RX ADMIN — PANTOPRAZOLE SODIUM 40 MG: 40 TABLET, DELAYED RELEASE ORAL at 05:16

## 2023-04-20 RX ADMIN — Medication 10 ML: at 08:12

## 2023-04-20 RX ADMIN — DEXAMETHASONE 6 MG: 4 TABLET ORAL at 08:13

## 2023-04-20 RX ADMIN — Medication 10 ML: at 20:16

## 2023-04-20 RX ADMIN — APIXABAN 5 MG: 5 TABLET, FILM COATED ORAL at 20:16

## 2023-04-20 RX ADMIN — QUETIAPINE FUMARATE 25 MG: 25 TABLET ORAL at 18:00

## 2023-04-20 RX ADMIN — Medication 1 CAPSULE: at 08:13

## 2023-04-20 RX ADMIN — IPRATROPIUM BROMIDE 2 PUFF: 17 AEROSOL, METERED RESPIRATORY (INHALATION) at 08:34

## 2023-04-20 RX ADMIN — CARBIDOPA AND LEVODOPA 1 TABLET: 25; 100 TABLET ORAL at 16:35

## 2023-04-20 RX ADMIN — CARBIDOPA AND LEVODOPA 1 TABLET: 25; 100 TABLET ORAL at 12:34

## 2023-04-20 RX ADMIN — ALBUTEROL SULFATE 2 PUFF: 90 AEROSOL, METERED RESPIRATORY (INHALATION) at 13:38

## 2023-04-20 RX ADMIN — SENNOSIDES AND DOCUSATE SODIUM 2 TABLET: 8.6; 5 TABLET ORAL at 08:13

## 2023-04-20 RX ADMIN — AMANTADINE HYDROCHLORIDE 100 MG: 100 CAPSULE ORAL at 20:17

## 2023-04-20 RX ADMIN — CARBIDOPA AND LEVODOPA 1 TABLET: 25; 100 TABLET ORAL at 20:16

## 2023-04-20 RX ADMIN — CLONAZEPAM 1 MG: 1 TABLET ORAL at 20:16

## 2023-04-20 RX ADMIN — ALBUTEROL SULFATE 2 PUFF: 90 AEROSOL, METERED RESPIRATORY (INHALATION) at 19:32

## 2023-04-20 RX ADMIN — DOXYCYCLINE 100 MG: 100 CAPSULE ORAL at 08:12

## 2023-04-20 RX ADMIN — HYDROCODONE BITARTRATE AND ACETAMINOPHEN 1 TABLET: 5; 325 TABLET ORAL at 18:03

## 2023-04-20 RX ADMIN — MORPHINE SULFATE 2 MG: 2 INJECTION, SOLUTION INTRAMUSCULAR; INTRAVENOUS at 08:26

## 2023-04-20 RX ADMIN — TAMSULOSIN HYDROCHLORIDE 0.4 MG: 0.4 CAPSULE ORAL at 20:16

## 2023-04-20 RX ADMIN — METOPROLOL SUCCINATE 100 MG: 100 TABLET, EXTENDED RELEASE ORAL at 08:13

## 2023-04-20 RX ADMIN — CARBIDOPA AND LEVODOPA 1 TABLET: 25; 100 TABLET ORAL at 08:13

## 2023-04-20 RX ADMIN — ASPIRIN 81 MG CHEWABLE TABLET 81 MG: 81 TABLET CHEWABLE at 08:13

## 2023-04-20 RX ADMIN — HYDROCODONE BITARTRATE AND ACETAMINOPHEN 1 TABLET: 5; 325 TABLET ORAL at 09:37

## 2023-04-20 RX ADMIN — IPRATROPIUM BROMIDE 2 PUFF: 17 AEROSOL, METERED RESPIRATORY (INHALATION) at 19:32

## 2023-04-20 RX ADMIN — APIXABAN 5 MG: 5 TABLET, FILM COATED ORAL at 08:13

## 2023-04-20 RX ADMIN — ATORVASTATIN CALCIUM 10 MG: 10 TABLET, FILM COATED ORAL at 08:13

## 2023-04-20 RX ADMIN — MONTELUKAST 10 MG: 10 TABLET, FILM COATED ORAL at 20:16

## 2023-04-20 NOTE — PLAN OF CARE
Goal Outcome Evaluation:  Plan of Care Reviewed With: patient        Progress: improving  Outcome Evaluation: patient ambulated 15' + 15' with CGA x1 and rolling walker for support, verbal cues for walker placement and improvement in posture. SaO2 >90% with activity on RA. Patient with short shuffling steps, slightly unsteady but no LOB noticed. Distance limited by weakness and fatigue. HEP completed. Recommend IRF at d/c.

## 2023-04-20 NOTE — PLAN OF CARE
Goal Outcome Evaluation:  Plan of Care Reviewed With: patient, son        Progress: improving  Outcome Evaluation: OT promoted adl retraining with pt demo improved mob with sba and setup assist for mob at rw level.  He stood times 10 minutes while shaving and brushing teeth with settup.  Pt amb at rw level with cga to sba.

## 2023-04-20 NOTE — PROGRESS NOTES
[facility] Medicine Services  PROGRESS NOTE    Patient Name: Flaco Roque II  : 1945  MRN: [id number]    Date of Admission: 4/15/2023  Primary Care Physician: Azar Stern MD    Subjective   Subjective     CC: Weakness    HPI: Up in bed. Appetite better but doesn't like the food. Weakness better. Slept better reportedly.    Review of Systems   Constitutional: Positive for activity change, appetite change and fatigue.   Respiratory: Positive for cough and shortness of breath.    Cardiovascular: Negative.    Gastrointestinal: Negative.    Neurological: Positive for weakness.   No change from  otherwise    Objective   Objective     Vital Signs:   Temp:  [97.3 °F (36.3 °C)-98.8 °F (37.1 °C)] 97.6 °F (36.4 °C)  Heart Rate:  [62-96] 67  Resp:  [16-24] 24  BP: (111-133)/(58-82) 119/65  Flow (L/min):  [2] 2     Physical Exam:  NAD, alert, oriented and conversant  OP clear, dry MM  Neck supple  No LAD  RRR  CTAB  +BS, soft  No c/c/e  No rashes  WHITE  No change from  otherwise    Results Reviewed:  LAB RESULTS:      Lab 23  0413 23  0406 23  0536 23  0419 04/15/23  1207   WBC 7.67 11.78* 11.93* 15.12* 11.94*   HEMOGLOBIN 10.1* 10.4* 10.5* 10.9* 12.2*   HEMATOCRIT 33.5* 34.1* 35.0* 35.3* 40.1   PLATELETS 264 265 230 227 190   NEUTROS ABS 6.28 10.44* 10.67* 13.56* 10.83*   IMMATURE GRANS (ABS) 0.04 0.04 0.03 0.06* 0.03   LYMPHS ABS 0.74 0.49* 0.49* 0.64* 0.24*   MONOS ABS 0.60 0.80 0.70 0.84 0.81   EOS ABS 0.00 0.00 0.02 0.00 0.01   MCV 78.8* 79.3 81.0 79.1 79.1   PROCALCITONIN 0.58*  --   --   --  0.63*   LACTATE  --   --   --   --  1.3   D DIMER QUANT <0.27 0.32 0.40 0.38 0.63         Lab 23  0413 23  0406 23  0536 23  0419 04/15/23  1815 04/15/23  1207   SODIUM 142 139 141 143  --  137   POTASSIUM 4.1 4.4 3.7 3.7  --  3.1*   CHLORIDE 107 107 108* 108*  --  102   CO2 25.0 26.0 27.0 28.0  --  24.0   ANION GAP 10.0 6.0 6.0 7.0   --  11.0   BUN 18 23 17 13  --  14   CREATININE 0.57* 0.57* 0.64* 0.65* 0.82 0.69*   EGFR 101.0 101.0 97.5 97.0 90.5 95.3   GLUCOSE 111* 114* 144* 128*  --  115*   CALCIUM 8.6 8.7 8.4* 8.4*  --  8.5*         Lab 04/19/23  0413 04/18/23  0406 04/17/23  0536 04/16/23  0419 04/15/23  1815 04/15/23  1207   TOTAL PROTEIN 4.9* 5.0* 5.3* 5.4* 5.7* 5.8*   ALBUMIN 3.2* 3.2* 3.1* 3.2* 3.8 3.6   GLOBULIN 1.7 1.8 2.2 2.2  --  2.2   ALT (SGPT) <5 <5 <5 <5 5 <5   AST (SGOT) 15 10 12 12 12 14   BILIRUBIN 0.3 0.3 0.3 0.5 0.9 0.8   INDIRECT BILIRUBIN  --   --   --   --  0.6  --    BILIRUBIN DIRECT <0.2 <0.2 <0.2 0.2 0.3  --    ALK PHOS 44 55 53 56 59 59   LIPASE  --   --   --   --   --  27         Lab 04/15/23  1511 04/15/23  1207   PROBNP  --  440.8   HSTROP T 23* 22*                 Brief Urine Lab Results     None          Microbiology Results Abnormal     Procedure Component Value - Date/Time    Blood Culture - Blood, Arm, Left [[id number]]  (Normal) Collected: 04/15/23 1219    Lab Status: Preliminary result Specimen: Blood from Arm, Left Updated: 04/19/23 1315     Blood Culture No growth at 4 days    Blood Culture - Blood, Hand, Left [[id number]]  (Normal) Collected: 04/15/23 1219    Lab Status: Preliminary result Specimen: Blood from Hand, Left Updated: 04/19/23 1315     Blood Culture No growth at 4 days          No radiology results from the last 24 hrs    Results for orders placed during the hospital encounter of 04/26/19    Adult Transthoracic Echo Complete W/ Cont if Necessary Per Protocol    Interpretation Summary  · Left ventricular systolic function is normal.  · Estimated EF appears to be in the range of 66 - 70%.      Current medications:  Scheduled Meds:albuterol sulfate HFA, 2 puff, Inhalation, TID - RT   And  ipratropium, 2 puff, Inhalation, TID - RT  amantadine, 100 mg, Oral, BID  apixaban, 5 mg, Oral, Q12H  aspirin, 81 mg, Oral, Daily  atorvastatin, 10 mg, Oral, Daily  carbidopa-levodopa, 1 tablet, Oral, 4x  Daily  clonazePAM, 1 mg, Oral, Nightly  dexamethasone, 6 mg, Oral, Daily   Or  dexamethasone, 6 mg, Intravenous, Daily  doxycycline, 100 mg, Oral, Q12H  lactobacillus acidophilus, 1 capsule, Oral, Daily  metoprolol succinate XL, 100 mg, Oral, Daily  montelukast, 10 mg, Oral, Nightly  pantoprazole, 40 mg, Oral, Q AM  QUEtiapine, 25 mg, Oral, Q PM  senna-docusate sodium, 2 tablet, Oral, BID  sodium chloride, 10 mL, Intravenous, Q12H  tamsulosin, 0.4 mg, Oral, Nightly      Continuous Infusions:   PRN Meds:.•  acetaminophen **OR** acetaminophen **OR** acetaminophen  •  senna-docusate sodium **AND** polyethylene glycol **AND** bisacodyl **AND** bisacodyl  •  Calcium Replacement - Follow Nurse / BPA Driven Protocol  •  HYDROcodone-acetaminophen  •  Magnesium Standard Dose Replacement - Follow Nurse / BPA Driven Protocol  •  metoprolol tartrate  •  Morphine  •  ondansetron **OR** ondansetron  •  Phosphorus Replacement - Follow Nurse / BPA Driven Protocol  •  Potassium Replacement - Follow Nurse / BPA Driven Protocol  •  sodium chloride  •  sodium chloride  •  sodium chloride    Assessment & Plan   Assessment & Plan     Active Hospital Problems    Diagnosis  POA   • **COVID-19 [U07.1]  Yes   • Chronic atrial fibrillation with rapid ventricular response [I48.20]  Yes   • Peripheral arterial disease [I73.9]  Yes   • Hypokalemia [E87.6]  Yes   • Acute respiratory failure with hypoxia [J96.01]  Yes   • Parkinson disease [G20]  Yes   • ABRIL (obstructive sleep apnea) [G47.33]  Yes   • Pulmonary emphysema [J43.9]  Yes   • GERD without esophagitis [K21.9]  Yes      Resolved Hospital Problems   No resolved problems to display.        Brief Hospital Course to date:  Flaco Roque II is a 77 y.o. male  with a past medical history of atrial fibrillation, ABRIL, GERD, emphysema and Parkinson's disease that presented to the ED complaining of fever/chills, rigors and chest pain with palpitations.      COVID-19  Acute Respiratory  Failure  H/o pulmonary emphysema  Possible Aspiration Pneumonia   -dex/remdesivir   -on RA this AM, again  -antibiotics  -pulmonary toilet  -speech following, adjustments made     Atrial Fibrillation with RVR  -rate control per cardiology, transitioned to PO anticoagulation  -stable on current regimen     Hypokalemia  -replace per protocol     Parkinsons disease  -continue home carbidopa/levadopa     ABRIL  -CPAP intolerant     GERD  Expected Discharge Location and Transportation:Rehab?  Expected Discharge   Expected Discharge Date and Time     Expected Discharge Date Expected Discharge Time    Apr 21, 2023            DVT prophylaxis:  Medical and mechanical DVT prophylaxis orders are present.     AM-PAC 6 Clicks Score (PT): 18 (04/20/23 9183)    CODE STATUS:   Code Status and Medical Interventions:   Ordered at: 04/15/23 1433     Level Of Support Discussed With:    Patient     Code Status (Patient has no pulse and is not breathing):    CPR (Attempt to Resuscitate)     Medical Interventions (Patient has pulse or is breathing):    Full Support     Release to patient:    Routine Release       Vlad Oliver MD  04/20/23

## 2023-04-20 NOTE — PLAN OF CARE
Problem: Adult Inpatient Plan of Care  Goal: Plan of Care Review  Outcome: Ongoing, Progressing  Goal: Patient-Specific Goal (Individualized)  Outcome: Ongoing, Progressing  Goal: Absence of Hospital-Acquired Illness or Injury  Outcome: Ongoing, Progressing  Intervention: Identify and Manage Fall Risk  Recent Flowsheet Documentation  Taken 4/20/2023 1600 by David Kelly RN  Safety Promotion/Fall Prevention:   activity supervised   assistive device/personal items within reach   clutter free environment maintained   fall prevention program maintained   lighting adjusted   nonskid shoes/slippers when out of bed   room organization consistent   safety round/check completed  Taken 4/20/2023 1400 by David Kelly RN  Safety Promotion/Fall Prevention:   activity supervised   assistive device/personal items within reach   clutter free environment maintained   fall prevention program maintained   lighting adjusted   nonskid shoes/slippers when out of bed   room organization consistent   safety round/check completed  Taken 4/20/2023 1200 by David Kelly RN  Safety Promotion/Fall Prevention:   activity supervised   assistive device/personal items within reach   clutter free environment maintained   fall prevention program maintained   lighting adjusted   nonskid shoes/slippers when out of bed   room organization consistent   safety round/check completed  Taken 4/20/2023 1000 by David Kelly RN  Safety Promotion/Fall Prevention:   activity supervised   assistive device/personal items within reach   clutter free environment maintained   fall prevention program maintained   lighting adjusted   nonskid shoes/slippers when out of bed   room organization consistent   safety round/check completed  Taken 4/20/2023 0800 by David Kelly, RN  Safety Promotion/Fall Prevention:   activity supervised   assistive device/personal items within reach   clutter free environment maintained   fall prevention program  maintained   lighting adjusted   nonskid shoes/slippers when out of bed   room organization consistent   safety round/check completed  Intervention: Prevent Skin Injury  Recent Flowsheet Documentation  Taken 4/20/2023 1600 by David Kelly RN  Body Position: position changed independently  Skin Protection:   adhesive use limited   incontinence pads utilized   transparent dressing maintained   tubing/devices free from skin contact  Taken 4/20/2023 1400 by David Kelly RN  Body Position: position changed independently  Skin Protection:   adhesive use limited   incontinence pads utilized   transparent dressing maintained   tubing/devices free from skin contact  Taken 4/20/2023 1200 by David Kelly RN  Body Position: position changed independently  Skin Protection:   adhesive use limited   incontinence pads utilized   transparent dressing maintained   tubing/devices free from skin contact  Taken 4/20/2023 1000 by David Kelly RN  Body Position: position changed independently  Taken 4/20/2023 0800 by David Kelly RN  Body Position: supine, legs elevated  Skin Protection:   adhesive use limited   incontinence pads utilized   transparent dressing maintained   tubing/devices free from skin contact  Intervention: Prevent and Manage VTE (Venous Thromboembolism) Risk  Recent Flowsheet Documentation  Taken 4/20/2023 1600 by David Kelly RN  Activity Management: activity encouraged  Taken 4/20/2023 1400 by David Kelly RN  Activity Management: activity encouraged  Taken 4/20/2023 1200 by David Kelly RN  Activity Management: activity encouraged  Taken 4/20/2023 1000 by David Kelly RN  Activity Management: activity encouraged  Taken 4/20/2023 0800 by David Kelly RN  Activity Management: activity encouraged  Range of Motion: active ROM (range of motion) encouraged  Intervention: Prevent Infection  Recent Flowsheet Documentation  Taken 4/20/2023 1600 by David Kelly  RN  Infection Prevention:   environmental surveillance performed   rest/sleep promoted  Taken 4/20/2023 1400 by David Kelly RN  Infection Prevention:   environmental surveillance performed   rest/sleep promoted  Taken 4/20/2023 1000 by David Kelly RN  Infection Prevention:   environmental surveillance performed   rest/sleep promoted  Taken 4/20/2023 0800 by David Kelly RN  Infection Prevention:   environmental surveillance performed   rest/sleep promoted  Goal: Optimal Comfort and Wellbeing  Outcome: Ongoing, Progressing  Intervention: Monitor Pain and Promote Comfort  Recent Flowsheet Documentation  Taken 4/20/2023 1600 by David Kelly RN  Pain Management Interventions:   pillow support provided   position adjusted   quiet environment facilitated  Taken 4/20/2023 1400 by David Kelly RN  Pain Management Interventions:   pillow support provided   position adjusted   quiet environment facilitated  Taken 4/20/2023 1200 by David Kelly RN  Pain Management Interventions:   position adjusted   pillow support provided  Taken 4/20/2023 1000 by David Kelly RN  Pain Management Interventions:   medication offered but refused   pillow support provided   position adjusted   quiet environment facilitated  Taken 4/20/2023 0800 by David Kelly RN  Pain Management Interventions: see MAR  Intervention: Provide Person-Centered Care  Recent Flowsheet Documentation  Taken 4/20/2023 1600 by David Kelly RN  Trust Relationship/Rapport:   care explained   choices provided  Taken 4/20/2023 1400 by David Kelly RN  Trust Relationship/Rapport:   care explained   choices provided  Taken 4/20/2023 1200 by David Kelly RN  Trust Relationship/Rapport:   care explained   choices provided  Taken 4/20/2023 1000 by David Kelly RN  Trust Relationship/Rapport:   care explained   choices provided  Taken 4/20/2023 0800 by David Kelly RN  Trust Relationship/Rapport:    care explained   choices provided  Goal: Readiness for Transition of Care  Outcome: Ongoing, Progressing     Problem: COPD (Chronic Obstructive Pulmonary Disease) Comorbidity  Goal: Maintenance of COPD Symptom Control  Outcome: Ongoing, Progressing  Intervention: Maintain COPD-Symptom Control  Recent Flowsheet Documentation  Taken 4/20/2023 1600 by David Kelly RN  Medication Review/Management: medications reviewed  Taken 4/20/2023 1400 by David Kelly RN  Medication Review/Management: medications reviewed  Taken 4/20/2023 1200 by David Kelly RN  Medication Review/Management: medications reviewed  Taken 4/20/2023 1000 by David Kelly RN  Medication Review/Management: medications reviewed  Taken 4/20/2023 0800 by David Kelly RN  Medication Review/Management: medications reviewed     Problem: Hypertension Comorbidity  Goal: Blood Pressure in Desired Range  Outcome: Ongoing, Progressing  Intervention: Maintain Blood Pressure Management  Recent Flowsheet Documentation  Taken 4/20/2023 1600 by David Kelly RN  Medication Review/Management: medications reviewed  Taken 4/20/2023 1400 by David Kelly RN  Medication Review/Management: medications reviewed  Taken 4/20/2023 1200 by David Kelly RN  Medication Review/Management: medications reviewed  Taken 4/20/2023 1000 by David Kelly RN  Medication Review/Management: medications reviewed  Taken 4/20/2023 0800 by David Kelly RN  Medication Review/Management: medications reviewed     Problem: Obstructive Sleep Apnea Risk or Actual Comorbidity Management  Goal: Unobstructed Breathing During Sleep  Outcome: Ongoing, Progressing     Problem: Pain Chronic (Persistent) (Comorbidity Management)  Goal: Acceptable Pain Control and Functional Ability  Outcome: Ongoing, Progressing  Intervention: Manage Persistent Pain  Recent Flowsheet Documentation  Taken 4/20/2023 1600 by David Kelly RN  Medication  Review/Management: medications reviewed  Taken 4/20/2023 1400 by David Kelly RN  Medication Review/Management: medications reviewed  Taken 4/20/2023 1200 by David Kelly RN  Medication Review/Management: medications reviewed  Taken 4/20/2023 1000 by David Kelly RN  Medication Review/Management: medications reviewed  Taken 4/20/2023 0800 by David Kelly RN  Medication Review/Management: medications reviewed  Intervention: Develop Pain Management Plan  Recent Flowsheet Documentation  Taken 4/20/2023 1600 by David Kelly RN  Pain Management Interventions:   pillow support provided   position adjusted   quiet environment facilitated  Taken 4/20/2023 1400 by David Kelly RN  Pain Management Interventions:   pillow support provided   position adjusted   quiet environment facilitated  Taken 4/20/2023 1200 by David Kelly RN  Pain Management Interventions:   position adjusted   pillow support provided  Taken 4/20/2023 1000 by David Kelly RN  Pain Management Interventions:   medication offered but refused   pillow support provided   position adjusted   quiet environment facilitated  Taken 4/20/2023 0800 by David Kelly RN  Pain Management Interventions: see MAR  Intervention: Optimize Psychosocial Wellbeing  Recent Flowsheet Documentation  Taken 4/20/2023 1600 by David Kelly RN  Diversional Activities:   smartphone   television  Family/Support System Care: support provided  Taken 4/20/2023 1400 by David Kelly RN  Diversional Activities:   smartphone   television  Family/Support System Care: support provided  Taken 4/20/2023 1200 by David Kelly RN  Diversional Activities:   smartphone   television  Family/Support System Care: support provided  Taken 4/20/2023 1000 by David Kelly RN  Diversional Activities:   smartphone   television  Family/Support System Care: support provided  Taken 4/20/2023 0800 by David Kelly RN  Diversional  Activities: television  Family/Support System Care: support provided     Problem: Skin Injury Risk Increased  Goal: Skin Health and Integrity  Outcome: Ongoing, Progressing  Intervention: Optimize Skin Protection  Recent Flowsheet Documentation  Taken 4/20/2023 1600 by David Kelly RN  Pressure Reduction Techniques: frequent weight shift encouraged  Head of Bed (HOB) Positioning: HOB at 30-45 degrees  Pressure Reduction Devices: pressure-redistributing mattress utilized  Skin Protection:   adhesive use limited   incontinence pads utilized   transparent dressing maintained   tubing/devices free from skin contact  Taken 4/20/2023 1400 by David Kelly RN  Pressure Reduction Techniques: frequent weight shift encouraged  Head of Bed (HOB) Positioning: HOB at 30-45 degrees  Pressure Reduction Devices: pressure-redistributing mattress utilized  Skin Protection:   adhesive use limited   incontinence pads utilized   transparent dressing maintained   tubing/devices free from skin contact  Taken 4/20/2023 1200 by David Kelly RN  Pressure Reduction Techniques: frequent weight shift encouraged  Head of Bed (HOB) Positioning: HOB at 30-45 degrees  Pressure Reduction Devices: pressure-redistributing mattress utilized  Skin Protection:   adhesive use limited   incontinence pads utilized   transparent dressing maintained   tubing/devices free from skin contact  Taken 4/20/2023 1000 by David Kelly RN  Head of Bed (HOB) Positioning: HOB at 30-45 degrees  Taken 4/20/2023 0800 by David Kelly RN  Pressure Reduction Techniques:   pressure points protected   weight shift assistance provided  Head of Bed (HOB) Positioning: [facility] elevated  Pressure Reduction Devices: pressure-redistributing mattress utilized  Skin Protection:   adhesive use limited   incontinence pads utilized   transparent dressing maintained   tubing/devices free from skin contact     Problem: Fall Injury Risk  Goal: Absence of Fall and  Fall-Related Injury  Outcome: Ongoing, Progressing  Intervention: Identify and Manage Contributors  Recent Flowsheet Documentation  Taken 4/20/2023 1600 by David Kelly RN  Medication Review/Management: medications reviewed  Taken 4/20/2023 1400 by David Kelly RN  Medication Review/Management: medications reviewed  Taken 4/20/2023 1200 by David Kelly RN  Medication Review/Management: medications reviewed  Taken 4/20/2023 1000 by David Kelly RN  Medication Review/Management: medications reviewed  Taken 4/20/2023 0800 by David Kelly RN  Medication Review/Management: medications reviewed  Intervention: Promote Injury-Free Environment  Recent Flowsheet Documentation  Taken 4/20/2023 1600 by David Kelly RN  Safety Promotion/Fall Prevention:   activity supervised   assistive device/personal items within reach   clutter free environment maintained   fall prevention program maintained   lighting adjusted   nonskid shoes/slippers when out of bed   room organization consistent   safety round/check completed  Taken 4/20/2023 1400 by David Kelly RN  Safety Promotion/Fall Prevention:   activity supervised   assistive device/personal items within reach   clutter free environment maintained   fall prevention program maintained   lighting adjusted   nonskid shoes/slippers when out of bed   room organization consistent   safety round/check completed  Taken 4/20/2023 1200 by David Kelly RN  Safety Promotion/Fall Prevention:   activity supervised   assistive device/personal items within reach   clutter free environment maintained   fall prevention program maintained   lighting adjusted   nonskid shoes/slippers when out of bed   room organization consistent   safety round/check completed  Taken 4/20/2023 1000 by David Kelly RN  Safety Promotion/Fall Prevention:   activity supervised   assistive device/personal items within reach   clutter free environment maintained   fall  prevention program maintained   lighting adjusted   nonskid shoes/slippers when out of bed   room organization consistent   safety round/check completed  Taken 4/20/2023 0800 by David Kelly RN  Safety Promotion/Fall Prevention:   activity supervised   assistive device/personal items within reach   clutter free environment maintained   fall prevention program maintained   lighting adjusted   nonskid shoes/slippers when out of bed   room organization consistent   safety round/check completed   Goal Outcome Evaluation:   Plan of care reviewed with: Patient
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
## 2023-04-20 NOTE — PROGRESS NOTES
Patient is on Apixaban. Due to COVID isolation, written education left in chartlet for patient when discharged. Information provided includes effects of medication, drug-drug and drug-food interactions, and signs/symptoms of bleeding and clotting.     Casey Manzanares, PharmD, BCPS  4/20/2023  12:03 EDT

## 2023-04-20 NOTE — PROGRESS NOTES
"                    Clinical Nutrition       Patient Name: Flaco Roque II  YOB: 1945  MRN: [id number]  Date of Encounter: 04/20/23 15:40 EDT  Admission date: 4/15/2023      Reason for Visit   Follow-up protocol    EMR Reviewed     EMR Reviewed: yes     Admission Diagnosis:  COVID-19 [U07.1]    Problem List:    COVID-19    ABRIL (obstructive sleep apnea)    Pulmonary emphysema    GERD without esophagitis    Parkinson disease    Acute respiratory failure with hypoxia    Hypokalemia    Peripheral arterial disease    Chronic atrial fibrillation with rapid ventricular response    (4/18) SLP MBS - mechanical ground, no mixed consistencies, NTL    Anthropometric      Flowsheet Rows    Flowsheet Row First Filed Value   Admission Height 172.7 cm (68\") Documented at 04/15/2023 1200   Admission Weight 62.6 kg (138 lb) Documented at 04/15/2023 1200          Height: 172.7 cm (68\")  Last Filed Weight: Weight: 62.6 kg (138 lb) (04/15/23 1200)  Weight Method: Stated  BMI: BMI (Calculated): 21  BMI classification: Normal: 18.5-24.9kg/m2   IBW:  154lb    Weight change: no significant changes per EMR      Reported/Observed/Food/Nutrition Related - Comments   4/20  RD unable to speak with patient d/t pt unable to answer room phone. RN reports patient ate 50% of lunch today (enjoyed the lasagna). Dislikes the scrambled eggs. However, pt loves and is drinking the chocolate Boost. RD spoke with wife about food preferences with patient's current dysphagia restrictions. Shared meal orders for next 2 meals.    4/17  Consult per RN patient requesting chocolate supplement.  RD started order for Boost + TID with meals.  Patient in COVID isolation, RD not able to reach patient over the phone.  Per MD note this morning, patient is confused, ? Issues with swallowing.  SLP eval ordered for today.      Current Nutrition Prescription     Diet: Regular/House Diet; No Mixed Consistencies; Texture: Mechanical Ground (NDD 2); Fluid " Consistency: [place] Thick    Oral Nutrition Supplement: Boost Plus TID    Average Intake from Charting: 3 Days:  38% x 4 meals (and some documentation of ONS intake)     Nutrition Diagnosis     4/17  Problem Predicted suboptimal energy intake   Etiology Clinical condition    Signs/Symptoms COVID virus, confusion, ? Dysphagia    Status: N/A - PO intake established    Date:  4/20 Updated:  Problem Inadequate oral intake   Etiology Decreased appetite, food preferences   Signs/Symptoms PO intake: 38% x 4 meals   Status:    Actions     Follow treatment progress, Care plan reviewed, Supplement provided     -Continue Boost Plus with meals.  -Shared preferences with diet office.    Monitor Per Protocol      Magalys Vitale RD,   Time Spent: 25min
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
## 2023-04-21 PROCEDURE — 94664 DEMO&/EVAL PT USE INHALER: CPT

## 2023-04-21 PROCEDURE — 94761 N-INVAS EAR/PLS OXIMETRY MLT: CPT

## 2023-04-21 PROCEDURE — 94799 UNLISTED PULMONARY SVC/PX: CPT

## 2023-04-21 PROCEDURE — 99231 SBSQ HOSP IP/OBS SF/LOW 25: CPT | Performed by: HOSPITALIST

## 2023-04-21 PROCEDURE — 92526 ORAL FUNCTION THERAPY: CPT

## 2023-04-21 PROCEDURE — 97535 SELF CARE MNGMENT TRAINING: CPT

## 2023-04-21 PROCEDURE — 97530 THERAPEUTIC ACTIVITIES: CPT

## 2023-04-21 PROCEDURE — 63710000001 DEXAMETHASONE PER 0.25 MG: Performed by: HOSPITALIST

## 2023-04-21 RX ADMIN — AMANTADINE HYDROCHLORIDE 100 MG: 100 CAPSULE ORAL at 09:50

## 2023-04-21 RX ADMIN — CLONAZEPAM 1 MG: 1 TABLET ORAL at 21:10

## 2023-04-21 RX ADMIN — HYDROCODONE BITARTRATE AND ACETAMINOPHEN 1 TABLET: 5; 325 TABLET ORAL at 05:14

## 2023-04-21 RX ADMIN — IPRATROPIUM BROMIDE 2 PUFF: 17 AEROSOL, METERED RESPIRATORY (INHALATION) at 21:18

## 2023-04-21 RX ADMIN — CARBIDOPA AND LEVODOPA 1 TABLET: 25; 100 TABLET ORAL at 09:50

## 2023-04-21 RX ADMIN — APIXABAN 5 MG: 5 TABLET, FILM COATED ORAL at 21:10

## 2023-04-21 RX ADMIN — Medication 10 ML: at 21:24

## 2023-04-21 RX ADMIN — ALBUTEROL SULFATE 2 PUFF: 90 AEROSOL, METERED RESPIRATORY (INHALATION) at 21:18

## 2023-04-21 RX ADMIN — PANTOPRAZOLE SODIUM 40 MG: 40 TABLET, DELAYED RELEASE ORAL at 05:14

## 2023-04-21 RX ADMIN — ASPIRIN 81 MG CHEWABLE TABLET 81 MG: 81 TABLET CHEWABLE at 09:49

## 2023-04-21 RX ADMIN — Medication 10 ML: at 09:50

## 2023-04-21 RX ADMIN — AMANTADINE HYDROCHLORIDE 100 MG: 100 CAPSULE ORAL at 21:24

## 2023-04-21 RX ADMIN — QUETIAPINE FUMARATE 25 MG: 25 TABLET ORAL at 17:46

## 2023-04-21 RX ADMIN — CARBIDOPA AND LEVODOPA 1 TABLET: 25; 100 TABLET ORAL at 21:10

## 2023-04-21 RX ADMIN — SENNOSIDES AND DOCUSATE SODIUM 2 TABLET: 8.6; 5 TABLET ORAL at 21:10

## 2023-04-21 RX ADMIN — APIXABAN 5 MG: 5 TABLET, FILM COATED ORAL at 09:49

## 2023-04-21 RX ADMIN — CARBIDOPA AND LEVODOPA 1 TABLET: 25; 100 TABLET ORAL at 12:52

## 2023-04-21 RX ADMIN — TAMSULOSIN HYDROCHLORIDE 0.4 MG: 0.4 CAPSULE ORAL at 21:10

## 2023-04-21 RX ADMIN — IPRATROPIUM BROMIDE 2 PUFF: 17 AEROSOL, METERED RESPIRATORY (INHALATION) at 07:51

## 2023-04-21 RX ADMIN — Medication 1 CAPSULE: at 09:49

## 2023-04-21 RX ADMIN — ATORVASTATIN CALCIUM 10 MG: 10 TABLET, FILM COATED ORAL at 09:50

## 2023-04-21 RX ADMIN — METOPROLOL SUCCINATE 100 MG: 100 TABLET, EXTENDED RELEASE ORAL at 09:49

## 2023-04-21 RX ADMIN — MONTELUKAST 10 MG: 10 TABLET, FILM COATED ORAL at 21:10

## 2023-04-21 RX ADMIN — CARBIDOPA AND LEVODOPA 1 TABLET: 25; 100 TABLET ORAL at 17:46

## 2023-04-21 RX ADMIN — SENNOSIDES AND DOCUSATE SODIUM 2 TABLET: 8.6; 5 TABLET ORAL at 09:49

## 2023-04-21 RX ADMIN — ALBUTEROL SULFATE 2 PUFF: 90 AEROSOL, METERED RESPIRATORY (INHALATION) at 07:52

## 2023-04-21 RX ADMIN — DEXAMETHASONE 6 MG: 4 TABLET ORAL at 09:49

## 2023-04-21 NOTE — PLAN OF CARE
Goal Outcome Evaluation:  Plan of Care Reviewed With: patient       VSS. RA but 2 L at night. Slept well. Good urine output. No BM. Alert and oriented, calls for assistance. Only c/o of R rib pain around 0500, gave norco. No c/o nausea. Turned throughout shift. No cough.       Progress: improving     Problem: Adult Inpatient Plan of Care  Goal: Plan of Care Review  Outcome: Ongoing, Progressing  Flowsheets (Taken 4/21/2023 0224)  Progress: improving  Plan of Care Reviewed With: patient  Goal: Patient-Specific Goal (Individualized)  Outcome: Ongoing, Progressing  Goal: Absence of Hospital-Acquired Illness or Injury  Outcome: Ongoing, Progressing  Intervention: Identify and Manage Fall Risk  Recent Flowsheet Documentation  Taken 4/21/2023 0000 by Agnieszka Nogueira RN  Safety Promotion/Fall Prevention:  • activity supervised  • assistive device/personal items within reach  • clutter free environment maintained  Taken 4/20/2023 2200 by Agnieszka Nogueira RN  Safety Promotion/Fall Prevention:  • safety round/check completed  • activity supervised  • assistive device/personal items within reach  • clutter free environment maintained  Taken 4/20/2023 2000 by Agnieszka Nogueira RN  Safety Promotion/Fall Prevention:  • activity supervised  • assistive device/personal items within reach  • clutter free environment maintained  Intervention: Prevent Skin Injury  Recent Flowsheet Documentation  Taken 4/21/2023 0000 by Agnieszka Nogueira RN  Body Position: weight shifting  Skin Protection:  • adhesive use limited  • skin-to-skin areas padded  • skin-to-device areas padded  Taken 4/20/2023 2200 by Agnieszka Nogueira RN  Body Position:  • side-lying  • position changed independently  Skin Protection:  • adhesive use limited  • skin-to-device areas padded  • skin-to-skin areas padded  • tubing/devices free from skin contact  Taken 4/20/2023 2000 by Agnieszka Nogueira RN  Body Position:  • turned  • sitting up in bed  Skin  Protection:  • adhesive use limited  • skin-to-device areas padded  • skin-to-skin areas padded  • tubing/devices free from skin contact  Intervention: Prevent and Manage VTE (Venous Thromboembolism) Risk  Recent Flowsheet Documentation  Taken 4/21/2023 0000 by Agnieskza Nogueira RN  Activity Management: activity encouraged  Taken 4/20/2023 2200 by Agnieszka Nogueira RN  Activity Management: activity encouraged  Taken 4/20/2023 2000 by Agnieszka Nogueira RN  Activity Management: activity encouraged  VTE Prevention/Management: (eliquis PO)  • bilateral  • sequential compression devices off  Goal: Optimal Comfort and Wellbeing  Outcome: Ongoing, Progressing  Intervention: Monitor Pain and Promote Comfort  Recent Flowsheet Documentation  Taken 4/20/2023 2000 by Agnieszka Nogueira RN  Pain Management Interventions: care clustered  Intervention: Provide Person-Centered Care  Recent Flowsheet Documentation  Taken 4/20/2023 2000 by Agnieszka Nogueira RN  Trust Relationship/Rapport: care explained  Goal: Readiness for Transition of Care  Outcome: Ongoing, Progressing     Problem: Skin Injury Risk Increased  Goal: Skin Health and Integrity  Outcome: Ongoing, Progressing  Intervention: Optimize Skin Protection  Recent Flowsheet Documentation  Taken 4/21/2023 0000 by Agnieszka Nogueira RN  Pressure Reduction Techniques:  • weight shift assistance provided  • frequent weight shift encouraged  Head of Bed (HOB) Positioning: HOB at 20 degrees  Pressure Reduction Devices:  • positioning supports utilized  • pressure-redistributing mattress utilized  Skin Protection:  • adhesive use limited  • skin-to-skin areas padded  • skin-to-device areas padded  Taken 4/20/2023 2200 by Agnieszka Nogueira RN  Pressure Reduction Techniques: weight shift assistance provided  Pressure Reduction Devices:  • positioning supports utilized  • pressure-redistributing mattress utilized  • specialty bed utilized  Skin Protection:  • adhesive use  limited  • skin-to-device areas padded  • skin-to-skin areas padded  • tubing/devices free from skin contact  Taken 4/20/2023 2000 by Agnieszka Nogueira, RN  Pressure Reduction Techniques: weight shift assistance provided  Head of Bed (HOB) Positioning: HOB at 45 degrees  Pressure Reduction Devices: (WAFFLE PLACED)  • positioning supports utilized  • pressure-redistributing mattress utilized  Skin Protection:  • adhesive use limited  • skin-to-device areas padded  • skin-to-skin areas padded  • tubing/devices free from skin contact

## 2023-04-21 NOTE — PROGRESS NOTES
[facility] Medicine Services  PROGRESS NOTE    Patient Name: Flaco Roque II  : 1945  MRN: [id number]    Date of Admission: 4/15/2023  Primary Care Physician: Azar Stern MD    Subjective   Subjective     CC: Weakness    HPI: Up in bed. Appetite better. Slept better. No longer needing assistance of sitter.    Review of Systems   Constitutional: Positive for activity change, appetite change and fatigue.   Respiratory: Positive for cough and shortness of breath.    Cardiovascular: Negative.    Gastrointestinal: Negative.    Neurological: Positive for weakness.   No change from  otherwise    Objective   Objective     Vital Signs:   Temp:  [97.1 °F (36.2 °C)-97.4 °F (36.3 °C)] 97.4 °F (36.3 °C)  Heart Rate:  [46-80] 78  Resp:  [16-22] 17  BP: (107-118)/(62-79) 107/79  Flow (L/min):  [2] 2     Physical Exam:  NAD, alert, oriented and conversant  OP clear, dry MM  Neck supple  No LAD  RRR  CTAB  +BS, soft  No c/c/e  No rashes  WHITE  No change from  otherwise    Results Reviewed:  LAB RESULTS:      Lab 23  0413 23  0406 23  0536 23  0419 04/15/23  1207   WBC 7.67 11.78* 11.93* 15.12* 11.94*   HEMOGLOBIN 10.1* 10.4* 10.5* 10.9* 12.2*   HEMATOCRIT 33.5* 34.1* 35.0* 35.3* 40.1   PLATELETS 264 265 230 227 190   NEUTROS ABS 6.28 10.44* 10.67* 13.56* 10.83*   IMMATURE GRANS (ABS) 0.04 0.04 0.03 0.06* 0.03   LYMPHS ABS 0.74 0.49* 0.49* 0.64* 0.24*   MONOS ABS 0.60 0.80 0.70 0.84 0.81   EOS ABS 0.00 0.00 0.02 0.00 0.01   MCV 78.8* 79.3 81.0 79.1 79.1   PROCALCITONIN 0.58*  --   --   --  0.63*   LACTATE  --   --   --   --  1.3   D DIMER QUANT <0.27 0.32 0.40 0.38 0.63         Lab 23  0413 23  0406 23  0536 23  0419 04/15/23  1815 04/15/23  1207   SODIUM 142 139 141 143  --  137   POTASSIUM 4.1 4.4 3.7 3.7  --  3.1*   CHLORIDE 107 107 108* 108*  --  102   CO2 25.0 26.0 27.0 28.0  --  24.0   ANION GAP 10.0 6.0 6.0 7.0  --  11.0   BUN  18 23 17 13  --  14   CREATININE 0.57* 0.57* 0.64* 0.65* 0.82 0.69*   EGFR 101.0 101.0 97.5 97.0 90.5 95.3   GLUCOSE 111* 114* 144* 128*  --  115*   CALCIUM 8.6 8.7 8.4* 8.4*  --  8.5*         Lab 04/19/23  0413 04/18/23  0406 04/17/23  0536 04/16/23  0419 04/15/23  1815 04/15/23  1207   TOTAL PROTEIN 4.9* 5.0* 5.3* 5.4* 5.7* 5.8*   ALBUMIN 3.2* 3.2* 3.1* 3.2* 3.8 3.6   GLOBULIN 1.7 1.8 2.2 2.2  --  2.2   ALT (SGPT) <5 <5 <5 <5 5 <5   AST (SGOT) 15 10 12 12 12 14   BILIRUBIN 0.3 0.3 0.3 0.5 0.9 0.8   INDIRECT BILIRUBIN  --   --   --   --  0.6  --    BILIRUBIN DIRECT <0.2 <0.2 <0.2 0.2 0.3  --    ALK PHOS 44 55 53 56 59 59   LIPASE  --   --   --   --   --  27         Lab 04/15/23  1511 04/15/23  1207   PROBNP  --  440.8   HSTROP T 23* 22*                 Brief Urine Lab Results     None          Microbiology Results Abnormal     Procedure Component Value - Date/Time    Blood Culture - Blood, Arm, Left [[id number]]  (Normal) Collected: 04/15/23 1219    Lab Status: Final result Specimen: Blood from Arm, Left Updated: 04/20/23 1317     Blood Culture No growth at 5 days    Blood Culture - Blood, Hand, Left [[id number]]  (Normal) Collected: 04/15/23 1219    Lab Status: Final result Specimen: Blood from Hand, Left Updated: 04/20/23 1316     Blood Culture No growth at 5 days          No radiology results from the last 24 hrs    Results for orders placed during the hospital encounter of 04/26/19    Adult Transthoracic Echo Complete W/ Cont if Necessary Per Protocol    Interpretation Summary  · Left ventricular systolic function is normal.  · Estimated EF appears to be in the range of 66 - 70%.      Current medications:  Scheduled Meds:albuterol sulfate HFA, 2 puff, Inhalation, TID - RT   And  ipratropium, 2 puff, Inhalation, TID - RT  amantadine, 100 mg, Oral, BID  apixaban, 5 mg, Oral, Q12H  aspirin, 81 mg, Oral, Daily  atorvastatin, 10 mg, Oral, Daily  carbidopa-levodopa, 1 tablet, Oral, 4x Daily  clonazePAM, 1 mg, Oral,  Nightly  dexamethasone, 6 mg, Oral, Daily   Or  dexamethasone, 6 mg, Intravenous, Daily  lactobacillus acidophilus, 1 capsule, Oral, Daily  metoprolol succinate XL, 100 mg, Oral, Daily  montelukast, 10 mg, Oral, Nightly  pantoprazole, 40 mg, Oral, Q AM  QUEtiapine, 25 mg, Oral, Q PM  senna-docusate sodium, 2 tablet, Oral, BID  sodium chloride, 10 mL, Intravenous, Q12H  tamsulosin, 0.4 mg, Oral, Nightly      Continuous Infusions:   PRN Meds:.•  acetaminophen **OR** acetaminophen **OR** acetaminophen  •  senna-docusate sodium **AND** polyethylene glycol **AND** bisacodyl **AND** bisacodyl  •  Calcium Replacement - Follow Nurse / BPA Driven Protocol  •  HYDROcodone-acetaminophen  •  Magnesium Standard Dose Replacement - Follow Nurse / BPA Driven Protocol  •  metoprolol tartrate  •  Morphine  •  ondansetron **OR** ondansetron  •  Phosphorus Replacement - Follow Nurse / BPA Driven Protocol  •  Potassium Replacement - Follow Nurse / BPA Driven Protocol  •  sodium chloride  •  sodium chloride  •  sodium chloride    Assessment & Plan   Assessment & Plan     Active Hospital Problems    Diagnosis  POA   • **COVID-19 [U07.1]  Yes   • Chronic atrial fibrillation with rapid ventricular response [I48.20]  Yes   • Peripheral arterial disease [I73.9]  Yes   • Hypokalemia [E87.6]  Yes   • Acute respiratory failure with hypoxia [J96.01]  Yes   • Parkinson disease [G20]  Yes   • ABRIL (obstructive sleep apnea) [G47.33]  Yes   • Pulmonary emphysema [J43.9]  Yes   • GERD without esophagitis [K21.9]  Yes      Resolved Hospital Problems   No resolved problems to display.        Brief Hospital Course to date:  Flaco Roque II is a 77 y.o. male  with a past medical history of atrial fibrillation, ABRIL, GERD, emphysema and Parkinson's disease that presented to the ED complaining of fever/chills, rigors and chest pain with palpitations.      COVID-19  Acute Respiratory Failure  H/o pulmonary emphysema  Possible Aspiration Pneumonia    -dex/remdesivir   -on RA this AM, again  -antibiotics  -pulmonary toilet  -speech following, adjustments made     Atrial Fibrillation with RVR  -rate control per cardiology, transitioned to PO anticoagulation  -stable on current regimen     Hypokalemia  -replace per protocol     Parkinsons disease  -continue home carbidopa/levadopa     ABRIL  -CPAP intolerant    Better, awaiting placement     GERD  Expected Discharge Location and Transportation:Rehab?  Expected Discharge   Expected Discharge Date and Time     Expected Discharge Date Expected Discharge Time    Apr 21, 2023            DVT prophylaxis:  Medical and mechanical DVT prophylaxis orders are present.     AM-PAC 6 Clicks Score (PT): 20 (04/20/23 2000)    CODE STATUS:   Code Status and Medical Interventions:   Ordered at: 04/15/23 1433     Level Of Support Discussed With:    Patient     Code Status (Patient has no pulse and is not breathing):    CPR (Attempt to Resuscitate)     Medical Interventions (Patient has pulse or is breathing):    Full Support     Release to patient:    Routine Release       Vlad Oliver MD  04/21/23
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
## 2023-04-21 NOTE — PLAN OF CARE
Problem: Adult Inpatient Plan of Care  Goal: Plan of Care Review  Outcome: Ongoing, Progressing  Goal: Patient-Specific Goal (Individualized)  Outcome: Ongoing, Progressing  Goal: Absence of Hospital-Acquired Illness or Injury  Outcome: Ongoing, Progressing  Intervention: Identify and Manage Fall Risk  Recent Flowsheet Documentation  Taken 4/21/2023 1400 by David Kelly RN  Safety Promotion/Fall Prevention:   activity supervised   assistive device/personal items within reach   clutter free environment maintained   fall prevention program maintained   lighting adjusted   nonskid shoes/slippers when out of bed   room organization consistent   safety round/check completed  Taken 4/21/2023 1200 by David Kelly RN  Safety Promotion/Fall Prevention:   activity supervised   assistive device/personal items within reach   clutter free environment maintained   fall prevention program maintained   lighting adjusted   nonskid shoes/slippers when out of bed   room organization consistent   safety round/check completed  Taken 4/21/2023 1000 by David Kelly RN  Safety Promotion/Fall Prevention:   activity supervised   assistive device/personal items within reach   clutter free environment maintained   fall prevention program maintained   lighting adjusted   nonskid shoes/slippers when out of bed   room organization consistent   safety round/check completed  Taken 4/21/2023 0800 by David Kelly RN  Safety Promotion/Fall Prevention:   activity supervised   assistive device/personal items within reach   clutter free environment maintained   fall prevention program maintained   lighting adjusted   nonskid shoes/slippers when out of bed   room organization consistent   safety round/check completed  Intervention: Prevent Skin Injury  Recent Flowsheet Documentation  Taken 4/21/2023 1400 by David Kelly, RN  Skin Protection:   adhesive use limited   incontinence pads utilized   transparent dressing  maintained   tubing/devices free from skin contact  Taken 4/21/2023 1200 by David Kelly RN  Body Position: position changed independently  Skin Protection:   adhesive use limited   incontinence pads utilized   transparent dressing maintained   tubing/devices free from skin contact  Taken 4/21/2023 1000 by David Kelly RN  Body Position: position changed independently  Taken 4/21/2023 0800 by David Kelly RN  Body Position:   side-lying   left  Skin Protection:   adhesive use limited   incontinence pads utilized   transparent dressing maintained   tubing/devices free from skin contact  Intervention: Prevent and Manage VTE (Venous Thromboembolism) Risk  Recent Flowsheet Documentation  Taken 4/21/2023 1200 by David Kelly RN  Activity Management:   activity encouraged   up to bedside commode  Taken 4/21/2023 1000 by David Kelly RN  Activity Management: activity encouraged  Taken 4/21/2023 0800 by David Kelly RN  Activity Management: activity encouraged  VTE Prevention/Management: (PO Eliquis) other (see comments)  Range of Motion: active ROM (range of motion) encouraged  Intervention: Prevent Infection  Recent Flowsheet Documentation  Taken 4/21/2023 1400 by David Kelly RN  Infection Prevention:   environmental surveillance performed   rest/sleep promoted  Taken 4/21/2023 1200 by David Kelly RN  Infection Prevention:   environmental surveillance performed   rest/sleep promoted  Taken 4/21/2023 1000 by David Kelly RN  Infection Prevention:   environmental surveillance performed   rest/sleep promoted  Taken 4/21/2023 0800 by David Kelly RN  Infection Prevention:   environmental surveillance performed   rest/sleep promoted  Goal: Optimal Comfort and Wellbeing  Outcome: Ongoing, Progressing  Intervention: Monitor Pain and Promote Comfort  Recent Flowsheet Documentation  Taken 4/21/2023 1400 by David Kelly RN  Pain Management Interventions:   quiet  environment facilitated   position adjusted   pillow support provided  Taken 4/21/2023 1200 by David Kelly RN  Pain Management Interventions:   quiet environment facilitated   position adjusted   pillow support provided  Taken 4/21/2023 1000 by David Kelly RN  Pain Management Interventions:   quiet environment facilitated   position adjusted   pillow support provided  Taken 4/21/2023 0800 by David Kelly RN  Pain Management Interventions:   quiet environment facilitated   position adjusted   pillow support provided  Intervention: Provide Person-Centered Care  Recent Flowsheet Documentation  Taken 4/21/2023 1400 by David Kelly RN  Trust Relationship/Rapport:   care explained   choices provided  Taken 4/21/2023 1200 by David Kelly RN  Trust Relationship/Rapport:   care explained   choices provided  Taken 4/21/2023 1000 by David Kelly RN  Trust Relationship/Rapport:   care explained   choices provided  Taken 4/21/2023 0800 by David Kelly RN  Trust Relationship/Rapport:   care explained   choices provided  Goal: Readiness for Transition of Care  Outcome: Ongoing, Progressing     Problem: COPD (Chronic Obstructive Pulmonary Disease) Comorbidity  Goal: Maintenance of COPD Symptom Control  Outcome: Ongoing, Progressing  Intervention: Maintain COPD-Symptom Control  Recent Flowsheet Documentation  Taken 4/21/2023 1400 by David Kelly RN  Medication Review/Management: medications reviewed  Taken 4/21/2023 1200 by David Kelly RN  Medication Review/Management: medications reviewed  Taken 4/21/2023 1000 by David Kelly RN  Medication Review/Management: medications reviewed  Taken 4/21/2023 0800 by David Kelly RN  Medication Review/Management: medications reviewed     Problem: Hypertension Comorbidity  Goal: Blood Pressure in Desired Range  Outcome: Ongoing, Progressing  Intervention: Maintain Blood Pressure Management  Recent Flowsheet Documentation  Taken  4/21/2023 1400 by David Kelly RN  Medication Review/Management: medications reviewed  Taken 4/21/2023 1200 by David Kelly RN  Medication Review/Management: medications reviewed  Taken 4/21/2023 1000 by David Kelly RN  Medication Review/Management: medications reviewed  Taken 4/21/2023 0800 by David Kelly RN  Medication Review/Management: medications reviewed     Problem: Obstructive Sleep Apnea Risk or Actual Comorbidity Management  Goal: Unobstructed Breathing During Sleep  Outcome: Ongoing, Progressing     Problem: Pain Chronic (Persistent) (Comorbidity Management)  Goal: Acceptable Pain Control and Functional Ability  Outcome: Ongoing, Progressing  Intervention: Manage Persistent Pain  Recent Flowsheet Documentation  Taken 4/21/2023 1400 by David Kelly RN  Medication Review/Management: medications reviewed  Taken 4/21/2023 1200 by David Kelly RN  Medication Review/Management: medications reviewed  Taken 4/21/2023 1000 by David Kelly RN  Medication Review/Management: medications reviewed  Taken 4/21/2023 0800 by David Kelly RN  Medication Review/Management: medications reviewed  Intervention: Develop Pain Management Plan  Recent Flowsheet Documentation  Taken 4/21/2023 1400 by David Kelly RN  Pain Management Interventions:   quiet environment facilitated   position adjusted   pillow support provided  Taken 4/21/2023 1200 by David Kelly RN  Pain Management Interventions:   quiet environment facilitated   position adjusted   pillow support provided  Taken 4/21/2023 1000 by David Kelly RN  Pain Management Interventions:   quiet environment facilitated   position adjusted   pillow support provided  Taken 4/21/2023 0800 by David Kelly RN  Pain Management Interventions:   quiet environment facilitated   position adjusted   pillow support provided  Intervention: Optimize Psychosocial Wellbeing  Recent Flowsheet Documentation  Taken  4/21/2023 1400 by David Kelly RN  Diversional Activities:   smartphone   television  Family/Support System Care: support provided  Taken 4/21/2023 1200 by David Kelly RN  Diversional Activities:   smartphone   television  Family/Support System Care: support provided  Taken 4/21/2023 1000 by David Kelly RN  Diversional Activities:   smartphone   television  Family/Support System Care: support provided  Taken 4/21/2023 0800 by David Kelly RN  Diversional Activities:   smartphone   television  Family/Support System Care: support provided     Problem: Skin Injury Risk Increased  Goal: Skin Health and Integrity  Outcome: Ongoing, Progressing  Intervention: Optimize Skin Protection  Recent Flowsheet Documentation  Taken 4/21/2023 1400 by David Kelly RN  Pressure Reduction Techniques: frequent weight shift encouraged  Pressure Reduction Devices: pressure-redistributing mattress utilized  Skin Protection:   adhesive use limited   incontinence pads utilized   transparent dressing maintained   tubing/devices free from skin contact  Taken 4/21/2023 1200 by David Kelly RN  Pressure Reduction Techniques: frequent weight shift encouraged  Head of Bed (HOB) Positioning: HOB at 45 degrees  Pressure Reduction Devices: positioning supports utilized  Skin Protection:   adhesive use limited   incontinence pads utilized   transparent dressing maintained   tubing/devices free from skin contact  Taken 4/21/2023 1000 by David Kelly RN  Head of Bed (HOB) Positioning: HOB at 30-45 degrees  Taken 4/21/2023 0800 by David Kelly RN  Pressure Reduction Techniques: frequent weight shift encouraged  Head of Bed (HOB) Positioning: HOB at 30 degrees  Pressure Reduction Devices: positioning supports utilized  Skin Protection:   adhesive use limited   incontinence pads utilized   transparent dressing maintained   tubing/devices free from skin contact     Problem: Fall Injury Risk  Goal: Absence of  Fall and Fall-Related Injury  Outcome: Ongoing, Progressing  Intervention: Identify and Manage Contributors  Recent Flowsheet Documentation  Taken 4/21/2023 1400 by David Kelly RN  Medication Review/Management: medications reviewed  Taken 4/21/2023 1200 by David Kelly RN  Medication Review/Management: medications reviewed  Taken 4/21/2023 1000 by David Kelly RN  Medication Review/Management: medications reviewed  Taken 4/21/2023 0800 by David Kelly RN  Medication Review/Management: medications reviewed  Intervention: Promote Injury-Free Environment  Recent Flowsheet Documentation  Taken 4/21/2023 1400 by David Kelly RN  Safety Promotion/Fall Prevention:   activity supervised   assistive device/personal items within reach   clutter free environment maintained   fall prevention program maintained   lighting adjusted   nonskid shoes/slippers when out of bed   room organization consistent   safety round/check completed  Taken 4/21/2023 1200 by David Kelly RN  Safety Promotion/Fall Prevention:   activity supervised   assistive device/personal items within reach   clutter free environment maintained   fall prevention program maintained   lighting adjusted   nonskid shoes/slippers when out of bed   room organization consistent   safety round/check completed  Taken 4/21/2023 1000 by David Kelly RN  Safety Promotion/Fall Prevention:   activity supervised   assistive device/personal items within reach   clutter free environment maintained   fall prevention program maintained   lighting adjusted   nonskid shoes/slippers when out of bed   room organization consistent   safety round/check completed  Taken 4/21/2023 0800 by David Kelly, RN  Safety Promotion/Fall Prevention:   activity supervised   assistive device/personal items within reach   clutter free environment maintained   fall prevention program maintained   lighting adjusted   nonskid shoes/slippers when out of  bed   room organization consistent   safety round/check completed   Goal Outcome Evaluation:   Plan of care reviewed with: Patient

## 2023-04-21 NOTE — CASE MANAGEMENT/SOCIAL WORK
Continued Stay Note  [facility]     Patient Name: Flaco Roque II  MRN: [id number]  Today's Date: 4/21/2023    Admit Date: 4/15/2023    Plan: discharge plan   Discharge Plan     Row Name 04/21/23 1532       Plan    Plan discharge plan    Plan Comments I spoke with spouse, Cheryl, on phone and the  transport chair has been delivered to pt's room provided by [facility]. Pt/spouse interested in short term rehab and per request, referrals made to [facility], [facility] and [facility]. CM will cont to follow    Final Discharge Disposition Code 03 - skilled nursing facility (SNF)               Discharge Codes    No documentation.               Expected Discharge Date and Time     Expected Discharge Date Expected Discharge Time    Apr 22, 2023             Nelda Grier RN

## 2023-04-22 PROCEDURE — 63710000001 DEXAMETHASONE PER 0.25 MG: Performed by: HOSPITALIST

## 2023-04-22 PROCEDURE — 94799 UNLISTED PULMONARY SVC/PX: CPT

## 2023-04-22 PROCEDURE — 94761 N-INVAS EAR/PLS OXIMETRY MLT: CPT

## 2023-04-22 PROCEDURE — 99231 SBSQ HOSP IP/OBS SF/LOW 25: CPT | Performed by: HOSPITALIST

## 2023-04-22 RX ADMIN — CARBIDOPA AND LEVODOPA 1 TABLET: 25; 100 TABLET ORAL at 17:04

## 2023-04-22 RX ADMIN — MONTELUKAST 10 MG: 10 TABLET, FILM COATED ORAL at 21:40

## 2023-04-22 RX ADMIN — METOPROLOL SUCCINATE 100 MG: 100 TABLET, EXTENDED RELEASE ORAL at 08:48

## 2023-04-22 RX ADMIN — IPRATROPIUM BROMIDE 2 PUFF: 17 AEROSOL, METERED RESPIRATORY (INHALATION) at 20:07

## 2023-04-22 RX ADMIN — Medication 10 ML: at 08:48

## 2023-04-22 RX ADMIN — CLONAZEPAM 1 MG: 1 TABLET ORAL at 21:40

## 2023-04-22 RX ADMIN — CARBIDOPA AND LEVODOPA 1 TABLET: 25; 100 TABLET ORAL at 12:33

## 2023-04-22 RX ADMIN — CARBIDOPA AND LEVODOPA 1 TABLET: 25; 100 TABLET ORAL at 21:40

## 2023-04-22 RX ADMIN — ALBUTEROL SULFATE 2 PUFF: 90 AEROSOL, METERED RESPIRATORY (INHALATION) at 07:57

## 2023-04-22 RX ADMIN — ATORVASTATIN CALCIUM 10 MG: 10 TABLET, FILM COATED ORAL at 08:48

## 2023-04-22 RX ADMIN — HYDROCODONE BITARTRATE AND ACETAMINOPHEN 1 TABLET: 5; 325 TABLET ORAL at 03:37

## 2023-04-22 RX ADMIN — CARBIDOPA AND LEVODOPA 1 TABLET: 25; 100 TABLET ORAL at 08:48

## 2023-04-22 RX ADMIN — IPRATROPIUM BROMIDE 2 PUFF: 17 AEROSOL, METERED RESPIRATORY (INHALATION) at 16:45

## 2023-04-22 RX ADMIN — QUETIAPINE FUMARATE 25 MG: 25 TABLET ORAL at 17:04

## 2023-04-22 RX ADMIN — AMANTADINE HYDROCHLORIDE 100 MG: 100 CAPSULE ORAL at 21:40

## 2023-04-22 RX ADMIN — IPRATROPIUM BROMIDE 2 PUFF: 17 AEROSOL, METERED RESPIRATORY (INHALATION) at 07:57

## 2023-04-22 RX ADMIN — ASPIRIN 81 MG CHEWABLE TABLET 81 MG: 81 TABLET CHEWABLE at 08:48

## 2023-04-22 RX ADMIN — APIXABAN 5 MG: 5 TABLET, FILM COATED ORAL at 21:40

## 2023-04-22 RX ADMIN — Medication 10 ML: at 21:40

## 2023-04-22 RX ADMIN — DEXAMETHASONE 6 MG: 4 TABLET ORAL at 08:48

## 2023-04-22 RX ADMIN — ALBUTEROL SULFATE 2 PUFF: 90 AEROSOL, METERED RESPIRATORY (INHALATION) at 16:45

## 2023-04-22 RX ADMIN — AMANTADINE HYDROCHLORIDE 100 MG: 100 CAPSULE ORAL at 08:48

## 2023-04-22 RX ADMIN — BISACODYL 5 MG: 5 TABLET, COATED ORAL at 08:48

## 2023-04-22 RX ADMIN — Medication 1 CAPSULE: at 08:47

## 2023-04-22 RX ADMIN — APIXABAN 5 MG: 5 TABLET, FILM COATED ORAL at 08:48

## 2023-04-22 RX ADMIN — SENNOSIDES AND DOCUSATE SODIUM 2 TABLET: 8.6; 5 TABLET ORAL at 08:48

## 2023-04-22 RX ADMIN — SENNOSIDES AND DOCUSATE SODIUM 2 TABLET: 8.6; 5 TABLET ORAL at 21:40

## 2023-04-22 RX ADMIN — TAMSULOSIN HYDROCHLORIDE 0.4 MG: 0.4 CAPSULE ORAL at 21:40

## 2023-04-22 RX ADMIN — ALBUTEROL SULFATE 2 PUFF: 90 AEROSOL, METERED RESPIRATORY (INHALATION) at 20:07

## 2023-04-22 RX ADMIN — PANTOPRAZOLE SODIUM 40 MG: 40 TABLET, DELAYED RELEASE ORAL at 03:37

## 2023-04-22 NOTE — PROGRESS NOTES
[facility] Medicine Services  PROGRESS NOTE    Patient Name: Flaco Roque II  : 1945  MRN: [id number]    Date of Admission: 4/15/2023  Primary Care Physician: Azar Stern MD    Subjective   Subjective     CC: Weakness    HPI: Up in chair. No f/c. No n/v. Denies difficulty swallowing.    Review of Systems   Constitutional: Positive for activity change, appetite change and fatigue.   Respiratory: Positive for cough and shortness of breath.    Cardiovascular: Negative.    Gastrointestinal: Negative.    Neurological: Positive for weakness.   No change from  otherwise    Objective   Objective     Vital Signs:   Temp:  [97.3 °F (36.3 °C)] 97.3 °F (36.3 °C)  Heart Rate:  [65-73] 73  Resp:  [16-18] 18  BP: (107-116)/(68-69) 116/69     Physical Exam:  NAD, alert, oriented and conversant  OP clear, dry MM  Neck supple  No LAD  RRR  CTAB  +BS, soft  No c/c/e  No rashes  WHITE  No change from  otherwise    Results Reviewed:  LAB RESULTS:      Lab 23  0413 23  0406 23  0536 23  0419 04/15/23  1207   WBC 7.67 11.78* 11.93* 15.12* 11.94*   HEMOGLOBIN 10.1* 10.4* 10.5* 10.9* 12.2*   HEMATOCRIT 33.5* 34.1* 35.0* 35.3* 40.1   PLATELETS 264 265 230 227 190   NEUTROS ABS 6.28 10.44* 10.67* 13.56* 10.83*   IMMATURE GRANS (ABS) 0.04 0.04 0.03 0.06* 0.03   LYMPHS ABS 0.74 0.49* 0.49* 0.64* 0.24*   MONOS ABS 0.60 0.80 0.70 0.84 0.81   EOS ABS 0.00 0.00 0.02 0.00 0.01   MCV 78.8* 79.3 81.0 79.1 79.1   PROCALCITONIN 0.58*  --   --   --  0.63*   LACTATE  --   --   --   --  1.3   D DIMER QUANT <0.27 0.32 0.40 0.38 0.63         Lab 23  0413 23  0406 23  0536 23  0419 04/15/23  1815 04/15/23  1207   SODIUM 142 139 141 143  --  137   POTASSIUM 4.1 4.4 3.7 3.7  --  3.1*   CHLORIDE 107 107 108* 108*  --  102   CO2 25.0 26.0 27.0 28.0  --  24.0   ANION GAP 10.0 6.0 6.0 7.0  --  11.0   BUN 18 23 17 13  --  14   CREATININE 0.57* 0.57* 0.64* 0.65* 0.82  0.69*   EGFR 101.0 101.0 97.5 97.0 90.5 95.3   GLUCOSE 111* 114* 144* 128*  --  115*   CALCIUM 8.6 8.7 8.4* 8.4*  --  8.5*         Lab 04/19/23  0413 04/18/23  0406 04/17/23  0536 04/16/23  0419 04/15/23  1815 04/15/23  1207   TOTAL PROTEIN 4.9* 5.0* 5.3* 5.4* 5.7* 5.8*   ALBUMIN 3.2* 3.2* 3.1* 3.2* 3.8 3.6   GLOBULIN 1.7 1.8 2.2 2.2  --  2.2   ALT (SGPT) <5 <5 <5 <5 5 <5   AST (SGOT) 15 10 12 12 12 14   BILIRUBIN 0.3 0.3 0.3 0.5 0.9 0.8   INDIRECT BILIRUBIN  --   --   --   --  0.6  --    BILIRUBIN DIRECT <0.2 <0.2 <0.2 0.2 0.3  --    ALK PHOS 44 55 53 56 59 59   LIPASE  --   --   --   --   --  27         Lab 04/15/23  1511 04/15/23  1207   PROBNP  --  440.8   HSTROP T 23* 22*                 Brief Urine Lab Results     None          Microbiology Results Abnormal     Procedure Component Value - Date/Time    Blood Culture - Blood, Arm, Left [[id number]]  (Normal) Collected: 04/15/23 1219    Lab Status: Final result Specimen: Blood from Arm, Left Updated: 04/20/23 1317     Blood Culture No growth at 5 days    Blood Culture - Blood, Hand, Left [[id number]]  (Normal) Collected: 04/15/23 1219    Lab Status: Final result Specimen: Blood from Hand, Left Updated: 04/20/23 1316     Blood Culture No growth at 5 days          No radiology results from the last 24 hrs    Results for orders placed during the hospital encounter of 04/26/19    Adult Transthoracic Echo Complete W/ Cont if Necessary Per Protocol    Interpretation Summary  · Left ventricular systolic function is normal.  · Estimated EF appears to be in the range of 66 - 70%.      Current medications:  Scheduled Meds:albuterol sulfate HFA, 2 puff, Inhalation, TID - RT   And  ipratropium, 2 puff, Inhalation, TID - RT  amantadine, 100 mg, Oral, BID  apixaban, 5 mg, Oral, Q12H  aspirin, 81 mg, Oral, Daily  atorvastatin, 10 mg, Oral, Daily  carbidopa-levodopa, 1 tablet, Oral, 4x Daily  clonazePAM, 1 mg, Oral, Nightly  dexamethasone, 6 mg, Oral, Daily   Or  dexamethasone, 6  mg, Intravenous, Daily  lactobacillus acidophilus, 1 capsule, Oral, Daily  metoprolol succinate XL, 100 mg, Oral, Daily  montelukast, 10 mg, Oral, Nightly  pantoprazole, 40 mg, Oral, Q AM  QUEtiapine, 25 mg, Oral, Q PM  senna-docusate sodium, 2 tablet, Oral, BID  sodium chloride, 10 mL, Intravenous, Q12H  tamsulosin, 0.4 mg, Oral, Nightly      Continuous Infusions:   PRN Meds:.•  acetaminophen **OR** acetaminophen **OR** acetaminophen  •  senna-docusate sodium **AND** polyethylene glycol **AND** bisacodyl **AND** bisacodyl  •  Calcium Replacement - Follow Nurse / BPA Driven Protocol  •  HYDROcodone-acetaminophen  •  Magnesium Standard Dose Replacement - Follow Nurse / BPA Driven Protocol  •  metoprolol tartrate  •  Morphine  •  ondansetron **OR** ondansetron  •  Phosphorus Replacement - Follow Nurse / BPA Driven Protocol  •  Potassium Replacement - Follow Nurse / BPA Driven Protocol  •  sodium chloride  •  sodium chloride  •  sodium chloride    Assessment & Plan   Assessment & Plan     Active Hospital Problems    Diagnosis  POA   • **COVID-19 [U07.1]  Yes   • Chronic atrial fibrillation with rapid ventricular response [I48.20]  Yes   • Peripheral arterial disease [I73.9]  Yes   • Hypokalemia [E87.6]  Yes   • Acute respiratory failure with hypoxia [J96.01]  Yes   • Parkinson disease [G20]  Yes   • ABRIL (obstructive sleep apnea) [G47.33]  Yes   • Pulmonary emphysema [J43.9]  Yes   • GERD without esophagitis [K21.9]  Yes      Resolved Hospital Problems   No resolved problems to display.        Brief Hospital Course to date:  Flaco Roque II is a 77 y.o. male  with a past medical history of atrial fibrillation, ABRIL, GERD, emphysema and Parkinson's disease that presented to the ED complaining of fever/chills, rigors and chest pain with palpitations.      COVID-19  Acute Respiratory Failure  H/o pulmonary emphysema  Possible Aspiration Pneumonia   -dex/remdesivir   -on RA this AM, again  -antibiotics  -pulmonary  toilet  -speech following, adjustments made, working with speech therapy but wants to continue using a straw     Atrial Fibrillation with RVR  -rate control per cardiology, transitioned to PO anticoagulation  -stable on current regimen     Hypokalemia  -replace per protocol     Parkinsons disease  -continue home carbidopa/levadopa     ABRIL  -CPAP intolerant    Better, awaiting placement     GERD  Expected Discharge Location and Transportation:Rehab?  Expected Discharge   Expected Discharge Date and Time     Expected Discharge Date Expected Discharge Time    Apr 22, 2023            DVT prophylaxis:  Medical and mechanical DVT prophylaxis orders are present.     AM-PAC 6 Clicks Score (PT): 20 (04/22/23 0800)    CODE STATUS:   Code Status and Medical Interventions:   Ordered at: 04/15/23 1433     Level Of Support Discussed With:    Patient     Code Status (Patient has no pulse and is not breathing):    CPR (Attempt to Resuscitate)     Medical Interventions (Patient has pulse or is breathing):    Full Support     Release to patient:    Routine Release       Vlad Oliver MD  04/22/23

## 2023-04-22 NOTE — PLAN OF CARE
Problem: Adult Inpatient Plan of Care  Goal: Plan of Care Review  Outcome: Ongoing, Progressing  Goal: Patient-Specific Goal (Individualized)  Outcome: Ongoing, Progressing  Goal: Absence of Hospital-Acquired Illness or Injury  Outcome: Ongoing, Progressing  Intervention: Identify and Manage Fall Risk  Recent Flowsheet Documentation  Taken 4/22/2023 1400 by David Kelly RN  Safety Promotion/Fall Prevention:   activity supervised   assistive device/personal items within reach   clutter free environment maintained   fall prevention program maintained   lighting adjusted   nonskid shoes/slippers when out of bed   room organization consistent   safety round/check completed  Taken 4/22/2023 1200 by David Kelly RN  Safety Promotion/Fall Prevention:   activity supervised   assistive device/personal items within reach   clutter free environment maintained   fall prevention program maintained   lighting adjusted   nonskid shoes/slippers when out of bed   room organization consistent   safety round/check completed  Taken 4/22/2023 1000 by David Kelly RN  Safety Promotion/Fall Prevention:   activity supervised   assistive device/personal items within reach   clutter free environment maintained   fall prevention program maintained   lighting adjusted   nonskid shoes/slippers when out of bed   room organization consistent   safety round/check completed  Taken 4/22/2023 0800 by David Kelly RN  Safety Promotion/Fall Prevention:   activity supervised   assistive device/personal items within reach   clutter free environment maintained   fall prevention program maintained   lighting adjusted   nonskid shoes/slippers when out of bed   room organization consistent   safety round/check completed  Intervention: Prevent Skin Injury  Recent Flowsheet Documentation  Taken 4/22/2023 1400 by David Kelly RN  Body Position: position changed independently  Skin Protection:   adhesive use limited   incontinence  pads utilized   transparent dressing maintained   tubing/devices free from skin contact  Taken 4/22/2023 1200 by David Kelly RN  Body Position: position changed independently  Skin Protection:   adhesive use limited   incontinence pads utilized   transparent dressing maintained   tubing/devices free from skin contact  Taken 4/22/2023 1000 by David Kelly RN  Body Position: position changed independently  Skin Protection:   adhesive use limited   incontinence pads utilized   transparent dressing maintained   tubing/devices free from skin contact  Taken 4/22/2023 0800 by David Kelly RN  Body Position: position changed independently  Skin Protection:   adhesive use limited   incontinence pads utilized   transparent dressing maintained   tubing/devices free from skin contact  Intervention: Prevent and Manage VTE (Venous Thromboembolism) Risk  Recent Flowsheet Documentation  Taken 4/22/2023 1400 by David Kelly RN  Activity Management:   up in chair   activity encouraged  Taken 4/22/2023 1200 by David Kelly RN  Activity Management:   up in chair   activity encouraged  Taken 4/22/2023 1000 by David Kelly RN  Activity Management:   up in chair   activity encouraged  Taken 4/22/2023 0800 by David Kelly RN  Activity Management:   up in chair   activity encouraged  VTE Prevention/Management: (Eliquis) other (see comments)  Intervention: Prevent Infection  Recent Flowsheet Documentation  Taken 4/22/2023 1400 by David Kelly RN  Infection Prevention:   environmental surveillance performed   rest/sleep promoted  Taken 4/22/2023 1200 by David Kelly RN  Infection Prevention:   environmental surveillance performed   rest/sleep promoted  Taken 4/22/2023 1000 by David Kelly RN  Infection Prevention:   environmental surveillance performed   rest/sleep promoted  Taken 4/22/2023 0800 by David Kelly RN  Infection Prevention:   environmental surveillance performed    rest/sleep promoted  Goal: Optimal Comfort and Wellbeing  Outcome: Ongoing, Progressing  Intervention: Monitor Pain and Promote Comfort  Recent Flowsheet Documentation  Taken 4/22/2023 1200 by David Kelly RN  Pain Management Interventions:   quiet environment facilitated   position adjusted  Taken 4/22/2023 0800 by David Kelly RN  Pain Management Interventions:   quiet environment facilitated   position adjusted   pillow support provided  Intervention: Provide Person-Centered Care  Recent Flowsheet Documentation  Taken 4/22/2023 1400 by David Kelly RN  Trust Relationship/Rapport:   care explained   choices provided  Taken 4/22/2023 1200 by David Kelly RN  Trust Relationship/Rapport:   care explained   choices provided  Taken 4/22/2023 1000 by David Kelly RN  Trust Relationship/Rapport:   care explained   choices provided  Taken 4/22/2023 0800 by David Kelly RN  Trust Relationship/Rapport:   care explained   choices provided  Goal: Readiness for Transition of Care  Outcome: Ongoing, Progressing     Problem: COPD (Chronic Obstructive Pulmonary Disease) Comorbidity  Goal: Maintenance of COPD Symptom Control  Outcome: Ongoing, Progressing  Intervention: Maintain COPD-Symptom Control  Recent Flowsheet Documentation  Taken 4/22/2023 1400 by David Kelly RN  Medication Review/Management: medications reviewed  Taken 4/22/2023 1200 by David Kelly RN  Medication Review/Management: medications reviewed  Taken 4/22/2023 1000 by David Kelly RN  Medication Review/Management: medications reviewed  Taken 4/22/2023 0800 by David Kelly RN  Medication Review/Management: medications reviewed     Problem: Hypertension Comorbidity  Goal: Blood Pressure in Desired Range  Outcome: Ongoing, Progressing  Intervention: Maintain Blood Pressure Management  Recent Flowsheet Documentation  Taken 4/22/2023 1400 by David Kelly RN  Medication Review/Management: medications  reviewed  Taken 4/22/2023 1200 by David Kelly RN  Medication Review/Management: medications reviewed  Taken 4/22/2023 1000 by David Kelly RN  Medication Review/Management: medications reviewed  Taken 4/22/2023 0800 by David Kelly RN  Medication Review/Management: medications reviewed     Problem: Obstructive Sleep Apnea Risk or Actual Comorbidity Management  Goal: Unobstructed Breathing During Sleep  Outcome: Ongoing, Progressing     Problem: Pain Chronic (Persistent) (Comorbidity Management)  Goal: Acceptable Pain Control and Functional Ability  Outcome: Ongoing, Progressing  Intervention: Manage Persistent Pain  Recent Flowsheet Documentation  Taken 4/22/2023 1400 by David Kelly RN  Medication Review/Management: medications reviewed  Taken 4/22/2023 1200 by David Kelly RN  Medication Review/Management: medications reviewed  Taken 4/22/2023 1000 by David Kelly RN  Medication Review/Management: medications reviewed  Taken 4/22/2023 0800 by David Kelly RN  Medication Review/Management: medications reviewed  Intervention: Develop Pain Management Plan  Recent Flowsheet Documentation  Taken 4/22/2023 1200 by David Kelly RN  Pain Management Interventions:   quiet environment facilitated   position adjusted  Taken 4/22/2023 0800 by David Kelly RN  Pain Management Interventions:   quiet environment facilitated   position adjusted   pillow support provided  Intervention: Optimize Psychosocial Wellbeing  Recent Flowsheet Documentation  Taken 4/22/2023 1400 by David Kelly RN  Diversional Activities:   smartphone   television  Family/Support System Care: support provided  Taken 4/22/2023 1200 by David Kelly RN  Diversional Activities:   smartphone   television  Family/Support System Care: support provided  Taken 4/22/2023 1000 by David Kelly RN  Diversional Activities:   smartphone   television  Family/Support System Care: support  provided  Taken 4/22/2023 0800 by David Kelly RN  Diversional Activities:   smartphone   television  Family/Support System Care: support provided     Problem: Skin Injury Risk Increased  Goal: Skin Health and Integrity  Outcome: Ongoing, Progressing  Intervention: Optimize Skin Protection  Recent Flowsheet Documentation  Taken 4/22/2023 1400 by David Kelly RN  Pressure Reduction Techniques: frequent weight shift encouraged  Head of Bed (HOB) Positioning: HOB at 30-45 degrees  Pressure Reduction Devices: pressure-redistributing mattress utilized  Skin Protection:   adhesive use limited   incontinence pads utilized   transparent dressing maintained   tubing/devices free from skin contact  Taken 4/22/2023 1200 by David Kelly RN  Pressure Reduction Techniques: frequent weight shift encouraged  Pressure Reduction Devices: pressure-redistributing mattress utilized  Skin Protection:   adhesive use limited   incontinence pads utilized   transparent dressing maintained   tubing/devices free from skin contact  Taken 4/22/2023 1000 by David Kelly RN  Pressure Reduction Techniques: frequent weight shift encouraged  Head of Bed (HOB) Positioning: HOB at 30-45 degrees  Pressure Reduction Devices: pressure-redistributing mattress utilized  Skin Protection:   adhesive use limited   incontinence pads utilized   transparent dressing maintained   tubing/devices free from skin contact  Taken 4/22/2023 0800 by David Kelly RN  Pressure Reduction Techniques: frequent weight shift encouraged  Pressure Reduction Devices: pressure-redistributing mattress utilized  Skin Protection:   adhesive use limited   incontinence pads utilized   transparent dressing maintained   tubing/devices free from skin contact     Problem: Fall Injury Risk  Goal: Absence of Fall and Fall-Related Injury  Outcome: Ongoing, Progressing  Intervention: Identify and Manage Contributors  Recent Flowsheet Documentation  Taken 4/22/2023  1400 by David Kelly RN  Medication Review/Management: medications reviewed  Taken 4/22/2023 1200 by David Kelly RN  Medication Review/Management: medications reviewed  Taken 4/22/2023 1000 by David Kelly RN  Medication Review/Management: medications reviewed  Taken 4/22/2023 0800 by David Kelly RN  Medication Review/Management: medications reviewed  Intervention: Promote Injury-Free Environment  Recent Flowsheet Documentation  Taken 4/22/2023 1400 by David Kelly RN  Safety Promotion/Fall Prevention:   activity supervised   assistive device/personal items within reach   clutter free environment maintained   fall prevention program maintained   lighting adjusted   nonskid shoes/slippers when out of bed   room organization consistent   safety round/check completed  Taken 4/22/2023 1200 by David Kelly RN  Safety Promotion/Fall Prevention:   activity supervised   assistive device/personal items within reach   clutter free environment maintained   fall prevention program maintained   lighting adjusted   nonskid shoes/slippers when out of bed   room organization consistent   safety round/check completed  Taken 4/22/2023 1000 by David Kelly RN  Safety Promotion/Fall Prevention:   activity supervised   assistive device/personal items within reach   clutter free environment maintained   fall prevention program maintained   lighting adjusted   nonskid shoes/slippers when out of bed   room organization consistent   safety round/check completed  Taken 4/22/2023 0800 by David Kelly RN  Safety Promotion/Fall Prevention:   activity supervised   assistive device/personal items within reach   clutter free environment maintained   fall prevention program maintained   lighting adjusted   nonskid shoes/slippers when out of bed   room organization consistent   safety round/check completed   Goal Outcome Evaluation:   Plan of care reviewed with: Patient

## 2023-04-23 PROCEDURE — 63710000001 DEXAMETHASONE PER 0.25 MG: Performed by: HOSPITALIST

## 2023-04-23 PROCEDURE — 94799 UNLISTED PULMONARY SVC/PX: CPT

## 2023-04-23 PROCEDURE — 99232 SBSQ HOSP IP/OBS MODERATE 35: CPT | Performed by: NURSE PRACTITIONER

## 2023-04-23 RX ORDER — METOPROLOL SUCCINATE 50 MG/1
50 TABLET, EXTENDED RELEASE ORAL DAILY
Status: DISCONTINUED | OUTPATIENT
Start: 2023-04-24 | End: 2023-04-25 | Stop reason: HOSPADM

## 2023-04-23 RX ADMIN — Medication 10 ML: at 21:18

## 2023-04-23 RX ADMIN — SENNOSIDES AND DOCUSATE SODIUM 2 TABLET: 8.6; 5 TABLET ORAL at 09:15

## 2023-04-23 RX ADMIN — IPRATROPIUM BROMIDE 2 PUFF: 17 AEROSOL, METERED RESPIRATORY (INHALATION) at 18:55

## 2023-04-23 RX ADMIN — AMANTADINE HYDROCHLORIDE 100 MG: 100 CAPSULE ORAL at 21:18

## 2023-04-23 RX ADMIN — APIXABAN 5 MG: 5 TABLET, FILM COATED ORAL at 09:16

## 2023-04-23 RX ADMIN — ASPIRIN 81 MG CHEWABLE TABLET 81 MG: 81 TABLET CHEWABLE at 09:16

## 2023-04-23 RX ADMIN — CLONAZEPAM 1 MG: 1 TABLET ORAL at 21:18

## 2023-04-23 RX ADMIN — CARBIDOPA AND LEVODOPA 1 TABLET: 25; 100 TABLET ORAL at 09:16

## 2023-04-23 RX ADMIN — APIXABAN 5 MG: 5 TABLET, FILM COATED ORAL at 21:18

## 2023-04-23 RX ADMIN — CARBIDOPA AND LEVODOPA 1 TABLET: 25; 100 TABLET ORAL at 21:18

## 2023-04-23 RX ADMIN — AMANTADINE HYDROCHLORIDE 100 MG: 100 CAPSULE ORAL at 09:17

## 2023-04-23 RX ADMIN — Medication 1 CAPSULE: at 09:15

## 2023-04-23 RX ADMIN — CARBIDOPA AND LEVODOPA 1 TABLET: 25; 100 TABLET ORAL at 13:27

## 2023-04-23 RX ADMIN — PANTOPRAZOLE SODIUM 40 MG: 40 TABLET, DELAYED RELEASE ORAL at 06:22

## 2023-04-23 RX ADMIN — SENNOSIDES AND DOCUSATE SODIUM 2 TABLET: 8.6; 5 TABLET ORAL at 21:18

## 2023-04-23 RX ADMIN — HYDROCODONE BITARTRATE AND ACETAMINOPHEN 1 TABLET: 5; 325 TABLET ORAL at 06:27

## 2023-04-23 RX ADMIN — CARBIDOPA AND LEVODOPA 1 TABLET: 25; 100 TABLET ORAL at 17:20

## 2023-04-23 RX ADMIN — DEXAMETHASONE 6 MG: 4 TABLET ORAL at 09:15

## 2023-04-23 RX ADMIN — ALBUTEROL SULFATE 2 PUFF: 90 AEROSOL, METERED RESPIRATORY (INHALATION) at 18:54

## 2023-04-23 RX ADMIN — IPRATROPIUM BROMIDE 2 PUFF: 17 AEROSOL, METERED RESPIRATORY (INHALATION) at 13:04

## 2023-04-23 RX ADMIN — MONTELUKAST 10 MG: 10 TABLET, FILM COATED ORAL at 21:18

## 2023-04-23 RX ADMIN — Medication 10 ML: at 09:17

## 2023-04-23 RX ADMIN — ATORVASTATIN CALCIUM 10 MG: 10 TABLET, FILM COATED ORAL at 09:15

## 2023-04-23 RX ADMIN — QUETIAPINE FUMARATE 25 MG: 25 TABLET ORAL at 17:20

## 2023-04-23 RX ADMIN — ALBUTEROL SULFATE 2 PUFF: 90 AEROSOL, METERED RESPIRATORY (INHALATION) at 13:04

## 2023-04-23 RX ADMIN — TAMSULOSIN HYDROCHLORIDE 0.4 MG: 0.4 CAPSULE ORAL at 21:18

## 2023-04-23 NOTE — PROGRESS NOTES
[facility] Medicine Services  PROGRESS NOTE    Patient Name: Flaco Roque II  : 1945  MRN: [id number]    Date of Admission: 4/15/2023  Primary Care Physician: Azar Stern MD    Subjective   Subjective     CC:  Weakness     HPI:  Patient is resting in bed in NAD with son at bedside. He states he feels weak. Doesn't like the food he has been getting. Denies any soa.     ROS:  Gen- No fevers, chills  CV- No chest pain, palpitations  Resp- No cough, dyspnea  GI- No N/V/D, abd pain        Objective   Objective     Vital Signs:   Temp:  [97.5 °F (36.4 °C)-98.3 °F (36.8 °C)] 98.2 °F (36.8 °C)  Heart Rate:  [62-91] 68  Resp:  [16-18] 18  BP: ()/(45-69) 93/53  Flow (L/min):  [2] 2     Physical Exam:  Constitutional: No acute distress, awake, alert  HENT: NCAT, mucous membranes moist  Respiratory: coarse breath sounds,  respiratory effort normal 2L 93%  Cardiovascular: RRR and Irr at times, no murmurs, rubs, or gallops  Gastrointestinal: Positive bowel sounds, soft, nontender, nondistended  Musculoskeletal: No bilateral ankle edema  Psychiatric: Appropriate affect, cooperative  Neurologic: Oriented x 3, strength symmetric in all extremities, Cranial Nerves grossly intact to confrontation, speech clear  Skin: No rashes. Pale       Results Reviewed:  LAB RESULTS:      Lab 23  0413 23  0406 23  0536   WBC 7.67 11.78* 11.93*   HEMOGLOBIN 10.1* 10.4* 10.5*   HEMATOCRIT 33.5* 34.1* 35.0*   PLATELETS 264 265 230   NEUTROS ABS 6.28 10.44* 10.67*   IMMATURE GRANS (ABS) 0.04 0.04 0.03   LYMPHS ABS 0.74 0.49* 0.49*   MONOS ABS 0.60 0.80 0.70   EOS ABS 0.00 0.00 0.02   MCV 78.8* 79.3 81.0   PROCALCITONIN 0.58*  --   --    D DIMER QUANT <0.27 0.32 0.40         Lab 23  0413 23  0406 23  0536   SODIUM 142 139 141   POTASSIUM 4.1 4.4 3.7   CHLORIDE 107 107 108*   CO2 25.0 26.0 27.0   ANION GAP 10.0 6.0 6.0   BUN  17   CREATININE 0.57* 0.57* 0.64*    EGFR 101.0 101.0 97.5   GLUCOSE 111* 114* 144*   CALCIUM 8.6 8.7 8.4*         Lab 04/19/23  0413 04/18/23  0406 04/17/23  0536   TOTAL PROTEIN 4.9* 5.0* 5.3*   ALBUMIN 3.2* 3.2* 3.1*   GLOBULIN 1.7 1.8 2.2   ALT (SGPT) <5 <5 <5   AST (SGOT) 15 10 12   BILIRUBIN 0.3 0.3 0.3   BILIRUBIN DIRECT <0.2 <0.2 <0.2   ALK PHOS 44 55 53                     Brief Urine Lab Results     None          Microbiology Results Abnormal     Procedure Component Value - Date/Time    Blood Culture - Blood, Arm, Left [[id number]]  (Normal) Collected: 04/15/23 1219    Lab Status: Final result Specimen: Blood from Arm, Left Updated: 04/20/23 1317     Blood Culture No growth at 5 days    Blood Culture - Blood, Hand, Left [[id number]]  (Normal) Collected: 04/15/23 1219    Lab Status: Final result Specimen: Blood from Hand, Left Updated: 04/20/23 1316     Blood Culture No growth at 5 days          No radiology results from the last 24 hrs    Results for orders placed during the hospital encounter of 04/26/19    Adult Transthoracic Echo Complete W/ Cont if Necessary Per Protocol    Interpretation Summary  · Left ventricular systolic function is normal.  · Estimated EF appears to be in the range of 66 - 70%.      Current medications:  Scheduled Meds:albuterol sulfate HFA, 2 puff, Inhalation, TID - RT   And  ipratropium, 2 puff, Inhalation, TID - RT  amantadine, 100 mg, Oral, BID  apixaban, 5 mg, Oral, Q12H  aspirin, 81 mg, Oral, Daily  atorvastatin, 10 mg, Oral, Daily  carbidopa-levodopa, 1 tablet, Oral, 4x Daily  clonazePAM, 1 mg, Oral, Nightly  dexamethasone, 6 mg, Oral, Daily   Or  dexamethasone, 6 mg, Intravenous, Daily  lactobacillus acidophilus, 1 capsule, Oral, Daily  [START ON 4/24/2023] metoprolol succinate XL, 50 mg, Oral, Daily  montelukast, 10 mg, Oral, Nightly  pantoprazole, 40 mg, Oral, Q AM  QUEtiapine, 25 mg, Oral, Q PM  senna-docusate sodium, 2 tablet, Oral, BID  sodium chloride, 10 mL, Intravenous, Q12H  tamsulosin, 0.4 mg, Oral,  Nightly      Continuous Infusions:   PRN Meds:.•  acetaminophen **OR** acetaminophen **OR** acetaminophen  •  senna-docusate sodium **AND** polyethylene glycol **AND** bisacodyl **AND** bisacodyl  •  Calcium Replacement - Follow Nurse / BPA Driven Protocol  •  HYDROcodone-acetaminophen  •  Magnesium Standard Dose Replacement - Follow Nurse / BPA Driven Protocol  •  metoprolol tartrate  •  ondansetron **OR** ondansetron  •  Phosphorus Replacement - Follow Nurse / BPA Driven Protocol  •  Potassium Replacement - Follow Nurse / BPA Driven Protocol  •  sodium chloride  •  sodium chloride  •  sodium chloride    Assessment & Plan   Assessment & Plan     Active Hospital Problems    Diagnosis  POA   • **COVID-19 [U07.1]  Yes   • Chronic atrial fibrillation with rapid ventricular response [I48.20]  Yes   • Peripheral arterial disease [I73.9]  Yes   • Hypokalemia [E87.6]  Yes   • Acute respiratory failure with hypoxia [J96.01]  Yes   • Parkinson disease [G20]  Yes   • ABRIL (obstructive sleep apnea) [G47.33]  Yes   • Pulmonary emphysema [J43.9]  Yes   • GERD without esophagitis [K21.9]  Yes      Resolved Hospital Problems   No resolved problems to display.        Brief Hospital Course to date:  Flaco Roque II is a 77 y.o. male with a past medical history of atrial fibrillation, ABRIL, GERD, emphysema and Parkinson's disease that presented to the ED complaining of fever/chills, rigors and chest pain with palpitations.     Plan was partially entered by my partner and I have reviewed and updated as appropriate on 4/23/23     COVID-19  Acute Respiratory Failure  H/o pulmonary emphysema  Possible Aspiration Pneumonia   -dex/remdesivir   -on RA -2L  -s/p Rocephin and doxycycline   -pulmonary toilet  -speech following, adjustments made, working with speech therapy but wants to continue using a straw     Atrial Fibrillation with RVR  -rate control per cardiology, transitioned to PO anticoagulation  -toprol dose decreased to 50 mg due  to borderline bp      Hypokalemia  -replace per protocol     Parkinsons disease  -continue home carbidopa/levadopa     ABRIL  -CPAP intolerant       GERD  -- PPI      Expected Discharge Location and Transportation: rehab   Expected Discharge   Expected Discharge Date and Time     Expected Discharge Date Expected Discharge Time    Apr 25, 2023            DVT prophylaxis:  Medical and mechanical DVT prophylaxis orders are present.     AM-PAC 6 Clicks Score (PT): 20 (04/22/23 2000)    CODE STATUS:   Code Status and Medical Interventions:   Ordered at: 04/15/23 1433     Level Of Support Discussed With:    Patient     Code Status (Patient has no pulse and is not breathing):    CPR (Attempt to Resuscitate)     Medical Interventions (Patient has pulse or is breathing):    Full Support     Release to patient:    Routine Release       Alyson Rubio, APRN  04/23/23

## 2023-04-24 PROCEDURE — 94799 UNLISTED PULMONARY SVC/PX: CPT

## 2023-04-24 PROCEDURE — 97110 THERAPEUTIC EXERCISES: CPT

## 2023-04-24 PROCEDURE — 94664 DEMO&/EVAL PT USE INHALER: CPT

## 2023-04-24 PROCEDURE — 63710000001 DEXAMETHASONE PER 0.25 MG: Performed by: HOSPITALIST

## 2023-04-24 PROCEDURE — 97116 GAIT TRAINING THERAPY: CPT

## 2023-04-24 PROCEDURE — 94761 N-INVAS EAR/PLS OXIMETRY MLT: CPT

## 2023-04-24 PROCEDURE — 99232 SBSQ HOSP IP/OBS MODERATE 35: CPT | Performed by: NURSE PRACTITIONER

## 2023-04-24 PROCEDURE — 97535 SELF CARE MNGMENT TRAINING: CPT

## 2023-04-24 RX ADMIN — Medication 10 ML: at 08:20

## 2023-04-24 RX ADMIN — ATORVASTATIN CALCIUM 10 MG: 10 TABLET, FILM COATED ORAL at 08:21

## 2023-04-24 RX ADMIN — IPRATROPIUM BROMIDE 2 PUFF: 17 AEROSOL, METERED RESPIRATORY (INHALATION) at 16:53

## 2023-04-24 RX ADMIN — MONTELUKAST 10 MG: 10 TABLET, FILM COATED ORAL at 21:25

## 2023-04-24 RX ADMIN — TAMSULOSIN HYDROCHLORIDE 0.4 MG: 0.4 CAPSULE ORAL at 21:25

## 2023-04-24 RX ADMIN — Medication 10 ML: at 21:26

## 2023-04-24 RX ADMIN — AMANTADINE HYDROCHLORIDE 100 MG: 100 CAPSULE ORAL at 08:21

## 2023-04-24 RX ADMIN — ASPIRIN 81 MG CHEWABLE TABLET 81 MG: 81 TABLET CHEWABLE at 08:20

## 2023-04-24 RX ADMIN — APIXABAN 5 MG: 5 TABLET, FILM COATED ORAL at 08:22

## 2023-04-24 RX ADMIN — PANTOPRAZOLE SODIUM 40 MG: 40 TABLET, DELAYED RELEASE ORAL at 05:23

## 2023-04-24 RX ADMIN — CARBIDOPA AND LEVODOPA 1 TABLET: 25; 100 TABLET ORAL at 21:26

## 2023-04-24 RX ADMIN — APIXABAN 5 MG: 5 TABLET, FILM COATED ORAL at 21:25

## 2023-04-24 RX ADMIN — CLONAZEPAM 1 MG: 1 TABLET ORAL at 21:26

## 2023-04-24 RX ADMIN — AMANTADINE HYDROCHLORIDE 100 MG: 100 CAPSULE ORAL at 21:25

## 2023-04-24 RX ADMIN — IPRATROPIUM BROMIDE 2 PUFF: 17 AEROSOL, METERED RESPIRATORY (INHALATION) at 20:15

## 2023-04-24 RX ADMIN — Medication 1 CAPSULE: at 08:20

## 2023-04-24 RX ADMIN — QUETIAPINE FUMARATE 25 MG: 25 TABLET ORAL at 16:08

## 2023-04-24 RX ADMIN — ALBUTEROL SULFATE 2 PUFF: 90 AEROSOL, METERED RESPIRATORY (INHALATION) at 16:50

## 2023-04-24 RX ADMIN — CARBIDOPA AND LEVODOPA 1 TABLET: 25; 100 TABLET ORAL at 11:46

## 2023-04-24 RX ADMIN — SENNOSIDES AND DOCUSATE SODIUM 2 TABLET: 8.6; 5 TABLET ORAL at 21:25

## 2023-04-24 RX ADMIN — METOPROLOL SUCCINATE 50 MG: 50 TABLET, EXTENDED RELEASE ORAL at 08:20

## 2023-04-24 RX ADMIN — ALBUTEROL SULFATE 2 PUFF: 90 AEROSOL, METERED RESPIRATORY (INHALATION) at 20:15

## 2023-04-24 RX ADMIN — DEXAMETHASONE 6 MG: 4 TABLET ORAL at 08:21

## 2023-04-24 RX ADMIN — CARBIDOPA AND LEVODOPA 1 TABLET: 25; 100 TABLET ORAL at 08:21

## 2023-04-24 RX ADMIN — CARBIDOPA AND LEVODOPA 1 TABLET: 25; 100 TABLET ORAL at 16:08
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
## 2023-04-24 NOTE — PLAN OF CARE
Goal Outcome Evaluation:      Pt doing well. He is looking forward to leaving. Family at bedside.

## 2023-04-24 NOTE — PLAN OF CARE
Goal Outcome Evaluation: pt rested well overnight with no complaints. Continued isolation for Covid-19. Denies pain. Vitals stable. Will monitor.

## 2023-04-24 NOTE — PLAN OF CARE
Goal Outcome Evaluation:           Progress: improving  Outcome Evaluation: Pt. demo'd good motivation and participation in OT tx session this date. Pt. also demo'd improved safety awareness during tx session and did not req. VC's for hand placement when performing STS. Pt. still presents below baseline for self-care tasks d/t decreased endurance and SOB when performing fxal tasks. Pt. would benefit from skilled OT to promote IND w/ BADLs and fxal mobility. OT recommend IRF upon d/c.
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
## 2023-04-24 NOTE — PROGRESS NOTES
[facility] Medicine Services  PROGRESS NOTE    Patient Name: Flaco Roque II  : 1945  MRN: [id number]    Date of Admission: 4/15/2023  Primary Care Physician: Azar Stern MD    Subjective   Subjective     CC:  Weakness     HPI:  Patient is resting in bed in NAD. He states he is tired of being in bed. Denies any soa. No acute events overnight per nursing     ROS:  Gen- No fevers, chills  CV- No chest pain, palpitations  Resp- No cough, dyspnea  GI- No N/V/D, abd pain        Objective   Objective     Vital Signs:   Temp:  [97.6 °F (36.4 °C)-98.3 °F (36.8 °C)] 98.1 °F (36.7 °C)  Heart Rate:  [65-85] 85  Resp:  [18-20] 20  BP: ()/(44-79) 98/67  Flow (L/min):  [2] 2     Physical Exam:  Constitutional: No acute distress, awake, alert  HENT: NCAT, mucous membranes moist  Respiratory: coarse breath sounds,  respiratory effort normal 2L 93%  Cardiovascular: RRR and Irr at times, no murmurs, rubs, or gallops  Gastrointestinal: Positive bowel sounds, soft, nontender, nondistended  Musculoskeletal: No bilateral ankle edema  Psychiatric: Appropriate affect, cooperative  Neurologic: Oriented x 3, strength symmetric in all extremities, Cranial Nerves grossly intact to confrontation, speech clear  Skin: No rashes. Pale       Results Reviewed:  LAB RESULTS:      Lab 23  0406   WBC 7.67 11.78*   HEMOGLOBIN 10.1* 10.4*   HEMATOCRIT 33.5* 34.1*   PLATELETS 264 265   NEUTROS ABS 6.28 10.44*   IMMATURE GRANS (ABS) 0.04 0.04   LYMPHS ABS 0.74 0.49*   MONOS ABS 0.60 0.80   EOS ABS 0.00 0.00   MCV 78.8* 79.3   PROCALCITONIN 0.58*  --    D DIMER QUANT <0.27 0.32         Lab 233 23  0406   SODIUM 142 139   POTASSIUM 4.1 4.4   CHLORIDE 107 107   CO2 25.0 26.0   ANION GAP 10.0 6.0   BUN 18 23   CREATININE 0.57* 0.57*   EGFR 101.0 101.0   GLUCOSE 111* 114*   CALCIUM 8.6 8.7         Lab 23  0413 23  0406   TOTAL PROTEIN 4.9* 5.0*   ALBUMIN 3.2*  3.2*   GLOBULIN 1.7 1.8   ALT (SGPT) <5 <5   AST (SGOT) 15 10   BILIRUBIN 0.3 0.3   BILIRUBIN DIRECT <0.2 <0.2   ALK PHOS 44 55                     Brief Urine Lab Results     None          Microbiology Results Abnormal     Procedure Component Value - Date/Time    Blood Culture - Blood, Arm, Left [[id number]]  (Normal) Collected: 04/15/23 1219    Lab Status: Final result Specimen: Blood from Arm, Left Updated: 04/20/23 1317     Blood Culture No growth at 5 days    Blood Culture - Blood, Hand, Left [[id number]]  (Normal) Collected: 04/15/23 1219    Lab Status: Final result Specimen: Blood from Hand, Left Updated: 04/20/23 1316     Blood Culture No growth at 5 days          No radiology results from the last 24 hrs    Results for orders placed during the hospital encounter of 04/26/19    Adult Transthoracic Echo Complete W/ Cont if Necessary Per Protocol    Interpretation Summary  · Left ventricular systolic function is normal.  · Estimated EF appears to be in the range of 66 - 70%.      Current medications:  Scheduled Meds:albuterol sulfate HFA, 2 puff, Inhalation, TID - RT   And  ipratropium, 2 puff, Inhalation, TID - RT  amantadine, 100 mg, Oral, BID  apixaban, 5 mg, Oral, Q12H  aspirin, 81 mg, Oral, Daily  atorvastatin, 10 mg, Oral, Daily  carbidopa-levodopa, 1 tablet, Oral, 4x Daily  clonazePAM, 1 mg, Oral, Nightly  dexamethasone, 6 mg, Oral, Daily   Or  dexamethasone, 6 mg, Intravenous, Daily  lactobacillus acidophilus, 1 capsule, Oral, Daily  metoprolol succinate XL, 50 mg, Oral, Daily  montelukast, 10 mg, Oral, Nightly  pantoprazole, 40 mg, Oral, Q AM  QUEtiapine, 25 mg, Oral, Q PM  senna-docusate sodium, 2 tablet, Oral, BID  sodium chloride, 10 mL, Intravenous, Q12H  tamsulosin, 0.4 mg, Oral, Nightly      Continuous Infusions:   PRN Meds:.•  acetaminophen **OR** acetaminophen **OR** acetaminophen  •  senna-docusate sodium **AND** polyethylene glycol **AND** bisacodyl **AND** bisacodyl  •  Calcium Replacement -  Follow Nurse / BPA Driven Protocol  •  HYDROcodone-acetaminophen  •  Magnesium Standard Dose Replacement - Follow Nurse / BPA Driven Protocol  •  metoprolol tartrate  •  ondansetron **OR** ondansetron  •  Phosphorus Replacement - Follow Nurse / BPA Driven Protocol  •  Potassium Replacement - Follow Nurse / BPA Driven Protocol  •  sodium chloride  •  sodium chloride    Assessment & Plan   Assessment & Plan     Active Hospital Problems    Diagnosis  POA   • **COVID-19 [U07.1]  Yes   • Chronic atrial fibrillation with rapid ventricular response [I48.20]  Yes   • Peripheral arterial disease [I73.9]  Yes   • Hypokalemia [E87.6]  Yes   • Acute respiratory failure with hypoxia [J96.01]  Yes   • Parkinson disease [G20]  Yes   • ABRIL (obstructive sleep apnea) [G47.33]  Yes   • Pulmonary emphysema [J43.9]  Yes   • GERD without esophagitis [K21.9]  Yes      Resolved Hospital Problems   No resolved problems to display.        Brief Hospital Course to date:  Flaco Roque II is a 77 y.o. male with a past medical history of atrial fibrillation, ABRIL, GERD, emphysema and Parkinson's disease that presented to the ED complaining of fever/chills, rigors and chest pain with palpitations.     Plan was partially entered by my partner and I have reviewed and updated as appropriate on 4/24/23     COVID-19  Acute Respiratory Failure  H/o pulmonary emphysema  Possible Aspiration Pneumonia   -dex/remdesivir, isolate for 10 days of diagnosis on 4/15  -on RA -2L  -s/p Rocephin and doxycycline   -pulmonary toilet  -speech following, adjustments made, working with speech therapy but wants to continue using a straw     Atrial Fibrillation with RVR  -rate control per cardiology, transitioned to PO anticoagulation  -toprol dose decreased to 50 mg due to borderline bp      Hypokalemia  -replace per protocol     Parkinsons disease  -continue home carbidopa/levadopa     ABRIL  -CPAP intolerant       GERD  -- PPI      Expected Discharge Location and  Transportation: rehab   Expected Discharge   Expected Discharge Date and Time     Expected Discharge Date Expected Discharge Time    Apr 25, 2023            DVT prophylaxis:  Medical and mechanical DVT prophylaxis orders are present.     AM-PAC 6 Clicks Score (PT): 20 (04/24/23 6001)    CODE STATUS:   Code Status and Medical Interventions:   Ordered at: 04/15/23 1433     Level Of Support Discussed With:    Patient     Code Status (Patient has no pulse and is not breathing):    CPR (Attempt to Resuscitate)     Medical Interventions (Patient has pulse or is breathing):    Full Support     Release to patient:    Routine Release       Alyson Rubio, APRN  04/24/23

## 2023-04-24 NOTE — PLAN OF CARE
Goal Outcome Evaluation:  Plan of Care Reviewed With: patient        Progress: improving  Outcome Evaluation: Pt demonstrating good improvements in functional activity tolerance and independence this session, progressing ambulation distance to 90ft with RW and CGA and 5ft + 5ft with CGA and no AD. Pt performed various standing ther ex. PT plans to continue progressing PT POC as tolerated, recommending return home with 24hr assist and HH PT at [place].

## 2023-04-24 NOTE — CASE MANAGEMENT/SOCIAL WORK
Continued Stay Note  [facility]     Patient Name: Flaco Roque II  MRN: [id number]  Today's Date: 4/24/2023    Admit Date: 4/15/2023    Plan: discharge plan   Discharge Plan     Row Name 04/24/23 1119       Plan    Plan discharge plan    Plan Comments I spoke with Andre(liason for [facility]) and [facility] is reviewing referral, but need updated OT notes. I did message [facility] and asked them to see pt. CM will cont to follow    Final Discharge Disposition Code 62 - inpatient rehab facility               Discharge Codes    No documentation.               Expected Discharge Date and Time     Expected Discharge Date Expected Discharge Time    Apr 25, 2023             Nelda Grier RN

## 2023-04-24 NOTE — SIGNIFICANT NOTE
04/24/23 1555   SLP Deferred Reason   SLP Deferred Reason Patient unavailable for treatment  (Attempted to see pt for tx this date; pt unavailable, will defer and f/u as pt is able to participate)

## 2023-04-25 VITALS
BODY MASS INDEX: 20.92 KG/M2 | SYSTOLIC BLOOD PRESSURE: 106 MMHG | RESPIRATION RATE: 18 BRPM | WEIGHT: 138 LBS | HEIGHT: 68 IN | DIASTOLIC BLOOD PRESSURE: 67 MMHG | HEART RATE: 72 BPM | TEMPERATURE: 97 F | OXYGEN SATURATION: 97 %

## 2023-04-25 LAB
ANION GAP SERPL CALCULATED.3IONS-SCNC: 11 MMOL/L (ref 5–15)
BUN SERPL-MCNC: 14 MG/DL (ref 8–23)
BUN/CREAT SERPL: 20.6 (ref 7–25)
CALCIUM SPEC-SCNC: 9.1 MG/DL (ref 8.6–10.5)
CHLORIDE SERPL-SCNC: 103 MMOL/L (ref 98–107)
CO2 SERPL-SCNC: 26 MMOL/L (ref 22–29)
CREAT SERPL-MCNC: 0.68 MG/DL (ref 0.76–1.27)
DEPRECATED RDW RBC AUTO: 49.2 FL (ref 37–54)
EGFRCR SERPLBLD CKD-EPI 2021: 95.7 ML/MIN/1.73
ERYTHROCYTE [DISTWIDTH] IN BLOOD BY AUTOMATED COUNT: 16.8 % (ref 12.3–15.4)
GLUCOSE SERPL-MCNC: 135 MG/DL (ref 65–99)
HCT VFR BLD AUTO: 39.7 % (ref 37.5–51)
HGB BLD-MCNC: 11.4 G/DL (ref 13–17.7)
MCH RBC QN AUTO: 23.4 PG (ref 26.6–33)
MCHC RBC AUTO-ENTMCNC: 28.7 G/DL (ref 31.5–35.7)
MCV RBC AUTO: 81.5 FL (ref 79–97)
PLATELET # BLD AUTO: 373 10*3/MM3 (ref 140–450)
PMV BLD AUTO: 12.4 FL (ref 6–12)
POTASSIUM SERPL-SCNC: 4.3 MMOL/L (ref 3.5–5.2)
RBC # BLD AUTO: 4.87 10*6/MM3 (ref 4.14–5.8)
SODIUM SERPL-SCNC: 140 MMOL/L (ref 136–145)
WBC NRBC COR # BLD: 12.93 10*3/MM3 (ref 3.4–10.8)

## 2023-04-25 PROCEDURE — 85027 COMPLETE CBC AUTOMATED: CPT | Performed by: NURSE PRACTITIONER

## 2023-04-25 PROCEDURE — 99239 HOSP IP/OBS DSCHRG MGMT >30: CPT | Performed by: NURSE PRACTITIONER

## 2023-04-25 PROCEDURE — 94799 UNLISTED PULMONARY SVC/PX: CPT

## 2023-04-25 PROCEDURE — 92526 ORAL FUNCTION THERAPY: CPT

## 2023-04-25 PROCEDURE — 80048 BASIC METABOLIC PNL TOTAL CA: CPT | Performed by: NURSE PRACTITIONER

## 2023-04-25 RX ORDER — TAMSULOSIN HYDROCHLORIDE 0.4 MG/1
0.4 CAPSULE ORAL NIGHTLY
Qty: 30 CAPSULE
Start: 2023-04-25

## 2023-04-25 RX ORDER — QUETIAPINE FUMARATE 25 MG/1
25 TABLET, FILM COATED ORAL EVERY EVENING
Start: 2023-04-25

## 2023-04-25 RX ORDER — ASPIRIN 81 MG/1
81 TABLET, CHEWABLE ORAL DAILY
Start: 2023-04-26

## 2023-04-25 RX ADMIN — ALBUTEROL SULFATE 2 PUFF: 90 AEROSOL, METERED RESPIRATORY (INHALATION) at 09:40

## 2023-04-25 RX ADMIN — PANTOPRAZOLE SODIUM 40 MG: 40 TABLET, DELAYED RELEASE ORAL at 05:41

## 2023-04-25 RX ADMIN — Medication 1 CAPSULE: at 08:35

## 2023-04-25 RX ADMIN — ASPIRIN 81 MG CHEWABLE TABLET 81 MG: 81 TABLET CHEWABLE at 08:34

## 2023-04-25 RX ADMIN — CARBIDOPA AND LEVODOPA 1 TABLET: 25; 100 TABLET ORAL at 11:11

## 2023-04-25 RX ADMIN — CARBIDOPA AND LEVODOPA 1 TABLET: 25; 100 TABLET ORAL at 08:35

## 2023-04-25 RX ADMIN — METOPROLOL SUCCINATE 50 MG: 50 TABLET, EXTENDED RELEASE ORAL at 08:34

## 2023-04-25 RX ADMIN — SENNOSIDES AND DOCUSATE SODIUM 2 TABLET: 8.6; 5 TABLET ORAL at 08:35

## 2023-04-25 RX ADMIN — ATORVASTATIN CALCIUM 10 MG: 10 TABLET, FILM COATED ORAL at 08:34

## 2023-04-25 RX ADMIN — IPRATROPIUM BROMIDE 2 PUFF: 17 AEROSOL, METERED RESPIRATORY (INHALATION) at 09:41

## 2023-04-25 RX ADMIN — DEXAMETHASONE 6 MG: 4 TABLET ORAL at 08:34

## 2023-04-25 RX ADMIN — Medication 10 ML: at 08:35

## 2023-04-25 RX ADMIN — APIXABAN 5 MG: 5 TABLET, FILM COATED ORAL at 08:34

## 2023-04-25 RX ADMIN — AMANTADINE HYDROCHLORIDE 100 MG: 100 CAPSULE ORAL at 08:35

## 2023-04-25 NOTE — DISCHARGE SUMMARY
[facility]  DISCHARGE SUMMARY    Patient Name: Flaco Roque II  : 1945  MRN: [id number]    Date of Admission: 4/15/2023 11:50 AM  Date of Discharge:  2023  Primary Care Physician: Azar Stern MD    Consults     Date and Time Order Name Status Description    4/15/2023  5:02 PM Inpatient Cardiology Consult Completed           Hospital Course     Presenting Problem:   COVID-19 [U07.1]    Active Hospital Problems    Diagnosis  POA   • **COVID-19 [U07.1]  Yes   • Chronic atrial fibrillation with rapid ventricular response [I48.20]  Yes   • Peripheral arterial disease [I73.9]  Yes   • Hypokalemia [E87.6]  Yes   • Acute respiratory failure with hypoxia [J96.01]  Yes   • Parkinson disease [G20]  Yes   • ABRIL (obstructive sleep apnea) [G47.33]  Yes   • Pulmonary emphysema [J43.9]  Yes   • GERD without esophagitis [K21.9]  Yes      Resolved Hospital Problems   No resolved problems to display.          Hospital Course:  Flaco Roque II is a 77 y.o. male with a past medical history of atrial fibrillation, ABRIL, GERD, emphysema and Parkinson's disease that presented to the ED complaining of fever/chills, rigors and chest pain with palpitations.       COVID-19  Acute Respiratory Failure  H/o pulmonary emphysema  Possible Aspiration Pneumonia   -s/p dex/remdesivir, isolate for 10 days of diagnosis until   -on RA  -s/p Rocephin and doxycycline   -pulmonary toilet  -speech following, adjustments made, working with speech therapy but wants to continue using a straw  -- labs improved some leukocytosis likely due to steroids. Patient has been afebrile      New Atrial Fibrillation with RVR  -rate control per cardiology, transitioned to Eliquis   -toprol dose was increased to 100 mg daily  -- dose was  decreased to 50 mg due to borderline bp on , adjust as needed for rate control      Hypokalemia  -stable      Parkinsons disease  -continue home  carbidopa/levadopa     ABRIL  -CPAP intolerant        GERD  -- PPI    Chronic pain  -- has pain pump, provider at   -- I talked with Shahnaz at pain clinic and they are ok with patient coming to clinic today to get pain pumped filled prior to going to [facility]     Patient has remained clinically stable and will be discharged to rehab today.       Discharge Follow Up Recommendations for outpatient labs/diagnostics:   follow up with pcp after dc from rehab   Follow up with Cardiology Dr. Kilpatrick in 6 weeks     Day of Discharge     HPI:   Patient is resting in bed in NAD. He states he feels good and is tired of being in bed. He wants to leave today if there is any chance he can     Review of Systems  Gen- No fevers, chills  CV- No chest pain, palpitations  Resp- No cough, dyspnea  GI- No N/V/D, abd pain      Vital Signs:   Temp:  [96.3 °F (35.7 °C)-97 °F (36.1 °C)] 97 °F (36.1 °C)  Heart Rate:  [65-85] 72  Resp:  [16-18] 18  BP: (106-118)/(60-67) 106/67  Flow (L/min):  [2] 2      Physical Exam:  Constitutional: No acute distress, awake, alert  HENT: NCAT, mucous membranes moist  Respiratory: Clear to auscultation bilaterally, respiratory effort normal room air 98%  Cardiovascular: RRR, no murmurs, rubs, or gallops  Gastrointestinal: Positive bowel sounds, soft, nontender, nondistended  Musculoskeletal: No bilateral ankle edema  Psychiatric: Anxious affect, cooperative  Neurologic: Oriented x 3, strength symmetric in all extremities, Cranial Nerves grossly intact to confrontation, speech clear  Skin: No rashes, pale       Pertinent  and/or Most Recent Results     LAB RESULTS:      Lab 04/25/23  0845 04/19/23  0413   WBC 12.93* 7.67   HEMOGLOBIN 11.4* 10.1*   HEMATOCRIT 39.7 33.5*   PLATELETS 373 264   NEUTROS ABS  --  6.28   IMMATURE GRANS (ABS)  --  0.04   LYMPHS ABS  --  0.74   MONOS ABS  --  0.60   EOS ABS  --  0.00   MCV 81.5 78.8*   PROCALCITONIN  --  0.58*   D DIMER QUANT  --  <0.27         Lab 04/25/23  0844  04/19/23 0413   SODIUM 140 142   POTASSIUM 4.3 4.1   CHLORIDE 103 107   CO2 26.0 25.0   ANION GAP 11.0 10.0   BUN 14 18   CREATININE 0.68* 0.57*   EGFR 95.7 101.0   GLUCOSE 135* 111*   CALCIUM 9.1 8.6         Lab 04/19/23 0413   TOTAL PROTEIN 4.9*   ALBUMIN 3.2*   GLOBULIN 1.7   ALT (SGPT) <5   AST (SGOT) 15   BILIRUBIN 0.3   BILIRUBIN DIRECT <0.2   ALK PHOS 44                     Brief Urine Lab Results     None        Microbiology Results (last 10 days)     Procedure Component Value - Date/Time    COVID PRE-OP / PRE-PROCEDURE SCREENING ORDER (NO ISOLATION) - Swab, Nasopharynx [[id number]]  (Abnormal) Collected: 04/15/23 1219    Lab Status: Final result Specimen: Swab from Nasopharynx Updated: 04/15/23 1328    Narrative:      The following orders were created for panel order COVID PRE-OP / PRE-PROCEDURE SCREENING ORDER (NO ISOLATION) - Swab, Nasopharynx.  Procedure                               Abnormality         Status                     ---------                               -----------         ------                     Respiratory Panel PCR w/...[[id number]]  Abnormal            Final result                 Please view results for these tests on the individual orders.    Blood Culture - Blood, Arm, Left [[id number]]  (Normal) Collected: 04/15/23 1219    Lab Status: Final result Specimen: Blood from Arm, Left Updated: 04/20/23 1317     Blood Culture No growth at 5 days    Blood Culture - Blood, Hand, Left [[id number]]  (Normal) Collected: 04/15/23 1219    Lab Status: Final result Specimen: Blood from Hand, Left Updated: 04/20/23 1316     Blood Culture No growth at 5 days    Respiratory Panel PCR w/COVID-19(SARS-CoV-2) SHIRLEY/ALESSIO/LOUISA/PAD/COR/MAD/ANGELA In-House, NP Swab in UTM/VTM, 3-4 HR TAT - Swab, Nasopharynx [[id number]]  (Abnormal) Collected: 04/15/23 1219    Lab Status: Final result Specimen: Swab from Nasopharynx Updated: 04/15/23 1328     ADENOVIRUS, PCR Not Detected     Coronavirus 229E Not Detected      Coronavirus HKU1 Not Detected     Coronavirus NL63 Not Detected     Coronavirus OC43 Not Detected     COVID19 Detected     Human Metapneumovirus Not Detected     Human Rhinovirus/Enterovirus Not Detected     Influenza A PCR Not Detected     Influenza B PCR Not Detected     Parainfluenza Virus 1 Not Detected     Parainfluenza Virus 2 Not Detected     Parainfluenza Virus 3 Not Detected     Parainfluenza Virus 4 Not Detected     RSV, PCR Not Detected     Bordetella pertussis pcr Not Detected     Bordetella parapertussis PCR Not Detected     Chlamydophila pneumoniae PCR Not Detected     Mycoplasma pneumo by PCR Not Detected    Narrative:      In the setting of a positive respiratory panel with a viral infection PLUS a negative procalcitonin without other underlying concern for bacterial infection, consider observing off antibiotics or discontinuation of antibiotics and continue supportive care. If the respiratory panel is positive for atypical bacterial infection (Bordetella pertussis, Chlamydophila pneumoniae, or Mycoplasma pneumoniae), consider antibiotic de-escalation to target atypical bacterial infection.          XR Shoulder 2+ View Right    Result Date: 4/17/2023  3 views right shoulder. DATE: 4/17/2023. COMPARISON: None available. CLINICAL HISTORY: Right shoulder pain.     There is a right shoulder arthroplasty which appears anatomically aligned. No acute osseous abnormalities are seen. Mild subacromial spurring is seen. There is scattered interstitial changes seen throughout the visualized portions of the right lung which is likely chronic. Electronically signed by:  Andre Salazar D.O.  4/16/2023 11:44 PM Mountain Time    FL Video Swallow With Speech Single Contrast    Result Date: 4/21/2023  FL VIDEO SWALLOW W SPEECH SINGLE-CONTRAST Date of Exam: 4/18/2023 11:12 AM EDT Indication: Assess oropharyngeal swallow. Comparison: None available. Technique:   Solid and/or liquid mixed with barium were administered to  the patient with cine/video imaging.  Imaging assistance was provided to the speech pathologist and an image was saved. Fluoroscopic Time: 1 minute and 24 seconds Number of Images: 11 associated fluoroscopic loops were saved Findings: Please see speech therapy report for full details and recommendations.     Impression: Fluoroscopy provided for a modified barium swallow. Please see the speech therapy report for full details and recommendations. Electronically Signed: Rome OrtizKacey  4/21/2023 12:11 AM EDT  Workstation ID: [id number]    XR Chest 1 View    Result Date: 4/15/2023  XR CHEST 1 VW Date of Exam: 4/15/2023 11:52 AM EDT Indication: Chest Pain Triage Protocol. Comparison: 6/4/2020 chest CT, 8/20/2019 chest radiograph Findings: Cardiomediastinal silhouette is within normal limits. Diffuse hazy opacities in the left mid to lower left lung. No pneumothorax. Possible trace left pleural effusion. Bilateral shoulder replacements. No evidence of acute osseous abnormalities. Visualized upper abdomen is unremarkable.     Impression: Diffuse hazy opacities in the left mid to lower lung concerning for infection. Possible trace left pleural effusion. Electronically Signed: Rome Kacey  4/15/2023 1:13 PM EDT  Workstation ID: [id number]    CT Angiogram Chest    Result Date: 4/15/2023  CT ANGIOGRAM CHEST Date of Exam: 4/15/2023 2:52 PM EDT Indication: covid with hypoxemia. Comparison: CT chest 6/4/2020, same day chest radiograph Technique: CTA of the chest was performed after the uneventful intravenous administration of 75 mL Isovue-370. Reconstructed coronal and sagittal images were also obtained. In addition, a 3-D volume rendered image was created for interpretation. Automated exposure control and iterative reconstruction methods were used. Findings: Diagnostic quality: Contrast opacification of the pulmonary arterial circulation is adequate for assessment of pulmonary embolism. Study is markedly limited by  respiratory motion artifact. Pulmonary arteries: No evidence of pulmonary embolus to the proximal segmental arteries. Main pulmonary artery is enlarged measuring 3.2 cm. Heart and pericardium:No flattening of the interventricular septum. Coronary artery calcification is seen. No substantial pericardial effusion. Enlargement of the right ventricle. Vessels:Normal caliber aorta. Atherosclerotic calcification of the aorta. No evidence of acute aortic injury. Venous structures including the superior vena cava and inferior vena cava appear grossly patent. Mediastinum: Large hiatal hernia with intrathoracic stomach. There is fluid and debris within the lower esophagus. Few mildly prominent mediastinal lymph nodes, presumably reactive. No anterior mediastinal masses. Lower neck:No suspicious or enlarged supraclavicular or axillary lymphadenopathy. Limited evaluation due to streak from bilateral shoulder hardware. Pulmonary parenchyma: Emphysematous changes in the lungs. Markedly limited evaluation due to respiratory motion. Calcified granulomas. Intralobular septal thickening throughout. Groundglass and consolidative opacities throughout the majority of the left lung predominantly in the lingula and left lower lobe. Minimal dependent atelectasis. No obvious suspicious pulmonary lesions. Pleura: Trace left pleural effusion. No pneumothorax. Airways: Likely debris and mucus plugging in the left lower lobe airways. Chest wall and bones:No acute osseous abnormality. Degenerative changes of thoracic spine. Bilateral gynecomastia. Upper abdomen: A few hepatic hypodensities are too small to characterize. Upper abdomen is unremarkable.     Impression: Markedly limited examination due to respiratory motion. No evidence of pulmonary embolus through the proximal segmental arteries. There is diffuse groundglass and consolidative opacity throughout the left lung predominantly in the left lower lobe and lingula. While infection is most  likely, given the presence of a large hiatal hernia with fluid and debris in the distal esophagus, aspiration should also be considered. Enlarged main pulmonary artery and enlarged appearance of the right heart shadow is seen in the setting of pulmonary hypertension. No evidence of intraventricular septum flattening. Interlobular septal thickening seen throughout the lungs may represent a background of mild edema. Trace left pleural effusion. Electronically Signed: Rome Kacey  4/15/2023 3:35 PM EDT  Workstation ID: [id number]      Results for orders placed during the hospital encounter of 07/16/21    Doppler Arterial Multi Level Lower Extremity - Bilateral CAR    Interpretation Summary  · Normal right KAMRYN of 1.02.  · Left femoral-popliteal arteries noncompressible. There are biphasic and monophasic waveforms noted in the left lower extremity  · The left posterior tibial artery was compressible with mildly reduced left KAMRYN of 0.86      Results for orders placed during the hospital encounter of 07/16/21    Doppler Arterial Multi Level Lower Extremity - Bilateral CAR    Interpretation Summary  · Normal right KAMRYN of 1.02.  · Left femoral-popliteal arteries noncompressible. There are biphasic and monophasic waveforms noted in the left lower extremity  · The left posterior tibial artery was compressible with mildly reduced left KAMRYN of 0.86      Results for orders placed during the hospital encounter of 04/26/19    Adult Transthoracic Echo Complete W/ Cont if Necessary Per Protocol    Interpretation Summary  · Left ventricular systolic function is normal.  · Estimated EF appears to be in the range of 66 - 70%.      Plan for Follow-up of Pending Labs/Results:     Discharge Details        Discharge Medications      New Medications      Instructions Start Date   apixaban 5 MG tablet tablet  Commonly known as: ELIQUIS   5 mg, Oral, Every 12 Hours Scheduled      QUEtiapine 25 MG tablet  Commonly known as: SEROquel   25 mg,  Oral, Every Evening         Changes to Medications      Instructions Start Date   carbidopa-levodopa  MG per tablet  Commonly known as: SINEMET  What changed: Another medication with the same name was removed. Continue taking this medication, and follow the directions you see here.   1 tablet, Oral, 4 Times Daily      tamsulosin 0.4 MG capsule 24 hr capsule  Commonly known as: FLOMAX  What changed:   · how much to take  · when to take this   0.4 mg, Oral, Nightly         Continue These Medications      Instructions Start Date   amantadine 100 MG capsule  Commonly known as: SYMMETREL   100 mg, Oral, 2 Times Daily      aspirin 81 MG chewable tablet   81 mg, Oral, Daily   Start Date: April 26, 2023     atorvastatin 10 MG tablet  Commonly known as: LIPITOR   10 mg, Oral, Daily      clonazePAM 1 MG tablet  Commonly known as: KlonoPIN   1 mg, Oral, Nightly      DRY MOUTH SPRAY MT   2-3 sprays, Mouth/Throat, As Needed      fentaNYL 0.05 MG/ML injection  Commonly known as: SUBLIMAZE   250 mcg, Intravenous, PAIN PUMP      ipratropium-albuterol 0.5-2.5 mg/3 ml nebulizer  Commonly known as: DUO-NEB   3 mL, Nebulization, 3 Times Daily      metoprolol succinate XL 50 MG 24 hr tablet  Commonly known as: TOPROL-XL   50 mg, Oral, Daily      montelukast 10 MG tablet  Commonly known as: SINGULAIR   10 mg, Oral, Nightly      multivitamin with minerals tablet tablet   1 tablet, Oral, Daily      omeprazole 20 MG capsule  Commonly known as: priLOSEC   20 mg, Oral, Daily         Stop These Medications    cilostazol 100 MG tablet  Commonly known as: PLETAL            No Known Allergies      Discharge Disposition:      Diet:  Hospital:  Diet Order   Procedures   • Diet: Regular/House Diet; No Straw; Texture: Mechanical Ground (NDD 2); Fluid Consistency: Thin (IDDSI 0)       Activity:  Activity Instructions     Activity as Tolerated      Measure Blood Pressure      Measure Weight            Restrictions or Other Recommendations:          CODE STATUS:    Code Status and Medical Interventions:   Ordered at: 04/15/23 1433     Level Of Support Discussed With:    Patient     Code Status (Patient has no pulse and is not breathing):    CPR (Attempt to Resuscitate)     Medical Interventions (Patient has pulse or is breathing):    Full Support     Release to patient:    Routine Release       Future Appointments   Date Time Provider Department Center   5/26/2023 10:45 AM RAD TECH [facility]   5/26/2023 11:00 AM [facility], PFT LAB 3 [facility]   5/26/2023 11:30 AM Alf Edwards MD [facility]   1/30/2024 11:15 AM Von Kilpatrick MD [facility]       Additional Instructions for the Follow-ups that You Need to Schedule     Discharge Follow-up with PCP   As directed       Currently Documented PCP:    Azar Stern MD    PCP Phone Number:    [phone]     Follow Up Details: follow up with pcp after dc from rehab         Discharge Follow-up with Specified Provider: follow up with DR Kilpatrick 6 weeks   As directed      To: follow up with DR Kilpatrick 6 weeks                     Alyson Rubio, YAMILET  04/25/23      Time Spent on Discharge:  I spent  45  minutes on this discharge activity which included: face-to-face encounter with the patient, reviewing the data in the system, coordination of the care with the nursing staff as well as consultants, documentation, and entering orders.

## 2023-04-25 NOTE — CASE MANAGEMENT/SOCIAL WORK
Case Management Discharge Note      Final Note: Pt is being discharged today and the plan is to go to [facility] for acute rehab. I spoke with Andre(liason for [facility]) and patient will have a bed on CVA[facility] unit today. The hospitalist has called [facility] and pt will be able to stop by [facility]  before going to [facility] to get pain pump filled. Per Andre, [facility] is agreeable to pt stopping at [facility] to fill pain pump. Discharge summary faxed to [facility] at [phone]. Pt's nurse can call report to [facility]) at [phone] and send a copy of the discharge summary with pt. Pt's spouse, Cheryl, will transport. I spoke with Cheryl on the phone and she and spouse are agreeable to discharge plan.         Selected Continued Care - Admitted Since 4/15/2023     Destination Coordination complete.    Service Provider Selected Services Address Phone Fax Patient Preferred    [facility] [address] [id number] [phone] --          Durable Medical Equipment     Service Provider Selected Services Address Phone Fax Patient Preferred    [facility] [address] [facility] [address] [phone] --          Dialysis/Infusion    No services have been selected for the patient.              Home Medical Care    No services have been selected for the patient.              Therapy    No services have been selected for the patient.              Community Resources    No services have been selected for the patient.              Community & DME    No services have been selected for the patient.                       Final Discharge Disposition Code: 62 - inpatient rehab facility

## 2023-04-25 NOTE — PLAN OF CARE
Problem: Adult Inpatient Plan of Care  Goal: Plan of Care Review  Outcome: Ongoing, Progressing  Flowsheets  Taken 4/25/2023 0406 by Anita Vega RN  Progress: improving  Taken 4/24/2023 0947 by Marcellus Hawk PT  Plan of Care Reviewed With: patient  Goal: Patient-Specific Goal (Individualized)  Outcome: Ongoing, Progressing  Goal: Absence of Hospital-Acquired Illness or Injury  Outcome: Ongoing, Progressing  Intervention: Identify and Manage Fall Risk  Recent Flowsheet Documentation  Taken 4/25/2023 0200 by Anita Vega RN  Safety Promotion/Fall Prevention:   activity supervised   assistive device/personal items within reach   clutter free environment maintained   room organization consistent   safety round/check completed   toileting scheduled  Taken 4/25/2023 0000 by Anita Vega RN  Safety Promotion/Fall Prevention:   activity supervised   assistive device/personal items within reach   clutter free environment maintained   room organization consistent   safety round/check completed   toileting scheduled  Taken 4/24/2023 2200 by Anita Vega RN  Safety Promotion/Fall Prevention:   activity supervised   assistive device/personal items within reach   clutter free environment maintained   room organization consistent   safety round/check completed   toileting scheduled  Taken 4/24/2023 2000 by Anita Vega RN  Safety Promotion/Fall Prevention:   activity supervised   assistive device/personal items within reach   clutter free environment maintained   room organization consistent   safety round/check completed   toileting scheduled  Intervention: Prevent Skin Injury  Recent Flowsheet Documentation  Taken 4/25/2023 0200 by Anita Vega RN  Body Position: position changed independently  Skin Protection:   incontinence pads utilized   adhesive use limited   tubing/devices free from skin contact  Taken 4/25/2023 0000 by Anita Vega RN  Body Position: position changed  independently  Skin Protection:   adhesive use limited   incontinence pads utilized   tubing/devices free from skin contact  Taken 4/24/2023 2200 by Anita Vega RN  Body Position: position changed independently  Skin Protection:   adhesive use limited   incontinence pads utilized   tubing/devices free from skin contact  Taken 4/24/2023 2000 by Anita Vega RN  Body Position: position changed independently  Skin Protection:   adhesive use limited   incontinence pads utilized   tubing/devices free from skin contact  Intervention: Prevent and Manage VTE (Venous Thromboembolism) Risk  Recent Flowsheet Documentation  Taken 4/25/2023 0200 by Anita Vega RN  Activity Management: activity encouraged  Taken 4/25/2023 0000 by Anita Vega RN  Activity Management: activity encouraged  Taken 4/24/2023 2200 by Anita Veag RN  Activity Management: activity encouraged  Taken 4/24/2023 2000 by Anita Vega RN  Activity Management: activity encouraged  Range of Motion: active ROM (range of motion) encouraged  Intervention: Prevent Infection  Recent Flowsheet Documentation  Taken 4/25/2023 0200 by Anita Vega RN  Infection Prevention:   environmental surveillance performed   hand hygiene promoted   rest/sleep promoted  Taken 4/25/2023 0000 by Anita Vega RN  Infection Prevention:   environmental surveillance performed   hand hygiene promoted   rest/sleep promoted  Taken 4/24/2023 2200 by Anita Vega RN  Infection Prevention:   environmental surveillance performed   hand hygiene promoted   rest/sleep promoted  Taken 4/24/2023 2000 by Anita Vega RN  Infection Prevention:   environmental surveillance performed   hand hygiene promoted   rest/sleep promoted  Goal: Optimal Comfort and Wellbeing  Outcome: Ongoing, Progressing  Intervention: Provide Person-Centered Care  Recent Flowsheet Documentation  Taken 4/24/2023 2000 by Anita Vega RN  Trust  Relationship/Rapport:   care explained   choices provided   emotional support provided   empathic listening provided   questions answered   questions encouraged   reassurance provided   thoughts/feelings acknowledged  Goal: Readiness for Transition of Care  Outcome: Ongoing, Progressing     Problem: COPD (Chronic Obstructive Pulmonary Disease) Comorbidity  Goal: Maintenance of COPD Symptom Control  Outcome: Ongoing, Progressing  Intervention: Maintain COPD-Symptom Control  Recent Flowsheet Documentation  Taken 4/25/2023 0200 by Anita Vega RN  Medication Review/Management: medications reviewed  Taken 4/25/2023 0000 by Anita Vega RN  Medication Review/Management: medications reviewed  Taken 4/24/2023 2200 by Anita Vega RN  Medication Review/Management: medications reviewed  Taken 4/24/2023 2000 by Anita Vega RN  Supportive Measures: active listening utilized  Medication Review/Management: medications reviewed     Problem: Hypertension Comorbidity  Goal: Blood Pressure in Desired Range  Outcome: Ongoing, Progressing  Intervention: Maintain Blood Pressure Management  Recent Flowsheet Documentation  Taken 4/25/2023 0200 by Anita Vega RN  Medication Review/Management: medications reviewed  Taken 4/25/2023 0000 by Anita Vega RN  Medication Review/Management: medications reviewed  Taken 4/24/2023 2200 by Anita Vega RN  Medication Review/Management: medications reviewed  Taken 4/24/2023 2000 by Anita Vega RN  Syncope Management: position changed slowly  Medication Review/Management: medications reviewed     Problem: Obstructive Sleep Apnea Risk or Actual Comorbidity Management  Goal: Unobstructed Breathing During Sleep  Outcome: Ongoing, Progressing     Problem: Pain Chronic (Persistent) (Comorbidity Management)  Goal: Acceptable Pain Control and Functional Ability  Outcome: Ongoing, Progressing  Intervention: Manage Persistent Pain  Recent Flowsheet  Documentation  Taken 4/25/2023 0200 by Anita Vega RN  Medication Review/Management: medications reviewed  Taken 4/25/2023 0000 by Anita Vega RN  Medication Review/Management: medications reviewed  Taken 4/24/2023 2200 by Anita Vega RN  Medication Review/Management: medications reviewed  Taken 4/24/2023 2000 by Anita Vega RN  Sleep/Rest Enhancement:   awakenings minimized   natural light exposure provided   noise level reduced   room darkened  Medication Review/Management: medications reviewed  Intervention: Optimize Psychosocial Wellbeing  Recent Flowsheet Documentation  Taken 4/24/2023 2000 by Anita Vega RN  Supportive Measures: active listening utilized  Spiritual Activities Assistance: affirmation provided     Problem: Skin Injury Risk Increased  Goal: Skin Health and Integrity  Outcome: Ongoing, Progressing  Intervention: Promote and Optimize Oral Intake  Recent Flowsheet Documentation  Taken 4/24/2023 2000 by Anita Vega RN  Oral Nutrition Promotion: rest periods promoted  Intervention: Optimize Skin Protection  Recent Flowsheet Documentation  Taken 4/25/2023 0200 by Anita Vega RN  Pressure Reduction Techniques:   heels elevated off bed   frequent weight shift encouraged  Head of Bed (HOB) Positioning: HOB at 30 degrees  Pressure Reduction Devices:   specialty bed utilized   pressure-redistributing mattress utilized   positioning supports utilized  Skin Protection:   incontinence pads utilized   adhesive use limited   tubing/devices free from skin contact  Taken 4/25/2023 0000 by Anita Vega RN  Pressure Reduction Techniques:   frequent weight shift encouraged   heels elevated off bed  Head of Bed (HOB) Positioning: HOB at 30 degrees  Pressure Reduction Devices:   specialty bed utilized   pressure-redistributing mattress utilized   positioning supports utilized  Skin Protection:   adhesive use limited   incontinence pads utilized    tubing/devices free from skin contact  Taken 4/24/2023 2200 by Anita Vega RN  Pressure Reduction Techniques:   frequent weight shift encouraged   heels elevated off bed  Head of Bed (HOB) Positioning: HOB at 30 degrees  Pressure Reduction Devices:   specialty bed utilized   pressure-redistributing mattress utilized   positioning supports utilized  Skin Protection:   adhesive use limited   incontinence pads utilized   tubing/devices free from skin contact  Taken 4/24/2023 2000 by Anita Vega RN  Pressure Reduction Techniques:   frequent weight shift encouraged   heels elevated off bed  Head of Bed (HOB) Positioning: HOB at 30 degrees  Pressure Reduction Devices: positioning supports utilized  Skin Protection:   adhesive use limited   incontinence pads utilized   tubing/devices free from skin contact     Problem: Fall Injury Risk  Goal: Absence of Fall and Fall-Related Injury  Outcome: Ongoing, Progressing  Intervention: Identify and Manage Contributors  Recent Flowsheet Documentation  Taken 4/25/2023 0200 by Anita Vega RN  Medication Review/Management: medications reviewed  Taken 4/25/2023 0000 by Anita Vega RN  Medication Review/Management: medications reviewed  Taken 4/24/2023 2200 by Anita Vega RN  Medication Review/Management: medications reviewed  Taken 4/24/2023 2000 by Anita Vega RN  Medication Review/Management: medications reviewed  Self-Care Promotion: independence encouraged  Intervention: Promote Injury-Free Environment  Recent Flowsheet Documentation  Taken 4/25/2023 0200 by Anita Vega RN  Safety Promotion/Fall Prevention:   activity supervised   assistive device/personal items within reach   clutter free environment maintained   room organization consistent   safety round/check completed   toileting scheduled  Taken 4/25/2023 0000 by Anita Vega RN  Safety Promotion/Fall Prevention:   activity supervised   assistive device/personal  items within reach   clutter free environment maintained   room organization consistent   safety round/check completed   toileting scheduled  Taken 4/24/2023 2200 by Anita Vega, RN  Safety Promotion/Fall Prevention:   activity supervised   assistive device/personal items within reach   clutter free environment maintained   room organization consistent   safety round/check completed   toileting scheduled  Taken 4/24/2023 2000 by Anita Vega, RN  Safety Promotion/Fall Prevention:   activity supervised   assistive device/personal items within reach   clutter free environment maintained   room organization consistent   safety round/check completed   toileting scheduled   Goal Outcome Evaluation:           Progress: improving

## 2023-04-25 NOTE — PLAN OF CARE
Goal Outcome Evaluation:  Plan of Care Reviewed With: patient, spouse  SLP treatment completed. Will continue to address dysphagia. Please see note for further details and recommendations.

## 2023-04-25 NOTE — DISCHARGE PLACEMENT REQUEST
"Laurence Roque II (77 y.o. Male)     To [facility](acute, CVA2)  [facility] [phone]    Date of Birth   1945    Social Security Number       Address   [address] [id number]    Home Phone   [phone]    MRN   [id number]       Congregational   Fa[employer]    Marital Status                               Admission Date   4/15/23    Admission Type   Emergency    Admitting Provider   Alejandra Orosco MD    Attending Provider   Alejandra Orosco MD    Department, Room/Bed   [facility] 5G, S563/1       Discharge Date       Discharge Disposition   Rehab Facility or Unit (DC - External)    Discharge Destination                               Attending Provider: Alejandra Orosco MD    Allergies: No Known Allergies    Isolation: Contact Air   Infection: COVID (confirmed) (04/15/23)   Code Status: CPR    Ht: 172.7 cm (68\")   Wt: 62.6 kg (138 lb)    Admission Cmt: None   Principal Problem: COVID-19 [U07.1]                 Active Insurance as of 4/15/2023     Primary Coverage     Payor Plan Insurance Group Employer/Plan Group    MEDICARE MEDICARE A & B      Payor Plan Address Payor Plan Phone Number Payor Plan Fax Number Effective Dates    [address] [phone]  1/1/2004 - None Entered    Former[facility] [id number]       Subscriber Name Subscriber Birth Date Member ID       LAURENCE ROQUE II 1945 [id number]           Secondary Coverage     Payor Plan Insurance Group Employer/Plan Group    HUMANA HUMANA [facility]              [id number]     Payor Plan Address Payor Plan Phone Number Payor Plan Fax Number Effective Dates    [address] [address]   1/1/2011 - None Entered    [facility] [id number]       Subscriber Name Subscriber Birth Date Member ID       LAURENCE ROQUE II 1945 [id number]                 Emergency Contacts      (Rel.) Home Phone Work Phone Mobile Phone    RoqueCheryl hadley (Spouse) [phone] -- [phone]               Discharge Summary      Ramiro, " YAMILET Helms at 23 1124              [facility] Medicine Services  DISCHARGE SUMMARY    Patient Name: Flaco Roque II  : 1945  MRN: [id number]    Date of Admission: 4/15/2023 11:50 AM  Date of Discharge:  2023  Primary Care Physician: Azar Stern MD    Consults     Date and Time Order Name Status Description    4/15/2023  5:02 PM Inpatient Cardiology Consult Completed           Hospital Course     Presenting Problem:   COVID-19 [U07.1]    Active Hospital Problems    Diagnosis  POA   • **COVID-19 [U07.1]  Yes   • Chronic atrial fibrillation with rapid ventricular response [I48.20]  Yes   • Peripheral arterial disease [I73.9]  Yes   • Hypokalemia [E87.6]  Yes   • Acute respiratory failure with hypoxia [J96.01]  Yes   • Parkinson disease [G20]  Yes   • ABRIL (obstructive sleep apnea) [G47.33]  Yes   • Pulmonary emphysema [J43.9]  Yes   • GERD without esophagitis [K21.9]  Yes      Resolved Hospital Problems   No resolved problems to display.          Hospital Course:  Flaco Roque II is a 77 y.o. male with a past medical history of atrial fibrillation, ABRIL, GERD, emphysema and Parkinson's disease that presented to the ED complaining of fever/chills, rigors and chest pain with palpitations.       COVID-19  Acute Respiratory Failure  H/o pulmonary emphysema  Possible Aspiration Pneumonia   -s/p dex/remdesivir, isolate for 10 days of diagnosis until   -on RA  -s/p Rocephin and doxycycline   -pulmonary toilet  -speech following, adjustments made, working with speech therapy but wants to continue using a straw  -- labs improved some leukocytosis likely due to steroids. Patient has been afebrile      New Atrial Fibrillation with RVR  -rate control per cardiology, transitioned to Eliquis   -toprol dose was increased to 100 mg daily  -- dose was  decreased to 50 mg due to borderline bp on , adjust as needed for rate control      Hypokalemia  -stable       Parkinsons disease  -continue home carbidopa/levadopa     ABRIL  -CPAP intolerant        GERD  -- PPI    Chronic pain  -- has pain pump, provider at   -- I talked with Shahnaz at pain clinic and they are ok with patient coming to clinic today to get pain pumped filled prior to going to [facility]     Patient has remained clinically stable and will be discharged to rehab today.       Discharge Follow Up Recommendations for outpatient labs/diagnostics:   follow up with pcp after dc from rehab   Follow up with Cardiology Dr. Kilpatrick in 6 weeks     Day of Discharge     HPI:   Patient is resting in bed in NAD. He states he feels good and is tired of being in bed. He wants to leave today if there is any chance he can     Review of Systems  Gen- No fevers, chills  CV- No chest pain, palpitations  Resp- No cough, dyspnea  GI- No N/V/D, abd pain      Vital Signs:   Temp:  [96.3 °F (35.7 °C)-97 °F (36.1 °C)] 97 °F (36.1 °C)  Heart Rate:  [65-85] 72  Resp:  [16-18] 18  BP: (106-118)/(60-67) 106/67  Flow (L/min):  [2] 2      Physical Exam:  Constitutional: No acute distress, awake, alert  HENT: NCAT, mucous membranes moist  Respiratory: Clear to auscultation bilaterally, respiratory effort normal room air 98%  Cardiovascular: RRR, no murmurs, rubs, or gallops  Gastrointestinal: Positive bowel sounds, soft, nontender, nondistended  Musculoskeletal: No bilateral ankle edema  Psychiatric: Anxious affect, cooperative  Neurologic: Oriented x 3, strength symmetric in all extremities, Cranial Nerves grossly intact to confrontation, speech clear  Skin: No rashes, pale       Pertinent  and/or Most Recent Results     LAB RESULTS:      Lab 04/25/23  0845 04/19/23  0413   WBC 12.93* 7.67   HEMOGLOBIN 11.4* 10.1*   HEMATOCRIT 39.7 33.5*   PLATELETS 373 264   NEUTROS ABS  --  6.28   IMMATURE GRANS (ABS)  --  0.04   LYMPHS ABS  --  0.74   MONOS ABS  --  0.60   EOS ABS  --  0.00   MCV 81.5 78.8*   PROCALCITONIN  --  0.58*   D DIMER QUANT  --   <0.27         Lab 04/25/23  0844 04/19/23  0413   SODIUM 140 142   POTASSIUM 4.3 4.1   CHLORIDE 103 107   CO2 26.0 25.0   ANION GAP 11.0 10.0   BUN 14 18   CREATININE 0.68* 0.57*   EGFR 95.7 101.0   GLUCOSE 135* 111*   CALCIUM 9.1 8.6         Lab 04/19/23  0413   TOTAL PROTEIN 4.9*   ALBUMIN 3.2*   GLOBULIN 1.7   ALT (SGPT) <5   AST (SGOT) 15   BILIRUBIN 0.3   BILIRUBIN DIRECT <0.2   ALK PHOS 44                     Brief Urine Lab Results     None        Microbiology Results (last 10 days)     Procedure Component Value - Date/Time    COVID PRE-OP / PRE-PROCEDURE SCREENING ORDER (NO ISOLATION) - Swab, Nasopharynx [[id number]]  (Abnormal) Collected: 04/15/23 1219    Lab Status: Final result Specimen: Swab from Nasopharynx Updated: 04/15/23 1328    Narrative:      The following orders were created for panel order COVID PRE-OP / PRE-PROCEDURE SCREENING ORDER (NO ISOLATION) - Swab, Nasopharynx.  Procedure                               Abnormality         Status                     ---------                               -----------         ------                     Respiratory Panel PCR w/...[[id number]]  Abnormal            Final result                 Please view results for these tests on the individual orders.    Blood Culture - Blood, Arm, Left [[id number]]  (Normal) Collected: 04/15/23 1219    Lab Status: Final result Specimen: Blood from Arm, Left Updated: 04/20/23 1317     Blood Culture No growth at 5 days    Blood Culture - Blood, Hand, Left [[id number]]  (Normal) Collected: 04/15/23 1219    Lab Status: Final result Specimen: Blood from Hand, Left Updated: 04/20/23 1316     Blood Culture No growth at 5 days    Respiratory Panel PCR w/COVID-19(SARS-CoV-2) SHIRLEY/ALESSIO/LOUISA/PAD/COR/MAD/ANGELA In-House, NP Swab in UTM/VTM, 3-4 HR TAT - Swab, Nasopharynx [[id number]]  (Abnormal) Collected: 04/15/23 1219    Lab Status: Final result Specimen: Swab from Nasopharynx Updated: 04/15/23 1328     ADENOVIRUS, PCR Not Detected      Coronavirus 229E Not Detected     Coronavirus HKU1 Not Detected     Coronavirus NL63 Not Detected     Coronavirus OC43 Not Detected     COVID19 Detected     Human Metapneumovirus Not Detected     Human Rhinovirus/Enterovirus Not Detected     Influenza A PCR Not Detected     Influenza B PCR Not Detected     Parainfluenza Virus 1 Not Detected     Parainfluenza Virus 2 Not Detected     Parainfluenza Virus 3 Not Detected     Parainfluenza Virus 4 Not Detected     RSV, PCR Not Detected     Bordetella pertussis pcr Not Detected     Bordetella parapertussis PCR Not Detected     Chlamydophila pneumoniae PCR Not Detected     Mycoplasma pneumo by PCR Not Detected    Narrative:      In the setting of a positive respiratory panel with a viral infection PLUS a negative procalcitonin without other underlying concern for bacterial infection, consider observing off antibiotics or discontinuation of antibiotics and continue supportive care. If the respiratory panel is positive for atypical bacterial infection (Bordetella pertussis, Chlamydophila pneumoniae, or Mycoplasma pneumoniae), consider antibiotic de-escalation to target atypical bacterial infection.          XR Shoulder 2+ View Right    Result Date: 4/17/2023  3 views right shoulder. DATE: 4/17/2023. COMPARISON: None available. CLINICAL HISTORY: Right shoulder pain.     There is a right shoulder arthroplasty which appears anatomically aligned. No acute osseous abnormalities are seen. Mild subacromial spurring is seen. There is scattered interstitial changes seen throughout the visualized portions of the right lung which is likely chronic. Electronically signed by:  Andre Salazar D.O.  4/16/2023 11:44 PM Mountain Time    FL Video Swallow With Speech Single Contrast    Result Date: 4/21/2023  FL VIDEO SWALLOW W SPEECH SINGLE-CONTRAST Date of Exam: 4/18/2023 11:12 AM EDT Indication: Assess oropharyngeal swallow. Comparison: None available. Technique:   Solid and/or liquid  mixed with barium were administered to the patient with cine/video imaging.  Imaging assistance was provided to the speech pathologist and an image was saved. Fluoroscopic Time: 1 minute and 24 seconds Number of Images: 11 associated fluoroscopic loops were saved Findings: Please see speech therapy report for full details and recommendations.     Impression: Fluoroscopy provided for a modified barium swallow. Please see the speech therapy report for full details and recommendations. Electronically Signed: Rome Erazo  4/21/2023 12:11 AM EDT  Workstation ID: [id number]    XR Chest 1 View    Result Date: 4/15/2023  XR CHEST 1 VW Date of Exam: 4/15/2023 11:52 AM EDT Indication: Chest Pain Triage Protocol. Comparison: 6/4/2020 chest CT, 8/20/2019 chest radiograph Findings: Cardiomediastinal silhouette is within normal limits. Diffuse hazy opacities in the left mid to lower left lung. No pneumothorax. Possible trace left pleural effusion. Bilateral shoulder replacements. No evidence of acute osseous abnormalities. Visualized upper abdomen is unremarkable.     Impression: Diffuse hazy opacities in the left mid to lower lung concerning for infection. Possible trace left pleural effusion. Electronically Signed: Rome AdenKacey  4/15/2023 1:13 PM EDT  Workstation ID: [id number]    CT Angiogram Chest    Result Date: 4/15/2023  CT ANGIOGRAM CHEST Date of Exam: 4/15/2023 2:52 PM EDT Indication: covid with hypoxemia. Comparison: CT chest 6/4/2020, same day chest radiograph Technique: CTA of the chest was performed after the uneventful intravenous administration of 75 mL Isovue-370. Reconstructed coronal and sagittal images were also obtained. In addition, a 3-D volume rendered image was created for interpretation. Automated exposure control and iterative reconstruction methods were used. Findings: Diagnostic quality: Contrast opacification of the pulmonary arterial circulation is adequate for assessment of pulmonary embolism.  Study is markedly limited by respiratory motion artifact. Pulmonary arteries: No evidence of pulmonary embolus to the proximal segmental arteries. Main pulmonary artery is enlarged measuring 3.2 cm. Heart and pericardium:No flattening of the interventricular septum. Coronary artery calcification is seen. No substantial pericardial effusion. Enlargement of the right ventricle. Vessels:Normal caliber aorta. Atherosclerotic calcification of the aorta. No evidence of acute aortic injury. Venous structures including the superior vena cava and inferior vena cava appear grossly patent. Mediastinum: Large hiatal hernia with intrathoracic stomach. There is fluid and debris within the lower esophagus. Few mildly prominent mediastinal lymph nodes, presumably reactive. No anterior mediastinal masses. Lower neck:No suspicious or enlarged supraclavicular or axillary lymphadenopathy. Limited evaluation due to streak from bilateral shoulder hardware. Pulmonary parenchyma: Emphysematous changes in the lungs. Markedly limited evaluation due to respiratory motion. Calcified granulomas. Intralobular septal thickening throughout. Groundglass and consolidative opacities throughout the majority of the left lung predominantly in the lingula and left lower lobe. Minimal dependent atelectasis. No obvious suspicious pulmonary lesions. Pleura: Trace left pleural effusion. No pneumothorax. Airways: Likely debris and mucus plugging in the left lower lobe airways. Chest wall and bones:No acute osseous abnormality. Degenerative changes of thoracic spine. Bilateral gynecomastia. Upper abdomen: A few hepatic hypodensities are too small to characterize. Upper abdomen is unremarkable.     Impression: Markedly limited examination due to respiratory motion. No evidence of pulmonary embolus through the proximal segmental arteries. There is diffuse groundglass and consolidative opacity throughout the left lung predominantly in the left lower lobe and  lingula. While infection is most likely, given the presence of a large hiatal hernia with fluid and debris in the distal esophagus, aspiration should also be considered. Enlarged main pulmonary artery and enlarged appearance of the right heart shadow is seen in the setting of pulmonary hypertension. No evidence of intraventricular septum flattening. Interlobular septal thickening seen throughout the lungs may represent a background of mild edema. Trace left pleural effusion. Electronically Signed: Rome Erazo  4/15/2023 3:35 PM EDT  Workstation ID: [id number]      Results for orders placed during the hospital encounter of 07/16/21    Doppler Arterial Multi Level Lower Extremity - Bilateral CAR    Interpretation Summary  · Normal right KAMRYN of 1.02.  · Left femoral-popliteal arteries noncompressible. There are biphasic and monophasic waveforms noted in the left lower extremity  · The left posterior tibial artery was compressible with mildly reduced left KAMRYN of 0.86      Results for orders placed during the hospital encounter of 07/16/21    Doppler Arterial Multi Level Lower Extremity - Bilateral CAR    Interpretation Summary  · Normal right KAMRYN of 1.02.  · Left femoral-popliteal arteries noncompressible. There are biphasic and monophasic waveforms noted in the left lower extremity  · The left posterior tibial artery was compressible with mildly reduced left KAMRYN of 0.86      Results for orders placed during the hospital encounter of 04/26/19    Adult Transthoracic Echo Complete W/ Cont if Necessary Per Protocol    Interpretation Summary  · Left ventricular systolic function is normal.  · Estimated EF appears to be in the range of 66 - 70%.      Plan for Follow-up of Pending Labs/Results:     Discharge Details        Discharge Medications      New Medications      Instructions Start Date   apixaban 5 MG tablet tablet  Commonly known as: ELIQUIS   5 mg, Oral, Every 12 Hours Scheduled      QUEtiapine 25 MG  tablet  Commonly known as: SEROquel   25 mg, Oral, Every Evening         Changes to Medications      Instructions Start Date   carbidopa-levodopa  MG per tablet  Commonly known as: SINEMET  What changed: Another medication with the same name was removed. Continue taking this medication, and follow the directions you see here.   1 tablet, Oral, 4 Times Daily      tamsulosin 0.4 MG capsule 24 hr capsule  Commonly known as: FLOMAX  What changed:   · how much to take  · when to take this   0.4 mg, Oral, Nightly         Continue These Medications      Instructions Start Date   amantadine 100 MG capsule  Commonly known as: SYMMETREL   100 mg, Oral, 2 Times Daily      aspirin 81 MG chewable tablet   81 mg, Oral, Daily   Start Date: April 26, 2023     atorvastatin 10 MG tablet  Commonly known as: LIPITOR   10 mg, Oral, Daily      clonazePAM 1 MG tablet  Commonly known as: KlonoPIN   1 mg, Oral, Nightly      DRY MOUTH SPRAY MT   2-3 sprays, Mouth/Throat, As Needed      fentaNYL 0.05 MG/ML injection  Commonly known as: SUBLIMAZE   250 mcg, Intravenous, PAIN PUMP      ipratropium-albuterol 0.5-2.5 mg/3 ml nebulizer  Commonly known as: DUO-NEB   3 mL, Nebulization, 3 Times Daily      metoprolol succinate XL 50 MG 24 hr tablet  Commonly known as: TOPROL-XL   50 mg, Oral, Daily      montelukast 10 MG tablet  Commonly known as: SINGULAIR   10 mg, Oral, Nightly      multivitamin with minerals tablet tablet   1 tablet, Oral, Daily      omeprazole 20 MG capsule  Commonly known as: priLOSEC   20 mg, Oral, Daily         Stop These Medications    cilostazol 100 MG tablet  Commonly known as: PLETAL            No Known Allergies      Discharge Disposition:      Diet:  Hospital:  Diet Order   Procedures   • Diet: Regular/House Diet; No Straw; Texture: Mechanical Ground (NDD 2); Fluid Consistency: Thin (IDDSI 0)       Activity:  Activity Instructions     Activity as Tolerated      Measure Blood Pressure      Measure Weight             Restrictions or Other Recommendations:         CODE STATUS:    Code Status and Medical Interventions:   Ordered at: 04/15/23 1433     Level Of Support Discussed With:    Patient     Code Status (Patient has no pulse and is not breathing):    CPR (Attempt to Resuscitate)     Medical Interventions (Patient has pulse or is breathing):    Full Support     Release to patient:    Routine Release       Future Appointments   Date Time Provider Department Center   5/26/2023 10:45 AM RAD TECH [facility]   5/26/2023 11:00 AM [facility], PFT LAB 3 [facility]   5/26/2023 11:30 AM Alf Edwards MD [facility]   1/30/2024 11:15 AM Von Kilpatrick MD [facility]       Additional Instructions for the Follow-ups that You Need to Schedule     Discharge Follow-up with PCP   As directed       Currently Documented PCP:    Azar Stern MD    PCP Phone Number:    [phone]     Follow Up Details: follow up with pcp after dc from rehab         Discharge Follow-up with Specified Provider: follow up with DR Kilpatrick 6 weeks   As directed      To: follow up with DR Kilpatrick 6 weeks                     YAMILET Ortiz  04/25/23      Time Spent on Discharge:  I spent  45  minutes on this discharge activity which included: face-to-face encounter with the patient, reviewing the data in the system, coordination of the care with the nursing staff as well as consultants, documentation, and entering orders.            Electronically signed by Alyson Rubio APRN at 04/25/23 6111
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
## 2023-05-05 LAB
QT INTERVAL: 398 MS
QTC INTERVAL: 447 MS

## 2023-05-11 DIAGNOSIS — J84.9 INTERSTITIAL LUNG DISEASE: Primary | ICD-10-CM

## 2023-07-14 PROBLEM — J96.01 ACUTE RESPIRATORY FAILURE WITH HYPOXIA: Status: RESOLVED | Noted: 2019-05-06 | Resolved: 2023-07-14

## 2023-07-14 PROBLEM — R91.8 ABNORMAL CT SCAN OF LUNG: Status: ACTIVE | Noted: 2019-12-20

## 2023-07-24 ENCOUNTER — TELEPHONE (OUTPATIENT)
Dept: CARDIOLOGY | Facility: CLINIC | Age: 78
End: 2023-07-24
Payer: MEDICARE

## 2023-07-24 NOTE — TELEPHONE ENCOUNTER
----- Message from Von Kilpatrick MD sent at 7/24/2023 11:19 AM EDT -----  Patient's cholesterol is well controlled.  No changes recommended at this time.

## 2023-08-07 RX ORDER — METOPROLOL SUCCINATE 50 MG/1
TABLET, EXTENDED RELEASE ORAL
Qty: 60 TABLET | Refills: 5 | Status: SHIPPED | OUTPATIENT
Start: 2023-08-07

## 2023-08-24 ENCOUNTER — HOSPITAL ENCOUNTER (OUTPATIENT)
Dept: CARDIOLOGY | Facility: HOSPITAL | Age: 78
Discharge: HOME | End: 2023-08-24
Payer: MEDICARE

## 2023-08-24 VITALS
HEIGHT: 68 IN | BODY MASS INDEX: 20.62 KG/M2 | WEIGHT: 136.02 LBS | DIASTOLIC BLOOD PRESSURE: 60 MMHG | HEART RATE: 134 BPM | SYSTOLIC BLOOD PRESSURE: 98 MMHG

## 2023-08-24 VITALS — BODY MASS INDEX: 20.61 KG/M2 | WEIGHT: 136 LBS | HEIGHT: 68 IN

## 2023-08-24 DIAGNOSIS — R06.09 DYSPNEA ON EXERTION: ICD-10-CM

## 2023-08-24 PROCEDURE — 0 TECHNETIUM SESTAMIBI: Performed by: INTERNAL MEDICINE

## 2023-08-24 PROCEDURE — 78452 HT MUSCLE IMAGE SPECT MULT: CPT

## 2023-08-24 PROCEDURE — 93017 CV STRESS TEST TRACING ONLY: CPT

## 2023-08-24 PROCEDURE — 93306 TTE W/DOPPLER COMPLETE: CPT

## 2023-08-24 PROCEDURE — A9500 TC99M SESTAMIBI: HCPCS | Performed by: INTERNAL MEDICINE

## 2023-08-24 PROCEDURE — 25010000002 REGADENOSON 0.4 MG/5ML SOLUTION: Performed by: INTERNAL MEDICINE

## 2023-08-24 RX ORDER — METOPROLOL TARTRATE 5 MG/5ML
3 INJECTION INTRAVENOUS ONCE
Status: COMPLETED | OUTPATIENT
Start: 2023-08-24 | End: 2023-08-24

## 2023-08-24 RX ORDER — REGADENOSON 0.08 MG/ML
0.4 INJECTION, SOLUTION INTRAVENOUS ONCE
Status: COMPLETED | OUTPATIENT
Start: 2023-08-24 | End: 2023-08-24

## 2023-08-24 RX ADMIN — METOPROLOL TARTRATE 3 MG: 5 INJECTION INTRAVENOUS at 10:00

## 2023-08-24 RX ADMIN — TECHNETIUM TC 99M SESTAMIBI 1 DOSE: 1 INJECTION INTRAVENOUS at 10:15

## 2023-08-24 RX ADMIN — TECHNETIUM TC 99M SESTAMIBI 1 DOSE: 1 INJECTION INTRAVENOUS at 07:55

## 2023-08-24 RX ADMIN — REGADENOSON 0.4 MG: 0.08 INJECTION, SOLUTION INTRAVENOUS at 10:10

## 2023-08-24 NOTE — NURSING NOTE
Pt. Arrived to day for his stress test. First scan was completed and he entered the stress room. Telemetry has him in rapid a fib. Orders received from Dr. Luque for fluid bolus and Iv Metoprolol. BP = 100/62, - a-fib with frequent PVC's. 3 mg given BP = 98/62, . Fluid bolus in progress. Recheck after 3 mg lopressor BP = 118/70 and HR is 100 bpm. Will proceed with the Lexiscan stress test.   
[phone]

## 2023-08-25 DIAGNOSIS — R94.39 ABNORMAL NUCLEAR STRESS TEST: Primary | ICD-10-CM

## 2023-08-25 LAB
BH CV ECHO MEAS - AO MAX PG: 6.6 MMHG
BH CV ECHO MEAS - AO MEAN PG: 4.3 MMHG
BH CV ECHO MEAS - AO ROOT DIAM: 3.6 CM
BH CV ECHO MEAS - AO V2 MAX: 128.5 CM/SEC
BH CV ECHO MEAS - AO V2 VTI: 20.1 CM
BH CV ECHO MEAS - AVA(I,D): 2.5 CM2
BH CV ECHO MEAS - EDV(CUBED): 85.5 ML
BH CV ECHO MEAS - EDV(MOD-SP2): 104 ML
BH CV ECHO MEAS - EDV(MOD-SP4): 67.8 ML
BH CV ECHO MEAS - EF(MOD-BP): 59.8 %
BH CV ECHO MEAS - EF(MOD-SP2): 59.6 %
BH CV ECHO MEAS - EF(MOD-SP4): 61.2 %
BH CV ECHO MEAS - ESV(CUBED): 17 ML
BH CV ECHO MEAS - ESV(MOD-SP2): 42 ML
BH CV ECHO MEAS - ESV(MOD-SP4): 26.3 ML
BH CV ECHO MEAS - FS: 41.6 %
BH CV ECHO MEAS - IVS/LVPW: 1.01 CM
BH CV ECHO MEAS - IVSD: 0.94 CM
BH CV ECHO MEAS - LA DIMENSION: 3.8 CM
BH CV ECHO MEAS - LAT PEAK E' VEL: 13.4 CM/SEC
BH CV ECHO MEAS - LV MASS(C)D: 134.2 GRAMS
BH CV ECHO MEAS - LV MAX PG: 3.9 MMHG
BH CV ECHO MEAS - LV MEAN PG: 1.99 MMHG
BH CV ECHO MEAS - LV V1 MAX: 98.5 CM/SEC
BH CV ECHO MEAS - LV V1 VTI: 16.1 CM
BH CV ECHO MEAS - LVIDD: 4.4 CM
BH CV ECHO MEAS - LVIDS: 2.6 CM
BH CV ECHO MEAS - LVOT AREA: 3.1 CM2
BH CV ECHO MEAS - LVOT DIAM: 2 CM
BH CV ECHO MEAS - LVPWD: 0.93 CM
BH CV ECHO MEAS - MED PEAK E' VEL: 4.4 CM/SEC
BH CV ECHO MEAS - MV DEC SLOPE: 652 CM/SEC2
BH CV ECHO MEAS - MV DEC TIME: 0.12 MSEC
BH CV ECHO MEAS - MV E MAX VEL: 79.6 CM/SEC
BH CV ECHO MEAS - MV MAX PG: 2.15 MMHG
BH CV ECHO MEAS - MV MEAN PG: 0.97 MMHG
BH CV ECHO MEAS - MV P1/2T: 34.7 MSEC
BH CV ECHO MEAS - MV V2 VTI: 10.6 CM
BH CV ECHO MEAS - MVA(P1/2T): 6.3 CM2
BH CV ECHO MEAS - MVA(VTI): 4.8 CM2
BH CV ECHO MEAS - PA ACC TIME: 0.12 SEC
BH CV ECHO MEAS - RAP SYSTOLE: 8 MMHG
BH CV ECHO MEAS - RVSP: 38 MMHG
BH CV ECHO MEAS - SV(LVOT): 50.5 ML
BH CV ECHO MEAS - SV(MOD-SP2): 62 ML
BH CV ECHO MEAS - SV(MOD-SP4): 41.5 ML
BH CV ECHO MEAS - TAPSE (>1.6): 2.01 CM
BH CV ECHO MEAS - TR MAX PG: 30.4 MMHG
BH CV ECHO MEAS - TR MAX VEL: 268.8 CM/SEC
BH CV ECHO MEASUREMENTS AVERAGE E/E' RATIO: 8.94
BH CV REST NUCLEAR ISOTOPE DOSE: 9.9 MCI
BH CV STRESS BP STAGE 2: NORMAL
BH CV STRESS BP STAGE 3: NORMAL
BH CV STRESS BP STAGE 4: NORMAL
BH CV STRESS COMMENTS STAGE 1: NORMAL
BH CV STRESS DOSE REGADENOSON STAGE 1: 0.4
BH CV STRESS DURATION MIN STAGE 2: 1
BH CV STRESS DURATION MIN STAGE 3: 1
BH CV STRESS DURATION MIN STAGE 4: 1
BH CV STRESS DURATION SEC STAGE 1: 10
BH CV STRESS HR STAGE 1: 100
BH CV STRESS HR STAGE 2: 105
BH CV STRESS HR STAGE 3: 113
BH CV STRESS HR STAGE 4: 108
BH CV STRESS NUCLEAR ISOTOPE DOSE: 33 MCI
BH CV STRESS O2 STAGE 1: 93
BH CV STRESS O2 STAGE 2: 96
BH CV STRESS O2 STAGE 3: 100
BH CV STRESS O2 STAGE 4: 98
BH CV STRESS PROTOCOL 1: NORMAL
BH CV STRESS RECOVERY BP: NORMAL MMHG
BH CV STRESS RECOVERY HR: 108 BPM
BH CV STRESS RECOVERY O2: 98 %
BH CV STRESS STAGE 1: 1
BH CV STRESS STAGE 2: 2
BH CV STRESS STAGE 3: 3
BH CV STRESS STAGE 4: 4
BH CV XLRA - RV BASE: 3.9 CM
BH CV XLRA - RV LENGTH: 5.6 CM
BH CV XLRA - RV MID: 2.8 CM
BH CV XLRA - TDI S': 35.1 CM/SEC
LEFT ATRIUM VOLUME INDEX: 40.8 ML/M2
LV EF NUC BP: 49 %
MAXIMAL PREDICTED HEART RATE: 143 BPM
PERCENT MAX PREDICTED HR: 85.31 %
STRESS BASELINE BP: NORMAL MMHG
STRESS BASELINE HR: 103 BPM
STRESS O2 SAT REST: 98 %
STRESS PERCENT HR: 100 %
STRESS POST EXERCISE DUR MIN: 4 MIN
STRESS POST EXERCISE DUR SEC: 0 SEC
STRESS POST O2 SAT PEAK: 98 %
STRESS POST PEAK BP: NORMAL MMHG
STRESS POST PEAK HR: 122 BPM
STRESS TARGET HR: 122 BPM

## 2023-08-25 NOTE — PROGRESS NOTES
Patient completed stress test and echocardiogram.  He was in rapid A-fib during both.  Continues to have dyspnea on exertion.  Stress test notable for a moderate size moderate intensity area of ischemia in the anterior wall and apex.  Discussed cardiac catheterization with the patient and risks/benefits.  He is agreeable to proceed.  We will need to hold Eliquis 2 days prior.  Scheduling will reach out to him.

## 2023-08-29 PROBLEM — R94.39 ABNORMAL NUCLEAR STRESS TEST: Status: ACTIVE | Noted: 2023-08-29

## 2023-09-01 ENCOUNTER — PREP FOR SURGERY (OUTPATIENT)
Dept: OTHER | Facility: HOSPITAL | Age: 78
End: 2023-09-01
Payer: MEDICARE

## 2023-09-01 RX ORDER — ASPIRIN 81 MG/1
81 TABLET ORAL DAILY
OUTPATIENT
Start: 2023-09-02

## 2023-09-01 RX ORDER — SODIUM CHLORIDE 9 MG/ML
40 INJECTION, SOLUTION INTRAVENOUS AS NEEDED
OUTPATIENT
Start: 2023-09-01

## 2023-09-01 RX ORDER — ACETAMINOPHEN 325 MG/1
650 TABLET ORAL EVERY 4 HOURS PRN
OUTPATIENT
Start: 2023-09-01

## 2023-09-01 RX ORDER — NITROGLYCERIN 0.4 MG/1
0.4 TABLET SUBLINGUAL
OUTPATIENT
Start: 2023-09-01

## 2023-09-01 RX ORDER — SODIUM CHLORIDE 0.9 % (FLUSH) 0.9 %
10 SYRINGE (ML) INJECTION AS NEEDED
OUTPATIENT
Start: 2023-09-01

## 2023-09-01 RX ORDER — SODIUM CHLORIDE 0.9 % (FLUSH) 0.9 %
10 SYRINGE (ML) INJECTION EVERY 12 HOURS SCHEDULED
OUTPATIENT
Start: 2023-09-01

## 2023-09-01 RX ORDER — ASPIRIN 81 MG/1
324 TABLET, CHEWABLE ORAL ONCE
OUTPATIENT
Start: 2023-09-01 | End: 2023-09-01

## 2023-09-05 ENCOUNTER — HOSPITAL ENCOUNTER (OUTPATIENT)
Facility: HOSPITAL | Age: 78
Setting detail: HOSPITAL OUTPATIENT SURGERY
Discharge: HOME | End: 2023-09-05
Attending: INTERNAL MEDICINE | Admitting: INTERNAL MEDICINE
Payer: MEDICARE

## 2023-09-05 VITALS
HEIGHT: 68 IN | HEART RATE: 62 BPM | RESPIRATION RATE: 20 BRPM | DIASTOLIC BLOOD PRESSURE: 71 MMHG | BODY MASS INDEX: 20.18 KG/M2 | OXYGEN SATURATION: 94 % | WEIGHT: 133.16 LBS | TEMPERATURE: 97 F | SYSTOLIC BLOOD PRESSURE: 136 MMHG

## 2023-09-05 DIAGNOSIS — R94.39 ABNORMAL NUCLEAR STRESS TEST: ICD-10-CM

## 2023-09-05 PROBLEM — I25.10 CORONARY ARTERY DISEASE INVOLVING NATIVE CORONARY ARTERY OF NATIVE HEART WITHOUT ANGINA PECTORIS: Status: ACTIVE | Noted: 2023-09-05

## 2023-09-05 LAB
ANION GAP SERPL CALCULATED.3IONS-SCNC: 10 MMOL/L (ref 5–15)
BUN SERPL-MCNC: 17 MG/DL (ref 8–23)
BUN/CREAT SERPL: 20.5 (ref 7–25)
CALCIUM SPEC-SCNC: 8.9 MG/DL (ref 8.6–10.5)
CHLORIDE SERPL-SCNC: 104 MMOL/L (ref 98–107)
CO2 SERPL-SCNC: 25 MMOL/L (ref 22–29)
CREAT SERPL-MCNC: 0.83 MG/DL (ref 0.76–1.27)
DEPRECATED RDW RBC AUTO: 52.6 FL (ref 37–54)
EGFRCR SERPLBLD CKD-EPI 2021: 90.1 ML/MIN/1.73
ERYTHROCYTE [DISTWIDTH] IN BLOOD BY AUTOMATED COUNT: 21.7 % (ref 12.3–15.4)
GLUCOSE SERPL-MCNC: 110 MG/DL (ref 65–99)
HCT VFR BLD AUTO: 37.8 % (ref 37.5–51)
HGB BLD-MCNC: 10.5 G/DL (ref 13–17.7)
MCH RBC QN AUTO: 19.7 PG (ref 26.6–33)
MCHC RBC AUTO-ENTMCNC: 27.8 G/DL (ref 31.5–35.7)
MCV RBC AUTO: 71.1 FL (ref 79–97)
PLATELET # BLD AUTO: 273 10*3/MM3 (ref 140–450)
POTASSIUM SERPL-SCNC: 4.2 MMOL/L (ref 3.5–5.2)
RBC # BLD AUTO: 5.32 10*6/MM3 (ref 4.14–5.8)
SODIUM SERPL-SCNC: 139 MMOL/L (ref 136–145)
WBC NRBC COR # BLD: 6.12 10*3/MM3 (ref 3.4–10.8)

## 2023-09-05 PROCEDURE — 25010000002 FENTANYL CITRATE (PF) 50 MCG/ML SOLUTION: Performed by: INTERNAL MEDICINE

## 2023-09-05 PROCEDURE — 25010000002 MIDAZOLAM PER 1 MG: Performed by: INTERNAL MEDICINE

## 2023-09-05 PROCEDURE — 25510000001 IOPAMIDOL PER 1 ML: Performed by: INTERNAL MEDICINE

## 2023-09-05 PROCEDURE — C1769 GUIDE WIRE: HCPCS | Performed by: INTERNAL MEDICINE

## 2023-09-05 PROCEDURE — C1894 INTRO/SHEATH, NON-LASER: HCPCS | Performed by: INTERNAL MEDICINE

## 2023-09-05 PROCEDURE — 93458 L HRT ARTERY/VENTRICLE ANGIO: CPT | Performed by: INTERNAL MEDICINE

## 2023-09-05 PROCEDURE — 25010000002 HEPARIN (PORCINE) PER 1000 UNITS: Performed by: INTERNAL MEDICINE

## 2023-09-05 PROCEDURE — C1760 CLOSURE DEV, VASC: HCPCS | Performed by: INTERNAL MEDICINE

## 2023-09-05 PROCEDURE — 85027 COMPLETE CBC AUTOMATED: CPT | Performed by: PHYSICIAN ASSISTANT

## 2023-09-05 PROCEDURE — 80048 BASIC METABOLIC PNL TOTAL CA: CPT | Performed by: PHYSICIAN ASSISTANT

## 2023-09-05 RX ORDER — ACETAMINOPHEN 325 MG/1
650 TABLET ORAL EVERY 4 HOURS PRN
Status: DISCONTINUED | OUTPATIENT
Start: 2023-09-05 | End: 2023-09-05 | Stop reason: HOSPADM

## 2023-09-05 RX ORDER — SODIUM CHLORIDE 9 MG/ML
INJECTION, SOLUTION INTRAVENOUS
Status: DISCONTINUED | OUTPATIENT
Start: 2023-09-05 | End: 2023-09-05 | Stop reason: HOSPADM

## 2023-09-05 RX ORDER — SODIUM CHLORIDE 0.9 % (FLUSH) 0.9 %
10 SYRINGE (ML) INJECTION AS NEEDED
Status: DISCONTINUED | OUTPATIENT
Start: 2023-09-05 | End: 2023-09-05 | Stop reason: HOSPADM

## 2023-09-05 RX ORDER — SODIUM CHLORIDE 9 MG/ML
1 INJECTION, SOLUTION INTRAVENOUS CONTINUOUS
Status: ACTIVE | OUTPATIENT
Start: 2023-09-05 | End: 2023-09-05

## 2023-09-05 RX ORDER — SODIUM CHLORIDE 9 MG/ML
40 INJECTION, SOLUTION INTRAVENOUS AS NEEDED
Status: DISCONTINUED | OUTPATIENT
Start: 2023-09-05 | End: 2023-09-05 | Stop reason: HOSPADM

## 2023-09-05 RX ORDER — NITROGLYCERIN 0.4 MG/1
0.4 TABLET SUBLINGUAL
Status: DISCONTINUED | OUTPATIENT
Start: 2023-09-05 | End: 2023-09-05 | Stop reason: HOSPADM

## 2023-09-05 RX ORDER — FENTANYL CITRATE 50 UG/ML
INJECTION, SOLUTION INTRAMUSCULAR; INTRAVENOUS
Status: DISCONTINUED | OUTPATIENT
Start: 2023-09-05 | End: 2023-09-05 | Stop reason: HOSPADM

## 2023-09-05 RX ORDER — LIDOCAINE HYDROCHLORIDE 10 MG/ML
INJECTION, SOLUTION EPIDURAL; INFILTRATION; INTRACAUDAL; PERINEURAL
Status: DISCONTINUED | OUTPATIENT
Start: 2023-09-05 | End: 2023-09-05 | Stop reason: HOSPADM

## 2023-09-05 RX ORDER — NICARDIPINE HCL-0.9% SOD CHLOR 1 MG/10 ML
SYRINGE (ML) INTRAVENOUS
Status: DISCONTINUED | OUTPATIENT
Start: 2023-09-05 | End: 2023-09-05 | Stop reason: HOSPADM

## 2023-09-05 RX ORDER — ASPIRIN 81 MG/1
81 TABLET ORAL DAILY
Status: DISCONTINUED | OUTPATIENT
Start: 2023-09-06 | End: 2023-09-05 | Stop reason: HOSPADM

## 2023-09-05 RX ORDER — HEPARIN SODIUM 1000 [USP'U]/ML
INJECTION, SOLUTION INTRAVENOUS; SUBCUTANEOUS
Status: DISCONTINUED | OUTPATIENT
Start: 2023-09-05 | End: 2023-09-05 | Stop reason: HOSPADM

## 2023-09-05 RX ORDER — ASPIRIN 81 MG/1
324 TABLET, CHEWABLE ORAL ONCE
Status: COMPLETED | OUTPATIENT
Start: 2023-09-05 | End: 2023-09-05

## 2023-09-05 RX ORDER — MIDAZOLAM HYDROCHLORIDE 1 MG/ML
INJECTION INTRAMUSCULAR; INTRAVENOUS
Status: DISCONTINUED | OUTPATIENT
Start: 2023-09-05 | End: 2023-09-05 | Stop reason: HOSPADM

## 2023-09-05 RX ORDER — SODIUM CHLORIDE 0.9 % (FLUSH) 0.9 %
10 SYRINGE (ML) INJECTION EVERY 12 HOURS SCHEDULED
Status: DISCONTINUED | OUTPATIENT
Start: 2023-09-05 | End: 2023-09-05 | Stop reason: HOSPADM

## 2023-09-05 RX ADMIN — SODIUM CHLORIDE 185.1 ML: 9 INJECTION, SOLUTION INTRAVENOUS at 09:56

## 2023-09-05 RX ADMIN — ASPIRIN 324 MG: 81 TABLET, CHEWABLE ORAL at 09:56

## 2023-09-05 NOTE — H&P
Pre-cardiac Catheterization History and Physical  [facility]      Patient:  Flaco Roque II  :  1945  MRN: [id number]    PCP:  Azar Stern MD  PHONE:  [phone]    DATE: 2023  ID: Flaco Roque II is a 77 y.o. male resident of [place], [place]     CC: Abnormal stress MPS    PROBLEM LIST:   Active Hospital Problems    Diagnosis  POA    **Abnormal nuclear stress test [R94.39]  Unknown     Added automatically from request for surgery [id number]      Chronic atrial fibrillation with rapid ventricular response [I48.20]  Yes    ABRIL (obstructive sleep apnea) [G47.33]  Yes     CPAP intolerant         BRIEF HPI: Mr. Roque is a 76 y/o male with PAF, COPD, Parkinson's disease, PAD, hiatal hernia and recent abnormal stress MPS who is referred for cardiac cath by Dr. Kilpatrick. He was evaluated in the office in July for PEARSON. Stress MPS was obtained 23 and he was noted to be in Afib with RVR. Stress test was abnormal with moderate sized ischemia in the anterior wall and apex. Echo with normal EF, aortic calcification, trivial pericardial effusion. He denies any chest pain. Last dose of Eliquis was  morning 9/3.     Cardiac Risk Factors: advanced age (older than 55 for men, 65 for women), dyslipidemia, male gender, and sedentary lifestyle    Allergies:      No Known Allergies    MEDICATIONS:  Current Outpatient Medications   Medication Instructions    amantadine (SYMMETREL) 100 mg, Oral, 2 Times Daily    apixaban (ELIQUIS) 5 mg, Oral, Every 12 Hours Scheduled    Artificial Saliva (DRY MOUTH SPRAY MT) 2-3 sprays, Mouth/Throat, As Needed    atorvastatin (LIPITOR) 10 mg, Oral, Daily    carbidopa-levodopa (SINEMET)  MG per tablet 1 tablet, Oral, 4 Times Daily    cilostazol (PLETAL) 100 mg, Oral, 2 Times Daily    clonazePAM (KLONOPIN) 1 mg, Oral, Nightly    fentaNYL (SUBLIMAZE) 250 mcg, Intravenous, PAIN PUMP    ipratropium-albuterol (DUO-NEB) 0.5-2.5  mg/3 ml nebulizer 3 mL, Nebulization, 3 Times Daily    metoprolol succinate XL (TOPROL-XL) 50 MG 24 hr tablet TAKE ONE TABLET BY MOUTH DAILY    montelukast (SINGULAIR) 10 mg, Oral, Nightly    Multiple Vitamins-Minerals (MULTIVITAMIN ADULT PO) 1 tablet, Oral, Daily    pantoprazole (PROTONIX) 40 MG EC tablet Take 1 tablet by mouth Daily.    QUEtiapine (SEROQUEL) 25 mg, Oral, Every Evening    tamsulosin (FLOMAX) 0.4 mg, Oral, Nightly     Past medical & surgical history, social and family history reviewed in the electronic medical record.    ROS: Pertinent positives listed in the HPI and problem list above. All others reviewed and negative.     Physical Exam:   There were no vitals taken for this visit.    Constitutional:    Alert, cooperative, in no acute distress   Neck:     No Jugular venous distention, adenopathy, or thyromegaly noted.    Heart:    Regular rhythm and normal rate, normal S1 and S2, no murmurs,gallops, rubs, or clicks. No distinct PMI noted.    Lungs:     Clear to auscultation bilaterally, respirations regular, even and unlabored    Extremities:   No gross deformities, no edema, clubbing, or cyanosis.      Barbaeu Test:  Left: Not Assessed  (oxymetric Allens) Right: Poor wave form due to cold fingers, R radial 2+     Labs and Diagnostic Data:          Lab Results   Component Value Date    AST 15 04/19/2023    ALT <5 04/19/2023                 Stress MPS 8/24/23:  Interpretation Summary         Patient reported shortness of breath    Patient in atrial fibrillation during the study.  Received IV metoprolol for rate control.  No ischemic EKG changes noted.    Myocardial perfusion imaging indicates a moderate-sized area of ischemia located in the anterior wall and apex.    CT portion of the exam shows aortic calcifications and a large hiatal hernia.    Left ventricular ejection fraction is borderline normal (Calculated EF = 49%).    Impression consistent with an intermediate risk study.    Echo  8/24/23:  Interpretation Summary         Left ventricular ejection fraction appears to be 56 - 60%.    The left atrial cavity is mild to moderately dilated    There is mild to moderate thickening of the aortic valve    Mild to moderate tricuspid valve regurgitation is present. Estimated right ventricular systolic pressure from tricuspid regurgitation is mildly elevated (35-45 mmHg).    There is a trivial pericardial effusion.    Patient noted to be in atrial fibrillation with elevated rates during the study.    Tele: SR with PVCs and PACs    IMPRESSION:  78 y/o male with PAF, dyslipidemia, Parkinson's disease, with recent PEARSON and abnormal stress MPS as noted above. Cardiac cath recommended and he presents in this regard.   Last dose of Eliquis was Sunday morning 9/3    PLAN:  Procedure to perform: LHC +/- CBI. Risks, benefits and alternatives to the procedure explained to the patient and he understands and wishes to proceed.       Electronically signed by Neha Garcia PA-C, 09/05/23, 10:12 AM EDT.  The risks, benefits, and alternative options of cardiac catheterization were discussed with the patient.  The risks include death, MI, stroke, infection, vascular injury requiring surgical repair and/or blood transfusion, coronary dissection, renal dysfunction/failure, allergic reaction, emergent CABG.  If PCI is needed there is a 1-2% risk of emergent CABG.  The patient is agreeable for cardiac catheterization, possible PCI or CABG. Plan is to proceed with cardiac catheterization and possible PCI.     Zack Mccann MD, [facility], Psychiatric  Interventional Cardiology  09/05/23  11:39 EDT

## 2023-09-11 ENCOUNTER — TELEPHONE (OUTPATIENT)
Dept: CARDIOLOGY | Facility: CLINIC | Age: 78
End: 2023-09-11
Payer: MEDICARE

## 2023-09-11 NOTE — TELEPHONE ENCOUNTER
Neurosurgery requesting cardiac clearance and instructions on blood thinners for patient to have a pain pump replacement.     Patient denies any cardiac issues.  Recent [facility] on 09/05/2023.    Currently taking:  Eliquis 5 mg BID  Cilostazol 100 mg BID    Please advice. Thank you!    Conclusion    Minor nonobstructive CAD, normal LAD, mid circumflex 30-40%, mid RCA 20-30%.    LV pressures (S/D/E) : 115/-8/8 mmHg

## 2023-09-11 NOTE — TELEPHONE ENCOUNTER
Caller: C[facility] ( NEUROSURGERY)    Relationship: Provider    Best call back number: [phone]    What form or medical record are you requesting: CARDIAC CLEARANCE     Who is requesting this form or medical record from you: DR. HERRERA AT  NEUROSURGERY     How would you like to receive the form or medical records (pick-up, mail, fax): FAX  If fax, what is the fax number: [phone] ATTENTION CECY       Timeframe paperwork needed: ASAP    Additional notes: DR. HERRERA OFFICE CALLED IN TO OBTAIN CARDIAC CLEARANCE FOR PATIENT TO HAVE HIS PAIN PUMP REPLACED, THEY NEED A LETTER STATING PATIENT IS SAFE TO GO TO THE OR FOR THE PROCEDURE, AND WHEN PATIENT IS TO STOP HIS ANTICOAGULANT AND WHEN HE CAN START THEM BACK.

## 2023-09-11 NOTE — TELEPHONE ENCOUNTER
Patient can hold the Eliquis on the cilostazol starting 48 hours prior to the procedure.  Would recommend he resume them when safe from neurosurgery standpoint post procedure.  With his recent heart catheterization showing no obstructive CAD I believe he can proceed at anticipated low cardiac risk

## 2023-09-27 PROBLEM — I48.21 PERMANENT ATRIAL FIBRILLATION: Status: ACTIVE | Noted: 2023-04-15

## 2023-09-27 NOTE — PROGRESS NOTES
Subjective:     Encounter Date:10/02/2023      Patient ID: Flaco Roque II is a 77 y.o.  white male from [place], [place].    PHYSICIAN: Azar Stern MD    Chief Complaint:   Chief Complaint   Patient presents with    Peripheral arterial disease    Shortness of Breath     Problem List:  Permanent atrial fibrillation  Echocardiogram May 2019: LVEF 66 to 70%, mild TR, aortic valve sclerosis without stenosis, normal RVSP  Holter monitor October 2020: 8.4% PVC burden, 11 NSVT episodes with the longest lasting 6 beats, occasional runs of SVT with the longest lasting 11 beats  Echocardiogram 8/25/2023: LVEF 56 to 60%, LA cavity mild to moderately dilated, mild to moderate thickening of the aortic valve, mild to moderate TR, RVSP 35-45 mmHg, patient in atrial fibrillation during study  CHADsVasc 3, anticoagulated with Eliquis  Coronary artery disease:  Regadenoson stress test December 2019: LVEF 70%, mild coronary artery calcification, normal myocardial perfusion study with no evidence of ischemia  Regadenoson stress test 8/24/2023: Myocardial perfusion imaging indicates a moderate-sized area of ischemia in the anterior wall and apex, CT portion of the exam shows aortic calcifications and a large hiatal hernia, EF 49%  Left heart catheterization 9/5/2023: Minor nonobstructive CAD, normal LAD, mid circumflex 30-40%, mid RCA 20-30%, LV pressures S/D/E 115/-8/8 mmHg  Peripheral arterial disease with chronic left foot ulcer (follows with Dr. Sawyer)  I&D of left foot April 2020  Left arterial duplex October 2020: Atherosclerotic plaque with biphasic waveforms in the common femoral, SFA, popliteal arteries.  Anterior tibial artery patent with biphasic flow, posterior tibial artery and peroneal artery are occluded with some mild reconstitution distally via collaterals, biphasic flow within the dorsalis pedis artery, left KAMRYN 0.56  KAMRYN July 2021: Normal right KAMRYN 1.02,Left femoral-popliteal arteries  noncompressible. There are biphasic and monophasic waveforms noted in the left lower extremity, left KAMRYN 0.86  COPD/emphysema, on home O2  GERD/hiatal hernia  Parkinson's  Left shoulder injury (hx replacement)  Obstructive sleep apnea  Former tobacco use with 84-pack-year history, quit in 2001  COVID-pneumonia April 2023  Urinary retention, has Pinto catheter October 2023  Upcoming pain pump replacement at [facility] November 2023      No Known Allergies    Current Outpatient Medications   Medication Instructions    amantadine (SYMMETREL) 100 mg, Oral, 2 Times Daily    apixaban (ELIQUIS) 5 mg, Oral, Every 12 Hours Scheduled    atorvastatin (LIPITOR) 10 mg, Oral, Daily    atropine 1 % ophthalmic solution 2 drops, Daily    carbidopa-levodopa (SINEMET)  MG per tablet 1 tablet, Oral, 4 Times Daily    cilostazol (PLETAL) 100 mg, Oral, 2 Times Daily    fentaNYL (SUBLIMAZE) 250 mcg, Intravenous, PAIN PUMP    ipratropium-albuterol (DUO-NEB) 0.5-2.5 mg/3 ml nebulizer 3 mL, Nebulization, 3 Times Daily    metoprolol succinate XL (TOPROL-XL) 50 MG 24 hr tablet TAKE ONE TABLET BY MOUTH DAILY    montelukast (SINGULAIR) 10 mg, Oral, Nightly    Multiple Vitamins-Minerals (MULTIVITAMIN ADULT PO) 1 tablet, Oral, Daily    pantoprazole (PROTONIX) 40 MG EC tablet Take 1 tablet by mouth Daily.    QUEtiapine (SEROQUEL) 25 mg, Oral, Every Evening    tamsulosin (FLOMAX) 0.4 mg, Oral, Nightly    Trelegy Ellipta 100-62.5-25 MCG/ACT inhaler 1 puff, Inhalation, Daily - RT         HISTORY OF PRESENT ILLNESS:  The patient is here for 3-month follow-up.  At his last appointment his aspirin was discontinued and he was continued on Eliquis and Pletal.  In the interim the patient had an abnormal stress test but heart catheterization September 2023 showed minor nonobstructive CAD.  The patient is supposed to have a pain pump replaced at [facility] soon and was recommended to hold Eliquis and Pletal 48 hours prior to procedure and resume postprocedure when  "neurosurgery allows.  The patient was seen in [facility] ED 9/30/2023 for urinary retention/issues with his Pinto catheter.  He is supposed to see his primary care physician regarding this today.  He denies any chest pain, shortness of breath, palpitations, dizziness, presyncope, or syncope.  His pain pump is supposed to be replaced in November 2023.  He says he normally is active and does a lot of walking for exercise.  Currently he has a Pinto which has hindered him from doing as much activity.      ROS   All other systems reviewed and otherwise negative.    Procedures       Objective:       Vitals:    10/02/23 0934 10/02/23 0938   BP: 108/64 116/70   BP Location: Left arm    Patient Position: Sitting    Pulse: 88 75   SpO2: 97% 100%   Weight: 60.8 kg (134 lb)    Height: 172.7 cm (68\")      Body mass index is 20.37 kg/m².    Constitutional:       Appearance: Healthy appearance. Not in distress.      Comments: In wheelchair, has Pinto   Neck:      Vascular: No JVR. JVD normal.   Pulmonary:      Effort: Pulmonary effort is normal.      Breath sounds: Examination of the right-lower field reveals rales. Examination of the left-lower field reveals rales. No wheezing. No rhonchi. Rales (faint) present.   Chest:      Chest wall: Not tender to palpatation.   Cardiovascular:      PMI at left midclavicular line. Normal rate. Irregularly irregular rhythm. Normal S1. Normal S2.       Murmurs: There is no murmur.      No gallop.  No click. No rub.   Pulses:     Intact distal pulses.   Edema:     Peripheral edema absent.   Abdominal:      General: Bowel sounds are normal.      Palpations: Abdomen is soft.      Tenderness: There is no abdominal tenderness.   Musculoskeletal: Normal range of motion.         General: No tenderness. Skin:     General: Skin is warm and dry.   Neurological:      General: No focal deficit present.      Mental Status: Alert and oriented to person, place and time.         Lab Review:   Lab Results   Component " Value Date    GLUCOSE 110 (H) 09/05/2023    BUN 17 09/05/2023    CREATININE 0.83 09/05/2023    EGFRIFNONA 132 04/17/2020    BCR 20.5 09/05/2023    CO2 25.0 09/05/2023    CALCIUM 8.9 09/05/2023    ALBUMIN 3.2 (L) 04/19/2023    AST 15 04/19/2023    ALT <5 04/19/2023       Lab Results   Component Value Date    WBC 6.12 09/05/2023    HGB 10.5 (L) 09/05/2023    HCT 37.8 09/05/2023    MCV 71.1 (L) 09/05/2023     09/05/2023       Lab Results   Component Value Date    HGBA1C 5.9 (H) 08/29/2023         Lab Results   Component Value Date    BNP 77.0 10/31/2017     Advance Care Planning   ACP discussion was held with the patient during this visit. Patient has an advance directive (not in EMR), copy requested.              Assessment:     Overall continued acceptable course with no new interim cardiopulmonary complaints with acceptable functional status on limited activity. We will defer additional diagnostic or therapeutic intervention from a cardiac perspective at this time.  The patient had a heart catheterization September 2023 that showed minor nonobstructive CAD.  The patient is supposed to have a pain pump replaced at [facility] November 2023 and was recommended to hold Eliquis and Pletal 48 hours prior to procedure and resume postprocedure when neurosurgery allows.  Patient considered mild risk.       Diagnosis Plan   1. Coronary artery disease involving native coronary artery of native heart without angina pectoris  No recurrent angina pectoris or CHF on current activity schedule; continue current treatment , The patient had a heart catheterization September 2023 that showed minor nonobstructive CAD.       2. Peripheral arterial disease  Follow-up with , continue current cardiac medications      3. Permanent atrial fibrillation  Stable, continue metoprolol, Eliquis             Plan:         Patient to continue current medications and close follow up with the above providers.  Tentative cardiology follow up in  March 2024 with Dr Kilpatrick or patient may return sooner PRN.    Electronically signed by Anu Brown, YAMILET, 10/02/23, 9:49 AM EDT.

## 2023-10-02 ENCOUNTER — OFFICE VISIT (OUTPATIENT)
Dept: CARDIOLOGY | Facility: CLINIC | Age: 78
End: 2023-10-02
Payer: MEDICARE

## 2023-10-02 VITALS
HEART RATE: 75 BPM | OXYGEN SATURATION: 100 % | WEIGHT: 134 LBS | HEIGHT: 68 IN | DIASTOLIC BLOOD PRESSURE: 70 MMHG | BODY MASS INDEX: 20.31 KG/M2 | SYSTOLIC BLOOD PRESSURE: 116 MMHG

## 2023-10-02 DIAGNOSIS — I73.9 PERIPHERAL ARTERIAL DISEASE: ICD-10-CM

## 2023-10-02 DIAGNOSIS — I48.21 PERMANENT ATRIAL FIBRILLATION: ICD-10-CM

## 2023-10-02 DIAGNOSIS — I25.10 CORONARY ARTERY DISEASE INVOLVING NATIVE CORONARY ARTERY OF NATIVE HEART WITHOUT ANGINA PECTORIS: Primary | ICD-10-CM

## 2023-10-02 PROCEDURE — 1160F RVW MEDS BY RX/DR IN RCRD: CPT | Performed by: NURSE PRACTITIONER

## 2023-10-02 PROCEDURE — 1159F MED LIST DOCD IN RCRD: CPT | Performed by: NURSE PRACTITIONER

## 2023-10-02 PROCEDURE — 99214 OFFICE O/P EST MOD 30 MIN: CPT | Performed by: NURSE PRACTITIONER

## 2023-10-02 RX ORDER — ATROPINE SULFATE 10 MG/ML
2 SOLUTION/ DROPS OPHTHALMIC DAILY
COMMUNITY

## 2023-10-02 RX ORDER — FLUTICASONE FUROATE, UMECLIDINIUM BROMIDE AND VILANTEROL TRIFENATATE 100; 62.5; 25 UG/1; UG/1; UG/1
1 POWDER RESPIRATORY (INHALATION)
COMMUNITY
Start: 2023-09-18

## 2023-10-09 NOTE — PLAN OF CARE
Problem: Patient Care Overview  Goal: Plan of Care Review  Outcome: Outcome(s) achieved Date Met: 09/11/18 09/11/18 7326   Coping/Psychosocial   Plan of Care Reviewed With patient   OTHER   Outcome Summary Pt s/p L reverse TSA and now with good overall pain control. Pt and wife with appropriate teach-back and understanding of HEP ROM precautions and protocol, shoulder safety, ADL retraining, nile dressing techniques, nerve cath safety and management, home safety and axilla care. Plan for pt to d/c home this p.m. with family assist.           DISPLAY PLAN FREE TEXT

## 2023-10-20 ENCOUNTER — APPOINTMENT (OUTPATIENT)
Dept: GENERAL RADIOLOGY | Facility: HOSPITAL | Age: 78
End: 2023-10-20
Payer: MEDICARE

## 2023-10-20 ENCOUNTER — HOSPITAL ENCOUNTER (INPATIENT)
Facility: HOSPITAL | Age: 78
LOS: 6 days | Discharge: HOME | End: 2023-10-26
Attending: EMERGENCY MEDICINE | Admitting: INTERNAL MEDICINE
Payer: MEDICARE

## 2023-10-20 ENCOUNTER — APPOINTMENT (OUTPATIENT)
Dept: CT IMAGING | Facility: HOSPITAL | Age: 78
End: 2023-10-20
Payer: MEDICARE

## 2023-10-20 DIAGNOSIS — J18.9 PNEUMONIA OF RIGHT LOWER LOBE DUE TO INFECTIOUS ORGANISM: Primary | ICD-10-CM

## 2023-10-20 DIAGNOSIS — J18.9 PNEUMONIA OF BOTH LOWER LOBES DUE TO INFECTIOUS ORGANISM: ICD-10-CM

## 2023-10-20 DIAGNOSIS — U07.1 COVID-19: ICD-10-CM

## 2023-10-20 DIAGNOSIS — J96.01 ACUTE HYPOXEMIC RESPIRATORY FAILURE: ICD-10-CM

## 2023-10-20 DIAGNOSIS — I48.21 PERMANENT ATRIAL FIBRILLATION: ICD-10-CM

## 2023-10-20 DIAGNOSIS — I25.10 CORONARY ARTERY DISEASE INVOLVING NATIVE CORONARY ARTERY OF NATIVE HEART WITHOUT ANGINA PECTORIS: ICD-10-CM

## 2023-10-20 DIAGNOSIS — G47.33 OSA (OBSTRUCTIVE SLEEP APNEA): ICD-10-CM

## 2023-10-20 PROBLEM — A41.9 SEPSIS: Status: ACTIVE | Noted: 2023-10-20

## 2023-10-20 LAB
ALBUMIN SERPL-MCNC: 3.5 G/DL (ref 3.5–5.2)
ALBUMIN/GLOB SERPL: 1.4 G/DL
ALP SERPL-CCNC: 56 U/L (ref 39–117)
ALT SERPL W P-5'-P-CCNC: <5 U/L (ref 1–41)
ANION GAP SERPL CALCULATED.3IONS-SCNC: 10 MMOL/L (ref 5–15)
ANISOCYTOSIS BLD QL: NORMAL
AST SERPL-CCNC: 11 U/L (ref 1–40)
BASOPHILS # BLD AUTO: 0.03 10*3/MM3 (ref 0–0.2)
BASOPHILS NFR BLD AUTO: 0.2 % (ref 0–1.5)
BILIRUB SERPL-MCNC: 0.7 MG/DL (ref 0–1.2)
BUN SERPL-MCNC: 14 MG/DL (ref 8–23)
BUN/CREAT SERPL: 17.9 (ref 7–25)
BURR CELLS BLD QL SMEAR: NORMAL
CALCIUM SPEC-SCNC: 8.4 MG/DL (ref 8.6–10.5)
CHLORIDE SERPL-SCNC: 106 MMOL/L (ref 98–107)
CO2 SERPL-SCNC: 24 MMOL/L (ref 22–29)
CREAT SERPL-MCNC: 0.78 MG/DL (ref 0.76–1.27)
D-LACTATE SERPL-SCNC: 1.2 MMOL/L (ref 0.5–2)
DEPRECATED RDW RBC AUTO: 51.5 FL (ref 37–54)
EGFRCR SERPLBLD CKD-EPI 2021: 91.9 ML/MIN/1.73
EOSINOPHIL # BLD AUTO: 0 10*3/MM3 (ref 0–0.4)
EOSINOPHIL NFR BLD AUTO: 0 % (ref 0.3–6.2)
ERYTHROCYTE [DISTWIDTH] IN BLOOD BY AUTOMATED COUNT: 22.1 % (ref 12.3–15.4)
FERRITIN SERPL-MCNC: 72.57 NG/ML (ref 30–400)
FLUAV RNA RESP QL NAA+PROBE: NOT DETECTED
FLUBV RNA RESP QL NAA+PROBE: NOT DETECTED
GEN 5 2HR TROPONIN T REFLEX: 30 NG/L
GLOBULIN UR ELPH-MCNC: 2.5 GM/DL
GLUCOSE SERPL-MCNC: 133 MG/DL (ref 65–99)
HCT VFR BLD AUTO: 29.8 % (ref 37.5–51)
HGB BLD-MCNC: 9 G/DL (ref 13–17.7)
HOLD SPECIMEN: NORMAL
HYPOCHROMIA BLD QL: NORMAL
IMM GRANULOCYTES # BLD AUTO: 0.05 10*3/MM3 (ref 0–0.05)
IMM GRANULOCYTES NFR BLD AUTO: 0.3 % (ref 0–0.5)
IRON 24H UR-MRATE: 16 MCG/DL (ref 59–158)
IRON SATN MFR SERPL: 4 % (ref 20–50)
LIPASE SERPL-CCNC: 17 U/L (ref 13–60)
LYMPHOCYTES # BLD AUTO: 0.17 10*3/MM3 (ref 0.7–3.1)
LYMPHOCYTES NFR BLD AUTO: 1.1 % (ref 19.6–45.3)
MCH RBC QN AUTO: 21 PG (ref 26.6–33)
MCHC RBC AUTO-ENTMCNC: 30.2 G/DL (ref 31.5–35.7)
MCV RBC AUTO: 69.6 FL (ref 79–97)
MICROCYTES BLD QL: NORMAL
MONOCYTES # BLD AUTO: 0.73 10*3/MM3 (ref 0.1–0.9)
MONOCYTES NFR BLD AUTO: 4.7 % (ref 5–12)
NEUTROPHILS NFR BLD AUTO: 14.4 10*3/MM3 (ref 1.7–7)
NEUTROPHILS NFR BLD AUTO: 93.7 % (ref 42.7–76)
NRBC BLD AUTO-RTO: 0 /100 WBC (ref 0–0.2)
NT-PROBNP SERPL-MCNC: 407.1 PG/ML (ref 0–1800)
OVALOCYTES BLD QL SMEAR: NORMAL
PLATELET # BLD AUTO: 296 10*3/MM3 (ref 140–450)
PMV BLD AUTO: 10.5 FL (ref 6–12)
POTASSIUM SERPL-SCNC: 3.2 MMOL/L (ref 3.5–5.2)
PROCALCITONIN SERPL-MCNC: 0.77 NG/ML (ref 0–0.25)
PROT SERPL-MCNC: 6 G/DL (ref 6–8.5)
RBC # BLD AUTO: 4.28 10*6/MM3 (ref 4.14–5.8)
RETICS # AUTO: 0.05 10*6/MM3 (ref 0.02–0.13)
RETICS/RBC NFR AUTO: 1.19 % (ref 0.7–1.9)
SARS-COV-2 RNA RESP QL NAA+PROBE: NOT DETECTED
SMALL PLATELETS BLD QL SMEAR: ADEQUATE
SODIUM SERPL-SCNC: 140 MMOL/L (ref 136–145)
SPHEROCYTES BLD QL SMEAR: NORMAL
TIBC SERPL-MCNC: 399 MCG/DL (ref 298–536)
TRANSFERRIN SERPL-MCNC: 268 MG/DL (ref 200–360)
TROPONIN T DELTA: 3 NG/L
TROPONIN T SERPL HS-MCNC: 27 NG/L
WBC MORPH BLD: NORMAL
WBC NRBC COR # BLD: 15.38 10*3/MM3 (ref 3.4–10.8)
WHOLE BLOOD HOLD COAG: NORMAL
WHOLE BLOOD HOLD SPECIMEN: NORMAL

## 2023-10-20 PROCEDURE — 80053 COMPREHEN METABOLIC PANEL: CPT | Performed by: EMERGENCY MEDICINE

## 2023-10-20 PROCEDURE — 82746 ASSAY OF FOLIC ACID SERUM: CPT | Performed by: NURSE PRACTITIONER

## 2023-10-20 PROCEDURE — 25010000002 CEFTRIAXONE PER 250 MG: Performed by: EMERGENCY MEDICINE

## 2023-10-20 PROCEDURE — 84484 ASSAY OF TROPONIN QUANT: CPT | Performed by: EMERGENCY MEDICINE

## 2023-10-20 PROCEDURE — 84145 PROCALCITONIN (PCT): CPT | Performed by: EMERGENCY MEDICINE

## 2023-10-20 PROCEDURE — 83540 ASSAY OF IRON: CPT | Performed by: NURSE PRACTITIONER

## 2023-10-20 PROCEDURE — 82607 VITAMIN B-12: CPT | Performed by: NURSE PRACTITIONER

## 2023-10-20 PROCEDURE — 25010000002 MORPHINE PER 10 MG: Performed by: EMERGENCY MEDICINE

## 2023-10-20 PROCEDURE — 25810000003 LACTATED RINGERS PER 1000 ML: Performed by: NURSE PRACTITIONER

## 2023-10-20 PROCEDURE — 85007 BL SMEAR W/DIFF WBC COUNT: CPT | Performed by: EMERGENCY MEDICINE

## 2023-10-20 PROCEDURE — 71275 CT ANGIOGRAPHY CHEST: CPT

## 2023-10-20 PROCEDURE — 82728 ASSAY OF FERRITIN: CPT | Performed by: NURSE PRACTITIONER

## 2023-10-20 PROCEDURE — 85045 AUTOMATED RETICULOCYTE COUNT: CPT | Performed by: NURSE PRACTITIONER

## 2023-10-20 PROCEDURE — 87040 BLOOD CULTURE FOR BACTERIA: CPT | Performed by: EMERGENCY MEDICINE

## 2023-10-20 PROCEDURE — 85025 COMPLETE CBC W/AUTO DIFF WBC: CPT | Performed by: EMERGENCY MEDICINE

## 2023-10-20 PROCEDURE — 71045 X-RAY EXAM CHEST 1 VIEW: CPT

## 2023-10-20 PROCEDURE — 87636 SARSCOV2 & INF A&B AMP PRB: CPT | Performed by: EMERGENCY MEDICINE

## 2023-10-20 PROCEDURE — 83690 ASSAY OF LIPASE: CPT | Performed by: EMERGENCY MEDICINE

## 2023-10-20 PROCEDURE — 25810000003 SODIUM CHLORIDE 0.9 % SOLUTION: Performed by: EMERGENCY MEDICINE

## 2023-10-20 PROCEDURE — 99285 EMERGENCY DEPT VISIT HI MDM: CPT

## 2023-10-20 PROCEDURE — 25510000001 IOPAMIDOL PER 1 ML: Performed by: FAMILY MEDICINE

## 2023-10-20 PROCEDURE — 93005 ELECTROCARDIOGRAM TRACING: CPT | Performed by: EMERGENCY MEDICINE

## 2023-10-20 PROCEDURE — 83880 ASSAY OF NATRIURETIC PEPTIDE: CPT | Performed by: EMERGENCY MEDICINE

## 2023-10-20 PROCEDURE — 84466 ASSAY OF TRANSFERRIN: CPT | Performed by: NURSE PRACTITIONER

## 2023-10-20 PROCEDURE — 83605 ASSAY OF LACTIC ACID: CPT | Performed by: EMERGENCY MEDICINE

## 2023-10-20 RX ORDER — MULTIPLE VITAMINS W/ MINERALS TAB 9MG-400MCG
1 TAB ORAL DAILY
Status: DISCONTINUED | OUTPATIENT
Start: 2023-10-21 | End: 2023-10-26 | Stop reason: HOSPADM

## 2023-10-20 RX ORDER — BISACODYL 10 MG
10 SUPPOSITORY, RECTAL RECTAL DAILY PRN
Status: DISCONTINUED | OUTPATIENT
Start: 2023-10-20 | End: 2023-10-26 | Stop reason: HOSPADM

## 2023-10-20 RX ORDER — ATORVASTATIN CALCIUM 10 MG/1
10 TABLET, FILM COATED ORAL DAILY
Status: DISCONTINUED | OUTPATIENT
Start: 2023-10-21 | End: 2023-10-26 | Stop reason: HOSPADM

## 2023-10-20 RX ORDER — POTASSIUM CHLORIDE 20 MEQ/1
40 TABLET, EXTENDED RELEASE ORAL EVERY 4 HOURS
Qty: 4 TABLET | Refills: 0 | Status: COMPLETED | OUTPATIENT
Start: 2023-10-20 | End: 2023-10-21

## 2023-10-20 RX ORDER — MORPHINE SULFATE 4 MG/ML
4 INJECTION, SOLUTION INTRAMUSCULAR; INTRAVENOUS ONCE
Status: COMPLETED | OUTPATIENT
Start: 2023-10-20 | End: 2023-10-20

## 2023-10-20 RX ORDER — TAMSULOSIN HYDROCHLORIDE 0.4 MG/1
0.4 CAPSULE ORAL NIGHTLY
Status: DISCONTINUED | OUTPATIENT
Start: 2023-10-20 | End: 2023-10-26 | Stop reason: HOSPADM

## 2023-10-20 RX ORDER — POLYETHYLENE GLYCOL 3350 17 G/17G
17 POWDER, FOR SOLUTION ORAL DAILY PRN
Status: DISCONTINUED | OUTPATIENT
Start: 2023-10-20 | End: 2023-10-26 | Stop reason: HOSPADM

## 2023-10-20 RX ORDER — AMANTADINE HYDROCHLORIDE 100 MG/1
100 CAPSULE, GELATIN COATED ORAL 2 TIMES DAILY
Status: DISCONTINUED | OUTPATIENT
Start: 2023-10-20 | End: 2023-10-26 | Stop reason: HOSPADM

## 2023-10-20 RX ORDER — METOPROLOL SUCCINATE 50 MG/1
50 TABLET, EXTENDED RELEASE ORAL DAILY
Status: DISCONTINUED | OUTPATIENT
Start: 2023-10-21 | End: 2023-10-24

## 2023-10-20 RX ORDER — CILOSTAZOL 50 MG/1
100 TABLET ORAL 2 TIMES DAILY
Status: DISCONTINUED | OUTPATIENT
Start: 2023-10-20 | End: 2023-10-26 | Stop reason: HOSPADM

## 2023-10-20 RX ORDER — PANTOPRAZOLE SODIUM 40 MG/1
40 TABLET, DELAYED RELEASE ORAL DAILY
Status: DISCONTINUED | OUTPATIENT
Start: 2023-10-21 | End: 2023-10-26 | Stop reason: HOSPADM

## 2023-10-20 RX ORDER — ATROPINE SULFATE 10 MG/ML
2 SOLUTION/ DROPS OPHTHALMIC DAILY
Status: DISCONTINUED | OUTPATIENT
Start: 2023-10-21 | End: 2023-10-24

## 2023-10-20 RX ORDER — SODIUM CHLORIDE 0.9 % (FLUSH) 0.9 %
10 SYRINGE (ML) INJECTION AS NEEDED
Status: DISCONTINUED | OUTPATIENT
Start: 2023-10-20 | End: 2023-10-26 | Stop reason: HOSPADM

## 2023-10-20 RX ORDER — MONTELUKAST SODIUM 10 MG/1
10 TABLET ORAL NIGHTLY
Status: DISCONTINUED | OUTPATIENT
Start: 2023-10-20 | End: 2023-10-26 | Stop reason: HOSPADM

## 2023-10-20 RX ORDER — BISACODYL 5 MG/1
5 TABLET, DELAYED RELEASE ORAL DAILY PRN
Status: DISCONTINUED | OUTPATIENT
Start: 2023-10-20 | End: 2023-10-26 | Stop reason: HOSPADM

## 2023-10-20 RX ORDER — NITROGLYCERIN 0.4 MG/1
0.4 TABLET SUBLINGUAL
Status: DISCONTINUED | OUTPATIENT
Start: 2023-10-20 | End: 2023-10-26 | Stop reason: HOSPADM

## 2023-10-20 RX ORDER — AMOXICILLIN 250 MG
2 CAPSULE ORAL 2 TIMES DAILY
Status: DISCONTINUED | OUTPATIENT
Start: 2023-10-21 | End: 2023-10-26 | Stop reason: HOSPADM

## 2023-10-20 RX ORDER — IPRATROPIUM BROMIDE AND ALBUTEROL SULFATE 2.5; .5 MG/3ML; MG/3ML
3 SOLUTION RESPIRATORY (INHALATION)
Status: DISCONTINUED | OUTPATIENT
Start: 2023-10-20 | End: 2023-10-26 | Stop reason: HOSPADM

## 2023-10-20 RX ORDER — BUDESONIDE AND FORMOTEROL FUMARATE DIHYDRATE 160; 4.5 UG/1; UG/1
2 AEROSOL RESPIRATORY (INHALATION)
Status: DISCONTINUED | OUTPATIENT
Start: 2023-10-20 | End: 2023-10-26 | Stop reason: HOSPADM

## 2023-10-20 RX ORDER — QUETIAPINE FUMARATE 25 MG/1
25 TABLET, FILM COATED ORAL NIGHTLY
Status: DISCONTINUED | OUTPATIENT
Start: 2023-10-20 | End: 2023-10-26 | Stop reason: HOSPADM

## 2023-10-20 RX ORDER — SODIUM CHLORIDE, SODIUM LACTATE, POTASSIUM CHLORIDE, CALCIUM CHLORIDE 600; 310; 30; 20 MG/100ML; MG/100ML; MG/100ML; MG/100ML
75 INJECTION, SOLUTION INTRAVENOUS CONTINUOUS
Status: ACTIVE | OUTPATIENT
Start: 2023-10-20 | End: 2023-10-21

## 2023-10-20 RX ADMIN — AMANTADINE HYDROCHLORIDE 100 MG: 100 CAPSULE ORAL at 21:48

## 2023-10-20 RX ADMIN — POTASSIUM CHLORIDE 40 MEQ: 1500 TABLET, EXTENDED RELEASE ORAL at 21:48

## 2023-10-20 RX ADMIN — CARBIDOPA AND LEVODOPA 1 TABLET: 25; 100 TABLET ORAL at 21:48

## 2023-10-20 RX ADMIN — SODIUM CHLORIDE 1851 ML: 9 INJECTION, SOLUTION INTRAVENOUS at 18:07

## 2023-10-20 RX ADMIN — QUETIAPINE FUMARATE 25 MG: 25 TABLET ORAL at 21:47

## 2023-10-20 RX ADMIN — CILOSTAZOL 100 MG: 50 TABLET ORAL at 21:48

## 2023-10-20 RX ADMIN — TAMSULOSIN HYDROCHLORIDE 0.4 MG: 0.4 CAPSULE ORAL at 21:47

## 2023-10-20 RX ADMIN — MONTELUKAST 10 MG: 10 TABLET, FILM COATED ORAL at 21:48

## 2023-10-20 RX ADMIN — DOXYCYCLINE 100 MG: 100 INJECTION, POWDER, LYOPHILIZED, FOR SOLUTION INTRAVENOUS at 17:51

## 2023-10-20 RX ADMIN — SODIUM CHLORIDE, POTASSIUM CHLORIDE, SODIUM LACTATE AND CALCIUM CHLORIDE 75 ML/HR: 600; 310; 30; 20 INJECTION, SOLUTION INTRAVENOUS at 22:06

## 2023-10-20 RX ADMIN — MORPHINE SULFATE 4 MG: 4 INJECTION, SOLUTION INTRAMUSCULAR; INTRAVENOUS at 17:42

## 2023-10-20 RX ADMIN — SODIUM CHLORIDE 1000 MG: 900 INJECTION INTRAVENOUS at 18:50

## 2023-10-20 RX ADMIN — APIXABAN 5 MG: 5 TABLET, FILM COATED ORAL at 21:47

## 2023-10-20 RX ADMIN — IOPAMIDOL 90 ML: 755 INJECTION, SOLUTION INTRAVENOUS at 21:04
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
## 2023-10-20 NOTE — Clinical Note
Level of Care: Telemetry [5]   Diagnosis: Sepsis [[id number]]   Admitting Physician: JERROD SEAMAN [[id number]]   Attending Physician: JERROD SEAMAN [[id number]]

## 2023-10-21 LAB
ALBUMIN SERPL-MCNC: 3 G/DL (ref 3.5–5.2)
ALBUMIN/GLOB SERPL: 1.2 G/DL
ALP SERPL-CCNC: 61 U/L (ref 39–117)
ALT SERPL W P-5'-P-CCNC: <5 U/L (ref 1–41)
ANION GAP SERPL CALCULATED.3IONS-SCNC: 6 MMOL/L (ref 5–15)
AST SERPL-CCNC: 13 U/L (ref 1–40)
BASOPHILS # BLD AUTO: 0.05 10*3/MM3 (ref 0–0.2)
BASOPHILS NFR BLD AUTO: 0.3 % (ref 0–1.5)
BILIRUB SERPL-MCNC: 0.9 MG/DL (ref 0–1.2)
BUN SERPL-MCNC: 9 MG/DL (ref 8–23)
BUN/CREAT SERPL: 14.8 (ref 7–25)
CA-I SERPL ISE-MCNC: 1.23 MMOL/L (ref 1.12–1.32)
CALCIUM SPEC-SCNC: 7.9 MG/DL (ref 8.6–10.5)
CHLORIDE SERPL-SCNC: 109 MMOL/L (ref 98–107)
CO2 SERPL-SCNC: 23 MMOL/L (ref 22–29)
CREAT SERPL-MCNC: 0.61 MG/DL (ref 0.76–1.27)
DEPRECATED RDW RBC AUTO: 51.2 FL (ref 37–54)
EGFRCR SERPLBLD CKD-EPI 2021: 98.9 ML/MIN/1.73
EOSINOPHIL # BLD AUTO: 0.07 10*3/MM3 (ref 0–0.4)
EOSINOPHIL NFR BLD AUTO: 0.5 % (ref 0.3–6.2)
ERYTHROCYTE [DISTWIDTH] IN BLOOD BY AUTOMATED COUNT: 21.2 % (ref 12.3–15.4)
FOLATE SERPL-MCNC: 11.2 NG/ML (ref 4.78–24.2)
GLOBULIN UR ELPH-MCNC: 2.5 GM/DL
GLUCOSE SERPL-MCNC: 99 MG/DL (ref 65–99)
HCT VFR BLD AUTO: 27.2 % (ref 37.5–51)
HCT VFR BLD AUTO: 29 % (ref 37.5–51)
HGB BLD-MCNC: 8 G/DL (ref 13–17.7)
HGB BLD-MCNC: 8.5 G/DL (ref 13–17.7)
IMM GRANULOCYTES # BLD AUTO: 0.05 10*3/MM3 (ref 0–0.05)
IMM GRANULOCYTES NFR BLD AUTO: 0.3 % (ref 0–0.5)
L PNEUMO1 AG UR QL IA: NEGATIVE
LYMPHOCYTES # BLD AUTO: 1 10*3/MM3 (ref 0.7–3.1)
LYMPHOCYTES NFR BLD AUTO: 6.5 % (ref 19.6–45.3)
MAGNESIUM SERPL-MCNC: 1.5 MG/DL (ref 1.6–2.4)
MCH RBC QN AUTO: 20.1 PG (ref 26.6–33)
MCHC RBC AUTO-ENTMCNC: 29.3 G/DL (ref 31.5–35.7)
MCV RBC AUTO: 68.6 FL (ref 79–97)
MONOCYTES # BLD AUTO: 0.86 10*3/MM3 (ref 0.1–0.9)
MONOCYTES NFR BLD AUTO: 5.6 % (ref 5–12)
MRSA DNA SPEC QL NAA+PROBE: NEGATIVE
NEUTROPHILS NFR BLD AUTO: 13.4 10*3/MM3 (ref 1.7–7)
NEUTROPHILS NFR BLD AUTO: 86.8 % (ref 42.7–76)
NRBC BLD AUTO-RTO: 0 /100 WBC (ref 0–0.2)
PLATELET # BLD AUTO: 315 10*3/MM3 (ref 140–450)
PMV BLD AUTO: 10.7 FL (ref 6–12)
POTASSIUM SERPL-SCNC: 4.1 MMOL/L (ref 3.5–5.2)
PROT SERPL-MCNC: 5.5 G/DL (ref 6–8.5)
RBC # BLD AUTO: 4.23 10*6/MM3 (ref 4.14–5.8)
S PNEUM AG SPEC QL LA: NEGATIVE
SODIUM SERPL-SCNC: 138 MMOL/L (ref 136–145)
VIT B12 BLD-MCNC: 237 PG/ML (ref 211–946)
WBC NRBC COR # BLD: 15.43 10*3/MM3 (ref 3.4–10.8)

## 2023-10-21 PROCEDURE — 85018 HEMOGLOBIN: CPT | Performed by: NURSE PRACTITIONER

## 2023-10-21 PROCEDURE — 94799 UNLISTED PULMONARY SVC/PX: CPT

## 2023-10-21 PROCEDURE — 87449 NOS EACH ORGANISM AG IA: CPT | Performed by: NURSE PRACTITIONER

## 2023-10-21 PROCEDURE — 93005 ELECTROCARDIOGRAM TRACING: CPT | Performed by: INTERNAL MEDICINE

## 2023-10-21 PROCEDURE — 97166 OT EVAL MOD COMPLEX 45 MIN: CPT

## 2023-10-21 PROCEDURE — 87899 AGENT NOS ASSAY W/OPTIC: CPT | Performed by: NURSE PRACTITIONER

## 2023-10-21 PROCEDURE — 25010000002 CEFTRIAXONE PER 250 MG: Performed by: NURSE PRACTITIONER

## 2023-10-21 PROCEDURE — 83735 ASSAY OF MAGNESIUM: CPT | Performed by: INTERNAL MEDICINE

## 2023-10-21 PROCEDURE — 93010 ELECTROCARDIOGRAM REPORT: CPT | Performed by: INTERNAL MEDICINE

## 2023-10-21 PROCEDURE — 25810000003 SODIUM CHLORIDE 0.9 % SOLUTION: Performed by: INTERNAL MEDICINE

## 2023-10-21 PROCEDURE — 80053 COMPREHEN METABOLIC PANEL: CPT | Performed by: NURSE PRACTITIONER

## 2023-10-21 PROCEDURE — 97162 PT EVAL MOD COMPLEX 30 MIN: CPT

## 2023-10-21 PROCEDURE — 82330 ASSAY OF CALCIUM: CPT | Performed by: NURSE PRACTITIONER

## 2023-10-21 PROCEDURE — 87641 MR-STAPH DNA AMP PROBE: CPT | Performed by: INTERNAL MEDICINE

## 2023-10-21 PROCEDURE — 85014 HEMATOCRIT: CPT | Performed by: NURSE PRACTITIONER

## 2023-10-21 PROCEDURE — 25010000002 MAGNESIUM SULFATE 2 GM/50ML SOLUTION: Performed by: INTERNAL MEDICINE

## 2023-10-21 PROCEDURE — 85025 COMPLETE CBC W/AUTO DIFF WBC: CPT | Performed by: NURSE PRACTITIONER

## 2023-10-21 PROCEDURE — 97535 SELF CARE MNGMENT TRAINING: CPT

## 2023-10-21 RX ORDER — CHOLECALCIFEROL (VITAMIN D3) 125 MCG
5 CAPSULE ORAL NIGHTLY PRN
Status: DISCONTINUED | OUTPATIENT
Start: 2023-10-21 | End: 2023-10-26 | Stop reason: HOSPADM

## 2023-10-21 RX ORDER — SODIUM CHLORIDE 9 MG/ML
100 INJECTION, SOLUTION INTRAVENOUS CONTINUOUS
Status: ACTIVE | OUTPATIENT
Start: 2023-10-21 | End: 2023-10-22

## 2023-10-21 RX ORDER — GUAIFENESIN 600 MG/1
1200 TABLET, EXTENDED RELEASE ORAL EVERY 12 HOURS SCHEDULED
Status: DISCONTINUED | OUTPATIENT
Start: 2023-10-21 | End: 2023-10-26 | Stop reason: HOSPADM

## 2023-10-21 RX ORDER — FERROUS SULFATE 325(65) MG
325 TABLET ORAL
Status: DISCONTINUED | OUTPATIENT
Start: 2023-10-22 | End: 2023-10-26 | Stop reason: HOSPADM

## 2023-10-21 RX ORDER — MAGNESIUM SULFATE HEPTAHYDRATE 40 MG/ML
2 INJECTION, SOLUTION INTRAVENOUS
Status: COMPLETED | OUTPATIENT
Start: 2023-10-21 | End: 2023-10-22

## 2023-10-21 RX ADMIN — CARBIDOPA AND LEVODOPA 1 TABLET: 25; 100 TABLET ORAL at 12:27

## 2023-10-21 RX ADMIN — DOXYCYCLINE 100 MG: 100 INJECTION, POWDER, LYOPHILIZED, FOR SOLUTION INTRAVENOUS at 20:11

## 2023-10-21 RX ADMIN — GUAIFENESIN 1200 MG: 600 TABLET, EXTENDED RELEASE ORAL at 20:10

## 2023-10-21 RX ADMIN — IPRATROPIUM BROMIDE AND ALBUTEROL SULFATE 3 ML: 2.5; .5 SOLUTION RESPIRATORY (INHALATION) at 08:33

## 2023-10-21 RX ADMIN — MONTELUKAST 10 MG: 10 TABLET, FILM COATED ORAL at 20:11

## 2023-10-21 RX ADMIN — SODIUM CHLORIDE 1000 MG: 900 INJECTION INTRAVENOUS at 18:21

## 2023-10-21 RX ADMIN — AMANTADINE HYDROCHLORIDE 100 MG: 100 CAPSULE ORAL at 20:10

## 2023-10-21 RX ADMIN — CARBIDOPA AND LEVODOPA 1 TABLET: 25; 100 TABLET ORAL at 18:20

## 2023-10-21 RX ADMIN — MAGNESIUM SULFATE HEPTAHYDRATE 2 G: 40 INJECTION, SOLUTION INTRAVENOUS at 22:44

## 2023-10-21 RX ADMIN — MAGNESIUM SULFATE HEPTAHYDRATE 2 G: 40 INJECTION, SOLUTION INTRAVENOUS at 19:33

## 2023-10-21 RX ADMIN — MULTIPLE VITAMINS W/ MINERALS TAB 1 TABLET: TAB at 08:41

## 2023-10-21 RX ADMIN — QUETIAPINE FUMARATE 25 MG: 25 TABLET ORAL at 20:10

## 2023-10-21 RX ADMIN — CILOSTAZOL 100 MG: 50 TABLET ORAL at 08:41

## 2023-10-21 RX ADMIN — BUDESONIDE AND FORMOTEROL FUMARATE DIHYDRATE 2 PUFF: 160; 4.5 AEROSOL RESPIRATORY (INHALATION) at 19:41

## 2023-10-21 RX ADMIN — SENNOSIDES AND DOCUSATE SODIUM 2 TABLET: 8.6; 5 TABLET ORAL at 08:41

## 2023-10-21 RX ADMIN — PANTOPRAZOLE SODIUM 40 MG: 40 TABLET, DELAYED RELEASE ORAL at 08:40

## 2023-10-21 RX ADMIN — MAGNESIUM SULFATE HEPTAHYDRATE 2 G: 40 INJECTION, SOLUTION INTRAVENOUS at 20:48

## 2023-10-21 RX ADMIN — AMANTADINE HYDROCHLORIDE 100 MG: 100 CAPSULE ORAL at 08:46

## 2023-10-21 RX ADMIN — CILOSTAZOL 100 MG: 50 TABLET ORAL at 20:11

## 2023-10-21 RX ADMIN — SODIUM CHLORIDE 100 ML/HR: 9 INJECTION, SOLUTION INTRAVENOUS at 19:09

## 2023-10-21 RX ADMIN — DOXYCYCLINE 100 MG: 100 INJECTION, POWDER, LYOPHILIZED, FOR SOLUTION INTRAVENOUS at 08:41

## 2023-10-21 RX ADMIN — APIXABAN 5 MG: 5 TABLET, FILM COATED ORAL at 20:11

## 2023-10-21 RX ADMIN — CARBIDOPA AND LEVODOPA 1 TABLET: 25; 100 TABLET ORAL at 20:10

## 2023-10-21 RX ADMIN — BUDESONIDE AND FORMOTEROL FUMARATE DIHYDRATE 2 PUFF: 160; 4.5 AEROSOL RESPIRATORY (INHALATION) at 08:33

## 2023-10-21 RX ADMIN — CARBIDOPA AND LEVODOPA 1 TABLET: 25; 100 TABLET ORAL at 08:41

## 2023-10-21 RX ADMIN — IPRATROPIUM BROMIDE AND ALBUTEROL SULFATE 3 ML: 2.5; .5 SOLUTION RESPIRATORY (INHALATION) at 19:41

## 2023-10-21 RX ADMIN — Medication 5 MG: at 00:11

## 2023-10-21 RX ADMIN — ATORVASTATIN CALCIUM 10 MG: 10 TABLET, FILM COATED ORAL at 08:41

## 2023-10-21 RX ADMIN — TAMSULOSIN HYDROCHLORIDE 0.4 MG: 0.4 CAPSULE ORAL at 20:10

## 2023-10-21 RX ADMIN — METOPROLOL SUCCINATE 50 MG: 50 TABLET, EXTENDED RELEASE ORAL at 08:41

## 2023-10-21 RX ADMIN — TIOTROPIUM BROMIDE INHALATION SPRAY 2 PUFF: 3.12 SPRAY, METERED RESPIRATORY (INHALATION) at 08:33

## 2023-10-21 RX ADMIN — SENNOSIDES AND DOCUSATE SODIUM 2 TABLET: 8.6; 5 TABLET ORAL at 20:10

## 2023-10-21 RX ADMIN — POTASSIUM CHLORIDE 40 MEQ: 1500 TABLET, EXTENDED RELEASE ORAL at 00:11

## 2023-10-21 RX ADMIN — Medication 5 MG: at 21:52

## 2023-10-21 RX ADMIN — IPRATROPIUM BROMIDE AND ALBUTEROL SULFATE 3 ML: 2.5; .5 SOLUTION RESPIRATORY (INHALATION) at 15:21

## 2023-10-21 RX ADMIN — ATROPINE SULFATE 2 DROP: 10 SOLUTION OPHTHALMIC at 08:44

## 2023-10-21 RX ADMIN — APIXABAN 5 MG: 5 TABLET, FILM COATED ORAL at 08:41

## 2023-10-21 NOTE — PLAN OF CARE
Goal Outcome Evaluation:  Plan of Care Reviewed With: patient           Outcome Evaluation: will continue treatment for pneumonia. patient is alert and oriented x 4. is a one assist for transfers. has parkinsons. on 2 liters oxygen and is tachy to afib with pvcs on tele.
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
## 2023-10-21 NOTE — NURSING NOTE
"PT is A/O x 4 on 2 L NC with no c/o pain, nausea/vomiting, headache, numbness/tingling. He has spent part of the day up in his chair. When he had a \"cramp\" we walked him around the room, ordered him a Gatorade and it resolved. His appetite has been good today and he hasn't had a BM. Will continue to monitor.  "
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
## 2023-10-21 NOTE — PLAN OF CARE
Goal Outcome Evaluation:  Plan of Care Reviewed With: patient        Progress: no change  Outcome Evaluation: Pt presents with strength and balance deficits in addition to significant oxygen requirements limiting ADLs/mobility independence from baseline. Pt noted to desat to 86% with movement and noted decreased pain in LEs with movement. Skilled IPOT services warranted to address deficits and support return to baseline. Rec. DC home with assist.      Anticipated Discharge Disposition (OT): home with assist

## 2023-10-21 NOTE — PROGRESS NOTES
[facility] Medicine Services  PROGRESS NOTE    Patient Name: Flaco Roque II  : 1945  MRN: [id number]    Date of Admission: 10/20/2023  Primary Care Physician: Azar Stern MD    Subjective   Subjective     CC:  SOA    HPI:  No acute events. Not sure if he feels much better. Having leg cramps.       Objective   Objective     Vital Signs:   Temp:  [98.3 °F (36.8 °C)-100.2 °F (37.9 °C)] 98.3 °F (36.8 °C)  Heart Rate:  [103-124] 103  Resp:  [20] 20  BP: ()/(58-99) 137/69  Flow (L/min):  [2] 2     Physical Exam:  Constitutional: No acute distress, awake, alert; frail  HENT: NCAT, mucous membranes moist  Respiratory: Clear to auscultation bilaterally, respiratory effort normal   Cardiovascular: irregular; tachy  Gastrointestinal: Positive bowel sounds, soft, nontender, nondistended  Musculoskeletal: No bilateral ankle edema  Psychiatric: flat affect, cooperative  Neurologic: Oriented x 3, strength symmetric in all extremities, Cranial Nerves grossly intact to confrontation, speech clear  Skin: No rashes      Results Reviewed:  LAB RESULTS:      Lab 10/21/23  0551 10/21/23  0011 10/20/23  1542   WBC 15.43*  --  15.38*   HEMOGLOBIN 8.5* 8.0* 9.0*   HEMATOCRIT 29.0* 27.2* 29.8*   PLATELETS 315  --  296   NEUTROS ABS 13.40*  --  14.40*   IMMATURE GRANS (ABS) 0.05  --  0.05   LYMPHS ABS 1.00  --  0.17*   MONOS ABS 0.86  --  0.73   EOS ABS 0.07  --  0.00   MCV 68.6*  --  69.6*   PROCALCITONIN  --   --  0.77*   LACTATE  --   --  1.2         Lab 10/21/23  0551 10/20/23  1542   SODIUM 138 140   POTASSIUM 4.1 3.2*   CHLORIDE 109* 106   CO2 23.0 24.0   ANION GAP 6.0 10.0   BUN 9 14   CREATININE 0.61* 0.78   EGFR 98.9 91.9   GLUCOSE 99 133*   CALCIUM 7.9* 8.4*   IONIZED CALCIUM 1.23  --          Lab 10/21/23  0551 10/20/23  1542   TOTAL PROTEIN 5.5* 6.0   ALBUMIN 3.0* 3.5   GLOBULIN 2.5 2.5   ALT (SGPT) <5 <5   AST (SGOT) 13 11   BILIRUBIN 0.9 0.7   ALK PHOS 61 56   LIPASE  --  17          Lab 10/20/23  2035 10/20/23  1542   PROBNP  --  407.1   HSTROP T 30* 27*             Lab 10/20/23  2035   IRON 16*   IRON SATURATION (TSAT) 4*   TIBC 399   TRANSFERRIN 268   FERRITIN 72.57   FOLATE 11.20   VITAMIN B 12 237         Brief Urine Lab Results  (Last result in the past 365 days)        Color   Clarity   Blood   Leuk Est   Nitrite   Protein   CREAT   Urine HCG        08/29/23 0927 Yellow   Clear     Negative                       Microbiology Results Abnormal       Procedure Component Value - Date/Time    S. Pneumo Ag Urine or CSF - Urine, Urine, Clean Catch [[id number]]  (Normal) Collected: 10/21/23 0019    Lab Status: Final result Specimen: Urine, Clean Catch Updated: 10/21/23 1321     Strep Pneumo Ag Negative    Legionella Antigen, Urine - Urine, Urine, Clean Catch [[id number]]  (Normal) Collected: 10/21/23 0019    Lab Status: Final result Specimen: Urine, Clean Catch Updated: 10/21/23 1321     LEGIONELLA ANTIGEN, URINE Negative    COVID PRE-OP / PRE-PROCEDURE SCREENING ORDER (NO ISOLATION) - Swab, Nasal Cavity [[id number]]  (Normal) Collected: 10/20/23 1617    Lab Status: Final result Specimen: Swab from Nasal Cavity Updated: 10/20/23 1646    Narrative:      The following orders were created for panel order COVID PRE-OP / PRE-PROCEDURE SCREENING ORDER (NO ISOLATION) - Swab, Nasal Cavity.  Procedure                               Abnormality         Status                     ---------                               -----------         ------                     COVID-19 and FLU A/B PCR...[[id number]]  Normal              Final result                 Please view results for these tests on the individual orders.    COVID-19 and FLU A/B PCR - Swab, Nasopharynx [[id number]]  (Normal) Collected: 10/20/23 1617    Lab Status: Final result Specimen: Swab from Nasopharynx Updated: 10/20/23 1646     COVID19 Not Detected     Influenza A PCR Not Detected     Influenza B PCR Not Detected    Narrative:      Fact  sheet for providers: https://www.fda.gov/media/344957/download    Fact sheet for patients: https://www.fda.gov/media/702754/download    Test performed by PCR.            CT Angiogram Chest    Result Date: 10/20/2023  CT ANGIOGRAM CHEST Date of Exam: 10/20/2023 8:58 PM EDT Indication: chest pain, pna, tachym hypoxia. Comparison: Chest CT 4/15/2023. Technique: CTA of the chest was performed after the uneventful intravenous administration of 90 mL Isovue-370. Reconstructed coronal and sagittal images were also obtained. In addition, a 3-D volume rendered image was created for interpretation. Automated exposure control and iterative reconstruction methods were used. Note this examination is suboptimal due to streak artifact from patient's hardware containing arms at sides. Findings: Pulmonary arteries / cardiovascular: No intraluminal filling defect to suggest pulmonary embolus. The main pulmonary artery is mildly enlarged measuring 3.2 cm, unchanged. No pericardial effusion. Normal caliber thoracic aorta. Calcifications of the thoracic aorta and coronary arteries. Lymph nodes and mediastinum: No lymphadenopathy or mass. Lungs and airways: Emphysematous changes. Patchy bilateral airspace opacities, worse on the right, particularly in the lower lobe. Diffuse intralobular septal thickening. Pleura: Trace bilateral pleural effusions. No pneumothorax.  Bones and soft tissues: No acute or suspicious osseous abnormality. Multilevel degenerative changes of the spine with exaggerated kyphosis. Soft tissues are within normal limits. Bilateral reverse shoulder arthroplasties. Upper abdomen: Large hiatal hernia containing nearly the entire stomach with patulous upstream esophagus. Cholecystectomy.     Impression: Impression: No evidence of pulmonary embolism. Patchy bilateral airspace opacities, worse in the right lower lobe. Findings consistent with infection and/or aspiration in the setting of a large hiatal hernia. Interlobular  septal thickening may represent a background of mild pulmonary edema. Trace bilateral pleural effusions. Electronically Signed: Abel Blum MD  10/20/2023 9:23 PM EDT  Workstation ID: [id number]    XR Chest 1 View    Result Date: 10/20/2023  XR CHEST 1 VW Date of Exam: 10/20/2023 3:43 PM EDT Indication: Chest Pain Triage Protocol Comparison: 7/14/2023. Findings: There are new fairly extensive bilateral lower lobe infiltrates, greatest on the right. The heart size is within normal limits. There is a large hiatal hernia. There are no effusions.     Impression: Impression: New infiltrates likely represent pneumonia. Continued follow-up recommended. Electronically Signed: Eden Mendez MD  10/20/2023 4:02 PM EDT  Workstation ID: [id number]     Results for orders placed during the hospital encounter of 08/24/23    Adult Transthoracic Echo Complete W/ Cont if Necessary Per Protocol    Interpretation Summary    Left ventricular ejection fraction appears to be 56 - 60%.    The left atrial cavity is mild to moderately dilated    There is mild to moderate thickening of the aortic valve    Mild to moderate tricuspid valve regurgitation is present. Estimated right ventricular systolic pressure from tricuspid regurgitation is mildly elevated (35-45 mmHg).    There is a trivial pericardial effusion.    Patient noted to be in atrial fibrillation with elevated rates during the study.      Current medications:  Scheduled Meds:amantadine, 100 mg, Oral, BID  apixaban, 5 mg, Oral, Q12H  atorvastatin, 10 mg, Oral, Daily  atropine, 2 drop, Both Eyes, Daily  budesonide-formoterol, 2 puff, Inhalation, BID - RT   And  tiotropium bromide monohydrate, 2 puff, Inhalation, Daily - RT  carbidopa-levodopa, 1 tablet, Oral, 4x Daily With Meals & Nightly  cefTRIAXone, 1,000 mg, Intravenous, Q24H  cilostazol, 100 mg, Oral, BID  doxycycline, 100 mg, Intravenous, Q12H  ipratropium-albuterol, 3 mL, Nebulization, TID - RT  metoprolol succinate XL, 50 mg,  Oral, Daily  montelukast, 10 mg, Oral, Nightly  multivitamin with minerals, 1 tablet, Oral, Daily  pantoprazole, 40 mg, Oral, Daily  QUEtiapine, 25 mg, Oral, Nightly  senna-docusate sodium, 2 tablet, Oral, BID  tamsulosin, 0.4 mg, Oral, Nightly      Continuous Infusions:   PRN Meds:.  senna-docusate sodium **AND** polyethylene glycol **AND** bisacodyl **AND** bisacodyl    Calcium Replacement - Follow Nurse / BPA Driven Protocol    Magnesium Standard Dose Replacement - Follow Nurse / BPA Driven Protocol    melatonin    nitroglycerin    Phosphorus Replacement - Follow Nurse / BPA Driven Protocol    Potassium Replacement - Follow Nurse / BPA Driven Protocol    sodium chloride    Assessment & Plan   Assessment & Plan     Active Hospital Problems    Diagnosis  POA    Sepsis [A41.9]  Yes    Coronary artery disease involving native coronary artery of native heart without angina pectoris [I25.10]  Yes    Permanent atrial fibrillation [I48.21]  Yes    Parkinson disease [G20.A1]  Yes    GERD without esophagitis [K21.9]  Yes    ABRIL (obstructive sleep apnea) [G47.33]  Yes    Pulmonary emphysema [J43.9]  Yes      Resolved Hospital Problems   No resolved problems to display.        Brief Hospital Course to date:  77 year old male presents to the ED with worsening shortness of air and chest that started this morning.  CXR consistent with bilateral lower lobe pneumonia.      Sepsis  Pneumonia   -WBC: 15.38, procal 0.72,   - CT chest with patchy bilateral airspace opacities worse in the right lower lobe; ? Mild pulm edema   - Bcx pending; strep/legionella negative; MRSA pending; COVID/flu negative  - Continue doxy/rocephin  - Continue o2 -- wean as tolerated. No O2 requirements.     hypocalcemia  -replace per protocol   - iCa within limits      Microcytic anemia  -H&H baseline around 10.5-11.4  -currently 9.0  - Low iron and ferritin   - Start supplement.  - AM labs     Permanent atrial fibrillation   -CHADs Vasc: 3  -on  eliquis, toprol XL   -last echo 8/24/23 EF 56-60%. Mild to moderate TVR. RVSP: 35-45 mmhg.      non-oxygen dependent COPD  -CXR mentioned above  -continue home inhalers   -continuous pulse ox  -continue trelegy      Parkinson's disease  -continue sinemet, amantadine   -fall precautions  -consult PT      Hyperlipidemia  -continue statin      GERD  -PPI      CAD  -[facility] 9/5/2023 minor obstructive CAD, normal LAD, mild circumflex 30-40%, mid RCA 20-30%  -follows with cardiology. Last   -continue ASA, statin      Expected Discharge Location and Transportation: home with assist recs  Expected Discharge   Expected discharge date/ time has not been documented.     DVT prophylaxis:  Medical DVT prophylaxis orders are present.     AM-PAC 6 Clicks Score (PT): 20 (10/21/23 4098)    CODE STATUS:   Code Status and Medical Interventions:   Ordered at: 10/20/23 2012     Level Of Support Discussed With:    Patient     Code Status (Patient has no pulse and is not breathing):    CPR (Attempt to Resuscitate)     Medical Interventions (Patient has pulse or is breathing):    Full Support       Joan Krause DO  10/21/23

## 2023-10-21 NOTE — PLAN OF CARE
Goal Outcome Evaluation:  Plan of Care Reviewed With: patient           Outcome Evaluation: Physical therapy evaluation complete. The patient noted with oxygen desaturation to 87% during ambulation and patient reports PEARSON 6/10. The patient presents below baseline for mobility and would continue to benefit from skilled PT to address balance and activity tolerance deficits.      Anticipated Discharge Disposition (PT): home with assist

## 2023-10-22 LAB
ANION GAP SERPL CALCULATED.3IONS-SCNC: 6 MMOL/L (ref 5–15)
BUN SERPL-MCNC: 7 MG/DL (ref 8–23)
BUN/CREAT SERPL: 11.9 (ref 7–25)
CALCIUM SPEC-SCNC: 7.9 MG/DL (ref 8.6–10.5)
CHLORIDE SERPL-SCNC: 105 MMOL/L (ref 98–107)
CO2 SERPL-SCNC: 23 MMOL/L (ref 22–29)
CREAT SERPL-MCNC: 0.59 MG/DL (ref 0.76–1.27)
DEPRECATED RDW RBC AUTO: 50.4 FL (ref 37–54)
EGFRCR SERPLBLD CKD-EPI 2021: 99.9 ML/MIN/1.73
ERYTHROCYTE [DISTWIDTH] IN BLOOD BY AUTOMATED COUNT: 21.1 % (ref 12.3–15.4)
GLUCOSE SERPL-MCNC: 121 MG/DL (ref 65–99)
HCT VFR BLD AUTO: 27.6 % (ref 37.5–51)
HGB BLD-MCNC: 8.1 G/DL (ref 13–17.7)
MAGNESIUM SERPL-MCNC: 2.5 MG/DL (ref 1.6–2.4)
MCH RBC QN AUTO: 20 PG (ref 26.6–33)
MCHC RBC AUTO-ENTMCNC: 29.3 G/DL (ref 31.5–35.7)
MCV RBC AUTO: 68.1 FL (ref 79–97)
PLATELET # BLD AUTO: 264 10*3/MM3 (ref 140–450)
PMV BLD AUTO: 10.2 FL (ref 6–12)
POTASSIUM SERPL-SCNC: 3.7 MMOL/L (ref 3.5–5.2)
RBC # BLD AUTO: 4.05 10*6/MM3 (ref 4.14–5.8)
SODIUM SERPL-SCNC: 134 MMOL/L (ref 136–145)
WBC NRBC COR # BLD: 10.82 10*3/MM3 (ref 3.4–10.8)

## 2023-10-22 PROCEDURE — 94799 UNLISTED PULMONARY SVC/PX: CPT

## 2023-10-22 PROCEDURE — 83735 ASSAY OF MAGNESIUM: CPT | Performed by: INTERNAL MEDICINE

## 2023-10-22 PROCEDURE — 94761 N-INVAS EAR/PLS OXIMETRY MLT: CPT

## 2023-10-22 PROCEDURE — 25810000003 SODIUM CHLORIDE 0.9 % SOLUTION: Performed by: NURSE PRACTITIONER

## 2023-10-22 PROCEDURE — 25010000002 CEFTRIAXONE PER 250 MG: Performed by: INTERNAL MEDICINE

## 2023-10-22 PROCEDURE — 85027 COMPLETE CBC AUTOMATED: CPT | Performed by: INTERNAL MEDICINE

## 2023-10-22 PROCEDURE — 80048 BASIC METABOLIC PNL TOTAL CA: CPT | Performed by: INTERNAL MEDICINE

## 2023-10-22 RX ORDER — ACETAMINOPHEN 325 MG/1
650 TABLET ORAL ONCE
Status: COMPLETED | OUTPATIENT
Start: 2023-10-22 | End: 2023-10-22

## 2023-10-22 RX ORDER — ACETAMINOPHEN 325 MG/1
650 TABLET ORAL EVERY 6 HOURS PRN
Status: DISCONTINUED | OUTPATIENT
Start: 2023-10-22 | End: 2023-10-26 | Stop reason: HOSPADM

## 2023-10-22 RX ADMIN — TAMSULOSIN HYDROCHLORIDE 0.4 MG: 0.4 CAPSULE ORAL at 20:18

## 2023-10-22 RX ADMIN — ACETAMINOPHEN 650 MG: 325 TABLET ORAL at 07:34

## 2023-10-22 RX ADMIN — IPRATROPIUM BROMIDE AND ALBUTEROL SULFATE 3 ML: 2.5; .5 SOLUTION RESPIRATORY (INHALATION) at 15:45

## 2023-10-22 RX ADMIN — AMANTADINE HYDROCHLORIDE 100 MG: 100 CAPSULE ORAL at 20:18

## 2023-10-22 RX ADMIN — CILOSTAZOL 100 MG: 50 TABLET ORAL at 20:19

## 2023-10-22 RX ADMIN — SENNOSIDES AND DOCUSATE SODIUM 2 TABLET: 8.6; 5 TABLET ORAL at 08:27

## 2023-10-22 RX ADMIN — MONTELUKAST 10 MG: 10 TABLET, FILM COATED ORAL at 20:19

## 2023-10-22 RX ADMIN — CILOSTAZOL 100 MG: 50 TABLET ORAL at 08:26

## 2023-10-22 RX ADMIN — CARBIDOPA AND LEVODOPA 1 TABLET: 25; 100 TABLET ORAL at 20:20

## 2023-10-22 RX ADMIN — DOXYCYCLINE 100 MG: 100 INJECTION, POWDER, LYOPHILIZED, FOR SOLUTION INTRAVENOUS at 08:27

## 2023-10-22 RX ADMIN — IPRATROPIUM BROMIDE AND ALBUTEROL SULFATE 3 ML: 2.5; .5 SOLUTION RESPIRATORY (INHALATION) at 07:30

## 2023-10-22 RX ADMIN — MULTIPLE VITAMINS W/ MINERALS TAB 1 TABLET: TAB at 08:27

## 2023-10-22 RX ADMIN — QUETIAPINE FUMARATE 25 MG: 25 TABLET ORAL at 20:19

## 2023-10-22 RX ADMIN — CARBIDOPA AND LEVODOPA 1 TABLET: 25; 100 TABLET ORAL at 08:26

## 2023-10-22 RX ADMIN — CARBIDOPA AND LEVODOPA 1 TABLET: 25; 100 TABLET ORAL at 12:35

## 2023-10-22 RX ADMIN — PANTOPRAZOLE SODIUM 40 MG: 40 TABLET, DELAYED RELEASE ORAL at 08:27

## 2023-10-22 RX ADMIN — GUAIFENESIN 1200 MG: 600 TABLET, EXTENDED RELEASE ORAL at 20:18

## 2023-10-22 RX ADMIN — BUDESONIDE AND FORMOTEROL FUMARATE DIHYDRATE 2 PUFF: 160; 4.5 AEROSOL RESPIRATORY (INHALATION) at 07:30

## 2023-10-22 RX ADMIN — METOPROLOL SUCCINATE 50 MG: 50 TABLET, EXTENDED RELEASE ORAL at 08:26

## 2023-10-22 RX ADMIN — TIOTROPIUM BROMIDE INHALATION SPRAY 2 PUFF: 3.12 SPRAY, METERED RESPIRATORY (INHALATION) at 07:30

## 2023-10-22 RX ADMIN — FERROUS SULFATE TAB 325 MG (65 MG ELEMENTAL FE) 325 MG: 325 (65 FE) TAB at 08:27

## 2023-10-22 RX ADMIN — Medication 5 MG: at 23:36

## 2023-10-22 RX ADMIN — APIXABAN 5 MG: 5 TABLET, FILM COATED ORAL at 08:27

## 2023-10-22 RX ADMIN — Medication 10 ML: at 08:27

## 2023-10-22 RX ADMIN — GUAIFENESIN 1200 MG: 600 TABLET, EXTENDED RELEASE ORAL at 08:26

## 2023-10-22 RX ADMIN — APIXABAN 5 MG: 5 TABLET, FILM COATED ORAL at 20:18

## 2023-10-22 RX ADMIN — CARBIDOPA AND LEVODOPA 1 TABLET: 25; 100 TABLET ORAL at 17:45

## 2023-10-22 RX ADMIN — ATORVASTATIN CALCIUM 10 MG: 10 TABLET, FILM COATED ORAL at 08:26

## 2023-10-22 RX ADMIN — IPRATROPIUM BROMIDE AND ALBUTEROL SULFATE 3 ML: 2.5; .5 SOLUTION RESPIRATORY (INHALATION) at 19:59

## 2023-10-22 RX ADMIN — BUDESONIDE AND FORMOTEROL FUMARATE DIHYDRATE 2 PUFF: 160; 4.5 AEROSOL RESPIRATORY (INHALATION) at 19:59

## 2023-10-22 RX ADMIN — AMANTADINE HYDROCHLORIDE 100 MG: 100 CAPSULE ORAL at 08:26

## 2023-10-22 RX ADMIN — SODIUM CHLORIDE 500 ML: 9 INJECTION, SOLUTION INTRAVENOUS at 00:21

## 2023-10-22 RX ADMIN — SODIUM CHLORIDE 1000 MG: 900 INJECTION INTRAVENOUS at 17:45

## 2023-10-22 RX ADMIN — DOXYCYCLINE 100 MG: 100 INJECTION, POWDER, LYOPHILIZED, FOR SOLUTION INTRAVENOUS at 20:17

## 2023-10-22 NOTE — PLAN OF CARE
Goal Outcome Evaluation:  Plan of Care Reviewed With: patient           Outcome Evaluation: patient sitting up in bed watching tv. continues antibiotics for pneumonia. replaced magnesium last night. patient had episode of afib with rvr at midnight. history of afib. on call hospitalist notified. no other episodes rest of night. denies pain and sob. up with assist of one for transfers. uses urinal without issues. oxygen at 3 liters nasal cannula. is anywhere from afib to tachycardia with pvcs on tele. alert and oriented x 4.

## 2023-10-22 NOTE — CASE MANAGEMENT/SOCIAL WORK
"Discharge Planning Assessment  [facility]     Patient Name: Flaco Roque II  MRN: [id number]  Today's Date: 10/22/2023    Admit Date: 10/20/2023    Plan: home/family   Discharge Needs Assessment       Row Name 10/22/23 1036       Living Environment    People in Home spouse    Name(s) of People in Home Wife Cheryl @ [phone]    Current Living Arrangements home    Primary Care Provided by self;spouse/significant other    Provides Primary Care For no one    Family Caregiver if Needed child(pawel), adult;spouse    Family Caregiver Names Wife and son Bright    Quality of Family Relationships helpful;involved;supportive    Able to Return to Prior Arrangements yes    Living Arrangement Comments Plan is home       Resource/Environmental Concerns    Resource/Environmental Concerns none    Transportation Concerns none       Transition Planning    Patient/Family Anticipates Transition to home with family    Patient/Family Anticipated Services at Transition none       Discharge Needs Assessment    Equipment Currently Used at Home walker, rolling;wheelchair;nebulizer;cane, straight    Concerns to be Addressed discharge planning    Equipment Needed After Discharge walker, rolling;wheelchair, manual;nebulizer    Discharge Coordination/Progress Plan is home                   Discharge Plan       Row Name 10/22/23 1038       Plan    Plan home/family    Patient/Family in Agreement with Plan yes    Plan Comments I spoke with patient and son Bright at the . Patient lives in [place] [facility] [place] with wife in a recently built home that is handicapped accessible, or for \"old people.\"  Patient tells me has had all kinds of DME in the home but only uses a nebulizer 4 x per day. No home 02 or HH. Patient plan is home for now. Patient tells me he is independent at home and he has good family support if needed with his wife and son Bright. Bright lives very close by.    Final Discharge Disposition Code 01 - home or self-care                  Continued Care " and Services - Admitted Since 10/20/2023    Coordination has not been started for this encounter.          Demographic Summary       Row Name 10/22/23 1028       General Information    Admission Type inpatient    Referral Source admission list    Reason for Consult discharge planning    Preferred Language English       Contact Information    Permission Granted to Share Info With     Contact Information Obtained for     Contact Information Comments PCP: Leena Askew, confirmed by patient                   Functional Status       Row Name 10/22/23 1035       Functional Status    Usual Activity Tolerance moderate    Current Activity Tolerance fair       Functional Status, IADL    Medications independent    Meal Preparation assistive person    Housekeeping assistive person    Laundry assistive person    Shopping assistive person    IADL Comments Patient has coverage for medications       Mental Status    General Appearance WDL WDL       Mental Status Summary    Recent Changes in Mental Status/Cognitive Functioning ability to speak clearly;ability to understand       Employment/    Employment Status retired                   Psychosocial    No documentation.                  Abuse/Neglect    No documentation.                  Legal    No documentation.                  Substance Abuse    No documentation.                  Patient Forms    No documentation.                     Stefanie Eastman RN

## 2023-10-22 NOTE — PLAN OF CARE
Goal Outcome Evaluation:  Plan of Care Reviewed With: patient        Progress: no change  Outcome Evaluation: VSS. Pt remains on 3L NC with no signs of SOA. Pt remains A. Fib on the monitor. Pt 140s when up and back down to 90s-110s when back in bed. No pain noted.

## 2023-10-22 NOTE — PROGRESS NOTES
[facility] Medicine Services  PROGRESS NOTE    Patient Name: Flaco Roque II  : 1945  MRN: [id number]    Date of Admission: 10/20/2023  Primary Care Physician: Azar Stern MD    Subjective   Subjective     CC:  Dyspnea     HPI:  No acute events. Difficulty with HR overnight. States that he thinks he is feeling a bit better.       Objective   Objective     Vital Signs:   Temp:  [98.2 °F (36.8 °C)-98.4 °F (36.9 °C)] 98.2 °F (36.8 °C)  Heart Rate:  [] 124  Resp:  [18-20] 18  BP: ()/(58-76) 102/65  Flow (L/min):  [2-4] 3     Physical Exam:  Constitutional: No acute distress, awake, alert; frail  HENT: NCAT, mucous membranes moist  Respiratory: Clear to auscultation bilaterally, respiratory effort normal   Cardiovascular: RRR, no murmurs, rubs, or gallops  Gastrointestinal: Positive bowel sounds, soft, nontender, nondistended  Musculoskeletal: No bilateral ankle edema  Psychiatric: Appropriate affect, cooperative  Neurologic: Oriented x 3, strength symmetric in all extremities, Cranial Nerves grossly intact to confrontation, speech clear  Skin: No rashes    Results Reviewed:  LAB RESULTS:      Lab 10/22/23  0434 10/21/23  0551 10/21/23  0011 10/20/23  1542   WBC 10.82* 15.43*  --  15.38*   HEMOGLOBIN 8.1* 8.5* 8.0* 9.0*   HEMATOCRIT 27.6* 29.0* 27.2* 29.8*   PLATELETS 264 315  --  296   NEUTROS ABS  --  13.40*  --  14.40*   IMMATURE GRANS (ABS)  --  0.05  --  0.05   LYMPHS ABS  --  1.00  --  0.17*   MONOS ABS  --  0.86  --  0.73   EOS ABS  --  0.07  --  0.00   MCV 68.1* 68.6*  --  69.6*   PROCALCITONIN  --   --   --  0.77*   LACTATE  --   --   --  1.2         Lab 10/22/23  0434 10/21/23  1725 10/21/23  0551 10/20/23  1542   SODIUM 134*  --  138 140   POTASSIUM 3.7  --  4.1 3.2*   CHLORIDE 105  --  109* 106   CO2 23.0  --  23.0 24.0   ANION GAP 6.0  --  6.0 10.0   BUN 7*  --  9 14   CREATININE 0.59*  --  0.61* 0.78   EGFR 99.9  --  98.9 91.9   GLUCOSE 121*  --  99  133*   CALCIUM 7.9*  --  7.9* 8.4*   IONIZED CALCIUM  --   --  1.23  --    MAGNESIUM 2.5* 1.5*  --   --          Lab 10/21/23  0551 10/20/23  1542   TOTAL PROTEIN 5.5* 6.0   ALBUMIN 3.0* 3.5   GLOBULIN 2.5 2.5   ALT (SGPT) <5 <5   AST (SGOT) 13 11   BILIRUBIN 0.9 0.7   ALK PHOS 61 56   LIPASE  --  17         Lab 10/20/23  2035 10/20/23  1542   PROBNP  --  407.1   HSTROP T 30* 27*             Lab 10/20/23  2035   IRON 16*   IRON SATURATION (TSAT) 4*   TIBC 399   TRANSFERRIN 268   FERRITIN 72.57   FOLATE 11.20   VITAMIN B 12 237         Brief Urine Lab Results  (Last result in the past 365 days)        Color   Clarity   Blood   Leuk Est   Nitrite   Protein   CREAT   Urine HCG        08/29/23 0927 Yellow   Clear     Negative                       Microbiology Results Abnormal       Procedure Component Value - Date/Time    Blood Culture - Blood, Arm, Right [[id number]]  (Normal) Collected: 10/20/23 1620    Lab Status: Preliminary result Specimen: Blood from Arm, Right Updated: 10/21/23 1701     Blood Culture No growth at 24 hours    Blood Culture - Blood, Hand, Right [[id number]]  (Normal) Collected: 10/20/23 1615    Lab Status: Preliminary result Specimen: Blood from Hand, Right Updated: 10/21/23 1701     Blood Culture No growth at 24 hours    MRSA Screen, PCR (Inpatient) - Swab, Nares [[id number]]  (Normal) Collected: 10/21/23 1321    Lab Status: Final result Specimen: Swab from Nares Updated: 10/21/23 1457     MRSA PCR Negative    Narrative:      The negative predictive value of this diagnostic test is high and should only be used to consider de-escalating anti-MRSA therapy. A positive result may indicate colonization with MRSA and must be correlated clinically.  MRSA Negative    S. Pneumo Ag Urine or CSF - Urine, Urine, Clean Catch [[id number]]  (Normal) Collected: 10/21/23 0019    Lab Status: Final result Specimen: Urine, Clean Catch Updated: 10/21/23 1321     Strep Pneumo Ag Negative    Legionella Antigen, Urine -  Urine, Urine, Clean Catch [[id number]]  (Normal) Collected: 10/21/23 0019    Lab Status: Final result Specimen: Urine, Clean Catch Updated: 10/21/23 1321     LEGIONELLA ANTIGEN, URINE Negative    COVID PRE-OP / PRE-PROCEDURE SCREENING ORDER (NO ISOLATION) - Swab, Nasal Cavity [[id number]]  (Normal) Collected: 10/20/23 1617    Lab Status: Final result Specimen: Swab from Nasal Cavity Updated: 10/20/23 1646    Narrative:      The following orders were created for panel order COVID PRE-OP / PRE-PROCEDURE SCREENING ORDER (NO ISOLATION) - Swab, Nasal Cavity.  Procedure                               Abnormality         Status                     ---------                               -----------         ------                     COVID-19 and FLU A/B PCR...[[id number]]  Normal              Final result                 Please view results for these tests on the individual orders.    COVID-19 and FLU A/B PCR - Swab, Nasopharynx [[id number]]  (Normal) Collected: 10/20/23 1617    Lab Status: Final result Specimen: Swab from Nasopharynx Updated: 10/20/23 1646     COVID19 Not Detected     Influenza A PCR Not Detected     Influenza B PCR Not Detected    Narrative:      Fact sheet for providers: https://www.fda.gov/media/046093/download    Fact sheet for patients: https://www.fda.gov/media/499657/download    Test performed by PCR.            CT Angiogram Chest    Result Date: 10/20/2023  CT ANGIOGRAM CHEST Date of Exam: 10/20/2023 8:58 PM EDT Indication: chest pain, pna, tachym hypoxia. Comparison: Chest CT 4/15/2023. Technique: CTA of the chest was performed after the uneventful intravenous administration of 90 mL Isovue-370. Reconstructed coronal and sagittal images were also obtained. In addition, a 3-D volume rendered image was created for interpretation. Automated exposure control and iterative reconstruction methods were used. Note this examination is suboptimal due to streak artifact from patient's hardware containing arms at  sides. Findings: Pulmonary arteries / cardiovascular: No intraluminal filling defect to suggest pulmonary embolus. The main pulmonary artery is mildly enlarged measuring 3.2 cm, unchanged. No pericardial effusion. Normal caliber thoracic aorta. Calcifications of the thoracic aorta and coronary arteries. Lymph nodes and mediastinum: No lymphadenopathy or mass. Lungs and airways: Emphysematous changes. Patchy bilateral airspace opacities, worse on the right, particularly in the lower lobe. Diffuse intralobular septal thickening. Pleura: Trace bilateral pleural effusions. No pneumothorax.  Bones and soft tissues: No acute or suspicious osseous abnormality. Multilevel degenerative changes of the spine with exaggerated kyphosis. Soft tissues are within normal limits. Bilateral reverse shoulder arthroplasties. Upper abdomen: Large hiatal hernia containing nearly the entire stomach with patulous upstream esophagus. Cholecystectomy.     Impression: Impression: No evidence of pulmonary embolism. Patchy bilateral airspace opacities, worse in the right lower lobe. Findings consistent with infection and/or aspiration in the setting of a large hiatal hernia. Interlobular septal thickening may represent a background of mild pulmonary edema. Trace bilateral pleural effusions. Electronically Signed: Abel Blum MD  10/20/2023 9:23 PM EDT  Workstation ID: [id number]    XR Chest 1 View    Result Date: 10/20/2023  XR CHEST 1 VW Date of Exam: 10/20/2023 3:43 PM EDT Indication: Chest Pain Triage Protocol Comparison: 7/14/2023. Findings: There are new fairly extensive bilateral lower lobe infiltrates, greatest on the right. The heart size is within normal limits. There is a large hiatal hernia. There are no effusions.     Impression: Impression: New infiltrates likely represent pneumonia. Continued follow-up recommended. Electronically Signed: Eden Mendez MD  10/20/2023 4:02 PM EDT  Workstation ID: [id number]     Results for orders  placed during the hospital encounter of 08/24/23    Adult Transthoracic Echo Complete W/ Cont if Necessary Per Protocol    Interpretation Summary    Left ventricular ejection fraction appears to be 56 - 60%.    The left atrial cavity is mild to moderately dilated    There is mild to moderate thickening of the aortic valve    Mild to moderate tricuspid valve regurgitation is present. Estimated right ventricular systolic pressure from tricuspid regurgitation is mildly elevated (35-45 mmHg).    There is a trivial pericardial effusion.    Patient noted to be in atrial fibrillation with elevated rates during the study.      Current medications:  Scheduled Meds:amantadine, 100 mg, Oral, BID  apixaban, 5 mg, Oral, Q12H  atorvastatin, 10 mg, Oral, Daily  atropine, 2 drop, Both Eyes, Daily  budesonide-formoterol, 2 puff, Inhalation, BID - RT   And  tiotropium bromide monohydrate, 2 puff, Inhalation, Daily - RT  carbidopa-levodopa, 1 tablet, Oral, 4x Daily With Meals & Nightly  cefTRIAXone, 1,000 mg, Intravenous, Q24H  cilostazol, 100 mg, Oral, BID  doxycycline, 100 mg, Intravenous, Q12H  ferrous sulfate, 325 mg, Oral, Daily With Breakfast  guaiFENesin, 1,200 mg, Oral, Q12H  ipratropium-albuterol, 3 mL, Nebulization, TID - RT  metoprolol succinate XL, 50 mg, Oral, Daily  montelukast, 10 mg, Oral, Nightly  multivitamin with minerals, 1 tablet, Oral, Daily  pantoprazole, 40 mg, Oral, Daily  QUEtiapine, 25 mg, Oral, Nightly  senna-docusate sodium, 2 tablet, Oral, BID  tamsulosin, 0.4 mg, Oral, Nightly      Continuous Infusions:   PRN Meds:.  senna-docusate sodium **AND** polyethylene glycol **AND** bisacodyl **AND** bisacodyl    Calcium Replacement - Follow Nurse / BPA Driven Protocol    Magnesium Standard Dose Replacement - Follow Nurse / BPA Driven Protocol    melatonin    nitroglycerin    Phosphorus Replacement - Follow Nurse / BPA Driven Protocol    Potassium Replacement - Follow Nurse / BPA Driven Protocol    sodium  chloride    Assessment & Plan   Assessment & Plan     Active Hospital Problems    Diagnosis  POA    Sepsis [A41.9]  Yes    Coronary artery disease involving native coronary artery of native heart without angina pectoris [I25.10]  Yes    Permanent atrial fibrillation [I48.21]  Yes    Pneumonia [J18.9]  Yes    Parkinson disease [G20.A1]  Yes    GERD without esophagitis [K21.9]  Yes    ABRIL (obstructive sleep apnea) [G47.33]  Yes    Pulmonary emphysema [J43.9]  Yes      Resolved Hospital Problems   No resolved problems to display.        Brief Hospital Course to date:  77 year old male presents to the ED with worsening shortness of air and chest that started this morning.  CXR consistent with bilateral lower lobe pneumonia.      Sepsis  Pneumonia   - WBC: 15.38, procal 0.72,  on admission  - CT chest with patchy bilateral airspace opacities worse in the right lower lobe; ? Mild pulm edema   - Bcx NGTD; strep/legionella negative; MRSA negative; COVID/flu negative  - Continue doxy/rocephin  - Currently on 3L; Wean O2 as tolerated. No home O2 requirements      hypocalcemia  -replace per protocol   - iCa within limits      Microcytic anemia  -H&H baseline around 10.5-11.4  -currently 9.0  - Low iron and ferritin   - Start supplement.  - Hgb stable     Permanent atrial fibrillation   -CHADs Vasc: 3  -on eliquis, toprol XL   -last echo 8/24/23 EF 56-60%. Mild to moderate TVR. RVSP: 35-45 mmhg.      non-oxygen dependent COPD  -CXR mentioned above  -continuous pulse ox  -continue trelegy     Chronic pain  - patient with fentanyl pain pump   - Trial heat. PRN tylenol      Parkinson's disease  -continue sinemet, amantadine   -fall precautions  - PT/OT      Hyperlipidemia  -continue statin      GERD  -PPI      CAD  -[facility] 9/5/2023 minor obstructive CAD, normal LAD, mild circumflex 30-40%, mid RCA 20-30%  -follows with cardiology  -continue ASA, statin     Expected Discharge Location and Transportation: home with assist    Expected Discharge   Expected Discharge Date: 10/25/2023; Expected Discharge Time:      DVT prophylaxis:  Medical DVT prophylaxis orders are present.     AM-PAC 6 Clicks Score (PT): 21 (10/21/23 1940)    CODE STATUS:   Code Status and Medical Interventions:   Ordered at: 10/20/23 2012     Level Of Support Discussed With:    Patient     Code Status (Patient has no pulse and is not breathing):    CPR (Attempt to Resuscitate)     Medical Interventions (Patient has pulse or is breathing):    Full Support       oJan Krause DO  10/22/23

## 2023-10-23 LAB
ANION GAP SERPL CALCULATED.3IONS-SCNC: 11 MMOL/L (ref 5–15)
BUN SERPL-MCNC: 7 MG/DL (ref 8–23)
BUN/CREAT SERPL: 13 (ref 7–25)
CALCIUM SPEC-SCNC: 8.3 MG/DL (ref 8.6–10.5)
CHLORIDE SERPL-SCNC: 108 MMOL/L (ref 98–107)
CO2 SERPL-SCNC: 20 MMOL/L (ref 22–29)
CREAT SERPL-MCNC: 0.54 MG/DL (ref 0.76–1.27)
DEPRECATED RDW RBC AUTO: 50.5 FL (ref 37–54)
EGFRCR SERPLBLD CKD-EPI 2021: 102.6 ML/MIN/1.73
ERYTHROCYTE [DISTWIDTH] IN BLOOD BY AUTOMATED COUNT: 21.4 % (ref 12.3–15.4)
GLUCOSE SERPL-MCNC: 90 MG/DL (ref 65–99)
HCT VFR BLD AUTO: 31.7 % (ref 37.5–51)
HGB BLD-MCNC: 9.4 G/DL (ref 13–17.7)
MAGNESIUM SERPL-MCNC: 1.9 MG/DL (ref 1.6–2.4)
MCH RBC QN AUTO: 20.3 PG (ref 26.6–33)
MCHC RBC AUTO-ENTMCNC: 29.7 G/DL (ref 31.5–35.7)
MCV RBC AUTO: 68.5 FL (ref 79–97)
PLATELET # BLD AUTO: 315 10*3/MM3 (ref 140–450)
PMV BLD AUTO: 10.5 FL (ref 6–12)
POTASSIUM SERPL-SCNC: 3.9 MMOL/L (ref 3.5–5.2)
QT INTERVAL: 404 MS
QTC INTERVAL: 566 MS
RBC # BLD AUTO: 4.63 10*6/MM3 (ref 4.14–5.8)
SODIUM SERPL-SCNC: 139 MMOL/L (ref 136–145)
WBC NRBC COR # BLD: 9.65 10*3/MM3 (ref 3.4–10.8)

## 2023-10-23 PROCEDURE — 80048 BASIC METABOLIC PNL TOTAL CA: CPT | Performed by: INTERNAL MEDICINE

## 2023-10-23 PROCEDURE — 25010000002 CEFTRIAXONE PER 250 MG: Performed by: INTERNAL MEDICINE

## 2023-10-23 PROCEDURE — 25810000003 SODIUM CHLORIDE 0.9 % SOLUTION 250 ML FLEX CONT: Performed by: INTERNAL MEDICINE

## 2023-10-23 PROCEDURE — 85027 COMPLETE CBC AUTOMATED: CPT | Performed by: INTERNAL MEDICINE

## 2023-10-23 PROCEDURE — 94664 DEMO&/EVAL PT USE INHALER: CPT

## 2023-10-23 PROCEDURE — 94799 UNLISTED PULMONARY SVC/PX: CPT

## 2023-10-23 PROCEDURE — 25010000002 MAGNESIUM SULFATE 2 GM/50ML SOLUTION: Performed by: INTERNAL MEDICINE

## 2023-10-23 PROCEDURE — 94640 AIRWAY INHALATION TREATMENT: CPT

## 2023-10-23 PROCEDURE — 83735 ASSAY OF MAGNESIUM: CPT | Performed by: INTERNAL MEDICINE

## 2023-10-23 PROCEDURE — 94761 N-INVAS EAR/PLS OXIMETRY MLT: CPT

## 2023-10-23 PROCEDURE — 25010000002 NA FERRIC GLUC CPLX PER 12.5 MG: Performed by: INTERNAL MEDICINE

## 2023-10-23 RX ORDER — DILTIAZEM HYDROCHLORIDE 5 MG/ML
10 INJECTION INTRAVENOUS ONCE
Status: COMPLETED | OUTPATIENT
Start: 2023-10-23 | End: 2023-10-23

## 2023-10-23 RX ORDER — MAGNESIUM SULFATE HEPTAHYDRATE 40 MG/ML
2 INJECTION, SOLUTION INTRAVENOUS ONCE
Status: COMPLETED | OUTPATIENT
Start: 2023-10-23 | End: 2023-10-23

## 2023-10-23 RX ORDER — ECHINACEA PURPUREA EXTRACT 125 MG
2 TABLET ORAL AS NEEDED
Status: DISCONTINUED | OUTPATIENT
Start: 2023-10-23 | End: 2023-10-26 | Stop reason: HOSPADM

## 2023-10-23 RX ORDER — METHOCARBAMOL 500 MG/1
750 TABLET, FILM COATED ORAL EVERY 6 HOURS PRN
Status: DISCONTINUED | OUTPATIENT
Start: 2023-10-23 | End: 2023-10-26 | Stop reason: HOSPADM

## 2023-10-23 RX ORDER — POTASSIUM CHLORIDE 750 MG/1
40 CAPSULE, EXTENDED RELEASE ORAL ONCE
Status: COMPLETED | OUTPATIENT
Start: 2023-10-23 | End: 2023-10-23

## 2023-10-23 RX ADMIN — GUAIFENESIN 1200 MG: 600 TABLET, EXTENDED RELEASE ORAL at 21:37

## 2023-10-23 RX ADMIN — CILOSTAZOL 100 MG: 50 TABLET ORAL at 21:38

## 2023-10-23 RX ADMIN — SODIUM CHLORIDE 1000 MG: 900 INJECTION INTRAVENOUS at 18:59

## 2023-10-23 RX ADMIN — PANTOPRAZOLE SODIUM 40 MG: 40 TABLET, DELAYED RELEASE ORAL at 08:58

## 2023-10-23 RX ADMIN — CILOSTAZOL 100 MG: 50 TABLET ORAL at 08:57

## 2023-10-23 RX ADMIN — METOPROLOL SUCCINATE 50 MG: 50 TABLET, EXTENDED RELEASE ORAL at 08:57

## 2023-10-23 RX ADMIN — SODIUM CHLORIDE 250 MG: 9 INJECTION, SOLUTION INTRAVENOUS at 10:09

## 2023-10-23 RX ADMIN — ACETAMINOPHEN 650 MG: 325 TABLET ORAL at 03:50

## 2023-10-23 RX ADMIN — METHOCARBAMOL 750 MG: 500 TABLET ORAL at 21:36

## 2023-10-23 RX ADMIN — SENNOSIDES AND DOCUSATE SODIUM 2 TABLET: 8.6; 5 TABLET ORAL at 21:36

## 2023-10-23 RX ADMIN — AMANTADINE HYDROCHLORIDE 100 MG: 100 CAPSULE ORAL at 08:57

## 2023-10-23 RX ADMIN — CARBIDOPA AND LEVODOPA 1 TABLET: 25; 100 TABLET ORAL at 13:02

## 2023-10-23 RX ADMIN — TAMSULOSIN HYDROCHLORIDE 0.4 MG: 0.4 CAPSULE ORAL at 21:37

## 2023-10-23 RX ADMIN — Medication 5 MG: at 23:58

## 2023-10-23 RX ADMIN — ATORVASTATIN CALCIUM 10 MG: 10 TABLET, FILM COATED ORAL at 08:58

## 2023-10-23 RX ADMIN — QUETIAPINE FUMARATE 25 MG: 25 TABLET ORAL at 21:37

## 2023-10-23 RX ADMIN — BUDESONIDE AND FORMOTEROL FUMARATE DIHYDRATE 2 PUFF: 160; 4.5 AEROSOL RESPIRATORY (INHALATION) at 09:18

## 2023-10-23 RX ADMIN — CARBIDOPA AND LEVODOPA 1 TABLET: 25; 100 TABLET ORAL at 18:58

## 2023-10-23 RX ADMIN — MULTIPLE VITAMINS W/ MINERALS TAB 1 TABLET: TAB at 08:57

## 2023-10-23 RX ADMIN — DOXYCYCLINE 100 MG: 100 INJECTION, POWDER, LYOPHILIZED, FOR SOLUTION INTRAVENOUS at 08:57

## 2023-10-23 RX ADMIN — APIXABAN 5 MG: 5 TABLET, FILM COATED ORAL at 08:58

## 2023-10-23 RX ADMIN — MONTELUKAST 10 MG: 10 TABLET, FILM COATED ORAL at 21:36

## 2023-10-23 RX ADMIN — IPRATROPIUM BROMIDE AND ALBUTEROL SULFATE 3 ML: 2.5; .5 SOLUTION RESPIRATORY (INHALATION) at 16:24

## 2023-10-23 RX ADMIN — POTASSIUM CHLORIDE 40 MEQ: 750 CAPSULE, EXTENDED RELEASE ORAL at 10:57

## 2023-10-23 RX ADMIN — IPRATROPIUM BROMIDE AND ALBUTEROL SULFATE 3 ML: 2.5; .5 SOLUTION RESPIRATORY (INHALATION) at 09:10

## 2023-10-23 RX ADMIN — BUDESONIDE AND FORMOTEROL FUMARATE DIHYDRATE 2 PUFF: 160; 4.5 AEROSOL RESPIRATORY (INHALATION) at 21:14

## 2023-10-23 RX ADMIN — DILTIAZEM HYDROCHLORIDE 10 MG: 5 INJECTION INTRAVENOUS at 13:02

## 2023-10-23 RX ADMIN — GUAIFENESIN 1200 MG: 600 TABLET, EXTENDED RELEASE ORAL at 08:57

## 2023-10-23 RX ADMIN — CARBIDOPA AND LEVODOPA 1 TABLET: 25; 100 TABLET ORAL at 21:37

## 2023-10-23 RX ADMIN — AMANTADINE HYDROCHLORIDE 100 MG: 100 CAPSULE ORAL at 21:37

## 2023-10-23 RX ADMIN — FERROUS SULFATE TAB 325 MG (65 MG ELEMENTAL FE) 325 MG: 325 (65 FE) TAB at 08:57

## 2023-10-23 RX ADMIN — APIXABAN 5 MG: 5 TABLET, FILM COATED ORAL at 21:37

## 2023-10-23 RX ADMIN — TIOTROPIUM BROMIDE INHALATION SPRAY 2 PUFF: 3.12 SPRAY, METERED RESPIRATORY (INHALATION) at 09:10

## 2023-10-23 RX ADMIN — Medication 10 ML: at 21:38

## 2023-10-23 RX ADMIN — SALINE NASAL SPRAY 2 SPRAY: 1.5 SOLUTION NASAL at 04:40

## 2023-10-23 RX ADMIN — IPRATROPIUM BROMIDE AND ALBUTEROL SULFATE 3 ML: 2.5; .5 SOLUTION RESPIRATORY (INHALATION) at 21:14

## 2023-10-23 RX ADMIN — CARBIDOPA AND LEVODOPA 1 TABLET: 25; 100 TABLET ORAL at 08:58

## 2023-10-23 RX ADMIN — MAGNESIUM SULFATE HEPTAHYDRATE 2 G: 40 INJECTION, SOLUTION INTRAVENOUS at 10:57

## 2023-10-23 RX ADMIN — DOXYCYCLINE 100 MG: 100 INJECTION, POWDER, LYOPHILIZED, FOR SOLUTION INTRAVENOUS at 21:38

## 2023-10-23 NOTE — PROGRESS NOTES
[facility] Medicine Services  PROGRESS NOTE    Patient Name: Flaco Roque II  : 1945  MRN: [id number]    Date of Admission: 10/20/2023  Primary Care Physician: Azar Stern MD    Subjective   Subjective     CC:  Dyspnea     HPI:  Patient was seen and examined this morning.  Sitting comfortably in the recliner bedside.  Continues to feel well.  Complaining of generalized weakness otherwise no acute complaints.    Objective   Objective     Vital Signs:   Temp:  [96.2 °F (35.7 °C)-98.6 °F (37 °C)] 96.7 °F (35.9 °C)  Heart Rate:  [] 139  Resp:  [16-24] 22  BP: (116-140)/(56-85) 119/85  Flow (L/min):  [3-3.5] 3     Physical Exam:  General: Comfortable, not in distress, conversant and cooperative  Head: Atraumatic and normocephalic  Eyes: No Icterus.++  Pallor  Ears:  Ears appear intact with no abnormalities noted  Throat: No oral lesions, no thrush  Neck: Supple, trachea midline  Lungs: Diminished air entry both lung fields with scattered crackles   Heart:  Normal S1 and S2, no murmur, no gallop, No JVD, no lower extremity swelling  Abdomen:  Soft, no tenderness, no organomegaly, normal bowel sounds, no organomegaly  Extremities: pulses equal bilaterally  Skin: No bleeding, bruising or rash, normal skin turgor and elasticity  Neurologic: Cranial nerves appear intact with no evidence of facial asymmetry, normal motor and sensory functions in all 4 extremities  Psych: Alert and oriented x 3, normal mood    Results Reviewed:  LAB RESULTS:      Lab 10/23/23  0835 10/22/23  0434 10/21/23  0551 10/21/23  0011 10/20/23  1542   WBC 9.65 10.82* 15.43*  --  15.38*   HEMOGLOBIN 9.4* 8.1* 8.5* 8.0* 9.0*   HEMATOCRIT 31.7* 27.6* 29.0* 27.2* 29.8*   PLATELETS 315 264 315  --  296   NEUTROS ABS  --   --  13.40*  --  14.40*   IMMATURE GRANS (ABS)  --   --  0.05  --  0.05   LYMPHS ABS  --   --  1.00  --  0.17*   MONOS ABS  --   --  0.86  --  0.73   EOS ABS  --   --  0.07  --  0.00   MCV  68.5* 68.1* 68.6*  --  69.6*   PROCALCITONIN  --   --   --   --  0.77*   LACTATE  --   --   --   --  1.2         Lab 10/23/23  0527 10/22/23  0434 10/21/23  1725 10/21/23  0551 10/20/23  1542   SODIUM 139 134*  --  138 140   POTASSIUM 3.9 3.7  --  4.1 3.2*   CHLORIDE 108* 105  --  109* 106   CO2 20.0* 23.0  --  23.0 24.0   ANION GAP 11.0 6.0  --  6.0 10.0   BUN 7* 7*  --  9 14   CREATININE 0.54* 0.59*  --  0.61* 0.78   EGFR 102.6 99.9  --  98.9 91.9   GLUCOSE 90 121*  --  99 133*   CALCIUM 8.3* 7.9*  --  7.9* 8.4*   IONIZED CALCIUM  --   --   --  1.23  --    MAGNESIUM 1.9 2.5* 1.5*  --   --          Lab 10/21/23  0551 10/20/23  1542   TOTAL PROTEIN 5.5* 6.0   ALBUMIN 3.0* 3.5   GLOBULIN 2.5 2.5   ALT (SGPT) <5 <5   AST (SGOT) 13 11   BILIRUBIN 0.9 0.7   ALK PHOS 61 56   LIPASE  --  17         Lab 10/20/23  2035 10/20/23  1542   PROBNP  --  407.1   HSTROP T 30* 27*             Lab 10/20/23  2035   IRON 16*   IRON SATURATION (TSAT) 4*   TIBC 399   TRANSFERRIN 268   FERRITIN 72.57   FOLATE 11.20   VITAMIN B 12 237         Brief Urine Lab Results  (Last result in the past 365 days)        Color   Clarity   Blood   Leuk Est   Nitrite   Protein   CREAT   Urine HCG        08/29/23 0927 Yellow   Clear     Negative                       Microbiology Results Abnormal       Procedure Component Value - Date/Time    Blood Culture - Blood, Arm, Right [[id number]]  (Normal) Collected: 10/20/23 1620    Lab Status: Preliminary result Specimen: Blood from Arm, Right Updated: 10/22/23 1701     Blood Culture No growth at 2 days    Blood Culture - Blood, Hand, Right [[id number]]  (Normal) Collected: 10/20/23 1615    Lab Status: Preliminary result Specimen: Blood from Hand, Right Updated: 10/22/23 1701     Blood Culture No growth at 2 days    MRSA Screen, PCR (Inpatient) - Swab, Nares [[id number]]  (Normal) Collected: 10/21/23 1321    Lab Status: Final result Specimen: Swab from Nares Updated: 10/21/23 1457     MRSA PCR Negative     Narrative:      The negative predictive value of this diagnostic test is high and should only be used to consider de-escalating anti-MRSA therapy. A positive result may indicate colonization with MRSA and must be correlated clinically.  MRSA Negative    S. Pneumo Ag Urine or CSF - Urine, Urine, Clean Catch [[id number]]  (Normal) Collected: 10/21/23 0019    Lab Status: Final result Specimen: Urine, Clean Catch Updated: 10/21/23 1321     Strep Pneumo Ag Negative    Legionella Antigen, Urine - Urine, Urine, Clean Catch [[id number]]  (Normal) Collected: 10/21/23 0019    Lab Status: Final result Specimen: Urine, Clean Catch Updated: 10/21/23 1321     LEGIONELLA ANTIGEN, URINE Negative    COVID PRE-OP / PRE-PROCEDURE SCREENING ORDER (NO ISOLATION) - Swab, Nasal Cavity [[id number]]  (Normal) Collected: 10/20/23 1617    Lab Status: Final result Specimen: Swab from Nasal Cavity Updated: 10/20/23 1646    Narrative:      The following orders were created for panel order COVID PRE-OP / PRE-PROCEDURE SCREENING ORDER (NO ISOLATION) - Swab, Nasal Cavity.  Procedure                               Abnormality         Status                     ---------                               -----------         ------                     COVID-19 and FLU A/B PCR...[[id number]]  Normal              Final result                 Please view results for these tests on the individual orders.    COVID-19 and FLU A/B PCR - Swab, Nasopharynx [[id number]]  (Normal) Collected: 10/20/23 1617    Lab Status: Final result Specimen: Swab from Nasopharynx Updated: 10/20/23 1646     COVID19 Not Detected     Influenza A PCR Not Detected     Influenza B PCR Not Detected    Narrative:      Fact sheet for providers: https://www.fda.gov/media/389865/download    Fact sheet for patients: https://www.fda.gov/media/639219/download    Test performed by PCR.            No radiology results from the last 24 hrs    Results for orders placed during the hospital encounter of  08/24/23    Adult Transthoracic Echo Complete W/ Cont if Necessary Per Protocol    Interpretation Summary    Left ventricular ejection fraction appears to be 56 - 60%.    The left atrial cavity is mild to moderately dilated    There is mild to moderate thickening of the aortic valve    Mild to moderate tricuspid valve regurgitation is present. Estimated right ventricular systolic pressure from tricuspid regurgitation is mildly elevated (35-45 mmHg).    There is a trivial pericardial effusion.    Patient noted to be in atrial fibrillation with elevated rates during the study.      Current medications:  Scheduled Meds:amantadine, 100 mg, Oral, BID  apixaban, 5 mg, Oral, Q12H  atorvastatin, 10 mg, Oral, Daily  atropine, 2 drop, Both Eyes, Daily  budesonide-formoterol, 2 puff, Inhalation, BID - RT   And  tiotropium bromide monohydrate, 2 puff, Inhalation, Daily - RT  carbidopa-levodopa, 1 tablet, Oral, 4x Daily With Meals & Nightly  cefTRIAXone, 1,000 mg, Intravenous, Q24H  cilostazol, 100 mg, Oral, BID  dilTIAZem, 10 mg, Intravenous, Once  doxycycline, 100 mg, Intravenous, Q12H  ferric gluconate, 250 mg, Intravenous, Daily  ferrous sulfate, 325 mg, Oral, Daily With Breakfast  guaiFENesin, 1,200 mg, Oral, Q12H  ipratropium-albuterol, 3 mL, Nebulization, TID - RT  metoprolol succinate XL, 50 mg, Oral, Daily  montelukast, 10 mg, Oral, Nightly  multivitamin with minerals, 1 tablet, Oral, Daily  pantoprazole, 40 mg, Oral, Daily  QUEtiapine, 25 mg, Oral, Nightly  senna-docusate sodium, 2 tablet, Oral, BID  tamsulosin, 0.4 mg, Oral, Nightly      Continuous Infusions:   PRN Meds:.  acetaminophen    senna-docusate sodium **AND** polyethylene glycol **AND** bisacodyl **AND** bisacodyl    Calcium Replacement - Follow Nurse / BPA Driven Protocol    Magnesium Standard Dose Replacement - Follow Nurse / BPA Driven Protocol    melatonin    nitroglycerin    Phosphorus Replacement - Follow Nurse / BPA Driven Protocol    Potassium  Replacement - Follow Nurse / BPA Driven Protocol    sodium chloride    sodium chloride    Assessment & Plan   Assessment & Plan     Active Hospital Problems    Diagnosis  POA    Sepsis [A41.9]  Yes    Coronary artery disease involving native coronary artery of native heart without angina pectoris [I25.10]  Yes    Permanent atrial fibrillation [I48.21]  Yes    Pneumonia [J18.9]  Yes    Parkinson disease [G20.A1]  Yes    GERD without esophagitis [K21.9]  Yes    ABRIL (obstructive sleep apnea) [G47.33]  Yes    Pulmonary emphysema [J43.9]  Yes      Resolved Hospital Problems   No resolved problems to display.        Brief Hospital Course to date:  77 year old male presents to the ED with worsening shortness of air and chest that started this morning.  CXR consistent with bilateral lower lobe pneumonia.      Sepsis sepsis secondary to community-acquired pneumonia, POA  COPD, not on home O2  WBC: 15.38, procal 0.72,  on admission  - CT chest with patchy bilateral airspace opacities worse in the right lower lobe; ? Mild pulm edema   - Bcx NGTD; strep/legionella negative; MRSA negative; COVID/flu negative  Continue Rocephin and doxycycline  Continue Trelegy  Patient is not on oxygen at home  Continue oxygen supplementation.  Might need home O2     Symptomatic anemia  Hemoglobin at baseline is 10-11.  Hemoglobin is 9 during this hospitalization with low iron studies  Start IV iron replacement  Monitor H&H    Permanent atrial fibrillation   Last echo 8/24/23 EF 56-60%. Mild to moderate TVR. RVSP: 35-45 mmhg.   Rate controlled with Toprol XL  Continue Eliquis     Chronic pain  On fentanyl pain pump   Continue PRN tylenol      Parkinson's disease  Continue sinemet, amantadine   Fall precautions  PT/OT      Hyperlipidemia  Continue statin      GERD  PPI      CAD  [facility] 9/5/2023 minor obstructive CAD, normal LAD, mild circumflex 30-40%, mid RCA 20-30%  Continue ASA, statin     Expected Discharge Location and Transportation:  home with assist   Expected Discharge   Expected Discharge Date: 10/25/2023; Expected Discharge Time:      DVT prophylaxis:  Medical DVT prophylaxis orders are present.     AM-PAC 6 Clicks Score (PT): 21 (10/22/23 2040)    CODE STATUS:   Code Status and Medical Interventions:   Ordered at: 10/20/23 2012     Level Of Support Discussed With:    Patient     Code Status (Patient has no pulse and is not breathing):    CPR (Attempt to Resuscitate)     Medical Interventions (Patient has pulse or is breathing):    Full Support     Copied text in this note has been reviewed and is accurate as of 10/23/23.     Steve Calhoun MD  10/23/23

## 2023-10-23 NOTE — CASE MANAGEMENT/SOCIAL WORK
Continued Stay Note  [facility]     Patient Name: Flaco Roque II  MRN: [id number]  Today's Date: 10/23/2023    Admit Date: 10/20/2023    Plan: Home with family   Discharge Plan       Row Name 10/23/23 1603       Plan    Plan Home with family    Patient/Family in Agreement with Plan yes    Plan Comments Spoke with patient at bedside. We discussed home health services. He declined home health. Patient states that [facility] and has an handicap accessible home. His discharge plan is home with private transport. No discharge needs at this time. CM will follow.    Final Discharge Disposition Code 01 - home or self-care                   Discharge Codes    No documentation.                 Expected Discharge Date and Time       Expected Discharge Date Expected Discharge Time    Oct 25, 2023               Belinda Jaramillo RN

## 2023-10-23 NOTE — PLAN OF CARE
Goal Outcome Evaluation:  Plan of Care Reviewed With: patient        Progress: no change  Outcome Evaluation: HR 120s-170s at the begining of shift IV cardizem x1 dose given. HR now 110s-120s. Pt remains a. fib on the monitor. Pt remains on 3L NC with no signs of SOA. Potassium and mag replaced. IV iron started. muscle relaxer added for leg cramps.

## 2023-10-24 ENCOUNTER — APPOINTMENT (OUTPATIENT)
Dept: GENERAL RADIOLOGY | Facility: HOSPITAL | Age: 78
End: 2023-10-24
Payer: MEDICARE

## 2023-10-24 LAB
ALBUMIN SERPL-MCNC: 3.4 G/DL (ref 3.5–5.2)
ALBUMIN/GLOB SERPL: 1.1 G/DL
ALP SERPL-CCNC: 63 U/L (ref 39–117)
ALT SERPL W P-5'-P-CCNC: <5 U/L (ref 1–41)
ANION GAP SERPL CALCULATED.3IONS-SCNC: 9 MMOL/L (ref 5–15)
AST SERPL-CCNC: 25 U/L (ref 1–40)
BASOPHILS # BLD AUTO: 0.04 10*3/MM3 (ref 0–0.2)
BASOPHILS NFR BLD AUTO: 0.6 % (ref 0–1.5)
BILIRUB SERPL-MCNC: 0.4 MG/DL (ref 0–1.2)
BUN SERPL-MCNC: 6 MG/DL (ref 8–23)
BUN/CREAT SERPL: 10 (ref 7–25)
CALCIUM SPEC-SCNC: 9.1 MG/DL (ref 8.6–10.5)
CHLORIDE SERPL-SCNC: 103 MMOL/L (ref 98–107)
CHOLEST SERPL-MCNC: 108 MG/DL (ref 0–200)
CO2 SERPL-SCNC: 24 MMOL/L (ref 22–29)
CREAT SERPL-MCNC: 0.6 MG/DL (ref 0.76–1.27)
CRP SERPL-MCNC: 5.35 MG/DL (ref 0–0.5)
DEPRECATED RDW RBC AUTO: 49.7 FL (ref 37–54)
EGFRCR SERPLBLD CKD-EPI 2021: 99.4 ML/MIN/1.73
EOSINOPHIL # BLD AUTO: 0.32 10*3/MM3 (ref 0–0.4)
EOSINOPHIL NFR BLD AUTO: 4.6 % (ref 0.3–6.2)
ERYTHROCYTE [DISTWIDTH] IN BLOOD BY AUTOMATED COUNT: 21.2 % (ref 12.3–15.4)
GLOBULIN UR ELPH-MCNC: 3.1 GM/DL
GLUCOSE SERPL-MCNC: 97 MG/DL (ref 65–99)
HCT VFR BLD AUTO: 31.9 % (ref 37.5–51)
HGB BLD-MCNC: 9.3 G/DL (ref 13–17.7)
IMM GRANULOCYTES # BLD AUTO: 0.02 10*3/MM3 (ref 0–0.05)
IMM GRANULOCYTES NFR BLD AUTO: 0.3 % (ref 0–0.5)
LYMPHOCYTES # BLD AUTO: 0.92 10*3/MM3 (ref 0.7–3.1)
LYMPHOCYTES NFR BLD AUTO: 13.1 % (ref 19.6–45.3)
MAGNESIUM SERPL-MCNC: 1.8 MG/DL (ref 1.6–2.4)
MCH RBC QN AUTO: 19.7 PG (ref 26.6–33)
MCHC RBC AUTO-ENTMCNC: 29.2 G/DL (ref 31.5–35.7)
MCV RBC AUTO: 67.6 FL (ref 79–97)
MONOCYTES # BLD AUTO: 0.64 10*3/MM3 (ref 0.1–0.9)
MONOCYTES NFR BLD AUTO: 9.1 % (ref 5–12)
NEUTROPHILS NFR BLD AUTO: 5.07 10*3/MM3 (ref 1.7–7)
NEUTROPHILS NFR BLD AUTO: 72.3 % (ref 42.7–76)
NRBC BLD AUTO-RTO: 0 /100 WBC (ref 0–0.2)
PHOSPHATE SERPL-MCNC: 3.1 MG/DL (ref 2.5–4.5)
PLATELET # BLD AUTO: 346 10*3/MM3 (ref 140–450)
PMV BLD AUTO: 10.4 FL (ref 6–12)
POTASSIUM SERPL-SCNC: 3.8 MMOL/L (ref 3.5–5.2)
PROT SERPL-MCNC: 6.5 G/DL (ref 6–8.5)
RBC # BLD AUTO: 4.72 10*6/MM3 (ref 4.14–5.8)
SODIUM SERPL-SCNC: 136 MMOL/L (ref 136–145)
TRIGL SERPL-MCNC: 48 MG/DL (ref 0–150)
WBC NRBC COR # BLD: 7.01 10*3/MM3 (ref 3.4–10.8)

## 2023-10-24 PROCEDURE — 97535 SELF CARE MNGMENT TRAINING: CPT

## 2023-10-24 PROCEDURE — 25010000002 DIGOXIN PER 500 MCG: Performed by: INTERNAL MEDICINE

## 2023-10-24 PROCEDURE — 94799 UNLISTED PULMONARY SVC/PX: CPT

## 2023-10-24 PROCEDURE — 86140 C-REACTIVE PROTEIN: CPT | Performed by: INTERNAL MEDICINE

## 2023-10-24 PROCEDURE — 25810000003 SODIUM CHLORIDE 0.9 % SOLUTION 250 ML FLEX CONT: Performed by: INTERNAL MEDICINE

## 2023-10-24 PROCEDURE — 82465 ASSAY BLD/SERUM CHOLESTEROL: CPT | Performed by: INTERNAL MEDICINE

## 2023-10-24 PROCEDURE — 80053 COMPREHEN METABOLIC PANEL: CPT | Performed by: INTERNAL MEDICINE

## 2023-10-24 PROCEDURE — 71045 X-RAY EXAM CHEST 1 VIEW: CPT

## 2023-10-24 PROCEDURE — 97110 THERAPEUTIC EXERCISES: CPT

## 2023-10-24 PROCEDURE — 97116 GAIT TRAINING THERAPY: CPT

## 2023-10-24 PROCEDURE — 97530 THERAPEUTIC ACTIVITIES: CPT

## 2023-10-24 PROCEDURE — 84100 ASSAY OF PHOSPHORUS: CPT | Performed by: INTERNAL MEDICINE

## 2023-10-24 PROCEDURE — 84478 ASSAY OF TRIGLYCERIDES: CPT | Performed by: INTERNAL MEDICINE

## 2023-10-24 PROCEDURE — 25010000002 NA FERRIC GLUC CPLX PER 12.5 MG: Performed by: INTERNAL MEDICINE

## 2023-10-24 PROCEDURE — 94664 DEMO&/EVAL PT USE INHALER: CPT

## 2023-10-24 PROCEDURE — 94761 N-INVAS EAR/PLS OXIMETRY MLT: CPT

## 2023-10-24 PROCEDURE — 85025 COMPLETE CBC W/AUTO DIFF WBC: CPT | Performed by: INTERNAL MEDICINE

## 2023-10-24 PROCEDURE — 83735 ASSAY OF MAGNESIUM: CPT | Performed by: INTERNAL MEDICINE

## 2023-10-24 PROCEDURE — 25010000002 CEFTRIAXONE PER 250 MG: Performed by: INTERNAL MEDICINE

## 2023-10-24 RX ORDER — METOPROLOL SUCCINATE 100 MG/1
100 TABLET, EXTENDED RELEASE ORAL
Status: DISCONTINUED | OUTPATIENT
Start: 2023-10-24 | End: 2023-10-25

## 2023-10-24 RX ORDER — DIGOXIN 0.25 MG/ML
500 INJECTION INTRAMUSCULAR; INTRAVENOUS ONCE
Status: COMPLETED | OUTPATIENT
Start: 2023-10-24 | End: 2023-10-24

## 2023-10-24 RX ORDER — TORSEMIDE 20 MG/1
20 TABLET ORAL DAILY
Status: DISCONTINUED | OUTPATIENT
Start: 2023-10-24 | End: 2023-10-26 | Stop reason: HOSPADM

## 2023-10-24 RX ORDER — ATROPINE SULFATE 10 MG/ML
2 SOLUTION/ DROPS OPHTHALMIC DAILY
Status: DISCONTINUED | OUTPATIENT
Start: 2023-10-24 | End: 2023-10-26 | Stop reason: HOSPADM

## 2023-10-24 RX ADMIN — METHOCARBAMOL 750 MG: 500 TABLET ORAL at 18:07

## 2023-10-24 RX ADMIN — CARBIDOPA AND LEVODOPA 1 TABLET: 25; 100 TABLET ORAL at 11:45

## 2023-10-24 RX ADMIN — SODIUM CHLORIDE 1000 MG: 900 INJECTION INTRAVENOUS at 17:55

## 2023-10-24 RX ADMIN — MULTIPLE VITAMINS W/ MINERALS TAB 1 TABLET: TAB at 09:36

## 2023-10-24 RX ADMIN — ATORVASTATIN CALCIUM 10 MG: 10 TABLET, FILM COATED ORAL at 09:36

## 2023-10-24 RX ADMIN — TIOTROPIUM BROMIDE INHALATION SPRAY 2 PUFF: 3.12 SPRAY, METERED RESPIRATORY (INHALATION) at 07:13

## 2023-10-24 RX ADMIN — TORSEMIDE 20 MG: 20 TABLET ORAL at 09:36

## 2023-10-24 RX ADMIN — SENNOSIDES AND DOCUSATE SODIUM 2 TABLET: 8.6; 5 TABLET ORAL at 21:25

## 2023-10-24 RX ADMIN — Medication 10 ML: at 09:38

## 2023-10-24 RX ADMIN — IPRATROPIUM BROMIDE AND ALBUTEROL SULFATE 3 ML: 2.5; .5 SOLUTION RESPIRATORY (INHALATION) at 07:13

## 2023-10-24 RX ADMIN — DOXYCYCLINE 100 MG: 100 INJECTION, POWDER, LYOPHILIZED, FOR SOLUTION INTRAVENOUS at 09:35

## 2023-10-24 RX ADMIN — METOPROLOL SUCCINATE 100 MG: 100 TABLET, EXTENDED RELEASE ORAL at 09:36

## 2023-10-24 RX ADMIN — Medication 5 MG: at 21:26

## 2023-10-24 RX ADMIN — MONTELUKAST 10 MG: 10 TABLET, FILM COATED ORAL at 21:26

## 2023-10-24 RX ADMIN — CARBIDOPA AND LEVODOPA 1 TABLET: 25; 100 TABLET ORAL at 09:36

## 2023-10-24 RX ADMIN — AMANTADINE HYDROCHLORIDE 100 MG: 100 CAPSULE ORAL at 21:35

## 2023-10-24 RX ADMIN — GUAIFENESIN 1200 MG: 600 TABLET, EXTENDED RELEASE ORAL at 09:36

## 2023-10-24 RX ADMIN — DIGOXIN 500 MCG: 0.25 INJECTION INTRAMUSCULAR; INTRAVENOUS at 09:35

## 2023-10-24 RX ADMIN — ATROPINE SULFATE 2 DROP: 10 SOLUTION OPHTHALMIC at 09:41

## 2023-10-24 RX ADMIN — CARBIDOPA AND LEVODOPA 1 TABLET: 25; 100 TABLET ORAL at 21:26

## 2023-10-24 RX ADMIN — CILOSTAZOL 100 MG: 50 TABLET ORAL at 21:25

## 2023-10-24 RX ADMIN — DOXYCYCLINE 100 MG: 100 INJECTION, POWDER, LYOPHILIZED, FOR SOLUTION INTRAVENOUS at 21:35

## 2023-10-24 RX ADMIN — QUETIAPINE FUMARATE 25 MG: 25 TABLET ORAL at 21:26

## 2023-10-24 RX ADMIN — FERROUS SULFATE TAB 325 MG (65 MG ELEMENTAL FE) 325 MG: 325 (65 FE) TAB at 09:36

## 2023-10-24 RX ADMIN — BUDESONIDE AND FORMOTEROL FUMARATE DIHYDRATE 2 PUFF: 160; 4.5 AEROSOL RESPIRATORY (INHALATION) at 07:20

## 2023-10-24 RX ADMIN — GUAIFENESIN 1200 MG: 600 TABLET, EXTENDED RELEASE ORAL at 21:25

## 2023-10-24 RX ADMIN — PANTOPRAZOLE SODIUM 40 MG: 40 TABLET, DELAYED RELEASE ORAL at 09:36

## 2023-10-24 RX ADMIN — SODIUM CHLORIDE 250 MG: 9 INJECTION, SOLUTION INTRAVENOUS at 14:23

## 2023-10-24 RX ADMIN — IPRATROPIUM BROMIDE AND ALBUTEROL SULFATE 3 ML: 2.5; .5 SOLUTION RESPIRATORY (INHALATION) at 21:28

## 2023-10-24 RX ADMIN — APIXABAN 5 MG: 5 TABLET, FILM COATED ORAL at 09:36

## 2023-10-24 RX ADMIN — APIXABAN 5 MG: 5 TABLET, FILM COATED ORAL at 21:26

## 2023-10-24 RX ADMIN — CILOSTAZOL 100 MG: 50 TABLET ORAL at 09:36

## 2023-10-24 RX ADMIN — IPRATROPIUM BROMIDE AND ALBUTEROL SULFATE 3 ML: 2.5; .5 SOLUTION RESPIRATORY (INHALATION) at 16:39

## 2023-10-24 RX ADMIN — Medication 10 ML: at 21:26

## 2023-10-24 RX ADMIN — AMANTADINE HYDROCHLORIDE 100 MG: 100 CAPSULE ORAL at 09:44

## 2023-10-24 RX ADMIN — CARBIDOPA AND LEVODOPA 1 TABLET: 25; 100 TABLET ORAL at 17:55

## 2023-10-24 RX ADMIN — BUDESONIDE AND FORMOTEROL FUMARATE DIHYDRATE 2 PUFF: 160; 4.5 AEROSOL RESPIRATORY (INHALATION) at 21:28

## 2023-10-24 RX ADMIN — TAMSULOSIN HYDROCHLORIDE 0.4 MG: 0.4 CAPSULE ORAL at 21:26

## 2023-10-24 NOTE — PLAN OF CARE
Goal Outcome Evaluation:  Plan of Care Reviewed With: patient        Progress: improving  Outcome Evaluation: Pt able to progress gait distance in hallway, however remains limited by cardiopulmonary status. Pt pleasant and cooperative and motivated to improve functional mobility independence. Pt remains below baseline and IPPT services continue to be warranted at this time.

## 2023-10-24 NOTE — PLAN OF CARE
Goal Outcome Evaluation:  Plan of Care Reviewed With: patient        Progress: no change  Outcome Evaluation: Pt decreased from 3L NC to RA. Pt's HR uncontrolled again this morning. Metoprolol dose increased to 100mg and 1 time dose of dig IV given. HR more controlled after. No pain noted. Possible DC tomorrow if HR remains controlled.
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
## 2023-10-24 NOTE — PLAN OF CARE
Goal Outcome Evaluation:  Plan of Care Reviewed With: patient        Progress: improving  Outcome Evaluation: Pt's ADL independence continues to be limited d/t generalized weakness, decreased functional endurance, and mild balance deficits. Will continue to progress pt as tolerated per OT POC. Rec home w/ A at d/c.      Anticipated Discharge Disposition (OT): home with assist

## 2023-10-24 NOTE — PLAN OF CARE
"Goal Outcome Evaluation:  Plan of Care Reviewed With: patient        Progress: no change  Outcome Evaluation: Patient rested overnight but did not sleep much. VSS. PRN muscle relaxer given per order for leg cramps, however, pt insists it made him \"Go crazy\". No new complaints.         "
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
## 2023-10-24 NOTE — PROGRESS NOTES
[facility] Medicine Services  PROGRESS NOTE    Patient Name: Flaco Roque II  : 1945  MRN: [id number]    Date of Admission: 10/20/2023  Primary Care Physician: Azar Stern MD    Subjective   Subjective     CC:  Dyspnea     HPI:  Patient was seen and examined this morning.  Complaining of worsening shortness of breath and cough overnight.  Oxygen saturation is 91% on 3 L nasal cannula.  Heart rate is not controlled, currently A-fib with heart rate ranging between 1 20-1 40    Objective   Objective     Vital Signs:   Temp:  [95.4 °F (35.2 °C)-97.6 °F (36.4 °C)] 96.7 °F (35.9 °C)  Heart Rate:  [] 90  Resp:  [18-22] 21  BP: (110-147)/(64-91) 137/71  Flow (L/min):  [3-3.5] 3     Physical Exam:  General: Comfortable, not in distress, conversant and cooperative  Head: Atraumatic and normocephalic  Eyes: No Icterus.++  Pallor  Ears:  Ears appear intact with no abnormalities noted  Throat: No oral lesions, no thrush  Neck: Supple, trachea midline  Lungs: Diminished air entry both lung fields with scattered crackles   Heart:  Normal S1 and S2, no murmur, no gallop, No JVD, no lower extremity swelling  Abdomen:  Soft, no tenderness, no organomegaly, normal bowel sounds, no organomegaly  Extremities: pulses equal bilaterally  Skin: No bleeding, bruising or rash, normal skin turgor and elasticity  Neurologic: Cranial nerves appear intact with no evidence of facial asymmetry, normal motor and sensory functions in all 4 extremities  Psych: Alert and oriented x 3, normal mood    Results Reviewed:  LAB RESULTS:      Lab 10/23/23  0835 10/22/23  0434 10/21/23  0551 10/21/23  0011 10/20/23  1542   WBC 9.65 10.82* 15.43*  --  15.38*   HEMOGLOBIN 9.4* 8.1* 8.5* 8.0* 9.0*   HEMATOCRIT 31.7* 27.6* 29.0* 27.2* 29.8*   PLATELETS 315 264 315  --  296   NEUTROS ABS  --   --  13.40*  --  14.40*   IMMATURE GRANS (ABS)  --   --  0.05  --  0.05   LYMPHS ABS  --   --  1.00  --  0.17*   MONOS ABS   --   --  0.86  --  0.73   EOS ABS  --   --  0.07  --  0.00   MCV 68.5* 68.1* 68.6*  --  69.6*   PROCALCITONIN  --   --   --   --  0.77*   LACTATE  --   --   --   --  1.2         Lab 10/23/23  0527 10/22/23  0434 10/21/23  1725 10/21/23  0551 10/20/23  1542   SODIUM 139 134*  --  138 140   POTASSIUM 3.9 3.7  --  4.1 3.2*   CHLORIDE 108* 105  --  109* 106   CO2 20.0* 23.0  --  23.0 24.0   ANION GAP 11.0 6.0  --  6.0 10.0   BUN 7* 7*  --  9 14   CREATININE 0.54* 0.59*  --  0.61* 0.78   EGFR 102.6 99.9  --  98.9 91.9   GLUCOSE 90 121*  --  99 133*   CALCIUM 8.3* 7.9*  --  7.9* 8.4*   IONIZED CALCIUM  --   --   --  1.23  --    MAGNESIUM 1.9 2.5* 1.5*  --   --          Lab 10/21/23  0551 10/20/23  1542   TOTAL PROTEIN 5.5* 6.0   ALBUMIN 3.0* 3.5   GLOBULIN 2.5 2.5   ALT (SGPT) <5 <5   AST (SGOT) 13 11   BILIRUBIN 0.9 0.7   ALK PHOS 61 56   LIPASE  --  17         Lab 10/20/23  2035 10/20/23  1542   PROBNP  --  407.1   HSTROP T 30* 27*             Lab 10/20/23  2035   IRON 16*   IRON SATURATION (TSAT) 4*   TIBC 399   TRANSFERRIN 268   FERRITIN 72.57   FOLATE 11.20   VITAMIN B 12 237         Brief Urine Lab Results  (Last result in the past 365 days)        Color   Clarity   Blood   Leuk Est   Nitrite   Protein   CREAT   Urine HCG        08/29/23 0927 Yellow   Clear     Negative                       Microbiology Results Abnormal       Procedure Component Value - Date/Time    Blood Culture - Blood, Arm, Right [[id number]]  (Normal) Collected: 10/20/23 1620    Lab Status: Preliminary result Specimen: Blood from Arm, Right Updated: 10/23/23 1701     Blood Culture No growth at 3 days    Blood Culture - Blood, Hand, Right [[id number]]  (Normal) Collected: 10/20/23 1615    Lab Status: Preliminary result Specimen: Blood from Hand, Right Updated: 10/23/23 1701     Blood Culture No growth at 3 days    MRSA Screen, PCR (Inpatient) - Swab, Nares [[id number]]  (Normal) Collected: 10/21/23 1321    Lab Status: Final result Specimen: Swab  from Nares Updated: 10/21/23 1457     MRSA PCR Negative    Narrative:      The negative predictive value of this diagnostic test is high and should only be used to consider de-escalating anti-MRSA therapy. A positive result may indicate colonization with MRSA and must be correlated clinically.  MRSA Negative    S. Pneumo Ag Urine or CSF - Urine, Urine, Clean Catch [[id number]]  (Normal) Collected: 10/21/23 0019    Lab Status: Final result Specimen: Urine, Clean Catch Updated: 10/21/23 1321     Strep Pneumo Ag Negative    Legionella Antigen, Urine - Urine, Urine, Clean Catch [[id number]]  (Normal) Collected: 10/21/23 0019    Lab Status: Final result Specimen: Urine, Clean Catch Updated: 10/21/23 1321     LEGIONELLA ANTIGEN, URINE Negative    COVID PRE-OP / PRE-PROCEDURE SCREENING ORDER (NO ISOLATION) - Swab, Nasal Cavity [[id number]]  (Normal) Collected: 10/20/23 1617    Lab Status: Final result Specimen: Swab from Nasal Cavity Updated: 10/20/23 1646    Narrative:      The following orders were created for panel order COVID PRE-OP / PRE-PROCEDURE SCREENING ORDER (NO ISOLATION) - Swab, Nasal Cavity.  Procedure                               Abnormality         Status                     ---------                               -----------         ------                     COVID-19 and FLU A/B PCR...[[id number]]  Normal              Final result                 Please view results for these tests on the individual orders.    COVID-19 and FLU A/B PCR - Swab, Nasopharynx [[id number]]  (Normal) Collected: 10/20/23 1617    Lab Status: Final result Specimen: Swab from Nasopharynx Updated: 10/20/23 1646     COVID19 Not Detected     Influenza A PCR Not Detected     Influenza B PCR Not Detected    Narrative:      Fact sheet for providers: https://www.fda.gov/media/713671/download    Fact sheet for patients: https://www.fda.gov/media/900451/download    Test performed by PCR.            No radiology results from the last 24  hrs    Results for orders placed during the hospital encounter of 08/24/23    Adult Transthoracic Echo Complete W/ Cont if Necessary Per Protocol    Interpretation Summary    Left ventricular ejection fraction appears to be 56 - 60%.    The left atrial cavity is mild to moderately dilated    There is mild to moderate thickening of the aortic valve    Mild to moderate tricuspid valve regurgitation is present. Estimated right ventricular systolic pressure from tricuspid regurgitation is mildly elevated (35-45 mmHg).    There is a trivial pericardial effusion.    Patient noted to be in atrial fibrillation with elevated rates during the study.      Current medications:  Scheduled Meds:amantadine, 100 mg, Oral, BID  apixaban, 5 mg, Oral, Q12H  atorvastatin, 10 mg, Oral, Daily  atropine, 2 drop, Sublingual, Daily  budesonide-formoterol, 2 puff, Inhalation, BID - RT   And  tiotropium bromide monohydrate, 2 puff, Inhalation, Daily - RT  carbidopa-levodopa, 1 tablet, Oral, 4x Daily With Meals & Nightly  cefTRIAXone, 1,000 mg, Intravenous, Q24H  cilostazol, 100 mg, Oral, BID  doxycycline, 100 mg, Intravenous, Q12H  ferric gluconate, 250 mg, Intravenous, Daily  ferrous sulfate, 325 mg, Oral, Daily With Breakfast  guaiFENesin, 1,200 mg, Oral, Q12H  ipratropium-albuterol, 3 mL, Nebulization, TID - RT  metoprolol succinate XL, 50 mg, Oral, Daily  montelukast, 10 mg, Oral, Nightly  multivitamin with minerals, 1 tablet, Oral, Daily  pantoprazole, 40 mg, Oral, Daily  QUEtiapine, 25 mg, Oral, Nightly  senna-docusate sodium, 2 tablet, Oral, BID  tamsulosin, 0.4 mg, Oral, Nightly      Continuous Infusions:   PRN Meds:.  acetaminophen    senna-docusate sodium **AND** polyethylene glycol **AND** bisacodyl **AND** bisacodyl    Calcium Replacement - Follow Nurse / BPA Driven Protocol    Magnesium Standard Dose Replacement - Follow Nurse / BPA Driven Protocol    melatonin    methocarbamol    nitroglycerin    Phosphorus Replacement - Follow  Nurse / BPA Driven Protocol    Potassium Replacement - Follow Nurse / BPA Driven Protocol    sodium chloride    sodium chloride    Assessment & Plan   Assessment & Plan     Active Hospital Problems    Diagnosis  POA    Sepsis [A41.9]  Yes    Coronary artery disease involving native coronary artery of native heart without angina pectoris [I25.10]  Yes    Permanent atrial fibrillation [I48.21]  Yes    Parkinson, PNA [J18.9]  Yes    Parkinson disease [G20.A1]  Yes    GERD without esophagitis [K21.9]  Yes    ABRIL (obstructive sleep apnea) [G47.33]  Yes    Pulmonary emphysema [J43.9]  Yes      Resolved Hospital Problems   No resolved problems to display.        Brief Hospital Course to date:  77 year old male presents to the ED with worsening shortness of air and chest that started this morning.  CXR consistent with bilateral lower lobe pneumonia.      Sepsis sepsis secondary to community-acquired pneumonia, POA  COPD, not on home O2  WBC: 15.38, procal 0.72,  on admission  CT chest with patchy bilateral airspace opacities worse in the right lower lobe; ? Mild pulm edema   Bcx NGTD; strep/legionella negative; MRSA negative; COVID/flu negative  Continue Rocephin and doxycycline  Continue Trelegy  Patient is not on oxygen at home  Continue oxygen supplementation.  Might need home O2     Permanent atrial fibrillation   A-fib with RVR  Last echo 8/24/23 EF 56-60%. Mild to moderate TVR. RVSP: 35-45 mmhg.   Heart rate is not controlled.  Increase Toprol-XL to 100 mg daily.  We will give 1 dose of IV digoxin 0.5 mg   Continue Eliquis    Symptomatic anemia  Hemoglobin at baseline is 10-11.  Hemoglobin is 9 during this hospitalization with low iron studies  Continue IV iron replacement  Monitor H&H     Chronic pain  On fentanyl pain pump   Continue PRN tylenol      Parkinson's disease  Continue sinemet, amantadine   Fall precautions  PT/OT      Hyperlipidemia  Continue statin      GERD  PPI      CAD  C 9/5/2023 minor  obstructive CAD, normal LAD, mild circumflex 30-40%, mid RCA 20-30%  Continue ASA, statin     Expected Discharge Location and Transportation: home with assist   Expected Discharge   Expected Discharge Date: 10/25/2023; Expected Discharge Time:      DVT prophylaxis:  Medical DVT prophylaxis orders are present.     AM-PAC 6 Clicks Score (PT): 21 (10/24/23 0500)    CODE STATUS:   Code Status and Medical Interventions:   Ordered at: 10/20/23 2012     Level Of Support Discussed With:    Patient     Code Status (Patient has no pulse and is not breathing):    CPR (Attempt to Resuscitate)     Medical Interventions (Patient has pulse or is breathing):    Full Support     Copied text in this note has been reviewed and is accurate as of 10/24/23.     Steve Calhoun MD  10/24/23
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
## 2023-10-25 LAB
ALBUMIN SERPL-MCNC: 3.2 G/DL (ref 3.5–5.2)
ALBUMIN/GLOB SERPL: 1.1 G/DL
ALP SERPL-CCNC: 62 U/L (ref 39–117)
ALT SERPL W P-5'-P-CCNC: 6 U/L (ref 1–41)
ANION GAP SERPL CALCULATED.3IONS-SCNC: 10 MMOL/L (ref 5–15)
AST SERPL-CCNC: 14 U/L (ref 1–40)
BACTERIA SPEC AEROBE CULT: NORMAL
BACTERIA SPEC AEROBE CULT: NORMAL
BASOPHILS # BLD AUTO: 0.06 10*3/MM3 (ref 0–0.2)
BASOPHILS NFR BLD AUTO: 0.7 % (ref 0–1.5)
BILIRUB SERPL-MCNC: 0.4 MG/DL (ref 0–1.2)
BUN SERPL-MCNC: 8 MG/DL (ref 8–23)
BUN/CREAT SERPL: 9.8 (ref 7–25)
CALCIUM SPEC-SCNC: 9.1 MG/DL (ref 8.6–10.5)
CHLORIDE SERPL-SCNC: 102 MMOL/L (ref 98–107)
CHOLEST SERPL-MCNC: 103 MG/DL (ref 0–200)
CO2 SERPL-SCNC: 27 MMOL/L (ref 22–29)
CREAT SERPL-MCNC: 0.82 MG/DL (ref 0.76–1.27)
CRP SERPL-MCNC: 3.93 MG/DL (ref 0–0.5)
DEPRECATED RDW RBC AUTO: 48.1 FL (ref 37–54)
EGFRCR SERPLBLD CKD-EPI 2021: 89.9 ML/MIN/1.73
EOSINOPHIL # BLD AUTO: 0.4 10*3/MM3 (ref 0–0.4)
EOSINOPHIL NFR BLD AUTO: 4.9 % (ref 0.3–6.2)
ERYTHROCYTE [DISTWIDTH] IN BLOOD BY AUTOMATED COUNT: 21.3 % (ref 12.3–15.4)
GLOBULIN UR ELPH-MCNC: 3 GM/DL
GLUCOSE SERPL-MCNC: 109 MG/DL (ref 65–99)
HCT VFR BLD AUTO: 31.8 % (ref 37.5–51)
HGB BLD-MCNC: 9.6 G/DL (ref 13–17.7)
IMM GRANULOCYTES # BLD AUTO: 0.04 10*3/MM3 (ref 0–0.05)
IMM GRANULOCYTES NFR BLD AUTO: 0.5 % (ref 0–0.5)
LYMPHOCYTES # BLD AUTO: 1.2 10*3/MM3 (ref 0.7–3.1)
LYMPHOCYTES NFR BLD AUTO: 14.8 % (ref 19.6–45.3)
MAGNESIUM SERPL-MCNC: 1.4 MG/DL (ref 1.6–2.4)
MCH RBC QN AUTO: 20.1 PG (ref 26.6–33)
MCHC RBC AUTO-ENTMCNC: 30.2 G/DL (ref 31.5–35.7)
MCV RBC AUTO: 66.7 FL (ref 79–97)
MONOCYTES # BLD AUTO: 0.85 10*3/MM3 (ref 0.1–0.9)
MONOCYTES NFR BLD AUTO: 10.5 % (ref 5–12)
NEUTROPHILS NFR BLD AUTO: 5.55 10*3/MM3 (ref 1.7–7)
NEUTROPHILS NFR BLD AUTO: 68.6 % (ref 42.7–76)
NRBC BLD AUTO-RTO: 0 /100 WBC (ref 0–0.2)
PHOSPHATE SERPL-MCNC: 4.2 MG/DL (ref 2.5–4.5)
PLATELET # BLD AUTO: 371 10*3/MM3 (ref 140–450)
PMV BLD AUTO: 10 FL (ref 6–12)
POTASSIUM SERPL-SCNC: 3.8 MMOL/L (ref 3.5–5.2)
PROT SERPL-MCNC: 6.2 G/DL (ref 6–8.5)
QT INTERVAL: 348 MS
QTC INTERVAL: 468 MS
RBC # BLD AUTO: 4.77 10*6/MM3 (ref 4.14–5.8)
SODIUM SERPL-SCNC: 139 MMOL/L (ref 136–145)
TRIGL SERPL-MCNC: 45 MG/DL (ref 0–150)
WBC NRBC COR # BLD: 8.1 10*3/MM3 (ref 3.4–10.8)

## 2023-10-25 PROCEDURE — 25010000002 MAGNESIUM SULFATE 2 GM/50ML SOLUTION: Performed by: INTERNAL MEDICINE

## 2023-10-25 PROCEDURE — 85025 COMPLETE CBC W/AUTO DIFF WBC: CPT | Performed by: INTERNAL MEDICINE

## 2023-10-25 PROCEDURE — 97116 GAIT TRAINING THERAPY: CPT

## 2023-10-25 PROCEDURE — 94799 UNLISTED PULMONARY SVC/PX: CPT

## 2023-10-25 PROCEDURE — 84478 ASSAY OF TRIGLYCERIDES: CPT | Performed by: INTERNAL MEDICINE

## 2023-10-25 PROCEDURE — 25810000003 SODIUM CHLORIDE 0.9 % SOLUTION 250 ML FLEX CONT: Performed by: INTERNAL MEDICINE

## 2023-10-25 PROCEDURE — 99222 1ST HOSP IP/OBS MODERATE 55: CPT | Performed by: INTERNAL MEDICINE

## 2023-10-25 PROCEDURE — 82465 ASSAY BLD/SERUM CHOLESTEROL: CPT | Performed by: INTERNAL MEDICINE

## 2023-10-25 PROCEDURE — 83735 ASSAY OF MAGNESIUM: CPT | Performed by: INTERNAL MEDICINE

## 2023-10-25 PROCEDURE — 97110 THERAPEUTIC EXERCISES: CPT

## 2023-10-25 PROCEDURE — 80053 COMPREHEN METABOLIC PANEL: CPT | Performed by: INTERNAL MEDICINE

## 2023-10-25 PROCEDURE — 84100 ASSAY OF PHOSPHORUS: CPT | Performed by: INTERNAL MEDICINE

## 2023-10-25 PROCEDURE — 86140 C-REACTIVE PROTEIN: CPT | Performed by: INTERNAL MEDICINE

## 2023-10-25 PROCEDURE — 25010000002 DIGOXIN PER 500 MCG: Performed by: INTERNAL MEDICINE

## 2023-10-25 PROCEDURE — 25010000002 NA FERRIC GLUC CPLX PER 12.5 MG: Performed by: INTERNAL MEDICINE

## 2023-10-25 RX ORDER — METOPROLOL SUCCINATE 50 MG/1
50 TABLET, EXTENDED RELEASE ORAL
Status: DISCONTINUED | OUTPATIENT
Start: 2023-10-26 | End: 2023-10-26

## 2023-10-25 RX ORDER — DIGOXIN 125 MCG
125 TABLET ORAL
Status: DISCONTINUED | OUTPATIENT
Start: 2023-10-26 | End: 2023-10-26 | Stop reason: HOSPADM

## 2023-10-25 RX ORDER — MAGNESIUM SULFATE HEPTAHYDRATE 40 MG/ML
2 INJECTION, SOLUTION INTRAVENOUS
Status: COMPLETED | OUTPATIENT
Start: 2023-10-25 | End: 2023-10-25

## 2023-10-25 RX ORDER — CEFDINIR 300 MG/1
300 CAPSULE ORAL EVERY 12 HOURS SCHEDULED
Qty: 6 CAPSULE | Refills: 0 | Status: DISCONTINUED | OUTPATIENT
Start: 2023-10-25 | End: 2023-10-26 | Stop reason: HOSPADM

## 2023-10-25 RX ORDER — DIGOXIN 0.25 MG/ML
250 INJECTION INTRAMUSCULAR; INTRAVENOUS ONCE
Status: COMPLETED | OUTPATIENT
Start: 2023-10-25 | End: 2023-10-25

## 2023-10-25 RX ADMIN — GUAIFENESIN 1200 MG: 600 TABLET, EXTENDED RELEASE ORAL at 08:14

## 2023-10-25 RX ADMIN — MULTIPLE VITAMINS W/ MINERALS TAB 1 TABLET: TAB at 08:14

## 2023-10-25 RX ADMIN — MAGNESIUM SULFATE HEPTAHYDRATE 2 G: 40 INJECTION, SOLUTION INTRAVENOUS at 13:10

## 2023-10-25 RX ADMIN — TAMSULOSIN HYDROCHLORIDE 0.4 MG: 0.4 CAPSULE ORAL at 20:50

## 2023-10-25 RX ADMIN — CARBIDOPA AND LEVODOPA 1 TABLET: 25; 100 TABLET ORAL at 08:14

## 2023-10-25 RX ADMIN — QUETIAPINE FUMARATE 25 MG: 25 TABLET ORAL at 20:50

## 2023-10-25 RX ADMIN — SODIUM CHLORIDE 250 MG: 9 INJECTION, SOLUTION INTRAVENOUS at 08:12

## 2023-10-25 RX ADMIN — AMANTADINE HYDROCHLORIDE 100 MG: 100 CAPSULE ORAL at 20:50

## 2023-10-25 RX ADMIN — Medication 5 MG: at 20:50

## 2023-10-25 RX ADMIN — Medication 10 ML: at 20:49

## 2023-10-25 RX ADMIN — MAGNESIUM SULFATE HEPTAHYDRATE 2 G: 40 INJECTION, SOLUTION INTRAVENOUS at 15:46

## 2023-10-25 RX ADMIN — METOPROLOL SUCCINATE 100 MG: 100 TABLET, EXTENDED RELEASE ORAL at 08:14

## 2023-10-25 RX ADMIN — CILOSTAZOL 100 MG: 50 TABLET ORAL at 20:50

## 2023-10-25 RX ADMIN — MONTELUKAST 10 MG: 10 TABLET, FILM COATED ORAL at 20:50

## 2023-10-25 RX ADMIN — Medication 10 ML: at 08:12

## 2023-10-25 RX ADMIN — AMANTADINE HYDROCHLORIDE 100 MG: 100 CAPSULE ORAL at 08:13

## 2023-10-25 RX ADMIN — BUDESONIDE AND FORMOTEROL FUMARATE DIHYDRATE 2 PUFF: 160; 4.5 AEROSOL RESPIRATORY (INHALATION) at 20:01

## 2023-10-25 RX ADMIN — TORSEMIDE 20 MG: 20 TABLET ORAL at 08:14

## 2023-10-25 RX ADMIN — CARBIDOPA AND LEVODOPA 1 TABLET: 25; 100 TABLET ORAL at 17:06

## 2023-10-25 RX ADMIN — IPRATROPIUM BROMIDE AND ALBUTEROL SULFATE 3 ML: 2.5; .5 SOLUTION RESPIRATORY (INHALATION) at 15:34

## 2023-10-25 RX ADMIN — SENNOSIDES AND DOCUSATE SODIUM 2 TABLET: 8.6; 5 TABLET ORAL at 08:14

## 2023-10-25 RX ADMIN — ATORVASTATIN CALCIUM 10 MG: 10 TABLET, FILM COATED ORAL at 08:14

## 2023-10-25 RX ADMIN — DOXYCYCLINE 100 MG: 100 INJECTION, POWDER, LYOPHILIZED, FOR SOLUTION INTRAVENOUS at 08:15

## 2023-10-25 RX ADMIN — FERROUS SULFATE TAB 325 MG (65 MG ELEMENTAL FE) 325 MG: 325 (65 FE) TAB at 08:14

## 2023-10-25 RX ADMIN — GUAIFENESIN 1200 MG: 600 TABLET, EXTENDED RELEASE ORAL at 20:50

## 2023-10-25 RX ADMIN — ATROPINE SULFATE 2 DROP: 10 SOLUTION OPHTHALMIC at 08:17

## 2023-10-25 RX ADMIN — DIGOXIN 250 MCG: 0.25 INJECTION INTRAMUSCULAR; INTRAVENOUS at 10:00

## 2023-10-25 RX ADMIN — CILOSTAZOL 100 MG: 50 TABLET ORAL at 08:14

## 2023-10-25 RX ADMIN — PANTOPRAZOLE SODIUM 40 MG: 40 TABLET, DELAYED RELEASE ORAL at 08:14

## 2023-10-25 RX ADMIN — CEFDINIR 300 MG: 300 CAPSULE ORAL at 20:49

## 2023-10-25 RX ADMIN — IPRATROPIUM BROMIDE AND ALBUTEROL SULFATE 3 ML: 2.5; .5 SOLUTION RESPIRATORY (INHALATION) at 07:31

## 2023-10-25 RX ADMIN — TIOTROPIUM BROMIDE INHALATION SPRAY 2 PUFF: 3.12 SPRAY, METERED RESPIRATORY (INHALATION) at 07:31

## 2023-10-25 RX ADMIN — BUDESONIDE AND FORMOTEROL FUMARATE DIHYDRATE 2 PUFF: 160; 4.5 AEROSOL RESPIRATORY (INHALATION) at 07:31

## 2023-10-25 RX ADMIN — APIXABAN 5 MG: 5 TABLET, FILM COATED ORAL at 08:14

## 2023-10-25 RX ADMIN — CARBIDOPA AND LEVODOPA 1 TABLET: 25; 100 TABLET ORAL at 20:50

## 2023-10-25 RX ADMIN — APIXABAN 5 MG: 5 TABLET, FILM COATED ORAL at 20:50

## 2023-10-25 RX ADMIN — MAGNESIUM SULFATE HEPTAHYDRATE 2 G: 40 INJECTION, SOLUTION INTRAVENOUS at 17:06

## 2023-10-25 RX ADMIN — CARBIDOPA AND LEVODOPA 1 TABLET: 25; 100 TABLET ORAL at 11:46

## 2023-10-25 RX ADMIN — IPRATROPIUM BROMIDE AND ALBUTEROL SULFATE 3 ML: 2.5; .5 SOLUTION RESPIRATORY (INHALATION) at 20:00

## 2023-10-25 NOTE — PLAN OF CARE
Goal Outcome Evaluation:  Plan of Care Reviewed With: patient        Progress: improving  Outcome Evaluation: Patient pleasant w/ good effort, but continues to be limited by generalized weakness, impaired balance and coordination, decreased activity tolerance, and remains below his functional baseline. He ambulated in room w/ CGA and readily fatigued; pt. asymptomatic, but tachycardic and hypotensive. PT recommends home w/ assist and HHPT when medically appropriate.      Anticipated Discharge Disposition (PT): home with assist, home with home health

## 2023-10-25 NOTE — CASE MANAGEMENT/SOCIAL WORK
Continued Stay Note  [facility]     Patient Name: Flaco Roque II  MRN: [id number]  Today's Date: 10/25/2023    Admit Date: 10/20/2023    Plan: Home with family   Discharge Plan       Row Name 10/25/23 1218       Plan    Plan Home with family    Patient/Family in Agreement with Plan yes    Plan Comments Discussed in MDR's. Patient discharge plan is home with family attentively tomorrow. CM will follow for discharge needs.    Final Discharge Disposition Code 01 - home or self-care                   Discharge Codes    No documentation.                 Expected Discharge Date and Time       Expected Discharge Date Expected Discharge Time    Oct 25, 2023               Belinda Jaramillo RN

## 2023-10-25 NOTE — PLAN OF CARE
Goal Outcome Evaluation:  Plan of Care Reviewed With: patient        Progress: no change  Outcome Evaluation: Patient on RA until sleeping tonight. Placed on 3.5L for sleep. HR ranges from 80s-120s this shift, however, mostly remained in 90s. VSS; no new complaints to this writer. Continue current POC. BP soft but stable.
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
## 2023-10-25 NOTE — PROGRESS NOTES
[facility] Medicine Services  PROGRESS NOTE    Patient Name: Flaco Roque II  : 1945  MRN: [id number]    Date of Admission: 10/20/2023  Primary Care Physician: Azar Stern MD    Subjective   Subjective     CC:  Dyspnea     HPI:  Patient was seen and examined this morning.  Continues to feel well.  A-fib is better controlled with heart rate in the 90s but he has been on the hypotensive side with systolic in the 90s to 100.  He denies any dizziness, lightheadedness    Objective   Objective     Vital Signs:   Temp:  [96 °F (35.6 °C)-97.4 °F (36.3 °C)] 96.1 °F (35.6 °C)  Heart Rate:  [] 98  Resp:  [20] 20  BP: ()/(62-73) 104/62  Flow (L/min):  [3-3.5] 3.5     Physical Exam:  General: Comfortable, not in distress, conversant and cooperative  Head: Atraumatic and normocephalic  Eyes: No Icterus.++  Pallor  Ears:  Ears appear intact with no abnormalities noted  Throat: No oral lesions, no thrush  Neck: Supple, trachea midline  Lungs: Diminished air entry both lung fields with scattered crackles   Heart:  Normal S1 and S2, no murmur, no gallop, No JVD, no lower extremity swelling  Abdomen:  Soft, no tenderness, no organomegaly, normal bowel sounds, no organomegaly  Extremities: pulses equal bilaterally  Skin: No bleeding, bruising or rash, normal skin turgor and elasticity  Neurologic: Cranial nerves appear intact with no evidence of facial asymmetry, normal motor and sensory functions in all 4 extremities  Psych: Alert and oriented x 3, normal mood    Results Reviewed:  LAB RESULTS:      Lab 10/25/23  0517 10/24/23  0519 10/23/23  0835 10/22/23  0434 10/21/23  0551 10/21/23  0011 10/20/23  1542   WBC 8.10 7.01 9.65 10.82* 15.43*  --  15.38*   HEMOGLOBIN 9.6* 9.3* 9.4* 8.1* 8.5*   < > 9.0*   HEMATOCRIT 31.8* 31.9* 31.7* 27.6* 29.0*   < > 29.8*   PLATELETS 371 346 315 264 315  --  296   NEUTROS ABS 5.55 5.07  --   --  13.40*  --  14.40*   IMMATURE GRANS (ABS) 0.04  0.02  --   --  0.05  --  0.05   LYMPHS ABS 1.20 0.92  --   --  1.00  --  0.17*   MONOS ABS 0.85 0.64  --   --  0.86  --  0.73   EOS ABS 0.40 0.32  --   --  0.07  --  0.00   MCV 66.7* 67.6* 68.5* 68.1* 68.6*  --  69.6*   CRP 3.93* 5.35*  --   --   --   --   --    PROCALCITONIN  --   --   --   --   --   --  0.77*   LACTATE  --   --   --   --   --   --  1.2    < > = values in this interval not displayed.         Lab 10/25/23  0517 10/24/23  0519 10/23/23  0527 10/22/23  0434 10/21/23  1725 10/21/23  0551   SODIUM 139 136 139 134*  --  138   POTASSIUM 3.8 3.8 3.9 3.7  --  4.1   CHLORIDE 102 103 108* 105  --  109*   CO2 27.0 24.0 20.0* 23.0  --  23.0   ANION GAP 10.0 9.0 11.0 6.0  --  6.0   BUN 8 6* 7* 7*  --  9   CREATININE 0.82 0.60* 0.54* 0.59*  --  0.61*   EGFR 89.9 99.4 102.6 99.9  --  98.9   GLUCOSE 109* 97 90 121*  --  99   CALCIUM 9.1 9.1 8.3* 7.9*  --  7.9*   IONIZED CALCIUM  --   --   --   --   --  1.23   MAGNESIUM 1.4* 1.8 1.9 2.5* 1.5*  --    PHOSPHORUS 4.2 3.1  --   --   --   --          Lab 10/25/23  0517 10/24/23  0519 10/21/23  0551 10/20/23  1542   TOTAL PROTEIN 6.2 6.5 5.5* 6.0   ALBUMIN 3.2* 3.4* 3.0* 3.5   GLOBULIN 3.0 3.1 2.5 2.5   ALT (SGPT) 6 <5 <5 <5   AST (SGOT) 14 25 13 11   BILIRUBIN 0.4 0.4 0.9 0.7   ALK PHOS 62 63 61 56   LIPASE  --   --   --  17         Lab 10/20/23  2035 10/20/23  1542   PROBNP  --  407.1   HSTROP T 30* 27*         Lab 10/25/23  0517 10/24/23  0519   CHOLESTEROL 103 108   TRIGLYCERIDES 45 48         Lab 10/20/23  2035   IRON 16*   IRON SATURATION (TSAT) 4*   TIBC 399   TRANSFERRIN 268   FERRITIN 72.57   FOLATE 11.20   VITAMIN B 12 237         Brief Urine Lab Results  (Last result in the past 365 days)        Color   Clarity   Blood   Leuk Est   Nitrite   Protein   CREAT   Urine HCG        08/29/23 0927 Yellow   Clear     Negative                       Microbiology Results Abnormal       Procedure Component Value - Date/Time    Blood Culture - Blood, Arm, Right [[id number]]   (Normal) Collected: 10/20/23 1620    Lab Status: Preliminary result Specimen: Blood from Arm, Right Updated: 10/24/23 1701     Blood Culture No growth at 4 days    Blood Culture - Blood, Hand, Right [[id number]]  (Normal) Collected: 10/20/23 1615    Lab Status: Preliminary result Specimen: Blood from Hand, Right Updated: 10/24/23 1701     Blood Culture No growth at 4 days    MRSA Screen, PCR (Inpatient) - Swab, Nares [[id number]]  (Normal) Collected: 10/21/23 1321    Lab Status: Final result Specimen: Swab from Nares Updated: 10/21/23 1457     MRSA PCR Negative    Narrative:      The negative predictive value of this diagnostic test is high and should only be used to consider de-escalating anti-MRSA therapy. A positive result may indicate colonization with MRSA and must be correlated clinically.  MRSA Negative    S. Pneumo Ag Urine or CSF - Urine, Urine, Clean Catch [[id number]]  (Normal) Collected: 10/21/23 0019    Lab Status: Final result Specimen: Urine, Clean Catch Updated: 10/21/23 1321     Strep Pneumo Ag Negative    Legionella Antigen, Urine - Urine, Urine, Clean Catch [[id number]]  (Normal) Collected: 10/21/23 0019    Lab Status: Final result Specimen: Urine, Clean Catch Updated: 10/21/23 1321     LEGIONELLA ANTIGEN, URINE Negative    COVID PRE-OP / PRE-PROCEDURE SCREENING ORDER (NO ISOLATION) - Swab, Nasal Cavity [[id number]]  (Normal) Collected: 10/20/23 1617    Lab Status: Final result Specimen: Swab from Nasal Cavity Updated: 10/20/23 1646    Narrative:      The following orders were created for panel order COVID PRE-OP / PRE-PROCEDURE SCREENING ORDER (NO ISOLATION) - Swab, Nasal Cavity.  Procedure                               Abnormality         Status                     ---------                               -----------         ------                     COVID-19 and FLU A/B PCR...[[id number]]  Normal              Final result                 Please view results for these tests on the individual orders.     COVID-19 and FLU A/B PCR - Swab, Nasopharynx [[id number]]  (Normal) Collected: 10/20/23 1617    Lab Status: Final result Specimen: Swab from Nasopharynx Updated: 10/20/23 1646     COVID19 Not Detected     Influenza A PCR Not Detected     Influenza B PCR Not Detected    Narrative:      Fact sheet for providers: https://www.fda.gov/media/922936/download    Fact sheet for patients: https://www.fda.gov/media/186924/download    Test performed by PCR.            XR Chest 1 View    Result Date: 10/24/2023  XR CHEST 1 VW Date of Exam: 10/24/2023 8:44 AM EDT Indication: f/u Comparison: 10/20/2023. Findings: There is been improvement in previously seen bilateral lower lobe infiltrates with mild interstitial infiltrates remaining. There is stable cardiomegaly. There is a large hiatal hernia. Suggestion of a minimal right effusion.     Impression: Impression: Improving bibasilar lung infiltrates. Electronically Signed: Eden Mendez MD  10/24/2023 9:30 AM EDT  Workstation ID: [id number]     Results for orders placed during the hospital encounter of 08/24/23    Adult Transthoracic Echo Complete W/ Cont if Necessary Per Protocol    Interpretation Summary    Left ventricular ejection fraction appears to be 56 - 60%.    The left atrial cavity is mild to moderately dilated    There is mild to moderate thickening of the aortic valve    Mild to moderate tricuspid valve regurgitation is present. Estimated right ventricular systolic pressure from tricuspid regurgitation is mildly elevated (35-45 mmHg).    There is a trivial pericardial effusion.    Patient noted to be in atrial fibrillation with elevated rates during the study.      Current medications:  Scheduled Meds:amantadine, 100 mg, Oral, BID  apixaban, 5 mg, Oral, Q12H  atorvastatin, 10 mg, Oral, Daily  atropine, 2 drop, Sublingual, Daily  budesonide-formoterol, 2 puff, Inhalation, BID - RT   And  tiotropium bromide monohydrate, 2 puff, Inhalation, Daily -  RT  carbidopa-levodopa, 1 tablet, Oral, 4x Daily With Meals & Nightly  cilostazol, 100 mg, Oral, BID  [START ON 10/26/2023] digoxin, 125 mcg, Oral, Daily  ferrous sulfate, 325 mg, Oral, Daily With Breakfast  guaiFENesin, 1,200 mg, Oral, Q12H  ipratropium-albuterol, 3 mL, Nebulization, TID - RT  magnesium sulfate, 2 g, Intravenous, Q2H  [START ON 10/26/2023] metoprolol succinate XL, 50 mg, Oral, Q24H  montelukast, 10 mg, Oral, Nightly  multivitamin with minerals, 1 tablet, Oral, Daily  pantoprazole, 40 mg, Oral, Daily  QUEtiapine, 25 mg, Oral, Nightly  senna-docusate sodium, 2 tablet, Oral, BID  tamsulosin, 0.4 mg, Oral, Nightly  torsemide, 20 mg, Oral, Daily      Continuous Infusions:   PRN Meds:.  acetaminophen    senna-docusate sodium **AND** polyethylene glycol **AND** bisacodyl **AND** bisacodyl    Calcium Replacement - Follow Nurse / BPA Driven Protocol    Magnesium Standard Dose Replacement - Follow Nurse / BPA Driven Protocol    melatonin    methocarbamol    nitroglycerin    Phosphorus Replacement - Follow Nurse / BPA Driven Protocol    Potassium Replacement - Follow Nurse / BPA Driven Protocol    sodium chloride    sodium chloride    Assessment & Plan   Assessment & Plan     Active Hospital Problems    Diagnosis  POA    Sepsis [A41.9]  Yes    Coronary artery disease involving native coronary artery of native heart without angina pectoris [I25.10]  Yes    Permanent atrial fibrillation [I48.21]  Yes    Parkinson, PNA [J18.9]  Yes    Parkinson disease [G20.A1]  Yes    GERD without esophagitis [K21.9]  Yes    ABRIL (obstructive sleep apnea) [G47.33]  Yes    Pulmonary emphysema [J43.9]  Yes      Resolved Hospital Problems   No resolved problems to display.        Brief Hospital Course to date:  77 year old male presents to the ED with worsening shortness of air and chest that started this morning.  CXR consistent with bilateral lower lobe pneumonia.      Sepsis sepsis secondary to community-acquired pneumonia,  POA  COPD, not on home O2  WBC: 15.38, procal 0.72,  on admission  CT chest with patchy bilateral airspace opacities worse in the right lower lobe; ? Mild pulm edema   Bcx NGTD; strep/legionella negative; MRSA negative; COVID/flu negative  Continue Rocephin and doxycycline  Continue Trelegy  Patient is not on oxygen at home  Continue oxygen supplementation.  Might need home O2     Permanent atrial fibrillation   A-fib with RVR  Last echo 8/24/23 EF 56-60%. Mild to moderate TVR. RVSP: 35-45 mmhg.   Intolerant to high-dose Toprol- mg daily which is up from his home dose 50 because of hypotension   Heart rate is more or less controlled with IV digoxin.  Continue p.o. digoxin 0.125 mg daily  Continue p.o. Eliquis  Discussed with his cardiologist/Miguelina.  Appreciate his help    Symptomatic anemia  Hemoglobin at baseline is 10-11.  Hemoglobin stable around 9 during this hospitalization with low iron studies  Status post IV iron replacement  Monitor H&H     Chronic pain  On fentanyl pain pump   Continue PRN tylenol      Parkinson's disease  Continue sinemet, amantadine   Fall precautions  PT/OT      Hyperlipidemia  Continue statin      GERD  PPI      CAD  [facility] 9/5/2023 minor obstructive CAD, normal LAD, mild circumflex 30-40%, mid RCA 20-30%  Continue ASA, statin     Expected Discharge Location and Transportation: home with assist   Expected Discharge   Expected Discharge Date: 10/25/2023; Expected Discharge Time:      DVT prophylaxis:  Medical DVT prophylaxis orders are present.     AM-PAC 6 Clicks Score (PT): 21 (10/25/23 0600)    CODE STATUS:   Code Status and Medical Interventions:   Ordered at: 10/20/23 2012     Level Of Support Discussed With:    Patient     Code Status (Patient has no pulse and is not breathing):    CPR (Attempt to Resuscitate)     Medical Interventions (Patient has pulse or is breathing):    Full Support     Copied text in this note has been reviewed and is accurate as of 10/25/23.      Steve Calhoun MD  10/25/23

## 2023-10-25 NOTE — PLAN OF CARE
Goal Outcome Evaluation:  Pt alert and oriented today and was able to walk in halls with therapy.   AF w RVR at times on tele.   IV dig given and PO BB dose adjusted.   Cardiology input appreciated today.  Possible dc home w spouse on Thursday.

## 2023-10-25 NOTE — CONSULTS
[facility] Cardiology  Consultation History and Physical  Flaco Roque II  1945      PCP:   Azar Stern MD        Date of  Consultation: 10/25/2023 12:55 EDT     Reason for Consultation: Atrial fibrillation    Cardiology problem list:  Paroxysmal atrial fibrillation  Echocardiogram May 2019: LVEF 66 to 70%, mild TR, aortic valve sclerosis without stenosis, normal RVSP  Holter monitor October 2020: 8.4% PVC burden, 11 NSVT episodes with the longest lasting 6 beats, occasional runs of SVT with the longest lasting 11 beats  Echocardiogram 8/25/2023: LVEF 56 to 60%, LA cavity mild to moderately dilated, mild to moderate thickening of the aortic valve, mild to moderate TR, RVSP 35-45 mmHg, patient in atrial fibrillation during study  CHADsVasc 3, anticoagulated with Eliquis  Coronary artery disease:  Regadenoson stress test December 2019: LVEF 70%, mild coronary artery calcification, normal myocardial perfusion study with no evidence of ischemia  Regadenoson stress test 8/24/2023: Myocardial perfusion imaging indicates a moderate-sized area of ischemia in the anterior wall and apex, CT portion of the exam shows aortic calcifications and a large hiatal hernia, EF 49%  Left heart catheterization 9/5/2023: Minor nonobstructive CAD, normal LAD, mid circumflex 30-40%, mid RCA 20-30%, LV pressures S/D/E 115/-8/8 mmHg  Peripheral arterial disease with chronic left foot ulcer (follows with Dr. Sawyer)  I&D of left foot April 2020  Left arterial duplex October 2020: Atherosclerotic plaque with biphasic waveforms in the common femoral, SFA, popliteal arteries.  Anterior tibial artery patent with biphasic flow, posterior tibial artery and peroneal artery are occluded with some mild reconstitution distally via collaterals, biphasic flow within the dorsalis pedis artery, left KAMRYN 0.56  KAMRYN July 2021: Normal right KAMRYN 1.02,Left femoral-popliteal arteries noncompressible. There are biphasic and monophasic  waveforms noted in the left lower extremity, left KAMRYN 0.86  COPD/emphysema, on home O2  GERD/hiatal hernia  Parkinson's  Left shoulder injury (hx replacement)  Obstructive sleep apnea  Former tobacco use with 84-pack-year history, quit in 2001  COVID-pneumonia April 2023  Urinary retention, has Pinto catheter October 2023  Upcoming pain pump replacement at [facility] November 2023    History of Present Illness:  Flaco Roque II  Is a 78 y.o. male with medical history detailed above.  Patient is currently admitted with a pneumonia and has been having persistent A-fib with RVR.  He has not tolerated increase in his metoprolol due to hypotension.  He has been started on IV digoxin load with some improvement and has been intermittently going into sinus rhythm.  He cannot feel elevated heart rate or irregularities.  He did have chest pain initially when he came to the hospital as well as a cough with brown sputum but he states these are both improving.  He still is hypoxic and requiring supplemental O2.  In regards to his chest pain he did have a mildly elevated troponin on admission but he had a recent heart cath in September showing nonobstructive disease.      Patient Active Problem List    Diagnosis Date Noted    Sepsis 10/20/2023    Coronary artery disease involving native coronary artery of native heart without angina pectoris 09/05/2023     Note Last Updated: 9/5/2023 9/5/2023: LHC at [facility], normal LAD, mid circumflex 30-40%, RCA mid 20-30%, LVEDP 4 mmHg.  False-positive stress test.      Abnormal nuclear stress test 08/29/2023     Note Last Updated: 8/29/2023     Added automatically from request for surgery [id number]      COVID-19 04/15/2023    Permanent atrial fibrillation 04/15/2023    Status post reverse arthroplasty of shoulder, left 03/04/2021    Strain of deltoid muscle, left, initial encounter 03/04/2021    Impingement syndrome of left shoulder 03/04/2021    Scapular dyskinesis 03/04/2021    Peripheral  arterial disease 2020    S/P Irrigation debridement left foot with excision of base of fifth metatarsal and chronic ulcer 2020    On home O2 2020    Skin ulcer of left foot, limited to breakdown of skin 2020    Abnormal CT scan of lung 2019     Note Last Updated: 2023     Findings most consistent with postinflammatory scarring and emphysema      Hypokalemia 2019    Anemia, 1 unit PRBC 2019    Parkinson, PNA 2019    Parkinson disease 2019    S/P foot surgery, left 2019    Deformity of left foot 04/10/2019     Note Last Updated: 4/10/2019     Added automatically from request for surgery [id number]      Leukocytosis, likely reactive 2018    Acute postoperative pain 2018    Foot deformity, acquired, left 2018     Note Last Updated: 2018     Added automatically from request for surgery [id number]      ABRIL (obstructive sleep apnea) 10/31/2017     Note Last Updated: 2019     CPAP intolerant      Pulmonary emphysema 10/31/2017    GERD without esophagitis 10/31/2017    Acute blood loss anemia 10/31/2017       No Known Allergies    Social History     Socioeconomic History    Marital status:    Tobacco Use    Smoking status: Former     Packs/day: 2.00     Years: 42.00     Additional pack years: 0.00     Total pack years: 84.00     Types: Cigarettes     Start date:      Quit date:      Years since quittin.8    Smokeless tobacco: Never   Vaping Use    Vaping Use: Never used   Substance and Sexual Activity    Alcohol use: Yes     Comment: beer occasional     Drug use: No    Sexual activity: Defer       Family History   Problem Relation Age of Onset    Arthritis Mother     Diabetes Mother     Osteoarthritis Mother     Colon cancer Father     Cancer Father        Current Medications:    Current Facility-Administered Medications:     acetaminophen (TYLENOL) tablet 650 mg, 650 mg, Oral, Q6H PRN, Joan Krause, DO, 650 mg  at 10/23/23 0350    amantadine (SYMMETREL) capsule 100 mg, 100 mg, Oral, BID, Jaki, Chelo, APRN, 100 mg at 10/25/23 0813    apixaban (ELIQUIS) tablet 5 mg, 5 mg, Oral, Q12H, Jaki, Chelo, APRN, 5 mg at 10/25/23 0814    atorvastatin (LIPITOR) tablet 10 mg, 10 mg, Oral, Daily, Jaki, Chelo, APRN, 10 mg at 10/25/23 0814    atropine 1 % ophthalmic solution 2 drop, 2 drop, Sublingual, Daily, Younis, Steve Powers MD, 2 drop at 10/25/23 0817    sennosides-docusate (PERICOLACE) 8.6-50 MG per tablet 2 tablet, 2 tablet, Oral, BID, 2 tablet at 10/25/23 0814 **AND** polyethylene glycol (MIRALAX) packet 17 g, 17 g, Oral, Daily PRN **AND** bisacodyl (DULCOLAX) EC tablet 5 mg, 5 mg, Oral, Daily PRN **AND** bisacodyl (DULCOLAX) suppository 10 mg, 10 mg, Rectal, Daily PRN, Jaki, Chelo, APRN    budesonide-formoterol (SYMBICORT) 160-4.5 MCG/ACT inhaler 2 puff, 2 puff, Inhalation, BID - RT, 2 puff at 10/25/23 0731 **AND** tiotropium (SPIRIVA RESPIMAT) 2.5 mcg/act aerosol solution inhaler, 2 puff, Inhalation, Daily - RT, Jaki, Chelo, APRN, 2 puff at 10/25/23 0731    Calcium Replacement - Follow Nurse / BPA Driven Protocol, , Does not apply, PRN, Jaki, Chelo, APRN    carbidopa-levodopa (SINEMET)  MG per tablet 1 tablet, 1 tablet, Oral, 4x Daily With Meals & Nightly, Jaki, Chelo, APRN, 1 tablet at 10/25/23 1146    cilostazol (PLETAL) tablet 100 mg, 100 mg, Oral, BID, Jaki, Chelo, APRN, 100 mg at 10/25/23 0814    ferrous sulfate tablet 325 mg, 325 mg, Oral, Daily With Breakfast, Joan Krause DO, 325 mg at 10/25/23 0814    guaiFENesin (MUCINEX) 12 hr tablet 1,200 mg, 1,200 mg, Oral, Q12H, Joan Krause DO, 1,200 mg at 10/25/23 0814    ipratropium-albuterol (DUO-NEB) nebulizer solution 3 mL, 3 mL, Nebulization, TID - RT, Chelo Pollack APRN, 3 mL at 10/25/23 0731    Magnesium Standard Dose Replacement - Follow Nurse / BPA Driven Protocol, , Does not apply, PRN, Chelo Pollack APRN     magnesium sulfate 2g/50 mL (PREMIX) infusion, 2 g, Intravenous, Q2H, Steve Calhoun MD    melatonin tablet 5 mg, 5 mg, Oral, Nightly PRN, Dulce Pepper PA-C, 5 mg at 10/24/23 2126    methocarbamol (ROBAXIN) tablet 750 mg, 750 mg, Oral, Q6H PRN, Steve Calhoun MD, 750 mg at 10/24/23 1807    [START ON 10/26/2023] metoprolol succinate XL (TOPROL-XL) 24 hr tablet 75 mg, 75 mg, Oral, Q24H, Steve Calhoun MD    montelukast (SINGULAIR) tablet 10 mg, 10 mg, Oral, Nightly, Jaki, Chelo, APRN, 10 mg at 10/24/23 2126    multivitamin with minerals 1 tablet, 1 tablet, Oral, Daily, Jaki, Chelo, APRN, 1 tablet at 10/25/23 0814    nitroglycerin (NITROSTAT) SL tablet 0.4 mg, 0.4 mg, Sublingual, Q5 Min PRN, Jaki, Chelo, APRN    pantoprazole (PROTONIX) EC tablet 40 mg, 40 mg, Oral, Daily, Jaki, Chelo, APRN, 40 mg at 10/25/23 0814    Phosphorus Replacement - Follow Nurse / BPA Driven Protocol, , Does not apply, PRN, Jaki, Chelo, APRN    Potassium Replacement - Follow Nurse / BPA Driven Protocol, , Does not apply, PRN, Jaki, Chelo, APRN    QUEtiapine (SEROquel) tablet 25 mg, 25 mg, Oral, Nightly, Jaki, Chelo, APRN, 25 mg at 10/24/23 2126    sodium chloride 0.9 % flush 10 mL, 10 mL, Intravenous, PRN, Andre Velarde MD, 10 mL at 10/25/23 0812    sodium chloride nasal spray 2 spray, 2 spray, Each Nare, PRN, Delia Mayorga, APRN, 2 spray at 10/23/23 0440    tamsulosin (FLOMAX) 24 hr capsule 0.4 mg, 0.4 mg, Oral, Nightly, Jaki, Chelo, APRN, 0.4 mg at 10/24/23 2126    torsemide (DEMADEX) tablet 20 mg, 20 mg, Oral, Daily, Steve Calhoun MD, 20 mg at 10/25/23 0814     Review of Systems   Cardiovascular:  Positive for chest pain and dyspnea on exertion.   Respiratory:  Positive for cough, shortness of breath and sputum production.        OBJECTIVE:  Vitals:    10/25/23 0731 10/25/23 1000 10/25/23 1048 10/25/23 1052   BP:   90/68 104/62   BP Location:   Right  arm    Patient Position:   Lying    Pulse: 87 104 112    Resp: 20  20    Temp:   96.1 °F (35.6 °C)    TempSrc:   Oral    SpO2: 91%  93%    Weight:       Height:         I/O last 3 completed shifts:  In: -   Out: 3225 [Urine:3225]  I/O this shift:  In: 100 [IV Piggyback:100]  Out: 600 [Urine:600]  Intake & Output (last 3 days)         10/22 0701  10/23 0700 10/23 0701  10/24 0700 10/24 0701  10/25 0700 10/25 0701  10/26 0700    P.O. 300 771      IV Piggyback    100    Total Intake(mL/kg) 300 (4.9) 771 (12.5)  100 (1.6)    Urine (mL/kg/hr) 850 (0.6) 2075 (1.4) 1950 (1.3) 600 (1.6)    Total Output 850 2075 1950 600    Net -550 -1304 -1950 -500            Stool Unmeasured Occurrence 1 x                  Physical Exam  Constitutional:       Comments: Frail   Cardiovascular:      Rate and Rhythm: Tachycardia present. Rhythm irregular.   Pulmonary:      Effort: Pulmonary effort is normal.      Breath sounds: Rhonchi present.   Musculoskeletal:      Right lower leg: No edema.      Left lower leg: No edema.   Neurological:      General: No focal deficit present.         Diagnostic Data:  Lab Results (last 24 hours)       Procedure Component Value Units Date/Time    Nutrition Panel [[id number]]  (Abnormal) Collected: 10/25/23 0517    Specimen: Blood Updated: 10/25/23 0616     Glucose 109 mg/dL      BUN 8 mg/dL      Creatinine 0.82 mg/dL      Sodium 139 mmol/L      Potassium 3.8 mmol/L      Chloride 102 mmol/L      CO2 27.0 mmol/L      Calcium 9.1 mg/dL      Alkaline Phosphatase 62 U/L      ALT (SGPT) 6 U/L      AST (SGOT) 14 U/L      Total Cholesterol 103 mg/dL      Triglycerides 45 mg/dL      Total Protein 6.2 g/dL      Albumin 3.2 g/dL      Phosphorus 4.2 mg/dL      Total Bilirubin 0.4 mg/dL      Magnesium 1.4 mg/dL      Anion Gap 10.0 mmol/L      Globulin 3.0 gm/dL      A/G Ratio 1.1 g/dL      BUN/Creatinine Ratio 9.8     eGFR 89.9 mL/min/1.73     Narrative:      Cholesterol Reference Ranges:   Desirable       < 200 mg/dL    Borderline    200-239 mg/dL   High Risk       > 239 mg/dL    Triglyceride Reference Ranges:   Normal          < 150 mg/dL   Borderline    150-199 mg/dL   High          200-499 mg/dL   Very High       > 499 mg/dL    C-reactive Protein [[id number]]  (Abnormal) Collected: 10/25/23 0517    Specimen: Blood Updated: 10/25/23 0610     C-Reactive Protein 3.93 mg/dL     CBC & Differential [[id number]]  (Abnormal) Collected: 10/25/23 0517    Specimen: Blood Updated: 10/25/23 0607    Narrative:      The following orders were created for panel order CBC & Differential.  Procedure                               Abnormality         Status                     ---------                               -----------         ------                     CBC Auto Differential[[id number]]        Abnormal            Final result               Scan Slide[[id number]]                                                                    Please view results for these tests on the individual orders.    CBC Auto Differential [[id number]]  (Abnormal) Collected: 10/25/23 0517    Specimen: Blood Updated: 10/25/23 0607     WBC 8.10 10*3/mm3      RBC 4.77 10*6/mm3      Hemoglobin 9.6 g/dL      Hematocrit 31.8 %      MCV 66.7 fL      MCH 20.1 pg      MCHC 30.2 g/dL      RDW 21.3 %      RDW-SD 48.1 fl      MPV 10.0 fL      Platelets 371 10*3/mm3      Neutrophil % 68.6 %      Lymphocyte % 14.8 %      Monocyte % 10.5 %      Eosinophil % 4.9 %      Basophil % 0.7 %      Immature Grans % 0.5 %      Neutrophils, Absolute 5.55 10*3/mm3      Lymphocytes, Absolute 1.20 10*3/mm3      Monocytes, Absolute 0.85 10*3/mm3      Eosinophils, Absolute 0.40 10*3/mm3      Basophils, Absolute 0.06 10*3/mm3      Immature Grans, Absolute 0.04 10*3/mm3      nRBC 0.0 /100 WBC     Blood Culture - Blood, Arm, Right [[id number]]  (Normal) Collected: 10/20/23 1620    Specimen: Blood from Arm, Right Updated: 10/24/23 1701     Blood Culture No growth at 4 days    Blood Culture - Blood, Hand,  Right [[id number]]  (Normal) Collected: 10/20/23 1615    Specimen: Blood from Hand, Right Updated: 10/24/23 1701     Blood Culture No growth at 4 days          ECG/EMG Results (last 24 hours)       Procedure Component Value Units Date/Time    ECG 12 Lead Rhythm Change [[id number]] Collected: 10/21/23 2301     Updated: 10/25/23 0849     QT Interval 348 ms      QTC Interval 468 ms     Narrative:      Test Reason : Rhythm Change  Blood Pressure :   */*   mmHG  Vent. Rate : 109 BPM     Atrial Rate : 127 BPM     P-R Int :   * ms          QRS Dur :  94 ms      QT Int : 348 ms       P-R-T Axes :   * -52   0 degrees     QTc Int : 468 ms    ** Poor data quality, interpretation may be adversely affected  Atrial fibrillation with rapid ventricular response with premature ventricular or aberrantly conducted complexes  Left axis deviation  Incomplete right bundle branch block  Inferior infarct , age undetermined  Abnormal ECG  When compared with ECG of 20-OCT-2023 15:30, (Unconfirmed)  Atrial fibrillation has replaced Sinus rhythm  Nonspecific T wave abnormality, improved in Inferior leads  T wave inversion no longer evident in Lateral leads  Confirmed by Saloni Li ([id number]) on 10/25/2023 8:49:17 AM    Referred By:            Confirmed By: Saloni Li              ABRIL (obstructive sleep apnea)    Pulmonary emphysema    GERD without esophagitis    Parkinson disease    Parkinson, PNA    Permanent atrial fibrillation    Coronary artery disease involving native coronary artery of native heart without angina pectoris    Sepsis      Assessment/Plan:    Paroxysmal atrial fibrillation  Continue Toprol-XL at a dose of 50 mg.  Limited in uptitration due to hypotension  Agree with IV digoxin load, based on his low body weight we will start 125 mcg p.o. daily tomorrow  If rates are persistently elevated we can consider adding midodrine to further increase the metoprolol dose  Continue Eliquis anticoagulation  Pneumonia  Management per  primary team  Wean from O2 as tolerated  Heart catheterization with nonobstructive CAD  Continue medical management with Eliquis and statin  PAD  Continue medical management cilostazol, Eliquis, statin therapy    Cardiology will continue to follow.  Discussed with Dr. Calhoun today          Von Kilpatrick MD [facility]

## 2023-10-26 ENCOUNTER — READMISSION MANAGEMENT (OUTPATIENT)
Dept: CALL CENTER | Facility: HOSPITAL | Age: 78
End: 2023-10-26
Payer: MEDICARE

## 2023-10-26 VITALS
RESPIRATION RATE: 18 BRPM | HEIGHT: 68 IN | OXYGEN SATURATION: 88 % | DIASTOLIC BLOOD PRESSURE: 70 MMHG | SYSTOLIC BLOOD PRESSURE: 123 MMHG | TEMPERATURE: 96.4 F | BODY MASS INDEX: 20.84 KG/M2 | WEIGHT: 137.5 LBS | HEART RATE: 114 BPM

## 2023-10-26 PROBLEM — J18.9 PNEUMONIA, UNSPECIFIED ORGANISM: Status: ACTIVE | Noted: 2023-10-26

## 2023-10-26 LAB
ALBUMIN SERPL-MCNC: 3.4 G/DL (ref 3.5–5.2)
ALBUMIN/GLOB SERPL: 1.3 G/DL
ALP SERPL-CCNC: 64 U/L (ref 39–117)
ALT SERPL W P-5'-P-CCNC: 6 U/L (ref 1–41)
ANION GAP SERPL CALCULATED.3IONS-SCNC: 12 MMOL/L (ref 5–15)
AST SERPL-CCNC: 16 U/L (ref 1–40)
BASOPHILS # BLD AUTO: 0.07 10*3/MM3 (ref 0–0.2)
BASOPHILS NFR BLD AUTO: 0.8 % (ref 0–1.5)
BILIRUB SERPL-MCNC: 0.4 MG/DL (ref 0–1.2)
BUN SERPL-MCNC: 10 MG/DL (ref 8–23)
BUN/CREAT SERPL: 11.4 (ref 7–25)
CALCIUM SPEC-SCNC: 8.8 MG/DL (ref 8.6–10.5)
CHLORIDE SERPL-SCNC: 100 MMOL/L (ref 98–107)
CHOLEST SERPL-MCNC: 111 MG/DL (ref 0–200)
CO2 SERPL-SCNC: 29 MMOL/L (ref 22–29)
CREAT SERPL-MCNC: 0.88 MG/DL (ref 0.76–1.27)
DEPRECATED RDW RBC AUTO: 49.6 FL (ref 37–54)
EGFRCR SERPLBLD CKD-EPI 2021: 88 ML/MIN/1.73
EOSINOPHIL # BLD AUTO: 0.36 10*3/MM3 (ref 0–0.4)
EOSINOPHIL NFR BLD AUTO: 3.9 % (ref 0.3–6.2)
ERYTHROCYTE [DISTWIDTH] IN BLOOD BY AUTOMATED COUNT: 22.1 % (ref 12.3–15.4)
GLOBULIN UR ELPH-MCNC: 2.6 GM/DL
GLUCOSE SERPL-MCNC: 103 MG/DL (ref 65–99)
HCT VFR BLD AUTO: 32.2 % (ref 37.5–51)
HGB BLD-MCNC: 9.4 G/DL (ref 13–17.7)
IMM GRANULOCYTES # BLD AUTO: 0.06 10*3/MM3 (ref 0–0.05)
IMM GRANULOCYTES NFR BLD AUTO: 0.6 % (ref 0–0.5)
LYMPHOCYTES # BLD AUTO: 1.45 10*3/MM3 (ref 0.7–3.1)
LYMPHOCYTES NFR BLD AUTO: 15.6 % (ref 19.6–45.3)
MAGNESIUM SERPL-MCNC: 2.3 MG/DL (ref 1.6–2.4)
MCH RBC QN AUTO: 19.9 PG (ref 26.6–33)
MCHC RBC AUTO-ENTMCNC: 29.2 G/DL (ref 31.5–35.7)
MCV RBC AUTO: 68.2 FL (ref 79–97)
MONOCYTES # BLD AUTO: 0.96 10*3/MM3 (ref 0.1–0.9)
MONOCYTES NFR BLD AUTO: 10.3 % (ref 5–12)
NEUTROPHILS NFR BLD AUTO: 6.38 10*3/MM3 (ref 1.7–7)
NEUTROPHILS NFR BLD AUTO: 68.8 % (ref 42.7–76)
NRBC BLD AUTO-RTO: 0 /100 WBC (ref 0–0.2)
PHOSPHATE SERPL-MCNC: 4.5 MG/DL (ref 2.5–4.5)
PLATELET # BLD AUTO: 389 10*3/MM3 (ref 140–450)
PMV BLD AUTO: 10.3 FL (ref 6–12)
POTASSIUM SERPL-SCNC: 3.3 MMOL/L (ref 3.5–5.2)
PROT SERPL-MCNC: 6 G/DL (ref 6–8.5)
RBC # BLD AUTO: 4.72 10*6/MM3 (ref 4.14–5.8)
SODIUM SERPL-SCNC: 141 MMOL/L (ref 136–145)
TRIGL SERPL-MCNC: 54 MG/DL (ref 0–150)
WBC NRBC COR # BLD: 9.28 10*3/MM3 (ref 3.4–10.8)

## 2023-10-26 PROCEDURE — 94664 DEMO&/EVAL PT USE INHALER: CPT

## 2023-10-26 PROCEDURE — 82465 ASSAY BLD/SERUM CHOLESTEROL: CPT | Performed by: INTERNAL MEDICINE

## 2023-10-26 PROCEDURE — 99232 SBSQ HOSP IP/OBS MODERATE 35: CPT | Performed by: INTERNAL MEDICINE

## 2023-10-26 PROCEDURE — 94799 UNLISTED PULMONARY SVC/PX: CPT

## 2023-10-26 PROCEDURE — 83735 ASSAY OF MAGNESIUM: CPT | Performed by: INTERNAL MEDICINE

## 2023-10-26 PROCEDURE — 85025 COMPLETE CBC W/AUTO DIFF WBC: CPT | Performed by: INTERNAL MEDICINE

## 2023-10-26 PROCEDURE — 84100 ASSAY OF PHOSPHORUS: CPT | Performed by: INTERNAL MEDICINE

## 2023-10-26 PROCEDURE — 84478 ASSAY OF TRIGLYCERIDES: CPT | Performed by: INTERNAL MEDICINE

## 2023-10-26 PROCEDURE — 80053 COMPREHEN METABOLIC PANEL: CPT | Performed by: INTERNAL MEDICINE

## 2023-10-26 PROCEDURE — 94761 N-INVAS EAR/PLS OXIMETRY MLT: CPT

## 2023-10-26 RX ORDER — DIGOXIN 125 MCG
125 TABLET ORAL
Qty: 90 TABLET | Refills: 0 | Status: SHIPPED | OUTPATIENT
Start: 2023-10-26 | End: 2024-01-24

## 2023-10-26 RX ORDER — POTASSIUM CHLORIDE 20 MEQ/1
40 TABLET, EXTENDED RELEASE ORAL EVERY 4 HOURS
Status: COMPLETED | OUTPATIENT
Start: 2023-10-26 | End: 2023-10-26

## 2023-10-26 RX ORDER — FERROUS SULFATE 325(65) MG
325 TABLET ORAL
Qty: 30 TABLET | Refills: 2 | Status: SHIPPED | OUTPATIENT
Start: 2023-10-27 | End: 2024-01-25

## 2023-10-26 RX ORDER — CEFDINIR 300 MG/1
300 CAPSULE ORAL 2 TIMES DAILY
Qty: 10 CAPSULE | Refills: 0 | Status: SHIPPED | OUTPATIENT
Start: 2023-10-26 | End: 2023-10-31

## 2023-10-26 RX ORDER — METOPROLOL SUCCINATE 50 MG/1
50 TABLET, EXTENDED RELEASE ORAL EVERY 12 HOURS SCHEDULED
Status: DISCONTINUED | OUTPATIENT
Start: 2023-10-26 | End: 2023-10-26 | Stop reason: HOSPADM

## 2023-10-26 RX ORDER — GUAIFENESIN 600 MG/1
1200 TABLET, EXTENDED RELEASE ORAL EVERY 12 HOURS SCHEDULED
Qty: 28 TABLET | Refills: 0 | Status: SHIPPED | OUTPATIENT
Start: 2023-10-26 | End: 2023-11-02

## 2023-10-26 RX ORDER — FUROSEMIDE 20 MG/1
20 TABLET ORAL DAILY
Qty: 90 TABLET | Refills: 0 | Status: SHIPPED | OUTPATIENT
Start: 2023-10-26 | End: 2024-01-24

## 2023-10-26 RX ADMIN — METOPROLOL SUCCINATE 50 MG: 50 TABLET, EXTENDED RELEASE ORAL at 08:46

## 2023-10-26 RX ADMIN — FERROUS SULFATE TAB 325 MG (65 MG ELEMENTAL FE) 325 MG: 325 (65 FE) TAB at 08:46

## 2023-10-26 RX ADMIN — IPRATROPIUM BROMIDE AND ALBUTEROL SULFATE 3 ML: 2.5; .5 SOLUTION RESPIRATORY (INHALATION) at 08:24

## 2023-10-26 RX ADMIN — APIXABAN 5 MG: 5 TABLET, FILM COATED ORAL at 08:47

## 2023-10-26 RX ADMIN — GUAIFENESIN 1200 MG: 600 TABLET, EXTENDED RELEASE ORAL at 08:46

## 2023-10-26 RX ADMIN — PANTOPRAZOLE SODIUM 40 MG: 40 TABLET, DELAYED RELEASE ORAL at 08:47

## 2023-10-26 RX ADMIN — METHOCARBAMOL 750 MG: 500 TABLET ORAL at 04:02

## 2023-10-26 RX ADMIN — CILOSTAZOL 100 MG: 50 TABLET ORAL at 08:47

## 2023-10-26 RX ADMIN — POTASSIUM CHLORIDE 40 MEQ: 1500 TABLET, EXTENDED RELEASE ORAL at 12:17

## 2023-10-26 RX ADMIN — DIGOXIN 125 MCG: 125 TABLET ORAL at 12:17

## 2023-10-26 RX ADMIN — CARBIDOPA AND LEVODOPA 1 TABLET: 25; 100 TABLET ORAL at 12:17

## 2023-10-26 RX ADMIN — AMANTADINE HYDROCHLORIDE 100 MG: 100 CAPSULE ORAL at 08:47

## 2023-10-26 RX ADMIN — CEFDINIR 300 MG: 300 CAPSULE ORAL at 08:47

## 2023-10-26 RX ADMIN — ATORVASTATIN CALCIUM 10 MG: 10 TABLET, FILM COATED ORAL at 08:46

## 2023-10-26 RX ADMIN — MULTIPLE VITAMINS W/ MINERALS TAB 1 TABLET: TAB at 08:47

## 2023-10-26 RX ADMIN — CARBIDOPA AND LEVODOPA 1 TABLET: 25; 100 TABLET ORAL at 08:46

## 2023-10-26 RX ADMIN — BUDESONIDE AND FORMOTEROL FUMARATE DIHYDRATE 2 PUFF: 160; 4.5 AEROSOL RESPIRATORY (INHALATION) at 08:25

## 2023-10-26 RX ADMIN — Medication 10 ML: at 08:47

## 2023-10-26 RX ADMIN — TIOTROPIUM BROMIDE INHALATION SPRAY 2 PUFF: 3.12 SPRAY, METERED RESPIRATORY (INHALATION) at 08:25

## 2023-10-26 RX ADMIN — ATROPINE SULFATE 2 DROP: 10 SOLUTION OPHTHALMIC at 08:48

## 2023-10-26 RX ADMIN — POTASSIUM CHLORIDE 40 MEQ: 1500 TABLET, EXTENDED RELEASE ORAL at 06:34

## 2023-10-26 RX ADMIN — TORSEMIDE 20 MG: 20 TABLET ORAL at 08:47

## 2023-10-26 NOTE — OUTREACH NOTE
Prep Survey      Flowsheet Row Responses   [facility] facility patient discharged from? [facility]   Is LACE score < 7 ? No   Eligibility Readm Mgmt   Discharge diagnosis sepsis secondary to community-acquired pneumonia, POA  COPD,   Does the patient have one of the following disease processes/diagnoses(primary or secondary)? Sepsis   Does the patient have Home health ordered? No   Is there a DME ordered? Yes   What DME was ordered? [employer] for 2L   Prep survey completed? Yes            Claudia JEFFREY - Registered Nurse

## 2023-10-26 NOTE — DISCHARGE SUMMARY
[facility] Medicine Services  DISCHARGE SUMMARY    Patient Name: Flaco Roque II  : 1945  MRN: [id number]    Date of Admission: 10/20/2023  3:26 PM  Date of Discharge: 10/26/2023  Primary Care Physician: Azar Stern MD    Consults       No orders found from 2023 to 10/21/2023.            Hospital Course     Presenting Problem:     Active Hospital Problems    Diagnosis  POA    Sepsis [A41.9]  Yes    Coronary artery disease involving native coronary artery of native heart without angina pectoris [I25.10]  Yes    Permanent atrial fibrillation [I48.21]  Yes    Parkinson, PNA [J18.9]  Yes    Parkinson disease [G20.A1]  Yes    GERD without esophagitis [K21.9]  Yes    ABRIL (obstructive sleep apnea) [G47.33]  Yes    Pulmonary emphysema [J43.9]  Yes      Resolved Hospital Problems   No resolved problems to display.        Hospital Course:  77 year old male presents to the ED with worsening shortness of air and chest that started this morning.  CXR consistent with bilateral lower lobe pneumonia.  Treated with broad-spectrum antibiotic therapy with Rocephin and doxycycline with marked improvement of his respiratory symptoms.  Discharged on low-dose home oxygen at 2 L/min based on 6-minute walk test.  Had fast A-fib that was eventually controlled by adding low-dose digoxin with the help of Dr. Mcintyre his cardiologist.  Discharged in a stable condition     Sepsis sepsis secondary to community-acquired pneumonia, POA  COPD, not on home O2  WBC: 15.38, procal 0.72,  on admission  CT chest with patchy bilateral airspace opacities worse in the right lower lobe; ? Mild pulm edema   Bcx NGTD; strep/legionella negative; MRSA negative; COVID/flu negative  Status post IV Rocephin and doxycycline and transition to p.o. cefdinir and Doxy upon discharge   Patient is not on oxygen at home. Case management arrange for low-dose home oxygen 2 L/min     Permanent atrial fibrillation    A-fib with RVR, currently rate controlled  Last echo 8/24/23 EF 56-60%. Mild to moderate TVR. RVSP: 35-45 mmhg.   On Toprol-XL 50 mg daily.  Could not tolerate higher doses because of hypotension  Eventually A-fib was eventually controlled with IV loading dose digoxin and p.o. maintenance dose at 0.125 mg.  Prescription given upon discharge  Continue p.o. Eliquis  Discussed with his cardiologist/Miguelina.  Appreciate his help     Symptomatic anemia  Hemoglobin at baseline is 10-11.  Hemoglobin stable around 9 during this hospitalization with low iron studies  Status post IV iron replacement.  Discharged on p.o. supplementation     Chronic pain  On fentanyl pain pump   Continue PRN tylenol      Parkinson's disease  Continue sinemet, amantadine   Fall precautions  PT/OT      Hyperlipidemia  Continue statin      GERD  PPI      CAD  [facility] 9/5/2023 minor obstructive CAD, normal LAD, mild circumflex 30-40%, mid RCA 20-30%  Continue ASA, statin       Discharge Follow Up Recommendations for outpatient labs/diagnostics:  PCP in 1 week    Day of Discharge     HPI:   Patient was seen and examined this morning.  Feeling much better.  Minimal shortness of breath particularly with ambulation.  6-minute walk test showed that he desatted to 87% on room air at the end of the walk test.  Case management to arrange for home O2.  Patient denies any chest pain, dizziness or any other acute complaints.  Wanting to go home.  His wife will pick him up    Review of Systems  General : no fevers, chills  CVS: No chest pain, palpitations  Respiratory: No cough, dyspnea  GI: No N/V/D, abd pain  10 point review of systems is negative except for what is mentioned in HPI    Vital Signs:   Temp:  [96.6 °F (35.9 °C)-98.7 °F (37.1 °C)] 96.9 °F (36.1 °C)  Heart Rate:  [] 110  Resp:  [16-20] 18  BP: ()/(55-70) 113/70  Flow (L/min):  [2-3.5] 2      Physical Exam:  General: Chronically ill and frail looking.  Conversant and pleasant.     Head: Atraumatic and normocephalic  Eyes: No Icterus. No pallor  Ears:  Ears appear intact with no abnormalities noted  Throat: No oral lesions, no thrush  Neck: Supple, trachea midline  Lungs: Improved bilateral basal crackles.  No wheezing  Heart:  Normal S1 and S2, no murmur, no gallop, No JVD, no lower extremity swelling  Abdomen:  Soft, no tenderness, no organomegaly, normal bowel sounds, no organomegaly  Extremities: pulses equal bilaterally  Skin: No bleeding, bruising or rash, normal skin turgor and elasticity  Neurologic: Cranial nerves appear intact with no evidence of facial asymmetry, normal motor and sensory functions in all 4 extremities  Psych: Alert and oriented x 3, normal mood    Pertinent  and/or Most Recent Results     LAB RESULTS:      Lab 10/26/23  0433 10/25/23  0517 10/24/23  0519 10/23/23  0835 10/22/23  0434 10/21/23  0551 10/21/23  0011 10/20/23  1542   WBC 9.28 8.10 7.01 9.65 10.82* 15.43*  --  15.38*   HEMOGLOBIN 9.4* 9.6* 9.3* 9.4* 8.1* 8.5*   < > 9.0*   HEMATOCRIT 32.2* 31.8* 31.9* 31.7* 27.6* 29.0*   < > 29.8*   PLATELETS 389 371 346 315 264 315  --  296   NEUTROS ABS 6.38 5.55 5.07  --   --  13.40*  --  14.40*   IMMATURE GRANS (ABS) 0.06* 0.04 0.02  --   --  0.05  --  0.05   LYMPHS ABS 1.45 1.20 0.92  --   --  1.00  --  0.17*   MONOS ABS 0.96* 0.85 0.64  --   --  0.86  --  0.73   EOS ABS 0.36 0.40 0.32  --   --  0.07  --  0.00   MCV 68.2* 66.7* 67.6* 68.5* 68.1* 68.6*  --  69.6*   CRP  --  3.93* 5.35*  --   --   --   --   --    PROCALCITONIN  --   --   --   --   --   --   --  0.77*   LACTATE  --   --   --   --   --   --   --  1.2    < > = values in this interval not displayed.         Lab 10/26/23  0433 10/25/23  0517 10/24/23  0519 10/23/23  0527 10/22/23  0434 10/21/23  1725 10/21/23  0551   SODIUM 141 139 136 139 134*  --  138   POTASSIUM 3.3* 3.8 3.8 3.9 3.7  --  4.1   CHLORIDE 100 102 103 108* 105  --  109*   CO2 29.0 27.0 24.0 20.0* 23.0  --  23.0   ANION GAP 12.0 10.0 9.0  11.0 6.0  --  6.0   BUN 10 8 6* 7* 7*  --  9   CREATININE 0.88 0.82 0.60* 0.54* 0.59*  --  0.61*   EGFR 88.0 89.9 99.4 102.6 99.9  --  98.9   GLUCOSE 103* 109* 97 90 121*  --  99   CALCIUM 8.8 9.1 9.1 8.3* 7.9*  --  7.9*   IONIZED CALCIUM  --   --   --   --   --   --  1.23   MAGNESIUM 2.3 1.4* 1.8 1.9 2.5*   < >  --    PHOSPHORUS 4.5 4.2 3.1  --   --   --   --     < > = values in this interval not displayed.         Lab 10/26/23  0433 10/25/23  0517 10/24/23  0519 10/21/23  0551 10/20/23  1542   TOTAL PROTEIN 6.0 6.2 6.5 5.5* 6.0   ALBUMIN 3.4* 3.2* 3.4* 3.0* 3.5   GLOBULIN 2.6 3.0 3.1 2.5 2.5   ALT (SGPT) 6 6 <5 <5 <5   AST (SGOT) 16 14 25 13 11   BILIRUBIN 0.4 0.4 0.4 0.9 0.7   ALK PHOS 64 62 63 61 56   LIPASE  --   --   --   --  17         Lab 10/20/23  2035 10/20/23  1542   PROBNP  --  407.1   HSTROP T 30* 27*         Lab 10/26/23  0433 10/25/23  0517 10/24/23  0519   CHOLESTEROL 111 103 108   TRIGLYCERIDES 54 45 48         Lab 10/20/23  2035   IRON 16*   IRON SATURATION (TSAT) 4*   TIBC 399   TRANSFERRIN 268   FERRITIN 72.57   FOLATE 11.20   VITAMIN B 12 237         Brief Urine Lab Results  (Last result in the past 365 days)        Color   Clarity   Blood   Leuk Est   Nitrite   Protein   CREAT   Urine HCG        08/29/23 0927 Yellow   Clear     Negative                     Microbiology Results (last 10 days)       Procedure Component Value - Date/Time    MRSA Screen, PCR (Inpatient) - Swab, Nares [[id number]]  (Normal) Collected: 10/21/23 1321    Lab Status: Final result Specimen: Swab from Nares Updated: 10/21/23 1455     MRSA PCR Negative    Narrative:      The negative predictive value of this diagnostic test is high and should only be used to consider de-escalating anti-MRSA therapy. A positive result may indicate colonization with MRSA and must be correlated clinically.  MRSA Negative    Legionella Antigen, Urine - Urine, Urine, Clean Catch [[id number]]  (Normal) Collected: 10/21/23 0019    Lab Status: Final  result Specimen: Urine, Clean Catch Updated: 10/21/23 1321     LEGIONELLA ANTIGEN, URINE Negative    S. Pneumo Ag Urine or CSF - Urine, Urine, Clean Catch [[id number]]  (Normal) Collected: 10/21/23 0019    Lab Status: Final result Specimen: Urine, Clean Catch Updated: 10/21/23 1321     Strep Pneumo Ag Negative    Blood Culture - Blood, Arm, Right [[id number]]  (Normal) Collected: 10/20/23 1620    Lab Status: Final result Specimen: Blood from Arm, Right Updated: 10/25/23 1701     Blood Culture No growth at 5 days    COVID PRE-OP / PRE-PROCEDURE SCREENING ORDER (NO ISOLATION) - Swab, Nasal Cavity [[id number]]  (Normal) Collected: 10/20/23 1617    Lab Status: Final result Specimen: Swab from Nasal Cavity Updated: 10/20/23 1646    Narrative:      The following orders were created for panel order COVID PRE-OP / PRE-PROCEDURE SCREENING ORDER (NO ISOLATION) - Swab, Nasal Cavity.  Procedure                               Abnormality         Status                     ---------                               -----------         ------                     COVID-19 and FLU A/B PCR...[[id number]]  Normal              Final result                 Please view results for these tests on the individual orders.    COVID-19 and FLU A/B PCR - Swab, Nasopharynx [[id number]]  (Normal) Collected: 10/20/23 1617    Lab Status: Final result Specimen: Swab from Nasopharynx Updated: 10/20/23 1646     COVID19 Not Detected     Influenza A PCR Not Detected     Influenza B PCR Not Detected    Narrative:      Fact sheet for providers: https://www.fda.gov/media/262185/download    Fact sheet for patients: https://www.fda.gov/media/974131/download    Test performed by PCR.    Blood Culture - Blood, Hand, Right [[id number]]  (Normal) Collected: 10/20/23 1615    Lab Status: Final result Specimen: Blood from Hand, Right Updated: 10/25/23 1701     Blood Culture No growth at 5 days            XR Chest 1 View    Result Date: 10/24/2023  XR CHEST 1 VW Date of Exam:  10/24/2023 8:44 AM EDT Indication: f/u Comparison: 10/20/2023. Findings: There is been improvement in previously seen bilateral lower lobe infiltrates with mild interstitial infiltrates remaining. There is stable cardiomegaly. There is a large hiatal hernia. Suggestion of a minimal right effusion.     Impression: Improving bibasilar lung infiltrates. Electronically Signed: Eden Mendez MD  10/24/2023 9:30 AM EDT  Workstation ID: [id number]    CT Angiogram Chest    Result Date: 10/20/2023  CT ANGIOGRAM CHEST Date of Exam: 10/20/2023 8:58 PM EDT Indication: chest pain, pna, tachym hypoxia. Comparison: Chest CT 4/15/2023. Technique: CTA of the chest was performed after the uneventful intravenous administration of 90 mL Isovue-370. Reconstructed coronal and sagittal images were also obtained. In addition, a 3-D volume rendered image was created for interpretation. Automated exposure control and iterative reconstruction methods were used. Note this examination is suboptimal due to streak artifact from patient's hardware containing arms at sides. Findings: Pulmonary arteries / cardiovascular: No intraluminal filling defect to suggest pulmonary embolus. The main pulmonary artery is mildly enlarged measuring 3.2 cm, unchanged. No pericardial effusion. Normal caliber thoracic aorta. Calcifications of the thoracic aorta and coronary arteries. Lymph nodes and mediastinum: No lymphadenopathy or mass. Lungs and airways: Emphysematous changes. Patchy bilateral airspace opacities, worse on the right, particularly in the lower lobe. Diffuse intralobular septal thickening. Pleura: Trace bilateral pleural effusions. No pneumothorax.  Bones and soft tissues: No acute or suspicious osseous abnormality. Multilevel degenerative changes of the spine with exaggerated kyphosis. Soft tissues are within normal limits. Bilateral reverse shoulder arthroplasties. Upper abdomen: Large hiatal hernia containing nearly the entire stomach with  patulous upstream esophagus. Cholecystectomy.     Impression: No evidence of pulmonary embolism. Patchy bilateral airspace opacities, worse in the right lower lobe. Findings consistent with infection and/or aspiration in the setting of a large hiatal hernia. Interlobular septal thickening may represent a background of mild pulmonary edema. Trace bilateral pleural effusions. Electronically Signed: Abel Blum MD  10/20/2023 9:23 PM EDT  Workstation ID: [id number]    XR Chest 1 View    Result Date: 10/20/2023  XR CHEST 1 VW Date of Exam: 10/20/2023 3:43 PM EDT Indication: Chest Pain Triage Protocol Comparison: 7/14/2023. Findings: There are new fairly extensive bilateral lower lobe infiltrates, greatest on the right. The heart size is within normal limits. There is a large hiatal hernia. There are no effusions.     Impression: New infiltrates likely represent pneumonia. Continued follow-up recommended. Electronically Signed: Eden Mendez MD  10/20/2023 4:02 PM EDT  Workstation ID: [id number]     Results for orders placed during the hospital encounter of 07/16/21    Doppler Arterial Multi Level Lower Extremity - Bilateral CAR    Interpretation Summary  · Normal right KAMRYN of 1.02.  · Left femoral-popliteal arteries noncompressible. There are biphasic and monophasic waveforms noted in the left lower extremity  · The left posterior tibial artery was compressible with mildly reduced left KAMRYN of 0.86      Results for orders placed during the hospital encounter of 07/16/21    Doppler Arterial Multi Level Lower Extremity - Bilateral CAR    Interpretation Summary  · Normal right KAMRYN of 1.02.  · Left femoral-popliteal arteries noncompressible. There are biphasic and monophasic waveforms noted in the left lower extremity  · The left posterior tibial artery was compressible with mildly reduced left KAMRYN of 0.86      Results for orders placed during the hospital encounter of 08/24/23    Adult Transthoracic Echo Complete W/ Cont if  Necessary Per Protocol    Interpretation Summary    Left ventricular ejection fraction appears to be 56 - 60%.    The left atrial cavity is mild to moderately dilated    There is mild to moderate thickening of the aortic valve    Mild to moderate tricuspid valve regurgitation is present. Estimated right ventricular systolic pressure from tricuspid regurgitation is mildly elevated (35-45 mmHg).    There is a trivial pericardial effusion.    Patient noted to be in atrial fibrillation with elevated rates during the study.      Plan for Follow-up of Pending Labs/Results:     Discharge Details        Discharge Medications        New Medications        Instructions Start Date   cefdinir 300 MG capsule  Commonly known as: OMNICEF   300 mg, Oral, 2 Times Daily      digoxin 125 MCG tablet  Commonly known as: LANOXIN   125 mcg, Oral, Daily Digoxin      ferrous sulfate 325 (65 FE) MG tablet   325 mg, Oral, Daily With Breakfast   Start Date: October 27, 2023     furosemide 20 MG tablet  Commonly known as: Lasix   20 mg, Oral, Daily      guaiFENesin 600 MG 12 hr tablet  Commonly known as: MUCINEX   1,200 mg, Oral, Every 12 Hours Scheduled             Continue These Medications        Instructions Start Date   amantadine 100 MG capsule  Commonly known as: SYMMETREL   100 mg, Oral, 2 Times Daily      apixaban 5 MG tablet tablet  Commonly known as: ELIQUIS   5 mg, Oral, Every 12 Hours Scheduled      atorvastatin 10 MG tablet  Commonly known as: LIPITOR   10 mg, Oral, Daily      atropine 1 % ophthalmic solution   1 drop, Daily      carbidopa-levodopa  MG per tablet  Commonly known as: SINEMET   1 tablet, Oral, 4 Times Daily      cilostazol 100 MG tablet  Commonly known as: PLETAL   100 mg, Oral, 2 Times Daily      fentaNYL 0.05 MG/ML injection  Commonly known as: SUBLIMAZE   250 mcg, Intravenous, PAIN PUMP      ipratropium-albuterol 0.5-2.5 mg/3 ml nebulizer  Commonly known as: DUO-NEB   3 mL, Nebulization, 3 Times Daily       metoprolol succinate XL 50 MG 24 hr tablet  Commonly known as: TOPROL-XL   TAKE ONE TABLET BY MOUTH DAILY      montelukast 10 MG tablet  Commonly known as: SINGULAIR   10 mg, Oral, Nightly      multivitamin with minerals tablet tablet   1 tablet, Oral, Daily      pantoprazole 40 MG EC tablet  Commonly known as: PROTONIX   Take 1 tablet by mouth Daily.      QUEtiapine 25 MG tablet  Commonly known as: SEROquel   25 mg, Oral, Every Evening      tamsulosin 0.4 MG capsule 24 hr capsule  Commonly known as: FLOMAX   0.4 mg, Oral, Nightly      Trelegy Ellipta 100-62.5-25 MCG/ACT inhaler  Generic drug: Fluticasone-Umeclidin-Vilant   1 puff, Inhalation, Daily - RT               No Known Allergies      Discharge Disposition:  Home or Self Care    Diet:  Hospital:  Diet Order   Procedures    Diet: Cardiac Diets; Healthy Heart (2-3 Na+); Texture: Regular Texture (IDDSI 7); Fluid Consistency: Thin (IDDSI 0)       Diet Instructions       Diet: Regular/House Diet, Cardiac Diets; Healthy Heart (2-3 Na+); Regular Texture (IDDSI 7); Thin (IDDSI 0)      Discharge Diet:  Regular/House Diet  Cardiac Diets       Cardiac Diet: Healthy Heart (2-3 Na+)    Texture: Regular Texture (IDDSI 7)    Fluid Consistency: Thin (IDDSI 0)             Activity:  Activity Instructions       Activity as Tolerated              Restrictions or Other Recommendations:  None        CODE STATUS:    Code Status and Medical Interventions:   Ordered at: 10/20/23 2012     Level Of Support Discussed With:    Patient     Code Status (Patient has no pulse and is not breathing):    CPR (Attempt to Resuscitate)     Medical Interventions (Patient has pulse or is breathing):    Full Support       Future Appointments   Date Time Provider Department Center   7/16/2024  9:30 AM Von Kilpatrick MD [facility]                 Steve Calhoun MD  10/26/23      Time Spent on Discharge:  I spent  33 minutes on this discharge activity which included: face-to-face  encounter with the patient, reviewing the data in the system, coordination of the care with the nursing staff as well as consultants, documentation, and entering orders.

## 2023-10-26 NOTE — PROGRESS NOTES
"[facility] Progress Note     LOS: 6 days   Patient Care Team:  Azar Stern MD as PCP - General (Family Medicine)  Zayda Beach MD as Consulting Physician (Infectious Diseases)  Von Kilpatrick MD as Consulting Physician (Cardiology)  Alf Edwards MD as Consulting Physician (Pulmonary Disease)  PCP:  Azar Stern MD    Chief Complaint:  f/u afib with RVR    Subjective: Patient states he feels better today.  Breathing easy.  He did walk the halls on supplemental O2.  Heart rate for the most part was in the low 100s during that time.  He remains asymptomatic from A-fib.  Blood pressures      Review of Systems:   All systems have been reviewed and are negative with the exception of those mentioned above.      Objective:    Vital Sign Min/Max for last 24 hours  Temp  Min: 96.6 °F (35.9 °C)  Max: 98.7 °F (37.1 °C)   BP  Min: 93/55  Max: 113/70   Pulse  Min: 79  Max: 110   Resp  Min: 16  Max: 20   SpO2  Min: 92 %  Max: 96 %   No data recorded   No data recorded     Flowsheet Rows      Flowsheet Row First Filed Value   Admission Height 172.7 cm (68\") Documented at 10/20/2023 1535   Admission Weight 61.7 kg (136 lb) Documented at 10/20/2023 1535            Telemetry: afib with intermittently elevated rates      Intake/Output Summary (Last 24 hours) at 10/26/2023 1129  Last data filed at 10/26/2023 1000  Gross per 24 hour   Intake 236 ml   Output 1375 ml   Net -1139 ml     Intake & Output (last 3 days)         10/23 0701  10/24 0700 10/24 0701  10/25 0700 10/25 0701  10/26 0700 10/26 0701  10/27 0700    P.O. 771   236    IV Piggyback   100     Total Intake(mL/kg) 771 (12.5)  100 (1.6) 236 (3.8)    Urine (mL/kg/hr) 2075 (1.4) 1950 (1.3) 1250 (0.8) 125 (0.4)    Total Output 2075 1950 1250 125    Net -1304 -1950 -1150 +111                     Physical Exam:  Constitutional:       Appearance: Not in distress.   Pulmonary:      Effort: Pulmonary " effort is normal.      Breath sounds: No rales.   Cardiovascular:      Mildly tachycardic Irregular rhythm.      Murmurs: There is no murmur.      Comments: No palpable pedal pulses, no ulcerations   Edema:     Peripheral edema absent.          LABS/DIAGNOSTIC DATA:  Results from last 7 days   Lab Units 10/26/23  0433 10/25/23  0517 10/24/23  0519   WBC 10*3/mm3 9.28 8.10 7.01   HEMOGLOBIN g/dL 9.4* 9.6* 9.3*   HEMATOCRIT % 32.2* 31.8* 31.9*   PLATELETS 10*3/mm3 389 371 346     Lab Results   Lab Value Date/Time    TROPONINT 30 (H) 10/20/2023 2035    TROPONINT 27 (H) 10/20/2023 1542    TROPONINT 23 (H) 04/15/2023 1511    TROPONINT 22 (H) 04/15/2023 1207    TROPONINT <0.010 08/20/2019 1755    TROPONINT <0.010 07/28/2019 1715         Results from last 7 days   Lab Units 10/26/23  0433 10/25/23  0517 10/24/23  0519   SODIUM mmol/L 141 139 136   POTASSIUM mmol/L 3.3* 3.8 3.8   CHLORIDE mmol/L 100 102 103   CO2 mmol/L 29.0 27.0 24.0   BUN mg/dL 10 8 6*   CREATININE mg/dL 0.88 0.82 0.60*   CALCIUM mg/dL 8.8 9.1 9.1   BILIRUBIN mg/dL 0.4 0.4 0.4   ALK PHOS U/L 64 62 63   ALT (SGPT) U/L 6 6 <5   AST (SGOT) U/L 16 14 25   GLUCOSE mg/dL 103* 109* 97         Results from last 7 days   Lab Units 10/26/23  0433   CHOLESTEROL mg/dL 111   TRIGLYCERIDES mg/dL 54               Medication Review:   amantadine, 100 mg, Oral, BID  apixaban, 5 mg, Oral, Q12H  atorvastatin, 10 mg, Oral, Daily  atropine, 2 drop, Sublingual, Daily  budesonide-formoterol, 2 puff, Inhalation, BID - RT   And  tiotropium bromide monohydrate, 2 puff, Inhalation, Daily - RT  carbidopa-levodopa, 1 tablet, Oral, 4x Daily With Meals & Nightly  cefdinir, 300 mg, Oral, Q12H  cilostazol, 100 mg, Oral, BID  digoxin, 125 mcg, Oral, Daily  ferrous sulfate, 325 mg, Oral, Daily With Breakfast  guaiFENesin, 1,200 mg, Oral, Q12H  ipratropium-albuterol, 3 mL, Nebulization, TID - RT  metoprolol succinate XL, 50 mg, Oral, Q12H  montelukast, 10 mg, Oral, Nightly  multivitamin  with minerals, 1 tablet, Oral, Daily  pantoprazole, 40 mg, Oral, Daily  potassium chloride ER, 40 mEq, Oral, Q4H  QUEtiapine, 25 mg, Oral, Nightly  senna-docusate sodium, 2 tablet, Oral, BID  tamsulosin, 0.4 mg, Oral, Nightly  torsemide, 20 mg, Oral, Daily               ABRIL (obstructive sleep apnea)    Pulmonary emphysema    GERD without esophagitis    Parkinson disease    Parkinson, PNA    Permanent atrial fibrillation    Coronary artery disease involving native coronary artery of native heart without angina pectoris    Sepsis      Assessment/Plan:  Paroxysmal atrial fibrillation  Continue Toprol-XL 50 mg.  Will increase to twice daily dosing at discharge  And discharged on digoxin 125 mcg daily  Rates reasonably controlled with ambulation.  Continue Eliquis anticoagulation  Okay for discharge from cardiology standpoint.  We will arrange outpatient follow-up  Pneumonia  Management per primary team  He is planned to go home on 2 L O2 at this time  Heart catheterization with nonobstructive CAD  Continue medical management with Eliquis and statin  PAD  Continue medical management cilostazol, Eliquis, statin therapy      Okay for discharge from cardiology standpoint.  We will arrange outpatient follow-up    Von Kilpatrick MD [facility]  10/26/23

## 2023-10-26 NOTE — CASE MANAGEMENT/SOCIAL WORK
Case Management Discharge Note      Final Note: Patient discharging home with family today.  Patient needed oxygen. CM made referral to [facility] for 2L. No other discharge needs identifed.         Selected Continued Care - Discharged on 10/26/2023 Admission date: 10/20/2023 - Discharge disposition: Home or Self Care      Destination    No services have been selected for the patient.                Durable Medical Equipment Coordination complete.      Service Provider Selected Services Address Phone Fax Patient Preferred    [facility] [address], [place] [id number] [phone] --              Dialysis/Infusion    No services have been selected for the patient.                Home Medical Care    No services have been selected for the patient.                Therapy    No services have been selected for the patient.                Community Resources    No services have been selected for the patient.                Community & DME    No services have been selected for the patient.

## 2023-10-26 NOTE — PLAN OF CARE
Goal Outcome Evaluation:              Outcome Evaluation: VSS. 3L NC. K+ replaced on shift. No acute events on shift. PRN meds given for muscle spasms. Will continue plan of care.

## 2023-10-26 NOTE — DISCHARGE INSTR - APPOINTMENTS
You have an appointment with Azar Stern MD on November 2, 2023 @ 11:00 AM.   Call them if you have any questions. Phone: [phone]  [address] [place], [place] [id number]

## 2023-10-31 ENCOUNTER — READMISSION MANAGEMENT (OUTPATIENT)
Dept: CALL CENTER | Facility: HOSPITAL | Age: 78
End: 2023-10-31
Payer: MEDICARE

## 2023-10-31 NOTE — OUTREACH NOTE
Sepsis Week 1 Survey      Flowsheet Row Responses   [facility], [place], operated by [facility] patient discharged from? [place]   Does the patient have one of the following disease processes/diagnoses(primary or secondary)? Sepsis   Week 1 attempt successful? Yes   Call start time 1232   Call end time 1247   Discharge diagnosis sepsis secondary to community-acquired pneumonia, POA  COPD,   Medication alerts for this patient pt reports his Atropine Sulfate 1%drops are for his drooling from his mouth and is to use one drop under his tongue daily. AVS documents they are opthalmic but pt reports they are not for his eyes.   Meds reviewed with patient/caregiver? Yes   Is the patient having any side effects they believe may be caused by any medication additions or changes? No   Does the patient have all medications related to this admission filled (includes all antibiotics, inhalers, nebulizers,steroids,etc.) Yes   Is the patient taking all medications as directed (includes completed medication regime)? Yes   Medication comments Pt's son is his pharmacist.   Does the patient have a primary care provider?  Yes   Does the patient have an appointment with their PCP within 7 days of discharge? No   What is preventing the patient from scheduling follow up appointments within 7 days of discharge? Haven't had time   Nursing Interventions Advised patient to make appointment   Has the patient kept scheduled appointments due by today? N/A   What DME was ordered? [employer] for 2L   DME comments pt has O2 if needs at home and nebs.Pulse ox- RA at home 93%   Psychosocial issues? No   Comments Using IS at home up to 2500ml. Pt denies cough, SOA, chest pain and feels his respiratory status has improved. His eyesight is slightly blurry r/t eye drops he reports.Pt reports he has been to therapy.   Did the patient receive a copy of their discharge instructions? Yes   Nursing interventions Reviewed instructions with patient   What is the patient's perception of  their health status since discharge? Improving   Nursing interventions Nurse provided patient education   Is the patient/caregiver able to teach back TIME? T emperature - higher or lower than normal, I nfection - may have signs and symptoms of an infection, M ental Decline - confused, sleepy, difficult to arouse, E xtremely Ill - severe pain, discomfort, shortness of breath   Nursing interventions Nurse provided patient education   Is patient/caregiver able to teach back steps to recovery at home? Rest and regain strength, Set small, achievable goals for return to baseline health   Is the patient/caregiver able to teach back the hierarchy of who to call/visit for symptoms/problems? PCP, Specialist, Home health nurse, Urgent Care, ED, 911 Yes   Week 1 call completed? Yes   Revoked No further contact(revokes)-requires comment   Is the patient interested in additional calls from an ambulatory ? No   Call end time 4312            Alexia GARCIA - Registered Nurse

## 2023-11-02 ENCOUNTER — TELEPHONE (OUTPATIENT)
Dept: CARDIOLOGY | Facility: CLINIC | Age: 78
End: 2023-11-02
Payer: MEDICARE

## 2023-11-02 NOTE — TELEPHONE ENCOUNTER
Yes he can still proceed from my standpoint .he was admitted for pneumonia.  He did have high A-fib rates but we adjusted his medications.

## 2023-11-02 NOTE — TELEPHONE ENCOUNTER
Leola called back, DONOVAN for return call at [phone].    Called Leola, discussed NSK recommendations above. Leola verbalizes understanding and agreeable to plan.

## 2023-11-02 NOTE — TELEPHONE ENCOUNTER
Leola Sweeney at  [facility] called for updated cardiac clearance for intrathecal infusion pump battery replacement procedure on 11/20/2023.     Pt was seen 10/2/23 by YAMILET Hernández and clearance was given at that time, however, pt hospitalized 10/20/23-10/26/23 so updated clearance is requested.     Please advise.      Leola Sweeney  [phone]

## 2023-11-10 ENCOUNTER — TELEPHONE (OUTPATIENT)
Dept: CARDIOLOGY | Facility: CLINIC | Age: 78
End: 2023-11-10
Payer: MEDICARE

## 2023-11-10 NOTE — TELEPHONE ENCOUNTER
Patient has EGD 11/13/23 with Dr. Kaye at [facility].     Pt is also having pain pump battery replacement 11/20/23. Cardiac risk assessment already given for this procedure.        What is cardiac risk assessment? Ok to hold Eliquis 48 hours? Pletal?        Please advise.

## 2023-11-10 NOTE — TELEPHONE ENCOUNTER
Patient can proceed with endoscopy at anticipated low risk of complications.  Hold Eliquis starting 48 hours prior to the procedure.  I think he can just hold the Pletal the day of the procedure.

## 2023-11-10 NOTE — TELEPHONE ENCOUNTER
Pt's wife called back. Discussed NSK recommendations above. Pt's wife verbalizes understanding.    Called pt and discussed NSK recommendations above. Pt verbalizes understanding and agreeable to plan.

## 2023-12-18 NOTE — PROGRESS NOTES
[facility] Cardiology  Follow Up Visit  Flaco Roque II  1945    VISIT DATE:  12/19/23    PCP:   Azar Stern MD  [address] [place], [place] [id number]          CC:  Pneumonia, Covid, Coronary Artery Disease, and PAF      Problem List:  Paroxysmal atrial fibrillation  Echocardiogram May 2019: LVEF 66 to 70%, mild TR, aortic valve sclerosis without stenosis, normal RVSP  Holter monitor October 2020: 8.4% PVC burden, 11 NSVT episodes with the longest lasting 6 beats, occasional runs of SVT with the longest lasting 11 beats  Echocardiogram 8/25/2023: LVEF 56 to 60%, LA cavity mild to moderately dilated, mild to moderate thickening of the aortic valve, mild to moderate TR, RVSP 35-45 mmHg, patient in atrial fibrillation during study  CHADsVasc 3, anticoagulated with Eliquis  Coronary artery disease:  Regadenoson stress test December 2019: LVEF 70%, mild coronary artery calcification, normal myocardial perfusion study with no evidence of ischemia  Regadenoson stress test 8/24/2023: Myocardial perfusion imaging indicates a moderate-sized area of ischemia in the anterior wall and apex, CT portion of the exam shows aortic calcifications and a large hiatal hernia, EF 49%  Left heart catheterization 9/5/2023: Minor nonobstructive CAD, normal LAD, mid circumflex 30-40%, mid RCA 20-30%, LV pressures S/D/E 115/-8/8 mmHg  Peripheral arterial disease with chronic left foot ulcer (follows with Dr. Sawyer)  I&D of left foot April 2020  Left arterial duplex October 2020: Atherosclerotic plaque with biphasic waveforms in the common femoral, SFA, popliteal arteries.  Anterior tibial artery patent with biphasic flow, posterior tibial artery and peroneal artery are occluded with some mild reconstitution distally via collaterals, biphasic flow within the dorsalis pedis artery, left KAMRYN 0.56  KAMRYN July 2021: Normal right KAMRYN 1.02,Left femoral-popliteal arteries noncompressible. There are biphasic and monophasic  waveforms noted in the left lower extremity, left KAMRYN 0.86  COPD/emphysema, on home O2  GERD/hiatal hernia  Parkinson's  Left shoulder injury (hx replacement)  Obstructive sleep apnea  Former tobacco use with 84-pack-year history, quit in 2001  COVID-pneumonia April 2023  Urinary retention, has Pinto catheter October 2023  pain pump replacement at [facility] November 2023    History of Present Illness:  Flaco Roque II  Is a 78 y.o. male with pertinent cardiac history detailed above.  Patient last seen in October during admission for A-fib with RVR.  HR are controlled today,.  BP low today but he is asymptomatic.  Home Bps 110s/70s.  He is doing exercise bike riding 3 miles.  No falls.  He was on digoxin int he hospital but now off.  His heart rate is controlled on toprol.  He  is planned for hiatal hernia surgery on January 5.  He does have dysphagia.  Currently worsened with pills.  He is not bothered by his PAD at this time.  No major claudication symptoms.  Tolerating Eliquis      Patient Active Problem List    Diagnosis Date Noted    Pneumonia, unspecified organism 10/26/2023    Sepsis 10/20/2023    Coronary artery disease involving native coronary artery of native heart without angina pectoris 09/05/2023     Note Last Updated: 9/5/2023 9/5/2023: LHC at [facility], normal LAD, mid circumflex 30-40%, RCA mid 20-30%, LVEDP 4 mmHg.  False-positive stress test.      Abnormal nuclear stress test 08/29/2023     Note Last Updated: 8/29/2023     Added automatically from request for surgery [id number]      COVID-19 04/15/2023    Permanent atrial fibrillation 04/15/2023    Status post reverse arthroplasty of shoulder, left 03/04/2021    Strain of deltoid muscle, left, initial encounter 03/04/2021    Impingement syndrome of left shoulder 03/04/2021    Scapular dyskinesis 03/04/2021    Peripheral arterial disease 04/30/2020    S/P Irrigation debridement left foot with excision of base of fifth metatarsal and chronic ulcer  2020    On home O2 2020    Skin ulcer of left foot, limited to breakdown of skin 2020    Abnormal CT scan of lung 2019     Note Last Updated: 2023     Findings most consistent with postinflammatory scarring and emphysema      Hypokalemia 2019    Anemia, 1 unit PRBC 2019    Parkinson, PNA 2019    Parkinson disease 2019    S/P foot surgery, left 2019    Deformity of left foot 04/10/2019     Note Last Updated: 4/10/2019     Added automatically from request for surgery [id number]      Leukocytosis, likely reactive 2018    Acute postoperative pain 2018    Foot deformity, acquired, left 2018     Note Last Updated: 2018     Added automatically from request for surgery [id number]      ABRIL (obstructive sleep apnea) 10/31/2017     Note Last Updated: 2019     CPAP intolerant      Pulmonary emphysema 10/31/2017    GERD without esophagitis 10/31/2017    Acute blood loss anemia 10/31/2017       No Known Allergies    Social History     Socioeconomic History    Marital status:    Tobacco Use    Smoking status: Former     Packs/day: 2.00     Years: 42.00     Additional pack years: 0.00     Total pack years: 84.00     Types: Cigarettes     Start date:      Quit date:      Years since quittin.9    Smokeless tobacco: Never   Vaping Use    Vaping Use: Never used   Substance and Sexual Activity    Alcohol use: Yes     Comment: beer occasional     Drug use: No    Sexual activity: Defer       Family History   Problem Relation Age of Onset    Arthritis Mother     Diabetes Mother     Osteoarthritis Mother     Colon cancer Father     Cancer Father        Current Medications:    Current Outpatient Medications:     acetaminophen (TYLENOL) 500 MG tablet, Take 2 tablets by mouth Every 6 (Six) Hours As Needed., Disp: , Rfl:     amantadine (SYMMETREL) 100 MG capsule, Take 1 capsule by mouth 2 (Two) Times a Day., Disp: , Rfl:     apixaban  (ELIQUIS) 5 MG tablet tablet, Take 1 tablet by mouth Every 12 (Twelve) Hours. Indications: Atrial Fibrillation, Disp: 60 tablet, Rfl:     atorvastatin (LIPITOR) 10 MG tablet, Take 1 tablet by mouth Daily., Disp: 30 tablet, Rfl: 11    atropine 1 % ophthalmic solution, 1 drop Daily., Disp: , Rfl:     carbidopa-levodopa (SINEMET)  MG per tablet, Take 1 tablet by mouth 4 (Four) Times a Day., Disp: , Rfl:     cilostazol (PLETAL) 100 MG tablet, Take 1 tablet by mouth 2 (Two) Times a Day., Disp: , Rfl:     cyanocobalamin (VITAMIN B-12) 500 MCG tablet, Take 1 tablet by mouth Daily., Disp: , Rfl:     fentaNYL (SUBLIMAZE) 0.05 MG/ML injection, Infuse 5 mL into a venous catheter. PAIN PUMP, Disp: , Rfl:     ferrous sulfate 325 (65 FE) MG tablet, Take 1 tablet by mouth Daily With Breakfast for 90 days., Disp: 30 tablet, Rfl: 2    furosemide (Lasix) 20 MG tablet, Take 1 tablet by mouth Daily for 90 days., Disp: 90 tablet, Rfl: 0    ipratropium-albuterol (DUO-NEB) 0.5-2.5 mg/3 ml nebulizer, Take 3 mL by nebulization 3 (Three) Times a Day., Disp: , Rfl:     metoprolol succinate XL (TOPROL-XL) 50 MG 24 hr tablet, TAKE ONE TABLET BY MOUTH DAILY, Disp: 60 tablet, Rfl: 5    montelukast (SINGULAIR) 10 MG tablet, TAKE 1 TABLET BY MOUTH EVERY NIGHT., Disp: 90 tablet, Rfl: 2    Multiple Vitamins-Minerals (MULTIVITAMIN ADULT PO), Take 1 tablet by mouth Daily., Disp: , Rfl:     pantoprazole (PROTONIX) 40 MG EC tablet, Take 1 tablet by mouth Daily., Disp: , Rfl:     QUEtiapine (SEROquel) 25 MG tablet, Take 1 tablet by mouth Every Evening., Disp: , Rfl:     Stiolto Respimat 2.5-2.5 MCG/ACT aerosol solution inhaler, , Disp: , Rfl:     tamsulosin (FLOMAX) 0.4 MG capsule 24 hr capsule, Take 1 capsule by mouth Every Night., Disp: 30 capsule, Rfl:     guaiFENesin (MUCINEX) 600 MG 12 hr tablet, Take 2 tablets by mouth 2 (Two) Times a Day., Disp: , Rfl:     Trelereina Ellipta 100-62.5-25 MCG/ACT inhaler, Inhale 1 puff Daily., Disp: , Rfl:   "    Review of Systems   Cardiovascular:  Negative for chest pain, claudication, irregular heartbeat, leg swelling and palpitations.   Musculoskeletal:  Negative for falls.   Gastrointestinal:  Positive for dysphagia.       Vitals:    12/19/23 1334   BP: (!) 88/56   BP Location: Right arm   Patient Position: Sitting   Pulse: 52   SpO2: 91%   Weight: 56.7 kg (125 lb)   Height: 172.7 cm (67.99\")       Physical Exam  Constitutional:       Appearance: Normal appearance.   Neck:      Vascular: No carotid bruit.   Cardiovascular:      Rate and Rhythm: Normal rate. Rhythm irregular.   Pulmonary:      Effort: Pulmonary effort is normal.      Breath sounds: Normal breath sounds.   Neurological:      Mental Status: He is alert.         Diagnostic Data:  Procedures  Lab Results   Component Value Date    TRIG 54 10/26/2023     Lab Results   Component Value Date    GLUCOSE 103 (H) 10/26/2023    BUN 10 10/26/2023    CREATININE 0.88 10/26/2023     10/26/2023    K 3.3 (L) 10/26/2023     10/26/2023    CO2 29.0 10/26/2023     Lab Results   Component Value Date    HGBA1C 5.2 11/06/2023     Lab Results   Component Value Date    WBC 7.26 11/06/2023    HGB 11.7 (L) 11/06/2023    HCT 40.1 11/06/2023     (H) 11/06/2023       Assessment:  No diagnosis found.    Plan:    Paroxysmal A-fib  Rate controlled with Toprol-XL   Off of digoxin  Continue Eliquis anticoagulation for stroke prophylaxis  Nonobstructive CAD  [facility] 9/23  PAD  Cilostazol, Eliquis, statin  Exercising on bike without limitation/claudication  Hiatal hernia  Surgery January 5th, Dr. Noble  Hold eliquis 48 hours prior  Has undergone EGD with dilation  [facility] Sept 9/23 non-obstructive CAD    Continue current cardiac medications.  Follow-up in July as scheduled.      ACP discussion was held with the patient during this visit. Patient has an advance directive in EMR which is still valid.       Von Kilpatrick MD FAC     "

## 2023-12-19 ENCOUNTER — OFFICE VISIT (OUTPATIENT)
Dept: CARDIOLOGY | Facility: CLINIC | Age: 78
End: 2023-12-19
Payer: MEDICARE

## 2023-12-19 VITALS
BODY MASS INDEX: 18.94 KG/M2 | SYSTOLIC BLOOD PRESSURE: 88 MMHG | OXYGEN SATURATION: 91 % | HEIGHT: 68 IN | WEIGHT: 125 LBS | HEART RATE: 52 BPM | DIASTOLIC BLOOD PRESSURE: 56 MMHG

## 2023-12-19 DIAGNOSIS — I48.21 PERMANENT ATRIAL FIBRILLATION: Primary | ICD-10-CM

## 2023-12-19 RX ORDER — ACETAMINOPHEN 500 MG
1000 TABLET ORAL EVERY 6 HOURS PRN
COMMUNITY
Start: 2023-11-20

## 2023-12-19 RX ORDER — GUAIFENESIN 600 MG/1
1200 TABLET, EXTENDED RELEASE ORAL 2 TIMES DAILY
COMMUNITY

## 2023-12-19 RX ORDER — TIOTROPIUM BROMIDE AND OLODATEROL 3.124; 2.736 UG/1; UG/1
SPRAY, METERED RESPIRATORY (INHALATION)
COMMUNITY
Start: 2023-11-25

## 2024-02-22 NOTE — PERIOPERATIVE NURSING NOTE
"[facility] - Surgical Intensive Care  Adult Nutrition  Progress Note    SUMMARY       Recommendations    1.) TF recs: continue with Peptamen VHP @50ml/hr to provide 1200 kcal, 110g PRO, and 1008ml FF- FWF per MD.                 - if pt still undergoing CRRT, may increase Peptamen VHP to 60ml/hr to provide 1440 kcal, 132g PRO, and 1210ml FF- FWF per MD.      2.)  Monitor for s/s of intolerance such as residuals >500ml, n/v/d, or abdominal distension.      3.)  RD to monitor tolerance, skin, labs, wt.     Goals: Meet % EEN/EPN by follow-up date.  Nutrition Goal Status: progressing towards goal  Communication of RD Recs:  (POC)    Assessment and Plan    Nutrition Problem  Inadequate oral intake     Related to (etiology):   Diagnosis related symptoms     Signs and Symptoms (as evidenced by):   Intubated  NPO     Interventions/Recommendations (treatment strategy):  Collaboration with medical providers     Nutrition Diagnosis Status:   Continues    Reason for Assessment    Reason For Assessment: RD follow-up  Diagnosis:  (ros's gangrene)  Relevant Medical History: obesity, T2DM, CKD  Interdisciplinary Rounds: did not attend    General Information Comments: RD f/u: tentative plans for pt to go to OR for trach/PEG placement and wound closure. TF were stopped at midnight. No previous tolerance issues noted prior to stopping feeds, per RN notes. CRRT expected to resume today, after procedure. Mild edema noted. RD team to continue to monitor.    Nutrition Discharge Planning: pending medical course    Nutrition Risk Screen    Nutrition Risk Screen: tube feeding or parenteral nutrition    Nutrition/Diet History    Food Allergies: NKFA    Anthropometrics    Temp: 99 °F (37.2 °C)  Height Method: Stated  Height: 5' 5" (165.1 cm)  Height (inches): 65 in  Weight Method: Bed Scale  Weight: (!) 150.9 kg (332 lb 10.8 oz)  Weight (lb): (!) 332.68 lb  Ideal Body Weight (IBW), Female: 125 lb  % Ideal Body Weight, Female (lb): " Pt fell at home this a.m. @ 0230. Skin tear to left elbow area (covered with Telfa dressing). C/O left rib/side area tenderness & left arm pain.  Anesthesiologist made aware & awaiting MD to evaluate regarding proceeding with surgery VS. further evaluation.   285.6 %  BMI (Calculated): 55.4  BMI Grade: greater than 40 - morbid obesity    Lab/Procedures/Meds    Pertinent Labs Reviewed: reviewed  Pertinent Labs Comments: BUN: 47, cr: 3.6, GFR: 14.5, gluc: 123, phos: 4.7, alb: 2.0    Pertinent Medications Reviewed: reviewed  Pertinent Medications Comments: Abx, amlodipine, Ca Carbonate, lasix, heparin, insulin, pantoprazole, polyethylene glycol, psyllium husk, sevelamer, NaCl    Estimated/Assessed Needs    Weight Used For Calorie Calculations: 57 kg (125 lb 10.6 oz) (IBW d/t BMI >50.0)  Energy Calorie Requirements (kcal): 1254- 1425 kcal  Energy Need Method: Kcal/kg (22-25 kcal/kg of IBW)    Protein Requirements: 114- 143g (2.0-2.5g/kg)  Weight Used For Protein Calculations: 57 kg (125 lb 10.6 oz) (IBW d/t BMI >50.0)    Estimated Fluid Requirement Method: RDA Method  RDA Method (mL): 1254    Nutrition Prescription Ordered    Current Diet Order: NPO  Current Nutrition Support Formula Ordered: Peptamen Intense VHP  Current Nutrition Support Rate Ordered: 50 (ml)    Evaluation of Received Nutrient/Fluid Intake    Enteral Calories (kcal):  (-)  Enteral Protein (gm):  (-)  Enteral (Free Water) Fluid (mL):  (-)  I/O: -4.4L since 2/15  Energy Calories Required: not meeting needs  Protein Required: not meeting needs  Fluid Required:  (as per MD)  Comments: LBM 2/22  Tolerance: tolerating  % Intake of Estimated Energy Needs: 0 - 25 %  % Meal Intake: NPO for procedure    Nutrition Risk    Level of Risk/Frequency of Follow-up:  (RD to f/u x 1-2/week)     Monitor and Evaluation    Food and Nutrient Intake: enteral nutrition intake, parenteral nutrition intake  Food and Nutrient Adminstration: enteral and parenteral nutrition administration  Knowledge/Beliefs/Attitudes: food and nutrition knowledge/skill, beliefs and attitudes  Physical Activity and Function: nutrition-related ADLs and IADLs, factors affecting access to physical activity  Anthropometric Measurements: weight, weight  change, body mass index  Biochemical Data, Medical Tests and Procedures: electrolyte and renal panel  Nutrition-Focused Physical Findings: overall appearance     Nutrition Follow-Up    RD Follow-up?: Yes

## 2024-05-16 ENCOUNTER — HOSPITAL ENCOUNTER (INPATIENT)
Facility: HOSPITAL | Age: 79
LOS: 5 days | Discharge: HOME | End: 2024-05-21
Attending: EMERGENCY MEDICINE | Admitting: INTERNAL MEDICINE
Payer: MEDICARE

## 2024-05-16 ENCOUNTER — HOSPITAL ENCOUNTER (OUTPATIENT)
Facility: HOSPITAL | Age: 79
Setting detail: SURGERY ADMIT
End: 2024-05-16
Attending: SURGERY | Admitting: SURGERY
Payer: MEDICARE

## 2024-05-16 ENCOUNTER — ANESTHESIA EVENT CONVERTED (OUTPATIENT)
Dept: ANESTHESIOLOGY | Facility: HOSPITAL | Age: 79
End: 2024-05-16
Payer: MEDICARE

## 2024-05-16 ENCOUNTER — APPOINTMENT (OUTPATIENT)
Dept: GENERAL RADIOLOGY | Facility: HOSPITAL | Age: 79
End: 2024-05-16
Payer: MEDICARE

## 2024-05-16 ENCOUNTER — ANESTHESIA (OUTPATIENT)
Dept: PERIOP | Facility: HOSPITAL | Age: 79
End: 2024-05-16
Payer: MEDICARE

## 2024-05-16 ENCOUNTER — ANESTHESIA EVENT (OUTPATIENT)
Dept: PERIOP | Facility: HOSPITAL | Age: 79
End: 2024-05-16
Payer: MEDICARE

## 2024-05-16 ENCOUNTER — APPOINTMENT (OUTPATIENT)
Dept: CT IMAGING | Facility: HOSPITAL | Age: 79
End: 2024-05-16
Payer: MEDICARE

## 2024-05-16 DIAGNOSIS — K56.2 VOLVULUS: ICD-10-CM

## 2024-05-16 DIAGNOSIS — K56.690 OTHER PARTIAL INTESTINAL OBSTRUCTION: Primary | ICD-10-CM

## 2024-05-16 DIAGNOSIS — E86.0 DEHYDRATION: ICD-10-CM

## 2024-05-16 DIAGNOSIS — R10.9 ABDOMINAL PAIN: ICD-10-CM

## 2024-05-16 DIAGNOSIS — R11.2 INTRACTABLE NAUSEA AND VOMITING: ICD-10-CM

## 2024-05-16 DIAGNOSIS — R10.84 GENERALIZED ABDOMINAL PAIN: ICD-10-CM

## 2024-05-16 PROBLEM — K56.609 SBO (SMALL BOWEL OBSTRUCTION): Status: ACTIVE | Noted: 2024-05-16

## 2024-05-16 PROBLEM — K56.609 LARGE BOWEL OBSTRUCTION: Status: ACTIVE | Noted: 2024-05-16

## 2024-05-16 LAB
ALBUMIN SERPL-MCNC: 4.4 G/DL (ref 3.5–5.2)
ALBUMIN/GLOB SERPL: 1.6 G/DL
ALP SERPL-CCNC: 82 U/L (ref 39–117)
ALT SERPL W P-5'-P-CCNC: <5 U/L (ref 1–41)
ANION GAP SERPL CALCULATED.3IONS-SCNC: 10 MMOL/L (ref 5–15)
AST SERPL-CCNC: 12 U/L (ref 1–40)
BACTERIA UR QL AUTO: ABNORMAL /HPF
BASOPHILS # BLD AUTO: 0.04 10*3/MM3 (ref 0–0.2)
BASOPHILS NFR BLD AUTO: 0.4 % (ref 0–1.5)
BILIRUB SERPL-MCNC: 0.6 MG/DL (ref 0–1.2)
BILIRUB UR QL STRIP: NEGATIVE
BUN SERPL-MCNC: 17 MG/DL (ref 8–23)
BUN/CREAT SERPL: 21 (ref 7–25)
CALCIUM SPEC-SCNC: 9.8 MG/DL (ref 8.6–10.5)
CHLORIDE SERPL-SCNC: 102 MMOL/L (ref 98–107)
CLARITY UR: CLEAR
CO2 SERPL-SCNC: 30 MMOL/L (ref 22–29)
COLOR UR: ABNORMAL
CREAT SERPL-MCNC: 0.81 MG/DL (ref 0.76–1.27)
D-LACTATE SERPL-SCNC: 1.1 MMOL/L (ref 0.5–2)
DEPRECATED RDW RBC AUTO: 48.4 FL (ref 37–54)
EGFRCR SERPLBLD CKD-EPI 2021: 90.2 ML/MIN/1.73
EOSINOPHIL # BLD AUTO: 0.01 10*3/MM3 (ref 0–0.4)
EOSINOPHIL NFR BLD AUTO: 0.1 % (ref 0.3–6.2)
ERYTHROCYTE [DISTWIDTH] IN BLOOD BY AUTOMATED COUNT: 14.6 % (ref 12.3–15.4)
GEN 5 2HR TROPONIN T REFLEX: 17 NG/L
GLOBULIN UR ELPH-MCNC: 2.8 GM/DL
GLUCOSE SERPL-MCNC: 139 MG/DL (ref 65–99)
GLUCOSE UR STRIP-MCNC: NEGATIVE MG/DL
HCT VFR BLD AUTO: 48.7 % (ref 37.5–51)
HGB BLD-MCNC: 15.7 G/DL (ref 13–17.7)
HGB UR QL STRIP.AUTO: NEGATIVE
HOLD SPECIMEN: NORMAL
HYALINE CASTS UR QL AUTO: ABNORMAL /LPF
IMM GRANULOCYTES # BLD AUTO: 0.01 10*3/MM3 (ref 0–0.05)
IMM GRANULOCYTES NFR BLD AUTO: 0.1 % (ref 0–0.5)
KETONES UR QL STRIP: ABNORMAL
LEUKOCYTE ESTERASE UR QL STRIP.AUTO: ABNORMAL
LIPASE SERPL-CCNC: 16 U/L (ref 13–60)
LYMPHOCYTES # BLD AUTO: 0.41 10*3/MM3 (ref 0.7–3.1)
LYMPHOCYTES NFR BLD AUTO: 4 % (ref 19.6–45.3)
MCH RBC QN AUTO: 29.3 PG (ref 26.6–33)
MCHC RBC AUTO-ENTMCNC: 32.2 G/DL (ref 31.5–35.7)
MCV RBC AUTO: 91 FL (ref 79–97)
MONOCYTES # BLD AUTO: 0.53 10*3/MM3 (ref 0.1–0.9)
MONOCYTES NFR BLD AUTO: 5.2 % (ref 5–12)
NEUTROPHILS NFR BLD AUTO: 9.2 10*3/MM3 (ref 1.7–7)
NEUTROPHILS NFR BLD AUTO: 90.2 % (ref 42.7–76)
NITRITE UR QL STRIP: NEGATIVE
NRBC BLD AUTO-RTO: 0 /100 WBC (ref 0–0.2)
PH UR STRIP.AUTO: 7 [PH] (ref 5–8)
PLATELET # BLD AUTO: 266 10*3/MM3 (ref 140–450)
PMV BLD AUTO: 10.7 FL (ref 6–12)
POTASSIUM SERPL-SCNC: 3.8 MMOL/L (ref 3.5–5.2)
PROT SERPL-MCNC: 7.2 G/DL (ref 6–8.5)
PROT UR QL STRIP: ABNORMAL
RBC # BLD AUTO: 5.35 10*6/MM3 (ref 4.14–5.8)
RBC # UR STRIP: ABNORMAL /HPF
REF LAB TEST METHOD: ABNORMAL
SODIUM SERPL-SCNC: 142 MMOL/L (ref 136–145)
SP GR UR STRIP: 1.03 (ref 1–1.03)
SQUAMOUS #/AREA URNS HPF: ABNORMAL /HPF
TROPONIN T DELTA: 2 NG/L
TROPONIN T SERPL HS-MCNC: 15 NG/L
UROBILINOGEN UR QL STRIP: ABNORMAL
WBC # UR STRIP: ABNORMAL /HPF
WBC NRBC COR # BLD AUTO: 10.2 10*3/MM3 (ref 3.4–10.8)
WHOLE BLOOD HOLD COAG: NORMAL
WHOLE BLOOD HOLD SPECIMEN: NORMAL

## 2024-05-16 PROCEDURE — 25010000002 MORPHINE PER 10 MG: Performed by: SURGERY

## 2024-05-16 PROCEDURE — 25010000002 METOCLOPRAMIDE PER 10 MG: Performed by: SURGERY

## 2024-05-16 PROCEDURE — 94664 DEMO&/EVAL PT USE INHALER: CPT

## 2024-05-16 PROCEDURE — 25810000003 SODIUM CHLORIDE 0.9 % SOLUTION: Performed by: EMERGENCY MEDICINE

## 2024-05-16 PROCEDURE — 94799 UNLISTED PULMONARY SVC/PX: CPT

## 2024-05-16 PROCEDURE — 25010000002 HYDROMORPHONE PER 4 MG: Performed by: EMERGENCY MEDICINE

## 2024-05-16 PROCEDURE — 74018 RADEX ABDOMEN 1 VIEW: CPT

## 2024-05-16 PROCEDURE — 25010000002 PROPOFOL 10 MG/ML EMULSION: Performed by: ANESTHESIOLOGY

## 2024-05-16 PROCEDURE — 25010000002 DROPERIDOL PER 5 MG: Performed by: ANESTHESIOLOGY

## 2024-05-16 PROCEDURE — 25010000002 ONDANSETRON PER 1 MG: Performed by: EMERGENCY MEDICINE

## 2024-05-16 PROCEDURE — 71045 X-RAY EXAM CHEST 1 VIEW: CPT

## 2024-05-16 PROCEDURE — 94640 AIRWAY INHALATION TREATMENT: CPT

## 2024-05-16 PROCEDURE — 25010000002 PROTHROMBIN COMPLEX CONC HUMAN 1000 UNITS KIT: Performed by: EMERGENCY MEDICINE

## 2024-05-16 PROCEDURE — 25010000002 ONDANSETRON PER 1 MG: Performed by: ANESTHESIOLOGY

## 2024-05-16 PROCEDURE — 25010000002 HYDROMORPHONE PER 4 MG: Performed by: FAMILY MEDICINE

## 2024-05-16 PROCEDURE — 0DTJ0ZZ RESECTION OF APPENDIX, OPEN APPROACH: ICD-10-PCS | Performed by: SURGERY

## 2024-05-16 PROCEDURE — 44005 FREEING OF BOWEL ADHESION: CPT | Performed by: PHYSICIAN ASSISTANT

## 2024-05-16 PROCEDURE — 25010000002 HYDROMORPHONE 1 MG/ML SOLUTION

## 2024-05-16 PROCEDURE — 88304 TISSUE EXAM BY PATHOLOGIST: CPT | Performed by: SURGERY

## 2024-05-16 PROCEDURE — 99285 EMERGENCY DEPT VISIT HI MDM: CPT

## 2024-05-16 PROCEDURE — 83690 ASSAY OF LIPASE: CPT | Performed by: EMERGENCY MEDICINE

## 2024-05-16 PROCEDURE — 25510000001 IOPAMIDOL PER 1 ML: Performed by: EMERGENCY MEDICINE

## 2024-05-16 PROCEDURE — 25010000002 FENTANYL CITRATE (PF) 100 MCG/2ML SOLUTION: Performed by: ANESTHESIOLOGY

## 2024-05-16 PROCEDURE — 25810000003 LACTATED RINGERS PER 1000 ML: Performed by: ANESTHESIOLOGY

## 2024-05-16 PROCEDURE — 25810000003 LACTATED RINGERS PER 1000 ML: Performed by: SURGERY

## 2024-05-16 PROCEDURE — 25010000002 DEXAMETHASONE SODIUM PHOSPHATE 10 MG/ML SOLUTION: Performed by: ANESTHESIOLOGY

## 2024-05-16 PROCEDURE — 25010000002 BUPIVACAINE (PF) 0.25 % SOLUTION: Performed by: ANESTHESIOLOGY

## 2024-05-16 PROCEDURE — 83605 ASSAY OF LACTIC ACID: CPT | Performed by: EMERGENCY MEDICINE

## 2024-05-16 PROCEDURE — 25010000002 FENTANYL CITRATE (PF) 50 MCG/ML SOLUTION: Performed by: ANESTHESIOLOGY

## 2024-05-16 PROCEDURE — 85025 COMPLETE CBC W/AUTO DIFF WBC: CPT | Performed by: EMERGENCY MEDICINE

## 2024-05-16 PROCEDURE — 0DNU0ZZ RELEASE OMENTUM, OPEN APPROACH: ICD-10-PCS | Performed by: SURGERY

## 2024-05-16 PROCEDURE — 30283B1 TRANSFUSION OF NONAUTOLOGOUS 4-FACTOR PROTHROMBIN COMPLEX CONCENTRATE INTO VEIN, PERCUTANEOUS APPROACH: ICD-10-PCS | Performed by: SURGERY

## 2024-05-16 PROCEDURE — 25010000002 PROTHROMBIN COMPLEX CONC HUMAN 500 UNITS KIT: Performed by: EMERGENCY MEDICINE

## 2024-05-16 PROCEDURE — 74177 CT ABD & PELVIS W/CONTRAST: CPT

## 2024-05-16 PROCEDURE — 80053 COMPREHEN METABOLIC PANEL: CPT | Performed by: EMERGENCY MEDICINE

## 2024-05-16 PROCEDURE — 25010000002 SUGAMMADEX 500 MG/5ML SOLUTION: Performed by: ANESTHESIOLOGY

## 2024-05-16 PROCEDURE — 81001 URINALYSIS AUTO W/SCOPE: CPT | Performed by: EMERGENCY MEDICINE

## 2024-05-16 PROCEDURE — 25010000002 CEFAZOLIN PER 500 MG: Performed by: SURGERY

## 2024-05-16 PROCEDURE — 84484 ASSAY OF TROPONIN QUANT: CPT | Performed by: EMERGENCY MEDICINE

## 2024-05-16 PROCEDURE — 36415 COLL VENOUS BLD VENIPUNCTURE: CPT

## 2024-05-16 PROCEDURE — 99222 1ST HOSP IP/OBS MODERATE 55: CPT | Performed by: FAMILY MEDICINE

## 2024-05-16 RX ORDER — SODIUM CHLORIDE 0.9 % (FLUSH) 0.9 %
10 SYRINGE (ML) INJECTION AS NEEDED
Status: DISCONTINUED | OUTPATIENT
Start: 2024-05-16 | End: 2024-05-21 | Stop reason: HOSPADM

## 2024-05-16 RX ORDER — CARBIDOPA AND LEVODOPA 50; 200 MG/1; MG/1
1 TABLET, EXTENDED RELEASE ORAL 2 TIMES DAILY
COMMUNITY

## 2024-05-16 RX ORDER — PANTOPRAZOLE SODIUM 40 MG/1
40 TABLET, DELAYED RELEASE ORAL DAILY
Status: DISCONTINUED | OUTPATIENT
Start: 2024-05-17 | End: 2024-05-21 | Stop reason: HOSPADM

## 2024-05-16 RX ORDER — DOCUSATE SODIUM 100 MG/1
100 CAPSULE, LIQUID FILLED ORAL 2 TIMES DAILY
Status: DISCONTINUED | OUTPATIENT
Start: 2024-05-16 | End: 2024-05-16

## 2024-05-16 RX ORDER — SODIUM CHLORIDE 0.9 % (FLUSH) 0.9 %
10 SYRINGE (ML) INJECTION AS NEEDED
Status: DISCONTINUED | OUTPATIENT
Start: 2024-05-16 | End: 2024-05-16 | Stop reason: HOSPADM

## 2024-05-16 RX ORDER — FENTANYL CITRATE 50 UG/ML
50 INJECTION, SOLUTION INTRAMUSCULAR; INTRAVENOUS
Status: DISCONTINUED | OUTPATIENT
Start: 2024-05-16 | End: 2024-05-16 | Stop reason: HOSPADM

## 2024-05-16 RX ORDER — PROPOFOL 10 MG/ML
VIAL (ML) INTRAVENOUS AS NEEDED
Status: DISCONTINUED | OUTPATIENT
Start: 2024-05-16 | End: 2024-05-16 | Stop reason: SURG

## 2024-05-16 RX ORDER — CILOSTAZOL 50 MG/1
100 TABLET ORAL 2 TIMES DAILY
Status: DISCONTINUED | OUTPATIENT
Start: 2024-05-16 | End: 2024-05-21 | Stop reason: HOSPADM

## 2024-05-16 RX ORDER — MULTIPLE VITAMINS W/ MINERALS TAB 9MG-400MCG
1 TAB ORAL DAILY
Status: DISCONTINUED | OUTPATIENT
Start: 2024-05-17 | End: 2024-05-21 | Stop reason: HOSPADM

## 2024-05-16 RX ORDER — GUAIFENESIN 600 MG/1
1200 TABLET, EXTENDED RELEASE ORAL 2 TIMES DAILY
Status: DISCONTINUED | OUTPATIENT
Start: 2024-05-16 | End: 2024-05-21 | Stop reason: HOSPADM

## 2024-05-16 RX ORDER — SIMETHICONE 80 MG
80 TABLET,CHEWABLE ORAL 4 TIMES DAILY PRN
Status: DISCONTINUED | OUTPATIENT
Start: 2024-05-16 | End: 2024-05-21 | Stop reason: HOSPADM

## 2024-05-16 RX ORDER — OXYCODONE HYDROCHLORIDE AND ACETAMINOPHEN 5; 325 MG/1; MG/1
2 TABLET ORAL EVERY 4 HOURS PRN
Status: DISCONTINUED | OUTPATIENT
Start: 2024-05-16 | End: 2024-05-17

## 2024-05-16 RX ORDER — IPRATROPIUM BROMIDE AND ALBUTEROL SULFATE 2.5; .5 MG/3ML; MG/3ML
3 SOLUTION RESPIRATORY (INHALATION) 3 TIMES DAILY
Status: DISCONTINUED | OUTPATIENT
Start: 2024-05-16 | End: 2024-05-17

## 2024-05-16 RX ORDER — DEXAMETHASONE SODIUM PHOSPHATE 10 MG/ML
INJECTION, SOLUTION INTRAMUSCULAR; INTRAVENOUS
Status: COMPLETED | OUTPATIENT
Start: 2024-05-16 | End: 2024-05-16

## 2024-05-16 RX ORDER — ACETAMINOPHEN 650 MG/1
650 SUPPOSITORY RECTAL EVERY 4 HOURS PRN
Status: DISCONTINUED | OUTPATIENT
Start: 2024-05-16 | End: 2024-05-21 | Stop reason: HOSPADM

## 2024-05-16 RX ORDER — SODIUM CHLORIDE 9 MG/ML
40 INJECTION, SOLUTION INTRAVENOUS AS NEEDED
Status: DISCONTINUED | OUTPATIENT
Start: 2024-05-16 | End: 2024-05-21 | Stop reason: HOSPADM

## 2024-05-16 RX ORDER — PROMETHAZINE HYDROCHLORIDE 12.5 MG/1
12.5 SUPPOSITORY RECTAL EVERY 6 HOURS PRN
Status: DISCONTINUED | OUTPATIENT
Start: 2024-05-16 | End: 2024-05-21 | Stop reason: HOSPADM

## 2024-05-16 RX ORDER — SODIUM CHLORIDE, SODIUM LACTATE, POTASSIUM CHLORIDE, CALCIUM CHLORIDE 600; 310; 30; 20 MG/100ML; MG/100ML; MG/100ML; MG/100ML
INJECTION, SOLUTION INTRAVENOUS CONTINUOUS PRN
Status: DISCONTINUED | OUTPATIENT
Start: 2024-05-16 | End: 2024-05-16 | Stop reason: SURG

## 2024-05-16 RX ORDER — NALOXONE HCL 0.4 MG/ML
0.4 VIAL (ML) INJECTION
Status: DISCONTINUED | OUTPATIENT
Start: 2024-05-16 | End: 2024-05-21 | Stop reason: HOSPADM

## 2024-05-16 RX ORDER — METOCLOPRAMIDE HYDROCHLORIDE 5 MG/ML
10 INJECTION INTRAMUSCULAR; INTRAVENOUS EVERY 6 HOURS
Status: DISCONTINUED | OUTPATIENT
Start: 2024-05-16 | End: 2024-05-21 | Stop reason: HOSPADM

## 2024-05-16 RX ORDER — METRONIDAZOLE 500 MG/100ML
500 INJECTION, SOLUTION INTRAVENOUS EVERY 8 HOURS
Qty: 300 ML | Refills: 0 | Status: DISCONTINUED | OUTPATIENT
Start: 2024-05-16 | End: 2024-05-16 | Stop reason: HOSPADM

## 2024-05-16 RX ORDER — POLYETHYLENE GLYCOL 3350 17 G/17G
17 POWDER, FOR SOLUTION ORAL DAILY PRN
Status: DISCONTINUED | OUTPATIENT
Start: 2024-05-16 | End: 2024-05-21 | Stop reason: HOSPADM

## 2024-05-16 RX ORDER — SODIUM CHLORIDE 9 MG/ML
40 INJECTION, SOLUTION INTRAVENOUS AS NEEDED
Status: DISCONTINUED | OUTPATIENT
Start: 2024-05-16 | End: 2024-05-16 | Stop reason: HOSPADM

## 2024-05-16 RX ORDER — ENALAPRILAT 1.25 MG/ML
1.25 INJECTION INTRAVENOUS EVERY 6 HOURS PRN
Status: DISCONTINUED | OUTPATIENT
Start: 2024-05-16 | End: 2024-05-21 | Stop reason: HOSPADM

## 2024-05-16 RX ORDER — LIDOCAINE HYDROCHLORIDE 20 MG/ML
SOLUTION OROPHARYNGEAL
Status: COMPLETED
Start: 2024-05-16 | End: 2024-05-16

## 2024-05-16 RX ORDER — ROCURONIUM BROMIDE 10 MG/ML
INJECTION, SOLUTION INTRAVENOUS AS NEEDED
Status: DISCONTINUED | OUTPATIENT
Start: 2024-05-16 | End: 2024-05-16 | Stop reason: SURG

## 2024-05-16 RX ORDER — SUCRALFATE 1 G/1
1 TABLET ORAL ONCE
Status: COMPLETED | OUTPATIENT
Start: 2024-05-16 | End: 2024-05-16

## 2024-05-16 RX ORDER — ONDANSETRON 2 MG/ML
4 INJECTION INTRAMUSCULAR; INTRAVENOUS EVERY 6 HOURS PRN
Status: DISCONTINUED | OUTPATIENT
Start: 2024-05-16 | End: 2024-05-21 | Stop reason: HOSPADM

## 2024-05-16 RX ORDER — HEPARIN SODIUM 5000 [USP'U]/ML
5000 INJECTION, SOLUTION INTRAVENOUS; SUBCUTANEOUS EVERY 8 HOURS SCHEDULED
Status: DISCONTINUED | OUTPATIENT
Start: 2024-05-17 | End: 2024-05-19 | Stop reason: ALTCHOICE

## 2024-05-16 RX ORDER — HYDROMORPHONE HYDROCHLORIDE 1 MG/ML
0.5 INJECTION, SOLUTION INTRAMUSCULAR; INTRAVENOUS; SUBCUTANEOUS
Status: COMPLETED | OUTPATIENT
Start: 2024-05-16 | End: 2024-05-16

## 2024-05-16 RX ORDER — PANTOPRAZOLE SODIUM 40 MG/1
40 TABLET, DELAYED RELEASE ORAL
Status: DISCONTINUED | OUTPATIENT
Start: 2024-05-17 | End: 2024-05-16

## 2024-05-16 RX ORDER — ONDANSETRON 2 MG/ML
INJECTION INTRAMUSCULAR; INTRAVENOUS AS NEEDED
Status: DISCONTINUED | OUTPATIENT
Start: 2024-05-16 | End: 2024-05-16 | Stop reason: SURG

## 2024-05-16 RX ORDER — DROPERIDOL 2.5 MG/ML
0.62 INJECTION, SOLUTION INTRAMUSCULAR; INTRAVENOUS ONCE AS NEEDED
Status: COMPLETED | OUTPATIENT
Start: 2024-05-16 | End: 2024-05-16

## 2024-05-16 RX ORDER — ONDANSETRON 2 MG/ML
4 INJECTION INTRAMUSCULAR; INTRAVENOUS ONCE
Status: COMPLETED | OUTPATIENT
Start: 2024-05-16 | End: 2024-05-16

## 2024-05-16 RX ORDER — AMOXICILLIN 250 MG
2 CAPSULE ORAL 2 TIMES DAILY PRN
Status: DISCONTINUED | OUTPATIENT
Start: 2024-05-16 | End: 2024-05-21 | Stop reason: HOSPADM

## 2024-05-16 RX ORDER — METOPROLOL SUCCINATE 50 MG/1
50 TABLET, EXTENDED RELEASE ORAL DAILY
Status: DISCONTINUED | OUTPATIENT
Start: 2024-05-17 | End: 2024-05-21 | Stop reason: HOSPADM

## 2024-05-16 RX ORDER — AMANTADINE HYDROCHLORIDE 100 MG/1
100 CAPSULE, GELATIN COATED ORAL 2 TIMES DAILY
Status: DISCONTINUED | OUTPATIENT
Start: 2024-05-16 | End: 2024-05-21 | Stop reason: HOSPADM

## 2024-05-16 RX ORDER — FINASTERIDE 5 MG/1
5 TABLET, FILM COATED ORAL DAILY
COMMUNITY

## 2024-05-16 RX ORDER — BISACODYL 5 MG/1
10 TABLET, DELAYED RELEASE ORAL DAILY
Status: DISCONTINUED | OUTPATIENT
Start: 2024-05-17 | End: 2024-05-21 | Stop reason: HOSPADM

## 2024-05-16 RX ORDER — FUROSEMIDE 20 MG/1
20 TABLET ORAL DAILY
Status: DISCONTINUED | OUTPATIENT
Start: 2024-05-17 | End: 2024-05-21 | Stop reason: HOSPADM

## 2024-05-16 RX ORDER — ACETAMINOPHEN 325 MG/1
650 TABLET ORAL EVERY 4 HOURS PRN
Status: DISCONTINUED | OUTPATIENT
Start: 2024-05-16 | End: 2024-05-21 | Stop reason: HOSPADM

## 2024-05-16 RX ORDER — SUCCINYLCHOLINE/SOD CL,ISO/PF 200MG/10ML
SYRINGE (ML) INTRAVENOUS AS NEEDED
Status: DISCONTINUED | OUTPATIENT
Start: 2024-05-16 | End: 2024-05-16 | Stop reason: SURG

## 2024-05-16 RX ORDER — FINASTERIDE 5 MG/1
5 TABLET, FILM COATED ORAL DAILY
Status: DISCONTINUED | OUTPATIENT
Start: 2024-05-17 | End: 2024-05-21 | Stop reason: HOSPADM

## 2024-05-16 RX ORDER — BUPIVACAINE HYDROCHLORIDE 2.5 MG/ML
INJECTION, SOLUTION EPIDURAL; INFILTRATION; INTRACAUDAL
Status: COMPLETED | OUTPATIENT
Start: 2024-05-16 | End: 2024-05-16

## 2024-05-16 RX ORDER — LANOLIN ALCOHOL/MO/W.PET/CERES
500 CREAM (GRAM) TOPICAL DAILY
Status: DISCONTINUED | OUTPATIENT
Start: 2024-05-17 | End: 2024-05-21 | Stop reason: HOSPADM

## 2024-05-16 RX ORDER — HYDROMORPHONE HYDROCHLORIDE 1 MG/ML
0.5 INJECTION, SOLUTION INTRAMUSCULAR; INTRAVENOUS; SUBCUTANEOUS
Status: DISCONTINUED | OUTPATIENT
Start: 2024-05-16 | End: 2024-05-17

## 2024-05-16 RX ORDER — MAGNESIUM HYDROXIDE 1200 MG/15ML
LIQUID ORAL AS NEEDED
Status: DISCONTINUED | OUTPATIENT
Start: 2024-05-16 | End: 2024-05-16 | Stop reason: HOSPADM

## 2024-05-16 RX ORDER — PHENYLEPHRINE HCL IN 0.9% NACL 1 MG/10 ML
SYRINGE (ML) INTRAVENOUS AS NEEDED
Status: DISCONTINUED | OUTPATIENT
Start: 2024-05-16 | End: 2024-05-16 | Stop reason: SURG

## 2024-05-16 RX ORDER — SODIUM CHLORIDE, SODIUM LACTATE, POTASSIUM CHLORIDE, CALCIUM CHLORIDE 600; 310; 30; 20 MG/100ML; MG/100ML; MG/100ML; MG/100ML
100 INJECTION, SOLUTION INTRAVENOUS CONTINUOUS
Status: DISCONTINUED | OUTPATIENT
Start: 2024-05-16 | End: 2024-05-19

## 2024-05-16 RX ORDER — ONDANSETRON 2 MG/ML
4 INJECTION INTRAMUSCULAR; INTRAVENOUS EVERY 6 HOURS PRN
Status: DISCONTINUED | OUTPATIENT
Start: 2024-05-16 | End: 2024-05-16

## 2024-05-16 RX ORDER — BISACODYL 5 MG/1
5 TABLET, DELAYED RELEASE ORAL DAILY PRN
Status: DISCONTINUED | OUTPATIENT
Start: 2024-05-16 | End: 2024-05-21 | Stop reason: HOSPADM

## 2024-05-16 RX ORDER — ATROPINE SULFATE 10 MG/ML
1 SOLUTION/ DROPS OPHTHALMIC DAILY
Status: DISCONTINUED | OUTPATIENT
Start: 2024-05-17 | End: 2024-05-17

## 2024-05-16 RX ORDER — NITROGLYCERIN 0.4 MG/1
0.4 TABLET SUBLINGUAL
Status: DISCONTINUED | OUTPATIENT
Start: 2024-05-16 | End: 2024-05-21 | Stop reason: HOSPADM

## 2024-05-16 RX ORDER — CLONAZEPAM 1 MG/1
1 TABLET ORAL DAILY
COMMUNITY

## 2024-05-16 RX ORDER — SODIUM CHLORIDE, SODIUM LACTATE, POTASSIUM CHLORIDE, CALCIUM CHLORIDE 600; 310; 30; 20 MG/100ML; MG/100ML; MG/100ML; MG/100ML
9 INJECTION, SOLUTION INTRAVENOUS CONTINUOUS PRN
Status: DISCONTINUED | OUTPATIENT
Start: 2024-05-16 | End: 2024-05-16 | Stop reason: HOSPADM

## 2024-05-16 RX ORDER — SODIUM CHLORIDE 9 MG/ML
10 INJECTION, SOLUTION INTRAMUSCULAR; INTRAVENOUS; SUBCUTANEOUS AS NEEDED
Status: DISCONTINUED | OUTPATIENT
Start: 2024-05-16 | End: 2024-05-21 | Stop reason: HOSPADM

## 2024-05-16 RX ORDER — FENTANYL CITRATE 50 UG/ML
INJECTION, SOLUTION INTRAMUSCULAR; INTRAVENOUS AS NEEDED
Status: DISCONTINUED | OUTPATIENT
Start: 2024-05-16 | End: 2024-05-16 | Stop reason: SURG

## 2024-05-16 RX ORDER — ONDANSETRON 4 MG/1
4 TABLET, ORALLY DISINTEGRATING ORAL EVERY 6 HOURS PRN
Status: DISCONTINUED | OUTPATIENT
Start: 2024-05-16 | End: 2024-05-21 | Stop reason: HOSPADM

## 2024-05-16 RX ORDER — DOCUSATE SODIUM 100 MG/1
100 CAPSULE, LIQUID FILLED ORAL 2 TIMES DAILY
Status: DISCONTINUED | OUTPATIENT
Start: 2024-05-16 | End: 2024-05-21 | Stop reason: HOSPADM

## 2024-05-16 RX ORDER — PROMETHAZINE HYDROCHLORIDE 12.5 MG/1
12.5 TABLET ORAL EVERY 6 HOURS PRN
Status: DISCONTINUED | OUTPATIENT
Start: 2024-05-16 | End: 2024-05-21 | Stop reason: HOSPADM

## 2024-05-16 RX ORDER — QUETIAPINE FUMARATE 25 MG/1
25 TABLET, FILM COATED ORAL EVERY EVENING
Status: DISCONTINUED | OUTPATIENT
Start: 2024-05-16 | End: 2024-05-21 | Stop reason: HOSPADM

## 2024-05-16 RX ORDER — CARBIDOPA AND LEVODOPA 50; 200 MG/1; MG/1
1 TABLET, EXTENDED RELEASE ORAL 2 TIMES DAILY
Status: DISCONTINUED | OUTPATIENT
Start: 2024-05-16 | End: 2024-05-21 | Stop reason: HOSPADM

## 2024-05-16 RX ORDER — SODIUM CHLORIDE 0.9 % (FLUSH) 0.9 %
10 SYRINGE (ML) INJECTION EVERY 12 HOURS SCHEDULED
Status: DISCONTINUED | OUTPATIENT
Start: 2024-05-16 | End: 2024-05-16 | Stop reason: HOSPADM

## 2024-05-16 RX ORDER — ACETAMINOPHEN 160 MG/5ML
650 SOLUTION ORAL EVERY 4 HOURS PRN
Status: DISCONTINUED | OUTPATIENT
Start: 2024-05-16 | End: 2024-05-21 | Stop reason: HOSPADM

## 2024-05-16 RX ORDER — CLONAZEPAM 1 MG/1
1 TABLET ORAL DAILY
Status: DISCONTINUED | OUTPATIENT
Start: 2024-05-17 | End: 2024-05-21 | Stop reason: HOSPADM

## 2024-05-16 RX ORDER — SODIUM CHLORIDE 0.9 % (FLUSH) 0.9 %
10 SYRINGE (ML) INJECTION EVERY 12 HOURS SCHEDULED
Status: DISCONTINUED | OUTPATIENT
Start: 2024-05-16 | End: 2024-05-21 | Stop reason: HOSPADM

## 2024-05-16 RX ORDER — MONTELUKAST SODIUM 10 MG/1
10 TABLET ORAL NIGHTLY
Status: DISCONTINUED | OUTPATIENT
Start: 2024-05-16 | End: 2024-05-21 | Stop reason: HOSPADM

## 2024-05-16 RX ORDER — TAMSULOSIN HYDROCHLORIDE 0.4 MG/1
0.4 CAPSULE ORAL NIGHTLY
Status: DISCONTINUED | OUTPATIENT
Start: 2024-05-16 | End: 2024-05-21 | Stop reason: HOSPADM

## 2024-05-16 RX ORDER — BISACODYL 5 MG/1
10 TABLET, DELAYED RELEASE ORAL DAILY
Status: DISCONTINUED | OUTPATIENT
Start: 2024-05-17 | End: 2024-05-16

## 2024-05-16 RX ORDER — MORPHINE SULFATE 2 MG/ML
2 INJECTION, SOLUTION INTRAMUSCULAR; INTRAVENOUS
Status: DISCONTINUED | OUTPATIENT
Start: 2024-05-16 | End: 2024-05-17

## 2024-05-16 RX ORDER — FAMOTIDINE 10 MG/ML
20 INJECTION, SOLUTION INTRAVENOUS ONCE
Status: COMPLETED | OUTPATIENT
Start: 2024-05-16 | End: 2024-05-16

## 2024-05-16 RX ORDER — ATORVASTATIN CALCIUM 10 MG/1
10 TABLET, FILM COATED ORAL DAILY
Status: DISCONTINUED | OUTPATIENT
Start: 2024-05-17 | End: 2024-05-21 | Stop reason: HOSPADM

## 2024-05-16 RX ORDER — BUDESONIDE AND FORMOTEROL FUMARATE DIHYDRATE 160; 4.5 UG/1; UG/1
2 AEROSOL RESPIRATORY (INHALATION)
Status: DISCONTINUED | OUTPATIENT
Start: 2024-05-16 | End: 2024-05-21 | Stop reason: HOSPADM

## 2024-05-16 RX ORDER — HYDROMORPHONE HYDROCHLORIDE 1 MG/ML
0.2 INJECTION, SOLUTION INTRAMUSCULAR; INTRAVENOUS; SUBCUTANEOUS
Status: DISCONTINUED | OUTPATIENT
Start: 2024-05-16 | End: 2024-05-16

## 2024-05-16 RX ORDER — BISACODYL 10 MG
10 SUPPOSITORY, RECTAL RECTAL DAILY PRN
Status: DISCONTINUED | OUTPATIENT
Start: 2024-05-16 | End: 2024-05-21 | Stop reason: HOSPADM

## 2024-05-16 RX ADMIN — HYDROMORPHONE HYDROCHLORIDE 0.5 MG: 1 INJECTION, SOLUTION INTRAMUSCULAR; INTRAVENOUS; SUBCUTANEOUS at 18:41

## 2024-05-16 RX ADMIN — FAMOTIDINE 20 MG: 10 INJECTION, SOLUTION INTRAVENOUS at 11:45

## 2024-05-16 RX ADMIN — DEXAMETHASONE SODIUM PHOSPHATE 4 MG: 10 INJECTION, SOLUTION INTRAMUSCULAR; INTRAVENOUS at 16:48

## 2024-05-16 RX ADMIN — FENTANYL CITRATE 50 MCG: 50 INJECTION, SOLUTION INTRAMUSCULAR; INTRAVENOUS at 18:00

## 2024-05-16 RX ADMIN — IPRATROPIUM BROMIDE AND ALBUTEROL SULFATE 3 ML: 2.5; .5 SOLUTION RESPIRATORY (INHALATION) at 21:55

## 2024-05-16 RX ADMIN — METRONIDAZOLE 500 MG: 500 INJECTION, SOLUTION INTRAVENOUS at 16:46

## 2024-05-16 RX ADMIN — SODIUM CHLORIDE 1000 ML: 9 INJECTION, SOLUTION INTRAVENOUS at 11:45

## 2024-05-16 RX ADMIN — PROPOFOL 150 MG: 10 INJECTION, EMULSION INTRAVENOUS at 16:42

## 2024-05-16 RX ADMIN — FENTANYL CITRATE 100 MCG: 50 INJECTION, SOLUTION INTRAMUSCULAR; INTRAVENOUS at 16:42

## 2024-05-16 RX ADMIN — HYDROMORPHONE HYDROCHLORIDE 0.5 MG: 1 INJECTION, SOLUTION INTRAMUSCULAR; INTRAVENOUS; SUBCUTANEOUS at 22:52

## 2024-05-16 RX ADMIN — HYDROMORPHONE HYDROCHLORIDE 0.5 MG: 1 INJECTION, SOLUTION INTRAMUSCULAR; INTRAVENOUS; SUBCUTANEOUS at 18:05

## 2024-05-16 RX ADMIN — BUDESONIDE AND FORMOTEROL FUMARATE DIHYDRATE 2 PUFF: 160; 4.5 AEROSOL RESPIRATORY (INHALATION) at 21:55

## 2024-05-16 RX ADMIN — SODIUM CHLORIDE, POTASSIUM CHLORIDE, SODIUM LACTATE AND CALCIUM CHLORIDE: 600; 310; 30; 20 INJECTION, SOLUTION INTRAVENOUS at 16:10

## 2024-05-16 RX ADMIN — METOCLOPRAMIDE 10 MG: 5 INJECTION, SOLUTION INTRAMUSCULAR; INTRAVENOUS at 21:38

## 2024-05-16 RX ADMIN — SUCRALFATE 1 G: 1 TABLET ORAL at 11:46

## 2024-05-16 RX ADMIN — QUETIAPINE FUMARATE 25 MG: 25 TABLET ORAL at 21:38

## 2024-05-16 RX ADMIN — SODIUM CHLORIDE, POTASSIUM CHLORIDE, SODIUM LACTATE AND CALCIUM CHLORIDE 9 ML/HR: 600; 310; 30; 20 INJECTION, SOLUTION INTRAVENOUS at 15:19

## 2024-05-16 RX ADMIN — Medication 10 ML: at 21:39

## 2024-05-16 RX ADMIN — IOPAMIDOL 75 ML: 755 INJECTION, SOLUTION INTRAVENOUS at 11:17

## 2024-05-16 RX ADMIN — ROCURONIUM BROMIDE 40 MG: 10 INJECTION INTRAVENOUS at 16:49

## 2024-05-16 RX ADMIN — ONDANSETRON 4 MG: 2 INJECTION INTRAMUSCULAR; INTRAVENOUS at 11:45

## 2024-05-16 RX ADMIN — MORPHINE SULFATE 2 MG: 2 INJECTION, SOLUTION INTRAMUSCULAR; INTRAVENOUS at 21:38

## 2024-05-16 RX ADMIN — DROPERIDOL 0.62 MG: 2.5 INJECTION, SOLUTION INTRAMUSCULAR; INTRAVENOUS at 18:05

## 2024-05-16 RX ADMIN — FENTANYL CITRATE 50 MCG: 50 INJECTION, SOLUTION INTRAMUSCULAR; INTRAVENOUS at 18:10

## 2024-05-16 RX ADMIN — BUPIVACAINE HYDROCHLORIDE 60 ML: 2.5 INJECTION, SOLUTION EPIDURAL; INFILTRATION; INTRACAUDAL; PERINEURAL at 16:48

## 2024-05-16 RX ADMIN — ONDANSETRON 4 MG: 2 INJECTION INTRAMUSCULAR; INTRAVENOUS at 17:37

## 2024-05-16 RX ADMIN — ROCURONIUM BROMIDE 10 MG: 10 INJECTION INTRAVENOUS at 16:42

## 2024-05-16 RX ADMIN — Medication 100 MCG: at 17:34

## 2024-05-16 RX ADMIN — Medication 120 MG: at 16:42

## 2024-05-16 RX ADMIN — SODIUM CHLORIDE 2000 MG: 900 INJECTION INTRAVENOUS at 16:46

## 2024-05-16 RX ADMIN — Medication 100 MCG: at 17:20

## 2024-05-16 RX ADMIN — LIDOCAINE HYDROCHLORIDE: 20 SOLUTION ORAL at 13:51

## 2024-05-16 RX ADMIN — SUGAMMADEX 500 MG: 100 INJECTION, SOLUTION INTRAVENOUS at 17:47

## 2024-05-16 RX ADMIN — SODIUM CHLORIDE, POTASSIUM CHLORIDE, SODIUM LACTATE AND CALCIUM CHLORIDE 125 ML/HR: 600; 310; 30; 20 INJECTION, SOLUTION INTRAVENOUS at 21:38
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
## 2024-05-16 NOTE — BRIEF OP NOTE
LAPAROTOMY EXPLORATORY POSSIBLE COLON RESECTION  Progress Note    Flaco Roque II  5/16/2024    Pre-op Diagnosis:   Questionable volvulus       Post-Op Diagnosis Codes:  Partial colonic obstruction due to adhesions    Procedure/CPT® Codes:        Procedure(s):  EXPLORATORY LAPAROTOMY LYSIS OF ADHESIONS; APPENDECTOMY              Surgeon(s):  Cj Joseph MD    Anesthesia: General with Block    Staff:   Circulator: David Fox RN  Scrub Person: Carlie Paul  Assistant: Reyes Luis PA-C; Randal Rios PA-C  Assistant: Reyes Luis PA-C; Randal Rios PA-C      Estimated Blood Loss: minimal    Urine Voided: 350 mL    Specimens:                Specimens       ID Source Type Tests Collected By Collected At Frozen?    A Large Intestine, Appendix Tissue TISSUE PATHOLOGY EXAM   Cj Joseph MD 5/16/24 1722     Description: appendix                  Drains:   NG/OG Tube Nasogastric 16 Fr Right nostril (Active)   Placement Verification Other (Comment) 05/16/24 1347   Site Assessment Clean;Bleeding 05/16/24 1347   Securement taped to nostril center 05/16/24 1347   Secured at (cm) 55 05/16/24 1347   NG/OG Site Interventions Site assessed 05/16/24 1347   Dressing Intervention New dressing 05/16/24 1347   Status Suction-low intermittent 05/16/24 1347   Drainage Appearance Brown 05/16/24 1347       Findings:   No evidence of cecal or sigmoid volvulus, though there were adhesions related to prior cholecystectomy causing partial obstruction of both the duodenum as well as the colon which were lysed.  This allowed for complete freeing of the colon as well as the duodenum to prevent obstruction  Appendectomy performed to prevent diagnostic uncertainty in the future.  The right colon and hepatic flexure were appropriately affixed in the right upper quadrant.        Complications: None    Assistant: Reyes Luis PA-C; Randal Rios PA-C  was responsible for performing the following activities:  Retraction, Irrigation, Closing, and Placing Dressing and their skilled assistance was necessary for the success of this case.    Cj Joseph MD     Date: 5/16/2024  Time: 17:54 EDT
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
## 2024-05-16 NOTE — CASE MANAGEMENT/SOCIAL WORK
Discharge Planning Assessment  [facility]     Patient Name: Flaco Roque II  MRN: [id number]  Today's Date: 5/16/2024    Admit Date: 5/16/2024    Plan: IDP   Discharge Needs Assessment       Row Name 05/16/24 1323       Living Environment    People in Home spouse    Name(s) of People in Home Cheryl Roque    Current Living Arrangements home    Potentially Unsafe Housing Conditions unable to assess    Primary Care Provided by self    Provides Primary Care For no one    Family Caregiver if Needed spouse    Family Caregiver Names Cheryl Roque    Quality of Family Relationships involved;helpful    Able to Return to Prior Arrangements yes       Resource/Environmental Concerns    Resource/Environmental Concerns none    Transportation Concerns none       Transition Planning    Patient/Family Anticipates Transition to home with family    Patient/Family Anticipated Services at Transition none    Transportation Anticipated family or friend will provide       Discharge Needs Assessment    Readmission Within the Last 30 Days no previous admission in last 30 days    Equipment Currently Used at Home oxygen                   Discharge Plan       Row Name 05/16/24 1324       Plan    Plan IDP    Plan Comments MSW met with pt. and spouse Cheryl Roque at bedside. Pt. lives in [facility] with spouse. Pt.'s PCP is Azar Stern. Pt.'s pharmacy is [facility]. Pt.'s insurance is Medicare and [employer]. Pt. reports he is independent at baseline. Pt. denies DME and HH. Pt. report he has a portable O2 (2L) tank for PRN use. Pt. reports he has transportation back home when he is medically ready to d/c. Pt. doesn't have an advanced directive or ACP documentation on file. CM will continue to follow pt. throughout his stay.    Final Discharge Disposition Code 30 - still a patient                  Continued Care and Services - Admitted Since 5/16/2024    No active coordination exists for this encounter.          Demographic Summary        Row Name 05/16/24 1321       General Information    Admission Type inpatient    Arrived From home    Referral Source admission list;emergency department    Reason for Consult discharge planning    Preferred Language English                   Functional Status       Row Name 05/16/24 1321       Functional Status, IADL    Medications independent    Meal Preparation independent    Housekeeping independent    Laundry independent    Shopping independent       Mental Status Summary    Recent Changes in Mental Status/Cognitive Functioning unable to assess       Employment/    Employment Status retired                   Psychosocial    No documentation.                  Abuse/Neglect    No documentation.                  Legal    No documentation.                  Substance Abuse    No documentation.                  Patient Forms    No documentation.                     AZALEA Parr

## 2024-05-16 NOTE — ANESTHESIA PROCEDURE NOTES
"Peripheral Block      Patient reassessed immediately prior to procedure    Patient location during procedure: OR  Start time: 5/16/2024 4:44 PM  Stop time: 5/16/2024 4:48 PM  Reason for block: at surgeon's request and post-op pain management  Performed by  Anesthesiologist: Keshav Alcocer MD  Preanesthetic Checklist  Completed: patient identified, IV checked, site marked, risks and benefits discussed, surgical consent, monitors and equipment checked, pre-op evaluation and timeout performed  Prep:  Pt Position: supine  Sterile barriers:cap, gloves, mask and washed/disinfected hands  Prep: ChloraPrep  Patient monitoring: blood pressure monitoring, continuous pulse oximetry and EKG  Procedure    Sedation: yes  Performed under: general  Guidance:ultrasound guided  Images:still images obtained, printed/placed on chart    Laterality:Bilateral  Block Type:TAP  Injection Technique:single-shot  Needle Type:short-bevel and echogenic  Needle Gauge:20 G  Resistance on Injection: none    Medications Used: bupivacaine PF (MARCAINE) 0.25 % injection - Injection   60 mL - 5/16/2024 4:48:00 PM  dexamethasone sodium phosphate injection - Injection   4 mg - 5/16/2024 4:48:00 PM      Medications  Comment:Block Injection:  LA dose divided between Right and Left block        Post Assessment  Injection Assessment: negative aspiration for heme, incremental injection and no paresthesia on injection  Patient Tolerance:comfortable throughout block  Complications:no  Additional Notes    Subcostal TAPs    A high-frequency linear transducer, with sterile cover, was placed sub-xiphoid to identify Line[place] Alba, right and left Rectus Abdominus Muscles (HOLDEN). The transducer was moved either right or left subcostally to identify the HOLDEN and the Transverse Abdominus Muscle (MYRICK). The insertion site was prepped in sterile fashion and then localized with 2-5 ml of 1% Lidocaine. Using ultrasound-guidance, a 20-gauge B-Alcocer 4\" Ultraplex 360 " non-stimulating echogenic needle was advanced in plane, from medial to lateral, until the tip of the needle was in the fascial plane between the HOLDEN and MYRICK. 1-3ml of preservative free normal saline was used to hydro-dissect the fascial planes. After the fascial plane was verified, the local anesthetic (LA) was injected. The procedure was repeated on the opposite side for bilateral coverage. Aspiration every 5 ml to prevent intravascular injection. Injection was completed with negative aspiration of blood and negative intravascular injection. Injection pressures were normal with minimal resistance. The subcostal approach to the TAP nerve block ideally anesthetizes the intercostal nerves T6-T9.
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
## 2024-05-16 NOTE — ANESTHESIA PREPROCEDURE EVALUATION
Anesthesia Evaluation     Patient summary reviewed and Nursing notes reviewed   no history of anesthetic complications:   NPO Solid Status: > 8 hours  NPO Liquid Status: > 2 hours           Airway   Mallampati: I  TM distance: >3 FB  Neck ROM: full  No difficulty expected  Comment: Recent intubation: Dlx1, MAC3, grade 1 view  Dental - normal exam     Pulmonary    (+) ,sleep apnea  Cardiovascular     ECG reviewed  PT is on anticoagulation therapy    (+) dysrhythmias Atrial Fib, PVD    ROS comment: 2023 ECHO:  Interpretation Summary  ·  Left ventricular ejection fraction appears to be 56 - 60%.  ·  The left atrial cavity is mild to moderately dilated  ·  There is mild to moderate thickening of the aortic valve  ·  Mild to moderate tricuspid valve regurgitation is present. Estimated right ventricular systolic pressure from tricuspid regurgitation is mildly elevated (35-45 mmHg).  ·  There is a trivial pericardial effusion.  ·  Patient noted to be in atrial fibrillation with elevated rates during the study.    2023 stress test with intermediate risk  Heart catheterization with nonobstructive CAD    On eliquis s/p kcentra    Neuro/Psych  (+) Parkinson's disease  GI/Hepatic/Renal/Endo    (+) hiatal hernia (recent repair), GERD    ROS Comment: Acute abd pain  Intractable NV  Bowel obstruction/volvulus      Musculoskeletal     Abdominal    Substance History      OB/GYN          Other   arthritis,                   Anesthesia Plan    ASA 3     general with block     intravenous induction     Anesthetic plan, risks, benefits, and alternatives have been provided, discussed and informed consent has been obtained with: patient.    Plan discussed with CRNA.      CODE STATUS:    Medical Intervention Limits: NO intubation (DNI)  Level Of Support Discussed With: Patient; Next of Kin (If No Surrogate)  Code Status (Patient has no pulse and is not breathing): No CPR (Do Not Attempt to Resuscitate)  Medical Interventions (Patient has  pulse or is breathing): Limited Support

## 2024-05-16 NOTE — ANESTHESIA POSTPROCEDURE EVALUATION
Patient: Flaco Roque II    Procedure Summary       Date: 05/16/24 Room / Location:  [facility]    Anesthesia Start: 1636 Anesthesia Stop: 1802    Procedure: EXPLORATORY LAPAROTOMY LYSIS OF ADHESIONS; APPENDECTOMY (Abdomen) Diagnosis:     Surgeons: Cj Joseph MD Provider: Keshav Alcocer MD    Anesthesia Type: general with block ASA Status: 3            Anesthesia Type: general with block    Vitals  No vitals data found for the desired time range.          Post Anesthesia Care and Evaluation    Patient location during evaluation: PACU  Patient participation: complete - patient participated  Level of consciousness: agitated  Pain score: 4  Pain management: adequate    Airway patency: patent  Anesthetic complications: No anesthetic complications  PONV Status: none  Cardiovascular status: acceptable  Respiratory status: acceptable  Hydration status: acceptable

## 2024-05-16 NOTE — ANESTHESIA PROCEDURE NOTES
Airway  Urgency: elective    Date/Time: 5/16/2024 4:43 PM  Airway not difficult    General Information and Staff    Patient location during procedure: OR  Anesthesiologist: Skinny Manriquez MD    Indications and Patient Condition  Indications for airway management: airway protection    Preoxygenated: yes  MILS not maintained throughout  Mask difficulty assessment: 1 - vent by mask    Final Airway Details  Final airway type: endotracheal airway      Successful airway: ETT  Cuffed: yes   Successful intubation technique: direct laryngoscopy  Endotracheal tube insertion site: oral  Blade: Suyapa  Blade size: 3  ETT size (mm): 7.5  Cormack-Lehane Classification: grade I - full view of glottis  Placement verified by: chest auscultation and capnometry   Measured from: lips  ETT/EBT  to lips (cm): 20  Number of attempts at approach: 1  Assessment: lips, teeth, and gum same as pre-op and atraumatic intubation    Additional Comments  Negative epigastric sounds, Breath sound equal bilaterally with symmetric chest rise and fall
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
## 2024-05-16 NOTE — OP NOTE
OPERATIVE NOTE    Patient Name:  Flaco Roque II  YOB: 1945  [id number]    Date of Surgery:  5/16/2024      PREOPERATIVE DIAGNOSIS: Questionable cecal volvulus      POSTOPERATIVE DIAGNOSIS: Partial bowel obstruction due to adhesive disease       PROCEDURE PERFORMED:  1.  Exploratory laparotomy  2.  Lysis of adhesions  3.  Appendectomy     This procedure was not performed to treat colon cancer through resection       SURGEON: Cj Joseph MD       Circulator: David Fox RN  Scrub Person: Carlie Paul  Assistant: Reyes Luis PA-C; Randal Rios PA-C       Assistant: Reyes Luis PA-C; Randal Rios PA-C    ASSISTANT SURGEON: None       SPECIMENS: Appendix and contents      EBL: Minimal       ANESTHESIA: General.        FINDINGS:   No evidence of cecal or sigmoid volvulus, though there were adhesions related to prior cholecystectomy causing partial obstruction of both the duodenum as well as the colon which were lysed.  This allowed for complete freeing of the colon as well as the duodenum to prevent obstruction.  Appendectomy performed to prevent diagnostic uncertainty in the future.  The right colon and hepatic flexure were appropriately affixed in the right upper quadrant.       INDICATIONS: The patient is a 78 y.o. male who presented with evidence of partial bowel obstruction.  There was concern of a volvulus versus internal hernia in the right upper quadrant.  Given these findings, decision was made to proceed emergently to the operating room.  The risk and benefits were discussed like the patient and his family, and they agreed to proceed.  His anticoagulation was reversed with Kcentra prior to the procedure.         DESCRIPTION OF PROCEDURE:      After obtaining informed consent, the patient was taken to the operating room and placed in supine  position. After appropriate DVT and antibiotic prophylaxis, general anesthesia was induced. TAP blocks were placed by the  anesthesia staff bilaterally to aid in postop pain control . The abdomen  was prepped and draped in standard sterile fashion, and a vertical midline incision was made from the subxiphoid area to just below the umbilicus.  Electrocautery dissection was carried down to level the fascia which is all anteriorly and sharply divided.  Dissection was carried in the peritoneal cavity.  The peritoneal and fascial incisions were then extended to the length of skin incision.  The Bookwalter retractor was brought in the field to aid in retraction and visualization of structures.    Upon entering the abdomen the cecum was laying on the right side.  Although floppy there is no evidence of any cecal volvulus whatsoever.  We carefully delivered all the small bowel as well as the cecum into the wound.  There is no evidence of cecal volvulus.  There were adhesions of the omentum covering both the duodenum as well as the proximal transverse colon to the gallbladder fossa.  There was evidence of prior cholecystectomy.  These adhesions were carefully taken down using blunt and LigaSure dissection without injury to either the duodenum or the proximal transverse colon.  These adhesions did seem to be causing a partial obstruction of each structure and once lysed that obstruction was relieved.  We were then able to fully inspect the ascending and transverse colon.  The hepatic flexure was then the right upper quadrant and appropriately affixed to the retroperitoneum without obstruction.  We were able to visualize the entire duodenal sweep and the pylorus.  There is no evidence of ulcer mass lesion or other obstructing source.  The duodenum was dilating appropriately once the obstruction had been relieved.  The transverse descending and sigmoid colons were normal other than diverticulosis without diverticulitis of the sigmoid colon.  The entire small bowel was run from ligament of Treitz to the ileocecal valve and was normal.    It appeared  that the adhesions were the cause of the obstruction seen on scan, but though there was no volvulus of either the cecum or the sigmoid colon.  Given the patient's chronic pain issues as well as his thin body habitus and fairly floppy cecum, it was felt best to proceed with appendectomy to prevent diagnostic concern in the future.    The appendiceal mesentery was taken between clamps, the contents clamps tied, and it was divided.  The base the appendix was encircled with a 3-0 silk suture in pursestring fashion.  The base of the appendix itself was crushed with a straight clamp which was then moved slightly distally.  The previously crushed portion was then ligated with a 2-0 chromic suture.  The appendix was then resected and passed off the specimen.  The appendiceal stump was then dunked and the pursestring suture tied.  There was no encroachment on the ileocecal valve.  There was no bleeding.    The abdomen was then copiously irrigated with normal saline until clear.  All bleeding had been controlled with either suture ligation or electrocautery.  The NG to position was confirmed by the operating surgeon and secured by the anesthesia staff.  The midline at laparotomy incision was then closed using a combination of running loop PDS sutures x 2 as well as internal retention sutures of 0 PDS.  The wound was irrigated with antibiotic saline, and the skin closed using skin staples.  The incision was dressed in standard sterile fashion, and covered with a dry sterile dressing.    All sponge and needle counts were correct times two at the completion of the procedure. The patient recovered from anesthesia, was extubated in the operating room  and was transported to the PACU  in stable condition.    COMPLICATIONS: None     Assistant: Reyes Luis PA-C; Randal Rios PA-C  was responsible for performing the following activities: Retraction, Suction, and Placing Dressing and their skilled assistance was necessary for the  success of this case.    Cj Joseph MD  5/16/2024  17:55 EDT        Dictated using Dragon Dictation

## 2024-05-16 NOTE — Clinical Note
Level of Care: Telemetry [5]   Diagnosis: Volvulus [560.2.ICD-9-CM]   Admitting Physician: BHUPINDER OROZCO [[id number]]

## 2024-05-17 LAB
ANION GAP SERPL CALCULATED.3IONS-SCNC: 14 MMOL/L (ref 5–15)
BASOPHILS # BLD AUTO: 0.01 10*3/MM3 (ref 0–0.2)
BASOPHILS NFR BLD AUTO: 0.1 % (ref 0–1.5)
BUN SERPL-MCNC: 10 MG/DL (ref 8–23)
BUN/CREAT SERPL: 14.1 (ref 7–25)
CALCIUM SPEC-SCNC: 8.8 MG/DL (ref 8.6–10.5)
CHLORIDE SERPL-SCNC: 103 MMOL/L (ref 98–107)
CO2 SERPL-SCNC: 20 MMOL/L (ref 22–29)
CREAT SERPL-MCNC: 0.71 MG/DL (ref 0.76–1.27)
DEPRECATED RDW RBC AUTO: 48.3 FL (ref 37–54)
EGFRCR SERPLBLD CKD-EPI 2021: 93.9 ML/MIN/1.73
EOSINOPHIL # BLD AUTO: 0 10*3/MM3 (ref 0–0.4)
EOSINOPHIL NFR BLD AUTO: 0 % (ref 0.3–6.2)
ERYTHROCYTE [DISTWIDTH] IN BLOOD BY AUTOMATED COUNT: 14.5 % (ref 12.3–15.4)
GLUCOSE SERPL-MCNC: 162 MG/DL (ref 65–99)
HCT VFR BLD AUTO: 45.5 % (ref 37.5–51)
HGB BLD-MCNC: 14.5 G/DL (ref 13–17.7)
IMM GRANULOCYTES # BLD AUTO: 0.04 10*3/MM3 (ref 0–0.05)
IMM GRANULOCYTES NFR BLD AUTO: 0.3 % (ref 0–0.5)
INR PPP: 1.18 (ref 0.89–1.12)
LYMPHOCYTES # BLD AUTO: 0.25 10*3/MM3 (ref 0.7–3.1)
LYMPHOCYTES NFR BLD AUTO: 1.7 % (ref 19.6–45.3)
MAGNESIUM SERPL-MCNC: 1.7 MG/DL (ref 1.6–2.4)
MCH RBC QN AUTO: 29.1 PG (ref 26.6–33)
MCHC RBC AUTO-ENTMCNC: 31.9 G/DL (ref 31.5–35.7)
MCV RBC AUTO: 91.4 FL (ref 79–97)
MONOCYTES # BLD AUTO: 1.35 10*3/MM3 (ref 0.1–0.9)
MONOCYTES NFR BLD AUTO: 9.3 % (ref 5–12)
NEUTROPHILS NFR BLD AUTO: 12.93 10*3/MM3 (ref 1.7–7)
NEUTROPHILS NFR BLD AUTO: 88.6 % (ref 42.7–76)
NRBC BLD AUTO-RTO: 0 /100 WBC (ref 0–0.2)
PLATELET # BLD AUTO: 282 10*3/MM3 (ref 140–450)
PMV BLD AUTO: 11.4 FL (ref 6–12)
POTASSIUM SERPL-SCNC: 3.9 MMOL/L (ref 3.5–5.2)
PROTHROMBIN TIME: 15.1 SECONDS (ref 12.2–14.5)
RBC # BLD AUTO: 4.98 10*6/MM3 (ref 4.14–5.8)
SODIUM SERPL-SCNC: 137 MMOL/L (ref 136–145)
WBC NRBC COR # BLD AUTO: 14.58 10*3/MM3 (ref 3.4–10.8)

## 2024-05-17 PROCEDURE — 97530 THERAPEUTIC ACTIVITIES: CPT

## 2024-05-17 PROCEDURE — 97535 SELF CARE MNGMENT TRAINING: CPT

## 2024-05-17 PROCEDURE — 83735 ASSAY OF MAGNESIUM: CPT | Performed by: INTERNAL MEDICINE

## 2024-05-17 PROCEDURE — 94799 UNLISTED PULMONARY SVC/PX: CPT

## 2024-05-17 PROCEDURE — 80048 BASIC METABOLIC PNL TOTAL CA: CPT | Performed by: FAMILY MEDICINE

## 2024-05-17 PROCEDURE — 99232 SBSQ HOSP IP/OBS MODERATE 35: CPT | Performed by: INTERNAL MEDICINE

## 2024-05-17 PROCEDURE — 25010000002 METOCLOPRAMIDE PER 10 MG: Performed by: SURGERY

## 2024-05-17 PROCEDURE — 97161 PT EVAL LOW COMPLEX 20 MIN: CPT

## 2024-05-17 PROCEDURE — 94664 DEMO&/EVAL PT USE INHALER: CPT

## 2024-05-17 PROCEDURE — 94761 N-INVAS EAR/PLS OXIMETRY MLT: CPT

## 2024-05-17 PROCEDURE — 25010000002 HYDROMORPHONE PER 4 MG: Performed by: SURGERY

## 2024-05-17 PROCEDURE — 97165 OT EVAL LOW COMPLEX 30 MIN: CPT

## 2024-05-17 PROCEDURE — 25010000002 CEFTRIAXONE PER 250 MG: Performed by: INTERNAL MEDICINE

## 2024-05-17 PROCEDURE — 25010000002 HYDROMORPHONE PER 4 MG: Performed by: FAMILY MEDICINE

## 2024-05-17 PROCEDURE — 85610 PROTHROMBIN TIME: CPT | Performed by: FAMILY MEDICINE

## 2024-05-17 PROCEDURE — 25010000002 MORPHINE PER 10 MG: Performed by: SURGERY

## 2024-05-17 PROCEDURE — 85025 COMPLETE CBC W/AUTO DIFF WBC: CPT | Performed by: SURGERY

## 2024-05-17 PROCEDURE — 25010000002 ONDANSETRON PER 1 MG: Performed by: SURGERY

## 2024-05-17 PROCEDURE — 25010000002 HEPARIN (PORCINE) PER 1000 UNITS: Performed by: SURGERY

## 2024-05-17 PROCEDURE — 25810000003 LACTATED RINGERS PER 1000 ML: Performed by: SURGERY

## 2024-05-17 RX ORDER — IPRATROPIUM BROMIDE AND ALBUTEROL SULFATE 2.5; .5 MG/3ML; MG/3ML
3 SOLUTION RESPIRATORY (INHALATION) EVERY 4 HOURS PRN
Status: DISCONTINUED | OUTPATIENT
Start: 2024-05-17 | End: 2024-05-21 | Stop reason: HOSPADM

## 2024-05-17 RX ORDER — BISACODYL 10 MG
10 SUPPOSITORY, RECTAL RECTAL 3 TIMES DAILY
Status: DISCONTINUED | OUTPATIENT
Start: 2024-05-17 | End: 2024-05-21 | Stop reason: HOSPADM

## 2024-05-17 RX ORDER — ALBUTEROL SULFATE 2.5 MG/3ML
2.5 SOLUTION RESPIRATORY (INHALATION)
Status: DISCONTINUED | OUTPATIENT
Start: 2024-05-17 | End: 2024-05-21 | Stop reason: HOSPADM

## 2024-05-17 RX ORDER — ATROPINE SULFATE 10 MG/ML
2 SOLUTION/ DROPS OPHTHALMIC 2 TIMES DAILY
Status: DISCONTINUED | OUTPATIENT
Start: 2024-05-17 | End: 2024-05-21 | Stop reason: HOSPADM

## 2024-05-17 RX ORDER — OXYCODONE HYDROCHLORIDE 10 MG/1
10 TABLET ORAL EVERY 4 HOURS PRN
Status: DISCONTINUED | OUTPATIENT
Start: 2024-05-17 | End: 2024-05-18 | Stop reason: SDUPTHER

## 2024-05-17 RX ORDER — OXYCODONE HYDROCHLORIDE 15 MG/1
15 TABLET ORAL EVERY 4 HOURS PRN
Status: DISCONTINUED | OUTPATIENT
Start: 2024-05-17 | End: 2024-05-17

## 2024-05-17 RX ORDER — HYDROMORPHONE HYDROCHLORIDE 1 MG/ML
0.5 INJECTION, SOLUTION INTRAMUSCULAR; INTRAVENOUS; SUBCUTANEOUS
Status: DISCONTINUED | OUTPATIENT
Start: 2024-05-17 | End: 2024-05-21 | Stop reason: HOSPADM

## 2024-05-17 RX ADMIN — METOCLOPRAMIDE 10 MG: 5 INJECTION, SOLUTION INTRAMUSCULAR; INTRAVENOUS at 16:38

## 2024-05-17 RX ADMIN — ATORVASTATIN CALCIUM 10 MG: 10 TABLET, FILM COATED ORAL at 08:27

## 2024-05-17 RX ADMIN — MONTELUKAST 10 MG: 10 TABLET, FILM COATED ORAL at 20:14

## 2024-05-17 RX ADMIN — ONDANSETRON 4 MG: 2 INJECTION INTRAMUSCULAR; INTRAVENOUS at 04:32

## 2024-05-17 RX ADMIN — METOPROLOL SUCCINATE 50 MG: 50 TABLET, EXTENDED RELEASE ORAL at 08:26

## 2024-05-17 RX ADMIN — BUDESONIDE AND FORMOTEROL FUMARATE DIHYDRATE 2 PUFF: 160; 4.5 AEROSOL RESPIRATORY (INHALATION) at 21:13

## 2024-05-17 RX ADMIN — OXYCODONE HYDROCHLORIDE 10 MG: 10 TABLET ORAL at 15:20

## 2024-05-17 RX ADMIN — OXYCODONE HYDROCHLORIDE AND ACETAMINOPHEN 2 TABLET: 5; 325 TABLET ORAL at 04:32

## 2024-05-17 RX ADMIN — CARBIDOPA AND LEVODOPA 1 TABLET: 25; 100 TABLET ORAL at 20:14

## 2024-05-17 RX ADMIN — OXYCODONE HYDROCHLORIDE 10 MG: 10 TABLET ORAL at 20:12

## 2024-05-17 RX ADMIN — PANTOPRAZOLE SODIUM 40 MG: 40 TABLET, DELAYED RELEASE ORAL at 08:26

## 2024-05-17 RX ADMIN — Medication 1 TABLET: at 08:26

## 2024-05-17 RX ADMIN — DOCUSATE SODIUM 100 MG: 100 CAPSULE, LIQUID FILLED ORAL at 08:26

## 2024-05-17 RX ADMIN — HEPARIN SODIUM 5000 UNITS: 5000 INJECTION INTRAVENOUS; SUBCUTANEOUS at 03:57

## 2024-05-17 RX ADMIN — CARBIDOPA AND LEVODOPA 1 TABLET: 50; 200 TABLET, EXTENDED RELEASE ORAL at 10:27

## 2024-05-17 RX ADMIN — CARBIDOPA AND LEVODOPA 1 TABLET: 25; 100 TABLET ORAL at 13:00

## 2024-05-17 RX ADMIN — CLONAZEPAM 1 MG: 1 TABLET ORAL at 08:27

## 2024-05-17 RX ADMIN — HYDROMORPHONE HYDROCHLORIDE 0.5 MG: 1 INJECTION, SOLUTION INTRAMUSCULAR; INTRAVENOUS; SUBCUTANEOUS at 16:58

## 2024-05-17 RX ADMIN — BISACODYL 10 MG: 5 TABLET, COATED ORAL at 08:26

## 2024-05-17 RX ADMIN — HEPARIN SODIUM 5000 UNITS: 5000 INJECTION INTRAVENOUS; SUBCUTANEOUS at 20:15

## 2024-05-17 RX ADMIN — BISACODYL 10 MG: 10 SUPPOSITORY RECTAL at 20:14

## 2024-05-17 RX ADMIN — MORPHINE SULFATE 2 MG: 2 INJECTION, SOLUTION INTRAMUSCULAR; INTRAVENOUS at 06:36

## 2024-05-17 RX ADMIN — MORPHINE SULFATE 2 MG: 2 INJECTION, SOLUTION INTRAMUSCULAR; INTRAVENOUS at 03:57

## 2024-05-17 RX ADMIN — CARBIDOPA AND LEVODOPA 1 TABLET: 25; 100 TABLET ORAL at 17:00

## 2024-05-17 RX ADMIN — AMANTADINE HYDROCHLORIDE 100 MG: 100 CAPSULE ORAL at 20:14

## 2024-05-17 RX ADMIN — SODIUM CHLORIDE, POTASSIUM CHLORIDE, SODIUM LACTATE AND CALCIUM CHLORIDE 125 ML/HR: 600; 310; 30; 20 INJECTION, SOLUTION INTRAVENOUS at 16:58

## 2024-05-17 RX ADMIN — METOCLOPRAMIDE 10 MG: 5 INJECTION, SOLUTION INTRAMUSCULAR; INTRAVENOUS at 02:38

## 2024-05-17 RX ADMIN — ALBUTEROL SULFATE 2.5 MG: 2.5 SOLUTION RESPIRATORY (INHALATION) at 21:13

## 2024-05-17 RX ADMIN — QUETIAPINE FUMARATE 25 MG: 25 TABLET ORAL at 16:37

## 2024-05-17 RX ADMIN — ALBUTEROL SULFATE 2.5 MG: 2.5 SOLUTION RESPIRATORY (INHALATION) at 07:44

## 2024-05-17 RX ADMIN — DOCUSATE SODIUM 100 MG: 100 CAPSULE, LIQUID FILLED ORAL at 20:14

## 2024-05-17 RX ADMIN — OXYCODONE HYDROCHLORIDE 15 MG: 15 TABLET ORAL at 08:27

## 2024-05-17 RX ADMIN — ALBUTEROL SULFATE 2.5 MG: 2.5 SOLUTION RESPIRATORY (INHALATION) at 17:04

## 2024-05-17 RX ADMIN — BUDESONIDE AND FORMOTEROL FUMARATE DIHYDRATE 2 PUFF: 160; 4.5 AEROSOL RESPIRATORY (INHALATION) at 07:45

## 2024-05-17 RX ADMIN — BISACODYL 10 MG: 10 SUPPOSITORY RECTAL at 16:37

## 2024-05-17 RX ADMIN — HYDROMORPHONE HYDROCHLORIDE 0.5 MG: 1 INJECTION, SOLUTION INTRAMUSCULAR; INTRAVENOUS; SUBCUTANEOUS at 20:35

## 2024-05-17 RX ADMIN — TAMSULOSIN HYDROCHLORIDE 0.4 MG: 0.4 CAPSULE ORAL at 20:14

## 2024-05-17 RX ADMIN — HEPARIN SODIUM 5000 UNITS: 5000 INJECTION INTRAVENOUS; SUBCUTANEOUS at 13:27

## 2024-05-17 RX ADMIN — HYDROMORPHONE HYDROCHLORIDE 0.5 MG: 1 INJECTION, SOLUTION INTRAMUSCULAR; INTRAVENOUS; SUBCUTANEOUS at 02:38

## 2024-05-17 RX ADMIN — ATROPINE SULFATE 2 DROP: 10 SOLUTION/ DROPS OPHTHALMIC at 20:11

## 2024-05-17 RX ADMIN — CARBIDOPA AND LEVODOPA 1 TABLET: 25; 100 TABLET ORAL at 08:27

## 2024-05-17 RX ADMIN — CARBIDOPA AND LEVODOPA 1 TABLET: 50; 200 TABLET, EXTENDED RELEASE ORAL at 20:13

## 2024-05-17 RX ADMIN — HYDROMORPHONE HYDROCHLORIDE 0.5 MG: 1 INJECTION, SOLUTION INTRAMUSCULAR; INTRAVENOUS; SUBCUTANEOUS at 06:30

## 2024-05-17 RX ADMIN — HYDROMORPHONE HYDROCHLORIDE 0.5 MG: 1 INJECTION, SOLUTION INTRAMUSCULAR; INTRAVENOUS; SUBCUTANEOUS at 22:14

## 2024-05-17 RX ADMIN — SODIUM CHLORIDE 1000 MG: 900 INJECTION INTRAVENOUS at 08:25

## 2024-05-17 RX ADMIN — Medication 10 ML: at 08:27

## 2024-05-17 RX ADMIN — SODIUM CHLORIDE, POTASSIUM CHLORIDE, SODIUM LACTATE AND CALCIUM CHLORIDE 125 ML/HR: 600; 310; 30; 20 INJECTION, SOLUTION INTRAVENOUS at 06:30

## 2024-05-17 RX ADMIN — METOCLOPRAMIDE 10 MG: 5 INJECTION, SOLUTION INTRAMUSCULAR; INTRAVENOUS at 08:27

## 2024-05-17 RX ADMIN — FINASTERIDE 5 MG: 5 TABLET, FILM COATED ORAL at 08:26

## 2024-05-17 RX ADMIN — METOCLOPRAMIDE 10 MG: 5 INJECTION, SOLUTION INTRAMUSCULAR; INTRAVENOUS at 20:17

## 2024-05-17 RX ADMIN — MORPHINE SULFATE 2 MG: 2 INJECTION, SOLUTION INTRAMUSCULAR; INTRAVENOUS at 01:26

## 2024-05-17 RX ADMIN — AMANTADINE HYDROCHLORIDE 100 MG: 100 CAPSULE ORAL at 10:26

## 2024-05-17 RX ADMIN — Medication 10 ML: at 20:17

## 2024-05-17 RX ADMIN — TIOTROPIUM BROMIDE INHALATION SPRAY 2 PUFF: 3.12 SPRAY, METERED RESPIRATORY (INHALATION) at 07:45

## 2024-05-17 NOTE — PLAN OF CARE
Goal Outcome Evaluation:  Plan of Care Reviewed With: patient, spouse        Progress: no change  Outcome Evaluation: Pt presents with decreased functional mobility and decreased strength. Pt ambulated 325ft with CGA and RW for support. Recommend continued skilled IP PT interventions. Recommend D/C home with assist when medically appropriate.      Anticipated Discharge Disposition (PT): home with assist

## 2024-05-17 NOTE — PROGRESS NOTES
[facility] Medicine Services  PROGRESS NOTE    Patient Name: Flaco Roque II  : 1945  MRN: [id number]    Date of Admission: 2024  Primary Care Physician: Azar Stern MD    Subjective   Subjective     CC:  Partial sbo, s/p taj    HPI:  Some post-op pain  No dyspnea  No n/v      Objective   Objective     Vital Signs:   Temp:  [97 °F (36.1 °C)-98.4 °F (36.9 °C)] 97.5 °F (36.4 °C)  Heart Rate:  [] 84  Resp:  [16-30] 19  BP: (129-171)/(70-94) 142/79  Flow (L/min):  [2-3] 2     Physical Exam:  Constitutional:Alert, oriented x 3, elderly & frail appearing but nontoxic, nasal canula in place, no distress  Psych:Normal/appropriate affect  HEENT:NCAT, oropharynx clear  Neck: neck supple, full range of motion  Neuro: Face symmetric, speech clear, equal , moves all extremities  Cardiac: rrr; No pretibial pitting edema  Resp: ctab  GI: abd soft, nontender hypoactive sounds  Skin: No extremity rash  Musculoskeletal/extremities: no cyanosis of extremities; no significant ankle edema          Results Reviewed:  LAB RESULTS:      Lab 24  0452 24  1016   WBC 14.58* 10.20   HEMOGLOBIN 14.5 15.7   HEMATOCRIT 45.5 48.7   PLATELETS 282 266   NEUTROS ABS 12.93* 9.20*   IMMATURE GRANS (ABS) 0.04 0.01   LYMPHS ABS 0.25* 0.41*   MONOS ABS 1.35* 0.53   EOS ABS 0.00 0.01   MCV 91.4 91.0   LACTATE  --  1.1   PROTIME 15.1*  --          Lab 24  0452 24  1016   SODIUM 137 142   POTASSIUM 3.9 3.8   CHLORIDE 103 102   CO2 20.0* 30.0*   ANION GAP 14.0 10.0   BUN 10 17   CREATININE 0.71* 0.81   EGFR 93.9 90.2   GLUCOSE 162* 139*   CALCIUM 8.8 9.8   MAGNESIUM 1.7  --          Lab 24  1016   TOTAL PROTEIN 7.2   ALBUMIN 4.4   GLOBULIN 2.8   ALT (SGPT) <5   AST (SGOT) 12   BILIRUBIN 0.6   ALK PHOS 82   LIPASE 16         Lab 24  0452 24  1240 24  1016   HSTROP T  --  17 15   PROTIME 15.1*  --   --    INR 1.18*  --   --                  Brief Urine  Lab Results  (Last result in the past 365 days)        Color   Clarity   Blood   Leuk Est   Nitrite   Protein   CREAT   Urine HCG        05/16/24 1017 Dark Yellow   Clear   Negative   Trace   Negative   Trace                   Microbiology Results Abnormal       None            XR Chest 1 View    Result Date: 5/16/2024  XR CHEST 1 VW Date of Exam: 5/16/2024 8:09 PM EDT Indication: copd Comparison: 10/24/2023. Findings: Patchy airspace disease is seen within the medial right lower lobe. The heart appears mildly enlarged. Chronic interstitial changes are present. No significant pleural effusion. No pneumothorax. Enteric tube present within the stomach.     Impression: Impression: Patchy airspace disease seen within the medial right lower lobe, concerning for pneumonia. Slightly limited evaluation due to patient rotation. Stable chronic interstitial changes and cardiomegaly. Electronically Signed: Adelita Rivas MD  5/16/2024 11:01 PM EDT  Workstation ID: [id number]    Peripheral Block    Result Date: 5/16/2024  Skinny Manriquez MD     5/16/2024  4:48 PM Peripheral Block Patient reassessed immediately prior to procedure Patient location during procedure: OR Start time: 5/16/2024 4:44 PM Stop time: 5/16/2024 4:48 PM Reason for block: at surgeon's request and post-op pain management Performed by Anesthesiologist: Keshav Alcocer MD Preanesthetic Checklist Completed: patient identified, IV checked, site marked, risks and benefits discussed, surgical consent, monitors and equipment checked, pre-op evaluation and timeout performed Prep: Pt Position: supine Sterile barriers:cap, gloves, mask and washed/disinfected hands Prep: ChloraPrep Patient monitoring: blood pressure monitoring, continuous pulse oximetry and EKG Procedure Sedation: yes Performed under: general Guidance:ultrasound guided Images:still images obtained, printed/placed on chart Laterality:Bilateral Block Type:TAP Injection Technique:single-shot Needle  "Type:short-bevel and echogenic Needle Gauge:20 G Resistance on Injection: none Medications Used: bupivacaine PF (MARCAINE) 0.25 % injection - Injection  60 mL - 5/16/2024 4:48:00 PM dexamethasone sodium phosphate injection - Injection  4 mg - 5/16/2024 4:48:00 PM Medications Comment:Block Injection:  LA dose divided between Right and Left block Post Assessment Injection Assessment: negative aspiration for heme, incremental injection and no paresthesia on injection Patient Tolerance:comfortable throughout block Complications:no Additional Notes Subcostal TAPs A high-frequency linear transducer, with sterile cover, was placed sub-xiphoid to identify Linea Alba, right and left Rectus Abdominus Muscles (HOLDEN). The transducer was moved either right or left subcostally to identify the HOLDEN and the Transverse Abdominus Muscle (MYRICK). The insertion site was prepped in sterile fashion and then localized with 2-5 ml of 1% Lidocaine. Using ultrasound-guidance, a 20-gauge B-Alcocer 4\" Ultraplex 360 non-stimulating echogenic needle was advanced in plane, from medial to lateral, until the tip of the needle was in the fascial plane between the HOLDEN and MYRICK. 1-3ml of preservative free normal saline was used to hydro-dissect the fascial planes. After the fascial plane was verified, the local anesthetic (LA) was injected. The procedure was repeated on the opposite side for bilateral coverage. Aspiration every 5 ml to prevent intravascular injection. Injection was completed with negative aspiration of blood and negative intravascular injection. Injection pressures were normal with minimal resistance. The subcostal approach to the TAP nerve block ideally anesthetizes the intercostal nerves T6-T9.      XR Abdomen KUB    Result Date: 5/16/2024  XR ABDOMEN KUB Date of Exam: 5/16/2024 1:54 PM EDT Indication: post ng tube Comparison: None available. Findings: Single view. An NG tube terminates in the left upper quadrant in the region of the " gastric body. There is mild dilatation of upper abdominal small bowel loops. The colon is nondilated. There are cholecystectomy clips. There is contrast in the urinary bladder from CT exam performed earlier today.     Impression: Impression: NG tube tip in the region of the gastric body. Mildly dilated small bowel noted. Electronically Signed: Eden Mendez MD  5/16/2024 2:08 PM EDT  Workstation ID: [id number]    CT Abdomen Pelvis With Contrast    Result Date: 5/16/2024  CT ABDOMEN PELVIS W CONTRAST Date of Exam: 5/16/2024 11:12 AM EDT Indication: abd pain, intract naus vomiting. Comparison: 4/29/2019, CT angiogram chest 4/20/2023 Technique: Axial CT images were obtained of the abdomen and pelvis following the uneventful intravenous administration of 75 cc Isovue-370. Reconstructed coronal and sagittal images were also obtained. Automated exposure control and iterative construction methods were used. Findings: LUNG BASES:  Unremarkable without mass or infiltrate. LIVER:  Unremarkable parenchyma without focal lesion. BILIARY/GALLBLADDER: Cholecystectomy SPLEEN:  Unremarkable PANCREAS:  Unremarkable ADRENAL:  Unremarkable KIDNEYS:  Unremarkable parenchyma with no solid mass identified. No obstruction.  No calculus identified. GASTROINTESTINAL/MESENTERY: Fluid-filled distal esophagus and stomach with nondistended duodenum. There appears to be narrowing of the proximal duodenum with pulling of the cecum into the right upper quadrant. Findings are suggestive of an internal hernia involving the proximal duodenum and cecum located in the right upper quadrant resulting in gastroduodenal obstruction. This is not appear to represent a classic cecal volvulus. There is a moderate to large fecal load without overt cecal distention. There is descending and sigmoid colonic diverticulosis. MESENTERIC VESSELS:  Patent. AORTA/IVC:  Normal caliber. RETROPERITONEUM/LYMPH NODES:  Unremarkable REPRODUCTIVE:  Unremarkable BLADDER:   Unremarkable OSSEUS STRUCTURES: No acute osseous process is identified.     Impression: Impression: 1.There is evidence of narrowing/stricture at the gastroduodenal junction with imaging features suggestive of an internal hernia which appears to include portions of the proximal colon/cecum. There is resultant gastric and distal esophageal distention consistent with at least partial mechanical obstruction. There is a moderate to large colonic fecal load without cecal distention to suggest significant cecal obstructive changes. Surgical consultation recommended 2.Other incidental findings detailed above. Findings discussed by Dr. Alfonzo Burgess with ER care team Dina at 12:22 p.m. EDT on 5/16/2024 Electronically Signed: Alfonzo Burgess MD  5/16/2024 12:23 PM EDT  Workstation ID: [id number]     Results for orders placed during the hospital encounter of 08/24/23    Adult Transthoracic Echo Complete W/ Cont if Necessary Per Protocol    Interpretation Summary    Left ventricular ejection fraction appears to be 56 - 60%.    The left atrial cavity is mild to moderately dilated    There is mild to moderate thickening of the aortic valve    Mild to moderate tricuspid valve regurgitation is present. Estimated right ventricular systolic pressure from tricuspid regurgitation is mildly elevated (35-45 mmHg).    There is a trivial pericardial effusion.    Patient noted to be in atrial fibrillation with elevated rates during the study.      Current medications:  Scheduled Meds:albuterol, 2.5 mg, Nebulization, TID - RT  amantadine, 100 mg, Oral, BID  [Held by provider] apixaban, 5 mg, Oral, Q12H  atorvastatin, 10 mg, Oral, Daily  atropine, 2 drop, Sublingual, BID  bisacodyl, 10 mg, Oral, Daily  bisacodyl, 10 mg, Rectal, TID  budesonide-formoterol, 2 puff, Inhalation, BID - RT   And  tiotropium bromide monohydrate, 2 puff, Inhalation, Daily - RT  carbidopa-levodopa, 1 tablet, Oral, 4x Daily  carbidopa-levodopa CR, 1 tablet, Oral,  BID  cefTRIAXone, 1,000 mg, Intravenous, Q24H  [Held by provider] cilostazol, 100 mg, Oral, BID  clonazePAM, 1 mg, Oral, Daily  docusate sodium, 100 mg, Oral, BID  finasteride, 5 mg, Oral, Daily  [Held by provider] furosemide, 20 mg, Oral, Daily  [Held by provider] guaiFENesin, 1,200 mg, Oral, BID  heparin (porcine), 5,000 Units, Subcutaneous, Q8H  metoclopramide, 10 mg, Intravenous, Q6H  metoprolol succinate XL, 50 mg, Oral, Daily  montelukast, 10 mg, Oral, Nightly  multivitamin with minerals, 1 tablet, Oral, Daily  pantoprazole, 40 mg, Oral, Daily  QUEtiapine, 25 mg, Oral, Q PM  sodium chloride, 10 mL, Intravenous, Q12H  tamsulosin, 0.4 mg, Oral, Nightly  [Held by provider] cyanocobalamin, 500 mcg, Oral, Daily      Continuous Infusions:lactated ringers, 125 mL/hr, Last Rate: 125 mL/hr (05/17/24 2949)      PRN Meds:.  acetaminophen **OR** acetaminophen **OR** acetaminophen    senna-docusate sodium **AND** polyethylene glycol **AND** bisacodyl **AND** bisacodyl    Calcium Replacement - Follow Nurse / BPA Driven Protocol    enalaprilat    HYDROmorphone    Magnesium Standard Dose Replacement - Follow Nurse / BPA Driven Protocol    [DISCONTINUED] Morphine **AND** naloxone    nitroglycerin    ondansetron ODT **OR** ondansetron    oxyCODONE    Phosphorus Replacement - Follow Nurse / BPA Driven Protocol    Potassium Replacement - Follow Nurse / BPA Driven Protocol    promethazine **OR** promethazine    simethicone    Sodium Chloride (PF)    sodium chloride    sodium chloride    Assessment & Plan   Assessment & Plan     Active Hospital Problems    Diagnosis  POA    **Large bowel obstruction [K56.609]  Yes    SBO (small bowel obstruction) [K56.609]  Yes    Intractable nausea and vomiting [R11.2]  Yes    Permanent atrial fibrillation [I48.21]  Yes    Parkinson disease [G20.A1]  Yes    Chronic pain with pain pump in place [G89.29]  Yes    Pulmonary emphysema [J43.9]  Yes      Resolved Hospital Problems   No resolved problems  to display.        Brief Hospital Course to date:  Flaco Roque II is a 78 y.o. male w/ hx afib (on eliquis), copd, parkinsons dz, chronic back pain (w/ pain pump), chronic constipation, previous ccy. Patient presented w/ abd and n/v. 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 Joseph evening of 5/16/24 for ex-lap which suggested partial bowel obstruction due to adhesive disease, underwent lysis of adhesions and appendectomy.     Partial bowel obstruction due to adhesions  -initial ct a/p concerning for bowel obstruction and possible cecal volvulus  **s/p ex-lap, lysis adhesions, appy 5/16/24 by Dr. Joseph; intraoperatively c/w partial bowel obstruction due to adhesive dz  -post-op care per Dr. Joseph (including NG tube, diet, etc)    RLL infiltrate (on cxr), cannot rule out pneumonia  COPD  -cxr 5/16/24: medial right lower lobe concerning for pneumonia  -day #1 rocephin  -continue symbicort, spiriva, nebs  -currently resp status baseline    Afib  -holding eliquis perioperatively; restart eliquis when ok w/ surgery  -continue toprol    Parkinsons dz  anxiety  -continue amantadine & sinemet (while has NG tube in place clamp tube for 30 minutes after give sinemet)  -continue seroquel & daily home klonopin  -pt/ot evaluated; recommended home w/ assist    Hyperglycemia  -A1c 5.2  -hyperglycemia related to stress response    Chronic back pain, w/ pain pump in place        Am labs: cbc,bmp,mag        Expected Discharge Location and Transportation: home  Expected Discharge   Expected Discharge Date: 5/20/2024; Expected Discharge Time:      DVT prophylaxis:  Medical and mechanical DVT prophylaxis orders are present.         AM-PAC 6 Clicks Score (PT): 17 (05/17/24 9450)    CODE STATUS:   Code Status and Medical Interventions:   Ordered at: 05/16/24 2019     Level Of Support Discussed With:    Patient     Code Status (Patient has no pulse and is not breathing):     CPR (Attempt to Resuscitate)     Medical Interventions (Patient has pulse or is breathing):    Full Support       David Ellington MD  05/17/24

## 2024-05-17 NOTE — PROGRESS NOTES
"Patient Name:  Flaco Roque II  YOB: 1945  [id number]    Surgery Progress Note    Date of visit: 5/17/2024    Subjective   Subjective: Complains of soreness. Pain hard to manage,         Objective     Objective:     /85 (BP Location: Left arm, Patient Position: Sitting)   Pulse 101   Temp 98.4 °F (36.9 °C) (Oral)   Resp 20   Ht 172.7 cm (67.99\")   Wt 57.6 kg (126 lb 15.8 oz)   SpO2 92%   BMI 19.31 kg/m²     Intake/Output Summary (Last 24 hours) at 5/17/2024 0735  Last data filed at 5/17/2024 0230  Gross per 24 hour   Intake 1000 ml   Output 2350 ml   Net -1350 ml       CV:  Rhythm  regular and rate regular   L:  Clear  to auscultation bilaterally   Abd:  Bowel sounds hypoactive, soft, appropriately tender. Dressing c/d/i  Ext:  No cyanosis, clubbing, edema    Recent labs that are back at this time have been reviewed.            Assessment/ Plan:    Problem List Items Addressed This Visit          Gastrointestinal Abdominal     * (Principal) Large bowel obstruction- Stable after ex-lap.  His pain may be difficult to control given his chronic pain issues and pain pump.  I will work on his bowel regimen.  KENN Pinto, out of bed to chair.  Okay to work with physical therapy from my standpoint.    Intractable nausea and vomiting     Other Visit Diagnoses       Other partial intestinal obstruction    -  Primary    Generalized abdominal pain        Dehydration        Abdominal pain        Relevant Orders    Tissue Pathology Exam             Active Hospital Problems    Diagnosis  POA    **Large bowel obstruction [K56.609]  Yes    SBO (small bowel obstruction) [K56.609]  Yes    Intractable nausea and vomiting [R11.2]  Yes    Permanent atrial fibrillation [I48.21]  Yes    Parkinson disease [G20.A1]  Yes    Chronic pain with pain pump in place [G89.29]  Yes    Pulmonary emphysema [J43.9]  Yes      Resolved Hospital Problems   No resolved problems to display.              Cj Joseph, " MD  5/17/2024  07:35 EDT

## 2024-05-17 NOTE — PLAN OF CARE
Goal Outcome Evaluation:  Plan of Care Reviewed With: patient        Progress: no change  Outcome Evaluation: Patient presents with impaired strength, balance, safety and activity tolerance impacting PLOF BADL and related transfer independence. Skilled OT services appropriate to address deficit areas.  Recommend home with assist at discharage.      Anticipated Discharge Disposition (OT): home with assist
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
## 2024-05-17 NOTE — CASE MANAGEMENT/SOCIAL WORK
Continued Stay Note  [facility]     Patient Name: Flaco Roque II  MRN: [id number]  Today's Date: 5/17/2024    Admit Date: 5/16/2024    Plan: IDP   Discharge Plan       Row Name 05/17/24 1346       Plan    Plan Comments Per MDR, patient might be medically ready for discharge on Sunday. Patient has portable home oxygen tank. No discharge needs identified. CM will continue to follow.    Final Discharge Disposition Code 01 - home or self-care                   Discharge Codes    No documentation.                       Kallie Guo RN

## 2024-05-18 LAB
ANION GAP SERPL CALCULATED.3IONS-SCNC: 10 MMOL/L (ref 5–15)
BUN SERPL-MCNC: 8 MG/DL (ref 8–23)
BUN/CREAT SERPL: 15.4 (ref 7–25)
CALCIUM SPEC-SCNC: 8.4 MG/DL (ref 8.6–10.5)
CHLORIDE SERPL-SCNC: 99 MMOL/L (ref 98–107)
CO2 SERPL-SCNC: 25 MMOL/L (ref 22–29)
CREAT SERPL-MCNC: 0.52 MG/DL (ref 0.76–1.27)
DEPRECATED RDW RBC AUTO: 48.1 FL (ref 37–54)
EGFRCR SERPLBLD CKD-EPI 2021: 103.2 ML/MIN/1.73
ERYTHROCYTE [DISTWIDTH] IN BLOOD BY AUTOMATED COUNT: 14.5 % (ref 12.3–15.4)
GLUCOSE SERPL-MCNC: 102 MG/DL (ref 65–99)
HCT VFR BLD AUTO: 43.1 % (ref 37.5–51)
HGB BLD-MCNC: 13.8 G/DL (ref 13–17.7)
MAGNESIUM SERPL-MCNC: 1.9 MG/DL (ref 1.6–2.4)
MCH RBC QN AUTO: 29 PG (ref 26.6–33)
MCHC RBC AUTO-ENTMCNC: 32 G/DL (ref 31.5–35.7)
MCV RBC AUTO: 90.5 FL (ref 79–97)
PLATELET # BLD AUTO: 255 10*3/MM3 (ref 140–450)
PMV BLD AUTO: 11.3 FL (ref 6–12)
POTASSIUM SERPL-SCNC: 3.9 MMOL/L (ref 3.5–5.2)
RBC # BLD AUTO: 4.76 10*6/MM3 (ref 4.14–5.8)
SODIUM SERPL-SCNC: 134 MMOL/L (ref 136–145)
WBC NRBC COR # BLD AUTO: 10.65 10*3/MM3 (ref 3.4–10.8)

## 2024-05-18 PROCEDURE — 25010000002 HEPARIN (PORCINE) PER 1000 UNITS: Performed by: SURGERY

## 2024-05-18 PROCEDURE — 25010000002 HYDROMORPHONE PER 4 MG: Performed by: SURGERY

## 2024-05-18 PROCEDURE — 94799 UNLISTED PULMONARY SVC/PX: CPT

## 2024-05-18 PROCEDURE — 83735 ASSAY OF MAGNESIUM: CPT | Performed by: INTERNAL MEDICINE

## 2024-05-18 PROCEDURE — 80048 BASIC METABOLIC PNL TOTAL CA: CPT | Performed by: SURGERY

## 2024-05-18 PROCEDURE — 99232 SBSQ HOSP IP/OBS MODERATE 35: CPT | Performed by: INTERNAL MEDICINE

## 2024-05-18 PROCEDURE — 25010000002 CEFTRIAXONE PER 250 MG: Performed by: INTERNAL MEDICINE

## 2024-05-18 PROCEDURE — 25810000003 LACTATED RINGERS PER 1000 ML: Performed by: INTERNAL MEDICINE

## 2024-05-18 PROCEDURE — 94761 N-INVAS EAR/PLS OXIMETRY MLT: CPT

## 2024-05-18 PROCEDURE — 85027 COMPLETE CBC AUTOMATED: CPT | Performed by: SURGERY

## 2024-05-18 PROCEDURE — 94664 DEMO&/EVAL PT USE INHALER: CPT

## 2024-05-18 PROCEDURE — 25010000002 METOCLOPRAMIDE PER 10 MG: Performed by: SURGERY

## 2024-05-18 PROCEDURE — 25810000003 LACTATED RINGERS PER 1000 ML: Performed by: SURGERY

## 2024-05-18 RX ORDER — OXYCODONE HYDROCHLORIDE 5 MG/1
5 TABLET ORAL EVERY 4 HOURS PRN
Status: DISCONTINUED | OUTPATIENT
Start: 2024-05-18 | End: 2024-05-21 | Stop reason: HOSPADM

## 2024-05-18 RX ADMIN — BISACODYL 10 MG: 5 TABLET, COATED ORAL at 08:39

## 2024-05-18 RX ADMIN — BUDESONIDE AND FORMOTEROL FUMARATE DIHYDRATE 2 PUFF: 160; 4.5 AEROSOL RESPIRATORY (INHALATION) at 20:48

## 2024-05-18 RX ADMIN — HYDROMORPHONE HYDROCHLORIDE 0.5 MG: 1 INJECTION, SOLUTION INTRAMUSCULAR; INTRAVENOUS; SUBCUTANEOUS at 08:38

## 2024-05-18 RX ADMIN — CARBIDOPA AND LEVODOPA 1 TABLET: 25; 100 TABLET ORAL at 12:31

## 2024-05-18 RX ADMIN — METOCLOPRAMIDE 10 MG: 5 INJECTION, SOLUTION INTRAMUSCULAR; INTRAVENOUS at 20:16

## 2024-05-18 RX ADMIN — BISACODYL 10 MG: 10 SUPPOSITORY RECTAL at 20:16

## 2024-05-18 RX ADMIN — OXYCODONE HYDROCHLORIDE 5 MG: 5 TABLET ORAL at 17:06

## 2024-05-18 RX ADMIN — ATROPINE SULFATE 2 DROP: 10 SOLUTION/ DROPS OPHTHALMIC at 20:17

## 2024-05-18 RX ADMIN — HYDROMORPHONE HYDROCHLORIDE 0.5 MG: 1 INJECTION, SOLUTION INTRAMUSCULAR; INTRAVENOUS; SUBCUTANEOUS at 04:27

## 2024-05-18 RX ADMIN — DOCUSATE SODIUM 100 MG: 100 CAPSULE, LIQUID FILLED ORAL at 08:39

## 2024-05-18 RX ADMIN — MONTELUKAST 10 MG: 10 TABLET, FILM COATED ORAL at 20:16

## 2024-05-18 RX ADMIN — METOCLOPRAMIDE 10 MG: 5 INJECTION, SOLUTION INTRAMUSCULAR; INTRAVENOUS at 08:37

## 2024-05-18 RX ADMIN — TAMSULOSIN HYDROCHLORIDE 0.4 MG: 0.4 CAPSULE ORAL at 20:16

## 2024-05-18 RX ADMIN — HYDROMORPHONE HYDROCHLORIDE 0.5 MG: 1 INJECTION, SOLUTION INTRAMUSCULAR; INTRAVENOUS; SUBCUTANEOUS at 00:11

## 2024-05-18 RX ADMIN — METOCLOPRAMIDE 10 MG: 5 INJECTION, SOLUTION INTRAMUSCULAR; INTRAVENOUS at 14:50

## 2024-05-18 RX ADMIN — SODIUM CHLORIDE, POTASSIUM CHLORIDE, SODIUM LACTATE AND CALCIUM CHLORIDE 125 ML/HR: 600; 310; 30; 20 INJECTION, SOLUTION INTRAVENOUS at 00:59

## 2024-05-18 RX ADMIN — SODIUM CHLORIDE 1000 MG: 900 INJECTION INTRAVENOUS at 08:38

## 2024-05-18 RX ADMIN — OXYCODONE HYDROCHLORIDE 10 MG: 10 TABLET ORAL at 00:56

## 2024-05-18 RX ADMIN — AMANTADINE HYDROCHLORIDE 100 MG: 100 CAPSULE ORAL at 08:50

## 2024-05-18 RX ADMIN — CARBIDOPA AND LEVODOPA 1 TABLET: 25; 100 TABLET ORAL at 08:38

## 2024-05-18 RX ADMIN — TIOTROPIUM BROMIDE INHALATION SPRAY 2 PUFF: 3.12 SPRAY, METERED RESPIRATORY (INHALATION) at 07:32

## 2024-05-18 RX ADMIN — HEPARIN SODIUM 5000 UNITS: 5000 INJECTION INTRAVENOUS; SUBCUTANEOUS at 14:50

## 2024-05-18 RX ADMIN — PANTOPRAZOLE SODIUM 40 MG: 40 TABLET, DELAYED RELEASE ORAL at 08:38

## 2024-05-18 RX ADMIN — ALBUTEROL SULFATE 2.5 MG: 2.5 SOLUTION RESPIRATORY (INHALATION) at 07:31

## 2024-05-18 RX ADMIN — Medication 1 TABLET: at 08:39

## 2024-05-18 RX ADMIN — METOCLOPRAMIDE 10 MG: 5 INJECTION, SOLUTION INTRAMUSCULAR; INTRAVENOUS at 04:26

## 2024-05-18 RX ADMIN — SODIUM CHLORIDE, POTASSIUM CHLORIDE, SODIUM LACTATE AND CALCIUM CHLORIDE 100 ML/HR: 600; 310; 30; 20 INJECTION, SOLUTION INTRAVENOUS at 20:27

## 2024-05-18 RX ADMIN — ATROPINE SULFATE 2 DROP: 10 SOLUTION/ DROPS OPHTHALMIC at 08:40

## 2024-05-18 RX ADMIN — BUDESONIDE AND FORMOTEROL FUMARATE DIHYDRATE 2 PUFF: 160; 4.5 AEROSOL RESPIRATORY (INHALATION) at 07:32

## 2024-05-18 RX ADMIN — CARBIDOPA AND LEVODOPA 1 TABLET: 25; 100 TABLET ORAL at 20:16

## 2024-05-18 RX ADMIN — ALBUTEROL SULFATE 2.5 MG: 2.5 SOLUTION RESPIRATORY (INHALATION) at 14:00

## 2024-05-18 RX ADMIN — CARBIDOPA AND LEVODOPA 1 TABLET: 50; 200 TABLET, EXTENDED RELEASE ORAL at 08:50

## 2024-05-18 RX ADMIN — CLONAZEPAM 1 MG: 1 TABLET ORAL at 08:39

## 2024-05-18 RX ADMIN — CARBIDOPA AND LEVODOPA 1 TABLET: 25; 100 TABLET ORAL at 17:06

## 2024-05-18 RX ADMIN — ATORVASTATIN CALCIUM 10 MG: 10 TABLET, FILM COATED ORAL at 08:39

## 2024-05-18 RX ADMIN — METOPROLOL SUCCINATE 50 MG: 50 TABLET, EXTENDED RELEASE ORAL at 08:38

## 2024-05-18 RX ADMIN — OXYCODONE HYDROCHLORIDE 5 MG: 5 TABLET ORAL at 20:33

## 2024-05-18 RX ADMIN — QUETIAPINE FUMARATE 25 MG: 25 TABLET ORAL at 17:06

## 2024-05-18 RX ADMIN — BISACODYL 10 MG: 10 SUPPOSITORY RECTAL at 08:37

## 2024-05-18 RX ADMIN — AMANTADINE HYDROCHLORIDE 100 MG: 100 CAPSULE ORAL at 20:20

## 2024-05-18 RX ADMIN — Medication 10 ML: at 08:39

## 2024-05-18 RX ADMIN — FINASTERIDE 5 MG: 5 TABLET, FILM COATED ORAL at 08:38

## 2024-05-18 RX ADMIN — DOCUSATE SODIUM 100 MG: 100 CAPSULE, LIQUID FILLED ORAL at 20:16

## 2024-05-18 RX ADMIN — HEPARIN SODIUM 5000 UNITS: 5000 INJECTION INTRAVENOUS; SUBCUTANEOUS at 04:26

## 2024-05-18 RX ADMIN — HEPARIN SODIUM 5000 UNITS: 5000 INJECTION INTRAVENOUS; SUBCUTANEOUS at 20:15

## 2024-05-18 RX ADMIN — CARBIDOPA AND LEVODOPA 1 TABLET: 50; 200 TABLET, EXTENDED RELEASE ORAL at 20:19

## 2024-05-18 RX ADMIN — Medication 10 ML: at 20:17

## 2024-05-18 RX ADMIN — ALBUTEROL SULFATE 2.5 MG: 2.5 SOLUTION RESPIRATORY (INHALATION) at 20:48

## 2024-05-18 NOTE — PROGRESS NOTES
[facility] Medicine Services  PROGRESS NOTE    Patient Name: Flaco Roque II  : 1945  MRN: [id number]    Date of Admission: 2024  Primary Care Physician: Azar Stern MD    Subjective   Subjective     CC:  Partial sbo, s/p taj    HPI:  Post-op pain reasonably controlled  Denies dyspnea  Has been ambulating      Objective   Objective     Vital Signs:   Temp:  [97.8 °F (36.6 °C)-98.3 °F (36.8 °C)] 98.3 °F (36.8 °C)  Heart Rate:  [86-97] 97  Resp:  [18-20] 18  BP: (125-151)/(85-92) 125/85  Flow (L/min):  [2-3] 3     Physical Exam:  Constitutional:Alert, oriented x 3, elderly & frail appearing but nontoxic, nasal shekhar in place  Psych:Normal/appropriate affect  HEENT:NCAT, oropharynx clear  Neck: neck supple, full range of motion  Neuro: Face symmetric, speech clear, equal , moves all extremities  Cardiac: rrr; No pretibial pitting edema  Resp: ctab, nasal canula in place  GI: abd soft, appropriate post-op mild tenderness, hypoactive bs  Skin: No extremity rash  Musculoskeletal/extremities: no cyanosis of extremities; no significant ankle edema          Results Reviewed:  LAB RESULTS:      Lab 24  0240 24  0452 24  1016   WBC 10.65 14.58* 10.20   HEMOGLOBIN 13.8 14.5 15.7   HEMATOCRIT 43.1 45.5 48.7   PLATELETS 255 282 266   NEUTROS ABS  --  12.93* 9.20*   IMMATURE GRANS (ABS)  --  0.04 0.01   LYMPHS ABS  --  0.25* 0.41*   MONOS ABS  --  1.35* 0.53   EOS ABS  --  0.00 0.01   MCV 90.5 91.4 91.0   LACTATE  --   --  1.1   PROTIME  --  15.1*  --          Lab 24  0240 24  0452 24  1016   SODIUM 134* 137 142   POTASSIUM 3.9 3.9 3.8   CHLORIDE 99 103 102   CO2 25.0 20.0* 30.0*   ANION GAP 10.0 14.0 10.0   BUN 8 10 17   CREATININE 0.52* 0.71* 0.81   EGFR 103.2 93.9 90.2   GLUCOSE 102* 162* 139*   CALCIUM 8.4* 8.8 9.8   MAGNESIUM 1.9 1.7  --          Lab 24  1016   TOTAL PROTEIN 7.2   ALBUMIN 4.4   GLOBULIN 2.8   ALT (SGPT) <5   AST  (SGOT) 12   BILIRUBIN 0.6   ALK PHOS 82   LIPASE 16         Lab 05/17/24  0452 05/16/24  1240 05/16/24  1016   HSTROP T  --  17 15   PROTIME 15.1*  --   --    INR 1.18*  --   --                  Brief Urine Lab Results  (Last result in the past 365 days)        Color   Clarity   Blood   Leuk Est   Nitrite   Protein   CREAT   Urine HCG        05/16/24 1017 Dark Yellow   Clear   Negative   Trace   Negative   Trace                   Microbiology Results Abnormal       None            XR Chest 1 View    Result Date: 5/16/2024  XR CHEST 1 VW Date of Exam: 5/16/2024 8:09 PM EDT Indication: copd Comparison: 10/24/2023. Findings: Patchy airspace disease is seen within the medial right lower lobe. The heart appears mildly enlarged. Chronic interstitial changes are present. No significant pleural effusion. No pneumothorax. Enteric tube present within the stomach.     Impression: Impression: Patchy airspace disease seen within the medial right lower lobe, concerning for pneumonia. Slightly limited evaluation due to patient rotation. Stable chronic interstitial changes and cardiomegaly. Electronically Signed: Adelita Rivas MD  5/16/2024 11:01 PM EDT  Workstation ID: [id number]    Peripheral Block    Result Date: 5/16/2024  Skinny Manriquez MD     5/16/2024  4:48 PM Peripheral Block Patient reassessed immediately prior to procedure Patient location during procedure: OR Start time: 5/16/2024 4:44 PM Stop time: 5/16/2024 4:48 PM Reason for block: at surgeon's request and post-op pain management Performed by Anesthesiologist: Keshav Alcocer MD Preanesthetic Checklist Completed: patient identified, IV checked, site marked, risks and benefits discussed, surgical consent, monitors and equipment checked, pre-op evaluation and timeout performed Prep: Pt Position: supine Sterile barriers:cap, gloves, mask and washed/disinfected hands Prep: ChloraPrep Patient monitoring: blood pressure monitoring, continuous pulse oximetry and EKG  "Procedure Sedation: yes Performed under: general Guidance:ultrasound guided Images:still images obtained, printed/placed on chart Laterality:Bilateral Block Type:TAP Injection Technique:single-shot Needle Type:short-bevel and echogenic Needle Gauge:20 G Resistance on Injection: none Medications Used: bupivacaine PF (MARCAINE) 0.25 % injection - Injection  60 mL - 5/16/2024 4:48:00 PM dexamethasone sodium phosphate injection - Injection  4 mg - 5/16/2024 4:48:00 PM Medications Comment:Block Injection:  LA dose divided between Right and Left block Post Assessment Injection Assessment: negative aspiration for heme, incremental injection and no paresthesia on injection Patient Tolerance:comfortable throughout block Complications:no Additional Notes Subcostal TAPs A high-frequency linear transducer, with sterile cover, was placed sub-xiphoid to identify Line[place] Alba, right and left Rectus Abdominus Muscles (HOLDEN). The transducer was moved either right or left subcostally to identify the HOLDEN and the Transverse Abdominus Muscle (MYRICK). The insertion site was prepped in sterile fashion and then localized with 2-5 ml of 1% Lidocaine. Using ultrasound-guidance, a 20-gauge B-Alcocer 4\" Ultraplex 360 non-stimulating echogenic needle was advanced in plane, from medial to lateral, until the tip of the needle was in the fascial plane between the HOLDEN and MYRICK. 1-3ml of preservative free normal saline was used to hydro-dissect the fascial planes. After the fascial plane was verified, the local anesthetic (LA) was injected. The procedure was repeated on the opposite side for bilateral coverage. Aspiration every 5 ml to prevent intravascular injection. Injection was completed with negative aspiration of blood and negative intravascular injection. Injection pressures were normal with minimal resistance. The subcostal approach to the TAP nerve block ideally anesthetizes the intercostal nerves T6-T9.       Results for orders placed during the " hospital encounter of 08/24/23    Adult Transthoracic Echo Complete W/ Cont if Necessary Per Protocol    Interpretation Summary    Left ventricular ejection fraction appears to be 56 - 60%.    The left atrial cavity is mild to moderately dilated    There is mild to moderate thickening of the aortic valve    Mild to moderate tricuspid valve regurgitation is present. Estimated right ventricular systolic pressure from tricuspid regurgitation is mildly elevated (35-45 mmHg).    There is a trivial pericardial effusion.    Patient noted to be in atrial fibrillation with elevated rates during the study.      Current medications:  Scheduled Meds:albuterol, 2.5 mg, Nebulization, TID - RT  amantadine, 100 mg, Oral, BID  [Held by provider] apixaban, 5 mg, Oral, Q12H  atorvastatin, 10 mg, Oral, Daily  atropine, 2 drop, Sublingual, BID  bisacodyl, 10 mg, Oral, Daily  bisacodyl, 10 mg, Rectal, TID  budesonide-formoterol, 2 puff, Inhalation, BID - RT   And  tiotropium bromide monohydrate, 2 puff, Inhalation, Daily - RT  carbidopa-levodopa, 1 tablet, Oral, 4x Daily  carbidopa-levodopa CR, 1 tablet, Oral, BID  cefTRIAXone, 1,000 mg, Intravenous, Q24H  [Held by provider] cilostazol, 100 mg, Oral, BID  clonazePAM, 1 mg, Oral, Daily  docusate sodium, 100 mg, Oral, BID  finasteride, 5 mg, Oral, Daily  [Held by provider] furosemide, 20 mg, Oral, Daily  [Held by provider] guaiFENesin, 1,200 mg, Oral, BID  heparin (porcine), 5,000 Units, Subcutaneous, Q8H  metoclopramide, 10 mg, Intravenous, Q6H  metoprolol succinate XL, 50 mg, Oral, Daily  montelukast, 10 mg, Oral, Nightly  multivitamin with minerals, 1 tablet, Oral, Daily  pantoprazole, 40 mg, Oral, Daily  QUEtiapine, 25 mg, Oral, Q PM  sodium chloride, 10 mL, Intravenous, Q12H  tamsulosin, 0.4 mg, Oral, Nightly  [Held by provider] cyanocobalamin, 500 mcg, Oral, Daily      Continuous Infusions:lactated ringers, 100 mL/hr, Last Rate: 125 mL/hr (05/18/24 0059)      PRN Meds:.   acetaminophen **OR** acetaminophen **OR** acetaminophen    senna-docusate sodium **AND** polyethylene glycol **AND** bisacodyl **AND** bisacodyl    Calcium Replacement - Follow Nurse / BPA Driven Protocol    enalaprilat    HYDROmorphone    ipratropium-albuterol    Magnesium Standard Dose Replacement - Follow Nurse / BPA Driven Protocol    [DISCONTINUED] Morphine **AND** naloxone    nitroglycerin    ondansetron ODT **OR** ondansetron    oxyCODONE    Phosphorus Replacement - Follow Nurse / BPA Driven Protocol    Potassium Replacement - Follow Nurse / BPA Driven Protocol    promethazine **OR** promethazine    simethicone    Sodium Chloride (PF)    sodium chloride    sodium chloride    Assessment & Plan   Assessment & Plan     Active Hospital Problems    Diagnosis  POA    **Large bowel obstruction [K56.609]  Yes    SBO (small bowel obstruction) [K56.609]  Yes    Intractable nausea and vomiting [R11.2]  Yes    Permanent atrial fibrillation [I48.21]  Yes    Parkinson disease [G20.A1]  Yes    Chronic pain with pain pump in place [G89.29]  Yes    Pulmonary emphysema [J43.9]  Yes      Resolved Hospital Problems   No resolved problems to display.        Brief Hospital Course to date:  Flaco Roque II is a 78 y.o. male w/ hx afib (on eliquis), copd, parkinsons dz, chronic back pain (w/ pain pump), chronic constipation, previous ccy. Patient presented w/ abd and n/v. 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 Joseph evening of 5/16/24 for ex-lap which suggested partial bowel obstruction due to adhesive disease, underwent lysis of adhesions and appendectomy.     Partial bowel obstruction due to adhesions  -initial ct a/p concerning for bowel obstruction and possible cecal volvulus  **s/p ex-lap, lysis adhesions, appy 5/16/24 by Dr. Joseph; intraoperatively c/w partial bowel obstruction due to adhesive dz  -post-op care per Dr. Joseph ; clamping ng, checking  residuals; beginning clears  -on sq heparin currently (holding eliquis currently perioperatively, restart post-op when ok w surgery in next day or two    RLL infiltrate (on cxr), cannot rule out pneumonia  COPD  -cxr 5/16/24: medial right lower lobe concerning for pneumonia  -day #2 rocephin  -continue symbicort, spiriva, nebs  -currently resp status baseline    Afib  HFpEF  -continue toprol  -holding eliquis perioperatively; restart eliquis when ok w/ surgery  -holding lasix, restart prn    Parkinsons dz  anxiety  -continue amantadine & sinemet (while has NG tube in place clamp tube for 30 minutes after give sinemet)  -continue seroquel & daily home klonopin  -pt/ot evaluated; recommended home w/ assist    Hyperglycemia  -A1c 5.2  -hyperglycemia related to stress response    Chronic back pain, w/ pain pump in place        Am labs: cbc,bmp,mag        Expected Discharge Location and Transportation: home  Expected Discharge   Expected Discharge Date: 5/20/2024; Expected Discharge Time:      DVT prophylaxis:  Medical and mechanical DVT prophylaxis orders are present.         AM-PAC 6 Clicks Score (PT): 17 (05/18/24 0800)    CODE STATUS:   Code Status and Medical Interventions:   Ordered at: 05/16/24 2019     Level Of Support Discussed With:    Patient     Code Status (Patient has no pulse and is not breathing):    CPR (Attempt to Resuscitate)     Medical Interventions (Patient has pulse or is breathing):    Full Support       David Ellington MD  05/18/24

## 2024-05-18 NOTE — PROGRESS NOTES
"Patient Name:  Flaco Roque II  YOB: 1945  [id number]    Surgery Progress Note    Date of visit: 5/18/2024    Subjective   Subjective: Feeling much better this AM.  He had several bowel movements, and his pain is much better controlled.         Objective     Objective:     /92 (BP Location: Left arm, Patient Position: Lying)   Pulse 90   Temp 97.8 °F (36.6 °C) (Oral)   Resp 20   Ht 172.7 cm (67.99\")   Wt 57.6 kg (126 lb 15.8 oz)   SpO2 92%   BMI 19.31 kg/m²     Intake/Output Summary (Last 24 hours) at 5/18/2024 0750  Last data filed at 5/18/2024 0539  Gross per 24 hour   Intake --   Output 3050 ml   Net -3050 ml       CV:  Rhythm  regular and rate regular   L:  Clear  to auscultation bilaterally   Abd:  Bowel sounds positive , soft, less tender.  Incision clean dry and intact  Ext:  No cyanosis, clubbing, edema    Recent labs that are back at this time have been reviewed.            Assessment/ Plan:    Problem List Items Addressed This Visit          Gastrointestinal Abdominal     * (Principal) Large bowel obstruction- Improving.  Clamp NG and check residuals today.  Begin clear liquid diet.  Increase mobility and work with physical therapy.      Intractable nausea and vomiting     Other Visit Diagnoses       Other partial intestinal obstruction    -  Primary    Generalized abdominal pain        Dehydration        Abdominal pain        Relevant Orders    Tissue Pathology Exam             Active Hospital Problems    Diagnosis  POA    **Large bowel obstruction [K56.609]  Yes    SBO (small bowel obstruction) [K56.609]  Yes    Intractable nausea and vomiting [R11.2]  Yes    Permanent atrial fibrillation [I48.21]  Yes    Parkinson disease [G20.A1]  Yes    Chronic pain with pain pump in place [G89.29]  Yes    Pulmonary emphysema [J43.9]  Yes      Resolved Hospital Problems   No resolved problems to display.              Cj Joseph MD  5/18/2024  07:50 EDT      "

## 2024-05-19 LAB
ANION GAP SERPL CALCULATED.3IONS-SCNC: 8 MMOL/L (ref 5–15)
BUN SERPL-MCNC: 5 MG/DL (ref 8–23)
BUN/CREAT SERPL: 10.4 (ref 7–25)
CALCIUM SPEC-SCNC: 8.1 MG/DL (ref 8.6–10.5)
CHLORIDE SERPL-SCNC: 102 MMOL/L (ref 98–107)
CO2 SERPL-SCNC: 26 MMOL/L (ref 22–29)
CREAT SERPL-MCNC: 0.48 MG/DL (ref 0.76–1.27)
DEPRECATED RDW RBC AUTO: 47.1 FL (ref 37–54)
EGFRCR SERPLBLD CKD-EPI 2021: 105.7 ML/MIN/1.73
ERYTHROCYTE [DISTWIDTH] IN BLOOD BY AUTOMATED COUNT: 14.4 % (ref 12.3–15.4)
GLUCOSE SERPL-MCNC: 98 MG/DL (ref 65–99)
HCT VFR BLD AUTO: 39.9 % (ref 37.5–51)
HGB BLD-MCNC: 12.8 G/DL (ref 13–17.7)
MAGNESIUM SERPL-MCNC: 1.8 MG/DL (ref 1.6–2.4)
MCH RBC QN AUTO: 28.8 PG (ref 26.6–33)
MCHC RBC AUTO-ENTMCNC: 32.1 G/DL (ref 31.5–35.7)
MCV RBC AUTO: 89.7 FL (ref 79–97)
PLATELET # BLD AUTO: 207 10*3/MM3 (ref 140–450)
PMV BLD AUTO: 10.4 FL (ref 6–12)
POTASSIUM SERPL-SCNC: 3.6 MMOL/L (ref 3.5–5.2)
RBC # BLD AUTO: 4.45 10*6/MM3 (ref 4.14–5.8)
SODIUM SERPL-SCNC: 136 MMOL/L (ref 136–145)
WBC NRBC COR # BLD AUTO: 8.23 10*3/MM3 (ref 3.4–10.8)

## 2024-05-19 PROCEDURE — 85027 COMPLETE CBC AUTOMATED: CPT | Performed by: SURGERY

## 2024-05-19 PROCEDURE — 25010000002 CEFTRIAXONE PER 250 MG: Performed by: INTERNAL MEDICINE

## 2024-05-19 PROCEDURE — 25010000002 METOCLOPRAMIDE PER 10 MG: Performed by: SURGERY

## 2024-05-19 PROCEDURE — 25810000003 LACTATED RINGERS PER 1000 ML: Performed by: INTERNAL MEDICINE

## 2024-05-19 PROCEDURE — 94799 UNLISTED PULMONARY SVC/PX: CPT

## 2024-05-19 PROCEDURE — 94664 DEMO&/EVAL PT USE INHALER: CPT

## 2024-05-19 PROCEDURE — 99232 SBSQ HOSP IP/OBS MODERATE 35: CPT | Performed by: INTERNAL MEDICINE

## 2024-05-19 PROCEDURE — 25010000002 HEPARIN (PORCINE) PER 1000 UNITS: Performed by: SURGERY

## 2024-05-19 PROCEDURE — 80048 BASIC METABOLIC PNL TOTAL CA: CPT | Performed by: SURGERY

## 2024-05-19 PROCEDURE — 83735 ASSAY OF MAGNESIUM: CPT | Performed by: INTERNAL MEDICINE

## 2024-05-19 PROCEDURE — 97116 GAIT TRAINING THERAPY: CPT

## 2024-05-19 RX ORDER — DEXTROSE MONOHYDRATE, SODIUM CHLORIDE, AND POTASSIUM CHLORIDE 50; 1.49; 4.5 G/1000ML; G/1000ML; G/1000ML
50 INJECTION, SOLUTION INTRAVENOUS CONTINUOUS
Status: DISCONTINUED | OUTPATIENT
Start: 2024-05-19 | End: 2024-05-21 | Stop reason: HOSPADM

## 2024-05-19 RX ORDER — POTASSIUM CHLORIDE 20 MEQ/1
40 TABLET, EXTENDED RELEASE ORAL EVERY 4 HOURS
Status: DISPENSED | OUTPATIENT
Start: 2024-05-19 | End: 2024-05-19

## 2024-05-19 RX ADMIN — CARBIDOPA AND LEVODOPA 1 TABLET: 25; 100 TABLET ORAL at 09:40

## 2024-05-19 RX ADMIN — METOCLOPRAMIDE 10 MG: 5 INJECTION, SOLUTION INTRAMUSCULAR; INTRAVENOUS at 16:20

## 2024-05-19 RX ADMIN — METOPROLOL SUCCINATE 50 MG: 50 TABLET, EXTENDED RELEASE ORAL at 09:40

## 2024-05-19 RX ADMIN — HEPARIN SODIUM 5000 UNITS: 5000 INJECTION INTRAVENOUS; SUBCUTANEOUS at 05:00

## 2024-05-19 RX ADMIN — CARBIDOPA AND LEVODOPA 1 TABLET: 25; 100 TABLET ORAL at 21:01

## 2024-05-19 RX ADMIN — METOCLOPRAMIDE 10 MG: 5 INJECTION, SOLUTION INTRAMUSCULAR; INTRAVENOUS at 03:09

## 2024-05-19 RX ADMIN — BUDESONIDE AND FORMOTEROL FUMARATE DIHYDRATE 2 PUFF: 160; 4.5 AEROSOL RESPIRATORY (INHALATION) at 20:42

## 2024-05-19 RX ADMIN — SODIUM CHLORIDE 1000 MG: 900 INJECTION INTRAVENOUS at 09:40

## 2024-05-19 RX ADMIN — METOCLOPRAMIDE 10 MG: 5 INJECTION, SOLUTION INTRAMUSCULAR; INTRAVENOUS at 21:02

## 2024-05-19 RX ADMIN — ATORVASTATIN CALCIUM 10 MG: 10 TABLET, FILM COATED ORAL at 09:40

## 2024-05-19 RX ADMIN — TAMSULOSIN HYDROCHLORIDE 0.4 MG: 0.4 CAPSULE ORAL at 21:01

## 2024-05-19 RX ADMIN — Medication 10 ML: at 21:11

## 2024-05-19 RX ADMIN — APIXABAN 5 MG: 5 TABLET, FILM COATED ORAL at 21:14

## 2024-05-19 RX ADMIN — ATROPINE SULFATE 2 DROP: 10 SOLUTION/ DROPS OPHTHALMIC at 09:41

## 2024-05-19 RX ADMIN — CLONAZEPAM 1 MG: 1 TABLET ORAL at 09:39

## 2024-05-19 RX ADMIN — POTASSIUM CHLORIDE, DEXTROSE MONOHYDRATE AND SODIUM CHLORIDE 50 ML/HR: 150; 5; 450 INJECTION, SOLUTION INTRAVENOUS at 21:10

## 2024-05-19 RX ADMIN — SODIUM CHLORIDE, POTASSIUM CHLORIDE, SODIUM LACTATE AND CALCIUM CHLORIDE 100 ML/HR: 600; 310; 30; 20 INJECTION, SOLUTION INTRAVENOUS at 06:13

## 2024-05-19 RX ADMIN — BISACODYL 10 MG: 5 TABLET, COATED ORAL at 09:39

## 2024-05-19 RX ADMIN — CARBIDOPA AND LEVODOPA 1 TABLET: 25; 100 TABLET ORAL at 11:14

## 2024-05-19 RX ADMIN — Medication 10 ML: at 09:41

## 2024-05-19 RX ADMIN — FINASTERIDE 5 MG: 5 TABLET, FILM COATED ORAL at 09:39

## 2024-05-19 RX ADMIN — BISACODYL 10 MG: 10 SUPPOSITORY RECTAL at 09:47

## 2024-05-19 RX ADMIN — DOCUSATE SODIUM 100 MG: 100 CAPSULE, LIQUID FILLED ORAL at 09:40

## 2024-05-19 RX ADMIN — APIXABAN 5 MG: 5 TABLET, FILM COATED ORAL at 16:20

## 2024-05-19 RX ADMIN — QUETIAPINE FUMARATE 25 MG: 25 TABLET ORAL at 16:19

## 2024-05-19 RX ADMIN — DOCUSATE SODIUM 100 MG: 100 CAPSULE, LIQUID FILLED ORAL at 21:01

## 2024-05-19 RX ADMIN — CARBIDOPA AND LEVODOPA 1 TABLET: 50; 200 TABLET, EXTENDED RELEASE ORAL at 21:02

## 2024-05-19 RX ADMIN — AMANTADINE HYDROCHLORIDE 100 MG: 100 CAPSULE ORAL at 21:02

## 2024-05-19 RX ADMIN — Medication 1 TABLET: at 09:40

## 2024-05-19 RX ADMIN — BISACODYL 10 MG: 10 SUPPOSITORY RECTAL at 21:02

## 2024-05-19 RX ADMIN — MONTELUKAST 10 MG: 10 TABLET, FILM COATED ORAL at 21:01

## 2024-05-19 RX ADMIN — POTASSIUM CHLORIDE, DEXTROSE MONOHYDRATE AND SODIUM CHLORIDE 50 ML/HR: 150; 5; 450 INJECTION, SOLUTION INTRAVENOUS at 11:14

## 2024-05-19 RX ADMIN — ALBUTEROL SULFATE 2.5 MG: 2.5 SOLUTION RESPIRATORY (INHALATION) at 20:41

## 2024-05-19 RX ADMIN — BUDESONIDE AND FORMOTEROL FUMARATE DIHYDRATE 2 PUFF: 160; 4.5 AEROSOL RESPIRATORY (INHALATION) at 09:33

## 2024-05-19 RX ADMIN — CARBIDOPA AND LEVODOPA 1 TABLET: 25; 100 TABLET ORAL at 16:19

## 2024-05-19 RX ADMIN — AMANTADINE HYDROCHLORIDE 100 MG: 100 CAPSULE ORAL at 09:47

## 2024-05-19 RX ADMIN — PANTOPRAZOLE SODIUM 40 MG: 40 TABLET, DELAYED RELEASE ORAL at 09:39

## 2024-05-19 RX ADMIN — BISACODYL 10 MG: 10 SUPPOSITORY RECTAL at 16:20

## 2024-05-19 RX ADMIN — POTASSIUM CHLORIDE 40 MEQ: 1500 TABLET, EXTENDED RELEASE ORAL at 09:40

## 2024-05-19 RX ADMIN — ALBUTEROL SULFATE 2.5 MG: 2.5 SOLUTION RESPIRATORY (INHALATION) at 09:34

## 2024-05-19 RX ADMIN — OXYCODONE HYDROCHLORIDE 5 MG: 5 TABLET ORAL at 21:10

## 2024-05-19 RX ADMIN — METOCLOPRAMIDE 10 MG: 5 INJECTION, SOLUTION INTRAMUSCULAR; INTRAVENOUS at 09:40

## 2024-05-19 NOTE — PLAN OF CARE
Goal Outcome Evaluation:  Plan of Care Reviewed With: patient, family        Progress: improving       Problem: Adult Inpatient Plan of Care  Goal: Plan of Care Review  Outcome: Ongoing, Progressing  Flowsheets (Taken 5/18/2024 2234)  Progress: improving  Plan of Care Reviewed With:   patient   family  Goal: Patient-Specific Goal (Individualized)  Outcome: Ongoing, Progressing  Goal: Absence of Hospital-Acquired Illness or Injury  Outcome: Ongoing, Progressing  Intervention: Identify and Manage Fall Risk  Recent Flowsheet Documentation  Taken 5/18/2024 2000 by Agnieszka Stringer RN  Safety Promotion/Fall Prevention:   safety round/check completed   room organization consistent  Intervention: Prevent Skin Injury  Recent Flowsheet Documentation  Taken 5/18/2024 2125 by Agnieszka Stringer RN  Body Position:   turned   sitting up in bed  Taken 5/18/2024 2048 by Agnieszka Stringer RN  Body Position:   turned   left   side-lying  Taken 5/18/2024 2000 by Agnieszka Stringer RN  Body Position: weight shifting  Skin Protection:   transparent dressing maintained   skin-to-device areas padded   skin-to-skin areas padded   tubing/devices free from skin contact  Intervention: Prevent and Manage VTE (Venous Thromboembolism) Risk  Recent Flowsheet Documentation  Taken 5/18/2024 2125 by Agnieszka Stringer RN  Activity Management: standing at bedside  Taken 5/18/2024 2000 by Agnieszka Stringer RN  Activity Management: activity encouraged  VTE Prevention/Management: (hep subq)   bilateral   sequential compression devices off   patient refused intervention  Range of Motion:   active ROM (range of motion) encouraged   ROM (range of motion) performed  Goal: Optimal Comfort and Wellbeing  Outcome: Ongoing, Progressing  Intervention: Provide Person-Centered Care  Recent Flowsheet Documentation  Taken 5/18/2024 2000 by Agnieszka Stringer RN  Trust Relationship/Rapport:   care explained   choices provided   emotional support provided  Goal:  Readiness for Transition of Care  Outcome: Ongoing, Progressing     Problem: Skin Injury Risk Increased  Goal: Skin Health and Integrity  Outcome: Ongoing, Progressing  Intervention: Optimize Skin Protection  Recent Flowsheet Documentation  Taken 5/18/2024 2125 by Agnieszka Stringer RN  Head of Bed (HOB) Positioning: HOB at 20 degrees  Taken 5/18/2024 2048 by Agnieszka Stringer RN  Head of Bed (HOB) Positioning: HOB at 30 degrees  Taken 5/18/2024 2000 by Agnieszka Stringer RN  Pressure Reduction Techniques:   frequent weight shift encouraged   weight shift assistance provided  Pressure Reduction Devices: positioning supports utilized  Skin Protection:   transparent dressing maintained   skin-to-device areas padded   skin-to-skin areas padded   tubing/devices free from skin contact     Problem: Fall Injury Risk  Goal: Absence of Fall and Fall-Related Injury  Outcome: Ongoing, Progressing  Intervention: Promote Injury-Free Environment  Recent Flowsheet Documentation  Taken 5/18/2024 2000 by Agnieszka Stringer RN  Safety Promotion/Fall Prevention:   safety round/check completed   room organization consistent

## 2024-05-19 NOTE — PROGRESS NOTES
[facility] Medicine Services  PROGRESS NOTE    Patient Name: Flaco Roque II  : 1945  MRN: [id number]    Date of Admission: 2024  Primary Care Physician: Azar Stern MD    Subjective   Subjective     CC:  Partial sbo, s/p taj    HPI:  Post-op pain reasonably controlled  Denies dyspnea  Has been ambulating      Objective   Objective     Vital Signs:   Temp:  [96.4 °F (35.8 °C)-98.2 °F (36.8 °C)] 98.2 °F (36.8 °C)  Heart Rate:  [] 114  Resp:  [16-18] 18  BP: (103-122)/(58-86) 122/83  Flow (L/min):  [2.5-3] 3     Physical Exam:  Constitutional:Alert, oriented x 3, elderly & frail appearing but nontoxic, nasal shekhar in place  Psych:Normal/appropriate affect  HEENT:NCAT, oropharynx clear  Neck: neck supple, full range of motion  Neuro: Face symmetric, speech clear, equal , moves all extremities  Cardiac: rrr; No pretibial pitting edema  Resp: ctab, nasal canula in place  GI: abd soft, appropriate post-op mild tenderness, hypoactive bs  Skin: No extremity rash  Musculoskeletal/extremities: no cyanosis of extremities; no significant ankle edema          Results Reviewed:  LAB RESULTS:      Lab 24  1016   WBC 8.23 10.65 14.58* 10.20   HEMOGLOBIN 12.8* 13.8 14.5 15.7   HEMATOCRIT 39.9 43.1 45.5 48.7   PLATELETS 207 255 282 266   NEUTROS ABS  --   --  12.93* 9.20*   IMMATURE GRANS (ABS)  --   --  0.04 0.01   LYMPHS ABS  --   --  0.25* 0.41*   MONOS ABS  --   --  1.35* 0.53   EOS ABS  --   --  0.00 0.01   MCV 89.7 90.5 91.4 91.0   LACTATE  --   --   --  1.1   PROTIME  --   --  15.1*  --          Lab 24/24  1016   SODIUM 136 134* 137 142   POTASSIUM 3.6 3.9 3.9 3.8   CHLORIDE 102 99 103 102   CO2 26.0 25.0 20.0* 30.0*   ANION GAP 8.0 10.0 14.0 10.0   BUN 5* 8 10 17   CREATININE 0.48* 0.52* 0.71* 0.81   EGFR 105.7 103.2 93.9 90.2   GLUCOSE 98 102* 162* 139*   CALCIUM  8.1* 8.4* 8.8 9.8   MAGNESIUM 1.8 1.9 1.7  --          Lab 05/16/24  1016   TOTAL PROTEIN 7.2   ALBUMIN 4.4   GLOBULIN 2.8   ALT (SGPT) <5   AST (SGOT) 12   BILIRUBIN 0.6   ALK PHOS 82   LIPASE 16         Lab 05/17/24  0452 05/16/24  1240 05/16/24  1016   HSTROP T  --  17 15   PROTIME 15.1*  --   --    INR 1.18*  --   --                  Brief Urine Lab Results  (Last result in the past 365 days)        Color   Clarity   Blood   Leuk Est   Nitrite   Protein   CREAT   Urine HCG        05/16/24 1017 Dark Yellow   Clear   Negative   Trace   Negative   Trace                   Microbiology Results Abnormal       None            No radiology results from the last 24 hrs    Results for orders placed during the hospital encounter of 08/24/23    Adult Transthoracic Echo Complete W/ Cont if Necessary Per Protocol    Interpretation Summary    Left ventricular ejection fraction appears to be 56 - 60%.    The left atrial cavity is mild to moderately dilated    There is mild to moderate thickening of the aortic valve    Mild to moderate tricuspid valve regurgitation is present. Estimated right ventricular systolic pressure from tricuspid regurgitation is mildly elevated (35-45 mmHg).    There is a trivial pericardial effusion.    Patient noted to be in atrial fibrillation with elevated rates during the study.      Current medications:  Scheduled Meds:albuterol, 2.5 mg, Nebulization, TID - RT  amantadine, 100 mg, Oral, BID  apixaban, 5 mg, Oral, Q12H  atorvastatin, 10 mg, Oral, Daily  atropine, 2 drop, Sublingual, BID  bisacodyl, 10 mg, Oral, Daily  bisacodyl, 10 mg, Rectal, TID  budesonide-formoterol, 2 puff, Inhalation, BID - RT   And  tiotropium bromide monohydrate, 2 puff, Inhalation, Daily - RT  carbidopa-levodopa, 1 tablet, Oral, 4x Daily  carbidopa-levodopa CR, 1 tablet, Oral, BID  cefTRIAXone, 1,000 mg, Intravenous, Q24H  [Held by provider] cilostazol, 100 mg, Oral, BID  clonazePAM, 1 mg, Oral, Daily  docusate sodium, 100  mg, Oral, BID  finasteride, 5 mg, Oral, Daily  [Held by provider] furosemide, 20 mg, Oral, Daily  [Held by provider] guaiFENesin, 1,200 mg, Oral, BID  metoclopramide, 10 mg, Intravenous, Q6H  metoprolol succinate XL, 50 mg, Oral, Daily  montelukast, 10 mg, Oral, Nightly  multivitamin with minerals, 1 tablet, Oral, Daily  pantoprazole, 40 mg, Oral, Daily  potassium chloride ER, 40 mEq, Oral, Q4H  QUEtiapine, 25 mg, Oral, Q PM  sodium chloride, 10 mL, Intravenous, Q12H  tamsulosin, 0.4 mg, Oral, Nightly  [Held by provider] cyanocobalamin, 500 mcg, Oral, Daily      Continuous Infusions:dextrose 5 % and sodium chloride 0.45 % with KCl 20 mEq/L, 50 mL/hr, Last Rate: 50 mL/hr (05/19/24 1114)      PRN Meds:.  acetaminophen **OR** acetaminophen **OR** acetaminophen    senna-docusate sodium **AND** polyethylene glycol **AND** bisacodyl **AND** bisacodyl    Calcium Replacement - Follow Nurse / BPA Driven Protocol    enalaprilat    HYDROmorphone    ipratropium-albuterol    Magnesium Standard Dose Replacement - Follow Nurse / BPA Driven Protocol    [DISCONTINUED] Morphine **AND** naloxone    nitroglycerin    ondansetron ODT **OR** ondansetron    oxyCODONE    Phosphorus Replacement - Follow Nurse / BPA Driven Protocol    Potassium Replacement - Follow Nurse / BPA Driven Protocol    promethazine **OR** promethazine    simethicone    Sodium Chloride (PF)    sodium chloride    sodium chloride    Assessment & Plan   Assessment & Plan     Active Hospital Problems    Diagnosis  POA    **Large bowel obstruction [K56.609]  Yes    SBO (small bowel obstruction) [K56.609]  Yes    Intractable nausea and vomiting [R11.2]  Yes    Permanent atrial fibrillation [I48.21]  Yes    Parkinson disease [G20.A1]  Yes    Chronic pain with pain pump in place [G89.29]  Yes    Pulmonary emphysema [J43.9]  Yes      Resolved Hospital Problems   No resolved problems to display.        Brief Hospital Course to date:  Flaco CROOKS Mya DENIS is a 78 y.o. male w/ hx  afib (on eliquis), copd, parkinsons dz, chronic back pain (w/ pain pump), chronic constipation, previous ccy. Patient presented w/ abd and n/v. 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 Joseph evening of 5/16/24 for ex-lap which suggested partial bowel obstruction due to adhesive disease, underwent lysis of adhesions and appendectomy.     Partial bowel obstruction due to adhesions  -initial ct a/p concerning for bowel obstruction and possible cecal volvulus  **s/p ex-lap, lysis adhesions, appy 5/16/24 by Dr. Joseph; intraoperatively c/w partial bowel obstruction due to adhesive dz  -post-op care per Dr. Joseph ; d/cing NG, on clears, encouraging ambulation, pulm toilet  -pt/ot recommends home w/ assist    RLL infiltrate (on cxr), cannot rule out pneumonia  COPD  -cxr 5/16/24: medial right lower lobe concerning for pneumonia  -day #3 rocephin  -continue symbicort, spiriva, nebs  -currently resp status baseline    Afib  HFpEF  -continue toprol  -restarting eliquis post-op     Parkinsons dz  anxiety  -continue amantadine & sinemet  -continue seroquel & daily home klonopin  -pt/ot evaluated; recommended home w/ assist    Hyperglycemia  -A1c 5.2  -hyperglycemia related to stress response    Chronic back pain, w/ pain pump in place        Am labs: cbc,bmp, mag        Expected Discharge Location and Transportation: home  Expected Discharge   Expected Discharge Date: 5/21/2024; Expected Discharge Time:      DVT prophylaxis:  Medical and mechanical DVT prophylaxis orders are present.         AM-PAC 6 Clicks Score (PT): 17 (05/18/24 2000)    CODE STATUS:   Code Status and Medical Interventions:   Ordered at: 05/16/24 2019     Level Of Support Discussed With:    Patient     Code Status (Patient has no pulse and is not breathing):    CPR (Attempt to Resuscitate)     Medical Interventions (Patient has pulse or is breathing):    Full Support       David Ellington,  MD  05/19/24
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
## 2024-05-19 NOTE — PROGRESS NOTES
"Patient Name:  Flaco Roque II  YOB: 1945  [id number]    Surgery Progress Note    Date of visit: 5/19/2024    Subjective   Subjective: Feeling better. Tolerating clears with minimal residuals. Had a BM.         Objective     Objective:     /58 (BP Location: Left arm, Patient Position: Lying)   Pulse 68   Temp 96.8 °F (36 °C) (Axillary)   Resp 17   Ht 172.7 cm (67.99\")   Wt 57.6 kg (126 lb 15.8 oz)   SpO2 95%   BMI 19.31 kg/m²     Intake/Output Summary (Last 24 hours) at 5/19/2024 0722  Last data filed at 5/19/2024 0316  Gross per 24 hour   Intake 120 ml   Output 2825 ml   Net -2705 ml       CV:  Rhythm  regular and rate regular   L:  Clear  to auscultation bilaterally   Abd:  Bowel sounds positive , soft, appropriately tender. Incision c/d/i  Ext:  No cyanosis, clubbing, edema    Recent labs that are back at this time have been reviewed.            Assessment/ Plan:    Problem List Items Addressed This Visit          Gastrointestinal Abdominal     * (Principal) Large bowel obstruction- Doing well after ex-lap. D/C NG. Increase mobility. Up in chair.      Intractable nausea and vomiting     Other Visit Diagnoses       Other partial intestinal obstruction    -  Primary    Generalized abdominal pain        Dehydration        Abdominal pain        Relevant Orders    Tissue Pathology Exam             Active Hospital Problems    Diagnosis  POA    **Large bowel obstruction [K56.609]  Yes    SBO (small bowel obstruction) [K56.609]  Yes    Intractable nausea and vomiting [R11.2]  Yes    Permanent atrial fibrillation [I48.21]  Yes    Parkinson disease [G20.A1]  Yes    Chronic pain with pain pump in place [G89.29]  Yes    Pulmonary emphysema [J43.9]  Yes      Resolved Hospital Problems   No resolved problems to display.              Cj Joseph MD  5/19/2024  07:22 EDT      "

## 2024-05-19 NOTE — PROGRESS NOTES
Malnutrition Severity Assessment    Patient Name:  Flaco Roque II  YOB: 1945  MRN: [id number]  Admit Date:  5/16/2024    Patient meets criteria for : Severe Malnutrition    Comments:  Pt meets criteria for chronic, severe malnutrition with the indicators of severe muscle wasting and subcutaneous fat loss. Of note, pt with parkinsons and COPD which are likely primary contributors to muscle/fat loss.     Malnutrition Severity Assessment  Malnutrition Type: Chronic Disease - Related Malnutrition  Malnutrition Type (Last 8 Hours)       Malnutrition Severity Assessment       Row Name 05/19/24 1031       Malnutrition Severity Assessment    Malnutrition Type Chronic Disease - Related Malnutrition      Row Name 05/19/24 1031       Insufficient Energy Intake     Insufficient Energy Intake Findings None      Row Name 05/19/24 1031       Unintentional Weight Loss     Unintentional Weight Loss Findings None  wt stable x6 months      Row Name 05/19/24 1031       Muscle Loss    Loss of Muscle Mass Findings Severe    Pentecostal Region Moderate - slight depression    Clavicle Bone Region Moderate - some protrusion in females, visible in males    Acromion Bone Region Severe - squared shoulders, bones, and acromion process protrusion prominent    Scapular Bone Region Moderate - mild depression, bones may show slightly    Dorsal Hand Region Severe - prominent depression    Patellar Region Severe - prominent bone, square looking, very little muscle definition    Anterior Thigh Region Severe - line/depression along thigh, obviously thin    Posterior Calf Region Severe - thin with very little definition/firmness      Row Name 05/19/24 1031       Fat Loss    Subcutaneous Fat Loss Findings Severe    Orbital Region  Severe - pronounced hollowness/depression, dark circles, loose saggy skin    Upper Arm Region Severe - mostly skin, very little space between folds, fingers touch    Thoracic & Lumbar Region Moderate - ribs  visible with mild depressions, iliac crest somewhat prominent      Row Name 05/19/24 1031       Criteria Met (Must meet criteria for severity in at least 2 of these categories: M Wasting, Fat Loss, Fluid, Secondary Signs, Wt. Status, Intake)    Patient meets criteria for  Severe Malnutrition                    Electronically signed by:  Zayda Johns MS,RD,LD  05/19/24 10:38 EDT

## 2024-05-19 NOTE — PLAN OF CARE
Goal Outcome Evaluation:  Plan of Care Reviewed With: patient        Progress: improving  Outcome Evaluation: Pt with good motivation and participation in therapy session. Pt continuing below his baseline level of function with decreased functional activity tolerance, balance deficits, and generalized weakness warrantin further skilled acute PT services. Pt ambualted 325ft with RW and CGA. PT plans to continue progressing PT POC as tolerated.
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
## 2024-05-20 PROBLEM — E43 SEVERE MALNUTRITION: Status: ACTIVE | Noted: 2024-05-20

## 2024-05-20 LAB
ANION GAP SERPL CALCULATED.3IONS-SCNC: 11 MMOL/L (ref 5–15)
BUN SERPL-MCNC: 3 MG/DL (ref 8–23)
BUN/CREAT SERPL: 6.7 (ref 7–25)
CALCIUM SPEC-SCNC: 8 MG/DL (ref 8.6–10.5)
CHLORIDE SERPL-SCNC: 101 MMOL/L (ref 98–107)
CO2 SERPL-SCNC: 25 MMOL/L (ref 22–29)
CREAT SERPL-MCNC: 0.45 MG/DL (ref 0.76–1.27)
CYTO UR: NORMAL
DEPRECATED RDW RBC AUTO: 48.7 FL (ref 37–54)
EGFRCR SERPLBLD CKD-EPI 2021: 107.8 ML/MIN/1.73
ERYTHROCYTE [DISTWIDTH] IN BLOOD BY AUTOMATED COUNT: 14.6 % (ref 12.3–15.4)
GLUCOSE SERPL-MCNC: 119 MG/DL (ref 65–99)
HCT VFR BLD AUTO: 45.4 % (ref 37.5–51)
HGB BLD-MCNC: 14.6 G/DL (ref 13–17.7)
LAB AP CASE REPORT: NORMAL
LAB AP CLINICAL INFORMATION: NORMAL
MAGNESIUM SERPL-MCNC: 1.9 MG/DL (ref 1.6–2.4)
MCH RBC QN AUTO: 29.2 PG (ref 26.6–33)
MCHC RBC AUTO-ENTMCNC: 32.2 G/DL (ref 31.5–35.7)
MCV RBC AUTO: 90.8 FL (ref 79–97)
PATH REPORT.FINAL DX SPEC: NORMAL
PATH REPORT.GROSS SPEC: NORMAL
PLATELET # BLD AUTO: 258 10*3/MM3 (ref 140–450)
PMV BLD AUTO: 10.9 FL (ref 6–12)
POTASSIUM SERPL-SCNC: 3.8 MMOL/L (ref 3.5–5.2)
RBC # BLD AUTO: 5 10*6/MM3 (ref 4.14–5.8)
SODIUM SERPL-SCNC: 137 MMOL/L (ref 136–145)
WBC NRBC COR # BLD AUTO: 9.36 10*3/MM3 (ref 3.4–10.8)

## 2024-05-20 PROCEDURE — 25010000002 METOCLOPRAMIDE PER 10 MG: Performed by: SURGERY

## 2024-05-20 PROCEDURE — 25010000002 CEFTRIAXONE PER 250 MG: Performed by: INTERNAL MEDICINE

## 2024-05-20 PROCEDURE — 85027 COMPLETE CBC AUTOMATED: CPT | Performed by: SURGERY

## 2024-05-20 PROCEDURE — 80048 BASIC METABOLIC PNL TOTAL CA: CPT | Performed by: SURGERY

## 2024-05-20 PROCEDURE — 94799 UNLISTED PULMONARY SVC/PX: CPT

## 2024-05-20 PROCEDURE — 99232 SBSQ HOSP IP/OBS MODERATE 35: CPT | Performed by: INTERNAL MEDICINE

## 2024-05-20 PROCEDURE — 93005 ELECTROCARDIOGRAM TRACING: CPT | Performed by: NURSE PRACTITIONER

## 2024-05-20 PROCEDURE — 94664 DEMO&/EVAL PT USE INHALER: CPT

## 2024-05-20 PROCEDURE — 93010 ELECTROCARDIOGRAM REPORT: CPT | Performed by: INTERNAL MEDICINE

## 2024-05-20 PROCEDURE — 25810000003 SODIUM CHLORIDE 0.9 % SOLUTION: Performed by: NURSE PRACTITIONER

## 2024-05-20 PROCEDURE — 83735 ASSAY OF MAGNESIUM: CPT | Performed by: INTERNAL MEDICINE

## 2024-05-20 RX ADMIN — CARBIDOPA AND LEVODOPA 1 TABLET: 50; 200 TABLET, EXTENDED RELEASE ORAL at 09:27

## 2024-05-20 RX ADMIN — CLONAZEPAM 1 MG: 1 TABLET ORAL at 09:27

## 2024-05-20 RX ADMIN — METOCLOPRAMIDE 10 MG: 5 INJECTION, SOLUTION INTRAMUSCULAR; INTRAVENOUS at 17:13

## 2024-05-20 RX ADMIN — SODIUM CHLORIDE 250 ML: 9 INJECTION, SOLUTION INTRAVENOUS at 00:28

## 2024-05-20 RX ADMIN — BISACODYL 10 MG: 5 TABLET, COATED ORAL at 09:27

## 2024-05-20 RX ADMIN — METOCLOPRAMIDE 10 MG: 5 INJECTION, SOLUTION INTRAMUSCULAR; INTRAVENOUS at 03:35

## 2024-05-20 RX ADMIN — MONTELUKAST 10 MG: 10 TABLET, FILM COATED ORAL at 20:27

## 2024-05-20 RX ADMIN — CARBIDOPA AND LEVODOPA 1 TABLET: 25; 100 TABLET ORAL at 09:27

## 2024-05-20 RX ADMIN — ATORVASTATIN CALCIUM 10 MG: 10 TABLET, FILM COATED ORAL at 09:27

## 2024-05-20 RX ADMIN — CARBIDOPA AND LEVODOPA 1 TABLET: 25; 100 TABLET ORAL at 17:14

## 2024-05-20 RX ADMIN — POTASSIUM CHLORIDE, DEXTROSE MONOHYDRATE AND SODIUM CHLORIDE 50 ML/HR: 150; 5; 450 INJECTION, SOLUTION INTRAVENOUS at 12:14

## 2024-05-20 RX ADMIN — CARBIDOPA AND LEVODOPA 1 TABLET: 25; 100 TABLET ORAL at 20:27

## 2024-05-20 RX ADMIN — OXYCODONE HYDROCHLORIDE 5 MG: 5 TABLET ORAL at 22:00

## 2024-05-20 RX ADMIN — CILOSTAZOL 100 MG: 50 TABLET ORAL at 09:27

## 2024-05-20 RX ADMIN — ATROPINE SULFATE 2 DROP: 10 SOLUTION/ DROPS OPHTHALMIC at 09:28

## 2024-05-20 RX ADMIN — OXYCODONE HYDROCHLORIDE 5 MG: 5 TABLET ORAL at 12:19

## 2024-05-20 RX ADMIN — CILOSTAZOL 100 MG: 50 TABLET ORAL at 17:19

## 2024-05-20 RX ADMIN — ALBUTEROL SULFATE 2.5 MG: 2.5 SOLUTION RESPIRATORY (INHALATION) at 21:03

## 2024-05-20 RX ADMIN — Medication 1 TABLET: at 09:27

## 2024-05-20 RX ADMIN — SODIUM CHLORIDE 250 ML: 9 INJECTION, SOLUTION INTRAVENOUS at 21:52

## 2024-05-20 RX ADMIN — METOCLOPRAMIDE 10 MG: 5 INJECTION, SOLUTION INTRAMUSCULAR; INTRAVENOUS at 09:27

## 2024-05-20 RX ADMIN — Medication 10 ML: at 20:29

## 2024-05-20 RX ADMIN — CARBIDOPA AND LEVODOPA 1 TABLET: 50; 200 TABLET, EXTENDED RELEASE ORAL at 20:28

## 2024-05-20 RX ADMIN — AMANTADINE HYDROCHLORIDE 100 MG: 100 CAPSULE ORAL at 12:13

## 2024-05-20 RX ADMIN — FINASTERIDE 5 MG: 5 TABLET, FILM COATED ORAL at 09:27

## 2024-05-20 RX ADMIN — BUDESONIDE AND FORMOTEROL FUMARATE DIHYDRATE 2 PUFF: 160; 4.5 AEROSOL RESPIRATORY (INHALATION) at 21:03

## 2024-05-20 RX ADMIN — APIXABAN 5 MG: 5 TABLET, FILM COATED ORAL at 09:27

## 2024-05-20 RX ADMIN — TAMSULOSIN HYDROCHLORIDE 0.4 MG: 0.4 CAPSULE ORAL at 20:27

## 2024-05-20 RX ADMIN — BISACODYL 10 MG: 10 SUPPOSITORY RECTAL at 17:14

## 2024-05-20 RX ADMIN — PANTOPRAZOLE SODIUM 40 MG: 40 TABLET, DELAYED RELEASE ORAL at 09:27

## 2024-05-20 RX ADMIN — METOCLOPRAMIDE 10 MG: 5 INJECTION, SOLUTION INTRAMUSCULAR; INTRAVENOUS at 20:28

## 2024-05-20 RX ADMIN — AMANTADINE HYDROCHLORIDE 100 MG: 100 CAPSULE ORAL at 20:28

## 2024-05-20 RX ADMIN — METOPROLOL SUCCINATE 50 MG: 50 TABLET, EXTENDED RELEASE ORAL at 09:27

## 2024-05-20 RX ADMIN — SODIUM CHLORIDE 1000 MG: 900 INJECTION INTRAVENOUS at 09:26

## 2024-05-20 RX ADMIN — OXYCODONE HYDROCHLORIDE 5 MG: 5 TABLET ORAL at 17:13

## 2024-05-20 RX ADMIN — DOCUSATE SODIUM 100 MG: 100 CAPSULE, LIQUID FILLED ORAL at 09:27

## 2024-05-20 RX ADMIN — DOCUSATE SODIUM 100 MG: 100 CAPSULE, LIQUID FILLED ORAL at 20:28

## 2024-05-20 RX ADMIN — APIXABAN 5 MG: 5 TABLET, FILM COATED ORAL at 20:28

## 2024-05-20 RX ADMIN — OXYCODONE HYDROCHLORIDE 5 MG: 5 TABLET ORAL at 03:35

## 2024-05-20 RX ADMIN — QUETIAPINE FUMARATE 25 MG: 25 TABLET ORAL at 17:13

## 2024-05-20 RX ADMIN — CARBIDOPA AND LEVODOPA 1 TABLET: 25; 100 TABLET ORAL at 12:13

## 2024-05-20 RX ADMIN — BISACODYL 10 MG: 10 SUPPOSITORY RECTAL at 20:27

## 2024-05-20 NOTE — PROGRESS NOTES
"Patient Name:  Flaco Roque II  YOB: 1945  [id number]    Surgery Progress Note    Date of visit: 5/20/2024    Subjective   Subjective: Feeling better, having BM's, no nausea.         Objective     Objective:     /80 (BP Location: Right arm, Patient Position: Lying)   Pulse 80   Temp 96.9 °F (36.1 °C) (Axillary)   Resp 19   Ht 172.7 cm (67.99\")   Wt 57.6 kg (126 lb 15.8 oz)   SpO2 92%   BMI 19.31 kg/m²     Intake/Output Summary (Last 24 hours) at 5/20/2024 0705  Last data filed at 5/20/2024 0600  Gross per 24 hour   Intake 320 ml   Output 3100 ml   Net -2780 ml       CV:  Rhythm  regular and rate regular   L:  Clear  to auscultation bilaterally   Abd:  Bowel sounds positive , soft, appropriately tender. Incision c/d/i  Ext:  No cyanosis, clubbing, edema    Recent labs that are back at this time have been reviewed.            Assessment/ Plan:    Problem List Items Addressed This Visit          Gastrointestinal Abdominal     * (Principal) Large bowel obstruction- Resolved. Doing well overall, advance diet. From my standpoint, will be ready to go to rehab as early as tomorrow.       Intractable nausea and vomiting     Other Visit Diagnoses       Other partial intestinal obstruction    -  Primary    Generalized abdominal pain        Dehydration        Abdominal pain        Relevant Orders    Tissue Pathology Exam             Active Hospital Problems    Diagnosis  POA    **Large bowel obstruction [K56.609]  Yes    SBO (small bowel obstruction) [K56.609]  Yes    Intractable nausea and vomiting [R11.2]  Yes    Permanent atrial fibrillation [I48.21]  Yes    Parkinson disease [G20.A1]  Yes    Chronic pain with pain pump in place [G89.29]  Yes    Pulmonary emphysema [J43.9]  Yes      Resolved Hospital Problems   No resolved problems to display.              Cj Joseph MD  5/20/2024  07:05 EDT      "

## 2024-05-20 NOTE — CASE MANAGEMENT/SOCIAL WORK
Continued Stay Note  [facility]     Patient Name: Flaco Roque II  MRN: [id number]  Today's Date: 5/20/2024    Admit Date: 5/16/2024    Plan: IDP   Discharge Plan       Row Name 05/20/24 1423       Plan    Plan Comments Per MDR, patient will likely be ready for discharge tmrw 5/21. Patient's plan is home. No discharge needs identified. CM will continue to follow.    Final Discharge Disposition Code 01 - home or self-care                   Discharge Codes    No documentation.                 Expected Discharge Date and Time       Expected Discharge Date Expected Discharge Time    May 21, 2024               Kallie Guo RN

## 2024-05-20 NOTE — PLAN OF CARE
Goal Outcome Evaluation:  Plan of Care Reviewed With: patient, son, family          Pt had hypotension around 0000, spoke to APRN, gave 250 mL bolus and bp resolved.      Progress: improving         Problem: Adult Inpatient Plan of Care  Goal: Plan of Care Review  Outcome: Ongoing, Progressing  Flowsheets (Taken 5/19/2024 2354)  Progress: improving  Plan of Care Reviewed With:   patient   son   family  Goal: Patient-Specific Goal (Individualized)  Outcome: Ongoing, Progressing  Goal: Absence of Hospital-Acquired Illness or Injury  Outcome: Ongoing, Progressing  Intervention: Identify and Manage Fall Risk  Recent Flowsheet Documentation  Taken 5/19/2024 2000 by Agnieszka Stringer RN  Safety Promotion/Fall Prevention: safety round/check completed  Intervention: Prevent Skin Injury  Recent Flowsheet Documentation  Taken 5/19/2024 2114 by Agnieszka Stringer RN  Body Position:   turned   left  Taken 5/19/2024 2000 by Agnieszka Stringer RN  Body Position: weight shifting  Skin Protection:   adhesive use limited   transparent dressing maintained   tubing/devices free from skin contact   skin-to-device areas padded   skin-to-skin areas padded  Taken 5/19/2024 1913 by Agnieszka Stringer RN  Body Position: weight shifting  Intervention: Prevent and Manage VTE (Venous Thromboembolism) Risk  Recent Flowsheet Documentation  Taken 5/19/2024 2114 by Agnieszka Stringer RN  Activity Management: activity encouraged  Taken 5/19/2024 2000 by Agnieszka Stringer RN  Activity Management: activity encouraged  VTE Prevention/Management: (eliquis po)   bilateral   sequential compression devices off  Range of Motion:   ROM (range of motion) performed   active ROM (range of motion) encouraged  Taken 5/19/2024 1913 by Agnieszka Stringer RN  Activity Management: activity encouraged  Goal: Optimal Comfort and Wellbeing  Outcome: Ongoing, Progressing  Intervention: Provide Person-Centered Care  Recent Flowsheet Documentation  Taken 5/19/2024 2000 by  Agnieszka Stringer RN  Trust Relationship/Rapport: care explained  Goal: Readiness for Transition of Care  Outcome: Ongoing, Progressing     Problem: Skin Injury Risk Increased  Goal: Skin Health and Integrity  Outcome: Ongoing, Progressing  Intervention: Optimize Skin Protection  Recent Flowsheet Documentation  Taken 5/19/2024 2114 by Agnieszka Stringer, RN  Head of Bed (HOB) Positioning: HOB at 20-30 degrees  Taken 5/19/2024 2000 by Agnieszka Stringer RN  Pressure Reduction Techniques: weight shift assistance provided  Pressure Reduction Devices: positioning supports utilized  Skin Protection:   adhesive use limited   transparent dressing maintained   tubing/devices free from skin contact   skin-to-device areas padded   skin-to-skin areas padded     Problem: Fall Injury Risk  Goal: Absence of Fall and Fall-Related Injury  Outcome: Ongoing, Progressing  Intervention: Promote Injury-Free Environment  Recent Flowsheet Documentation  Taken 5/19/2024 2000 by Agnieszka Stringer RN  Safety Promotion/Fall Prevention: safety round/check completed

## 2024-05-20 NOTE — PROGRESS NOTES
[facility] Medicine Services  PROGRESS NOTE    Patient Name: Flaco Roque II  : 1945  MRN: [id number]    Date of Admission: 2024  Primary Care Physician: Azar Stern MD    Subjective   Subjective     CC:  Partial sbo, s/p taj    HPI:  Feeling quite well, ambulating, abd pain managed, + flatus  No dyspnea  No chest pain      Objective   Objective     Vital Signs:   Temp:  [96.1 °F (35.6 °C)-98 °F (36.7 °C)] 97.4 °F (36.3 °C)  Heart Rate:  [78-85] 80  Resp:  [16-22] 16  BP: ()/(50-83) 107/65  Flow (L/min):  [1-3] 1     Physical Exam:  Constitutional:Alert, oriented x 3, elderly & frail appearing but nontoxic, nasal canula in place  Psych:Normal/appropriate affect  HEENT:NCAT, oropharynx clear  Neck: neck supple, full range of motion  Neuro: Face symmetric, speech clear, equal , moves all extremities  Cardiac: rrr; No pretibial pitting edema  Resp: lungs clear, nasal canula in place (1L)  GI: abd soft, minimal/appropriate post-op tenderness  Skin: No extremity rash  Musculoskeletal/extremities: no cyanosis of extremities; no significant ankle edema          Results Reviewed:  LAB RESULTS:      Lab 24  0515 24  0346 24  0240 24  0452 24  1016   WBC 9.36 8.23 10.65 14.58* 10.20   HEMOGLOBIN 14.6 12.8* 13.8 14.5 15.7   HEMATOCRIT 45.4 39.9 43.1 45.5 48.7   PLATELETS 258 207 255 282 266   NEUTROS ABS  --   --   --  12.93* 9.20*   IMMATURE GRANS (ABS)  --   --   --  0.04 0.01   LYMPHS ABS  --   --   --  0.25* 0.41*   MONOS ABS  --   --   --  1.35* 0.53   EOS ABS  --   --   --  0.00 0.01   MCV 90.8 89.7 90.5 91.4 91.0   LACTATE  --   --   --   --  1.1   PROTIME  --   --   --  15.1*  --          Lab 24  0514 24  0346 24  0240 24  0452 24  1016   SODIUM 137 136 134* 137 142   POTASSIUM 3.8 3.6 3.9 3.9 3.8   CHLORIDE 101 102 99 103 102   CO2 25.0 26.0 25.0 20.0* 30.0*   ANION GAP 11.0 8.0 10.0 14.0 10.0    BUN 3* 5* 8 10 17   CREATININE 0.45* 0.48* 0.52* 0.71* 0.81   EGFR 107.8 105.7 103.2 93.9 90.2   GLUCOSE 119* 98 102* 162* 139*   CALCIUM 8.0* 8.1* 8.4* 8.8 9.8   MAGNESIUM 1.9 1.8 1.9 1.7  --          Lab 05/16/24  1016   TOTAL PROTEIN 7.2   ALBUMIN 4.4   GLOBULIN 2.8   ALT (SGPT) <5   AST (SGOT) 12   BILIRUBIN 0.6   ALK PHOS 82   LIPASE 16         Lab 05/17/24  0452 05/16/24  1240 05/16/24  1016   HSTROP T  --  17 15   PROTIME 15.1*  --   --    INR 1.18*  --   --                  Brief Urine Lab Results  (Last result in the past 365 days)        Color   Clarity   Blood   Leuk Est   Nitrite   Protein   CREAT   Urine HCG        05/16/24 1017 Dark Yellow   Clear   Negative   Trace   Negative   Trace                   Microbiology Results Abnormal       None            No radiology results from the last 24 hrs    Results for orders placed during the hospital encounter of 08/24/23    Adult Transthoracic Echo Complete W/ Cont if Necessary Per Protocol    Interpretation Summary    Left ventricular ejection fraction appears to be 56 - 60%.    The left atrial cavity is mild to moderately dilated    There is mild to moderate thickening of the aortic valve    Mild to moderate tricuspid valve regurgitation is present. Estimated right ventricular systolic pressure from tricuspid regurgitation is mildly elevated (35-45 mmHg).    There is a trivial pericardial effusion.    Patient noted to be in atrial fibrillation with elevated rates during the study.      Current medications:  Scheduled Meds:albuterol, 2.5 mg, Nebulization, TID - RT  amantadine, 100 mg, Oral, BID  apixaban, 5 mg, Oral, Q12H  atorvastatin, 10 mg, Oral, Daily  atropine, 2 drop, Sublingual, BID  bisacodyl, 10 mg, Oral, Daily  bisacodyl, 10 mg, Rectal, TID  budesonide-formoterol, 2 puff, Inhalation, BID - RT   And  tiotropium bromide monohydrate, 2 puff, Inhalation, Daily - RT  carbidopa-levodopa, 1 tablet, Oral, 4x Daily  carbidopa-levodopa CR, 1 tablet, Oral,  BID  cefTRIAXone, 1,000 mg, Intravenous, Q24H  cilostazol, 100 mg, Oral, BID  clonazePAM, 1 mg, Oral, Daily  docusate sodium, 100 mg, Oral, BID  finasteride, 5 mg, Oral, Daily  [Held by provider] furosemide, 20 mg, Oral, Daily  [Held by provider] guaiFENesin, 1,200 mg, Oral, BID  metoclopramide, 10 mg, Intravenous, Q6H  metoprolol succinate XL, 50 mg, Oral, Daily  montelukast, 10 mg, Oral, Nightly  multivitamin with minerals, 1 tablet, Oral, Daily  pantoprazole, 40 mg, Oral, Daily  QUEtiapine, 25 mg, Oral, Q PM  sodium chloride, 10 mL, Intravenous, Q12H  tamsulosin, 0.4 mg, Oral, Nightly  [Held by provider] cyanocobalamin, 500 mcg, Oral, Daily      Continuous Infusions:dextrose 5 % and sodium chloride 0.45 % with KCl 20 mEq/L, 50 mL/hr, Last Rate: 50 mL/hr (05/20/24 1214)      PRN Meds:.  acetaminophen **OR** acetaminophen **OR** acetaminophen    senna-docusate sodium **AND** polyethylene glycol **AND** bisacodyl **AND** bisacodyl    Calcium Replacement - Follow Nurse / BPA Driven Protocol    enalaprilat    HYDROmorphone    ipratropium-albuterol    Magnesium Standard Dose Replacement - Follow Nurse / BPA Driven Protocol    [DISCONTINUED] Morphine **AND** naloxone    nitroglycerin    ondansetron ODT **OR** ondansetron    oxyCODONE    Phosphorus Replacement - Follow Nurse / BPA Driven Protocol    Potassium Replacement - Follow Nurse / BPA Driven Protocol    promethazine **OR** promethazine    simethicone    Sodium Chloride (PF)    sodium chloride    sodium chloride    Assessment & Plan   Assessment & Plan     Active Hospital Problems    Diagnosis  POA    **Large bowel obstruction [K56.609]  Yes    Severe malnutrition [E43]  Yes    SBO (small bowel obstruction) [K56.609]  Yes    Intractable nausea and vomiting [R11.2]  Yes    Permanent atrial fibrillation [I48.21]  Yes    Parkinson disease [G20.A1]  Yes    Chronic pain with pain pump in place [G89.29]  Yes    Pulmonary emphysema [J43.9]  Yes      Resolved Hospital  Problems   No resolved problems to display.        Brief Hospital Course to date:  Flaco Roque II is a 78 y.o. male w/ hx afib (on eliquis), copd, parkinsons dz, chronic back pain (w/ pain pump), chronic constipation, previous ccy. Patient presented w/ abd and n/v. 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 Joseph evening of 5/16/24 for ex-lap which suggested partial bowel obstruction due to adhesive disease, underwent lysis of adhesions and appendectomy.     Partial bowel obstruction due to adhesions (s/p ex-lap w/ lysis of adhesions this admission)  -initial ct a/p concerning for bowel obstruction and possible cecal volvulus  **s/p ex-lap, lysis adhesions, appy 5/16/24 by Dr. Joseph; intraoperatively c/w partial bowel obstruction due to adhesive dz  -post-op care per Dr. Joseph : advancing diet; may be ready for discharge tomorrow 5/21; encouraging ambulation, pulm toilet  -pt/ot recommends home w/ assist    RLL infiltrate (on cxr), cannot rule out pneumonia  COPD  -cxr 5/16/24: medial right lower lobe concerning for pneumonia  -day #4/5 rocephin  -continue symbicort, spiriva, nebs  -currently resp status baseline    Afib  HFpEF  -continue toprol  -restarted eliquis post-operatively    Parkinsons dz  anxiety  -continue amantadine & sinemet  -continue seroquel & daily home klonopin  -pt/ot evaluated; recommended home w/ assist    Hyperglycemia  -A1c 5.2  -hyperglycemia related to stress response    Chronic back pain, w/ pain pump in place        Am labs: cbc,bmp,mag        Expected Discharge Location and Transportation: home   Expected Discharge   Expected Discharge Date: 5/21/2024; Expected Discharge Time:      DVT prophylaxis:  Medical and mechanical DVT prophylaxis orders are present.         AM-PAC 6 Clicks Score (PT): 17 (05/20/24 2022)    CODE STATUS:   Code Status and Medical Interventions:   Ordered at: 05/16/24 2019     Level Of Support  Discussed With:    Patient     Code Status (Patient has no pulse and is not breathing):    CPR (Attempt to Resuscitate)     Medical Interventions (Patient has pulse or is breathing):    Full Support       David Ellington MD  05/20/24       normal sinus rhythm, Normal axis, Normal OK interval and QRS complex. There are no acute ischemic ST or T-wave changes.

## 2024-05-21 ENCOUNTER — READMISSION MANAGEMENT (OUTPATIENT)
Dept: CALL CENTER | Facility: HOSPITAL | Age: 79
End: 2024-05-21
Payer: MEDICARE

## 2024-05-21 VITALS
DIASTOLIC BLOOD PRESSURE: 75 MMHG | TEMPERATURE: 96 F | RESPIRATION RATE: 19 BRPM | HEART RATE: 103 BPM | OXYGEN SATURATION: 92 % | SYSTOLIC BLOOD PRESSURE: 134 MMHG | WEIGHT: 126.98 LBS | HEIGHT: 68 IN | BODY MASS INDEX: 19.25 KG/M2

## 2024-05-21 PROBLEM — R11.2 INTRACTABLE NAUSEA AND VOMITING: Status: RESOLVED | Noted: 2024-05-16 | Resolved: 2024-05-21

## 2024-05-21 PROBLEM — K56.609 SBO (SMALL BOWEL OBSTRUCTION): Status: RESOLVED | Noted: 2024-05-16 | Resolved: 2024-05-21

## 2024-05-21 LAB
ANION GAP SERPL CALCULATED.3IONS-SCNC: 8 MMOL/L (ref 5–15)
BUN SERPL-MCNC: 7 MG/DL (ref 8–23)
BUN/CREAT SERPL: 10.6 (ref 7–25)
CALCIUM SPEC-SCNC: 8.7 MG/DL (ref 8.6–10.5)
CHLORIDE SERPL-SCNC: 101 MMOL/L (ref 98–107)
CO2 SERPL-SCNC: 27 MMOL/L (ref 22–29)
CREAT SERPL-MCNC: 0.66 MG/DL (ref 0.76–1.27)
DEPRECATED RDW RBC AUTO: 47.9 FL (ref 37–54)
EGFRCR SERPLBLD CKD-EPI 2021: 96 ML/MIN/1.73
ERYTHROCYTE [DISTWIDTH] IN BLOOD BY AUTOMATED COUNT: 14.4 % (ref 12.3–15.4)
GLUCOSE SERPL-MCNC: 133 MG/DL (ref 65–99)
HCT VFR BLD AUTO: 42.8 % (ref 37.5–51)
HGB BLD-MCNC: 13.6 G/DL (ref 13–17.7)
MAGNESIUM SERPL-MCNC: 1.8 MG/DL (ref 1.6–2.4)
MCH RBC QN AUTO: 28.7 PG (ref 26.6–33)
MCHC RBC AUTO-ENTMCNC: 31.8 G/DL (ref 31.5–35.7)
MCV RBC AUTO: 90.3 FL (ref 79–97)
PLATELET # BLD AUTO: 219 10*3/MM3 (ref 140–450)
PMV BLD AUTO: 11 FL (ref 6–12)
POTASSIUM SERPL-SCNC: 4.4 MMOL/L (ref 3.5–5.2)
RBC # BLD AUTO: 4.74 10*6/MM3 (ref 4.14–5.8)
SODIUM SERPL-SCNC: 136 MMOL/L (ref 136–145)
WBC NRBC COR # BLD AUTO: 13.51 10*3/MM3 (ref 3.4–10.8)

## 2024-05-21 PROCEDURE — 25010000002 METOCLOPRAMIDE PER 10 MG: Performed by: SURGERY

## 2024-05-21 PROCEDURE — 99239 HOSP IP/OBS DSCHRG MGMT >30: CPT | Performed by: NURSE PRACTITIONER

## 2024-05-21 PROCEDURE — 25010000002 CEFTRIAXONE PER 250 MG: Performed by: INTERNAL MEDICINE

## 2024-05-21 PROCEDURE — 94664 DEMO&/EVAL PT USE INHALER: CPT

## 2024-05-21 PROCEDURE — 83735 ASSAY OF MAGNESIUM: CPT | Performed by: INTERNAL MEDICINE

## 2024-05-21 PROCEDURE — 25010000002 HYDROMORPHONE PER 4 MG: Performed by: SURGERY

## 2024-05-21 PROCEDURE — 80048 BASIC METABOLIC PNL TOTAL CA: CPT | Performed by: SURGERY

## 2024-05-21 PROCEDURE — 94799 UNLISTED PULMONARY SVC/PX: CPT

## 2024-05-21 PROCEDURE — 85027 COMPLETE CBC AUTOMATED: CPT | Performed by: SURGERY

## 2024-05-21 RX ORDER — OXYCODONE HYDROCHLORIDE 5 MG/1
15 TABLET ORAL EVERY 4 HOURS PRN
Qty: 11 TABLET | Refills: 0 | Status: SHIPPED | OUTPATIENT
Start: 2024-05-21

## 2024-05-21 RX ORDER — DOCUSATE SODIUM 100 MG/1
100 CAPSULE, LIQUID FILLED ORAL 2 TIMES DAILY
Qty: 20 CAPSULE | Refills: 0 | Status: SHIPPED | OUTPATIENT
Start: 2024-05-21

## 2024-05-21 RX ORDER — GUAIFENESIN 600 MG/1
1200 TABLET, EXTENDED RELEASE ORAL 2 TIMES DAILY
Start: 2024-05-21

## 2024-05-21 RX ORDER — NALOXONE HYDROCHLORIDE 4 MG/.1ML
SPRAY NASAL
Qty: 2 EACH | Refills: 0 | Status: SHIPPED | OUTPATIENT
Start: 2024-05-21

## 2024-05-21 RX ORDER — FUROSEMIDE 20 MG/1
20 TABLET ORAL DAILY
Qty: 90 TABLET | Refills: 0 | Status: SHIPPED | OUTPATIENT
Start: 2024-05-21 | End: 2024-08-19

## 2024-05-21 RX ORDER — ONDANSETRON 4 MG/1
4 TABLET, FILM COATED ORAL EVERY 8 HOURS PRN
Qty: 30 TABLET | Refills: 0 | Status: SHIPPED | OUTPATIENT
Start: 2024-05-21

## 2024-05-21 RX ADMIN — SODIUM CHLORIDE 1000 MG: 900 INJECTION INTRAVENOUS at 09:16

## 2024-05-21 RX ADMIN — CARBIDOPA AND LEVODOPA 1 TABLET: 50; 200 TABLET, EXTENDED RELEASE ORAL at 09:18

## 2024-05-21 RX ADMIN — METOPROLOL SUCCINATE 50 MG: 50 TABLET, EXTENDED RELEASE ORAL at 09:17

## 2024-05-21 RX ADMIN — ALBUTEROL SULFATE 2.5 MG: 2.5 SOLUTION RESPIRATORY (INHALATION) at 06:54

## 2024-05-21 RX ADMIN — FINASTERIDE 5 MG: 5 TABLET, FILM COATED ORAL at 09:17

## 2024-05-21 RX ADMIN — APIXABAN 5 MG: 5 TABLET, FILM COATED ORAL at 09:17

## 2024-05-21 RX ADMIN — Medication 1 TABLET: at 09:17

## 2024-05-21 RX ADMIN — METOCLOPRAMIDE 10 MG: 5 INJECTION, SOLUTION INTRAMUSCULAR; INTRAVENOUS at 09:16

## 2024-05-21 RX ADMIN — CARBIDOPA AND LEVODOPA 1 TABLET: 25; 100 TABLET ORAL at 09:17

## 2024-05-21 RX ADMIN — AMANTADINE HYDROCHLORIDE 100 MG: 100 CAPSULE ORAL at 09:18

## 2024-05-21 RX ADMIN — METOCLOPRAMIDE 10 MG: 5 INJECTION, SOLUTION INTRAMUSCULAR; INTRAVENOUS at 02:15

## 2024-05-21 RX ADMIN — ATROPINE SULFATE 2 DROP: 10 SOLUTION/ DROPS OPHTHALMIC at 09:18

## 2024-05-21 RX ADMIN — ATORVASTATIN CALCIUM 10 MG: 10 TABLET, FILM COATED ORAL at 09:17

## 2024-05-21 RX ADMIN — CILOSTAZOL 100 MG: 50 TABLET ORAL at 09:18

## 2024-05-21 RX ADMIN — CLONAZEPAM 1 MG: 1 TABLET ORAL at 09:17

## 2024-05-21 RX ADMIN — DOCUSATE SODIUM 100 MG: 100 CAPSULE, LIQUID FILLED ORAL at 09:17

## 2024-05-21 RX ADMIN — HYDROMORPHONE HYDROCHLORIDE 0.5 MG: 1 INJECTION, SOLUTION INTRAMUSCULAR; INTRAVENOUS; SUBCUTANEOUS at 02:12

## 2024-05-21 RX ADMIN — BUDESONIDE AND FORMOTEROL FUMARATE DIHYDRATE 2 PUFF: 160; 4.5 AEROSOL RESPIRATORY (INHALATION) at 06:54

## 2024-05-21 RX ADMIN — POTASSIUM CHLORIDE, DEXTROSE MONOHYDRATE AND SODIUM CHLORIDE 50 ML/HR: 150; 5; 450 INJECTION, SOLUTION INTRAVENOUS at 05:04

## 2024-05-21 RX ADMIN — Medication 10 ML: at 09:19

## 2024-05-21 RX ADMIN — PANTOPRAZOLE SODIUM 40 MG: 40 TABLET, DELAYED RELEASE ORAL at 09:17

## 2024-05-21 RX ADMIN — CARBIDOPA AND LEVODOPA 1 TABLET: 25; 100 TABLET ORAL at 13:08

## 2024-05-21 NOTE — DISCHARGE SUMMARY
[facility] Medicine Services  DISCHARGE SUMMARY    Patient Name: Flaco Roque II  : 1945  MRN: [id number]    Date of Admission: 2024  9:44 AM  Date of Discharge:  2024  Primary Care Physician: Azar Stern MD    Consults       No orders found from 2024 to 2024.            Hospital Course     Active Hospital Problems    Diagnosis  POA    **Large bowel obstruction [K56.609]  Yes    Severe malnutrition [E43]  Yes    Permanent atrial fibrillation [I48.21]  Yes    Parkinson disease [G20.A1]  Yes    Chronic pain with pain pump in place [G89.29]  Yes    Pulmonary emphysema [J43.9]  Yes      Resolved Hospital Problems    Diagnosis Date Resolved POA    SBO (small bowel obstruction) [K56.609] 2024 Yes    Intractable nausea and vomiting [R11.2] 2024 Yes      Hospital Course:  Flaco Roque II is a 78 y.o. male w/ hx afib (on eliquis), copd, parkinsons dz, chronic back pain (w/ pain pump), chronic constipation, previous ccy. Patient presented w/ abd and n/v. 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 Joseph evening of 24 for ex-lap which suggested partial bowel obstruction due to adhesive disease, underwent lysis of adhesions and appendectomy.      Partial bowel obstruction due to adhesions (s/p ex-lap w/ lysis of adhesions this admission)  -initial ct a/p concerning for bowel obstruction and possible cecal volvulus  **s/p ex-lap, lysis adhesions, appy 24 by Dr. Joseph; intraoperatively c/w partial bowel obstruction due to adhesive dz  --post-op care per Dr. Joseph : advancing diet  -- encouraging ambulation, pulm toilet  -pt/ot recommends home w/ assist     RLL infiltrate (on cxr), cannot rule out pneumonia  COPD  -cxr 24: medial right lower lobe concerning for pneumonia  -day #5/ rocephin   -continue symbicort, spiriva, nebs  -currently resp status baseline      Afib  HFpEF  -continue toprol  -restarted eliquis post-operatively     Parkinsons dz  anxiety  -continue amantadine & sinemet  -continue seroquel & daily home klonopin  -pt/ot evaluated; recommended home w/ assist     Hyperglycemia  -A1c 5.2  -hyperglycemia related to stress response     Chronic back pain, w/ pain pump in place    Patient sitting up in a chair medical for discharge and states he is ready to be discharged home.  Discharge follow-up recommendations and appointments are listed below and in the AVS.    Discharge Follow Up Recommendations for outpatient labs/diagnostics:  -- Follow-up with your family doctor in 1 week  --Follow-up with Dr. Joseph in 2 weeks to have staples removed  --Take Zofran as needed for nausea or vomiting  --Take Lipitor 20 mg as prescribed  --Take oxycodone as prescribed as needed for pain  --Do not drive, use riding lawn equipment, use heavy machinery, use gas or electric powered hand tools or make any legally binding decisions while taking oxycodone    Day of Discharge     HPI:   Patient sitting up in chair with oxygen on stating that he is ready to be discharged home.  Dr. Joseph saw him this morning and told that he needs to follow-up with him in 2 weeks to have staples removed.    Vital Signs:   Temp:  [96 °F (35.6 °C)-97.7 °F (36.5 °C)] 96 °F (35.6 °C)  Heart Rate:  [] 103  Resp:  [16-19] 19  BP: ()/(50-84) 134/75  Flow (L/min):  [1-3] 3    Physical Exam:  Constitutional: Alert, cachectic elderly chronically ill-appearing male sitting up on chair in NAD  ENT: Pink, moist mucous membranes   Respiratory: Nonlabored, symmetrical chest expansion, CTAB, 2L  Cardiovascular: IRR IRR, no M/R/G  Gastrointestinal: Soft, appropriate surgical tenderness, ND +BS, +BM  Musculoskeletal: WHITE; no LE edema bilaterally  Neurologic: Oriented x4, strength symmetric in all extremities, follows all commands, CN II-XII intact, speech clear  Skin: No rashes on exposed skin  Psychiatric:  Pleasant and cooperative; normal affect    Pertinent  and/or Most Recent Results     LAB RESULTS:      Lab 05/21/24  0813 05/20/24  0515 05/19/24  0346 05/18/24  0240 05/17/24  0452 05/16/24  1016   WBC 13.51* 9.36 8.23 10.65 14.58* 10.20   HEMOGLOBIN 13.6 14.6 12.8* 13.8 14.5 15.7   HEMATOCRIT 42.8 45.4 39.9 43.1 45.5 48.7   PLATELETS 219 258 207 255 282 266   NEUTROS ABS  --   --   --   --  12.93* 9.20*   IMMATURE GRANS (ABS)  --   --   --   --  0.04 0.01   LYMPHS ABS  --   --   --   --  0.25* 0.41*   MONOS ABS  --   --   --   --  1.35* 0.53   EOS ABS  --   --   --   --  0.00 0.01   MCV 90.3 90.8 89.7 90.5 91.4 91.0   LACTATE  --   --   --   --   --  1.1   PROTIME  --   --   --   --  15.1*  --          Lab 05/21/24  0443 05/20/24  0514 05/19/24  0346 05/18/24  0240 05/17/24  0452   SODIUM 136 137 136 134* 137   POTASSIUM 4.4 3.8 3.6 3.9 3.9   CHLORIDE 101 101 102 99 103   CO2 27.0 25.0 26.0 25.0 20.0*   ANION GAP 8.0 11.0 8.0 10.0 14.0   BUN 7* 3* 5* 8 10   CREATININE 0.66* 0.45* 0.48* 0.52* 0.71*   EGFR 96.0 107.8 105.7 103.2 93.9   GLUCOSE 133* 119* 98 102* 162*   CALCIUM 8.7 8.0* 8.1* 8.4* 8.8   MAGNESIUM 1.8 1.9 1.8 1.9 1.7         Lab 05/16/24  1016   TOTAL PROTEIN 7.2   ALBUMIN 4.4   GLOBULIN 2.8   ALT (SGPT) <5   AST (SGOT) 12   BILIRUBIN 0.6   ALK PHOS 82   LIPASE 16         Lab 05/17/24  0452 05/16/24  1240 05/16/24  1016   HSTROP T  --  17 15   PROTIME 15.1*  --   --    INR 1.18*  --   --                  Brief Urine Lab Results  (Last result in the past 365 days)        Color   Clarity   Blood   Leuk Est   Nitrite   Protein   CREAT   Urine HCG        05/16/24 1017 Dark Yellow   Clear   Negative   Trace   Negative   Trace                 Microbiology Results (last 10 days)       ** No results found for the last 240 hours. **            XR Chest 1 View    Result Date: 5/16/2024  XR CHEST 1 VW Date of Exam: 5/16/2024 8:09 PM EDT Indication: copd Comparison: 10/24/2023. Findings: Patchy airspace disease is  seen within the medial right lower lobe. The heart appears mildly enlarged. Chronic interstitial changes are present. No significant pleural effusion. No pneumothorax. Enteric tube present within the stomach.     Impression: Patchy airspace disease seen within the medial right lower lobe, concerning for pneumonia. Slightly limited evaluation due to patient rotation. Stable chronic interstitial changes and cardiomegaly. Electronically Signed: Adelita Rivas MD  5/16/2024 11:01 PM EDT  Workstation ID: [id number]    Peripheral Block    Result Date: 5/16/2024  Skinny Manriquez MD     5/16/2024  4:48 PM Peripheral Block Patient reassessed immediately prior to procedure Patient location during procedure: OR Start time: 5/16/2024 4:44 PM Stop time: 5/16/2024 4:48 PM Reason for block: at surgeon's request and post-op pain management Performed by Anesthesiologist: Keshav Alcocer MD Preanesthetic Checklist Completed: patient identified, IV checked, site marked, risks and benefits discussed, surgical consent, monitors and equipment checked, pre-op evaluation and timeout performed Prep: Pt Position: supine Sterile barriers:cap, gloves, mask and washed/disinfected hands Prep: ChloraPrep Patient monitoring: blood pressure monitoring, continuous pulse oximetry and EKG Procedure Sedation: yes Performed under: general Guidance:ultrasound guided Images:still images obtained, printed/placed on chart Laterality:Bilateral Block Type:TAP Injection Technique:single-shot Needle Type:short-bevel and echogenic Needle Gauge:20 G Resistance on Injection: none Medications Used: bupivacaine PF (MARCAINE) 0.25 % injection - Injection  60 mL - 5/16/2024 4:48:00 PM dexamethasone sodium phosphate injection - Injection  4 mg - 5/16/2024 4:48:00 PM Medications Comment:Block Injection:  LA dose divided between Right and Left block Post Assessment Injection Assessment: negative aspiration for heme, incremental injection and no paresthesia on injection  "Patient Tolerance:comfortable throughout block Complications:no Additional Notes Subcostal TAPs A high-frequency linear transducer, with sterile cover, was placed sub-xiphoid to identify [place], right and left Rectus Abdominus Muscles (HOLDEN). The transducer was moved either right or left subcostally to identify the HOLDEN and the Transverse Abdominus Muscle (MYRICK). The insertion site was prepped in sterile fashion and then localized with 2-5 ml of 1% Lidocaine. Using ultrasound-guidance, a 20-gauge B-Alcocer 4\" Ultraplex 360 non-stimulating echogenic needle was advanced in plane, from medial to lateral, until the tip of the needle was in the fascial plane between the HOLDEN and MYRICK. 1-3ml of preservative free normal saline was used to hydro-dissect the fascial planes. After the fascial plane was verified, the local anesthetic (LA) was injected. The procedure was repeated on the opposite side for bilateral coverage. Aspiration every 5 ml to prevent intravascular injection. Injection was completed with negative aspiration of blood and negative intravascular injection. Injection pressures were normal with minimal resistance. The subcostal approach to the TAP nerve block ideally anesthetizes the intercostal nerves T6-T9.      XR Abdomen KUB    Result Date: 5/16/2024  XR ABDOMEN KUB Date of Exam: 5/16/2024 1:54 PM EDT Indication: post ng tube Comparison: None available. Findings: Single view. An NG tube terminates in the left upper quadrant in the region of the gastric body. There is mild dilatation of upper abdominal small bowel loops. The colon is nondilated. There are cholecystectomy clips. There is contrast in the urinary bladder from CT exam performed earlier today.     Impression: NG tube tip in the region of the gastric body. Mildly dilated small bowel noted. Electronically Signed: Eden Mendez MD  5/16/2024 2:08 PM EDT  Workstation ID: [id number]    CT Abdomen Pelvis With Contrast    Result Date: 5/16/2024  CT ABDOMEN " PELVIS W CONTRAST Date of Exam: 5/16/2024 11:12 AM EDT Indication: abd pain, intract naus vomiting. Comparison: 4/29/2019, CT angiogram chest 4/20/2023 Technique: Axial CT images were obtained of the abdomen and pelvis following the uneventful intravenous administration of 75 cc Isovue-370. Reconstructed coronal and sagittal images were also obtained. Automated exposure control and iterative construction methods were used. Findings: LUNG BASES:  Unremarkable without mass or infiltrate. LIVER:  Unremarkable parenchyma without focal lesion. BILIARY/GALLBLADDER: Cholecystectomy SPLEEN:  Unremarkable PANCREAS:  Unremarkable ADRENAL:  Unremarkable KIDNEYS:  Unremarkable parenchyma with no solid mass identified. No obstruction.  No calculus identified. GASTROINTESTINAL/MESENTERY: Fluid-filled distal esophagus and stomach with nondistended duodenum. There appears to be narrowing of the proximal duodenum with pulling of the cecum into the right upper quadrant. Findings are suggestive of an internal hernia involving the proximal duodenum and cecum located in the right upper quadrant resulting in gastroduodenal obstruction. This is not appear to represent a classic cecal volvulus. There is a moderate to large fecal load without overt cecal distention. There is descending and sigmoid colonic diverticulosis. MESENTERIC VESSELS:  Patent. AORTA/IVC:  Normal caliber. RETROPERITONEUM/LYMPH NODES:  Unremarkable REPRODUCTIVE:  Unremarkable BLADDER:  Unremarkable OSSEUS STRUCTURES: No acute osseous process is identified.     Impression: 1.There is evidence of narrowing/stricture at the gastroduodenal junction with imaging features suggestive of an internal hernia which appears to include portions of the proximal colon/cecum. There is resultant gastric and distal esophageal distention consistent with at least partial mechanical obstruction. There is a moderate to large colonic fecal load without cecal distention to suggest significant  cecal obstructive changes. Surgical consultation recommended 2.Other incidental findings detailed above. Findings discussed by Dr. Alfonzo Burgess with ER care team Dina at 12:22 p.m. EDT on 5/16/2024 Electronically Signed: Alfonzo Burgess MD  5/16/2024 12:23 PM EDT  Workstation ID: [id number]     Results for orders placed during the hospital encounter of 07/16/21    Doppler Arterial Multi Level Lower Extremity - Bilateral CAR    Interpretation Summary  · Normal right KAMRYN of 1.02.  · Left femoral-popliteal arteries noncompressible. There are biphasic and monophasic waveforms noted in the left lower extremity  · The left posterior tibial artery was compressible with mildly reduced left KAMRYN of 0.86      Results for orders placed during the hospital encounter of 07/16/21    Doppler Arterial Multi Level Lower Extremity - Bilateral CAR    Interpretation Summary  · Normal right KAMRYN of 1.02.  · Left femoral-popliteal arteries noncompressible. There are biphasic and monophasic waveforms noted in the left lower extremity  · The left posterior tibial artery was compressible with mildly reduced left KAMRYN of 0.86      Results for orders placed during the hospital encounter of 08/24/23    Adult Transthoracic Echo Complete W/ Cont if Necessary Per Protocol    Interpretation Summary    Left ventricular ejection fraction appears to be 56 - 60%.    The left atrial cavity is mild to moderately dilated    There is mild to moderate thickening of the aortic valve    Mild to moderate tricuspid valve regurgitation is present. Estimated right ventricular systolic pressure from tricuspid regurgitation is mildly elevated (35-45 mmHg).    There is a trivial pericardial effusion.    Patient noted to be in atrial fibrillation with elevated rates during the study.      Plan for Follow-up of Pending Labs/Results:     Discharge Details        Discharge Medications        New Medications        Instructions Start Date   atorvastatin 10 MG  tablet  Commonly known as: LIPITOR   10 mg, Oral, Daily      docusate sodium 100 MG capsule  Commonly known as: COLACE   100 mg, Oral, 2 Times Daily      naloxone 4 MG/0.1ML nasal spray  Commonly known as: NARCAN   Call 911. Don't prime. [place] in 1 nostril for overdose. Repeat in 2-3 minutes in other nostril if no or minimal breathing/responsiveness.      ondansetron 4 MG tablet  Commonly known as: ZOFRAN   4 mg, Oral, Every 8 Hours PRN      oxyCODONE 5 MG immediate release tablet  Commonly known as: Roxicodone   15 mg, Oral, Every 4 Hours PRN             Continue These Medications        Instructions Start Date   acetaminophen 500 MG tablet  Commonly known as: TYLENOL   1,000 mg, Oral, Every 6 Hours PRN      amantadine 100 MG capsule  Commonly known as: SYMMETREL   100 mg, Oral, 2 Times Daily      apixaban 5 MG tablet tablet  Commonly known as: ELIQUIS   5 mg, Oral, Every 12 Hours Scheduled      atropine 1 % ophthalmic solution   1 drop, Daily      carbidopa-levodopa  MG per tablet  Commonly known as: SINEMET   1 tablet, Oral, 4 Times Daily      carbidopa-levodopa CR  MG per CR tablet  Commonly known as: SINEMET CR   1 tablet, Oral, 2 Times Daily      cilostazol 100 MG tablet  Commonly known as: PLETAL   100 mg, Oral, 2 Times Daily      clonazePAM 1 MG tablet  Commonly known as: KlonoPIN   1 mg, Oral, Daily      cyanocobalamin 500 MCG tablet  Commonly known as: VITAMIN B-12   500 mcg, Oral, Daily      fentaNYL 0.05 MG/ML injection  Commonly known as: SUBLIMAZE   250 mcg, Intravenous, PAIN PUMP      finasteride 5 MG tablet  Commonly known as: PROSCAR   5 mg, Oral, Daily      furosemide 20 MG tablet  Commonly known as: Lasix   20 mg, Oral, Daily      guaiFENesin 600 MG 12 hr tablet  Commonly known as: MUCINEX   1,200 mg, Oral, 2 Times Daily      ipratropium-albuterol 0.5-2.5 mg/3 ml nebulizer  Commonly known as: DUO-NEB   3 mL, Nebulization, 3 Times Daily      metoprolol succinate XL 50 MG 24 hr  tablet  Commonly known as: TOPROL-XL   TAKE ONE TABLET BY MOUTH DAILY      montelukast 10 MG tablet  Commonly known as: SINGULAIR   10 mg, Oral, Nightly      multivitamin with minerals tablet tablet   1 tablet, Oral, Daily      pantoprazole 40 MG EC tablet  Commonly known as: PROTONIX   Take 1 tablet by mouth Daily.      QUEtiapine 25 MG tablet  Commonly known as: SEROquel   25 mg, Oral, Every Evening      tamsulosin 0.4 MG capsule 24 hr capsule  Commonly known as: FLOMAX   0.4 mg, Oral, Nightly      Trelegy Ellipta 100-62.5-25 MCG/ACT inhaler  Generic drug: Fluticasone-Umeclidin-Vilant   1 puff, Inhalation, Daily - RT               No Known Allergies      Discharge Disposition:  Home or Self Care    Diet:  Hospital:  Diet Order   Procedures    Diet: Gastrointestinal; Fiber-Restricted; Texture: Soft to Chew (NDD 3); Soft to Chew: Whole Meat; Fluid Consistency: Thin (IDDSI 0)       Diet Instructions       Advance Diet As Tolerated -Target Diet: Cardiac diet      Target Diet: Cardiac diet    Diet: Gastrointestinal Diets; Fiber-Restricted; Soft to Chew (NDD 3); Whole Meat; Thin (IDDSI 0)      Discharge Diet: Gastrointestinal Diets    Gastrointestinal Diet: Fiber-Restricted    Texture: Soft to Chew (NDD 3)    Soft to Chew: Whole Meat    Fluid Consistency: Thin (IDDSI 0)             Activity:  Activity Instructions       Activity as Tolerated              Restrictions or Other Recommendations:  Per Dr Joseph       CODE STATUS:    Code Status and Medical Interventions:   Ordered at: 05/16/24 2019     Level Of Support Discussed With:    Patient     Code Status (Patient has no pulse and is not breathing):    CPR (Attempt to Resuscitate)     Medical Interventions (Patient has pulse or is breathing):    Full Support       Future Appointments   Date Time Provider Department Center   7/5/2024  9:00 AM Alf Edwards MD [facility]   7/16/2024  9:30 AM Von Kilpatrick MD [facility]       Additional  Instructions for the Follow-ups that You Need to Schedule       Discharge Follow-up with PCP   As directed       Currently Documented PCP:    Azar Stern MD    PCP Phone Number:    [phone]     Follow Up Details: 1 week; please schedule appointment prior to patient's discharge        Discharge Follow-up with Specialty: Dr. Joseph; 2 Weeks   As directed      Specialty: Dr. Joseph   Follow Up: 2 Weeks        Discharge Follow-up with Specified Provider: Dr. Joseph; 2 weeks; please schedule appointment prior to patient's discharge   As directed      To: Dr. Joseph; 2 weeks; please schedule appointment prior to patient's discharge                  YAMILET Espinoza  05/21/24      Time Spent on Discharge:  I spent  45  minutes on this discharge activity which included: face-to-face encounter with the patient, reviewing the data in the system, coordination of the care with the nursing staff as well as consultants, documentation, and entering orders.

## 2024-05-21 NOTE — PROGRESS NOTES
"Patient Name:  Flaco Roque II  YOB: 1945  [id number]    Surgery Progress Note    Date of visit: 5/21/2024    Subjective   Subjective: Feeling better. Tolerating PO, having BM's.         Objective     Objective:     BP 99/72 (BP Location: Left arm, Patient Position: Lying)   Pulse 104   Temp 96 °F (35.6 °C) (Axillary)   Resp 19   Ht 172.7 cm (67.99\")   Wt 57.6 kg (126 lb 15.8 oz)   SpO2 92%   BMI 19.31 kg/m²     Intake/Output Summary (Last 24 hours) at 5/21/2024 0704  Last data filed at 5/21/2024 0600  Gross per 24 hour   Intake 960 ml   Output 800 ml   Net 160 ml       CV:  Rhythm  regular and rate regular   L:  Clear  to auscultation bilaterally   Abd:  Bowel sounds positive , soft, appropriately tender. Incision c/d/i  Ext:  No cyanosis, clubbing, edema    Recent labs that are back at this time have been reviewed.            Assessment/ Plan:    Problem List Items Addressed This Visit          Gastrointestinal Abdominal     * (Principal) Large bowel obstruction- Resolved. OK to go home from my standpoint. RTC with me in 2 weeks for staple removal.      Intractable nausea and vomiting     Other Visit Diagnoses       Other partial intestinal obstruction    -  Primary    Generalized abdominal pain        Dehydration        Abdominal pain        Relevant Orders    Tissue Pathology Exam (Completed)             Active Hospital Problems    Diagnosis  POA    **Large bowel obstruction [K56.609]  Yes    Severe malnutrition [E43]  Yes    SBO (small bowel obstruction) [K56.609]  Yes    Intractable nausea and vomiting [R11.2]  Yes    Permanent atrial fibrillation [I48.21]  Yes    Parkinson disease [G20.A1]  Yes    Chronic pain with pain pump in place [G89.29]  Yes    Pulmonary emphysema [J43.9]  Yes      Resolved Hospital Problems   No resolved problems to display.              Cj Joseph MD  5/21/2024  07:04 EDT      "

## 2024-05-21 NOTE — PLAN OF CARE
Goal Outcome Evaluation:        Problem: Adult Inpatient Plan of Care  Goal: Plan of Care Review  Outcome: Ongoing, Progressing  Goal: Patient-Specific Goal (Individualized)  Outcome: Ongoing, Progressing  Goal: Absence of Hospital-Acquired Illness or Injury  Outcome: Ongoing, Progressing  Intervention: Identify and Manage Fall Risk  Recent Flowsheet Documentation  Taken 5/21/2024 0800 by Dina Andrade RN  Safety Promotion/Fall Prevention:   activity supervised   assistive device/personal items within reach   clutter free environment maintained   room organization consistent   safety round/check completed   toileting scheduled  Intervention: Prevent Skin Injury  Recent Flowsheet Documentation  Taken 5/21/2024 0800 by Dina Andrade RN  Body Position: position changed independently  Skin Protection:   incontinence pads utilized   transparent dressing maintained  Intervention: Prevent and Manage VTE (Venous Thromboembolism) Risk  Recent Flowsheet Documentation  Taken 5/21/2024 0800 by Dina Andrade RN  Activity Management:   activity encouraged   up in chair  VTE Prevention/Management: sequential compression devices on  Range of Motion: active ROM (range of motion) encouraged  Goal: Optimal Comfort and Wellbeing  Outcome: Ongoing, Progressing  Intervention: Monitor Pain and Promote Comfort  Recent Flowsheet Documentation  Taken 5/21/2024 0800 by Dina Andrade RN  Pain Management Interventions:   care clustered   see MAR  Intervention: Provide Person-Centered Care  Recent Flowsheet Documentation  Taken 5/21/2024 0800 by Dina Andrade RN  Trust Relationship/Rapport:   care explained   choices provided  Goal: Readiness for Transition of Care  Outcome: Ongoing, Progressing

## 2024-05-21 NOTE — PLAN OF CARE
Goal Outcome Evaluation:  Plan of Care Reviewed With: patient, family          After pt walked in bess and had a bath, pt was in sinus tach for a bit, monitor said afib. Pt had no symptoms. APRN notified. EKG obtained. 250 mL bolus given due to low BP. Good urine output. Had a BM, was mucous like. Turned throughout shift. C/o pain, gave brooks and one time had to give dilaudid for severe pain. No nausea. RA now.      Progress: improving     Problem: Adult Inpatient Plan of Care  Goal: Plan of Care Review  Outcome: Ongoing, Progressing  Flowsheets (Taken 5/21/2024 0013)  Progress: improving  Plan of Care Reviewed With:   patient   family  Goal: Patient-Specific Goal (Individualized)  Outcome: Ongoing, Progressing  Goal: Absence of Hospital-Acquired Illness or Injury  Outcome: Ongoing, Progressing  Intervention: Identify and Manage Fall Risk  Recent Flowsheet Documentation  Taken 5/20/2024 2000 by Agnieszka Stringer RN  Safety Promotion/Fall Prevention: safety round/check completed  Intervention: Prevent Skin Injury  Recent Flowsheet Documentation  Taken 5/20/2024 2250 by Agnieszka Stringer RN  Body Position:   turned   left  Taken 5/20/2024 2151 by Agnieszka Stringer RN  Body Position:   turned   right  Taken 5/20/2024 2000 by Agnieszka Stringer RN  Body Position: weight shifting  Skin Protection:   adhesive use limited   tubing/devices free from skin contact   transparent dressing maintained   skin-to-skin areas padded   skin-to-device areas padded  Intervention: Prevent and Manage VTE (Venous Thromboembolism) Risk  Recent Flowsheet Documentation  Taken 5/20/2024 2250 by Agnieszka Stringer RN  Activity Management: activity encouraged  Taken 5/20/2024 2151 by Agnieszka Stringer RN  Activity Management: activity encouraged  Taken 5/20/2024 2000 by Agnieszka Stringer RN  Activity Management: activity encouraged  VTE Prevention/Management: (eliquis po)   bilateral   sequential compression devices off  Range of Motion:   active  ROM (range of motion) encouraged   ROM (range of motion) performed  Taken 5/20/2024 1937 by Agnieszka Stringer RN  Activity Management: sitting, edge of bed  Goal: Optimal Comfort and Wellbeing  Outcome: Ongoing, Progressing  Intervention: Provide Person-Centered Care  Recent Flowsheet Documentation  Taken 5/20/2024 2000 by Agnieszka Stringer RN  Trust Relationship/Rapport:   care explained   choices provided  Goal: Readiness for Transition of Care  Outcome: Ongoing, Progressing     Problem: Skin Injury Risk Increased  Goal: Skin Health and Integrity  Outcome: Ongoing, Progressing  Intervention: Optimize Skin Protection  Recent Flowsheet Documentation  Taken 5/20/2024 2250 by Agnieszka Stringer RN  Head of Bed (HOB) Positioning: HOB at 20 degrees  Taken 5/20/2024 2151 by Agnieszka Stringer RN  Head of Bed (HOB) Positioning: HOB at 20 degrees  Taken 5/20/2024 2000 by Agnieszka Stringer RN  Pressure Reduction Techniques:   frequent weight shift encouraged   weight shift assistance provided  Pressure Reduction Devices: positioning supports utilized  Skin Protection:   adhesive use limited   tubing/devices free from skin contact   transparent dressing maintained   skin-to-skin areas padded   skin-to-device areas padded     Problem: Fall Injury Risk  Goal: Absence of Fall and Fall-Related Injury  Outcome: Ongoing, Progressing  Intervention: Promote Injury-Free Environment  Recent Flowsheet Documentation  Taken 5/20/2024 2000 by Agnieszka Stringer RN  Safety Promotion/Fall Prevention: safety round/check completed

## 2024-05-22 LAB
QT INTERVAL: 348 MS
QTC INTERVAL: 468 MS

## 2024-05-22 NOTE — OUTREACH NOTE
Prep Survey      Flowsheet Row Responses   [facility] patient discharged from? [place]   Is LACE score < 7 ? No   Eligibility Readm Mgmt   Discharge diagnosis *Large bowel obstruction   Does the patient have one of the following disease processes/diagnoses(primary or secondary)? Other   Does the patient have Home health ordered? No   Is there a DME ordered? No   Prep survey completed? Yes            JAMAR JEFFREY - Registered Nurse

## 2024-05-23 ENCOUNTER — READMISSION MANAGEMENT (OUTPATIENT)
Dept: CALL CENTER | Facility: HOSPITAL | Age: 79
End: 2024-05-23
Payer: MEDICARE

## 2024-05-23 NOTE — OUTREACH NOTE
Medical Week 1 Survey      Flowsheet Row Responses   [facility] patient discharged from? [place]   Does the patient have one of the following disease processes/diagnoses(primary or secondary)? Other   Week 1 attempt successful? Yes   Call start time 1200   Call end time 1203   Discharge diagnosis *Large bowel obstruction   Person spoke with today (if not patient) and relationship patient   Meds reviewed with patient/caregiver? Yes   Is the patient having any side effects they believe may be caused by any medication additions or changes? No   Does the patient have all medications ordered at discharge? Yes   Is the patient taking all medications as directed (includes completed medication regime)? Yes   Does the patient have a primary care provider?  Yes   Does the patient have an appointment with their PCP within 7 days of discharge? No   Comments regarding PCP Dr. Stern   What is preventing the patient from scheduling follow up appointments within 7 days of discharge? Haven't had time   Has the patient kept scheduled appointments due by today? N/A   Psychosocial issues? No   Did the patient receive a copy of their discharge instructions? Yes   Nursing interventions Reviewed instructions with patient   What is the patient's perception of their health status since discharge? Improving   If the patient is a current smoker, are they able to teach back resources for cessation? Not a smoker   Week 1 call completed? Yes   Graduated Yes   Did the patient feel the follow up calls were helpful during their recovery period? Yes   Wrap up additional comments Pt states he has some pain but abdomen/incisions look really good.  he is eating/having BMs.  Denies needs.   Call end time 1203            ARCELIA IVORY - Registered Nurse

## 2024-07-05 ENCOUNTER — OFFICE VISIT (OUTPATIENT)
Dept: PULMONOLOGY | Facility: CLINIC | Age: 79
End: 2024-07-05
Payer: MEDICARE

## 2024-07-05 VITALS
BODY MASS INDEX: 17.99 KG/M2 | HEIGHT: 68 IN | OXYGEN SATURATION: 98 % | HEART RATE: 57 BPM | SYSTOLIC BLOOD PRESSURE: 118 MMHG | TEMPERATURE: 98.2 F | WEIGHT: 118.71 LBS | DIASTOLIC BLOOD PRESSURE: 58 MMHG

## 2024-07-05 DIAGNOSIS — R91.8 ABNORMAL CT SCAN OF LUNG: ICD-10-CM

## 2024-07-05 DIAGNOSIS — J43.9 PULMONARY EMPHYSEMA, UNSPECIFIED EMPHYSEMA TYPE: Primary | ICD-10-CM

## 2024-07-05 RX ORDER — TIZANIDINE 4 MG/1
4 TABLET ORAL
COMMUNITY
Start: 2024-06-18 | End: 2024-08-17

## 2024-07-05 RX ORDER — SACCHAROMYCES BOULARDII 250 MG
CAPSULE ORAL
COMMUNITY
Start: 2024-01-14

## 2024-07-05 RX ORDER — TADALAFIL 10 MG/1
TABLET ORAL
COMMUNITY
Start: 2024-02-28

## 2024-07-05 NOTE — PROGRESS NOTES
Subjective:     Chief Complaint:   Chief Complaint   Patient presents with    Emphysema     Follow up       HPI:    Flaco Roque II is a 78 y.o. male here for follow-up of COPD    He has a history of emphysema as well as some interstitial scarring but this was focal and associated bronchiectasis primary in the left lung and was felt to represent chronic postinflammatory changes.  There has been no clear progression    He has a longstanding history of CPAP intolerance but was amenable to possibly trying CPAP again so a home sleep apnea test was done which revealed a mild to moderate degree of ABRIL with an AHI of 14.  He was prescribed CPAP with a nasal pillow mask but did not tolerate this.  He turned this back in and was not interested in reconsidering treatment with CPAP.    I last saw him in July 2023, 1 year ago.  He was on DuoNebs at the time.  His primary care provider has placed him on Trelegy 100 and he thinks this is a benefit.  The only problem is the cost.  He still uses DuoNebs as needed.    He was hospitalized several months ago for a bowel obstruction requiring surgery.    Current medications are:   Current Outpatient Medications:     acetaminophen (TYLENOL) 500 MG tablet, Take 2 tablets by mouth Every 6 (Six) Hours As Needed., Disp: , Rfl:     amantadine (SYMMETREL) 100 MG capsule, Take 1 capsule by mouth 2 (Two) Times a Day., Disp: , Rfl:     apixaban (ELIQUIS) 5 MG tablet tablet, Take 1 tablet by mouth Every 12 (Twelve) Hours. Indications: Atrial Fibrillation, Disp: 60 tablet, Rfl:     atorvastatin (LIPITOR) 10 MG tablet, Take 1 tablet by mouth Daily., Disp: 30 tablet, Rfl: 11    atropine 1 % ophthalmic solution, 1 drop Daily., Disp: , Rfl:     carbidopa-levodopa (SINEMET)  MG per tablet, Take 1 tablet by mouth 4 (Four) Times a Day., Disp: , Rfl:     carbidopa-levodopa CR (SINEMET CR)  MG per CR tablet, Take 1 tablet by mouth 2 (Two) Times a Day., Disp: , Rfl:     cilostazol (PLETAL)  100 MG tablet, Take 1 tablet by mouth 2 (Two) Times a Day., Disp: , Rfl:     clonazePAM (KlonoPIN) 1 MG tablet, Take 1 tablet by mouth Daily., Disp: , Rfl:     cyanocobalamin (VITAMIN B-12) 500 MCG tablet, Take 1 tablet by mouth Daily., Disp: , Rfl:     docusate sodium (COLACE) 100 MG capsule, Take 1 capsule by mouth 2 (Two) Times a Day., Disp: 20 capsule, Rfl: 0    fentaNYL (SUBLIMAZE) 0.05 MG/ML injection, Infuse 5 mL into a venous catheter. PAIN PUMP, Disp: , Rfl:     finasteride (PROSCAR) 5 MG tablet, Take 1 tablet by mouth Daily., Disp: , Rfl:     furosemide (Lasix) 20 MG tablet, Take 1 tablet by mouth Daily for 90 days., Disp: 90 tablet, Rfl: 0    guaiFENesin (MUCINEX) 600 MG 12 hr tablet, Take 2 tablets by mouth 2 (Two) Times a Day., Disp: , Rfl:     ipratropium-albuterol (DUO-NEB) 0.5-2.5 mg/3 ml nebulizer, Take 3 mL by nebulization 3 (Three) Times a Day., Disp: , Rfl:     metoprolol succinate XL (TOPROL-XL) 50 MG 24 hr tablet, TAKE ONE TABLET BY MOUTH DAILY, Disp: 60 tablet, Rfl: 5    montelukast (SINGULAIR) 10 MG tablet, TAKE 1 TABLET BY MOUTH EVERY NIGHT., Disp: 90 tablet, Rfl: 2    Multiple Vitamins-Minerals (MULTIVITAMIN ADULT PO), Take 1 tablet by mouth Daily., Disp: , Rfl:     naloxone (NARCAN) 4 MG/0.1ML nasal spray, Call 911. Don't prime. [place] in 1 nostril for overdose. Repeat in 2-3 minutes in other nostril if no or minimal breathing/responsiveness., Disp: 2 each, Rfl: 0    ondansetron (ZOFRAN) 4 MG tablet, Take 1 tablet by mouth Every 8 (Eight) Hours As Needed for Nausea or Vomiting., Disp: 30 tablet, Rfl: 0    oxyCODONE (Roxicodone) 5 MG immediate release tablet, Take 3 tablets by mouth Every 4 (Four) Hours As Needed for Moderate Pain., Disp: 11 tablet, Rfl: 0    pantoprazole (PROTONIX) 40 MG EC tablet, Take 1 tablet by mouth Daily., Disp: , Rfl:     QUEtiapine (SEROquel) 25 MG tablet, Take 1 tablet by mouth Every Evening., Disp: , Rfl:     saccharomyces boulardii (FLORASTOR) 250 MG capsule,  Take 1 capsule every day by oral route for 7 days., Disp: , Rfl:     tadalafil (CIALIS) 10 MG tablet, Take 1 tablet 1 hour prior to sexual intercourse as needed, Disp: , Rfl:     tamsulosin (FLOMAX) 0.4 MG capsule 24 hr capsule, Take 1 capsule by mouth Every Night., Disp: 30 capsule, Rfl:     tiZANidine (ZANAFLEX) 4 MG tablet, Take 1 tablet by mouth., Disp: , Rfl:     Trelegy Ellipta 100-62.5-25 MCG/ACT inhaler, Inhale 1 puff Daily., Disp: , Rfl: .      The patient's relevant past medical, surgical, family and social history were reviewed and updated in Epic as appropriate.     ROS:    Review of Systems  ROS as documented in patient questionnaire unless as noted otherwise    Objective:    Physical Exam  Vitals reviewed.   Constitutional:       Appearance: He is well-developed.   HENT:      Head: Normocephalic and atraumatic.      Mouth/Throat:      Mouth: Mucous membranes are moist.      Pharynx: Oropharynx is clear.   Neck:      Thyroid: No thyromegaly.   Cardiovascular:      Rate and Rhythm: Normal rate and regular rhythm.      Heart sounds: No murmur heard.     No friction rub. No gallop.   Pulmonary:      Effort: Pulmonary effort is normal. No respiratory distress.      Breath sounds: No wheezing or rales.   Musculoskeletal:      Cervical back: Neck supple.   Skin:     General: Skin is warm and dry.   Neurological:      Mental Status: He is alert and oriented to person, place, and time.   Psychiatric:         Behavior: Behavior normal.         Thought Content: Thought content normal.         Data:    CXR: No significant change over priors.    PFT: July 2023: No obstruction, no restriction, and mild decrease in diffusion.  No significant change over priors    Assessment:    Problem List Items Addressed This Visit          Pulmonary Problems    Pulmonary emphysema - Primary    Relevant Orders    XR Chest PA & Lateral       Other    Abnormal CT scan of lung    Overview     Findings most consistent with  postinflammatory scarring and emphysema              COPD: Emphysematous changes by CAT scan but very little obstruction by PFTs.  He is using Trelegy 100 at a dose of 1 inhalation daily and this seems to benefit him but the cost is an issue.  He uses DuoNebs as needed.  Question of interstitial lung disease: Findings most consistent with postinflammatory scarring and are not consistent with generalized ILD  ABRIL: Intolerant of CPAP therapy and has not been interested in reconsidering treatment.      Plan:    I gave him samples of Trelegy  DuoNebs as needed  Urged regular exercise/walking regimen as tolerated  Routine follow-up with PFTs on return to clinic in 1 year or earlier if any new pulmonary symptoms.    Level of Risk Moderate due to: prescription drug management    Discussed in detail with the patient.  He will call prior to his follow up visit for any new problems.    Signed by  Alf Edwards MD

## 2024-07-15 NOTE — PROGRESS NOTES
[facility]  Follow Up Visit  Flaco Roque II  1945    VISIT DATE:  07/16/24    PCP:   Azar Stern MD  [address] [id number]          CC:  Permanent atrial fibrillation      Problem List:  Paroxysmal atrial fibrillation  Echocardiogram May 2019: LVEF 66 to 70%, mild TR, aortic valve sclerosis without stenosis, normal RVSP  Holter monitor October 2020: 8.4% PVC burden, 11 NSVT episodes with the longest lasting 6 beats, occasional runs of SVT with the longest lasting 11 beats  Echocardiogram 8/25/2023: LVEF 56 to 60%, LA cavity mild to moderately dilated, mild to moderate thickening of the aortic valve, mild to moderate TR, RVSP 35-45 mmHg, patient in atrial fibrillation during study  CHADsVasc 3, anticoagulated with Eliquis  Coronary artery disease:  Regadenoson stress test December 2019: LVEF 70%, mild coronary artery calcification, normal myocardial perfusion study with no evidence of ischemia  Regadenoson stress test 8/24/2023: Myocardial perfusion imaging indicates a moderate-sized area of ischemia in the anterior wall and apex, CT portion of the exam shows aortic calcifications and a large hiatal hernia, EF 49%  Left heart catheterization 9/5/2023: Minor nonobstructive CAD, normal LAD, mid circumflex 30-40%, mid RCA 20-30%, LV pressures S/D/E 115/-8/8 mmHg  Peripheral arterial disease with chronic left foot ulcer (follows with Dr. Sawyer)  I&D of left foot April 2020  Left arterial duplex October 2020: Atherosclerotic plaque with biphasic waveforms in the common femoral, SFA, popliteal arteries.  Anterior tibial artery patent with biphasic flow, posterior tibial artery and peroneal artery are occluded with some mild reconstitution distally via collaterals, biphasic flow within the dorsalis pedis artery, left KAMRYN 0.56  KAMRYN July 2021: Normal right KAMRYN 1.02,Left femoral-popliteal arteries noncompressible. There are biphasic and monophasic waveforms noted in  the left lower extremity, left KAMRYN 0.86  COPD/emphysema, on home O2  GERD/hiatal hernia  Parkinson's  Left shoulder injury (hx replacement)  Obstructive sleep apnea  Former tobacco use with 84-pack-year history, quit in 2001  COVID-pneumonia April 2023  Urinary retention, has Pinto catheter October 2023  pain pump replacement at [facility] November 2023  Surgery for bowel obstruction and appendectomy May 2024         History of Present Illness:  Flaco Roque II  Is a 78 y.o. male with pertinent cardiac history detailed above.  Patient was admitted in May for bowel obstruction with lysis of adhesions and appendectomy.  He underwent surgery with Dr. Joseph.  He has recovered from that well.  He has lost about 10 pounds and states his appetite is not as much as it used to be.  He is not having any claudication.  He does go to a podiatrist regularly to get his nails trimmed.  Denies chest pain.  He goes to the gym about 20 minutes a day daily.  Asymptomatic with no atrial fibrillation symptoms.  Tolerating Eliquis well but it is costly for him.      Patient Active Problem List    Diagnosis Date Noted    Severe malnutrition 05/20/2024    Large bowel obstruction 05/16/2024    Pneumonia, unspecified organism 10/26/2023    Sepsis 10/20/2023    Coronary artery disease involving native coronary artery of native heart without angina pectoris 09/05/2023     Note Last Updated: 9/5/2023 9/5/2023: LHC at [facility], normal LAD, mid circumflex 30-40%, RCA mid 20-30%, LVEDP 4 mmHg.  False-positive stress test.      Abnormal nuclear stress test 08/29/2023     Note Last Updated: 8/29/2023     Added automatically from request for surgery [id number]      COVID-19 04/15/2023    Permanent atrial fibrillation 04/15/2023    Status post reverse arthroplasty of shoulder, left 03/04/2021    Strain of deltoid muscle, left, initial encounter 03/04/2021    Impingement syndrome of left shoulder 03/04/2021    Scapular dyskinesis 03/04/2021    Peripheral  arterial disease 2020    S/P Irrigation debridement left foot with excision of base of fifth metatarsal and chronic ulcer 2020    On home O2 2020    Skin ulcer of left foot, limited to breakdown of skin 2020    Abnormal CT scan of lung 2019     Note Last Updated: 2023     Findings most consistent with postinflammatory scarring and emphysema      Hypokalemia 2019    Anemia, 1 unit PRBC 2019    Parkinson, PNA 2019    Parkinson disease 2019    S/P foot surgery, left 2019    Deformity of left foot 04/10/2019     Note Last Updated: 4/10/2019     Added automatically from request for surgery [id number]      Leukocytosis, likely reactive 2018    Acute postoperative pain 2018    Foot deformity, acquired, left 2018     Note Last Updated: 2018     Added automatically from request for surgery [id number]      ABRIL (obstructive sleep apnea) 10/31/2017     Note Last Updated: 2019     CPAP intolerant      Chronic pain with pain pump in place 10/31/2017    Pulmonary emphysema 10/31/2017    GERD without esophagitis 10/31/2017    Acute blood loss anemia 10/31/2017       No Known Allergies    Social History     Socioeconomic History    Marital status:    Tobacco Use    Smoking status: Former     Current packs/day: 0.00     Average packs/day: 2.0 packs/day for 42.0 years (84.0 ttl pk-yrs)     Types: Cigarettes     Start date:      Quit date:      Years since quittin.5    Smokeless tobacco: Never   Vaping Use    Vaping status: Never Used   Substance and Sexual Activity    Alcohol use: Yes     Comment: beer occasional     Drug use: No    Sexual activity: Defer       Family History   Problem Relation Age of Onset    Arthritis Mother     Diabetes Mother     Osteoarthritis Mother     Colon cancer Father     Cancer Father        Current Medications:    Current Outpatient Medications:     acetaminophen (TYLENOL) 500 MG tablet, Take 2  tablets by mouth Every 6 (Six) Hours As Needed., Disp: , Rfl:     amantadine (SYMMETREL) 100 MG capsule, Take 1 capsule by mouth 2 (Two) Times a Day., Disp: , Rfl:     apixaban (ELIQUIS) 5 MG tablet tablet, Take 1 tablet by mouth Every 12 (Twelve) Hours. Indications: Atrial Fibrillation, Disp: 60 tablet, Rfl:     atorvastatin (LIPITOR) 10 MG tablet, Take 1 tablet by mouth Daily., Disp: 30 tablet, Rfl: 11    atropine 1 % ophthalmic solution, 1 drop Daily., Disp: , Rfl:     carbidopa-levodopa (SINEMET)  MG per tablet, Take 1 tablet by mouth 4 (Four) Times a Day., Disp: , Rfl:     carbidopa-levodopa CR (SINEMET CR)  MG per CR tablet, Take 1 tablet by mouth 2 (Two) Times a Day., Disp: , Rfl:     cilostazol (PLETAL) 100 MG tablet, Take 1 tablet by mouth 2 (Two) Times a Day., Disp: , Rfl:     clonazePAM (KlonoPIN) 1 MG tablet, Take 1 tablet by mouth Daily., Disp: , Rfl:     cyanocobalamin (VITAMIN B-12) 500 MCG tablet, Take 1 tablet by mouth Daily., Disp: , Rfl:     docusate sodium (COLACE) 100 MG capsule, Take 1 capsule by mouth 2 (Two) Times a Day., Disp: 20 capsule, Rfl: 0    fentaNYL (SUBLIMAZE) 0.05 MG/ML injection, Infuse 5 mL into a venous catheter. PAIN PUMP, Disp: , Rfl:     finasteride (PROSCAR) 5 MG tablet, Take 1 tablet by mouth Daily., Disp: , Rfl:     furosemide (Lasix) 20 MG tablet, Take 1 tablet by mouth Daily for 90 days., Disp: 90 tablet, Rfl: 0    guaiFENesin (MUCINEX) 600 MG 12 hr tablet, Take 2 tablets by mouth 2 (Two) Times a Day., Disp: , Rfl:     ipratropium-albuterol (DUO-NEB) 0.5-2.5 mg/3 ml nebulizer, Take 3 mL by nebulization 3 (Three) Times a Day., Disp: , Rfl:     metoprolol succinate XL (TOPROL-XL) 50 MG 24 hr tablet, TAKE ONE TABLET BY MOUTH DAILY, Disp: 60 tablet, Rfl: 5    montelukast (SINGULAIR) 10 MG tablet, TAKE 1 TABLET BY MOUTH EVERY NIGHT., Disp: 90 tablet, Rfl: 2    Multiple Vitamins-Minerals (MULTIVITAMIN ADULT PO), Take 1 tablet by mouth Daily., Disp: , Rfl:      "naloxone (NARCAN) 4 MG/0.1ML nasal spray, Call 911. Don't prime. [place] in 1 nostril for overdose. Repeat in 2-3 minutes in other nostril if no or minimal breathing/responsiveness., Disp: 2 each, Rfl: 0    ondansetron (ZOFRAN) 4 MG tablet, Take 1 tablet by mouth Every 8 (Eight) Hours As Needed for Nausea or Vomiting., Disp: 30 tablet, Rfl: 0    oxyCODONE (Roxicodone) 5 MG immediate release tablet, Take 3 tablets by mouth Every 4 (Four) Hours As Needed for Moderate Pain., Disp: 11 tablet, Rfl: 0    pantoprazole (PROTONIX) 40 MG EC tablet, Take 1 tablet by mouth Daily., Disp: , Rfl:     QUEtiapine (SEROquel) 25 MG tablet, Take 1 tablet by mouth Every Evening., Disp: , Rfl:     saccharomyces boulardii (FLORASTOR) 250 MG capsule, Take 1 capsule every day by oral route for 7 days., Disp: , Rfl:     tadalafil (CIALIS) 10 MG tablet, Take 1 tablet 1 hour prior to sexual intercourse as needed, Disp: , Rfl:     tamsulosin (FLOMAX) 0.4 MG capsule 24 hr capsule, Take 1 capsule by mouth Every Night., Disp: 30 capsule, Rfl:     tiZANidine (ZANAFLEX) 4 MG tablet, Take 1 tablet by mouth., Disp: , Rfl:     Trelegy Ellipta 100-62.5-25 MCG/ACT inhaler, Inhale 1 puff Daily., Disp: , Rfl:      Review of Systems   Constitutional: Positive for weight loss.   Cardiovascular:  Negative for chest pain, claudication, dyspnea on exertion and palpitations.   Gastrointestinal:  Negative for abdominal pain.       Vitals:    07/16/24 0905   BP: 112/70   Pulse: 78   SpO2: 95%   Weight: 52.7 kg (116 lb 3.2 oz)   Height: 172.7 cm (68\")       Physical Exam  Neck:      Vascular: No carotid bruit.   Cardiovascular:      Rate and Rhythm: Normal rate and regular rhythm.      Heart sounds: Normal heart sounds.   Pulmonary:      Effort: Pulmonary effort is normal.      Breath sounds: Normal breath sounds.   Musculoskeletal:      Right lower leg: No edema.      Left lower leg: No edema.   Neurological:      Mental Status: He is alert.         Diagnostic " Data:  Procedures  Lab Results   Component Value Date    TRIG 54 10/26/2023     Lab Results   Component Value Date    GLUCOSE 133 (H) 05/21/2024    BUN 7 (L) 05/21/2024    CREATININE 0.66 (L) 05/21/2024     05/21/2024    K 4.4 05/21/2024     05/21/2024    CO2 27.0 05/21/2024     Lab Results   Component Value Date    HGBA1C 5.2 11/06/2023     Lab Results   Component Value Date    WBC 13.51 (H) 05/21/2024    HGB 13.6 05/21/2024    HCT 42.8 05/21/2024     05/21/2024       Assessment:  No diagnosis found.    Plan:    Paroxysmal A-fib  Rate controlled with Toprol-XL   Asymptomatic.  Continue Eliquis anticoagulation for stroke prophylaxis.  Still qualifies for 5 mg daily  Nonobstructive CAD  [facility] 9/23.  Normal LAD, 30 to 40% circumflex, 20 to 30% RCA  No chest pain.  Goes to the gym daily.  PAD  Continue cilostazol, Eliquis, statin  Exercising on bike without limitation/claudication  Normal right KAMRYN, mild reduction of left KAMRYN  He does see a podiatrist regularly  Status post surgery for bowel obstruction  He has recovered      Follow-up in 1 year or sooner as needed      ACP discussion was held with the patient during this visit. Patient has an advance directive in EMR which is still valid.       Von Kilpatrick MD [facility]

## 2024-07-16 ENCOUNTER — OFFICE VISIT (OUTPATIENT)
Dept: CARDIOLOGY | Facility: CLINIC | Age: 79
End: 2024-07-16
Payer: MEDICARE

## 2024-07-16 ENCOUNTER — PATIENT ROUNDING (BHMG ONLY) (OUTPATIENT)
Dept: CARDIOLOGY | Facility: CLINIC | Age: 79
End: 2024-07-16
Payer: MEDICARE

## 2024-07-16 VITALS
OXYGEN SATURATION: 95 % | BODY MASS INDEX: 17.61 KG/M2 | WEIGHT: 116.2 LBS | HEART RATE: 78 BPM | HEIGHT: 68 IN | SYSTOLIC BLOOD PRESSURE: 112 MMHG | DIASTOLIC BLOOD PRESSURE: 70 MMHG

## 2024-07-16 DIAGNOSIS — I48.0 PAF (PAROXYSMAL ATRIAL FIBRILLATION): ICD-10-CM

## 2024-07-16 DIAGNOSIS — I73.9 PERIPHERAL ARTERIAL DISEASE: Primary | ICD-10-CM

## 2024-07-16 NOTE — PROGRESS NOTES
July 16, 2024    Hello, may I speak with Flaco Roque II? Yes.    My name is Sharon KAMINSKI      I am  with [facility]  [address]  [address]  [place] [address] [phone].    Before we get started may I verify your date of birth? 1945, Yes, correct.    I am calling to officially welcome you to our practice and ask about your recent visit. Is this a good time to talk? yes    Tell me about your visit with us. What things went well?  Everything.       We're always looking for ways to make our patients' experiences even better. Do you have recommendations on ways we may improve?  no    Overall were you satisfied with your first visit to our practice? yes       I appreciate you taking the time to speak with me today. Is there anything else I can do for you? no      Thank you, and have a great day.

## 2024-07-18 RX ORDER — METOPROLOL SUCCINATE 50 MG/1
TABLET, EXTENDED RELEASE ORAL
Qty: 90 TABLET | Refills: 3 | Status: SHIPPED | OUTPATIENT
Start: 2024-07-18

## 2024-07-29 NOTE — PROGRESS NOTES
Orthopedic  Progress Note    Subjective     Post-Operative Day: 1 Day Post-Op    Systemic or Specific Complaints: some pain  Objective     Vital signs in last 24 hours:  Temp:  [97.6 °F (36.4 °C)-98.3 °F (36.8 °C)] 97.7 °F (36.5 °C)  Heart Rate:  [59-91] 85  Resp:  [15-18] 16  BP: (100-146)/(58-71) 119/62    Neurovascular: Left foot toes pink   Wound: Dry blood on dressing         Data Review  Lab Results (last 24 hours)     Procedure Component Value Units Date/Time    CBC (No Diff) [[id number]]  (Abnormal) Collected:  04/24/19 0417    Specimen:  Blood Updated:  04/24/19 0515     WBC 12.65 10*3/mm3      RBC 4.31 10*6/mm3      Hemoglobin 8.6 g/dL      Hematocrit 30.1 %      MCV 69.8 fL      MCH 20.0 pg      MCHC 28.6 g/dL      RDW 18.6 %      RDW-SD 47.7 fl      MPV 9.8 fL      Platelets 299 10*3/mm3     Basic Metabolic Panel [[id number]]  (Abnormal) Collected:  04/24/19 0417    Specimen:  Blood Updated:  04/24/19 0500     Glucose 121 mg/dL      BUN 11 mg/dL      Creatinine 0.70 mg/dL      Sodium 137 mmol/L      Potassium 4.1 mmol/L      Chloride 103 mmol/L      CO2 25.0 mmol/L      Calcium 8.3 mg/dL      eGFR Non African Amer 111 mL/min/1.73      BUN/Creatinine Ratio 15.7     Anion Gap 9.0 mmol/L     Narrative:       GFR Normal >60  Chronic Kidney Disease <60  Kidney Failure <15            Assessment/Plan     Status post- stable, no falls, ok to go home, home health for dressing change           Juhi Calle MD    Date: 4/24/2019  Time: 9:10 AM   PCP

## 2024-08-23 ENCOUNTER — HOSPITAL ENCOUNTER (INPATIENT)
Facility: HOSPITAL | Age: 79
LOS: 5 days | Discharge: HOME | DRG: 389 | End: 2024-08-28
Attending: EMERGENCY MEDICINE | Admitting: HOSPITALIST
Payer: MEDICARE

## 2024-08-23 ENCOUNTER — APPOINTMENT (OUTPATIENT)
Dept: CT IMAGING | Facility: HOSPITAL | Age: 79
DRG: 389 | End: 2024-08-23
Payer: MEDICARE

## 2024-08-23 ENCOUNTER — APPOINTMENT (OUTPATIENT)
Dept: GENERAL RADIOLOGY | Facility: HOSPITAL | Age: 79
DRG: 389 | End: 2024-08-23
Payer: MEDICARE

## 2024-08-23 DIAGNOSIS — K31.5 DUODENAL OBSTRUCTION: Primary | ICD-10-CM

## 2024-08-23 DIAGNOSIS — Z87.19 HISTORY OF SMALL BOWEL OBSTRUCTION: ICD-10-CM

## 2024-08-23 DIAGNOSIS — K92.0 COFFEE GROUND EMESIS: ICD-10-CM

## 2024-08-23 DIAGNOSIS — K59.00 CONSTIPATION, UNSPECIFIED CONSTIPATION TYPE: ICD-10-CM

## 2024-08-23 DIAGNOSIS — R11.2 NAUSEA AND VOMITING, UNSPECIFIED VOMITING TYPE: ICD-10-CM

## 2024-08-23 PROBLEM — K56.609 SBO (SMALL BOWEL OBSTRUCTION): Status: ACTIVE | Noted: 2024-08-23

## 2024-08-23 LAB
ABO GROUP BLD: NORMAL
ALBUMIN SERPL-MCNC: 4 G/DL (ref 3.5–5.2)
ALBUMIN/GLOB SERPL: 1.4 G/DL
ALP SERPL-CCNC: 77 U/L (ref 39–117)
ALT SERPL W P-5'-P-CCNC: <5 U/L (ref 1–41)
ANION GAP SERPL CALCULATED.3IONS-SCNC: 8 MMOL/L (ref 5–15)
AST SERPL-CCNC: 20 U/L (ref 1–40)
BASOPHILS # BLD AUTO: 0.04 10*3/MM3 (ref 0–0.2)
BASOPHILS NFR BLD AUTO: 0.5 % (ref 0–1.5)
BILIRUB SERPL-MCNC: 0.6 MG/DL (ref 0–1.2)
BILIRUB UR QL STRIP: NEGATIVE
BLD GP AB SCN SERPL QL: NEGATIVE
BUN SERPL-MCNC: 12 MG/DL (ref 8–23)
BUN/CREAT SERPL: 15.6 (ref 7–25)
CALCIUM SPEC-SCNC: 9.2 MG/DL (ref 8.6–10.5)
CHLORIDE SERPL-SCNC: 104 MMOL/L (ref 98–107)
CLARITY UR: CLEAR
CO2 SERPL-SCNC: 28 MMOL/L (ref 22–29)
COLOR UR: YELLOW
CREAT BLDA-MCNC: 0.7 MG/DL (ref 0.6–1.3)
CREAT BLDA-MCNC: 0.7 MG/DL (ref 0.6–1.3)
CREAT SERPL-MCNC: 0.77 MG/DL (ref 0.76–1.27)
D-LACTATE SERPL-SCNC: 0.7 MMOL/L (ref 0.5–2)
DEPRECATED RDW RBC AUTO: 51.3 FL (ref 37–54)
EGFRCR SERPLBLD CKD-EPI 2021: 91.6 ML/MIN/1.73
EOSINOPHIL # BLD AUTO: 0.1 10*3/MM3 (ref 0–0.4)
EOSINOPHIL NFR BLD AUTO: 1.2 % (ref 0.3–6.2)
ERYTHROCYTE [DISTWIDTH] IN BLOOD BY AUTOMATED COUNT: 15.8 % (ref 12.3–15.4)
GLOBULIN UR ELPH-MCNC: 2.8 GM/DL
GLUCOSE SERPL-MCNC: 111 MG/DL (ref 65–99)
GLUCOSE UR STRIP-MCNC: NEGATIVE MG/DL
HCT VFR BLD AUTO: 43.2 % (ref 37.5–51)
HGB BLD-MCNC: 14.3 G/DL (ref 13–17.7)
HGB UR QL STRIP.AUTO: NEGATIVE
IMM GRANULOCYTES # BLD AUTO: 0.01 10*3/MM3 (ref 0–0.05)
IMM GRANULOCYTES NFR BLD AUTO: 0.1 % (ref 0–0.5)
KETONES UR QL STRIP: ABNORMAL
LEUKOCYTE ESTERASE UR QL STRIP.AUTO: NEGATIVE
LIPASE SERPL-CCNC: 18 U/L (ref 13–60)
LYMPHOCYTES # BLD AUTO: 0.94 10*3/MM3 (ref 0.7–3.1)
LYMPHOCYTES NFR BLD AUTO: 11.3 % (ref 19.6–45.3)
MCH RBC QN AUTO: 29.2 PG (ref 26.6–33)
MCHC RBC AUTO-ENTMCNC: 33.1 G/DL (ref 31.5–35.7)
MCV RBC AUTO: 88.3 FL (ref 79–97)
MONOCYTES # BLD AUTO: 0.6 10*3/MM3 (ref 0.1–0.9)
MONOCYTES NFR BLD AUTO: 7.2 % (ref 5–12)
NEUTROPHILS NFR BLD AUTO: 6.63 10*3/MM3 (ref 1.7–7)
NEUTROPHILS NFR BLD AUTO: 79.7 % (ref 42.7–76)
NITRITE UR QL STRIP: NEGATIVE
NRBC BLD AUTO-RTO: 0 /100 WBC (ref 0–0.2)
PH UR STRIP.AUTO: 6 [PH] (ref 5–8)
PLATELET # BLD AUTO: 272 10*3/MM3 (ref 140–450)
PMV BLD AUTO: 10.4 FL (ref 6–12)
POTASSIUM SERPL-SCNC: 3.8 MMOL/L (ref 3.5–5.2)
PROT SERPL-MCNC: 6.8 G/DL (ref 6–8.5)
PROT UR QL STRIP: NEGATIVE
QT INTERVAL: 366 MS
QTC INTERVAL: 445 MS
RBC # BLD AUTO: 4.89 10*6/MM3 (ref 4.14–5.8)
RH BLD: POSITIVE
SODIUM SERPL-SCNC: 140 MMOL/L (ref 136–145)
SP GR UR STRIP: 1.03 (ref 1–1.03)
T&S EXPIRATION DATE: NORMAL
UROBILINOGEN UR QL STRIP: ABNORMAL
WBC NRBC COR # BLD AUTO: 8.32 10*3/MM3 (ref 3.4–10.8)

## 2024-08-23 PROCEDURE — 80053 COMPREHEN METABOLIC PANEL: CPT | Performed by: EMERGENCY MEDICINE

## 2024-08-23 PROCEDURE — 94799 UNLISTED PULMONARY SVC/PX: CPT

## 2024-08-23 PROCEDURE — 83690 ASSAY OF LIPASE: CPT | Performed by: EMERGENCY MEDICINE

## 2024-08-23 PROCEDURE — 82565 ASSAY OF CREATININE: CPT

## 2024-08-23 PROCEDURE — 25810000003 LACTATED RINGERS PER 1000 ML: Performed by: INTERNAL MEDICINE

## 2024-08-23 PROCEDURE — 25810000003 SODIUM CHLORIDE 0.9 % SOLUTION: Performed by: EMERGENCY MEDICINE

## 2024-08-23 PROCEDURE — 86901 BLOOD TYPING SEROLOGIC RH(D): CPT | Performed by: EMERGENCY MEDICINE

## 2024-08-23 PROCEDURE — 99222 1ST HOSP IP/OBS MODERATE 55: CPT | Performed by: PHYSICIAN ASSISTANT

## 2024-08-23 PROCEDURE — 74018 RADEX ABDOMEN 1 VIEW: CPT

## 2024-08-23 PROCEDURE — 74177 CT ABD & PELVIS W/CONTRAST: CPT

## 2024-08-23 PROCEDURE — 99285 EMERGENCY DEPT VISIT HI MDM: CPT

## 2024-08-23 PROCEDURE — 94640 AIRWAY INHALATION TREATMENT: CPT

## 2024-08-23 PROCEDURE — 81003 URINALYSIS AUTO W/O SCOPE: CPT | Performed by: EMERGENCY MEDICINE

## 2024-08-23 PROCEDURE — 86850 RBC ANTIBODY SCREEN: CPT | Performed by: EMERGENCY MEDICINE

## 2024-08-23 PROCEDURE — 25010000002 ONDANSETRON PER 1 MG: Performed by: EMERGENCY MEDICINE

## 2024-08-23 PROCEDURE — 25010000002 FENTANYL CITRATE (PF) 50 MCG/ML SOLUTION: Performed by: EMERGENCY MEDICINE

## 2024-08-23 PROCEDURE — 99223 1ST HOSP IP/OBS HIGH 75: CPT | Performed by: INTERNAL MEDICINE

## 2024-08-23 PROCEDURE — 93005 ELECTROCARDIOGRAM TRACING: CPT | Performed by: EMERGENCY MEDICINE

## 2024-08-23 PROCEDURE — 83605 ASSAY OF LACTIC ACID: CPT | Performed by: EMERGENCY MEDICINE

## 2024-08-23 PROCEDURE — 25510000001 IOPAMIDOL 61 % SOLUTION: Performed by: EMERGENCY MEDICINE

## 2024-08-23 PROCEDURE — 25010000002 METHYLNALTREXONE 12 MG/0.6ML SOLUTION: Performed by: PHYSICIAN ASSISTANT

## 2024-08-23 PROCEDURE — 86900 BLOOD TYPING SEROLOGIC ABO: CPT | Performed by: EMERGENCY MEDICINE

## 2024-08-23 PROCEDURE — 85025 COMPLETE CBC W/AUTO DIFF WBC: CPT | Performed by: EMERGENCY MEDICINE

## 2024-08-23 PROCEDURE — 25010000002 MORPHINE PER 10 MG: Performed by: EMERGENCY MEDICINE

## 2024-08-23 RX ORDER — MORPHINE SULFATE 4 MG/ML
4 INJECTION, SOLUTION INTRAMUSCULAR; INTRAVENOUS ONCE
Status: COMPLETED | OUTPATIENT
Start: 2024-08-23 | End: 2024-08-23

## 2024-08-23 RX ORDER — SODIUM CHLORIDE 9 MG/ML
40 INJECTION, SOLUTION INTRAVENOUS AS NEEDED
Status: DISCONTINUED | OUTPATIENT
Start: 2024-08-23 | End: 2024-08-28 | Stop reason: HOSPADM

## 2024-08-23 RX ORDER — CARBIDOPA AND LEVODOPA 25; 100 MG/1; MG/1
1 TABLET ORAL 4 TIMES DAILY
Status: DISCONTINUED | OUTPATIENT
Start: 2024-08-23 | End: 2024-08-28 | Stop reason: HOSPADM

## 2024-08-23 RX ORDER — IOPAMIDOL 612 MG/ML
100 INJECTION, SOLUTION INTRAVASCULAR
Status: COMPLETED | OUTPATIENT
Start: 2024-08-23 | End: 2024-08-23

## 2024-08-23 RX ORDER — MONTELUKAST SODIUM 10 MG/1
10 TABLET ORAL NIGHTLY
Status: DISCONTINUED | OUTPATIENT
Start: 2024-08-24 | End: 2024-08-28 | Stop reason: HOSPADM

## 2024-08-23 RX ORDER — METOPROLOL SUCCINATE 50 MG/1
50 TABLET, EXTENDED RELEASE ORAL DAILY
Status: DISCONTINUED | OUTPATIENT
Start: 2024-08-24 | End: 2024-08-25

## 2024-08-23 RX ORDER — ONDANSETRON 2 MG/ML
4 INJECTION INTRAMUSCULAR; INTRAVENOUS ONCE
Status: COMPLETED | OUTPATIENT
Start: 2024-08-23 | End: 2024-08-23

## 2024-08-23 RX ORDER — FENTANYL CITRATE 50 UG/ML
50 INJECTION, SOLUTION INTRAMUSCULAR; INTRAVENOUS ONCE
Status: COMPLETED | OUTPATIENT
Start: 2024-08-23 | End: 2024-08-23

## 2024-08-23 RX ORDER — ONDANSETRON 4 MG/1
4 TABLET, ORALLY DISINTEGRATING ORAL EVERY 6 HOURS PRN
Status: DISCONTINUED | OUTPATIENT
Start: 2024-08-23 | End: 2024-08-28 | Stop reason: HOSPADM

## 2024-08-23 RX ORDER — PANTOPRAZOLE SODIUM 40 MG/10ML
40 INJECTION, POWDER, LYOPHILIZED, FOR SOLUTION INTRAVENOUS
Status: DISCONTINUED | OUTPATIENT
Start: 2024-08-24 | End: 2024-08-27

## 2024-08-23 RX ORDER — PANTOPRAZOLE SODIUM 40 MG/10ML
80 INJECTION, POWDER, LYOPHILIZED, FOR SOLUTION INTRAVENOUS ONCE
Status: COMPLETED | OUTPATIENT
Start: 2024-08-23 | End: 2024-08-23

## 2024-08-23 RX ORDER — SODIUM CHLORIDE, SODIUM LACTATE, POTASSIUM CHLORIDE, CALCIUM CHLORIDE 600; 310; 30; 20 MG/100ML; MG/100ML; MG/100ML; MG/100ML
75 INJECTION, SOLUTION INTRAVENOUS CONTINUOUS
Status: DISCONTINUED | OUTPATIENT
Start: 2024-08-23 | End: 2024-08-28 | Stop reason: HOSPADM

## 2024-08-23 RX ORDER — CARBIDOPA AND LEVODOPA 50; 200 MG/1; MG/1
1 TABLET, EXTENDED RELEASE ORAL 2 TIMES DAILY
Status: DISCONTINUED | OUTPATIENT
Start: 2024-08-23 | End: 2024-08-28 | Stop reason: HOSPADM

## 2024-08-23 RX ORDER — HYDROMORPHONE HYDROCHLORIDE 1 MG/ML
0.25 INJECTION, SOLUTION INTRAMUSCULAR; INTRAVENOUS; SUBCUTANEOUS
Status: DISCONTINUED | OUTPATIENT
Start: 2024-08-23 | End: 2024-08-23

## 2024-08-23 RX ORDER — ACETAMINOPHEN 325 MG/1
650 TABLET ORAL EVERY 4 HOURS PRN
Status: DISCONTINUED | OUTPATIENT
Start: 2024-08-23 | End: 2024-08-28 | Stop reason: HOSPADM

## 2024-08-23 RX ORDER — FINASTERIDE 5 MG/1
5 TABLET, FILM COATED ORAL DAILY
Status: DISCONTINUED | OUTPATIENT
Start: 2024-08-24 | End: 2024-08-28 | Stop reason: HOSPADM

## 2024-08-23 RX ORDER — POLYETHYLENE GLYCOL 3350 17 G/17G
17 POWDER, FOR SOLUTION ORAL DAILY PRN
Status: DISCONTINUED | OUTPATIENT
Start: 2024-08-23 | End: 2024-08-28 | Stop reason: HOSPADM

## 2024-08-23 RX ORDER — NALOXONE HCL 0.4 MG/ML
0.4 VIAL (ML) INJECTION
Status: DISCONTINUED | OUTPATIENT
Start: 2024-08-23 | End: 2024-08-28 | Stop reason: HOSPADM

## 2024-08-23 RX ORDER — SODIUM CHLORIDE 0.9 % (FLUSH) 0.9 %
10 SYRINGE (ML) INJECTION EVERY 12 HOURS SCHEDULED
Status: DISCONTINUED | OUTPATIENT
Start: 2024-08-23 | End: 2024-08-28 | Stop reason: HOSPADM

## 2024-08-23 RX ORDER — BISACODYL 10 MG
10 SUPPOSITORY, RECTAL RECTAL DAILY PRN
Status: DISCONTINUED | OUTPATIENT
Start: 2024-08-23 | End: 2024-08-25 | Stop reason: SDUPTHER

## 2024-08-23 RX ORDER — TAMSULOSIN HYDROCHLORIDE 0.4 MG/1
0.4 CAPSULE ORAL NIGHTLY
Status: DISCONTINUED | OUTPATIENT
Start: 2024-08-24 | End: 2024-08-28 | Stop reason: HOSPADM

## 2024-08-23 RX ORDER — IPRATROPIUM BROMIDE AND ALBUTEROL SULFATE 2.5; .5 MG/3ML; MG/3ML
3 SOLUTION RESPIRATORY (INHALATION) 3 TIMES DAILY
Status: DISCONTINUED | OUTPATIENT
Start: 2024-08-23 | End: 2024-08-26

## 2024-08-23 RX ORDER — AMOXICILLIN 250 MG
2 CAPSULE ORAL 2 TIMES DAILY PRN
Status: DISCONTINUED | OUTPATIENT
Start: 2024-08-23 | End: 2024-08-28 | Stop reason: HOSPADM

## 2024-08-23 RX ORDER — ACETAMINOPHEN 650 MG/1
650 SUPPOSITORY RECTAL EVERY 4 HOURS PRN
Status: DISCONTINUED | OUTPATIENT
Start: 2024-08-23 | End: 2024-08-28 | Stop reason: HOSPADM

## 2024-08-23 RX ORDER — HYDROMORPHONE HYDROCHLORIDE 1 MG/ML
0.25 INJECTION, SOLUTION INTRAMUSCULAR; INTRAVENOUS; SUBCUTANEOUS
Status: DISCONTINUED | OUTPATIENT
Start: 2024-08-23 | End: 2024-08-28 | Stop reason: HOSPADM

## 2024-08-23 RX ORDER — SODIUM CHLORIDE 0.9 % (FLUSH) 0.9 %
10 SYRINGE (ML) INJECTION AS NEEDED
Status: DISCONTINUED | OUTPATIENT
Start: 2024-08-23 | End: 2024-08-28 | Stop reason: HOSPADM

## 2024-08-23 RX ORDER — QUETIAPINE FUMARATE 25 MG/1
25 TABLET, FILM COATED ORAL EVERY EVENING
Status: DISCONTINUED | OUTPATIENT
Start: 2024-08-23 | End: 2024-08-28 | Stop reason: HOSPADM

## 2024-08-23 RX ORDER — BISACODYL 5 MG/1
5 TABLET, DELAYED RELEASE ORAL DAILY PRN
Status: DISCONTINUED | OUTPATIENT
Start: 2024-08-23 | End: 2024-08-25 | Stop reason: SDUPTHER

## 2024-08-23 RX ORDER — BUDESONIDE AND FORMOTEROL FUMARATE DIHYDRATE 160; 4.5 UG/1; UG/1
2 AEROSOL RESPIRATORY (INHALATION)
Status: DISCONTINUED | OUTPATIENT
Start: 2024-08-23 | End: 2024-08-28 | Stop reason: HOSPADM

## 2024-08-23 RX ORDER — ACETAMINOPHEN 160 MG/5ML
650 SOLUTION ORAL EVERY 4 HOURS PRN
Status: DISCONTINUED | OUTPATIENT
Start: 2024-08-23 | End: 2024-08-28 | Stop reason: HOSPADM

## 2024-08-23 RX ORDER — ONDANSETRON 2 MG/ML
4 INJECTION INTRAMUSCULAR; INTRAVENOUS EVERY 6 HOURS PRN
Status: DISCONTINUED | OUTPATIENT
Start: 2024-08-23 | End: 2024-08-28 | Stop reason: HOSPADM

## 2024-08-23 RX ADMIN — SODIUM CHLORIDE 1000 ML: 9 INJECTION, SOLUTION INTRAVENOUS at 11:27

## 2024-08-23 RX ADMIN — IOPAMIDOL 85 ML: 612 INJECTION, SOLUTION INTRAVENOUS at 12:47

## 2024-08-23 RX ADMIN — Medication 10 ML: at 15:46

## 2024-08-23 RX ADMIN — MORPHINE SULFATE 4 MG: 4 INJECTION, SOLUTION INTRAMUSCULAR; INTRAVENOUS at 11:29

## 2024-08-23 RX ADMIN — PANTOPRAZOLE SODIUM 80 MG: 40 INJECTION, POWDER, FOR SOLUTION INTRAVENOUS at 16:55

## 2024-08-23 RX ADMIN — FENTANYL CITRATE 50 MCG: 50 INJECTION, SOLUTION INTRAMUSCULAR; INTRAVENOUS at 11:53

## 2024-08-23 RX ADMIN — METHYLNALTREXONE BROMIDE 8 MG: 12 INJECTION, SOLUTION SUBCUTANEOUS at 21:37

## 2024-08-23 RX ADMIN — CARBIDOPA AND LEVODOPA 1 TABLET: 25; 100 TABLET ORAL at 21:37

## 2024-08-23 RX ADMIN — SENNOSIDES AND DOCUSATE SODIUM 2 TABLET: 50; 8.6 TABLET ORAL at 21:36

## 2024-08-23 RX ADMIN — ONDANSETRON 4 MG: 2 INJECTION INTRAMUSCULAR; INTRAVENOUS at 11:29

## 2024-08-23 RX ADMIN — IPRATROPIUM BROMIDE AND ALBUTEROL SULFATE 3 ML: 2.5; .5 SOLUTION RESPIRATORY (INHALATION) at 20:51

## 2024-08-23 RX ADMIN — SODIUM CHLORIDE, POTASSIUM CHLORIDE, SODIUM LACTATE AND CALCIUM CHLORIDE 75 ML/HR: 600; 310; 30; 20 INJECTION, SOLUTION INTRAVENOUS at 16:53

## 2024-08-23 RX ADMIN — BUDESONIDE AND FORMOTEROL FUMARATE DIHYDRATE 2 PUFF: 160; 4.5 AEROSOL RESPIRATORY (INHALATION) at 20:51

## 2024-08-23 RX ADMIN — CARBIDOPA AND LEVODOPA 1 TABLET: 50; 200 TABLET, EXTENDED RELEASE ORAL at 21:37

## 2024-08-23 RX ADMIN — QUETIAPINE FUMARATE 25 MG: 25 TABLET ORAL at 21:37

## 2024-08-23 NOTE — PLAN OF CARE
Problem: Adult Inpatient Plan of Care  Goal: Plan of Care Review  Outcome: Ongoing, Progressing  Goal: Patient-Specific Goal (Individualized)  Outcome: Ongoing, Progressing  Goal: Absence of Hospital-Acquired Illness or Injury  Outcome: Ongoing, Progressing  Intervention: Identify and Manage Fall Risk  Recent Flowsheet Documentation  Taken 8/23/2024 1543 by Nelly Gee RN  Safety Promotion/Fall Prevention:   activity supervised   safety round/check completed   nonskid shoes/slippers when out of bed   lighting adjusted  Intervention: Prevent Skin Injury  Recent Flowsheet Documentation  Taken 8/23/2024 1543 by Nelly Gee RN  Body Position: position changed independently  Skin Protection:   tubing/devices free from skin contact   transparent dressing maintained   adhesive use limited  Intervention: Prevent and Manage VTE (Venous Thromboembolism) Risk  Recent Flowsheet Documentation  Taken 8/23/2024 1543 by Nelly Gee RN  Activity Management: activity encouraged  VTE Prevention/Management: sequential compression devices off  Range of Motion: active ROM (range of motion) encouraged  Intervention: Prevent Infection  Recent Flowsheet Documentation  Taken 8/23/2024 1543 by Nelly Gee RN  Infection Prevention:   hand hygiene promoted   single patient room provided  Goal: Optimal Comfort and Wellbeing  Outcome: Ongoing, Progressing  Intervention: Provide Person-Centered Care  Recent Flowsheet Documentation  Taken 8/23/2024 1543 by Nelly Gee RN  Trust Relationship/Rapport:   care explained   questions answered   questions encouraged  Goal: Readiness for Transition of Care  Outcome: Ongoing, Progressing  Intervention: Mutually Develop Transition Plan  Recent Flowsheet Documentation  Taken 8/23/2024 1539 by Nelly Gee RN  Transportation Anticipated: family or friend will provide  Patient/Family Anticipated Services at Transition: none  Patient/Family Anticipates Transition to: home with  family  Taken 8/23/2024 1538 by Nelly Gee, RN  Equipment Currently Used at Home:   walker, standard   cane, straight   Goal Outcome Evaluation:
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
## 2024-08-23 NOTE — Clinical Note
Level of Care: Telemetry [5]   Diagnosis: SBO (small bowel obstruction) [807286]   Admitting Physician: TALAT ALEXANDER [[id number]]   Attending Physician: TALAT ALEXANDER [[id number]]
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
## 2024-08-23 NOTE — SIGNIFICANT NOTE
08/23/24 1542   Living Situation   Current Living Arrangements home   Potentially Unsafe Housing Conditions none   Food Insecurity   Within the past 12 months, you worried that your food would run out before you got the money to buy more. Pt Declined   Within the past 12 months, the food you bought just didn't last and you didn't have money to get more. Pt Declined   Transportation Needs   In the past 12 months, has lack of transportation kept you from meetings, work, or from getting things needed for daily living? Pt Declined   Utilities   In the past 12 months has the electric, gas, oil, or water company threatened to shut off services in your home? Pt Declined   Abuse Screen (yes response referral indicated)   Feels Unsafe at Home or Work/School no   Feels Threatened by Someone no   Does Anyone Try to Keep You From Having Contact with Others or Doing Things Outside Your Home? no   Physical Signs of Abuse Present no   Financial Resource Strain   How hard is it for you to pay for the very basics like food, housing, medical care, and heating? Pt Declined   Employment   Do you want help finding or keeping work or a job? Patient declined   Family and Community Support   If for any reason you need help with day-to-day activities such as bathing, preparing meals, shopping, managing finances, etc., do you get the help you need? I don't need any help   How often do you feel lonely or isolated from those around you? Rarely   Education   Preferred Language English   Do you want help with school or training? For example, starting or completing job training or getting a high school diploma, GED or equivalent No   Physical Activity   On average, how many days per week do you engage in moderate to strenuous exercise (like a brisk walk)? 1 day   On average, how many minutes do you engage in exercise at this level? 10 min   Number of minutes of exercise per week (!) 10   Alcohol Use   Q1: How often do you have a drink  containing alcohol? Never   Q2: How many drinks containing alcohol do you have on a typical day when you are drinking? None   Q3: How often do you have six or more drinks on one occasion? Never   Stress   Do you feel stress - tense, restless, nervous, or anxious, or unable to sleep at night because your mind is troubled all the time - these days? Not at all   Mental Health   Little Interest or Pleasure in Doing Things 0-->not at all   Feeling Down, Depressed or Hopeless 0-->not at all   Disabilities   Concentrating, Remembering or Making Decisions Difficulty no   Doing Errands Independently Difficulty (such as shopping) no   SDOH Completion Check   Social Determinants Score 1

## 2024-08-23 NOTE — SIGNIFICANT NOTE
08/23/24 1541   Living Situation   Current Living Arrangements home   Potentially Unsafe Housing Conditions none   Food Insecurity   Within the past 12 months, you worried that your food would run out before you got the money to buy more. Pt Declined   Within the past 12 months, the food you bought just didn't last and you didn't have money to get more. Pt Declined   Transportation Needs   In the past 12 months, has lack of transportation kept you from medical appointments or from getting medications? Pt Declined   In the past 12 months, has lack of transportation kept you from meetings, work, or from getting things needed for daily living? Pt Declined   Utilities   In the past 12 months has the electric, gas, oil, or water company threatened to shut off services in your home? Pt Declined   Abuse Screen (yes response referral indicated)   Feels Unsafe at Home or Work/School patient declined   Feels Threatened by Someone patient declined   Does Anyone Try to Keep You From Having Contact with Others or Doing Things Outside Your Home? patient declined   Physical Signs of Abuse Present patient declined   Financial Resource Strain   How hard is it for you to pay for the very basics like food, housing, medical care, and heating? Pt Declined   Employment   Do you want help finding or keeping work or a job? Patient declined   Family and Community Support   If for any reason you need help with day-to-day activities such as bathing, preparing meals, shopping, managing finances, etc., do you get the help you need? Patient declined   How often do you feel lonely or isolated from those around you? Patient declined   Education   Do you want help with school or training? For example, starting or completing job training or getting a high school diploma, GED or equivalent Patient declined   Physical Activity   On average, how many days per week do you engage in moderate to strenuous exercise (like a brisk walk)? Pt Declined   On  average, how many minutes do you engage in exercise at this level? Pt Declined   Alcohol Use   Q1: How often do you have a drink containing alcohol? Pt Declined   Q2: How many drinks containing alcohol do you have on a typical day when you are drinking? Pt Declined   Q3: How often do you have six or more drinks on one occasion? Pt Declined   Stress   Do you feel stress - tense, restless, nervous, or anxious, or unable to sleep at night because your mind is troubled all the time - these days? Pt Declined   Mental Health   Little Interest or Pleasure in Doing Things 99-->patient declined   Feeling Down, Depressed or Hopeless 99-->patient declined   Disabilities   Concentrating, Remembering or Making Decisions Difficulty patient declined   Doing Errands Independently Difficulty (such as shopping) patient declined   [facility] Completion Check   Social Determinants Score 1
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
## 2024-08-23 NOTE — PROGRESS NOTES
Discharge Planning Assessment  [facility]     Patient Name: Flaco Roque II  MRN: [id number]  Today's Date: 8/23/2024    Admit Date: 8/23/2024        Discharge Needs Assessment       Row Name 08/23/24 1548       Living Environment    People in Home spouse    Current Living Arrangements home    Potentially Unsafe Housing Conditions none    Primary Care Provided by self    Provides Primary Care For no one    Family Caregiver if Needed spouse    Quality of Family Relationships supportive    Able to Return to Prior Arrangements no       Resource/Environmental Concerns    Resource/Environmental Concerns none       Transportation Needs    In the past 12 months, has lack of transportation kept you from medical appointments or from getting medications? no    In the past 12 months, has lack of transportation kept you from meetings, work, or from getting things needed for daily living? No       Transition Planning    Patient/Family Anticipates Transition to home with family    Patient/Family Anticipated Services at Transition none    Transportation Anticipated family or friend will provide       Discharge Needs Assessment    Equipment Currently Used at Home cane, straight;walker, rolling    Concerns to be Addressed discharge planning    Equipment Needed After Discharge cane, straight;walker, rolling                   Discharge Plan       Row Name 08/23/24 1549       Plan    Plan Comments Mr. Roque lives with his spouse in [place] and has Medicare independent plan may be home at discharge. He can get medications at [facility] in [place]. Case management is following for discharge planning needs.    Final Discharge Disposition Code 01 - home or self-care                  Continued Care and Services - Admitted Since 8/23/2024    No active coordination exists for this encounter.          Demographic Summary       Row Name 08/23/24 1547       General Information    Admission Type inpatient    Arrived From home     Referral Source patient    Reason for Consult discharge planning    Preferred Language English       Contact Information    Permission Granted to Share Info With     Contact Information Obtained for                    Functional Status       Row Name 08/23/24 1548       Functional Status    Usual Activity Tolerance good    Current Activity Tolerance good       Functional Status, IADL    Medications assistive person    Meal Preparation assistive person    Housekeeping assistive person    Laundry assistive person    Shopping assistive person                   Psychosocial    No documentation.                  Abuse/Neglect    No documentation.                  Legal    No documentation.                  Substance Abuse    No documentation.                  Patient Forms    No documentation.                     AZALEA Peng

## 2024-08-24 LAB
ANION GAP SERPL CALCULATED.3IONS-SCNC: 8 MMOL/L (ref 5–15)
BUN SERPL-MCNC: 8 MG/DL (ref 8–23)
BUN/CREAT SERPL: 11.9 (ref 7–25)
CALCIUM SPEC-SCNC: 8.7 MG/DL (ref 8.6–10.5)
CHLORIDE SERPL-SCNC: 106 MMOL/L (ref 98–107)
CO2 SERPL-SCNC: 23 MMOL/L (ref 22–29)
CREAT SERPL-MCNC: 0.67 MG/DL (ref 0.76–1.27)
DEPRECATED RDW RBC AUTO: 52.7 FL (ref 37–54)
EGFRCR SERPLBLD CKD-EPI 2021: 95.6 ML/MIN/1.73
ERYTHROCYTE [DISTWIDTH] IN BLOOD BY AUTOMATED COUNT: 15.6 % (ref 12.3–15.4)
GLUCOSE SERPL-MCNC: 73 MG/DL (ref 65–99)
HCT VFR BLD AUTO: 45.9 % (ref 37.5–51)
HGB BLD-MCNC: 14.7 G/DL (ref 13–17.7)
MCH RBC QN AUTO: 29.3 PG (ref 26.6–33)
MCHC RBC AUTO-ENTMCNC: 32 G/DL (ref 31.5–35.7)
MCV RBC AUTO: 91.4 FL (ref 79–97)
PLATELET # BLD AUTO: 254 10*3/MM3 (ref 140–450)
PMV BLD AUTO: 10.7 FL (ref 6–12)
POTASSIUM SERPL-SCNC: 4.1 MMOL/L (ref 3.5–5.2)
RBC # BLD AUTO: 5.02 10*6/MM3 (ref 4.14–5.8)
SODIUM SERPL-SCNC: 137 MMOL/L (ref 136–145)
TSH SERPL DL<=0.05 MIU/L-ACNC: 1.73 UIU/ML (ref 0.27–4.2)
WBC NRBC COR # BLD AUTO: 7.33 10*3/MM3 (ref 3.4–10.8)

## 2024-08-24 PROCEDURE — 25810000003 LACTATED RINGERS PER 1000 ML: Performed by: INTERNAL MEDICINE

## 2024-08-24 PROCEDURE — 25010000002 HYDROMORPHONE PER 4 MG: Performed by: INTERNAL MEDICINE

## 2024-08-24 PROCEDURE — 94761 N-INVAS EAR/PLS OXIMETRY MLT: CPT

## 2024-08-24 PROCEDURE — 80048 BASIC METABOLIC PNL TOTAL CA: CPT | Performed by: INTERNAL MEDICINE

## 2024-08-24 PROCEDURE — 94664 DEMO&/EVAL PT USE INHALER: CPT

## 2024-08-24 PROCEDURE — 99232 SBSQ HOSP IP/OBS MODERATE 35: CPT | Performed by: INTERNAL MEDICINE

## 2024-08-24 PROCEDURE — 85027 COMPLETE CBC AUTOMATED: CPT | Performed by: INTERNAL MEDICINE

## 2024-08-24 PROCEDURE — 94799 UNLISTED PULMONARY SVC/PX: CPT

## 2024-08-24 PROCEDURE — 84443 ASSAY THYROID STIM HORMONE: CPT | Performed by: PHYSICIAN ASSISTANT

## 2024-08-24 PROCEDURE — 97530 THERAPEUTIC ACTIVITIES: CPT

## 2024-08-24 PROCEDURE — 99223 1ST HOSP IP/OBS HIGH 75: CPT | Performed by: NURSE PRACTITIONER

## 2024-08-24 PROCEDURE — 97161 PT EVAL LOW COMPLEX 20 MIN: CPT

## 2024-08-24 RX ADMIN — BUDESONIDE AND FORMOTEROL FUMARATE DIHYDRATE 2 PUFF: 160; 4.5 AEROSOL RESPIRATORY (INHALATION) at 19:17

## 2024-08-24 RX ADMIN — QUETIAPINE FUMARATE 25 MG: 25 TABLET ORAL at 18:09

## 2024-08-24 RX ADMIN — PANTOPRAZOLE SODIUM 40 MG: 40 INJECTION, POWDER, LYOPHILIZED, FOR SOLUTION INTRAVENOUS at 18:09

## 2024-08-24 RX ADMIN — FINASTERIDE 5 MG: 5 TABLET, FILM COATED ORAL at 09:50

## 2024-08-24 RX ADMIN — CARBIDOPA AND LEVODOPA 1 TABLET: 25; 100 TABLET ORAL at 12:32

## 2024-08-24 RX ADMIN — HYDROMORPHONE HYDROCHLORIDE 0.25 MG: 1 INJECTION, SOLUTION INTRAMUSCULAR; INTRAVENOUS; SUBCUTANEOUS at 02:22

## 2024-08-24 RX ADMIN — ACETAMINOPHEN 650 MG: 325 TABLET ORAL at 12:32

## 2024-08-24 RX ADMIN — METOPROLOL SUCCINATE 50 MG: 50 TABLET, EXTENDED RELEASE ORAL at 09:50

## 2024-08-24 RX ADMIN — MONTELUKAST 10 MG: 10 TABLET, FILM COATED ORAL at 21:10

## 2024-08-24 RX ADMIN — CARBIDOPA AND LEVODOPA 1 TABLET: 25; 100 TABLET ORAL at 21:10

## 2024-08-24 RX ADMIN — BUDESONIDE AND FORMOTEROL FUMARATE DIHYDRATE 2 PUFF: 160; 4.5 AEROSOL RESPIRATORY (INHALATION) at 08:25

## 2024-08-24 RX ADMIN — TAMSULOSIN HYDROCHLORIDE 0.4 MG: 0.4 CAPSULE ORAL at 21:10

## 2024-08-24 RX ADMIN — CARBIDOPA AND LEVODOPA 1 TABLET: 25; 100 TABLET ORAL at 18:09

## 2024-08-24 RX ADMIN — PANTOPRAZOLE SODIUM 40 MG: 40 INJECTION, POWDER, LYOPHILIZED, FOR SOLUTION INTRAVENOUS at 09:50

## 2024-08-24 RX ADMIN — CARBIDOPA AND LEVODOPA 1 TABLET: 25; 100 TABLET ORAL at 09:50

## 2024-08-24 RX ADMIN — IPRATROPIUM BROMIDE AND ALBUTEROL SULFATE 3 ML: 2.5; .5 SOLUTION RESPIRATORY (INHALATION) at 19:17

## 2024-08-24 RX ADMIN — PHENOL 1 SPRAY: 1.5 LIQUID ORAL at 04:37

## 2024-08-24 RX ADMIN — SODIUM CHLORIDE, POTASSIUM CHLORIDE, SODIUM LACTATE AND CALCIUM CHLORIDE 75 ML/HR: 600; 310; 30; 20 INJECTION, SOLUTION INTRAVENOUS at 05:02

## 2024-08-24 RX ADMIN — IPRATROPIUM BROMIDE AND ALBUTEROL SULFATE 3 ML: 2.5; .5 SOLUTION RESPIRATORY (INHALATION) at 12:52

## 2024-08-24 RX ADMIN — Medication 10 ML: at 09:52

## 2024-08-24 RX ADMIN — CARBIDOPA AND LEVODOPA 1 TABLET: 50; 200 TABLET, EXTENDED RELEASE ORAL at 09:50

## 2024-08-24 RX ADMIN — IPRATROPIUM BROMIDE AND ALBUTEROL SULFATE 3 ML: 2.5; .5 SOLUTION RESPIRATORY (INHALATION) at 08:25
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
## 2024-08-24 NOTE — PLAN OF CARE
Goal Outcome Evaluation:  Plan of Care Reviewed With: patient, son           Outcome Evaluation: Physical therapy evaluation complete. The patient required CGA to ambulate without AD. Patient presents below baseline for mobility and would continue to benefit from skilled PT to address strength, balance and activity tolerance deficits.      Anticipated Discharge Disposition (PT): home with assist
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
## 2024-08-24 NOTE — PROGRESS NOTES
"    GI Daily Progress Note  Subjective:    Chief Complaint: Follow-up PSBO    Patient resting up to chair in no acute distress.  Notes he is feeling better than yesterday; notes his stomach is much softer and less distended than prior day.  Reports that getting the [place] catheter placed was rather unpleasant.  No new or worsening GI complaints.  Denies pain.    Objective:    /75 (BP Location: Left arm, Patient Position: Sitting)   Pulse 77   Temp 97.5 °F (36.4 °C) (Oral)   Resp 17   Ht 172.7 cm (68\")   Wt 53.5 kg (118 lb)   SpO2 97%   BMI 17.94 kg/m²     Physical Exam  Vitals and nursing note reviewed.   Constitutional:       General: He is not in acute distress.     Appearance: Normal appearance. He is normal weight. He is not ill-appearing or toxic-appearing.   HENT:      Head: Normocephalic and atraumatic.      Comments: NG tube in place  Cardiovascular:      Rate and Rhythm: Normal rate and regular rhythm.      Pulses: Normal pulses.      Heart sounds: Normal heart sounds.   Pulmonary:      Effort: Pulmonary effort is normal. No respiratory distress.      Breath sounds: Normal breath sounds.   Abdominal:      General: Abdomen is flat. Bowel sounds are normal. There is no distension.      Palpations: Abdomen is soft. There is no mass.      Tenderness: There is no abdominal tenderness. There is no guarding or rebound.      Hernia: No hernia is present.   Neurological:      General: No focal deficit present.      Mental Status: He is alert and oriented to person, place, and time. Mental status is at baseline.     Lab  I have personally reviewed most recent cardiac tracings, lab results, and radiology images and interpretations and agree with findings.    Lab Results   Component Value Date    WBC 7.33 08/24/2024    HGB 14.7 08/24/2024    HGB 14.3 08/23/2024    HGB 13.6 05/21/2024    MCV 91.4 08/24/2024     08/24/2024    INR 1.18 (H) 05/17/2024    INR 1.2 (H) 11/06/2023    INR 1.4 (H) 08/29/2023 "    INR 1.01 11/01/2017       Lab Results   Component Value Date    GLUCOSE 73 08/24/2024    BUN 8 08/24/2024    CREATININE 0.67 (L) 08/24/2024    EGFRIFNONA 132 04/17/2020    BCR 11.9 08/24/2024     08/24/2024    K 4.1 08/24/2024    CO2 23.0 08/24/2024    CALCIUM 8.7 08/24/2024    ALBUMIN 4.0 08/23/2024    ALKPHOS 77 08/23/2024    BILITOT 0.6 08/23/2024    BILIDIR <0.2 04/19/2023    ALT <5 08/23/2024    AST 20 08/23/2024     Assessment:      SBO (small bowel obstruction)    Acute nausea and vomiting, possible hematemesis   Abdominal pain   Gastric and duodenal distention   Constipation, aggravated by opiates   History of partial small bowel obstruction in May 2024, secondary to adhesions   Parkinson's disease    Plan:  Patient clinically improving.  Abdominal exam markedly improved from prior day.  >>> Continue NG tube to low wall suction  >>> Oral care and swabs  >>> Continue PPI  >>> Continue methylnaltrexone  >>> Plan for upper GI series on Monday; pending clinical course, can consider EGD following upper GI series.    YAMILET Mcfarlane  08/24/24  16:06 EDT

## 2024-08-24 NOTE — PROGRESS NOTES
[facility] Medicine Services  PROGRESS NOTE    Patient Name: Flaco Roque II  : 1945  MRN: [id number]    Date of Admission: 2024  Primary Care Physician: Provider, No Known    Subjective   Subjective     CC:  F/u PSBO    HPI:  Patient sitting up in bedside chair. Says he was able to walk the halls today. Denies nausea, states he is passing quite a bit of flatus. Still not BM. Complains of sore throat related to NGT      Objective   Objective     Vital Signs:   Temp:  [97 °F (36.1 °C)-98.3 °F (36.8 °C)] 98.3 °F (36.8 °C)  Heart Rate:  [56-87] 62  Resp:  [12-18] 18  BP: (125-159)/(74-92) 146/82  Flow (L/min):  [1] 1     Physical Exam:  Constitutional: No acute distress, awake, alert, elderly/frail appearing male  HENT: NCAT, mucous membranes moist, NGT in place  Respiratory: Clear to auscultation bilaterally, respiratory effort normal   Cardiovascular: RRR, no murmurs, rubs, or gallops  Gastrointestinal: soft, diffusely tender to palpation, nondistended  Musculoskeletal: No bilateral ankle edema  Psychiatric: Appropriate affect, cooperative  Neurologic: Oriented x 3, speech clear, no focal deficits  Skin: No rashes      Results Reviewed:  LAB RESULTS:      Lab 24  0722 24  1117   WBC 7.33 8.32   HEMOGLOBIN 14.7 14.3   HEMATOCRIT 45.9 43.2   PLATELETS 254 272   NEUTROS ABS  --  6.63   IMMATURE GRANS (ABS)  --  0.01   LYMPHS ABS  --  0.94   MONOS ABS  --  0.60   EOS ABS  --  0.10   MCV 91.4 88.3   LACTATE  --  0.7         Lab 24  0722 24  1146 24  1128 24  1117   SODIUM 137  --   --  140   POTASSIUM 4.1  --   --  3.8   CHLORIDE 106  --   --  104   CO2 23.0  --   --  28.0   ANION GAP 8.0  --   --  8.0   BUN 8  --   --  12   CREATININE 0.67* 0.70 0.70 0.77   EGFR 95.6  --   --  91.6   GLUCOSE 73  --   --  111*   CALCIUM 8.7  --   --  9.2   TSH 1.730  --   --   --          Lab 24  1117   TOTAL PROTEIN 6.8   ALBUMIN 4.0   GLOBULIN 2.8    ALT (SGPT) <5   AST (SGOT) 20   BILIRUBIN 0.6   ALK PHOS 77   LIPASE 18                 Lab 08/23/24  1117   ABO TYPING A   RH TYPING Positive   ANTIBODY SCREEN Negative         Brief Urine Lab Results  (Last result in the past 365 days)        Color   Clarity   Blood   Leuk Est   Nitrite   Protein   CREAT   Urine HCG        08/23/24 1121 Yellow   Clear   Negative   Negative   Negative   Negative                   Microbiology Results Abnormal       None            XR Abdomen KUB    Result Date: 8/23/2024  XR ABDOMEN KUB Date of Exam: 8/23/2024 3:52 PM EDT Indication: NG tube placement Comparison: CT abdomen and pelvis 8/23/2024, KUB 5/16/2024 Findings: Tip of the nonweighted enteric tube terminates over the left upper quadrant, likely in the gastric body. The stomach is mildly distended with air.     Impression: Impression: Tip of the enteric tube terminates in the gastric body. Electronically Signed: Delia Hurtado  8/23/2024 4:10 PM EDT  Workstation ID: [id number]    CT Abdomen Pelvis With Contrast    Result Date: 8/23/2024  CT ABDOMEN PELVIS W CONTRAST Date of Exam: 8/23/2024 12:42 PM EDT Indication: generalized abd pain, vomiting, hx of sbo. Comparison: 5/16/2024 KUB. 4/26/2019 abdomen pelvis CT scan Technique: Axial CT images were obtained of the abdomen and pelvis following the uneventful intravenous administration of 85 mL Isovue-300. Reconstructed coronal and sagittal images were also obtained. Automated exposure control and iterative construction methods were used. Findings: There is diffuse bullous change of the lower lungs, which appear clear of active disease. Stomach appears markedly distended with fluid and food. Duodenum is distended along the second and third segments, nondistended at the level of the duodenal bulb and fourth portion. No obstructing lesion is seen. There appears to be a large diverticulum of the third portion of the duodenum, with no visible inflammation or extraluminal air. Small  bowel loops elsewhere and mostly normal in caliber. A couple of isolated small bowel loops at upper limits of normal size are seen in the right lower pelvis and right mid abdomen of doubtful clinical significance. The course of the redundant colon is difficult to follow on this study. There is quite extensive descending colon and sigmoid diverticulosis but no evidence to suggest diverticulitis here. There is fecal stasis within the right and transverse colon and also of the multiply redundant descending colon, with no obvious focal impaction or obstruction. No bowel wall inflammation is seen. There is postcholecystectomy dilatation of the common duct and left lobe intrahepatic ducts. Several scattered small hepatic cysts are noted. Portal, splenic and superior mesenteric veins enhance normally with contrast. Spleen is not enlarged. Pancreas, adrenal glands, and kidneys appear unremarkable for age, with small renal cysts. No ascites adenopathy or acute inflammatory focus is seen. Regarding the lower abdomen/pelvis, no intrapelvic free fluid or acute inflammatory change is seen. There is extensive and diffuse calcification of the aorta and iliac arteries. Lower pelvis is obscured by streak artifact from the patient's left hip prosthesis. Bladder appears mildly distended. No intrapelvic free fluid or inflammatory change is seen. There appears to be an infusion pump in the left mid abdominal subcutaneous tissues. No visible associated inflammatory change. Bony structures appear to be intact although there is advanced multilevel lumbar degenerative disc disease and marked scoliosis.     Impression: Impression: 1. Markedly distended stomach and distended duodenum. No visible obstructing lesion or inflammation. 2. Normal-appearing small bowel, but mildly distended colon, mostly filled with formed stool. No visible impaction or inflammation. 3. Very extensive sigmoid diverticulosis without evidence of diverticulitis. No  acute inflammatory focus is seen. 4. Moderate postcholecystectomy dilatation of the intra and extrahepatic bile ducts which appears increased from prior studies. 5. Large but intact appearing duodenal diverticulum incidentally noted. Electronically Signed: Vlad Blake MD  8/23/2024 1:11 PM EDT  Workstation ID: [id number]     Results for orders placed during the hospital encounter of 08/24/23    Adult Transthoracic Echo Complete W/ Cont if Necessary Per Protocol    Interpretation Summary    Left ventricular ejection fraction appears to be 56 - 60%.    The left atrial cavity is mild to moderately dilated    There is mild to moderate thickening of the aortic valve    Mild to moderate tricuspid valve regurgitation is present. Estimated right ventricular systolic pressure from tricuspid regurgitation is mildly elevated (35-45 mmHg).    There is a trivial pericardial effusion.    Patient noted to be in atrial fibrillation with elevated rates during the study.      Current medications:  Scheduled Meds:[Held by provider] apixaban, 5 mg, Oral, Q12H  budesonide-formoterol, 2 puff, Inhalation, BID - RT   And  tiotropium bromide monohydrate, 2 puff, Inhalation, Daily - RT  carbidopa-levodopa, 1 tablet, Oral, 4x Daily  carbidopa-levodopa CR, 1 tablet, Oral, BID  finasteride, 5 mg, Oral, Daily  ipratropium-albuterol, 3 mL, Nebulization, TID  methylnaltrexone, 8 mg, Subcutaneous, Every Other Day  metoprolol succinate XL, 50 mg, Oral, Daily  montelukast, 10 mg, Oral, Nightly  pantoprazole, 40 mg, Intravenous, BID AC  QUEtiapine, 25 mg, Oral, Q PM  sodium chloride, 10 mL, Intravenous, Q12H  tamsulosin, 0.4 mg, Oral, Nightly      Continuous Infusions:lactated ringers, 75 mL/hr, Last Rate: 75 mL/hr (08/24/24 0502)  pain,       PRN Meds:.  acetaminophen **OR** acetaminophen **OR** acetaminophen    senna-docusate sodium **AND** polyethylene glycol **AND** bisacodyl **AND** bisacodyl    HYDROmorphone **AND** naloxone    ondansetron ODT  **OR** ondansetron    phenol    sodium chloride    sodium chloride    Assessment & Plan   Assessment & Plan     Active Hospital Problems    Diagnosis  POA    SBO (small bowel obstruction) [K56.609]  Yes      Resolved Hospital Problems   No resolved problems to display.        Brief Hospital Course to date:  Flaco Roque II is a 78 y.o. male  with history of atrial fibrillation on eliquis, Parkinson's disease, chronic back pain, COPD, ABRIL, HTN, HLD, BPH, thyroid disease, cholecystectomy, paraesophageal hernia repair with mesh and fundoplication (1/9/2024, Dr Noble), subsequent partial bowel obstruction s/p Ex-lap (5/16/24, Dr Joseph), who presented with approx 5 weeks of abdominal pain, constipation, and nausea/vomiting.     PSBO w/possible Enteritis  - CT shows stomach and duodenal distention  - continue NGT decompression, NPO, IVF  - general surgery following, recommend conservative management for now, Plan GI consult for possible EGD  - continue bowel regimen   - consider SBFT   - PRN pain control      Atrial fibrillation  - hold eliquis in case surgical intervention required     Parkinson's  - continue sinemet     COPD  - continue home nebs/inhalers     ABRIL  - cpap intolerant     HTN     HLD     Chronic back pain  - pain pump     Anxiety  - patient told nursing staff he no longer takes xanax     Expected Discharge Location and Transportation: Home  Expected Discharge   Expected discharge date/ time has not been documented.     VTE Prophylaxis:  Pharmacologic & mechanical VTE prophylaxis orders are present.         AM-PAC 6 Clicks Score (PT): 20 (08/24/24 0905)    CODE STATUS:   Code Status and Medical Interventions: CPR (Attempt to Resuscitate); Full Support   Ordered at: 08/23/24 5232     Level Of Support Discussed With:    Patient     Code Status (Patient has no pulse and is not breathing):    CPR (Attempt to Resuscitate)     Medical Interventions (Patient has pulse or is breathing):    Full Support        Yanira Salazar,   08/24/24

## 2024-08-25 PROCEDURE — 99232 SBSQ HOSP IP/OBS MODERATE 35: CPT | Performed by: INTERNAL MEDICINE

## 2024-08-25 PROCEDURE — 25010000002 HYDROMORPHONE PER 4 MG: Performed by: INTERNAL MEDICINE

## 2024-08-25 PROCEDURE — 25810000003 LACTATED RINGERS PER 1000 ML: Performed by: INTERNAL MEDICINE

## 2024-08-25 PROCEDURE — 94761 N-INVAS EAR/PLS OXIMETRY MLT: CPT

## 2024-08-25 PROCEDURE — 25010000002 METOCLOPRAMIDE PER 10 MG: Performed by: SURGERY

## 2024-08-25 PROCEDURE — 94664 DEMO&/EVAL PT USE INHALER: CPT

## 2024-08-25 PROCEDURE — 94799 UNLISTED PULMONARY SVC/PX: CPT

## 2024-08-25 PROCEDURE — 25010000002 METHYLNALTREXONE 12 MG/0.6ML SOLUTION: Performed by: PHYSICIAN ASSISTANT

## 2024-08-25 RX ORDER — BISACODYL 5 MG/1
10 TABLET, DELAYED RELEASE ORAL DAILY
Status: DISCONTINUED | OUTPATIENT
Start: 2024-08-25 | End: 2024-08-28 | Stop reason: HOSPADM

## 2024-08-25 RX ORDER — METOCLOPRAMIDE HYDROCHLORIDE 5 MG/ML
10 INJECTION INTRAMUSCULAR; INTRAVENOUS EVERY 6 HOURS
Status: DISCONTINUED | OUTPATIENT
Start: 2024-08-25 | End: 2024-08-27

## 2024-08-25 RX ORDER — METOPROLOL TARTRATE 50 MG
50 TABLET ORAL EVERY 12 HOURS SCHEDULED
Status: DISCONTINUED | OUTPATIENT
Start: 2024-08-25 | End: 2024-08-28 | Stop reason: HOSPADM

## 2024-08-25 RX ORDER — DOCUSATE SODIUM 50 MG/5 ML
100 LIQUID (ML) ORAL 2 TIMES DAILY
Status: DISCONTINUED | OUTPATIENT
Start: 2024-08-25 | End: 2024-08-28

## 2024-08-25 RX ORDER — DOCUSATE SODIUM 100 MG/1
100 CAPSULE, LIQUID FILLED ORAL 2 TIMES DAILY
Status: DISCONTINUED | OUTPATIENT
Start: 2024-08-25 | End: 2024-08-25 | Stop reason: ALTCHOICE

## 2024-08-25 RX ORDER — BISACODYL 10 MG
10 SUPPOSITORY, RECTAL RECTAL EVERY 8 HOURS
Status: DISCONTINUED | OUTPATIENT
Start: 2024-08-25 | End: 2024-08-27

## 2024-08-25 RX ADMIN — HYDROMORPHONE HYDROCHLORIDE 0.25 MG: 1 INJECTION, SOLUTION INTRAMUSCULAR; INTRAVENOUS; SUBCUTANEOUS at 21:56

## 2024-08-25 RX ADMIN — DOCUSATE SODIUM 100 MG: 50 LIQUID ORAL at 10:04

## 2024-08-25 RX ADMIN — TAMSULOSIN HYDROCHLORIDE 0.4 MG: 0.4 CAPSULE ORAL at 20:14

## 2024-08-25 RX ADMIN — Medication 10 ML: at 20:16

## 2024-08-25 RX ADMIN — HYDROMORPHONE HYDROCHLORIDE 0.25 MG: 1 INJECTION, SOLUTION INTRAMUSCULAR; INTRAVENOUS; SUBCUTANEOUS at 06:37

## 2024-08-25 RX ADMIN — METOPROLOL TARTRATE 50 MG: 50 TABLET ORAL at 10:04

## 2024-08-25 RX ADMIN — CARBIDOPA AND LEVODOPA 1 TABLET: 50; 200 TABLET, EXTENDED RELEASE ORAL at 20:14

## 2024-08-25 RX ADMIN — BUDESONIDE AND FORMOTEROL FUMARATE DIHYDRATE 2 PUFF: 160; 4.5 AEROSOL RESPIRATORY (INHALATION) at 19:23

## 2024-08-25 RX ADMIN — ACETAMINOPHEN 650 MG: 650 SOLUTION ORAL at 07:26

## 2024-08-25 RX ADMIN — METOCLOPRAMIDE 10 MG: 5 INJECTION, SOLUTION INTRAMUSCULAR; INTRAVENOUS at 20:14

## 2024-08-25 RX ADMIN — CARBIDOPA AND LEVODOPA 1 TABLET: 25; 100 TABLET ORAL at 11:55

## 2024-08-25 RX ADMIN — METHYLNALTREXONE BROMIDE 8 MG: 12 INJECTION, SOLUTION SUBCUTANEOUS at 11:57

## 2024-08-25 RX ADMIN — FINASTERIDE 5 MG: 5 TABLET, FILM COATED ORAL at 10:04

## 2024-08-25 RX ADMIN — BISACODYL 10 MG: 5 TABLET, COATED ORAL at 10:04

## 2024-08-25 RX ADMIN — CARBIDOPA AND LEVODOPA 1 TABLET: 25; 100 TABLET ORAL at 18:10

## 2024-08-25 RX ADMIN — BISACODYL 10 MG: 10 SUPPOSITORY RECTAL at 10:04

## 2024-08-25 RX ADMIN — TIOTROPIUM BROMIDE INHALATION SPRAY 2 PUFF: 3.12 SPRAY, METERED RESPIRATORY (INHALATION) at 08:33

## 2024-08-25 RX ADMIN — DOCUSATE SODIUM 100 MG: 50 LIQUID ORAL at 20:14

## 2024-08-25 RX ADMIN — SODIUM CHLORIDE, POTASSIUM CHLORIDE, SODIUM LACTATE AND CALCIUM CHLORIDE 75 ML/HR: 600; 310; 30; 20 INJECTION, SOLUTION INTRAVENOUS at 18:15

## 2024-08-25 RX ADMIN — METOCLOPRAMIDE 10 MG: 5 INJECTION, SOLUTION INTRAMUSCULAR; INTRAVENOUS at 18:10

## 2024-08-25 RX ADMIN — CARBIDOPA AND LEVODOPA 1 TABLET: 25; 100 TABLET ORAL at 07:26

## 2024-08-25 RX ADMIN — PANTOPRAZOLE SODIUM 40 MG: 40 INJECTION, POWDER, LYOPHILIZED, FOR SOLUTION INTRAVENOUS at 17:30

## 2024-08-25 RX ADMIN — MONTELUKAST 10 MG: 10 TABLET, FILM COATED ORAL at 20:14

## 2024-08-25 RX ADMIN — IPRATROPIUM BROMIDE AND ALBUTEROL SULFATE 3 ML: 2.5; .5 SOLUTION RESPIRATORY (INHALATION) at 19:23

## 2024-08-25 RX ADMIN — PANTOPRAZOLE SODIUM 40 MG: 40 INJECTION, POWDER, LYOPHILIZED, FOR SOLUTION INTRAVENOUS at 06:32

## 2024-08-25 RX ADMIN — METOCLOPRAMIDE 10 MG: 5 INJECTION, SOLUTION INTRAMUSCULAR; INTRAVENOUS at 10:04

## 2024-08-25 RX ADMIN — IPRATROPIUM BROMIDE AND ALBUTEROL SULFATE 3 ML: 2.5; .5 SOLUTION RESPIRATORY (INHALATION) at 08:33

## 2024-08-25 RX ADMIN — QUETIAPINE FUMARATE 25 MG: 25 TABLET ORAL at 20:14

## 2024-08-25 RX ADMIN — CARBIDOPA AND LEVODOPA 1 TABLET: 50; 200 TABLET, EXTENDED RELEASE ORAL at 10:24

## 2024-08-25 RX ADMIN — BUDESONIDE AND FORMOTEROL FUMARATE DIHYDRATE 2 PUFF: 160; 4.5 AEROSOL RESPIRATORY (INHALATION) at 08:33

## 2024-08-25 RX ADMIN — CARBIDOPA AND LEVODOPA 1 TABLET: 25; 100 TABLET ORAL at 20:14

## 2024-08-25 RX ADMIN — METOPROLOL TARTRATE 50 MG: 50 TABLET ORAL at 20:14

## 2024-08-25 RX ADMIN — BISACODYL 10 MG: 10 SUPPOSITORY RECTAL at 23:29
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
## 2024-08-25 NOTE — PROGRESS NOTES
[facility] Medicine Services  PROGRESS NOTE    Patient Name: Flaco Roque II  : 1945  MRN: [id number]    Date of Admission: 2024  Primary Care Physician: Provider, No Known    Subjective   Subjective     CC:  F/u PSBO    HPI:  Patient sitting up in bed. States his tremors are worse this morning because he has not had his sinemet. No BM yet but abdominal pain has significantly improved overnight.    Objective   Objective     Vital Signs:   Temp:  [96.5 °F (35.8 °C)-97.7 °F (36.5 °C)] 97.7 °F (36.5 °C)  Heart Rate:  [58-81] 78  Resp:  [16-18] 16  BP: (121-161)/(63-97) 150/77  Flow (L/min):  [1] 1     Physical Exam:  Constitutional: No acute distress, awake, alert, elderly/frail appearing male  HENT: NCAT, mucous membranes moist, NGT in place  Respiratory: Clear to auscultation bilaterally, respiratory effort normal   Cardiovascular: RRR, no murmurs, rubs, or gallops  Gastrointestinal: soft, tenderness much improved today, nondistended  Musculoskeletal: No bilateral ankle edema  Psychiatric: Appropriate affect, cooperative  Neurologic: Oriented x 3, speech clear, no focal deficits  Skin: No rashes      Results Reviewed:  LAB RESULTS:      Lab 24  0722 24  1117   WBC 7.33 8.32   HEMOGLOBIN 14.7 14.3   HEMATOCRIT 45.9 43.2   PLATELETS 254 272   NEUTROS ABS  --  6.63   IMMATURE GRANS (ABS)  --  0.01   LYMPHS ABS  --  0.94   MONOS ABS  --  0.60   EOS ABS  --  0.10   MCV 91.4 88.3   LACTATE  --  0.7         Lab 24  0722 24  1146 24  1128 24  1117   SODIUM 137  --   --  140   POTASSIUM 4.1  --   --  3.8   CHLORIDE 106  --   --  104   CO2 23.0  --   --  28.0   ANION GAP 8.0  --   --  8.0   BUN 8  --   --  12   CREATININE 0.67* 0.70 0.70 0.77   EGFR 95.6  --   --  91.6   GLUCOSE 73  --   --  111*   CALCIUM 8.7  --   --  9.2   TSH 1.730  --   --   --          Lab 24  1117   TOTAL PROTEIN 6.8   ALBUMIN 4.0   GLOBULIN 2.8   ALT (SGPT) <5   AST  (SGOT) 20   BILIRUBIN 0.6   ALK PHOS 77   LIPASE 18                 Lab 08/23/24  1117   ABO TYPING A   RH TYPING Positive   ANTIBODY SCREEN Negative         Brief Urine Lab Results  (Last result in the past 365 days)        Color   Clarity   Blood   Leuk Est   Nitrite   Protein   CREAT   Urine HCG        08/23/24 1121 Yellow   Clear   Negative   Negative   Negative   Negative                   Microbiology Results Abnormal       None            XR Abdomen KUB    Result Date: 8/23/2024  XR ABDOMEN KUB Date of Exam: 8/23/2024 3:52 PM EDT Indication: NG tube placement Comparison: CT abdomen and pelvis 8/23/2024, KUB 5/16/2024 Findings: Tip of the nonweighted enteric tube terminates over the left upper quadrant, likely in the gastric body. The stomach is mildly distended with air.     Impression: Impression: Tip of the enteric tube terminates in the gastric body. Electronically Signed: Delia Hurtado  8/23/2024 4:10 PM EDT  Workstation ID: [id number]    CT Abdomen Pelvis With Contrast    Result Date: 8/23/2024  CT ABDOMEN PELVIS W CONTRAST Date of Exam: 8/23/2024 12:42 PM EDT Indication: generalized abd pain, vomiting, hx of sbo. Comparison: 5/16/2024 KUB. 4/26/2019 abdomen pelvis CT scan Technique: Axial CT images were obtained of the abdomen and pelvis following the uneventful intravenous administration of 85 mL Isovue-300. Reconstructed coronal and sagittal images were also obtained. Automated exposure control and iterative construction methods were used. Findings: There is diffuse bullous change of the lower lungs, which appear clear of active disease. Stomach appears markedly distended with fluid and food. Duodenum is distended along the second and third segments, nondistended at the level of the duodenal bulb and fourth portion. No obstructing lesion is seen. There appears to be a large diverticulum of the third portion of the duodenum, with no visible inflammation or extraluminal air. Small bowel loops elsewhere  and mostly normal in caliber. A couple of isolated small bowel loops at upper limits of normal size are seen in the right lower pelvis and right mid abdomen of doubtful clinical significance. The course of the redundant colon is difficult to follow on this study. There is quite extensive descending colon and sigmoid diverticulosis but no evidence to suggest diverticulitis here. There is fecal stasis within the right and transverse colon and also of the multiply redundant descending colon, with no obvious focal impaction or obstruction. No bowel wall inflammation is seen. There is postcholecystectomy dilatation of the common duct and left lobe intrahepatic ducts. Several scattered small hepatic cysts are noted. Portal, splenic and superior mesenteric veins enhance normally with contrast. Spleen is not enlarged. Pancreas, adrenal glands, and kidneys appear unremarkable for age, with small renal cysts. No ascites adenopathy or acute inflammatory focus is seen. Regarding the lower abdomen/pelvis, no intrapelvic free fluid or acute inflammatory change is seen. There is extensive and diffuse calcification of the aorta and iliac arteries. Lower pelvis is obscured by streak artifact from the patient's left hip prosthesis. Bladder appears mildly distended. No intrapelvic free fluid or inflammatory change is seen. There appears to be an infusion pump in the left mid abdominal subcutaneous tissues. No visible associated inflammatory change. Bony structures appear to be intact although there is advanced multilevel lumbar degenerative disc disease and marked scoliosis.     Impression: Impression: 1. Markedly distended stomach and distended duodenum. No visible obstructing lesion or inflammation. 2. Normal-appearing small bowel, but mildly distended colon, mostly filled with formed stool. No visible impaction or inflammation. 3. Very extensive sigmoid diverticulosis without evidence of diverticulitis. No acute inflammatory focus  is seen. 4. Moderate postcholecystectomy dilatation of the intra and extrahepatic bile ducts which appears increased from prior studies. 5. Large but intact appearing duodenal diverticulum incidentally noted. Electronically Signed: Vlad Blake MD  8/23/2024 1:11 PM EDT  Workstation ID: [id number]     Results for orders placed during the hospital encounter of 08/24/23    Adult Transthoracic Echo Complete W/ Cont if Necessary Per Protocol    Interpretation Summary    Left ventricular ejection fraction appears to be 56 - 60%.    The left atrial cavity is mild to moderately dilated    There is mild to moderate thickening of the aortic valve    Mild to moderate tricuspid valve regurgitation is present. Estimated right ventricular systolic pressure from tricuspid regurgitation is mildly elevated (35-45 mmHg).    There is a trivial pericardial effusion.    Patient noted to be in atrial fibrillation with elevated rates during the study.      Current medications:  Scheduled Meds:[Held by provider] apixaban, 5 mg, Oral, Q12H  bisacodyl, 10 mg, Oral, Daily  bisacodyl, 10 mg, Rectal, Q8H  budesonide-formoterol, 2 puff, Inhalation, BID - RT   And  tiotropium bromide monohydrate, 2 puff, Inhalation, Daily - RT  carbidopa-levodopa, 1 tablet, Oral, 4x Daily  carbidopa-levodopa CR, 1 tablet, Oral, BID  docusate sodium, 100 mg, Nasogastric, BID  finasteride, 5 mg, Oral, Daily  ipratropium-albuterol, 3 mL, Nebulization, TID  methylnaltrexone, 8 mg, Subcutaneous, Every Other Day  metoclopramide, 10 mg, Intravenous, Q6H  metoprolol tartrate, 50 mg, Oral, Q12H  montelukast, 10 mg, Oral, Nightly  pantoprazole, 40 mg, Intravenous, BID AC  QUEtiapine, 25 mg, Oral, Q PM  sodium chloride, 10 mL, Intravenous, Q12H  tamsulosin, 0.4 mg, Oral, Nightly      Continuous Infusions:lactated ringers, 75 mL/hr, Last Rate: 75 mL/hr (08/24/24 0502)  pain,       PRN Meds:.  acetaminophen **OR** acetaminophen **OR** acetaminophen    HYDROmorphone **AND**  naloxone    ondansetron ODT **OR** ondansetron    phenol    senna-docusate sodium **AND** polyethylene glycol **AND** [DISCONTINUED] bisacodyl **AND** [DISCONTINUED] bisacodyl    sodium chloride    sodium chloride    Assessment & Plan   Assessment & Plan     Active Hospital Problems    Diagnosis  POA    SBO (small bowel obstruction) [K56.609]  Yes      Resolved Hospital Problems   No resolved problems to display.        Brief Hospital Course to date:  Flaco Roque II is a 78 y.o. male  with history of atrial fibrillation on eliquis, Parkinson's disease, chronic back pain, COPD, ABRIL, HTN, HLD, BPH, thyroid disease, cholecystectomy, paraesophageal hernia repair with mesh and fundoplication (1/9/2024, Dr Noble), subsequent partial bowel obstruction s/p Ex-lap (5/16/24, Dr Joseph), who presented with approx 5 weeks of abdominal pain, constipation, and nausea/vomiting.     PSBO w/possible Enteritis  - continue NGT decompression, NPO, IVF  - general surgery following, recommend conservative management for now with aggressive bowel regimen  - GI following, planning upper GI series tomorrow, pending results considering EGD  - PRN pain control      Atrial fibrillation  - hold eliquis for now in case surgical intervention or procedure required     Parkinson's  - continue sinemet per home dose schedule     COPD  - continue home nebs/inhalers     ABRIL  - cpap intolerant     HTN     HLD     Chronic back pain  - pain pump     Anxiety  - patient told nursing staff he no longer takes xanax     Expected Discharge Location and Transportation: Home  Expected Discharge   Expected discharge date/ time has not been documented.     VTE Prophylaxis:  Pharmacologic & mechanical VTE prophylaxis orders are present.         AM-PAC 6 Clicks Score (PT): 20 (08/24/24 1763)    CODE STATUS:   Code Status and Medical Interventions: CPR (Attempt to Resuscitate); Full Support   Ordered at: 08/23/24 5024     Level Of Support Discussed With:     Patient     Code Status (Patient has no pulse and is not breathing):    CPR (Attempt to Resuscitate)     Medical Interventions (Patient has pulse or is breathing):    Full Support       Yanira Salazar,   08/25/24

## 2024-08-25 NOTE — PROGRESS NOTES
Malnutrition Severity Assessment    Patient Name:  Flaco Roque II  YOB: 1945  MRN: [id number]  Admit Date:  8/23/2024    Patient meets criteria for : Severe Malnutrition    Comments:  Pt meets criteria for severe malnutrition in the context of acute illness indicated by po intake </=50% EEN x >/=5 days, severe muscle wasting and subcutaneous fat loss. Of note, acute exacerbation of chronic malnutrition.     Malnutrition Severity Assessment  Malnutrition Type: Acute Disease or Injury - Related Malnutrition  Malnutrition Type (Last 8 Hours)       Malnutrition Severity Assessment       Row Name 08/25/24 1405       Malnutrition Severity Assessment    Malnutrition Type Acute Disease or Injury - Related Malnutrition      Row Name 08/25/24 1405       Insufficient Energy Intake     Insufficient Energy Intake Findings Severe    Insufficient Energy Intake  < or equal to 50% of est. energy requirement for > or equal to 5d)      Row Name 08/25/24 1401       Muscle Loss    Loss of Muscle Mass Findings Severe    Clavicle Bone Region Severe - protruding prominent bone    Acromion Bone Region Severe - squared shoulders, bones, and acromion process protrusion prominent    Dorsal Hand Region Severe - prominent depression    Patellar Region Severe - prominent bone, square looking, very little muscle definition    Anterior Thigh Region Severe - line/depression along thigh, obviously thin    Posterior Calf Region Severe - thin with very little definition/firmness      Row Name 08/25/24 140       Fat Loss    Subcutaneous Fat Loss Findings Severe    Upper Arm Region Severe - mostly skin, very little space between folds, fingers touch      Row Name 08/25/24 1402       Criteria Met (Must meet criteria for severity in at least 2 of these categories: M Wasting, Fat Loss, Fluid, Secondary Signs, Wt. Status, Intake)    Patient meets criteria for  Severe Malnutrition                    Electronically signed by:  Lidia  MS Srinath,RD,LD  08/25/24 14:06 EDT

## 2024-08-25 NOTE — PROGRESS NOTES
"Patient Name:  Flaco Roque II  YOB: 1945  [id number]    Surgery Progress Note    Date of visit: 8/25/2024    Subjective   Subjective: Denies abdominal pain, passing a lot of flatus. Main concerns are his Parkinson's meds.         Objective     Objective:     /78 (BP Location: Left arm, Patient Position: Lying)   Pulse 71   Temp 97.7 °F (36.5 °C) (Oral)   Resp 18   Ht 172.7 cm (68\")   Wt 53.5 kg (118 lb)   SpO2 96%   BMI 17.94 kg/m²     Intake/Output Summary (Last 24 hours) at 8/25/2024 0804  Last data filed at 8/25/2024 0600  Gross per 24 hour   Intake --   Output 1825 ml   Net -1825 ml       CV:  Rhythm  regular and rate regular   L:  Clear  to auscultation bilaterally   Abd:  Bowel sounds positive , soft, nontender, nondistended  Ext:  No cyanosis, clubbing, edema    Recent labs that are back at this time have been reviewed.            Assessment/ Plan:      Visit Diagnoses       Duodenal obstruction    -  Primary- Doubt a true duodenal or bowel obstruction, appears more consistent with constipation on CT, which he has had chronically. Expand bowel regimen including enemas and suppositories. Will follow.     Nausea and vomiting, unspecified vomiting type        Coffee ground emesis        History of small bowel obstruction                 Active Hospital Problems    Diagnosis  POA    SBO (small bowel obstruction) [K56.609]  Yes      Resolved Hospital Problems   No resolved problems to display.              Cj Joseph MD  8/25/2024  08:04 EDT      "

## 2024-08-26 ENCOUNTER — APPOINTMENT (OUTPATIENT)
Dept: GENERAL RADIOLOGY | Facility: HOSPITAL | Age: 79
DRG: 389 | End: 2024-08-26
Payer: MEDICARE

## 2024-08-26 PROCEDURE — 94799 UNLISTED PULMONARY SVC/PX: CPT

## 2024-08-26 PROCEDURE — 74240 X-RAY XM UPR GI TRC 1CNTRST: CPT

## 2024-08-26 PROCEDURE — 97530 THERAPEUTIC ACTIVITIES: CPT

## 2024-08-26 PROCEDURE — 94664 DEMO&/EVAL PT USE INHALER: CPT

## 2024-08-26 PROCEDURE — 99232 SBSQ HOSP IP/OBS MODERATE 35: CPT | Performed by: NURSE PRACTITIONER

## 2024-08-26 PROCEDURE — 97165 OT EVAL LOW COMPLEX 30 MIN: CPT

## 2024-08-26 PROCEDURE — 99232 SBSQ HOSP IP/OBS MODERATE 35: CPT | Performed by: INTERNAL MEDICINE

## 2024-08-26 PROCEDURE — 25010000002 HYDROMORPHONE PER 4 MG: Performed by: INTERNAL MEDICINE

## 2024-08-26 PROCEDURE — 94761 N-INVAS EAR/PLS OXIMETRY MLT: CPT

## 2024-08-26 PROCEDURE — 74248 X-RAY SM INT F-THRU STD: CPT

## 2024-08-26 PROCEDURE — 0 DIATRIZOATE MEGLUMINE & SODIUM PER 1 ML: Performed by: INTERNAL MEDICINE

## 2024-08-26 PROCEDURE — 25010000002 METOCLOPRAMIDE PER 10 MG: Performed by: SURGERY

## 2024-08-26 RX ORDER — LUBIPROSTONE 24 UG/1
24 CAPSULE ORAL 2 TIMES DAILY WITH MEALS
Status: DISCONTINUED | OUTPATIENT
Start: 2024-08-27 | End: 2024-08-28

## 2024-08-26 RX ORDER — ALBUTEROL SULFATE 0.83 MG/ML
2.5 SOLUTION RESPIRATORY (INHALATION)
Status: DISCONTINUED | OUTPATIENT
Start: 2024-08-26 | End: 2024-08-28 | Stop reason: HOSPADM

## 2024-08-26 RX ADMIN — CARBIDOPA AND LEVODOPA 1 TABLET: 50; 200 TABLET, EXTENDED RELEASE ORAL at 22:27

## 2024-08-26 RX ADMIN — METOCLOPRAMIDE 10 MG: 5 INJECTION, SOLUTION INTRAMUSCULAR; INTRAVENOUS at 22:25

## 2024-08-26 RX ADMIN — BUDESONIDE AND FORMOTEROL FUMARATE DIHYDRATE 2 PUFF: 160; 4.5 AEROSOL RESPIRATORY (INHALATION) at 07:19

## 2024-08-26 RX ADMIN — METOPROLOL TARTRATE 50 MG: 50 TABLET ORAL at 12:16

## 2024-08-26 RX ADMIN — TAMSULOSIN HYDROCHLORIDE 0.4 MG: 0.4 CAPSULE ORAL at 22:26

## 2024-08-26 RX ADMIN — FINASTERIDE 5 MG: 5 TABLET, FILM COATED ORAL at 13:21

## 2024-08-26 RX ADMIN — CARBIDOPA AND LEVODOPA 1 TABLET: 25; 100 TABLET ORAL at 22:25

## 2024-08-26 RX ADMIN — BUDESONIDE AND FORMOTEROL FUMARATE DIHYDRATE 2 PUFF: 160; 4.5 AEROSOL RESPIRATORY (INHALATION) at 21:06

## 2024-08-26 RX ADMIN — PANTOPRAZOLE SODIUM 40 MG: 40 INJECTION, POWDER, LYOPHILIZED, FOR SOLUTION INTRAVENOUS at 18:30

## 2024-08-26 RX ADMIN — METOPROLOL TARTRATE 50 MG: 50 TABLET ORAL at 22:26

## 2024-08-26 RX ADMIN — HYDROMORPHONE HYDROCHLORIDE 0.25 MG: 1 INJECTION, SOLUTION INTRAMUSCULAR; INTRAVENOUS; SUBCUTANEOUS at 19:48

## 2024-08-26 RX ADMIN — CARBIDOPA AND LEVODOPA 1 TABLET: 25; 100 TABLET ORAL at 18:31

## 2024-08-26 RX ADMIN — DIATRIZOATE MEGLUMINE AND DIATRIZOATE SODIUM 240 ML: 660; 100 LIQUID ORAL; RECTAL at 09:48

## 2024-08-26 RX ADMIN — TIOTROPIUM BROMIDE INHALATION SPRAY 2 PUFF: 3.12 SPRAY, METERED RESPIRATORY (INHALATION) at 07:19

## 2024-08-26 RX ADMIN — Medication 10 ML: at 19:49

## 2024-08-26 RX ADMIN — METOCLOPRAMIDE 10 MG: 5 INJECTION, SOLUTION INTRAMUSCULAR; INTRAVENOUS at 15:34

## 2024-08-26 RX ADMIN — ALBUTEROL SULFATE 2.5 MG: 2.5 SOLUTION RESPIRATORY (INHALATION) at 21:06

## 2024-08-26 RX ADMIN — CARBIDOPA AND LEVODOPA 1 TABLET: 25; 100 TABLET ORAL at 12:16

## 2024-08-26 RX ADMIN — METOCLOPRAMIDE 10 MG: 5 INJECTION, SOLUTION INTRAMUSCULAR; INTRAVENOUS at 02:22

## 2024-08-26 RX ADMIN — QUETIAPINE FUMARATE 25 MG: 25 TABLET ORAL at 22:25

## 2024-08-26 RX ADMIN — IPRATROPIUM BROMIDE AND ALBUTEROL SULFATE 3 ML: 2.5; .5 SOLUTION RESPIRATORY (INHALATION) at 07:19

## 2024-08-26 RX ADMIN — CARBIDOPA AND LEVODOPA 1 TABLET: 50; 200 TABLET, EXTENDED RELEASE ORAL at 13:21

## 2024-08-26 RX ADMIN — MONTELUKAST 10 MG: 10 TABLET, FILM COATED ORAL at 22:25

## 2024-08-26 NOTE — PLAN OF CARE
Goal Outcome Evaluation:  Plan of Care Reviewed With: patient           Outcome Evaluation: Pt presents below baseline with self care d/t weakness and decr balance.  Pt min A LBD,  max A toileting hygiene,  CGA BSC to chair with RW.  OT will follow to advance pt toward PLOF.  Recommend home with assist and HHOT pending progress.      Anticipated Discharge Disposition (OT): home with assist, home with home health

## 2024-08-26 NOTE — PROGRESS NOTES
"Patient Name:  Flaco Roque II  YOB: 1945  [id number]    Surgery Progress Note    Date of visit: 8/26/2024    Subjective   Subjective: Feeling much better. Had a large BM yesterday. Denies abdominal pain.         Objective     Objective:     /77 (BP Location: Left arm, Patient Position: Lying)   Pulse 67   Temp 97.9 °F (36.6 °C) (Oral)   Resp 18   Ht 172.7 cm (68\")   Wt 53.5 kg (118 lb)   SpO2 93%   BMI 17.94 kg/m²     Intake/Output Summary (Last 24 hours) at 8/26/2024 0704  Last data filed at 8/26/2024 0500  Gross per 24 hour   Intake --   Output 1725 ml   Net -1725 ml       CV:  Rhythm  regular and rate regular   L:  Clear  to auscultation bilaterally   Abd:  Bowel sounds positive , soft, nontender  Ext:  No cyanosis, clubbing, edema    Recent labs that are back at this time have been reviewed.            Assessment/ Plan:      Visit Diagnoses       Duodenal obstruction    -  Primary- Resolving. For UGI and SBFT today. Will follow.      Nausea and vomiting, unspecified vomiting type        Coffee ground emesis        History of small bowel obstruction                 Active Hospital Problems    Diagnosis  POA    SBO (small bowel obstruction) [K56.609]  Yes      Resolved Hospital Problems   No resolved problems to display.              Cj Joseph MD  8/26/2024  07:04 EDT      "

## 2024-08-26 NOTE — CASE MANAGEMENT/SOCIAL WORK
Continued Stay Note  [facility]     Patient Name: Flaco Roque II  MRN: [id number]  Today's Date: 8/26/2024    Admit Date: 8/23/2024    Plan: Home with spouse   Discharge Plan       Row Name 08/26/24 1632       Plan    Plan Home with spouse    Patient/Family in Agreement with Plan yes    Plan Comments CM spoke to patient at bedside. His plan is home with his spouse. Spouse will likely transport. PT recommends home with assist. He denies any equipment or discharge needs at this time. CM will continue to follow.    Final Discharge Disposition Code 01 - home or self-care                   Discharge Codes    No documentation.                       Kiara Saeed RN

## 2024-08-26 NOTE — PLAN OF CARE
Goal Outcome Evaluation:  Plan of Care Reviewed With: patient        Progress: no change  Outcome Evaluation: patient with increased bowel issues today limiting activity tolerance, completed 3 sit<>stands from BSC to be cleaned secondary to BM, on third attempt patient transfered to recliner. Progress limited by weakness, faitgue, bowel issues and decreased activity tolerance. Recommend home with assist at D/C,

## 2024-08-26 NOTE — PROGRESS NOTES
"  GI Daily Progress Note  Subjective:    Chief Complaint: Follow-up obstruction    Patient resting on bedside toilet experiencing frequent BMs following upper GI series today.  Notes he is feeling overall improved.  Has had approximately 400 out from NG tube.  Nursing reports roughly 15 BMs following upper GI series.    Objective:    /96 (BP Location: Right arm, Patient Position: Lying)   Pulse 81   Temp 97.6 °F (36.4 °C) (Oral)   Resp 18   Ht 172.7 cm (68\")   Wt 53.5 kg (118 lb)   SpO2 93%   BMI 17.94 kg/m²     Physical Exam  Vitals and nursing note reviewed.   Constitutional:       General: He is not in acute distress.     Appearance: Normal appearance. He is not ill-appearing or toxic-appearing.      Comments: Pleasantly conversant, NG tube in place   Cardiovascular:      Rate and Rhythm: Normal rate and regular rhythm.      Pulses: Normal pulses.      Heart sounds: Normal heart sounds.   Pulmonary:      Effort: Pulmonary effort is normal. No respiratory distress.      Breath sounds: Normal breath sounds.   Abdominal:      General: Abdomen is flat. Bowel sounds are normal. There is no distension.      Palpations: Abdomen is soft. There is no mass.      Tenderness: There is no abdominal tenderness. There is no guarding or rebound.      Hernia: No hernia is present.   Neurological:      General: No focal deficit present.      Mental Status: He is alert and oriented to person, place, and time.   Psychiatric:         Mood and Affect: Mood normal.         Behavior: Behavior normal.         Thought Content: Thought content normal.         Judgment: Judgment normal.     Lab  I have personally reviewed most recent cardiac tracings, lab results, and radiology images and interpretations and agree with findings.    Lab Results   Component Value Date    WBC 7.33 08/24/2024    HGB 14.7 08/24/2024    HGB 14.3 08/23/2024    HGB 13.6 05/21/2024    MCV 91.4 08/24/2024     08/24/2024    INR 1.18 (H) 05/17/2024 "    INR 1.2 (H) 11/06/2023    INR 1.4 (H) 08/29/2023    INR 1.01 11/01/2017       Lab Results   Component Value Date    GLUCOSE 73 08/24/2024    BUN 8 08/24/2024    CREATININE 0.67 (L) 08/24/2024    EGFRIFNONA 132 04/17/2020    BCR 11.9 08/24/2024     08/24/2024    K 4.1 08/24/2024    CO2 23.0 08/24/2024    CALCIUM 8.7 08/24/2024    ALBUMIN 4.0 08/23/2024    ALKPHOS 77 08/23/2024    BILITOT 0.6 08/23/2024    BILIDIR <0.2 04/19/2023    ALT <5 08/23/2024    AST 20 08/23/2024 08/26/24 11:06   FL UPPER GI SINGLE CONTRAST SBFT Impression:  Upper GI series:  1. Mild gastroesophageal reflux to the level of the midesophagus.  2. Small sized sliding-type hiatal hernia.  3. Large sized duodenal diverticulum.     Small bowel follow-through: Small bowel series appeared within normal limits.   Rpt: View report in Results Review for more information    Assessment:      SBO (small bowel obstruction)    Acute nausea and vomiting, possible hematemesis, improved with NG tube, hemoglobin stable  Abdominal pain, improved following aggressive bowel regimen  Gastric and duodenal distention   Constipation, aggravated by opiates   History of partial small bowel obstruction in May 2024, secondary to adhesions   Parkinson's disease      Plan:  Patient clinically improving.  Reports feeling markedly improved from prior days though he is having frequent BMs following upper GI series this afternoon.  Upper GI series reviewed and shows evidence of GERD to the level of the mid esophagus and a small hiatal hernia and duodenal diverticulum with normal small bowel series; there does appear to be some small bowel distention as well on my review.    >>> Given frequent BMs, okay for FMS to be placed until large-volume watery output subsides secondary to upper GI series.  >>> Continue NG tube; okay for patient to have sips and chips  >>> If NG tube output remains low, okay to clamp in a.m. and trial liquids.  >>> Continue PPI  >>>  Methylnaltrexone every other day  >>> Daily miralax  >>> plan to begin Amitiza in AM    Santi Drake, YAMILET  08/26/24  17:35 EDT
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
## 2024-08-26 NOTE — PROGRESS NOTES
Nutrition Services    Patient Name:  Flaco Roque II  YOB: 1945  MRN: [id number]  Admit Date:  8/23/2024    Pt identified NPO/Clear Liquid Diet >72 hrs. NG in place to LWS. SBFT completed - results pending. Advance diet as medically appropriate. RD to monitor for diet advance. Please consult RD if nutrition support indicated.        Electronically signed by:  Zayda Johns MS,RD,LD  08/26/24 11:45 EDT
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
## 2024-08-26 NOTE — PROGRESS NOTES
[facility] Medicine Services  PROGRESS NOTE    Patient Name: Flaco Roque II  : 1945  MRN: [id number]    Date of Admission: 2024  Primary Care Physician: Provider, No Known    Subjective   Subjective     CC:  F/u PSBO    HPI:  Patient sitting up in bedside chair. Doing well. Had large BM yesterday. Son at bedside. Complains of sore throat.    Objective   Objective     Vital Signs:   Temp:  [97.5 °F (36.4 °C)-98.1 °F (36.7 °C)] 97.6 °F (36.4 °C)  Heart Rate:  [63-90] 90  Resp:  [16-20] 16  BP: (124-152)/(59-86) 130/80     Physical Exam:  Constitutional: No acute distress, awake, alert, elderly/frail appearing male  HENT: NCAT, mucous membranes moist, NGT in place  Respiratory: Clear to auscultation bilaterally, respiratory effort normal   Cardiovascular: RRR, no murmurs, rubs, or gallops  Gastrointestinal: soft, tenderness much improved today, nondistended  Musculoskeletal: No bilateral ankle edema  Psychiatric: Appropriate affect, cooperative  Neurologic: Oriented x 3, speech clear, no focal deficits  Skin: No rashes      Results Reviewed:  LAB RESULTS:      Lab 24  0722 24  1117   WBC 7.33 8.32   HEMOGLOBIN 14.7 14.3   HEMATOCRIT 45.9 43.2   PLATELETS 254 272   NEUTROS ABS  --  6.63   IMMATURE GRANS (ABS)  --  0.01   LYMPHS ABS  --  0.94   MONOS ABS  --  0.60   EOS ABS  --  0.10   MCV 91.4 88.3   LACTATE  --  0.7         Lab 24  0722 24  1146 24  1128 24  1117   SODIUM 137  --   --  140   POTASSIUM 4.1  --   --  3.8   CHLORIDE 106  --   --  104   CO2 23.0  --   --  28.0   ANION GAP 8.0  --   --  8.0   BUN 8  --   --  12   CREATININE 0.67* 0.70 0.70 0.77   EGFR 95.6  --   --  91.6   GLUCOSE 73  --   --  111*   CALCIUM 8.7  --   --  9.2   TSH 1.730  --   --   --          Lab 24  1117   TOTAL PROTEIN 6.8   ALBUMIN 4.0   GLOBULIN 2.8   ALT (SGPT) <5   AST (SGOT) 20   BILIRUBIN 0.6   ALK PHOS 77   LIPASE 18                 Lab  08/23/24  1117   ABO TYPING A   RH TYPING Positive   ANTIBODY SCREEN Negative         Brief Urine Lab Results  (Last result in the past 365 days)        Color   Clarity   Blood   Leuk Est   Nitrite   Protein   CREAT   Urine HCG        08/23/24 1121 Yellow   Clear   Negative   Negative   Negative   Negative                   Microbiology Results Abnormal       None            No radiology results from the last 24 hrs    Results for orders placed during the hospital encounter of 08/24/23    Adult Transthoracic Echo Complete W/ Cont if Necessary Per Protocol    Interpretation Summary    Left ventricular ejection fraction appears to be 56 - 60%.    The left atrial cavity is mild to moderately dilated    There is mild to moderate thickening of the aortic valve    Mild to moderate tricuspid valve regurgitation is present. Estimated right ventricular systolic pressure from tricuspid regurgitation is mildly elevated (35-45 mmHg).    There is a trivial pericardial effusion.    Patient noted to be in atrial fibrillation with elevated rates during the study.      Current medications:  Scheduled Meds:Albuterol Sulfate NEB Orderable, 2.5 mg, Nebulization, TID - RT  [Held by provider] apixaban, 5 mg, Oral, Q12H  bisacodyl, 10 mg, Oral, Daily  bisacodyl, 10 mg, Rectal, Q8H  budesonide-formoterol, 2 puff, Inhalation, BID - RT   And  tiotropium bromide monohydrate, 2 puff, Inhalation, Daily - RT  carbidopa-levodopa, 1 tablet, Oral, 4x Daily  carbidopa-levodopa CR, 1 tablet, Oral, BID  docusate sodium, 100 mg, Nasogastric, BID  finasteride, 5 mg, Oral, Daily  methylnaltrexone, 8 mg, Subcutaneous, Every Other Day  metoclopramide, 10 mg, Intravenous, Q6H  metoprolol tartrate, 50 mg, Oral, Q12H  montelukast, 10 mg, Oral, Nightly  pantoprazole, 40 mg, Intravenous, BID AC  QUEtiapine, 25 mg, Oral, Q PM  sodium chloride, 10 mL, Intravenous, Q12H  tamsulosin, 0.4 mg, Oral, Nightly      Continuous Infusions:lactated ringers, 75 mL/hr, Last  Rate: 75 mL/hr (08/25/24 1815)  pain,       PRN Meds:.  acetaminophen **OR** acetaminophen **OR** acetaminophen    HYDROmorphone **AND** naloxone    ondansetron ODT **OR** ondansetron    phenol    senna-docusate sodium **AND** polyethylene glycol **AND** [DISCONTINUED] bisacodyl **AND** [DISCONTINUED] bisacodyl    sodium chloride    sodium chloride    Assessment & Plan   Assessment & Plan     Active Hospital Problems    Diagnosis  POA    SBO (small bowel obstruction) [K56.609]  Yes      Resolved Hospital Problems   No resolved problems to display.        Brief Hospital Course to date:  Flaco Roque II is a 78 y.o. male  with history of atrial fibrillation on eliquis, Parkinson's disease, chronic back pain, COPD, ABRIL, HTN, HLD, BPH, thyroid disease, cholecystectomy, paraesophageal hernia repair with mesh and fundoplication (1/9/2024, Dr Noble), subsequent partial bowel obstruction s/p Ex-lap (5/16/24, Dr Joseph), who presented with approx 5 weeks of abdominal pain, constipation, and nausea/vomiting.     PSBO w/possible Enteritis  - continue NGT decompression, NPO, IVF  - general surgery following, recommend conservative management for now with aggressive bowel regimen, having bowel movements, planning SBFT today  - GI following, planning upper GI series today, pending results considering EGD  - PRN pain control      Atrial fibrillation  - holding eliquis for now in case surgical intervention or procedure required     Parkinson's  - continue sinemet per home dose schedule     COPD  - continue home nebs/inhalers     ABRIL  - cpap intolerant     HTN     HLD     Chronic back pain  - pain pump     Anxiety  - patient told nursing staff he no longer takes xanax     Expected Discharge Location and Transportation: Home  Expected Discharge   Expected discharge date/ time has not been documented.     VTE Prophylaxis:  Pharmacologic & mechanical VTE prophylaxis orders are present.         AM-PAC 6 Clicks Score (PT): 20  (08/24/24 3607)    CODE STATUS:   Code Status and Medical Interventions: CPR (Attempt to Resuscitate); Full Support   Ordered at: 08/23/24 4442     Level Of Support Discussed With:    Patient     Code Status (Patient has no pulse and is not breathing):    CPR (Attempt to Resuscitate)     Medical Interventions (Patient has pulse or is breathing):    Full Support       Yanira Salazar, DO  08/26/24

## 2024-08-27 ENCOUNTER — APPOINTMENT (OUTPATIENT)
Dept: GENERAL RADIOLOGY | Facility: HOSPITAL | Age: 79
DRG: 389 | End: 2024-08-27
Payer: MEDICARE

## 2024-08-27 PROCEDURE — 99232 SBSQ HOSP IP/OBS MODERATE 35: CPT | Performed by: INTERNAL MEDICINE

## 2024-08-27 PROCEDURE — 73030 X-RAY EXAM OF SHOULDER: CPT

## 2024-08-27 PROCEDURE — 94799 UNLISTED PULMONARY SVC/PX: CPT

## 2024-08-27 PROCEDURE — 99232 SBSQ HOSP IP/OBS MODERATE 35: CPT | Performed by: PHYSICIAN ASSISTANT

## 2024-08-27 PROCEDURE — 94761 N-INVAS EAR/PLS OXIMETRY MLT: CPT

## 2024-08-27 PROCEDURE — 94664 DEMO&/EVAL PT USE INHALER: CPT

## 2024-08-27 PROCEDURE — 25010000002 METHYLNALTREXONE 12 MG/0.6ML SOLUTION: Performed by: PHYSICIAN ASSISTANT

## 2024-08-27 PROCEDURE — 25010000002 HYDROMORPHONE 1 MG/ML SOLUTION: Performed by: INTERNAL MEDICINE

## 2024-08-27 PROCEDURE — 25010000002 METOCLOPRAMIDE PER 10 MG: Performed by: SURGERY

## 2024-08-27 RX ORDER — METOCLOPRAMIDE HYDROCHLORIDE 5 MG/ML
5 INJECTION INTRAMUSCULAR; INTRAVENOUS EVERY 6 HOURS
Status: DISCONTINUED | OUTPATIENT
Start: 2024-08-27 | End: 2024-08-27

## 2024-08-27 RX ORDER — PANTOPRAZOLE SODIUM 40 MG/1
40 TABLET, DELAYED RELEASE ORAL
Status: DISCONTINUED | OUTPATIENT
Start: 2024-08-27 | End: 2024-08-28 | Stop reason: HOSPADM

## 2024-08-27 RX ADMIN — METOPROLOL TARTRATE 50 MG: 50 TABLET ORAL at 20:43

## 2024-08-27 RX ADMIN — CARBIDOPA AND LEVODOPA 1 TABLET: 25; 100 TABLET ORAL at 09:47

## 2024-08-27 RX ADMIN — Medication 10 ML: at 09:49

## 2024-08-27 RX ADMIN — BUDESONIDE AND FORMOTEROL FUMARATE DIHYDRATE 2 PUFF: 160; 4.5 AEROSOL RESPIRATORY (INHALATION) at 08:12

## 2024-08-27 RX ADMIN — CARBIDOPA AND LEVODOPA 1 TABLET: 50; 200 TABLET, EXTENDED RELEASE ORAL at 09:47

## 2024-08-27 RX ADMIN — METOCLOPRAMIDE 5 MG: 5 INJECTION, SOLUTION INTRAMUSCULAR; INTRAVENOUS at 09:47

## 2024-08-27 RX ADMIN — CARBIDOPA AND LEVODOPA 1 TABLET: 25; 100 TABLET ORAL at 11:40

## 2024-08-27 RX ADMIN — CARBIDOPA AND LEVODOPA 1 TABLET: 25; 100 TABLET ORAL at 20:43

## 2024-08-27 RX ADMIN — TIOTROPIUM BROMIDE INHALATION SPRAY 2 PUFF: 3.12 SPRAY, METERED RESPIRATORY (INHALATION) at 08:12

## 2024-08-27 RX ADMIN — FINASTERIDE 5 MG: 5 TABLET, FILM COATED ORAL at 09:47

## 2024-08-27 RX ADMIN — ALBUTEROL SULFATE 2.5 MG: 2.5 SOLUTION RESPIRATORY (INHALATION) at 14:33

## 2024-08-27 RX ADMIN — BUDESONIDE AND FORMOTEROL FUMARATE DIHYDRATE 2 PUFF: 160; 4.5 AEROSOL RESPIRATORY (INHALATION) at 18:52

## 2024-08-27 RX ADMIN — HYDROMORPHONE HYDROCHLORIDE 0.25 MG: 1 INJECTION, SOLUTION INTRAMUSCULAR; INTRAVENOUS; SUBCUTANEOUS at 05:55

## 2024-08-27 RX ADMIN — PANTOPRAZOLE SODIUM 40 MG: 40 INJECTION, POWDER, LYOPHILIZED, FOR SOLUTION INTRAVENOUS at 09:47

## 2024-08-27 RX ADMIN — METOPROLOL TARTRATE 50 MG: 50 TABLET ORAL at 09:47

## 2024-08-27 RX ADMIN — CARBIDOPA AND LEVODOPA 1 TABLET: 25; 100 TABLET ORAL at 17:47

## 2024-08-27 RX ADMIN — CARBIDOPA AND LEVODOPA 1 TABLET: 50; 200 TABLET, EXTENDED RELEASE ORAL at 20:43

## 2024-08-27 RX ADMIN — ALBUTEROL SULFATE 2.5 MG: 2.5 SOLUTION RESPIRATORY (INHALATION) at 18:52

## 2024-08-27 RX ADMIN — LUBIPROSTONE 24 MCG: 24 CAPSULE ORAL at 17:47

## 2024-08-27 RX ADMIN — PANTOPRAZOLE SODIUM 40 MG: 40 TABLET, DELAYED RELEASE ORAL at 17:47

## 2024-08-27 RX ADMIN — APIXABAN 5 MG: 5 TABLET, FILM COATED ORAL at 09:52

## 2024-08-27 RX ADMIN — MONTELUKAST 10 MG: 10 TABLET, FILM COATED ORAL at 20:43

## 2024-08-27 RX ADMIN — METHYLNALTREXONE BROMIDE 8 MG: 12 INJECTION, SOLUTION SUBCUTANEOUS at 09:49

## 2024-08-27 RX ADMIN — QUETIAPINE FUMARATE 25 MG: 25 TABLET ORAL at 20:43

## 2024-08-27 RX ADMIN — TAMSULOSIN HYDROCHLORIDE 0.4 MG: 0.4 CAPSULE ORAL at 20:43

## 2024-08-27 RX ADMIN — LUBIPROSTONE 24 MCG: 24 CAPSULE ORAL at 09:48

## 2024-08-27 RX ADMIN — APIXABAN 5 MG: 5 TABLET, FILM COATED ORAL at 20:43

## 2024-08-27 RX ADMIN — ALBUTEROL SULFATE 2.5 MG: 2.5 SOLUTION RESPIRATORY (INHALATION) at 08:12

## 2024-08-27 NOTE — PROGRESS NOTES
[facility]  PROGRESS NOTE    Patient Name: Flaco Roque II  : 1945  MRN: [id number]    Date of Admission: 2024  Primary Care Physician: Provider, No Known    Subjective   Subjective     CC:  Abd pain    HPI:  No acute events. States that he feels much better. No abd pain.       Objective   Objective     Vital Signs:   Temp:  [97 °F (36.1 °C)-97.6 °F (36.4 °C)] 97 °F (36.1 °C)  Heart Rate:  [66-81] 77  Resp:  [16-18] 18  BP: (137-154)/(79-96) 154/79     Physical Exam:  Constitutional: No acute distress, frail  Respiratory: Clear to auscultation bilaterally, respiratory effort normal   Cardiovascular: RRR, no murmurs  Gastrointestinal: Positive bowel sounds, soft, nontender  Musculoskeletal: No bilateral ankle edema  Psychiatric: Appropriate affect, cooperative  Neurologic: Oriented x 3, strength symmetric in all extremities, Cranial Nerves grossly intact to confrontation, speech clear    Results Reviewed:  LAB RESULTS:      Lab 24  0722 24  1117   WBC 7.33 8.32   HEMOGLOBIN 14.7 14.3   HEMATOCRIT 45.9 43.2   PLATELETS 254 272   NEUTROS ABS  --  6.63   IMMATURE GRANS (ABS)  --  0.01   LYMPHS ABS  --  0.94   MONOS ABS  --  0.60   EOS ABS  --  0.10   MCV 91.4 88.3   LACTATE  --  0.7         Lab 24  0722 24  1146 24  1128 24  1117   SODIUM 137  --   --  140   POTASSIUM 4.1  --   --  3.8   CHLORIDE 106  --   --  104   CO2 23.0  --   --  28.0   ANION GAP 8.0  --   --  8.0   BUN 8  --   --  12   CREATININE 0.67* 0.70 0.70 0.77   EGFR 95.6  --   --  91.6   GLUCOSE 73  --   --  111*   CALCIUM 8.7  --   --  9.2   TSH 1.730  --   --   --          Lab 24  1117   TOTAL PROTEIN 6.8   ALBUMIN 4.0   GLOBULIN 2.8   ALT (SGPT) <5   AST (SGOT) 20   BILIRUBIN 0.6   ALK PHOS 77   LIPASE 18                 Lab 24  1117   ABO TYPING A   RH TYPING Positive   ANTIBODY SCREEN Negative         Brief Urine Lab Results  (Last result in  the past 365 days)        Color   Clarity   Blood   Leuk Est   Nitrite   Protein   CREAT   Urine HCG        08/23/24 1121 Yellow   Clear   Negative   Negative   Negative   Negative                   Microbiology Results Abnormal       None            XR Shoulder 2+ View Right    Result Date: 8/27/2024  XR SHOULDER 2+ VW RIGHT Date of Exam: 8/27/2024 1:13 AM EDT Indication: R shoulder pain Comparison: 4/17/2023. Findings: Total right shoulder arthroplasty present which appears intact. No evidence of surrounding lucency. No evidence of fracture. No evidence of dislocation. Mild to moderate degenerative changes are present within the acromioclavicular joint, stable.     Impression: Impression: No acute osseous abnormality. Stable right shoulder arthroplasty with no evidence of acute hardware failure. Stable mild to moderate acromioclavicular osteoarthritic changes are present. Electronically Signed: Adelita Rivas MD  8/27/2024 1:36 AM EDT  Workstation ID: [id number]    FL Upper GI Single Contrast SBFT    Result Date: 8/26/2024  FL UPPER GI SINGLE CONTRAST SBFT Date of Exam: 8/26/2024 10:13 AM EDT Indication: eval gastric outlet/ duodenal obstruction/pSBO.   Comparison: None available. Technique:   radiograph of the abdomen was obtained. Under fluoroscopic observation, the patient ingested thin barium contrast. Fluoroscopic imaging was obtained through the upper gastrointestinal tract. Radiologic images of the small bowel were obtained at timed intervals, including fluoroscopic spot imaging. Fluoroscopic Time UGI and SBFT: 1 minute and 20 seconds Number of Images: 17 associated fluoroscopic series were saved Findings:  imaging reveals a gaseous abdomen. Surgical clips are visible in the right upper quadrant, and pelvis. An NG tube is visible in the left upper quadrant. Upper GI series: The oral phase of deglutition appeared normal. The esophageal mucosa appeared grossly normal. There was mild gastroesophageal  reflux to the level of the midesophagus. Examination of the stomach demonstrated a small sized sliding-type hiatal hernia. The gastric folds, gastric mucosa appeared grossly normal. There was no delay in gastric emptying. The duodenal bulb appeared grossly normal. There is a large sized diverticulum visible on the second or third portion of the duodenum, otherwise the duodenal C-loop appeared grossly normal. Small bowel follow-through: 30-minute, and 60-minute films show contrast opacification of grossly normal-appearing proximal, mid, distal small bowel. Contrast was seen reaching the colon at 30 minutes. There was no dilatation or other evidence of obstruction. There was no fold thickening, filling defect, or mucosal irregularity. There was normal peristalsis throughout the small bowel.     Impression: Impression: Upper GI series: 1. Mild gastroesophageal reflux to the level of the midesophagus. 2. Small sized sliding-type hiatal hernia. 3. Large sized duodenal diverticulum. Small bowel follow-through: Small bowel series appeared within normal limits. Report dictated by: Vinita Dorsey PA-c  I have personally reviewed this case and agree with the findings above: Electronically Signed: Abel Blum MD  8/26/2024 3:41 PM EDT  Workstation ID: [id number]     Results for orders placed during the hospital encounter of 08/24/23    Adult Transthoracic Echo Complete W/ Cont if Necessary Per Protocol    Interpretation Summary    Left ventricular ejection fraction appears to be 56 - 60%.    The left atrial cavity is mild to moderately dilated    There is mild to moderate thickening of the aortic valve    Mild to moderate tricuspid valve regurgitation is present. Estimated right ventricular systolic pressure from tricuspid regurgitation is mildly elevated (35-45 mmHg).    There is a trivial pericardial effusion.    Patient noted to be in atrial fibrillation with elevated rates during the study.      Current  medications:  Scheduled Meds:Albuterol Sulfate NEB Orderable, 2.5 mg, Nebulization, TID - RT  apixaban, 5 mg, Oral, Q12H  bisacodyl, 10 mg, Oral, Daily  budesonide-formoterol, 2 puff, Inhalation, BID - RT   And  tiotropium bromide monohydrate, 2 puff, Inhalation, Daily - RT  carbidopa-levodopa, 1 tablet, Oral, 4x Daily  carbidopa-levodopa CR, 1 tablet, Oral, BID  docusate sodium, 100 mg, Nasogastric, BID  finasteride, 5 mg, Oral, Daily  lubiprostone, 24 mcg, Oral, BID With Meals  methylnaltrexone, 8 mg, Subcutaneous, Every Other Day  metoprolol tartrate, 50 mg, Oral, Q12H  montelukast, 10 mg, Oral, Nightly  pantoprazole, 40 mg, Oral, BID AC  QUEtiapine, 25 mg, Oral, Q PM  sodium chloride, 10 mL, Intravenous, Q12H  tamsulosin, 0.4 mg, Oral, Nightly      Continuous Infusions:lactated ringers, 75 mL/hr, Last Rate: 75 mL/hr (08/25/24 1815)  pain,       PRN Meds:.  acetaminophen **OR** acetaminophen **OR** acetaminophen    HYDROmorphone **AND** naloxone    ondansetron ODT **OR** ondansetron    phenol    senna-docusate sodium **AND** polyethylene glycol **AND** [DISCONTINUED] bisacodyl **AND** [DISCONTINUED] bisacodyl    sodium chloride    sodium chloride    Assessment & Plan   Assessment & Plan     Active Hospital Problems    Diagnosis  POA    SBO (small bowel obstruction) [K56.609]  Yes      Resolved Hospital Problems   No resolved problems to display.        Brief Hospital Course to date:  Flaco Roque II is a 78 y.o. male  with history of atrial fibrillation on eliquis, Parkinson's disease, chronic back pain, COPD, ABRIL, HTN, HLD, BPH, thyroid disease, cholecystectomy, paraesophageal hernia repair with mesh and fundoplication (1/9/2024, Dr Noble), subsequent partial bowel obstruction s/p Ex-lap (5/16/24, Dr Joseph), who presented with approx 5 weeks of abdominal pain, constipation, and nausea/vomiting.      PSBO w/possible Enteritis  - general surgery consulted, recommend conservative management for now with  aggressive bowel regimen  - Improvement in constipation/ileus. FMS and NG now removed   - DC IV reglan; continue relistor; considering movantik at DC  - Continue amitiza inpatient     Atrial fibrillation  - eliquis resumed      Parkinson's  - continue sinemet per home dose schedule     COPD  - continue home nebs/inhalers     ABRIL  - cpap intolerant     HTN     HLD     Chronic back pain  - pain pump     Anxiety  - patient told nursing staff he no longer takes xanax     Expected Discharge Location and Transportation: Home  Expected Discharge   Expected Discharge Date: 8/28/2024; Expected Discharge Time:      VTE Prophylaxis:  Pharmacologic & mechanical VTE prophylaxis orders are present.         AM-PAC 6 Clicks Score (PT): 16 (08/27/24 0800)    CODE STATUS:   Code Status and Medical Interventions: CPR (Attempt to Resuscitate); Full Support   Ordered at: 08/23/24 1438     Level Of Support Discussed With:    Patient     Code Status (Patient has no pulse and is not breathing):    CPR (Attempt to Resuscitate)     Medical Interventions (Patient has pulse or is breathing):    Full Support       Joan Karuse DO  08/27/24

## 2024-08-27 NOTE — PROGRESS NOTES
"Patient Name:  Flaco Roque II  YOB: 1945  [id number]    Surgery Progress Note    Date of visit: 8/27/2024    Subjective   Subjective: Feeling much better. Multiple BM's, no abdominal pain. He pulled out his NG.         Objective     Objective:     /81 (BP Location: Right arm, Patient Position: Lying)   Pulse 76   Temp 97 °F (36.1 °C) (Oral)   Resp 18   Ht 172.7 cm (68\")   Wt 53.5 kg (118 lb)   SpO2 96%   BMI 17.94 kg/m²     Intake/Output Summary (Last 24 hours) at 8/27/2024 0650  Last data filed at 8/27/2024 0315  Gross per 24 hour   Intake --   Output 1875 ml   Net -1875 ml       CV:  Rhythm  regular and rate regular   L:  Clear  to auscultation bilaterally   Abd:  Bowel sounds positive , soft, nontender.  Ext:  No cyanosis, clubbing, edema    Recent labs that are back at this time have been reviewed. UGI and SBFT shows no obstruction,. Known duodenal diverticulum           Assessment/ Plan:      Visit Diagnoses       Duodenal obstruction    -  Primary- Resolved. I suspect that all of his symptoms were likely due to constipation and ileus.  Will start clear liquid diet, advance as tolerated.  Remove rectal tube and the patient needs to get up and move.  May be ready for discharge as early as tomorrow.    Nausea and vomiting, unspecified vomiting type        Coffee ground emesis        History of small bowel obstruction                 Active Hospital Problems    Diagnosis  POA    SBO (small bowel obstruction) [K56.609]  Yes      Resolved Hospital Problems   No resolved problems to display.              Cj Joseph MD  8/27/2024  06:50 EDT      "

## 2024-08-27 NOTE — CASE MANAGEMENT/SOCIAL WORK
Continued Stay Note  [facility]     Patient Name: Flaco Roque II  MRN: [id number]  Today's Date: 8/27/2024    Admit Date: 8/23/2024    Plan: Home with spouse and [facility]   Discharge Plan       Row Name 08/27/24 1621       Plan    Plan Home with spouse and [facility]    Patient/Family in Agreement with Plan yes    Plan Comments CM spoke to patient at bedside and wife via phone. His plan is home with his wife and [facility] for SN, PT/OT. Referral accepted by [facility]. Order is in Epic. PT recommends home with assist & OT recommends home health. Wife will transport via private vehicle. Patient has a follow-up appointment with PCP arranged for 9/4/24 at 1115. No other discharge needs noted at this time.    Final Discharge Disposition Code 06 - home with home health care                   Discharge Codes    No documentation.                 Expected Discharge Date and Time       Expected Discharge Date Expected Discharge Time    Aug 28, 2024               Kiara Saeed RN

## 2024-08-27 NOTE — PROGRESS NOTES
"GI Daily Progress Note  Subjective:    Chief Complaint:  Abdominal pain follow up     \" I feel so much better.\"    NGT removed.   Patient tolerating clear liquids.   Had multiple bowel movements.    Denies nausea nor vomiting.       Objective:    /79 (BP Location: Right arm, Patient Position: Lying)   Pulse 77   Temp 97 °F (36.1 °C) (Oral)   Resp 18   Ht 172.7 cm (68\")   Wt 53.5 kg (118 lb)   SpO2 91%   BMI 17.94 kg/m²     Physical Exam  Constitutional:       General: He is not in acute distress.  Cardiovascular:      Rate and Rhythm: Normal rate and regular rhythm.   Pulmonary:      Effort: Pulmonary effort is normal. No respiratory distress.   Abdominal:      General: Bowel sounds are normal.      Palpations: Abdomen is soft.      Tenderness: There is no abdominal tenderness.   Neurological:      Mental Status: He is alert and oriented to person, place, and time.         Lab  Lab Results   Component Value Date    WBC 7.33 08/24/2024    HGB 14.7 08/24/2024    HGB 14.3 08/23/2024    HGB 13.6 05/21/2024    MCV 91.4 08/24/2024     08/24/2024    INR 1.18 (H) 05/17/2024    INR 1.2 (H) 11/06/2023    INR 1.4 (H) 08/29/2023    INR 1.01 11/01/2017       Lab Results   Component Value Date    GLUCOSE 73 08/24/2024    BUN 8 08/24/2024    CREATININE 0.67 (L) 08/24/2024    EGFRIFNONA 132 04/17/2020    BCR 11.9 08/24/2024     08/24/2024    K 4.1 08/24/2024    CO2 23.0 08/24/2024    CALCIUM 8.7 08/24/2024    ALBUMIN 4.0 08/23/2024    ALKPHOS 77 08/23/2024    BILITOT 0.6 08/23/2024    BILIDIR <0.2 04/19/2023    ALT <5 08/23/2024    AST 20 08/23/2024       Assessment:    Constipation, opiate induced.    Acute nausea and vomiting, possible hematemesis in ER. Resolved.   Gastric and duodenal distention on CT, likely ileus, resolved.   UGI series showed mild GERD with large duodenal diverticulum, normal small bowel series.     4.   History of partial small bowel obstruction in May 2024, secondary to adhesions "   5.   Parkinson's disease     Plan:    >> Will discontinue IV Reglan   >> Continue Relistor SQ inpatient.   Consider Movantik at discharge  >> Continue Amitiza 24 mcg BID inpatient   >> Continue PPI     SHARON Lamb  08/27/24  09:55 EDT

## 2024-08-27 NOTE — DISCHARGE INSTR - APPOINTMENTS
Hospital follow-up with your PCP Adriane Jones on 9/4 at 11:15 am ([facility])  [phone]

## 2024-08-28 ENCOUNTER — READMISSION MANAGEMENT (OUTPATIENT)
Dept: CALL CENTER | Facility: HOSPITAL | Age: 79
End: 2024-08-28
Payer: MEDICARE

## 2024-08-28 VITALS
TEMPERATURE: 97.6 F | OXYGEN SATURATION: 93 % | WEIGHT: 118 LBS | BODY MASS INDEX: 17.88 KG/M2 | HEIGHT: 68 IN | SYSTOLIC BLOOD PRESSURE: 132 MMHG | RESPIRATION RATE: 16 BRPM | DIASTOLIC BLOOD PRESSURE: 85 MMHG | HEART RATE: 67 BPM

## 2024-08-28 PROCEDURE — 94761 N-INVAS EAR/PLS OXIMETRY MLT: CPT

## 2024-08-28 PROCEDURE — 94799 UNLISTED PULMONARY SVC/PX: CPT

## 2024-08-28 PROCEDURE — 94664 DEMO&/EVAL PT USE INHALER: CPT

## 2024-08-28 PROCEDURE — 99232 SBSQ HOSP IP/OBS MODERATE 35: CPT | Performed by: PHYSICIAN ASSISTANT

## 2024-08-28 PROCEDURE — 99239 HOSP IP/OBS DSCHRG MGMT >30: CPT | Performed by: HOSPITALIST

## 2024-08-28 RX ORDER — AMOXICILLIN 250 MG
2 CAPSULE ORAL 2 TIMES DAILY PRN
Qty: 30 TABLET | Refills: 0 | Status: CANCELLED | OUTPATIENT
Start: 2024-08-28 | End: 2024-09-27

## 2024-08-28 RX ORDER — POLYETHYLENE GLYCOL 3350 17 G/17G
17 POWDER, FOR SOLUTION ORAL DAILY PRN
Qty: 30 PACKET | Refills: 0 | Status: SHIPPED | OUTPATIENT
Start: 2024-08-28 | End: 2024-09-27

## 2024-08-28 RX ADMIN — METOPROLOL TARTRATE 50 MG: 50 TABLET ORAL at 08:16

## 2024-08-28 RX ADMIN — APIXABAN 5 MG: 5 TABLET, FILM COATED ORAL at 08:16

## 2024-08-28 RX ADMIN — ALBUTEROL SULFATE 2.5 MG: 2.5 SOLUTION RESPIRATORY (INHALATION) at 09:27

## 2024-08-28 RX ADMIN — PANTOPRAZOLE SODIUM 40 MG: 40 TABLET, DELAYED RELEASE ORAL at 08:16

## 2024-08-28 RX ADMIN — BUDESONIDE AND FORMOTEROL FUMARATE DIHYDRATE 2 PUFF: 160; 4.5 AEROSOL RESPIRATORY (INHALATION) at 09:27

## 2024-08-28 RX ADMIN — CARBIDOPA AND LEVODOPA 1 TABLET: 25; 100 TABLET ORAL at 08:16

## 2024-08-28 RX ADMIN — Medication 10 ML: at 08:17

## 2024-08-28 RX ADMIN — CARBIDOPA AND LEVODOPA 1 TABLET: 25; 100 TABLET ORAL at 12:06

## 2024-08-28 RX ADMIN — TIOTROPIUM BROMIDE INHALATION SPRAY 2 PUFF: 3.12 SPRAY, METERED RESPIRATORY (INHALATION) at 09:27

## 2024-08-28 RX ADMIN — LUBIPROSTONE 24 MCG: 24 CAPSULE ORAL at 08:16

## 2024-08-28 RX ADMIN — CARBIDOPA AND LEVODOPA 1 TABLET: 50; 200 TABLET, EXTENDED RELEASE ORAL at 08:16

## 2024-08-28 RX ADMIN — FINASTERIDE 5 MG: 5 TABLET, FILM COATED ORAL at 08:16

## 2024-08-28 NOTE — PROGRESS NOTES
"GI Daily Progress Note  Subjective:    Chief Complaint:  Follow up constipation     Flaco continues to feel improved.   Denies abdominal pain nor nausea/vomiting.   Tolerating diet.   Having several bowel movements.       Objective:    /62 (BP Location: Left arm, Patient Position: Lying)   Pulse 83   Temp 97 °F (36.1 °C) (Oral)   Resp 16   Ht 172.7 cm (68\")   Wt 53.5 kg (118 lb)   SpO2 92%   BMI 17.94 kg/m²     Physical Exam  Constitutional:       General: He is not in acute distress.  Pulmonary:      Effort: Pulmonary effort is normal. No respiratory distress.   Abdominal:      General: Bowel sounds are normal. There is no distension.      Palpations: Abdomen is soft.      Tenderness: There is no abdominal tenderness.   Neurological:      Mental Status: He is alert and oriented to person, place, and time.         Lab  Lab Results   Component Value Date    WBC 7.33 08/24/2024    HGB 14.7 08/24/2024    HGB 14.3 08/23/2024    HGB 13.6 05/21/2024    MCV 91.4 08/24/2024     08/24/2024    INR 1.18 (H) 05/17/2024    INR 1.2 (H) 11/06/2023    INR 1.4 (H) 08/29/2023    INR 1.01 11/01/2017       Lab Results   Component Value Date    GLUCOSE 73 08/24/2024    BUN 8 08/24/2024    CREATININE 0.67 (L) 08/24/2024    EGFRIFNONA 132 04/17/2020    BCR 11.9 08/24/2024     08/24/2024    K 4.1 08/24/2024    CO2 23.0 08/24/2024    CALCIUM 8.7 08/24/2024    ALBUMIN 4.0 08/23/2024    ALKPHOS 77 08/23/2024    BILITOT 0.6 08/23/2024    BILIDIR <0.2 04/19/2023    ALT <5 08/23/2024    AST 20 08/23/2024       Assessment:    Constipation, opiate induced.    Acute nausea and vomiting, possible hematemesis in ER. Resolved.   Gastric and duodenal distention on CT, likely ileus, resolved.   UGI series showed mild GERD with large duodenal diverticulum, normal small bowel series.     4.   History of partial small bowel obstruction in May 2024, secondary to adhesions   5.   Parkinson's disease     Plan:    >> Will simplify " bowel regimen.   Discontinue SQ Relistor and BID Amitiza.  Begin Movantik 25 mg once daily.  I sent this prescription to his preferred pharmacy through [facility].    >> Pantroprazole 40 mg BID for 1 month, then reduce to daily.    Follow up with [facility] in 4-6 weeks.       Anticipate discharge soon.   Will sign off.  Please call for questions or concerns.       SHARON Lamb  08/28/24  08:15 EDT

## 2024-08-28 NOTE — OUTREACH NOTE
Prep Survey      Flowsheet Row Responses   [facility] facility patient discharged from? [facility]   Is LACE score < 7 ? No   Eligibility Readm Mgmt   Discharge diagnosis SBO (small bowel obstruction)   Does the patient have one of the following disease processes/diagnoses(primary or secondary)? Other   Does the patient have Home health ordered? Yes   What is the Home health agency?  [facility]    Is there a DME ordered? No   Prep survey completed? Yes            Josephine JOHNSON - Registered Nurse

## 2024-08-28 NOTE — CASE MANAGEMENT/SOCIAL WORK
Case Management Discharge Note      Final Note: Patient is discharging today. CM spoke to patient and son at bedside. His plan is home with his wife and [facility] for SN, PT/OT. Referral accepted by [facility]. Order is in Epic. PT recommends home with assist & OT recommends home health. Son will transport via private vehicle. Patient has a follow-up appointment with PCP arranged for 9/4/24 at 1115. No other discharge needs noted at this time.         Selected Continued Care - Admitted Since 8/23/2024       Destination    No services have been selected for the patient.                Durable Medical Equipment    No services have been selected for the patient.                Dialysis/Infusion    No services have been selected for the patient.                Home Medical Care       Service Provider Selected Services Address Phone Fax Patient Preferred    [facility] [address], [facility] [id number] [phone] --              Therapy    No services have been selected for the patient.                Community Resources    No services have been selected for the patient.                Community & DME    No services have been selected for the patient.                    Transportation Services  Private: Car    Final Discharge Disposition Code: 06 - home with home health care

## 2024-08-28 NOTE — PROGRESS NOTES
"Patient Name:  Flaco Roque II  YOB: 1945  [id number]    Surgery Progress Note    Date of visit: 8/28/2024    Subjective   Subjective: Feeling better. Tolerating PO, passing gas.         Objective     Objective:     /62 (BP Location: Left arm, Patient Position: Lying)   Pulse 83   Temp 97 °F (36.1 °C) (Oral)   Resp 16   Ht 172.7 cm (68\")   Wt 53.5 kg (118 lb)   SpO2 92%   BMI 17.94 kg/m²     Intake/Output Summary (Last 24 hours) at 8/28/2024 0800  Last data filed at 8/27/2024 0900  Gross per 24 hour   Intake 360 ml   Output 300 ml   Net 60 ml       CV:  Rhythm  regular and rate regular   L:  Clear  to auscultation bilaterally  Abd:  Bowel sounds positive , soft, nontender  Ext:  No cyanosis, clubbing, edema    Recent labs that are back at this time have been reviewed.            Assessment/ Plan:      Visit Diagnoses       Duodenal obstruction    -  Primary- Resolved. No duodenal obstruction seen on imaging. OK to go home with home health from my standpoint. RTC with me in 2 weeks. Continue bowel regimen per GI.    Relevant Orders    Ambulatory Referral to Home Health    Nausea and vomiting, unspecified vomiting type        Coffee ground emesis        History of small bowel obstruction                 Active Hospital Problems    Diagnosis  POA    SBO (small bowel obstruction) [K56.609]  Yes      Resolved Hospital Problems   No resolved problems to display.              Cj Joseph MD  8/28/2024  08:00 EDT      "
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
## 2024-08-28 NOTE — DISCHARGE SUMMARY
[facility] Medicine Services  DISCHARGE SUMMARY    Patient Name: Flaco Roque II  : 1945  MRN: [id number]    Date of Admission: 2024 10:53 AM  Date of Discharge: 2024  Primary Care Physician: Adriane Jones PA    Consults       Date and Time Order Name Status Description    2024  4:38 PM Inpatient General Surgery Consult Completed             Hospital Course     Presenting Problem: Small bowel obstruction    Active Hospital Problems    Diagnosis  POA    SBO (small bowel obstruction) [K56.609]  Yes      Resolved Hospital Problems   No resolved problems to display.          Hospital Course:    Flaco Roque II is a 78 y.o. male  with history of atrial fibrillation on eliquis, Parkinson's disease, chronic back pain, COPD, ABRIL, HTN, HLD, BPH, thyroid disease, cholecystectomy, paraesophageal hernia repair with mesh and fundoplication (2024, Dr Noble), subsequent partial bowel obstruction s/p Ex-lap (24, Dr Joseph), who presented with approx 5 weeks of abdominal pain, constipation, and nausea/vomiting.     Copied text in this note has been reviewed and is accurate as of 2024.  Patient is new to me.       PSBO w/possible Enteritis.  Resolved  - general surgery consulted, recommend conservative management for now with aggressive bowel regimen  - Improvement in constipation/ileus. FMS and NG removed   -Received IV reglan and amitiza inpatient.  DC on discharge.  -  movantik added at DC per GI recs     Atrial fibrillation  - eliquis resumed      Parkinson's  - continue sinemet per home dose schedule     COPD  - continue home nebs/inhalers     ABRIL  - cpap intolerant     HTN     HLD     Chronic back pain  - pain pump     Anxiety  - patient told nursing staff he no longer takes xanax     Discharge Follow Up Recommendations for outpatient labs/diagnostics:  Follow-up with PCP in 1 week  Follow-up with general surgery in 2 weeks    Day of Discharge      HPI:   No acute event over the night.  Patient doing well.  Hemodynamically stable.  Okay to discharge from surgery standpoint.    Review of Systems  No fever, chills, chest pain or shortness of breath.    Vital Signs:   Temp:  [97.6 °F (36.4 °C)] 97.6 °F (36.4 °C)  Heart Rate:  [67-83] 67  Resp:  [16-20] 16  BP: (114-135)/(62-85) 132/85      Physical Exam:  Constitutional: No acute distress, frail  Respiratory: Clear to auscultation bilaterally, respiratory effort normal   Cardiovascular: RRR, no murmurs  Gastrointestinal: Positive bowel sounds, soft, nontender  Musculoskeletal: No bilateral ankle edema  Psychiatric: Appropriate affect, cooperative  Neurologic: Oriented x 3, strength symmetric in all extremities, Cranial Nerves grossly intact to confrontation, speech clear    Pertinent  and/or Most Recent Results     LAB RESULTS:      Lab 08/24/24  0722 08/23/24  1117   WBC 7.33 8.32   HEMOGLOBIN 14.7 14.3   HEMATOCRIT 45.9 43.2   PLATELETS 254 272   NEUTROS ABS  --  6.63   IMMATURE GRANS (ABS)  --  0.01   LYMPHS ABS  --  0.94   MONOS ABS  --  0.60   EOS ABS  --  0.10   MCV 91.4 88.3   LACTATE  --  0.7         Lab 08/24/24  0722 08/23/24  1146 08/23/24  1128 08/23/24  1117   SODIUM 137  --   --  140   POTASSIUM 4.1  --   --  3.8   CHLORIDE 106  --   --  104   CO2 23.0  --   --  28.0   ANION GAP 8.0  --   --  8.0   BUN 8  --   --  12   CREATININE 0.67* 0.70 0.70 0.77   EGFR 95.6  --   --  91.6   GLUCOSE 73  --   --  111*   CALCIUM 8.7  --   --  9.2   TSH 1.730  --   --   --          Lab 08/23/24  1117   TOTAL PROTEIN 6.8   ALBUMIN 4.0   GLOBULIN 2.8   ALT (SGPT) <5   AST (SGOT) 20   BILIRUBIN 0.6   ALK PHOS 77   LIPASE 18                 Lab 08/23/24  1117   ABO TYPING A   RH TYPING Positive   ANTIBODY SCREEN Negative         Brief Urine Lab Results  (Last result in the past 365 days)        Color   Clarity   Blood   Leuk Est   Nitrite   Protein   CREAT   Urine HCG        08/23/24 1121 Yellow   Clear    Negative   Negative   Negative   Negative                 Microbiology Results (last 10 days)       ** No results found for the last 240 hours. **            XR Shoulder 2+ View Right    Result Date: 8/27/2024  XR SHOULDER 2+ VW RIGHT Date of Exam: 8/27/2024 1:13 AM EDT Indication: R shoulder pain Comparison: 4/17/2023. Findings: Total right shoulder arthroplasty present which appears intact. No evidence of surrounding lucency. No evidence of fracture. No evidence of dislocation. Mild to moderate degenerative changes are present within the acromioclavicular joint, stable.     Impression: No acute osseous abnormality. Stable right shoulder arthroplasty with no evidence of acute hardware failure. Stable mild to moderate acromioclavicular osteoarthritic changes are present. Electronically Signed: Adelita Rivas MD  8/27/2024 1:36 AM EDT  Workstation ID: [id number]    FL Upper GI Single Contrast SBFT    Result Date: 8/26/2024  FL UPPER GI SINGLE CONTRAST SBFT Date of Exam: 8/26/2024 10:13 AM EDT Indication: eval gastric outlet/ duodenal obstruction/pSBO.   Comparison: None available. Technique:   radiograph of the abdomen was obtained. Under fluoroscopic observation, the patient ingested thin barium contrast. Fluoroscopic imaging was obtained through the upper gastrointestinal tract. Radiologic images of the small bowel were obtained at timed intervals, including fluoroscopic spot imaging. Fluoroscopic Time UGI and SBFT: 1 minute and 20 seconds Number of Images: 17 associated fluoroscopic series were saved Findings:  imaging reveals a gaseous abdomen. Surgical clips are visible in the right upper quadrant, and pelvis. An NG tube is visible in the left upper quadrant. Upper GI series: The oral phase of deglutition appeared normal. The esophageal mucosa appeared grossly normal. There was mild gastroesophageal reflux to the level of the midesophagus. Examination of the stomach demonstrated a small sized sliding-type  hiatal hernia. The gastric folds, gastric mucosa appeared grossly normal. There was no delay in gastric emptying. The duodenal bulb appeared grossly normal. There is a large sized diverticulum visible on the second or third portion of the duodenum, otherwise the duodenal C-loop appeared grossly normal. Small bowel follow-through: 30-minute, and 60-minute films show contrast opacification of grossly normal-appearing proximal, mid, distal small bowel. Contrast was seen reaching the colon at 30 minutes. There was no dilatation or other evidence of obstruction. There was no fold thickening, filling defect, or mucosal irregularity. There was normal peristalsis throughout the small bowel.     Impression: Upper GI series: 1. Mild gastroesophageal reflux to the level of the midesophagus. 2. Small sized sliding-type hiatal hernia. 3. Large sized duodenal diverticulum. Small bowel follow-through: Small bowel series appeared within normal limits. Report dictated by: Vinita Dorsey PA-c  I have personally reviewed this case and agree with the findings above: Electronically Signed: Abel Blum MD  8/26/2024 3:41 PM EDT  Workstation ID: [id number]    XR Abdomen KUB    Result Date: 8/23/2024  XR ABDOMEN KUB Date of Exam: 8/23/2024 3:52 PM EDT Indication: NG tube placement Comparison: CT abdomen and pelvis 8/23/2024, KUB 5/16/2024 Findings: Tip of the nonweighted enteric tube terminates over the left upper quadrant, likely in the gastric body. The stomach is mildly distended with air.     Impression: Tip of the enteric tube terminates in the gastric body. Electronically Signed: Delia Hurtado  8/23/2024 4:10 PM EDT  Workstation ID: [id number]    CT Abdomen Pelvis With Contrast    Result Date: 8/23/2024  CT ABDOMEN PELVIS W CONTRAST Date of Exam: 8/23/2024 12:42 PM EDT Indication: generalized abd pain, vomiting, hx of sbo. Comparison: 5/16/2024 KUB. 4/26/2019 abdomen pelvis CT scan Technique: Axial CT images were obtained of the abdomen  and pelvis following the uneventful intravenous administration of 85 mL Isovue-300. Reconstructed coronal and sagittal images were also obtained. Automated exposure control and iterative construction methods were used. Findings: There is diffuse bullous change of the lower lungs, which appear clear of active disease. Stomach appears markedly distended with fluid and food. Duodenum is distended along the second and third segments, nondistended at the level of the duodenal bulb and fourth portion. No obstructing lesion is seen. There appears to be a large diverticulum of the third portion of the duodenum, with no visible inflammation or extraluminal air. Small bowel loops elsewhere and mostly normal in caliber. A couple of isolated small bowel loops at upper limits of normal size are seen in the right lower pelvis and right mid abdomen of doubtful clinical significance. The course of the redundant colon is difficult to follow on this study. There is quite extensive descending colon and sigmoid diverticulosis but no evidence to suggest diverticulitis here. There is fecal stasis within the right and transverse colon and also of the multiply redundant descending colon, with no obvious focal impaction or obstruction. No bowel wall inflammation is seen. There is postcholecystectomy dilatation of the common duct and left lobe intrahepatic ducts. Several scattered small hepatic cysts are noted. Portal, splenic and superior mesenteric veins enhance normally with contrast. Spleen is not enlarged. Pancreas, adrenal glands, and kidneys appear unremarkable for age, with small renal cysts. No ascites adenopathy or acute inflammatory focus is seen. Regarding the lower abdomen/pelvis, no intrapelvic free fluid or acute inflammatory change is seen. There is extensive and diffuse calcification of the aorta and iliac arteries. Lower pelvis is obscured by streak artifact from the patient's left hip prosthesis. Bladder appears mildly  distended. No intrapelvic free fluid or inflammatory change is seen. There appears to be an infusion pump in the left mid abdominal subcutaneous tissues. No visible associated inflammatory change. Bony structures appear to be intact although there is advanced multilevel lumbar degenerative disc disease and marked scoliosis.     Impression: 1. Markedly distended stomach and distended duodenum. No visible obstructing lesion or inflammation. 2. Normal-appearing small bowel, but mildly distended colon, mostly filled with formed stool. No visible impaction or inflammation. 3. Very extensive sigmoid diverticulosis without evidence of diverticulitis. No acute inflammatory focus is seen. 4. Moderate postcholecystectomy dilatation of the intra and extrahepatic bile ducts which appears increased from prior studies. 5. Large but intact appearing duodenal diverticulum incidentally noted. Electronically Signed: Vlad Blake MD  8/23/2024 1:11 PM EDT  Workstation ID: [id number]     Results for orders placed during the hospital encounter of 07/16/21    Doppler Arterial Multi Level Lower Extremity - Bilateral CAR    Interpretation Summary  · Normal right KAMRYN of 1.02.  · Left femoral-popliteal arteries noncompressible. There are biphasic and monophasic waveforms noted in the left lower extremity  · The left posterior tibial artery was compressible with mildly reduced left KAMRYN of 0.86      Results for orders placed during the hospital encounter of 07/16/21    Doppler Arterial Multi Level Lower Extremity - Bilateral CAR    Interpretation Summary  · Normal right KAMRYN of 1.02.  · Left femoral-popliteal arteries noncompressible. There are biphasic and monophasic waveforms noted in the left lower extremity  · The left posterior tibial artery was compressible with mildly reduced left KAMRYN of 0.86      Results for orders placed during the hospital encounter of 08/24/23    Adult Transthoracic Echo Complete W/ Cont if Necessary Per  Protocol    Interpretation Summary    Left ventricular ejection fraction appears to be 56 - 60%.    The left atrial cavity is mild to moderately dilated    There is mild to moderate thickening of the aortic valve    Mild to moderate tricuspid valve regurgitation is present. Estimated right ventricular systolic pressure from tricuspid regurgitation is mildly elevated (35-45 mmHg).    There is a trivial pericardial effusion.    Patient noted to be in atrial fibrillation with elevated rates during the study.      Plan for Follow-up of Pending Labs/Results:     Discharge Details        Discharge Medications        New Medications        Instructions Start Date   Naloxegol Oxalate 25 MG tablet  Commonly known as: Movantik   25 mg, Oral, Every Morning      polyethylene glycol 17 g packet  Commonly known as: MIRALAX   17 g, Oral, Daily PRN             Continue These Medications        Instructions Start Date   acetaminophen 500 MG tablet  Commonly known as: TYLENOL   1,000 mg, Oral, Every 6 Hours PRN      apixaban 5 MG tablet tablet  Commonly known as: ELIQUIS   5 mg, Oral, Every 12 Hours Scheduled      atorvastatin 10 MG tablet  Commonly known as: LIPITOR   10 mg, Oral, Daily      atropine 1 % ophthalmic solution   1 drop, Daily      carbidopa-levodopa  MG per tablet  Commonly known as: SINEMET   1 tablet, Oral, 4 Times Daily      carbidopa-levodopa CR  MG per CR tablet  Commonly known as: SINEMET CR   1 tablet, Oral, 2 Times Daily      cilostazol 100 MG tablet  Commonly known as: PLETAL   100 mg, Oral, 2 Times Daily      docusate sodium 100 MG capsule  Commonly known as: COLACE   100 mg, Oral, 2 Times Daily      fentaNYL 0.05 MG/ML injection  Commonly known as: SUBLIMAZE   250 mcg, Intravenous, PAIN PUMP      finasteride 5 MG tablet  Commonly known as: PROSCAR   5 mg, Oral, Daily      ipratropium-albuterol 0.5-2.5 mg/3 ml nebulizer  Commonly known as: DUO-NEB   3 mL, Nebulization, 3 Times Daily       metoprolol succinate XL 50 MG 24 hr tablet  Commonly known as: TOPROL-XL   TAKE ONE TABLET BY MOUTH DAILY      montelukast 10 MG tablet  Commonly known as: SINGULAIR   10 mg, Oral, Nightly      multivitamin with minerals tablet tablet   1 tablet, Oral, Daily      naloxone 4 MG/0.1ML nasal spray  Commonly known as: NARCAN   Call 911. Don't prime. [place] in 1 nostril for overdose. Repeat in 2-3 minutes in other nostril if no or minimal breathing/responsiveness.      pantoprazole 40 MG EC tablet  Commonly known as: PROTONIX   Take 1 tablet by mouth Daily.      QUEtiapine 25 MG tablet  Commonly known as: SEROquel   25 mg, Oral, Every Evening      saccharomyces boulardii 250 MG capsule  Commonly known as: FLORASTOR   Take 1 capsule every day by oral route for 7 days.      tadalafil 10 MG tablet  Commonly known as: CIALIS   Take 1 tablet 1 hour prior to sexual intercourse as needed      tamsulosin 0.4 MG capsule 24 hr capsule  Commonly known as: FLOMAX   0.4 mg, Oral, Nightly      Trelegy Ellipta 100-62.5-25 MCG/ACT inhaler  Generic drug: Fluticasone-Umeclidin-Vilant   1 puff, Inhalation, Daily - RT             Stop These Medications      amantadine 100 MG capsule  Commonly known as: SYMMETREL     clonazePAM 1 MG tablet  Commonly known as: KlonoPIN     cyanocobalamin 500 MCG tablet  Commonly known as: VITAMIN B-12     guaiFENesin 600 MG 12 hr tablet  Commonly known as: MUCINEX     ondansetron 4 MG tablet  Commonly known as: ZOFRAN     oxyCODONE 5 MG immediate release tablet  Commonly known as: Roxicodone              No Known Allergies      Discharge Disposition:  Home or Self Care    Diet:  Hospital:  Diet Order   Procedures    Diet: Gastrointestinal; Fiber-Restricted; Fluid Consistency: Thin (IDDSI 0)            Activity:      Restrictions or Other Recommendations:         CODE STATUS:    Code Status and Medical Interventions: CPR (Attempt to Resuscitate); Full Support   Ordered at: 08/23/24 5817     Level Of Support  Discussed With:    Patient     Code Status (Patient has no pulse and is not breathing):    CPR (Attempt to Resuscitate)     Medical Interventions (Patient has pulse or is breathing):    Full Support       Future Appointments   Date Time Provider Department Center   9/23/2025 11:45 AM Von Kilpatrick MD [facility]       Additional Instructions for the Follow-ups that You Need to Schedule       Ambulatory Referral to Home Health   As directed      Face to Face Visit Date: 8/27/2024   Follow-up provider for Plan of Care?: I treated the patient in an acute care facility and will not continue treatment after discharge.   Follow-up provider: JAIR RUIZ [[id number]]   Reason/Clinical Findings: PSBO w/possible Enteritis   Describe mobility limitations that make leaving home difficult: impaired functional mobility, balance, gait and endurance   Nursing/Therapeutic Services Requested: Skilled Nursing Physical Therapy Occupational Therapy   Skilled nursing orders: Medication education Cardiopulmonary assessments Neurovascular assessments   PT orders: Therapeutic exercise Gait Training Transfer training Strengthening Home safety assessment   Weight Bearing Status: As Tolerated   Occupational orders: Activities of daily living Energy conservation Strengthening Home safety assessment   Frequency: 1 Week 1                      Nisha Dubose MD  08/28/24      Time Spent on Discharge:  I spent 35 minutes on this discharge activity which included: face-to-face encounter with the patient, reviewing the data in the system, coordination of the care with the nursing staff as well as consultants, documentation, and entering orders.

## 2024-08-29 NOTE — PROGRESS NOTES
"Enter Query Response Below      Query Response: Moderate Malnutrition .  Chronic             If applicable, please update the problem list.     Patient: Flaco Michelle Ii        : 1945  Account: [id number]           Admit Date: 2024        How to Respond to this query:       a. Click New Note     b. Answer query within the yellow box.                c. Update the Problem List, if applicable.      If you have any questions about this query contact me at: [email or web address]    Dr. Sawyer    78-year-old male admitted 24 with \"PSBO w/possible Enteritis. \" per the discharge summary.    malnutrition severity assessment by the registered dietician   \"Problem Malnutrition acute severe  Etiology Minimal po intake 2/2 ? SBO, constipation  Signs/Symptoms Po intake </=50% EEN x >/=5 days, severe muscle wasting and subcutaneous fat loss.   Advance diet as medically feasible, encourage po intake and supplement use 2/2 malnutrition (see MSA). Of note, pt w/acute exacerbation of chronic malnutrition.   ? Boost (pasquale) once daily w/breakfast-frequency per pt preference  ? Pt likes/tolerates soft foods-cream of wheat, applesauce, yogurt, hard-boiled eggs  ? Recommend SLP for appropriate consistencies, pt/son states pt taking thickened liquids at home\"   progress note \"BMI 17.94\"    Please clarify diagnosis treated/monitored:  Acute illness related severe protein calorie malnutrition  Malnutrition  Underweight  Other- specify __________  Unable to determine     By submitting this query, we are merely seeking further clarification of documentation to accurately reflect all conditions that you are monitoring, evaluating, treating or that extend the hospitalization or utilize additional resources of care. Please utilize your independent clinical judgment when addressing the question(s) above.     This query and your response, once completed, will be entered into the legal medical " record.    Sincerely,  Kamryn Cannon MSN, RN, CCDS  Clinical Documentation Integrity Program

## 2024-08-29 NOTE — PROGRESS NOTES
"Enter Query Response Below      Query Response: Paroxysmal A-fib per previous cardiology's note             If applicable, please update the problem list.     Patient: Flaco Michelle Ii        : 1945  Account: [id number]           Admit Date: 2024        How to Respond to this query:       a. Click New Note     b. Answer query within the yellow box.                c. Update the Problem List, if applicable.      If you have any questions about this query contact me at: [email or web address]    Dr. Sawyer    78-year-old male with history of atrial fibrillation on eliquis admitted 24 with \"PSBO w/possible Enteritis. \" per the discharge summary.  EKG \"When compared with ECG of 20-MAY-2024 22:05, Atrial fibrillation has replaced Sinus rhythm\".  Treatment: apixaban (ELIQUIS) tablet 5 mg BID (-), metoprolol tartrate (LOPRESSOR) tablet 50 mg BID (-)    Please clarify the type of atrial fibrillation treated/monitored:  Persistent atrial fibrillation  Permanent (chronic) atrial fibrillation  Chronic atrial fibrillation  Other- specify_______    By submitting this query, we are merely seeking further clarification of documentation to accurately reflect all conditions that you are monitoring, evaluating, treating or that extend the hospitalization or utilize additional resources of care. Please utilize your independent clinical judgment when addressing the question(s) above.     This query and your response, once completed, will be entered into the legal medical record.    Sincerely,  Kamryn Cannon MSN, RN, CCDS  Clinical Documentation Integrity Program    "

## 2024-09-02 ENCOUNTER — NURSE TRIAGE (OUTPATIENT)
Dept: CALL CENTER | Facility: HOSPITAL | Age: 79
End: 2024-09-02
Payer: MEDICARE

## 2024-09-02 NOTE — TELEPHONE ENCOUNTER
amantadine 100 MG capsule (SYMMETREL)  Medication stopped per AVS  Caller says that is his Parkinson medication  Advised to call PCP in am when office opens for recommendation.        Reason for Disposition   [1] Caller has NON-URGENT medicine question about med that PCP prescribed AND [2] triager unable to answer question    Additional Information   Negative: [1] Intentional drug overdose AND [2] suicidal thoughts or ideas   Negative: Drug overdose and triager unable to answer question   Negative: Caller requesting a renewal or refill of a medicine patient is currently taking   Negative: Caller requesting information unrelated to medicine   Negative: Caller requesting information about COVID-19 Vaccine   Negative: Caller requesting information about Emergency Contraception   Negative: Caller requesting information about Combined Birth Control Pills   Negative: Caller requesting information about Progestin Birth Control Pills   Negative: Caller requesting information about Post-Op pain or medicines   Negative: Caller requesting a prescription antibiotic (such as Penicillin) for Strep throat and has a positive culture result   Negative: Caller requesting a prescription anti-viral med (such as Tamiflu) and has influenza (flu) symptoms   Negative: Immunization reaction suspected   Negative: Rash while taking a medicine or within 3 days of stopping it   Negative: [1] Asthma and [2] having symptoms of asthma (cough, wheezing, etc.)   Negative: [1] Symptom of illness (e.g., headache, abdominal pain, earache, vomiting) AND [2] more than mild   Negative: Breastfeeding questions about mother's medicines and diet   Negative: MORE THAN A DOUBLE DOSE of a prescription or over-the-counter (OTC) drug   Negative: [1] DOUBLE DOSE (an extra dose or lesser amount) of prescription drug AND [2] any symptoms (e.g., dizziness, nausea, pain, sleepiness)   Negative: [1] DOUBLE DOSE (an extra dose or lesser amount) of over-the-counter (OTC)  "drug AND [2] any symptoms (e.g., dizziness, nausea, pain, sleepiness)   Negative: Took another person's prescription drug   Negative: [1] DOUBLE DOSE (an extra dose or lesser amount) of prescription drug AND [2] NO symptoms  (Exception: A double dose of antibiotics.)   Negative: Diabetes drug error or overdose (e.g., took wrong type of insulin or took extra dose)   Negative: [1] Prescription not at pharmacy AND [2] was prescribed by PCP recently (Exception: Triager has access to EMR and prescription is recorded there. Go to Home Care and confirm for pharmacy.)   Negative: [1] Pharmacy calling with prescription question AND [2] triager unable to answer question   Negative: [1] Caller has URGENT medicine question about med that PCP or specialist prescribed AND [2] triager unable to answer question   Negative: Medicine patch causing local rash or itching   Negative: [1] Caller has medicine question about med NOT prescribed by PCP AND [2] triager unable to answer question (e.g., compatibility with other med, storage)   Negative: Prescription request for new medicine (not a refill)    Answer Assessment - Initial Assessment Questions  1. NAME of MEDICINE: \"What medicine(s) are you calling about?\"     amantadine 100 MG capsule (SYMMETREL)  2. QUESTION: \"What is your question?\" (e.g., double dose of medicine, side effect)      AVS says to stop taking. That us his Parkinson Medication  3. PRESCRIBER: \"Who prescribed the medicine?\" Reason: if prescribed by specialist, call should be referred to that group.      *No Answer*  4. SYMPTOMS: \"Do you have any symptoms?\" If Yes, ask: \"What symptoms are you having?\"  \"How bad are the symptoms (e.g., mild, moderate, severe)      *No Answer*  5. PREGNANCY:  \"Is there any chance that you are pregnant?\" \"When was your last menstrual period?\"      *No Answer*    Protocols used: Medication Question Call-ADULT-AH    "

## 2024-09-03 ENCOUNTER — NURSE TRIAGE (OUTPATIENT)
Dept: CALL CENTER | Facility: HOSPITAL | Age: 79
End: 2024-09-03
Payer: MEDICARE

## 2024-09-03 NOTE — TELEPHONE ENCOUNTER
Calling to see why his symmetrel was stopped.  His sinemet was continued.  Epic records reviewed with caller, unable to find reason it was stopped.  Recommend contacting his parkinson's specialist for recommendation.  Reason for Disposition  • [1] Caller has URGENT medicine question about med that PCP or specialist prescribed AND [2] triager unable to answer question    Additional Information  • Negative: [1] Intentional drug overdose AND [2] suicidal thoughts or ideas  • Negative: Drug overdose and triager unable to answer question  • Negative: Caller requesting a renewal or refill of a medicine patient is currently taking  • Negative: Caller requesting information unrelated to medicine  • Negative: Caller requesting information about COVID-19 Vaccine  • Negative: Caller requesting information about Emergency Contraception  • Negative: Caller requesting information about Combined Birth Control Pills  • Negative: Caller requesting information about Progestin Birth Control Pills  • Negative: Caller requesting information about Post-Op pain or medicines  • Negative: Caller requesting a prescription antibiotic (such as Penicillin) for Strep throat and has a positive culture result  • Negative: Caller requesting a prescription anti-viral med (such as Tamiflu) and has influenza (flu) symptoms  • Negative: Immunization reaction suspected  • Negative: Rash while taking a medicine or within 3 days of stopping it  • Negative: [1] Asthma and [2] having symptoms of asthma (cough, wheezing, etc.)  • Negative: [1] Symptom of illness (e.g., headache, abdominal pain, earache, vomiting) AND [2] more than mild  • Negative: Breastfeeding questions about mother's medicines and diet  • Negative: MORE THAN A DOUBLE DOSE of a prescription or over-the-counter (OTC) drug  • Negative: [1] DOUBLE DOSE (an extra dose or lesser amount) of prescription drug AND [2] any symptoms (e.g., dizziness, nausea, pain, sleepiness)  • Negative: [1] DOUBLE  "DOSE (an extra dose or lesser amount) of over-the-counter (OTC) drug AND [2] any symptoms (e.g., dizziness, nausea, pain, sleepiness)  • Negative: Took another person's prescription drug  • Negative: [1] DOUBLE DOSE (an extra dose or lesser amount) of prescription drug AND [2] NO symptoms  (Exception: A double dose of antibiotics.)  • Negative: Diabetes drug error or overdose (e.g., took wrong type of insulin or took extra dose)  • Negative: [1] Prescription not at pharmacy AND [2] was prescribed by PCP recently (Exception: Triager has access to EMR and prescription is recorded there. Go to Home Care and confirm for pharmacy.)  • Negative: [1] Pharmacy calling with prescription question AND [2] triager unable to answer question    Answer Assessment - Initial Assessment Questions  1. NAME of MEDICINE: \"What medicine(s) are you calling about?\"      symmetrel  2. QUESTION: \"What is your question?\" (e.g., double dose of medicine, side effect)      Why was it stopped  3. PRESCRIBER: \"Who prescribed the medicine?\" Reason: if prescribed by specialist, call should be referred to that group.      specialist  4. SYMPTOMS: \"Do you have any symptoms?\" If Yes, ask: \"What symptoms are you having?\"  \"How bad are the symptoms (e.g., mild, moderate, severe)      no  5. PREGNANCY:  \"Is there any chance that you are pregnant?\" \"When was your last menstrual period?\"      na    Protocols used: Medication Question Call-ADULT-    "

## 2024-09-09 ENCOUNTER — READMISSION MANAGEMENT (OUTPATIENT)
Dept: CALL CENTER | Facility: HOSPITAL | Age: 79
End: 2024-09-09
Payer: MEDICARE

## 2024-09-09 NOTE — OUTREACH NOTE
Medical Week 2 Survey      Flowsheet Row Responses   [facility] patient discharged from? [facility]   Does the patient have one of the following disease processes/diagnoses(primary or secondary)? Other   Week 2 attempt successful? No   Unsuccessful attempts Attempt 1            Katerina MATA - Registered Nurse

## 2024-09-13 ENCOUNTER — READMISSION MANAGEMENT (OUTPATIENT)
Dept: CALL CENTER | Facility: HOSPITAL | Age: 79
End: 2024-09-13
Payer: MEDICARE

## 2024-09-13 NOTE — OUTREACH NOTE
Medical Week 2 Survey      Flowsheet Row Responses   [facility] patient discharged from? [place]   Does the patient have one of the following disease processes/diagnoses(primary or secondary)? Other   Week 2 attempt successful? Yes   Call start time 0957   Discharge diagnosis SBO (small bowel obstruction)   Call end time 1008   Person spoke with today (if not patient) and relationship wife and patient   Meds reviewed with patient/caregiver? Yes   Does the patient have all medications ordered at discharge? Yes   Is the patient taking all medications as directed (includes completed medication regime)? No  [Patient reports that he cannot take the movantik. He reports that is caused him a lot of pain.]   What is preventing the patient from taking all medications as directed? Side effects   Nursing Interventions Advised patient to call provider   Does the patient have a primary care provider?  Yes   Does the patient have an appointment with their PCP within 7 days of discharge? Yes   Comments regarding PCP PCP Adriane Jones on 9/4 at 11:15 am ([facility])   Has the patient kept scheduled appointments due by today? Yes   What is the Home health agency?  [facility]    Has home health visited the patient within 72 hours of discharge? Yes   Home health comments Patient states that he told  that he did not need them.   Psychosocial issues? No   Did the patient receive a copy of their discharge instructions? Yes   Nursing interventions Reviewed instructions with patient   What is the patient's perception of their health status since discharge? Improving  [Patient reports good appetite. Reports that he has had diarrhea, but better this am.]   Is the patient/caregiver able to teach back signs and symptoms related to disease process for when to call PCP? Yes   Is the patient/caregiver able to teach back signs and symptoms related to disease process for when to call 911? Yes   Is the patient/caregiver able  to teach back the hierarchy of who to call/visit for symptoms/problems? PCP, Specialist, Home health nurse, Urgent Care, ED, 911 Yes   If the patient is a current smoker, are they able to teach back resources for cessation? Not a smoker   Week 2 Call Completed? Yes   Is the patient interested in additional calls from an ambulatory ? No   Would this patient benefit from a Referral to [facility]? No   Call end time 1008            NELI SANDY - Registered Nurse

## 2024-09-20 ENCOUNTER — OFFICE VISIT (OUTPATIENT)
Dept: GASTROENTEROLOGY | Facility: CLINIC | Age: 79
End: 2024-09-20
Payer: MEDICARE

## 2024-09-20 VITALS
HEIGHT: 68 IN | DIASTOLIC BLOOD PRESSURE: 70 MMHG | SYSTOLIC BLOOD PRESSURE: 130 MMHG | BODY MASS INDEX: 19.88 KG/M2 | WEIGHT: 131.2 LBS

## 2024-09-20 DIAGNOSIS — Z86.010 HISTORY OF COLONIC POLYPS: ICD-10-CM

## 2024-09-20 DIAGNOSIS — K21.9 GASTROESOPHAGEAL REFLUX DISEASE, UNSPECIFIED WHETHER ESOPHAGITIS PRESENT: Primary | ICD-10-CM

## 2024-09-20 DIAGNOSIS — K22.70 BARRETT'S ESOPHAGUS WITHOUT DYSPLASIA: ICD-10-CM

## 2024-09-20 DIAGNOSIS — K57.90 DIVERTICULOSIS: ICD-10-CM

## 2024-09-20 DIAGNOSIS — K59.03 THERAPEUTIC OPIOID INDUCED CONSTIPATION: ICD-10-CM

## 2024-09-20 DIAGNOSIS — T40.2X5A THERAPEUTIC OPIOID INDUCED CONSTIPATION: ICD-10-CM

## 2024-09-20 DIAGNOSIS — K57.10 DUODENAL DIVERTICULUM: ICD-10-CM

## 2024-09-20 PROBLEM — I48.91 ATRIAL FIBRILLATION: Status: ACTIVE | Noted: 2022-06-28

## 2024-09-20 PROBLEM — T40.605D: Status: ACTIVE | Noted: 2024-08-31

## 2024-09-20 PROBLEM — Z87.891 EX-SMOKER: Status: ACTIVE | Noted: 2024-01-05

## 2024-09-20 PROBLEM — G20.A1 PARKINSON'S DISEASE WITHOUT DYSKINESIA, WITHOUT MENTION OF FLUCTUATIONS: Status: ACTIVE | Noted: 2024-08-31

## 2024-09-20 PROBLEM — I73.9 PERIPHERAL VASCULAR DISEASE: Status: ACTIVE | Noted: 2024-02-20

## 2024-09-20 PROBLEM — K56.699 OTHER INTESTINAL OBSTRUCTION UNSPECIFIED AS TO PARTIAL VERSUS COMPLETE OBSTRUCTION: Status: ACTIVE | Noted: 2024-08-31

## 2024-09-20 PROBLEM — S46.009A INJURY OF TENDON OF ROTATOR CUFF: Status: ACTIVE | Noted: 2018-07-10

## 2024-09-20 PROBLEM — K44.9 HIATAL HERNIA: Status: ACTIVE | Noted: 2023-11-10

## 2024-09-20 PROBLEM — N40.0 BENIGN PROSTATIC HYPERPLASIA WITHOUT LOWER URINARY TRACT SYMPTOMS: Status: ACTIVE | Noted: 2024-08-31

## 2024-09-20 PROBLEM — M54.9 BACK PAIN: Status: ACTIVE | Noted: 2017-04-24

## 2024-09-20 PROBLEM — M21.962 FOOT DEFORMITY, ACQUIRED, LEFT: Status: ACTIVE | Noted: 2018-04-06

## 2024-09-20 PROBLEM — Z47.1 AFTERCARE FOLLOWING JOINT REPLACEMENT SURGERY: Status: ACTIVE | Noted: 2022-06-28

## 2024-09-20 PROBLEM — Z96.611 PRESENCE OF RIGHT ARTIFICIAL SHOULDER JOINT: Status: ACTIVE | Noted: 2022-06-28

## 2024-09-20 PROBLEM — B37.9 CANDIDIASIS: Status: ACTIVE | Noted: 2020-04-20

## 2024-09-20 PROBLEM — E46 MALNUTRITION, CALORIE: Status: ACTIVE | Noted: 2024-09-20

## 2024-09-20 PROBLEM — G47.33 OBSTRUCTIVE SLEEP APNEA: Status: ACTIVE | Noted: 2017-10-31

## 2024-09-20 PROBLEM — D68.59 HYPERCOAGULABLE STATE: Status: ACTIVE | Noted: 2023-09-27

## 2024-09-20 PROBLEM — Z87.891 PERSONAL HISTORY OF NICOTINE DEPENDENCE: Status: ACTIVE | Noted: 2022-06-28

## 2024-09-20 PROBLEM — I10 ESSENTIAL (PRIMARY) HYPERTENSION: Status: ACTIVE | Noted: 2022-06-28

## 2024-09-20 PROBLEM — E07.9 DISORDER OF THYROID, UNSPECIFIED: Status: ACTIVE | Noted: 2024-08-31

## 2024-09-20 PROBLEM — E78.5 HYPERLIPIDEMIA, UNSPECIFIED: Status: ACTIVE | Noted: 2024-08-31

## 2024-09-20 PROBLEM — M25.619 STIFFNESS OF SHOULDER JOINT: Status: ACTIVE | Noted: 2018-07-10

## 2024-09-20 PROBLEM — L03.119 CELLULITIS OF FOOT: Status: ACTIVE | Noted: 2020-02-10

## 2024-09-20 PROBLEM — M62.81 MUSCLE WEAKNESS: Status: ACTIVE | Noted: 2018-07-10

## 2024-09-20 PROBLEM — I25.10 CORONARY ARTERY DISEASE INVOLVING NATIVE CORONARY ARTERY OF NATIVE HEART WITHOUT ANGINA PECTORIS: Status: ACTIVE | Noted: 2023-09-05

## 2024-09-20 PROBLEM — M21.622 BUNIONETTE OF LEFT FOOT: Status: ACTIVE | Noted: 2020-02-10

## 2024-09-20 PROBLEM — J43.9 PULMONARY EMPHYSEMA: Status: ACTIVE | Noted: 2020-02-10

## 2024-09-20 PROBLEM — M25.551 PAIN OF RIGHT HIP JOINT: Status: ACTIVE | Noted: 2017-06-06

## 2024-09-20 PROBLEM — Z86.0100 HISTORY OF COLONIC POLYPS: Status: ACTIVE | Noted: 2019-03-27

## 2024-09-20 PROBLEM — Z91.89 AT RISK FOR APNEA: Status: ACTIVE | Noted: 2024-01-05

## 2024-09-20 PROBLEM — D69.9 BLEEDING TENDENCY: Status: ACTIVE | Noted: 2017-04-24

## 2024-09-20 PROBLEM — D70.9 NEUTROPENIA: Status: ACTIVE | Noted: 2024-07-05

## 2024-09-20 PROBLEM — M86.679 CHRONIC OSTEOMYELITIS INVOLVING ANKLE AND FOOT: Status: ACTIVE | Noted: 2020-04-17

## 2024-09-20 PROBLEM — Z97.8 PRESENCE OF INTRATHECAL PUMP: Status: ACTIVE | Noted: 2018-02-14

## 2024-09-20 PROBLEM — R13.19 ESOPHAGEAL DYSPHAGIA: Status: ACTIVE | Noted: 2023-11-10

## 2024-09-20 PROBLEM — R26.9 ABNORMAL GAIT: Status: ACTIVE | Noted: 2017-01-03

## 2024-09-20 PROBLEM — Z79.01 LONG TERM (CURRENT) USE OF ANTICOAGULANTS: Status: ACTIVE | Noted: 2024-08-31

## 2024-09-20 PROBLEM — M96.1 LUMBAR POST-LAMINECTOMY SYNDROME: Status: ACTIVE | Noted: 2020-07-05

## 2024-09-20 RX ORDER — MELOXICAM 15 MG/1
15 TABLET ORAL DAILY
COMMUNITY
Start: 2024-09-13 | End: 2024-09-20

## 2024-10-24 NOTE — PLAN OF CARE
Problem: Patient Care Overview  Goal: Plan of Care Review  Outcome: Ongoing (interventions implemented as appropriate)   05/04/19 2939   Coping/Psychosocial   Plan of Care Reviewed With patient   OTHER   Outcome Summary pt remains on 6l, sob with exertion. iv lasix given with good output. c/o constipation, GI ordered sq relista. he he still coughing up dk tan thick sputum. echo oredered,, not completed yet.    Plan of Care Review   Progress no change       Problem: Gastrointestinal Bleeding (Adult)  Goal: Signs and Symptoms of Listed Potential Problems Will be Absent, Minimized or Managed (Gastrointestinal Bleeding)  Outcome: Ongoing (interventions implemented as appropriate)      Problem: Pain, Chronic (Adult)  Goal: Identify Related Risk Factors and Signs and Symptoms  Outcome: Outcome(s) achieved Date Met: 05/04/19    Goal: Acceptable Pain/Comfort Level and Functional Ability  Outcome: Ongoing (interventions implemented as appropriate)      Problem: Skin Injury Risk (Adult)  Goal: Identify Related Risk Factors and Signs and Symptoms  Outcome: Outcome(s) achieved Date Met: 05/04/19    Goal: Skin Health and Integrity  Outcome: Ongoing (interventions implemented as appropriate)      Problem: Fall Risk (Adult)  Goal: Identify Related Risk Factors and Signs and Symptoms  Outcome: Outcome(s) achieved Date Met: 05/04/19    Goal: Absence of Fall  Outcome: Ongoing (interventions implemented as appropriate)         Lumbar spine/yes(specify)

## 2024-11-17 ENCOUNTER — APPOINTMENT (OUTPATIENT)
Dept: GENERAL RADIOLOGY | Facility: HOSPITAL | Age: 79
End: 2024-11-17
Payer: MEDICARE

## 2024-11-17 ENCOUNTER — APPOINTMENT (OUTPATIENT)
Dept: CT IMAGING | Facility: HOSPITAL | Age: 79
End: 2024-11-17
Payer: MEDICARE

## 2024-11-17 ENCOUNTER — HOSPITAL ENCOUNTER (EMERGENCY)
Facility: HOSPITAL | Age: 79
Discharge: HOME | End: 2024-11-17
Attending: EMERGENCY MEDICINE | Admitting: EMERGENCY MEDICINE
Payer: MEDICARE

## 2024-11-17 VITALS
OXYGEN SATURATION: 95 % | BODY MASS INDEX: 18.83 KG/M2 | RESPIRATION RATE: 18 BRPM | HEIGHT: 67 IN | TEMPERATURE: 97.7 F | WEIGHT: 120 LBS | SYSTOLIC BLOOD PRESSURE: 148 MMHG | DIASTOLIC BLOOD PRESSURE: 80 MMHG | HEART RATE: 67 BPM

## 2024-11-17 DIAGNOSIS — N39.0 URINARY TRACT INFECTION WITHOUT HEMATURIA, SITE UNSPECIFIED: Primary | ICD-10-CM

## 2024-11-17 DIAGNOSIS — R68.83 CHILLS: ICD-10-CM

## 2024-11-17 LAB
ALBUMIN SERPL-MCNC: 4.1 G/DL (ref 3.5–5.2)
ALBUMIN/GLOB SERPL: 1.6 G/DL
ALP SERPL-CCNC: 80 U/L (ref 39–117)
ALT SERPL W P-5'-P-CCNC: <5 U/L (ref 1–41)
ANION GAP SERPL CALCULATED.3IONS-SCNC: 10 MMOL/L (ref 5–15)
AST SERPL-CCNC: 22 U/L (ref 1–40)
BACTERIA UR QL AUTO: ABNORMAL /HPF
BASOPHILS # BLD AUTO: 0.03 10*3/MM3 (ref 0–0.2)
BASOPHILS NFR BLD AUTO: 0.3 % (ref 0–1.5)
BILIRUB SERPL-MCNC: 0.7 MG/DL (ref 0–1.2)
BILIRUB UR QL STRIP: NEGATIVE
BUN SERPL-MCNC: 12 MG/DL (ref 8–23)
BUN/CREAT SERPL: 17.1 (ref 7–25)
CALCIUM SPEC-SCNC: 9.1 MG/DL (ref 8.6–10.5)
CHLORIDE SERPL-SCNC: 96 MMOL/L (ref 98–107)
CLARITY UR: CLEAR
CO2 SERPL-SCNC: 27 MMOL/L (ref 22–29)
COLOR UR: YELLOW
CREAT SERPL-MCNC: 0.7 MG/DL (ref 0.76–1.27)
DEPRECATED RDW RBC AUTO: 46 FL (ref 37–54)
EGFRCR SERPLBLD CKD-EPI 2021: 93.7 ML/MIN/1.73
EOSINOPHIL # BLD AUTO: 0.02 10*3/MM3 (ref 0–0.4)
EOSINOPHIL NFR BLD AUTO: 0.2 % (ref 0.3–6.2)
ERYTHROCYTE [DISTWIDTH] IN BLOOD BY AUTOMATED COUNT: 15.1 % (ref 12.3–15.4)
FLUAV SUBTYP SPEC NAA+PROBE: NOT DETECTED
FLUBV RNA ISLT QL NAA+PROBE: NOT DETECTED
GLOBULIN UR ELPH-MCNC: 2.6 GM/DL
GLUCOSE SERPL-MCNC: 146 MG/DL (ref 65–99)
GLUCOSE UR STRIP-MCNC: NEGATIVE MG/DL
HCT VFR BLD AUTO: 46 % (ref 37.5–51)
HGB BLD-MCNC: 15.5 G/DL (ref 13–17.7)
HGB UR QL STRIP.AUTO: NEGATIVE
HOLD SPECIMEN: NORMAL
HYALINE CASTS UR QL AUTO: ABNORMAL /LPF
IMM GRANULOCYTES # BLD AUTO: 0.05 10*3/MM3 (ref 0–0.05)
IMM GRANULOCYTES NFR BLD AUTO: 0.5 % (ref 0–0.5)
KETONES UR QL STRIP: ABNORMAL
LEUKOCYTE ESTERASE UR QL STRIP.AUTO: ABNORMAL
LYMPHOCYTES # BLD AUTO: 0.75 10*3/MM3 (ref 0.7–3.1)
LYMPHOCYTES NFR BLD AUTO: 7.5 % (ref 19.6–45.3)
MCH RBC QN AUTO: 28.8 PG (ref 26.6–33)
MCHC RBC AUTO-ENTMCNC: 33.7 G/DL (ref 31.5–35.7)
MCV RBC AUTO: 85.3 FL (ref 79–97)
MONOCYTES # BLD AUTO: 0.86 10*3/MM3 (ref 0.1–0.9)
MONOCYTES NFR BLD AUTO: 8.7 % (ref 5–12)
NEUTROPHILS NFR BLD AUTO: 8.23 10*3/MM3 (ref 1.7–7)
NEUTROPHILS NFR BLD AUTO: 82.8 % (ref 42.7–76)
NITRITE UR QL STRIP: NEGATIVE
NRBC BLD AUTO-RTO: 0 /100 WBC (ref 0–0.2)
NT-PROBNP SERPL-MCNC: 1206 PG/ML (ref 0–1800)
PH UR STRIP.AUTO: 6 [PH] (ref 5–8)
PLATELET # BLD AUTO: 321 10*3/MM3 (ref 140–450)
PMV BLD AUTO: 10.2 FL (ref 6–12)
POTASSIUM SERPL-SCNC: 3.4 MMOL/L (ref 3.5–5.2)
PROT SERPL-MCNC: 6.7 G/DL (ref 6–8.5)
PROT UR QL STRIP: NEGATIVE
QT INTERVAL: 400 MS
QTC INTERVAL: 458 MS
RBC # BLD AUTO: 5.39 10*6/MM3 (ref 4.14–5.8)
RBC # UR STRIP: ABNORMAL /HPF
REF LAB TEST METHOD: ABNORMAL
SARS-COV-2 RNA RESP QL NAA+PROBE: NOT DETECTED
SODIUM SERPL-SCNC: 133 MMOL/L (ref 136–145)
SP GR UR STRIP: 1.01 (ref 1–1.03)
SQUAMOUS #/AREA URNS HPF: ABNORMAL /HPF
TROPONIN T SERPL HS-MCNC: 16 NG/L
UROBILINOGEN UR QL STRIP: ABNORMAL
WBC # UR STRIP: ABNORMAL /HPF
WBC NRBC COR # BLD AUTO: 9.94 10*3/MM3 (ref 3.4–10.8)
WHOLE BLOOD HOLD COAG: NORMAL
WHOLE BLOOD HOLD SPECIMEN: NORMAL

## 2024-11-17 PROCEDURE — 81001 URINALYSIS AUTO W/SCOPE: CPT | Performed by: EMERGENCY MEDICINE

## 2024-11-17 PROCEDURE — 71045 X-RAY EXAM CHEST 1 VIEW: CPT

## 2024-11-17 PROCEDURE — 83880 ASSAY OF NATRIURETIC PEPTIDE: CPT | Performed by: EMERGENCY MEDICINE

## 2024-11-17 PROCEDURE — 36415 COLL VENOUS BLD VENIPUNCTURE: CPT

## 2024-11-17 PROCEDURE — 96365 THER/PROPH/DIAG IV INF INIT: CPT

## 2024-11-17 PROCEDURE — 87636 SARSCOV2 & INF A&B AMP PRB: CPT | Performed by: EMERGENCY MEDICINE

## 2024-11-17 PROCEDURE — 96375 TX/PRO/DX INJ NEW DRUG ADDON: CPT

## 2024-11-17 PROCEDURE — 93005 ELECTROCARDIOGRAM TRACING: CPT | Performed by: EMERGENCY MEDICINE

## 2024-11-17 PROCEDURE — 84484 ASSAY OF TROPONIN QUANT: CPT | Performed by: EMERGENCY MEDICINE

## 2024-11-17 PROCEDURE — 25010000002 ONDANSETRON PER 1 MG: Performed by: EMERGENCY MEDICINE

## 2024-11-17 PROCEDURE — 99284 EMERGENCY DEPT VISIT MOD MDM: CPT

## 2024-11-17 PROCEDURE — 25010000002 CEFTRIAXONE PER 250 MG: Performed by: EMERGENCY MEDICINE

## 2024-11-17 PROCEDURE — 71250 CT THORAX DX C-: CPT

## 2024-11-17 PROCEDURE — 85025 COMPLETE CBC W/AUTO DIFF WBC: CPT | Performed by: EMERGENCY MEDICINE

## 2024-11-17 PROCEDURE — 80053 COMPREHEN METABOLIC PANEL: CPT | Performed by: EMERGENCY MEDICINE

## 2024-11-17 RX ORDER — AMANTADINE HYDROCHLORIDE 100 MG/1
100 CAPSULE, GELATIN COATED ORAL 2 TIMES DAILY
COMMUNITY

## 2024-11-17 RX ORDER — ONDANSETRON 2 MG/ML
4 INJECTION INTRAMUSCULAR; INTRAVENOUS ONCE
Status: COMPLETED | OUTPATIENT
Start: 2024-11-17 | End: 2024-11-17

## 2024-11-17 RX ORDER — SODIUM CHLORIDE 0.9 % (FLUSH) 0.9 %
10 SYRINGE (ML) INJECTION AS NEEDED
Status: DISCONTINUED | OUTPATIENT
Start: 2024-11-17 | End: 2024-11-17 | Stop reason: HOSPADM

## 2024-11-17 RX ORDER — ACETAMINOPHEN 500 MG
1000 TABLET ORAL ONCE
Status: COMPLETED | OUTPATIENT
Start: 2024-11-17 | End: 2024-11-17

## 2024-11-17 RX ORDER — CEFUROXIME AXETIL 250 MG/1
250 TABLET ORAL 2 TIMES DAILY
Qty: 14 TABLET | Refills: 0 | Status: SHIPPED | OUTPATIENT
Start: 2024-11-17 | End: 2024-11-24

## 2024-11-17 RX ORDER — CLONAZEPAM 1 MG/1
1 TABLET ORAL NIGHTLY
COMMUNITY

## 2024-11-17 RX ORDER — ONDANSETRON 4 MG/1
4 TABLET, ORALLY DISINTEGRATING ORAL 4 TIMES DAILY PRN
Qty: 15 TABLET | Refills: 0 | Status: SHIPPED | OUTPATIENT
Start: 2024-11-17

## 2024-11-17 RX ADMIN — SODIUM CHLORIDE 1000 MG: 900 INJECTION INTRAVENOUS at 14:31

## 2024-11-17 RX ADMIN — ACETAMINOPHEN 1000 MG: 500 TABLET ORAL at 12:13

## 2024-11-17 RX ADMIN — ONDANSETRON 4 MG: 2 INJECTION INTRAMUSCULAR; INTRAVENOUS at 11:53

## 2024-11-17 NOTE — ED PROVIDER NOTES
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 Fluticasone-Umeclidin-Vilant           * This list has 2 medication(s) that are the same as other medications prescribed for you. Read the directions carefully, and ask your doctor or other care provider to review them with you.                   Where to Get Your Medications        These medications were sent to [facility] – [place], [place] - [address] [facility] - [phone] [place] [phone] FX  [address] [facility] [address]      Phone: [phone]   cefuroxime 250 MG tablet  ondansetron ODT 4 MG disintegrating tablet             Comment: Please note this report has been produced using speech recognition software.      Cyrus Deshpande DO  Attending Emergency Physician         Cyrus Deshpande DO  11/17/24 [id number]

## 2025-02-14 ENCOUNTER — TELEPHONE (OUTPATIENT)
Dept: CARDIOLOGY | Facility: CLINIC | Age: 80
End: 2025-02-14
Payer: MEDICARE

## 2025-02-14 NOTE — TELEPHONE ENCOUNTER
Received voicemail from Maegan at Dr. Herson Hughes's dental office.    In voicemail she states Dr. Hughes needs to do extractions. They want to know what the protocol should be for the Eliquis that pt is on.    Please advise.

## 2025-02-14 NOTE — TELEPHONE ENCOUNTER
Spoke to Maegan and Dr. Hughes's office and informed her what Dr. Kilpatrick said about Eliquis. She verbalized understanding and had no further questions.

## 2025-02-15 ENCOUNTER — HOSPITAL ENCOUNTER (INPATIENT)
Facility: HOSPITAL | Age: 80
LOS: 4 days | Discharge: NURSING, REHAB OR LONG-TERM CARE | End: 2025-02-19
Attending: EMERGENCY MEDICINE | Admitting: INTERNAL MEDICINE
Payer: MEDICARE

## 2025-02-15 ENCOUNTER — APPOINTMENT (OUTPATIENT)
Dept: GENERAL RADIOLOGY | Facility: HOSPITAL | Age: 80
End: 2025-02-15
Payer: MEDICARE

## 2025-02-15 DIAGNOSIS — R13.12 OROPHARYNGEAL DYSPHAGIA: ICD-10-CM

## 2025-02-15 DIAGNOSIS — D72.829 LEUKOCYTOSIS, UNSPECIFIED TYPE: ICD-10-CM

## 2025-02-15 DIAGNOSIS — R09.02 HYPOXIA: ICD-10-CM

## 2025-02-15 DIAGNOSIS — J18.9 PNEUMONIA OF RIGHT LUNG DUE TO INFECTIOUS ORGANISM, UNSPECIFIED PART OF LUNG: Primary | ICD-10-CM

## 2025-02-15 PROBLEM — D62 ACUTE BLOOD LOSS ANEMIA: Status: RESOLVED | Noted: 2017-10-31 | Resolved: 2025-02-15

## 2025-02-15 PROBLEM — S46.812A: Status: RESOLVED | Noted: 2021-03-04 | Resolved: 2025-02-15

## 2025-02-15 PROBLEM — M75.42 IMPINGEMENT SYNDROME OF LEFT SHOULDER: Status: RESOLVED | Noted: 2021-03-04 | Resolved: 2025-02-15

## 2025-02-15 PROBLEM — J69.0 ASPIRATION PNEUMONIA: Status: ACTIVE | Noted: 2025-02-15

## 2025-02-15 PROBLEM — Z97.8 PRESENCE OF INTRATHECAL PUMP: Status: RESOLVED | Noted: 2018-02-14 | Resolved: 2025-02-15

## 2025-02-15 PROBLEM — Z96.611 PRESENCE OF RIGHT ARTIFICIAL SHOULDER JOINT: Status: RESOLVED | Noted: 2022-06-28 | Resolved: 2025-02-15

## 2025-02-15 PROBLEM — Z98.890 S/P FOOT SURGERY, LEFT: Status: RESOLVED | Noted: 2019-04-23 | Resolved: 2025-02-15

## 2025-02-15 PROBLEM — M25.619 STIFFNESS OF SHOULDER JOINT: Status: RESOLVED | Noted: 2018-07-10 | Resolved: 2025-02-15

## 2025-02-15 PROBLEM — J43.9 PULMONARY EMPHYSEMA: Status: RESOLVED | Noted: 2020-02-10 | Resolved: 2025-02-15

## 2025-02-15 PROBLEM — Z96.612 STATUS POST REVERSE ARTHROPLASTY OF SHOULDER, LEFT: Status: RESOLVED | Noted: 2021-03-04 | Resolved: 2025-02-15

## 2025-02-15 PROBLEM — B37.9 CANDIDIASIS: Status: RESOLVED | Noted: 2020-04-20 | Resolved: 2025-02-15

## 2025-02-15 PROBLEM — U07.1 COVID-19: Status: RESOLVED | Noted: 2023-04-15 | Resolved: 2025-02-15

## 2025-02-15 PROBLEM — G20.A1 PARKINSON'S DISEASE WITHOUT DYSKINESIA, WITHOUT MENTION OF FLUCTUATIONS: Status: RESOLVED | Noted: 2024-08-31 | Resolved: 2025-02-15

## 2025-02-15 PROBLEM — S46.009A INJURY OF TENDON OF ROTATOR CUFF: Status: RESOLVED | Noted: 2018-07-10 | Resolved: 2025-02-15

## 2025-02-15 PROBLEM — L97.521 SKIN ULCER OF LEFT FOOT, LIMITED TO BREAKDOWN OF SKIN: Status: RESOLVED | Noted: 2020-01-27 | Resolved: 2025-02-15

## 2025-02-15 PROBLEM — A41.9 SEPSIS: Status: RESOLVED | Noted: 2023-10-20 | Resolved: 2025-02-15

## 2025-02-15 PROBLEM — T40.605D: Status: RESOLVED | Noted: 2024-08-31 | Resolved: 2025-02-15

## 2025-02-15 PROBLEM — L03.119 CELLULITIS OF FOOT: Status: RESOLVED | Noted: 2020-02-10 | Resolved: 2025-02-15

## 2025-02-15 PROBLEM — M62.81 MUSCLE WEAKNESS: Status: RESOLVED | Noted: 2018-07-10 | Resolved: 2025-02-15

## 2025-02-15 PROBLEM — I48.21 PERMANENT ATRIAL FIBRILLATION: Status: RESOLVED | Noted: 2023-04-15 | Resolved: 2025-02-15

## 2025-02-15 PROBLEM — K56.609 SBO (SMALL BOWEL OBSTRUCTION): Status: RESOLVED | Noted: 2024-08-23 | Resolved: 2025-02-15

## 2025-02-15 PROBLEM — D70.9 NEUTROPENIA: Status: RESOLVED | Noted: 2024-07-05 | Resolved: 2025-02-15

## 2025-02-15 PROBLEM — D64.9 ANEMIA: Status: RESOLVED | Noted: 2019-07-28 | Resolved: 2025-02-15

## 2025-02-15 PROBLEM — G89.18 ACUTE POSTOPERATIVE PAIN: Status: RESOLVED | Noted: 2018-09-11 | Resolved: 2025-02-15

## 2025-02-15 PROBLEM — E87.6 HYPOKALEMIA: Status: RESOLVED | Noted: 2019-07-28 | Resolved: 2025-02-15

## 2025-02-15 PROBLEM — M25.551 PAIN OF RIGHT HIP JOINT: Status: RESOLVED | Noted: 2017-06-06 | Resolved: 2025-02-15

## 2025-02-15 PROBLEM — Z47.1 AFTERCARE FOLLOWING JOINT REPLACEMENT SURGERY: Status: RESOLVED | Noted: 2022-06-28 | Resolved: 2025-02-15

## 2025-02-15 PROBLEM — Z98.890 S/P DEBRIDEMENT: Status: RESOLVED | Noted: 2020-04-14 | Resolved: 2025-02-15

## 2025-02-15 PROBLEM — J43.9 PULMONARY EMPHYSEMA: Status: RESOLVED | Noted: 2017-10-31 | Resolved: 2025-02-15

## 2025-02-15 PROBLEM — I25.10 CORONARY ARTERY DISEASE INVOLVING NATIVE CORONARY ARTERY OF NATIVE HEART WITHOUT ANGINA PECTORIS: Status: RESOLVED | Noted: 2023-09-05 | Resolved: 2025-02-15

## 2025-02-15 LAB
ALBUMIN SERPL-MCNC: 4.2 G/DL (ref 3.5–5.2)
ALBUMIN/GLOB SERPL: 1.8 G/DL
ALP SERPL-CCNC: 74 U/L (ref 39–117)
ALT SERPL W P-5'-P-CCNC: <5 U/L (ref 1–41)
ANION GAP SERPL CALCULATED.3IONS-SCNC: 12 MMOL/L (ref 5–15)
AST SERPL-CCNC: 13 U/L (ref 1–40)
BACTERIA SPEC RESP CULT: NORMAL
BASOPHILS # BLD AUTO: 0.04 10*3/MM3 (ref 0–0.2)
BASOPHILS NFR BLD AUTO: 0.3 % (ref 0–1.5)
BILIRUB SERPL-MCNC: 0.6 MG/DL (ref 0–1.2)
BUN SERPL-MCNC: 14 MG/DL (ref 8–23)
BUN/CREAT SERPL: 23 (ref 7–25)
CALCIUM SPEC-SCNC: 9.2 MG/DL (ref 8.6–10.5)
CHLORIDE SERPL-SCNC: 104 MMOL/L (ref 98–107)
CO2 SERPL-SCNC: 26 MMOL/L (ref 22–29)
CREAT SERPL-MCNC: 0.61 MG/DL (ref 0.76–1.27)
D-LACTATE SERPL-SCNC: 1.7 MMOL/L (ref 0.5–2)
DEPRECATED RDW RBC AUTO: 51.4 FL (ref 37–54)
EGFRCR SERPLBLD CKD-EPI 2021: 97.7 ML/MIN/1.73
EOSINOPHIL # BLD AUTO: 0.06 10*3/MM3 (ref 0–0.4)
EOSINOPHIL NFR BLD AUTO: 0.5 % (ref 0.3–6.2)
ERYTHROCYTE [DISTWIDTH] IN BLOOD BY AUTOMATED COUNT: 15.7 % (ref 12.3–15.4)
FLUAV RNA RESP QL NAA+PROBE: NOT DETECTED
FLUBV RNA RESP QL NAA+PROBE: NOT DETECTED
GEN 5 1HR TROPONIN T REFLEX: 11 NG/L
GLOBULIN UR ELPH-MCNC: 2.4 GM/DL
GLUCOSE SERPL-MCNC: 167 MG/DL (ref 65–99)
GRAM STN SPEC: NORMAL
HCT VFR BLD AUTO: 49.5 % (ref 37.5–51)
HGB BLD-MCNC: 15.8 G/DL (ref 13–17.7)
HOLD SPECIMEN: NORMAL
IMM GRANULOCYTES # BLD AUTO: 0.03 10*3/MM3 (ref 0–0.05)
IMM GRANULOCYTES NFR BLD AUTO: 0.3 % (ref 0–0.5)
L PNEUMO1 AG UR QL IA: NEGATIVE
LYMPHOCYTES # BLD AUTO: 1.07 10*3/MM3 (ref 0.7–3.1)
LYMPHOCYTES NFR BLD AUTO: 9.2 % (ref 19.6–45.3)
MCH RBC QN AUTO: 28.5 PG (ref 26.6–33)
MCHC RBC AUTO-ENTMCNC: 31.9 G/DL (ref 31.5–35.7)
MCV RBC AUTO: 89.2 FL (ref 79–97)
MONOCYTES # BLD AUTO: 0.58 10*3/MM3 (ref 0.1–0.9)
MONOCYTES NFR BLD AUTO: 5 % (ref 5–12)
MRSA DNA SPEC QL NAA+PROBE: NEGATIVE
NEUTROPHILS NFR BLD AUTO: 84.7 % (ref 42.7–76)
NEUTROPHILS NFR BLD AUTO: 9.87 10*3/MM3 (ref 1.7–7)
NRBC BLD AUTO-RTO: 0 /100 WBC (ref 0–0.2)
NT-PROBNP SERPL-MCNC: 395 PG/ML (ref 0–1800)
PLATELET # BLD AUTO: 253 10*3/MM3 (ref 140–450)
PMV BLD AUTO: 10.6 FL (ref 6–12)
POTASSIUM SERPL-SCNC: 3.6 MMOL/L (ref 3.5–5.2)
PROCALCITONIN SERPL-MCNC: 0.07 NG/ML (ref 0–0.25)
PROT SERPL-MCNC: 6.6 G/DL (ref 6–8.5)
QT INTERVAL: 308 MS
QTC INTERVAL: 428 MS
RBC # BLD AUTO: 5.55 10*6/MM3 (ref 4.14–5.8)
S PNEUM AG SPEC QL LA: NEGATIVE
SARS-COV-2 RNA RESP QL NAA+PROBE: NOT DETECTED
SODIUM SERPL-SCNC: 142 MMOL/L (ref 136–145)
TROPONIN T NUMERIC DELTA: 1 NG/L
TROPONIN T SERPL HS-MCNC: 10 NG/L
WBC NRBC COR # BLD AUTO: 11.65 10*3/MM3 (ref 3.4–10.8)
WHOLE BLOOD HOLD COAG: NORMAL
WHOLE BLOOD HOLD SPECIMEN: NORMAL

## 2025-02-15 PROCEDURE — 83880 ASSAY OF NATRIURETIC PEPTIDE: CPT | Performed by: EMERGENCY MEDICINE

## 2025-02-15 PROCEDURE — 94640 AIRWAY INHALATION TREATMENT: CPT

## 2025-02-15 PROCEDURE — 99285 EMERGENCY DEPT VISIT HI MDM: CPT

## 2025-02-15 PROCEDURE — 87641 MR-STAPH DNA AMP PROBE: CPT

## 2025-02-15 PROCEDURE — 83605 ASSAY OF LACTIC ACID: CPT | Performed by: EMERGENCY MEDICINE

## 2025-02-15 PROCEDURE — 87040 BLOOD CULTURE FOR BACTERIA: CPT | Performed by: EMERGENCY MEDICINE

## 2025-02-15 PROCEDURE — 87205 SMEAR GRAM STAIN: CPT | Performed by: INTERNAL MEDICINE

## 2025-02-15 PROCEDURE — 71045 X-RAY EXAM CHEST 1 VIEW: CPT

## 2025-02-15 PROCEDURE — 94799 UNLISTED PULMONARY SVC/PX: CPT

## 2025-02-15 PROCEDURE — 93005 ELECTROCARDIOGRAM TRACING: CPT | Performed by: EMERGENCY MEDICINE

## 2025-02-15 PROCEDURE — 25010000002 METHYLPREDNISOLONE PER 125 MG: Performed by: INTERNAL MEDICINE

## 2025-02-15 PROCEDURE — 87449 NOS EACH ORGANISM AG IA: CPT | Performed by: INTERNAL MEDICINE

## 2025-02-15 PROCEDURE — 84484 ASSAY OF TROPONIN QUANT: CPT | Performed by: EMERGENCY MEDICINE

## 2025-02-15 PROCEDURE — 94664 DEMO&/EVAL PT USE INHALER: CPT

## 2025-02-15 PROCEDURE — 84145 PROCALCITONIN (PCT): CPT | Performed by: EMERGENCY MEDICINE

## 2025-02-15 PROCEDURE — 92610 EVALUATE SWALLOWING FUNCTION: CPT

## 2025-02-15 PROCEDURE — 87636 SARSCOV2 & INF A&B AMP PRB: CPT | Performed by: EMERGENCY MEDICINE

## 2025-02-15 PROCEDURE — 36415 COLL VENOUS BLD VENIPUNCTURE: CPT

## 2025-02-15 PROCEDURE — 99222 1ST HOSP IP/OBS MODERATE 55: CPT | Performed by: INTERNAL MEDICINE

## 2025-02-15 PROCEDURE — 87899 AGENT NOS ASSAY W/OPTIC: CPT | Performed by: INTERNAL MEDICINE

## 2025-02-15 PROCEDURE — 85025 COMPLETE CBC W/AUTO DIFF WBC: CPT | Performed by: EMERGENCY MEDICINE

## 2025-02-15 PROCEDURE — 25010000002 CEFTRIAXONE PER 250 MG: Performed by: EMERGENCY MEDICINE

## 2025-02-15 PROCEDURE — 80053 COMPREHEN METABOLIC PANEL: CPT | Performed by: EMERGENCY MEDICINE

## 2025-02-15 RX ORDER — SODIUM CHLORIDE 0.9 % (FLUSH) 0.9 %
10 SYRINGE (ML) INJECTION AS NEEDED
Status: DISCONTINUED | OUTPATIENT
Start: 2025-02-15 | End: 2025-02-19 | Stop reason: HOSPADM

## 2025-02-15 RX ORDER — WATER 10 ML/10ML
INJECTION INTRAMUSCULAR; INTRAVENOUS; SUBCUTANEOUS
Status: COMPLETED
Start: 2025-02-15 | End: 2025-02-15

## 2025-02-15 RX ORDER — ALBUTEROL SULFATE 0.83 MG/ML
2.5 SOLUTION RESPIRATORY (INHALATION) ONCE
Status: COMPLETED | OUTPATIENT
Start: 2025-02-15 | End: 2025-02-15

## 2025-02-15 RX ORDER — DEXTROSE MONOHYDRATE, SODIUM CHLORIDE, AND POTASSIUM CHLORIDE 50; 1.49; 4.5 G/1000ML; G/1000ML; G/1000ML
75 INJECTION, SOLUTION INTRAVENOUS CONTINUOUS
Status: DISPENSED | OUTPATIENT
Start: 2025-02-15 | End: 2025-02-16

## 2025-02-15 RX ORDER — QUETIAPINE FUMARATE 25 MG/1
25 TABLET, FILM COATED ORAL EVERY EVENING
Status: DISCONTINUED | OUTPATIENT
Start: 2025-02-15 | End: 2025-02-19 | Stop reason: HOSPADM

## 2025-02-15 RX ORDER — CARBIDOPA AND LEVODOPA 50; 200 MG/1; MG/1
1 TABLET, EXTENDED RELEASE ORAL 2 TIMES DAILY
Status: DISCONTINUED | OUTPATIENT
Start: 2025-02-15 | End: 2025-02-19 | Stop reason: HOSPADM

## 2025-02-15 RX ORDER — PANTOPRAZOLE SODIUM 40 MG/1
40 TABLET, DELAYED RELEASE ORAL DAILY
Status: DISCONTINUED | OUTPATIENT
Start: 2025-02-15 | End: 2025-02-19 | Stop reason: HOSPADM

## 2025-02-15 RX ORDER — AMOXICILLIN 250 MG
2 CAPSULE ORAL 2 TIMES DAILY
Status: DISCONTINUED | OUTPATIENT
Start: 2025-02-15 | End: 2025-02-19 | Stop reason: HOSPADM

## 2025-02-15 RX ORDER — HYDROCODONE BITARTRATE AND ACETAMINOPHEN 5; 325 MG/1; MG/1
1 TABLET ORAL EVERY 6 HOURS PRN
Status: DISCONTINUED | OUTPATIENT
Start: 2025-02-15 | End: 2025-02-19 | Stop reason: HOSPADM

## 2025-02-15 RX ORDER — BISACODYL 5 MG/1
5 TABLET, DELAYED RELEASE ORAL DAILY PRN
Status: DISCONTINUED | OUTPATIENT
Start: 2025-02-15 | End: 2025-02-19 | Stop reason: HOSPADM

## 2025-02-15 RX ORDER — METHYLPREDNISOLONE SODIUM SUCCINATE 40 MG/ML
40 INJECTION, POWDER, LYOPHILIZED, FOR SOLUTION INTRAMUSCULAR; INTRAVENOUS EVERY 12 HOURS
Status: DISCONTINUED | OUTPATIENT
Start: 2025-02-16 | End: 2025-02-16

## 2025-02-15 RX ORDER — DOXYCYCLINE 100 MG/1
100 CAPSULE ORAL EVERY 12 HOURS
Status: DISCONTINUED | OUTPATIENT
Start: 2025-02-15 | End: 2025-02-19 | Stop reason: HOSPADM

## 2025-02-15 RX ORDER — POLYETHYLENE GLYCOL 3350 17 G/17G
17 POWDER, FOR SOLUTION ORAL DAILY PRN
Status: DISCONTINUED | OUTPATIENT
Start: 2025-02-15 | End: 2025-02-19 | Stop reason: HOSPADM

## 2025-02-15 RX ORDER — DOXYCYCLINE 100 MG/1
100 CAPSULE ORAL ONCE
Status: COMPLETED | OUTPATIENT
Start: 2025-02-15 | End: 2025-02-15

## 2025-02-15 RX ORDER — METHYLPREDNISOLONE SODIUM SUCCINATE 125 MG/2ML
60 INJECTION, POWDER, LYOPHILIZED, FOR SOLUTION INTRAMUSCULAR; INTRAVENOUS EVERY 12 HOURS
Status: DISCONTINUED | OUTPATIENT
Start: 2025-02-15 | End: 2025-02-15

## 2025-02-15 RX ORDER — HYDROCODONE BITARTRATE AND ACETAMINOPHEN 5; 325 MG/1; MG/1
1 TABLET ORAL ONCE
Status: COMPLETED | OUTPATIENT
Start: 2025-02-15 | End: 2025-02-15

## 2025-02-15 RX ORDER — BISACODYL 10 MG
10 SUPPOSITORY, RECTAL RECTAL DAILY PRN
Status: DISCONTINUED | OUTPATIENT
Start: 2025-02-15 | End: 2025-02-19 | Stop reason: HOSPADM

## 2025-02-15 RX ORDER — ACETAMINOPHEN 500 MG
1000 TABLET ORAL EVERY 6 HOURS PRN
Status: DISCONTINUED | OUTPATIENT
Start: 2025-02-15 | End: 2025-02-19 | Stop reason: HOSPADM

## 2025-02-15 RX ORDER — METOPROLOL SUCCINATE 50 MG/1
50 TABLET, EXTENDED RELEASE ORAL DAILY
Status: DISCONTINUED | OUTPATIENT
Start: 2025-02-15 | End: 2025-02-19 | Stop reason: HOSPADM

## 2025-02-15 RX ORDER — IPRATROPIUM BROMIDE AND ALBUTEROL SULFATE 2.5; .5 MG/3ML; MG/3ML
3 SOLUTION RESPIRATORY (INHALATION)
Status: DISCONTINUED | OUTPATIENT
Start: 2025-02-15 | End: 2025-02-19 | Stop reason: HOSPADM

## 2025-02-15 RX ADMIN — METHYLPREDNISOLONE SODIUM SUCCINATE 60 MG: 125 INJECTION INTRAMUSCULAR; INTRAVENOUS at 12:38

## 2025-02-15 RX ADMIN — APIXABAN 5 MG: 5 TABLET, FILM COATED ORAL at 15:22

## 2025-02-15 RX ADMIN — SENNOSIDES AND DOCUSATE SODIUM 2 TABLET: 50; 8.6 TABLET ORAL at 20:17

## 2025-02-15 RX ADMIN — DOXYCYCLINE 100 MG: 100 CAPSULE ORAL at 17:22

## 2025-02-15 RX ADMIN — TIZANIDINE 4 MG: 4 TABLET ORAL at 15:22

## 2025-02-15 RX ADMIN — APIXABAN 5 MG: 5 TABLET, FILM COATED ORAL at 20:21

## 2025-02-15 RX ADMIN — CARBIDOPA AND LEVODOPA 1 TABLET: 50; 200 TABLET, EXTENDED RELEASE ORAL at 20:17

## 2025-02-15 RX ADMIN — ALBUTEROL SULFATE 2.5 MG: 2.5 SOLUTION RESPIRATORY (INHALATION) at 07:04

## 2025-02-15 RX ADMIN — WATER 10 ML: 1 INJECTION INTRAMUSCULAR; INTRAVENOUS; SUBCUTANEOUS at 12:48

## 2025-02-15 RX ADMIN — CARBIDOPA AND LEVODOPA 1 TABLET: 50; 200 TABLET, EXTENDED RELEASE ORAL at 12:56

## 2025-02-15 RX ADMIN — METOPROLOL SUCCINATE 50 MG: 50 TABLET, EXTENDED RELEASE ORAL at 15:22

## 2025-02-15 RX ADMIN — TIZANIDINE 4 MG: 4 TABLET ORAL at 06:45

## 2025-02-15 RX ADMIN — HYDROCODONE BITARTRATE AND ACETAMINOPHEN 1 TABLET: 5; 325 TABLET ORAL at 12:48

## 2025-02-15 RX ADMIN — DOXYCYCLINE 100 MG: 100 CAPSULE ORAL at 06:46

## 2025-02-15 RX ADMIN — HYDROCODONE BITARTRATE AND ACETAMINOPHEN 1 TABLET: 5; 325 TABLET ORAL at 06:45

## 2025-02-15 RX ADMIN — PANTOPRAZOLE SODIUM 40 MG: 40 TABLET, DELAYED RELEASE ORAL at 15:22

## 2025-02-15 RX ADMIN — IPRATROPIUM BROMIDE AND ALBUTEROL SULFATE 3 ML: 2.5; .5 SOLUTION RESPIRATORY (INHALATION) at 16:27

## 2025-02-15 RX ADMIN — SODIUM CHLORIDE 2 G: 900 INJECTION INTRAVENOUS at 06:45

## 2025-02-15 RX ADMIN — POTASSIUM CHLORIDE, DEXTROSE MONOHYDRATE AND SODIUM CHLORIDE 75 ML/HR: 150; 5; 450 INJECTION, SOLUTION INTRAVENOUS at 18:31

## 2025-02-15 RX ADMIN — QUETIAPINE FUMARATE 25 MG: 25 TABLET ORAL at 20:17
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
## 2025-02-15 NOTE — Clinical Note
Level of Care: Telemetry [5]   Diagnosis: Aspiration pneumonia [203324]   Admitting Physician: DAMIR COLBY [[id number]]   Attending Physician: DAMIR COLBY [[id number]]

## 2025-02-15 NOTE — PLAN OF CARE
Goal Outcome Evaluation:  Plan of Care Reviewed With: patient                Anticipated Discharge Disposition (SLP): inpatient rehabilitation facility          SLP Swallowing Diagnosis: suspected pharyngeal dysphagia, oral dysphagia (02/15/25 1300)
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
## 2025-02-16 LAB
ANION GAP SERPL CALCULATED.3IONS-SCNC: 12 MMOL/L (ref 5–15)
BASOPHILS # BLD AUTO: 0.01 10*3/MM3 (ref 0–0.2)
BASOPHILS NFR BLD AUTO: 0.1 % (ref 0–1.5)
BUN SERPL-MCNC: 14 MG/DL (ref 8–23)
BUN/CREAT SERPL: 26.4 (ref 7–25)
CALCIUM SPEC-SCNC: 8.4 MG/DL (ref 8.6–10.5)
CHLORIDE SERPL-SCNC: 103 MMOL/L (ref 98–107)
CO2 SERPL-SCNC: 22 MMOL/L (ref 22–29)
CREAT SERPL-MCNC: 0.53 MG/DL (ref 0.76–1.27)
DEPRECATED RDW RBC AUTO: 52.1 FL (ref 37–54)
EGFRCR SERPLBLD CKD-EPI 2021: 101.9 ML/MIN/1.73
EOSINOPHIL # BLD AUTO: 0 10*3/MM3 (ref 0–0.4)
EOSINOPHIL NFR BLD AUTO: 0 % (ref 0.3–6.2)
ERYTHROCYTE [DISTWIDTH] IN BLOOD BY AUTOMATED COUNT: 15.9 % (ref 12.3–15.4)
GLUCOSE SERPL-MCNC: 149 MG/DL (ref 65–99)
HCT VFR BLD AUTO: 44.7 % (ref 37.5–51)
HGB BLD-MCNC: 14.2 G/DL (ref 13–17.7)
IMM GRANULOCYTES # BLD AUTO: 0.05 10*3/MM3 (ref 0–0.05)
IMM GRANULOCYTES NFR BLD AUTO: 0.3 % (ref 0–0.5)
LYMPHOCYTES # BLD AUTO: 0.34 10*3/MM3 (ref 0.7–3.1)
LYMPHOCYTES NFR BLD AUTO: 2.3 % (ref 19.6–45.3)
MCH RBC QN AUTO: 28.6 PG (ref 26.6–33)
MCHC RBC AUTO-ENTMCNC: 31.8 G/DL (ref 31.5–35.7)
MCV RBC AUTO: 90.1 FL (ref 79–97)
MONOCYTES # BLD AUTO: 0.27 10*3/MM3 (ref 0.1–0.9)
MONOCYTES NFR BLD AUTO: 1.8 % (ref 5–12)
NEUTROPHILS NFR BLD AUTO: 14.44 10*3/MM3 (ref 1.7–7)
NEUTROPHILS NFR BLD AUTO: 95.5 % (ref 42.7–76)
NRBC BLD AUTO-RTO: 0 /100 WBC (ref 0–0.2)
PLATELET # BLD AUTO: 239 10*3/MM3 (ref 140–450)
PMV BLD AUTO: 11.3 FL (ref 6–12)
POTASSIUM SERPL-SCNC: 4.8 MMOL/L (ref 3.5–5.2)
RBC # BLD AUTO: 4.96 10*6/MM3 (ref 4.14–5.8)
SODIUM SERPL-SCNC: 137 MMOL/L (ref 136–145)
WBC NRBC COR # BLD AUTO: 15.11 10*3/MM3 (ref 3.4–10.8)

## 2025-02-16 PROCEDURE — 94664 DEMO&/EVAL PT USE INHALER: CPT

## 2025-02-16 PROCEDURE — 85025 COMPLETE CBC W/AUTO DIFF WBC: CPT | Performed by: INTERNAL MEDICINE

## 2025-02-16 PROCEDURE — 94799 UNLISTED PULMONARY SVC/PX: CPT

## 2025-02-16 PROCEDURE — 25010000002 CEFTRIAXONE PER 250 MG: Performed by: INTERNAL MEDICINE

## 2025-02-16 PROCEDURE — 80048 BASIC METABOLIC PNL TOTAL CA: CPT | Performed by: INTERNAL MEDICINE

## 2025-02-16 PROCEDURE — 25010000002 METHYLPREDNISOLONE PER 40 MG: Performed by: INTERNAL MEDICINE

## 2025-02-16 PROCEDURE — 99232 SBSQ HOSP IP/OBS MODERATE 35: CPT | Performed by: INTERNAL MEDICINE

## 2025-02-16 RX ORDER — PREDNISONE 20 MG/1
40 TABLET ORAL
Status: COMPLETED | OUTPATIENT
Start: 2025-02-17 | End: 2025-02-19

## 2025-02-16 RX ORDER — DEXTROSE MONOHYDRATE, SODIUM CHLORIDE, AND POTASSIUM CHLORIDE 50; 1.49; 4.5 G/1000ML; G/1000ML; G/1000ML
50 INJECTION, SOLUTION INTRAVENOUS CONTINUOUS
Status: DISPENSED | OUTPATIENT
Start: 2025-02-16 | End: 2025-02-17

## 2025-02-16 RX ADMIN — QUETIAPINE FUMARATE 25 MG: 25 TABLET ORAL at 20:50

## 2025-02-16 RX ADMIN — APIXABAN 5 MG: 5 TABLET, FILM COATED ORAL at 20:50

## 2025-02-16 RX ADMIN — CARBIDOPA AND LEVODOPA 1 TABLET: 50; 200 TABLET, EXTENDED RELEASE ORAL at 08:40

## 2025-02-16 RX ADMIN — METOPROLOL SUCCINATE 50 MG: 50 TABLET, EXTENDED RELEASE ORAL at 08:40

## 2025-02-16 RX ADMIN — PANTOPRAZOLE SODIUM 40 MG: 40 TABLET, DELAYED RELEASE ORAL at 08:40

## 2025-02-16 RX ADMIN — SENNOSIDES AND DOCUSATE SODIUM 2 TABLET: 50; 8.6 TABLET ORAL at 20:50

## 2025-02-16 RX ADMIN — DOXYCYCLINE 100 MG: 100 CAPSULE ORAL at 06:08

## 2025-02-16 RX ADMIN — CARBIDOPA AND LEVODOPA 1 TABLET: 50; 200 TABLET, EXTENDED RELEASE ORAL at 20:50

## 2025-02-16 RX ADMIN — IPRATROPIUM BROMIDE AND ALBUTEROL SULFATE 3 ML: 2.5; .5 SOLUTION RESPIRATORY (INHALATION) at 08:30

## 2025-02-16 RX ADMIN — SENNOSIDES AND DOCUSATE SODIUM 2 TABLET: 50; 8.6 TABLET ORAL at 08:40

## 2025-02-16 RX ADMIN — IPRATROPIUM BROMIDE AND ALBUTEROL SULFATE 3 ML: 2.5; .5 SOLUTION RESPIRATORY (INHALATION) at 16:32

## 2025-02-16 RX ADMIN — APIXABAN 5 MG: 5 TABLET, FILM COATED ORAL at 08:40

## 2025-02-16 RX ADMIN — SODIUM CHLORIDE 2 G: 900 INJECTION INTRAVENOUS at 08:39

## 2025-02-16 RX ADMIN — POTASSIUM CHLORIDE, DEXTROSE MONOHYDRATE AND SODIUM CHLORIDE 50 ML/HR: 150; 5; 450 INJECTION, SOLUTION INTRAVENOUS at 16:27

## 2025-02-16 RX ADMIN — METHYLPREDNISOLONE SODIUM SUCCINATE 40 MG: 40 INJECTION, POWDER, FOR SOLUTION INTRAMUSCULAR; INTRAVENOUS at 00:46

## 2025-02-16 RX ADMIN — DOXYCYCLINE 100 MG: 100 CAPSULE ORAL at 17:16

## 2025-02-16 RX ADMIN — IPRATROPIUM BROMIDE AND ALBUTEROL SULFATE 3 ML: 2.5; .5 SOLUTION RESPIRATORY (INHALATION) at 21:06

## 2025-02-16 RX ADMIN — Medication 10 ML: at 08:39

## 2025-02-16 NOTE — SIGNIFICANT NOTE
02/16/25 1304   SLP Deferred Reason   SLP Deferred Reason Routine  (RN reported patient tolerating current diet without incident. FEES to be completed 2/17/25 for further evaluation.)

## 2025-02-16 NOTE — PROGRESS NOTES
[facility] Medicine Services  INPATIENT PROGRESS NOTE    Date of Admission: 2/15/2025  Primary Care Physician: Ariadne Galloway PA    Subjective     Chief Complaint: shortness of breath    HPI:patient feels better today, still weak, still hates thickened liquids, still with cough      Objective      Vitals:  Temp:  [97.2 °F (36.2 °C)-97.9 °F (36.6 °C)] 97.2 °F (36.2 °C)  Heart Rate:  [64-97] 74  Resp:  [16-20] 20  BP: (127-153)/(76-90) 153/87  Flow (L/min) (Oxygen Therapy):  [2-4] 2    Patient is alert and talkative -- slowly- up in the chair  Neck is without mass or JVD  Heart is Reg wo murmur  Lungs are coarse  Abd is soft without HSM or mass, not distended or tender to palpation  MAEW, no clubbing cyanosis or edema, he is thin with a resting tremor  Skin is without rash  Neurologic exam is nonfocal   Mood is appropriate      Results Review:    I have reviewed the labs, radiology results and diagnostic studies.    Results from last 7 days   Lab Units 02/16/25  0407 02/15/25  0618   WBC 10*3/mm3 15.11* 11.65*   HEMOGLOBIN g/dL 14.2 15.8   HEMATOCRIT % 44.7 49.5   PLATELETS 10*3/mm3 239 253     Results from last 7 days   Lab Units 02/16/25  0406 02/15/25  0743 02/15/25  0618   SODIUM mmol/L 137  --  142   POTASSIUM mmol/L 4.8  --  3.6   CHLORIDE mmol/L 103  --  104   CO2 mmol/L 22.0  --  26.0   BUN mg/dL 14  --  14   CREATININE mg/dL 0.53*  --  0.61*   GLUCOSE mg/dL 149*  --  167*   CALCIUM mg/dL 8.4*  --  9.2   ALK PHOS U/L  --   --  74   ALT (SGPT) U/L  --   --  <5   AST (SGOT) U/L  --   --  13   HSTROP T ng/L  --  11 10       Microbiology Results Abnormal       None          XR Chest 1 View    Result Date: 2/15/2025  Impression: Right middle lobe and right lower lobe airspace disease favored to represent pneumonia. Electronically Signed: Carlitos Barry MD  2/15/2025 6:37 AM EST  Workstation ID: [id number]   Results for orders placed during the hospital encounter of 08/24/23    Adult  Transthoracic Echo Complete W/ Cont if Necessary Per Protocol    Interpretation Summary    Left ventricular ejection fraction appears to be 56 - 60%.    The left atrial cavity is mild to moderately dilated    There is mild to moderate thickening of the aortic valve    Mild to moderate tricuspid valve regurgitation is present. Estimated right ventricular systolic pressure from tricuspid regurgitation is mildly elevated (35-45 mmHg).    There is a trivial pericardial effusion.    Patient noted to be in atrial fibrillation with elevated rates during the study.      I have reviewed the medications.    Assessment/Plan     Assessment/Problem List    Aspiration pneumonia    Chronic pain with pain pump in place    Parkinson disease    Coronary artery disease involving native coronary artery of native heart without angina pectoris    Severe malnutrition    Benign prostatic hyperplasia without lower urinary tract symptoms    Essential (primary) hypertension    Hiatal hernia    Hyperlipidemia, unspecified    Obstructive sleep apnea    Atrial fibrillation    Chronic obstructive pulmonary disease    Pneumonia      Plan  Assessment & Plan  Aspiration pneumonia  Presumed, cont rocephin and doxy for 5 days  Seen by SLP--?FEES TOMORROW?  Chronic pain with pain pump in place  stable  Chronic obstructive pulmonary disease   Cont nebs, wean oxygen, decrease steroids  Obstructive sleep apnea    Parkinson disease  Cont sinemet, seroquel at night  Coronary artery disease involving native coronary artery of native heart without angina pectoris   Troponins negative  Atrial fibrillation  Cont metoprolol and eliquis, rate controlled  Essential (primary) hypertension   Cont metoprolol  Benign prostatic hyperplasia without lower urinary tract symptoms    Hiatal hernia  Cont PPI  Hyperlipidemia, unspecified   Not on a statin  Severe malnutrition   Add supplements      VTE Prophylaxis:  Pharmacologic VTE prophylaxis orders are  present.        Expected Discharge  Expected Discharge Date: 2/18/2025; Expected Discharge Time:     Electronically signed by Rachelle Baer MD, 02/16/25

## 2025-02-17 ENCOUNTER — ANCILLARY PROCEDURE (OUTPATIENT)
Dept: SPEECH THERAPY | Facility: HOSPITAL | Age: 80
End: 2025-02-17
Payer: MEDICARE

## 2025-02-17 PROCEDURE — 25010000002 CEFTRIAXONE PER 250 MG: Performed by: INTERNAL MEDICINE

## 2025-02-17 PROCEDURE — 92612 ENDOSCOPY SWALLOW (FEES) VID: CPT

## 2025-02-17 PROCEDURE — 97165 OT EVAL LOW COMPLEX 30 MIN: CPT

## 2025-02-17 PROCEDURE — 99232 SBSQ HOSP IP/OBS MODERATE 35: CPT | Performed by: STUDENT IN AN ORGANIZED HEALTH CARE EDUCATION/TRAINING PROGRAM

## 2025-02-17 PROCEDURE — 63710000001 PREDNISONE PER 1 MG: Performed by: INTERNAL MEDICINE

## 2025-02-17 PROCEDURE — 94799 UNLISTED PULMONARY SVC/PX: CPT

## 2025-02-17 PROCEDURE — 97161 PT EVAL LOW COMPLEX 20 MIN: CPT

## 2025-02-17 PROCEDURE — 94664 DEMO&/EVAL PT USE INHALER: CPT

## 2025-02-17 PROCEDURE — 94761 N-INVAS EAR/PLS OXIMETRY MLT: CPT

## 2025-02-17 PROCEDURE — 97535 SELF CARE MNGMENT TRAINING: CPT

## 2025-02-17 RX ORDER — AMLODIPINE BESYLATE 5 MG/1
5 TABLET ORAL
Status: DISCONTINUED | OUTPATIENT
Start: 2025-02-17 | End: 2025-02-19 | Stop reason: HOSPADM

## 2025-02-17 RX ORDER — BENZONATATE 100 MG/1
100 CAPSULE ORAL 3 TIMES DAILY PRN
Status: DISCONTINUED | OUTPATIENT
Start: 2025-02-17 | End: 2025-02-19 | Stop reason: HOSPADM

## 2025-02-17 RX ADMIN — BENZONATATE 100 MG: 100 CAPSULE ORAL at 15:27

## 2025-02-17 RX ADMIN — BENZONATATE 100 MG: 100 CAPSULE ORAL at 23:56

## 2025-02-17 RX ADMIN — POTASSIUM CHLORIDE, DEXTROSE MONOHYDRATE AND SODIUM CHLORIDE 50 ML/HR: 150; 5; 450 INJECTION, SOLUTION INTRAVENOUS at 09:34

## 2025-02-17 RX ADMIN — CARBIDOPA AND LEVODOPA 1 TABLET: 50; 200 TABLET, EXTENDED RELEASE ORAL at 09:01

## 2025-02-17 RX ADMIN — HYDROCODONE BITARTRATE AND ACETAMINOPHEN 1 TABLET: 5; 325 TABLET ORAL at 03:24

## 2025-02-17 RX ADMIN — METOPROLOL SUCCINATE 50 MG: 50 TABLET, EXTENDED RELEASE ORAL at 09:01

## 2025-02-17 RX ADMIN — PREDNISONE 40 MG: 20 TABLET ORAL at 09:01

## 2025-02-17 RX ADMIN — CARBIDOPA AND LEVODOPA 1 TABLET: 50; 200 TABLET, EXTENDED RELEASE ORAL at 20:22

## 2025-02-17 RX ADMIN — DOXYCYCLINE 100 MG: 100 CAPSULE ORAL at 17:28

## 2025-02-17 RX ADMIN — PANTOPRAZOLE SODIUM 40 MG: 40 TABLET, DELAYED RELEASE ORAL at 09:01

## 2025-02-17 RX ADMIN — SENNOSIDES AND DOCUSATE SODIUM 2 TABLET: 50; 8.6 TABLET ORAL at 09:01

## 2025-02-17 RX ADMIN — AMLODIPINE BESYLATE 5 MG: 5 TABLET ORAL at 17:28

## 2025-02-17 RX ADMIN — QUETIAPINE FUMARATE 25 MG: 25 TABLET ORAL at 20:21

## 2025-02-17 RX ADMIN — SODIUM CHLORIDE 2 G: 900 INJECTION INTRAVENOUS at 06:17

## 2025-02-17 RX ADMIN — TIZANIDINE 4 MG: 4 TABLET ORAL at 03:24

## 2025-02-17 RX ADMIN — APIXABAN 5 MG: 5 TABLET, FILM COATED ORAL at 20:22

## 2025-02-17 RX ADMIN — DOXYCYCLINE 100 MG: 100 CAPSULE ORAL at 06:17

## 2025-02-17 RX ADMIN — IPRATROPIUM BROMIDE AND ALBUTEROL SULFATE 3 ML: 2.5; .5 SOLUTION RESPIRATORY (INHALATION) at 19:42

## 2025-02-17 RX ADMIN — HYDROCODONE BITARTRATE AND ACETAMINOPHEN 1 TABLET: 5; 325 TABLET ORAL at 20:22

## 2025-02-17 RX ADMIN — TIZANIDINE 4 MG: 4 TABLET ORAL at 20:22

## 2025-02-17 RX ADMIN — APIXABAN 5 MG: 5 TABLET, FILM COATED ORAL at 09:01

## 2025-02-17 RX ADMIN — IPRATROPIUM BROMIDE AND ALBUTEROL SULFATE 3 ML: 2.5; .5 SOLUTION RESPIRATORY (INHALATION) at 07:01

## 2025-02-17 RX ADMIN — IPRATROPIUM BROMIDE AND ALBUTEROL SULFATE 3 ML: 2.5; .5 SOLUTION RESPIRATORY (INHALATION) at 13:40

## 2025-02-17 NOTE — PLAN OF CARE
Goal Outcome Evaluation:                   Anticipated Discharge Disposition (SLP): inpatient rehabilitation facility          SLP Swallowing Diagnosis: mild, oral dysphagia, moderate, pharyngeal dysphagia (02/17/25 0900)

## 2025-02-17 NOTE — CASE MANAGEMENT/SOCIAL WORK
Discharge Planning Assessment  [facility]     Patient Name: Flaco Roque II  MRN: [id number]  Today's Date: 2/17/2025    Admit Date: 2/15/2025    Plan: home   Discharge Needs Assessment       Row Name 02/17/25 1253       Living Environment    People in Home spouse    Current Living Arrangements home    Potentially Unsafe Housing Conditions none    In the past 12 months has the electric, gas, oil, or water company threatened to shut off services in your home? No    Primary Care Provided by self    Provides Primary Care For no one    Family Caregiver if Needed spouse    Quality of Family Relationships helpful;involved;supportive    Able to Return to Prior Arrangements yes       Resource/Environmental Concerns    Resource/Environmental Concerns none    Transportation Concerns none       Transportation Needs    In the past 12 months, has lack of transportation kept you from medical appointments or from getting medications? no    In the past 12 months, has lack of transportation kept you from meetings, work, or from getting things needed for daily living? No       Food Insecurity    Within the past 12 months, you worried that your food would run out before you got the money to buy more. Never true    Within the past 12 months, the food you bought just didn't last and you didn't have money to get more. Never true       Transition Planning    Patient/Family Anticipates Transition to home with family    Patient/Family Anticipated Services at Transition none    Transportation Anticipated family or friend will provide       Discharge Needs Assessment    Readmission Within the Last 30 Days no previous admission in last 30 days    Equipment Currently Used at Home oxygen;walker, rolling    Concerns to be Addressed discharge planning    Do you want help finding or keeping work or a job? I do not need or want help    Do you want help with school or training? For example, starting or completing job training or getting a high  school diploma, GED or equivalent No    Anticipated Changes Related to Illness none    Equipment Needed After Discharge none                   Discharge Plan       Row Name 02/17/25 1254       Plan    Plan home    Patient/Family in Agreement with Plan yes    Plan Comments Met with Mr. Roque at the bedside to initiate discharge planning. He shares a [facility] home with his wife. He is independent with activities of daily living and goes to the gym 7 days a week for at least 30 minutes. He has a portable oxygen tank that he rarely uses. He has a rolling walker that he occasionally uses at home. He denies the receipt of home health or outpatient services at this time. His primary care provider is SHARON Lazaro. He denies obstacles to getting medical care or obtaining medications. PT worked with him and recommended home with assist.  will continue to follow plan of care and assist with discharge planning needs as indicated.    Final Discharge Disposition Code 01 - home or self-care                  Continued Care and Services - Admitted Since 2/15/2025    No active coordination exists for this encounter.       Expected Discharge Date and Time       Expected Discharge Date Expected Discharge Time    Feb 18, 2025            Demographic Summary       Row Name 02/17/25 1251       General Information    Admission Type inpatient    Arrived From emergency department    Referral Source admission list    Reason for Consult discharge planning    Preferred Language English       Contact Information    Permission Granted to Share Info With family/designee    Contact Information Obtained for     Contact Information Comments Cheryl Roque Spouse [phone]      ALESSIO ROQUE Son [phone]        KRYSTYNA ROQUE Son   [phone]                   Functional Status       Row Name 02/17/25 1252       Functional Status    Usual Activity Tolerance good    Current  Activity Tolerance good       Physical Activity    On average, how many days per week do you engage in moderate to strenuous exercise (like a brisk walk)? 7 days    On average, how many minutes do you engage in exercise at this level? 30 min    Number of minutes of exercise per week 210       Assessment of Health Literacy    How often do you have someone help you read hospital materials? Never    How often do you have problems learning about your medical condition because of difficulty understanding written information? Never    How often do you have a problem understanding what is told to you about your medical condition? Occasionally    How confident are you filling out medical forms by yourself? Extremely    Health Literacy Good       Functional Status, IADL    Medications independent    Meal Preparation independent    Housekeeping assistive person    Laundry assistive person    Shopping independent    If for any reason you need help with day-to-day activities such as bathing, preparing meals, shopping, managing finances, etc., do you get the help you need? I don't need any help       Mental Status    General Appearance WDL WDL       Mental Status Summary    Recent Changes in Mental Status/Cognitive Functioning no changes                   Psychosocial    No documentation.                  Abuse/Neglect    No documentation.                  Legal    No documentation.                  Substance Abuse    No documentation.                  Patient Forms    No documentation.                     Melody Milan RN
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
## 2025-02-17 NOTE — PROGRESS NOTES
[facility]  PROGRESS NOTE    Patient Name: Flaco Roque II  : 1945  MRN: [id number]    Date of Admission: 2/15/2025  Primary Care Physician: Ariadne Galloway PA    Subjective   Subjective     CC:  Follow-up dyspnea    HPI:  No new overnight events.  Is resting comfortably and awaiting his speech study today.  He would like to try to get out of bed with PT today if possible      Objective   Objective     Vital Signs:   Temp:  [97.4 °F (36.3 °C)-97.5 °F (36.4 °C)] 97.5 °F (36.4 °C)  Heart Rate:  [67-89] 75  Resp:  [16-20] 18  BP: (156-163)/(94-98) 156/98  Flow (L/min) (Oxygen Therapy):  [2] 2     Physical Exam:  Constitutional: No acute distress, awake, alert, frail, elderly  HENT: NCAT, mucous membranes moist  Respiratory: resp effort fair. Diminished basilar  Cardiovascular: RRR, no murmurs, rubs, or gallops  Gastrointestinal: Positive bowel sounds, soft, nontender, nondistended  Musculoskeletal: No bilateral ankle edema  Psychiatric: Appropriate affect, cooperative  Neurologic: Oriented x 3, speech soft, no slurring, symmetric face, globally weak  Skin: No rashes      Results Reviewed:  LAB RESULTS:      Lab 25  0407 02/15/25  0743 02/15/25  0618   WBC 15.11*  --  11.65*   HEMOGLOBIN 14.2  --  15.8   HEMATOCRIT 44.7  --  49.5   PLATELETS 239  --  253   NEUTROS ABS 14.44*  --  9.87*   IMMATURE GRANS (ABS) 0.05  --  0.03   LYMPHS ABS 0.34*  --  1.07   MONOS ABS 0.27  --  0.58   EOS ABS 0.00  --  0.06   MCV 90.1  --  89.2   PROCALCITONIN  --   --  0.07   LACTATE  --   --  1.7   HSTROP T  --  11 10         Lab 25  0406 02/15/25  0618   SODIUM 137 142   POTASSIUM 4.8 3.6   CHLORIDE 103 104   CO2 22.0 26.0   ANION GAP 12.0 12.0   BUN 14 14   CREATININE 0.53* 0.61*   EGFR 101.9 97.7   GLUCOSE 149* 167*   CALCIUM 8.4* 9.2         Lab 02/15/25  0618   TOTAL PROTEIN 6.6   ALBUMIN 4.2   GLOBULIN 2.4   ALT (SGPT) <5   AST (SGOT) 13   BILIRUBIN 0.6   ALK  PHOS 74         Lab 02/15/25  0743 02/15/25  0618   PROBNP  --  395.0   HSTROP T 11 10                 Brief Urine Lab Results  (Last result in the past 365 days)        Color   Clarity   Blood   Leuk Est   Nitrite   Protein   CREAT   Urine HCG        11/17/24 1310 Yellow   Clear   Negative   Moderate (2+)   Negative   Negative                   Microbiology Results Abnormal       None            SLP FEES - Fiberoptic Endo Eval Swallow    Result Date: 2/17/2025  This procedure was auto-finalized with no dictation required.     Results for orders placed during the hospital encounter of 08/24/23    Adult Transthoracic Echo Complete W/ Cont if Necessary Per Protocol    Interpretation Summary    Left ventricular ejection fraction appears to be 56 - 60%.    The left atrial cavity is mild to moderately dilated    There is mild to moderate thickening of the aortic valve    Mild to moderate tricuspid valve regurgitation is present. Estimated right ventricular systolic pressure from tricuspid regurgitation is mildly elevated (35-45 mmHg).    There is a trivial pericardial effusion.    Patient noted to be in atrial fibrillation with elevated rates during the study.      Current medications:  Scheduled Meds:apixaban, 5 mg, Oral, Q12H  carbidopa-levodopa CR, 1 tablet, Oral, BID  cefTRIAXone, 2 g, Intravenous, Q24H  doxycycline, 100 mg, Oral, Q12H  ipratropium-albuterol, 3 mL, Nebulization, 4x Daily - RT  metoprolol succinate XL, 50 mg, Oral, Daily  pantoprazole, 40 mg, Oral, Daily  predniSONE, 40 mg, Oral, Daily With Breakfast  QUEtiapine, 25 mg, Oral, Q PM  senna-docusate sodium, 2 tablet, Oral, BID      Continuous Infusions:   PRN Meds:.  acetaminophen    benzonatate    senna-docusate sodium **AND** polyethylene glycol **AND** bisacodyl **AND** bisacodyl    HYDROcodone-acetaminophen    sodium chloride    tiZANidine    Assessment & Plan   Assessment & Plan     Active Hospital Problems    Diagnosis  POA    **Aspiration  pneumonia [J69.0]  Yes    Benign prostatic hyperplasia without lower urinary tract symptoms [N40.0]  Yes    Hyperlipidemia, unspecified [E78.5]  Yes    Severe malnutrition [E43]  Yes    Hiatal hernia [K44.9]  Yes    Coronary artery disease involving native coronary artery of native heart without angina pectoris [I25.10]  Yes    Atrial fibrillation [I48.91]  Yes    Essential (primary) hypertension [I10]  Yes    Parkinson disease [G20.A1]  Yes    Chronic pain with pain pump in place [G89.29]  Yes    Obstructive sleep apnea [G47.33]  Yes    Chronic obstructive pulmonary disease [J44.9]  Yes      Resolved Hospital Problems   No resolved problems to display.        Brief Hospital Course to date:  Flaco Roque II is a 79 y.o. male with history of Parkinson's, A-fib, hypertension, chronic pain here with aspiration pneumonia    Aspiration pneumonia  - Continue IV ceftriaxone and doxycycline  - FEES by speech today. Discussed aspiration precautions  --cultures in process    COPD  --cont nebs, steroids    HTN  --add amlodipine    CAD    Afib  --cont eliquis, BB    Malnutrition    Parkinsons  --cont home meds    Chronic pain        Expected Discharge Location and Transportation: home  Expected Discharge   Expected Discharge Date: 2/18/2025; Expected Discharge Time:      VTE Prophylaxis:  Pharmacologic VTE prophylaxis orders are present.         AM-PAC 6 Clicks Score (PT): 18 (02/17/25 5036)    CODE STATUS:   Code Status and Medical Interventions: CPR (Attempt to Resuscitate); Limited Support; No intubation (DNI), No artificial nutrition   Ordered at: 02/15/25 1800     Medical Intervention Limits:    No intubation (DNI)    No artificial nutrition     Code Status (Patient has no pulse and is not breathing):    CPR (Attempt to Resuscitate)     Medical Interventions (Patient has pulse or is breathing):    Limited Support       Cheryl Garrett MD  02/17/25

## 2025-02-17 NOTE — PLAN OF CARE
Goal Outcome Evaluation:  Plan of Care Reviewed With: patient        Progress: no change  Outcome Evaluation: Pt presents with decreased functional mobility and decreased activity tolerance compared to baseline. Pt ambulated 325ft with CGA and RW for support. Recommend continued skilled IP PT interventions. Recommend D/C home with assist when medically appropriate.    Anticipated Discharge Disposition (PT): home with assist

## 2025-02-17 NOTE — PROGRESS NOTES
Malnutrition Severity Assessment    Patient Name:  Flaco Roque II  YOB: 1945  MRN: [id number]  Admit Date:  2/15/2025    Patient meets criteria for : Severe Malnutrition    Comments:  Pt meets criteria for severe, chronic malnutrition with the indicators of severe muscle wasting and severe subcutaneous fat loss. Of note, pt with hx of COPD  and parkinsons which may be additional factors in muscle wasting.    Malnutrition Severity Assessment  Malnutrition Type: Chronic Disease - Related Malnutrition  Malnutrition Type (Last 8 Hours)       Malnutrition Severity Assessment       Row Name 02/17/25 1230       Malnutrition Severity Assessment    Malnutrition Type Chronic Disease - Related Malnutrition      Row Name 02/17/25 1230       Muscle Loss    Loss of Muscle Mass Findings Severe    [place] Region Moderate - slight depression    Clavicle Bone Region Severe - protruding prominent bone    Acromion Bone Region Severe - squared shoulders, bones, and acromion process protrusion prominent    Scapular Bone Region Severe - prominent bones, depressions easily visible between ribs, scapula, spine, shoulders    Dorsal Hand Region Severe - prominent depression    Patellar Region Severe - prominent bone, square looking, very little muscle definition    Anterior Thigh Region Severe - line/depression along thigh, obviously thin    Posterior Calf Region Severe - thin with very little definition/firmness      Row Name 02/17/25 1230       Fat Loss    Subcutaneous Fat Loss Findings Severe    Orbital Region  Moderate -  somewhat hollowness, slightly dark circles    Upper Arm Region Severe - mostly skin, very little space between folds, fingers touch    Thoracic & Lumbar Region Severe - ribs visible with prominent depressions, iliac crest very prominent      Row Name 02/17/25 1230       Criteria Met (Must meet criteria for severity in at least 2 of these categories: M Wasting, Fat Loss, Fluid, Secondary Signs, Wt.  Status, Intake)    Patient meets criteria for  Severe Malnutrition                    Electronically signed by:  Zayda Johns MS,RD,LD  02/17/25 12:30 EST
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
## 2025-02-17 NOTE — PLAN OF CARE
Goal Outcome Evaluation:  Plan of Care Reviewed With: patient        Progress: no change (Initial Eval)  Outcome Evaluation: Patient presenting below his functional baseline w/ endurance, strength and balance requiring increased assist for ADLs. Pt demonstrated good effort w/ HH distances of mobility, toileting, LB dressing and grooming. Deficits warrant skilled OT services. Recommend home w/ assist when medically appropriate for d/c.    Anticipated Discharge Disposition (OT): home with assist
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
## 2025-02-17 NOTE — NURSING NOTE
WOC consult for:  Wound    Indicate Wound Location / Details R 3rd toe       Visited patient and noted right third toe corn/callus related to his arthritic toes.  Patient states he follows with Dr. Hess.  Patient states he has already made appointment with Dr. Hess for this issue.  Patient agrees with plan of care which is offloading keeping clean and dry.  No evidence of infection or drainage.  sign off.

## 2025-02-18 LAB
ANION GAP SERPL CALCULATED.3IONS-SCNC: 10 MMOL/L (ref 5–15)
BASOPHILS # BLD AUTO: 0.01 10*3/MM3 (ref 0–0.2)
BASOPHILS NFR BLD AUTO: 0.1 % (ref 0–1.5)
BUN SERPL-MCNC: 11 MG/DL (ref 8–23)
BUN/CREAT SERPL: 25 (ref 7–25)
CALCIUM SPEC-SCNC: 9.2 MG/DL (ref 8.6–10.5)
CHLORIDE SERPL-SCNC: 105 MMOL/L (ref 98–107)
CO2 SERPL-SCNC: 26 MMOL/L (ref 22–29)
CREAT SERPL-MCNC: 0.44 MG/DL (ref 0.76–1.27)
DEPRECATED RDW RBC AUTO: 50.1 FL (ref 37–54)
EGFRCR SERPLBLD CKD-EPI 2021: 107.8 ML/MIN/1.73
EOSINOPHIL # BLD AUTO: 0.02 10*3/MM3 (ref 0–0.4)
EOSINOPHIL NFR BLD AUTO: 0.2 % (ref 0.3–6.2)
ERYTHROCYTE [DISTWIDTH] IN BLOOD BY AUTOMATED COUNT: 15.6 % (ref 12.3–15.4)
GEN 5 1HR TROPONIN T REFLEX: 8 NG/L
GLUCOSE SERPL-MCNC: 115 MG/DL (ref 65–99)
HCT VFR BLD AUTO: 48.2 % (ref 37.5–51)
HGB BLD-MCNC: 15.6 G/DL (ref 13–17.7)
IMM GRANULOCYTES # BLD AUTO: 0.02 10*3/MM3 (ref 0–0.05)
IMM GRANULOCYTES NFR BLD AUTO: 0.2 % (ref 0–0.5)
LYMPHOCYTES # BLD AUTO: 1.06 10*3/MM3 (ref 0.7–3.1)
LYMPHOCYTES NFR BLD AUTO: 10.6 % (ref 19.6–45.3)
MCH RBC QN AUTO: 28.2 PG (ref 26.6–33)
MCHC RBC AUTO-ENTMCNC: 32.4 G/DL (ref 31.5–35.7)
MCV RBC AUTO: 87 FL (ref 79–97)
MONOCYTES # BLD AUTO: 0.8 10*3/MM3 (ref 0.1–0.9)
MONOCYTES NFR BLD AUTO: 8 % (ref 5–12)
NEUTROPHILS NFR BLD AUTO: 8.09 10*3/MM3 (ref 1.7–7)
NEUTROPHILS NFR BLD AUTO: 80.9 % (ref 42.7–76)
NRBC BLD AUTO-RTO: 0 /100 WBC (ref 0–0.2)
PLATELET # BLD AUTO: 251 10*3/MM3 (ref 140–450)
PMV BLD AUTO: 10.4 FL (ref 6–12)
POTASSIUM SERPL-SCNC: 3.7 MMOL/L (ref 3.5–5.2)
QT INTERVAL: 386 MS
QTC INTERVAL: 461 MS
RBC # BLD AUTO: 5.54 10*6/MM3 (ref 4.14–5.8)
SODIUM SERPL-SCNC: 141 MMOL/L (ref 136–145)
TROPONIN T NUMERIC DELTA: 0 NG/L
TROPONIN T SERPL HS-MCNC: 8 NG/L
WBC NRBC COR # BLD AUTO: 10 10*3/MM3 (ref 3.4–10.8)

## 2025-02-18 PROCEDURE — 94667 MNPJ CHEST WALL 1ST: CPT

## 2025-02-18 PROCEDURE — 85025 COMPLETE CBC W/AUTO DIFF WBC: CPT | Performed by: STUDENT IN AN ORGANIZED HEALTH CARE EDUCATION/TRAINING PROGRAM

## 2025-02-18 PROCEDURE — 25010000002 CEFTRIAXONE PER 250 MG: Performed by: INTERNAL MEDICINE

## 2025-02-18 PROCEDURE — 84484 ASSAY OF TROPONIN QUANT: CPT | Performed by: STUDENT IN AN ORGANIZED HEALTH CARE EDUCATION/TRAINING PROGRAM

## 2025-02-18 PROCEDURE — 93005 ELECTROCARDIOGRAM TRACING: CPT | Performed by: STUDENT IN AN ORGANIZED HEALTH CARE EDUCATION/TRAINING PROGRAM

## 2025-02-18 PROCEDURE — 94664 DEMO&/EVAL PT USE INHALER: CPT

## 2025-02-18 PROCEDURE — 99232 SBSQ HOSP IP/OBS MODERATE 35: CPT | Performed by: STUDENT IN AN ORGANIZED HEALTH CARE EDUCATION/TRAINING PROGRAM

## 2025-02-18 PROCEDURE — 94799 UNLISTED PULMONARY SVC/PX: CPT

## 2025-02-18 PROCEDURE — 93010 ELECTROCARDIOGRAM REPORT: CPT | Performed by: INTERNAL MEDICINE

## 2025-02-18 PROCEDURE — 63710000001 PREDNISONE PER 1 MG: Performed by: INTERNAL MEDICINE

## 2025-02-18 PROCEDURE — 80048 BASIC METABOLIC PNL TOTAL CA: CPT | Performed by: STUDENT IN AN ORGANIZED HEALTH CARE EDUCATION/TRAINING PROGRAM

## 2025-02-18 RX ORDER — GLYCOPYRROLATE 1 MG/1
1 TABLET ORAL 2 TIMES DAILY PRN
COMMUNITY
Start: 2025-02-05 | End: 2025-04-06

## 2025-02-18 RX ORDER — CARBIDOPA AND LEVODOPA 25; 100 MG/1; MG/1
1 TABLET ORAL 4 TIMES DAILY
COMMUNITY

## 2025-02-18 RX ORDER — PANTOPRAZOLE SODIUM 20 MG/1
20 TABLET, DELAYED RELEASE ORAL 2 TIMES DAILY
COMMUNITY
Start: 2024-10-03

## 2025-02-18 RX ORDER — GLYCOPYRROLATE 1 MG/1
TABLET ORAL 3 TIMES DAILY
Status: CANCELLED | OUTPATIENT
Start: 2025-02-18

## 2025-02-18 RX ORDER — NITROGLYCERIN 0.4 MG/1
TABLET SUBLINGUAL
Status: COMPLETED
Start: 2025-02-18 | End: 2025-02-18

## 2025-02-18 RX ORDER — TADALAFIL 10 MG/1
10 TABLET ORAL DAILY PRN
COMMUNITY

## 2025-02-18 RX ORDER — GLYCOPYRROLATE 1 MG/1
1 TABLET ORAL 2 TIMES DAILY
Status: DISCONTINUED | OUTPATIENT
Start: 2025-02-18 | End: 2025-02-19 | Stop reason: HOSPADM

## 2025-02-18 RX ORDER — GUAIFENESIN 600 MG/1
1200 TABLET, EXTENDED RELEASE ORAL EVERY 12 HOURS SCHEDULED
Status: DISCONTINUED | OUTPATIENT
Start: 2025-02-18 | End: 2025-02-19 | Stop reason: HOSPADM

## 2025-02-18 RX ORDER — CILOSTAZOL 100 MG/1
100 TABLET ORAL 2 TIMES DAILY
COMMUNITY

## 2025-02-18 RX ADMIN — APIXABAN 5 MG: 5 TABLET, FILM COATED ORAL at 08:08

## 2025-02-18 RX ADMIN — GUAIFENESIN 1200 MG: 600 TABLET ORAL at 10:06

## 2025-02-18 RX ADMIN — NITROGLYCERIN: 0.4 TABLET SUBLINGUAL at 07:48

## 2025-02-18 RX ADMIN — PREDNISONE 40 MG: 20 TABLET ORAL at 08:08

## 2025-02-18 RX ADMIN — GLYCOPYRROLATE 1 MG: 1 TABLET ORAL at 22:15

## 2025-02-18 RX ADMIN — IPRATROPIUM BROMIDE AND ALBUTEROL SULFATE 3 ML: 2.5; .5 SOLUTION RESPIRATORY (INHALATION) at 13:10

## 2025-02-18 RX ADMIN — ACETAMINOPHEN 1000 MG: 500 TABLET ORAL at 08:07

## 2025-02-18 RX ADMIN — METOPROLOL SUCCINATE 50 MG: 50 TABLET, EXTENDED RELEASE ORAL at 08:07

## 2025-02-18 RX ADMIN — PANTOPRAZOLE SODIUM 40 MG: 40 TABLET, DELAYED RELEASE ORAL at 08:10

## 2025-02-18 RX ADMIN — GLYCOPYRROLATE 1 MG: 1 TABLET ORAL at 11:20

## 2025-02-18 RX ADMIN — QUETIAPINE FUMARATE 25 MG: 25 TABLET ORAL at 22:15

## 2025-02-18 RX ADMIN — DOXYCYCLINE 100 MG: 100 CAPSULE ORAL at 06:14

## 2025-02-18 RX ADMIN — GUAIFENESIN 1200 MG: 600 TABLET ORAL at 22:15

## 2025-02-18 RX ADMIN — AMLODIPINE BESYLATE 5 MG: 5 TABLET ORAL at 08:08

## 2025-02-18 RX ADMIN — SODIUM CHLORIDE 2 G: 900 INJECTION INTRAVENOUS at 06:14

## 2025-02-18 RX ADMIN — APIXABAN 5 MG: 5 TABLET, FILM COATED ORAL at 22:15

## 2025-02-18 RX ADMIN — CARBIDOPA AND LEVODOPA 1 TABLET: 50; 200 TABLET, EXTENDED RELEASE ORAL at 08:07

## 2025-02-18 RX ADMIN — DOXYCYCLINE 100 MG: 100 CAPSULE ORAL at 17:04

## 2025-02-18 RX ADMIN — CARBIDOPA AND LEVODOPA 1 TABLET: 50; 200 TABLET, EXTENDED RELEASE ORAL at 22:15

## 2025-02-18 RX ADMIN — IPRATROPIUM BROMIDE AND ALBUTEROL SULFATE 3 ML: 2.5; .5 SOLUTION RESPIRATORY (INHALATION) at 19:24

## 2025-02-18 RX ADMIN — NITROGLYCERIN: 0.4 TABLET SUBLINGUAL at 07:42

## 2025-02-18 NOTE — PROGRESS NOTES
[facility] Medicine Services  PROGRESS NOTE    Patient Name: Flaco Roque II  : 1945  MRN: [id number]    Date of Admission: 2/15/2025  Primary Care Physician: Ariadne Galloway PA    Subjective   Subjective     CC:  Follow-up dyspnea    HPI:  He is endorsing some chest pain today and increased cough.  Chest pain is reproducible.  It subsided with some Tylenol.  After some coughing he was able to breathe a little bit better.      Objective   Objective     Vital Signs:   Temp:  [98.4 °F (36.9 °C)] 98.4 °F (36.9 °C)  Heart Rate:  [53-86] 81  Resp:  [18] 18  BP: (117-166)/() 134/87  Flow (L/min) (Oxygen Therapy):  [2] 2     Physical Exam:  Constitutional: No acute distress, awake, alert, frail, elderly  HENT: NCAT, mucous membranes moist  Respiratory: resp effort fair. Diminished basilar breath sounds.  Fair cough effort.  Lung exam appeared improved after coughing  Cardiovascular: RRR, no murmurs, rubs, or gallops, tender to palpation around the left sternal area  Gastrointestinal: Positive bowel sounds, soft, nontender, nondistended  Musculoskeletal: No bilateral ankle edema  Psychiatric: Appropriate affect, cooperative  Neurologic: Oriented x 3, speech soft, no slurring, symmetric face, globally weak  Skin: No rashes      Results Reviewed:  LAB RESULTS:      Lab 25  0421 25  0407 02/15/25  0743 02/15/25  0618   WBC 10.00 15.11*  --  11.65*   HEMOGLOBIN 15.6 14.2  --  15.8   HEMATOCRIT 48.2 44.7  --  49.5   PLATELETS 251 239  --  253   NEUTROS ABS 8.09* 14.44*  --  9.87*   IMMATURE GRANS (ABS) 0.02 0.05  --  0.03   LYMPHS ABS 1.06 0.34*  --  1.07   MONOS ABS 0.80 0.27  --  0.58   EOS ABS 0.02 0.00  --  0.06   MCV 87.0 90.1  --  89.2   PROCALCITONIN  --   --   --  0.07   LACTATE  --   --   --  1.7   HSTROP T  --   --  11 10         Lab 02/18/25  0421 25  0406 02/15/25  0618   SODIUM 141 137 142   POTASSIUM 3.7 4.8 3.6   CHLORIDE 105 103 104   CO2 26.0 22.0  26.0   ANION GAP 10.0 12.0 12.0   BUN 11 14 14   CREATININE 0.44* 0.53* 0.61*   EGFR 107.8 101.9 97.7   GLUCOSE 115* 149* 167*   CALCIUM 9.2 8.4* 9.2         Lab 02/15/25  0618   TOTAL PROTEIN 6.6   ALBUMIN 4.2   GLOBULIN 2.4   ALT (SGPT) <5   AST (SGOT) 13   BILIRUBIN 0.6   ALK PHOS 74         Lab 02/15/25  0743 02/15/25  0618   PROBNP  --  395.0   HSTROP T 11 10                 Brief Urine Lab Results  (Last result in the past 365 days)        Color   Clarity   Blood   Leuk Est   Nitrite   Protein   CREAT   Urine HCG        11/17/24 1310 Yellow   Clear   Negative   Moderate (2+)   Negative   Negative                   Microbiology Results Abnormal       None            SLP FEES - Fiberoptic Endo Eval Swallow    Result Date: 2/17/2025  This procedure was auto-finalized with no dictation required.     Results for orders placed during the hospital encounter of 08/24/23    Adult Transthoracic Echo Complete W/ Cont if Necessary Per Protocol    Interpretation Summary    Left ventricular ejection fraction appears to be 56 - 60%.    The left atrial cavity is mild to moderately dilated    There is mild to moderate thickening of the aortic valve    Mild to moderate tricuspid valve regurgitation is present. Estimated right ventricular systolic pressure from tricuspid regurgitation is mildly elevated (35-45 mmHg).    There is a trivial pericardial effusion.    Patient noted to be in atrial fibrillation with elevated rates during the study.      Current medications:  Scheduled Meds:amLODIPine, 5 mg, Oral, Q24H  apixaban, 5 mg, Oral, Q12H  carbidopa-levodopa CR, 1 tablet, Oral, BID  cefTRIAXone, 2 g, Intravenous, Q24H  doxycycline, 100 mg, Oral, Q12H  glycopyrrolate, 1 mg, Oral, BID  guaiFENesin, 1,200 mg, Oral, Q12H  ipratropium-albuterol, 3 mL, Nebulization, 4x Daily - RT  metoprolol succinate XL, 50 mg, Oral, Daily  pantoprazole, 40 mg, Oral, Daily  predniSONE, 40 mg, Oral, Daily With Breakfast  QUEtiapine, 25 mg, Oral, Q  PM  senna-docusate sodium, 2 tablet, Oral, BID      Continuous Infusions:   PRN Meds:.  acetaminophen    benzonatate    senna-docusate sodium **AND** polyethylene glycol **AND** bisacodyl **AND** bisacodyl    HYDROcodone-acetaminophen    sodium chloride    tiZANidine    Assessment & Plan   Assessment & Plan     Active Hospital Problems    Diagnosis  POA    **Aspiration pneumonia [J69.0]  Yes    Benign prostatic hyperplasia without lower urinary tract symptoms [N40.0]  Yes    Hyperlipidemia, unspecified [E78.5]  Yes    Severe malnutrition [E43]  Yes    Hiatal hernia [K44.9]  Yes    Coronary artery disease involving native coronary artery of native heart without angina pectoris [I25.10]  Yes    Atrial fibrillation [I48.91]  Yes    Essential (primary) hypertension [I10]  Yes    Parkinson disease [G20.A1]  Yes    Chronic pain with pain pump in place [G89.29]  Yes    Obstructive sleep apnea [G47.33]  Yes    Chronic obstructive pulmonary disease [J44.9]  Yes      Resolved Hospital Problems   No resolved problems to display.        Brief Hospital Course to date:  Flaco Roque II is a 79 y.o. male with history of Parkinson's, A-fib, hypertension, chronic pain here with aspiration pneumonia    Aspiration pneumonia  - Continue IV ceftriaxone and doxycycline  - Speech has on puréed, nectar thick liquids  --cultures in process, negative so far  - Aggressive pulmonary toilet today.  Ordered chest physiotherapy, Mucinex.  Discussed with RN    Chest pain  - Suspect related to above, it is reproducible on exam.  Troponin pending.  Reviewed EKG, no new ST changes    COPD  --cont nebs, steroids    HTN  --added amlodipine    CAD    Afib  --cont eliquis, BB    Malnutrition    Parkinsons  --cont home meds    Chronic pain        Expected Discharge Location and Transportation: home  Expected Discharge   Expected Discharge Date: 2/18/2025; Expected Discharge Time:      VTE Prophylaxis:  Pharmacologic VTE prophylaxis orders are  present.         AM-PAC 6 Clicks Score (PT): 15 (02/17/25 2000)    CODE STATUS:   Code Status and Medical Interventions: CPR (Attempt to Resuscitate); Limited Support; No intubation (DNI), No artificial nutrition   Ordered at: 02/15/25 1800     Medical Intervention Limits:    No intubation (DNI)    No artificial nutrition     Code Status (Patient has no pulse and is not breathing):    CPR (Attempt to Resuscitate)     Medical Interventions (Patient has pulse or is breathing):    Limited Support       Cheryl Garrett MD  02/18/25

## 2025-02-18 NOTE — CASE MANAGEMENT/SOCIAL WORK
Continued Stay Note  [facility]     Patient Name: Flaco Roque II  MRN: [id number]  Today's Date: 2/18/2025    Admit Date: 2/15/2025    Plan: home   Discharge Plan       Row Name 02/18/25 1005       Plan    Plan home    Patient/Family in Agreement with Plan yes    Plan Comments Met with Mr. Roque to discuss discharge plan. His plan is home with his wife when medically  ready. Family will transport. No discharge needs were identified today.  will continue to follow plan of care and assist with discharge planning needs as indicated.    Final Discharge Disposition Code 01 - home or self-care                   Discharge Codes    No documentation.                 Expected Discharge Date and Time       Expected Discharge Date Expected Discharge Time    Feb 18, 2025               Melody Milan RN

## 2025-02-19 VITALS
BODY MASS INDEX: 18.83 KG/M2 | DIASTOLIC BLOOD PRESSURE: 95 MMHG | OXYGEN SATURATION: 92 % | WEIGHT: 120 LBS | HEIGHT: 67 IN | RESPIRATION RATE: 12 BRPM | HEART RATE: 74 BPM | SYSTOLIC BLOOD PRESSURE: 149 MMHG | TEMPERATURE: 98.6 F

## 2025-02-19 PROCEDURE — 94668 MNPJ CHEST WALL SBSQ: CPT

## 2025-02-19 PROCEDURE — 25010000002 CEFTRIAXONE PER 250 MG: Performed by: INTERNAL MEDICINE

## 2025-02-19 PROCEDURE — 94664 DEMO&/EVAL PT USE INHALER: CPT

## 2025-02-19 PROCEDURE — 94799 UNLISTED PULMONARY SVC/PX: CPT

## 2025-02-19 PROCEDURE — 63710000001 PREDNISONE PER 1 MG: Performed by: INTERNAL MEDICINE

## 2025-02-19 PROCEDURE — 99239 HOSP IP/OBS DSCHRG MGMT >30: CPT | Performed by: INTERNAL MEDICINE

## 2025-02-19 RX ORDER — GUAIFENESIN 600 MG/1
1200 TABLET, EXTENDED RELEASE ORAL EVERY 12 HOURS SCHEDULED
Qty: 12 TABLET | Refills: 0 | Status: SHIPPED | OUTPATIENT
Start: 2025-02-19 | End: 2025-02-22

## 2025-02-19 RX ORDER — AMLODIPINE BESYLATE 5 MG/1
5 TABLET ORAL
Qty: 30 TABLET | Refills: 0 | Status: SHIPPED | OUTPATIENT
Start: 2025-02-19 | End: 2025-03-21

## 2025-02-19 RX ORDER — PREDNISONE 20 MG/1
40 TABLET ORAL
Qty: 2 TABLET | Refills: 0 | Status: SHIPPED | OUTPATIENT
Start: 2025-02-19 | End: 2025-02-20

## 2025-02-19 RX ORDER — TAMSULOSIN HYDROCHLORIDE 0.4 MG/1
0.8 CAPSULE ORAL NIGHTLY
Start: 2025-02-19

## 2025-02-19 RX ADMIN — IPRATROPIUM BROMIDE AND ALBUTEROL SULFATE 3 ML: 2.5; .5 SOLUTION RESPIRATORY (INHALATION) at 08:54

## 2025-02-19 RX ADMIN — GUAIFENESIN 1200 MG: 600 TABLET ORAL at 09:16

## 2025-02-19 RX ADMIN — SENNOSIDES AND DOCUSATE SODIUM 2 TABLET: 50; 8.6 TABLET ORAL at 09:15

## 2025-02-19 RX ADMIN — GLYCOPYRROLATE 1 MG: 1 TABLET ORAL at 09:16

## 2025-02-19 RX ADMIN — BENZONATATE 100 MG: 100 CAPSULE ORAL at 02:30

## 2025-02-19 RX ADMIN — METOPROLOL SUCCINATE 50 MG: 50 TABLET, EXTENDED RELEASE ORAL at 09:16

## 2025-02-19 RX ADMIN — TIZANIDINE 4 MG: 4 TABLET ORAL at 02:29

## 2025-02-19 RX ADMIN — PREDNISONE 40 MG: 20 TABLET ORAL at 09:16

## 2025-02-19 RX ADMIN — HYDROCODONE BITARTRATE AND ACETAMINOPHEN 1 TABLET: 5; 325 TABLET ORAL at 02:30

## 2025-02-19 RX ADMIN — AMLODIPINE BESYLATE 5 MG: 5 TABLET ORAL at 09:16

## 2025-02-19 RX ADMIN — PANTOPRAZOLE SODIUM 40 MG: 40 TABLET, DELAYED RELEASE ORAL at 09:15

## 2025-02-19 RX ADMIN — APIXABAN 5 MG: 5 TABLET, FILM COATED ORAL at 09:16

## 2025-02-19 RX ADMIN — SODIUM CHLORIDE 2 G: 900 INJECTION INTRAVENOUS at 05:31

## 2025-02-19 RX ADMIN — CARBIDOPA AND LEVODOPA 1 TABLET: 50; 200 TABLET, EXTENDED RELEASE ORAL at 09:16

## 2025-02-19 RX ADMIN — IPRATROPIUM BROMIDE AND ALBUTEROL SULFATE 3 ML: 2.5; .5 SOLUTION RESPIRATORY (INHALATION) at 12:28

## 2025-02-19 RX ADMIN — DOXYCYCLINE 100 MG: 100 CAPSULE ORAL at 05:31

## 2025-02-19 NOTE — CASE MANAGEMENT/SOCIAL WORK
Case Management Discharge Note      Final Note: Met with Mr. Roque at the bedside to finalize discharge plan. He will be discharging to [facility] for inpatient rehab later today, and his son will transport. Son has agreed to  patient at approximately 1430. Nursing, please call report to [phone]. If no answer, call the house supervisor at [phone]. No other discharge needs were identified.         Selected Continued Care - Admitted Since 2/15/2025       Destination Coordination complete.      Service Provider Services Address Phone Fax Patient Preferred    [facility] [address] [address] [phone] --              Durable Medical Equipment    No services have been selected for the patient.                Dialysis/Infusion    No services have been selected for the patient.                Home Medical Care    No services have been selected for the patient.                Therapy    No services have been selected for the patient.                Community Resources    No services have been selected for the patient.                Community & DME    No services have been selected for the patient.                         Final Discharge Disposition Code: 62 - inpatient rehab facility

## 2025-02-19 NOTE — DISCHARGE SUMMARY
[facility] Medicine Services  DISCHARGE SUMMARY    Patient Name: Flaco Roque II  : 1945  MRN: [id number]    Date of Admission: 2/15/2025  6:09 AM  Date of Discharge:  2025  Primary Care Physician: Ariadne Galloway PA    Consults       No orders found from 2025 to 2025.            Hospital Course       Active Hospital Problems    Diagnosis  POA    **Aspiration pneumonia [J69.0]  Yes    Benign prostatic hyperplasia without lower urinary tract symptoms [N40.0]  Yes    Hyperlipidemia, unspecified [E78.5]  Yes    Severe malnutrition [E43]  Yes    Hiatal hernia [K44.9]  Yes    Coronary artery disease involving native coronary artery of native heart without angina pectoris [I25.10]  Yes    Atrial fibrillation [I48.91]  Yes    Essential (primary) hypertension [I10]  Yes    Parkinson disease [G20.A1]  Yes    Chronic pain with pain pump in place [G89.29]  Yes    Obstructive sleep apnea [G47.33]  Yes    Chronic obstructive pulmonary disease [J44.9]  Yes      Resolved Hospital Problems   No resolved problems to display.      Hospital Course:  Flaco Roque II is a 79 y.o. male with history of Parkinson's, A-fib, hypertension, chronic pain here with aspiration pneumonia     Aspiration pneumonia  -MRSA PCR, Legionella, strep pneumo, urinary cultures, blood cultures are all negative  - Continue antibiotics, status post IV Rocephin and doxycycline, will discharge with Augmentin to complete 7-day course  -SLP is evaluated recommend puréed, honey thick liquids.  - Encourage aggressive pulmonary toilet today.  Mucinex, nebs ATC    Chest pain  - Suspect related to above, it is reproducible on exam.   -Troponins are flat, EKG unremarkable, no further workup planned     COPD, not on exacerbation  --cont nebs, steroids     Hypertension  -BP is elevated but improved continue amlodipine     CAD     Afib  --cont eliquis, BB     Malnutrition     Parkinsons  --cont home meds      Chronic pain    Discharge Follow Up Recommendations for outpatient labs/diagnostics:  Follow-up with PCP in 1 week    Day of Discharge     HPI: No acute events overnight, patient feels better but not completely 100%    Review of Systems  Gen- No fevers, chills  CV- No chest pain, palpitations  Resp- No cough, dyspnea  GI- No N/V/D, abd pain    Vital Signs:   Temp:  [97.6 °F (36.4 °C)-98.4 °F (36.9 °C)] 98.4 °F (36.9 °C)  Heart Rate:  [73-92] 74  Resp:  [12-18] 12  BP: (149-151)/(94-97) 149/95  Flow (L/min) (Oxygen Therapy):  [2] 2      Physical Exam:  Constitutional: Chronically ill-appearing elderly male.  HENT: NCAT, mucous membranes moist  Respiratory: Nonlabored respirations, diffuse coarse breath sounds on room air  Cardiovascular: RRR, no murmurs, rubs, or gallops  Gastrointestinal: Positive bowel sounds, soft, nontender, nondistended  Musculoskeletal: No bilateral ankle edema  Psychiatric: Appropriate affect, cooperative  Neurologic: Stationary tremors, otherwise nonfocal  Skin: No rashes      Pertinent  and/or Most Recent Results     LAB RESULTS:      Lab 02/18/25  0421 02/16/25  0407 02/15/25  0618   WBC 10.00 15.11* 11.65*   HEMOGLOBIN 15.6 14.2 15.8   HEMATOCRIT 48.2 44.7 49.5   PLATELETS 251 239 253   NEUTROS ABS 8.09* 14.44* 9.87*   IMMATURE GRANS (ABS) 0.02 0.05 0.03   LYMPHS ABS 1.06 0.34* 1.07   MONOS ABS 0.80 0.27 0.58   EOS ABS 0.02 0.00 0.06   MCV 87.0 90.1 89.2   PROCALCITONIN  --   --  0.07   LACTATE  --   --  1.7         Lab 02/18/25  0421 02/16/25  0406 02/15/25  0618   SODIUM 141 137 142   POTASSIUM 3.7 4.8 3.6   CHLORIDE 105 103 104   CO2 26.0 22.0 26.0   ANION GAP 10.0 12.0 12.0   BUN 11 14 14   CREATININE 0.44* 0.53* 0.61*   EGFR 107.8 101.9 97.7   GLUCOSE 115* 149* 167*   CALCIUM 9.2 8.4* 9.2         Lab 02/15/25  0618   TOTAL PROTEIN 6.6   ALBUMIN 4.2   GLOBULIN 2.4   ALT (SGPT) <5   AST (SGOT) 13   BILIRUBIN 0.6   ALK PHOS 74         Lab 02/18/25  1419 02/18/25  1251 02/15/25  0743  02/15/25  0618   PROBNP  --   --   --  395.0   HSTROP T 8 8 11 10                 Brief Urine Lab Results  (Last result in the past 365 days)        Color   Clarity   Blood   Leuk Est   Nitrite   Protein   CREAT   Urine HCG        11/17/24 1310 Yellow   Clear   Negative   Moderate (2+)   Negative   Negative                 Microbiology Results (last 10 days)       Procedure Component Value - Date/Time    S. Pneumo Ag Urine or CSF - Urine, Urine, Clean Catch [[id number]]  (Normal) Collected: 02/15/25 1241    Lab Status: Final result Specimen: Urine, Clean Catch Updated: 02/15/25 2130     Strep Pneumo Ag Negative    Legionella Antigen, Urine - Urine, Urine, Clean Catch [[id number]]  (Normal) Collected: 02/15/25 1241    Lab Status: Final result Specimen: Urine, Clean Catch Updated: 02/15/25 2130     LEGIONELLA ANTIGEN, URINE Negative    MRSA Screen, PCR (Inpatient) - Swab, Nares [[id number]]  (Normal) Collected: 02/15/25 1240    Lab Status: Final result Specimen: Swab from Nares Updated: 02/15/25 1420     MRSA PCR Negative    Narrative:      The negative predictive value of this diagnostic test is high and should only be used to consider de-escalating anti-MRSA therapy. A positive result may indicate colonization with MRSA and must be correlated clinically.  MRSA Negative    Respiratory Culture - Sputum, Cough [[id number]] Collected: 02/15/25 1035    Lab Status: Final result Specimen: Sputum from Cough Updated: 02/15/25 1228     Respiratory Culture Rejected     Gram Stain Many (4+) Epithelial cells per low power field      Few (2+) WBCs per low power field      Moderate (3+) Mixed bacterial morphotypes seen on Gram Stain    Narrative:      Specimen rejected due to oropharyngeal contamination. Please reorder and recollect specimen if clinically necessary.    Blood Culture - Blood, Arm, Right [[id number]]  (Normal) Collected: 02/15/25 0618    Lab Status: Preliminary result Specimen: Blood from Arm, Right Updated: 02/19/25  0800     Blood Culture No growth at 4 days    Narrative:      Less than seven (7) mL's of blood was collected.  Insufficient quantity may yield false negative results.    Blood Culture - Blood, Arm, Right [[id number]]  (Normal) Collected: 02/15/25 0618    Lab Status: Preliminary result Specimen: Blood from Arm, Right Updated: 02/19/25 0800     Blood Culture No growth at 4 days    Narrative:      Less than seven (7) mL's of blood was collected.  Insufficient quantity may yield false negative results.    COVID PRE-OP / PRE-PROCEDURE SCREENING ORDER (NO ISOLATION) - Swab, Nasopharynx [[id number]]  (Normal) Collected: 02/15/25 0618    Lab Status: Final result Specimen: Swab from Nasopharynx Updated: 02/15/25 0658    Narrative:      The following orders were created for panel order COVID PRE-OP / PRE-PROCEDURE SCREENING ORDER (NO ISOLATION) - Swab, Nasopharynx.  Procedure                               Abnormality         Status                     ---------                               -----------         ------                     COVID-19 and FLU A/B PCR...[[id number]]  Normal              Final result                 Please view results for these tests on the individual orders.    COVID-19 and FLU A/B PCR, 1 HR TAT - Swab, Nasopharynx [[id number]]  (Normal) Collected: 02/15/25 0618    Lab Status: Final result Specimen: Swab from Nasopharynx Updated: 02/15/25 0658     COVID19 Not Detected     Influenza A PCR Not Detected     Influenza B PCR Not Detected    Narrative:      Fact sheet for providers: https://www.fda.gov/media/904135/download    Fact sheet for patients: https://www.fda.gov/media/472115/download    Test performed by PCR.            SLP FEES - Fiberoptic Endo Eval Swallow    Result Date: 2/17/2025  This procedure was auto-finalized with no dictation required.    XR Chest 1 View    Result Date: 2/15/2025  XR CHEST 1 VW Date of Exam: 2/15/2025 6:20 AM EST Indication: SOA triage protocol Comparison: Chest x-ray  11/17/2024 Findings: The heart is enlarged. There is tortuosity of the thoracic aorta. There are chronic appearing interstitial changes throughout both lungs. There is right middle lobe and right lower lobe airspace disease favored to represent pneumonia. There are postoperative changes from bilateral shoulder arthroplasties.     Impression: Right middle lobe and right lower lobe airspace disease favored to represent pneumonia. Electronically Signed: Carlitos Barry MD  2/15/2025 6:37 AM EST  Workstation ID: [id number]     Results for orders placed during the hospital encounter of 07/16/21    Doppler Arterial Multi Level Lower Extremity - Bilateral CAR    Interpretation Summary  · Normal right KAMRYN of 1.02.  · Left femoral-popliteal arteries noncompressible. There are biphasic and monophasic waveforms noted in the left lower extremity  · The left posterior tibial artery was compressible with mildly reduced left KAMRYN of 0.86      Results for orders placed during the hospital encounter of 07/16/21    Doppler Arterial Multi Level Lower Extremity - Bilateral CAR    Interpretation Summary  · Normal right KAMRYN of 1.02.  · Left femoral-popliteal arteries noncompressible. There are biphasic and monophasic waveforms noted in the left lower extremity  · The left posterior tibial artery was compressible with mildly reduced left KAMRYN of 0.86      Results for orders placed during the hospital encounter of 08/24/23    Adult Transthoracic Echo Complete W/ Cont if Necessary Per Protocol    Interpretation Summary    Left ventricular ejection fraction appears to be 56 - 60%.    The left atrial cavity is mild to moderately dilated    There is mild to moderate thickening of the aortic valve    Mild to moderate tricuspid valve regurgitation is present. Estimated right ventricular systolic pressure from tricuspid regurgitation is mildly elevated (35-45 mmHg).    There is a trivial pericardial effusion.    Patient noted to be in atrial  fibrillation with elevated rates during the study.      Plan for Follow-up of Pending Labs/Results:   Pending Labs       Order Current Status    Blood Culture - Blood, Arm, Right Preliminary result    Blood Culture - Blood, Arm, Right Preliminary result          Discharge Details        Discharge Medications        New Medications        Instructions Start Date   amLODIPine 5 MG tablet  Commonly known as: NORVASC   5 mg, Oral, Every 24 Hours Scheduled      amoxicillin-clavulanate 875-125 MG per tablet  Commonly known as: AUGMENTIN   1 tablet, Oral, 2 Times Daily      guaiFENesin 600 MG 12 hr tablet  Commonly known as: MUCINEX   1,200 mg, Oral, Every 12 Hours Scheduled      predniSONE 20 MG tablet  Commonly known as: DELTASONE   40 mg, Oral, Daily With Breakfast             Changes to Medications        Instructions Start Date   atropine 1 % ophthalmic solution  What changed: additional instructions   1 drop, Daily      multivitamin with minerals tablet tablet   1 tablet, Oral, Daily             Continue These Medications        Instructions Start Date   acetaminophen 500 MG tablet  Commonly known as: TYLENOL   1,000 mg, Oral, Every 6 Hours PRN, OTC      amantadine 100 MG capsule  Commonly known as: SYMMETREL   100 mg, Oral, 2 Times Daily, Note: this was discontinued on 8/28/24 at [facility] from  Stephan.  ? Should this have been resumed?        apixaban 5 MG tablet tablet  Commonly known as: ELIQUIS   5 mg, Oral, Every 12 Hours Scheduled      carbidopa-levodopa CR  MG per CR tablet  Commonly known as: SINEMET CR   1 tablet, 2 Times Daily      carbidopa-levodopa  MG per tablet  Commonly known as: SINEMET   1 tablet, Oral, 4 Times Daily, At [id number]      cilostazol 100 MG tablet  Commonly known as: PLETAL   100 mg, Oral, 2 Times Daily      docusate sodium 100 MG capsule  Commonly known as: COLACE   100 mg, Oral, 2 Times Daily      fentaNYL 0.05 MG/ML injection  Commonly known as: SUBLIMAZE   250 mcg       glycopyrrolate 1 MG tablet  Commonly known as: ROBINUL   1 mg, 2 Times Daily PRN      ipratropium-albuterol 0.5-2.5 mg/3 ml nebulizer  Commonly known as: DUO-NEB   3 mL, Nebulization, 3 Times Daily PRN      metoprolol succinate XL 50 MG 24 hr tablet  Commonly known as: TOPROL-XL   TAKE ONE TABLET BY MOUTH DAILY      Naloxegol Oxalate 25 MG tablet  Commonly known as: MOVANTIK   25 mg, Oral, Every Morning      naloxone 4 MG/0.1ML nasal spray  Commonly known as: NARCAN   Call 911. Don't prime. [place] in 1 nostril for overdose. Repeat in 2-3 minutes in other nostril if no or minimal breathing/responsiveness.      pantoprazole 20 MG EC tablet  Commonly known as: PROTONIX   20 mg, 2 Times Daily      QUEtiapine 25 MG tablet  Commonly known as: SEROquel   25 mg, Oral, Every Evening      tadalafil 10 MG tablet  Commonly known as: CIALIS   10 mg, Oral, Daily PRN      tamsulosin 0.4 MG capsule 24 hr capsule  Commonly known as: FLOMAX   0.8 mg, Oral, Nightly      tiZANidine 4 MG tablet  Commonly known as: ZANAFLEX   4 mg, Every 8 Hours PRN      Trelegy Ellipta 100-62.5-25 MCG/ACT inhaler  Generic drug: Fluticasone-Umeclidin-Vilant   1 puff, Daily - RT               No Known Allergies      Discharge Disposition: [facility]  Rehab Facility or Unit (DC - External)    Diet:  Hospital:  Diet Order   Procedures    Diet: Regular/House; No Mixed Consistencies, No Straw; Texture: Pureed (NDD 1); Fluid Consistency: Nectar Thick        Activity: As tolerated      Restrictions or Other Recommendations:         CODE STATUS:    Code Status and Medical Interventions: CPR (Attempt to Resuscitate); Limited Support; No intubation (DNI), No artificial nutrition   Ordered at: 02/15/25 1800     Medical Intervention Limits:    No intubation (DNI)    No artificial nutrition     Code Status (Patient has no pulse and is not breathing):    CPR (Attempt to Resuscitate)     Medical Interventions (Patient has pulse or is breathing):    Limited Support        Future Appointments   Date Time Provider Department Center   9/23/2025 11:45 AM Von Kilpatrick MD [facility] Quijano MD  02/19/25      Time Spent on Discharge:  I spent  45  minutes on this discharge activity which included: face-to-face encounter with the patient, reviewing the data in the system, coordination of the care with the nursing staff as well as consultants, documentation, and entering orders.

## 2025-02-19 NOTE — PLAN OF CARE
Problem: Adult Inpatient Plan of Care  Goal: Plan of Care Review  Outcome: Adequate for Care Transition  Goal: Patient-Specific Goal (Individualized)  Outcome: Adequate for Care Transition  Goal: Absence of Hospital-Acquired Illness or Injury  Outcome: Adequate for Care Transition  Goal: Optimal Comfort and Wellbeing  Outcome: Adequate for Care Transition  Goal: Readiness for Transition of Care  Outcome: Adequate for Care Transition     Problem: Comorbidity Management  Goal: Maintenance of Osteoarthritis Symptom Control  Outcome: Adequate for Care Transition     Problem: Skin Injury Risk Increased  Goal: Skin Health and Integrity  Outcome: Adequate for Care Transition     Problem: Sepsis/Septic Shock  Goal: Optimal Coping  Outcome: Adequate for Care Transition  Goal: Absence of Bleeding  Outcome: Adequate for Care Transition  Goal: Blood Glucose Level Within Target Range  Outcome: Adequate for Care Transition  Goal: Absence of Infection Signs and Symptoms  Outcome: Adequate for Care Transition  Goal: Optimal Nutrition Delivery  Outcome: Adequate for Care Transition     Problem: Fall Injury Risk  Goal: Absence of Fall and Fall-Related Injury  Outcome: Adequate for Care Transition   Goal Outcome Evaluation:        Pt is planning to discharge today at 1430 to rehab facility.      Pt son arrived and transported pt via personal transport. IV removed. Reviewed AVS with pt and his son. Called report to [facility] as well. AVS and  note printed for pt to be given to the facility as well.

## 2025-02-20 LAB
BACTERIA SPEC AEROBE CULT: NORMAL
BACTERIA SPEC AEROBE CULT: NORMAL

## 2025-03-03 ENCOUNTER — TELEPHONE (OUTPATIENT)
Dept: CARDIOLOGY | Facility: CLINIC | Age: 80
End: 2025-03-03
Payer: MEDICARE

## 2025-03-03 NOTE — TELEPHONE ENCOUNTER
Patient wife called -left  requesting call back.    Returned call to patient- left  for return call/.

## 2025-03-03 NOTE — TELEPHONE ENCOUNTER
Patient wife returned call stating she needs patient oxygen orders approved. Informed her we do not order oxygen and advised patient to reach out to pulmonary. She verbalizes understanding and is agreeable.

## 2025-04-22 ENCOUNTER — HOSPITAL ENCOUNTER (INPATIENT)
Facility: HOSPITAL | Age: 80
LOS: 6 days | Discharge: HOME | DRG: 177 | End: 2025-04-28
Attending: EMERGENCY MEDICINE | Admitting: STUDENT IN AN ORGANIZED HEALTH CARE EDUCATION/TRAINING PROGRAM
Payer: MEDICARE

## 2025-04-22 ENCOUNTER — APPOINTMENT (OUTPATIENT)
Dept: GENERAL RADIOLOGY | Facility: HOSPITAL | Age: 80
DRG: 177 | End: 2025-04-22
Payer: MEDICARE

## 2025-04-22 ENCOUNTER — ANCILLARY PROCEDURE (OUTPATIENT)
Dept: SPEECH THERAPY | Facility: HOSPITAL | Age: 80
DRG: 177 | End: 2025-04-22
Payer: MEDICARE

## 2025-04-22 DIAGNOSIS — J18.9 PNEUMONIA OF RIGHT LOWER LOBE DUE TO INFECTIOUS ORGANISM: ICD-10-CM

## 2025-04-22 DIAGNOSIS — J96.01 ACUTE RESPIRATORY FAILURE WITH HYPOXIA: Primary | ICD-10-CM

## 2025-04-22 DIAGNOSIS — A41.9 SEPSIS DUE TO PNEUMONIA: ICD-10-CM

## 2025-04-22 DIAGNOSIS — R13.12 OROPHARYNGEAL DYSPHAGIA: ICD-10-CM

## 2025-04-22 DIAGNOSIS — J18.9 SEPSIS DUE TO PNEUMONIA: ICD-10-CM

## 2025-04-22 LAB
ALBUMIN SERPL-MCNC: 3.8 G/DL (ref 3.5–5.2)
ALBUMIN/GLOB SERPL: 1.7 G/DL
ALP SERPL-CCNC: 92 U/L (ref 39–117)
ALT SERPL W P-5'-P-CCNC: <5 U/L (ref 1–41)
ANION GAP SERPL CALCULATED.3IONS-SCNC: 14 MMOL/L (ref 5–15)
ARTERIAL PATENCY WRIST A: POSITIVE
AST SERPL-CCNC: 12 U/L (ref 1–40)
ATMOSPHERIC PRESS: ABNORMAL MM[HG]
B PARAPERT DNA SPEC QL NAA+PROBE: NOT DETECTED
B PERT DNA SPEC QL NAA+PROBE: NOT DETECTED
BASE EXCESS BLDA CALC-SCNC: 0.8 MMOL/L (ref 0–2)
BASOPHILS # BLD AUTO: 0.02 10*3/MM3 (ref 0–0.2)
BASOPHILS NFR BLD AUTO: 0.2 % (ref 0–1.5)
BDY SITE: ABNORMAL
BILIRUB SERPL-MCNC: 1.3 MG/DL (ref 0–1.2)
BODY TEMPERATURE: 37
BUN SERPL-MCNC: 14 MG/DL (ref 8–23)
BUN/CREAT SERPL: 21.5 (ref 7–25)
C PNEUM DNA NPH QL NAA+NON-PROBE: NOT DETECTED
CALCIUM SPEC-SCNC: 9 MG/DL (ref 8.6–10.5)
CHLORIDE SERPL-SCNC: 101 MMOL/L (ref 98–107)
CO2 BLDA-SCNC: 25.5 MMOL/L (ref 22–33)
CO2 SERPL-SCNC: 25 MMOL/L (ref 22–29)
COHGB MFR BLD: 1.4 % (ref 0–2)
CREAT SERPL-MCNC: 0.65 MG/DL (ref 0.76–1.27)
D-LACTATE SERPL-SCNC: 1.8 MMOL/L (ref 0.5–2)
DEPRECATED RDW RBC AUTO: 50.6 FL (ref 37–54)
EGFRCR SERPLBLD CKD-EPI 2021: 95.8 ML/MIN/1.73
EOSINOPHIL # BLD AUTO: 0.02 10*3/MM3 (ref 0–0.4)
EOSINOPHIL NFR BLD AUTO: 0.2 % (ref 0.3–6.2)
EPAP: 5
ERYTHROCYTE [DISTWIDTH] IN BLOOD BY AUTOMATED COUNT: 15.7 % (ref 12.3–15.4)
FLUAV RNA RESP QL NAA+PROBE: NOT DETECTED
FLUAV SUBTYP SPEC NAA+PROBE: NOT DETECTED
FLUBV RNA ISLT QL NAA+PROBE: NOT DETECTED
FLUBV RNA RESP QL NAA+PROBE: NOT DETECTED
GEN 5 1HR TROPONIN T REFLEX: 17 NG/L
GLOBULIN UR ELPH-MCNC: 2.3 GM/DL
GLUCOSE BLDC GLUCOMTR-MCNC: 128 MG/DL (ref 70–130)
GLUCOSE BLDC GLUCOMTR-MCNC: 143 MG/DL (ref 70–130)
GLUCOSE BLDC GLUCOMTR-MCNC: 207 MG/DL (ref 70–130)
GLUCOSE SERPL-MCNC: 155 MG/DL (ref 65–99)
HADV DNA SPEC NAA+PROBE: NOT DETECTED
HCO3 BLDA-SCNC: 24.4 MMOL/L (ref 20–26)
HCOV 229E RNA SPEC QL NAA+PROBE: NOT DETECTED
HCOV HKU1 RNA SPEC QL NAA+PROBE: NOT DETECTED
HCOV NL63 RNA SPEC QL NAA+PROBE: NOT DETECTED
HCOV OC43 RNA SPEC QL NAA+PROBE: NOT DETECTED
HCT VFR BLD AUTO: 46.3 % (ref 37.5–51)
HCT VFR BLD CALC: 46.5 % (ref 38–51)
HGB BLD-MCNC: 15.4 G/DL (ref 13–17.7)
HGB BLDA-MCNC: 15.2 G/DL (ref 13.5–17.5)
HMPV RNA NPH QL NAA+NON-PROBE: NOT DETECTED
HPIV1 RNA ISLT QL NAA+PROBE: NOT DETECTED
HPIV2 RNA SPEC QL NAA+PROBE: NOT DETECTED
HPIV3 RNA NPH QL NAA+PROBE: NOT DETECTED
HPIV4 P GENE NPH QL NAA+PROBE: NOT DETECTED
IMM GRANULOCYTES # BLD AUTO: 0.04 10*3/MM3 (ref 0–0.05)
IMM GRANULOCYTES NFR BLD AUTO: 0.4 % (ref 0–0.5)
INHALED O2 CONCENTRATION: 60 %
IPAP: 12
L PNEUMO1 AG UR QL IA: NEGATIVE
LYMPHOCYTES # BLD AUTO: 0.52 10*3/MM3 (ref 0.7–3.1)
LYMPHOCYTES NFR BLD AUTO: 4.7 % (ref 19.6–45.3)
M PNEUMO IGG SER IA-ACNC: NOT DETECTED
MAGNESIUM SERPL-MCNC: 1.6 MG/DL (ref 1.6–2.4)
MCH RBC QN AUTO: 29.2 PG (ref 26.6–33)
MCHC RBC AUTO-ENTMCNC: 33.3 G/DL (ref 31.5–35.7)
MCV RBC AUTO: 87.7 FL (ref 79–97)
METHGB BLD QL: 0.1 % (ref 0–1.5)
MODALITY: ABNORMAL
MONOCYTES # BLD AUTO: 0.54 10*3/MM3 (ref 0.1–0.9)
MONOCYTES NFR BLD AUTO: 4.9 % (ref 5–12)
MRSA DNA SPEC QL NAA+PROBE: NEGATIVE
NEUTROPHILS NFR BLD AUTO: 89.6 % (ref 42.7–76)
NEUTROPHILS NFR BLD AUTO: 9.98 10*3/MM3 (ref 1.7–7)
NRBC BLD AUTO-RTO: 0 /100 WBC (ref 0–0.2)
NT-PROBNP SERPL-MCNC: 627.5 PG/ML (ref 0–1800)
OXYHGB MFR BLDV: 98 % (ref 94–99)
PAW @ PEAK INSP FLOW SETTING VENT: 0 CMH2O
PCO2 BLDA: 35.4 MM HG (ref 35–45)
PCO2 TEMP ADJ BLD: 35.4 MM HG (ref 35–48)
PH BLDA: 7.45 PH UNITS (ref 7.35–7.45)
PH, TEMP CORRECTED: 7.45 PH UNITS
PHOSPHATE SERPL-MCNC: 2.6 MG/DL (ref 2.5–4.5)
PLATELET # BLD AUTO: 208 10*3/MM3 (ref 140–450)
PMV BLD AUTO: 10.8 FL (ref 6–12)
PO2 BLDA: 134 MM HG (ref 83–108)
PO2 TEMP ADJ BLD: 134 MM HG (ref 83–108)
POTASSIUM SERPL-SCNC: 3.6 MMOL/L (ref 3.5–5.2)
PROCALCITONIN SERPL-MCNC: 0.42 NG/ML (ref 0–0.25)
PROT SERPL-MCNC: 6.1 G/DL (ref 6–8.5)
RBC # BLD AUTO: 5.28 10*6/MM3 (ref 4.14–5.8)
RHINOVIRUS RNA SPEC NAA+PROBE: NOT DETECTED
RSV RNA NPH QL NAA+NON-PROBE: NOT DETECTED
S PNEUM AG SPEC QL LA: NEGATIVE
SARS-COV-2 RNA NPH QL NAA+NON-PROBE: NOT DETECTED
SARS-COV-2 RNA RESP QL NAA+PROBE: NOT DETECTED
SODIUM SERPL-SCNC: 140 MMOL/L (ref 136–145)
TOTAL RATE: 12 BREATHS/MINUTE
TROPONIN T NUMERIC DELTA: 3 NG/L
TROPONIN T SERPL HS-MCNC: 14 NG/L
WBC NRBC COR # BLD AUTO: 11.12 10*3/MM3 (ref 3.4–10.8)

## 2025-04-22 PROCEDURE — 84100 ASSAY OF PHOSPHORUS: CPT

## 2025-04-22 PROCEDURE — 25810000003 SODIUM CHLORIDE 0.9 % SOLUTION 250 ML FLEX CONT

## 2025-04-22 PROCEDURE — 94799 UNLISTED PULMONARY SVC/PX: CPT

## 2025-04-22 PROCEDURE — 25010000002 CEFTRIAXONE PER 250 MG

## 2025-04-22 PROCEDURE — 92610 EVALUATE SWALLOWING FUNCTION: CPT

## 2025-04-22 PROCEDURE — 87641 MR-STAPH DNA AMP PROBE: CPT

## 2025-04-22 PROCEDURE — 36415 COLL VENOUS BLD VENIPUNCTURE: CPT

## 2025-04-22 PROCEDURE — 94640 AIRWAY INHALATION TREATMENT: CPT

## 2025-04-22 PROCEDURE — 83880 ASSAY OF NATRIURETIC PEPTIDE: CPT | Performed by: EMERGENCY MEDICINE

## 2025-04-22 PROCEDURE — 82375 ASSAY CARBOXYHB QUANT: CPT

## 2025-04-22 PROCEDURE — 87186 SC STD MICRODIL/AGAR DIL: CPT

## 2025-04-22 PROCEDURE — 0202U NFCT DS 22 TRGT SARS-COV-2: CPT

## 2025-04-22 PROCEDURE — 71045 X-RAY EXAM CHEST 1 VIEW: CPT

## 2025-04-22 PROCEDURE — 99291 CRITICAL CARE FIRST HOUR: CPT

## 2025-04-22 PROCEDURE — 87040 BLOOD CULTURE FOR BACTERIA: CPT | Performed by: EMERGENCY MEDICINE

## 2025-04-22 PROCEDURE — 94660 CPAP INITIATION&MGMT: CPT

## 2025-04-22 PROCEDURE — 25010000002 MAGNESIUM SULFATE 2 GM/50ML SOLUTION: Performed by: STUDENT IN AN ORGANIZED HEALTH CARE EDUCATION/TRAINING PROGRAM

## 2025-04-22 PROCEDURE — 25010000002 POTASSIUM CHLORIDE 10 MEQ/100ML SOLUTION: Performed by: STUDENT IN AN ORGANIZED HEALTH CARE EDUCATION/TRAINING PROGRAM

## 2025-04-22 PROCEDURE — 82948 REAGENT STRIP/BLOOD GLUCOSE: CPT

## 2025-04-22 PROCEDURE — 25810000003 SODIUM CHLORIDE 0.9 % SOLUTION: Performed by: EMERGENCY MEDICINE

## 2025-04-22 PROCEDURE — 82805 BLOOD GASES W/O2 SATURATION: CPT

## 2025-04-22 PROCEDURE — 87077 CULTURE AEROBIC IDENTIFY: CPT

## 2025-04-22 PROCEDURE — 83605 ASSAY OF LACTIC ACID: CPT | Performed by: EMERGENCY MEDICINE

## 2025-04-22 PROCEDURE — 87636 SARSCOV2 & INF A&B AMP PRB: CPT | Performed by: EMERGENCY MEDICINE

## 2025-04-22 PROCEDURE — 94761 N-INVAS EAR/PLS OXIMETRY MLT: CPT

## 2025-04-22 PROCEDURE — 83050 HGB METHEMOGLOBIN QUAN: CPT

## 2025-04-22 PROCEDURE — 87070 CULTURE OTHR SPECIMN AEROBIC: CPT

## 2025-04-22 PROCEDURE — 94760 N-INVAS EAR/PLS OXIMETRY 1: CPT

## 2025-04-22 PROCEDURE — 87449 NOS EACH ORGANISM AG IA: CPT

## 2025-04-22 PROCEDURE — 36600 WITHDRAWAL OF ARTERIAL BLOOD: CPT

## 2025-04-22 PROCEDURE — 92612 ENDOSCOPY SWALLOW (FEES) VID: CPT

## 2025-04-22 PROCEDURE — 83735 ASSAY OF MAGNESIUM: CPT

## 2025-04-22 PROCEDURE — 93005 ELECTROCARDIOGRAM TRACING: CPT | Performed by: EMERGENCY MEDICINE

## 2025-04-22 PROCEDURE — 25010000002 METHYLPREDNISOLONE PER 125 MG: Performed by: EMERGENCY MEDICINE

## 2025-04-22 PROCEDURE — 93005 ELECTROCARDIOGRAM TRACING: CPT

## 2025-04-22 PROCEDURE — 80053 COMPREHEN METABOLIC PANEL: CPT | Performed by: EMERGENCY MEDICINE

## 2025-04-22 PROCEDURE — 85025 COMPLETE CBC W/AUTO DIFF WBC: CPT | Performed by: EMERGENCY MEDICINE

## 2025-04-22 PROCEDURE — 84145 PROCALCITONIN (PCT): CPT

## 2025-04-22 PROCEDURE — 87205 SMEAR GRAM STAIN: CPT

## 2025-04-22 PROCEDURE — 25010000002 AZITHROMYCIN PER 500 MG

## 2025-04-22 PROCEDURE — 93010 ELECTROCARDIOGRAM REPORT: CPT | Performed by: INTERNAL MEDICINE

## 2025-04-22 PROCEDURE — 99291 CRITICAL CARE FIRST HOUR: CPT | Performed by: STUDENT IN AN ORGANIZED HEALTH CARE EDUCATION/TRAINING PROGRAM

## 2025-04-22 PROCEDURE — 84484 ASSAY OF TROPONIN QUANT: CPT | Performed by: EMERGENCY MEDICINE

## 2025-04-22 RX ORDER — BUDESONIDE 0.5 MG/2ML
0.5 INHALANT ORAL
Status: DISCONTINUED | OUTPATIENT
Start: 2025-04-22 | End: 2025-04-28 | Stop reason: HOSPADM

## 2025-04-22 RX ORDER — TAMSULOSIN HYDROCHLORIDE 0.4 MG/1
0.4 CAPSULE ORAL DAILY
Status: DISCONTINUED | OUTPATIENT
Start: 2025-04-22 | End: 2025-04-28 | Stop reason: HOSPADM

## 2025-04-22 RX ORDER — HYDROXYZINE HYDROCHLORIDE 25 MG/1
25 TABLET, FILM COATED ORAL NIGHTLY PRN
Status: DISCONTINUED | OUTPATIENT
Start: 2025-04-22 | End: 2025-04-28 | Stop reason: HOSPADM

## 2025-04-22 RX ORDER — SODIUM CHLORIDE 9 MG/ML
40 INJECTION, SOLUTION INTRAVENOUS AS NEEDED
Status: DISCONTINUED | OUTPATIENT
Start: 2025-04-22 | End: 2025-04-28 | Stop reason: HOSPADM

## 2025-04-22 RX ORDER — IPRATROPIUM BROMIDE AND ALBUTEROL SULFATE 2.5; .5 MG/3ML; MG/3ML
3 SOLUTION RESPIRATORY (INHALATION) ONCE
Status: COMPLETED | OUTPATIENT
Start: 2025-04-22 | End: 2025-04-22

## 2025-04-22 RX ORDER — MAGNESIUM SULFATE HEPTAHYDRATE 40 MG/ML
2 INJECTION, SOLUTION INTRAVENOUS ONCE
Status: COMPLETED | OUTPATIENT
Start: 2025-04-22 | End: 2025-04-22

## 2025-04-22 RX ORDER — ACETAMINOPHEN 325 MG/1
650 TABLET ORAL EVERY 6 HOURS PRN
Status: DISCONTINUED | OUTPATIENT
Start: 2025-04-22 | End: 2025-04-28 | Stop reason: HOSPADM

## 2025-04-22 RX ORDER — METOPROLOL SUCCINATE 50 MG/1
50 TABLET, EXTENDED RELEASE ORAL
Status: DISCONTINUED | OUTPATIENT
Start: 2025-04-22 | End: 2025-04-28 | Stop reason: HOSPADM

## 2025-04-22 RX ORDER — LIDOCAINE 4 G/G
1 PATCH TOPICAL
Status: DISCONTINUED | OUTPATIENT
Start: 2025-04-22 | End: 2025-04-28 | Stop reason: HOSPADM

## 2025-04-22 RX ORDER — IPRATROPIUM BROMIDE AND ALBUTEROL SULFATE 2.5; .5 MG/3ML; MG/3ML
3 SOLUTION RESPIRATORY (INHALATION) EVERY 4 HOURS PRN
Status: DISCONTINUED | OUTPATIENT
Start: 2025-04-22 | End: 2025-04-25

## 2025-04-22 RX ORDER — CARBIDOPA AND LEVODOPA 25; 100 MG/1; MG/1
1 TABLET ORAL 3 TIMES DAILY
Status: DISCONTINUED | OUTPATIENT
Start: 2025-04-22 | End: 2025-04-24

## 2025-04-22 RX ORDER — PREDNISONE 20 MG/1
40 TABLET ORAL DAILY
Status: COMPLETED | OUTPATIENT
Start: 2025-04-23 | End: 2025-04-27

## 2025-04-22 RX ORDER — SODIUM CHLORIDE 0.9 % (FLUSH) 0.9 %
10 SYRINGE (ML) INJECTION EVERY 12 HOURS SCHEDULED
Status: DISCONTINUED | OUTPATIENT
Start: 2025-04-22 | End: 2025-04-28 | Stop reason: HOSPADM

## 2025-04-22 RX ORDER — PANTOPRAZOLE SODIUM 40 MG/1
40 TABLET, DELAYED RELEASE ORAL
Status: DISCONTINUED | OUTPATIENT
Start: 2025-04-22 | End: 2025-04-28 | Stop reason: HOSPADM

## 2025-04-22 RX ORDER — IPRATROPIUM BROMIDE AND ALBUTEROL SULFATE 2.5; .5 MG/3ML; MG/3ML
3 SOLUTION RESPIRATORY (INHALATION)
Status: DISCONTINUED | OUTPATIENT
Start: 2025-04-22 | End: 2025-04-25

## 2025-04-22 RX ORDER — SODIUM CHLORIDE 0.9 % (FLUSH) 0.9 %
10 SYRINGE (ML) INJECTION AS NEEDED
Status: DISCONTINUED | OUTPATIENT
Start: 2025-04-22 | End: 2025-04-28 | Stop reason: HOSPADM

## 2025-04-22 RX ORDER — WATER 10 ML/10ML
INJECTION INTRAMUSCULAR; INTRAVENOUS; SUBCUTANEOUS
Status: COMPLETED
Start: 2025-04-22 | End: 2025-04-22

## 2025-04-22 RX ORDER — METOPROLOL TARTRATE 1 MG/ML
5 INJECTION, SOLUTION INTRAVENOUS ONCE
Status: COMPLETED | OUTPATIENT
Start: 2025-04-22 | End: 2025-04-22

## 2025-04-22 RX ORDER — ONDANSETRON 2 MG/ML
4 INJECTION INTRAMUSCULAR; INTRAVENOUS EVERY 6 HOURS PRN
Status: DISCONTINUED | OUTPATIENT
Start: 2025-04-22 | End: 2025-04-24

## 2025-04-22 RX ORDER — METHYLPREDNISOLONE SODIUM SUCCINATE 125 MG/2ML
125 INJECTION, POWDER, LYOPHILIZED, FOR SOLUTION INTRAMUSCULAR; INTRAVENOUS ONCE
Status: COMPLETED | OUTPATIENT
Start: 2025-04-22 | End: 2025-04-22

## 2025-04-22 RX ORDER — POTASSIUM CHLORIDE 7.45 MG/ML
10 INJECTION INTRAVENOUS
Status: COMPLETED | OUTPATIENT
Start: 2025-04-22 | End: 2025-04-22

## 2025-04-22 RX ADMIN — TAMSULOSIN HYDROCHLORIDE 0.4 MG: 0.4 CAPSULE ORAL at 12:06

## 2025-04-22 RX ADMIN — POTASSIUM CHLORIDE 10 MEQ: 7.45 INJECTION INTRAVENOUS at 08:34

## 2025-04-22 RX ADMIN — MUPIROCIN 1 APPLICATION: 20 OINTMENT TOPICAL at 06:14

## 2025-04-22 RX ADMIN — CARBIDOPA AND LEVODOPA 1 TABLET: 25; 100 TABLET ORAL at 17:23

## 2025-04-22 RX ADMIN — BUDESONIDE 0.5 MG: 0.5 SUSPENSION RESPIRATORY (INHALATION) at 19:47

## 2025-04-22 RX ADMIN — MAGNESIUM SULFATE HEPTAHYDRATE 2 G: 40 INJECTION, SOLUTION INTRAVENOUS at 08:35

## 2025-04-22 RX ADMIN — IPRATROPIUM BROMIDE AND ALBUTEROL SULFATE 3 ML: 2.5; .5 SOLUTION RESPIRATORY (INHALATION) at 12:44

## 2025-04-22 RX ADMIN — BUDESONIDE 0.5 MG: 0.5 SUSPENSION RESPIRATORY (INHALATION) at 12:44

## 2025-04-22 RX ADMIN — POTASSIUM CHLORIDE 10 MEQ: 7.45 INJECTION INTRAVENOUS at 09:42

## 2025-04-22 RX ADMIN — APIXABAN 5 MG: 5 TABLET, FILM COATED ORAL at 12:06

## 2025-04-22 RX ADMIN — APIXABAN 5 MG: 5 TABLET, FILM COATED ORAL at 20:53

## 2025-04-22 RX ADMIN — CEFTRIAXONE 2000 MG: 2 INJECTION, POWDER, FOR SOLUTION INTRAMUSCULAR; INTRAVENOUS at 06:14

## 2025-04-22 RX ADMIN — SODIUM CHLORIDE 500 ML: 0.9 INJECTION, SOLUTION INTRAVENOUS at 04:03

## 2025-04-22 RX ADMIN — IPRATROPIUM BROMIDE AND ALBUTEROL SULFATE 3 ML: 2.5; .5 SOLUTION RESPIRATORY (INHALATION) at 19:47

## 2025-04-22 RX ADMIN — POTASSIUM CHLORIDE 10 MEQ: 7.45 INJECTION INTRAVENOUS at 10:55

## 2025-04-22 RX ADMIN — IPRATROPIUM BROMIDE AND ALBUTEROL SULFATE 3 ML: 2.5; .5 SOLUTION RESPIRATORY (INHALATION) at 04:14

## 2025-04-22 RX ADMIN — MUPIROCIN 1 APPLICATION: 20 OINTMENT TOPICAL at 20:53

## 2025-04-22 RX ADMIN — Medication 10 ML: at 20:56

## 2025-04-22 RX ADMIN — AZITHROMYCIN DIHYDRATE 500 MG: 500 INJECTION, POWDER, LYOPHILIZED, FOR SOLUTION INTRAVENOUS at 06:13

## 2025-04-22 RX ADMIN — METOPROLOL TARTRATE 5 MG: 5 INJECTION INTRAVENOUS at 04:02

## 2025-04-22 RX ADMIN — CARBIDOPA AND LEVODOPA 1 TABLET: 25; 100 TABLET ORAL at 12:06

## 2025-04-22 RX ADMIN — Medication 5 MG: at 20:53

## 2025-04-22 RX ADMIN — Medication 10 ML: at 09:43

## 2025-04-22 RX ADMIN — METHYLPREDNISOLONE SODIUM SUCCINATE 125 MG: 125 INJECTION INTRAMUSCULAR; INTRAVENOUS at 06:45

## 2025-04-22 RX ADMIN — HYDROXYZINE HYDROCHLORIDE 25 MG: 25 TABLET, FILM COATED ORAL at 23:51

## 2025-04-22 RX ADMIN — ACETAMINOPHEN 650 MG: 325 TABLET, FILM COATED ORAL at 20:53

## 2025-04-22 RX ADMIN — WATER 10 ML: 1 INJECTION INTRAMUSCULAR; INTRAVENOUS; SUBCUTANEOUS at 06:45

## 2025-04-22 NOTE — PLAN OF CARE
Goal Outcome Evaluation:  Plan of Care Reviewed With: patient        Progress: no change  Outcome Evaluation: Pt admitted from ED around 0600.  Cont to have 5-6/10 sharp chest pain, APRN notified.  Sats > 90 on 2-4L NC.  Remains in A-Fib rates 100-110s with PVCs.  Due to void, no BM.
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
## 2025-04-22 NOTE — PLAN OF CARE
Goal Outcome Evaluation:  Plan of Care Reviewed With: patient                Anticipated Discharge Disposition (SLP): inpatient rehabilitation facility          SLP Swallowing Diagnosis: mild-moderate, oral dysphagia, pharyngeal dysphagia (04/22/25 1100)

## 2025-04-22 NOTE — PROGRESS NOTES
Malnutrition Severity Assessment    Patient Name:  Flaco Roque II  YOB: 1945  MRN: [id number]  Admit Date:  4/22/2025    Patient meets criteria for : Severe Malnutrition (PO intakes <75% EEN >/=1m, loss 14.4% body weight X 6 months, severe muscle wasting, and severe subcutaneous fat loss.)    Malnutrition Severity Assessment  Malnutrition Type: Chronic Disease - Related Malnutrition  Malnutrition Type (Last 8 Hours)       Malnutrition Severity Assessment       Row Name 04/22/25 1516       Malnutrition Severity Assessment    Malnutrition Type Chronic Disease - Related Malnutrition      Row Name 04/22/25 1516       Insufficient Energy Intake     Insufficient Energy Intake Findings Severe    Insufficient Energy Intake  <75% of est. energy requirement for > or equal to 1 month      Row Name 04/22/25 1516       Unintentional Weight Loss     Unintentional Weight Loss Findings Severe    Unintentional Weight Loss  Weight loss greater than 10% in six months      Row Name 04/22/25 1516       Muscle Loss    Loss of Muscle Mass Findings Severe    [place] Region Severe - deep hollowing/scooping, lack of muscle to touch, facial bones well defined    Clavicle Bone Region Severe - protruding prominent bone    Acromion Bone Region Severe - squared shoulders, bones, and acromion process protrusion prominent    Scapular Bone Region Severe - prominent bones, depressions easily visible between ribs, scapula, spine, shoulders    Dorsal Hand Region Severe - prominent depression    Patellar Region Severe - prominent bone, square looking, very little muscle definition    Anterior Thigh Region Severe - line/depression along thigh, obviously thin    Posterior Calf Region Severe - thin with very little definition/firmness      Row Name 04/22/25 1516       Fat Loss    Subcutaneous Fat Loss Findings Severe    Orbital Region  Severe - pronounced hollowness/depression, dark circles, loose saggy skin    Upper Arm Region Severe  - mostly skin, very little space between folds, fingers touch      Row Name 04/22/25 1516       Criteria Met (Must meet criteria for severity in at least 2 of these categories: M Wasting, Fat Loss, Fluid, Secondary Signs, Wt. Status, Intake)    Patient meets criteria for  Severe Malnutrition  PO intakes <75% EEN >/=1m, loss 14.4% body weight X 6 months, severe muscle wasting, and severe subcutaneous fat loss.                    Electronically signed by:  Zayda Lopez RD  04/22/25 15:20 EDT
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
## 2025-04-22 NOTE — PROGRESS NOTES
Chart reviewed.  Patient reports he is doing much better.  He is on 4 L oxygen.  Noted change of CODE STATUS to no CPR and limited support with no intubation.    Given his improvement and current goals of care patient is okay for transfer to telemetry/hospitalist and I will order this.
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
## 2025-04-22 NOTE — CASE MANAGEMENT/SOCIAL WORK
Discharge Planning Assessment  [facility]     Patient Name: Flaco Roque II  MRN: [id number]  Today's Date: 4/22/2025    Admit Date: 4/22/2025    Plan: Home   Discharge Needs Assessment       Row Name 04/22/25 1529       Discharge Needs Assessment    Equipment Currently Used at Home cane, straight;walker, rolling;wheelchair;shower chair;grab bar;oxygen;nebulizer      Row Name 04/22/25 1522       Living Environment    People in Home spouse    Current Living Arrangements home    Primary Care Provided by self    Provides Primary Care For no one    Family Caregiver if Needed spouse    Family Caregiver Names Cheryl Roque (Spouse) [phone]    Quality of Family Relationships supportive    Able to Return to Prior Arrangements yes       Resource/Environmental Concerns    Transportation Concerns none       Transition Planning    Patient/Family Anticipates Transition to home with family    Transportation Anticipated family or friend will provide       Discharge Needs Assessment    Readmission Within the Last 30 Days no previous admission in last 30 days    Equipment Currently Used at Home cane, straight;walker, rolling;wheelchair;shower chair;grab bar;nebulizer                   Discharge Plan       Row Name 04/22/25 1524       Plan    Plan Home    Patient/Family in Agreement with Plan yes    Plan Comments Spoke with Mr. Roque at the bedside. He lives with his Spouse in [place] [facility]. He is independent with ADL's and uses a cane, walker, wheelchair, shower chair, grab bars and nebulizer. He uses 2L oxygen through [employer]. Conf[facility]  that he is current with PT and ST. His preferred Pharmacy is [facility] in [place]. His PCP is Ariadne Galloway and he has Medicare A and B and [employer] Insurance. Discharge plan is home. CM will continue to follow for discharge needs.    Final Discharge Disposition Code 01 - home or self-care                  Selected Continued Care - Prior Encounters  Includes continued care and service providers with selected services from prior encounters from 1/22/2025 to 4/22/2025      Discharged on 2/19/2025 Admission date: 2/15/2025 - Discharge disposition: Rehab Facility or Unit (DC - External)      Destination       Service Provider Services Address Phone Fax Patient Preferred    [facility] [address] [address] [phone] --                             Demographic Summary       Row Name 04/22/25 1521       General Information    Arrived From home    Referral Source admission list    Reason for Consult discharge planning    Preferred Language English       Contact Information    Permission Granted to Share Info With                    Functional Status       Row Name 04/22/25 1521       Functional Status, IADL    Medications independent    Meal Preparation independent    Housekeeping independent    Laundry independent    Shopping independent       Mental Status    General Appearance WDL WDL                   Psychosocial    No documentation.                  Abuse/Neglect    No documentation.                  Legal    No documentation.                  Substance Abuse    No documentation.                  Patient Forms    No documentation.                     Juhi Singh RN

## 2025-04-22 NOTE — PLAN OF CARE
Goal Outcome Evaluation:           Progress: improving  Outcome Evaluation: Patient with no acute events. Patient A&O x4, VSS on 2-4L NC. No complaints of pain. UOP adequate, no BM. FEES completed and able to have a diet and nectar thickened liquids. Mag and Potassium replaced. Tele orders placed awaiting tele bed.

## 2025-04-23 LAB
ANION GAP SERPL CALCULATED.3IONS-SCNC: 12 MMOL/L (ref 5–15)
BUN SERPL-MCNC: 17 MG/DL (ref 8–23)
BUN/CREAT SERPL: 27.9 (ref 7–25)
CALCIUM SPEC-SCNC: 8.9 MG/DL (ref 8.6–10.5)
CHLORIDE SERPL-SCNC: 105 MMOL/L (ref 98–107)
CO2 SERPL-SCNC: 23 MMOL/L (ref 22–29)
CREAT SERPL-MCNC: 0.61 MG/DL (ref 0.76–1.27)
DEPRECATED RDW RBC AUTO: 50.4 FL (ref 37–54)
EGFRCR SERPLBLD CKD-EPI 2021: 97.7 ML/MIN/1.73
ERYTHROCYTE [DISTWIDTH] IN BLOOD BY AUTOMATED COUNT: 16.1 % (ref 12.3–15.4)
GLUCOSE SERPL-MCNC: 130 MG/DL (ref 65–99)
HCT VFR BLD AUTO: 44.6 % (ref 37.5–51)
HGB BLD-MCNC: 14.8 G/DL (ref 13–17.7)
MAGNESIUM SERPL-MCNC: 2.1 MG/DL (ref 1.6–2.4)
MCH RBC QN AUTO: 28.7 PG (ref 26.6–33)
MCHC RBC AUTO-ENTMCNC: 33.2 G/DL (ref 31.5–35.7)
MCV RBC AUTO: 86.6 FL (ref 79–97)
PHOSPHATE SERPL-MCNC: 3.1 MG/DL (ref 2.5–4.5)
PLATELET # BLD AUTO: 223 10*3/MM3 (ref 140–450)
PMV BLD AUTO: 11.5 FL (ref 6–12)
POTASSIUM SERPL-SCNC: 4 MMOL/L (ref 3.5–5.2)
POTASSIUM SERPL-SCNC: 4.2 MMOL/L (ref 3.5–5.2)
QT INTERVAL: 388 MS
QTC INTERVAL: 466 MS
RBC # BLD AUTO: 5.15 10*6/MM3 (ref 4.14–5.8)
SODIUM SERPL-SCNC: 140 MMOL/L (ref 136–145)
WBC NRBC COR # BLD AUTO: 12.56 10*3/MM3 (ref 3.4–10.8)

## 2025-04-23 PROCEDURE — 83735 ASSAY OF MAGNESIUM: CPT | Performed by: INTERNAL MEDICINE

## 2025-04-23 PROCEDURE — 94799 UNLISTED PULMONARY SVC/PX: CPT

## 2025-04-23 PROCEDURE — 63710000001 PREDNISONE PER 1 MG

## 2025-04-23 PROCEDURE — 84132 ASSAY OF SERUM POTASSIUM: CPT | Performed by: INTERNAL MEDICINE

## 2025-04-23 PROCEDURE — 94761 N-INVAS EAR/PLS OXIMETRY MLT: CPT

## 2025-04-23 PROCEDURE — 25010000002 CEFTRIAXONE PER 250 MG

## 2025-04-23 PROCEDURE — 94664 DEMO&/EVAL PT USE INHALER: CPT

## 2025-04-23 PROCEDURE — 25810000003 SODIUM CHLORIDE 0.9 % SOLUTION 250 ML FLEX CONT

## 2025-04-23 PROCEDURE — 99232 SBSQ HOSP IP/OBS MODERATE 35: CPT | Performed by: INTERNAL MEDICINE

## 2025-04-23 PROCEDURE — 85027 COMPLETE CBC AUTOMATED: CPT | Performed by: INTERNAL MEDICINE

## 2025-04-23 PROCEDURE — 84100 ASSAY OF PHOSPHORUS: CPT | Performed by: INTERNAL MEDICINE

## 2025-04-23 PROCEDURE — 25010000002 AZITHROMYCIN PER 500 MG

## 2025-04-23 PROCEDURE — 80048 BASIC METABOLIC PNL TOTAL CA: CPT | Performed by: INTERNAL MEDICINE

## 2025-04-23 RX ORDER — QUETIAPINE FUMARATE 25 MG/1
25 TABLET, FILM COATED ORAL NIGHTLY PRN
Status: DISCONTINUED | OUTPATIENT
Start: 2025-04-23 | End: 2025-04-23

## 2025-04-23 RX ORDER — TRAMADOL HYDROCHLORIDE 50 MG/1
25 TABLET ORAL ONCE
Status: COMPLETED | OUTPATIENT
Start: 2025-04-23 | End: 2025-04-23

## 2025-04-23 RX ADMIN — BUDESONIDE 0.5 MG: 0.5 SUSPENSION RESPIRATORY (INHALATION) at 19:45

## 2025-04-23 RX ADMIN — TAMSULOSIN HYDROCHLORIDE 0.4 MG: 0.4 CAPSULE ORAL at 08:16

## 2025-04-23 RX ADMIN — PANTOPRAZOLE SODIUM 40 MG: 40 TABLET, DELAYED RELEASE ORAL at 06:06

## 2025-04-23 RX ADMIN — BUDESONIDE 0.5 MG: 0.5 SUSPENSION RESPIRATORY (INHALATION) at 07:49

## 2025-04-23 RX ADMIN — METOPROLOL SUCCINATE 50 MG: 50 TABLET, EXTENDED RELEASE ORAL at 08:16

## 2025-04-23 RX ADMIN — PREDNISONE 40 MG: 20 TABLET ORAL at 08:16

## 2025-04-23 RX ADMIN — APIXABAN 5 MG: 5 TABLET, FILM COATED ORAL at 20:31

## 2025-04-23 RX ADMIN — CARBIDOPA AND LEVODOPA 1 TABLET: 25; 100 TABLET ORAL at 08:16

## 2025-04-23 RX ADMIN — AZITHROMYCIN DIHYDRATE 500 MG: 500 INJECTION, POWDER, LYOPHILIZED, FOR SOLUTION INTRAVENOUS at 05:59

## 2025-04-23 RX ADMIN — CEFTRIAXONE 2000 MG: 2 INJECTION, POWDER, FOR SOLUTION INTRAMUSCULAR; INTRAVENOUS at 05:59

## 2025-04-23 RX ADMIN — APIXABAN 5 MG: 5 TABLET, FILM COATED ORAL at 08:16

## 2025-04-23 RX ADMIN — ACETAMINOPHEN 650 MG: 325 TABLET, FILM COATED ORAL at 22:57

## 2025-04-23 RX ADMIN — ACETAMINOPHEN 650 MG: 325 TABLET, FILM COATED ORAL at 16:54

## 2025-04-23 RX ADMIN — Medication 10 ML: at 08:16

## 2025-04-23 RX ADMIN — Medication 10 ML: at 20:32

## 2025-04-23 RX ADMIN — HYDROXYZINE HYDROCHLORIDE 25 MG: 25 TABLET, FILM COATED ORAL at 22:56

## 2025-04-23 RX ADMIN — MUPIROCIN 1 APPLICATION: 20 OINTMENT TOPICAL at 20:31

## 2025-04-23 RX ADMIN — IPRATROPIUM BROMIDE AND ALBUTEROL SULFATE 3 ML: 2.5; .5 SOLUTION RESPIRATORY (INHALATION) at 01:08

## 2025-04-23 RX ADMIN — CARBIDOPA AND LEVODOPA 1 TABLET: 25; 100 TABLET ORAL at 13:04

## 2025-04-23 RX ADMIN — IPRATROPIUM BROMIDE AND ALBUTEROL SULFATE 3 ML: 2.5; .5 SOLUTION RESPIRATORY (INHALATION) at 13:29

## 2025-04-23 RX ADMIN — IPRATROPIUM BROMIDE AND ALBUTEROL SULFATE 3 ML: 2.5; .5 SOLUTION RESPIRATORY (INHALATION) at 19:45

## 2025-04-23 RX ADMIN — CARBIDOPA AND LEVODOPA 1 TABLET: 25; 100 TABLET ORAL at 17:32

## 2025-04-23 RX ADMIN — TRAMADOL HYDROCHLORIDE 25 MG: 50 TABLET, COATED ORAL at 01:53

## 2025-04-23 RX ADMIN — MUPIROCIN 1 APPLICATION: 20 OINTMENT TOPICAL at 08:16

## 2025-04-23 RX ADMIN — IPRATROPIUM BROMIDE AND ALBUTEROL SULFATE 3 ML: 2.5; .5 SOLUTION RESPIRATORY (INHALATION) at 07:50

## 2025-04-23 NOTE — PLAN OF CARE
Goal Outcome Evaluation:              Outcome Evaluation: pt has been pleasant but anxious. 3L NC. pt had some complaints of pain. see mar. miplex applied. x1 assist. pt was able to ambulate 150 ft outside of room. call light within reach.

## 2025-04-23 NOTE — CASE MANAGEMENT/SOCIAL WORK
Continued Stay Note  [facility]     Patient Name: Flaco Roque II  MRN: [id number]  Today's Date: 4/23/2025    Admit Date: 4/22/2025    Plan: Home   Discharge Plan       Row Name 04/23/25 1100       Plan    Plan Home    Patient/Family in Agreement with Plan yes    Plan Comments Discussed Mr. Roque in MDR. Patient has orders to tele. PT/OT evaluations will be beneficial to assist with determining discharge needs. CM will continue to follow up.    Final Discharge Disposition Code 01 - home or self-care                   Discharge Codes    No documentation.                       Juhi Singh RN

## 2025-04-23 NOTE — PROGRESS NOTES
Pulmonary/Critical Care Follow-up     LOS: 1 day   Patient Care Team:  Ariadne Galloway PA as PCP - General (Physician Assistant)  Zayda Beach MD as Consulting Physician (Infectious Diseases)  Von Kilpatrick MD as Consulting Physician (Cardiology)  Alf Edwards MD as Consulting Physician (Pulmonary Disease)      Chief Complaint   Patient presents with    Shortness of Breath     Subjective     History reviewed and updated in EMR as indicated.    Interval History:     Patient is awake and alert.  He reports he is breathing better.  No fevers or chills.  No new complaints.  Mild to moderate dysphagia on fees and patient is tolerating soft to chew texture chopped meat no mixed consistencies and nectar thick liquids.    History taken from: Patient, staff, chart    PMH/FH/Social History were reviewed and updated appropriately in the electronic medical record.     Review of Systems:    Review of 14 systems was completed with positives and pertinent negatives noted in the subjective section.  All other systems reviewed and are negative.         Objective     Vital Signs  Temp:  [97.5 °F (36.4 °C)-98.4 °F (36.9 °C)] 97.8 °F (36.6 °C)  Heart Rate:  [] 101  Resp:  [16-24] 16  BP: (120-157)/() 145/93  04/22 0701 - 04/23 0700  In: 867 [I.V.:867]  Out: 1950 [Urine:1950]  Body mass index is 17.7 kg/m².     IV drips:      Physical Exam:     Constitutional:   Alert, in no acute distress   Head:   Normocephalic, atraumatic   Eyes:           Lids and lashes normal, conjunctivae and sclerae normal.  PER   ENMT:  Ears appear intact with no abnormalities noted     Lips normal.     Neck:  Trachea midline, no JVD   Lungs/Resp:    Normal effort, symmetric chest rise, no crepitus, clear to      auscultation bilaterally.               Heart/CV:   Regular rhythm and normal rate, no murmur   Abdomen/GI:    Soft, nontender, nondistended   :    Deferred   Extremities/MSK:  No clubbing or cyanosis.  No  edema.     Pulses:  Pulses palpable and equal bilaterally   Skin:  No bleeding, bruising or rash   Heme/Lymph:  No cervical or supraclavicular adenopathy.   Neurologic:      Psychiatric:    Moves all extremities with no obvious focal motor deficit.  Cranial nerves 2 - 12 grossly intact.  Positive tremors.  Somewhat dysarthric speech.  Non-agitated, normal affect.    The above physical exam findings were reviewed and reflect my exam findings as of today's exam.   Electronically signed by:  Melvin White MD  04/23/25  08:47 EDT      Results Review:     I reviewed the patient's new clinical results.   Results from last 7 days   Lab Units 04/23/25  0249 04/23/25  0033 04/22/25  0356   SODIUM mmol/L 140  --  140   POTASSIUM mmol/L 4.0 4.2 3.6   CHLORIDE mmol/L 105  --  101   CO2 mmol/L 23.0  --  25.0   BUN mg/dL 17  --  14   CREATININE mg/dL 0.61*  --  0.65*   CALCIUM mg/dL 8.9  --  9.0   BILIRUBIN mg/dL  --   --  1.3*   ALK PHOS U/L  --   --  92   ALT (SGPT) U/L  --   --  <5   AST (SGOT) U/L  --   --  12   GLUCOSE mg/dL 130*  --  155*     Results from last 7 days   Lab Units 04/23/25  0249 04/22/25  0356   WBC 10*3/mm3 12.56* 11.12*   HEMOGLOBIN g/dL 14.8 15.4   HEMATOCRIT % 44.6 46.3   PLATELETS 10*3/mm3 223 208     Results from last 7 days   Lab Units 04/22/25  0416   PH, ARTERIAL pH units 7.447   PO2 ART mm Hg 134.0*   PCO2, ARTERIAL mm Hg 35.4   HCO3 ART mmol/L 24.4     Results from last 7 days   Lab Units 04/23/25  0249 04/22/25  0510   MAGNESIUM mg/dL 2.1 1.6   PHOSPHORUS mg/dL 3.1 2.6       I reviewed the patient's new imaging including images and reports.        Medication Review:   apixaban, 5 mg, Oral, Q12H  azithromycin, 500 mg, Intravenous, Q24H  budesonide, 0.5 mg, Nebulization, BID - RT  carbidopa-levodopa, 1 tablet, Oral, TID  cefTRIAXone, 2,000 mg, Intravenous, Q24H  ipratropium-albuterol, 3 mL, Nebulization, Q6H - RT  Lidocaine, 1 patch, Transdermal, Q24H  metoprolol succinate XL, 50 mg, Oral,  Q24H  mupirocin, 1 Application, Each Nare, BID  pantoprazole, 40 mg, Oral, Q AM  predniSONE, 40 mg, Oral, Daily  sodium chloride, 10 mL, Intravenous, Q12H  tamsulosin, 0.4 mg, Oral, Daily           Assessment & Plan         Right lower lobe pneumonia    79 y.o. male with history of Parkinson's disease, Chrsi's esophagus, COPD (2 L at baseline), GERD, A-fib, ABRIL, BPH, chronic low back pain, presented to [facility] 4/22/2025 with dyspnea.    Patient had a prior admission with aspiration pneumonia from 2/15-19/2025.  He was discharged on Augmentin which she completed.    Patient reported acute onset shortness of breath and left-sided chest pain that was sharp in nature.  Low oxygen saturations on 2 L baseline at presentation.  He was tachypneic.  He was placed on BiPAP.  He also was in atrial fibrillation with RVR and heart rates in the 140s.  He was off and on BiPAP and was ultimately admitted to the ICU with high flow nasal cannula oxygen.    Patient has overall improved with treatment with antibiotics, steroids, and heart rate control.    Plan:  1.  For aspiration pneumonia/acute on chronic hypoxemic respiratory failure/COPD with exacerbation: Improved on current antibiotics of azithromycin and Rocephin so I will not change this.  Supplemental oxygen as needed and wean as tolerated.  Continue current steroids with planned course of 7 to 10 days.  2.  For atrial fibrillation with RVR: Continue rate control and Eliquis.  Adequately controlled currently.  3.  For Parkinson's/neuro: Continue Sinemet.  4.  For dysphagia: Diet per speech recommendations.  5.  GI prophylaxis: Protonix.    Okay to telemetry/hospitalist.    Electronically signed by:    Melvin White MD  04/23/25  08:47 EDT      *. Please note that portions of this note were completed with [employer] - a voice recognition program.

## 2025-04-23 NOTE — PLAN OF CARE
Goal Outcome Evaluation:         VSS on 4L NC, afib on monitor. Patient A/O x4 but restless overnight. C/o headache, tylenol administered. Adequate UOP, no BM. Awaiting tele bed.

## 2025-04-24 PROBLEM — E46 MALNUTRITION, CALORIE: Status: RESOLVED | Noted: 2024-09-20 | Resolved: 2025-04-24

## 2025-04-24 PROBLEM — M21.962 DEFORMITY OF LEFT FOOT: Status: RESOLVED | Noted: 2019-04-10 | Resolved: 2025-04-24

## 2025-04-24 PROBLEM — K56.609 LARGE BOWEL OBSTRUCTION: Status: RESOLVED | Noted: 2024-05-16 | Resolved: 2025-04-24

## 2025-04-24 PROBLEM — G47.33 OBSTRUCTIVE SLEEP APNEA: Status: RESOLVED | Noted: 2017-10-31 | Resolved: 2025-04-24

## 2025-04-24 PROBLEM — E07.9 DISORDER OF THYROID, UNSPECIFIED: Status: RESOLVED | Noted: 2024-08-31 | Resolved: 2025-04-24

## 2025-04-24 PROBLEM — K21.9 GASTRO-ESOPHAGEAL REFLUX DISEASE WITHOUT ESOPHAGITIS: Status: RESOLVED | Noted: 2017-10-31 | Resolved: 2025-04-24

## 2025-04-24 PROBLEM — J18.9 PNEUMONIA OF BOTH LOWER LOBES DUE TO INFECTIOUS ORGANISM: Status: RESOLVED | Noted: 2019-05-06 | Resolved: 2025-04-24

## 2025-04-24 LAB
BACTERIA SPEC RESP CULT: ABNORMAL
BACTERIA SPEC RESP CULT: ABNORMAL
BASOPHILS # BLD AUTO: 0 10*3/MM3 (ref 0–0.2)
BASOPHILS NFR BLD AUTO: 0 % (ref 0–1.5)
DEPRECATED RDW RBC AUTO: 51.9 FL (ref 37–54)
EOSINOPHIL # BLD AUTO: 0 10*3/MM3 (ref 0–0.4)
EOSINOPHIL NFR BLD AUTO: 0 % (ref 0.3–6.2)
ERYTHROCYTE [DISTWIDTH] IN BLOOD BY AUTOMATED COUNT: 16 % (ref 12.3–15.4)
GRAM STN SPEC: ABNORMAL
HCT VFR BLD AUTO: 44.6 % (ref 37.5–51)
HGB BLD-MCNC: 14.4 G/DL (ref 13–17.7)
IMM GRANULOCYTES # BLD AUTO: 0.03 10*3/MM3 (ref 0–0.05)
IMM GRANULOCYTES NFR BLD AUTO: 0.4 % (ref 0–0.5)
LYMPHOCYTES # BLD AUTO: 0.25 10*3/MM3 (ref 0.7–3.1)
LYMPHOCYTES NFR BLD AUTO: 2.9 % (ref 19.6–45.3)
MCH RBC QN AUTO: 28.6 PG (ref 26.6–33)
MCHC RBC AUTO-ENTMCNC: 32.3 G/DL (ref 31.5–35.7)
MCV RBC AUTO: 88.7 FL (ref 79–97)
MONOCYTES # BLD AUTO: 0.49 10*3/MM3 (ref 0.1–0.9)
MONOCYTES NFR BLD AUTO: 5.8 % (ref 5–12)
NEUTROPHILS NFR BLD AUTO: 7.73 10*3/MM3 (ref 1.7–7)
NEUTROPHILS NFR BLD AUTO: 90.9 % (ref 42.7–76)
NRBC BLD AUTO-RTO: 0 /100 WBC (ref 0–0.2)
PLATELET # BLD AUTO: 268 10*3/MM3 (ref 140–450)
PMV BLD AUTO: 10.7 FL (ref 6–12)
RBC # BLD AUTO: 5.03 10*6/MM3 (ref 4.14–5.8)
WBC NRBC COR # BLD AUTO: 8.5 10*3/MM3 (ref 3.4–10.8)

## 2025-04-24 PROCEDURE — 99232 SBSQ HOSP IP/OBS MODERATE 35: CPT | Performed by: INTERNAL MEDICINE

## 2025-04-24 PROCEDURE — 83735 ASSAY OF MAGNESIUM: CPT | Performed by: PHYSICIAN ASSISTANT

## 2025-04-24 PROCEDURE — 94799 UNLISTED PULMONARY SVC/PX: CPT

## 2025-04-24 PROCEDURE — 93010 ELECTROCARDIOGRAM REPORT: CPT | Performed by: INTERNAL MEDICINE

## 2025-04-24 PROCEDURE — 25010000002 AZITHROMYCIN PER 500 MG

## 2025-04-24 PROCEDURE — 85025 COMPLETE CBC W/AUTO DIFF WBC: CPT | Performed by: PHYSICIAN ASSISTANT

## 2025-04-24 PROCEDURE — 63710000001 PREDNISONE PER 1 MG: Performed by: INTERNAL MEDICINE

## 2025-04-24 PROCEDURE — 80048 BASIC METABOLIC PNL TOTAL CA: CPT | Performed by: PHYSICIAN ASSISTANT

## 2025-04-24 PROCEDURE — 25810000003 SODIUM CHLORIDE 0.9 % SOLUTION: Performed by: PHYSICIAN ASSISTANT

## 2025-04-24 PROCEDURE — 93005 ELECTROCARDIOGRAM TRACING: CPT | Performed by: PHYSICIAN ASSISTANT

## 2025-04-24 PROCEDURE — 25010000002 CEFTRIAXONE PER 250 MG: Performed by: INTERNAL MEDICINE

## 2025-04-24 PROCEDURE — 84145 PROCALCITONIN (PCT): CPT | Performed by: PHYSICIAN ASSISTANT

## 2025-04-24 PROCEDURE — 92526 ORAL FUNCTION THERAPY: CPT

## 2025-04-24 PROCEDURE — 94761 N-INVAS EAR/PLS OXIMETRY MLT: CPT

## 2025-04-24 PROCEDURE — 83605 ASSAY OF LACTIC ACID: CPT | Performed by: PHYSICIAN ASSISTANT

## 2025-04-24 PROCEDURE — 25810000003 SODIUM CHLORIDE 0.9 % SOLUTION 250 ML FLEX CONT

## 2025-04-24 RX ORDER — CILOSTAZOL 50 MG/1
100 TABLET ORAL 2 TIMES DAILY
Status: DISCONTINUED | OUTPATIENT
Start: 2025-04-24 | End: 2025-04-28 | Stop reason: HOSPADM

## 2025-04-24 RX ORDER — CARBIDOPA AND LEVODOPA 25; 100 MG/1; MG/1
1 TABLET ORAL 4 TIMES DAILY
Status: DISCONTINUED | OUTPATIENT
Start: 2025-04-24 | End: 2025-04-28 | Stop reason: HOSPADM

## 2025-04-24 RX ORDER — BISACODYL 5 MG/1
5 TABLET, DELAYED RELEASE ORAL DAILY PRN
Status: DISCONTINUED | OUTPATIENT
Start: 2025-04-24 | End: 2025-04-28 | Stop reason: HOSPADM

## 2025-04-24 RX ORDER — POLYETHYLENE GLYCOL 3350 17 G/17G
17 POWDER, FOR SOLUTION ORAL DAILY PRN
Status: DISCONTINUED | OUTPATIENT
Start: 2025-04-24 | End: 2025-04-25

## 2025-04-24 RX ORDER — ATROPINE SULFATE 10 MG/ML
1 SOLUTION/ DROPS OPHTHALMIC 4 TIMES DAILY PRN
Status: DISCONTINUED | OUTPATIENT
Start: 2025-04-24 | End: 2025-04-28 | Stop reason: HOSPADM

## 2025-04-24 RX ORDER — CARBIDOPA AND LEVODOPA 50; 200 MG/1; MG/1
1 TABLET, EXTENDED RELEASE ORAL 2 TIMES DAILY
Status: DISCONTINUED | OUTPATIENT
Start: 2025-04-24 | End: 2025-04-28 | Stop reason: HOSPADM

## 2025-04-24 RX ORDER — DOCUSATE SODIUM 100 MG/1
100 CAPSULE, LIQUID FILLED ORAL 2 TIMES DAILY
Status: DISCONTINUED | OUTPATIENT
Start: 2025-04-24 | End: 2025-04-28 | Stop reason: HOSPADM

## 2025-04-24 RX ORDER — QUETIAPINE FUMARATE 25 MG/1
25 TABLET, FILM COATED ORAL EVERY EVENING
Status: DISCONTINUED | OUTPATIENT
Start: 2025-04-24 | End: 2025-04-28 | Stop reason: HOSPADM

## 2025-04-24 RX ORDER — BISACODYL 10 MG
10 SUPPOSITORY, RECTAL RECTAL DAILY PRN
Status: DISCONTINUED | OUTPATIENT
Start: 2025-04-24 | End: 2025-04-28 | Stop reason: HOSPADM

## 2025-04-24 RX ORDER — MULTIPLE VITAMINS W/ MINERALS TAB 9MG-400MCG
1 TAB ORAL DAILY
Status: DISCONTINUED | OUTPATIENT
Start: 2025-04-24 | End: 2025-04-28 | Stop reason: HOSPADM

## 2025-04-24 RX ORDER — AMANTADINE HYDROCHLORIDE 100 MG/1
100 CAPSULE, GELATIN COATED ORAL 2 TIMES DAILY
Status: DISCONTINUED | OUTPATIENT
Start: 2025-04-24 | End: 2025-04-28 | Stop reason: HOSPADM

## 2025-04-24 RX ORDER — AMOXICILLIN 250 MG
2 CAPSULE ORAL 2 TIMES DAILY
Status: DISCONTINUED | OUTPATIENT
Start: 2025-04-24 | End: 2025-04-28 | Stop reason: HOSPADM

## 2025-04-24 RX ADMIN — NALOXEGOL OXALATE 25 MG: 25 TABLET, FILM COATED ORAL at 09:58

## 2025-04-24 RX ADMIN — Medication 1 TABLET: at 09:58

## 2025-04-24 RX ADMIN — IPRATROPIUM BROMIDE AND ALBUTEROL SULFATE 3 ML: 2.5; .5 SOLUTION RESPIRATORY (INHALATION) at 07:44

## 2025-04-24 RX ADMIN — TAMSULOSIN HYDROCHLORIDE 0.4 MG: 0.4 CAPSULE ORAL at 09:58

## 2025-04-24 RX ADMIN — IPRATROPIUM BROMIDE AND ALBUTEROL SULFATE 3 ML: 2.5; .5 SOLUTION RESPIRATORY (INHALATION) at 20:41

## 2025-04-24 RX ADMIN — PANTOPRAZOLE SODIUM 40 MG: 40 TABLET, DELAYED RELEASE ORAL at 06:08

## 2025-04-24 RX ADMIN — DOCUSATE SODIUM 100 MG: 100 CAPSULE, LIQUID FILLED ORAL at 20:19

## 2025-04-24 RX ADMIN — TIZANIDINE 4 MG: 4 TABLET ORAL at 22:50

## 2025-04-24 RX ADMIN — CILOSTAZOL 100 MG: 50 TABLET ORAL at 09:57

## 2025-04-24 RX ADMIN — CEFTRIAXONE 2000 MG: 2 INJECTION, POWDER, FOR SOLUTION INTRAMUSCULAR; INTRAVENOUS at 05:10

## 2025-04-24 RX ADMIN — AMANTADINE HYDROCHLORIDE 100 MG: 100 CAPSULE ORAL at 20:19

## 2025-04-24 RX ADMIN — BUDESONIDE 0.5 MG: 0.5 SUSPENSION RESPIRATORY (INHALATION) at 20:41

## 2025-04-24 RX ADMIN — QUETIAPINE FUMARATE 25 MG: 25 TABLET ORAL at 17:51

## 2025-04-24 RX ADMIN — CARBIDOPA AND LEVODOPA 1 TABLET: 25; 100 TABLET ORAL at 12:52

## 2025-04-24 RX ADMIN — MUPIROCIN 1 APPLICATION: 20 OINTMENT TOPICAL at 09:57

## 2025-04-24 RX ADMIN — ACETAMINOPHEN 650 MG: 325 TABLET, FILM COATED ORAL at 05:08

## 2025-04-24 RX ADMIN — PREDNISONE 40 MG: 20 TABLET ORAL at 09:57

## 2025-04-24 RX ADMIN — HYDROXYZINE HYDROCHLORIDE 25 MG: 25 TABLET, FILM COATED ORAL at 22:50

## 2025-04-24 RX ADMIN — APIXABAN 5 MG: 5 TABLET, FILM COATED ORAL at 20:19

## 2025-04-24 RX ADMIN — IPRATROPIUM BROMIDE AND ALBUTEROL SULFATE 3 ML: 2.5; .5 SOLUTION RESPIRATORY (INHALATION) at 00:59

## 2025-04-24 RX ADMIN — SODIUM CHLORIDE 1000 ML: 900 INJECTION, SOLUTION INTRAVENOUS at 23:24

## 2025-04-24 RX ADMIN — AZITHROMYCIN DIHYDRATE 500 MG: 500 INJECTION, POWDER, LYOPHILIZED, FOR SOLUTION INTRAVENOUS at 06:08

## 2025-04-24 RX ADMIN — SENNOSIDES AND DOCUSATE SODIUM 2 TABLET: 50; 8.6 TABLET ORAL at 12:52

## 2025-04-24 RX ADMIN — CARBIDOPA AND LEVODOPA 1 TABLET: 50; 200 TABLET, EXTENDED RELEASE ORAL at 20:19

## 2025-04-24 RX ADMIN — TIZANIDINE 4 MG: 4 TABLET ORAL at 14:09

## 2025-04-24 RX ADMIN — CARBIDOPA AND LEVODOPA 1 TABLET: 25; 100 TABLET ORAL at 09:58

## 2025-04-24 RX ADMIN — DOCUSATE SODIUM 100 MG: 100 CAPSULE, LIQUID FILLED ORAL at 09:57

## 2025-04-24 RX ADMIN — ATROPINE SULFATE 1 DROP: 10 SOLUTION/ DROPS OPHTHALMIC at 17:52

## 2025-04-24 RX ADMIN — SENNOSIDES AND DOCUSATE SODIUM 2 TABLET: 50; 8.6 TABLET ORAL at 20:19

## 2025-04-24 RX ADMIN — METOPROLOL SUCCINATE 50 MG: 50 TABLET, EXTENDED RELEASE ORAL at 09:58

## 2025-04-24 RX ADMIN — Medication 10 ML: at 09:58

## 2025-04-24 RX ADMIN — MUPIROCIN 1 APPLICATION: 20 OINTMENT TOPICAL at 20:19

## 2025-04-24 RX ADMIN — CARBIDOPA AND LEVODOPA 1 TABLET: 25; 100 TABLET ORAL at 20:19

## 2025-04-24 RX ADMIN — IPRATROPIUM BROMIDE AND ALBUTEROL SULFATE 3 ML: 2.5; .5 SOLUTION RESPIRATORY (INHALATION) at 13:16

## 2025-04-24 RX ADMIN — AMANTADINE HYDROCHLORIDE 100 MG: 100 CAPSULE ORAL at 14:09

## 2025-04-24 RX ADMIN — APIXABAN 5 MG: 5 TABLET, FILM COATED ORAL at 09:57

## 2025-04-24 RX ADMIN — Medication 10 ML: at 20:20

## 2025-04-24 RX ADMIN — CILOSTAZOL 100 MG: 50 TABLET ORAL at 20:19

## 2025-04-24 RX ADMIN — BUDESONIDE 0.5 MG: 0.5 SUSPENSION RESPIRATORY (INHALATION) at 07:44

## 2025-04-24 NOTE — PLAN OF CARE
Goal Outcome Evaluation:              Outcome Evaluation: pt has been pleasant but anxious. 3L NC. x1 assist. famiy came to bedside. pt had some complaints of pain. see mar. call light within reach. q2h turn. miplex assessed.
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
## 2025-04-24 NOTE — PROGRESS NOTES
[facility] Medicine Services  INPATIENT PROGRESS NOTE    Date of Admission: 4/22/2025  Primary Care Physician: Ariadne Galloway PA    Subjective     Chief Complaint: shortness of breath      HPI:Patient is feeling much better, moved out of the ICU, weaning oxygen, ready to eat and get his parkinsons meds back on schedule      Objective      Vitals:  Temp:  [98 °F (36.7 °C)] 98 °F (36.7 °C)  Heart Rate:  [] 77  Resp:  [16-18] 18  BP: (115-142)/(78-94) 142/89  Flow (L/min) (Oxygen Therapy):  [2-4] 3    Patient is alert and talkative in no distress at rest, sitting up oin the chair talking my leg off- remember me from previous admissions  Neck is without mass or JVD  Heart is Reg  Lungs are distant, slightly coarse, shallow  Abd is soft without HSM or mass, not distended or tender to palpation  MAEW, no clubbing cyanosis or edema  Skin is without rash- he is thin  Neurologic exam is nonfocal   Mood is appropriate      Results Review:    I have reviewed the labs, radiology results and diagnostic studies.    Results from last 7 days   Lab Units 04/23/25  0249 04/22/25  0356   WBC 10*3/mm3 12.56* 11.12*   HEMOGLOBIN g/dL 14.8 15.4   HEMATOCRIT % 44.6 46.3   PLATELETS 10*3/mm3 223 208     Results from last 7 days   Lab Units 04/23/25  0249 04/23/25  0033 04/22/25  0510 04/22/25  0356   SODIUM mmol/L 140  --   --  140   POTASSIUM mmol/L 4.0 4.2  --  3.6   CHLORIDE mmol/L 105  --   --  101   CO2 mmol/L 23.0  --   --  25.0   BUN mg/dL 17  --   --  14   CREATININE mg/dL 0.61*  --   --  0.65*   GLUCOSE mg/dL 130*  --   --  155*   CALCIUM mg/dL 8.9  --   --  9.0   ALK PHOS U/L  --   --   --  92   ALT (SGPT) U/L  --   --   --  <5   AST (SGOT) U/L  --   --   --  12   HSTROP T ng/L  --   --  17 14       Microbiology Results Abnormal       Procedure Component Value - Date/Time    Respiratory Culture - Sputum, Cough [[id number]]  (Abnormal)  (Susceptibility) Collected: 04/22/25 8513    Lab Status:  Final result Specimen: Sputum from Cough Updated: 04/24/25 0909     Respiratory Culture Heavy growth (4+) Klebsiella pneumoniae ssp pneumoniae      Scant growth (1+) Normal Respiratory Yolanda     Gram Stain Moderate (3+) WBCs per low power field      Rare (1+) Epithelial cells per low power field      Moderate (3+) Yeast      Few (2+) Mixed bacterial morphotypes seen on Gram Stain    Susceptibility        Klebsiella pneumoniae ssp pneumoniae      MONIQUE      Amoxicillin + Clavulanate Susceptible      Ampicillin Resistant      Ampicillin + Sulbactam Susceptible      Cefazolin (Non Urine) Susceptible      Cefepime Susceptible      Ceftazidime Susceptible      Ceftriaxone Susceptible      Gentamicin Susceptible      Levofloxacin Susceptible      Piperacillin + Tazobactam Susceptible      Tetracycline Susceptible      Trimethoprim + Sulfamethoxazole Susceptible                       Susceptibility Comments       Klebsiella pneumoniae ssp pneumoniae    With the exception of urinary-sourced infections, aminoglycosides should not be used as monotherapy.                     No radiology results for the last day  Results for orders placed during the hospital encounter of 08/24/23    Adult Transthoracic Echo Complete W/ Cont if Necessary Per Protocol    Interpretation Summary    Left ventricular ejection fraction appears to be 56 - 60%.    The left atrial cavity is mild to moderately dilated    There is mild to moderate thickening of the aortic valve    Mild to moderate tricuspid valve regurgitation is present. Estimated right ventricular systolic pressure from tricuspid regurgitation is mildly elevated (35-45 mmHg).    There is a trivial pericardial effusion.    Patient noted to be in atrial fibrillation with elevated rates during the study.      I have reviewed the medications.    Assessment/Plan     Assessment/Problem List    Right lower lobe pneumonia    ABRIL (obstructive sleep apnea)    Chronic pain with pain pump in place     Parkinson disease    Coronary artery disease involving native coronary artery of native heart without angina pectoris    Severe malnutrition    Essential (primary) hypertension    Hiatal hernia    Chris's esophagus    Atrial fibrillation    Chronic obstructive pulmonary disease    Aspiration pneumonia      Plan  79 y.o. male with history of Parkinson's disease, Chris's esophagus, COPD (2 L at baseline), GERD, A-fib, ABRIL, BPH, chronic low back pain, presented to [facility] 4/22/2025 with dyspnea.   Patient reported acute onset shortness of breath and left-sided chest pain that was sharp in nature.  Low oxygen saturations on 2 L baseline at presentation.  He was tachypneic.  He was placed on BiPAP.  He also was in atrial fibrillation with RVR and heart rates in the 140s.  He was off and on BiPAP and was ultimately admitted to the ICU with high flow nasal cannula oxygen on 4/22/25. He was moved out of the ICU on 4/24.     Aspiration pneumonia/acute on chronic hypoxemic respiratory failure/COPD with exacerbation: Sputum with Klebsiella, will cont rocpehin only, hold zithromax Supplemental oxygen as needed and wean as tolerated.  Continue current steroids with planned course of 7 to 10 days.    Atrial fibrillation with RVR: Continue rate control and Eliquis.  Adequately controlled currently.    Parkinson's/neuro: Continue Sinemet.-- arrange meds as at home today    Dysphagia: Diet per speech recommendations.    GI prophylaxis: Protonix.    Cont to ambulate, home soon    VTE Prophylaxis:  Pharmacologic & mechanical VTE prophylaxis orders are present.    Expected Discharge  Expected Discharge Date: 4/26/2025; Expected Discharge Time:     Electronically signed by Rachelle Baer MD, 04/24/25

## 2025-04-24 NOTE — PLAN OF CARE
Goal Outcome Evaluation:  Plan of Care Reviewed With: (P) patient        Progress: (P) no change       Anticipated Discharge Disposition (SLP): (P) inpatient rehabilitation facility             Treatment Assessment (SLP): (P) continued, oral dysphagia, suspected, pharyngeal dysphagia (04/24/25 1445)  Treatment Assessment Comments (SLP): (P) Pt reported toleration of diet. No s/sx of aspiration observed with therapeutic trials of thin liquids though pt is a known silent aspiratior. Completed swallowing exercises using nectar thick liquids. Required minimal cues to complete exercises, expressing that he frequently practiced these swallowing exercises with his HH therapist. Provided EMST 75 to which pt was against using, stating he already has one at home. Exercises left in pt's room and encourage continued practice. (04/24/25 1445)  Plan for Continued Treatment (SLP): (P) continue treatment per plan of care (04/24/25 1445)

## 2025-04-25 ENCOUNTER — APPOINTMENT (OUTPATIENT)
Dept: GENERAL RADIOLOGY | Facility: HOSPITAL | Age: 80
DRG: 177 | End: 2025-04-25
Payer: MEDICARE

## 2025-04-25 ENCOUNTER — APPOINTMENT (OUTPATIENT)
Dept: CARDIOLOGY | Facility: HOSPITAL | Age: 80
DRG: 177 | End: 2025-04-25
Payer: MEDICARE

## 2025-04-25 LAB
ALBUMIN SERPL-MCNC: 3.4 G/DL (ref 3.5–5.2)
ALBUMIN/GLOB SERPL: 1.5 G/DL
ALP SERPL-CCNC: 74 U/L (ref 39–117)
ALT SERPL W P-5'-P-CCNC: <5 U/L (ref 1–41)
ANION GAP SERPL CALCULATED.3IONS-SCNC: 11 MMOL/L (ref 5–15)
ANION GAP SERPL CALCULATED.3IONS-SCNC: 12 MMOL/L (ref 5–15)
AST SERPL-CCNC: 17 U/L (ref 1–40)
BASOPHILS # BLD AUTO: 0.01 10*3/MM3 (ref 0–0.2)
BASOPHILS NFR BLD AUTO: 0.1 % (ref 0–1.5)
BILIRUB SERPL-MCNC: 0.5 MG/DL (ref 0–1.2)
BUN SERPL-MCNC: 14 MG/DL (ref 8–23)
BUN SERPL-MCNC: 18 MG/DL (ref 8–23)
BUN/CREAT SERPL: 28 (ref 7–25)
BUN/CREAT SERPL: 34 (ref 7–25)
CALCIUM SPEC-SCNC: 8.5 MG/DL (ref 8.6–10.5)
CALCIUM SPEC-SCNC: 8.5 MG/DL (ref 8.6–10.5)
CHLORIDE SERPL-SCNC: 105 MMOL/L (ref 98–107)
CHLORIDE SERPL-SCNC: 105 MMOL/L (ref 98–107)
CHOLEST SERPL-MCNC: 130 MG/DL (ref 0–200)
CO2 SERPL-SCNC: 21 MMOL/L (ref 22–29)
CO2 SERPL-SCNC: 24 MMOL/L (ref 22–29)
CREAT SERPL-MCNC: 0.5 MG/DL (ref 0.76–1.27)
CREAT SERPL-MCNC: 0.53 MG/DL (ref 0.76–1.27)
D-LACTATE SERPL-SCNC: 1.4 MMOL/L (ref 0.5–2)
DEPRECATED RDW RBC AUTO: 51.6 FL (ref 37–54)
EGFRCR SERPLBLD CKD-EPI 2021: 101.9 ML/MIN/1.73
EGFRCR SERPLBLD CKD-EPI 2021: 103.8 ML/MIN/1.73
EOSINOPHIL # BLD AUTO: 0.04 10*3/MM3 (ref 0–0.4)
EOSINOPHIL NFR BLD AUTO: 0.5 % (ref 0.3–6.2)
ERYTHROCYTE [DISTWIDTH] IN BLOOD BY AUTOMATED COUNT: 16 % (ref 12.3–15.4)
GEN 5 1HR TROPONIN T REFLEX: 12 NG/L
GLOBULIN UR ELPH-MCNC: 2.3 GM/DL
GLUCOSE SERPL-MCNC: 172 MG/DL (ref 65–99)
GLUCOSE SERPL-MCNC: 185 MG/DL (ref 65–99)
HCT VFR BLD AUTO: 45.4 % (ref 37.5–51)
HDLC SERPL-MCNC: 52 MG/DL (ref 40–60)
HGB BLD-MCNC: 14.7 G/DL (ref 13–17.7)
IMM GRANULOCYTES # BLD AUTO: 0.03 10*3/MM3 (ref 0–0.05)
IMM GRANULOCYTES NFR BLD AUTO: 0.4 % (ref 0–0.5)
LDLC SERPL CALC-MCNC: 60 MG/DL (ref 0–100)
LDLC/HDLC SERPL: 1.13 {RATIO}
LYMPHOCYTES # BLD AUTO: 0.67 10*3/MM3 (ref 0.7–3.1)
LYMPHOCYTES NFR BLD AUTO: 7.8 % (ref 19.6–45.3)
MAGNESIUM SERPL-MCNC: 1.7 MG/DL (ref 1.6–2.4)
MAGNESIUM SERPL-MCNC: 2.8 MG/DL (ref 1.6–2.4)
MCH RBC QN AUTO: 28.3 PG (ref 26.6–33)
MCHC RBC AUTO-ENTMCNC: 32.4 G/DL (ref 31.5–35.7)
MCV RBC AUTO: 87.5 FL (ref 79–97)
MONOCYTES # BLD AUTO: 0.59 10*3/MM3 (ref 0.1–0.9)
MONOCYTES NFR BLD AUTO: 6.9 % (ref 5–12)
NEUTROPHILS NFR BLD AUTO: 7.2 10*3/MM3 (ref 1.7–7)
NEUTROPHILS NFR BLD AUTO: 84.3 % (ref 42.7–76)
NRBC BLD AUTO-RTO: 0 /100 WBC (ref 0–0.2)
PLATELET # BLD AUTO: 237 10*3/MM3 (ref 140–450)
PMV BLD AUTO: 10.2 FL (ref 6–12)
POTASSIUM SERPL-SCNC: 3.6 MMOL/L (ref 3.5–5.2)
POTASSIUM SERPL-SCNC: 3.7 MMOL/L (ref 3.5–5.2)
PROCALCITONIN SERPL-MCNC: 0.62 NG/ML (ref 0–0.25)
PROT SERPL-MCNC: 5.7 G/DL (ref 6–8.5)
QT INTERVAL: 404 MS
QTC INTERVAL: 598 MS
RBC # BLD AUTO: 5.19 10*6/MM3 (ref 4.14–5.8)
SODIUM SERPL-SCNC: 138 MMOL/L (ref 136–145)
SODIUM SERPL-SCNC: 140 MMOL/L (ref 136–145)
TRIGL SERPL-MCNC: 95 MG/DL (ref 0–150)
TROPONIN T NUMERIC DELTA: -2 NG/L
TROPONIN T SERPL HS-MCNC: 14 NG/L
TSH SERPL DL<=0.05 MIU/L-ACNC: 0.37 UIU/ML (ref 0.27–4.2)
VLDLC SERPL-MCNC: 18 MG/DL (ref 5–40)
WBC NRBC COR # BLD AUTO: 8.54 10*3/MM3 (ref 3.4–10.8)

## 2025-04-25 PROCEDURE — 25010000002 PROCHLORPERAZINE 10 MG/2ML SOLUTION: Performed by: INTERNAL MEDICINE

## 2025-04-25 PROCEDURE — 83735 ASSAY OF MAGNESIUM: CPT | Performed by: INTERNAL MEDICINE

## 2025-04-25 PROCEDURE — 25010000002 CEFTRIAXONE PER 250 MG: Performed by: INTERNAL MEDICINE

## 2025-04-25 PROCEDURE — 99233 SBSQ HOSP IP/OBS HIGH 50: CPT | Performed by: INTERNAL MEDICINE

## 2025-04-25 PROCEDURE — 94799 UNLISTED PULMONARY SVC/PX: CPT

## 2025-04-25 PROCEDURE — 93005 ELECTROCARDIOGRAM TRACING: CPT | Performed by: INTERNAL MEDICINE

## 2025-04-25 PROCEDURE — 85025 COMPLETE CBC W/AUTO DIFF WBC: CPT | Performed by: INTERNAL MEDICINE

## 2025-04-25 PROCEDURE — 97162 PT EVAL MOD COMPLEX 30 MIN: CPT

## 2025-04-25 PROCEDURE — 99222 1ST HOSP IP/OBS MODERATE 55: CPT

## 2025-04-25 PROCEDURE — 25010000002 DIGOXIN PER 500 MCG: Performed by: INTERNAL MEDICINE

## 2025-04-25 PROCEDURE — 63710000001 PREDNISONE PER 1 MG: Performed by: INTERNAL MEDICINE

## 2025-04-25 PROCEDURE — 80053 COMPREHEN METABOLIC PANEL: CPT | Performed by: INTERNAL MEDICINE

## 2025-04-25 PROCEDURE — 25010000002 AMIODARONE IN DEXTROSE 5% 150-4.21 MG/100ML-% SOLUTION

## 2025-04-25 PROCEDURE — 25010000002 MAGNESIUM SULFATE 4 GM/100ML SOLUTION: Performed by: INTERNAL MEDICINE

## 2025-04-25 PROCEDURE — 84443 ASSAY THYROID STIM HORMONE: CPT

## 2025-04-25 PROCEDURE — 93010 ELECTROCARDIOGRAM REPORT: CPT | Performed by: INTERNAL MEDICINE

## 2025-04-25 PROCEDURE — 25810000003 SODIUM CHLORIDE 0.9 % SOLUTION: Performed by: INTERNAL MEDICINE

## 2025-04-25 PROCEDURE — 80061 LIPID PANEL: CPT

## 2025-04-25 PROCEDURE — 84484 ASSAY OF TROPONIN QUANT: CPT | Performed by: INTERNAL MEDICINE

## 2025-04-25 PROCEDURE — 71045 X-RAY EXAM CHEST 1 VIEW: CPT

## 2025-04-25 PROCEDURE — 25010000002 AMIODARONE IN DEXTROSE 5% 360-4.14 MG/200ML-% SOLUTION

## 2025-04-25 PROCEDURE — 97116 GAIT TRAINING THERAPY: CPT

## 2025-04-25 PROCEDURE — 94664 DEMO&/EVAL PT USE INHALER: CPT

## 2025-04-25 PROCEDURE — 25010000002 METHYLPREDNISOLONE PER 40 MG: Performed by: INTERNAL MEDICINE

## 2025-04-25 PROCEDURE — 25810000003 SODIUM CHLORIDE 0.9 % SOLUTION

## 2025-04-25 RX ORDER — METOPROLOL TARTRATE 1 MG/ML
2.5 INJECTION, SOLUTION INTRAVENOUS ONCE
Status: COMPLETED | OUTPATIENT
Start: 2025-04-25 | End: 2025-04-25

## 2025-04-25 RX ORDER — POLYETHYLENE GLYCOL 3350 17 G/17G
17 POWDER, FOR SOLUTION ORAL DAILY
Status: DISCONTINUED | OUTPATIENT
Start: 2025-04-25 | End: 2025-04-28 | Stop reason: HOSPADM

## 2025-04-25 RX ORDER — IPRATROPIUM BROMIDE AND ALBUTEROL SULFATE 2.5; .5 MG/3ML; MG/3ML
1.5 SOLUTION RESPIRATORY (INHALATION) EVERY 4 HOURS PRN
Status: DISCONTINUED | OUTPATIENT
Start: 2025-04-25 | End: 2025-04-28 | Stop reason: HOSPADM

## 2025-04-25 RX ORDER — BISACODYL 10 MG
10 SUPPOSITORY, RECTAL RECTAL ONCE
Status: COMPLETED | OUTPATIENT
Start: 2025-04-25 | End: 2025-04-25

## 2025-04-25 RX ORDER — METOPROLOL TARTRATE 1 MG/ML
5 INJECTION, SOLUTION INTRAVENOUS EVERY 4 HOURS PRN
Status: DISCONTINUED | OUTPATIENT
Start: 2025-04-25 | End: 2025-04-28 | Stop reason: HOSPADM

## 2025-04-25 RX ORDER — DILTIAZEM HCL/D5W 125 MG/125
5-15 PLASTIC BAG, INJECTION (ML) INTRAVENOUS
Status: DISCONTINUED | OUTPATIENT
Start: 2025-04-25 | End: 2025-04-25

## 2025-04-25 RX ORDER — MAGNESIUM SULFATE HEPTAHYDRATE 40 MG/ML
4 INJECTION, SOLUTION INTRAVENOUS ONCE
Status: COMPLETED | OUTPATIENT
Start: 2025-04-25 | End: 2025-04-25

## 2025-04-25 RX ORDER — METHYLPREDNISOLONE SODIUM SUCCINATE 40 MG/ML
40 INJECTION, POWDER, LYOPHILIZED, FOR SOLUTION INTRAMUSCULAR; INTRAVENOUS ONCE
Status: COMPLETED | OUTPATIENT
Start: 2025-04-25 | End: 2025-04-25

## 2025-04-25 RX ORDER — DIGOXIN 0.25 MG/ML
500 INJECTION INTRAMUSCULAR; INTRAVENOUS ONCE
Status: COMPLETED | OUTPATIENT
Start: 2025-04-25 | End: 2025-04-25

## 2025-04-25 RX ORDER — PROCHLORPERAZINE EDISYLATE 5 MG/ML
5 INJECTION INTRAMUSCULAR; INTRAVENOUS EVERY 6 HOURS PRN
Status: DISCONTINUED | OUTPATIENT
Start: 2025-04-25 | End: 2025-04-28 | Stop reason: HOSPADM

## 2025-04-25 RX ORDER — POTASSIUM CHLORIDE 1.5 G/1.58G
40 POWDER, FOR SOLUTION ORAL EVERY 4 HOURS
Status: COMPLETED | OUTPATIENT
Start: 2025-04-25 | End: 2025-04-25

## 2025-04-25 RX ADMIN — POTASSIUM CHLORIDE 40 MEQ: 1.5 FOR SOLUTION ORAL at 16:16

## 2025-04-25 RX ADMIN — CILOSTAZOL 100 MG: 50 TABLET ORAL at 10:04

## 2025-04-25 RX ADMIN — POLYETHYLENE GLYCOL 3350 17 G: 17 POWDER, FOR SOLUTION ORAL at 18:15

## 2025-04-25 RX ADMIN — SODIUM CHLORIDE 500 ML: 9 INJECTION, SOLUTION INTRAVENOUS at 12:42

## 2025-04-25 RX ADMIN — APIXABAN 5 MG: 5 TABLET, FILM COATED ORAL at 20:23

## 2025-04-25 RX ADMIN — AMIODARONE HYDROCHLORIDE 1 MG/MIN: 1.8 INJECTION, SOLUTION INTRAVENOUS at 14:16

## 2025-04-25 RX ADMIN — DIGOXIN 500 MCG: 0.25 INJECTION INTRAMUSCULAR; INTRAVENOUS at 13:11

## 2025-04-25 RX ADMIN — AMIODARONE HYDROCHLORIDE 150 MG: 1.5 INJECTION, SOLUTION INTRAVENOUS at 13:56

## 2025-04-25 RX ADMIN — MAGNESIUM SULFATE IN WATER FOR 4 G: 40 INJECTION INTRAVENOUS at 01:05

## 2025-04-25 RX ADMIN — POTASSIUM CHLORIDE 40 MEQ: 1.5 FOR SOLUTION ORAL at 20:27

## 2025-04-25 RX ADMIN — PANTOPRAZOLE SODIUM 40 MG: 40 TABLET, DELAYED RELEASE ORAL at 06:14

## 2025-04-25 RX ADMIN — METHYLPREDNISOLONE SODIUM SUCCINATE 40 MG: 40 INJECTION, POWDER, FOR SOLUTION INTRAMUSCULAR; INTRAVENOUS at 20:21

## 2025-04-25 RX ADMIN — MUPIROCIN 1 APPLICATION: 20 OINTMENT TOPICAL at 10:05

## 2025-04-25 RX ADMIN — NALOXEGOL OXALATE 25 MG: 25 TABLET, FILM COATED ORAL at 06:14

## 2025-04-25 RX ADMIN — SODIUM CHLORIDE 500 ML: 9 INJECTION, SOLUTION INTRAVENOUS at 13:34

## 2025-04-25 RX ADMIN — PROCHLORPERAZINE EDISYLATE 5 MG: 5 INJECTION INTRAMUSCULAR; INTRAVENOUS at 17:28

## 2025-04-25 RX ADMIN — CEFTRIAXONE 2000 MG: 2 INJECTION, POWDER, FOR SOLUTION INTRAMUSCULAR; INTRAVENOUS at 06:14

## 2025-04-25 RX ADMIN — TAMSULOSIN HYDROCHLORIDE 0.4 MG: 0.4 CAPSULE ORAL at 10:04

## 2025-04-25 RX ADMIN — CARBIDOPA AND LEVODOPA 1 TABLET: 25; 100 TABLET ORAL at 11:31

## 2025-04-25 RX ADMIN — CARBIDOPA AND LEVODOPA 1 TABLET: 25; 100 TABLET ORAL at 16:16

## 2025-04-25 RX ADMIN — MUPIROCIN 1 APPLICATION: 20 OINTMENT TOPICAL at 20:22

## 2025-04-25 RX ADMIN — CILOSTAZOL 100 MG: 50 TABLET ORAL at 20:22

## 2025-04-25 RX ADMIN — IPRATROPIUM BROMIDE AND ALBUTEROL SULFATE 3 ML: 2.5; .5 SOLUTION RESPIRATORY (INHALATION) at 12:02

## 2025-04-25 RX ADMIN — METOPROLOL TARTRATE 5 MG: 5 INJECTION INTRAVENOUS at 11:31

## 2025-04-25 RX ADMIN — AMANTADINE HYDROCHLORIDE 100 MG: 100 CAPSULE ORAL at 20:22

## 2025-04-25 RX ADMIN — Medication 10 ML: at 10:57

## 2025-04-25 RX ADMIN — CARBIDOPA AND LEVODOPA 1 TABLET: 25; 100 TABLET ORAL at 10:04

## 2025-04-25 RX ADMIN — BISACODYL 5 MG: 5 TABLET, COATED ORAL at 16:54

## 2025-04-25 RX ADMIN — BISACODYL 10 MG: 10 SUPPOSITORY RECTAL at 18:15

## 2025-04-25 RX ADMIN — METOPROLOL TARTRATE 2.5 MG: 5 INJECTION INTRAVENOUS at 01:05

## 2025-04-25 RX ADMIN — DOCUSATE SODIUM 100 MG: 100 CAPSULE, LIQUID FILLED ORAL at 20:23

## 2025-04-25 RX ADMIN — AMANTADINE HYDROCHLORIDE 100 MG: 100 CAPSULE ORAL at 12:19

## 2025-04-25 RX ADMIN — CARBIDOPA AND LEVODOPA 1 TABLET: 50; 200 TABLET, EXTENDED RELEASE ORAL at 12:19

## 2025-04-25 RX ADMIN — SENNOSIDES AND DOCUSATE SODIUM 2 TABLET: 50; 8.6 TABLET ORAL at 20:22

## 2025-04-25 RX ADMIN — BUDESONIDE 0.5 MG: 0.5 SUSPENSION RESPIRATORY (INHALATION) at 12:02

## 2025-04-25 RX ADMIN — DOCUSATE SODIUM 100 MG: 100 CAPSULE, LIQUID FILLED ORAL at 10:04

## 2025-04-25 RX ADMIN — Medication 1 TABLET: at 10:04

## 2025-04-25 RX ADMIN — METOPROLOL SUCCINATE 50 MG: 50 TABLET, EXTENDED RELEASE ORAL at 10:04

## 2025-04-25 RX ADMIN — CARBIDOPA AND LEVODOPA 1 TABLET: 50; 200 TABLET, EXTENDED RELEASE ORAL at 20:22

## 2025-04-25 RX ADMIN — APIXABAN 5 MG: 5 TABLET, FILM COATED ORAL at 10:04

## 2025-04-25 RX ADMIN — Medication 10 ML: at 20:24

## 2025-04-25 RX ADMIN — BUDESONIDE 0.5 MG: 0.5 SUSPENSION RESPIRATORY (INHALATION) at 21:08

## 2025-04-25 RX ADMIN — CARBIDOPA AND LEVODOPA 1 TABLET: 25; 100 TABLET ORAL at 20:22

## 2025-04-25 RX ADMIN — SENNOSIDES AND DOCUSATE SODIUM 2 TABLET: 50; 8.6 TABLET ORAL at 10:04

## 2025-04-25 RX ADMIN — PREDNISONE 40 MG: 20 TABLET ORAL at 10:04

## 2025-04-25 NOTE — NURSING NOTE
Pt HR flipped into A-FIB RVR. EKG obtained and placed in chart. blood pressure started to trend down. Attending provider notified and came to bedside. Labs ordered. Cardiology consult placed and cariology provider came to bedside. Orders given. See mar.

## 2025-04-25 NOTE — NURSING NOTE
Pt went into a-fib, rvr around 2240 this shift with a rate of 140-160's, hospitalist paged. Hospitalist nurse prac returned call at 2313, new orders received and carried out.

## 2025-04-25 NOTE — PLAN OF CARE
Problem: Noninvasive Ventilation Acute  Goal: Effective Unassisted Ventilation and Oxygenation  Outcome: Progressing     Problem: Adult Inpatient Plan of Care  Goal: Plan of Care Review  Outcome: Progressing  Goal: Patient-Specific Goal (Individualized)  Outcome: Progressing  Goal: Absence of Hospital-Acquired Illness or Injury  Outcome: Progressing  Intervention: Identify and Manage Fall Risk  Recent Flowsheet Documentation  Taken 4/25/2025 0200 by Any Ricketts RN  Safety Promotion/Fall Prevention: safety round/check completed  Taken 4/25/2025 0000 by Any Ricketts RN  Safety Promotion/Fall Prevention: safety round/check completed  Taken 4/24/2025 2200 by Any Ricketts RN  Safety Promotion/Fall Prevention: safety round/check completed  Taken 4/24/2025 2012 by Any Ricketts RN  Safety Promotion/Fall Prevention: safety round/check completed  Intervention: Prevent Skin Injury  Recent Flowsheet Documentation  Taken 4/25/2025 0200 by Any Ricketts RN  Body Position:   turned   left  Skin Protection:   incontinence pads utilized   silicone foam dressing in place  Taken 4/25/2025 0000 by Any Ricketts RN  Body Position:   turned   supine  Skin Protection:   incontinence pads utilized   silicone foam dressing in place  Taken 4/24/2025 2200 by Any Ricketts RN  Body Position:   turned   left  Skin Protection:   incontinence pads utilized   silicone foam dressing in place  Taken 4/24/2025 2012 by Any Ricketts RN  Body Position: position maintained  Skin Protection:   incontinence pads utilized   silicone foam dressing in place  Intervention: Prevent Infection  Recent Flowsheet Documentation  Taken 4/25/2025 0200 by Any Ricketts RN  Infection Prevention:   environmental surveillance performed   equipment surfaces disinfected   hand hygiene promoted   rest/sleep promoted   single patient room provided  Taken 4/25/2025 0000 by Any Ricketts RN  Infection Prevention:   environmental  surveillance performed   equipment surfaces disinfected   hand hygiene promoted   rest/sleep promoted   single patient room provided  Taken 4/24/2025 2200 by Any Ricketts RN  Infection Prevention:   environmental surveillance performed   equipment surfaces disinfected   hand hygiene promoted   rest/sleep promoted   single patient room provided  Taken 4/24/2025 2012 by Any Ricketts RN  Infection Prevention:   environmental surveillance performed   equipment surfaces disinfected   hand hygiene promoted   rest/sleep promoted   single patient room provided  Goal: Optimal Comfort and Wellbeing  Outcome: Progressing  Intervention: Provide Person-Centered Care  Recent Flowsheet Documentation  Taken 4/25/2025 0000 by Any Ricketts RN  Trust Relationship/Rapport: care explained  Taken 4/24/2025 2012 by Any Ricketts RN  Trust Relationship/Rapport: care explained  Goal: Readiness for Transition of Care  Outcome: Progressing     Problem: Skin Injury Risk Increased  Goal: Skin Health and Integrity  Outcome: Progressing  Intervention: Optimize Skin Protection  Recent Flowsheet Documentation  Taken 4/25/2025 0200 by Any Ricketts RN  Activity Management: activity minimized  Pressure Reduction Techniques:   heels elevated off bed   pressure points protected  Head of Bed (HOB) Positioning: [facility] elevated  Pressure Reduction Devices: pressure-redistributing mattress utilized  Skin Protection:   incontinence pads utilized   silicone foam dressing in place  Taken 4/25/2025 0000 by Any Ricketts RN  Activity Management: activity minimized  Pressure Reduction Techniques:   heels elevated off bed   pressure points protected  Head of Bed (HOB) Positioning: HOB elevated  Pressure Reduction Devices: pressure-redistributing mattress utilized  Skin Protection:   incontinence pads utilized   silicone foam dressing in place  Taken 4/24/2025 2200 by Any Ricketts RN  Activity Management: activity minimized  Pressure  Reduction Techniques:   heels elevated off bed   pressure points protected  Head of Bed (HOB) Positioning: HOB elevated  Pressure Reduction Devices: pressure-redistributing mattress utilized  Skin Protection:   incontinence pads utilized   silicone foam dressing in place  Taken 4/24/2025 2012 by Any Ricketts RN  Activity Management: activity minimized  Pressure Reduction Techniques:   heels elevated off bed   pressure points protected  Head of Bed (HOB) Positioning: HOB elevated  Pressure Reduction Devices: pressure-redistributing mattress utilized  Skin Protection:   incontinence pads utilized   silicone foam dressing in place     Problem: Comorbidity Management  Goal: Maintenance of COPD Symptom Control  Outcome: Progressing     Problem: Sepsis/Septic Shock  Goal: Optimal Coping  Outcome: Progressing  Intervention: Support Patient and Family Response  Recent Flowsheet Documentation  Taken 4/25/2025 0000 by Any Ricketts RN  Family/Support System Care: self-care encouraged  Taken 4/24/2025 2012 by Any Ricketts RN  Family/Support System Care: support provided  Goal: Absence of Bleeding  Outcome: Progressing  Goal: Blood Glucose Level Within Target Range  Outcome: Progressing  Goal: Absence of Infection Signs and Symptoms  Outcome: Progressing  Intervention: Initiate Sepsis Management  Recent Flowsheet Documentation  Taken 4/25/2025 0200 by Any Ricketts RN  Infection Prevention:   environmental surveillance performed   equipment surfaces disinfected   hand hygiene promoted   rest/sleep promoted   single patient room provided  Taken 4/25/2025 0000 by Any Ricketts RN  Infection Prevention:   environmental surveillance performed   equipment surfaces disinfected   hand hygiene promoted   rest/sleep promoted   single patient room provided  Taken 4/24/2025 2200 by Any Ricketts RN  Infection Prevention:   environmental surveillance performed   equipment surfaces disinfected   hand hygiene  promoted   rest/sleep promoted   single patient room provided  Taken 4/24/2025 2012 by Any Ricketts RN  Infection Prevention:   environmental surveillance performed   equipment surfaces disinfected   hand hygiene promoted   rest/sleep promoted   single patient room provided  Intervention: Promote Recovery  Recent Flowsheet Documentation  Taken 4/25/2025 0200 by Any Ricketts RN  Activity Management: activity minimized  Taken 4/25/2025 0000 by Any Ricketts RN  Activity Management: activity minimized  Taken 4/24/2025 2200 by Any Ricketts RN  Activity Management: activity minimized  Taken 4/24/2025 2012 by Any Ricketts RN  Activity Management: activity minimized  Goal: Optimal Nutrition Delivery  Outcome: Progressing     Problem: Pneumonia  Goal: Absence of Infection Signs and Symptoms  Outcome: Progressing  Goal: Effective Oxygenation and Ventilation  Outcome: Progressing  Intervention: Promote Airway Secretion Clearance  Recent Flowsheet Documentation  Taken 4/25/2025 0200 by Any Ricketts RN  Activity Management: activity minimized  Taken 4/25/2025 0000 by Any Ricketts RN  Activity Management: activity minimized  Cough And Deep Breathing: done with encouragement  Taken 4/24/2025 2200 by Any Ricketts RN  Activity Management: activity minimized  Taken 4/24/2025 2012 by Any Ricketts RN  Activity Management: activity minimized  Cough And Deep Breathing: done with encouragement  Intervention: Optimize Oxygenation and Ventilation  Recent Flowsheet Documentation  Taken 4/25/2025 0200 by Any Ricketts RN  Head of Bed (HOB) Positioning: HOB elevated  Taken 4/25/2025 0000 by Any Ricketts RN  Head of Bed (HOB) Positioning: HOB elevated  Taken 4/24/2025 2200 by Any Ricketts RN  Head of Bed (HOB) Positioning: HOB elevated  Taken 4/24/2025 2012 by Any Ricketts RN  Head of Bed (HOB) Positioning: HOB elevated     Problem: Fall Injury Risk  Goal: Absence of Fall and  Fall-Related Injury  Outcome: Progressing  Intervention: Promote Injury-Free Environment  Recent Flowsheet Documentation  Taken 4/25/2025 0200 by Any Ricketts RN  Safety Promotion/Fall Prevention: safety round/check completed  Taken 4/25/2025 0000 by Any Ricketts RN  Safety Promotion/Fall Prevention: safety round/check completed  Taken 4/24/2025 2200 by Any Ricketts RN  Safety Promotion/Fall Prevention: safety round/check completed  Taken 4/24/2025 2012 by Any Ricketts RN  Safety Promotion/Fall Prevention: safety round/check completed   Goal Outcome Evaluation:         Patient noted in a-fib, rvr this shift, asymptomatic, IV lopressor given with desired effect. Patient rested most of the night, no complaints voiced.

## 2025-04-25 NOTE — CASE MANAGEMENT/SOCIAL WORK
Continued Stay Note  [facility]     Patient Name: Flaco Roque II  MRN: [id number]  Today's Date: 4/25/2025    Admit Date: 4/22/2025    Plan: Home   Discharge Plan       Row Name 04/25/25 1300       Plan    Plan Home    Patient/Family in Agreement with Plan other (see comments)    Plan Comments Pt was discussed in Medical Rounds today. Pt is not medically ready for discharge. Awaiting PT/OT recommendations. Pt plans to discharge home.  will continue to follow.    Final Discharge Disposition Code 01 - home or self-care                   Discharge Codes    No documentation.                 Expected Discharge Date and Time       Expected Discharge Date Expected Discharge Time    Apr 26, 2025               Susana Hitchcock RN

## 2025-04-25 NOTE — PROGRESS NOTES
[facility] Medicine Services  INPATIENT PROGRESS NOTE    Date of Admission: 4/22/2025  Primary Care Physician: Ariadne Galloway PA    Subjective     Chief Complaint: shortness of breath      HPI:Patient had a rough night with nightmares, feels poorly this morning but is having a great greand baby today... later in the morning he went into Afib with RVR and hypotension requiring IV medications, also still no BM for 4 days      Objective      Vitals:  Temp:  [97.1 °F (36.2 °C)-98 °F (36.7 °C)] 97.4 °F (36.3 °C)  Heart Rate:  [] 97  Resp:  [16-18] 18  BP: (102-128)/() 128/82  Flow (L/min) (Oxygen Therapy):  [3] 3    Patient is alert and talkative in no distress at rest, sitting up oin the chair talking my leg off- remember me from previous admissions  Neck is without mass or JVD  Heart is irreg and tachy  Lungs are distant, slightly coarse, shallow  Abd is soft without HSM or mass, not distended or tender to palpation  MAEW, no clubbing cyanosis or edema  Skin is without rash- he is thin  Neurologic exam is nonfocal   Mood is appropriate      Results Review:    I have reviewed the labs, radiology results and diagnostic studies.    Results from last 7 days   Lab Units 04/24/25 2337 04/23/25 0249 04/22/25  0356   WBC 10*3/mm3 8.50 12.56* 11.12*   HEMOGLOBIN g/dL 14.4 14.8 15.4   HEMATOCRIT % 44.6 44.6 46.3   PLATELETS 10*3/mm3 268 223 208     Results from last 7 days   Lab Units 04/24/25 2337 04/23/25 0249 04/23/25  0033 04/22/25  0510 04/22/25  0356   SODIUM mmol/L 138 140  --   --  140   POTASSIUM mmol/L 3.7 4.0 4.2  --  3.6   CHLORIDE mmol/L 105 105  --   --  101   CO2 mmol/L 21.0* 23.0  --   --  25.0   BUN mg/dL 18 17  --   --  14   CREATININE mg/dL 0.53* 0.61*  --   --  0.65*   GLUCOSE mg/dL 185* 130*  --   --  155*   CALCIUM mg/dL 8.5* 8.9  --   --  9.0   ALK PHOS U/L  --   --   --   --  92   ALT (SGPT) U/L  --   --   --   --  <5   AST (SGOT) U/L  --   --   --   --  12    HSTROP T ng/L  --   --   --  17 14       Microbiology Results Abnormal       Procedure Component Value - Date/Time    Respiratory Culture - Sputum, Cough [[id number]]  (Abnormal)  (Susceptibility) Collected: 04/22/25 0729    Lab Status: Final result Specimen: Sputum from Cough Updated: 04/24/25 0909     Respiratory Culture Heavy growth (4+) Klebsiella pneumoniae ssp pneumoniae      Scant growth (1+) Normal Respiratory Yolanda     Gram Stain Moderate (3+) WBCs per low power field      Rare (1+) Epithelial cells per low power field      Moderate (3+) Yeast      Few (2+) Mixed bacterial morphotypes seen on Gram Stain    Susceptibility        Klebsiella pneumoniae ssp pneumoniae      MONIQUE      Amoxicillin + Clavulanate Susceptible      Ampicillin Resistant      Ampicillin + Sulbactam Susceptible      Cefazolin (Non Urine) Susceptible      Cefepime Susceptible      Ceftazidime Susceptible      Ceftriaxone Susceptible      Gentamicin Susceptible      Levofloxacin Susceptible      Piperacillin + Tazobactam Susceptible      Tetracycline Susceptible      Trimethoprim + Sulfamethoxazole Susceptible                       Susceptibility Comments       Klebsiella pneumoniae ssp pneumoniae    With the exception of urinary-sourced infections, aminoglycosides should not be used as monotherapy.                     No radiology results for the last day  Results for orders placed during the hospital encounter of 08/24/23    Adult Transthoracic Echo Complete W/ Cont if Necessary Per Protocol    Interpretation Summary    Left ventricular ejection fraction appears to be 56 - 60%.    The left atrial cavity is mild to moderately dilated    There is mild to moderate thickening of the aortic valve    Mild to moderate tricuspid valve regurgitation is present. Estimated right ventricular systolic pressure from tricuspid regurgitation is mildly elevated (35-45 mmHg).    There is a trivial pericardial effusion.    Patient noted to be in atrial  fibrillation with elevated rates during the study.      I have reviewed the medications.    Assessment/Plan     Assessment/Problem List    Right lower lobe pneumonia    ABRIL (obstructive sleep apnea)    Chronic pain with pain pump in place    Parkinson disease    Coronary artery disease involving native coronary artery of native heart without angina pectoris    Severe malnutrition    Essential (primary) hypertension    Hiatal hernia    Chris's esophagus    Atrial fibrillation    Chronic obstructive pulmonary disease    Aspiration pneumonia      Plan  79 y.o. male with history of Parkinson's disease, Chris's esophagus, COPD (2 L at baseline), GERD, A-fib, ABRIL, BPH, chronic low back pain, presented to [facility] 4/22/2025 with dyspnea.   Patient reported acute onset shortness of breath and left-sided chest pain that was sharp in nature.  Low oxygen saturations on 2 L baseline at presentation.  He was tachypneic.  He was placed on BiPAP.  He also was in atrial fibrillation with RVR and heart rates in the 140s.  He was off and on BiPAP and was ultimately admitted to the ICU with high flow nasal cannula oxygen on 4/22/25. He was moved out of the ICU on 4/24.     Aspiration pneumonia/acute on chronic hypoxemic respiratory failure/COPD with exacerbation: Sputum with Klebsiella, will cont rocephin only, hold zithromax Supplemental oxygen as needed and wean as tolerated.  Continue current steroids with planned course of 7 to 10 days.    Atrial fibrillation with RVR: Continue rate control and Eliquis. Requiried IV metoprolol and amiodarone 4/25 and then dig due to hypotension and flushing with amiodarone.. rate slowed.    Parkinson's/neuro: Continue Sinemet.-- arranged meds as at home 4/24    Dysphagia: Diet per speech recommendations.    GI prophylaxis: Protonix.    Cont to ambulate, home soon    VTE Prophylaxis:  Pharmacologic & mechanical VTE prophylaxis orders are present.    Expected Discharge  Expected Discharge Date:  4/26/2025; Expected Discharge Time:     Electronically signed by Rachelle Baer MD, 04/25/25
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
## 2025-04-25 NOTE — PLAN OF CARE
Goal Outcome Evaluation:  Plan of Care Reviewed With: patient, grandchild(pawel)        Progress: improving  Outcome Evaluation: Patient ambulated 200 feet with RW, CGA, step to gait pattern, limited by fatigue and elevated HR. CGA for all mobility. Patient currently below functional baseline, demonstrating decreased functional mobility status, impaired balance, decreased endurance, and decreased strength. Will address these deficits to promote return to PLOF. Recommend d/c home with assist and HHPT.    Anticipated Discharge Disposition (PT): home with assist, home with home health

## 2025-04-26 ENCOUNTER — APPOINTMENT (OUTPATIENT)
Dept: CARDIOLOGY | Facility: HOSPITAL | Age: 80
DRG: 177 | End: 2025-04-26
Payer: MEDICARE

## 2025-04-26 LAB
AORTIC DIMENSIONLESS INDEX: 0.67 (DI)
AV MEAN PRESS GRAD SYS DOP V1V2: 3 MMHG
AV VMAX SYS DOP: 123 CM/SEC
BH CV ECHO MEAS - AO MAX PG: 6.2 MMHG
BH CV ECHO MEAS - AO ROOT AREA (BSA CORRECTED): 2.5 CM2
BH CV ECHO MEAS - AO ROOT DIAM: 4 CM
BH CV ECHO MEAS - AO V2 VTI: 23 CM
BH CV ECHO MEAS - AVA(I,D): 2.11 CM2
BH CV ECHO MEAS - EDV(CUBED): 103.8 ML
BH CV ECHO MEAS - EDV(MOD-SP2): 75.2 ML
BH CV ECHO MEAS - EDV(MOD-SP4): 67.7 ML
BH CV ECHO MEAS - EF(MOD-SP2): 57.6 %
BH CV ECHO MEAS - EF(MOD-SP4): 55.2 %
BH CV ECHO MEAS - ESV(CUBED): 39.3 ML
BH CV ECHO MEAS - ESV(MOD-SP2): 31.9 ML
BH CV ECHO MEAS - ESV(MOD-SP4): 30.3 ML
BH CV ECHO MEAS - FS: 27.7 %
BH CV ECHO MEAS - IVS/LVPW: 1.01 CM
BH CV ECHO MEAS - IVSD: 1.32 CM
BH CV ECHO MEAS - LA DIMENSION: 3.4 CM
BH CV ECHO MEAS - LAT PEAK E' VEL: 7.2 CM/SEC
BH CV ECHO MEAS - LV DIASTOLIC VOL/BSA (35-75): 41.8 CM2
BH CV ECHO MEAS - LV MASS(C)D: 241.3 GRAMS
BH CV ECHO MEAS - LV MAX PG: 3 MMHG
BH CV ECHO MEAS - LV MEAN PG: 2 MMHG
BH CV ECHO MEAS - LV SYSTOLIC VOL/BSA (12-30): 18.7 CM2
BH CV ECHO MEAS - LV V1 MAX: 85.9 CM/SEC
BH CV ECHO MEAS - LV V1 VTI: 15.5 CM
BH CV ECHO MEAS - LVIDD: 4.7 CM
BH CV ECHO MEAS - LVIDS: 3.4 CM
BH CV ECHO MEAS - LVOT AREA: 3.1 CM2
BH CV ECHO MEAS - LVOT DIAM: 2 CM
BH CV ECHO MEAS - LVPWD: 1.31 CM
BH CV ECHO MEAS - MED PEAK E' VEL: 8.3 CM/SEC
BH CV ECHO MEAS - MV A MAX VEL: 105 CM/SEC
BH CV ECHO MEAS - MV DEC TIME: 0.24 SEC
BH CV ECHO MEAS - MV E MAX VEL: 77.8 CM/SEC
BH CV ECHO MEAS - MV E/A: 0.74
BH CV ECHO MEAS - RAP SYSTOLE: 8 MMHG
BH CV ECHO MEAS - RVSP: 28 MMHG
BH CV ECHO MEAS - SV(LVOT): 48.5 ML
BH CV ECHO MEAS - SV(MOD-SP2): 43.3 ML
BH CV ECHO MEAS - SV(MOD-SP4): 37.4 ML
BH CV ECHO MEAS - SVI(LVOT): 29.9 ML/M2
BH CV ECHO MEAS - SVI(MOD-SP2): 26.7 ML/M2
BH CV ECHO MEAS - SVI(MOD-SP4): 23.1 ML/M2
BH CV ECHO MEAS - TR MAX PG: 20.1 MMHG
BH CV ECHO MEAS - TR MAX VEL: 223.5 CM/SEC
BH CV ECHO MEASUREMENTS AVERAGE E/E' RATIO: 10.04
BH CV XLRA - RV BASE: 3.8 CM
BH CV XLRA - RV LENGTH: 5.5 CM
BH CV XLRA - RV MID: 2.8 CM
BH CV XLRA - TDI S': 28.9 CM/SEC
LEFT ATRIUM VOLUME INDEX: 37.9 ML/M2
LV EF 2D ECHO EST: 60 %
LV EF BIPLANE MOD: 56.7 %
POTASSIUM SERPL-SCNC: 5.2 MMOL/L (ref 3.5–5.2)
QT INTERVAL: 294 MS
QT INTERVAL: 302 MS
QT INTERVAL: 324 MS
QTC INTERVAL: 406 MS
QTC INTERVAL: 413 MS
QTC INTERVAL: 507 MS

## 2025-04-26 PROCEDURE — 25010000002 CEFTRIAXONE PER 250 MG: Performed by: INTERNAL MEDICINE

## 2025-04-26 PROCEDURE — 93306 TTE W/DOPPLER COMPLETE: CPT

## 2025-04-26 PROCEDURE — 94799 UNLISTED PULMONARY SVC/PX: CPT

## 2025-04-26 PROCEDURE — 63710000001 PREDNISONE PER 1 MG: Performed by: INTERNAL MEDICINE

## 2025-04-26 PROCEDURE — 93306 TTE W/DOPPLER COMPLETE: CPT | Performed by: INTERNAL MEDICINE

## 2025-04-26 PROCEDURE — 97165 OT EVAL LOW COMPLEX 30 MIN: CPT

## 2025-04-26 PROCEDURE — 94664 DEMO&/EVAL PT USE INHALER: CPT

## 2025-04-26 PROCEDURE — 92526 ORAL FUNCTION THERAPY: CPT

## 2025-04-26 PROCEDURE — 84132 ASSAY OF SERUM POTASSIUM: CPT | Performed by: INTERNAL MEDICINE

## 2025-04-26 PROCEDURE — 97535 SELF CARE MNGMENT TRAINING: CPT

## 2025-04-26 PROCEDURE — 99232 SBSQ HOSP IP/OBS MODERATE 35: CPT | Performed by: INTERNAL MEDICINE

## 2025-04-26 RX ADMIN — CEFTRIAXONE 2000 MG: 2 INJECTION, POWDER, FOR SOLUTION INTRAMUSCULAR; INTRAVENOUS at 06:11

## 2025-04-26 RX ADMIN — PANTOPRAZOLE SODIUM 40 MG: 40 TABLET, DELAYED RELEASE ORAL at 06:11

## 2025-04-26 RX ADMIN — Medication 5 MG: at 21:29

## 2025-04-26 RX ADMIN — NALOXEGOL OXALATE 25 MG: 25 TABLET, FILM COATED ORAL at 06:11

## 2025-04-26 RX ADMIN — APIXABAN 5 MG: 5 TABLET, FILM COATED ORAL at 08:19

## 2025-04-26 RX ADMIN — APIXABAN 5 MG: 5 TABLET, FILM COATED ORAL at 21:29

## 2025-04-26 RX ADMIN — DOCUSATE SODIUM 100 MG: 100 CAPSULE, LIQUID FILLED ORAL at 08:20

## 2025-04-26 RX ADMIN — BUDESONIDE 0.5 MG: 0.5 SUSPENSION RESPIRATORY (INHALATION) at 08:57

## 2025-04-26 RX ADMIN — AMANTADINE HYDROCHLORIDE 100 MG: 100 CAPSULE ORAL at 08:19

## 2025-04-26 RX ADMIN — Medication 10 ML: at 21:29

## 2025-04-26 RX ADMIN — CARBIDOPA AND LEVODOPA 1 TABLET: 50; 200 TABLET, EXTENDED RELEASE ORAL at 21:29

## 2025-04-26 RX ADMIN — CARBIDOPA AND LEVODOPA 1 TABLET: 25; 100 TABLET ORAL at 08:20

## 2025-04-26 RX ADMIN — TAMSULOSIN HYDROCHLORIDE 0.4 MG: 0.4 CAPSULE ORAL at 08:20

## 2025-04-26 RX ADMIN — Medication 10 ML: at 08:21

## 2025-04-26 RX ADMIN — CARBIDOPA AND LEVODOPA 1 TABLET: 25; 100 TABLET ORAL at 21:29

## 2025-04-26 RX ADMIN — CILOSTAZOL 100 MG: 50 TABLET ORAL at 08:19

## 2025-04-26 RX ADMIN — PREDNISONE 40 MG: 20 TABLET ORAL at 08:20

## 2025-04-26 RX ADMIN — MUPIROCIN 1 APPLICATION: 20 OINTMENT TOPICAL at 08:20

## 2025-04-26 RX ADMIN — CARBIDOPA AND LEVODOPA 1 TABLET: 50; 200 TABLET, EXTENDED RELEASE ORAL at 08:20

## 2025-04-26 RX ADMIN — CILOSTAZOL 100 MG: 50 TABLET ORAL at 21:29

## 2025-04-26 RX ADMIN — AMANTADINE HYDROCHLORIDE 100 MG: 100 CAPSULE ORAL at 21:29

## 2025-04-26 RX ADMIN — DOCUSATE SODIUM 100 MG: 100 CAPSULE, LIQUID FILLED ORAL at 21:29

## 2025-04-26 RX ADMIN — POLYETHYLENE GLYCOL 3350 17 G: 17 POWDER, FOR SOLUTION ORAL at 08:20

## 2025-04-26 RX ADMIN — Medication 1 TABLET: at 08:20

## 2025-04-26 RX ADMIN — TIZANIDINE 4 MG: 4 TABLET ORAL at 21:38

## 2025-04-26 RX ADMIN — CARBIDOPA AND LEVODOPA 1 TABLET: 25; 100 TABLET ORAL at 13:32

## 2025-04-26 RX ADMIN — SENNOSIDES AND DOCUSATE SODIUM 2 TABLET: 50; 8.6 TABLET ORAL at 08:20

## 2025-04-26 RX ADMIN — SENNOSIDES AND DOCUSATE SODIUM 2 TABLET: 50; 8.6 TABLET ORAL at 21:38

## 2025-04-26 RX ADMIN — MUPIROCIN 1 APPLICATION: 20 OINTMENT TOPICAL at 21:29
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
## 2025-04-26 NOTE — PLAN OF CARE
Problem: Noninvasive Ventilation Acute  Goal: Effective Unassisted Ventilation and Oxygenation  Outcome: Progressing     Problem: Adult Inpatient Plan of Care  Goal: Plan of Care Review  Outcome: Progressing  Goal: Patient-Specific Goal (Individualized)  Outcome: Progressing  Goal: Absence of Hospital-Acquired Illness or Injury  Outcome: Progressing  Intervention: Identify and Manage Fall Risk  Recent Flowsheet Documentation  Taken 4/26/2025 0400 by Any Ricketts RN  Safety Promotion/Fall Prevention: safety round/check completed  Taken 4/26/2025 0200 by Any Ricketts RN  Safety Promotion/Fall Prevention: safety round/check completed  Taken 4/26/2025 0000 by Any Ricketts RN  Safety Promotion/Fall Prevention: safety round/check completed  Taken 4/25/2025 2200 by Any Ricketts RN  Safety Promotion/Fall Prevention: safety round/check completed  Taken 4/25/2025 2021 by Any Ricketts RN  Safety Promotion/Fall Prevention: safety round/check completed  Intervention: Prevent Skin Injury  Recent Flowsheet Documentation  Taken 4/26/2025 0400 by Any Ricketts RN  Body Position: (pt trying to use restroom) patient/family refused  Skin Protection:   incontinence pads utilized   silicone foam dressing in place  Taken 4/26/2025 0200 by Any Ricketts RN  Skin Protection:   incontinence pads utilized   silicone foam dressing in place  Taken 4/26/2025 0000 by Any Ricketts RN  Body Position:   turned   supine  Skin Protection:   incontinence pads utilized   silicone foam dressing in place  Taken 4/25/2025 2200 by Any Ricketts RN  Skin Protection:   incontinence pads utilized   silicone foam dressing in place  Taken 4/25/2025 2021 by Any Ricketts RN  Body Position: position maintained  Skin Protection:   incontinence pads utilized   silicone foam dressing in place  Intervention: Prevent Infection  Recent Flowsheet Documentation  Taken 4/26/2025 0400 by Any Ricketts RN  Infection  Prevention:   environmental surveillance performed   equipment surfaces disinfected   hand hygiene promoted   rest/sleep promoted   single patient room provided  Taken 4/26/2025 0200 by Any Ricketts RN  Infection Prevention:   environmental surveillance performed   equipment surfaces disinfected   hand hygiene promoted   rest/sleep promoted   single patient room provided  Taken 4/26/2025 0000 by Any Ricketts RN  Infection Prevention:   environmental surveillance performed   equipment surfaces disinfected   hand hygiene promoted   rest/sleep promoted   single patient room provided  Taken 4/25/2025 2200 by Any Ricketts RN  Infection Prevention:   environmental surveillance performed   equipment surfaces disinfected   hand hygiene promoted   rest/sleep promoted   single patient room provided  Taken 4/25/2025 2021 by Any Ricketts RN  Infection Prevention:   environmental surveillance performed   equipment surfaces disinfected   hand hygiene promoted   rest/sleep promoted   single patient room provided  Goal: Optimal Comfort and Wellbeing  Outcome: Progressing  Intervention: Monitor Pain and Promote Comfort  Recent Flowsheet Documentation  Taken 4/26/2025 0400 by Any Ricketts RN  Pain Management Interventions: relaxation techniques promoted  Intervention: Provide Person-Centered Care  Recent Flowsheet Documentation  Taken 4/26/2025 0000 by Any Ricketts RN  Trust Relationship/Rapport: care explained  Taken 4/25/2025 2021 by Any Ricketts RN  Trust Relationship/Rapport: care explained  Goal: Readiness for Transition of Care  Outcome: Progressing     Problem: Skin Injury Risk Increased  Goal: Skin Health and Integrity  Outcome: Progressing  Intervention: Optimize Skin Protection  Recent Flowsheet Documentation  Taken 4/26/2025 0400 by Any Ricketts RN  Activity Management: activity encouraged  Pressure Reduction Techniques:   heels elevated off bed   pressure points protected  Head of  Bed (HOB) Positioning: [facility] elevated  Pressure Reduction Devices: pressure-redistributing mattress utilized  Skin Protection:   incontinence pads utilized   silicone foam dressing in place  Taken 4/26/2025 0200 by Any Ricketts RN  Activity Management: activity encouraged  Pressure Reduction Techniques:   heels elevated off bed   pressure points protected  Pressure Reduction Devices: pressure-redistributing mattress utilized  Skin Protection:   incontinence pads utilized   silicone foam dressing in place  Taken 4/26/2025 0000 by Any Ricketts RN  Activity Management: activity encouraged  Pressure Reduction Techniques:   heels elevated off bed   pressure points protected  Head of Bed (HOB) Positioning: [facility] elevated  Pressure Reduction Devices: pressure-redistributing mattress utilized  Skin Protection:   incontinence pads utilized   silicone foam dressing in place  Taken 4/25/2025 2200 by Any Ricketts RN  Activity Management: activity encouraged  Pressure Reduction Techniques:   heels elevated off bed   pressure points protected  Pressure Reduction Devices: pressure-redistributing mattress utilized  Skin Protection:   incontinence pads utilized   silicone foam dressing in place  Taken 4/25/2025 2021 by Any Ricketts RN  Activity Management: activity encouraged  Pressure Reduction Techniques:   heels elevated off bed   pressure points protected  Head of Bed ([facility]) Positioning: [facility] elevated  Pressure Reduction Devices: pressure-redistributing mattress utilized  Skin Protection:   incontinence pads utilized   silicone foam dressing in place     Problem: Comorbidity Management  Goal: Maintenance of COPD Symptom Control  Outcome: Progressing     Problem: Sepsis/Septic Shock  Goal: Optimal Coping  Outcome: Progressing  Intervention: Support Patient and Family Response  Recent Flowsheet Documentation  Taken 4/26/2025 0000 by Any Ricketts RN  Family/Support System Care: support provided  Taken 4/25/2025  2021 by Any Ricketts RN  Family/Support System Care: support provided  Goal: Absence of Bleeding  Outcome: Progressing  Goal: Blood Glucose Level Within Target Range  Outcome: Progressing  Goal: Absence of Infection Signs and Symptoms  Outcome: Progressing  Intervention: Initiate Sepsis Management  Recent Flowsheet Documentation  Taken 4/26/2025 0400 by Any Ricketts RN  Infection Prevention:   environmental surveillance performed   equipment surfaces disinfected   hand hygiene promoted   rest/sleep promoted   single patient room provided  Taken 4/26/2025 0200 by Any Ricketts RN  Infection Prevention:   environmental surveillance performed   equipment surfaces disinfected   hand hygiene promoted   rest/sleep promoted   single patient room provided  Taken 4/26/2025 0000 by Any Ricketts RN  Infection Prevention:   environmental surveillance performed   equipment surfaces disinfected   hand hygiene promoted   rest/sleep promoted   single patient room provided  Taken 4/25/2025 2200 by Any Ricketts RN  Infection Prevention:   environmental surveillance performed   equipment surfaces disinfected   hand hygiene promoted   rest/sleep promoted   single patient room provided  Taken 4/25/2025 2021 by Any Ricketts RN  Infection Prevention:   environmental surveillance performed   equipment surfaces disinfected   hand hygiene promoted   rest/sleep promoted   single patient room provided  Intervention: Promote Recovery  Recent Flowsheet Documentation  Taken 4/26/2025 0400 by Any Ricketts RN  Activity Management: activity encouraged  Taken 4/26/2025 0200 by Any Ricketts RN  Activity Management: activity encouraged  Taken 4/26/2025 0000 by Any Ricketts RN  Activity Management: activity encouraged  Taken 4/25/2025 2200 by Any Ricketts RN  Activity Management: activity encouraged  Taken 4/25/2025 2021 by Any Ricketts RN  Activity Management: activity encouraged  Goal: Optimal  Nutrition Delivery  Outcome: Progressing     Problem: Pneumonia  Goal: Absence of Infection Signs and Symptoms  Outcome: Progressing  Goal: Effective Oxygenation and Ventilation  Outcome: Progressing  Intervention: Promote Airway Secretion Clearance  Recent Flowsheet Documentation  Taken 4/26/2025 0400 by Any Ricketts RN  Activity Management: activity encouraged  Taken 4/26/2025 0200 by Any Ricketts RN  Activity Management: activity encouraged  Taken 4/26/2025 0000 by Any Ricketts RN  Activity Management: activity encouraged  Taken 4/25/2025 2200 by Any Ricketts RN  Activity Management: activity encouraged  Taken 4/25/2025 2021 by Any Ricketts RN  Activity Management: activity encouraged  Cough And Deep Breathing: done with encouragement  Intervention: Optimize Oxygenation and Ventilation  Recent Flowsheet Documentation  Taken 4/26/2025 0400 by Any Ricketts RN  Head of Bed (HOB) Positioning: HOB elevated  Taken 4/26/2025 0000 by Any Ricketts RN  Head of Bed (HOB) Positioning: HOB elevated  Taken 4/25/2025 2021 by Any Ricketts RN  Head of Bed (HOB) Positioning: HOB elevated     Problem: Fall Injury Risk  Goal: Absence of Fall and Fall-Related Injury  Outcome: Progressing  Intervention: Promote Injury-Free Environment  Recent Flowsheet Documentation  Taken 4/26/2025 0400 by Any Ricketts RN  Safety Promotion/Fall Prevention: safety round/check completed  Taken 4/26/2025 0200 by Any Ricketts RN  Safety Promotion/Fall Prevention: safety round/check completed  Taken 4/26/2025 0000 by Any Ricketts RN  Safety Promotion/Fall Prevention: safety round/check completed  Taken 4/25/2025 2200 by Any Ricketts RN  Safety Promotion/Fall Prevention: safety round/check completed  Taken 4/25/2025 2021 by Any Ricketts RN  Safety Promotion/Fall Prevention: safety round/check completed   Goal Outcome Evaluation:

## 2025-04-26 NOTE — PLAN OF CARE
Goal Outcome Evaluation:  Plan of Care Reviewed With: patient        Progress: no change  Outcome Evaluation: Pt presents below his functional baseline with all ADLs and mobility due to generalized weakness,impaired balance, and decreased activity tolerance warranting OT services. Pt required use of RW for all transfers and mobility. Rec home with assist and home health services.    Anticipated Discharge Disposition (OT): home with assist, home with home health

## 2025-04-26 NOTE — PLAN OF CARE
Problem: Adult Inpatient Plan of Care  Goal: Plan of Care Review  Outcome: Progressing  Flowsheets (Taken 4/26/2025 1645)  Plan of Care Reviewed With: patient   Goal Outcome Evaluation:  Plan of Care Reviewed With: patient      SLP treatment completed. Will continue to address dysphagia in tx. Please see note for further details and recommendations.                          Treatment Assessment (SLP): suspected, continued, pharyngeal dysphagia, no clinical signs of, aspiration (04/26/25 3505)  Treatment Assessment Comments (SLP): No s/sx aspiration with nectar thick liquids or thin liquid trials. Mastication of regular cracker was adequate. Exercises completed. (04/26/25 7285)  Plan for Continued Treatment (SLP): continue treatment per plan of care (04/26/25 1545)

## 2025-04-26 NOTE — NURSING NOTE
Patient reported pressure in stomach during bedside report, bladder scan performed 897ml shown on scanner.

## 2025-04-26 NOTE — PROGRESS NOTES
[facility] Medicine Services  INPATIENT PROGRESS NOTE    Date of Admission: 4/22/2025  Primary Care Physician: Ariadne Galloway PA    Subjective     Chief Complaint: shortness of breath      HPI:Patient had another rough night, but today is feeling much better after large bowel movement.  Patient had significant urinary retention with greater than 800 mL on bladder scan.  In-N-Out cath x 2 has not helped.  Overall he feels better, otherwise no specific complaints    Objective      Vitals:  Temp:  [97.5 °F (36.4 °C)-98.1 °F (36.7 °C)] 97.5 °F (36.4 °C)  Heart Rate:  [] 65  Resp:  [17-18] 17  BP: (108-129)/(65-87) 116/65  Flow (L/min) (Oxygen Therapy):  [3] 3    Patient is alert and talkative in no distress at rest, sitting up in the chair, then the bed leg off- remember me from previous admissions  Neck is without mass or JVD  Heart is irreg and tachy  Lungs are distant, slightly coarse, shallow  Abd is soft without HSM or mass, not distended or tender to palpation  MAEW, no clubbing cyanosis or edema  Skin is without rash- he is thin  Neurologic exam is nonfocal generalized weak and cachectic  Mood is appropriate      Results Review:    I have reviewed the labs, radiology results and diagnostic studies.    Results from last 7 days   Lab Units 04/25/25  1305 04/24/25  2337 04/23/25  0249   WBC 10*3/mm3 8.54 8.50 12.56*   HEMOGLOBIN g/dL 14.7 14.4 14.8   HEMATOCRIT % 45.4 44.6 44.6   PLATELETS 10*3/mm3 237 268 223     Results from last 7 days   Lab Units 04/26/25  0050 04/25/25  1500 04/25/25  1305 04/24/25  2337 04/23/25  0249 04/23/25  0033 04/22/25  0510 04/22/25  0356   SODIUM mmol/L  --   --  140 138 140  --   --  140   POTASSIUM mmol/L 5.2  --  3.6 3.7 4.0   < >  --  3.6   CHLORIDE mmol/L  --   --  105 105 105  --   --  101   CO2 mmol/L  --   --  24.0 21.0* 23.0  --   --  25.0   BUN mg/dL  --   --  14 18 17  --   --  14   CREATININE mg/dL  --   --  0.50* 0.53* 0.61*  --   --   0.65*   GLUCOSE mg/dL  --   --  172* 185* 130*  --   --  155*   CALCIUM mg/dL  --   --  8.5* 8.5* 8.9  --   --  9.0   ALK PHOS U/L  --   --  74  --   --   --   --  92   ALT (SGPT) U/L  --   --  <5  --   --   --   --  <5   AST (SGOT) U/L  --   --  17  --   --   --   --  12   HSTROP T ng/L  --  12 14  --   --   --  17 14    < > = values in this interval not displayed.       Microbiology Results Abnormal       Procedure Component Value - Date/Time    Respiratory Culture - Sputum, Cough [[id number]]  (Abnormal)  (Susceptibility) Collected: 04/22/25 0729    Lab Status: Final result Specimen: Sputum from Cough Updated: 04/24/25 0909     Respiratory Culture Heavy growth (4+) Klebsiella pneumoniae ssp pneumoniae      Scant growth (1+) Normal Respiratory Yolanda     Gram Stain Moderate (3+) WBCs per low power field      Rare (1+) Epithelial cells per low power field      Moderate (3+) Yeast      Few (2+) Mixed bacterial morphotypes seen on Gram Stain    Susceptibility        Klebsiella pneumoniae ssp pneumoniae      MONIQUE      Amoxicillin + Clavulanate Susceptible      Ampicillin Resistant      Ampicillin + Sulbactam Susceptible      Cefazolin (Non Urine) Susceptible      Cefepime Susceptible      Ceftazidime Susceptible      Ceftriaxone Susceptible      Gentamicin Susceptible      Levofloxacin Susceptible      Piperacillin + Tazobactam Susceptible      Tetracycline Susceptible      Trimethoprim + Sulfamethoxazole Susceptible                       Susceptibility Comments       Klebsiella pneumoniae ssp pneumoniae    With the exception of urinary-sourced infections, aminoglycosides should not be used as monotherapy.                     XR Chest 1 View  Result Date: 4/25/2025  Impression: Decreased right basilar airspace opacities, likely due to improving pneumonia/atelectasis. Electronically Signed: Delia Hurtado MD  4/25/2025 2:09 PM EDT  Workstation ID: [id number]    Results for orders placed during the hospital encounter of  08/24/23    Adult Transthoracic Echo Complete W/ Cont if Necessary Per Protocol    Interpretation Summary    Left ventricular ejection fraction appears to be 56 - 60%.    The left atrial cavity is mild to moderately dilated    There is mild to moderate thickening of the aortic valve    Mild to moderate tricuspid valve regurgitation is present. Estimated right ventricular systolic pressure from tricuspid regurgitation is mildly elevated (35-45 mmHg).    There is a trivial pericardial effusion.    Patient noted to be in atrial fibrillation with elevated rates during the study.      I have reviewed the medications.    Assessment/Plan     Assessment/Problem List    Right lower lobe pneumonia    ABRIL (obstructive sleep apnea)    Chronic pain with pain pump in place    Parkinson disease    Coronary artery disease involving native coronary artery of native heart without angina pectoris    Severe malnutrition    Essential (primary) hypertension    Hiatal hernia    Chris's esophagus    Atrial fibrillation    Chronic obstructive pulmonary disease    Aspiration pneumonia      Plan  79 y.o. male with history of Parkinson's disease, Chris's esophagus, COPD (2 L at baseline), GERD, A-fib, ABRIL, BPH, chronic low back pain, presented to [facility] 4/22/2025 with dyspnea.   Patient reported acute onset shortness of breath and left-sided chest pain that was sharp in nature.  Low oxygen saturations on 2 L baseline at presentation.  He was tachypneic.  He was placed on BiPAP.  He also was in atrial fibrillation with RVR and heart rates in the 140s.  He was off and on BiPAP and was ultimately admitted to the ICU with high flow nasal cannula oxygen on 4/22/25. He was moved out of the ICU on 4/24.     Aspiration pneumonia/acute on chronic hypoxemic respiratory failure/COPD with exacerbation: Sputum with Klebsiella, will cont rocephin only, hold zithromax Supplemental oxygen as needed and wean as tolerated.  Continue current steroids with  planned course of 7 to 10 days.    Atrial fibrillation with RVR: Continue rate control and Eliquis. Requiried IV metoprolol and amiodarone 4/25 and then dig due to hypotension and flushing with amiodarone.. rate slowed.    Parkinson's/neuro: Continue Sinemet.-- arranged meds as at home 4/24    Dysphagia: Diet per speech recommendations.    GI prophylaxis: Protonix.  Constipation-much improved  Cont to ambulate, home soon    New urinary retention 4/26/2025-will anchor P[place] since he had significant large volume bladder scans, monitor I's and O's, daily Flomax, hopefully as his strength improves as well as ambulation.  Bowel and bladder function returned    VTE Prophylaxis:  Pharmacologic & mechanical VTE prophylaxis orders are present.    Expected Discharge  Expected Discharge Date: 4/26/2025; Expected Discharge Time:     Electronically signed by Rachelle Baer MD, 04/26/25

## 2025-04-27 LAB
ALBUMIN SERPL-MCNC: 3.7 G/DL (ref 3.5–5.2)
ALBUMIN/GLOB SERPL: 1.6 G/DL
ALP SERPL-CCNC: 82 U/L (ref 39–117)
ALT SERPL W P-5'-P-CCNC: <5 U/L (ref 1–41)
ANION GAP SERPL CALCULATED.3IONS-SCNC: 9 MMOL/L (ref 5–15)
AST SERPL-CCNC: 13 U/L (ref 1–40)
BACTERIA SPEC AEROBE CULT: NORMAL
BACTERIA SPEC AEROBE CULT: NORMAL
BASOPHILS # BLD AUTO: 0.03 10*3/MM3 (ref 0–0.2)
BASOPHILS NFR BLD AUTO: 0.3 % (ref 0–1.5)
BILIRUB SERPL-MCNC: 0.6 MG/DL (ref 0–1.2)
BUN SERPL-MCNC: 13 MG/DL (ref 8–23)
BUN/CREAT SERPL: 25.5 (ref 7–25)
CALCIUM SPEC-SCNC: 9.3 MG/DL (ref 8.6–10.5)
CHLORIDE SERPL-SCNC: 100 MMOL/L (ref 98–107)
CO2 SERPL-SCNC: 26 MMOL/L (ref 22–29)
CREAT SERPL-MCNC: 0.51 MG/DL (ref 0.76–1.27)
DEPRECATED RDW RBC AUTO: 49.9 FL (ref 37–54)
EGFRCR SERPLBLD CKD-EPI 2021: 103.1 ML/MIN/1.73
EOSINOPHIL # BLD AUTO: 0.07 10*3/MM3 (ref 0–0.4)
EOSINOPHIL NFR BLD AUTO: 0.6 % (ref 0.3–6.2)
ERYTHROCYTE [DISTWIDTH] IN BLOOD BY AUTOMATED COUNT: 15.8 % (ref 12.3–15.4)
GLOBULIN UR ELPH-MCNC: 2.3 GM/DL
GLUCOSE SERPL-MCNC: 104 MG/DL (ref 65–99)
HCT VFR BLD AUTO: 49.5 % (ref 37.5–51)
HGB BLD-MCNC: 16.1 G/DL (ref 13–17.7)
IMM GRANULOCYTES # BLD AUTO: 0.09 10*3/MM3 (ref 0–0.05)
IMM GRANULOCYTES NFR BLD AUTO: 0.8 % (ref 0–0.5)
LYMPHOCYTES # BLD AUTO: 1.41 10*3/MM3 (ref 0.7–3.1)
LYMPHOCYTES NFR BLD AUTO: 12.9 % (ref 19.6–45.3)
MCH RBC QN AUTO: 28.2 PG (ref 26.6–33)
MCHC RBC AUTO-ENTMCNC: 32.5 G/DL (ref 31.5–35.7)
MCV RBC AUTO: 86.8 FL (ref 79–97)
MONOCYTES # BLD AUTO: 1.07 10*3/MM3 (ref 0.1–0.9)
MONOCYTES NFR BLD AUTO: 9.8 % (ref 5–12)
NEUTROPHILS NFR BLD AUTO: 75.6 % (ref 42.7–76)
NEUTROPHILS NFR BLD AUTO: 8.24 10*3/MM3 (ref 1.7–7)
NRBC BLD AUTO-RTO: 0 /100 WBC (ref 0–0.2)
PLATELET # BLD AUTO: 320 10*3/MM3 (ref 140–450)
PMV BLD AUTO: 10.7 FL (ref 6–12)
POTASSIUM SERPL-SCNC: 4 MMOL/L (ref 3.5–5.2)
PROT SERPL-MCNC: 6 G/DL (ref 6–8.5)
RBC # BLD AUTO: 5.7 10*6/MM3 (ref 4.14–5.8)
SODIUM SERPL-SCNC: 135 MMOL/L (ref 136–145)
WBC NRBC COR # BLD AUTO: 10.91 10*3/MM3 (ref 3.4–10.8)

## 2025-04-27 PROCEDURE — 63710000001 PREDNISONE PER 1 MG: Performed by: INTERNAL MEDICINE

## 2025-04-27 PROCEDURE — 85025 COMPLETE CBC W/AUTO DIFF WBC: CPT | Performed by: INTERNAL MEDICINE

## 2025-04-27 PROCEDURE — 99232 SBSQ HOSP IP/OBS MODERATE 35: CPT | Performed by: STUDENT IN AN ORGANIZED HEALTH CARE EDUCATION/TRAINING PROGRAM

## 2025-04-27 PROCEDURE — 94799 UNLISTED PULMONARY SVC/PX: CPT

## 2025-04-27 PROCEDURE — 80053 COMPREHEN METABOLIC PANEL: CPT | Performed by: INTERNAL MEDICINE

## 2025-04-27 PROCEDURE — 94664 DEMO&/EVAL PT USE INHALER: CPT

## 2025-04-27 RX ADMIN — BUDESONIDE 0.5 MG: 0.5 SUSPENSION RESPIRATORY (INHALATION) at 11:49

## 2025-04-27 RX ADMIN — APIXABAN 5 MG: 5 TABLET, FILM COATED ORAL at 20:46

## 2025-04-27 RX ADMIN — HYDROXYZINE HYDROCHLORIDE 25 MG: 25 TABLET, FILM COATED ORAL at 01:01

## 2025-04-27 RX ADMIN — ACETAMINOPHEN 650 MG: 325 TABLET, FILM COATED ORAL at 01:01

## 2025-04-27 RX ADMIN — TAMSULOSIN HYDROCHLORIDE 0.4 MG: 0.4 CAPSULE ORAL at 08:29

## 2025-04-27 RX ADMIN — Medication 12.5 MG: at 20:47

## 2025-04-27 RX ADMIN — AMANTADINE HYDROCHLORIDE 100 MG: 100 CAPSULE ORAL at 20:46

## 2025-04-27 RX ADMIN — Medication 5 MG: at 20:46

## 2025-04-27 RX ADMIN — CARBIDOPA AND LEVODOPA 1 TABLET: 25; 100 TABLET ORAL at 17:09

## 2025-04-27 RX ADMIN — CARBIDOPA AND LEVODOPA 1 TABLET: 25; 100 TABLET ORAL at 12:21

## 2025-04-27 RX ADMIN — CILOSTAZOL 100 MG: 50 TABLET ORAL at 08:29

## 2025-04-27 RX ADMIN — DOCUSATE SODIUM 100 MG: 100 CAPSULE, LIQUID FILLED ORAL at 12:21

## 2025-04-27 RX ADMIN — DOCUSATE SODIUM 100 MG: 100 CAPSULE, LIQUID FILLED ORAL at 20:46

## 2025-04-27 RX ADMIN — PREDNISONE 40 MG: 20 TABLET ORAL at 08:29

## 2025-04-27 RX ADMIN — APIXABAN 5 MG: 5 TABLET, FILM COATED ORAL at 08:29

## 2025-04-27 RX ADMIN — CARBIDOPA AND LEVODOPA 1 TABLET: 25; 100 TABLET ORAL at 08:02

## 2025-04-27 RX ADMIN — POLYETHYLENE GLYCOL 3350 17 G: 17 POWDER, FOR SOLUTION ORAL at 12:20

## 2025-04-27 RX ADMIN — CARBIDOPA AND LEVODOPA 1 TABLET: 50; 200 TABLET, EXTENDED RELEASE ORAL at 08:29

## 2025-04-27 RX ADMIN — PANTOPRAZOLE SODIUM 40 MG: 40 TABLET, DELAYED RELEASE ORAL at 05:06

## 2025-04-27 RX ADMIN — BUDESONIDE 0.5 MG: 0.5 SUSPENSION RESPIRATORY (INHALATION) at 19:08

## 2025-04-27 RX ADMIN — CARBIDOPA AND LEVODOPA 1 TABLET: 25; 100 TABLET ORAL at 20:46

## 2025-04-27 RX ADMIN — AMANTADINE HYDROCHLORIDE 100 MG: 100 CAPSULE ORAL at 08:29

## 2025-04-27 RX ADMIN — SENNOSIDES AND DOCUSATE SODIUM 2 TABLET: 50; 8.6 TABLET ORAL at 20:46

## 2025-04-27 RX ADMIN — Medication 10 ML: at 08:28

## 2025-04-27 RX ADMIN — Medication 10 ML: at 20:47

## 2025-04-27 RX ADMIN — NALOXEGOL OXALATE 25 MG: 25 TABLET, FILM COATED ORAL at 08:02

## 2025-04-27 RX ADMIN — CARBIDOPA AND LEVODOPA 1 TABLET: 50; 200 TABLET, EXTENDED RELEASE ORAL at 20:46

## 2025-04-27 RX ADMIN — CILOSTAZOL 100 MG: 50 TABLET ORAL at 20:46

## 2025-04-27 RX ADMIN — Medication 1 TABLET: at 08:29

## 2025-04-27 NOTE — PROGRESS NOTES
[facility] Medicine Services  INPATIENT PROGRESS NOTE    Date of Admission: 4/22/2025  Primary Care Physician: Ariadne Galloway PA    Subjective     Chief Complaint: shortness of breath      HPI:  Reports feeling better than he has the past several days.  Still with cough.  Reports sputum is clearing up.  No chest pain or shortness of breath.  Had not had a bowel movement in a week, but has since had 2 bowel movements yesterday.  No nausea or vomiting.  Had urinary retention and and Pinto was placed.    Objective      Vitals:  Temp:  [97.5 °F (36.4 °C)-97.6 °F (36.4 °C)] 97.6 °F (36.4 °C)  Heart Rate:  [] 79  Resp:  [16-18] 18  BP: (116-151)/(72-94) 116/72  Flow (L/min) (Oxygen Therapy):  [3] 3    Constitutional: No acute distress, awake, alert  HENT: NCAT, mucous membranes moist  Respiratory: Diminished in the bilateral bases, respiratory effort normal   Cardiovascular: IRIR, HR 80s  Gastrointestinal: Positive bowel sounds, soft, nontender, nondistended  Musculoskeletal: No bilateral ankle edema  Psychiatric: Appropriate affect, cooperative  Neurologic: Alert, masked facies, pill-rolling resting tremor, speech clear  Skin: No rashes      Results from last 7 days   Lab Units 04/27/25  0554 04/25/25  1305 04/24/25  2337   WBC 10*3/mm3 10.91* 8.54 8.50   HEMOGLOBIN g/dL 16.1 14.7 14.4   HEMATOCRIT % 49.5 45.4 44.6   PLATELETS 10*3/mm3 320 237 268     Results from last 7 days   Lab Units 04/27/25  0554 04/26/25  0050 04/25/25  1500 04/25/25  1305 04/24/25  2337 04/23/25  0033 04/22/25  0510 04/22/25  0356   SODIUM mmol/L 135*  --   --  140 138   < >  --  140   POTASSIUM mmol/L 4.0 5.2  --  3.6 3.7   < >  --  3.6   CHLORIDE mmol/L 100  --   --  105 105   < >  --  101   CO2 mmol/L 26.0  --   --  24.0 21.0*   < >  --  25.0   BUN mg/dL 13  --   --  14 18   < >  --  14   CREATININE mg/dL 0.51*  --   --  0.50* 0.53*   < >  --  0.65*   GLUCOSE mg/dL 104*  --   --  172* 185*   < >  --  155*    CALCIUM mg/dL 9.3  --   --  8.5* 8.5*   < >  --  9.0   ALK PHOS U/L 82  --   --  74  --   --   --  92   ALT (SGPT) U/L <5  --   --  <5  --   --   --  <5   AST (SGOT) U/L 13  --   --  17  --   --   --  12   HSTROP T ng/L  --   --  12 14  --   --  17 14    < > = values in this interval not displayed.       Microbiology Results Abnormal       Procedure Component Value - Date/Time    Respiratory Culture - Sputum, Cough [[id number]]  (Abnormal)  (Susceptibility) Collected: 04/22/25 0729    Lab Status: Final result Specimen: Sputum from Cough Updated: 04/24/25 0909     Respiratory Culture Heavy growth (4+) Klebsiella pneumoniae ssp pneumoniae      Scant growth (1+) Normal Respiratory Yolanda     Gram Stain Moderate (3+) WBCs per low power field      Rare (1+) Epithelial cells per low power field      Moderate (3+) Yeast      Few (2+) Mixed bacterial morphotypes seen on Gram Stain    Susceptibility        Klebsiella pneumoniae ssp pneumoniae      MONIQUE      Amoxicillin + Clavulanate Susceptible      Ampicillin Resistant      Ampicillin + Sulbactam Susceptible      Cefazolin (Non Urine) Susceptible      Cefepime Susceptible      Ceftazidime Susceptible      Ceftriaxone Susceptible      Gentamicin Susceptible      Levofloxacin Susceptible      Piperacillin + Tazobactam Susceptible      Tetracycline Susceptible      Trimethoprim + Sulfamethoxazole Susceptible                       Susceptibility Comments       Klebsiella pneumoniae ssp pneumoniae    With the exception of urinary-sourced infections, aminoglycosides should not be used as monotherapy.                     XR Chest 1 View  Result Date: 4/25/2025  Impression: Decreased right basilar airspace opacities, likely due to improving pneumonia/atelectasis. Electronically Signed: Delia Hurtado MD  4/25/2025 2:09 PM EDT  Workstation ID: [id number]    Results for orders placed during the hospital encounter of 04/22/25    Adult Transthoracic Echo Complete W/ Cont if Necessary Per  Protocol    Interpretation Summary    Left ventricular systolic function is normal. Estimated left ventricular EF = 60%    Left ventricular wall thickness is consistent with hypertrophy.    No hemodynamically significant valvular heart disease      I have reviewed the medications.    Assessment/Plan     Assessment/Problem List    Right lower lobe pneumonia    ABRIL (obstructive sleep apnea)    Chronic pain with pain pump in place    Parkinson disease    Coronary artery disease involving native coronary artery of native heart without angina pectoris    Severe malnutrition    Essential (primary) hypertension    Hiatal hernia    Chris's esophagus    Atrial fibrillation    Chronic obstructive pulmonary disease    Aspiration pneumonia      Plan  79 y.o. male with history of Parkinson's disease, Chris's esophagus, COPD (2 L at baseline), GERD, A-fib, ABRIL, BPH, chronic low back pain, who presented to [facility] 4/22/2025 with dyspnea.   Patient reported acute onset shortness of breath and left-sided chest pain that was sharp in nature.  He was placed on BiPAP initially.  He was in atrial fibrillation with RVR and heart rates in the 140s.  He was off and on BiPAP and was ultimately admitted to the ICU with high flow nasal cannula oxygen on 4/22/25. He was moved out of the ICU on 4/24.    This patient's problems and plans were partially entered by my partner and updated as appropriate by me 04/27/25.     Aspiration pneumonia/acute on chronic hypoxemic respiratory failure  COPD with exacerbation  -Sputum with Klebsiella  - Status post course of Rocephin  -Completed course of steroids  -Nebs, airway clearance  -Goal saturations 88 to 92%, weaning down to home oxygen    Atrial fibrillation with RVR, now rate-controlled   -Required IV metoprolol and amiodarone 4/25 and then dig due to hypotension and flushing with amiodarone  - Dose reduced metoprolol twice daily for now  -Continue Eliquis  -Telemetry monitoring    Parkinson's  disease  -Continue Sinemet    Dysphagia  -Diet per speech recommendations.    Constipation  -much improved, likely contributed to urinary retention; bowel regimen    Urinary retention likely related to constipation and acute illness  -Continue Flomax  -Discontinue Pinto likely tomorrow, will work on increasing bowel care today    Hopefully home tomorrow if continued improvement     VTE Prophylaxis:  Pharmacologic & mechanical VTE prophylaxis orders are present.    Expected Discharge  Expected Discharge Date: 4/26/2025; Expected Discharge Time:     Electronically signed by Eve Hall MD, 04/27/25

## 2025-04-27 NOTE — PLAN OF CARE
Problem: Noninvasive Ventilation Acute  Goal: Effective Unassisted Ventilation and Oxygenation  Outcome: Progressing     Problem: Adult Inpatient Plan of Care  Goal: Plan of Care Review  Outcome: Progressing  Goal: Patient-Specific Goal (Individualized)  Outcome: Progressing  Goal: Absence of Hospital-Acquired Illness or Injury  Outcome: Progressing  Intervention: Identify and Manage Fall Risk  Recent Flowsheet Documentation  Taken 4/27/2025 0200 by Gemma Watts RN  Safety Promotion/Fall Prevention:   safety round/check completed   room organization consistent   nonskid shoes/slippers when out of bed   fall prevention program maintained   clutter free environment maintained   assistive device/personal items within reach   activity supervised  Taken 4/27/2025 0000 by Gemma Watts RN  Safety Promotion/Fall Prevention:   safety round/check completed   room organization consistent   nonskid shoes/slippers when out of bed   fall prevention program maintained   clutter free environment maintained   assistive device/personal items within reach   activity supervised  Taken 4/26/2025 2200 by Gemma Watts RN  Safety Promotion/Fall Prevention:   safety round/check completed   room organization consistent   nonskid shoes/slippers when out of bed   fall prevention program maintained   clutter free environment maintained   assistive device/personal items within reach   activity supervised  Taken 4/26/2025 2005 by Gemma Watts RN  Safety Promotion/Fall Prevention:   safety round/check completed   room organization consistent   nonskid shoes/slippers when out of bed   fall prevention program maintained   clutter free environment maintained   assistive device/personal items within reach   activity supervised  Intervention: Prevent Skin Injury  Recent Flowsheet Documentation  Taken 4/27/2025 0200 by Gemma Watts, RN  Skin Protection:   incontinence pads utilized   silicone foam dressing in place   pulse oximeter probe site  ----- Message from Jose Montanez MD sent at 11/9/2017 11:39 AM CST -----  Blood counts, liver and kidney function tests are good. Okay to continue methotrexate.   changed   transparent dressing maintained  Taken 4/27/2025 0000 by Gemma Watts RN  Skin Protection:   incontinence pads utilized   silicone foam dressing in place   transparent dressing maintained  Taken 4/26/2025 2200 by Gemma Watts RN  Body Position:   turned   right  Skin Protection:   incontinence pads utilized   silicone foam dressing in place   transparent dressing maintained  Taken 4/26/2025 2005 by Gemma Watts RN  Skin Protection:   incontinence pads utilized   silicone foam dressing in place   transparent dressing maintained  Intervention: Prevent and Manage VTE (Venous Thromboembolism) Risk  Recent Flowsheet Documentation  Taken 4/26/2025 2005 by Gemma Watts RN  VTE Prevention/Management: SCDs (sequential compression devices) off  Intervention: Prevent Infection  Recent Flowsheet Documentation  Taken 4/27/2025 0200 by Gemma Watts RN  Infection Prevention:   single patient room provided   rest/sleep promoted   hand hygiene promoted   environmental surveillance performed  Taken 4/27/2025 0000 by Gemma Watts RN  Infection Prevention:   single patient room provided   rest/sleep promoted   hand hygiene promoted   environmental surveillance performed  Taken 4/26/2025 2200 by Gemma Watts RN  Infection Prevention:   single patient room provided   rest/sleep promoted   environmental surveillance performed   hand hygiene promoted  Taken 4/26/2025 2005 by Gemma Watts RN  Infection Prevention:   single patient room provided   rest/sleep promoted   hand hygiene promoted   environmental surveillance performed  Goal: Optimal Comfort and Wellbeing  Outcome: Progressing  Intervention: Monitor Pain and Promote Comfort  Recent Flowsheet Documentation  Taken 4/27/2025 0101 by Gemma Watts RN  Pain Management Interventions: pain medication given  Goal: Readiness for Transition of Care  Outcome: Progressing     Problem: Skin Injury Risk Increased  Goal: Skin Health and Integrity  Outcome:  Progressing  Intervention: Optimize Skin Protection  Recent Flowsheet Documentation  Taken 4/27/2025 0200 by Gemma Watts, RN  Activity Management: activity encouraged  Pressure Reduction Techniques:   frequent weight shift encouraged   weight shift assistance provided   heels elevated off bed   pressure points protected  Pressure Reduction Devices:   pressure-redistributing mattress utilized   heel offloading device utilized   positioning supports utilized   foam padding utilized  Skin Protection:   incontinence pads utilized   silicone foam dressing in place   pulse oximeter probe site changed   transparent dressing maintained  Taken 4/27/2025 0000 by Gemma Watts RN  Activity Management: activity encouraged  Pressure Reduction Techniques:   frequent weight shift encouraged   heels elevated off bed   positioned off wounds   pressure points protected   weight shift assistance provided  Pressure Reduction Devices:   pressure-redistributing mattress utilized   positioning supports utilized   heel offloading device utilized   foam padding utilized  Skin Protection:   incontinence pads utilized   silicone foam dressing in place   transparent dressing maintained  Taken 4/26/2025 2200 by Gemma Watts, RN  Activity Management: activity encouraged  Pressure Reduction Techniques:   frequent weight shift encouraged   heels elevated off bed   pressure points protected   weight shift assistance provided  Head of Bed (HOB) Positioning: HOB elevated  Pressure Reduction Devices:   pressure-redistributing mattress utilized   positioning supports utilized   heel offloading device utilized   foam padding utilized  Skin Protection:   incontinence pads utilized   silicone foam dressing in place   transparent dressing maintained  Taken 4/26/2025 2005 by Gemma Watts, RN  Activity Management: activity encouraged  Pressure Reduction Techniques:   frequent weight shift encouraged   heels elevated off bed   positioned off wounds    pressure points protected   weight shift assistance provided  Pressure Reduction Devices:   pressure-redistributing mattress utilized   positioning supports utilized   foam padding utilized   heel offloading device utilized  Skin Protection:   incontinence pads utilized   silicone foam dressing in place   transparent dressing maintained     Problem: Comorbidity Management  Goal: Maintenance of COPD Symptom Control  Outcome: Progressing     Problem: Sepsis/Septic Shock  Goal: Optimal Coping  Outcome: Progressing  Goal: Absence of Bleeding  Outcome: Progressing  Goal: Blood Glucose Level Within Target Range  Outcome: Progressing  Goal: Absence of Infection Signs and Symptoms  Outcome: Progressing  Intervention: Initiate Sepsis Management  Recent Flowsheet Documentation  Taken 4/27/2025 0200 by Gemma Watts RN  Infection Prevention:   single patient room provided   rest/sleep promoted   hand hygiene promoted   environmental surveillance performed  Taken 4/27/2025 0000 by Gemma Watts RN  Infection Prevention:   single patient room provided   rest/sleep promoted   hand hygiene promoted   environmental surveillance performed  Taken 4/26/2025 2200 by Gemma Watts RN  Infection Prevention:   single patient room provided   rest/sleep promoted   environmental surveillance performed   hand hygiene promoted  Taken 4/26/2025 2005 by Gemma Watts RN  Infection Prevention:   single patient room provided   rest/sleep promoted   hand hygiene promoted   environmental surveillance performed  Intervention: Promote Recovery  Recent Flowsheet Documentation  Taken 4/27/2025 0200 by Gemma Watts RN  Activity Management: activity encouraged  Taken 4/27/2025 0000 by Gemma Watts RN  Activity Management: activity encouraged  Taken 4/26/2025 2200 by Gemma Watts RN  Activity Management: activity encouraged  Taken 4/26/2025 2005 by Gemma Watts RN  Activity Management: activity encouraged  Goal: Optimal Nutrition  Delivery  Outcome: Progressing     Problem: Pneumonia  Goal: Absence of Infection Signs and Symptoms  Outcome: Progressing  Goal: Effective Oxygenation and Ventilation  Outcome: Progressing  Intervention: Promote Airway Secretion Clearance  Recent Flowsheet Documentation  Taken 4/27/2025 0200 by Gemma Watts RN  Activity Management: activity encouraged  Taken 4/27/2025 0000 by Gemma Watts RN  Activity Management: activity encouraged  Taken 4/26/2025 2200 by Gemma Watts RN  Activity Management: activity encouraged  Taken 4/26/2025 2005 by Gemma Watts RN  Activity Management: activity encouraged  Intervention: Optimize Oxygenation and Ventilation  Recent Flowsheet Documentation  Taken 4/26/2025 2200 by Gemma Watts RN  Head of Bed (HOB) Positioning: HOB elevated     Problem: Fall Injury Risk  Goal: Absence of Fall and Fall-Related Injury  Outcome: Progressing  Intervention: Promote Injury-Free Environment  Recent Flowsheet Documentation  Taken 4/27/2025 0200 by Gemma Watts RN  Safety Promotion/Fall Prevention:   safety round/check completed   room organization consistent   nonskid shoes/slippers when out of bed   fall prevention program maintained   clutter free environment maintained   assistive device/personal items within reach   activity supervised  Taken 4/27/2025 0000 by Gemma Watts, RN  Safety Promotion/Fall Prevention:   safety round/check completed   room organization consistent   nonskid shoes/slippers when out of bed   fall prevention program maintained   clutter free environment maintained   assistive device/personal items within reach   activity supervised  Taken 4/26/2025 2200 by Gemma Watts, RN  Safety Promotion/Fall Prevention:   safety round/check completed   room organization consistent   nonskid shoes/slippers when out of bed   fall prevention program maintained   clutter free environment maintained   assistive device/personal items within reach   activity supervised  Taken  4/26/2025 2005 by Gemma Watts, RN  Safety Promotion/Fall Prevention:   safety round/check completed   room organization consistent   nonskid shoes/slippers when out of bed   fall prevention program maintained   clutter free environment maintained   assistive device/personal items within reach   activity supervised   Goal Outcome Evaluation:

## 2025-04-28 ENCOUNTER — READMISSION MANAGEMENT (OUTPATIENT)
Dept: CALL CENTER | Facility: HOSPITAL | Age: 80
End: 2025-04-28
Payer: COMMERCIAL

## 2025-04-28 VITALS
OXYGEN SATURATION: 94 % | BODY MASS INDEX: 18.34 KG/M2 | HEART RATE: 62 BPM | HEIGHT: 68 IN | SYSTOLIC BLOOD PRESSURE: 117 MMHG | RESPIRATION RATE: 18 BRPM | DIASTOLIC BLOOD PRESSURE: 71 MMHG | WEIGHT: 121 LBS | TEMPERATURE: 98 F

## 2025-04-28 PROCEDURE — 94799 UNLISTED PULMONARY SVC/PX: CPT

## 2025-04-28 PROCEDURE — 99232 SBSQ HOSP IP/OBS MODERATE 35: CPT | Performed by: INTERNAL MEDICINE

## 2025-04-28 PROCEDURE — 97110 THERAPEUTIC EXERCISES: CPT

## 2025-04-28 PROCEDURE — 93010 ELECTROCARDIOGRAM REPORT: CPT | Performed by: INTERNAL MEDICINE

## 2025-04-28 PROCEDURE — 97530 THERAPEUTIC ACTIVITIES: CPT

## 2025-04-28 PROCEDURE — 93005 ELECTROCARDIOGRAM TRACING: CPT | Performed by: INTERNAL MEDICINE

## 2025-04-28 PROCEDURE — 99239 HOSP IP/OBS DSCHRG MGMT >30: CPT | Performed by: STUDENT IN AN ORGANIZED HEALTH CARE EDUCATION/TRAINING PROGRAM

## 2025-04-28 PROCEDURE — 97116 GAIT TRAINING THERAPY: CPT

## 2025-04-28 PROCEDURE — 94761 N-INVAS EAR/PLS OXIMETRY MLT: CPT

## 2025-04-28 RX ORDER — AMIODARONE HYDROCHLORIDE 200 MG/1
200 TABLET ORAL
Qty: 30 TABLET | Refills: 0 | Status: SHIPPED | OUTPATIENT
Start: 2025-04-28

## 2025-04-28 RX ORDER — AMIODARONE HYDROCHLORIDE 200 MG/1
200 TABLET ORAL
Status: DISCONTINUED | OUTPATIENT
Start: 2025-04-28 | End: 2025-04-28 | Stop reason: HOSPADM

## 2025-04-28 RX ADMIN — Medication 1 TABLET: at 09:07

## 2025-04-28 RX ADMIN — PANTOPRAZOLE SODIUM 40 MG: 40 TABLET, DELAYED RELEASE ORAL at 05:07

## 2025-04-28 RX ADMIN — CILOSTAZOL 100 MG: 50 TABLET ORAL at 09:07

## 2025-04-28 RX ADMIN — CARBIDOPA AND LEVODOPA 1 TABLET: 50; 200 TABLET, EXTENDED RELEASE ORAL at 09:07

## 2025-04-28 RX ADMIN — DOCUSATE SODIUM 100 MG: 100 CAPSULE, LIQUID FILLED ORAL at 09:07

## 2025-04-28 RX ADMIN — POLYETHYLENE GLYCOL 3350 17 G: 17 POWDER, FOR SOLUTION ORAL at 09:07

## 2025-04-28 RX ADMIN — Medication 10 ML: at 09:08

## 2025-04-28 RX ADMIN — LIDOCAINE 1 PATCH: 4 PATCH TOPICAL at 09:07

## 2025-04-28 RX ADMIN — BUDESONIDE 0.5 MG: 0.5 SUSPENSION RESPIRATORY (INHALATION) at 08:44

## 2025-04-28 RX ADMIN — AMIODARONE HYDROCHLORIDE 200 MG: 200 TABLET ORAL at 16:12

## 2025-04-28 RX ADMIN — CARBIDOPA AND LEVODOPA 1 TABLET: 25; 100 TABLET ORAL at 16:12

## 2025-04-28 RX ADMIN — AMANTADINE HYDROCHLORIDE 100 MG: 100 CAPSULE ORAL at 09:07

## 2025-04-28 RX ADMIN — CARBIDOPA AND LEVODOPA 1 TABLET: 25; 100 TABLET ORAL at 13:56

## 2025-04-28 RX ADMIN — Medication 12.5 MG: at 09:07

## 2025-04-28 RX ADMIN — CARBIDOPA AND LEVODOPA 1 TABLET: 25; 100 TABLET ORAL at 09:07

## 2025-04-28 RX ADMIN — SENNOSIDES AND DOCUSATE SODIUM 2 TABLET: 50; 8.6 TABLET ORAL at 09:07

## 2025-04-28 RX ADMIN — TAMSULOSIN HYDROCHLORIDE 0.4 MG: 0.4 CAPSULE ORAL at 09:07

## 2025-04-28 RX ADMIN — APIXABAN 5 MG: 5 TABLET, FILM COATED ORAL at 09:07

## 2025-04-28 RX ADMIN — NALOXEGOL OXALATE 25 MG: 25 TABLET, FILM COATED ORAL at 09:07

## 2025-04-28 NOTE — PROGRESS NOTES
"[facility] Progress Note     LOS: 6 days   Patient Care Team:  Ariadne Galloway PA as PCP - General (Physician Assistant)  Zayda Beach MD as Consulting Physician (Infectious Diseases)  Von Kilpatrick MD as Consulting Physician (Cardiology)  Alf Edwards MD as Consulting Physician (Pulmonary Disease)  PCP:  Ariadne Galloway PA    Chief Complaint: Atrial fibrillation    Subjective: Patient is currently in sinus rhythm with frequent PACs.  He states he feels better than Friday.  Has a catheter placed for urinary retention.  Also remains on supplemental oxygen.  At home he states he uses at night but does not always use it during the day.  Feeling a little weak and feels like he would benefit from another day in the hospital      Review of Systems:   All systems have been reviewed and are negative with the exception of those mentioned above.      Objective:    Vital Sign Min/Max for last 24 hours  Temp  Min: 95.4 °F (35.2 °C)  Max: 98.8 °F (37.1 °C)   BP  Min: 101/61  Max: 132/78   Pulse  Min: 62  Max: 118   Resp  Min: 16  Max: 18   SpO2  Min: 92 %  Max: 100 %   No data recorded   Weight  Min: 54.9 kg (121 lb)  Max: 54.9 kg (121 lb)     Flowsheet Rows      Flowsheet Row First Filed Value   Admission Height 172.7 cm (68\") Documented at 04/22/2025 0351   Admission Weight 51.3 kg (113 lb) Documented at 04/22/2025 0351            Telemetry: Sinus rhythm with PACs and PVCs      Intake/Output Summary (Last 24 hours) at 4/28/2025 1417  Last data filed at 4/28/2025 1325  Gross per 24 hour   Intake 720 ml   Output 850 ml   Net -130 ml     Intake & Output (last 3 days)         04/25 0701 04/26 0700 04/26 0701 04/27 0700 04/27 0701 04/28 0700 04/28 0701 04/29 0700    P.O.   480 240    Total Intake(mL/kg)   480 (8.7) 240 (4.4)    Urine (mL/kg/hr) 2100 (1.7) 1700 (1.4) 450 (0.3) 250 (0.8)    Stool  0      Total Output 2100 1700 450 250    Net -2100 " -1700 +30 -10            Stool Unmeasured Occurrence  2 x               Physical Exam:  Constitutional:       Appearance: Not in distress.   Pulmonary:      Effort: Pulmonary effort is normal.      Comments: Coarse breath sounds bilaterally  Cardiovascular:      Occasional ectopic beats. Regular rhythm.      Murmurs: There is no murmur.   Edema:     Peripheral edema absent.          LABS/DIAGNOSTIC DATA:  Results from last 7 days   Lab Units 04/27/25  0554 04/25/25  1305 04/24/25  2337   WBC 10*3/mm3 10.91* 8.54 8.50   HEMOGLOBIN g/dL 16.1 14.7 14.4   HEMATOCRIT % 49.5 45.4 44.6   PLATELETS 10*3/mm3 320 237 268     Lab Results   Lab Value Date/Time    TROPONINT 12 04/25/2025 1500    TROPONINT 14 04/25/2025 1305    TROPONINT 17 04/22/2025 0510    TROPONINT 14 04/22/2025 0356    TROPONINT 8 02/18/2025 1419    TROPONINT 8 02/18/2025 1251    TROPONINT 11 02/15/2025 0743    TROPONINT 10 02/15/2025 0618    TROPONINT 16 11/17/2024 1129    TROPONINT 17 05/16/2024 1240    TROPONINT 15 05/16/2024 1016    TROPONINT 30 (H) 10/20/2023 2035    TROPONINT 27 (H) 10/20/2023 1542    TROPONINT 23 (H) 04/15/2023 1511    TROPONINT 22 (H) 04/15/2023 1207    TROPONINT <0.010 08/20/2019 1755    TROPONINT <0.010 07/28/2019 1715         Results from last 7 days   Lab Units 04/27/25  0554 04/26/25  0050 04/25/25  1305 04/24/25  2337 04/23/25  0033 04/22/25  0356   SODIUM mmol/L 135*  --  140 138   < > 140   POTASSIUM mmol/L 4.0 5.2 3.6 3.7   < > 3.6   CHLORIDE mmol/L 100  --  105 105   < > 101   CO2 mmol/L 26.0  --  24.0 21.0*   < > 25.0   BUN mg/dL 13  --  14 18   < > 14   CREATININE mg/dL 0.51*  --  0.50* 0.53*   < > 0.65*   CALCIUM mg/dL 9.3  --  8.5* 8.5*   < > 9.0   BILIRUBIN mg/dL 0.6  --  0.5  --   --  1.3*   ALK PHOS U/L 82  --  74  --   --  92   ALT (SGPT) U/L <5  --  <5  --   --  <5   AST (SGOT) U/L 13  --  17  --   --  12   GLUCOSE mg/dL 104*  --  172* 185*   < > 155*    < > = values in this interval not displayed.         Results  from last 7 days   Lab Units 04/25/25  1500   CHOLESTEROL mg/dL 130   TRIGLYCERIDES mg/dL 95   HDL CHOL mg/dL 52   LDL CHOL mg/dL 60     Results from last 7 days   Lab Units 04/25/25  1500   TSH uIU/mL 0.372           Medication Review:   amantadine, 100 mg, Oral, BID  amiodarone, 200 mg, Oral, Q24H  apixaban, 5 mg, Oral, Q12H  budesonide, 0.5 mg, Nebulization, BID - RT  carbidopa-levodopa, 1 tablet, Oral, 4x Daily  carbidopa-levodopa CR, 1 tablet, Oral, BID  cilostazol, 100 mg, Oral, BID  docusate sodium, 100 mg, Oral, BID  Lidocaine, 1 patch, Transdermal, Q24H  [Held by provider] metoprolol succinate XL, 50 mg, Oral, Q24H  metoprolol tartrate, 12.5 mg, Oral, Q12H  multivitamin with minerals, 1 tablet, Oral, Daily  Naloxegol Oxalate, 25 mg, Oral, QAM  pantoprazole, 40 mg, Oral, Q AM  polyethylene glycol, 17 g, Oral, Daily  [Held by provider] QUEtiapine, 25 mg, Oral, Q PM  senna-docusate sodium, 2 tablet, Oral, BID  sodium chloride, 10 mL, Intravenous, Q12H  tamsulosin, 0.4 mg, Oral, Daily               Right lower lobe pneumonia    ABRIL (obstructive sleep apnea)    Chronic pain with pain pump in place    Parkinson disease    Coronary artery disease involving native coronary artery of native heart without angina pectoris    Severe malnutrition    Essential (primary) hypertension    Hiatal hernia    Chris's esophagus    Atrial fibrillation    Chronic obstructive pulmonary disease    Aspiration pneumonia    Paroxysmal A-fib RVR  CAD  Reported chest pain while Afib RVR and hypotensive episodes, was reproducible with palpitation   EF 56 to 60%, pending updated echocardiogram  [facility] 2023 with nonobstructive CAD  Troponin T 14 --> 12  PAD  Continue cilostazol, Eliquis, statin  Normal right KAMRYN, mild reduction of left KAMRYN   Follows with podiatrist regularly  COPD with 2l NC at baseline   Pneumonia in respiratory distress with hypoxia   Off and on BiPAP   Klebsiella pneumonia treating with rocephin and 7-10 day of steroids          PLAN:     Patient has transition to sinus rhythm.  Will keep on a low-dose of amiodarone 200 mg daily.  Continue Eliquis 5 mg twice daily  Echocardiogram with EF 60%, normal valve function  BP within normal limits      From a cardiac standpoint he is improved..  Discharge planning per hospital medicine team.        Von Kilpatrick MD [facility]  04/28/25

## 2025-04-28 NOTE — PLAN OF CARE
Problem: Noninvasive Ventilation Acute  Goal: Effective Unassisted Ventilation and Oxygenation  Outcome: Progressing     Problem: Adult Inpatient Plan of Care  Goal: Plan of Care Review  Outcome: Progressing  Goal: Patient-Specific Goal (Individualized)  Outcome: Progressing  Goal: Absence of Hospital-Acquired Illness or Injury  Outcome: Progressing  Intervention: Identify and Manage Fall Risk  Recent Flowsheet Documentation  Taken 4/28/2025 0238 by Gemma Watts RN  Safety Promotion/Fall Prevention:   safety round/check completed   room organization consistent   nonskid shoes/slippers when out of bed   fall prevention program maintained   clutter free environment maintained   assistive device/personal items within reach   activity supervised  Taken 4/28/2025 0028 by Gemma Watts RN  Safety Promotion/Fall Prevention:   safety round/check completed   room organization consistent   nonskid shoes/slippers when out of bed   fall prevention program maintained   clutter free environment maintained   assistive device/personal items within reach   activity supervised  Taken 4/27/2025 2200 by Gemma Watts RN  Safety Promotion/Fall Prevention:   safety round/check completed   room organization consistent   nonskid shoes/slippers when out of bed   fall prevention program maintained   clutter free environment maintained   assistive device/personal items within reach   activity supervised  Taken 4/27/2025 2045 by Gemma Watts RN  Safety Promotion/Fall Prevention:   safety round/check completed   room organization consistent   nonskid shoes/slippers when out of bed   fall prevention program maintained   clutter free environment maintained   assistive device/personal items within reach   activity supervised  Intervention: Prevent Skin Injury  Recent Flowsheet Documentation  Taken 4/28/2025 0238 by Gemma Watts, RN  Body Position:   turned   left  Skin Protection:   incontinence pads utilized   silicone foam dressing in  place   transparent dressing maintained  Taken 4/28/2025 0028 by Gemma Watts RN  Body Position:   turned   supine  Skin Protection:   incontinence pads utilized   silicone foam dressing in place   transparent dressing maintained  Taken 4/27/2025 2200 by Gemma Watts RN  Body Position:   turned   left  Skin Protection:   incontinence pads utilized   silicone foam dressing in place   transparent dressing maintained  Taken 4/27/2025 2045 by Gemma Watts RN  Skin Protection:   incontinence pads utilized   pulse oximeter probe site changed   silicone foam dressing in place   transparent dressing maintained  Intervention: Prevent and Manage VTE (Venous Thromboembolism) Risk  Recent Flowsheet Documentation  Taken 4/27/2025 2045 by Gemma Watts RN  VTE Prevention/Management: SCDs (sequential compression devices) off  Intervention: Prevent Infection  Recent Flowsheet Documentation  Taken 4/28/2025 0238 by Gemma Watts RN  Infection Prevention:   single patient room provided   rest/sleep promoted   hand hygiene promoted   environmental surveillance performed  Taken 4/28/2025 0028 by Gemma Watts RN  Infection Prevention:   single patient room provided   rest/sleep promoted   hand hygiene promoted   environmental surveillance performed  Taken 4/27/2025 2200 by Gemma Watts RN  Infection Prevention:   single patient room provided   rest/sleep promoted   hand hygiene promoted   environmental surveillance performed  Taken 4/27/2025 2045 by Gemma Watts RN  Infection Prevention:   single patient room provided   rest/sleep promoted   hand hygiene promoted   environmental surveillance performed  Goal: Optimal Comfort and Wellbeing  Outcome: Progressing  Goal: Readiness for Transition of Care  Outcome: Progressing     Problem: Skin Injury Risk Increased  Goal: Skin Health and Integrity  Outcome: Progressing  Intervention: Optimize Skin Protection  Recent Flowsheet Documentation  Taken 4/28/2025 0238 by Gemma Watts  RN  Activity Management: activity encouraged  Pressure Reduction Techniques:   frequent weight shift encouraged   weight shift assistance provided   heels elevated off bed   positioned off wounds   pressure points protected  Head of Bed (HOB) Positioning: HOB at 30-45 degrees  Pressure Reduction Devices:   pressure-redistributing mattress utilized   positioning supports utilized   heel offloading device utilized   foam padding utilized  Skin Protection:   incontinence pads utilized   silicone foam dressing in place   transparent dressing maintained  Taken 4/28/2025 0028 by Gemma Watts RN  Activity Management: activity encouraged  Pressure Reduction Techniques:   frequent weight shift encouraged   weight shift assistance provided   heels elevated off bed   positioned off wounds   pressure points protected  Head of Bed (HOB) Positioning: HOB at 30-45 degrees  Pressure Reduction Devices:   pressure-redistributing mattress utilized   positioning supports utilized   heel offloading device utilized   foam padding utilized   chair cushion utilized  Skin Protection:   incontinence pads utilized   silicone foam dressing in place   transparent dressing maintained  Taken 4/27/2025 2200 by Gemma Watts, RN  Activity Management: activity encouraged  Pressure Reduction Techniques:   frequent weight shift encouraged   weight shift assistance provided   heels elevated off bed   pressure points protected  Head of Bed (HOB) Positioning: HOB elevated  Pressure Reduction Devices:   pressure-redistributing mattress utilized   positioning supports utilized   heel offloading device utilized   foam padding utilized  Skin Protection:   incontinence pads utilized   silicone foam dressing in place   transparent dressing maintained  Taken 4/27/2025 2045 by Gemma Watts RN  Activity Management: activity encouraged  Pressure Reduction Techniques:   frequent weight shift encouraged   weight shift assistance provided   heels elevated off  bed   positioned off wounds   pressure points protected  Pressure Reduction Devices:   pressure-redistributing mattress utilized   positioning supports utilized   heel offloading device utilized   foam padding utilized  Skin Protection:   incontinence pads utilized   pulse oximeter probe site changed   silicone foam dressing in place   transparent dressing maintained     Problem: Comorbidity Management  Goal: Maintenance of COPD Symptom Control  Outcome: Progressing  Intervention: Maintain COPD (Chronic Obstructive Pulmonary Disease) Symptom Control  Recent Flowsheet Documentation  Taken 4/27/2025 2045 by Gemma Watts RN  Medication Review/Management: medications reviewed     Problem: Sepsis/Septic Shock  Goal: Optimal Coping  Outcome: Progressing  Goal: Absence of Bleeding  Outcome: Progressing  Goal: Blood Glucose Level Within Target Range  Outcome: Progressing  Goal: Absence of Infection Signs and Symptoms  Outcome: Progressing  Intervention: Initiate Sepsis Management  Recent Flowsheet Documentation  Taken 4/28/2025 0238 by Gemma Watts RN  Infection Prevention:   single patient room provided   rest/sleep promoted   hand hygiene promoted   environmental surveillance performed  Taken 4/28/2025 0028 by Gemma Watts RN  Infection Prevention:   single patient room provided   rest/sleep promoted   hand hygiene promoted   environmental surveillance performed  Taken 4/27/2025 2200 by Gemma Watts RN  Infection Prevention:   single patient room provided   rest/sleep promoted   hand hygiene promoted   environmental surveillance performed  Taken 4/27/2025 2045 by Gemma Watts RN  Infection Prevention:   single patient room provided   rest/sleep promoted   hand hygiene promoted   environmental surveillance performed  Intervention: Promote Recovery  Recent Flowsheet Documentation  Taken 4/28/2025 0238 by Gemma Watts RN  Activity Management: activity encouraged  Taken 4/28/2025 0028 by Gemma Watts  RN  Activity Management: activity encouraged  Taken 4/27/2025 2200 by Gemma Watts RN  Activity Management: activity encouraged  Taken 4/27/2025 2045 by Gemma Watts RN  Activity Management: activity encouraged  Goal: Optimal Nutrition Delivery  Outcome: Progressing  Intervention: Optimize Nutrition Delivery  Recent Flowsheet Documentation  Taken 4/27/2025 2045 by Gemma Watts RN  Nutrition Support Management: (boost) other (see comments)     Problem: Pneumonia  Goal: Absence of Infection Signs and Symptoms  Outcome: Progressing  Goal: Effective Oxygenation and Ventilation  Outcome: Progressing  Intervention: Promote Airway Secretion Clearance  Recent Flowsheet Documentation  Taken 4/28/2025 0238 by Gemma Watts RN  Activity Management: activity encouraged  Taken 4/28/2025 0028 by Gemma Watts RN  Activity Management: activity encouraged  Taken 4/27/2025 2200 by Gemma Watts RN  Activity Management: activity encouraged  Taken 4/27/2025 2045 by Gemma Watts RN  Activity Management: activity encouraged  Intervention: Optimize Oxygenation and Ventilation  Recent Flowsheet Documentation  Taken 4/28/2025 0238 by Gemma Watts RN  Head of Bed (HOB) Positioning: HOB at 30-45 degrees  Taken 4/28/2025 0028 by Gemma Watts RN  Head of Bed (HOB) Positioning: HOB at 30-45 degrees  Taken 4/27/2025 2200 by Gemma Watts RN  Head of Bed (HOB) Positioning: HOB elevated     Problem: Fall Injury Risk  Goal: Absence of Fall and Fall-Related Injury  Outcome: Progressing  Intervention: Identify and Manage Contributors  Recent Flowsheet Documentation  Taken 4/27/2025 2045 by Gemma Watts RN  Medication Review/Management: medications reviewed  Intervention: Promote Injury-Free Environment  Recent Flowsheet Documentation  Taken 4/28/2025 0238 by Gemma Watts RN  Safety Promotion/Fall Prevention:   safety round/check completed   room organization consistent   nonskid shoes/slippers when out of bed   fall prevention  program maintained   clutter free environment maintained   assistive device/personal items within reach   activity supervised  Taken 4/28/2025 0028 by Gemma Watts, RN  Safety Promotion/Fall Prevention:   safety round/check completed   room organization consistent   nonskid shoes/slippers when out of bed   fall prevention program maintained   clutter free environment maintained   assistive device/personal items within reach   activity supervised  Taken 4/27/2025 2200 by Gemma Watts, RN  Safety Promotion/Fall Prevention:   safety round/check completed   room organization consistent   nonskid shoes/slippers when out of bed   fall prevention program maintained   clutter free environment maintained   assistive device/personal items within reach   activity supervised  Taken 4/27/2025 2045 by Gemma Watts, RN  Safety Promotion/Fall Prevention:   safety round/check completed   room organization consistent   nonskid shoes/slippers when out of bed   fall prevention program maintained   clutter free environment maintained   assistive device/personal items within reach   activity supervised   Goal Outcome Evaluation:

## 2025-04-28 NOTE — PLAN OF CARE
Goal Outcome Evaluation:  Plan of Care Reviewed With: patient        Progress: improving  Outcome Evaluation: INCREASED DISTANCE AMBULATED  FEET WITH R WALKER AND CGA.  ABLE TO IMPROVE QUALITY OF GAIT WITH CUES. NO OVERT LOB WITH USE OF R WALKER. VITALS STABLE EXCEPT UNABLE TO GET O2 SENSOR READING DESPITE NEW SENSOR AT [place] ( AS UPON ARRIVAL).   NSG NOTIFIED AND TO ADDRESS. PT MILDLY SOA BUT ABLE TO CONVERSE AND IMPROVED WITH SEATED REST BREAK. RECOMMEND HOME WITH ASSIST AND HHPT. PT REPORTS HE WILL HAVE HELP FROM HIS WIFE AND SONS.    Anticipated Discharge Disposition (PT): home with assist, home with home health

## 2025-04-28 NOTE — DISCHARGE SUMMARY
[facility] Medicine Services  DISCHARGE SUMMARY    Patient Name: Flaco Roque II  : 1945  MRN: [id number]    Date of Admission: 2025  3:45 AM  Date of Discharge:  2025  Primary Care Physician: Ariadne Galloway PA    Consults       Date and Time Order Name Status Description    2025  1:03 PM Inpatient Cardiology Consult Completed             Hospital Course     Presenting Problem: right lower lobe pneumonia    Active Hospital Problems    Diagnosis  POA    **Right lower lobe pneumonia [J18.9]  Yes    Aspiration pneumonia [J69.0]  Yes    Severe malnutrition [E43]  Yes    Hiatal hernia [K44.9]  Yes    Coronary artery disease involving native coronary artery of native heart without angina pectoris [I25.10]  Yes    Atrial fibrillation [I48.91]  Yes    Essential (primary) hypertension [I10]  Yes    Chris's esophagus [K22.70]  Yes    Parkinson disease [G20.A1]  Yes    Chronic pain with pain pump in place [G89.29]  Yes    ABRIL (obstructive sleep apnea) [G47.33]  Yes    Chronic obstructive pulmonary disease [J44.9]  Yes      Resolved Hospital Problems   No resolved problems to display.          Hospital Course:  Flaco Roque II is a 79 y.o. male  with history of Parkinson's disease, Chris's esophagus, COPD (2 L at baseline), GERD, A-fib, ABRIL, BPH, chronic low back pain, who presented to [facility] 2025 with dyspnea. Patient reported acute onset shortness of breath and left-sided chest pain that was sharp in nature.  He was placed on BiPAP initially.  He was in atrial fibrillation with RVR and heart rates in the 140s.  He was off and on BiPAP and was ultimately admitted to the ICU with high flow nasal cannula oxygen on 25. He was moved out of the ICU on .     This patient's problems and plans were partially entered by my partner and updated as appropriate by me 25.     Aspiration pneumonia/acute on chronic hypoxemic respiratory failure  COPD with  exacerbation  -Sputum with Klebsiella  - Status post course of Rocephin  -Completed course of steroids  -Goal saturations 88 to 92%, weaned down to home oxygen     Atrial fibrillation with RVR, now rate-controlled   -Required IV metoprolol and amiodarone 4/25 and then dig due to hypotension and flushing with amiodarone  -Continue Eliquis, metoprolol, amiodarone      Parkinson's disease  -Continue Sinemet  -Evaluated again by PT, who recommended home with home health     Dysphagia  -Evaluated by SLP, on modified diet as below     Constipation  -much improved, likely contributed to urinary retention; bowel regimen     Urinary retention likely related to constipation and acute illness  -Continue Flomax  -Discontinued Pinto and patient voided without difficulty today    Discharge Follow Up Recommendations for outpatient labs/diagnostics:  -Follow-up with primary care doctor in 5 to 7 days regarding this hospitalization, chronic disease management, repeat labs (CBC, CMP), repeat chest x-ray   -Follow-up with Dr. Kilpatrick in 1 month    Day of Discharge     HPI:   Patient reports feeling well today.  He is concerned that he has not gotten out of bed much and is wondering if he can stay another night versus have therapy work with him today.  He was amenable to repeat therapy evaluation today and if they are recommending home with home health, then home today.  Reports improved cough.  Reports breathing well.  No chest pain.  No nausea or vomiting.  Had 2 bowel movements.    Review of Systems  As above    Vital Signs:   Temp:  [95.4 °F (35.2 °C)-98.8 °F (37.1 °C)] 98 °F (36.7 °C)  Heart Rate:  [] 62  Resp:  [16-18] 18  BP: (101-132)/(61-79) 117/71  Flow (L/min) (Oxygen Therapy):  [2] 2      Physical Exam:  Constitutional: No acute distress, awake, alert  HENT: NCAT, mucous membranes moist  Respiratory: Diminished in the bilateral bases, respiratory effort normal   Cardiovascular: IRIR, HR 80s  Gastrointestinal:  Positive bowel sounds, soft, nontender, nondistended  Musculoskeletal: No bilateral ankle edema  Psychiatric: Appropriate affect, cooperative  Neurologic: Alert, masked facies, pill-rolling resting tremor, speech clear  Skin: No rashes on exposed skin    Pertinent  and/or Most Recent Results     LAB RESULTS:      Lab 04/27/25  0554 04/25/25  1305 04/24/25  2337 04/23/25  0249 04/22/25  0510 04/22/25  0356   WBC 10.91* 8.54 8.50 12.56*  --  11.12*   HEMOGLOBIN 16.1 14.7 14.4 14.8  --  15.4   HEMATOCRIT 49.5 45.4 44.6 44.6  --  46.3   PLATELETS 320 237 268 223  --  208   NEUTROS ABS 8.24* 7.20* 7.73*  --   --  9.98*   IMMATURE GRANS (ABS) 0.09* 0.03 0.03  --   --  0.04   LYMPHS ABS 1.41 0.67* 0.25*  --   --  0.52*   MONOS ABS 1.07* 0.59 0.49  --   --  0.54   EOS ABS 0.07 0.04 0.00  --   --  0.02   MCV 86.8 87.5 88.7 86.6  --  87.7   PROCALCITONIN  --   --  0.62*  --  0.42*  --    LACTATE  --   --  1.4  --   --  1.8         Lab 04/27/25  0554 04/26/25  0050 04/25/25  1500 04/25/25  1305 04/25/25  0635 04/24/25  2337 04/23/25  0249 04/23/25  0033 04/22/25  0510 04/22/25  0356   SODIUM 135*  --   --  140  --  138 140  --   --  140   POTASSIUM 4.0 5.2  --  3.6  --  3.7 4.0   < >  --  3.6   CHLORIDE 100  --   --  105  --  105 105  --   --  101   CO2 26.0  --   --  24.0  --  21.0* 23.0  --   --  25.0   ANION GAP 9.0  --   --  11.0  --  12.0 12.0  --   --  14.0   BUN 13  --   --  14  --  18 17  --   --  14   CREATININE 0.51*  --   --  0.50*  --  0.53* 0.61*  --   --  0.65*   EGFR 103.1  --   --  103.8  --  101.9 97.7  --   --  95.8   GLUCOSE 104*  --   --  172*  --  185* 130*  --   --  155*   CALCIUM 9.3  --   --  8.5*  --  8.5* 8.9  --   --  9.0   MAGNESIUM  --   --   --   --  2.8* 1.7 2.1  --  1.6  --    PHOSPHORUS  --   --   --   --   --   --  3.1  --  2.6  --    TSH  --   --  0.372  --   --   --   --   --   --   --     < > = values in this interval not displayed.         Lab 04/27/25  0554 04/25/25  1305 04/22/25  0356    TOTAL PROTEIN 6.0 5.7* 6.1   ALBUMIN 3.7 3.4* 3.8   GLOBULIN 2.3 2.3 2.3   ALT (SGPT) <5 <5 <5   AST (SGOT) 13 17 12   BILIRUBIN 0.6 0.5 1.3*   ALK PHOS 82 74 92         Lab 04/25/25  1500 04/25/25  1305 04/22/25  0510 04/22/25  0356   PROBNP  --   --   --  627.5   HSTROP T 12 14 17 14         Lab 04/25/25  1500   CHOLESTEROL 130   LDL CHOL 60   HDL CHOL 52   TRIGLYCERIDES 95             Lab 04/22/25  0416   PH, ARTERIAL 7.447   PCO2, ARTERIAL 35.4   PO2 .0*   FIO2 60   HCO3 ART 24.4   BASE EXCESS ART 0.8   CARBOXYHEMOGLOBIN 1.4     Brief Urine Lab Results  (Last result in the past 365 days)        Color   Clarity   Blood   Leuk Est   Nitrite   Protein   CREAT   Urine HCG        11/17/24 1310 Yellow   Clear   Negative   Moderate (2+)   Negative   Negative                 Microbiology Results (last 10 days)       Procedure Component Value - Date/Time    Respiratory Culture - Sputum, Cough [[id number]]  (Abnormal)  (Susceptibility) Collected: 04/22/25 0729    Lab Status: Final result Specimen: Sputum from Cough Updated: 04/24/25 0909     Respiratory Culture Heavy growth (4+) Klebsiella pneumoniae ssp pneumoniae      Scant growth (1+) Normal Respiratory Yolanda     Gram Stain Moderate (3+) WBCs per low power field      Rare (1+) Epithelial cells per low power field      Moderate (3+) Yeast      Few (2+) Mixed bacterial morphotypes seen on Gram Stain    Susceptibility        Klebsiella pneumoniae ssp pneumoniae      MONIQUE      Amoxicillin + Clavulanate Susceptible      Ampicillin Resistant      Ampicillin + Sulbactam Susceptible      Cefazolin (Non Urine) Susceptible      Cefepime Susceptible      Ceftazidime Susceptible      Ceftriaxone Susceptible      Gentamicin Susceptible      Levofloxacin Susceptible      Piperacillin + Tazobactam Susceptible      Tetracycline Susceptible      Trimethoprim + Sulfamethoxazole Susceptible                       Susceptibility Comments       Klebsiella pneumoniae ssp pneumoniae     With the exception of urinary-sourced infections, aminoglycosides should not be used as monotherapy.               S. Pneumo Ag Urine or CSF - Urine, Urine, Clean Catch [[id number]]  (Normal) Collected: 04/22/25 0728    Lab Status: Final result Specimen: Urine, Clean Catch Updated: 04/22/25 1311     Strep Pneumo Ag Negative    Legionella Antigen, Urine - Urine, Urine, Clean Catch [[id number]]  (Normal) Collected: 04/22/25 0728    Lab Status: Final result Specimen: Urine, Clean Catch Updated: 04/22/25 1311     LEGIONELLA ANTIGEN, URINE Negative    MRSA Screen, PCR (Inpatient) - Swab, Nares [[id number]]  (Normal) Collected: 04/22/25 0642    Lab Status: Final result Specimen: Swab from Nares Updated: 04/22/25 0816     MRSA PCR Negative    Narrative:      The negative predictive value of this diagnostic test is high and should only be used to consider de-escalating anti-MRSA therapy. A positive result may indicate colonization with MRSA and must be correlated clinically.  MRSA Negative    Respiratory Panel PCR w/COVID-19(SARS-CoV-2) SHIRLEY/[facility]/LOUISA/PAD/COR/ANGELA In-House, NP Swab in UTM/VTM, 2 HR TAT - Swab, Nasopharynx [[id number]]  (Normal) Collected: 04/22/25 0642    Lab Status: Final result Specimen: Swab from Nasopharynx Updated: 04/22/25 0807     ADENOVIRUS, PCR Not Detected     Coronavirus 229E Not Detected     Coronavirus HKU1 Not Detected     Coronavirus NL63 Not Detected     Coronavirus OC43 Not Detected     COVID19 Not Detected     Human Metapneumovirus Not Detected     Human Rhinovirus/Enterovirus Not Detected     Influenza A PCR Not Detected     Influenza B PCR Not Detected     Parainfluenza Virus 1 Not Detected     Parainfluenza Virus 2 Not Detected     Parainfluenza Virus 3 Not Detected     Parainfluenza Virus 4 Not Detected     RSV, PCR Not Detected     Bordetella pertussis pcr Not Detected     Bordetella parapertussis PCR Not Detected     Chlamydophila pneumoniae PCR Not Detected     Mycoplasma pneumo by PCR Not  Detected    Narrative:      In the setting of a positive respiratory panel with a viral infection PLUS a negative procalcitonin without other underlying concern for bacterial infection, consider observing off antibiotics or discontinuation of antibiotics and continue supportive care. If the respiratory panel is positive for atypical bacterial infection (Bordetella pertussis, Chlamydophila pneumoniae, or Mycoplasma pneumoniae), consider antibiotic de-escalation to target atypical bacterial infection.    Blood Culture - Blood, Arm, Left [[id number]]  (Normal) Collected: 04/22/25 0510    Lab Status: Final result Specimen: Blood from Arm, Left Updated: 04/27/25 0545     Blood Culture No growth at 5 days    Narrative:      Less than seven (7) mL's of blood was collected.  Insufficient quantity may yield false negative results.    Blood Culture - Blood, Arm, Right [[id number]]  (Normal) Collected: 04/22/25 0510    Lab Status: Final result Specimen: Blood from Arm, Right Updated: 04/27/25 0545     Blood Culture No growth at 5 days    Narrative:      Less than seven (7) mL's of blood was collected.  Insufficient quantity may yield false negative results.    COVID PRE-OP / PRE-PROCEDURE SCREENING ORDER (NO ISOLATION) - Swab, Nasopharynx [[id number]]  (Normal) Collected: 04/22/25 0358    Lab Status: Final result Specimen: Swab from Nasopharynx Updated: 04/22/25 1531    Narrative:      The following orders were created for panel order COVID PRE-OP / PRE-PROCEDURE SCREENING ORDER (NO ISOLATION) - Swab, Nasopharynx.  Procedure                               Abnormality         Status                     ---------                               -----------         ------                     COVID-19 and FLU A/B PCR...[[id number]]  Normal              Final result                 Please view results for these tests on the individual orders.    COVID-19 and FLU A/B PCR, 1 HR TAT - Swab, Nasopharynx [[id number]]  (Normal) Collected:  04/22/25 0358    Lab Status: Final result Specimen: Swab from Nasopharynx Updated: 04/22/25 0435     COVID19 Not Detected     Influenza A PCR Not Detected     Influenza B PCR Not Detected    Narrative:      Fact sheet for providers: https://www.fda.gov/media/529001/download    Fact sheet for patients: https://www.fda.gov/media/987258/download    Test performed by PCR.            XR Chest 1 View  Result Date: 4/25/2025  XR CHEST 1 VW Date of Exam: 4/25/2025 1:42 PM EDT Indication: DYSPNEA, CARDIAC ORIGIN SUSPECTED shortness of breath Comparison: AP chest x-ray 4/22/2025, AP chest x-ray 2/15/2025, CT chest 11/17/2024, AP chest x-ray 11/17/2024 Findings: Left hemidiaphragm remains elevated. Right basilar airspace opacities have decreased. There are stable diffuse interstitial opacities. No pneumothorax is seen. Cardiomediastinal contours appear stable.     Impression: Decreased right basilar airspace opacities, likely due to improving pneumonia/atelectasis. Electronically Signed: Delia Hurtado MD  4/25/2025 2:09 PM EDT  Workstation ID: [id number]    SLP FEES - Fiberoptic Endo Eval Swallow  Result Date: 4/22/2025  This procedure was auto-finalized with no dictation required.    XR Chest 1 View  Result Date: 4/22/2025  XR CHEST 1 VW Date of Exam: 4/22/2025 4:04 AM EDT Indication: dyspnea Comparison: Chest radiograph 2/15/2025 Findings: Heart is enlarged. There is tortuosity of the thoracic aorta. There is stable discoid atelectasis in the left lower chest. There is diffuse emphysema. There are chronic appearing interstitial changes throughout both lungs. Airspace disease in the right lower lobe is likely pneumonia.     Impression: Right lower lobe pneumonia. Electronically Signed: Carlitos Barry MD  4/22/2025 4:34 AM EDT  Workstation ID: [id number]      Results for orders placed during the hospital encounter of 07/16/21    Doppler Arterial Multi Level Lower Extremity - Bilateral CAR    Interpretation Summary  · Normal right  KAMRYN of 1.02.  · Left femoral-popliteal arteries noncompressible. There are biphasic and monophasic waveforms noted in the left lower extremity  · The left posterior tibial artery was compressible with mildly reduced left KAMRYN of 0.86      Results for orders placed during the hospital encounter of 07/16/21    Doppler Arterial Multi Level Lower Extremity - Bilateral CAR    Interpretation Summary  · Normal right KAMRYN of 1.02.  · Left femoral-popliteal arteries noncompressible. There are biphasic and monophasic waveforms noted in the left lower extremity  · The left posterior tibial artery was compressible with mildly reduced left KAMRYN of 0.86      Results for orders placed during the hospital encounter of 04/22/25    Adult Transthoracic Echo Complete W/ Cont if Necessary Per Protocol    Interpretation Summary    Left ventricular systolic function is normal. Estimated left ventricular EF = 60%    Left ventricular wall thickness is consistent with hypertrophy.    No hemodynamically significant valvular heart disease      Plan for Follow-up of Pending Labs/Results:     Discharge Details        Discharge Medications        New Medications        Instructions Start Date   amiodarone 200 MG tablet  Commonly known as: PACERONE   200 mg, Oral, Every 24 Hours Scheduled             Continue These Medications        Instructions Start Date   acetaminophen 500 MG tablet  Commonly known as: TYLENOL   1,000 mg, Oral, Every 6 Hours PRN, OTC      amantadine 100 MG capsule  Commonly known as: SYMMETREL   100 mg, Oral, 2 Times Daily, Note: this was discontinued on 8/28/24 at [facility] from [facility].  ? Should this have been resumed?        apixaban 5 MG tablet tablet  Commonly known as: ELIQUIS   5 mg, Oral, Every 12 Hours Scheduled      atropine 1 % ophthalmic solution   1 drop, Daily      carbidopa-levodopa CR  MG per CR tablet  Commonly known as: SINEMET CR   1 tablet, 2 Times Daily      carbidopa-levodopa  MG per tablet  Commonly  known as: SINEMET   1 tablet, Oral, 4 Times Daily, At [id number]      cilostazol 100 MG tablet  Commonly known as: PLETAL   100 mg, Oral, 2 Times Daily      docusate sodium 100 MG capsule  Commonly known as: COLACE   100 mg, Oral, 2 Times Daily      fentaNYL 0.05 MG/ML injection  Commonly known as: SUBLIMAZE   250 mcg      ipratropium-albuterol 0.5-2.5 mg/3 ml nebulizer  Commonly known as: DUO-NEB   3 mL, Nebulization, 3 Times Daily PRN      metoprolol succinate XL 50 MG 24 hr tablet  Commonly known as: TOPROL-XL   TAKE ONE TABLET BY MOUTH DAILY      multivitamin with minerals tablet tablet   1 tablet, Oral, Daily      Naloxegol Oxalate 25 MG tablet  Commonly known as: MOVANTIK   25 mg, Oral, Every Morning      naloxone 4 MG/0.1ML nasal spray  Commonly known as: NARCAN   Call 911. Don't prime. [place] in 1 nostril for overdose. Repeat in 2-3 minutes in other nostril if no or minimal breathing/responsiveness.      pantoprazole 20 MG EC tablet  Commonly known as: PROTONIX   20 mg, 2 Times Daily      tadalafil 10 MG tablet  Commonly known as: CIALIS   10 mg, Oral, Daily PRN      tamsulosin 0.4 MG capsule 24 hr capsule  Commonly known as: FLOMAX   0.8 mg, Oral, Nightly      tiZANidine 4 MG tablet  Commonly known as: ZANAFLEX   4 mg, Every 8 Hours PRN      Trelegy Ellipta 100-62.5-25 MCG/ACT inhaler  Generic drug: Fluticasone-Umeclidin-Vilant   1 puff, Daily - RT             Stop These Medications      QUEtiapine 25 MG tablet  Commonly known as: SEROquel              No Known Allergies      Discharge Disposition:  Home-Health Care [facility]    Diet:  Hospital:  Diet Order   Procedures    Diet: Regular/House; No Mixed Consistencies; Texture: Soft to Chew (NDD 3); Soft to Chew: Chopped Meat; Fluid Consistency: Nectar Thick       Diet Instructions       Diet: Regular/House Diet; Soft to Chew (NDD 3); Chopped Meat; Sun Thick      Discharge Diet: Regular/House Diet    Texture: Soft to Chew (NDD 3)    Soft to Chew: Chopped  Meat    Fluid Consistency: Nectar Thick             Activity:      Restrictions or Other Recommendations:         CODE STATUS:    Code Status and Medical Interventions: No CPR (Do Not Attempt to Resuscitate); Limited Support; No intubation (DNI)   Ordered at: 04/22/25 0608     Code Status (Patient has no pulse and is not breathing):    No CPR (Do Not Attempt to Resuscitate)     Medical Interventions (Patient has pulse or is breathing):    Limited Support     Medical Intervention Limits:    No intubation (DNI)     Level Of Support Discussed With:    Patient       Future Appointments   Date Time Provider Department Center   9/23/2025 11:45 AM Von Kilpatrick MD [facility]       Additional Instructions for the Follow-ups that You Need to Schedule       Call MD With Problems / Concerns   As directed      Please seek medical attention for any of the following: Chest pain, difficulty breathing, vomiting blood, bloody stools, and/or any concerning symptoms    Order Comments: Please seek medical attention for any of the following: Chest pain, difficulty breathing, vomiting blood, bloody stools, and/or any concerning symptoms         Discharge Follow-up with PCP   As directed       Currently Documented PCP:    Ariadne Galloway PA    PCP Phone Number:    [phone]     Follow Up Details: Follow-up with primary care doctor in 5 to 7 days regarding this hospitalization, chronic disease management, repeat labs (CBC, CMP), repeat chest x-ray        Discharge Follow-up with Specified Provider: Follow-up with Dr. Kilpatrick in 1 month   As directed      To: Follow-up with Dr. Kilpatrick in 1 month                      Eve Hall MD  04/28/25      Time Spent on Discharge:  I spent  35  minutes on this discharge activity which included: face-to-face encounter with the patient, reviewing the data in the system, coordination of the care with the nursing staff as well as consultants, documentation, and entering orders.
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
## 2025-04-29 ENCOUNTER — NURSE TRIAGE (OUTPATIENT)
Dept: CALL CENTER | Facility: HOSPITAL | Age: 80
End: 2025-04-29
Payer: COMMERCIAL

## 2025-04-29 LAB
QT INTERVAL: 334 MS
QT INTERVAL: 404 MS
QTC INTERVAL: 475 MS
QTC INTERVAL: 488 MS

## 2025-04-29 NOTE — OUTREACH NOTE
Prep Survey      Flowsheet Row Responses   [facility] facility patient discharged from? [facility]   Is LACE score < 7 ? No   Eligibility Readm Mgmt   Discharge diagnosis Right lower lobe pneumonia, aspiration PNA   Does the patient have one of the following disease processes/diagnoses(primary or secondary)? Pneumonia   Does the patient have Home health ordered? No   Is there a DME ordered? No   Prep survey completed? Yes            Josephine JOHNSON - Registered Nurse

## 2025-04-29 NOTE — TELEPHONE ENCOUNTER
Take this medicine exactly as directed by your doctor.  Do not miss any doses. It may also take 1 to 3 weeks before your body responds to this medicine.If HR is ever below 60 before taking it, then call MD before taking medication.   Reason for Disposition   Caller has medicine question, adult has minor symptoms, caller declines triage, AND triager answers question    Additional Information   Negative: [1] Intentional drug overdose AND [2] suicidal thoughts or ideas   Negative: Drug overdose and triager unable to answer question   Negative: Caller requesting a renewal or refill of a medicine patient is currently taking   Negative: Caller requesting information unrelated to medicine   Negative: Caller requesting information about COVID-19 Vaccine   Negative: Caller requesting information about Emergency Contraception   Negative: Caller requesting information about Combined Birth Control Pills   Negative: Caller requesting information about Progestin Birth Control Pills   Negative: Caller requesting information about Post-Op pain or medicines   Negative: Caller requesting a prescription antibiotic (such as Penicillin) for Strep throat and has a positive culture result   Negative: Caller requesting a prescription anti-viral med (such as Tamiflu) and has influenza (flu) symptoms   Negative: Immunization reaction suspected   Negative: Rash while taking a medicine or within 3 days of stopping it   Negative: [1] Asthma and [2] having symptoms of asthma (cough, wheezing, etc.)   Negative: [1] Symptom of illness (e.g., headache, abdominal pain, earache, vomiting) AND [2] more than mild   Negative: Breastfeeding questions about mother's medicines and diet   Negative: MORE THAN A DOUBLE DOSE of a prescription or over-the-counter (OTC) drug   Negative: [1] DOUBLE DOSE (an extra dose or lesser amount) of prescription drug AND [2] any symptoms (e.g., dizziness, nausea, pain, sleepiness)   Negative: [1] DOUBLE DOSE (an extra dose  or lesser amount) of over-the-counter (OTC) drug AND [2] any symptoms (e.g., dizziness, nausea, pain, sleepiness)   Negative: Took another person's prescription drug   Negative: [1] DOUBLE DOSE (an extra dose or lesser amount) of prescription drug AND [2] NO symptoms  (Exception: A double dose of antibiotics.)   Negative: Diabetes drug error or overdose (e.g., took wrong type of insulin or took extra dose)   Negative: [1] Prescription not at pharmacy AND [2] was prescribed by PCP recently (Exception: Triager has access to EMR and prescription is recorded there. Go to Home Care and confirm for pharmacy.)   Negative: [1] Pharmacy calling with prescription question AND [2] triager unable to answer question   Negative: [1] Caller has URGENT medicine question about med that PCP or specialist prescribed AND [2] triager unable to answer question   Negative: Medicine patch causing local rash or itching   Negative: [1] Caller has medicine question about med NOT prescribed by PCP AND [2] triager unable to answer question (e.g., compatibility with other med, storage)   Negative: Prescription request for new medicine (not a refill)   Negative: [1] Caller has NON-URGENT medicine question about med that PCP prescribed AND [2] triager unable to answer question   Negative: Caller wants to use a complementary or alternative medicine   Negative: [1] Prescription prescribed recently is not at pharmacy AND [2] triager has access to patient's EMR AND [3] prescription is recorded in the EMR   Negative: [1] DOUBLE DOSE (an extra dose or lesser amount) of over-the-counter (OTC) drug AND [2] NO symptoms   Negative: [1] DOUBLE DOSE (an extra dose or lesser amount) of antibiotic drug AND [2] NO symptoms   Negative: Caller has medicine question only, adult not sick, AND triager answers question   Negative: Caller requesting information about medicine during pregnancy; adult is not ill AND triager answers question   Negative: Caller requesting  "information about medicine use with breastfeeding; neither adult nor infant is ill, triager answers question    Answer Assessment - Initial Assessment Questions  1. NAME of MEDICINE: \"What medicine(s) are you calling about?\"      amiodarone  2. QUESTION: \"What is your question?\" (e.g., double dose of medicine, side effect)      Nausea when first began taking it  3. PRESCRIBER: \"Who prescribed the medicine?\" Reason: if prescribed by specialist, call should be referred to that group.      Dr Eve Hall  4. SYMPTOMS: \"Do you have any symptoms?\" If Yes, ask: \"What symptoms are you having?\"  \"How bad are the symptoms (e.g., mild, moderate, severe)      nausea  5. PREGNANCY:  \"Is there any chance that you are pregnant?\" \"When was your last menstrual period?\"      na    Protocols used: Medication Question Call-ADULT-    " Tarsorrhaphy Text: A tarsorrhaphy was performed using Frost sutures.

## 2025-04-30 ENCOUNTER — NURSE TRIAGE (OUTPATIENT)
Dept: CALL CENTER | Facility: HOSPITAL | Age: 80
End: 2025-04-30
Payer: COMMERCIAL

## 2025-04-30 NOTE — TELEPHONE ENCOUNTER
Patient recently started on Amiodarone. Reviewed side effects and patient education for amiodarone.    Reason for Disposition   Caller has medicine question, adult has minor symptoms, caller declines triage, AND triager answers question    Additional Information   Negative: [1] Intentional drug overdose AND [2] suicidal thoughts or ideas   Negative: Drug overdose and triager unable to answer question   Negative: Caller requesting a renewal or refill of a medicine patient is currently taking   Negative: Caller requesting information unrelated to medicine   Negative: Caller requesting information about COVID-19 Vaccine   Negative: Caller requesting information about Emergency Contraception   Negative: Caller requesting information about Combined Birth Control Pills   Negative: Caller requesting information about Progestin Birth Control Pills   Negative: Caller requesting information about Post-Op pain or medicines   Negative: Caller requesting a prescription antibiotic (such as Penicillin) for Strep throat and has a positive culture result   Negative: Caller requesting a prescription anti-viral med (such as Tamiflu) and has influenza (flu) symptoms   Negative: Immunization reaction suspected   Negative: Rash while taking a medicine or within 3 days of stopping it   Negative: [1] Asthma and [2] having symptoms of asthma (cough, wheezing, etc.)   Negative: [1] Symptom of illness (e.g., headache, abdominal pain, earache, vomiting) AND [2] more than mild   Negative: Breastfeeding questions about mother's medicines and diet   Negative: MORE THAN A DOUBLE DOSE of a prescription or over-the-counter (OTC) drug   Negative: [1] DOUBLE DOSE (an extra dose or lesser amount) of prescription drug AND [2] any symptoms (e.g., dizziness, nausea, pain, sleepiness)   Negative: [1] DOUBLE DOSE (an extra dose or lesser amount) of over-the-counter (OTC) drug AND [2] any symptoms (e.g., dizziness, nausea, pain, sleepiness)   Negative: Took  "another person's prescription drug   Negative: [1] DOUBLE DOSE (an extra dose or lesser amount) of prescription drug AND [2] NO symptoms  (Exception: A double dose of antibiotics.)   Negative: Diabetes drug error or overdose (e.g., took wrong type of insulin or took extra dose)   Negative: [1] Prescription not at pharmacy AND [2] was prescribed by PCP recently (Exception: Triager has access to EMR and prescription is recorded there. Go to Home Care and confirm for pharmacy.)   Negative: [1] Pharmacy calling with prescription question AND [2] triager unable to answer question   Negative: [1] Caller has URGENT medicine question about med that PCP or specialist prescribed AND [2] triager unable to answer question   Negative: Medicine patch causing local rash or itching   Negative: [1] Caller has medicine question about med NOT prescribed by PCP AND [2] triager unable to answer question (e.g., compatibility with other med, storage)   Negative: Prescription request for new medicine (not a refill)   Negative: [1] Caller has NON-URGENT medicine question about med that PCP prescribed AND [2] triager unable to answer question   Negative: Caller wants to use a complementary or alternative medicine   Negative: [1] Prescription prescribed recently is not at pharmacy AND [2] triager has access to patient's EMR AND [3] prescription is recorded in the EMR   Negative: [1] DOUBLE DOSE (an extra dose or lesser amount) of over-the-counter (OTC) drug AND [2] NO symptoms   Negative: [1] DOUBLE DOSE (an extra dose or lesser amount) of antibiotic drug AND [2] NO symptoms   Negative: Caller has medicine question only, adult not sick, AND triager answers question    Answer Assessment - Initial Assessment Questions  1. NAME of MEDICINE: \"What medicine(s) are you calling about?\"      Amiodarone  2. QUESTION: \"What is your question?\" (e.g., double dose of medicine, side effect)      Side effects  3. PRESCRIBER: \"Who prescribed the medicine?\" " "Reason: if prescribed by specialist, call should be referred to that group.      Dr. Eve Hall  4. SYMPTOMS: \"Do you have any symptoms?\" If Yes, ask: \"What symptoms are you having?\"  \"How bad are the symptoms (e.g., mild, moderate, severe)      Patient feels \"different\"  5. PREGNANCY:  \"Is there any chance that you are pregnant?\" \"When was your last menstrual period?\"      N/A    Protocols used: Medication Question Call-ADULT-    "

## 2025-05-01 ENCOUNTER — TELEPHONE (OUTPATIENT)
Dept: CARDIOLOGY | Facility: CLINIC | Age: 80
End: 2025-05-01
Payer: COMMERCIAL

## 2025-05-01 NOTE — TELEPHONE ENCOUNTER
"Patient called, left  stating he was in hospital with pneumonia and was discharged on new medication- states since discharge he has been lightheaded and \"feels like he is not getting enough oxygen\".     After review of chart - patient discharged on Amiodarone 200 mg daily- did have episode a-fib RVR while admitted.     Returned patient call, left  requesting call back to discuss symptoms/concerns.   "

## 2025-05-02 ENCOUNTER — READMISSION MANAGEMENT (OUTPATIENT)
Dept: CALL CENTER | Facility: HOSPITAL | Age: 80
End: 2025-05-02
Payer: COMMERCIAL

## 2025-05-02 NOTE — OUTREACH NOTE
COPD/PN Week 1 Survey      Flowsheet Row Responses   [facility] patient discharged from? [place]   Does the patient have one of the following disease processes/diagnoses(primary or secondary)? Pneumonia   Week 1 attempt successful? Yes   Call start time 1118   Call end time 1121   Discharge diagnosis Right lower lobe pneumonia, aspiration PNA   Person spoke with today (if not patient) and relationship Cheryl (spouse)   Does the patient have a primary care provider?  Yes   Comments regarding PCP Currently at PCP appt   Psychosocial issues? No   What is the patient's perception of their health status since discharge? Same   Is the patient/caregiver able to teach back the hierarchy of who to call/visit for symptoms/problems? PCP, Specialist, Home health nurse, Urgent Care, ED, 911 Yes   Week 1 call completed? Yes   Is the patient interested in additional calls from an ambulatory ? No   Would this patient benefit from a Referral to Amb Social Work? No   Wrap up additional comments Brief call, currently at PCP appt. Spouse states patient is not doing well. Spouse is with patient at patient's PCP appt.   Call end time 1121            EVELYN JAMA - Registered Nurse

## 2025-05-12 ENCOUNTER — READMISSION MANAGEMENT (OUTPATIENT)
Dept: CALL CENTER | Facility: HOSPITAL | Age: 80
End: 2025-05-12
Payer: COMMERCIAL

## 2025-05-12 NOTE — OUTREACH NOTE
COPD/PN Week 2 Survey      Flowsheet Row Responses   [facility] patient discharged [facility]? [place]   Does the patient have one of the following disease processes/diagnoses(primary or secondary)? Pneumonia   Week 2 attempt successful? No   Unsuccessful attempts Attempt 1   Revoke Kayode France H - Registered Nurse

## 2025-05-14 RX ORDER — AMIODARONE HYDROCHLORIDE 200 MG/1
200 TABLET ORAL
Qty: 90 TABLET | Refills: 0 | Status: ON HOLD | OUTPATIENT
Start: 2025-05-14

## 2025-05-18 ENCOUNTER — APPOINTMENT (OUTPATIENT)
Dept: GENERAL RADIOLOGY | Facility: HOSPITAL | Age: 80
End: 2025-05-18
Payer: MEDICARE

## 2025-05-18 ENCOUNTER — HOSPITAL ENCOUNTER (INPATIENT)
Facility: HOSPITAL | Age: 80
LOS: 1 days | Discharge: HOME HEALTH | End: 2025-05-22
Attending: STUDENT IN AN ORGANIZED HEALTH CARE EDUCATION/TRAINING PROGRAM | Admitting: INTERNAL MEDICINE
Payer: MEDICARE

## 2025-05-18 ENCOUNTER — APPOINTMENT (OUTPATIENT)
Dept: CT IMAGING | Facility: HOSPITAL | Age: 80
End: 2025-05-18
Payer: MEDICARE

## 2025-05-18 DIAGNOSIS — Z87.891 PERSONAL HISTORY OF NICOTINE DEPENDENCE: ICD-10-CM

## 2025-05-18 DIAGNOSIS — N40.0 BENIGN PROSTATIC HYPERPLASIA WITHOUT LOWER URINARY TRACT SYMPTOMS: ICD-10-CM

## 2025-05-18 DIAGNOSIS — I73.9 PERIPHERAL ARTERIAL DISEASE: ICD-10-CM

## 2025-05-18 DIAGNOSIS — G20.A1 PARKINSON'S DISEASE WITHOUT DYSKINESIA OR FLUCTUATING MANIFESTATIONS: ICD-10-CM

## 2025-05-18 DIAGNOSIS — R94.39 ABNORMAL NUCLEAR STRESS TEST: ICD-10-CM

## 2025-05-18 DIAGNOSIS — K22.70 BARRETT'S ESOPHAGUS WITHOUT DYSPLASIA: ICD-10-CM

## 2025-05-18 DIAGNOSIS — D69.9 BLEEDING TENDENCY: ICD-10-CM

## 2025-05-18 DIAGNOSIS — M54.17 LUMBOSACRAL NEURITIS: ICD-10-CM

## 2025-05-18 DIAGNOSIS — M47.816 LUMBAR SPONDYLOSIS: ICD-10-CM

## 2025-05-18 DIAGNOSIS — Z99.81 ON HOME O2: ICD-10-CM

## 2025-05-18 DIAGNOSIS — L81.4 LENTIGO: ICD-10-CM

## 2025-05-18 DIAGNOSIS — R09.02 HYPOXEMIA: ICD-10-CM

## 2025-05-18 DIAGNOSIS — Z79.01 LONG TERM (CURRENT) USE OF ANTICOAGULANTS: ICD-10-CM

## 2025-05-18 DIAGNOSIS — I25.10 CORONARY ARTERY DISEASE INVOLVING NATIVE CORONARY ARTERY OF NATIVE HEART WITHOUT ANGINA PECTORIS: ICD-10-CM

## 2025-05-18 DIAGNOSIS — D49.2 NEOPLASM OF SKIN: ICD-10-CM

## 2025-05-18 DIAGNOSIS — E43 SEVERE MALNUTRITION: ICD-10-CM

## 2025-05-18 DIAGNOSIS — M81.0 AGE-RELATED OSTEOPOROSIS WITHOUT CURRENT PATHOLOGICAL FRACTURE: ICD-10-CM

## 2025-05-18 DIAGNOSIS — K44.9 HIATAL HERNIA: ICD-10-CM

## 2025-05-18 DIAGNOSIS — D68.59 HYPERCOAGULABLE STATE: ICD-10-CM

## 2025-05-18 DIAGNOSIS — J18.9 PNEUMONIA OF RIGHT LUNG DUE TO INFECTIOUS ORGANISM, UNSPECIFIED PART OF LUNG: Primary | ICD-10-CM

## 2025-05-18 DIAGNOSIS — G89.29 OTHER CHRONIC PAIN: ICD-10-CM

## 2025-05-18 DIAGNOSIS — R91.8 ABNORMAL CT SCAN OF LUNG: ICD-10-CM

## 2025-05-18 DIAGNOSIS — I73.9 PERIPHERAL VASCULAR DISEASE: ICD-10-CM

## 2025-05-18 DIAGNOSIS — K21.9 GERD WITHOUT ESOPHAGITIS: ICD-10-CM

## 2025-05-18 DIAGNOSIS — D22.5 MELANOCYTIC NEVUS OF TRUNK: ICD-10-CM

## 2025-05-18 DIAGNOSIS — R39.14 FEELING OF INCOMPLETE BLADDER EMPTYING: ICD-10-CM

## 2025-05-18 DIAGNOSIS — Z91.89 AT RISK FOR APNEA: ICD-10-CM

## 2025-05-18 DIAGNOSIS — J15.0 PNEUMONIA OF RIGHT LOWER LOBE DUE TO KLEBSIELLA PNEUMONIAE: ICD-10-CM

## 2025-05-18 DIAGNOSIS — R13.19 ESOPHAGEAL DYSPHAGIA: ICD-10-CM

## 2025-05-18 DIAGNOSIS — Z86.0100 HISTORY OF COLONIC POLYPS: ICD-10-CM

## 2025-05-18 DIAGNOSIS — R26.9 ABNORMAL GAIT: ICD-10-CM

## 2025-05-18 DIAGNOSIS — L91.9 HYPERTROPHIC CONDITION OF SKIN: ICD-10-CM

## 2025-05-18 DIAGNOSIS — I10 ESSENTIAL (PRIMARY) HYPERTENSION: ICD-10-CM

## 2025-05-18 DIAGNOSIS — J69.0 ASPIRATION PNEUMONIA OF RIGHT MIDDLE LOBE, UNSPECIFIED ASPIRATION PNEUMONIA TYPE: ICD-10-CM

## 2025-05-18 DIAGNOSIS — G25.89 SCAPULAR DYSKINESIS: ICD-10-CM

## 2025-05-18 DIAGNOSIS — R06.02 SHORTNESS OF BREATH: ICD-10-CM

## 2025-05-18 DIAGNOSIS — N41.1 CHRONIC PROSTATITIS: ICD-10-CM

## 2025-05-18 DIAGNOSIS — Z87.891 EX-SMOKER: ICD-10-CM

## 2025-05-18 DIAGNOSIS — L57.0 SENILE HYPERKERATOSIS: ICD-10-CM

## 2025-05-18 DIAGNOSIS — K56.699 OTHER INTESTINAL OBSTRUCTION UNSPECIFIED AS TO PARTIAL VERSUS COMPLETE OBSTRUCTION: ICD-10-CM

## 2025-05-18 DIAGNOSIS — M96.1 LUMBAR POST-LAMINECTOMY SYNDROME: ICD-10-CM

## 2025-05-18 DIAGNOSIS — K59.03 DRUG INDUCED CONSTIPATION: ICD-10-CM

## 2025-05-18 DIAGNOSIS — G47.33 OSA (OBSTRUCTIVE SLEEP APNEA): ICD-10-CM

## 2025-05-18 DIAGNOSIS — M21.622 BUNIONETTE OF LEFT FOOT: ICD-10-CM

## 2025-05-18 DIAGNOSIS — F51.01 PRIMARY INSOMNIA: ICD-10-CM

## 2025-05-18 DIAGNOSIS — K57.32 DIVERTICULITIS OF LARGE INTESTINE WITHOUT PERFORATION OR ABSCESS WITHOUT BLEEDING: ICD-10-CM

## 2025-05-18 DIAGNOSIS — R13.12 OROPHARYNGEAL DYSPHAGIA: ICD-10-CM

## 2025-05-18 DIAGNOSIS — M21.962 FOOT DEFORMITY, ACQUIRED, LEFT: ICD-10-CM

## 2025-05-18 LAB
ALBUMIN SERPL-MCNC: 3.7 G/DL (ref 3.5–5.2)
ALBUMIN/GLOB SERPL: 1.5 G/DL
ALP SERPL-CCNC: 70 U/L (ref 39–117)
ALT SERPL W P-5'-P-CCNC: <5 U/L (ref 1–41)
ANION GAP SERPL CALCULATED.3IONS-SCNC: 14 MMOL/L (ref 5–15)
ARTERIAL PATENCY WRIST A: POSITIVE
AST SERPL-CCNC: 12 U/L (ref 1–40)
ATMOSPHERIC PRESS: ABNORMAL MM[HG]
B PARAPERT DNA SPEC QL NAA+PROBE: NOT DETECTED
B PERT DNA SPEC QL NAA+PROBE: NOT DETECTED
BASE EXCESS BLDA CALC-SCNC: 4.3 MMOL/L (ref 0–2)
BASOPHILS # BLD AUTO: 0.04 10*3/MM3 (ref 0–0.2)
BASOPHILS NFR BLD AUTO: 0.2 % (ref 0–1.5)
BDY SITE: ABNORMAL
BILIRUB SERPL-MCNC: 1.7 MG/DL (ref 0–1.2)
BODY TEMPERATURE: 37
BUN SERPL-MCNC: 19 MG/DL (ref 8–23)
BUN/CREAT SERPL: 22.6 (ref 7–25)
C PNEUM DNA NPH QL NAA+NON-PROBE: NOT DETECTED
CALCIUM SPEC-SCNC: 9.3 MG/DL (ref 8.6–10.5)
CHLORIDE SERPL-SCNC: 97 MMOL/L (ref 98–107)
CO2 BLDA-SCNC: 30.3 MMOL/L (ref 22–33)
CO2 SERPL-SCNC: 25 MMOL/L (ref 22–29)
COHGB MFR BLD: 1.8 % (ref 0–2)
CREAT SERPL-MCNC: 0.84 MG/DL (ref 0.76–1.27)
CRP SERPL-MCNC: 1.62 MG/DL (ref 0–0.5)
D-LACTATE SERPL-SCNC: 1.5 MMOL/L (ref 0.5–2)
DEPRECATED RDW RBC AUTO: 51.8 FL (ref 37–54)
EGFRCR SERPLBLD CKD-EPI 2021: 88.7 ML/MIN/1.73
EOSINOPHIL # BLD AUTO: 0 10*3/MM3 (ref 0–0.4)
EOSINOPHIL NFR BLD AUTO: 0 % (ref 0.3–6.2)
EPAP: 0
ERYTHROCYTE [DISTWIDTH] IN BLOOD BY AUTOMATED COUNT: 16.1 % (ref 12.3–15.4)
FLUAV SUBTYP SPEC NAA+PROBE: NOT DETECTED
FLUBV RNA ISLT QL NAA+PROBE: NOT DETECTED
GEN 5 1HR TROPONIN T REFLEX: 15 NG/L
GLOBULIN UR ELPH-MCNC: 2.5 GM/DL
GLUCOSE SERPL-MCNC: 104 MG/DL (ref 65–99)
HADV DNA SPEC NAA+PROBE: NOT DETECTED
HCO3 BLDA-SCNC: 29 MMOL/L (ref 20–26)
HCOV 229E RNA SPEC QL NAA+PROBE: NOT DETECTED
HCOV HKU1 RNA SPEC QL NAA+PROBE: NOT DETECTED
HCOV NL63 RNA SPEC QL NAA+PROBE: NOT DETECTED
HCOV OC43 RNA SPEC QL NAA+PROBE: NOT DETECTED
HCT VFR BLD AUTO: 49 % (ref 37.5–51)
HCT VFR BLD CALC: 50.5 % (ref 38–51)
HGB BLD-MCNC: 16 G/DL (ref 13–17.7)
HGB BLDA-MCNC: 16.5 G/DL (ref 13.5–17.5)
HMPV RNA NPH QL NAA+NON-PROBE: NOT DETECTED
HOLD SPECIMEN: NORMAL
HPIV1 RNA ISLT QL NAA+PROBE: NOT DETECTED
HPIV2 RNA SPEC QL NAA+PROBE: NOT DETECTED
HPIV3 RNA NPH QL NAA+PROBE: NOT DETECTED
HPIV4 P GENE NPH QL NAA+PROBE: NOT DETECTED
IMM GRANULOCYTES # BLD AUTO: 0.04 10*3/MM3 (ref 0–0.05)
IMM GRANULOCYTES NFR BLD AUTO: 0.2 % (ref 0–0.5)
INHALED O2 CONCENTRATION: 28 %
IPAP: 0
LYMPHOCYTES # BLD AUTO: 0.24 10*3/MM3 (ref 0.7–3.1)
LYMPHOCYTES NFR BLD AUTO: 1.5 % (ref 19.6–45.3)
M PNEUMO IGG SER IA-ACNC: NOT DETECTED
MCH RBC QN AUTO: 29.3 PG (ref 26.6–33)
MCHC RBC AUTO-ENTMCNC: 32.7 G/DL (ref 31.5–35.7)
MCV RBC AUTO: 89.7 FL (ref 79–97)
METHGB BLD QL: -0.1 % (ref 0–1.5)
MODALITY: ABNORMAL
MONOCYTES # BLD AUTO: 0.79 10*3/MM3 (ref 0.1–0.9)
MONOCYTES NFR BLD AUTO: 4.9 % (ref 5–12)
NEUTROPHILS NFR BLD AUTO: 15.1 10*3/MM3 (ref 1.7–7)
NEUTROPHILS NFR BLD AUTO: 93.2 % (ref 42.7–76)
NRBC BLD AUTO-RTO: 0 /100 WBC (ref 0–0.2)
NT-PROBNP SERPL-MCNC: 708 PG/ML (ref 0–1800)
OXYHGB MFR BLDV: 81.6 % (ref 94–99)
PAW @ PEAK INSP FLOW SETTING VENT: 0 CMH2O
PCO2 BLDA: 42.4 MM HG (ref 35–45)
PCO2 TEMP ADJ BLD: 42.4 MM HG (ref 35–48)
PH BLDA: 7.44 PH UNITS (ref 7.35–7.45)
PH, TEMP CORRECTED: 7.44 PH UNITS
PLATELET # BLD AUTO: 225 10*3/MM3 (ref 140–450)
PMV BLD AUTO: 10.4 FL (ref 6–12)
PO2 BLDA: 45.1 MM HG (ref 83–108)
PO2 TEMP ADJ BLD: 45.1 MM HG (ref 83–108)
POTASSIUM SERPL-SCNC: 3.7 MMOL/L (ref 3.5–5.2)
PROCALCITONIN SERPL-MCNC: 1.4 NG/ML (ref 0–0.25)
PROT SERPL-MCNC: 6.2 G/DL (ref 6–8.5)
RBC # BLD AUTO: 5.46 10*6/MM3 (ref 4.14–5.8)
RHINOVIRUS RNA SPEC NAA+PROBE: NOT DETECTED
RSV RNA NPH QL NAA+NON-PROBE: NOT DETECTED
SARS-COV-2 RNA RESP QL NAA+PROBE: NOT DETECTED
SODIUM SERPL-SCNC: 136 MMOL/L (ref 136–145)
TOTAL RATE: 0 BREATHS/MINUTE
TROPONIN T NUMERIC DELTA: -2 NG/L
TROPONIN T SERPL HS-MCNC: 17 NG/L
WBC NRBC COR # BLD AUTO: 16.21 10*3/MM3 (ref 3.4–10.8)
WHOLE BLOOD HOLD COAG: NORMAL
WHOLE BLOOD HOLD SPECIMEN: NORMAL

## 2025-05-18 PROCEDURE — 25810000003 SODIUM CHLORIDE 0.9 % SOLUTION 250 ML FLEX CONT: Performed by: STUDENT IN AN ORGANIZED HEALTH CARE EDUCATION/TRAINING PROGRAM

## 2025-05-18 PROCEDURE — 94799 UNLISTED PULMONARY SVC/PX: CPT

## 2025-05-18 PROCEDURE — 87040 BLOOD CULTURE FOR BACTERIA: CPT | Performed by: STUDENT IN AN ORGANIZED HEALTH CARE EDUCATION/TRAINING PROGRAM

## 2025-05-18 PROCEDURE — 84145 PROCALCITONIN (PCT): CPT | Performed by: NURSE PRACTITIONER

## 2025-05-18 PROCEDURE — 99285 EMERGENCY DEPT VISIT HI MDM: CPT

## 2025-05-18 PROCEDURE — 36415 COLL VENOUS BLD VENIPUNCTURE: CPT

## 2025-05-18 PROCEDURE — 94664 DEMO&/EVAL PT USE INHALER: CPT

## 2025-05-18 PROCEDURE — 87641 MR-STAPH DNA AMP PROBE: CPT

## 2025-05-18 PROCEDURE — 36600 WITHDRAWAL OF ARTERIAL BLOOD: CPT

## 2025-05-18 PROCEDURE — 83880 ASSAY OF NATRIURETIC PEPTIDE: CPT | Performed by: STUDENT IN AN ORGANIZED HEALTH CARE EDUCATION/TRAINING PROGRAM

## 2025-05-18 PROCEDURE — 85025 COMPLETE CBC W/AUTO DIFF WBC: CPT | Performed by: STUDENT IN AN ORGANIZED HEALTH CARE EDUCATION/TRAINING PROGRAM

## 2025-05-18 PROCEDURE — 83050 HGB METHEMOGLOBIN QUAN: CPT

## 2025-05-18 PROCEDURE — 93005 ELECTROCARDIOGRAM TRACING: CPT | Performed by: STUDENT IN AN ORGANIZED HEALTH CARE EDUCATION/TRAINING PROGRAM

## 2025-05-18 PROCEDURE — 87449 NOS EACH ORGANISM AG IA: CPT | Performed by: NURSE PRACTITIONER

## 2025-05-18 PROCEDURE — 93010 ELECTROCARDIOGRAM REPORT: CPT | Performed by: INTERNAL MEDICINE

## 2025-05-18 PROCEDURE — 25010000002 VANCOMYCIN 1 G RECONSTITUTED SOLUTION 1 EACH VIAL: Performed by: STUDENT IN AN ORGANIZED HEALTH CARE EDUCATION/TRAINING PROGRAM

## 2025-05-18 PROCEDURE — 71275 CT ANGIOGRAPHY CHEST: CPT

## 2025-05-18 PROCEDURE — 87205 SMEAR GRAM STAIN: CPT | Performed by: NURSE PRACTITIONER

## 2025-05-18 PROCEDURE — 0202U NFCT DS 22 TRGT SARS-COV-2: CPT | Performed by: NURSE PRACTITIONER

## 2025-05-18 PROCEDURE — 94640 AIRWAY INHALATION TREATMENT: CPT

## 2025-05-18 PROCEDURE — G0378 HOSPITAL OBSERVATION PER HR: HCPCS

## 2025-05-18 PROCEDURE — 83605 ASSAY OF LACTIC ACID: CPT | Performed by: NURSE PRACTITIONER

## 2025-05-18 PROCEDURE — 82375 ASSAY CARBOXYHB QUANT: CPT

## 2025-05-18 PROCEDURE — 86140 C-REACTIVE PROTEIN: CPT | Performed by: NURSE PRACTITIONER

## 2025-05-18 PROCEDURE — 82805 BLOOD GASES W/O2 SATURATION: CPT

## 2025-05-18 PROCEDURE — 74018 RADEX ABDOMEN 1 VIEW: CPT

## 2025-05-18 PROCEDURE — 25810000003 LACTATED RINGERS PER 1000 ML

## 2025-05-18 PROCEDURE — 25010000002 DOXYCYCLINE 100 MG RECONSTITUTED SOLUTION 1 EACH VIAL: Performed by: NURSE PRACTITIONER

## 2025-05-18 PROCEDURE — 84484 ASSAY OF TROPONIN QUANT: CPT | Performed by: STUDENT IN AN ORGANIZED HEALTH CARE EDUCATION/TRAINING PROGRAM

## 2025-05-18 PROCEDURE — 71045 X-RAY EXAM CHEST 1 VIEW: CPT

## 2025-05-18 PROCEDURE — 80053 COMPREHEN METABOLIC PANEL: CPT | Performed by: STUDENT IN AN ORGANIZED HEALTH CARE EDUCATION/TRAINING PROGRAM

## 2025-05-18 PROCEDURE — 87070 CULTURE OTHR SPECIMN AEROBIC: CPT | Performed by: NURSE PRACTITIONER

## 2025-05-18 PROCEDURE — 25010000002 HYDROMORPHONE PER 4 MG

## 2025-05-18 PROCEDURE — 99222 1ST HOSP IP/OBS MODERATE 55: CPT | Performed by: NURSE PRACTITIONER

## 2025-05-18 PROCEDURE — 25510000001 IOPAMIDOL PER 1 ML: Performed by: STUDENT IN AN ORGANIZED HEALTH CARE EDUCATION/TRAINING PROGRAM

## 2025-05-18 PROCEDURE — 25010000002 PIPERACILLIN SOD-TAZOBACTAM PER 1 G: Performed by: STUDENT IN AN ORGANIZED HEALTH CARE EDUCATION/TRAINING PROGRAM

## 2025-05-18 RX ORDER — MULTIPLE VITAMINS W/ MINERALS TAB 9MG-400MCG
1 TAB ORAL DAILY
Status: DISCONTINUED | OUTPATIENT
Start: 2025-05-19 | End: 2025-05-22 | Stop reason: HOSPADM

## 2025-05-18 RX ORDER — IPRATROPIUM BROMIDE AND ALBUTEROL SULFATE 2.5; .5 MG/3ML; MG/3ML
3 SOLUTION RESPIRATORY (INHALATION)
Status: DISCONTINUED | OUTPATIENT
Start: 2025-05-18 | End: 2025-05-22 | Stop reason: HOSPADM

## 2025-05-18 RX ORDER — SODIUM CHLORIDE 0.9 % (FLUSH) 0.9 %
10 SYRINGE (ML) INJECTION AS NEEDED
Status: DISCONTINUED | OUTPATIENT
Start: 2025-05-18 | End: 2025-05-22 | Stop reason: HOSPADM

## 2025-05-18 RX ORDER — TAMSULOSIN HYDROCHLORIDE 0.4 MG/1
0.8 CAPSULE ORAL NIGHTLY
Status: DISCONTINUED | OUTPATIENT
Start: 2025-05-18 | End: 2025-05-19

## 2025-05-18 RX ORDER — PANTOPRAZOLE SODIUM 40 MG/1
40 TABLET, DELAYED RELEASE ORAL
Status: DISCONTINUED | OUTPATIENT
Start: 2025-05-19 | End: 2025-05-20 | Stop reason: ALTCHOICE

## 2025-05-18 RX ORDER — BUDESONIDE 0.5 MG/2ML
0.5 INHALANT ORAL
Status: DISCONTINUED | OUTPATIENT
Start: 2025-05-18 | End: 2025-05-22 | Stop reason: HOSPADM

## 2025-05-18 RX ORDER — METOPROLOL SUCCINATE 50 MG/1
50 TABLET, EXTENDED RELEASE ORAL DAILY
Status: DISCONTINUED | OUTPATIENT
Start: 2025-05-19 | End: 2025-05-22 | Stop reason: HOSPADM

## 2025-05-18 RX ORDER — IOPAMIDOL 755 MG/ML
100 INJECTION, SOLUTION INTRAVASCULAR
Status: COMPLETED | OUTPATIENT
Start: 2025-05-18 | End: 2025-05-18

## 2025-05-18 RX ORDER — CARBIDOPA AND LEVODOPA 25; 100 MG/1; MG/1
1 TABLET ORAL 4 TIMES DAILY
Status: DISCONTINUED | OUTPATIENT
Start: 2025-05-18 | End: 2025-05-19

## 2025-05-18 RX ORDER — SODIUM CHLORIDE 0.9 % (FLUSH) 0.9 %
10 SYRINGE (ML) INJECTION EVERY 12 HOURS SCHEDULED
Status: DISCONTINUED | OUTPATIENT
Start: 2025-05-18 | End: 2025-05-22 | Stop reason: HOSPADM

## 2025-05-18 RX ORDER — CARBIDOPA AND LEVODOPA 50; 200 MG/1; MG/1
1 TABLET, EXTENDED RELEASE ORAL 2 TIMES DAILY
Status: DISCONTINUED | OUTPATIENT
Start: 2025-05-18 | End: 2025-05-19

## 2025-05-18 RX ORDER — GUAIFENESIN 600 MG/1
1200 TABLET, EXTENDED RELEASE ORAL EVERY 12 HOURS SCHEDULED
Status: DISCONTINUED | OUTPATIENT
Start: 2025-05-18 | End: 2025-05-21

## 2025-05-18 RX ORDER — MIRTAZAPINE 15 MG/1
7.5 TABLET, FILM COATED ORAL NIGHTLY
Status: DISCONTINUED | OUTPATIENT
Start: 2025-05-18 | End: 2025-05-21

## 2025-05-18 RX ORDER — HYDROMORPHONE HYDROCHLORIDE 1 MG/ML
0.5 INJECTION, SOLUTION INTRAMUSCULAR; INTRAVENOUS; SUBCUTANEOUS EVERY 4 HOURS PRN
Status: DISCONTINUED | OUTPATIENT
Start: 2025-05-18 | End: 2025-05-21

## 2025-05-18 RX ORDER — ACETAMINOPHEN 650 MG/1
650 SUPPOSITORY RECTAL EVERY 4 HOURS PRN
Status: DISCONTINUED | OUTPATIENT
Start: 2025-05-18 | End: 2025-05-22 | Stop reason: HOSPADM

## 2025-05-18 RX ORDER — AMANTADINE HYDROCHLORIDE 100 MG/1
100 CAPSULE, GELATIN COATED ORAL 2 TIMES DAILY
Status: DISCONTINUED | OUTPATIENT
Start: 2025-05-18 | End: 2025-05-22 | Stop reason: HOSPADM

## 2025-05-18 RX ORDER — ARFORMOTEROL TARTRATE 15 UG/2ML
15 SOLUTION RESPIRATORY (INHALATION)
Status: DISCONTINUED | OUTPATIENT
Start: 2025-05-18 | End: 2025-05-22 | Stop reason: HOSPADM

## 2025-05-18 RX ORDER — SODIUM CHLORIDE 9 MG/ML
40 INJECTION, SOLUTION INTRAVENOUS AS NEEDED
Status: DISCONTINUED | OUTPATIENT
Start: 2025-05-18 | End: 2025-05-22 | Stop reason: HOSPADM

## 2025-05-18 RX ORDER — SODIUM CHLORIDE, SODIUM LACTATE, POTASSIUM CHLORIDE, CALCIUM CHLORIDE 600; 310; 30; 20 MG/100ML; MG/100ML; MG/100ML; MG/100ML
100 INJECTION, SOLUTION INTRAVENOUS CONTINUOUS
Status: ACTIVE | OUTPATIENT
Start: 2025-05-18 | End: 2025-05-19

## 2025-05-18 RX ORDER — AMIODARONE HYDROCHLORIDE 200 MG/1
200 TABLET ORAL
Status: DISCONTINUED | OUTPATIENT
Start: 2025-05-18 | End: 2025-05-22 | Stop reason: HOSPADM

## 2025-05-18 RX ORDER — ACETAMINOPHEN 160 MG/5ML
650 SOLUTION ORAL EVERY 4 HOURS PRN
Status: DISCONTINUED | OUTPATIENT
Start: 2025-05-18 | End: 2025-05-22 | Stop reason: HOSPADM

## 2025-05-18 RX ORDER — BISACODYL 5 MG/1
5 TABLET, DELAYED RELEASE ORAL DAILY PRN
Status: DISCONTINUED | OUTPATIENT
Start: 2025-05-18 | End: 2025-05-22 | Stop reason: HOSPADM

## 2025-05-18 RX ORDER — METHYLPREDNISOLONE SODIUM SUCCINATE 125 MG/2ML
125 INJECTION, POWDER, LYOPHILIZED, FOR SOLUTION INTRAMUSCULAR; INTRAVENOUS ONCE
Status: DISCONTINUED | OUTPATIENT
Start: 2025-05-18 | End: 2025-05-18

## 2025-05-18 RX ORDER — LACTULOSE 10 G/15ML
10 SOLUTION ORAL 2 TIMES DAILY
Status: DISPENSED | OUTPATIENT
Start: 2025-05-18 | End: 2025-05-19

## 2025-05-18 RX ORDER — CILOSTAZOL 50 MG/1
100 TABLET ORAL 2 TIMES DAILY
Status: DISCONTINUED | OUTPATIENT
Start: 2025-05-18 | End: 2025-05-21

## 2025-05-18 RX ORDER — AMOXICILLIN 250 MG
2 CAPSULE ORAL 2 TIMES DAILY
Status: DISCONTINUED | OUTPATIENT
Start: 2025-05-18 | End: 2025-05-22 | Stop reason: HOSPADM

## 2025-05-18 RX ORDER — BISACODYL 10 MG
10 SUPPOSITORY, RECTAL RECTAL DAILY
Status: DISCONTINUED | OUTPATIENT
Start: 2025-05-18 | End: 2025-05-22 | Stop reason: HOSPADM

## 2025-05-18 RX ORDER — IPRATROPIUM BROMIDE AND ALBUTEROL SULFATE 2.5; .5 MG/3ML; MG/3ML
3 SOLUTION RESPIRATORY (INHALATION) ONCE
Status: COMPLETED | OUTPATIENT
Start: 2025-05-18 | End: 2025-05-18

## 2025-05-18 RX ORDER — ACETAMINOPHEN 325 MG/1
650 TABLET ORAL EVERY 4 HOURS PRN
Status: DISCONTINUED | OUTPATIENT
Start: 2025-05-18 | End: 2025-05-22 | Stop reason: HOSPADM

## 2025-05-18 RX ORDER — CETIRIZINE HYDROCHLORIDE 10 MG/1
5 TABLET ORAL NIGHTLY
Status: DISCONTINUED | OUTPATIENT
Start: 2025-05-18 | End: 2025-05-22 | Stop reason: HOSPADM

## 2025-05-18 RX ADMIN — BISACODYL 10 MG: 10 SUPPOSITORY RECTAL at 18:13

## 2025-05-18 RX ADMIN — SODIUM CHLORIDE, POTASSIUM CHLORIDE, SODIUM LACTATE AND CALCIUM CHLORIDE 100 ML/HR: 600; 310; 30; 20 INJECTION, SOLUTION INTRAVENOUS at 20:25

## 2025-05-18 RX ADMIN — DOXYCYCLINE 100 MG: 100 INJECTION, POWDER, LYOPHILIZED, FOR SOLUTION INTRAVENOUS at 18:13

## 2025-05-18 RX ADMIN — IPRATROPIUM BROMIDE AND ALBUTEROL SULFATE 3 ML: 2.5; .5 SOLUTION RESPIRATORY (INHALATION) at 21:04

## 2025-05-18 RX ADMIN — Medication 10 ML: at 22:02

## 2025-05-18 RX ADMIN — PIPERACILLIN AND TAZOBACTAM 3.38 G: 3; .375 INJECTION, POWDER, LYOPHILIZED, FOR SOLUTION INTRAVENOUS at 16:21

## 2025-05-18 RX ADMIN — VANCOMYCIN HYDROCHLORIDE 1000 MG: 1 INJECTION, POWDER, LYOPHILIZED, FOR SOLUTION INTRAVENOUS at 16:57

## 2025-05-18 RX ADMIN — IOPAMIDOL 65 ML: 755 INJECTION, SOLUTION INTRAVENOUS at 15:10

## 2025-05-18 RX ADMIN — BUDESONIDE 0.5 MG: 0.5 SUSPENSION RESPIRATORY (INHALATION) at 21:04

## 2025-05-18 RX ADMIN — IPRATROPIUM BROMIDE AND ALBUTEROL SULFATE 3 ML: 2.5; .5 SOLUTION RESPIRATORY (INHALATION) at 14:14

## 2025-05-18 RX ADMIN — ARFORMOTEROL TARTRATE 15 MCG: 15 SOLUTION RESPIRATORY (INHALATION) at 21:04

## 2025-05-18 RX ADMIN — HYDROMORPHONE HYDROCHLORIDE 0.5 MG: 1 INJECTION, SOLUTION INTRAMUSCULAR; INTRAVENOUS; SUBCUTANEOUS at 20:38

## 2025-05-18 NOTE — PLAN OF CARE
Goal Outcome Evaluation:  Plan of Care Reviewed With: patient        Progress: no change
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
## 2025-05-18 NOTE — Clinical Note
Level of Care: Telemetry [5]   Diagnosis: PNA (pneumonia) [[id number]]   Admitting Physician: KELBY YANES [[id number]]   Attending Physician: KELBY YANES [[id number]]   Is patient appropriate for Inpatient Observation Unit?: Yes [1]
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
## 2025-05-19 ENCOUNTER — ANCILLARY PROCEDURE (OUTPATIENT)
Dept: SPEECH THERAPY | Facility: HOSPITAL | Age: 80
End: 2025-05-19
Payer: MEDICARE

## 2025-05-19 LAB
ANION GAP SERPL CALCULATED.3IONS-SCNC: 13 MMOL/L (ref 5–15)
BASOPHILS # BLD AUTO: 0.03 10*3/MM3 (ref 0–0.2)
BASOPHILS NFR BLD AUTO: 0.3 % (ref 0–1.5)
BUN SERPL-MCNC: 15 MG/DL (ref 8–23)
BUN/CREAT SERPL: 23.1 (ref 7–25)
CALCIUM SPEC-SCNC: 8.7 MG/DL (ref 8.6–10.5)
CHLORIDE SERPL-SCNC: 100 MMOL/L (ref 98–107)
CO2 SERPL-SCNC: 25 MMOL/L (ref 22–29)
CREAT SERPL-MCNC: 0.65 MG/DL (ref 0.76–1.27)
DEPRECATED RDW RBC AUTO: 58.3 FL (ref 37–54)
EGFRCR SERPLBLD CKD-EPI 2021: 95.8 ML/MIN/1.73
EOSINOPHIL # BLD AUTO: 0.02 10*3/MM3 (ref 0–0.4)
EOSINOPHIL NFR BLD AUTO: 0.2 % (ref 0.3–6.2)
ERYTHROCYTE [DISTWIDTH] IN BLOOD BY AUTOMATED COUNT: 16.3 % (ref 12.3–15.4)
GLUCOSE SERPL-MCNC: 79 MG/DL (ref 65–99)
HCT VFR BLD AUTO: 47.3 % (ref 37.5–51)
HGB BLD-MCNC: 14.5 G/DL (ref 13–17.7)
IMM GRANULOCYTES # BLD AUTO: 0.04 10*3/MM3 (ref 0–0.05)
IMM GRANULOCYTES NFR BLD AUTO: 0.4 % (ref 0–0.5)
L PNEUMO1 AG UR QL IA: NEGATIVE
LYMPHOCYTES # BLD AUTO: 0.49 10*3/MM3 (ref 0.7–3.1)
LYMPHOCYTES NFR BLD AUTO: 4.7 % (ref 19.6–45.3)
MCH RBC QN AUTO: 29.5 PG (ref 26.6–33)
MCHC RBC AUTO-ENTMCNC: 30.7 G/DL (ref 31.5–35.7)
MCV RBC AUTO: 96.1 FL (ref 79–97)
MONOCYTES # BLD AUTO: 0.57 10*3/MM3 (ref 0.1–0.9)
MONOCYTES NFR BLD AUTO: 5.5 % (ref 5–12)
MRSA DNA SPEC QL NAA+PROBE: NEGATIVE
NEUTROPHILS NFR BLD AUTO: 88.9 % (ref 42.7–76)
NEUTROPHILS NFR BLD AUTO: 9.27 10*3/MM3 (ref 1.7–7)
NRBC BLD AUTO-RTO: 0 /100 WBC (ref 0–0.2)
PLATELET # BLD AUTO: 199 10*3/MM3 (ref 140–450)
PMV BLD AUTO: 10.5 FL (ref 6–12)
POTASSIUM SERPL-SCNC: 4 MMOL/L (ref 3.5–5.2)
QT INTERVAL: 456 MS
QT INTERVAL: 596 MS
QTC INTERVAL: 478 MS
QTC INTERVAL: 652 MS
RBC # BLD AUTO: 4.92 10*6/MM3 (ref 4.14–5.8)
S PNEUM AG SPEC QL LA: NEGATIVE
SODIUM SERPL-SCNC: 138 MMOL/L (ref 136–145)
WBC NRBC COR # BLD AUTO: 10.42 10*3/MM3 (ref 3.4–10.8)

## 2025-05-19 PROCEDURE — 92610 EVALUATE SWALLOWING FUNCTION: CPT

## 2025-05-19 PROCEDURE — 93010 ELECTROCARDIOGRAM REPORT: CPT | Performed by: STUDENT IN AN ORGANIZED HEALTH CARE EDUCATION/TRAINING PROGRAM

## 2025-05-19 PROCEDURE — 92612 ENDOSCOPY SWALLOW (FEES) VID: CPT

## 2025-05-19 PROCEDURE — 25010000002 DOXYCYCLINE 100 MG RECONSTITUTED SOLUTION 1 EACH VIAL: Performed by: NURSE PRACTITIONER

## 2025-05-19 PROCEDURE — 94799 UNLISTED PULMONARY SVC/PX: CPT

## 2025-05-19 PROCEDURE — G0378 HOSPITAL OBSERVATION PER HR: HCPCS

## 2025-05-19 PROCEDURE — 80048 BASIC METABOLIC PNL TOTAL CA: CPT | Performed by: NURSE PRACTITIONER

## 2025-05-19 PROCEDURE — 85025 COMPLETE CBC W/AUTO DIFF WBC: CPT | Performed by: NURSE PRACTITIONER

## 2025-05-19 PROCEDURE — 63710000001 REVEFENACIN 175 MCG/3ML SOLUTION: Performed by: NURSE PRACTITIONER

## 2025-05-19 PROCEDURE — 97165 OT EVAL LOW COMPLEX 30 MIN: CPT

## 2025-05-19 PROCEDURE — 97161 PT EVAL LOW COMPLEX 20 MIN: CPT

## 2025-05-19 PROCEDURE — 94664 DEMO&/EVAL PT USE INHALER: CPT

## 2025-05-19 PROCEDURE — 93005 ELECTROCARDIOGRAM TRACING: CPT | Performed by: NURSE PRACTITIONER

## 2025-05-19 PROCEDURE — 99232 SBSQ HOSP IP/OBS MODERATE 35: CPT | Performed by: STUDENT IN AN ORGANIZED HEALTH CARE EDUCATION/TRAINING PROGRAM

## 2025-05-19 PROCEDURE — 25010000002 HYDROMORPHONE PER 4 MG

## 2025-05-19 PROCEDURE — 25010000002 CEFTRIAXONE PER 250 MG: Performed by: NURSE PRACTITIONER

## 2025-05-19 PROCEDURE — 94761 N-INVAS EAR/PLS OXIMETRY MLT: CPT

## 2025-05-19 RX ORDER — TAMSULOSIN HYDROCHLORIDE 0.4 MG/1
0.8 CAPSULE ORAL DAILY
Status: DISCONTINUED | OUTPATIENT
Start: 2025-05-19 | End: 2025-05-22 | Stop reason: HOSPADM

## 2025-05-19 RX ORDER — CARBIDOPA AND LEVODOPA 25; 100 MG/1; MG/1
1 TABLET ORAL
Status: DISCONTINUED | OUTPATIENT
Start: 2025-05-19 | End: 2025-05-22 | Stop reason: HOSPADM

## 2025-05-19 RX ORDER — SCOPOLAMINE 1 MG/3D
1 PATCH, EXTENDED RELEASE TRANSDERMAL
Status: DISCONTINUED | OUTPATIENT
Start: 2025-05-19 | End: 2025-05-21

## 2025-05-19 RX ORDER — CARBIDOPA/LEVODOPA 25MG-250MG
1 TABLET ORAL EVERY 24 HOURS
Status: DISCONTINUED | OUTPATIENT
Start: 2025-05-20 | End: 2025-05-22 | Stop reason: HOSPADM

## 2025-05-19 RX ADMIN — AMANTADINE HYDROCHLORIDE 100 MG: 100 CAPSULE ORAL at 11:00

## 2025-05-19 RX ADMIN — GUAIFENESIN 1200 MG: 600 TABLET, EXTENDED RELEASE ORAL at 11:01

## 2025-05-19 RX ADMIN — ACETAMINOPHEN 650 MG: 325 TABLET ORAL at 11:00

## 2025-05-19 RX ADMIN — Medication 1 TABLET: at 11:00

## 2025-05-19 RX ADMIN — CETIRIZINE HYDROCHLORIDE 5 MG: 10 TABLET, FILM COATED ORAL at 20:46

## 2025-05-19 RX ADMIN — APIXABAN 5 MG: 5 TABLET, FILM COATED ORAL at 20:47

## 2025-05-19 RX ADMIN — SENNOSIDES AND DOCUSATE SODIUM 2 TABLET: 50; 8.6 TABLET ORAL at 11:00

## 2025-05-19 RX ADMIN — BUDESONIDE 0.5 MG: 0.5 SUSPENSION RESPIRATORY (INHALATION) at 07:12

## 2025-05-19 RX ADMIN — IPRATROPIUM BROMIDE AND ALBUTEROL SULFATE 3 ML: 2.5; .5 SOLUTION RESPIRATORY (INHALATION) at 07:12

## 2025-05-19 RX ADMIN — SENNOSIDES AND DOCUSATE SODIUM 2 TABLET: 50; 8.6 TABLET ORAL at 20:46

## 2025-05-19 RX ADMIN — CILOSTAZOL 100 MG: 50 TABLET ORAL at 20:54

## 2025-05-19 RX ADMIN — SCOPOLAMINE 1 PATCH: 1.5 PATCH, EXTENDED RELEASE TRANSDERMAL at 09:44

## 2025-05-19 RX ADMIN — IPRATROPIUM BROMIDE AND ALBUTEROL SULFATE 3 ML: 2.5; .5 SOLUTION RESPIRATORY (INHALATION) at 19:21

## 2025-05-19 RX ADMIN — BISACODYL 5 MG: 5 TABLET, COATED ORAL at 17:33

## 2025-05-19 RX ADMIN — BISACODYL 10 MG: 10 SUPPOSITORY RECTAL at 17:33

## 2025-05-19 RX ADMIN — HYDROMORPHONE HYDROCHLORIDE 0.5 MG: 1 INJECTION, SOLUTION INTRAMUSCULAR; INTRAVENOUS; SUBCUTANEOUS at 06:10

## 2025-05-19 RX ADMIN — APIXABAN 5 MG: 5 TABLET, FILM COATED ORAL at 11:01

## 2025-05-19 RX ADMIN — GUAIFENESIN 1200 MG: 600 TABLET, EXTENDED RELEASE ORAL at 20:46

## 2025-05-19 RX ADMIN — LACTULOSE 10 G: 20 SOLUTION ORAL at 10:59

## 2025-05-19 RX ADMIN — REVEFENACIN 175 MCG: 175 SOLUTION RESPIRATORY (INHALATION) at 07:15

## 2025-05-19 RX ADMIN — DOXYCYCLINE 100 MG: 100 INJECTION, POWDER, LYOPHILIZED, FOR SOLUTION INTRAVENOUS at 06:10

## 2025-05-19 RX ADMIN — CARBIDOPA AND LEVODOPA 1 TABLET: 25; 100 TABLET ORAL at 12:35

## 2025-05-19 RX ADMIN — ARFORMOTEROL TARTRATE 15 MCG: 15 SOLUTION RESPIRATORY (INHALATION) at 07:13

## 2025-05-19 RX ADMIN — CILOSTAZOL 100 MG: 50 TABLET ORAL at 11:00

## 2025-05-19 RX ADMIN — METOPROLOL SUCCINATE 50 MG: 50 TABLET, EXTENDED RELEASE ORAL at 11:01

## 2025-05-19 RX ADMIN — MIRTAZAPINE 7.5 MG: 15 TABLET, FILM COATED ORAL at 20:45

## 2025-05-19 RX ADMIN — BUDESONIDE 0.5 MG: 0.5 SUSPENSION RESPIRATORY (INHALATION) at 19:21

## 2025-05-19 RX ADMIN — AMANTADINE HYDROCHLORIDE 100 MG: 100 CAPSULE ORAL at 21:21

## 2025-05-19 RX ADMIN — CARBIDOPA AND LEVODOPA 1 TABLET: 25; 100 TABLET ORAL at 21:20

## 2025-05-19 RX ADMIN — SODIUM CHLORIDE 1000 MG: 900 INJECTION INTRAVENOUS at 17:33

## 2025-05-19 RX ADMIN — Medication 10 ML: at 20:48

## 2025-05-19 RX ADMIN — DOXYCYCLINE 100 MG: 100 INJECTION, POWDER, LYOPHILIZED, FOR SOLUTION INTRAVENOUS at 20:47

## 2025-05-19 RX ADMIN — TAMSULOSIN HYDROCHLORIDE 0.8 MG: 0.4 CAPSULE ORAL at 12:15

## 2025-05-19 RX ADMIN — IPRATROPIUM BROMIDE AND ALBUTEROL SULFATE 3 ML: 2.5; .5 SOLUTION RESPIRATORY (INHALATION) at 00:27

## 2025-05-19 RX ADMIN — IPRATROPIUM BROMIDE AND ALBUTEROL SULFATE 3 ML: 2.5; .5 SOLUTION RESPIRATORY (INHALATION) at 12:18

## 2025-05-19 RX ADMIN — CARBIDOPA AND LEVODOPA 1 TABLET: 50; 200 TABLET, EXTENDED RELEASE ORAL at 11:01

## 2025-05-19 RX ADMIN — ARFORMOTEROL TARTRATE 15 MCG: 15 SOLUTION RESPIRATORY (INHALATION) at 19:21

## 2025-05-19 RX ADMIN — CARBIDOPA AND LEVODOPA 1 TABLET: 25; 100 TABLET ORAL at 17:33

## 2025-05-19 NOTE — PLAN OF CARE
Goal Outcome Evaluation:  Plan of Care Reviewed With: patient           Outcome Evaluation: Pt. presents below baseline function w/generalized weakness, balance deficits and decreased functional endurance affecting his ability to safely participate in functional mobility. He performed bed mobility, transfers and ambulated 140' w/front wheeled walker, contact guard assist. Activity limited by fatigue. Pt. would benefit from acute PT services to address stated deficits.    Anticipated Discharge Disposition (PT): home with assist, home with home health
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
## 2025-05-19 NOTE — PROGRESS NOTES
[facility] Medicine Services  PROGRESS NOTE    Patient Name: Flaco Roque II  : 1945  MRN: [id number]    Date of Admission: 2025  Primary Care Physician: Ariadne Galloway PA    Subjective   Subjective     CC:  Follow-up dyspnea    HPI:  He reports breathing much better today.  He has also been able to urinate and that has relieved his bladder pain      Objective   Objective     Vital Signs:   Temp:  [97.7 °F (36.5 °C)-98.3 °F (36.8 °C)] 98.2 °F (36.8 °C)  Heart Rate:  [55-94] 88  Resp:  [16-20] 16  BP: ()/(62-86) 116/64  Flow (L/min) (Oxygen Therapy):  [2] 2     Physical Exam:  Constitutional: No acute distress, awake, alert, elderly/frail  HENT: NCAT, mucous membranes moist  Respiratory: Clear to auscultation bilaterally, respiratory effort fair on 2L NC  Cardiovascular: RRR, no murmurs, rubs, or gallops  Gastrointestinal: Positive bowel sounds, soft, nontender, nondistended  Musculoskeletal: No bilateral ankle edema  Psychiatric: Appropriate affect, cooperative  Neurologic: Oriented x 3,no focal deficit, globally weak  Skin: No rashes      Results Reviewed:  LAB RESULTS:      Lab 25  0746 25  1538 25  1351   WBC 10.42  --  16.21*   HEMOGLOBIN 14.5  --  16.0   HEMATOCRIT 47.3  --  49.0   PLATELETS 199  --  225   NEUTROS ABS 9.27*  --  15.10*   IMMATURE GRANS (ABS) 0.04  --  0.04   LYMPHS ABS 0.49*  --  0.24*   MONOS ABS 0.57  --  0.79   EOS ABS 0.02  --  0.00   MCV 96.1  --  89.7   CRP  --   --  1.62*   PROCALCITONIN  --   --  1.40*   LACTATE  --   --  1.5   HSTROP T  --  15 17         Lab 25  0746 25  1351   SODIUM 138 136   POTASSIUM 4.0 3.7   CHLORIDE 100 97*   CO2 25.0 25.0   ANION GAP 13.0 14.0   BUN 15 19   CREATININE 0.65* 0.84   EGFR 95.8 88.7   GLUCOSE 79 104*   CALCIUM 8.7 9.3         Lab 25  1351   TOTAL PROTEIN 6.2   ALBUMIN 3.7   GLOBULIN 2.5   ALT (SGPT) <5   AST (SGOT) 12   BILIRUBIN 1.7*   ALK PHOS 70         Lab  05/18/25  1538 05/18/25  1351   PROBNP  --  708.0   HSTROP T 15 17                 Lab 05/18/25  1426   PH, ARTERIAL 7.443   PCO2, ARTERIAL 42.4   PO2 ART 45.1*   FIO2 28   HCO3 ART 29.0*   BASE EXCESS ART 4.3*   CARBOXYHEMOGLOBIN 1.8     Brief Urine Lab Results  (Last result in the past 365 days)        Color   Clarity   Blood   Leuk Est   Nitrite   Protein   CREAT   Urine HCG        11/17/24 1310 Yellow   Clear   Negative   Moderate (2+)   Negative   Negative                   Microbiology Results Abnormal       None            SLP FEES - Fiberoptic Endo Eval Swallow  Result Date: 5/19/2025  This procedure was auto-finalized with no dictation required.    XR Abdomen KUB  Result Date: 5/18/2025  XR ABDOMEN KUB Date of Exam: 5/18/2025 5:47 PM EDT Indication: constipation Comparison: Abdominal radiograph 8/26/2024, CT abdomen pelvis 8/23/2024 Findings: Mild to moderate formed colonic stool. No abnormally dilated loops of bowel to suggest ileus or obstruction. Excreted contrast in the renal collecting system and urinary bladder. Posttreatment related changes of the prostate. Generator device left lower quadrant. Left hip prosthesis. Severe degenerative osteoarthritis of the right hip. Osseous demineralization with multilevel thoracolumbar spondylosis and levoscoliosis. Extensive intra-abdominal atherosclerotic disease.     Impression: Impression: Mild to moderate formed colonic stool without findings of obstruction. Electronically Signed: Melvin Frazier MD  5/18/2025 5:52 PM EDT  Workstation ID: [id number]    CT Angiogram Chest Pulmonary Embolism  Result Date: 5/18/2025  CT ANGIOGRAM CHEST PULMONARY EMBOLISM Date of Exam: 5/18/2025 2:49 PM EDT Indication: sob, pleuritic chest pain. Comparison: 11/17/2024 Technique: Axial CT images were obtained of the chest after the uneventful intravenous administration of 65 cc Isovue-370 IV contrast utilizing pulmonary embolism protocol.  In addition, a 3-D volume rendered image was  created for interpretation.  Reconstructed coronal and sagittal images were also obtained. Automated exposure control and iterative construction methods were used. Findings: Severe degenerative changes of the cervical spine. Bilateral shoulder replacements. Emphysema. Right middle and right upper lobe infiltrates right lower lobe infiltrate. Large hiatal hernia. 1.3 cm left thyroid nodule. No evidence of aortic dissection. Atheromatous disease of the coronary vessels. No pulmonary embolus. Calcified granulomata. Calcified mediastinal lymph nodes consistent with prior granulomatous disease. Left lower lobe mucous plugging. Scoliotic curvature of the spine. The sternum is intact. Significant degenerative changes of the spine. No rib fractures. Prior cholecystectomy. Severe atheromatous disease of the abdominal aorta and visualized branches. Prior laminectomies of the lumbar spine.     Impression: Mucous plugging in the left lower lobe without atelectasis. Right-sided pulmonary infiltrate concerning for developing pneumonia. Large hiatal hernia. Electronically Signed: Flaco Grace MD  5/18/2025 3:32 PM EDT  Workstation ID: [id number]    XR Chest 1 View  Result Date: 5/18/2025  XR CHEST 1 VW Date of Exam: 5/18/2025 1:29 PM EDT Indication: SOA triage protocol Comparison: 4/25/2025 Findings: Elevation of the left hemidiaphragm which obscures the left heart border. Pulmonary vasculature appears stable scattered lucencies throughout the lungs compatible with emphysema. Airspace disease and irregular opacities in the right perihilar and lower lung. Atelectasis in the left lung base. Status post bilateral shoulder reverse arthroplasties     Impression: Impression: 1. Right perihilar and lower lung opacities suspicious for pneumonia 2. Pulmonary emphysema 3. Elevation of the left hemidiaphragm Electronically Signed: Manoj Hudson  5/18/2025 1:56 PM EDT  Workstation ID: [id number]      Results for orders placed during the hospital  encounter of 04/22/25    Adult Transthoracic Echo Complete W/ Cont if Necessary Per Protocol    Interpretation Summary    Left ventricular systolic function is normal. Estimated left ventricular EF = 60%    Left ventricular wall thickness is consistent with hypertrophy.    No hemodynamically significant valvular heart disease      Current medications:  Scheduled Meds:amantadine, 100 mg, Oral, BID  [Held by provider] amiodarone, 200 mg, Oral, Q24H  apixaban, 5 mg, Oral, Q12H  arformoterol, 15 mcg, Nebulization, BID - RT   And  budesonide, 0.5 mg, Nebulization, BID - RT   And  revefenacin, 175 mcg, Nebulization, Daily - RT  senna-docusate sodium, 2 tablet, Oral, BID   And  bisacodyl, 10 mg, Rectal, Daily  carbidopa-levodopa, 1 tablet, Oral, 4x Daily  carbidopa-levodopa CR, 1 tablet, Oral, BID  cefTRIAXone, 1,000 mg, Intravenous, Q24H  cetirizine, 5 mg, Oral, Nightly  cilostazol, 100 mg, Oral, BID  doxycycline, 100 mg, Intravenous, Q12H  guaiFENesin, 1,200 mg, Oral, Q12H  ipratropium-albuterol, 3 mL, Nebulization, Q6H - RT  lactulose, 10 g, Oral, BID  metoprolol succinate XL, 50 mg, Oral, Daily  mirtazapine, 7.5 mg, Oral, Nightly  multivitamin with minerals, 1 tablet, Oral, Daily  pantoprazole, 40 mg, Oral, Q AM  Scopolamine, 1 patch, Transdermal, Q72H  sodium chloride, 10 mL, Intravenous, Q12H  tamsulosin, 0.8 mg, Oral, Daily      Continuous Infusions:   PRN Meds:.  acetaminophen **OR** acetaminophen **OR** acetaminophen    senna-docusate sodium **AND** bisacodyl **AND** bisacodyl    Calcium Replacement - Follow Nurse / BPA Driven Protocol    HYDROmorphone    Magnesium Standard Dose Replacement - Follow Nurse / BPA Driven Protocol    Phosphorus Replacement - Follow Nurse / BPA Driven Protocol    Potassium Replacement - Follow Nurse / BPA Driven Protocol    sodium chloride    sodium chloride    sodium chloride    tiZANidine    Assessment & Plan   Assessment & Plan     Active Hospital Problems    Diagnosis  POA    **PNA  (pneumonia) [J18.9]  Yes    Hyperlipidemia, unspecified [E78.5]  Yes    Esophageal dysphagia [R13.19]  Yes    Essential (primary) hypertension [I10]  Yes    Parkinson disease [G20.A1]  Yes    ABRIL (obstructive sleep apnea) [G47.33]  Yes    Chronic pain with pain pump in place [G89.29]  Yes    GERD without esophagitis [K21.9]  Yes      Resolved Hospital Problems   No resolved problems to display.        Brief Hospital Course to date:  Flaco Roque II is a 79 y.o. male with past medical history of Parkinson's, his COPD on 2 L nasal cannula, BPH, recent admission for Klebsiella pneumonia and COPD exacerbation who is here with worsening dyspnea, admitted for concerns of aspiration pneumonia    Aspiration PNA  -- CT chest  LLL mucous plugging, right sided pulmonary infiltrate  -- repeat sputum cultures in process  -- nebs and inhalers    -- cont oxygen  -- speech eval    -- cont rocephin and doxycycline   - mucinex and other pulmonary toilet  -- MRSA negative, strep negative, Legionella negative     Dizziness  Poor appetite  Insomnia   -- consult PT.OT.CM. dietician  -- Orthostatic blood pressure borderline, suspect secondary to decreased appetite and above  -- start remeron for sleep and appetite       COPD without exac on home oxygen   -- cont inhalers and nebs         Constipation  -- bowel regimen  - KUB without evidence of obstruction       PAD  -- cont cilostazol, eliquis, statin      Paroxysmal Afib  --cont toprol and eliquis  - Holding amiodarone secondary to prolonged QTc.  Recheck EKG in a.m.     Parkinson' disease  Dysphagia   -- cont home meds  -- speech to eval   -- pt was on chopped meats and nectar thick liquids last admissio n         Chronic pain with pain pump in place     ABRIL  -- cpap at hs     Expected Discharge Location and Transportation: home  Expected Discharge   Expected Discharge Date: 5/20/2025; Expected Discharge Time: 12:00 PM     VTE Prophylaxis:  Pharmacologic VTE prophylaxis orders are  present.         AM-PAC 6 Clicks Score (PT): 19 (05/19/25 0933)    CODE STATUS:   Code Status and Medical Interventions: CPR (Attempt to Resuscitate); Limited Support; No intubation (DNI)   Ordered at: 05/18/25 1711     Code Status (Patient has no pulse and is not breathing):    CPR (Attempt to Resuscitate)     Medical Interventions (Patient has pulse or is breathing):    Limited Support     Medical Intervention Limits:    No intubation (DNI)     Level Of Support Discussed With:    Patient       Cheryl Garrett MD  05/19/25
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
## 2025-05-19 NOTE — PLAN OF CARE
Goal Outcome Evaluation:  Plan of Care Reviewed With: patient        Progress: no change  Outcome Evaluation: OT eval complete. Pt presents below baseline with decreased activity tolerance, strength, SOA, balance deficits, and limited ADL performance. SBA for bed mobility. Pt needing assist with genesis hygiene and min A for toilet transfer. Recommend IPOT POC and home with assist and home health at D/C.    Anticipated Discharge Disposition (OT): home with assist, home with home health

## 2025-05-19 NOTE — PROGRESS NOTES
Malnutrition Severity Assessment    Patient Name:  Flaco Roque II  YOB: 1945  MRN: [id number]  Admit Date:  5/18/2025    Patient meets criteria for : (P) Severe Malnutrition    Comments:  Pt meets criteria for severe, chronic malnutrition with the indicators of significant weight loss of >7.5% in 3 months, severe muscle wasting, and mild/moderate subcutaneous fat loss. Of note, pt with hx of COPD and parkinsons which may be additional factors in muscle wasting.    Malnutrition Severity Assessment  Malnutrition Type: (P) Chronic Disease - Related Malnutrition  Malnutrition Type (Last 8 Hours)       Malnutrition Severity Assessment       Row Name 05/19/25 1414       Malnutrition Severity Assessment    Malnutrition Type Chronic Disease - Related Malnutrition (P)       Row Name 05/19/25 1414       Insufficient Energy Intake     Insufficient Energy Intake Findings -- (P)   FUENTES      Row Name 05/19/25 1414       Unintentional Weight Loss     Unintentional Weight Loss Findings Severe (P)     Unintentional Weight Loss  Weight loss greater than 7.5% in three months (P)       Row Name 05/19/25 1414       Muscle Loss    Loss of Muscle Mass Findings Severe (P)     [place] Severe - deep hollowing/scooping, lack of muscle to touch, facial bones well defined (P)     Clavicle Bone Region Moderate - some protrusion in females, visible in males (P)     Acromion Bone Region Severe - squared shoulders, bones, and acromion process protrusion prominent (P)     Dorsal Hand Region Moderate - slight depression (P)     Patellar Region Moderate - patella more prominent, less muscle definition around patella (P)     Posterior Calf Region Moderate - some roundness, slight firmness (P)       Row Name 05/19/25 1414       Fat Loss    Subcutaneous Fat Loss Findings Moderate (P)     Orbital Region  Moderate -  somewhat hollowness, slightly dark circles (P)     Upper Arm Region Moderate - some fat tissue, not ample (P)        Row Name 05/19/25 1414       Criteria Met (Must meet criteria for severity in at least 2 of these categories: M Wasting, Fat Loss, Fluid, Secondary Signs, Wt. Status, Intake)    Patient meets criteria for  Severe Malnutrition (P)                     Electronically signed by:  Komal Agarwal  05/19/25 14:17 EDT
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
## 2025-05-19 NOTE — PLAN OF CARE
Problem: Adult Inpatient Plan of Care  Goal: Plan of Care Review  Outcome: Progressing  Flowsheets (Taken 5/19/2025 0651)  Outcome Evaluation: ao. vss. c/o persistent pain managing with implanted pain pump and prn iv med. npo until eval by SPL. unable to urinate this am. bladder scanned with 550,I/O cath with 300.  Goal: Patient-Specific Goal (Individualized)  Outcome: Progressing  Goal: Absence of Hospital-Acquired Illness or Injury  Outcome: Progressing  Intervention: Identify and Manage Fall Risk  Recent Flowsheet Documentation  Taken 5/19/2025 0610 by Selwyn Barraza RN  Safety Promotion/Fall Prevention:   assistive device/personal items within reach   clutter free environment maintained   nonskid shoes/slippers when out of bed   room organization consistent   safety round/check completed  Taken 5/19/2025 0400 by Selwyn Barraza RN  Safety Promotion/Fall Prevention:   assistive device/personal items within reach   clutter free environment maintained   nonskid shoes/slippers when out of bed   room organization consistent   safety round/check completed  Taken 5/19/2025 0200 by Selwyn Barraza RN  Safety Promotion/Fall Prevention:   assistive device/personal items within reach   activity supervised   clutter free environment maintained   nonskid shoes/slippers when out of bed   room organization consistent   safety round/check completed  Taken 5/19/2025 0000 by Selwyn Barraza RN  Safety Promotion/Fall Prevention:   activity supervised   assistive device/personal items within reach   clutter free environment maintained   nonskid shoes/slippers when out of bed   room organization consistent   safety round/check completed  Taken 5/18/2025 2200 by Selwyn Barraza RN  Safety Promotion/Fall Prevention:   assistive device/personal items within reach   clutter free environment maintained   nonskid shoes/slippers when out of bed   room organization consistent   safety round/check completed  Taken 5/18/2025 1953 by  Barraza, Mardella, RN  Safety Promotion/Fall Prevention:   assistive device/personal items within reach   clutter free environment maintained   nonskid shoes/slippers when out of bed   room organization consistent   safety round/check completed  Intervention: Prevent Skin Injury  Recent Flowsheet Documentation  Taken 5/19/2025 0610 by Selwyn Barraza RN  Body Position: position changed independently  Skin Protection: silicone foam dressing in place  Taken 5/19/2025 0400 by Selwyn Barraza RN  Body Position: position changed independently  Skin Protection: silicone foam dressing in place  Taken 5/19/2025 0200 by Selwyn Barraza RN  Body Position: position changed independently  Taken 5/19/2025 0000 by Selwyn Barraza RN  Body Position: position changed independently  Skin Protection: silicone foam dressing in place  Taken 5/18/2025 2200 by Selwyn Barraza RN  Body Position: position changed independently  Skin Protection: silicone foam dressing in place  Taken 5/18/2025 1953 by Selwyn Barraza RN  Body Position: position changed independently  Skin Protection: silicone foam dressing in place  Intervention: Prevent and Manage VTE (Venous Thromboembolism) Risk  Recent Flowsheet Documentation  Taken 5/18/2025 1953 by Selwyn Barraza RN  VTE Prevention/Management: (see mar) other (see comments)  Intervention: Prevent Infection  Recent Flowsheet Documentation  Taken 5/19/2025 0610 by Selwyn Barraza RN  Infection Prevention: environmental surveillance performed  Taken 5/19/2025 0400 by Selwyn Barraza RN  Infection Prevention: environmental surveillance performed  Taken 5/19/2025 0200 by Selwyn Barraza RN  Infection Prevention: environmental surveillance performed  Taken 5/19/2025 0000 by Selwyn Barraza RN  Infection Prevention: environmental surveillance performed  Taken 5/18/2025 2200 by Selwyn Barraza RN  Infection Prevention: environmental surveillance performed  Taken 5/18/2025 1953 by Selwyn Barraza  RN  Infection Prevention: environmental surveillance performed  Goal: Optimal Comfort and Wellbeing  Outcome: Progressing  Goal: Readiness for Transition of Care  Outcome: Progressing   Goal Outcome Evaluation:              Outcome Evaluation: ao. vss. c/o persistent pain managing with implanted pain pump and prn iv med. npo until eval by SPL. unable to urinate this am. bladder scanned with 550,I/O cath with 300.

## 2025-05-20 ENCOUNTER — APPOINTMENT (OUTPATIENT)
Dept: CT IMAGING | Facility: HOSPITAL | Age: 80
End: 2025-05-20
Payer: MEDICARE

## 2025-05-20 ENCOUNTER — APPOINTMENT (OUTPATIENT)
Dept: GENERAL RADIOLOGY | Facility: HOSPITAL | Age: 80
End: 2025-05-20
Payer: MEDICARE

## 2025-05-20 LAB
ANION GAP SERPL CALCULATED.3IONS-SCNC: 11 MMOL/L (ref 5–15)
BACTERIA SPEC RESP CULT: NORMAL
BASOPHILS # BLD AUTO: 0.03 10*3/MM3 (ref 0–0.2)
BASOPHILS NFR BLD AUTO: 0.3 % (ref 0–1.5)
BUN SERPL-MCNC: 13 MG/DL (ref 8–23)
BUN/CREAT SERPL: 23.2 (ref 7–25)
CALCIUM SPEC-SCNC: 8.7 MG/DL (ref 8.6–10.5)
CHLORIDE SERPL-SCNC: 100 MMOL/L (ref 98–107)
CO2 SERPL-SCNC: 26 MMOL/L (ref 22–29)
CREAT SERPL-MCNC: 0.56 MG/DL (ref 0.76–1.27)
DEPRECATED RDW RBC AUTO: 53.4 FL (ref 37–54)
EGFRCR SERPLBLD CKD-EPI 2021: 100.3 ML/MIN/1.73
EOSINOPHIL # BLD AUTO: 0.04 10*3/MM3 (ref 0–0.4)
EOSINOPHIL NFR BLD AUTO: 0.4 % (ref 0.3–6.2)
ERYTHROCYTE [DISTWIDTH] IN BLOOD BY AUTOMATED COUNT: 16 % (ref 12.3–15.4)
GLUCOSE SERPL-MCNC: 119 MG/DL (ref 65–99)
GRAM STN SPEC: NORMAL
HCT VFR BLD AUTO: 42.1 % (ref 37.5–51)
HGB BLD-MCNC: 13.6 G/DL (ref 13–17.7)
IMM GRANULOCYTES # BLD AUTO: 0.03 10*3/MM3 (ref 0–0.05)
IMM GRANULOCYTES NFR BLD AUTO: 0.3 % (ref 0–0.5)
LYMPHOCYTES # BLD AUTO: 0.29 10*3/MM3 (ref 0.7–3.1)
LYMPHOCYTES NFR BLD AUTO: 3.1 % (ref 19.6–45.3)
MCH RBC QN AUTO: 29.4 PG (ref 26.6–33)
MCHC RBC AUTO-ENTMCNC: 32.3 G/DL (ref 31.5–35.7)
MCV RBC AUTO: 90.9 FL (ref 79–97)
MONOCYTES # BLD AUTO: 0.57 10*3/MM3 (ref 0.1–0.9)
MONOCYTES NFR BLD AUTO: 6 % (ref 5–12)
NEUTROPHILS NFR BLD AUTO: 8.49 10*3/MM3 (ref 1.7–7)
NEUTROPHILS NFR BLD AUTO: 89.9 % (ref 42.7–76)
NRBC BLD AUTO-RTO: 0 /100 WBC (ref 0–0.2)
PLATELET # BLD AUTO: 201 10*3/MM3 (ref 140–450)
PMV BLD AUTO: 11.1 FL (ref 6–12)
POTASSIUM SERPL-SCNC: 3.4 MMOL/L (ref 3.5–5.2)
POTASSIUM SERPL-SCNC: 3.9 MMOL/L (ref 3.5–5.2)
RBC # BLD AUTO: 4.63 10*6/MM3 (ref 4.14–5.8)
SODIUM SERPL-SCNC: 137 MMOL/L (ref 136–145)
WBC NRBC COR # BLD AUTO: 9.45 10*3/MM3 (ref 3.4–10.8)

## 2025-05-20 PROCEDURE — 25010000002 HYDROMORPHONE PER 4 MG

## 2025-05-20 PROCEDURE — 63710000001 REVEFENACIN 175 MCG/3ML SOLUTION: Performed by: NURSE PRACTITIONER

## 2025-05-20 PROCEDURE — 25010000002 DOXYCYCLINE 100 MG RECONSTITUTED SOLUTION 1 EACH VIAL: Performed by: NURSE PRACTITIONER

## 2025-05-20 PROCEDURE — 94799 UNLISTED PULMONARY SVC/PX: CPT

## 2025-05-20 PROCEDURE — 93005 ELECTROCARDIOGRAM TRACING: CPT | Performed by: STUDENT IN AN ORGANIZED HEALTH CARE EDUCATION/TRAINING PROGRAM

## 2025-05-20 PROCEDURE — G0378 HOSPITAL OBSERVATION PER HR: HCPCS

## 2025-05-20 PROCEDURE — 84132 ASSAY OF SERUM POTASSIUM: CPT | Performed by: STUDENT IN AN ORGANIZED HEALTH CARE EDUCATION/TRAINING PROGRAM

## 2025-05-20 PROCEDURE — 73560 X-RAY EXAM OF KNEE 1 OR 2: CPT

## 2025-05-20 PROCEDURE — 70450 CT HEAD/BRAIN W/O DYE: CPT

## 2025-05-20 PROCEDURE — 94664 DEMO&/EVAL PT USE INHALER: CPT

## 2025-05-20 PROCEDURE — 94761 N-INVAS EAR/PLS OXIMETRY MLT: CPT

## 2025-05-20 PROCEDURE — 80048 BASIC METABOLIC PNL TOTAL CA: CPT | Performed by: STUDENT IN AN ORGANIZED HEALTH CARE EDUCATION/TRAINING PROGRAM

## 2025-05-20 PROCEDURE — 93010 ELECTROCARDIOGRAM REPORT: CPT | Performed by: INTERNAL MEDICINE

## 2025-05-20 PROCEDURE — 73030 X-RAY EXAM OF SHOULDER: CPT

## 2025-05-20 PROCEDURE — 73080 X-RAY EXAM OF ELBOW: CPT

## 2025-05-20 PROCEDURE — 85025 COMPLETE CBC W/AUTO DIFF WBC: CPT | Performed by: STUDENT IN AN ORGANIZED HEALTH CARE EDUCATION/TRAINING PROGRAM

## 2025-05-20 PROCEDURE — 97530 THERAPEUTIC ACTIVITIES: CPT

## 2025-05-20 PROCEDURE — 99232 SBSQ HOSP IP/OBS MODERATE 35: CPT | Performed by: STUDENT IN AN ORGANIZED HEALTH CARE EDUCATION/TRAINING PROGRAM

## 2025-05-20 PROCEDURE — 25010000002 CEFTRIAXONE PER 250 MG: Performed by: NURSE PRACTITIONER

## 2025-05-20 RX ORDER — POTASSIUM CHLORIDE 1.5 G/1.58G
40 POWDER, FOR SOLUTION ORAL EVERY 4 HOURS
Status: COMPLETED | OUTPATIENT
Start: 2025-05-20 | End: 2025-05-20

## 2025-05-20 RX ADMIN — CARBIDOPA AND LEVODOPA 1 TABLET: 25; 100 TABLET ORAL at 21:00

## 2025-05-20 RX ADMIN — SODIUM CHLORIDE 1000 MG: 900 INJECTION INTRAVENOUS at 18:14

## 2025-05-20 RX ADMIN — SENNOSIDES AND DOCUSATE SODIUM 2 TABLET: 50; 8.6 TABLET ORAL at 08:31

## 2025-05-20 RX ADMIN — CARBIDOPA AND LEVODOPA 1 TABLET: 25; 100 TABLET ORAL at 13:02

## 2025-05-20 RX ADMIN — CILOSTAZOL 100 MG: 50 TABLET ORAL at 08:31

## 2025-05-20 RX ADMIN — ACETAMINOPHEN 650 MG: 325 TABLET ORAL at 16:34

## 2025-05-20 RX ADMIN — METOPROLOL SUCCINATE 50 MG: 50 TABLET, EXTENDED RELEASE ORAL at 08:32

## 2025-05-20 RX ADMIN — TAMSULOSIN HYDROCHLORIDE 0.8 MG: 0.4 CAPSULE ORAL at 08:32

## 2025-05-20 RX ADMIN — IPRATROPIUM BROMIDE AND ALBUTEROL SULFATE 3 ML: 2.5; .5 SOLUTION RESPIRATORY (INHALATION) at 19:35

## 2025-05-20 RX ADMIN — CETIRIZINE HYDROCHLORIDE 5 MG: 10 TABLET, FILM COATED ORAL at 20:48

## 2025-05-20 RX ADMIN — LANSOPRAZOLE 15 MG: 15 TABLET, ORALLY DISINTEGRATING ORAL at 05:15

## 2025-05-20 RX ADMIN — ARFORMOTEROL TARTRATE 15 MCG: 15 SOLUTION RESPIRATORY (INHALATION) at 19:35

## 2025-05-20 RX ADMIN — CILOSTAZOL 100 MG: 50 TABLET ORAL at 18:14

## 2025-05-20 RX ADMIN — IPRATROPIUM BROMIDE AND ALBUTEROL SULFATE 3 ML: 2.5; .5 SOLUTION RESPIRATORY (INHALATION) at 01:13

## 2025-05-20 RX ADMIN — DOXYCYCLINE 100 MG: 100 INJECTION, POWDER, LYOPHILIZED, FOR SOLUTION INTRAVENOUS at 20:48

## 2025-05-20 RX ADMIN — APIXABAN 5 MG: 5 TABLET, FILM COATED ORAL at 08:31

## 2025-05-20 RX ADMIN — Medication 1 TABLET: at 08:31

## 2025-05-20 RX ADMIN — AMANTADINE HYDROCHLORIDE 100 MG: 100 CAPSULE ORAL at 20:48

## 2025-05-20 RX ADMIN — SENNOSIDES AND DOCUSATE SODIUM 2 TABLET: 50; 8.6 TABLET ORAL at 20:48

## 2025-05-20 RX ADMIN — BUDESONIDE 0.5 MG: 0.5 SUSPENSION RESPIRATORY (INHALATION) at 19:35

## 2025-05-20 RX ADMIN — REVEFENACIN 175 MCG: 175 SOLUTION RESPIRATORY (INHALATION) at 06:55

## 2025-05-20 RX ADMIN — MIRTAZAPINE 7.5 MG: 15 TABLET, FILM COATED ORAL at 20:48

## 2025-05-20 RX ADMIN — ACETAMINOPHEN 650 MG: 325 TABLET ORAL at 05:08

## 2025-05-20 RX ADMIN — IPRATROPIUM BROMIDE AND ALBUTEROL SULFATE 3 ML: 2.5; .5 SOLUTION RESPIRATORY (INHALATION) at 12:50

## 2025-05-20 RX ADMIN — AMANTADINE HYDROCHLORIDE 100 MG: 100 CAPSULE ORAL at 08:31

## 2025-05-20 RX ADMIN — GUAIFENESIN 1200 MG: 600 TABLET, EXTENDED RELEASE ORAL at 20:48

## 2025-05-20 RX ADMIN — HYDROMORPHONE HYDROCHLORIDE 0.5 MG: 1 INJECTION, SOLUTION INTRAMUSCULAR; INTRAVENOUS; SUBCUTANEOUS at 20:48

## 2025-05-20 RX ADMIN — CARBIDOPA AND LEVODOPA 1 TABLET: 25; 100 TABLET ORAL at 18:23

## 2025-05-20 RX ADMIN — CARBIDOPA AND LEVODOPA 1 TABLET: 25; 250 TABLET ORAL at 08:31

## 2025-05-20 RX ADMIN — IPRATROPIUM BROMIDE AND ALBUTEROL SULFATE 3 ML: 2.5; .5 SOLUTION RESPIRATORY (INHALATION) at 06:55

## 2025-05-20 RX ADMIN — CARBIDOPA AND LEVODOPA 1 TABLET: 25; 100 TABLET ORAL at 08:32

## 2025-05-20 RX ADMIN — Medication 10 ML: at 20:49

## 2025-05-20 RX ADMIN — POTASSIUM CHLORIDE 40 MEQ: 1.5 FOR SOLUTION ORAL at 13:02

## 2025-05-20 RX ADMIN — ARFORMOTEROL TARTRATE 15 MCG: 15 SOLUTION RESPIRATORY (INHALATION) at 06:55

## 2025-05-20 RX ADMIN — BISACODYL 10 MG: 10 SUPPOSITORY RECTAL at 08:32

## 2025-05-20 RX ADMIN — POTASSIUM CHLORIDE 40 MEQ: 1.5 FOR SOLUTION ORAL at 18:23

## 2025-05-20 RX ADMIN — BUDESONIDE 0.5 MG: 0.5 SUSPENSION RESPIRATORY (INHALATION) at 06:55

## 2025-05-20 RX ADMIN — GUAIFENESIN 1200 MG: 600 TABLET, EXTENDED RELEASE ORAL at 08:31

## 2025-05-20 RX ADMIN — APIXABAN 5 MG: 5 TABLET, FILM COATED ORAL at 20:48

## 2025-05-20 NOTE — CASE MANAGEMENT/SOCIAL WORK
Continued Stay Note   [facility]     Patient Name: Flaco Roque II  MRN: [id number]  Today's Date: 5/20/2025    Admit Date: 5/18/2025    Plan: [facility]   Discharge Plan       Row Name 05/20/25 1458       Plan    Plan [facility]    Plan Comments  spoke with patient and then called his wife in regards to discharge planning. Patient and wife are unsure of insurance, but she will find out and let me know. He lives with spouse in a single level home located in [facility]. He has use of rolling walker, wheelchair, shower chair, oxygen through [employer].   He had had home health but not current. His PCP is Ariadne Galloway. A referral is given to [facility] per his and spouse request primarily for SLP.     Final Discharge Disposition Code 03 - skilled nursing facility (SNF)                   Discharge Codes    No documentation.                 Expected Discharge Date and Time       Expected Discharge Date Expected Discharge Time    May 20, 2025               Kylah Rushing RN

## 2025-05-20 NOTE — PLAN OF CARE
Problem: Adult Inpatient Plan of Care  Goal: Plan of Care Review  Outcome: Progressing  Flowsheets (Taken 5/20/2025 0626)  Outcome Evaluation: ao. vss. up x1 assist. bm overnight.  Goal: Patient-Specific Goal (Individualized)  Outcome: Progressing  Goal: Absence of Hospital-Acquired Illness or Injury  Outcome: Progressing  Intervention: Identify and Manage Fall Risk  Recent Flowsheet Documentation  Taken 5/20/2025 0600 by Selwyn Barraza RN  Safety Promotion/Fall Prevention:   assistive device/personal items within reach   clutter free environment maintained   nonskid shoes/slippers when out of bed   room organization consistent   safety round/check completed  Taken 5/20/2025 0400 by Selwyn Barraza RN  Safety Promotion/Fall Prevention:   assistive device/personal items within reach   clutter free environment maintained   room organization consistent   safety round/check completed   nonskid shoes/slippers when out of bed  Taken 5/20/2025 0200 by Selwyn Barraza RN  Safety Promotion/Fall Prevention:   assistive device/personal items within reach   clutter free environment maintained   nonskid shoes/slippers when out of bed   room organization consistent   safety round/check completed  Taken 5/20/2025 0000 by Selwyn Barraza RN  Safety Promotion/Fall Prevention:   assistive device/personal items within reach   clutter free environment maintained   nonskid shoes/slippers when out of bed   room organization consistent   safety round/check completed  Taken 5/19/2025 2200 by Selwyn Barraza RN  Safety Promotion/Fall Prevention:   assistive device/personal items within reach   clutter free environment maintained   nonskid shoes/slippers when out of bed   room organization consistent   safety round/check completed  Taken 5/19/2025 2032 by Selwyn Barraza RN  Safety Promotion/Fall Prevention:   assistive device/personal items within reach   clutter free environment maintained   nonskid shoes/slippers when out of  bed   room organization consistent   safety round/check completed  Intervention: Prevent Skin Injury  Recent Flowsheet Documentation  Taken 5/20/2025 0600 by Selwyn Barraza RN  Body Position: legs elevated  Taken 5/20/2025 0400 by Selwyn Barraza RN  Body Position: left  Taken 5/20/2025 0200 by Selwyn Barraza RN  Body Position: right  Taken 5/20/2025 0000 by Selwyn Barraza RN  Body Position: left  Taken 5/19/2025 2200 by Selwyn Barraza RN  Body Position:   right   position changed independently  Taken 5/19/2025 2032 by Selwyn Barraza RN  Body Position:   supine   position changed independently  Intervention: Prevent and Manage VTE (Venous Thromboembolism) Risk  Recent Flowsheet Documentation  Taken 5/19/2025 2032 by Selwyn Barraza RN  VTE Prevention/Management: (see mar) other (see comments)  Intervention: Prevent Infection  Recent Flowsheet Documentation  Taken 5/20/2025 0600 by Selwyn Barraza RN  Infection Prevention: environmental surveillance performed  Taken 5/20/2025 0000 by Selwyn Barraza RN  Infection Prevention: environmental surveillance performed  Taken 5/19/2025 2200 by Selwyn Barraza RN  Infection Prevention: environmental surveillance performed  Taken 5/19/2025 2032 by Selwyn Barraza RN  Infection Prevention: environmental surveillance performed  Goal: Optimal Comfort and Wellbeing  Outcome: Progressing  Goal: Readiness for Transition of Care  Outcome: Progressing     Problem: Skin Injury Risk Increased  Goal: Skin Health and Integrity  Outcome: Progressing  Intervention: Optimize Skin Protection  Recent Flowsheet Documentation  Taken 5/20/2025 0600 by Selwyn Barraza RN  Activity Management:   activity encouraged   up in chair  Taken 5/20/2025 0400 by Selwyn Barraza RN  Activity Management: activity encouraged  Head of Bed (HOB) Positioning: HOB elevated  Taken 5/20/2025 0200 by Selwyn Barraza RN  Activity Management: activity encouraged  Head of Bed (HOB) Positioning: HOB  elevated  Taken 5/20/2025 0000 by Selwyn Barraza RN  Activity Management: activity encouraged  Head of Bed (HOB) Positioning: HOB elevated  Taken 5/19/2025 2200 by Selwyn Barraza RN  Activity Management: activity encouraged  Head of Bed (HOB) Positioning: HOB elevated  Taken 5/19/2025 2032 by Selwyn Barraza RN  Activity Management: activity encouraged  Head of Bed (HOB) Positioning: HOB elevated     Problem: Fall Injury Risk  Goal: Absence of Fall and Fall-Related Injury  Outcome: Progressing  Intervention: Identify and Manage Contributors  Recent Flowsheet Documentation  Taken 5/19/2025 2032 by Selwyn Barraza RN  Medication Review/Management: medications reviewed  Intervention: Promote Injury-Free Environment  Recent Flowsheet Documentation  Taken 5/20/2025 0600 by Selwyn Barraza RN  Safety Promotion/Fall Prevention:   assistive device/personal items within reach   clutter free environment maintained   nonskid shoes/slippers when out of bed   room organization consistent   safety round/check completed  Taken 5/20/2025 0400 by Selwyn Barraza RN  Safety Promotion/Fall Prevention:   assistive device/personal items within reach   clutter free environment maintained   room organization consistent   safety round/check completed   nonskid shoes/slippers when out of bed  Taken 5/20/2025 0200 by Selwyn Barraza RN  Safety Promotion/Fall Prevention:   assistive device/personal items within reach   clutter free environment maintained   nonskid shoes/slippers when out of bed   room organization consistent   safety round/check completed  Taken 5/20/2025 0000 by Selwyn Barraza RN  Safety Promotion/Fall Prevention:   assistive device/personal items within reach   clutter free environment maintained   nonskid shoes/slippers when out of bed   room organization consistent   safety round/check completed  Taken 5/19/2025 2200 by Selwyn Barraza RN  Safety Promotion/Fall Prevention:   assistive device/personal items  within reach   clutter free environment maintained   nonskid shoes/slippers when out of bed   room organization consistent   safety round/check completed  Taken 5/19/2025 2032 by Selwyn Barraza RN  Safety Promotion/Fall Prevention:   assistive device/personal items within reach   clutter free environment maintained   nonskid shoes/slippers when out of bed   room organization consistent   safety round/check completed     Problem: Noninvasive Ventilation Acute  Goal: Effective Unassisted Ventilation and Oxygenation  Outcome: Progressing   Goal Outcome Evaluation:              Outcome Evaluation: ao. vss. up x1 assist. bm overnight.

## 2025-05-20 NOTE — PROGRESS NOTES
[facility] Medicine Services  PROGRESS NOTE    Patient Name: Flaco Roque II  : 1945  MRN: [id number]    Date of Admission: 2025  Primary Care Physician: Ariadne Galloway PA    Subjective   Subjective     CC:  Follow-up dyspnea    HPI:  Stable overnight.  Long discussion with patient and son in the room regarding his underlying malnutrition and swallowing issues.  We discussed artificial nutrition, we discussed aspiration precautions and possibility of transfer to rehab based on speech therapy recommendations    Objective   Objective     Vital Signs:   Temp:  [97.6 °F (36.4 °C)-98 °F (36.7 °C)] 98 °F (36.7 °C)  Heart Rate:  [77-95] 79  Resp:  [16-20] 18  BP: (109-117)/(66-70) 111/70  Flow (L/min) (Oxygen Therapy):  [2] 2     Physical Exam:  Constitutional: No acute distress, awake, alert, elderly/frail  HENT: NCAT, mucous membranes moist  Respiratory: Clear to auscultation bilaterally, respiratory effort fair on 2L NC  Cardiovascular: RRR, no murmurs, rubs, or gallops  Gastrointestinal: Positive bowel sounds, soft, nontender, nondistended  Musculoskeletal: No bilateral ankle edema  Psychiatric: Appropriate affect, cooperative  Neurologic: Oriented x 3,no focal deficit, globally weak  Skin: No rashes    No significant change from       Results Reviewed:  LAB RESULTS:      Lab 25  1007 25  0746 25  1538 25  1351   WBC 9.45 10.42  --  16.21*   HEMOGLOBIN 13.6 14.5  --  16.0   HEMATOCRIT 42.1 47.3  --  49.0   PLATELETS 201 199  --  225   NEUTROS ABS 8.49* 9.27*  --  15.10*   IMMATURE GRANS (ABS) 0.03 0.04  --  0.04   LYMPHS ABS 0.29* 0.49*  --  0.24*   MONOS ABS 0.57 0.57  --  0.79   EOS ABS 0.04 0.02  --  0.00   MCV 90.9 96.1  --  89.7   CRP  --   --   --  1.62*   PROCALCITONIN  --   --   --  1.40*   LACTATE  --   --   --  1.5   HSTROP T  --   --  15 17         Lab 25  1006 25  0746 25  1351   SODIUM 137 138 136   POTASSIUM 3.4*  4.0 3.7   CHLORIDE 100 100 97*   CO2 26.0 25.0 25.0   ANION GAP 11.0 13.0 14.0   BUN 13 15 19   CREATININE 0.56* 0.65* 0.84   EGFR 100.3 95.8 88.7   GLUCOSE 119* 79 104*   CALCIUM 8.7 8.7 9.3         Lab 05/18/25  1351   TOTAL PROTEIN 6.2   ALBUMIN 3.7   GLOBULIN 2.5   ALT (SGPT) <5   AST (SGOT) 12   BILIRUBIN 1.7*   ALK PHOS 70         Lab 05/18/25  1538 05/18/25  1351   PROBNP  --  708.0   HSTROP T 15 17                 Lab 05/18/25  1426   PH, ARTERIAL 7.443   PCO2, ARTERIAL 42.4   PO2 ART 45.1*   FIO2 28   HCO3 ART 29.0*   BASE EXCESS ART 4.3*   CARBOXYHEMOGLOBIN 1.8     Brief Urine Lab Results  (Last result in the past 365 days)        Color   Clarity   Blood   Leuk Est   Nitrite   Protein   CREAT   Urine HCG        11/17/24 1310 Yellow   Clear   Negative   Moderate (2+)   Negative   Negative                   Microbiology Results Abnormal       None            SLP FEES - Fiberoptic Endo Eval Swallow  Result Date: 5/19/2025  This procedure was auto-finalized with no dictation required.    XR Abdomen KUB  Result Date: 5/18/2025  XR ABDOMEN KUB Date of Exam: 5/18/2025 5:47 PM EDT Indication: constipation Comparison: Abdominal radiograph 8/26/2024, CT abdomen pelvis 8/23/2024 Findings: Mild to moderate formed colonic stool. No abnormally dilated loops of bowel to suggest ileus or obstruction. Excreted contrast in the renal collecting system and urinary bladder. Posttreatment related changes of the prostate. Generator device left lower quadrant. Left hip prosthesis. Severe degenerative osteoarthritis of the right hip. Osseous demineralization with multilevel thoracolumbar spondylosis and levoscoliosis. Extensive intra-abdominal atherosclerotic disease.     Impression: Impression: Mild to moderate formed colonic stool without findings of obstruction. Electronically Signed: Melvin Frazier MD  5/18/2025 5:52 PM EDT  Workstation ID: [id number]    CT Angiogram Chest Pulmonary Embolism  Result Date: 5/18/2025  CT  ANGIOGRAM CHEST PULMONARY EMBOLISM Date of Exam: 5/18/2025 2:49 PM EDT Indication: sob, pleuritic chest pain. Comparison: 11/17/2024 Technique: Axial CT images were obtained of the chest after the uneventful intravenous administration of 65 cc Isovue-370 IV contrast utilizing pulmonary embolism protocol.  In addition, a 3-D volume rendered image was created for interpretation.  Reconstructed coronal and sagittal images were also obtained. Automated exposure control and iterative construction methods were used. Findings: Severe degenerative changes of the cervical spine. Bilateral shoulder replacements. Emphysema. Right middle and right upper lobe infiltrates right lower lobe infiltrate. Large hiatal hernia. 1.3 cm left thyroid nodule. No evidence of aortic dissection. Atheromatous disease of the coronary vessels. No pulmonary embolus. Calcified granulomata. Calcified mediastinal lymph nodes consistent with prior granulomatous disease. Left lower lobe mucous plugging. Scoliotic curvature of the spine. The sternum is intact. Significant degenerative changes of the spine. No rib fractures. Prior cholecystectomy. Severe atheromatous disease of the abdominal aorta and visualized branches. Prior laminectomies of the lumbar spine.     Impression: Mucous plugging in the left lower lobe without atelectasis. Right-sided pulmonary infiltrate concerning for developing pneumonia. Large hiatal hernia. Electronically Signed: Flaco Grace MD  5/18/2025 3:32 PM EDT  Workstation ID: [id number]      Results for orders placed during the hospital encounter of 04/22/25    Adult Transthoracic Echo Complete W/ Cont if Necessary Per Protocol    Interpretation Summary    Left ventricular systolic function is normal. Estimated left ventricular EF = 60%    Left ventricular wall thickness is consistent with hypertrophy.    No hemodynamically significant valvular heart disease      Current medications:  Scheduled Meds:amantadine, 100 mg, Oral,  BID  [Held by provider] amiodarone, 200 mg, Oral, Q24H  apixaban, 5 mg, Oral, Q12H  arformoterol, 15 mcg, Nebulization, BID - RT   And  budesonide, 0.5 mg, Nebulization, BID - RT   And  revefenacin, 175 mcg, Nebulization, Daily - RT  senna-docusate sodium, 2 tablet, Oral, BID   And  bisacodyl, 10 mg, Rectal, Daily  carbidopa-levodopa, 1 tablet, Oral, 5x Daily  carbidopa-levodopa, 1 tablet, Oral, Q24H  cefTRIAXone, 1,000 mg, Intravenous, Q24H  cetirizine, 5 mg, Oral, Nightly  cilostazol, 100 mg, Oral, BID  doxycycline, 100 mg, Intravenous, Q12H  guaiFENesin, 1,200 mg, Oral, Q12H  ipratropium-albuterol, 3 mL, Nebulization, Q6H - RT  lansoprazole, 15 mg, Oral, Q AM  metoprolol succinate XL, 50 mg, Oral, Daily  mirtazapine, 7.5 mg, Oral, Nightly  multivitamin with minerals, 1 tablet, Oral, Daily  potassium chloride, 40 mEq, Oral, Q4H  Scopolamine, 1 patch, Transdermal, Q72H  sodium chloride, 10 mL, Intravenous, Q12H  tamsulosin, 0.8 mg, Oral, Daily      Continuous Infusions:   PRN Meds:.  acetaminophen **OR** acetaminophen **OR** acetaminophen    senna-docusate sodium **AND** bisacodyl **AND** bisacodyl    Calcium Replacement - Follow Nurse / BPA Driven Protocol    HYDROmorphone    Magnesium Standard Dose Replacement - Follow Nurse / BPA Driven Protocol    Phosphorus Replacement - Follow Nurse / BPA Driven Protocol    Potassium Replacement - Follow Nurse / BPA Driven Protocol    sodium chloride    sodium chloride    sodium chloride    tiZANidine    Assessment & Plan   Assessment & Plan     Active Hospital Problems    Diagnosis  POA    **PNA (pneumonia) [J18.9]  Yes    Hyperlipidemia, unspecified [E78.5]  Yes    Esophageal dysphagia [R13.19]  Yes    Essential (primary) hypertension [I10]  Yes    Parkinson disease [G20.A1]  Yes    ABRIL (obstructive sleep apnea) [G47.33]  Yes    Chronic pain with pain pump in place [G89.29]  Yes    GERD without esophagitis [K21.9]  Yes      Resolved Hospital Problems   No resolved problems  to display.        Brief Hospital Course to date:  Flaco Roque II is a 79 y.o. male with past medical history of Parkinson's, his COPD on 2 L nasal cannula, BPH, recent admission for Klebsiella pneumonia and COPD exacerbation who is here with worsening dyspnea, admitted for concerns of aspiration pneumonia    Aspiration PNA  -- CT chest  LLL mucous plugging, right sided pulmonary infiltrate  -- repeat sputum cultures no Staph aureus or Pseudomonas, moderate gram-positive bacilli, moderate yeast, rare gram-negative bacilli, Gram positive cocci  -- nebs and inhalers    -- cont oxygen  -- speech has evaluated and recommended a puréed diet with thick liquids.  Discussed extensively with patient and son, may qualify for inpatient rehab.  Per son he had done really well at [facility] for [facility].  -- cont rocephin and doxycycline   - mucinex and other pulmonary toilet  -- MRSA negative, strep negative, Legionella negative     Dizziness  Poor appetite  Insomnia   -- consult PT.OT.CM. dietician  -- Orthostatic blood pressure borderline, suspect secondary to decreased appetite and above  -- started remeron for sleep and appetite    Severe malnutrition  - Nutrition is following  - We did discuss the possibility of artificial nutrition through a PEG tube if p.o. intake does not improve despite measures w appetite stimulants etc.  Patient and son are going to discuss this further      COPD without exac on home oxygen   -- cont inhalers and nebs         Constipation  -- bowel regimen  - KUB without evidence of obstruction       PAD  -- cont cilostazol, eliquis, statin      Paroxysmal Afib  --cont toprol and eliquis  - Holding amiodarone secondary to prolonged QTc.  Monitor qtc     Parkinson' disease  Dysphagia   -- cont home meds  -- speech follows. See above. D/w CM regarding IPR secondary to speech recs. She will d/w family as well  -- pt was on chopped meats and nectar thick liquids last admission         Chronic pain with pain pump in place     ABRIL  -- cpap at hs       Paged by nursing this afternoon.  Patient had a fall.  He did have head trauma, left shoulder trauma, left elbow trauma and left knee, trauma.  Ordered x-rays and a stat CT head since he is on Eliquis    Expected Discharge Location and Transportation: home vs rehab  Expected Discharge   Expected Discharge Date: 5/21/2025; Expected Discharge Time: 12:00 PM     VTE Prophylaxis:  Pharmacologic VTE prophylaxis orders are present.         AM-PAC 6 Clicks Score (PT): 19 (05/20/25 0800)    CODE STATUS:   Code Status and Medical Interventions: CPR (Attempt to Resuscitate); Limited Support; No intubation (DNI)   Ordered at: 05/18/25 1711     Code Status (Patient has no pulse and is not breathing):    CPR (Attempt to Resuscitate)     Medical Interventions (Patient has pulse or is breathing):    Limited Support     Medical Intervention Limits:    No intubation (DNI)     Level Of Support Discussed With:    Patient       Cheryl Garrett MD  05/20/25
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
## 2025-05-20 NOTE — PLAN OF CARE
Goal Outcome Evaluation:  Plan of Care Reviewed With: patient        Progress: improving  Outcome Evaluation: patient was able to increase ambulation to 180 ft with rolling walker and on O2 patient limited by fatigue and SOA but has stable O2 sats with activity. patient is progressing toward all mobility goals anticiate patient to be able to go home with  services at D/C    Anticipated Discharge Disposition (PT): home with assist, home with home health

## 2025-05-20 NOTE — SIGNIFICANT NOTE
"[facility] 15:50 PCT asked for nurse assistance as pt was found pt on floor on his left side. PCT and RN assisted pt off of floor and back into bed. Pt stated that he was attempting to go from the chair to the bed without staff assistance. Pt stated that he \"thinks\" he hit his head, left arm, and left leg. Skin tear noted on L elbow. He stated that he had no pain once returned to the bed, other than his chronic back pain. Dr. Garrett paged, and imaging was ordered. Pt's spouse contacted as well. Vitals obtain after fall and head to toe assessment completed.   "

## 2025-05-21 LAB
QT INTERVAL: 432 MS
QTC INTERVAL: 507 MS

## 2025-05-21 PROCEDURE — 99232 SBSQ HOSP IP/OBS MODERATE 35: CPT | Performed by: INTERNAL MEDICINE

## 2025-05-21 PROCEDURE — 63710000001 REVEFENACIN 175 MCG/3ML SOLUTION: Performed by: NURSE PRACTITIONER

## 2025-05-21 PROCEDURE — 97116 GAIT TRAINING THERAPY: CPT

## 2025-05-21 PROCEDURE — 94664 DEMO&/EVAL PT USE INHALER: CPT

## 2025-05-21 PROCEDURE — 94799 UNLISTED PULMONARY SVC/PX: CPT

## 2025-05-21 PROCEDURE — 94761 N-INVAS EAR/PLS OXIMETRY MLT: CPT

## 2025-05-21 PROCEDURE — 25010000002 CEFTRIAXONE PER 250 MG: Performed by: INTERNAL MEDICINE

## 2025-05-21 PROCEDURE — 99498 ADVNCD CARE PLAN ADDL 30 MIN: CPT | Performed by: INTERNAL MEDICINE

## 2025-05-21 PROCEDURE — 99497 ADVNCD CARE PLAN 30 MIN: CPT | Performed by: INTERNAL MEDICINE

## 2025-05-21 RX ORDER — DOXYCYCLINE 100 MG/1
100 CAPSULE ORAL EVERY 12 HOURS SCHEDULED
Status: DISCONTINUED | OUTPATIENT
Start: 2025-05-21 | End: 2025-05-22 | Stop reason: HOSPADM

## 2025-05-21 RX ORDER — GLYCOPYRROLATE 1 MG/1
1 TABLET ORAL 2 TIMES DAILY PRN
Status: DISCONTINUED | OUTPATIENT
Start: 2025-05-21 | End: 2025-05-22 | Stop reason: HOSPADM

## 2025-05-21 RX ADMIN — DOXYCYCLINE 100 MG: 100 CAPSULE ORAL at 21:31

## 2025-05-21 RX ADMIN — TIZANIDINE 4 MG: 4 TABLET ORAL at 00:34

## 2025-05-21 RX ADMIN — ARFORMOTEROL TARTRATE 15 MCG: 15 SOLUTION RESPIRATORY (INHALATION) at 07:15

## 2025-05-21 RX ADMIN — CARBIDOPA AND LEVODOPA 1 TABLET: 25; 250 TABLET ORAL at 08:27

## 2025-05-21 RX ADMIN — CARBIDOPA AND LEVODOPA 1 TABLET: 25; 100 TABLET ORAL at 15:14

## 2025-05-21 RX ADMIN — BUDESONIDE 0.5 MG: 0.5 SUSPENSION RESPIRATORY (INHALATION) at 18:14

## 2025-05-21 RX ADMIN — REVEFENACIN 175 MCG: 175 SOLUTION RESPIRATORY (INHALATION) at 07:15

## 2025-05-21 RX ADMIN — IPRATROPIUM BROMIDE AND ALBUTEROL SULFATE 3 ML: 2.5; .5 SOLUTION RESPIRATORY (INHALATION) at 18:14

## 2025-05-21 RX ADMIN — CARBIDOPA AND LEVODOPA 1 TABLET: 25; 100 TABLET ORAL at 21:30

## 2025-05-21 RX ADMIN — IPRATROPIUM BROMIDE AND ALBUTEROL SULFATE 3 ML: 2.5; .5 SOLUTION RESPIRATORY (INHALATION) at 13:10

## 2025-05-21 RX ADMIN — ACETAMINOPHEN 650 MG: 325 TABLET ORAL at 00:09

## 2025-05-21 RX ADMIN — APIXABAN 5 MG: 5 TABLET, FILM COATED ORAL at 08:23

## 2025-05-21 RX ADMIN — SODIUM CHLORIDE 1000 MG: 900 INJECTION INTRAVENOUS at 08:22

## 2025-05-21 RX ADMIN — Medication 10 ML: at 08:27

## 2025-05-21 RX ADMIN — BISACODYL 10 MG: 10 SUPPOSITORY RECTAL at 08:24

## 2025-05-21 RX ADMIN — TAMSULOSIN HYDROCHLORIDE 0.8 MG: 0.4 CAPSULE ORAL at 08:23

## 2025-05-21 RX ADMIN — CILOSTAZOL 100 MG: 50 TABLET ORAL at 08:23

## 2025-05-21 RX ADMIN — IPRATROPIUM BROMIDE AND ALBUTEROL SULFATE 3 ML: 2.5; .5 SOLUTION RESPIRATORY (INHALATION) at 07:15

## 2025-05-21 RX ADMIN — CARBIDOPA AND LEVODOPA 1 TABLET: 25; 100 TABLET ORAL at 17:45

## 2025-05-21 RX ADMIN — LANSOPRAZOLE 15 MG: 15 TABLET, ORALLY DISINTEGRATING ORAL at 05:32

## 2025-05-21 RX ADMIN — METOPROLOL SUCCINATE 50 MG: 50 TABLET, EXTENDED RELEASE ORAL at 08:23

## 2025-05-21 RX ADMIN — ARFORMOTEROL TARTRATE 15 MCG: 15 SOLUTION RESPIRATORY (INHALATION) at 18:14

## 2025-05-21 RX ADMIN — SENNOSIDES AND DOCUSATE SODIUM 2 TABLET: 50; 8.6 TABLET ORAL at 21:39

## 2025-05-21 RX ADMIN — SENNOSIDES AND DOCUSATE SODIUM 2 TABLET: 50; 8.6 TABLET ORAL at 08:23

## 2025-05-21 RX ADMIN — CARBIDOPA AND LEVODOPA 1 TABLET: 25; 100 TABLET ORAL at 08:23

## 2025-05-21 RX ADMIN — DOXYCYCLINE 100 MG: 100 CAPSULE ORAL at 08:23

## 2025-05-21 RX ADMIN — APIXABAN 5 MG: 5 TABLET, FILM COATED ORAL at 21:30

## 2025-05-21 RX ADMIN — AMANTADINE HYDROCHLORIDE 100 MG: 100 CAPSULE ORAL at 08:23

## 2025-05-21 RX ADMIN — BUDESONIDE 0.5 MG: 0.5 SUSPENSION RESPIRATORY (INHALATION) at 07:15

## 2025-05-21 RX ADMIN — CETIRIZINE HYDROCHLORIDE 5 MG: 10 TABLET, FILM COATED ORAL at 21:31

## 2025-05-21 RX ADMIN — AMANTADINE HYDROCHLORIDE 100 MG: 100 CAPSULE ORAL at 21:30

## 2025-05-21 RX ADMIN — GUAIFENESIN 1200 MG: 600 TABLET, EXTENDED RELEASE ORAL at 08:23

## 2025-05-21 RX ADMIN — TIZANIDINE 4 MG: 4 TABLET ORAL at 22:21

## 2025-05-21 RX ADMIN — Medication 1 TABLET: at 12:17

## 2025-05-21 RX ADMIN — CARBIDOPA AND LEVODOPA 1 TABLET: 25; 100 TABLET ORAL at 12:17

## 2025-05-21 NOTE — PROGRESS NOTES
[facility] Medicine Services  PROGRESS NOTE    Patient Name: Flaco Roque II  : 1945  MRN: [id number]    Date of Admission: 2025  Primary Care Physician: Ariadne Galloway PA    Subjective   Subjective     CC: aspiration PNA    HPI:  See goals of care conversation in AM    Improving - I wean to room air and sat stays >90%, wears intermittent PRN o2  Cough improving  Wants to eat/drink larger spectrum of things, see GOC    Home tomorrow, Wife needs pain pump refilled tmrw at     Objective   Objective     Vital Signs:   Temp:  [97.2 °F (36.2 °C)-97.9 °F (36.6 °C)] 97.2 °F (36.2 °C)  Heart Rate:  [] 103  Resp:  [18-20] 18  BP: (108-133)/(72-94) 121/90  Flow (L/min) (Oxygen Therapy):  [2] 2     Physical Exam:  Constitutional: No acute distress, awake, alert thin male lying in bed  Respiratory: Clear to auscultation bilaterally, respiratory effort normal   Cardiovascular: RRR, no murmurs, rubs, or gallops  Gastrointestinal: Soft, nontender, nondistended  Musculoskeletal: Muscle tone decreased throughout,   Psychiatric: Appropriate affect, cooperative  Neurologic: flat facies, mild hand tremor, speech clear and alert and oriented/appropriate  Skin: No rashes    Results Reviewed:  LAB RESULTS:      Lab 25  1007 25  0746 25  1538 25  1351   WBC 9.45 10.42  --  16.21*   HEMOGLOBIN 13.6 14.5  --  16.0   HEMATOCRIT 42.1 47.3  --  49.0   PLATELETS 201 199  --  225   NEUTROS ABS 8.49* 9.27*  --  15.10*   IMMATURE GRANS (ABS) 0.03 0.04  --  0.04   LYMPHS ABS 0.29* 0.49*  --  0.24*   MONOS ABS 0.57 0.57  --  0.79   EOS ABS 0.04 0.02  --  0.00   MCV 90.9 96.1  --  89.7   CRP  --   --   --  1.62*   PROCALCITONIN  --   --   --  1.40*   LACTATE  --   --   --  1.5   HSTROP T  --   --  15 17         Lab 25  2326 25  1006 25  0746 25  1351   SODIUM  --  137 138 136   POTASSIUM 3.9 3.4* 4.0 3.7   CHLORIDE  --  100 100 97*   CO2  --  26.0  25.0 25.0   ANION GAP  --  11.0 13.0 14.0   BUN  --  13 15 19   CREATININE  --  0.56* 0.65* 0.84   EGFR  --  100.3 95.8 88.7   GLUCOSE  --  119* 79 104*   CALCIUM  --  8.7 8.7 9.3         Lab 05/18/25  1351   TOTAL PROTEIN 6.2   ALBUMIN 3.7   GLOBULIN 2.5   ALT (SGPT) <5   AST (SGOT) 12   BILIRUBIN 1.7*   ALK PHOS 70         Lab 05/18/25  1538 05/18/25  1351   PROBNP  --  708.0   HSTROP T 15 17                 Lab 05/18/25  1426   PH, ARTERIAL 7.443   PCO2, ARTERIAL 42.4   PO2 ART 45.1*   FIO2 28   HCO3 ART 29.0*   BASE EXCESS ART 4.3*   CARBOXYHEMOGLOBIN 1.8     Brief Urine Lab Results  (Last result in the past 365 days)        Color   Clarity   Blood   Leuk Est   Nitrite   Protein   CREAT   Urine HCG        11/17/24 1310 Yellow   Clear   Negative   Moderate (2+)   Negative   Negative                   Microbiology Results Abnormal       None            XR Shoulder 2+ View Left  Result Date: 5/20/2025  XR SHOULDER 2+ VW LEFT, XR KNEE 1 OR 2 VW LEFT Date of Exam: 5/20/2025 4:56 PM EDT Indication: fall Comparison: Left shoulder 3/4/2021 Findings: Evaluation of the left shoulder demonstrates a reverse shoulder arthroplasty. Hardware appears intact without acute complication. No acute osseous injury. Evaluation of the left knee shows no evidence for acute fracture or acute alignment abnormality. There is medial compartment joint space narrowing. Medial and lateral compartment chondrocalcinosis is noted. No underlying joint effusion. There are vascular calcifications.     Impression: Impression: Left shoulder arthroplasty without hardware complication or acute osseous injury. Degenerative arthrosis in the left knee without acute osseous injury. Electronically Signed: Racquel Burgess MD  5/20/2025 5:50 PM EDT  Workstation ID: [id number]    XR Knee 1 or 2 View Left  Result Date: 5/20/2025  XR SHOULDER 2+ VW LEFT, XR KNEE 1 OR 2 VW LEFT Date of Exam: 5/20/2025 4:56 PM EDT Indication: fall Comparison: Left shoulder 3/4/2021  Findings: Evaluation of the left shoulder demonstrates a reverse shoulder arthroplasty. Hardware appears intact without acute complication. No acute osseous injury. Evaluation of the left knee shows no evidence for acute fracture or acute alignment abnormality. There is medial compartment joint space narrowing. Medial and lateral compartment chondrocalcinosis is noted. No underlying joint effusion. There are vascular calcifications.     Impression: Impression: Left shoulder arthroplasty without hardware complication or acute osseous injury. Degenerative arthrosis in the left knee without acute osseous injury. Electronically Signed: Racquel Burgess MD  5/20/2025 5:50 PM EDT  Workstation ID: [id number]    XR Elbow 3+ View Left  Result Date: 5/20/2025  XR ELBOW 3+ VW LEFT Date of Exam: 5/20/2025 5:09 PM EDT Indication: fall Comparison: None available. Findings: There is osseous demineralization. There is evidence for some chondrocalcinosis. An acute osseous abnormality is not definitely identified. There is no positive fat pad.     Impression: Impression: 1. Suggested chondrocalcinosis 2. Osseous demineralization. Electronically Signed: Chance Adams MD  5/20/2025 5:23 PM EDT  Workstation ID: [id number]    CT Head Without Contrast  Result Date: 5/20/2025  CT HEAD WO CONTRAST Date of Exam: 5/20/2025 4:48 PM EDT Indication: Fall, on eliquis. Comparison: None available. Technique: Axial CT images were obtained of the head without contrast administration.  Automated exposure control and iterative construction methods were used. Findings: No acute intracranial hemorrhage.Intact appearing gray-white differentiation.No extra-axial fluid collection.No significant mass effect. No hydrocephalus. Mild generalized parenchymal volume loss. Moderate periventricular and subcortical white matter hypoattenuation, nonspecific, perhaps from small vessel ischemic/hypertensive changes in a patient of this age.There are intracranial  atherosclerotic calcifications. Mild scattered mucosal thickening in the paranasal sinuses.. Nonspecific trace left mastoid effusion. . Bilateral lens replacements. No acute or aggressive appearing bony or extracranial soft tissue process.     Impression: Impression: No acute intracranial findings. Electronically Signed: Clint Christianson MD  5/20/2025 5:08 PM EDT  Workstation ID: [id number]      Results for orders placed during the hospital encounter of 04/22/25    Adult Transthoracic Echo Complete W/ Cont if Necessary Per Protocol    Interpretation Summary    Left ventricular systolic function is normal. Estimated left ventricular EF = 60%    Left ventricular wall thickness is consistent with hypertrophy.    No hemodynamically significant valvular heart disease      Current medications:  Scheduled Meds:amantadine, 100 mg, Oral, BID  amiodarone, 200 mg, Oral, Q24H  apixaban, 5 mg, Oral, Q12H  arformoterol, 15 mcg, Nebulization, BID - RT   And  budesonide, 0.5 mg, Nebulization, BID - RT   And  revefenacin, 175 mcg, Nebulization, Daily - RT  senna-docusate sodium, 2 tablet, Oral, BID   And  bisacodyl, 10 mg, Rectal, Daily  carbidopa-levodopa, 1 tablet, Oral, 5x Daily  carbidopa-levodopa, 1 tablet, Oral, Q24H  cefTRIAXone, 1,000 mg, Intravenous, Q24H  cetirizine, 5 mg, Oral, Nightly  doxycycline, 100 mg, Oral, Q12H  ipratropium-albuterol, 3 mL, Nebulization, Q6H - RT  lansoprazole, 15 mg, Oral, Q AM  metoprolol succinate XL, 50 mg, Oral, Daily  mirtazapine, 7.5 mg, Oral, Nightly  multivitamin with minerals, 1 tablet, Oral, Daily  sodium chloride, 10 mL, Intravenous, Q12H  tamsulosin, 0.8 mg, Oral, Daily      Continuous Infusions:Pharmacy Consult,       PRN Meds:.  acetaminophen **OR** acetaminophen **OR** acetaminophen    senna-docusate sodium **AND** bisacodyl **AND** bisacodyl    Calcium Replacement - Follow Nurse / BPA Driven Protocol    glycopyrrolate    Magnesium Standard Dose Replacement - Follow Nurse / BPA Driven  Protocol    Pharmacy Consult    Phosphorus Replacement - Follow Nurse / BPA Driven Protocol    Potassium Replacement - Follow Nurse / BPA Driven Protocol    sodium chloride    sodium chloride    sodium chloride    tiZANidine    Assessment & Plan   Assessment & Plan     Active Hospital Problems    Diagnosis  POA    **PNA (pneumonia) [J18.9]  Yes    Hyperlipidemia, unspecified [E78.5]  Yes    Esophageal dysphagia [R13.19]  Yes    Essential (primary) hypertension [I10]  Yes    Parkinson disease [G20.A1]  Yes    ABRIL (obstructive sleep apnea) [G47.33]  Yes    Chronic pain with pain pump in place [G89.29]  Yes    GERD without esophagitis [K21.9]  Yes      Resolved Hospital Problems   No resolved problems to display.        Brief Hospital Course to date:  Flaco Roque II is a 79 y.o. male w COPD on home intermittent 2 liters n/c, recurrent aspiration pneumonias, escalating Parkinsons dementia with high symptom burden who presents with aspiration pneumonia again    Recurrent aspiration PNA - improving w rocephin/doxycycline, complete 5 days    Fall 5/20 - CT head, x-rays all negative for serious injury and at baseline 5/21    Escalating aspiration/dysphagia, severe malnutrition and debility 2/2 Parkinsons dz  -d/w patient and family both on 5/21, they all understand concerns on prognosis and how asp PNA fits into this  -comfort diet per patient + family wishes, no feeding tube ever  -OP therapy services, palliative information will be dropped off to family    COPD on home 2 liters PRN  Paroxysmal Afib - metoprolol, eliquis, amiodarone  Chronic pain w pain pump - refill w  [facility]    *home med rec incomplete, asked pharmacy for assistance before dc back home tmrw. Corrected errors as seen on pharmacy fill list    Expected Discharge Location and Transportation: home w  PT/OT  Expected Discharge home  Expected Discharge Date: 5/22/2025; Expected Discharge Time: 12:00 PM     VTE Prophylaxis:  Pharmacologic VTE prophylaxis  orders are present.         AM-PAC 6 Clicks Score (PT): 17 (05/21/25 1610)    CODE STATUS:   Code Status and Medical Interventions: No CPR (Do Not Attempt to Resuscitate); Limited Support; No intubation (DNI), No vasopressors, No NIPPV (Non-Invasive Positive Pressure Ventilation), No artificial nutrition   Ordered at: 05/21/25 1033     Code Status (Patient has no pulse and is not breathing):    No CPR (Do Not Attempt to Resuscitate)     Medical Interventions (Patient has pulse or is breathing):    Limited Support     Medical Intervention Limits:    No intubation (DNI)       No vasopressors       No NIPPV (Non-Invasive Positive Pressure Ventilation)       No artificial nutrition     Level Of Support Discussed With:    Health Care Surrogate       Patient       Next of Kin (If No Surrogate)       Alejandra Orosco MD  05/21/25

## 2025-05-21 NOTE — CASE MANAGEMENT/SOCIAL WORK
Continued Stay Note  [facility]     Patient Name: Flaco Roque II  MRN: [id number]  Today's Date: 5/21/2025    Admit Date: 5/18/2025    Plan: Home   Discharge Plan       Row Name 05/21/25 1248       Plan    Plan Home    Plan Comments Patient's spouse presented insurance cards.   Patient is listed as having [employer] Medicare. [facility] will not be able to accept as they have no current subacute beds.  spoke with patient's spouse in regards to therapy recommendations for home with home health.   She is agreeable for this and would prefer [facility] which referral will be sent to them.   IMM given over phone to his spouse    Final Discharge Disposition Code 06 - home with home health care                   Discharge Codes    No documentation.                 Expected Discharge Date and Time       Expected Discharge Date Expected Discharge Time    May 21, 2025               Kylah Rushing RN

## 2025-05-21 NOTE — ACP (ADVANCE CARE PLANNING)
I d/w patient with wife and both sons present. We had a group conversation about his progressive Parkinsons and his wishes.    He is able to participate in discussions, but relies on his wife at times who reports they have had many talks in the past. Both sons present and listening/supportive of discussion as to his prognosis.    They all understand his poor nutritional status + recurrent aspiration risk in setting of worsening Parkinsons dz. We discuss this is common in Parkinson's and that pneumonia is often the cause of death in these situations. They all have understood this.    At home, he was using thickened juices (which he liked) but otherwise eating/drinking what he would like (thin liquids, etc). Family is all supportive of this approach.    Patient is clear on the following. Wife reports they have a living will that reflects this also that they will work to bring in but wishes are as follows, all family in agreement:    -NO CPR  -NO INTUBATION  -NO BIPAP (he remembers this last ICU experience and hates this mask). Family agrees with idea of NO ICU generally  -NO FEEDING TUBES    -wife is surrogate decision maker    OK for hospitalization for pneumonia at this time. Not ICU admission for pneumonia. They report if pneumonia's persist, they may be interested in palliative approach. I discuss role of hospice here as well. All agreeable to palliative dropping off information today    Family all agreement with COMFORT DIET and understand aspiration risk, etc.   Answer all questions related to feeding tube for nutrition: would NOT likely improve QoL or length of life in this clinical scenario. Patient clear he would not want it regardless which is consistent with past expressed wishes.    All thankful for conversation. Wife kim me    Will ask palliative to come by for info drop-off      This discussion of wishes, living dennison, surrogate decision makers, etc constituted 55 minutes of my day including conference  with speech therapist, review of chart, time in room with family, and documentation of such.
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
## 2025-05-21 NOTE — PLAN OF CARE
Goal Outcome Evaluation:  Plan of Care Reviewed With: patient        Progress: improving  Outcome Evaluation: Pt. continues to present below baseline function w/generalized weakness, balance deficits and decreased functional endurance affecting his ability to safely participate in functional mobility. He performed bed mobility, transfers and ambulated 100' w/front wheeled walker, contact guard assist. Activity limited by fatigue. Pt. tolerated ther-ex well. Continue acute PT POC to progress as able.    Anticipated Discharge Disposition (PT): home with assist, home with home health

## 2025-05-21 NOTE — CONSULTS
Patient resting fitfully. Patient woke to voice when I called his name. He was pleasant and appreciative of  support, but declined visit at this time.

## 2025-05-21 NOTE — PLAN OF CARE
Problem: Palliative Care  Goal: Enhanced Quality of Life  Intervention: Promote Advance Care Planning  Flowsheets (Taken 5/21/2025 1412)  Life Transition/Adjustment: palliative care discussed  Intervention: Optimize Psychosocial Wellbeing  Flowsheets (Taken 5/21/2025 1412)  Supportive Measures:   active listening utilized   decision-making supported   positive reinforcement provided   self-care encouraged   verbalization of feelings encouraged  Family/Support System Care:   self-care encouraged   support provided   caregiver stress acknowledged  New consult from ARTHUR Orosco MD for Palliative Team support to provide information, support and potentially assist/facilitate completion of MOST and/or EMS DNR consistent with patient established wishes and priorities of care.  Visit at bedside with patient -family not present at the time, patient up to the chair, pleasant and talkative, hopeful to d/c home possibly tomorrow.  Briefly introduced palliative support.  Patient okay with me calling his wife.  Phone call to Cheryl, briefly discussed palliative care - she is unsure when she will be at hospital tomorrow but is okay with me calling in the morning.  Have folder with information about palliative care as well as MOST form and EMS DNR form, will leave with patient tomorrow and either f/u with phone call or schedule time to meet with patient's wife for completion of forms prior to d/c if possible.  If patient and wife are agreeable, [facility] community palliative care referral would be appropriate.  LCSW, RN, and  following for support and assist as possible and permitted.

## 2025-05-21 NOTE — PLAN OF CARE
Problem: Adult Inpatient Plan of Care  Goal: Plan of Care Review  Outcome: Progressing  Flowsheets (Taken 5/21/2025 0564)  Outcome Evaluation: restless overnoc with intermittent confusion and anxiety. c/o persistent chronic pain to back requiring multiple prn meds. up with assist x1 walker and gait belt to BR. multiple attempts at bm but only gas. denies N/V. vss.  Goal: Patient-Specific Goal (Individualized)  Outcome: Progressing  Goal: Absence of Hospital-Acquired Illness or Injury  Outcome: Progressing  Intervention: Identify and Manage Fall Risk  Recent Flowsheet Documentation  Taken 5/21/2025 0400 by Selwyn Barraza, RN  Safety Promotion/Fall Prevention:   activity supervised   assistive device/personal items within reach   clutter free environment maintained   nonskid shoes/slippers when out of bed   room organization consistent   safety round/check completed  Taken 5/21/2025 0200 by Selwyn Barraza, RN  Safety Promotion/Fall Prevention:   activity supervised   assistive device/personal items within reach   clutter free environment maintained   nonskid shoes/slippers when out of bed   room organization consistent   safety round/check completed  Taken 5/21/2025 0009 by Selwyn Barraza, RN  Safety Promotion/Fall Prevention:   activity supervised   assistive device/personal items within reach   clutter free environment maintained   gait belt   nonskid shoes/slippers when out of bed   room organization consistent   safety round/check completed  Taken 5/20/2025 2200 by Selwyn Barraza, RN  Safety Promotion/Fall Prevention:   assistive device/personal items within reach   clutter free environment maintained   nonskid shoes/slippers when out of bed   room organization consistent   safety round/check completed  Taken 5/20/2025 2009 by Selwyn Barraza, RN  Safety Promotion/Fall Prevention:   assistive device/personal items within reach   clutter free environment maintained   nonskid shoes/slippers when out of bed    room organization consistent   safety round/check completed  Intervention: Prevent Skin Injury  Recent Flowsheet Documentation  Taken 5/21/2025 0400 by Selwyn Barraza RN  Body Position: left  Skin Protection:   incontinence pads utilized   silicone foam dressing in place  Taken 5/21/2025 0200 by Selwyn Barraza RN  Body Position: supine  Skin Protection:   incontinence pads utilized   silicone foam dressing in place  Taken 5/21/2025 0009 by Selwyn Barraza RN  Body Position: right  Skin Protection:   incontinence pads utilized   silicone foam dressing in place  Taken 5/20/2025 2200 by Selwyn Barraza RN  Skin Protection:   incontinence pads utilized   silicone foam dressing in place  Taken 5/20/2025 2009 by Selwyn Barraza RN  Body Position: right  Skin Protection:   incontinence pads utilized   silicone foam dressing in place  Intervention: Prevent and Manage VTE (Venous Thromboembolism) Risk  Recent Flowsheet Documentation  Taken 5/20/2025 2009 by Selwyn Barraza RN  VTE Prevention/Management: (see mar) other (see comments)  Intervention: Prevent Infection  Recent Flowsheet Documentation  Taken 5/20/2025 2200 by Selwyn Barraza RN  Infection Prevention: environmental surveillance performed  Taken 5/20/2025 2009 by Selwyn Barraza RN  Infection Prevention: environmental surveillance performed  Goal: Optimal Comfort and Wellbeing  Outcome: Progressing  Goal: Readiness for Transition of Care  Outcome: Progressing     Problem: Skin Injury Risk Increased  Goal: Skin Health and Integrity  Outcome: Progressing  Intervention: Optimize Skin Protection  Recent Flowsheet Documentation  Taken 5/21/2025 0446 by Selwyn Barraza RN  Activity Management: ambulated to bathroom  Taken 5/21/2025 0400 by Selwyn Barraza RN  Activity Management: activity minimized  Pressure Reduction Techniques:   pressure points protected   frequent weight shift encouraged  Head of Bed (HOB) Positioning: HOB elevated  Pressure Reduction  Devices:   pressure-redistributing mattress utilized   foam padding utilized  Skin Protection:   incontinence pads utilized   silicone foam dressing in place  Taken 5/21/2025 0200 by Selwyn Barraza RN  Activity Management: activity minimized  Pressure Reduction Techniques: frequent weight shift encouraged  Head of Bed (HOB) Positioning: HOB elevated  Pressure Reduction Devices:   foam padding utilized   pressure-redistributing mattress utilized  Skin Protection:   incontinence pads utilized   silicone foam dressing in place  Taken 5/21/2025 0009 by Selwyn Barraza RN  Activity Management: activity minimized  Pressure Reduction Techniques: frequent weight shift encouraged  Head of Bed (HOB) Positioning: HOB elevated  Pressure Reduction Devices:   foam padding utilized   pressure-redistributing mattress utilized  Skin Protection:   incontinence pads utilized   silicone foam dressing in place  Taken 5/20/2025 2200 by Selwyn Barraza RN  Activity Management: activity minimized  Pressure Reduction Techniques: frequent weight shift encouraged  Pressure Reduction Devices:   pressure-redistributing mattress utilized   foam padding utilized  Skin Protection:   incontinence pads utilized   silicone foam dressing in place  Taken 5/20/2025 2030 by Selwyn Barraza RN  Activity Management:   ambulated in room   ambulated to bathroom  Taken 5/20/2025 2009 by Selwny Barraza RN  Activity Management: activity minimized  Pressure Reduction Techniques: frequent weight shift encouraged  Head of Bed (HOB) Positioning: [facility] elevated  Pressure Reduction Devices: pressure-redistributing mattress utilized  Skin Protection:   incontinence pads utilized   silicone foam dressing in place     Problem: Fall Injury Risk  Goal: Absence of Fall and Fall-Related Injury  Outcome: Progressing  Intervention: Identify and Manage Contributors  Recent Flowsheet Documentation  Taken 5/20/2025 2009 by Selwyn Barraza RN  Medication Review/Management:  medications reviewed  Intervention: Promote Injury-Free Environment  Recent Flowsheet Documentation  Taken 5/21/2025 0400 by Selwyn Barraza RN  Safety Promotion/Fall Prevention:   activity supervised   assistive device/personal items within reach   clutter free environment maintained   nonskid shoes/slippers when out of bed   room organization consistent   safety round/check completed  Taken 5/21/2025 0200 by Selwyn Barraza, RN  Safety Promotion/Fall Prevention:   activity supervised   assistive device/personal items within reach   clutter free environment maintained   nonskid shoes/slippers when out of bed   room organization consistent   safety round/check completed  Taken 5/21/2025 0009 by Selwyn Barraza RN  Safety Promotion/Fall Prevention:   activity supervised   assistive device/personal items within reach   clutter free environment maintained   gait belt   nonskid shoes/slippers when out of bed   room organization consistent   safety round/check completed  Taken 5/20/2025 2200 by Selwyn Barraza RN  Safety Promotion/Fall Prevention:   assistive device/personal items within reach   clutter free environment maintained   nonskid shoes/slippers when out of bed   room organization consistent   safety round/check completed  Taken 5/20/2025 2009 by Selwyn Barraza RN  Safety Promotion/Fall Prevention:   assistive device/personal items within reach   clutter free environment maintained   nonskid shoes/slippers when out of bed   room organization consistent   safety round/check completed     Problem: Noninvasive Ventilation Acute  Goal: Effective Unassisted Ventilation and Oxygenation  Outcome: Progressing   Goal Outcome Evaluation:              Outcome Evaluation: restless overnoc with intermittent confusion and anxiety. c/o persistent chronic pain to back requiring multiple prn meds. up with assist x1 walker and gait belt to BR. multiple attempts at bm but only gas. denies N/V. vss.
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
## 2025-05-22 ENCOUNTER — READMISSION MANAGEMENT (OUTPATIENT)
Dept: CALL CENTER | Facility: HOSPITAL | Age: 80
End: 2025-05-22
Payer: COMMERCIAL

## 2025-05-22 VITALS
OXYGEN SATURATION: 97 % | RESPIRATION RATE: 16 BRPM | HEIGHT: 68 IN | DIASTOLIC BLOOD PRESSURE: 70 MMHG | BODY MASS INDEX: 15.9 KG/M2 | SYSTOLIC BLOOD PRESSURE: 109 MMHG | TEMPERATURE: 97.7 F | WEIGHT: 104.94 LBS | HEART RATE: 80 BPM

## 2025-05-22 PROCEDURE — 94799 UNLISTED PULMONARY SVC/PX: CPT

## 2025-05-22 PROCEDURE — 99238 HOSP IP/OBS DSCHRG MGMT 30/<: CPT | Performed by: INTERNAL MEDICINE

## 2025-05-22 PROCEDURE — 63710000001 REVEFENACIN 175 MCG/3ML SOLUTION: Performed by: NURSE PRACTITIONER

## 2025-05-22 PROCEDURE — 94664 DEMO&/EVAL PT USE INHALER: CPT

## 2025-05-22 PROCEDURE — 25010000002 CEFTRIAXONE PER 250 MG: Performed by: INTERNAL MEDICINE

## 2025-05-22 PROCEDURE — 94761 N-INVAS EAR/PLS OXIMETRY MLT: CPT

## 2025-05-22 RX ORDER — CARBIDOPA AND LEVODOPA 25; 100 MG/1; MG/1
1 TABLET, EXTENDED RELEASE ORAL 4 TIMES DAILY
COMMUNITY

## 2025-05-22 RX ORDER — DOXYCYCLINE 100 MG/1
100 CAPSULE ORAL EVERY 12 HOURS SCHEDULED
Qty: 2 CAPSULE | Refills: 0 | Status: SHIPPED | OUTPATIENT
Start: 2025-05-22 | End: 2025-05-23

## 2025-05-22 RX ORDER — QUETIAPINE FUMARATE 25 MG/1
25 TABLET, FILM COATED ORAL NIGHTLY
COMMUNITY

## 2025-05-22 RX ADMIN — ARFORMOTEROL TARTRATE 15 MCG: 15 SOLUTION RESPIRATORY (INHALATION) at 07:01

## 2025-05-22 RX ADMIN — BISACODYL 10 MG: 10 SUPPOSITORY RECTAL at 08:03

## 2025-05-22 RX ADMIN — DOXYCYCLINE 100 MG: 100 CAPSULE ORAL at 08:03

## 2025-05-22 RX ADMIN — IPRATROPIUM BROMIDE AND ALBUTEROL SULFATE 3 ML: 2.5; .5 SOLUTION RESPIRATORY (INHALATION) at 07:01

## 2025-05-22 RX ADMIN — CARBIDOPA AND LEVODOPA 1 TABLET: 25; 250 TABLET ORAL at 08:03

## 2025-05-22 RX ADMIN — SENNOSIDES AND DOCUSATE SODIUM 2 TABLET: 50; 8.6 TABLET ORAL at 08:03

## 2025-05-22 RX ADMIN — AMIODARONE HYDROCHLORIDE 200 MG: 200 TABLET ORAL at 08:03

## 2025-05-22 RX ADMIN — METOPROLOL SUCCINATE 50 MG: 50 TABLET, EXTENDED RELEASE ORAL at 08:03

## 2025-05-22 RX ADMIN — CARBIDOPA AND LEVODOPA 1 TABLET: 25; 100 TABLET ORAL at 10:59

## 2025-05-22 RX ADMIN — LANSOPRAZOLE 15 MG: 15 TABLET, ORALLY DISINTEGRATING ORAL at 06:16

## 2025-05-22 RX ADMIN — TAMSULOSIN HYDROCHLORIDE 0.8 MG: 0.4 CAPSULE ORAL at 08:03

## 2025-05-22 RX ADMIN — APIXABAN 5 MG: 5 TABLET, FILM COATED ORAL at 08:03

## 2025-05-22 RX ADMIN — SODIUM CHLORIDE 1000 MG: 900 INJECTION INTRAVENOUS at 08:03

## 2025-05-22 RX ADMIN — BUDESONIDE 0.5 MG: 0.5 SUSPENSION RESPIRATORY (INHALATION) at 07:01

## 2025-05-22 RX ADMIN — AMANTADINE HYDROCHLORIDE 100 MG: 100 CAPSULE ORAL at 08:08

## 2025-05-22 RX ADMIN — REVEFENACIN 175 MCG: 175 SOLUTION RESPIRATORY (INHALATION) at 07:01

## 2025-05-22 RX ADMIN — Medication 10 ML: at 08:04

## 2025-05-22 RX ADMIN — CARBIDOPA AND LEVODOPA 1 TABLET: 25; 100 TABLET ORAL at 08:03

## 2025-05-22 RX ADMIN — Medication 1 TABLET: at 11:00

## 2025-05-22 RX ADMIN — IPRATROPIUM BROMIDE AND ALBUTEROL SULFATE 3 ML: 2.5; .5 SOLUTION RESPIRATORY (INHALATION) at 00:35

## 2025-05-22 NOTE — PLAN OF CARE
Problem: Adult Inpatient Plan of Care  Goal: Plan of Care Review  Outcome: Progressing  Flowsheets (Taken 5/22/2025 0543)  Outcome Evaluation:   AOx4/VSS/O2 stable on 2L nasal canula throughout shift   ambulated to bathroom with walker and gait belt during shift   pt voices no complaints or concerns at this time   continue plan of care  Goal: Patient-Specific Goal (Individualized)  Outcome: Progressing  Goal: Absence of Hospital-Acquired Illness or Injury  Outcome: Progressing  Intervention: Identify and Manage Fall Risk  Recent Flowsheet Documentation  Taken 5/22/2025 0400 by Gavin Howe RN  Safety Promotion/Fall Prevention:   activity supervised   assistive device/personal items within reach   clutter free environment maintained   fall prevention program maintained   safety round/check completed   room organization consistent   toileting scheduled  Taken 5/22/2025 0200 by Gavin Howe RN  Safety Promotion/Fall Prevention:   activity supervised   assistive device/personal items within reach   clutter free environment maintained   fall prevention program maintained   room organization consistent   safety round/check completed   toileting scheduled  Taken 5/21/2025 2000 by Gavin Howe RN  Safety Promotion/Fall Prevention:   activity supervised   assistive device/personal items within reach   clutter free environment maintained   fall prevention program maintained   room organization consistent   toileting scheduled   safety round/check completed  Intervention: Prevent Skin Injury  Recent Flowsheet Documentation  Taken 5/22/2025 0400 by Gavin Howe RN  Skin Protection: silicone foam dressing in place  Taken 5/22/2025 0200 by Gavin Howe RN  Skin Protection: silicone foam dressing in place  Taken 5/22/2025 0000 by Gavin Howe RN  Skin Protection: silicone foam dressing in place  Taken 5/21/2025 2200 by Gavin Howe RN  Skin Protection: silicone foam dressing in place  Taken  5/21/2025 2000 by Gavin Howe RN  Skin Protection: (allyvns in place; peeled back skin assessed) silicone foam dressing in place  Intervention: Prevent Infection  Recent Flowsheet Documentation  Taken 5/22/2025 0400 by Gavin Howe RN  Infection Prevention:   environmental surveillance performed   equipment surfaces disinfected   hand hygiene promoted   personal protective equipment utilized   single patient room provided   rest/sleep promoted  Taken 5/21/2025 2200 by Gavin Howe RN  Infection Prevention:   environmental surveillance performed   equipment surfaces disinfected   hand hygiene promoted   personal protective equipment utilized   rest/sleep promoted   single patient room provided  Taken 5/21/2025 2000 by Gavin Howe RN  Infection Prevention:   environmental surveillance performed   equipment surfaces disinfected   hand hygiene promoted   personal protective equipment utilized   rest/sleep promoted   single patient room provided  Goal: Optimal Comfort and Wellbeing  Outcome: Progressing  Intervention: Monitor Pain and Promote Comfort  Recent Flowsheet Documentation  Taken 5/21/2025 2000 by Gavin Howe RN  Pain Management Interventions: (prn muscle relaxant given per MD order and patient request) other (see comments)  Intervention: Provide Person-Centered Care  Recent Flowsheet Documentation  Taken 5/21/2025 2000 by Gavin Howe RN  Trust Relationship/Rapport:   care explained   choices provided   emotional support provided   empathic listening provided   questions answered   reassurance provided   questions encouraged   thoughts/feelings acknowledged  Goal: Readiness for Transition of Care  Outcome: Progressing     Problem: Skin Injury Risk Increased  Goal: Skin Health and Integrity  Outcome: Progressing  Intervention: Optimize Skin Protection  Recent Flowsheet Documentation  Taken 5/22/2025 0400 by Gavin Howe RN  Activity Management: activity encouraged  Pressure  Reduction Techniques: frequent weight shift encouraged  Pressure Reduction Devices: heel offloading device utilized  Skin Protection: silicone foam dressing in place  Taken 5/22/2025 0200 by Gavin Howe RN  Activity Management: activity encouraged  Pressure Reduction Techniques:   frequent weight shift encouraged   heels elevated off bed   positioned off wounds   pressure points protected   weight shift assistance provided  Pressure Reduction Devices: heel offloading device utilized  Skin Protection: silicone foam dressing in place  Taken 5/22/2025 0000 by Gavin Howe RN  Activity Management: activity encouraged  Pressure Reduction Techniques:   frequent weight shift encouraged   heels elevated off bed   pressure points protected   positioned off wounds   weight shift assistance provided  Pressure Reduction Devices:   pressure-redistributing mattress utilized   positioning supports utilized  Skin Protection: silicone foam dressing in place  Taken 5/21/2025 2200 by Gavin Howe RN  Activity Management: activity encouraged  Pressure Reduction Techniques:   frequent weight shift encouraged   heels elevated off bed   positioned off wounds   pressure points protected   weight shift assistance provided  Head of Bed (HOB) Positioning: HOB elevated  Pressure Reduction Devices:   positioning supports utilized   heel offloading device utilized  Skin Protection: silicone foam dressing in place  Taken 5/21/2025 2000 by Gavin Howe RN  Activity Management: activity encouraged  Pressure Reduction Techniques:   frequent weight shift encouraged   heels elevated off bed   positioned off wounds   weight shift assistance provided  Pressure Reduction Devices:   positioning supports utilized   specialty bed utilized   feet on footrest/footstool  Skin Protection: (allyvns in place; peeled back skin assessed) silicone foam dressing in place     Problem: Fall Injury Risk  Goal: Absence of Fall and Fall-Related  Injury  Outcome: Progressing  Intervention: Identify and Manage Contributors  Recent Flowsheet Documentation  Taken 5/21/2025 2000 by Gavin Howe RN  Medication Review/Management: medications reviewed  Intervention: Promote Injury-Free Environment  Recent Flowsheet Documentation  Taken 5/22/2025 0400 by Gavin Howe RN  Safety Promotion/Fall Prevention:   activity supervised   assistive device/personal items within reach   clutter free environment maintained   fall prevention program maintained   safety round/check completed   room organization consistent   toileting scheduled  Taken 5/22/2025 0200 by Gavin Howe RN  Safety Promotion/Fall Prevention:   activity supervised   assistive device/personal items within reach   clutter free environment maintained   fall prevention program maintained   room organization consistent   safety round/check completed   toileting scheduled  Taken 5/21/2025 2000 by Gavin Howe RN  Safety Promotion/Fall Prevention:   activity supervised   assistive device/personal items within reach   clutter free environment maintained   fall prevention program maintained   room organization consistent   toileting scheduled   safety round/check completed     Problem: Noninvasive Ventilation Acute  Goal: Effective Unassisted Ventilation and Oxygenation  Outcome: Progressing     Problem: Palliative Care  Goal: Enhanced Quality of Life  Outcome: Progressing  Intervention: Maximize Comfort  Recent Flowsheet Documentation  Taken 5/21/2025 2000 by Gavin Howe RN  Pain Management Interventions: (prn muscle relaxant given per MD order and patient request) other (see comments)  Intervention: Optimize Psychosocial Wellbeing  Recent Flowsheet Documentation  Taken 5/21/2025 2000 by Gavin Howe RN  Family/Support System Care: support provided   Goal Outcome Evaluation:              Outcome Evaluation: AOx4/VSS/O2 stable on 2L nasal canula throughout shift; ambulated to bathroom  with walker and gait belt during shift; pt voices no complaints or concerns at this time; continue plan of care

## 2025-05-22 NOTE — NURSING NOTE
Pt seen in follow by Palliative RN; provided Palliative information, EMS DNR form, MOST form. Pt declined offer to complete forms prior to discharge; encouraged to review with family and complete, advised PCP can sign MOST form.

## 2025-05-22 NOTE — DISCHARGE SUMMARY
[facility] Medicine Services  DISCHARGE SUMMARY    Patient Name: Flaco Roque II  : 1945  MRN: [id number]    Date of Admission: 2025  1:23 PM  Date of Discharge:  2025  Primary Care Physician: Ariadne Galloway PA    Consults       Date and Time Order Name Status Description    2025 11:11 AM Inpatient Palliative Care MD Consult      2025  1:03 PM Inpatient Cardiology Consult Completed             Hospital Course     Presenting Problem: recurrent aspiration PNA    Active Hospital Problems    Diagnosis  POA    **PNA (pneumonia) [J18.9]  Yes    Hyperlipidemia, unspecified [E78.5]  Yes    Severe malnutrition [E43]  Yes    Essential (primary) hypertension [I10]  Yes    Parkinson disease [G20.A1]  Yes    ABRIL (obstructive sleep apnea) [G47.33]  Yes    Chronic pain with pain pump in place [G89.29]  Yes    GERD without esophagitis [K21.9]  Yes      Resolved Hospital Problems   No resolved problems to display.          Hospital Course:  Flaco Roque II is a 79 y.o. male w COPD on home intermittent 2 liters n/c, recurrent aspiration pneumonias, escalating Parkinsons dementia with high symptom burden who presents with aspiration pneumonia again    Improved quickly with rocephin, doxycycline. Complete 2 dose of doxycycline at home to complete course. Back on home 0-2 liters n/c at time of discharge and doing well    Evaluated by PT/OT, walking ~150 feet and recommendation for home with home health  Had a fall while here on eliquis - subsequent scans negative for serious injury and no complaints on subsequent days    Goals of care discussions were initated while here with patient + wife + 2 sons. Had discussion about prognosis of progressive Parkinsons with severe malnutrition + recurrent aspiration. All on same page about following planning:  -DNR/DNI  -comfort diet, no feeding tubes ever   -OK to readmit for pneumonia at this point in patient's illness but would  not want ICU admission/BIPAP  -family will consider OP palliative/change in status as disease progresses    I have updated charting here to reflect the above. Please see ACP note from 5/21 for more details    Discharge Follow Up Recommendations for outpatient labs/diagnostics:  F/u PCP 1 week  OP palliative information given to patient + family    Day of Discharge     HPI:   Feeling well today  Wife to come later in morning  No questions from patient      Vital Signs:   Temp:  [97.2 °F (36.2 °C)-97.9 °F (36.6 °C)] 97.7 °F (36.5 °C)  Heart Rate:  [] 80  Resp:  [16-18] 16  BP: (109-124)/(70-90) 109/70  Flow (L/min) (Oxygen Therapy):  [2] 2      Physical Exam:  Constitutional: No acute distress, awake, alert thin male sitting up in recliner  Respiratory: Clear to auscultation bilaterally, respiratory effort normal on 2 liters n/c satting high 90's  Cardiovascular: RRR, no murmurs, rubs, or gallops  Gastrointestinal: Soft, nontender, nondistended  Musculoskeletal: Muscle tone decreased throughout  Psychiatric: Appropriate affect, cooperative  Neurologic: Alert and oriented, speech clear  Skin: No rashes    Pertinent  and/or Most Recent Results     LAB RESULTS:      Lab 05/20/25  1007 05/19/25  0746 05/18/25  1351   WBC 9.45 10.42 16.21*   HEMOGLOBIN 13.6 14.5 16.0   HEMATOCRIT 42.1 47.3 49.0   PLATELETS 201 199 225   NEUTROS ABS 8.49* 9.27* 15.10*   IMMATURE GRANS (ABS) 0.03 0.04 0.04   LYMPHS ABS 0.29* 0.49* 0.24*   MONOS ABS 0.57 0.57 0.79   EOS ABS 0.04 0.02 0.00   MCV 90.9 96.1 89.7   CRP  --   --  1.62*   PROCALCITONIN  --   --  1.40*   LACTATE  --   --  1.5         Lab 05/20/25  2326 05/20/25  1006 05/19/25  0746 05/18/25  1351   SODIUM  --  137 138 136   POTASSIUM 3.9 3.4* 4.0 3.7   CHLORIDE  --  100 100 97*   CO2  --  26.0 25.0 25.0   ANION GAP  --  11.0 13.0 14.0   BUN  --  13 15 19   CREATININE  --  0.56* 0.65* 0.84   EGFR  --  100.3 95.8 88.7   GLUCOSE  --  119* 79 104*   CALCIUM  --  8.7 8.7 9.3          Lab 05/18/25  1351   TOTAL PROTEIN 6.2   ALBUMIN 3.7   GLOBULIN 2.5   ALT (SGPT) <5   AST (SGOT) 12   BILIRUBIN 1.7*   ALK PHOS 70         Lab 05/18/25  1538 05/18/25  1351   PROBNP  --  708.0   HSTROP T 15 17                 Lab 05/18/25  1426   PH, ARTERIAL 7.443   PCO2, ARTERIAL 42.4   PO2 ART 45.1*   FIO2 28   HCO3 ART 29.0*   BASE EXCESS ART 4.3*   CARBOXYHEMOGLOBIN 1.8     Brief Urine Lab Results  (Last result in the past 365 days)        Color   Clarity   Blood   Leuk Est   Nitrite   Protein   CREAT   Urine HCG        11/17/24 1310 Yellow   Clear   Negative   Moderate (2+)   Negative   Negative                 Microbiology Results (last 10 days)       Procedure Component Value - Date/Time    MRSA Screen, PCR (Inpatient) - Swab, Nares [[id number]]  (Normal) Collected: 05/18/25 2148    Lab Status: Final result Specimen: Swab from Nares Updated: 05/19/25 0839     MRSA PCR Negative    Narrative:      The negative predictive value of this diagnostic test is high and should only be used to consider de-escalating anti-MRSA therapy. A positive result may indicate colonization with MRSA and must be correlated clinically.  MRSA Negative    Respiratory Culture - Sputum, Cough [[id number]] Collected: 05/18/25 1751    Lab Status: Final result Specimen: Sputum from Cough Updated: 05/20/25 1127     Respiratory Culture Scant growth (1+) Normal respiratory santiago. No S. aureus or Pseudomonas aeruginosa detected. Final report.     Gram Stain Many (4+) WBCs per low power field      Few (2+) Epithelial cells per low power field      Moderate (3+) Gram positive bacilli      Moderate (3+) Yeast      Rare (1+) Gram negative bacilli      Rare (1+) Gram positive cocci    S. Pneumo Ag Urine or CSF - Urine, Urine, Clean Catch [[id number]]  (Normal) Collected: 05/18/25 1751    Lab Status: Final result Specimen: Urine, Clean Catch Updated: 05/19/25 0715     Strep Pneumo Ag Negative    Legionella Antigen, Urine - Urine, Urine, Clean Catch  [[id number]]  (Normal) Collected: 05/18/25 1751    Lab Status: Final result Specimen: Urine, Clean Catch Updated: 05/19/25 0715     LEGIONELLA ANTIGEN, URINE Negative    Blood Culture - Blood, Hand, Left [[id number]]  (Normal) Collected: 05/18/25 1556    Lab Status: Preliminary result Specimen: Blood from Hand, Left Updated: 05/21/25 1931     Blood Culture No growth at 3 days    Narrative:      Aerobic Bottle Only    Less than seven (7) mL's of blood was collected.  Insufficient quantity may yield false negative results.    Blood Culture - Blood, Hand, Right [[id number]]  (Normal) Collected: 05/18/25 1538    Lab Status: Preliminary result Specimen: Blood from Hand, Right Updated: 05/21/25 1915     Blood Culture No growth at 3 days    Narrative:      Less than seven (7) mL's of blood was collected.  Insufficient quantity may yield false negative results.    Respiratory Panel PCR w/COVID-19(SARS-CoV-2) SHIRLEY/ALESSIO/LOUISA/PAD/COR/ANGELA In-House, NP Swab in UTM/VTM, 2 HR TAT - Swab, Nasopharynx [[id number]]  (Normal) Collected: 05/18/25 1352    Lab Status: Final result Specimen: Swab from Nasopharynx Updated: 05/18/25 1449     ADENOVIRUS, PCR Not Detected     Coronavirus 229E Not Detected     Coronavirus HKU1 Not Detected     Coronavirus NL63 Not Detected     Coronavirus OC43 Not Detected     COVID19 Not Detected     Human Metapneumovirus Not Detected     Human Rhinovirus/Enterovirus Not Detected     Influenza A PCR Not Detected     Influenza B PCR Not Detected     Parainfluenza Virus 1 Not Detected     Parainfluenza Virus 2 Not Detected     Parainfluenza Virus 3 Not Detected     Parainfluenza Virus 4 Not Detected     RSV, PCR Not Detected     Bordetella pertussis pcr Not Detected     Bordetella parapertussis PCR Not Detected     Chlamydophila pneumoniae PCR Not Detected     Mycoplasma pneumo by PCR Not Detected    Narrative:      In the setting of a positive respiratory panel with a viral infection PLUS a negative procalcitonin  without other underlying concern for bacterial infection, consider observing off antibiotics or discontinuation of antibiotics and continue supportive care. If the respiratory panel is positive for atypical bacterial infection (Bordetella pertussis, Chlamydophila pneumoniae, or Mycoplasma pneumoniae), consider antibiotic de-escalation to target atypical bacterial infection.            XR Shoulder 2+ View Left  Result Date: 5/20/2025  XR SHOULDER 2+ VW LEFT, XR KNEE 1 OR 2 VW LEFT Date of Exam: 5/20/2025 4:56 PM EDT Indication: fall Comparison: Left shoulder 3/4/2021 Findings: Evaluation of the left shoulder demonstrates a reverse shoulder arthroplasty. Hardware appears intact without acute complication. No acute osseous injury. Evaluation of the left knee shows no evidence for acute fracture or acute alignment abnormality. There is medial compartment joint space narrowing. Medial and lateral compartment chondrocalcinosis is noted. No underlying joint effusion. There are vascular calcifications.     Impression: Left shoulder arthroplasty without hardware complication or acute osseous injury. Degenerative arthrosis in the left knee without acute osseous injury. Electronically Signed: Racquel Burgess MD  5/20/2025 5:50 PM EDT  Workstation ID: [id number]    XR Knee 1 or 2 View Left  Result Date: 5/20/2025  XR SHOULDER 2+ VW LEFT, XR KNEE 1 OR 2 VW LEFT Date of Exam: 5/20/2025 4:56 PM EDT Indication: fall Comparison: Left shoulder 3/4/2021 Findings: Evaluation of the left shoulder demonstrates a reverse shoulder arthroplasty. Hardware appears intact without acute complication. No acute osseous injury. Evaluation of the left knee shows no evidence for acute fracture or acute alignment abnormality. There is medial compartment joint space narrowing. Medial and lateral compartment chondrocalcinosis is noted. No underlying joint effusion. There are vascular calcifications.     Impression: Left shoulder arthroplasty without  hardware complication or acute osseous injury. Degenerative arthrosis in the left knee without acute osseous injury. Electronically Signed: Racquel Burgess MD  5/20/2025 5:50 PM EDT  Workstation ID: [id number]    XR Elbow 3+ View Left  Result Date: 5/20/2025  XR ELBOW 3+ VW LEFT Date of Exam: 5/20/2025 5:09 PM EDT Indication: fall Comparison: None available. Findings: There is osseous demineralization. There is evidence for some chondrocalcinosis. An acute osseous abnormality is not definitely identified. There is no positive fat pad.     Impression: 1. Suggested chondrocalcinosis 2. Osseous demineralization. Electronically Signed: Chance Adams MD  5/20/2025 5:23 PM EDT  Workstation ID: [id number]    CT Head Without Contrast  Result Date: 5/20/2025  CT HEAD WO CONTRAST Date of Exam: 5/20/2025 4:48 PM EDT Indication: Fall, on eliquis. Comparison: None available. Technique: Axial CT images were obtained of the head without contrast administration.  Automated exposure control and iterative construction methods were used. Findings: No acute intracranial hemorrhage.Intact appearing gray-white differentiation.No extra-axial fluid collection.No significant mass effect. No hydrocephalus. Mild generalized parenchymal volume loss. Moderate periventricular and subcortical white matter hypoattenuation, nonspecific, perhaps from small vessel ischemic/hypertensive changes in a patient of this age.There are intracranial atherosclerotic calcifications. Mild scattered mucosal thickening in the paranasal sinuses.. Nonspecific trace left mastoid effusion. . Bilateral lens replacements. No acute or aggressive appearing bony or extracranial soft tissue process.     Impression: No acute intracranial findings. Electronically Signed: Clint Christianson MD  5/20/2025 5:08 PM EDT  Workstation ID: [id number]    SLP FEES - Fiberoptic Endo Eval Swallow  Result Date: 5/19/2025  This procedure was auto-finalized with no dictation required.    XR Abdomen  KUB  Result Date: 5/18/2025  XR ABDOMEN KUB Date of Exam: 5/18/2025 5:47 PM EDT Indication: constipation Comparison: Abdominal radiograph 8/26/2024, CT abdomen pelvis 8/23/2024 Findings: Mild to moderate formed colonic stool. No abnormally dilated loops of bowel to suggest ileus or obstruction. Excreted contrast in the renal collecting system and urinary bladder. Posttreatment related changes of the prostate. Generator device left lower quadrant. Left hip prosthesis. Severe degenerative osteoarthritis of the right hip. Osseous demineralization with multilevel thoracolumbar spondylosis and levoscoliosis. Extensive intra-abdominal atherosclerotic disease.     Impression: Mild to moderate formed colonic stool without findings of obstruction. Electronically Signed: Melvin Frazier MD  5/18/2025 5:52 PM EDT  Workstation ID: [id number]    CT Angiogram Chest Pulmonary Embolism  Result Date: 5/18/2025  CT ANGIOGRAM CHEST PULMONARY EMBOLISM Date of Exam: 5/18/2025 2:49 PM EDT Indication: sob, pleuritic chest pain. Comparison: 11/17/2024 Technique: Axial CT images were obtained of the chest after the uneventful intravenous administration of 65 cc Isovue-370 IV contrast utilizing pulmonary embolism protocol.  In addition, a 3-D volume rendered image was created for interpretation.  Reconstructed coronal and sagittal images were also obtained. Automated exposure control and iterative construction methods were used. Findings: Severe degenerative changes of the cervical spine. Bilateral shoulder replacements. Emphysema. Right middle and right upper lobe infiltrates right lower lobe infiltrate. Large hiatal hernia. 1.3 cm left thyroid nodule. No evidence of aortic dissection. Atheromatous disease of the coronary vessels. No pulmonary embolus. Calcified granulomata. Calcified mediastinal lymph nodes consistent with prior granulomatous disease. Left lower lobe mucous plugging. Scoliotic curvature of the spine. The sternum is intact.  Significant degenerative changes of the spine. No rib fractures. Prior cholecystectomy. Severe atheromatous disease of the abdominal aorta and visualized branches. Prior laminectomies of the lumbar spine.     Mucous plugging in the left lower lobe without atelectasis. Right-sided pulmonary infiltrate concerning for developing pneumonia. Large hiatal hernia. Electronically Signed: Flaco Grace MD  5/18/2025 3:32 PM EDT  Workstation ID: [id number]    XR Chest 1 View  Result Date: 5/18/2025  XR CHEST 1 VW Date of Exam: 5/18/2025 1:29 PM EDT Indication: SOA triage protocol Comparison: 4/25/2025 Findings: Elevation of the left hemidiaphragm which obscures the left heart border. Pulmonary vasculature appears stable scattered lucencies throughout the lungs compatible with emphysema. Airspace disease and irregular opacities in the right perihilar and lower lung. Atelectasis in the left lung base. Status post bilateral shoulder reverse arthroplasties     Impression: 1. Right perihilar and lower lung opacities suspicious for pneumonia 2. Pulmonary emphysema 3. Elevation of the left hemidiaphragm Electronically Signed: Manoj Hudson  5/18/2025 1:56 PM EDT  Workstation ID: [id number]      Results for orders placed during the hospital encounter of 07/16/21    Doppler Arterial Multi Level Lower Extremity - Bilateral CAR    Interpretation Summary  · Normal right KAMRYN of 1.02.  · Left femoral-popliteal arteries noncompressible. There are biphasic and monophasic waveforms noted in the left lower extremity  · The left posterior tibial artery was compressible with mildly reduced left KAMRYN of 0.86      Results for orders placed during the hospital encounter of 07/16/21    Doppler Arterial Multi Level Lower Extremity - Bilateral CAR    Interpretation Summary  · Normal right KAMRYN of 1.02.  · Left femoral-popliteal arteries noncompressible. There are biphasic and monophasic waveforms noted in the left lower extremity  · The left posterior tibial  artery was compressible with mildly reduced left KAMRYN of 0.86      Results for orders placed during the hospital encounter of 04/22/25    Adult Transthoracic Echo Complete W/ Cont if Necessary Per Protocol    Interpretation Summary    Left ventricular systolic function is normal. Estimated left ventricular EF = 60%    Left ventricular wall thickness is consistent with hypertrophy.    No hemodynamically significant valvular heart disease      Plan for Follow-up of Pending Labs/Results:   Pending Labs       Order Current Status    Blood Culture - Blood, Hand, Left Preliminary result    Blood Culture - Blood, Hand, Right Preliminary result          Discharge Details        Discharge Medications        New Medications        Instructions Start Date   doxycycline 100 MG capsule  Commonly known as: MONODOX   100 mg, Oral, Every 12 Hours Scheduled             Continue These Medications        Instructions Start Date   acetaminophen 500 MG tablet  Commonly known as: TYLENOL   1,000 mg, Every 6 Hours PRN      amantadine 100 MG capsule  Commonly known as: SYMMETREL   100 mg, 2 Times Daily      amiodarone 200 MG tablet  Commonly known as: PACERONE   200 mg, Oral, Every 24 Hours Scheduled      apixaban 5 MG tablet tablet  Commonly known as: ELIQUIS   5 mg, Oral, Every 12 Hours Scheduled      carbidopa-levodopa CR  MG per CR tablet  Commonly known as: SINEMET CR   1 tablet, 2 Times Daily      carbidopa-levodopa  MG per tablet  Commonly known as: SINEMET   1 tablet, 4 Times Daily      carbidopa-levodopa ER  MG per tablet  Commonly known as: SINEMET CR   1 tablet, 4 Times Daily      docusate sodium 100 MG capsule  Commonly known as: COLACE   100 mg, Oral, 2 Times Daily      fentaNYL 0.05 MG/ML injection  Commonly known as: SUBLIMAZE   250 mcg      ipratropium-albuterol 0.5-2.5 mg/3 ml nebulizer  Commonly known as: DUO-NEB   3 mL, 3 Times Daily PRN      metoprolol succinate XL 50 MG 24 hr tablet  Commonly known as:  TOPROL-XL   TAKE ONE TABLET BY MOUTH DAILY      multivitamin with minerals tablet tablet   1 tablet, Daily      naloxone 4 MG/0.1ML nasal spray  Commonly known as: NARCAN   Call 911. Don't prime. [place] in 1 nostril for overdose. Repeat in 2-3 minutes in other nostril if no or minimal breathing/responsiveness.      pantoprazole 20 MG EC tablet  Commonly known as: PROTONIX   20 mg, 2 Times Daily      QUEtiapine 25 MG tablet  Commonly known as: SEROquel   25 mg, Nightly      tadalafil 10 MG tablet  Commonly known as: CIALIS   10 mg, Oral, Daily PRN      tamsulosin 0.4 MG capsule 24 hr capsule  Commonly known as: FLOMAX   0.8 mg, Oral, Nightly      tiZANidine 4 MG tablet  Commonly known as: ZANAFLEX   4 mg, Every 8 Hours PRN      Trelegy Ellipta 100-62.5-25 MCG/ACT inhaler  Generic drug: Fluticasone-Umeclidin-Vilant   1 puff, Daily - RT               No Known Allergies      Discharge Disposition:  Home-Health Care [facility] [place]    Diet:  Hospital:  Diet Order   Procedures    Diet: Regular/House; Texture: Soft to Chew (NDD 3); Soft to Chew: Ground Meat; Fluid Consistency: Thin (IDDSI 0)            Activity:      Restrictions or Other Recommendations:         CODE STATUS:    Code Status and Medical Interventions: No CPR (Do Not Attempt to Resuscitate); Limited Support; No intubation (DNI), No vasopressors, No NIPPV (Non-Invasive Positive Pressure Ventilation), No artificial nutrition   Ordered at: 05/21/25 1033     Code Status (Patient has no pulse and is not breathing):    No CPR (Do Not Attempt to Resuscitate)     Medical Interventions (Patient has pulse or is breathing):    Limited Support     Medical Intervention Limits:    No intubation (DNI)       No vasopressors       No NIPPV (Non-Invasive Positive Pressure Ventilation)       No artificial nutrition     Level Of Support Discussed With:    Health Care Surrogate       Patient       Next of Kin (If No Surrogate)       Future Appointments   Date Time Provider Department Center    9/23/2025 11:45 AM oVn Kilpatrick MD [facility]       Additional Instructions for the Follow-ups that You Need to Schedule       Ambulatory Referral to Home Health   As directed      Face to Face Visit Date: 5/21/2025   Follow-up provider for Plan of Care?: I treated the patient in an acute care facility and will not continue treatment after discharge.   Follow-up provider: ARIADNE JOHNSON [[id number]]   Reason/Clinical Findings: pneumonia   Describe mobility limitations that make leaving home difficult: use of walker   Nursing/Therapeutic Services Requested: Skilled Nursing Physical Therapy Occupational Therapy Speech Therapy   Skilled nursing orders: Medication education   SLP orders: Dysphagia therapy   Frequency: Other (2-3 times a week)        Discharge Follow-up with PCP   As directed       Currently Documented PCP:    Ariadne Johnson PA    PCP Phone Number:    [phone]     Follow Up Details: 1 week                      Alejandra Orosco MD  05/22/25      Time Spent on Discharge:  I spent  25  minutes on this discharge activity which included: face-to-face encounter with the patient, reviewing the data in the system, coordination of the care with the nursing staff as well as consultants, documentation, and entering orders.

## 2025-05-22 NOTE — PLAN OF CARE
Goal Outcome Evaluation:   Patient discharged home with wife

## 2025-05-23 LAB
BACTERIA SPEC AEROBE CULT: NORMAL
BACTERIA SPEC AEROBE CULT: NORMAL

## 2025-05-23 NOTE — OUTREACH NOTE
Prep Survey      Flowsheet Row Responses   [facility]olic facility patient discharged from? [place]   Is LACE score < 7 ? No   Eligibility Readm Mgmt   Discharge diagnosis Pneumonia   Does the patient have one of the following disease processes/diagnoses(primary or secondary)? Pneumonia   Does the patient have Home health ordered? Yes   What is the Home health agency?  [facility]   Is there a DME ordered? No   Prep survey completed? Yes            ARCELIA IVORY - Registered Nurse

## 2025-05-29 ENCOUNTER — READMISSION MANAGEMENT (OUTPATIENT)
Dept: CALL CENTER | Facility: HOSPITAL | Age: 80
End: 2025-05-29
Payer: COMMERCIAL

## 2025-05-29 NOTE — OUTREACH NOTE
COPD/PN Week 1 Survey      Flowsheet Row Responses   [facility] patient discharged from? [place]   Does the patient have one of the following disease processes/diagnoses(primary or secondary)? Pneumonia   Week 1 attempt successful? Yes   Call start time 1206   Call end time 1210   Discharge diagnosis Pneumonia   Meds reviewed with patient/caregiver? Yes   Is the patient having any side effects they believe may be caused by any medication additions or changes? No   Does the patient have all medications ordered at discharge? Yes   Is the patient taking all medications as directed (includes completed medication regime)? Yes   Does the patient have a primary care provider?  Yes   Does the patient have an appointment with their PCP or specialist within 7 days of discharge? Yes   Comments regarding PCP PCP follow up 5/30/25   Has the patient kept scheduled appointments due by today? N/A   What is the Home health agency?  [facility]    Has home health visited the patient within 72 hours of discharge? Yes   Pulse Ox monitoring Intermittent   Pulse Ox device source Patient   O2 Sat comments O2 sats 90's on 2L   O2 Sat: education provided Sat levels, Monitoring frequency, When to seek care   Psychosocial issues? No   Did the patient receive a copy of their discharge instructions? Yes   Nursing interventions Reviewed instructions with patient   What is the patient's perception of their health status since discharge? Improving   Nursing Interventions Nurse provided patient education   Is the patient/caregiver able to teach back the hierarchy of who to call/visit for symptoms/problems? PCP, Specialist, Home health nurse, Urgent Care, ED, 911 Yes   Is the patient/caregiver able to teach back signs and symptoms of worsening condition: Fever/chills, Shortness of breath, Chest pain   Is the patient/caregiver able to teach back importance of completing antibiotic course of treatment? Yes   Week 1 call completed? Yes   Is the  patient interested in additional calls from an ambulatory ? No   Would this patient benefit from a Referral to [facility] Social Work? No   Call end time 1210            Yesenia JEFFREY - Registered Nurse

## 2025-06-09 ENCOUNTER — READMISSION MANAGEMENT (OUTPATIENT)
Dept: CALL CENTER | Facility: HOSPITAL | Age: 80
End: 2025-06-09
Payer: COMMERCIAL

## 2025-06-09 NOTE — OUTREACH NOTE
COPD/PN Week 2 Survey      Flowsheet Row Responses   Sabian[facility] facility patient discharged from? [place]   Does the patient have one of the following disease processes/diagnoses(primary or secondary)? Pneumonia   Week 2 attempt successful? No   Unsuccessful attempts Attempt 2   Revoke Other            Edilma H - Registered Nurse

## 2025-06-16 ENCOUNTER — HOSPITAL ENCOUNTER (EMERGENCY)
Facility: HOSPITAL | Age: 80
Discharge: HOME | End: 2025-06-16
Attending: EMERGENCY MEDICINE | Admitting: EMERGENCY MEDICINE
Payer: MEDICARE

## 2025-06-16 ENCOUNTER — APPOINTMENT (OUTPATIENT)
Dept: GENERAL RADIOLOGY | Facility: HOSPITAL | Age: 80
End: 2025-06-16
Payer: MEDICARE

## 2025-06-16 VITALS
HEIGHT: 68 IN | RESPIRATION RATE: 18 BRPM | TEMPERATURE: 98.4 F | HEART RATE: 75 BPM | SYSTOLIC BLOOD PRESSURE: 119 MMHG | OXYGEN SATURATION: 100 % | BODY MASS INDEX: 15.91 KG/M2 | DIASTOLIC BLOOD PRESSURE: 62 MMHG | WEIGHT: 105 LBS

## 2025-06-16 DIAGNOSIS — K56.41 FECAL IMPACTION: Primary | ICD-10-CM

## 2025-06-16 LAB
GLUCOSE BLDC GLUCOMTR-MCNC: 71 MG/DL (ref 70–130)
HOLD SPECIMEN: NORMAL
WHOLE BLOOD HOLD COAG: NORMAL
WHOLE BLOOD HOLD SPECIMEN: NORMAL

## 2025-06-16 PROCEDURE — 82948 REAGENT STRIP/BLOOD GLUCOSE: CPT

## 2025-06-16 PROCEDURE — 74018 RADEX ABDOMEN 1 VIEW: CPT

## 2025-06-16 PROCEDURE — 99284 EMERGENCY DEPT VISIT MOD MDM: CPT

## 2025-06-16 RX ORDER — SODIUM PHOSPHATE,MONO-DIBASIC 19G-7G/118
1 ENEMA (ML) RECTAL ONCE
Status: COMPLETED | OUTPATIENT
Start: 2025-06-16 | End: 2025-06-16

## 2025-06-16 RX ADMIN — SODIUM PHOSPHATE, DIBASIC AND SODIUM PHOSPHATE, MONOBASIC 1 ENEMA: 7; 19 ENEMA RECTAL at 18:21
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
## 2025-06-16 NOTE — DISCHARGE INSTRUCTIONS
I recommend the following regimen for signs of constipation.  Drink plenty of fluids, especially water.  Avoid caffeinated beverages and alcohol.  MiraLAX in water morning and night until constipation is relieved.  Magnesium citrate around noon until constipation is relieved.  Fleet enema 1-2 times per day until constipation is relieved.  High-fiber diet.

## 2025-07-16 ENCOUNTER — HOSPITAL ENCOUNTER (EMERGENCY)
Facility: HOSPITAL | Age: 80
Discharge: HOME | End: 2025-07-16
Payer: MEDICARE

## 2025-07-16 ENCOUNTER — APPOINTMENT (OUTPATIENT)
Dept: GENERAL RADIOLOGY | Facility: HOSPITAL | Age: 80
End: 2025-07-16
Payer: MEDICARE

## 2025-07-16 ENCOUNTER — APPOINTMENT (OUTPATIENT)
Dept: CT IMAGING | Facility: HOSPITAL | Age: 80
End: 2025-07-16
Payer: MEDICARE

## 2025-07-16 VITALS
DIASTOLIC BLOOD PRESSURE: 81 MMHG | HEART RATE: 47 BPM | SYSTOLIC BLOOD PRESSURE: 135 MMHG | OXYGEN SATURATION: 95 % | WEIGHT: 100 LBS | RESPIRATION RATE: 16 BRPM | TEMPERATURE: 97.7 F | HEIGHT: 68 IN | BODY MASS INDEX: 15.16 KG/M2

## 2025-07-16 DIAGNOSIS — I95.9 HYPOTENSION, UNSPECIFIED HYPOTENSION TYPE: Primary | ICD-10-CM

## 2025-07-16 DIAGNOSIS — G20.A1 PARKINSON'S DISEASE, UNSPECIFIED WHETHER DYSKINESIA PRESENT, UNSPECIFIED WHETHER MANIFESTATIONS FLUCTUATE: ICD-10-CM

## 2025-07-16 DIAGNOSIS — R00.1 BRADYCARDIA: ICD-10-CM

## 2025-07-16 LAB
ALBUMIN SERPL-MCNC: 3.2 G/DL (ref 3.5–5.2)
ALBUMIN/GLOB SERPL: 1.5 G/DL
ALP SERPL-CCNC: 107 U/L (ref 39–117)
ALT SERPL W P-5'-P-CCNC: <5 U/L (ref 1–41)
ANION GAP SERPL CALCULATED.3IONS-SCNC: 9.8 MMOL/L (ref 5–15)
AST SERPL-CCNC: 17 U/L (ref 1–40)
BACTERIA UR QL AUTO: NORMAL /HPF
BASOPHILS # BLD AUTO: 0.04 10*3/MM3 (ref 0–0.2)
BASOPHILS NFR BLD AUTO: 0.8 % (ref 0–1.5)
BILIRUB SERPL-MCNC: 0.4 MG/DL (ref 0–1.2)
BILIRUB UR QL STRIP: NEGATIVE
BUN SERPL-MCNC: 24.6 MG/DL (ref 8–23)
BUN/CREAT SERPL: 21.6 (ref 7–25)
CALCIUM SPEC-SCNC: 8.3 MG/DL (ref 8.6–10.5)
CHLORIDE SERPL-SCNC: 105 MMOL/L (ref 98–107)
CLARITY UR: ABNORMAL
CO2 SERPL-SCNC: 27.2 MMOL/L (ref 22–29)
COD CRY URNS QL: NORMAL /HPF
COLOR UR: ABNORMAL
CREAT SERPL-MCNC: 1.14 MG/DL (ref 0.76–1.27)
DEPRECATED RDW RBC AUTO: 59.3 FL (ref 37–54)
EGFRCR SERPLBLD CKD-EPI 2021: 65.4 ML/MIN/1.73
EOSINOPHIL # BLD AUTO: 0.05 10*3/MM3 (ref 0–0.4)
EOSINOPHIL NFR BLD AUTO: 1 % (ref 0.3–6.2)
ERYTHROCYTE [DISTWIDTH] IN BLOOD BY AUTOMATED COUNT: 17.2 % (ref 12.3–15.4)
GEN 5 1HR TROPONIN T REFLEX: 31 NG/L
GLOBULIN UR ELPH-MCNC: 2.1 GM/DL
GLUCOSE SERPL-MCNC: 93 MG/DL (ref 65–99)
GLUCOSE UR STRIP-MCNC: NEGATIVE MG/DL
HCT VFR BLD AUTO: 38.9 % (ref 37.5–51)
HGB BLD-MCNC: 12.3 G/DL (ref 13–17.7)
HGB UR QL STRIP.AUTO: NEGATIVE
HYALINE CASTS UR QL AUTO: NORMAL /LPF
IMM GRANULOCYTES # BLD AUTO: 0.02 10*3/MM3 (ref 0–0.05)
IMM GRANULOCYTES NFR BLD AUTO: 0.4 % (ref 0–0.5)
KETONES UR QL STRIP: ABNORMAL
LEUKOCYTE ESTERASE UR QL STRIP.AUTO: ABNORMAL
LIPASE SERPL-CCNC: 15 U/L (ref 13–60)
LYMPHOCYTES # BLD AUTO: 0.68 10*3/MM3 (ref 0.7–3.1)
LYMPHOCYTES NFR BLD AUTO: 13.4 % (ref 19.6–45.3)
MAGNESIUM SERPL-MCNC: 1.9 MG/DL (ref 1.6–2.4)
MCH RBC QN AUTO: 29.9 PG (ref 26.6–33)
MCHC RBC AUTO-ENTMCNC: 31.6 G/DL (ref 31.5–35.7)
MCV RBC AUTO: 94.4 FL (ref 79–97)
MONOCYTES # BLD AUTO: 0.43 10*3/MM3 (ref 0.1–0.9)
MONOCYTES NFR BLD AUTO: 8.4 % (ref 5–12)
NEUTROPHILS NFR BLD AUTO: 3.87 10*3/MM3 (ref 1.7–7)
NEUTROPHILS NFR BLD AUTO: 76 % (ref 42.7–76)
NITRITE UR QL STRIP: NEGATIVE
NRBC BLD AUTO-RTO: 0 /100 WBC (ref 0–0.2)
NT-PROBNP SERPL-MCNC: 2613 PG/ML (ref 0–1800)
PH UR STRIP.AUTO: <=5 [PH] (ref 5–8)
PLATELET # BLD AUTO: 227 10*3/MM3 (ref 140–450)
PMV BLD AUTO: 10.6 FL (ref 6–12)
POTASSIUM SERPL-SCNC: 4.2 MMOL/L (ref 3.5–5.2)
PROT SERPL-MCNC: 5.3 G/DL (ref 6–8.5)
PROT UR QL STRIP: ABNORMAL
RBC # BLD AUTO: 4.12 10*6/MM3 (ref 4.14–5.8)
RBC # UR STRIP: NORMAL /HPF
REF LAB TEST METHOD: NORMAL
SODIUM SERPL-SCNC: 142 MMOL/L (ref 136–145)
SP GR UR STRIP: 1.02 (ref 1–1.03)
SQUAMOUS #/AREA URNS HPF: NORMAL /HPF
TROPONIN T % DELTA: -3
TROPONIN T NUMERIC DELTA: -1 NG/L
TROPONIN T SERPL HS-MCNC: 32 NG/L
UROBILINOGEN UR QL STRIP: ABNORMAL
WBC # UR STRIP: NORMAL /HPF
WBC NRBC COR # BLD AUTO: 5.09 10*3/MM3 (ref 3.4–10.8)

## 2025-07-16 PROCEDURE — 83880 ASSAY OF NATRIURETIC PEPTIDE: CPT

## 2025-07-16 PROCEDURE — 83735 ASSAY OF MAGNESIUM: CPT

## 2025-07-16 PROCEDURE — 84484 ASSAY OF TROPONIN QUANT: CPT

## 2025-07-16 PROCEDURE — 25810000003 LACTATED RINGERS SOLUTION

## 2025-07-16 PROCEDURE — 70450 CT HEAD/BRAIN W/O DYE: CPT

## 2025-07-16 PROCEDURE — 85025 COMPLETE CBC W/AUTO DIFF WBC: CPT

## 2025-07-16 PROCEDURE — 83690 ASSAY OF LIPASE: CPT

## 2025-07-16 PROCEDURE — 81001 URINALYSIS AUTO W/SCOPE: CPT

## 2025-07-16 PROCEDURE — 71045 X-RAY EXAM CHEST 1 VIEW: CPT

## 2025-07-16 PROCEDURE — 36415 COLL VENOUS BLD VENIPUNCTURE: CPT

## 2025-07-16 PROCEDURE — 99284 EMERGENCY DEPT VISIT MOD MDM: CPT

## 2025-07-16 PROCEDURE — 93005 ELECTROCARDIOGRAM TRACING: CPT

## 2025-07-16 PROCEDURE — 80053 COMPREHEN METABOLIC PANEL: CPT

## 2025-07-16 PROCEDURE — P9612 CATHETERIZE FOR URINE SPEC: HCPCS

## 2025-07-16 RX ADMIN — SODIUM CHLORIDE, POTASSIUM CHLORIDE, SODIUM LACTATE AND CALCIUM CHLORIDE 500 ML: 600; 310; 30; 20 INJECTION, SOLUTION INTRAVENOUS at 17:01

## 2025-07-16 NOTE — ED PROVIDER NOTES
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   Preliminary Result   Test Reason : Bradycardia   Blood Pressure :   */*   mmHG   Vent. Rate :  45 BPM     Atrial Rate :  45 BPM      P-R Int : 206 ms          QRS Dur : 110 ms       QT Int : 540 ms       P-R-T Axes :   * -20  10 degrees     QTcB Int : 467 ms      Sinus bradycardia   Cannot rule out Anterior infarct , age undetermined   Abnormal ECG   When compared with ECG of 20-May-2025 05:07,   Vent. rate has decreased by  38 bpm   ST elevation now present in Inferior leads   ST no longer depressed in Anterior leads   T wave inversion no longer evident in Anterolateral leads      Referred By: ED MD           Confirmed By:              All labs have been independently reviewed by me.  All radiology studies have been reviewed by me and radiologist dictating the report.   EKG's independently viewed and interpreted by me.  Discussion below represents my analysis of pertinent findings related to patient's condition, differential diagnosis, treatment plan and final disposition.    ED course:  ED Course as of 07/16/25 2121 Wed Jul 16, 2025   1637 On arrival, patient normotensive, bradycardic to 47, afebrile, satting 97% on 2 L nasal cannula which is his baseline.   Appears to be at his baseline mental status.  And given previous hospital medicine note noting transition towards more of a comfort care direction as well as DNR/DNI status,  as well as plan to not admit patient to the ICU and patient also declining noninvasive ventilation, will speak with patient's wife about transcutaneous pacing if he gets to this point given his bradycardia and hypotension, however patient is stable at this point.  Will confirm CODE STATUS [AC]   1652 Was unable to get in touch with patient's wife, however did discuss his goals of care with patient's son, Bright Roque who was present at his previous goals of care discussion.  Confirmed patient is DNR/DNI status.  Also discussed the need for transcutaneous pacing should the patient became also discussed transcutaneous pacing if patient became more hypotensive in the setting of his bradycardia.  Patient's son stated that this would not be what in accordance with his wishes and so the decision was made that we would not transcutaneous pace artificially pace the patient [AC]   1653 ECG 12 Lead Bradycardia  Independently interpreted by me, revealing of sinus bradycardia at a rate of 45, QTc 467, normal axis, no STEMI [AC]   1655 Additionally, patient son states that he has been more confused and hallucinating today.  He also notes that patient's baseline heart rate is in the 40s and 50s and that his blood pressure is always low, SBP in the 90s [AC]   1807 Comprehensive Metabolic Panel(!)  Nonactionable [AC]   1807 CBC & Differential(!)  Nonactionable [AC]   1807 HS Troponin T(!): 32  Elevated, delta pending [AC]   1807 proBNP(!): 2,613.0  Elevated [AC]   1807 Magnesium: 1.9  Normal [AC]   1807 Urinalysis, Microscopic Only - Straight Cath  No UTI [AC]   1807 XR Chest 1 View  Independently interpreted by me, revealing of no acute cardiopulmonary pathology [AC]   1807 CT Head Without Contrast  Independently interpreted by me, revealing no acute  intracranial pathology   [AC]   1807 Blood pressure remains stable at 119/64 [AC]   1824 Discussed patient further with updated patient's son, Bright, on his workup and stability.  Patient remained stable throughout his stay in the emergency department and appeared to be at his baseline mental status according to.  Son had reported some intermittent hallucinations.  Discussed hospitalization versus discharge, which was strongly considered, versus discharge.  It was felt that the best thing for the patient in accordance with his wishes would likely be to be discharged back home in the environment he is most used to.  Did I discussed that it is possible the patient was becoming more delirious and gave strict return precautions, however son felt comfortable with patient's disposition home.  He stated that he would call his mother to come pick the patient up from the ER [AC]   1842 HS Troponin T(!): 31  No significant delta [AC]      ED Course User Index  [AC] Cyrus Leyva MD            Shared Decision Making:  After my consideration of clinical presentation and any laboratory/radiology studies obtained, I discussed the findings with the patient/patient representative who is in agreement with the treatment plan and the final disposition.   Risks and benefits of discharge and/or observation/admission were discussed.     Ultimately, the patient was discharged home.       AS OF 21:21 EDT VITALS:    BP - 135/81  HR - (!) 47  TEMP - 97.7 °F (36.5 °C) (Oral)  O2 SATS - 95%              DIAGNOSIS  Final diagnoses:   Hypotension, unspecified hypotension type   Bradycardia   Parkinson's disease, unspecified whether dyskinesia present, unspecified whether manifestations fluctuate         DISPOSITION  ED Disposition       ED Disposition   Discharge    Condition   Stable    Comment   --                Ariadne Galloway PA  [address] [place] [id number] [phone]               Medication List      No changes  were made to your prescriptions during this visit.         Please note that portions of this document were completed with voice recognition software.      Cyrus Leyva MD  07/16/25 3022

## 2025-07-16 NOTE — DISCHARGE INSTRUCTIONS
Follow-up with primary care doctor.  Please return to the ER with any new, concerning, worsening symptoms.

## 2025-07-17 LAB
QT INTERVAL: 540 MS
QTC INTERVAL: 467 MS

## 2025-07-18 ENCOUNTER — APPOINTMENT (OUTPATIENT)
Dept: GENERAL RADIOLOGY | Facility: HOSPITAL | Age: 80
End: 2025-07-18
Payer: MEDICARE

## 2025-07-18 ENCOUNTER — APPOINTMENT (OUTPATIENT)
Dept: CT IMAGING | Facility: HOSPITAL | Age: 80
End: 2025-07-18
Payer: MEDICARE

## 2025-07-18 ENCOUNTER — HOSPITAL ENCOUNTER (INPATIENT)
Facility: HOSPITAL | Age: 80
LOS: 5 days | Discharge: NURSING, REHAB OR LONG-TERM CARE | End: 2025-07-24
Attending: EMERGENCY MEDICINE | Admitting: INTERNAL MEDICINE
Payer: MEDICARE
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
## 2025-07-18 DIAGNOSIS — R74.8 ELEVATED CK: ICD-10-CM

## 2025-07-18 DIAGNOSIS — M17.12 ARTHRITIS OF LEFT KNEE: ICD-10-CM

## 2025-07-18 DIAGNOSIS — S43.402A SPRAIN OF LEFT SHOULDER, UNSPECIFIED SHOULDER SPRAIN TYPE, INITIAL ENCOUNTER: ICD-10-CM

## 2025-07-18 DIAGNOSIS — J96.10 CHRONIC RESPIRATORY FAILURE, UNSPECIFIED WHETHER WITH HYPOXIA OR HYPERCAPNIA: ICD-10-CM

## 2025-07-18 DIAGNOSIS — S70.00XA CONTUSION OF HIP, UNSPECIFIED LATERALITY, INITIAL ENCOUNTER: ICD-10-CM

## 2025-07-18 DIAGNOSIS — S22.43XA MULTIPLE FRACTURES OF RIBS, BILATERAL, INIT FOR CLOS FX: Primary | ICD-10-CM

## 2025-07-18 PROBLEM — S22.49XA RIB FRACTURES: Status: ACTIVE | Noted: 2025-07-18

## 2025-07-18 LAB
ALBUMIN SERPL-MCNC: 3.4 G/DL (ref 3.5–5.2)
ALBUMIN/GLOB SERPL: 1.4 G/DL
ALP SERPL-CCNC: 129 U/L (ref 39–117)
ALT SERPL W P-5'-P-CCNC: <5 U/L (ref 1–41)
ANION GAP SERPL CALCULATED.3IONS-SCNC: 15 MMOL/L (ref 5–15)
AST SERPL-CCNC: 22 U/L (ref 1–40)
BASOPHILS # BLD AUTO: 0.03 10*3/MM3 (ref 0–0.2)
BASOPHILS NFR BLD AUTO: 0.4 % (ref 0–1.5)
BILIRUB SERPL-MCNC: 1.1 MG/DL (ref 0–1.2)
BILIRUB UR QL STRIP: NEGATIVE
BUN SERPL-MCNC: 19.1 MG/DL (ref 8–23)
BUN/CREAT SERPL: 23.3 (ref 7–25)
CALCIUM SPEC-SCNC: 8.7 MG/DL (ref 8.6–10.5)
CHLORIDE SERPL-SCNC: 102 MMOL/L (ref 98–107)
CK SERPL-CCNC: 236 U/L (ref 20–200)
CLARITY UR: CLEAR
CO2 SERPL-SCNC: 23 MMOL/L (ref 22–29)
COLOR UR: YELLOW
CREAT SERPL-MCNC: 0.82 MG/DL (ref 0.76–1.27)
D-LACTATE SERPL-SCNC: 1.1 MMOL/L (ref 0.5–2)
DEPRECATED RDW RBC AUTO: 57.7 FL (ref 37–54)
EGFRCR SERPLBLD CKD-EPI 2021: 89.4 ML/MIN/1.73
EOSINOPHIL # BLD AUTO: 0 10*3/MM3 (ref 0–0.4)
EOSINOPHIL NFR BLD AUTO: 0 % (ref 0.3–6.2)
ERYTHROCYTE [DISTWIDTH] IN BLOOD BY AUTOMATED COUNT: 16.5 % (ref 12.3–15.4)
GEN 5 1HR TROPONIN T REFLEX: 34 NG/L
GLOBULIN UR ELPH-MCNC: 2.5 GM/DL
GLUCOSE SERPL-MCNC: 85 MG/DL (ref 65–99)
GLUCOSE UR STRIP-MCNC: NEGATIVE MG/DL
HCT VFR BLD AUTO: 40.5 % (ref 37.5–51)
HGB BLD-MCNC: 12.9 G/DL (ref 13–17.7)
HGB UR QL STRIP.AUTO: NEGATIVE
IMM GRANULOCYTES # BLD AUTO: 0.04 10*3/MM3 (ref 0–0.05)
IMM GRANULOCYTES NFR BLD AUTO: 0.5 % (ref 0–0.5)
KETONES UR QL STRIP: ABNORMAL
LEUKOCYTE ESTERASE UR QL STRIP.AUTO: NEGATIVE
LYMPHOCYTES # BLD AUTO: 0.31 10*3/MM3 (ref 0.7–3.1)
LYMPHOCYTES NFR BLD AUTO: 4 % (ref 19.6–45.3)
MCH RBC QN AUTO: 30.1 PG (ref 26.6–33)
MCHC RBC AUTO-ENTMCNC: 31.9 G/DL (ref 31.5–35.7)
MCV RBC AUTO: 94.4 FL (ref 79–97)
MONOCYTES # BLD AUTO: 0.8 10*3/MM3 (ref 0.1–0.9)
MONOCYTES NFR BLD AUTO: 10.3 % (ref 5–12)
NEUTROPHILS NFR BLD AUTO: 6.57 10*3/MM3 (ref 1.7–7)
NEUTROPHILS NFR BLD AUTO: 84.8 % (ref 42.7–76)
NITRITE UR QL STRIP: NEGATIVE
NRBC BLD AUTO-RTO: 0 /100 WBC (ref 0–0.2)
NT-PROBNP SERPL-MCNC: 3974 PG/ML (ref 0–1800)
PH UR STRIP.AUTO: 6 [PH] (ref 5–8)
PLATELET # BLD AUTO: 231 10*3/MM3 (ref 140–450)
PMV BLD AUTO: 10.8 FL (ref 6–12)
POTASSIUM SERPL-SCNC: 4.1 MMOL/L (ref 3.5–5.2)
PROT SERPL-MCNC: 5.9 G/DL (ref 6–8.5)
PROT UR QL STRIP: NEGATIVE
RBC # BLD AUTO: 4.29 10*6/MM3 (ref 4.14–5.8)
SODIUM SERPL-SCNC: 140 MMOL/L (ref 136–145)
SP GR UR STRIP: 1.01 (ref 1–1.03)
TROPONIN T % DELTA: 10
TROPONIN T NUMERIC DELTA: 3 NG/L
TROPONIN T SERPL HS-MCNC: 31 NG/L
UROBILINOGEN UR QL STRIP: ABNORMAL
WBC NRBC COR # BLD AUTO: 7.75 10*3/MM3 (ref 3.4–10.8)

## 2025-07-18 PROCEDURE — 25010000002 TETANUS-DIPHTH-ACELL PERTUSSIS 5-2.5-18.5 LF-MCG/0.5 SUSPENSION PREFILLED SYRINGE: Performed by: NURSE PRACTITIONER

## 2025-07-18 PROCEDURE — 73080 X-RAY EXAM OF ELBOW: CPT

## 2025-07-18 PROCEDURE — 74176 CT ABD & PELVIS W/O CONTRAST: CPT

## 2025-07-18 PROCEDURE — 94799 UNLISTED PULMONARY SVC/PX: CPT

## 2025-07-18 PROCEDURE — 83605 ASSAY OF LACTIC ACID: CPT | Performed by: NURSE PRACTITIONER

## 2025-07-18 PROCEDURE — 73560 X-RAY EXAM OF KNEE 1 OR 2: CPT

## 2025-07-18 PROCEDURE — G0378 HOSPITAL OBSERVATION PER HR: HCPCS

## 2025-07-18 PROCEDURE — 82550 ASSAY OF CK (CPK): CPT | Performed by: NURSE PRACTITIONER

## 2025-07-18 PROCEDURE — 93005 ELECTROCARDIOGRAM TRACING: CPT | Performed by: INTERNAL MEDICINE

## 2025-07-18 PROCEDURE — 94761 N-INVAS EAR/PLS OXIMETRY MLT: CPT

## 2025-07-18 PROCEDURE — 83880 ASSAY OF NATRIURETIC PEPTIDE: CPT | Performed by: NURSE PRACTITIONER

## 2025-07-18 PROCEDURE — 85025 COMPLETE CBC W/AUTO DIFF WBC: CPT | Performed by: NURSE PRACTITIONER

## 2025-07-18 PROCEDURE — 73030 X-RAY EXAM OF SHOULDER: CPT

## 2025-07-18 PROCEDURE — 97162 PT EVAL MOD COMPLEX 30 MIN: CPT

## 2025-07-18 PROCEDURE — 36415 COLL VENOUS BLD VENIPUNCTURE: CPT

## 2025-07-18 PROCEDURE — 94640 AIRWAY INHALATION TREATMENT: CPT

## 2025-07-18 PROCEDURE — 90715 TDAP VACCINE 7 YRS/> IM: CPT | Performed by: NURSE PRACTITIONER

## 2025-07-18 PROCEDURE — 99223 1ST HOSP IP/OBS HIGH 75: CPT | Performed by: INTERNAL MEDICINE

## 2025-07-18 PROCEDURE — 80053 COMPREHEN METABOLIC PANEL: CPT | Performed by: NURSE PRACTITIONER

## 2025-07-18 PROCEDURE — 70450 CT HEAD/BRAIN W/O DYE: CPT

## 2025-07-18 PROCEDURE — 72125 CT NECK SPINE W/O DYE: CPT

## 2025-07-18 PROCEDURE — 93005 ELECTROCARDIOGRAM TRACING: CPT | Performed by: NURSE PRACTITIONER

## 2025-07-18 PROCEDURE — 99285 EMERGENCY DEPT VISIT HI MDM: CPT

## 2025-07-18 PROCEDURE — 93010 ELECTROCARDIOGRAM REPORT: CPT | Performed by: INTERNAL MEDICINE

## 2025-07-18 PROCEDURE — 90471 IMMUNIZATION ADMIN: CPT | Performed by: NURSE PRACTITIONER

## 2025-07-18 PROCEDURE — 81003 URINALYSIS AUTO W/O SCOPE: CPT | Performed by: NURSE PRACTITIONER

## 2025-07-18 PROCEDURE — 97166 OT EVAL MOD COMPLEX 45 MIN: CPT

## 2025-07-18 PROCEDURE — 84484 ASSAY OF TROPONIN QUANT: CPT | Performed by: NURSE PRACTITIONER

## 2025-07-18 PROCEDURE — 71250 CT THORAX DX C-: CPT

## 2025-07-18 RX ORDER — PANTOPRAZOLE SODIUM 40 MG/1
40 TABLET, DELAYED RELEASE ORAL
Status: DISCONTINUED | OUTPATIENT
Start: 2025-07-19 | End: 2025-07-24 | Stop reason: HOSPADM

## 2025-07-18 RX ORDER — OXYCODONE AND ACETAMINOPHEN 5; 325 MG/1; MG/1
1 TABLET ORAL EVERY 6 HOURS PRN
Refills: 0 | Status: DISPENSED | OUTPATIENT
Start: 2025-07-18 | End: 2025-07-23

## 2025-07-18 RX ORDER — ACETAMINOPHEN 500 MG
500 TABLET ORAL ONCE
Status: COMPLETED | OUTPATIENT
Start: 2025-07-18 | End: 2025-07-18

## 2025-07-18 RX ORDER — AMANTADINE HYDROCHLORIDE 100 MG/1
100 CAPSULE, GELATIN COATED ORAL 2 TIMES DAILY
Status: DISCONTINUED | OUTPATIENT
Start: 2025-07-18 | End: 2025-07-24 | Stop reason: HOSPADM

## 2025-07-18 RX ORDER — BISACODYL 5 MG/1
5 TABLET, DELAYED RELEASE ORAL DAILY PRN
Status: DISCONTINUED | OUTPATIENT
Start: 2025-07-18 | End: 2025-07-21

## 2025-07-18 RX ORDER — SODIUM CHLORIDE 0.9 % (FLUSH) 0.9 %
10 SYRINGE (ML) INJECTION AS NEEDED
Status: DISCONTINUED | OUTPATIENT
Start: 2025-07-18 | End: 2025-07-24 | Stop reason: HOSPADM

## 2025-07-18 RX ORDER — AMOXICILLIN 250 MG
2 CAPSULE ORAL 2 TIMES DAILY PRN
Status: DISCONTINUED | OUTPATIENT
Start: 2025-07-18 | End: 2025-07-21

## 2025-07-18 RX ORDER — BUDESONIDE AND FORMOTEROL FUMARATE DIHYDRATE 160; 4.5 UG/1; UG/1
2 AEROSOL RESPIRATORY (INHALATION)
Status: DISCONTINUED | OUTPATIENT
Start: 2025-07-18 | End: 2025-07-24 | Stop reason: HOSPADM

## 2025-07-18 RX ORDER — CARBIDOPA AND LEVODOPA 25; 100 MG/1; MG/1
1 TABLET ORAL 4 TIMES DAILY
Status: DISCONTINUED | OUTPATIENT
Start: 2025-07-18 | End: 2025-07-24 | Stop reason: HOSPADM

## 2025-07-18 RX ORDER — LIDOCAINE 4 G/G
1 PATCH TOPICAL
Status: DISCONTINUED | OUTPATIENT
Start: 2025-07-18 | End: 2025-07-24 | Stop reason: HOSPADM

## 2025-07-18 RX ORDER — SODIUM CHLORIDE 9 MG/ML
40 INJECTION, SOLUTION INTRAVENOUS AS NEEDED
Status: DISCONTINUED | OUTPATIENT
Start: 2025-07-18 | End: 2025-07-24 | Stop reason: HOSPADM

## 2025-07-18 RX ORDER — METOPROLOL SUCCINATE 50 MG/1
50 TABLET, EXTENDED RELEASE ORAL DAILY
Status: DISCONTINUED | OUTPATIENT
Start: 2025-07-18 | End: 2025-07-24 | Stop reason: HOSPADM

## 2025-07-18 RX ORDER — NITROGLYCERIN 0.4 MG/1
0.4 TABLET SUBLINGUAL
Status: DISCONTINUED | OUTPATIENT
Start: 2025-07-18 | End: 2025-07-24 | Stop reason: HOSPADM

## 2025-07-18 RX ORDER — BISACODYL 10 MG
10 SUPPOSITORY, RECTAL RECTAL DAILY PRN
Status: DISCONTINUED | OUTPATIENT
Start: 2025-07-18 | End: 2025-07-21

## 2025-07-18 RX ORDER — TAMSULOSIN HYDROCHLORIDE 0.4 MG/1
0.8 CAPSULE ORAL NIGHTLY
Status: DISCONTINUED | OUTPATIENT
Start: 2025-07-18 | End: 2025-07-24 | Stop reason: HOSPADM

## 2025-07-18 RX ORDER — QUETIAPINE FUMARATE 25 MG/1
25 TABLET, FILM COATED ORAL NIGHTLY
Status: DISCONTINUED | OUTPATIENT
Start: 2025-07-18 | End: 2025-07-24 | Stop reason: HOSPADM

## 2025-07-18 RX ORDER — SODIUM CHLORIDE 0.9 % (FLUSH) 0.9 %
10 SYRINGE (ML) INJECTION EVERY 12 HOURS SCHEDULED
Status: DISCONTINUED | OUTPATIENT
Start: 2025-07-18 | End: 2025-07-24 | Stop reason: HOSPADM

## 2025-07-18 RX ORDER — IPRATROPIUM BROMIDE AND ALBUTEROL SULFATE 2.5; .5 MG/3ML; MG/3ML
3 SOLUTION RESPIRATORY (INHALATION) EVERY 4 HOURS PRN
Status: DISCONTINUED | OUTPATIENT
Start: 2025-07-18 | End: 2025-07-24 | Stop reason: HOSPADM

## 2025-07-18 RX ORDER — AMIODARONE HYDROCHLORIDE 200 MG/1
200 TABLET ORAL
Status: DISCONTINUED | OUTPATIENT
Start: 2025-07-18 | End: 2025-07-24 | Stop reason: HOSPADM

## 2025-07-18 RX ORDER — ONDANSETRON 2 MG/ML
4 INJECTION INTRAMUSCULAR; INTRAVENOUS EVERY 6 HOURS PRN
Status: DISCONTINUED | OUTPATIENT
Start: 2025-07-18 | End: 2025-07-24 | Stop reason: HOSPADM

## 2025-07-18 RX ORDER — POLYETHYLENE GLYCOL 3350 17 G/17G
17 POWDER, FOR SOLUTION ORAL DAILY PRN
Status: DISCONTINUED | OUTPATIENT
Start: 2025-07-18 | End: 2025-07-21

## 2025-07-18 RX ADMIN — OXYCODONE HYDROCHLORIDE AND ACETAMINOPHEN 1 TABLET: 5; 325 TABLET ORAL at 16:06

## 2025-07-18 RX ADMIN — TAMSULOSIN HYDROCHLORIDE 0.8 MG: 0.4 CAPSULE ORAL at 21:11

## 2025-07-18 RX ADMIN — TETANUS TOXOID, REDUCED DIPHTHERIA TOXOID AND ACELLULAR PERTUSSIS VACCINE, ADSORBED 0.5 ML: 5; 2.5; 8; 8; 2.5 SUSPENSION INTRAMUSCULAR at 10:01

## 2025-07-18 RX ADMIN — AMANTADINE HYDROCHLORIDE 100 MG: 100 CAPSULE ORAL at 21:11

## 2025-07-18 RX ADMIN — METOPROLOL SUCCINATE 50 MG: 50 TABLET, EXTENDED RELEASE ORAL at 16:06

## 2025-07-18 RX ADMIN — TIZANIDINE 4 MG: 4 TABLET ORAL at 21:11

## 2025-07-18 RX ADMIN — BUDESONIDE AND FORMOTEROL FUMARATE DIHYDRATE 2 PUFF: 160; 4.5 AEROSOL RESPIRATORY (INHALATION) at 20:23

## 2025-07-18 RX ADMIN — AMIODARONE HYDROCHLORIDE 200 MG: 200 TABLET ORAL at 18:37

## 2025-07-18 RX ADMIN — QUETIAPINE FUMARATE 25 MG: 25 TABLET ORAL at 21:11

## 2025-07-18 RX ADMIN — Medication 10 ML: at 14:19

## 2025-07-18 RX ADMIN — LIDOCAINE 1 PATCH: 4 PATCH TOPICAL at 14:18

## 2025-07-18 RX ADMIN — ACETAMINOPHEN 500 MG: 500 TABLET, FILM COATED ORAL at 10:51

## 2025-07-18 RX ADMIN — CARBIDOPA AND LEVODOPA 1 TABLET: 25; 100 TABLET ORAL at 18:37

## 2025-07-18 RX ADMIN — Medication 10 ML: at 21:16

## 2025-07-18 RX ADMIN — CARBIDOPA AND LEVODOPA 1 TABLET: 25; 100 TABLET ORAL at 21:11
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
## 2025-07-18 NOTE — CONSULTS
Continued Stay Note  [facility]     Patient Name: Flaco Roque II  MRN: [id number]  Today's Date: 7/18/2025    Admit Date: 7/18/2025    Plan: To be determined   Discharge Plan       Row Name 07/18/25 1831       Plan    Plan To be determined    Plan Comments Hospice referral received, Dr. White gave this writer report on the pt. Visit made to pt, pt alert, communicative, no family present. Informed pt this writer will make a visit on Monday when family present. Telephone call to pt's wife, message left, informed this writer will follow up on Monday.                   Discharge Codes    No documentation.                       Kadi Lewis RN

## 2025-07-18 NOTE — PLAN OF CARE
Goal Outcome Evaluation:  Plan of Care Reviewed With: patient           Outcome Evaluation: Patient participated in PT initial evaluation. Patient presents with cognitive deficits, increased pain (particularly noted at left LE), generalized weakness, decreased ROM, and decreased endurance that impacts functional mobility and independence. Patient requiring increased assist x 2 for all bed mobility and transfers and is unable to ambulate at this time. Patient would benefit from skilled PT services at this time with PT to progress patient as appropriate. PT recommending patient discharge to a skilled nursing faciltiy when medically appropriate for discharge.    Anticipated Discharge Disposition (PT): skilled nursing facility
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
## 2025-07-18 NOTE — Clinical Note
Level of Care: Telemetry [5]   Diagnosis: Fall [893536]   Is patient appropriate for Inpatient Observation Unit?: Yes [1]
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
## 2025-07-18 NOTE — PLAN OF CARE
Goal Outcome Evaluation:  Plan of Care Reviewed With: patient           Outcome Evaluation: Pt presents below baseline with ADLs d/t confusion, anxiety, pain, weakness, decr balance and activity tolerance.  Pt dep LBD, setup to wash face, mod A drink to mouth,  mod A x 2 bed to chair given UE support. OT will follow to advance pt toward PLOF.  Recomemnd SNF upon d/c.    Anticipated Discharge Disposition (OT): skilled nursing facility

## 2025-07-18 NOTE — H&P
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 cardioversion, No dialysis, No vasopressors   Ordered at: 07/18/25 1340     Code Status (Patient has no pulse and is not breathing):    No CPR (Do Not Attempt to Resuscitate)     Medical Interventions (Patient has pulse or is breathing):    Limited Support     Medical Intervention Limits:    No intubation (DNI)       No artificial nutrition       No cardioversion       No dialysis       No vasopressors       Expected Discharge   Expected discharge date/ time has not been documented.     David Ellington MD  07/18/25

## 2025-07-19 LAB
ANION GAP SERPL CALCULATED.3IONS-SCNC: 11 MMOL/L (ref 5–15)
BUN SERPL-MCNC: 19.5 MG/DL (ref 8–23)
BUN/CREAT SERPL: 26.7 (ref 7–25)
CALCIUM SPEC-SCNC: 8.4 MG/DL (ref 8.6–10.5)
CHLORIDE SERPL-SCNC: 106 MMOL/L (ref 98–107)
CO2 SERPL-SCNC: 26 MMOL/L (ref 22–29)
CREAT SERPL-MCNC: 0.73 MG/DL (ref 0.76–1.27)
DEPRECATED RDW RBC AUTO: 57.6 FL (ref 37–54)
EGFRCR SERPLBLD CKD-EPI 2021: 92.5 ML/MIN/1.73
ERYTHROCYTE [DISTWIDTH] IN BLOOD BY AUTOMATED COUNT: 16.6 % (ref 12.3–15.4)
GLUCOSE BLDC GLUCOMTR-MCNC: 213 MG/DL (ref 70–130)
GLUCOSE SERPL-MCNC: 47 MG/DL (ref 65–99)
HCT VFR BLD AUTO: 36.6 % (ref 37.5–51)
HGB BLD-MCNC: 11.7 G/DL (ref 13–17.7)
MAGNESIUM SERPL-MCNC: 1.8 MG/DL (ref 1.6–2.4)
MCH RBC QN AUTO: 30.3 PG (ref 26.6–33)
MCHC RBC AUTO-ENTMCNC: 32 G/DL (ref 31.5–35.7)
MCV RBC AUTO: 94.8 FL (ref 79–97)
PLATELET # BLD AUTO: 233 10*3/MM3 (ref 140–450)
PMV BLD AUTO: 10.9 FL (ref 6–12)
POTASSIUM SERPL-SCNC: 3.9 MMOL/L (ref 3.5–5.2)
QT INTERVAL: 394 MS
QT INTERVAL: 684 MS
QTC INTERVAL: 443 MS
QTC INTERVAL: 711 MS
RBC # BLD AUTO: 3.86 10*6/MM3 (ref 4.14–5.8)
SODIUM SERPL-SCNC: 143 MMOL/L (ref 136–145)
WBC NRBC COR # BLD AUTO: 7.63 10*3/MM3 (ref 3.4–10.8)

## 2025-07-19 PROCEDURE — 80048 BASIC METABOLIC PNL TOTAL CA: CPT | Performed by: INTERNAL MEDICINE

## 2025-07-19 PROCEDURE — 83735 ASSAY OF MAGNESIUM: CPT | Performed by: INTERNAL MEDICINE

## 2025-07-19 PROCEDURE — 94761 N-INVAS EAR/PLS OXIMETRY MLT: CPT

## 2025-07-19 PROCEDURE — 94799 UNLISTED PULMONARY SVC/PX: CPT

## 2025-07-19 PROCEDURE — 82948 REAGENT STRIP/BLOOD GLUCOSE: CPT

## 2025-07-19 PROCEDURE — 85027 COMPLETE CBC AUTOMATED: CPT | Performed by: INTERNAL MEDICINE

## 2025-07-19 PROCEDURE — 94664 DEMO&/EVAL PT USE INHALER: CPT

## 2025-07-19 PROCEDURE — 63710000001 REVEFENACIN 175 MCG/3ML SOLUTION: Performed by: INTERNAL MEDICINE

## 2025-07-19 RX ORDER — DEXTROSE MONOHYDRATE 25 G/50ML
25 INJECTION, SOLUTION INTRAVENOUS
Status: DISCONTINUED | OUTPATIENT
Start: 2025-07-19 | End: 2025-07-24 | Stop reason: HOSPADM

## 2025-07-19 RX ORDER — DEXTROSE MONOHYDRATE 25 G/50ML
INJECTION, SOLUTION INTRAVENOUS
Status: COMPLETED
Start: 2025-07-19 | End: 2025-07-19

## 2025-07-19 RX ORDER — NICOTINE POLACRILEX 4 MG
15 LOZENGE BUCCAL
Status: DISCONTINUED | OUTPATIENT
Start: 2025-07-19 | End: 2025-07-24 | Stop reason: HOSPADM

## 2025-07-19 RX ORDER — IBUPROFEN 600 MG/1
1 TABLET ORAL
Status: DISCONTINUED | OUTPATIENT
Start: 2025-07-19 | End: 2025-07-24 | Stop reason: HOSPADM

## 2025-07-19 RX ADMIN — DEXTROSE MONOHYDRATE 25 G: 25 INJECTION, SOLUTION INTRAVENOUS at 06:44

## 2025-07-19 RX ADMIN — AMANTADINE HYDROCHLORIDE 100 MG: 100 CAPSULE ORAL at 21:08

## 2025-07-19 RX ADMIN — AMANTADINE HYDROCHLORIDE 100 MG: 100 CAPSULE ORAL at 10:05

## 2025-07-19 RX ADMIN — BUDESONIDE AND FORMOTEROL FUMARATE DIHYDRATE 2 PUFF: 160; 4.5 AEROSOL RESPIRATORY (INHALATION) at 07:37

## 2025-07-19 RX ADMIN — Medication 10 ML: at 10:10

## 2025-07-19 RX ADMIN — Medication 10 ML: at 21:09

## 2025-07-19 RX ADMIN — TIZANIDINE 4 MG: 4 TABLET ORAL at 14:23

## 2025-07-19 RX ADMIN — CARBIDOPA AND LEVODOPA 1 TABLET: 25; 100 TABLET ORAL at 21:08

## 2025-07-19 RX ADMIN — LIDOCAINE 1 PATCH: 4 PATCH TOPICAL at 10:06

## 2025-07-19 RX ADMIN — TAMSULOSIN HYDROCHLORIDE 0.8 MG: 0.4 CAPSULE ORAL at 21:09

## 2025-07-19 RX ADMIN — AMIODARONE HYDROCHLORIDE 200 MG: 200 TABLET ORAL at 10:05

## 2025-07-19 RX ADMIN — BUDESONIDE AND FORMOTEROL FUMARATE DIHYDRATE 2 PUFF: 160; 4.5 AEROSOL RESPIRATORY (INHALATION) at 20:31

## 2025-07-19 RX ADMIN — CARBIDOPA AND LEVODOPA 1 TABLET: 25; 100 TABLET ORAL at 12:17

## 2025-07-19 RX ADMIN — QUETIAPINE FUMARATE 25 MG: 25 TABLET ORAL at 21:08

## 2025-07-19 RX ADMIN — REVEFENACIN 175 MCG: 175 SOLUTION RESPIRATORY (INHALATION) at 07:37

## 2025-07-19 RX ADMIN — CARBIDOPA AND LEVODOPA 1 TABLET: 25; 100 TABLET ORAL at 18:18

## 2025-07-19 RX ADMIN — OXYCODONE HYDROCHLORIDE AND ACETAMINOPHEN 1 TABLET: 5; 325 TABLET ORAL at 10:05

## 2025-07-19 RX ADMIN — CARBIDOPA AND LEVODOPA 1 TABLET: 25; 100 TABLET ORAL at 10:05

## 2025-07-19 NOTE — PLAN OF CARE
Problem: Adult Inpatient Plan of Care  Goal: Plan of Care Review  Outcome: Progressing  Goal: Patient-Specific Goal (Individualized)  Outcome: Progressing  Goal: Absence of Hospital-Acquired Illness or Injury  Outcome: Progressing  Intervention: Identify and Manage Fall Risk  Recent Flowsheet Documentation  Taken 7/19/2025 1807 by Sophia Greene RN  Safety Promotion/Fall Prevention:   activity supervised   room organization consistent   safety round/check completed  Taken 7/19/2025 1651 by Sophia Greene RN  Safety Promotion/Fall Prevention:   assistive device/personal items within reach   clutter free environment maintained   fall prevention program maintained   nonskid shoes/slippers when out of bed   room organization consistent   safety round/check completed   toileting scheduled  Taken 7/19/2025 1400 by Sophia Greene RN  Safety Promotion/Fall Prevention:   assistive device/personal items within reach   clutter free environment maintained   fall prevention program maintained   nonskid shoes/slippers when out of bed   room organization consistent   safety round/check completed   toileting scheduled  Taken 7/19/2025 1200 by Sophia Greene RN  Safety Promotion/Fall Prevention:   assistive device/personal items within reach   clutter free environment maintained   fall prevention program maintained   nonskid shoes/slippers when out of bed   room organization consistent   safety round/check completed   toileting scheduled  Taken 7/19/2025 1005 by Sophia Greene RN  Safety Promotion/Fall Prevention:   activity supervised   room organization consistent   safety round/check completed  Taken 7/19/2025 0800 by Sophia Greene RN  Safety Promotion/Fall Prevention:   activity supervised   room organization consistent   safety round/check completed  Intervention: Prevent Skin Injury  Recent Flowsheet Documentation  Taken 7/19/2025 1807 by Sophia Greene RN  Body Position:   turned   right  Skin Protection:  transparent dressing maintained  Taken 7/19/2025 1651 by Sophia Greene RN  Body Position: (up in chair) --  Skin Protection: transparent dressing maintained  Taken 7/19/2025 1400 by Sophia Greene RN  Body Position: (up in chair) other (see comments)  Skin Protection: transparent dressing maintained  Taken 7/19/2025 1200 by Sophia Greene RN  Body Position: supine  Skin Protection: transparent dressing maintained  Taken 7/19/2025 1005 by Sophia Greene RN  Skin Protection: transparent dressing maintained  Taken 7/19/2025 0800 by Sophia Greene RN  Body Position: supine  Skin Protection: transparent dressing maintained  Intervention: Prevent Infection  Recent Flowsheet Documentation  Taken 7/19/2025 1651 by Sophia Greene RN  Infection Prevention:   cohorting utilized   environmental surveillance performed   rest/sleep promoted   single patient room provided  Taken 7/19/2025 1400 by Sophia Greene RN  Infection Prevention:   cohorting utilized   environmental surveillance performed   rest/sleep promoted   single patient room provided  Taken 7/19/2025 1200 by Sophia Greene RN  Infection Prevention:   cohorting utilized   environmental surveillance performed   rest/sleep promoted   single patient room provided  Goal: Optimal Comfort and Wellbeing  Outcome: Progressing  Intervention: Monitor Pain and Promote Comfort  Recent Flowsheet Documentation  Taken 7/19/2025 1807 by Sophia Greene RN  Pain Management Interventions: medication offered but refused  Taken 7/19/2025 1651 by Sophia Greene RN  Pain Management Interventions: medication offered but refused  Goal: Readiness for Transition of Care  Outcome: Progressing   Goal Outcome Evaluation:      Pt resting in bed, 2x assist with walker, 2 Bms thru out shift. Pain meds given, pt refused pain meds when pain stated 5. Safety precautions utilized and maintained.

## 2025-07-19 NOTE — PLAN OF CARE
Problem: Adult Inpatient Plan of Care  Goal: Plan of Care Review  Outcome: Progressing  Goal: Patient-Specific Goal (Individualized)  Outcome: Progressing  Goal: Absence of Hospital-Acquired Illness or Injury  Outcome: Progressing  Intervention: Identify and Manage Fall Risk  Recent Flowsheet Documentation  Taken 7/19/2025 0200 by Janet Saldana RN  Safety Promotion/Fall Prevention: safety round/check completed  Taken 7/19/2025 0000 by Janet Saldana RN  Safety Promotion/Fall Prevention: safety round/check completed  Taken 7/18/2025 2200 by Janet Saldana RN  Safety Promotion/Fall Prevention: safety round/check completed  Taken 7/18/2025 2050 by Janet Saldana RN  Safety Promotion/Fall Prevention:   toileting scheduled   safety round/check completed   room organization consistent   nonskid shoes/slippers when out of bed  Intervention: Prevent Skin Injury  Recent Flowsheet Documentation  Taken 7/19/2025 0200 by Janet Saldana RN  Body Position: position maintained  Taken 7/19/2025 0100 by Janet Saldana RN  Body Position:   turned   right  Taken 7/19/2025 0000 by Janet Saldana RN  Body Position: position maintained  Taken 7/18/2025 2200 by Janet Saldana RN  Body Position: position maintained  Taken 7/18/2025 2100 by Janet Saldana RN  Body Position:   left   turned  Taken 7/18/2025 2050 by Janet Saldana RN  Body Position: position maintained  Taken 7/18/2025 1900 by Janet Saldana RN  Body Position:   right   turned  Intervention: Prevent Infection  Recent Flowsheet Documentation  Taken 7/19/2025 0200 by Janet Saldana RN  Infection Prevention: environmental surveillance performed  Taken 7/19/2025 0000 by Janet Saldana RN  Infection Prevention: environmental surveillance performed  Taken 7/18/2025 2200 by Janet Saldana RN  Infection Prevention:  environmental surveillance performed  Taken 7/18/2025 2050 by Janet Saldana RN  Infection Prevention: environmental surveillance performed  Goal: Optimal Comfort and Wellbeing  Outcome: Progressing  Intervention: Monitor Pain and Promote Comfort  Recent Flowsheet Documentation  Taken 7/18/2025 2111 by Janet Saldana RN  Pain Management Interventions: pain medication given  Intervention: Provide Person-Centered Care  Recent Flowsheet Documentation  Taken 7/19/2025 0200 by Janet Saldana RN  Trust Relationship/Rapport:   care explained   choices provided   emotional support provided  Taken 7/19/2025 0000 by Janet Saldana RN  Trust Relationship/Rapport:   emotional support provided   choices provided   care explained  Taken 7/18/2025 2200 by Janet Saldana RN  Trust Relationship/Rapport:   care explained   choices provided   emotional support provided  Taken 7/18/2025 2050 by Janet Saldana RN  Trust Relationship/Rapport:   care explained   choices provided   emotional support provided  Goal: Readiness for Transition of Care  Outcome: Progressing     Problem: Skin Injury Risk Increased  Goal: Skin Health and Integrity  Outcome: Progressing  Intervention: Optimize Skin Protection  Recent Flowsheet Documentation  Taken 7/19/2025 0200 by Janet Saldana, RN  Activity Management: activity minimized  Head of Bed (HOB) Positioning: HOB elevated  Taken 7/19/2025 0000 by Janet Saldana RN  Activity Management: activity minimized  Head of Bed (HOB) Positioning: HOB elevated  Taken 7/18/2025 2200 by Janet Saldana, RN  Activity Management: activity minimized  Head of Bed (HOB) Positioning: HOB elevated  Taken 7/18/2025 2100 by Janet Saldana RN  Head of Bed (HOB) Positioning: HOB elevated  Taken 7/18/2025 2050 by Janet Saldana, RN  Activity Management: activity minimized  Head of Bed (HOB)  Positioning: HOB elevated  Taken 7/18/2025 1900 by Janet Saldana, RN  Head of Bed (HOB) Positioning: HOB elevated   Goal Outcome Evaluation:      Pt is alert and oriented. Has multiple skin tears, dressings have been reapplied as needed. Call light within reach.

## 2025-07-19 NOTE — PROGRESS NOTES
[facility] Medicine Services  PROGRESS NOTE    Patient Name: Flaco Roque II  : 1945  MRN: [id number]    Date of Admission: 2025  Primary Care Physician: Ariadne Galloway PA    Subjective   Subjective     CC:  fell at home    HPI:  Worst pain is in his back.  Currently not too bad.  Would like to sit up in chair.       Objective   Objective     Vital Signs:   Temp:  [97.7 °F (36.5 °C)-98.8 °F (37.1 °C)] 98.4 °F (36.9 °C)  Heart Rate:  [50-75] 50  Resp:  [16-18] 16  BP: ()/(64-87) 90/78  Flow (L/min) (Oxygen Therapy):  [2-3] 3     Physical Exam:  Gen:  frail, alert, NAD.  Wife present   Neuro: alert and oriented, clear speech, follows commands, grossly nonfocal  HEENT:  NC/AT  Neck:  Supple, no LAD  Heart RRR   Abd:  Soft, nontender, .  Pain pump noted LLQ   Extrem:  No c/c/e      Results Reviewed:  LAB RESULTS:      Lab 25  0520 25  1002 25  0829 25  1820 25  1641   WBC 7.63  --  7.75  --  5.09   HEMOGLOBIN 11.7*  --  12.9*  --  12.3*   HEMATOCRIT 36.6*  --  40.5  --  38.9   PLATELETS 233  --  231  --  227   NEUTROS ABS  --   --  6.57  --  3.87   IMMATURE GRANS (ABS)  --   --  0.04  --  0.02   LYMPHS ABS  --   --  0.31*  --  0.68*   MONOS ABS  --   --  0.80  --  0.43   EOS ABS  --   --  0.00  --  0.05   MCV 94.8  --  94.4  --  94.4   LACTATE  --   --  1.1  --   --    HSTROP T  --  34* 31* 31* 32*         Lab 25  0520 25  0829 25  1641   SODIUM 143 140 142   POTASSIUM 3.9 4.1 4.2   CHLORIDE 106 102 105   CO2 26.0 23.0 27.2   ANION GAP 11.0 15.0 9.8   BUN 19.5 19.1 24.6*   CREATININE 0.73* 0.82 1.14   EGFR 92.5 89.4 65.4   GLUCOSE 47* 85 93   CALCIUM 8.4* 8.7 8.3*   MAGNESIUM 1.8  --  1.9         Lab 25  0829 25  1641   TOTAL PROTEIN 5.9* 5.3*   ALBUMIN 3.4* 3.2*   GLOBULIN 2.5 2.1   ALT (SGPT) <5 <5   AST (SGOT) 22 17   BILIRUBIN 1.1 0.4   ALK PHOS 129* 107   LIPASE  --  15         Lab 25  1002  07/18/25  0829 07/16/25  1820 07/16/25  1641   PROBNP  --  3,974.0*  --  2,613.0*   HSTROP T 34* 31* 31* 32*                 Brief Urine Lab Results  (Last result in the past 365 days)        Color   Clarity   Blood   Leuk Est   Nitrite   Protein   CREAT   Urine HCG        07/18/25 1010 Yellow   Clear   Negative   Negative   Negative   Negative                   Microbiology Results Abnormal       None            CT Head Without Contrast  Result Date: 7/18/2025  CT HEAD WO CONTRAST Date of Exam: 7/18/2025 8:35 AM EDT Indication: fall. head injury.. Comparison: 7/16/2025 Technique: Axial CT images were obtained of the head without contrast administration.  Automated exposure control and iterative construction methods were used. Findings: Motion artifact limits the examination. There is no evidence of acute territorial infarction. There is no acute intracranial hemorrhage. There are no extra-axial collections. Ventricles and CSF spaces are symmetrically prominent. No mass effect nor hydrocephalus. Patchy subcortical and periventricular white matter hypodensities in keeping with chronic microvascular ischemic disease. Intracranial vascular calcifications are present.  Paranasal sinuses and mastoid air cells are adequately aerated.  Osseous structures and orbits appear intact.     Impression: Impression: Motion limited examination demonstrates no definite acute intracranial abnormality. Diffuse atrophy and chronic small vessel ischemic changes again noted. Electronically Signed: Angela Saini MD  7/18/2025 10:27 AM EDT  Workstation ID: [id number]    XR Elbow 3+ View Left  Result Date: 7/18/2025  XR ELBOW 3+ VW LEFT Date of Exam: 7/18/2025 9:00 AM EDT Indication: fall. Comparison: 5/20/2025 Findings: No elbow joint effusion. Alignment appears intact and articular surfaces appear intact. Joint spaces are preserved. No evidence of fracture. Triceps enthesopathy. Soft tissue swelling over the olecranon suggestive of  bursitis.     Impression: Impression: 1.No evidence of acute fracture or malalignment. 2.Soft tissue swelling over the olecranon suggestive of bursitis. Electronically Signed: Rome Erazo MD  7/18/2025 9:36 AM EDT  Workstation ID: [id number]    CT Cervical Spine Without Contrast  Result Date: 7/18/2025  CT CERVICAL SPINE WO CONTRAST, CT ABDOMEN PELVIS WO CONTRAST, CT CHEST WO CONTRAST DIAGNOSTIC Date of Exam: 7/18/2025 8:35 AM EDT Indication: fall. neck pain.. Comparison: CT angiogram chest 5/18/2025, CT abdomen pelvis 8/23/2024 Technique: Axial CT images were obtained of the cervical spine, chest, abdomen, and pelvis without contrast administration.  Reconstructed coronal and sagittal images were also obtained. Automated exposure control and iterative construction methods were used. Findings: CT CERVICAL SPINE OSSEOUS STRUCTURES: No fracture nor bony destructive process. ALIGNMENT: There is 2 mm of anterolisthesis of C2 on C3. There is 1-2 mm of anterior listhesis of C7 on T1. DISC SPACE: There is moderate to severe spondylosis most pronounced in the mid cervical spine. JOINTS: There is moderate facet arthropathy and mid cervical uncovertebral hypertrophy. SOFT TISSUES:  Unremarkable LEVELS: There is moderate mid cervical osseous neuroforaminal stenosis most pronounced at C3/4. CT CHEST: MEDIASTINUM: Similar elevation of the left hemidiaphragm. Aortic and heart size are normal. No mass nor pericardial effusion. CORONARY ARTERIES: There is calcified atherosclerotic disease. LUNGS: Persistent left lower lung airspace disease, likely atelectasis. Interval clearance of right middle lobe airspace disease. Moderate to severe emphysema is similar. No suspicious nodules or new consolidation. PLEURAL SPACE: No effusion, mass, nor pneumothorax. LYMPH NODES: No pathologically enlarged thoracic nodes. There are relatively nondisplaced fractures involving the anterior margins of the right ninth through 11th ribs and left  third and fourth ribs. CT ABDOMEN AND PELVIS:  LIVER:  Unremarkable parenchyma without focal lesion. BILIARY/GALLBLADDER: Cholecystectomy SPLEEN:  Unremarkable PANCREAS:  Unremarkable ADRENAL:  Unremarkable KIDNEYS:  Unremarkable parenchyma with no solid mass identified. No obstruction.  No calculus identified. GASTROINTESTINAL/MESENTERY:  No evidence of obstruction nor inflammation. AORTA/IVC:  Normal caliber. RETROPERITONEUM/LYMPH NODES:  Unremarkable REPRODUCTIVE: There appear to be bilateral hydroceles. BLADDER:  Unremarkable OSSEUS STRUCTURES: Advanced lumbar degenerative changes. Total left hip arthroplasty with associated streak and beam hardening artifact.     Impression: Impression: 1.There are relatively nondisplaced fractures involving the anterior margins of the right ninth through 11th ribs and left third and fourth ribs. 2.Persistent left lower lung airspace disease, likely atelectasis. Interval clearance of right middle lobe airspace disease. 3.Moderate to severe emphysema. 4.Additional chronic findings as described above. Electronically Signed: Alfonzo Burgess MD  7/18/2025 9:29 AM EDT  Workstation ID: [id number]    CT Chest Without Contrast Diagnostic  Result Date: 7/18/2025  CT CERVICAL SPINE WO CONTRAST, CT ABDOMEN PELVIS WO CONTRAST, CT CHEST WO CONTRAST DIAGNOSTIC Date of Exam: 7/18/2025 8:35 AM EDT Indication: fall. neck pain.. Comparison: CT angiogram chest 5/18/2025, CT abdomen pelvis 8/23/2024 Technique: Axial CT images were obtained of the cervical spine, chest, abdomen, and pelvis without contrast administration.  Reconstructed coronal and sagittal images were also obtained. Automated exposure control and iterative construction methods were used. Findings: CT CERVICAL SPINE OSSEOUS STRUCTURES: No fracture nor bony destructive process. ALIGNMENT: There is 2 mm of anterolisthesis of C2 on C3. There is 1-2 mm of anterior listhesis of C7 on T1. DISC SPACE: There is moderate to severe spondylosis  most pronounced in the mid cervical spine. JOINTS: There is moderate facet arthropathy and mid cervical uncovertebral hypertrophy. SOFT TISSUES:  Unremarkable LEVELS: There is moderate mid cervical osseous neuroforaminal stenosis most pronounced at C3/4. CT CHEST: MEDIASTINUM: Similar elevation of the left hemidiaphragm. Aortic and heart size are normal. No mass nor pericardial effusion. CORONARY ARTERIES: There is calcified atherosclerotic disease. LUNGS: Persistent left lower lung airspace disease, likely atelectasis. Interval clearance of right middle lobe airspace disease. Moderate to severe emphysema is similar. No suspicious nodules or new consolidation. PLEURAL SPACE: No effusion, mass, nor pneumothorax. LYMPH NODES: No pathologically enlarged thoracic nodes. There are relatively nondisplaced fractures involving the anterior margins of the right ninth through 11th ribs and left third and fourth ribs. CT ABDOMEN AND PELVIS:  LIVER:  Unremarkable parenchyma without focal lesion. BILIARY/GALLBLADDER: Cholecystectomy SPLEEN:  Unremarkable PANCREAS:  Unremarkable ADRENAL:  Unremarkable KIDNEYS:  Unremarkable parenchyma with no solid mass identified. No obstruction.  No calculus identified. GASTROINTESTINAL/MESENTERY:  No evidence of obstruction nor inflammation. AORTA/IVC:  Normal caliber. RETROPERITONEUM/LYMPH NODES:  Unremarkable REPRODUCTIVE: There appear to be bilateral hydroceles. BLADDER:  Unremarkable OSSEUS STRUCTURES: Advanced lumbar degenerative changes. Total left hip arthroplasty with associated streak and beam hardening artifact.     Impression: Impression: 1.There are relatively nondisplaced fractures involving the anterior margins of the right ninth through 11th ribs and left third and fourth ribs. 2.Persistent left lower lung airspace disease, likely atelectasis. Interval clearance of right middle lobe airspace disease. 3.Moderate to severe emphysema. 4.Additional chronic findings as described  above. Electronically Signed: Alfonzo Burgess MD  7/18/2025 9:29 AM EDT  Workstation ID: [id number]    CT Abdomen Pelvis Without Contrast  Result Date: 7/18/2025  CT CERVICAL SPINE WO CONTRAST, CT ABDOMEN PELVIS WO CONTRAST, CT CHEST WO CONTRAST DIAGNOSTIC Date of Exam: 7/18/2025 8:35 AM EDT Indication: fall. neck pain.. Comparison: CT angiogram chest 5/18/2025, CT abdomen pelvis 8/23/2024 Technique: Axial CT images were obtained of the cervical spine, chest, abdomen, and pelvis without contrast administration.  Reconstructed coronal and sagittal images were also obtained. Automated exposure control and iterative construction methods were used. Findings: CT CERVICAL SPINE OSSEOUS STRUCTURES: No fracture nor bony destructive process. ALIGNMENT: There is 2 mm of anterolisthesis of C2 on C3. There is 1-2 mm of anterior listhesis of C7 on T1. DISC SPACE: There is moderate to severe spondylosis most pronounced in the mid cervical spine. JOINTS: There is moderate facet arthropathy and mid cervical uncovertebral hypertrophy. SOFT TISSUES:  Unremarkable LEVELS: There is moderate mid cervical osseous neuroforaminal stenosis most pronounced at C3/4. CT CHEST: MEDIASTINUM: Similar elevation of the left hemidiaphragm. Aortic and heart size are normal. No mass nor pericardial effusion. CORONARY ARTERIES: There is calcified atherosclerotic disease. LUNGS: Persistent left lower lung airspace disease, likely atelectasis. Interval clearance of right middle lobe airspace disease. Moderate to severe emphysema is similar. No suspicious nodules or new consolidation. PLEURAL SPACE: No effusion, mass, nor pneumothorax. LYMPH NODES: No pathologically enlarged thoracic nodes. There are relatively nondisplaced fractures involving the anterior margins of the right ninth through 11th ribs and left third and fourth ribs. CT ABDOMEN AND PELVIS:  LIVER:  Unremarkable parenchyma without focal lesion. BILIARY/GALLBLADDER: Cholecystectomy SPLEEN:   Unremarkable PANCREAS:  Unremarkable ADRENAL:  Unremarkable KIDNEYS:  Unremarkable parenchyma with no solid mass identified. No obstruction.  No calculus identified. GASTROINTESTINAL/MESENTERY:  No evidence of obstruction nor inflammation. AORTA/IVC:  Normal caliber. RETROPERITONEUM/LYMPH NODES:  Unremarkable REPRODUCTIVE: There appear to be bilateral hydroceles. BLADDER:  Unremarkable OSSEUS STRUCTURES: Advanced lumbar degenerative changes. Total left hip arthroplasty with associated streak and beam hardening artifact.     Impression: Impression: 1.There are relatively nondisplaced fractures involving the anterior margins of the right ninth through 11th ribs and left third and fourth ribs. 2.Persistent left lower lung airspace disease, likely atelectasis. Interval clearance of right middle lobe airspace disease. 3.Moderate to severe emphysema. 4.Additional chronic findings as described above. Electronically Signed: Alfonzo Burgess MD  7/18/2025 9:29 AM EDT  Workstation ID: [id number]    XR Knee 1 or 2 View Left  Result Date: 7/18/2025  XR KNEE 1 OR 2 VW LEFT Date of Exam: 7/18/2025 9:00 AM EDT Indication: pain. fall. Comparison: 5/20/2025 Findings: 2 views of the left knee demonstrate no acute fracture or dislocation. There is medial at the patellofemoral compartment joint space narrowing with chondrocalcinosis seen in the lateral joint space. There is no joint effusion. Vascular calcifications are  present.     Impression: Impression: Degenerative changes of the left knee. No acute osseous abnormality. Electronically Signed: Angela Saini MD  7/18/2025 9:26 AM EDT  Workstation ID: [id number]    XR Shoulder 2+ View Left  Result Date: 7/18/2025  XR SHOULDER 2+ VW LEFT Date of Exam: 7/18/2025 9:00 AM EDT Indication: pain. fall. Comparison: 5/20/2025 Findings: There is a left reverse shoulder arthroplasty with intact hardware components. There is no acute fracture or dislocation. There is no evidence for hardware  loosening. The soft tissues are unremarkable.     Impression: Impression: Left reverse shoulder arthroplasty without fracture or hardware complication. Electronically Signed: Angela Saini MD  7/18/2025 9:25 AM EDT  Workstation ID: [id number]      Results for orders placed during the hospital encounter of 04/22/25    Adult Transthoracic Echo Complete W/ Cont if Necessary Per Protocol 04/26/2025  4:58 PM    Interpretation Summary    Left ventricular systolic function is normal. Estimated left ventricular EF = 60%    Left ventricular wall thickness is consistent with hypertrophy.    No hemodynamically significant valvular heart disease      Current medications:  Scheduled Meds:amantadine, 100 mg, Oral, BID  amiodarone, 200 mg, Oral, Q24H  [Held by provider] apixaban, 5 mg, Oral, Q12H  budesonide-formoterol, 2 puff, Inhalation, BID - RT   And  revefenacin, 175 mcg, Nebulization, Daily - RT  carbidopa-levodopa, 1 tablet, Oral, 4x Daily  Lidocaine, 1 patch, Transdermal, Q24H  Lidocaine, 1 patch, Transdermal, Q24H  metoprolol succinate XL, 50 mg, Oral, Daily  pantoprazole, 40 mg, Oral, Q AM  QUEtiapine, 25 mg, Oral, Nightly  sodium chloride, 10 mL, Intravenous, Q12H  tamsulosin, 0.8 mg, Oral, Nightly      Continuous Infusions:   PRN Meds:.  senna-docusate sodium **AND** polyethylene glycol **AND** bisacodyl **AND** bisacodyl    dextrose    dextrose    glucagon (human recombinant)    ipratropium-albuterol    Magnesium Standard Dose Replacement - Follow Nurse / BPA Driven Protocol    nitroglycerin    ondansetron    oxyCODONE-acetaminophen    Insert Peripheral IV **AND** sodium chloride    sodium chloride    sodium chloride    tiZANidine    Assessment & Plan   Assessment & Plan     Active Hospital Problems    Diagnosis  POA    **Fall [W19.XXXA]  Yes    Rib fractures [S22.49XA]  Unknown    Atrial fibrillation [I48.91]  Yes    Parkinson disease [G20.A1]  Yes    Back pain [M54.9]  Yes    Lumbar spondylosis [M47.816]  Yes     Chronic obstructive pulmonary disease [J44.9]  Yes      Resolved Hospital Problems   No resolved problems to display.        Brief Hospital Course to date:  Flaco Roque II is a 79 y.o. male with dysphagia, Afib, and progressive weakness at home; fell at home and sustained rib fractures    Fall (mechanical) w/ diffuse body pain  Acute nondisplaced Rib Fractures (right 9-11th; left 3-4th), due to above  Parkinsons dz, w/ ataxia   - PT OT - recommending SNF  - palliative care consult appreciated  - interested in Hospice care - consult noted.   - CM for dispo planning      Mildly elevated/flat troponins  Parox afib  Prolonged qt on ekg  - no current angina   -already anticoagulated w/ eliquis, on b-blocker, etc. Will stop eliquis due to falls and GOC   -patient would NOT want heart cath or interventions in event of acute cardiac event  --continue amiodarone & toprol        Chronic hypoxic resp failure (2-3L o2 chronically)  COPD  Atelectasis LLL (on ct imaging)  -continue inhalants, prn duonebs  -currently on 2Lnc, denies current dyspnea  -ct chest : mod-severe emphysematous changes; nterval clearance of irght pneumonia, persistent left lower lung air space dz (likely atelectasis)     Parkinsons dz  Dysphagia  Severe protein malnutrition  -ataxia, autonomic dysfunction due to parkinsons  -frequent falls  -not interested in tube feedings, only interested in regular/comfort diet (he understands risk as does family)  -continue sinemet, amantadine  -dietition consult to maximize nutrition (not interested in tube feeds..he wants regular diet)  -pt/ot evals      Gerd  -ppi     Chronic back pain  -fentanyl pain pump, recently refilled     Expected Discharge Location and Transportation: to SNF .  Familiy requesting [facility]   Expected Discharge tbd   Expected discharge date/ time has not been documented.     VTE Prophylaxis:  Pharmacologic VTE prophylaxis orders are present.         AM-PAC 6 Clicks Score (PT): 6 (07/19/25  0800)    CODE STATUS:   Code Status and Medical Interventions: No CPR (Do Not Attempt to Resuscitate); Limited Support; No intubation (DNI), No artificial nutrition, No cardioversion, No dialysis, No vasopressors   Ordered at: 07/18/25 1340     Code Status (Patient has no pulse and is not breathing):    No CPR (Do Not Attempt to Resuscitate)     Medical Interventions (Patient has pulse or is breathing):    Limited Support     Medical Intervention Limits:    No intubation (DNI)       No artificial nutrition       No cardioversion       No dialysis       No vasopressors       Zaida Ybarra MD  07/19/25

## 2025-07-20 LAB — GLUCOSE BLDC GLUCOMTR-MCNC: 81 MG/DL (ref 70–130)

## 2025-07-20 PROCEDURE — 63710000001 REVEFENACIN 175 MCG/3ML SOLUTION: Performed by: INTERNAL MEDICINE

## 2025-07-20 PROCEDURE — 94761 N-INVAS EAR/PLS OXIMETRY MLT: CPT

## 2025-07-20 PROCEDURE — 94664 DEMO&/EVAL PT USE INHALER: CPT

## 2025-07-20 PROCEDURE — 99232 SBSQ HOSP IP/OBS MODERATE 35: CPT | Performed by: INTERNAL MEDICINE

## 2025-07-20 PROCEDURE — 94799 UNLISTED PULMONARY SVC/PX: CPT

## 2025-07-20 PROCEDURE — 82948 REAGENT STRIP/BLOOD GLUCOSE: CPT

## 2025-07-20 RX ORDER — IBUPROFEN 400 MG/1
400 TABLET, FILM COATED ORAL 2 TIMES DAILY PRN
Status: DISCONTINUED | OUTPATIENT
Start: 2025-07-20 | End: 2025-07-24 | Stop reason: HOSPADM

## 2025-07-20 RX ADMIN — BUDESONIDE AND FORMOTEROL FUMARATE DIHYDRATE 2 PUFF: 160; 4.5 AEROSOL RESPIRATORY (INHALATION) at 08:59

## 2025-07-20 RX ADMIN — LIDOCAINE 1 PATCH: 4 PATCH TOPICAL at 08:49

## 2025-07-20 RX ADMIN — CARBIDOPA AND LEVODOPA 1 TABLET: 25; 100 TABLET ORAL at 17:22

## 2025-07-20 RX ADMIN — Medication 10 ML: at 08:50

## 2025-07-20 RX ADMIN — LIDOCAINE 1 PATCH: 4 PATCH TOPICAL at 08:50

## 2025-07-20 RX ADMIN — OXYCODONE HYDROCHLORIDE AND ACETAMINOPHEN 1 TABLET: 5; 325 TABLET ORAL at 06:47

## 2025-07-20 RX ADMIN — PANTOPRAZOLE SODIUM 40 MG: 40 TABLET, DELAYED RELEASE ORAL at 06:30

## 2025-07-20 RX ADMIN — IBUPROFEN 400 MG: 400 TABLET ORAL at 22:14

## 2025-07-20 RX ADMIN — QUETIAPINE FUMARATE 25 MG: 25 TABLET ORAL at 22:10

## 2025-07-20 RX ADMIN — CARBIDOPA AND LEVODOPA 1 TABLET: 25; 100 TABLET ORAL at 22:10

## 2025-07-20 RX ADMIN — AMANTADINE HYDROCHLORIDE 100 MG: 100 CAPSULE ORAL at 08:48

## 2025-07-20 RX ADMIN — REVEFENACIN 175 MCG: 175 SOLUTION RESPIRATORY (INHALATION) at 08:59

## 2025-07-20 RX ADMIN — IBUPROFEN 400 MG: 400 TABLET ORAL at 11:13

## 2025-07-20 RX ADMIN — Medication 10 ML: at 22:10

## 2025-07-20 RX ADMIN — CARBIDOPA AND LEVODOPA 1 TABLET: 25; 100 TABLET ORAL at 11:13

## 2025-07-20 RX ADMIN — CARBIDOPA AND LEVODOPA 1 TABLET: 25; 100 TABLET ORAL at 08:48

## 2025-07-20 RX ADMIN — TAMSULOSIN HYDROCHLORIDE 0.8 MG: 0.4 CAPSULE ORAL at 22:10

## 2025-07-20 RX ADMIN — AMIODARONE HYDROCHLORIDE 200 MG: 200 TABLET ORAL at 08:48

## 2025-07-20 RX ADMIN — AMANTADINE HYDROCHLORIDE 100 MG: 100 CAPSULE ORAL at 22:10

## 2025-07-20 RX ADMIN — BUDESONIDE AND FORMOTEROL FUMARATE DIHYDRATE 2 PUFF: 160; 4.5 AEROSOL RESPIRATORY (INHALATION) at 20:27

## 2025-07-20 NOTE — PROGRESS NOTES
[facility] Medicine Services  PROGRESS NOTE    Patient Name: Flaco Roque II  : 1945  MRN: [id number]    Date of Admission: 2025  Primary Care Physician: Ariadne Galloway PA    Subjective   Subjective     CC:  fell at home    HPI:  having pain in back, ribs, L hip.        Objective   Objective     Vital Signs:   Temp:  [97.4 °F (36.3 °C)-98.5 °F (36.9 °C)] 97.4 °F (36.3 °C)  Heart Rate:  [46-58] 57  Resp:  [16] 16  BP: ()/(49-78) 101/55  Flow (L/min) (Oxygen Therapy):  [3] 3     Physical Exam:  Gen:  frail, alert, NAD. No visitors in room   Neuro: alert and oriented, clear speech, follows commands, grossly nonfocal  HEENT:  NC/AT  Neck:  Supple, no LAD  Heart RRR   Abd:  Soft, nontender, .  Extrem:  No c/c/e      Results Reviewed:  LAB RESULTS:      Lab 25  0525  1002 25  0829 25  1820 25  1641   WBC 7.63  --  7.75  --  5.09   HEMOGLOBIN 11.7*  --  12.9*  --  12.3*   HEMATOCRIT 36.6*  --  40.5  --  38.9   PLATELETS 233  --  231  --  227   NEUTROS ABS  --   --  6.57  --  3.87   IMMATURE GRANS (ABS)  --   --  0.04  --  0.02   LYMPHS ABS  --   --  0.31*  --  0.68*   MONOS ABS  --   --  0.80  --  0.43   EOS ABS  --   --  0.00  --  0.05   MCV 94.8  --  94.4  --  94.4   LACTATE  --   --  1.1  --   --    HSTROP T  --  34* 31* 31* 32*         Lab 25  0520 25  0829 25  1641   SODIUM 143 140 142   POTASSIUM 3.9 4.1 4.2   CHLORIDE 106 102 105   CO2 26.0 23.0 27.2   ANION GAP 11.0 15.0 9.8   BUN 19.5 19.1 24.6*   CREATININE 0.73* 0.82 1.14   EGFR 92.5 89.4 65.4   GLUCOSE 47* 85 93   CALCIUM 8.4* 8.7 8.3*   MAGNESIUM 1.8  --  1.9         Lab 25  0829 25  1641   TOTAL PROTEIN 5.9* 5.3*   ALBUMIN 3.4* 3.2*   GLOBULIN 2.5 2.1   ALT (SGPT) <5 <5   AST (SGOT) 22 17   BILIRUBIN 1.1 0.4   ALK PHOS 129* 107   LIPASE  --  15         Lab 25  1002 25  0829 25  1820 25  1641   PROBNP  --  3,974.0*  --   2,613.0*   HSTROP T 34* 31* 31* 32*                 Brief Urine Lab Results  (Last result in the past 365 days)        Color   Clarity   Blood   Leuk Est   Nitrite   Protein   CREAT   Urine HCG        07/18/25 1010 Yellow   Clear   Negative   Negative   Negative   Negative                   Microbiology Results Abnormal       None            CT Head Without Contrast  Result Date: 7/18/2025  CT HEAD WO CONTRAST Date of Exam: 7/18/2025 8:35 AM EDT Indication: fall. head injury.. Comparison: 7/16/2025 Technique: Axial CT images were obtained of the head without contrast administration.  Automated exposure control and iterative construction methods were used. Findings: Motion artifact limits the examination. There is no evidence of acute territorial infarction. There is no acute intracranial hemorrhage. There are no extra-axial collections. Ventricles and CSF spaces are symmetrically prominent. No mass effect nor hydrocephalus. Patchy subcortical and periventricular white matter hypodensities in keeping with chronic microvascular ischemic disease. Intracranial vascular calcifications are present.  Paranasal sinuses and mastoid air cells are adequately aerated.  Osseous structures and orbits appear intact.     Impression: Impression: Motion limited examination demonstrates no definite acute intracranial abnormality. Diffuse atrophy and chronic small vessel ischemic changes again noted. Electronically Signed: Angela Saini MD  7/18/2025 10:27 AM EDT  Workstation ID: [id number]    XR Elbow 3+ View Left  Result Date: 7/18/2025  XR ELBOW 3+ VW LEFT Date of Exam: 7/18/2025 9:00 AM EDT Indication: fall. Comparison: 5/20/2025 Findings: No elbow joint effusion. Alignment appears intact and articular surfaces appear intact. Joint spaces are preserved. No evidence of fracture. Triceps enthesopathy. Soft tissue swelling over the olecranon suggestive of bursitis.     Impression: Impression: 1.No evidence of acute fracture or  malalignment. 2.Soft tissue swelling over the olecranon suggestive of bursitis. Electronically Signed: Rome Erazo MD  7/18/2025 9:36 AM EDT  Workstation ID: [id number]    CT Cervical Spine Without Contrast  Result Date: 7/18/2025  CT CERVICAL SPINE WO CONTRAST, CT ABDOMEN PELVIS WO CONTRAST, CT CHEST WO CONTRAST DIAGNOSTIC Date of Exam: 7/18/2025 8:35 AM EDT Indication: fall. neck pain.. Comparison: CT angiogram chest 5/18/2025, CT abdomen pelvis 8/23/2024 Technique: Axial CT images were obtained of the cervical spine, chest, abdomen, and pelvis without contrast administration.  Reconstructed coronal and sagittal images were also obtained. Automated exposure control and iterative construction methods were used. Findings: CT CERVICAL SPINE OSSEOUS STRUCTURES: No fracture nor bony destructive process. ALIGNMENT: There is 2 mm of anterolisthesis of C2 on C3. There is 1-2 mm of anterior listhesis of C7 on T1. DISC SPACE: There is moderate to severe spondylosis most pronounced in the mid cervical spine. JOINTS: There is moderate facet arthropathy and mid cervical uncovertebral hypertrophy. SOFT TISSUES:  Unremarkable LEVELS: There is moderate mid cervical osseous neuroforaminal stenosis most pronounced at C3/4. CT CHEST: MEDIASTINUM: Similar elevation of the left hemidiaphragm. Aortic and heart size are normal. No mass nor pericardial effusion. CORONARY ARTERIES: There is calcified atherosclerotic disease. LUNGS: Persistent left lower lung airspace disease, likely atelectasis. Interval clearance of right middle lobe airspace disease. Moderate to severe emphysema is similar. No suspicious nodules or new consolidation. PLEURAL SPACE: No effusion, mass, nor pneumothorax. LYMPH NODES: No pathologically enlarged thoracic nodes. There are relatively nondisplaced fractures involving the anterior margins of the right ninth through 11th ribs and left third and fourth ribs. CT ABDOMEN AND PELVIS:  LIVER:  Unremarkable  parenchyma without focal lesion. BILIARY/GALLBLADDER: Cholecystectomy SPLEEN:  Unremarkable PANCREAS:  Unremarkable ADRENAL:  Unremarkable KIDNEYS:  Unremarkable parenchyma with no solid mass identified. No obstruction.  No calculus identified. GASTROINTESTINAL/MESENTERY:  No evidence of obstruction nor inflammation. AORTA/IVC:  Normal caliber. RETROPERITONEUM/LYMPH NODES:  Unremarkable REPRODUCTIVE: There appear to be bilateral hydroceles. BLADDER:  Unremarkable OSSEUS STRUCTURES: Advanced lumbar degenerative changes. Total left hip arthroplasty with associated streak and beam hardening artifact.     Impression: Impression: 1.There are relatively nondisplaced fractures involving the anterior margins of the right ninth through 11th ribs and left third and fourth ribs. 2.Persistent left lower lung airspace disease, likely atelectasis. Interval clearance of right middle lobe airspace disease. 3.Moderate to severe emphysema. 4.Additional chronic findings as described above. Electronically Signed: Alfonzo Burgess MD  7/18/2025 9:29 AM EDT  Workstation ID: [id number]    CT Chest Without Contrast Diagnostic  Result Date: 7/18/2025  CT CERVICAL SPINE WO CONTRAST, CT ABDOMEN PELVIS WO CONTRAST, CT CHEST WO CONTRAST DIAGNOSTIC Date of Exam: 7/18/2025 8:35 AM EDT Indication: fall. neck pain.. Comparison: CT angiogram chest 5/18/2025, CT abdomen pelvis 8/23/2024 Technique: Axial CT images were obtained of the cervical spine, chest, abdomen, and pelvis without contrast administration.  Reconstructed coronal and sagittal images were also obtained. Automated exposure control and iterative construction methods were used. Findings: CT CERVICAL SPINE OSSEOUS STRUCTURES: No fracture nor bony destructive process. ALIGNMENT: There is 2 mm of anterolisthesis of C2 on C3. There is 1-2 mm of anterior listhesis of C7 on T1. DISC SPACE: There is moderate to severe spondylosis most pronounced in the mid cervical spine. JOINTS: There is moderate  facet arthropathy and mid cervical uncovertebral hypertrophy. SOFT TISSUES:  Unremarkable LEVELS: There is moderate mid cervical osseous neuroforaminal stenosis most pronounced at C3/4. CT CHEST: MEDIASTINUM: Similar elevation of the left hemidiaphragm. Aortic and heart size are normal. No mass nor pericardial effusion. CORONARY ARTERIES: There is calcified atherosclerotic disease. LUNGS: Persistent left lower lung airspace disease, likely atelectasis. Interval clearance of right middle lobe airspace disease. Moderate to severe emphysema is similar. No suspicious nodules or new consolidation. PLEURAL SPACE: No effusion, mass, nor pneumothorax. LYMPH NODES: No pathologically enlarged thoracic nodes. There are relatively nondisplaced fractures involving the anterior margins of the right ninth through 11th ribs and left third and fourth ribs. CT ABDOMEN AND PELVIS:  LIVER:  Unremarkable parenchyma without focal lesion. BILIARY/GALLBLADDER: Cholecystectomy SPLEEN:  Unremarkable PANCREAS:  Unremarkable ADRENAL:  Unremarkable KIDNEYS:  Unremarkable parenchyma with no solid mass identified. No obstruction.  No calculus identified. GASTROINTESTINAL/MESENTERY:  No evidence of obstruction nor inflammation. AORTA/IVC:  Normal caliber. RETROPERITONEUM/LYMPH NODES:  Unremarkable REPRODUCTIVE: There appear to be bilateral hydroceles. BLADDER:  Unremarkable OSSEUS STRUCTURES: Advanced lumbar degenerative changes. Total left hip arthroplasty with associated streak and beam hardening artifact.     Impression: Impression: 1.There are relatively nondisplaced fractures involving the anterior margins of the right ninth through 11th ribs and left third and fourth ribs. 2.Persistent left lower lung airspace disease, likely atelectasis. Interval clearance of right middle lobe airspace disease. 3.Moderate to severe emphysema. 4.Additional chronic findings as described above. Electronically Signed: Alfonzo Burgess MD  7/18/2025 9:29 AM EDT   Workstation ID: [id number]    CT Abdomen Pelvis Without Contrast  Result Date: 7/18/2025  CT CERVICAL SPINE WO CONTRAST, CT ABDOMEN PELVIS WO CONTRAST, CT CHEST WO CONTRAST DIAGNOSTIC Date of Exam: 7/18/2025 8:35 AM EDT Indication: fall. neck pain.. Comparison: CT angiogram chest 5/18/2025, CT abdomen pelvis 8/23/2024 Technique: Axial CT images were obtained of the cervical spine, chest, abdomen, and pelvis without contrast administration.  Reconstructed coronal and sagittal images were also obtained. Automated exposure control and iterative construction methods were used. Findings: CT CERVICAL SPINE OSSEOUS STRUCTURES: No fracture nor bony destructive process. ALIGNMENT: There is 2 mm of anterolisthesis of C2 on C3. There is 1-2 mm of anterior listhesis of C7 on T1. DISC SPACE: There is moderate to severe spondylosis most pronounced in the mid cervical spine. JOINTS: There is moderate facet arthropathy and mid cervical uncovertebral hypertrophy. SOFT TISSUES:  Unremarkable LEVELS: There is moderate mid cervical osseous neuroforaminal stenosis most pronounced at C3/4. CT CHEST: MEDIASTINUM: Similar elevation of the left hemidiaphragm. Aortic and heart size are normal. No mass nor pericardial effusion. CORONARY ARTERIES: There is calcified atherosclerotic disease. LUNGS: Persistent left lower lung airspace disease, likely atelectasis. Interval clearance of right middle lobe airspace disease. Moderate to severe emphysema is similar. No suspicious nodules or new consolidation. PLEURAL SPACE: No effusion, mass, nor pneumothorax. LYMPH NODES: No pathologically enlarged thoracic nodes. There are relatively nondisplaced fractures involving the anterior margins of the right ninth through 11th ribs and left third and fourth ribs. CT ABDOMEN AND PELVIS:  LIVER:  Unremarkable parenchyma without focal lesion. BILIARY/GALLBLADDER: Cholecystectomy SPLEEN:  Unremarkable PANCREAS:  Unremarkable ADRENAL:  Unremarkable KIDNEYS:   Unremarkable parenchyma with no solid mass identified. No obstruction.  No calculus identified. GASTROINTESTINAL/MESENTERY:  No evidence of obstruction nor inflammation. AORTA/IVC:  Normal caliber. RETROPERITONEUM/LYMPH NODES:  Unremarkable REPRODUCTIVE: There appear to be bilateral hydroceles. BLADDER:  Unremarkable OSSEUS STRUCTURES: Advanced lumbar degenerative changes. Total left hip arthroplasty with associated streak and beam hardening artifact.     Impression: Impression: 1.There are relatively nondisplaced fractures involving the anterior margins of the right ninth through 11th ribs and left third and fourth ribs. 2.Persistent left lower lung airspace disease, likely atelectasis. Interval clearance of right middle lobe airspace disease. 3.Moderate to severe emphysema. 4.Additional chronic findings as described above. Electronically Signed: Alfonzo Burgess MD  7/18/2025 9:29 AM EDT  Workstation ID: [id number]    XR Knee 1 or 2 View Left  Result Date: 7/18/2025  XR KNEE 1 OR 2 VW LEFT Date of Exam: 7/18/2025 9:00 AM EDT Indication: pain. fall. Comparison: 5/20/2025 Findings: 2 views of the left knee demonstrate no acute fracture or dislocation. There is medial at the patellofemoral compartment joint space narrowing with chondrocalcinosis seen in the lateral joint space. There is no joint effusion. Vascular calcifications are  present.     Impression: Impression: Degenerative changes of the left knee. No acute osseous abnormality. Electronically Signed: Angela Saini MD  7/18/2025 9:26 AM EDT  Workstation ID: [id number]    XR Shoulder 2+ View Left  Result Date: 7/18/2025  XR SHOULDER 2+ VW LEFT Date of Exam: 7/18/2025 9:00 AM EDT Indication: pain. fall. Comparison: 5/20/2025 Findings: There is a left reverse shoulder arthroplasty with intact hardware components. There is no acute fracture or dislocation. There is no evidence for hardware loosening. The soft tissues are unremarkable.     Impression: Impression: Left  reverse shoulder arthroplasty without fracture or hardware complication. Electronically Signed: Angela Saini MD  7/18/2025 9:25 AM EDT  Workstation ID: [id number]      Results for orders placed during the hospital encounter of 04/22/25    Adult Transthoracic Echo Complete W/ Cont if Necessary Per Protocol 04/26/2025  4:58 PM    Interpretation Summary    Left ventricular systolic function is normal. Estimated left ventricular EF = 60%    Left ventricular wall thickness is consistent with hypertrophy.    No hemodynamically significant valvular heart disease      Current medications:  Scheduled Meds:amantadine, 100 mg, Oral, BID  amiodarone, 200 mg, Oral, Q24H  [Held by provider] apixaban, 5 mg, Oral, Q12H  budesonide-formoterol, 2 puff, Inhalation, BID - RT   And  revefenacin, 175 mcg, Nebulization, Daily - RT  carbidopa-levodopa, 1 tablet, Oral, 4x Daily  Lidocaine, 1 patch, Transdermal, Q24H  Lidocaine, 1 patch, Transdermal, Q24H  metoprolol succinate XL, 50 mg, Oral, Daily  pantoprazole, 40 mg, Oral, Q AM  QUEtiapine, 25 mg, Oral, Nightly  sodium chloride, 10 mL, Intravenous, Q12H  tamsulosin, 0.8 mg, Oral, Nightly      Continuous Infusions:   PRN Meds:.  senna-docusate sodium **AND** polyethylene glycol **AND** bisacodyl **AND** bisacodyl    dextrose    dextrose    glucagon (human recombinant)    ipratropium-albuterol    Magnesium Standard Dose Replacement - Follow Nurse / BPA Driven Protocol    nitroglycerin    ondansetron    oxyCODONE-acetaminophen    [COMPLETED] Insert Peripheral IV **AND** sodium chloride    sodium chloride    sodium chloride    tiZANidine    Assessment & Plan   Assessment & Plan     Active Hospital Problems    Diagnosis  POA    **Fall [W19.XXXA]  Yes    Rib fractures [S22.49XA]  Unknown    Atrial fibrillation [I48.91]  Yes    Parkinson disease [G20.A1]  Yes    Back pain [M54.9]  Yes    Lumbar spondylosis [M47.816]  Yes    Chronic obstructive pulmonary disease [J44.9]  Yes      Resolved  Hospital Problems   No resolved problems to display.        Brief Hospital Course to date:  Flaco Roque II is a 79 y.o. male with dysphagia, Afib, and progressive weakness at home; fell at home and sustained rib fractures    Fall (mechanical) w/ diffuse body pain  Acute nondisplaced Rib Fractures (right 9-11th; left 3-4th), due to above  Parkinsons dz, w/ ataxia   - PT OT - recommending SNF  - palliative care consult appreciated  - interested in Hospice care - consult noted.   - CM for dispo planning   - pain meds adjusted.  RN advised to offer them to patient .  He seems to be forgetting to ask.      Mildly elevated/flat troponins  Parox afib  Prolonged qt on ekg  - no current angina   -already anticoagulated w/ eliquis, on b-blocker, etc. Will stop eliquis due to falls and GOC   -patient would NOT want heart cath or interventions in event of acute cardiac event  --continue amiodarone   - stopped toprol due to dizziness and low nl BP         Chronic hypoxic resp failure (2-3L o2 chronically)  COPD  Atelectasis LLL (on ct imaging)  -continue inhalants, prn duonebs  -currently on 2Lnc, denies current dyspnea  -ct chest : mod-severe emphysematous changes; nterval clearance of irght pneumonia, persistent left lower lung air space dz (likely atelectasis)     Parkinsons dz  Dysphagia  Severe protein malnutrition  -ataxia, autonomic dysfunction due to parkinsons  -frequent falls  -not interested in tube feedings, only interested in regular/comfort diet (he understands risk as does family)  -continue sinemet, amantadine  -dietition consult to maximize nutrition (not interested in tube feeds..he wants regular diet)  -pt/ot evals      Gerd  -ppi     Chronic back pain  -fentanyl pain pump, recently refilled     Expected Discharge Location and Transportation: to SNF .  Familiy requesting [facility]   Expected Discharge tbd   Expected discharge date/ time has not been documented.     VTE Prophylaxis:  Pharmacologic VTE  prophylaxis orders are present.         AM-PAC 6 Clicks Score (PT): 6 (07/20/25 0000)    CODE STATUS:   Code Status and Medical Interventions: No CPR (Do Not Attempt to Resuscitate); Limited Support; No intubation (DNI), No artificial nutrition, No cardioversion, No dialysis, No vasopressors   Ordered at: 07/18/25 1340     Code Status (Patient has no pulse and is not breathing):    No CPR (Do Not Attempt to Resuscitate)     Medical Interventions (Patient has pulse or is breathing):    Limited Support     Medical Intervention Limits:    No intubation (DNI)       No artificial nutrition       No cardioversion       No dialysis       No vasopressors       Zaida Ybarra MD  07/20/25
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
## 2025-07-20 NOTE — PLAN OF CARE
Problem: Adult Inpatient Plan of Care  Goal: Plan of Care Review  Outcome: Progressing  Goal: Patient-Specific Goal (Individualized)  Outcome: Progressing  Goal: Absence of Hospital-Acquired Illness or Injury  Outcome: Progressing  Intervention: Identify and Manage Fall Risk  Recent Flowsheet Documentation  Taken 7/20/2025 1800 by Buffy Gordon RN  Safety Promotion/Fall Prevention:   activity supervised   assistive device/personal items within reach   clutter free environment maintained   lighting adjusted   nonskid shoes/slippers when out of bed  Taken 7/20/2025 1600 by Buffy Gordon RN  Safety Promotion/Fall Prevention:   activity supervised   assistive device/personal items within reach   clutter free environment maintained   lighting adjusted   nonskid shoes/slippers when out of bed  Taken 7/20/2025 1400 by Buffy Gordon RN  Safety Promotion/Fall Prevention:   activity supervised   assistive device/personal items within reach   clutter free environment maintained   lighting adjusted   nonskid shoes/slippers when out of bed  Taken 7/20/2025 1200 by Buffy Gordon RN  Safety Promotion/Fall Prevention:   activity supervised   assistive device/personal items within reach   clutter free environment maintained   lighting adjusted   nonskid shoes/slippers when out of bed  Taken 7/20/2025 1000 by Buffy Gordon RN  Safety Promotion/Fall Prevention: safety round/check completed  Taken 7/20/2025 0800 by Buffy Gordon RN  Safety Promotion/Fall Prevention: safety round/check completed  Intervention: Prevent Skin Injury  Recent Flowsheet Documentation  Taken 7/20/2025 1800 by Buffy Gordon RN  Skin Protection:   incontinence pads utilized   skin sealant/moisture barrier applied   transparent dressing maintained  Taken 7/20/2025 1600 by Buffy Gordon RN  Body Position: position changed independently  Skin Protection:   incontinence pads utilized   transparent dressing maintained   skin  sealant/moisture barrier applied  Taken 7/20/2025 1400 by Buffy Gordon RN  Body Position: (pt up in chair) legs elevated  Skin Protection:   incontinence pads utilized   transparent dressing maintained  Taken 7/20/2025 1200 by Buffy Gordon RN  Skin Protection:   incontinence pads utilized   transparent dressing maintained  Taken 7/20/2025 1000 by Buffy Gordon RN  Body Position: sitting up in bed  Skin Protection:   incontinence pads utilized   skin sealant/moisture barrier applied   transparent dressing maintained  Taken 7/20/2025 0800 by Buffy Gordon RN  Body Position: left  Skin Protection:   incontinence pads utilized   skin sealant/moisture barrier applied  Intervention: Prevent Infection  Recent Flowsheet Documentation  Taken 7/20/2025 1800 by Buffy Gordon RN  Infection Prevention:   environmental surveillance performed   single patient room provided  Taken 7/20/2025 1600 by Buffy Gordon RN  Infection Prevention:   environmental surveillance performed   single patient room provided  Taken 7/20/2025 1400 by Buffy Gordon RN  Infection Prevention:   environmental surveillance performed   single patient room provided  Taken 7/20/2025 1200 by Buffy Gordon RN  Infection Prevention:   environmental surveillance performed   single patient room provided  Taken 7/20/2025 1000 by Buffy Gordon RN  Infection Prevention: single patient room provided  Taken 7/20/2025 0800 by Buffy Gordon RN  Infection Prevention:   single patient room provided   environmental surveillance performed  Goal: Optimal Comfort and Wellbeing  Outcome: Progressing  Goal: Readiness for Transition of Care  Outcome: Progressing

## 2025-07-20 NOTE — PLAN OF CARE
Goal Outcome Evaluation: all alarms activated,  Problem: Fall Injury Risk  Goal: Absence of Fall and Fall-Related Injury  Intervention: Promote Injury-Free Environment  Recent Flowsheet Documentation  Taken 7/20/2025 0400 by Kanika Wills, RN  Safety Promotion/Fall Prevention:   activity supervised   room organization consistent   safety round/check completed  Taken 7/20/2025 0000 by Kanika Wills, RN  Safety Promotion/Fall Prevention:   activity supervised   room organization consistent   safety round/check completed  Taken 7/19/2025 2000 by Kanika Wills, RN  Safety Promotion/Fall Prevention:   activity supervised   room organization consistent   safety round/check completed

## 2025-07-21 PROCEDURE — 99232 SBSQ HOSP IP/OBS MODERATE 35: CPT | Performed by: INTERNAL MEDICINE

## 2025-07-21 PROCEDURE — 94761 N-INVAS EAR/PLS OXIMETRY MLT: CPT

## 2025-07-21 PROCEDURE — 94664 DEMO&/EVAL PT USE INHALER: CPT

## 2025-07-21 PROCEDURE — 94799 UNLISTED PULMONARY SVC/PX: CPT

## 2025-07-21 PROCEDURE — 63710000001 REVEFENACIN 175 MCG/3ML SOLUTION: Performed by: INTERNAL MEDICINE

## 2025-07-21 PROCEDURE — 97535 SELF CARE MNGMENT TRAINING: CPT

## 2025-07-21 RX ORDER — POLYETHYLENE GLYCOL 3350 17 G/17G
17 POWDER, FOR SOLUTION ORAL DAILY PRN
Status: DISCONTINUED | OUTPATIENT
Start: 2025-07-21 | End: 2025-07-21

## 2025-07-21 RX ORDER — AMOXICILLIN 250 MG
2 CAPSULE ORAL 2 TIMES DAILY
Status: DISCONTINUED | OUTPATIENT
Start: 2025-07-21 | End: 2025-07-24 | Stop reason: HOSPADM

## 2025-07-21 RX ORDER — AMOXICILLIN 250 MG
2 CAPSULE ORAL 2 TIMES DAILY
Status: DISCONTINUED | OUTPATIENT
Start: 2025-07-21 | End: 2025-07-21

## 2025-07-21 RX ORDER — NALOXONE HCL 0.4 MG/ML
0.4 VIAL (ML) INJECTION
Status: DISCONTINUED | OUTPATIENT
Start: 2025-07-21 | End: 2025-07-24 | Stop reason: HOSPADM

## 2025-07-21 RX ORDER — BISACODYL 10 MG
10 SUPPOSITORY, RECTAL RECTAL DAILY PRN
Status: DISCONTINUED | OUTPATIENT
Start: 2025-07-21 | End: 2025-07-24 | Stop reason: HOSPADM

## 2025-07-21 RX ORDER — BISACODYL 5 MG/1
5 TABLET, DELAYED RELEASE ORAL DAILY PRN
Status: DISCONTINUED | OUTPATIENT
Start: 2025-07-21 | End: 2025-07-24 | Stop reason: HOSPADM

## 2025-07-21 RX ORDER — BISACODYL 10 MG
10 SUPPOSITORY, RECTAL RECTAL DAILY PRN
Status: DISCONTINUED | OUTPATIENT
Start: 2025-07-21 | End: 2025-07-21

## 2025-07-21 RX ORDER — BISACODYL 5 MG/1
5 TABLET, DELAYED RELEASE ORAL DAILY PRN
Status: DISCONTINUED | OUTPATIENT
Start: 2025-07-21 | End: 2025-07-21

## 2025-07-21 RX ORDER — POLYETHYLENE GLYCOL 3350 17 G/17G
17 POWDER, FOR SOLUTION ORAL DAILY
Status: DISCONTINUED | OUTPATIENT
Start: 2025-07-21 | End: 2025-07-24 | Stop reason: HOSPADM

## 2025-07-21 RX ADMIN — LIDOCAINE 1 PATCH: 4 PATCH TOPICAL at 08:47

## 2025-07-21 RX ADMIN — PANTOPRAZOLE SODIUM 40 MG: 40 TABLET, DELAYED RELEASE ORAL at 05:18

## 2025-07-21 RX ADMIN — BUDESONIDE AND FORMOTEROL FUMARATE DIHYDRATE 2 PUFF: 160; 4.5 AEROSOL RESPIRATORY (INHALATION) at 09:48

## 2025-07-21 RX ADMIN — POLYETHYLENE GLYCOL 3350 17 G: 17 POWDER, FOR SOLUTION ORAL at 11:53

## 2025-07-21 RX ADMIN — AMANTADINE HYDROCHLORIDE 100 MG: 100 CAPSULE ORAL at 08:45

## 2025-07-21 RX ADMIN — AMIODARONE HYDROCHLORIDE 200 MG: 200 TABLET ORAL at 08:45

## 2025-07-21 RX ADMIN — TAMSULOSIN HYDROCHLORIDE 0.8 MG: 0.4 CAPSULE ORAL at 20:37

## 2025-07-21 RX ADMIN — CARBIDOPA AND LEVODOPA 1 TABLET: 25; 100 TABLET ORAL at 20:37

## 2025-07-21 RX ADMIN — AMANTADINE HYDROCHLORIDE 100 MG: 100 CAPSULE ORAL at 20:36

## 2025-07-21 RX ADMIN — CARBIDOPA AND LEVODOPA 1 TABLET: 25; 100 TABLET ORAL at 17:43

## 2025-07-21 RX ADMIN — Medication 10 ML: at 20:37

## 2025-07-21 RX ADMIN — SENNOSIDES, DOCUSATE SODIUM 2 TABLET: 50; 8.6 TABLET, FILM COATED ORAL at 20:37

## 2025-07-21 RX ADMIN — CARBIDOPA AND LEVODOPA 1 TABLET: 25; 100 TABLET ORAL at 08:45

## 2025-07-21 RX ADMIN — CARBIDOPA AND LEVODOPA 1 TABLET: 25; 100 TABLET ORAL at 11:54

## 2025-07-21 RX ADMIN — IBUPROFEN 400 MG: 400 TABLET ORAL at 17:43

## 2025-07-21 RX ADMIN — BUDESONIDE AND FORMOTEROL FUMARATE DIHYDRATE 2 PUFF: 160; 4.5 AEROSOL RESPIRATORY (INHALATION) at 20:39

## 2025-07-21 RX ADMIN — SENNOSIDES, DOCUSATE SODIUM 2 TABLET: 50; 8.6 TABLET, FILM COATED ORAL at 11:54

## 2025-07-21 RX ADMIN — Medication 10 ML: at 08:48

## 2025-07-21 RX ADMIN — REVEFENACIN 175 MCG: 175 SOLUTION RESPIRATORY (INHALATION) at 09:48

## 2025-07-21 RX ADMIN — QUETIAPINE FUMARATE 25 MG: 25 TABLET ORAL at 20:37

## 2025-07-21 RX ADMIN — LIDOCAINE 1 PATCH: 4 PATCH TOPICAL at 08:46

## 2025-07-21 NOTE — PROGRESS NOTES
Continued Stay Note  [facility]     Patient Name: Flaco Roque II  MRN: [id number]  Today's Date: 7/21/2025    Admit Date: 7/18/2025    Plan: Ongoing   Discharge Plan       Row Name 07/21/25 1923       Plan    Plan Ongoing    Plan Comments Visit made to pt, pt up in recliner, no family present. Telephone call to pt's wife regarding the hospice referral. Wife stated knows about hospice and does not want hospice for pt at this time. Wife stated pt is doing better and is considering having short term rehab for pt. Offered to do a follow up call, wife declined, stating will reach out to hospice in the future if needed. Will close the hospice referral. Please call [phone] if can be of further assistance.                   Discharge Codes    No documentation.                       Kadi Lewis RN

## 2025-07-21 NOTE — PLAN OF CARE
Goal Outcome Evaluation:  Plan of Care Reviewed With: patient        Progress: no change  Outcome Evaluation: Palliative RN saw pt. at 1154.  New palliative consult for symptom management per Dr. Ellington.  MIGNON is spouse Cheryl Roque.  Dr. GUCCI White saw pt for initial provider visit on 7/18.  She addressed GOC at that time.  Hospice referral entered for information.  Today, pt. sitting up in bedside chair on 3LNC denying pain at time of visit but did state he had pain in bilateral shoulders, neck and back eariler this morning that is chronic in nature.  He does have a pain pump.  Pt. also endorsed dyspnea.  He has known dysphagia but refuses modified diet.  Ate nearly 100% of lunch tray and did not demonstrate any difficulty with grilled cheese and potato chips.  Spoke with primary nurse who stated pt. has had significant pain but denies pain today.  Palliative number given to nurse.  Palliative care to continue to follow along for support and ongoing symptom management.       Problem: Palliative Care  Goal: Enhanced Quality of Life  Intervention: Promote Advance Care Planning  Flowsheets (Taken 7/21/2025 1205)  Life Transition/Adjustment:   palliative care initiated   palliative care discussed       0930 Palliative IDT meeting: MD; APRN ; MDiv; RNs After hours, weekends and holidays, contact Palliative Provider by calling [phone].

## 2025-07-21 NOTE — PROGRESS NOTES
[facility] Medicine Services  PROGRESS NOTE    Patient Name: lFaco Roque II  : 1945  MRN: [id number]    Date of Admission: 2025  Primary Care Physician: Ariadne Galloway PA    Subjective   Subjective     CC:  fell at home    HPI: Got up to chair this morning, says it did not hurt.  Is willing to go to SNF.  No other complaints except constipation.       Objective   Objective     Vital Signs:   Temp:  [97.3 °F (36.3 °C)-98.3 °F (36.8 °C)] 97.5 °F (36.4 °C)  Heart Rate:  [60-78] 71  Resp:  [16-20] 16  BP: (109-132)/(58-68) 124/66  Flow (L/min) (Oxygen Therapy):  [2-3] 2     Physical Exam:  Gen:  frail, alert, NAD. Up in chair, very pleasant   Neuro: alert and oriented, clear speech, follows commands, grossly nonfocal  HEENT:  NC/AT  Neck:  Supple, no LAD  Heart RRR   Abd:  Soft, nontender, .  Extrem:  No c/c/e      Results Reviewed:  LAB RESULTS:      Lab 25  0520 25  1002 25  0829 25  1820 25  1641   WBC 7.63  --  7.75  --  5.09   HEMOGLOBIN 11.7*  --  12.9*  --  12.3*   HEMATOCRIT 36.6*  --  40.5  --  38.9   PLATELETS 233  --  231  --  227   NEUTROS ABS  --   --  6.57  --  3.87   IMMATURE GRANS (ABS)  --   --  0.04  --  0.02   LYMPHS ABS  --   --  0.31*  --  0.68*   MONOS ABS  --   --  0.80  --  0.43   EOS ABS  --   --  0.00  --  0.05   MCV 94.8  --  94.4  --  94.4   LACTATE  --   --  1.1  --   --    HSTROP T  --  34* 31* 31* 32*         Lab 25  0520 25  0829 25  1641   SODIUM 143 140 142   POTASSIUM 3.9 4.1 4.2   CHLORIDE 106 102 105   CO2 26.0 23.0 27.2   ANION GAP 11.0 15.0 9.8   BUN 19.5 19.1 24.6*   CREATININE 0.73* 0.82 1.14   EGFR 92.5 89.4 65.4   GLUCOSE 47* 85 93   CALCIUM 8.4* 8.7 8.3*   MAGNESIUM 1.8  --  1.9         Lab 25  0829 25  1641   TOTAL PROTEIN 5.9* 5.3*   ALBUMIN 3.4* 3.2*   GLOBULIN 2.5 2.1   ALT (SGPT) <5 <5   AST (SGOT) 22 17   BILIRUBIN 1.1 0.4   ALK PHOS 129* 107   LIPASE  --  15          Lab 07/18/25  1002 07/18/25  0829 07/16/25  1820 07/16/25  1641   PROBNP  --  3,974.0*  --  2,613.0*   HSTROP T 34* 31* 31* 32*                 Brief Urine Lab Results  (Last result in the past 365 days)        Color   Clarity   Blood   Leuk Est   Nitrite   Protein   CREAT   Urine HCG        07/18/25 1010 Yellow   Clear   Negative   Negative   Negative   Negative                   Microbiology Results Abnormal       None            No radiology results from the last 24 hrs      Results for orders placed during the hospital encounter of 04/22/25    Adult Transthoracic Echo Complete W/ Cont if Necessary Per Protocol 04/26/2025  4:58 PM    Interpretation Summary    Left ventricular systolic function is normal. Estimated left ventricular EF = 60%    Left ventricular wall thickness is consistent with hypertrophy.    No hemodynamically significant valvular heart disease      Current medications:  Scheduled Meds:amantadine, 100 mg, Oral, BID  amiodarone, 200 mg, Oral, Q24H  [Held by provider] apixaban, 5 mg, Oral, Q12H  budesonide-formoterol, 2 puff, Inhalation, BID - RT   And  revefenacin, 175 mcg, Nebulization, Daily - RT  carbidopa-levodopa, 1 tablet, Oral, 4x Daily  Lidocaine, 1 patch, Transdermal, Q24H  Lidocaine, 1 patch, Transdermal, Q24H  [Held by provider] metoprolol succinate XL, 50 mg, Oral, Daily  pantoprazole, 40 mg, Oral, Q AM  QUEtiapine, 25 mg, Oral, Nightly  sodium chloride, 10 mL, Intravenous, Q12H  tamsulosin, 0.8 mg, Oral, Nightly      Continuous Infusions:   PRN Meds:.  senna-docusate sodium **AND** polyethylene glycol **AND** bisacodyl **AND** bisacodyl    dextrose    dextrose    glucagon (human recombinant)    ibuprofen    ipratropium-albuterol    Magnesium Standard Dose Replacement - Follow Nurse / BPA Driven Protocol    nitroglycerin    ondansetron    oxyCODONE-acetaminophen    [COMPLETED] Insert Peripheral IV **AND** sodium chloride    sodium chloride    sodium chloride     tiZANidine    Assessment & Plan   Assessment & Plan     Active Hospital Problems    Diagnosis  POA    **Fall [W19.XXXA]  Yes    Rib fractures [S22.49XA]  Unknown    Atrial fibrillation [I48.91]  Yes    Parkinson disease [G20.A1]  Yes    Back pain [M54.9]  Yes    Lumbar spondylosis [M47.816]  Yes    Chronic obstructive pulmonary disease [J44.9]  Yes      Resolved Hospital Problems   No resolved problems to display.        Brief Hospital Course to date:  Flaco Roque II is a 79 y.o. male with dysphagia, Afib, and progressive weakness at home; fell at home and sustained rib fractures    Fall (mechanical) w/ diffuse body pain  Acute nondisplaced Rib Fractures (right 9-11th; left 3-4th), due to above  Parkinsons dz, w/ ataxia   - PT OT - recommending SNF  - palliative care consult appreciated  - interested in Hospice care - consult noted.   - CM for dispo planning   - pain meds adjusted.  RN advised to offer them to patient .  He seems to be forgetting to ask.     Contstipation - increase bowel regimen.  Ongoing opiates.      Mildly elevated/flat troponins  Parox afib  Prolonged qt on ekg  - no current angina  --continue amiodarone   - stopped toprol due to dizziness and low nl BP         Chronic hypoxic resp failure (2-3L o2 chronically)  COPD  Atelectasis LLL (on ct imaging)  -continue inhalants, prn duonebs  -currently on 2Lnc, denies current dyspnea  -ct chest : mod-severe emphysematous changes; nterval clearance of irght pneumonia, persistent left lower lung air space dz (likely atelectasis)     Parkinsons dz  Dysphagia  Severe protein malnutrition  -ataxia, autonomic dysfunction due to parkinsons  -frequent falls  -not interested in tube feedings, only interested in regular/comfort diet (he understands risk as does family)  -continue sinemet, amantadine  -dietition consult to maximize nutrition (not interested in tube feeds..he wants regular diet)  -pt/ot evals      Gerd  -ppi     Chronic back pain  -fentanyl  pain pump, recently refilled     Expected Discharge Location and Transportation: to SNF .  [facility]amiliy requesting [facility]   Expected Discharge any time   Expected discharge date/ time has not been documented.     VTE Prophylaxis:  Pharmacologic VTE prophylaxis orders are present.         AM-PAC 6 Clicks Score (PT): 6 (07/20/25 2000)    CODE STATUS:   Code Status and Medical Interventions: No CPR (Do Not Attempt to Resuscitate); Limited Support; No intubation (DNI), No artificial nutrition, No cardioversion, No dialysis, No vasopressors   Ordered at: 07/18/25 1340     Code Status (Patient has no pulse and is not breathing):    No CPR (Do Not Attempt to Resuscitate)     Medical Interventions (Patient has pulse or is breathing):    Limited Support     Medical Intervention Limits:    No intubation (DNI)       No artificial nutrition       No cardioversion       No dialysis       No vasopressors       Zaida Ybarra MD  07/21/25
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
## 2025-07-21 NOTE — PLAN OF CARE
Problem: Adult Inpatient Plan of Care  Goal: Plan of Care Review  Outcome: Progressing  Goal: Patient-Specific Goal (Individualized)  Outcome: Progressing  Goal: Absence of Hospital-Acquired Illness or Injury  Outcome: Progressing  Intervention: Identify and Manage Fall Risk  Recent Flowsheet Documentation  Taken 7/21/2025 0400 by Parveen Jaramillo, RN  Safety Promotion/Fall Prevention:   activity supervised   assistive device/personal items within reach   clutter free environment maintained   fall prevention program maintained   lighting adjusted   nonskid shoes/slippers when out of bed   room organization consistent   safety round/check completed  Taken 7/21/2025 0200 by Parveen Jaramillo, RN  Safety Promotion/Fall Prevention:   safety round/check completed   room organization consistent   nonskid shoes/slippers when out of bed   lighting adjusted   fall prevention program maintained   clutter free environment maintained   assistive device/personal items within reach   activity supervised  Taken 7/21/2025 0000 by Parveen Jaramillo, RN  Safety Promotion/Fall Prevention:   activity supervised   assistive device/personal items within reach   clutter free environment maintained   fall prevention program maintained   lighting adjusted   nonskid shoes/slippers when out of bed   room organization consistent   safety round/check completed  Taken 7/20/2025 2214 by Parveen Jaramillo, RN  Safety Promotion/Fall Prevention:   activity supervised   assistive device/personal items within reach   clutter free environment maintained   fall prevention program maintained   lighting adjusted   nonskid shoes/slippers when out of bed   safety round/check completed   room organization consistent  Taken 7/20/2025 2000 by Parveen Jaramillo, RN  Safety Promotion/Fall Prevention:   activity supervised   assistive device/personal items within reach   clutter free environment maintained   fall prevention program maintained   lighting adjusted    nonskid shoes/slippers when out of bed   safety round/check completed   room organization consistent  Intervention: Prevent Skin Injury  Recent Flowsheet Documentation  Taken 7/21/2025 0400 by Parveen Jaramillo RN  Body Position: lower extremity elevated  Skin Protection:   incontinence pads utilized   pulse oximeter probe site changed   silicone foam dressing in place   transparent dressing maintained  Taken 7/21/2025 0200 by Parveen Jaramillo RN  Body Position: turned  Skin Protection: transparent dressing maintained  Taken 7/21/2025 0000 by Parveen Jaramillo RN  Body Position: position maintained  Skin Protection: transparent dressing maintained  Taken 7/20/2025 2214 by Parveen Jaramillo RN  Body Position: position maintained  Skin Protection:   incontinence pads utilized   silicone foam dressing in place   transparent dressing maintained  Taken 7/20/2025 2000 by Parveen Jaramillo RN  Body Position: position changed independently  Skin Protection:   incontinence pads utilized   pulse oximeter probe site changed   silicone foam dressing in place   transparent dressing maintained  Intervention: Prevent Infection  Recent Flowsheet Documentation  Taken 7/21/2025 0400 by Parveen Jaramillo RN  Infection Prevention: hand hygiene promoted  Taken 7/21/2025 0200 by Parveen Jaramillo RN  Infection Prevention:   hand hygiene promoted   rest/sleep promoted   single patient room provided  Taken 7/21/2025 0000 by Parveen Jaramillo RN  Infection Prevention:   hand hygiene promoted   rest/sleep promoted   single patient room provided  Taken 7/20/2025 2000 by Parveen Jaramillo RN  Infection Prevention: hand hygiene promoted  Goal: Optimal Comfort and Wellbeing  Outcome: Progressing  Intervention: Provide Person-Centered Care  Recent Flowsheet Documentation  Taken 7/20/2025 2214 by Parveen Jaramillo RN  Trust Relationship/Rapport: care explained  Taken 7/20/2025 2000 by Parveen Jaramillo RN  Trust Relationship/Rapport: care explained  Goal:  Readiness for Transition of Care  Outcome: Progressing   Goal Outcome Evaluation:

## 2025-07-21 NOTE — PROGRESS NOTES
Malnutrition Severity Assessment    Patient Name:  Flaco Roque II  YOB: 1945  MRN: [id number]  Admit Date:  7/18/2025    Patient meets criteria for : Severe Malnutrition (Chronic severe malnutrition: severe muscle wasting and severe fat loss)    Malnutrition Severity Assessment  Malnutrition Type: Chronic Disease - Related Malnutrition  Malnutrition Type (Last 8 Hours)       Malnutrition Severity Assessment       Row Name 07/20/25 2246       Malnutrition Severity Assessment    Malnutrition Type Chronic Disease - Related Malnutrition      Row Name 07/20/25 2246       Muscle Loss    Loss of Muscle Mass Findings Severe    [place] Region Severe - deep hollowing/scooping, lack of muscle to touch, facial bones well defined    Clavicle Bone Region Severe - protruding prominent bone    Acromion Bone Region Severe - squared shoulders, bones, and acromion process protrusion prominent    Dorsal Hand Region Severe - prominent depression    Patellar Region Severe - prominent bone, square looking, very little muscle definition    Anterior Thigh Region Severe - line/depression along thigh, obviously thin    Posterior Calf Region Severe - thin with very little definition/firmness      Row Name 07/20/25 2246       Fat Loss    Subcutaneous Fat Loss Findings Severe    Orbital Region  Severe - pronounced hollowness/depression, dark circles, loose saggy skin    Upper Arm Region Severe - mostly skin, very little space between folds, fingers touch      Row Name 07/20/25 2246       Criteria Met (Must meet criteria for severity in at least 2 of these categories: M Wasting, Fat Loss, Fluid, Secondary Signs, Wt. Status, Intake)    Patient meets criteria for  Severe Malnutrition  Chronic severe malnutrition: severe muscle wasting and severe fat loss                    Electronically signed by:  Jessi Baugh RD  07/20/25 22:47 EDT

## 2025-07-21 NOTE — PLAN OF CARE
Problem: Adult Inpatient Plan of Care  Goal: Plan of Care Review  Outcome: Progressing  Goal: Patient-Specific Goal (Individualized)  Outcome: Progressing  Goal: Absence of Hospital-Acquired Illness or Injury  Outcome: Progressing  Intervention: Identify and Manage Fall Risk  Recent Flowsheet Documentation  Taken 7/21/2025 1800 by Buffy Gordon RN  Safety Promotion/Fall Prevention:   activity supervised   assistive device/personal items within reach   clutter free environment maintained   lighting adjusted   nonskid shoes/slippers when out of bed  Taken 7/21/2025 1600 by Buffy Gordon RN  Safety Promotion/Fall Prevention:   activity supervised   assistive device/personal items within reach   clutter free environment maintained   lighting adjusted   nonskid shoes/slippers when out of bed  Taken 7/21/2025 1400 by Buffy Gordon RN  Safety Promotion/Fall Prevention:   activity supervised   assistive device/personal items within reach   clutter free environment maintained   lighting adjusted   nonskid shoes/slippers when out of bed  Taken 7/21/2025 1200 by Buffy Gordon RN  Safety Promotion/Fall Prevention:   activity supervised   assistive device/personal items within reach   clutter free environment maintained   lighting adjusted   nonskid shoes/slippers when out of bed  Taken 7/21/2025 1000 by Buffy Gordon RN  Safety Promotion/Fall Prevention:   activity supervised   assistive device/personal items within reach   clutter free environment maintained   lighting adjusted   nonskid shoes/slippers when out of bed  Taken 7/21/2025 0800 by Buffy Gordon RN  Safety Promotion/Fall Prevention:   activity supervised   assistive device/personal items within reach   clutter free environment maintained   lighting adjusted   nonskid shoes/slippers when out of bed  Intervention: Prevent Skin Injury  Recent Flowsheet Documentation  Taken 7/21/2025 1800 by Buffy Gordon RN  Body Position: position  changed independently  Skin Protection:   incontinence pads utilized   transparent dressing maintained  Taken 7/21/2025 1600 by Buffy Gordon RN  Body Position: position changed independently  Skin Protection:   incontinence pads utilized   transparent dressing maintained  Taken 7/21/2025 1400 by Buffy Gordon RN  Body Position: position changed independently  Skin Protection:   incontinence pads utilized   transparent dressing maintained   skin sealant/moisture barrier applied  Taken 7/21/2025 1200 by Buffy Gordon RN  Body Position: position changed independently  Skin Protection:   incontinence pads utilized   transparent dressing maintained  Taken 7/21/2025 1000 by Buffy Gordon RN  Skin Protection:   incontinence pads utilized   transparent dressing maintained   skin sealant/moisture barrier applied  Taken 7/21/2025 0800 by Buffy Gordon RN  Skin Protection:   incontinence pads utilized   transparent dressing maintained  Intervention: Prevent Infection  Recent Flowsheet Documentation  Taken 7/21/2025 1800 by Buffy Gordon RN  Infection Prevention:   environmental surveillance performed   single patient room provided  Taken 7/21/2025 1600 by Buffy Gordon RN  Infection Prevention:   environmental surveillance performed   single patient room provided  Taken 7/21/2025 1400 by Buffy Gordon RN  Infection Prevention:   environmental surveillance performed   single patient room provided  Taken 7/21/2025 1200 by Buffy Gordon RN  Infection Prevention:   environmental surveillance performed   single patient room provided  Taken 7/21/2025 1000 by Buffy Gordon RN  Infection Prevention:   environmental surveillance performed   single patient room provided  Taken 7/21/2025 0800 by Buffy Gordon RN  Infection Prevention:   environmental surveillance performed   single patient room provided  Goal: Optimal Comfort and Wellbeing  Outcome: Progressing  Goal: Readiness for  Transition of Care  Outcome: Progressing

## 2025-07-21 NOTE — PROGRESS NOTES
"Discharge Planning Assessment  [facility]     Patient Name: Flaco Roque II  MRN: [id number]  Today's Date: 7/21/2025    Admit Date: 7/18/2025    Plan: TBD   Discharge Needs Assessment       Row Name 07/21/25 0935       Living Environment    People in Home spouse    Current Living Arrangements home    Potentially Unsafe Housing Conditions none    Primary Care Provided by self    Provides Primary Care For no one                   Discharge Plan       Row Name 07/21/25 0936       Plan    Plan TBD    Plan Comments Spoke with patient at bedside regarding home situation.  Patient states that he lives with his wife in their home [place].  Reports that he does his own personal care but that they have \"a lady\" that comes and assists with housekeeping.  States that they have 2 sons and 4 grandchildren and that all are supportive and stay with him when his wife is away.  Also, reports a home health \"nurse\"  that comes \"3 times a week\" but he does not know the name of the agency.  I contacted [employer] , [employer] , [employer]  [facility] and [facility] do not have Mr. Roque as a current patient.  Patient states he has home oxygen and a walker through [employer].  Case Management will follow and assist with any discharge planning needs.                  Continued Care and Services - Admitted Since 7/18/2025    No active coordination exists.       Selected Continued Care - Prior Encounters Includes continued care and service providers with selected services from prior encounters from 4/19/2025 to 7/21/2025      Discharged on 5/22/2025 Admission date: 5/18/2025 - Discharge disposition: Home-Health Care [facility]      Home Medical Care       Service Provider Services Address Phone Fax Patient Preferred    [facility] [address] [facility], [facility] [address] [place] [id number] [phone] --                             Demographic Summary       Row Name 07/21/25 " 0934       General Information    Admission Type inpatient    Arrived From home    Referral Source admission list    Reason for Consult discharge planning    Preferred Language English                   Functional Status    No documentation.                  Psychosocial    No documentation.                  Abuse/Neglect    No documentation.                  Legal    No documentation.                  Substance Abuse    No documentation.                  Patient Forms    No documentation.                     Susana Goldsetin RN

## 2025-07-21 NOTE — PLAN OF CARE
Goal Outcome Evaluation:  Plan of Care Reviewed With: patient        Progress: no change  Outcome Evaluation: Pt alert and participatory in OT interventions while seated in chair. Pt reported improved comfort and respiratory support while seated UIC. Focused on IS use w/ known rib fxs Feliberto and pt able to retun demo with good effort and inhale/exhale. PT SPO2 > 90% throughout w/ supplemental O2. Pt able to wash face/hands w/ SUA only. Pt declined other ADLs at this time. Will progress OT interventions as tolerable per OT POC.

## 2025-07-22 LAB
ANION GAP SERPL CALCULATED.3IONS-SCNC: 10 MMOL/L (ref 5–15)
BUN SERPL-MCNC: 10 MG/DL (ref 8–23)
BUN/CREAT SERPL: 15.2 (ref 7–25)
CALCIUM SPEC-SCNC: 8.7 MG/DL (ref 8.6–10.5)
CHLORIDE SERPL-SCNC: 103 MMOL/L (ref 98–107)
CO2 SERPL-SCNC: 29 MMOL/L (ref 22–29)
CREAT SERPL-MCNC: 0.66 MG/DL (ref 0.76–1.27)
DEPRECATED RDW RBC AUTO: 57.8 FL (ref 37–54)
EGFRCR SERPLBLD CKD-EPI 2021: 95.4 ML/MIN/1.73
ERYTHROCYTE [DISTWIDTH] IN BLOOD BY AUTOMATED COUNT: 16.5 % (ref 12.3–15.4)
GLUCOSE SERPL-MCNC: 114 MG/DL (ref 65–99)
HCT VFR BLD AUTO: 35.3 % (ref 37.5–51)
HGB BLD-MCNC: 11.2 G/DL (ref 13–17.7)
MCH RBC QN AUTO: 30.4 PG (ref 26.6–33)
MCHC RBC AUTO-ENTMCNC: 31.7 G/DL (ref 31.5–35.7)
MCV RBC AUTO: 95.7 FL (ref 79–97)
PLATELET # BLD AUTO: 239 10*3/MM3 (ref 140–450)
PMV BLD AUTO: 10.9 FL (ref 6–12)
POTASSIUM SERPL-SCNC: 4.1 MMOL/L (ref 3.5–5.2)
RBC # BLD AUTO: 3.69 10*6/MM3 (ref 4.14–5.8)
SODIUM SERPL-SCNC: 142 MMOL/L (ref 136–145)
WBC NRBC COR # BLD AUTO: 5.36 10*3/MM3 (ref 3.4–10.8)

## 2025-07-22 PROCEDURE — 99232 SBSQ HOSP IP/OBS MODERATE 35: CPT | Performed by: INTERNAL MEDICINE

## 2025-07-22 PROCEDURE — 94799 UNLISTED PULMONARY SVC/PX: CPT

## 2025-07-22 PROCEDURE — 94664 DEMO&/EVAL PT USE INHALER: CPT

## 2025-07-22 PROCEDURE — 85027 COMPLETE CBC AUTOMATED: CPT | Performed by: INTERNAL MEDICINE

## 2025-07-22 PROCEDURE — 94761 N-INVAS EAR/PLS OXIMETRY MLT: CPT

## 2025-07-22 PROCEDURE — 80048 BASIC METABOLIC PNL TOTAL CA: CPT | Performed by: INTERNAL MEDICINE

## 2025-07-22 PROCEDURE — 63710000001 REVEFENACIN 175 MCG/3ML SOLUTION: Performed by: INTERNAL MEDICINE

## 2025-07-22 RX ADMIN — CARBIDOPA AND LEVODOPA 1 TABLET: 25; 100 TABLET ORAL at 08:59

## 2025-07-22 RX ADMIN — LIDOCAINE 1 PATCH: 4 PATCH TOPICAL at 09:01

## 2025-07-22 RX ADMIN — SENNOSIDES, DOCUSATE SODIUM 2 TABLET: 50; 8.6 TABLET, FILM COATED ORAL at 20:05

## 2025-07-22 RX ADMIN — AMIODARONE HYDROCHLORIDE 200 MG: 200 TABLET ORAL at 08:59

## 2025-07-22 RX ADMIN — PANTOPRAZOLE SODIUM 40 MG: 40 TABLET, DELAYED RELEASE ORAL at 05:02

## 2025-07-22 RX ADMIN — CARBIDOPA AND LEVODOPA 1 TABLET: 25; 100 TABLET ORAL at 11:20

## 2025-07-22 RX ADMIN — CARBIDOPA AND LEVODOPA 1 TABLET: 25; 100 TABLET ORAL at 17:08

## 2025-07-22 RX ADMIN — BUDESONIDE AND FORMOTEROL FUMARATE DIHYDRATE 2 PUFF: 160; 4.5 AEROSOL RESPIRATORY (INHALATION) at 09:09

## 2025-07-22 RX ADMIN — QUETIAPINE FUMARATE 25 MG: 25 TABLET ORAL at 20:05

## 2025-07-22 RX ADMIN — AMANTADINE HYDROCHLORIDE 100 MG: 100 CAPSULE ORAL at 20:05

## 2025-07-22 RX ADMIN — AMANTADINE HYDROCHLORIDE 100 MG: 100 CAPSULE ORAL at 09:00

## 2025-07-22 RX ADMIN — OXYCODONE HYDROCHLORIDE AND ACETAMINOPHEN 1 TABLET: 5; 325 TABLET ORAL at 03:23

## 2025-07-22 RX ADMIN — BUDESONIDE AND FORMOTEROL FUMARATE DIHYDRATE 2 PUFF: 160; 4.5 AEROSOL RESPIRATORY (INHALATION) at 19:16

## 2025-07-22 RX ADMIN — TAMSULOSIN HYDROCHLORIDE 0.8 MG: 0.4 CAPSULE ORAL at 20:05

## 2025-07-22 RX ADMIN — REVEFENACIN 175 MCG: 175 SOLUTION RESPIRATORY (INHALATION) at 09:09

## 2025-07-22 RX ADMIN — TIZANIDINE 4 MG: 4 TABLET ORAL at 05:02

## 2025-07-22 RX ADMIN — SENNOSIDES, DOCUSATE SODIUM 2 TABLET: 50; 8.6 TABLET, FILM COATED ORAL at 08:59

## 2025-07-22 RX ADMIN — IBUPROFEN 400 MG: 400 TABLET ORAL at 08:59

## 2025-07-22 RX ADMIN — POLYETHYLENE GLYCOL 3350 17 G: 17 POWDER, FOR SOLUTION ORAL at 09:00

## 2025-07-22 RX ADMIN — CARBIDOPA AND LEVODOPA 1 TABLET: 25; 100 TABLET ORAL at 20:05

## 2025-07-22 RX ADMIN — Medication 10 ML: at 09:02

## 2025-07-22 NOTE — PLAN OF CARE
Goal Outcome Evaluation:  Plan of Care Reviewed With: patient        Progress: no change  Outcome Evaluation: Palliative RN saw pt. at 1202.  Pt. sitting up in bedside chair on 3LNC eating lunch.  Appears to have great appetite.  Pt. endorsed 2/10 back pain.  SOA noted with conversation at rest.  No family at BS.  Wife not interested in hospice at this time.  Palliative to continue to follow for support and ongoing GOC.    1143 Palliative IDT meeting: MD; APRN ; MDiv; RNs; LCSW   After hours, weekends and holidays, contact Palliative Provider by calling [phone].

## 2025-07-22 NOTE — PLAN OF CARE
Problem: Adult Inpatient Plan of Care  Goal: Plan of Care Review  Outcome: Progressing  Goal: Patient-Specific Goal (Individualized)  Outcome: Progressing  Goal: Absence of Hospital-Acquired Illness or Injury  Outcome: Progressing  Intervention: Identify and Manage Fall Risk  Recent Flowsheet Documentation  Taken 7/22/2025 1600 by Buffy Gordon RN  Safety Promotion/Fall Prevention:   activity supervised   assistive device/personal items within reach   clutter free environment maintained   lighting adjusted   nonskid shoes/slippers when out of bed  Taken 7/22/2025 1400 by Buffy Gordon RN  Safety Promotion/Fall Prevention:   activity supervised   assistive device/personal items within reach   clutter free environment maintained   lighting adjusted   nonskid shoes/slippers when out of bed  Taken 7/22/2025 1200 by Buffy Gordon RN  Safety Promotion/Fall Prevention:   activity supervised   assistive device/personal items within reach   clutter free environment maintained   lighting adjusted   nonskid shoes/slippers when out of bed  Taken 7/22/2025 1000 by Buffy Gordon RN  Safety Promotion/Fall Prevention:   activity supervised   assistive device/personal items within reach   clutter free environment maintained   lighting adjusted   nonskid shoes/slippers when out of bed  Taken 7/22/2025 0800 by Buffy Gordon RN  Safety Promotion/Fall Prevention:   activity supervised   assistive device/personal items within reach   clutter free environment maintained   lighting adjusted   nonskid shoes/slippers when out of bed  Intervention: Prevent Skin Injury  Recent Flowsheet Documentation  Taken 7/22/2025 1600 by Buffy Gordon RN  Skin Protection:   incontinence pads utilized   transparent dressing maintained   skin sealant/moisture barrier applied  Taken 7/22/2025 1400 by Buffy Gordon RN  Skin Protection:   incontinence pads utilized   transparent dressing maintained  Taken 7/22/2025 1200 by  Buffy Gordon RN  Skin Protection:   incontinence pads utilized   transparent dressing maintained  Taken 7/22/2025 1000 by Buffy Gordon RN  Skin Protection:   incontinence pads utilized   transparent dressing maintained  Taken 7/22/2025 0800 by Buffy Gordon RN  Skin Protection:   incontinence pads utilized   silicone foam dressing in place   skin sealant/moisture barrier applied  Intervention: Prevent Infection  Recent Flowsheet Documentation  Taken 7/22/2025 1600 by Buffy Gordon RN  Infection Prevention:   environmental surveillance performed   single patient room provided  Taken 7/22/2025 1400 by Buffy Gordon RN  Infection Prevention:   environmental surveillance performed   single patient room provided  Taken 7/22/2025 1200 by Buffy Gordon RN  Infection Prevention:   environmental surveillance performed   single patient room provided  Taken 7/22/2025 1000 by Buffy Gordon RN  Infection Prevention:   environmental surveillance performed   single patient room provided  Taken 7/22/2025 0800 by Buffy Gordon RN  Infection Prevention:   environmental surveillance performed   single patient room provided  Goal: Optimal Comfort and Wellbeing  Outcome: Progressing  Goal: Readiness for Transition of Care  Outcome: Progressing

## 2025-07-22 NOTE — CASE MANAGEMENT/SOCIAL WORK
Discharge Planning Assessment  [facility]     Patient Name: Flaco Roque II  MRN: [id number]  Today's Date: 7/22/2025    Admit Date: 7/18/2025    Plan: inpat rehab   Discharge Needs Assessment    No documentation.                  Discharge Plan       Row Name 07/22/25 1155       Plan    Plan inpat rehab    Patient/Family in Agreement with Plan yes    Plan Comments I talked with patient and wife(x2) this morning and both agree to inpat rehab. [facility], [facility] and I called referral to [facility], Second choice [place] and called this to [facility], third choice is [facility] and called this referral to April and left a message to call back to give referral. Patient is Medically ready.    Final Discharge Disposition Code 03 - skilled nursing facility (SNF)                  Continued Care and Services - Admitted Since 7/18/2025       Destination       Service Provider Request Status Services Address Phone Fax Patient Preferred    [facility] Pending - No Request Sent -- [address] [facility] [id number] [phone] --    [facility] Pending - No Request Sent -- [address] [facility] [address] [phone] --                  Selected Continued Care - Prior Encounters Includes continued care and service providers with selected services from prior encounters from 4/19/2025 to 7/22/2025      Discharged on 5/22/2025 Admission date: 5/18/2025 - Discharge disposition: [place] [facility]      Home Medical Care       Service Provider Services Address Phone Fax Patient Preferred    [facility], [facility] [address] [facility] [address] [place] [address] [phone] --                             Demographic Summary    No documentation.                  Functional Status    No documentation.                  Psychosocial    No documentation.                  Abuse/Neglect    No  documentation.                  Legal    No documentation.                  Substance Abuse    No documentation.                  Patient Forms    No documentation.                     Stefanie Eastman RN

## 2025-07-22 NOTE — PROGRESS NOTES
[facility]  PROGRESS NOTE    Patient Name: Flaco Roque II  : 1945  MRN: [id number]    Date of Admission: 2025  Primary Care Physician: Ariadne Galloway PA    Subjective   Subjective     CC:  fell at home    HPI:  Up in chair, says his breathing is good and he rested well.  Still in agreement with going to rehab.        Objective   Objective     Vital Signs:   Temp:  [97.4 °F (36.3 °C)-98.3 °F (36.8 °C)] 97.8 °F (36.6 °C)  Heart Rate:  [68-92] 92  Resp:  [16-18] 16  BP: (104-142)/(63-93) 104/63  Flow (L/min) (Oxygen Therapy):  [2-3] 3     Physical Exam:  Gen:  frail, dozing in chair but wakes easily.  NAD, very pleasant   Neuro: alert and oriented, clear speech, follows commands, grossly nonfocal  HEENT:  NC/AT  Neck:  Supple, no LAD  Heart RRR   Lungs clear anteriorly   Abd:  Soft, nontender, .Pain pump LLQ   Extrem:  No c/c/e      Results Reviewed:  LAB RESULTS:      Lab 25  0520 25  1002 25  0829 25  1820 25  1641   WBC 7.63  --  7.75  --  5.09   HEMOGLOBIN 11.7*  --  12.9*  --  12.3*   HEMATOCRIT 36.6*  --  40.5  --  38.9   PLATELETS 233  --  231  --  227   NEUTROS ABS  --   --  6.57  --  3.87   IMMATURE GRANS (ABS)  --   --  0.04  --  0.02   LYMPHS ABS  --   --  0.31*  --  0.68*   MONOS ABS  --   --  0.80  --  0.43   EOS ABS  --   --  0.00  --  0.05   MCV 94.8  --  94.4  --  94.4   LACTATE  --   --  1.1  --   --    HSTROP T  --  34* 31* 31* 32*         Lab 25  0520 25  0829 25  1641   SODIUM 143 140 142   POTASSIUM 3.9 4.1 4.2   CHLORIDE 106 102 105   CO2 26.0 23.0 27.2   ANION GAP 11.0 15.0 9.8   BUN 19.5 19.1 24.6*   CREATININE 0.73* 0.82 1.14   EGFR 92.5 89.4 65.4   GLUCOSE 47* 85 93   CALCIUM 8.4* 8.7 8.3*   MAGNESIUM 1.8  --  1.9         Lab 25  0829 25  1641   TOTAL PROTEIN 5.9* 5.3*   ALBUMIN 3.4* 3.2*   GLOBULIN 2.5 2.1   ALT (SGPT) <5 <5   AST (SGOT) 22 17   BILIRUBIN 1.1 0.4    ALK PHOS 129* 107   LIPASE  --  15         Lab 07/18/25  1002 07/18/25  0829 07/16/25  1820 07/16/25  1641   PROBNP  --  3,974.0*  --  2,613.0*   HSTROP T 34* 31* 31* 32*                 Brief Urine Lab Results  (Last result in the past 365 days)        Color   Clarity   Blood   Leuk Est   Nitrite   Protein   CREAT   Urine HCG        07/18/25 1010 Yellow   Clear   Negative   Negative   Negative   Negative                   Microbiology Results Abnormal       None            No radiology results from the last 24 hrs      Results for orders placed during the hospital encounter of 04/22/25    Adult Transthoracic Echo Complete W/ Cont if Necessary Per Protocol 04/26/2025  4:58 PM    Interpretation Summary    Left ventricular systolic function is normal. Estimated left ventricular EF = 60%    Left ventricular wall thickness is consistent with hypertrophy.    No hemodynamically significant valvular heart disease      Current medications:  Scheduled Meds:amantadine, 100 mg, Oral, BID  amiodarone, 200 mg, Oral, Q24H  [Held by provider] apixaban, 5 mg, Oral, Q12H  budesonide-formoterol, 2 puff, Inhalation, BID - RT   And  revefenacin, 175 mcg, Nebulization, Daily - RT  carbidopa-levodopa, 1 tablet, Oral, 4x Daily  Lidocaine, 1 patch, Transdermal, Q24H  Lidocaine, 1 patch, Transdermal, Q24H  [Held by provider] metoprolol succinate XL, 50 mg, Oral, Daily  pantoprazole, 40 mg, Oral, Q AM  senna-docusate sodium, 2 tablet, Oral, BID   And  polyethylene glycol, 17 g, Oral, Daily  QUEtiapine, 25 mg, Oral, Nightly  sodium chloride, 10 mL, Intravenous, Q12H  tamsulosin, 0.8 mg, Oral, Nightly      Continuous Infusions:   PRN Meds:.  senna-docusate sodium **AND** polyethylene glycol **AND** bisacodyl **AND** bisacodyl    dextrose    dextrose    glucagon (human recombinant)    ibuprofen    ipratropium-albuterol    Magnesium Standard Dose Replacement - Follow Nurse / BPA Driven Protocol    naloxone    nitroglycerin    ondansetron     oxyCODONE-acetaminophen    [COMPLETED] Insert Peripheral IV **AND** sodium chloride    sodium chloride    sodium chloride    tiZANidine    Assessment & Plan   Assessment & Plan     Active Hospital Problems    Diagnosis  POA    **Fall [W19.XXXA]  Yes    Rib fractures [S22.49XA]  Unknown    Atrial fibrillation [I48.91]  Yes    Parkinson disease [G20.A1]  Yes    Back pain [M54.9]  Yes    Lumbar spondylosis [M47.816]  Yes    Chronic obstructive pulmonary disease [J44.9]  Yes      Resolved Hospital Problems   No resolved problems to display.        Brief Hospital Course to date:  Flaco Roque II is a 79 y.o. male with dysphagia, Afib, and progressive weakness at home; fell at home and sustained rib fractures    Fall (mechanical) w/ diffuse body pain  Acute nondisplaced Rib Fractures (right 9-11th; left 3-4th), due to above  Parkinsons dz, w/ ataxia   - PT OT - recommending SNF  - palliative care consult appreciated.  Wife declines Hospice.    - CM for dispo planning   - pain meds adjusted.  RN advised to offer them to patient .  He seems to be forgetting to ask.     Contstipation - Last BM approx 7/19.  Increased bowel regimen.  Ongoing opiates.      Mildly elevated/flat troponins  Parox afib  Prolonged qt on ekg  - no current angina  --continue amiodarone   - stopped toprol due to dizziness and low nl BP         Chronic hypoxic resp failure (2-3L o2 chronically)  COPD  Atelectasis LLL (on ct imaging)  -prn duonebs   -currently on 2Lnc, denies current dyspnea  -ct chest : chronic changes       Parkinsons dz  Dysphagia  Severe protein malnutrition  -ataxia, autonomic dysfunction due to parkinsons  -frequent falls  -not interested in tube feedings, only interested in regular/comfort diet (he understands risk as does family)  -continue sinemet, amantadine  -dietition consulted  -pt/ot evals      Gerd  -ppi     Chronic back pain  -fentanyl pain pump, recently refilled     Expected Discharge Location and Transportation:  to SNF .  Zachary requesting [facility]   Expected Discharge any time   Expected discharge date/ time has not been documented.     VTE Prophylaxis:  Pharmacologic VTE prophylaxis orders are present.         AM-PAC 6 Clicks Score (PT): 10 (07/21/25 2030)    CODE STATUS:   Code Status and Medical Interventions: No CPR (Do Not Attempt to Resuscitate); Limited Support; No intubation (DNI), No artificial nutrition, No cardioversion, No dialysis, No vasopressors   Ordered at: 07/18/25 1340     Code Status (Patient has no pulse and is not breathing):    No CPR (Do Not Attempt to Resuscitate)     Medical Interventions (Patient has pulse or is breathing):    Limited Support     Medical Intervention Limits:    No intubation (DNI)       No artificial nutrition       No cardioversion       No dialysis       No vasopressors       Zaida Ybarra MD  07/22/25

## 2025-07-23 PROCEDURE — 97535 SELF CARE MNGMENT TRAINING: CPT

## 2025-07-23 PROCEDURE — 97530 THERAPEUTIC ACTIVITIES: CPT

## 2025-07-23 PROCEDURE — 97110 THERAPEUTIC EXERCISES: CPT | Performed by: PHYSICAL THERAPIST

## 2025-07-23 PROCEDURE — 99232 SBSQ HOSP IP/OBS MODERATE 35: CPT | Performed by: INTERNAL MEDICINE

## 2025-07-23 PROCEDURE — 94799 UNLISTED PULMONARY SVC/PX: CPT

## 2025-07-23 PROCEDURE — 94664 DEMO&/EVAL PT USE INHALER: CPT

## 2025-07-23 PROCEDURE — 94761 N-INVAS EAR/PLS OXIMETRY MLT: CPT

## 2025-07-23 PROCEDURE — 63710000001 REVEFENACIN 175 MCG/3ML SOLUTION: Performed by: INTERNAL MEDICINE

## 2025-07-23 PROCEDURE — 97116 GAIT TRAINING THERAPY: CPT | Performed by: PHYSICAL THERAPIST

## 2025-07-23 RX ADMIN — CARBIDOPA AND LEVODOPA 1 TABLET: 25; 100 TABLET ORAL at 08:30

## 2025-07-23 RX ADMIN — SENNOSIDES, DOCUSATE SODIUM 2 TABLET: 50; 8.6 TABLET, FILM COATED ORAL at 20:44

## 2025-07-23 RX ADMIN — TAMSULOSIN HYDROCHLORIDE 0.8 MG: 0.4 CAPSULE ORAL at 20:44

## 2025-07-23 RX ADMIN — CARBIDOPA AND LEVODOPA 1 TABLET: 25; 100 TABLET ORAL at 17:57

## 2025-07-23 RX ADMIN — BUDESONIDE AND FORMOTEROL FUMARATE DIHYDRATE 2 PUFF: 160; 4.5 AEROSOL RESPIRATORY (INHALATION) at 09:57

## 2025-07-23 RX ADMIN — LIDOCAINE 1 PATCH: 4 PATCH TOPICAL at 22:04

## 2025-07-23 RX ADMIN — CARBIDOPA AND LEVODOPA 1 TABLET: 25; 100 TABLET ORAL at 20:44

## 2025-07-23 RX ADMIN — PANTOPRAZOLE SODIUM 40 MG: 40 TABLET, DELAYED RELEASE ORAL at 05:51

## 2025-07-23 RX ADMIN — LIDOCAINE 1 PATCH: 4 PATCH TOPICAL at 08:32

## 2025-07-23 RX ADMIN — POLYETHYLENE GLYCOL 3350 17 G: 17 POWDER, FOR SOLUTION ORAL at 08:35

## 2025-07-23 RX ADMIN — AMANTADINE HYDROCHLORIDE 100 MG: 100 CAPSULE ORAL at 20:44

## 2025-07-23 RX ADMIN — CARBIDOPA AND LEVODOPA 1 TABLET: 25; 100 TABLET ORAL at 13:19

## 2025-07-23 RX ADMIN — LIDOCAINE 1 PATCH: 4 PATCH TOPICAL at 08:31

## 2025-07-23 RX ADMIN — QUETIAPINE FUMARATE 25 MG: 25 TABLET ORAL at 20:44

## 2025-07-23 RX ADMIN — AMIODARONE HYDROCHLORIDE 200 MG: 200 TABLET ORAL at 08:30

## 2025-07-23 RX ADMIN — BISACODYL 10 MG: 10 SUPPOSITORY RECTAL at 13:56

## 2025-07-23 RX ADMIN — SENNOSIDES, DOCUSATE SODIUM 2 TABLET: 50; 8.6 TABLET, FILM COATED ORAL at 08:30

## 2025-07-23 RX ADMIN — BUDESONIDE AND FORMOTEROL FUMARATE DIHYDRATE 2 PUFF: 160; 4.5 AEROSOL RESPIRATORY (INHALATION) at 18:55

## 2025-07-23 RX ADMIN — IPRATROPIUM BROMIDE AND ALBUTEROL SULFATE 3 ML: 2.5; .5 SOLUTION RESPIRATORY (INHALATION) at 18:49

## 2025-07-23 RX ADMIN — AMANTADINE HYDROCHLORIDE 100 MG: 100 CAPSULE ORAL at 08:30

## 2025-07-23 RX ADMIN — REVEFENACIN 175 MCG: 175 SOLUTION RESPIRATORY (INHALATION) at 09:57
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
## 2025-07-23 NOTE — CASE MANAGEMENT/SOCIAL WORK
Continued Stay Note  [facility]     Patient Name: Flaco Roque II  MRN: [id number]  Today's Date: 7/23/2025    Admit Date: 7/18/2025    Plan: [facility]   Discharge Plan       Row Name 07/23/25 1109       Plan    Plan [facility]    Patient/Family in Agreement with Plan yes    Plan Comments No beds at [facility]. I spoke with Lisa at [facility]. The pt has been accepted by [facility]. They will work on a bed when the pt is medically ready.    Final Discharge Disposition Code 62 - inpatient rehab facility                   Discharge Codes    No documentation.                 Expected Discharge Date and Time       Expected Discharge Date Expected Discharge Time    Jul 24, 2025               Luh Coburn RN

## 2025-07-23 NOTE — PLAN OF CARE
Goal Outcome Evaluation:  Plan of Care Reviewed With: patient        Progress: improving  Outcome Evaluation: Pt progressing well towards skilled PT goals.  Pt stood and ambulated 120 feet using rw with CGAx1 - 2 standing rest breaks needed to PLB.  BLE ther ex completed.  Pt gave excellent effort and is motivated to return to independence.  Pt continues to be limited by weakness, decreased activity tolerance, and gait instability.  Recommend inpt rehab at d/c.    Anticipated Discharge Disposition (PT): inpatient rehabilitation facility

## 2025-07-23 NOTE — PLAN OF CARE
Goal Outcome Evaluation:           Progress: improving  Outcome Evaluation: Pt with good effort and participation this date, making good progress toward goals. Pt ambulated 120ft with RWx and CGA, required 1 standing rest break. Pt then stood at sink for 8 minutes with RWx and CGA to complete grooming tasks, pt with no LOB or need for rest break while completing grooming tasks. Pt continues to present below functional baseline secondary to weakness and decreased activity tolerance and would benefit from skilled IPOT services to improve fxnl status. OT recommends IRF at discharge.    Anticipated Discharge Disposition (OT): inpatient rehabilitation facility
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
## 2025-07-23 NOTE — PLAN OF CARE
Problem: Palliative Care  Goal: Enhanced Quality of Life  Intervention: Optimize Psychosocial Wellbeing  Flowsheets (Taken 7/23/2025 9225)  Supportive Measures:   active listening utilized   goal-setting facilitated   verbalization of feelings encouraged   decision-making supported   Goal Outcome Evaluation:  Plan of Care Reviewed With: patient        Progress: no change  Outcome Evaluation: Pt is a little more dyspneic today although he has been up walking and is fatigued. He has some rib and back pain, lidocaine is helping. Last bm on 7/19.  Pt has had multple bowel meds including a supp without results.  Lactulose may be next step if no results by tomorrow.  Pt is planning to go to [facility] at d/c.  He  is no cpr/no intubation.  no art nutrition nor dialysis.  Continue palliative support.  Palliative IDT: Peter, MD Sebastian,,   After hours# [phone]

## 2025-07-23 NOTE — PLAN OF CARE
Problem: Adult Inpatient Plan of Care  Goal: Plan of Care Review  Outcome: Progressing  Goal: Patient-Specific Goal (Individualized)  Outcome: Progressing  Goal: Absence of Hospital-Acquired Illness or Injury  Outcome: Progressing  Intervention: Identify and Manage Fall Risk  Recent Flowsheet Documentation  Taken 7/23/2025 1600 by Michaela Barkley RN  Safety Promotion/Fall Prevention:   activity supervised   safety round/check completed  Taken 7/23/2025 1400 by Michaela Barkley RN  Safety Promotion/Fall Prevention:   activity supervised   safety round/check completed  Taken 7/23/2025 1200 by Michaela Barkley RN  Safety Promotion/Fall Prevention:   activity supervised   safety round/check completed  Taken 7/23/2025 1000 by Michaela Barkley RN  Safety Promotion/Fall Prevention:   activity supervised   safety round/check completed  Taken 7/23/2025 0800 by Michaela Barkley RN  Safety Promotion/Fall Prevention: safety round/check completed  Intervention: Prevent Skin Injury  Recent Flowsheet Documentation  Taken 7/23/2025 1600 by Michaela Barkley RN  Body Position: position changed independently  Skin Protection: transparent dressing maintained  Taken 7/23/2025 1400 by Michaela Barkley RN  Body Position: position changed independently  Skin Protection: transparent dressing maintained  Taken 7/23/2025 1200 by Michaela Barkley RN  Body Position: position changed independently  Skin Protection:   incontinence pads utilized   transparent dressing maintained  Taken 7/23/2025 1000 by Michaela Barkley RN  Body Position: position changed independently  Skin Protection: incontinence pads utilized  Taken 7/23/2025 0800 by Michaela Barkley RN  Body Position: position changed independently  Skin Protection: incontinence pads utilized  Goal: Optimal Comfort and Wellbeing  Outcome: Progressing  Intervention: Monitor Pain and Promote Comfort  Recent Flowsheet Documentation  Taken 7/23/2025 1200 by Michaela Barkley RN  Pain Management Interventions:  medication offered but refused  Taken 7/23/2025 1000 by Michaela Barkley RN  Pain Management Interventions: medication offered but refused  Taken 7/23/2025 0800 by Michaela Barkley RN  Pain Management Interventions: (Lidocaine Patches placed)   medication offered but refused   other (see comments)  Goal: Readiness for Transition of Care  Outcome: Progressing     Problem: Skin Injury Risk Increased  Goal: Skin Health and Integrity  Outcome: Progressing  Intervention: Optimize Skin Protection  Recent Flowsheet Documentation  Taken 7/23/2025 1600 by Michaela Barkley RN  Activity Management: activity encouraged  Pressure Reduction Techniques: frequent weight shift encouraged  Head of Bed (HOB) Positioning: HOB elevated  Pressure Reduction Devices: pressure-redistributing mattress utilized  Skin Protection: transparent dressing maintained  Taken 7/23/2025 1400 by Michaela Barkley RN  Activity Management: activity encouraged  Pressure Reduction Techniques: frequent weight shift encouraged  Head of Bed (HOB) Positioning: HOB elevated  Pressure Reduction Devices: pressure-redistributing mattress utilized  Skin Protection: transparent dressing maintained  Taken 7/23/2025 1200 by Michaela Barkley RN  Activity Management: activity encouraged  Pressure Reduction Techniques: frequent weight shift encouraged  Head of Bed (HOB) Positioning: HOB elevated  Pressure Reduction Devices: pressure-redistributing mattress utilized  Skin Protection:   incontinence pads utilized   transparent dressing maintained  Taken 7/23/2025 1000 by Michaela Barlkey RN  Activity Management: activity minimized  Pressure Reduction Techniques: frequent weight shift encouraged  Head of Bed (HOB) Positioning: HOB elevated  Pressure Reduction Devices: pressure-redistributing mattress utilized  Skin Protection: incontinence pads utilized  Taken 7/23/2025 0800 by Michaela Barkley RN  Activity Management: activity minimized  Pressure Reduction Techniques: frequent weight  shift encouraged  Head of Bed (HOB) Positioning: HOB elevated  Pressure Reduction Devices: pressure-redistributing mattress utilized  Skin Protection: incontinence pads utilized     Problem: Fall Injury Risk  Goal: Absence of Fall and Fall-Related Injury  Outcome: Progressing  Intervention: Promote Injury-Free Environment  Recent Flowsheet Documentation  Taken 7/23/2025 1600 by Michaela Barkley RN  Safety Promotion/Fall Prevention:   activity supervised   safety round/check completed  Taken 7/23/2025 1400 by Michaela Barkley RN  Safety Promotion/Fall Prevention:   activity supervised   safety round/check completed  Taken 7/23/2025 1200 by Michaela Barkley RN  Safety Promotion/Fall Prevention:   activity supervised   safety round/check completed  Taken 7/23/2025 1000 by Michaela Barkley RN  Safety Promotion/Fall Prevention:   activity supervised   safety round/check completed  Taken 7/23/2025 0800 by Michaela Barkley RN  Safety Promotion/Fall Prevention: safety round/check completed     Problem: Palliative Care  Goal: Enhanced Quality of Life  Outcome: Progressing  Intervention: Maximize Comfort  Recent Flowsheet Documentation  Taken 7/23/2025 1200 by Michaela Barkley RN  Pain Management Interventions: medication offered but refused  Taken 7/23/2025 1000 by Michaela Barkley RN  Pain Management Interventions: medication offered but refused  Taken 7/23/2025 0800 by Michaela Barkley RN  Pain Management Interventions: (Lidocaine Patches placed)   medication offered but refused   other (see comments)   Goal Outcome Evaluation:      Patient up in chair. Suppository given, no success. Aox2. 2.5L NC. Up 1 with walker. Frequently asked if in pain, patient said it is tolerable and does not want pain medication. Slept majority of the day, VSS. Will monitor till 1900

## 2025-07-23 NOTE — PROGRESS NOTES
[facility] Medicine Services  PROGRESS NOTE    Patient Name: Flaco Roque II  : 1945  MRN: [id number]    Date of Admission: 2025  Primary Care Physician: Ariadne Galloway PA    Subjective   Subjective     CC:  F/U fall    HPI:  Doing well today.  States pain is improved.  Has not had a bowel movement yet.      Objective   Objective     Vital Signs:   Temp:  [97.4 °F (36.3 °C)-98.1 °F (36.7 °C)] 98 °F (36.7 °C)  Heart Rate:  [65-73] 68  Resp:  [16-18] 16  BP: (104-135)/(59-84) 118/66  Flow (L/min) (Oxygen Therapy):  [3] 3     Physical Exam:  Constitutional: No acute distress, awake, alert, laying in bed, frail appearing  HENT: NCAT, mucous membranes moist  Respiratory: Respiratory effort normal on 3L NC  Psychiatric: Appropriate affect, cooperative  Neurologic: Speech clear and fluent  Skin: No rashes on exposed surfaces      Results Reviewed:  LAB RESULTS:      Lab 25  0901 25  0520 25  1002 25  0829 25  1820 25  1641   WBC 5.36 7.63  --  7.75  --  5.09   HEMOGLOBIN 11.2* 11.7*  --  12.9*  --  12.3*   HEMATOCRIT 35.3* 36.6*  --  40.5  --  38.9   PLATELETS 239 233  --  231  --  227   NEUTROS ABS  --   --   --  6.57  --  3.87   IMMATURE GRANS (ABS)  --   --   --  0.04  --  0.02   LYMPHS ABS  --   --   --  0.31*  --  0.68*   MONOS ABS  --   --   --  0.80  --  0.43   EOS ABS  --   --   --  0.00  --  0.05   MCV 95.7 94.8  --  94.4  --  94.4   LACTATE  --   --   --  1.1  --   --    HSTROP T  --   --  34* 31* 31* 32*         Lab 25  0549 25  0520 25  0825  1641   SODIUM 142 143 140 142   POTASSIUM 4.1 3.9 4.1 4.2   CHLORIDE 103 106 102 105   CO2 29.0 26.0 23.0 27.2   ANION GAP 10.0 11.0 15.0 9.8   BUN 10.0 19.5 19.1 24.6*   CREATININE 0.66* 0.73* 0.82 1.14   EGFR 95.4 92.5 89.4 65.4   GLUCOSE 114* 47* 85 93   CALCIUM 8.7 8.4* 8.7 8.3*   MAGNESIUM  --  1.8  --  1.9         Lab 25  0829 25  1641   TOTAL  PROTEIN 5.9* 5.3*   ALBUMIN 3.4* 3.2*   GLOBULIN 2.5 2.1   ALT (SGPT) <5 <5   AST (SGOT) 22 17   BILIRUBIN 1.1 0.4   ALK PHOS 129* 107   LIPASE  --  15         Lab 07/18/25  1002 07/18/25  0829 07/16/25  1820 07/16/25  1641   PROBNP  --  3,974.0*  --  2,613.0*   HSTROP T 34* 31* 31* 32*                 Brief Urine Lab Results  (Last result in the past 365 days)        Color   Clarity   Blood   Leuk Est   Nitrite   Protein   CREAT   Urine HCG        07/18/25 1010 Yellow   Clear   Negative   Negative   Negative   Negative                   Microbiology Results Abnormal       None            No radiology results from the last 24 hrs      Results for orders placed during the hospital encounter of 04/22/25    Adult Transthoracic Echo Complete W/ Cont if Necessary Per Protocol 04/26/2025  4:58 PM    Interpretation Summary    Left ventricular systolic function is normal. Estimated left ventricular EF = 60%    Left ventricular wall thickness is consistent with hypertrophy.    No hemodynamically significant valvular heart disease      Current medications:  Scheduled Meds:amantadine, 100 mg, Oral, BID  amiodarone, 200 mg, Oral, Q24H  [Held by provider] apixaban, 5 mg, Oral, Q12H  budesonide-formoterol, 2 puff, Inhalation, BID - RT   And  revefenacin, 175 mcg, Nebulization, Daily - RT  carbidopa-levodopa, 1 tablet, Oral, 4x Daily  Lidocaine, 1 patch, Transdermal, Q24H  Lidocaine, 1 patch, Transdermal, Q24H  [Held by provider] metoprolol succinate XL, 50 mg, Oral, Daily  pantoprazole, 40 mg, Oral, Q AM  senna-docusate sodium, 2 tablet, Oral, BID   And  polyethylene glycol, 17 g, Oral, Daily  QUEtiapine, 25 mg, Oral, Nightly  sodium chloride, 10 mL, Intravenous, Q12H  tamsulosin, 0.8 mg, Oral, Nightly      Continuous Infusions:   PRN Meds:.  senna-docusate sodium **AND** polyethylene glycol **AND** bisacodyl **AND** bisacodyl    dextrose    dextrose    glucagon (human recombinant)    ibuprofen    ipratropium-albuterol     Magnesium Standard Dose Replacement - Follow Nurse / BPA Driven Protocol    naloxone    nitroglycerin    ondansetron    oxyCODONE-acetaminophen    [COMPLETED] Insert Peripheral IV **AND** sodium chloride    sodium chloride    sodium chloride    tiZANidine    Assessment & Plan   Assessment & Plan     Active Hospital Problems    Diagnosis  POA    **Fall [W19.XXXA]  Yes    Rib fractures [S22.49XA]  Unknown    Atrial fibrillation [I48.91]  Yes    Parkinson disease [G20.A1]  Yes    Back pain [M54.9]  Yes    Lumbar spondylosis [M47.816]  Yes    Chronic obstructive pulmonary disease [J44.9]  Yes      Resolved Hospital Problems   No resolved problems to display.        Brief Hospital Course to date:  Flaco Roque II is a 79 y.o. male with Parkinson's disease, dysphagia, atrial fibrillation and progressive weakness at home who presented after a fall at home resulting in multiple rib fractures.    This patient's problems and plans were partially entered by my partner and updated as appropriate by me 07/23/25.     Fall (mechanical) with diffuse body pain  Acute nondisplaced Rib Fractures (right 9-11th; left 3-4th), due to above  Parkinsons disease with ataxia   - Palliative care following; not interested in Hospice at this time.   - Pain control   - Rehab referrals in progress    Constipation  - Continue bowel regimen-if no BM by lunch will give suppository     Mildly elevated/flat troponins  Paroxysmal atrial fibrillation  Prolonged QT on EKG-resolved on repeat  - No current angina  - Continue amiodarone   - Stopped metoprolol due to dizziness and low normal blood pressure  - Eliquis stopped due to frequent falls.  My partner previously discussed with patient and family and they expressed understanding of stroke and other cardiac risk vs. benefit     Chronic hypoxic respiratory failure (2-3L O2 chronically)  COPD  Atelectasis LLL  -PRN duonebs   -Incentive spirometry  -Currently stable     Parkinson's  disease  Dysphagia  Severe protein malnutrition  -Not interested in tube feedings, only interested in regular/comfort diet (he understands risk as does family)  -Continue sinemet, amantadine  -Nutrition following     GERD  -Continue PPI     Chronic back pain  -Fentanyl pain pump, recently refilled     Expected Discharge Location and Transportation: Medically ready for rehab  Expected Discharge   Expected Discharge Date: 7/24/2025; Expected Discharge Time:      VTE Prophylaxis:  Pharmacologic VTE prophylaxis orders are present.         AM-PAC 6 Clicks Score (PT): 10 (07/22/25 0800)    CODE STATUS:   Code Status and Medical Interventions: No CPR (Do Not Attempt to Resuscitate); Limited Support; No intubation (DNI), No artificial nutrition, No cardioversion, No dialysis, No vasopressors   Ordered at: 07/18/25 1340     Code Status (Patient has no pulse and is not breathing):    No CPR (Do Not Attempt to Resuscitate)     Medical Interventions (Patient has pulse or is breathing):    Limited Support     Medical Intervention Limits:    No intubation (DNI)       No artificial nutrition       No cardioversion       No dialysis       No vasopressors       Maira Worthington MD  07/23/25

## 2025-07-24 VITALS
RESPIRATION RATE: 16 BRPM | BODY MASS INDEX: 15.16 KG/M2 | HEART RATE: 77 BPM | WEIGHT: 100 LBS | TEMPERATURE: 97.5 F | DIASTOLIC BLOOD PRESSURE: 73 MMHG | SYSTOLIC BLOOD PRESSURE: 132 MMHG | OXYGEN SATURATION: 96 % | HEIGHT: 68 IN

## 2025-07-24 PROCEDURE — 94799 UNLISTED PULMONARY SVC/PX: CPT

## 2025-07-24 PROCEDURE — 99239 HOSP IP/OBS DSCHRG MGMT >30: CPT | Performed by: INTERNAL MEDICINE

## 2025-07-24 PROCEDURE — 94761 N-INVAS EAR/PLS OXIMETRY MLT: CPT

## 2025-07-24 PROCEDURE — 63710000001 REVEFENACIN 175 MCG/3ML SOLUTION: Performed by: INTERNAL MEDICINE

## 2025-07-24 RX ORDER — POLYETHYLENE GLYCOL 3350 17 G/17G
17 POWDER, FOR SOLUTION ORAL DAILY
Start: 2025-07-25

## 2025-07-24 RX ORDER — BISACODYL 10 MG
10 SUPPOSITORY, RECTAL RECTAL DAILY PRN
Start: 2025-07-24

## 2025-07-24 RX ORDER — LIDOCAINE 4 G/G
1 PATCH TOPICAL
Start: 2025-07-25 | End: 2025-08-02 | Stop reason: HOSPADM

## 2025-07-24 RX ORDER — SODIUM PHOSPHATE,MONO-DIBASIC 19G-7G/118
1 ENEMA (ML) RECTAL ONCE
Status: COMPLETED | OUTPATIENT
Start: 2025-07-24 | End: 2025-07-24

## 2025-07-24 RX ORDER — BISACODYL 5 MG/1
5 TABLET, DELAYED RELEASE ORAL DAILY PRN
Start: 2025-07-24

## 2025-07-24 RX ORDER — AMOXICILLIN 250 MG
2 CAPSULE ORAL 2 TIMES DAILY
Start: 2025-07-24

## 2025-07-24 RX ADMIN — AMIODARONE HYDROCHLORIDE 200 MG: 200 TABLET ORAL at 08:44

## 2025-07-24 RX ADMIN — MINERAL OIL 1 ENEMA: 100 ENEMA RECTAL at 09:04

## 2025-07-24 RX ADMIN — POLYETHYLENE GLYCOL 3350 17 G: 17 POWDER, FOR SOLUTION ORAL at 08:44

## 2025-07-24 RX ADMIN — LIDOCAINE 1 PATCH: 4 PATCH TOPICAL at 08:52

## 2025-07-24 RX ADMIN — AMANTADINE HYDROCHLORIDE 100 MG: 100 CAPSULE ORAL at 08:44

## 2025-07-24 RX ADMIN — REVEFENACIN 175 MCG: 175 SOLUTION RESPIRATORY (INHALATION) at 08:28

## 2025-07-24 RX ADMIN — PANTOPRAZOLE SODIUM 40 MG: 40 TABLET, DELAYED RELEASE ORAL at 06:10

## 2025-07-24 RX ADMIN — CARBIDOPA AND LEVODOPA 1 TABLET: 25; 100 TABLET ORAL at 11:48

## 2025-07-24 RX ADMIN — SODIUM PHOSPHATE, DIBASIC AND SODIUM PHOSPHATE, MONOBASIC 1 ENEMA: 7; 19 ENEMA RECTAL at 12:08

## 2025-07-24 RX ADMIN — BUDESONIDE AND FORMOTEROL FUMARATE DIHYDRATE 2 PUFF: 160; 4.5 AEROSOL RESPIRATORY (INHALATION) at 08:28

## 2025-07-24 RX ADMIN — SENNOSIDES, DOCUSATE SODIUM 2 TABLET: 50; 8.6 TABLET, FILM COATED ORAL at 08:44

## 2025-07-24 RX ADMIN — CARBIDOPA AND LEVODOPA 1 TABLET: 25; 100 TABLET ORAL at 08:44

## 2025-07-24 RX ADMIN — LIDOCAINE 1 PATCH: 4 PATCH TOPICAL at 08:53

## 2025-07-24 RX ADMIN — IBUPROFEN 400 MG: 400 TABLET ORAL at 09:35

## 2025-07-24 NOTE — DISCHARGE SUMMARY
[facility] Medicine Services  DISCHARGE SUMMARY    Patient Name: Flaco Roque II  : 1945  MRN: [id number]    Date of Admission: 2025  8:08 AM  Date of Discharge:  2025  Primary Care Physician: Ariadne Galloway PA    Consults       Date and Time Order Name Status Description    2025  1:40 PM Inpatient Palliative Care MD Consult Completed             Hospital Course     Presenting Problem: Fall    Active Hospital Problems    Diagnosis  POA    **Fall [W19.XXXA]  Yes    Rib fractures [S22.49XA]  Yes    Atrial fibrillation [I48.91]  Yes    Parkinson disease [G20.A1]  Yes    Back pain [M54.9]  Yes    Lumbar spondylosis [M47.816]  Yes    Chronic obstructive pulmonary disease [J44.9]  Yes      Resolved Hospital Problems   No resolved problems to display.          Hospital Course:  Flaco Roque II is a 79 y.o. male with Parkinson's disease, dysphagia, atrial fibrillation and progressive weakness at home who presented after a fall at home resulting in multiple rib fractures.     This patient's problems and plans were partially entered by my partner and updated as appropriate by me 25.     Fall (mechanical) with diffuse body pain  Acute nondisplaced Rib Fractures (right 9-11th; left 3-4th), due to above  Parkinsons disease with ataxia   - Palliative care followed; not interested in Hospice at this time.   - Pain control improved.  Rehab at [facility] arranged.     Constipation  - Continue bowel regimen  - Enema today  - Continue to work on this at [facility]     Mildly elevated/flat troponins  Paroxysmal atrial fibrillation  Prolonged QT on EKG-resolved on repeat  - No current angina  - Continue amiodarone   - Stopped metoprolol due to dizziness and low normal blood pressure  - Eliquis stopped due to frequent falls.  My partner previously discussed with patient and family and they expressed understanding of stroke and other cardiac risk vs. benefit     Chronic  hypoxic respiratory failure (2-3L O2 chronically)  COPD  Atelectasis LLL  -Incentive spirometry  -Currently stable     Parkinson's disease  Dysphagia  Severe protein malnutrition  -Not interested in tube feedings, only interested in regular/comfort diet (he understands risk as does family)  -Continue sinemet, amantadine  -Nutrition followed     GERD  -Continue PPI     Chronic back pain  -Fentanyl pain pump, recently refilled      Discharge Follow Up Recommendations for outpatient labs/diagnostics:   F/U with PCP within 1 week of rehab    Day of Discharge     HPI:   He is doing well today.  Pain is controlled.  Eating well.  No bowel movement yet.      Vital Signs:   Temp:  [97.3 °F (36.3 °C)-98.3 °F (36.8 °C)] 97.5 °F (36.4 °C)  Heart Rate:  [67-80] 77  Resp:  [16-24] 16  BP: (123-150)/(73-92) 132/73  Flow (L/min) (Oxygen Therapy):  [2-3] 2      Physical Exam:  Constitutional: No acute distress, awake, alert, laying in bed  HENT: NCAT, mucous membranes moist  Respiratory: Respiratory effort normal on nasal cannula  Cardiovascular: RRR, no murmurs, rubs, or gallops  Musculoskeletal: No bilateral ankle edema  Psychiatric: Appropriate affect, cooperative  Neurologic: Speech clear and fluent  Skin: No rashes on exposed surfaces    Pertinent  and/or Most Recent Results     LAB RESULTS:      Lab 07/22/25  0901 07/19/25  0520 07/18/25  0829   WBC 5.36 7.63 7.75   HEMOGLOBIN 11.2* 11.7* 12.9*   HEMATOCRIT 35.3* 36.6* 40.5   PLATELETS 239 233 231   NEUTROS ABS  --   --  6.57   IMMATURE GRANS (ABS)  --   --  0.04   LYMPHS ABS  --   --  0.31*   MONOS ABS  --   --  0.80   EOS ABS  --   --  0.00   MCV 95.7 94.8 94.4   LACTATE  --   --  1.1         Lab 07/22/25  0549 07/19/25  0520 07/18/25  0829   SODIUM 142 143 140   POTASSIUM 4.1 3.9 4.1   CHLORIDE 103 106 102   CO2 29.0 26.0 23.0   ANION GAP 10.0 11.0 15.0   BUN 10.0 19.5 19.1   CREATININE 0.66* 0.73* 0.82   EGFR 95.4 92.5 89.4   GLUCOSE 114* 47* 85   CALCIUM 8.7 8.4* 8.7    MAGNESIUM  --  1.8  --          Lab 07/18/25  0829   TOTAL PROTEIN 5.9*   ALBUMIN 3.4*   GLOBULIN 2.5   ALT (SGPT) <5   AST (SGOT) 22   BILIRUBIN 1.1   ALK PHOS 129*         Lab 07/18/25  1002 07/18/25  0829   PROBNP  --  3,974.0*   HSTROP T 34* 31*                 Brief Urine Lab Results  (Last result in the past 365 days)        Color   Clarity   Blood   Leuk Est   Nitrite   Protein   CREAT   Urine HCG        07/18/25 1010 Yellow   Clear   Negative   Negative   Negative   Negative                 Microbiology Results (last 10 days)       ** No results found for the last 240 hours. **            CT Head Without Contrast  Result Date: 7/18/2025  CT HEAD WO CONTRAST Date of Exam: 7/18/2025 8:35 AM EDT Indication: fall. head injury.. Comparison: 7/16/2025 Technique: Axial CT images were obtained of the head without contrast administration.  Automated exposure control and iterative construction methods were used. Findings: Motion artifact limits the examination. There is no evidence of acute territorial infarction. There is no acute intracranial hemorrhage. There are no extra-axial collections. Ventricles and CSF spaces are symmetrically prominent. No mass effect nor hydrocephalus. Patchy subcortical and periventricular white matter hypodensities in keeping with chronic microvascular ischemic disease. Intracranial vascular calcifications are present.  Paranasal sinuses and mastoid air cells are adequately aerated.  Osseous structures and orbits appear intact.     Impression: Motion limited examination demonstrates no definite acute intracranial abnormality. Diffuse atrophy and chronic small vessel ischemic changes again noted. Electronically Signed: Angela Saini MD  7/18/2025 10:27 AM EDT  Workstation ID: [id number]    XR Elbow 3+ View Left  Result Date: 7/18/2025  XR ELBOW 3+ VW LEFT Date of Exam: 7/18/2025 9:00 AM EDT Indication: fall. Comparison: 5/20/2025 Findings: No elbow joint effusion. Alignment appears  intact and articular surfaces appear intact. Joint spaces are preserved. No evidence of fracture. Triceps enthesopathy. Soft tissue swelling over the olecranon suggestive of bursitis.     Impression: 1.No evidence of acute fracture or malalignment. 2.Soft tissue swelling over the olecranon suggestive of bursitis. Electronically Signed: Rome Erazo MD  7/18/2025 9:36 AM EDT  Workstation ID: [id number]    CT Cervical Spine Without Contrast  Result Date: 7/18/2025  CT CERVICAL SPINE WO CONTRAST, CT ABDOMEN PELVIS WO CONTRAST, CT CHEST WO CONTRAST DIAGNOSTIC Date of Exam: 7/18/2025 8:35 AM EDT Indication: fall. neck pain.. Comparison: CT angiogram chest 5/18/2025, CT abdomen pelvis 8/23/2024 Technique: Axial CT images were obtained of the cervical spine, chest, abdomen, and pelvis without contrast administration.  Reconstructed coronal and sagittal images were also obtained. Automated exposure control and iterative construction methods were used. Findings: CT CERVICAL SPINE OSSEOUS STRUCTURES: No fracture nor bony destructive process. ALIGNMENT: There is 2 mm of anterolisthesis of C2 on C3. There is 1-2 mm of anterior listhesis of C7 on T1. DISC SPACE: There is moderate to severe spondylosis most pronounced in the mid cervical spine. JOINTS: There is moderate facet arthropathy and mid cervical uncovertebral hypertrophy. SOFT TISSUES:  Unremarkable LEVELS: There is moderate mid cervical osseous neuroforaminal stenosis most pronounced at C3/4. CT CHEST: MEDIASTINUM: Similar elevation of the left hemidiaphragm. Aortic and heart size are normal. No mass nor pericardial effusion. CORONARY ARTERIES: There is calcified atherosclerotic disease. LUNGS: Persistent left lower lung airspace disease, likely atelectasis. Interval clearance of right middle lobe airspace disease. Moderate to severe emphysema is similar. No suspicious nodules or new consolidation. PLEURAL SPACE: No effusion, mass, nor pneumothorax. LYMPH NODES: No  pathologically enlarged thoracic nodes. There are relatively nondisplaced fractures involving the anterior margins of the right ninth through 11th ribs and left third and fourth ribs. CT ABDOMEN AND PELVIS:  LIVER:  Unremarkable parenchyma without focal lesion. BILIARY/GALLBLADDER: Cholecystectomy SPLEEN:  Unremarkable PANCREAS:  Unremarkable ADRENAL:  Unremarkable KIDNEYS:  Unremarkable parenchyma with no solid mass identified. No obstruction.  No calculus identified. GASTROINTESTINAL/MESENTERY:  No evidence of obstruction nor inflammation. AORTA/IVC:  Normal caliber. RETROPERITONEUM/LYMPH NODES:  Unremarkable REPRODUCTIVE: There appear to be bilateral hydroceles. BLADDER:  Unremarkable OSSEUS STRUCTURES: Advanced lumbar degenerative changes. Total left hip arthroplasty with associated streak and beam hardening artifact.     Impression: 1.There are relatively nondisplaced fractures involving the anterior margins of the right ninth through 11th ribs and left third and fourth ribs. 2.Persistent left lower lung airspace disease, likely atelectasis. Interval clearance of right middle lobe airspace disease. 3.Moderate to severe emphysema. 4.Additional chronic findings as described above. Electronically Signed: Alfonzo Burgess MD  7/18/2025 9:29 AM EDT  Workstation ID: [id number]    CT Chest Without Contrast Diagnostic  Result Date: 7/18/2025  CT CERVICAL SPINE WO CONTRAST, CT ABDOMEN PELVIS WO CONTRAST, CT CHEST WO CONTRAST DIAGNOSTIC Date of Exam: 7/18/2025 8:35 AM EDT Indication: fall. neck pain.. Comparison: CT angiogram chest 5/18/2025, CT abdomen pelvis 8/23/2024 Technique: Axial CT images were obtained of the cervical spine, chest, abdomen, and pelvis without contrast administration.  Reconstructed coronal and sagittal images were also obtained. Automated exposure control and iterative construction methods were used. Findings: CT CERVICAL SPINE OSSEOUS STRUCTURES: No fracture nor bony destructive process. ALIGNMENT:  There is 2 mm of anterolisthesis of C2 on C3. There is 1-2 mm of anterior listhesis of C7 on T1. DISC SPACE: There is moderate to severe spondylosis most pronounced in the mid cervical spine. JOINTS: There is moderate facet arthropathy and mid cervical uncovertebral hypertrophy. SOFT TISSUES:  Unremarkable LEVELS: There is moderate mid cervical osseous neuroforaminal stenosis most pronounced at C3/4. CT CHEST: MEDIASTINUM: Similar elevation of the left hemidiaphragm. Aortic and heart size are normal. No mass nor pericardial effusion. CORONARY ARTERIES: There is calcified atherosclerotic disease. LUNGS: Persistent left lower lung airspace disease, likely atelectasis. Interval clearance of right middle lobe airspace disease. Moderate to severe emphysema is similar. No suspicious nodules or new consolidation. PLEURAL SPACE: No effusion, mass, nor pneumothorax. LYMPH NODES: No pathologically enlarged thoracic nodes. There are relatively nondisplaced fractures involving the anterior margins of the right ninth through 11th ribs and left third and fourth ribs. CT ABDOMEN AND PELVIS:  LIVER:  Unremarkable parenchyma without focal lesion. BILIARY/GALLBLADDER: Cholecystectomy SPLEEN:  Unremarkable PANCREAS:  Unremarkable ADRENAL:  Unremarkable KIDNEYS:  Unremarkable parenchyma with no solid mass identified. No obstruction.  No calculus identified. GASTROINTESTINAL/MESENTERY:  No evidence of obstruction nor inflammation. AORTA/IVC:  Normal caliber. RETROPERITONEUM/LYMPH NODES:  Unremarkable REPRODUCTIVE: There appear to be bilateral hydroceles. BLADDER:  Unremarkable OSSEUS STRUCTURES: Advanced lumbar degenerative changes. Total left hip arthroplasty with associated streak and beam hardening artifact.     Impression: 1.There are relatively nondisplaced fractures involving the anterior margins of the right ninth through 11th ribs and left third and fourth ribs. 2.Persistent left lower lung airspace disease, likely atelectasis.  Interval clearance of right middle lobe airspace disease. 3.Moderate to severe emphysema. 4.Additional chronic findings as described above. Electronically Signed: Alfonzo Burgess MD  7/18/2025 9:29 AM EDT  Workstation ID: [id number]    CT Abdomen Pelvis Without Contrast  Result Date: 7/18/2025  CT CERVICAL SPINE WO CONTRAST, CT ABDOMEN PELVIS WO CONTRAST, CT CHEST WO CONTRAST DIAGNOSTIC Date of Exam: 7/18/2025 8:35 AM EDT Indication: fall. neck pain.. Comparison: CT angiogram chest 5/18/2025, CT abdomen pelvis 8/23/2024 Technique: Axial CT images were obtained of the cervical spine, chest, abdomen, and pelvis without contrast administration.  Reconstructed coronal and sagittal images were also obtained. Automated exposure control and iterative construction methods were used. Findings: CT CERVICAL SPINE OSSEOUS STRUCTURES: No fracture nor bony destructive process. ALIGNMENT: There is 2 mm of anterolisthesis of C2 on C3. There is 1-2 mm of anterior listhesis of C7 on T1. DISC SPACE: There is moderate to severe spondylosis most pronounced in the mid cervical spine. JOINTS: There is moderate facet arthropathy and mid cervical uncovertebral hypertrophy. SOFT TISSUES:  Unremarkable LEVELS: There is moderate mid cervical osseous neuroforaminal stenosis most pronounced at C3/4. CT CHEST: MEDIASTINUM: Similar elevation of the left hemidiaphragm. Aortic and heart size are normal. No mass nor pericardial effusion. CORONARY ARTERIES: There is calcified atherosclerotic disease. LUNGS: Persistent left lower lung airspace disease, likely atelectasis. Interval clearance of right middle lobe airspace disease. Moderate to severe emphysema is similar. No suspicious nodules or new consolidation. PLEURAL SPACE: No effusion, mass, nor pneumothorax. LYMPH NODES: No pathologically enlarged thoracic nodes. There are relatively nondisplaced fractures involving the anterior margins of the right ninth through 11th ribs and left third and fourth  ribs. CT ABDOMEN AND PELVIS:  LIVER:  Unremarkable parenchyma without focal lesion. BILIARY/GALLBLADDER: Cholecystectomy SPLEEN:  Unremarkable PANCREAS:  Unremarkable ADRENAL:  Unremarkable KIDNEYS:  Unremarkable parenchyma with no solid mass identified. No obstruction.  No calculus identified. GASTROINTESTINAL/MESENTERY:  No evidence of obstruction nor inflammation. AORTA/IVC:  Normal caliber. RETROPERITONEUM/LYMPH NODES:  Unremarkable REPRODUCTIVE: There appear to be bilateral hydroceles. BLADDER:  Unremarkable OSSEUS STRUCTURES: Advanced lumbar degenerative changes. Total left hip arthroplasty with associated streak and beam hardening artifact.     Impression: 1.There are relatively nondisplaced fractures involving the anterior margins of the right ninth through 11th ribs and left third and fourth ribs. 2.Persistent left lower lung airspace disease, likely atelectasis. Interval clearance of right middle lobe airspace disease. 3.Moderate to severe emphysema. 4.Additional chronic findings as described above. Electronically Signed: Alofnzo Burgess MD  7/18/2025 9:29 AM EDT  Workstation ID: [id number]    XR Knee 1 or 2 View Left  Result Date: 7/18/2025  XR KNEE 1 OR 2 VW LEFT Date of Exam: 7/18/2025 9:00 AM EDT Indication: pain. fall. Comparison: 5/20/2025 Findings: 2 views of the left knee demonstrate no acute fracture or dislocation. There is medial at the patellofemoral compartment joint space narrowing with chondrocalcinosis seen in the lateral joint space. There is no joint effusion. Vascular calcifications are  present.     Impression: Degenerative changes of the left knee. No acute osseous abnormality. Electronically Signed: Angela Saini MD  7/18/2025 9:26 AM EDT  Workstation ID: [id number]    XR Shoulder 2+ View Left  Result Date: 7/18/2025  XR SHOULDER 2+ VW LEFT Date of Exam: 7/18/2025 9:00 AM EDT Indication: pain. fall. Comparison: 5/20/2025 Findings: There is a left reverse shoulder arthroplasty with intact  hardware components. There is no acute fracture or dislocation. There is no evidence for hardware loosening. The soft tissues are unremarkable.     Impression: Left reverse shoulder arthroplasty without fracture or hardware complication. Electronically Signed: Angela Saini MD  7/18/2025 9:25 AM EDT  Workstation ID: [id number]    CT Head Without Contrast  Result Date: 7/16/2025  CT HEAD WO CONTRAST Date of Exam: 7/16/2025 5:52 PM EDT Indication: AMS. Comparison: CT brain dated 5/20/2025 Technique: Axial CT images were obtained of the head without contrast administration.  Automated exposure control and iterative construction methods were used. Findings: There is no evidence of hemorrhage. There is no mass effect or midline shift. Diffuse brain atrophy. Periventricular hypodensities compatible with chronic small vessel ischemia There is no extracerebral collection. Ventricles are normal in size and configuration for patient's stated age. Posterior fossa is within normal limits. Calvarium and skull base appear intact.  Visualized sinuses show no air fluid levels. Visualized orbits are unremarkable.     Impression: 1.No acute intracranial abnormality identified. 2.Diffuse brain atrophy. 3.Chronic small vessel ischemia. Electronically Signed: Dev Angulo MD  7/16/2025 6:06 PM EDT  Workstation ID: [id number]    XR Chest 1 View  Result Date: 7/16/2025  XR CHEST 1 VW Date of Exam: 7/16/2025 4:59 PM EDT Indication: Hypotension Comparison: 5/18/2025. Findings: The heart size is normal for technique. The left hemidiaphragm is chronically elevated. There are stable prominent increased interstitial densities. There are a few thin linear bandlike densities felt to represent subsegment atelectasis. The patient's chin obscures the medial pulmonary apices. There are no moderate to large pleural effusions. There is no definite pneumothorax. The patient has had bilateral shoulder arthroplasties. There are chronic age-related changes  involving the bony thorax and thoracic aorta     Impression: 1.Chronic elevation of the left hemidiaphragm. 2.Stable prominent increased interstitial densities. 3.There are a few thin linear bandlike densities felt to represent subsegment atelectasis. Electronically Signed: Arpan Wynne MD  7/16/2025 5:29 PM EDT  Workstation ID: [id number]      Results for orders placed during the hospital encounter of 07/16/21    Doppler Arterial Multi Level Lower Extremity - Bilateral CAR 07/16/2021  2:28 PM    Interpretation Summary  · Normal right KAMRYN of 1.02.  · Left femoral-popliteal arteries noncompressible. There are biphasic and monophasic waveforms noted in the left lower extremity  · The left posterior tibial artery was compressible with mildly reduced left KAMRYN of 0.86      Results for orders placed during the hospital encounter of 07/16/21    Doppler Arterial Multi Level Lower Extremity - Bilateral CAR 07/16/2021  2:28 PM    Interpretation Summary  · Normal right KAMRYN of 1.02.  · Left femoral-popliteal arteries noncompressible. There are biphasic and monophasic waveforms noted in the left lower extremity  · The left posterior tibial artery was compressible with mildly reduced left KAMRYN of 0.86      Results for orders placed during the hospital encounter of 04/22/25    Adult Transthoracic Echo Complete W/ Cont if Necessary Per Protocol 04/26/2025  4:58 PM    Interpretation Summary    Left ventricular systolic function is normal. Estimated left ventricular EF = 60%    Left ventricular wall thickness is consistent with hypertrophy.    No hemodynamically significant valvular heart disease      Plan for Follow-up of Pending Labs/Results: None pending    Discharge Details        Discharge Medications        New Medications        Instructions Start Date   bisacodyl 5 MG EC tablet  Commonly known as: DULCOLAX   5 mg, Oral, Daily PRN      bisacodyl 10 MG suppository  Commonly known as: DULCOLAX   10 mg, Rectal, Daily PRN       Lidocaine 4 %   1 patch, Transdermal, Every 24 Hours Scheduled, Remove & Discard patch within 12 hours or as directed by MD   Start Date: July 25, 2025     polyethylene glycol 17 g packet  Commonly known as: MIRALAX   17 g, Oral, Daily   Start Date: July 25, 2025     sennosides-docusate 8.6-50 MG per tablet  Commonly known as: PERICOLACE   2 tablets, Oral, 2 Times Daily             Changes to Medications        Instructions Start Date   carbidopa-levodopa CR  MG per CR tablet  Commonly known as: SINEMET CR  What changed: Another medication with the same name was removed. Continue taking this medication, and follow the directions you see here.   1 tablet, 2 Times Daily      carbidopa-levodopa  MG per tablet  Commonly known as: SINEMET  What changed: Another medication with the same name was removed. Continue taking this medication, and follow the directions you see here.   1 tablet, 4 Times Daily             Continue These Medications        Instructions Start Date   acetaminophen 500 MG tablet  Commonly known as: TYLENOL   1,000 mg, Every 6 Hours PRN      amantadine 100 MG capsule  Commonly known as: SYMMETREL   100 mg, 2 Times Daily      amiodarone 200 MG tablet  Commonly known as: PACERONE   200 mg, Oral, Every 24 Hours Scheduled      fentaNYL 0.05 MG/ML injection  Commonly known as: SUBLIMAZE   250 mcg      ipratropium-albuterol 0.5-2.5 mg/3 ml nebulizer  Commonly known as: DUO-NEB   3 mL, 3 Times Daily PRN      multivitamin with minerals tablet tablet   1 tablet, Daily      naloxone 4 MG/0.1ML nasal spray  Commonly known as: NARCAN   Call 911. Don't prime. [place] in 1 nostril for overdose. Repeat in 2-3 minutes in other nostril if no or minimal breathing/responsiveness.      pantoprazole 20 MG EC tablet  Commonly known as: PROTONIX   20 mg, 2 Times Daily      QUEtiapine 25 MG tablet  Commonly known as: SEROquel   25 mg, Nightly      tadalafil 10 MG tablet  Commonly known as: CIALIS   10 mg, Oral,  Daily PRN      tamsulosin 0.4 MG capsule 24 hr capsule  Commonly known as: FLOMAX   0.8 mg, Oral, Nightly      tiZANidine 4 MG tablet  Commonly known as: ZANAFLEX   4 mg, Every 8 Hours PRN      Trelegy Ellipta 100-62.5-25 MCG/ACT inhaler  Generic drug: Fluticasone-Umeclidin-Vilant   1 puff, Daily - RT             Stop These Medications      apixaban 5 MG tablet tablet  Commonly known as: ELIQUIS     docusate sodium 100 MG capsule  Commonly known as: COLACE     metoprolol succinate XL 50 MG 24 hr tablet  Commonly known as: TOPROL-XL              No Known Allergies      Discharge Disposition:  Rehab Facility or Unit (DC - External)    Diet:  Hospital:  Diet Order   Procedures    Diet: Regular/House; Fluid Consistency: Thin (IDDSI 0)            CODE STATUS:    Code Status and Medical Interventions: No CPR (Do Not Attempt to Resuscitate); Limited Support; No intubation (DNI), No artificial nutrition, No cardioversion, No dialysis, No vasopressors   Ordered at: 07/18/25 1340     Code Status (Patient has no pulse and is not breathing):    No CPR (Do Not Attempt to Resuscitate)     Medical Interventions (Patient has pulse or is breathing):    Limited Support     Medical Intervention Limits:    No intubation (DNI)       No artificial nutrition       No cardioversion       No dialysis       No vasopressors       Future Appointments   Date Time Provider Department Center   9/23/2025 11:45 AM Von Kilpatrick MD [facility]                 Maira Worthington MD  07/24/25      Time Spent on Discharge:  I spent  31  minutes on this discharge activity which included: face-to-face encounter with the patient, reviewing the data in the system, coordination of the care with the nursing staff as well as consultants, documentation, and entering orders.

## 2025-07-24 NOTE — CASE MANAGEMENT/SOCIAL WORK
Case Management Discharge Note      Final Note: Per Lisa at [facility], they have an  acute rehab bed on the [facility] today if ready. They are aware the pt has not had a BM and has had meds for this. [facility] will f/u on that. Please call report to [phone] and send a copy of the DC summary with  the pt. No scripts needed. [facility] van will transport at 1530 with 02. The pt will need to be at the [address] by 1530. The pt and spouse are in agreement.         Selected Continued Care - Admitted Since 7/18/2025       Destination Coordination complete.      Service Provider Services Address Phone Fax Patient Preferred    [facility] [address] [facility] [address] [phone] --              Durable Medical Equipment    No services have been selected for the patient.                Dialysis/Infusion    No services have been selected for the patient.                Home Medical Care    No services have been selected for the patient.                Therapy    No services have been selected for the patient.                Community Resources    No services have been selected for the patient.                Community & DME    No services have been selected for the patient.                    Selected Continued Care - Prior Encounters Includes continued care and service providers with selected services from prior encounters from 4/19/2025 to 7/24/2025      Discharged on 5/22/2025 Admission date: 5/18/2025 - Discharge disposition: Home-Health Care [facility]      Home Medical Care       Service Provider Services Address Phone Fax Patient Preferred    [facility] [address] , [facility] [address] [id number] [phone] --                               Final Discharge Disposition Code: 62 - inpatient rehab facility

## 2025-07-26 ENCOUNTER — APPOINTMENT (OUTPATIENT)
Dept: GENERAL RADIOLOGY | Facility: HOSPITAL | Age: 80
End: 2025-07-26
Payer: MEDICARE

## 2025-07-26 ENCOUNTER — HOSPITAL ENCOUNTER (INPATIENT)
Facility: HOSPITAL | Age: 80
LOS: 7 days | Discharge: NURSING, REHAB OR LONG-TERM CARE | End: 2025-08-02
Attending: EMERGENCY MEDICINE | Admitting: INTERNAL MEDICINE
Payer: MEDICARE

## 2025-07-26 DIAGNOSIS — J96.21 ACUTE ON CHRONIC RESPIRATORY FAILURE WITH HYPOXIA AND HYPERCAPNIA: ICD-10-CM

## 2025-07-26 DIAGNOSIS — R13.10 DYSPHAGIA, UNSPECIFIED TYPE: ICD-10-CM

## 2025-07-26 DIAGNOSIS — J44.9 CHRONIC OBSTRUCTIVE PULMONARY DISEASE, UNSPECIFIED COPD TYPE: ICD-10-CM

## 2025-07-26 DIAGNOSIS — Z74.09 IMPAIRED FUNCTIONAL MOBILITY, BALANCE, GAIT, AND ENDURANCE: ICD-10-CM

## 2025-07-26 DIAGNOSIS — G20.B1 PARKINSON'S DISEASE WITH DYSKINESIA, UNSPECIFIED WHETHER MANIFESTATIONS FLUCTUATE: ICD-10-CM

## 2025-07-26 DIAGNOSIS — J18.9 PNEUMONIA OF LEFT LOWER LOBE DUE TO INFECTIOUS ORGANISM: Primary | ICD-10-CM

## 2025-07-26 DIAGNOSIS — J96.22 ACUTE ON CHRONIC RESPIRATORY FAILURE WITH HYPOXIA AND HYPERCAPNIA: ICD-10-CM

## 2025-07-26 LAB
ALBUMIN SERPL-MCNC: 3.5 G/DL (ref 3.5–5.2)
ALBUMIN/GLOB SERPL: 1.3 G/DL
ALP SERPL-CCNC: 121 U/L (ref 39–117)
ALT SERPL W P-5'-P-CCNC: 7 U/L (ref 1–41)
ANION GAP SERPL CALCULATED.3IONS-SCNC: 11.6 MMOL/L (ref 5–15)
ARTERIAL PATENCY WRIST A: POSITIVE
AST SERPL-CCNC: 22 U/L (ref 1–40)
ATMOSPHERIC PRESS: ABNORMAL MM[HG]
B PARAPERT DNA SPEC QL NAA+PROBE: NOT DETECTED
B PERT DNA SPEC QL NAA+PROBE: NOT DETECTED
BASE EXCESS BLDA CALC-SCNC: 3.7 MMOL/L (ref 0–2)
BASOPHILS # BLD AUTO: 0.03 10*3/MM3 (ref 0–0.2)
BASOPHILS NFR BLD AUTO: 0.4 % (ref 0–1.5)
BDY SITE: ABNORMAL
BILIRUB SERPL-MCNC: 0.7 MG/DL (ref 0–1.2)
BODY TEMPERATURE: 37
BUN SERPL-MCNC: 11 MG/DL (ref 8–23)
BUN/CREAT SERPL: 16.9 (ref 7–25)
C PNEUM DNA NPH QL NAA+NON-PROBE: NOT DETECTED
CALCIUM SPEC-SCNC: 9.1 MG/DL (ref 8.6–10.5)
CHLORIDE SERPL-SCNC: 100 MMOL/L (ref 98–107)
CO2 BLDA-SCNC: 30.5 MMOL/L (ref 22–33)
CO2 SERPL-SCNC: 26.4 MMOL/L (ref 22–29)
COHGB MFR BLD: 1.7 % (ref 0–2)
CREAT SERPL-MCNC: 0.65 MG/DL (ref 0.76–1.27)
D-LACTATE SERPL-SCNC: 1.9 MMOL/L (ref 0.5–2)
DEPRECATED RDW RBC AUTO: 57.4 FL (ref 37–54)
EGFRCR SERPLBLD CKD-EPI 2021: 95.8 ML/MIN/1.73
EOSINOPHIL # BLD AUTO: 0.03 10*3/MM3 (ref 0–0.4)
EOSINOPHIL NFR BLD AUTO: 0.4 % (ref 0.3–6.2)
EPAP: 0
ERYTHROCYTE [DISTWIDTH] IN BLOOD BY AUTOMATED COUNT: 16.8 % (ref 12.3–15.4)
FLUAV SUBTYP SPEC NAA+PROBE: NOT DETECTED
FLUBV RNA NPH QL NAA+NON-PROBE: NOT DETECTED
GEN 5 1HR TROPONIN T REFLEX: 26 NG/L
GLOBULIN UR ELPH-MCNC: 2.8 GM/DL
GLUCOSE SERPL-MCNC: 139 MG/DL (ref 65–99)
HADV DNA SPEC NAA+PROBE: NOT DETECTED
HCO3 BLDA-SCNC: 29.1 MMOL/L (ref 20–26)
HCOV 229E RNA SPEC QL NAA+PROBE: NOT DETECTED
HCOV HKU1 RNA SPEC QL NAA+PROBE: NOT DETECTED
HCOV NL63 RNA SPEC QL NAA+PROBE: NOT DETECTED
HCOV OC43 RNA SPEC QL NAA+PROBE: NOT DETECTED
HCT VFR BLD AUTO: 36.5 % (ref 37.5–51)
HCT VFR BLD CALC: 37.3 % (ref 38–51)
HGB BLD-MCNC: 11.7 G/DL (ref 13–17.7)
HGB BLDA-MCNC: 12.2 G/DL (ref 13.5–17.5)
HMPV RNA NPH QL NAA+NON-PROBE: NOT DETECTED
HOLD SPECIMEN: NORMAL
HPIV1 RNA ISLT QL NAA+PROBE: NOT DETECTED
HPIV2 RNA SPEC QL NAA+PROBE: NOT DETECTED
HPIV3 RNA NPH QL NAA+PROBE: NOT DETECTED
HPIV4 P GENE NPH QL NAA+PROBE: NOT DETECTED
IMM GRANULOCYTES # BLD AUTO: 0.04 10*3/MM3 (ref 0–0.05)
IMM GRANULOCYTES NFR BLD AUTO: 0.5 % (ref 0–0.5)
INHALED O2 CONCENTRATION: 36 %
IPAP: 0
LYMPHOCYTES # BLD AUTO: 0.27 10*3/MM3 (ref 0.7–3.1)
LYMPHOCYTES NFR BLD AUTO: 3.4 % (ref 19.6–45.3)
M PNEUMO IGG SER IA-ACNC: NOT DETECTED
MCH RBC QN AUTO: 30.5 PG (ref 26.6–33)
MCHC RBC AUTO-ENTMCNC: 32.1 G/DL (ref 31.5–35.7)
MCV RBC AUTO: 95.1 FL (ref 79–97)
METHGB BLD QL: 0.4 % (ref 0–1.5)
MODALITY: ABNORMAL
MONOCYTES # BLD AUTO: 0.5 10*3/MM3 (ref 0.1–0.9)
MONOCYTES NFR BLD AUTO: 6.4 % (ref 5–12)
NEUTROPHILS NFR BLD AUTO: 6.96 10*3/MM3 (ref 1.7–7)
NEUTROPHILS NFR BLD AUTO: 88.9 % (ref 42.7–76)
NRBC BLD AUTO-RTO: 0 /100 WBC (ref 0–0.2)
NT-PROBNP SERPL-MCNC: 798 PG/ML (ref 0–1800)
OXYHGB MFR BLDV: 90.8 % (ref 94–99)
PAW @ PEAK INSP FLOW SETTING VENT: 0 CMH2O
PCO2 BLDA: 46.2 MM HG (ref 35–45)
PCO2 TEMP ADJ BLD: 46.2 MM HG (ref 35–48)
PH BLDA: 7.41 PH UNITS (ref 7.35–7.45)
PH, TEMP CORRECTED: 7.41 PH UNITS
PLATELET # BLD AUTO: 317 10*3/MM3 (ref 140–450)
PMV BLD AUTO: 10.3 FL (ref 6–12)
PO2 BLDA: 66.2 MM HG (ref 83–108)
PO2 TEMP ADJ BLD: 66.2 MM HG (ref 83–108)
POTASSIUM SERPL-SCNC: 4 MMOL/L (ref 3.5–5.2)
PROCALCITONIN SERPL-MCNC: 0.07 NG/ML (ref 0–0.25)
PROT SERPL-MCNC: 6.3 G/DL (ref 6–8.5)
QT INTERVAL: 460 MS
QTC INTERVAL: 527 MS
RBC # BLD AUTO: 3.84 10*6/MM3 (ref 4.14–5.8)
RHINOVIRUS RNA SPEC NAA+PROBE: NOT DETECTED
RSV RNA NPH QL NAA+NON-PROBE: NOT DETECTED
SARS-COV-2 RNA RESP QL NAA+PROBE: NOT DETECTED
SODIUM SERPL-SCNC: 138 MMOL/L (ref 136–145)
TOTAL RATE: 0 BREATHS/MINUTE
TROPONIN T % DELTA: -4
TROPONIN T NUMERIC DELTA: -1 NG/L
TROPONIN T SERPL HS-MCNC: 27 NG/L
WBC NRBC COR # BLD AUTO: 7.83 10*3/MM3 (ref 3.4–10.8)
WHOLE BLOOD HOLD COAG: NORMAL
WHOLE BLOOD HOLD SPECIMEN: NORMAL

## 2025-07-26 PROCEDURE — 82375 ASSAY CARBOXYHB QUANT: CPT

## 2025-07-26 PROCEDURE — 93005 ELECTROCARDIOGRAM TRACING: CPT | Performed by: EMERGENCY MEDICINE

## 2025-07-26 PROCEDURE — 99291 CRITICAL CARE FIRST HOUR: CPT

## 2025-07-26 PROCEDURE — 86140 C-REACTIVE PROTEIN: CPT

## 2025-07-26 PROCEDURE — 0202U NFCT DS 22 TRGT SARS-COV-2: CPT | Performed by: EMERGENCY MEDICINE

## 2025-07-26 PROCEDURE — 25010000002 VANCOMYCIN 1 G RECONSTITUTED SOLUTION 1 EACH VIAL: Performed by: EMERGENCY MEDICINE

## 2025-07-26 PROCEDURE — 94799 UNLISTED PULMONARY SVC/PX: CPT

## 2025-07-26 PROCEDURE — 87641 MR-STAPH DNA AMP PROBE: CPT

## 2025-07-26 PROCEDURE — 25810000003 LACTATED RINGERS SOLUTION: Performed by: EMERGENCY MEDICINE

## 2025-07-26 PROCEDURE — 83880 ASSAY OF NATRIURETIC PEPTIDE: CPT | Performed by: EMERGENCY MEDICINE

## 2025-07-26 PROCEDURE — 25010000002 CEFEPIME PER 500 MG: Performed by: EMERGENCY MEDICINE

## 2025-07-26 PROCEDURE — 84484 ASSAY OF TROPONIN QUANT: CPT | Performed by: EMERGENCY MEDICINE

## 2025-07-26 PROCEDURE — 99223 1ST HOSP IP/OBS HIGH 75: CPT

## 2025-07-26 PROCEDURE — 36415 COLL VENOUS BLD VENIPUNCTURE: CPT

## 2025-07-26 PROCEDURE — 36600 WITHDRAWAL OF ARTERIAL BLOOD: CPT

## 2025-07-26 PROCEDURE — 25810000003 SODIUM CHLORIDE 0.9 % SOLUTION 250 ML FLEX CONT: Performed by: EMERGENCY MEDICINE

## 2025-07-26 PROCEDURE — 83050 HGB METHEMOGLOBIN QUAN: CPT

## 2025-07-26 PROCEDURE — 71045 X-RAY EXAM CHEST 1 VIEW: CPT

## 2025-07-26 PROCEDURE — 80053 COMPREHEN METABOLIC PANEL: CPT | Performed by: EMERGENCY MEDICINE

## 2025-07-26 PROCEDURE — 83605 ASSAY OF LACTIC ACID: CPT | Performed by: EMERGENCY MEDICINE

## 2025-07-26 PROCEDURE — 87040 BLOOD CULTURE FOR BACTERIA: CPT | Performed by: EMERGENCY MEDICINE

## 2025-07-26 PROCEDURE — 82805 BLOOD GASES W/O2 SATURATION: CPT

## 2025-07-26 PROCEDURE — 84145 PROCALCITONIN (PCT): CPT | Performed by: EMERGENCY MEDICINE

## 2025-07-26 PROCEDURE — 85025 COMPLETE CBC W/AUTO DIFF WBC: CPT | Performed by: EMERGENCY MEDICINE

## 2025-07-26 RX ORDER — LIDOCAINE 4 G/G
1 PATCH TOPICAL
Status: DISCONTINUED | OUTPATIENT
Start: 2025-07-27 | End: 2025-07-26

## 2025-07-26 RX ORDER — LIDOCAINE 4 G/G
1 PATCH TOPICAL
Status: DISCONTINUED | OUTPATIENT
Start: 2025-07-26 | End: 2025-08-02 | Stop reason: HOSPADM

## 2025-07-26 RX ORDER — SODIUM CHLORIDE 0.9 % (FLUSH) 0.9 %
10 SYRINGE (ML) INJECTION AS NEEDED
Status: DISCONTINUED | OUTPATIENT
Start: 2025-07-26 | End: 2025-08-01

## 2025-07-26 RX ADMIN — SODIUM CHLORIDE, POTASSIUM CHLORIDE, SODIUM LACTATE AND CALCIUM CHLORIDE 500 ML: 600; 310; 30; 20 INJECTION, SOLUTION INTRAVENOUS at 21:54

## 2025-07-26 RX ADMIN — LIDOCAINE 1 PATCH: 4 PATCH TOPICAL at 23:35

## 2025-07-26 RX ADMIN — SODIUM CHLORIDE 1000 MG: 9 INJECTION, SOLUTION INTRAVENOUS at 23:35

## 2025-07-26 RX ADMIN — CEFEPIME 2000 MG: 2 INJECTION, POWDER, FOR SOLUTION INTRAVENOUS at 22:27

## 2025-07-27 ENCOUNTER — ANCILLARY PROCEDURE (OUTPATIENT)
Dept: SPEECH THERAPY | Facility: HOSPITAL | Age: 80
End: 2025-07-27
Payer: MEDICARE

## 2025-07-27 ENCOUNTER — APPOINTMENT (OUTPATIENT)
Dept: GENERAL RADIOLOGY | Facility: HOSPITAL | Age: 80
End: 2025-07-27
Payer: MEDICARE

## 2025-07-27 LAB
ALBUMIN SERPL-MCNC: 3.4 G/DL (ref 3.5–5.2)
ALBUMIN/GLOB SERPL: 1.4 G/DL
ALP SERPL-CCNC: 121 U/L (ref 39–117)
ALT SERPL W P-5'-P-CCNC: 10 U/L (ref 1–41)
ANION GAP SERPL CALCULATED.3IONS-SCNC: 10.9 MMOL/L (ref 5–15)
AST SERPL-CCNC: 21 U/L (ref 1–40)
BASOPHILS # BLD AUTO: 0.01 10*3/MM3 (ref 0–0.2)
BASOPHILS NFR BLD AUTO: 0.1 % (ref 0–1.5)
BILIRUB SERPL-MCNC: 0.8 MG/DL (ref 0–1.2)
BUN SERPL-MCNC: 9.3 MG/DL (ref 8–23)
BUN/CREAT SERPL: 16.9 (ref 7–25)
CALCIUM SPEC-SCNC: 8.8 MG/DL (ref 8.6–10.5)
CHLORIDE SERPL-SCNC: 100 MMOL/L (ref 98–107)
CO2 SERPL-SCNC: 26.1 MMOL/L (ref 22–29)
CREAT SERPL-MCNC: 0.55 MG/DL (ref 0.76–1.27)
CRP SERPL-MCNC: 3.63 MG/DL (ref 0–0.5)
DEPRECATED RDW RBC AUTO: 56.8 FL (ref 37–54)
EGFRCR SERPLBLD CKD-EPI 2021: 100.8 ML/MIN/1.73
EOSINOPHIL # BLD AUTO: 0 10*3/MM3 (ref 0–0.4)
EOSINOPHIL NFR BLD AUTO: 0 % (ref 0.3–6.2)
ERYTHROCYTE [DISTWIDTH] IN BLOOD BY AUTOMATED COUNT: 16.7 % (ref 12.3–15.4)
ERYTHROCYTE [SEDIMENTATION RATE] IN BLOOD: 20 MM/HR (ref 0–20)
GLOBULIN UR ELPH-MCNC: 2.5 GM/DL
GLUCOSE SERPL-MCNC: 125 MG/DL (ref 65–99)
HCT VFR BLD AUTO: 38.3 % (ref 37.5–51)
HGB BLD-MCNC: 12.2 G/DL (ref 13–17.7)
IMM GRANULOCYTES # BLD AUTO: 0.05 10*3/MM3 (ref 0–0.05)
IMM GRANULOCYTES NFR BLD AUTO: 0.4 % (ref 0–0.5)
L PNEUMO1 AG UR QL IA: NEGATIVE
LYMPHOCYTES # BLD AUTO: 0.11 10*3/MM3 (ref 0.7–3.1)
LYMPHOCYTES NFR BLD AUTO: 1 % (ref 19.6–45.3)
MCH RBC QN AUTO: 30.3 PG (ref 26.6–33)
MCHC RBC AUTO-ENTMCNC: 31.9 G/DL (ref 31.5–35.7)
MCV RBC AUTO: 95 FL (ref 79–97)
MONOCYTES # BLD AUTO: 0.05 10*3/MM3 (ref 0.1–0.9)
MONOCYTES NFR BLD AUTO: 0.4 % (ref 5–12)
MRSA DNA SPEC QL NAA+PROBE: NEGATIVE
NEUTROPHILS NFR BLD AUTO: 11.03 10*3/MM3 (ref 1.7–7)
NEUTROPHILS NFR BLD AUTO: 98.1 % (ref 42.7–76)
NRBC BLD AUTO-RTO: 0 /100 WBC (ref 0–0.2)
PLATELET # BLD AUTO: 325 10*3/MM3 (ref 140–450)
PMV BLD AUTO: 10.3 FL (ref 6–12)
POTASSIUM SERPL-SCNC: 4.4 MMOL/L (ref 3.5–5.2)
PROT SERPL-MCNC: 5.9 G/DL (ref 6–8.5)
QT INTERVAL: 362 MS
QTC INTERVAL: 407 MS
RBC # BLD AUTO: 4.03 10*6/MM3 (ref 4.14–5.8)
S PNEUM AG SPEC QL LA: NEGATIVE
SODIUM SERPL-SCNC: 137 MMOL/L (ref 136–145)
WBC NRBC COR # BLD AUTO: 11.25 10*3/MM3 (ref 3.4–10.8)

## 2025-07-27 PROCEDURE — 94799 UNLISTED PULMONARY SVC/PX: CPT

## 2025-07-27 PROCEDURE — 92612 ENDOSCOPY SWALLOW (FEES) VID: CPT

## 2025-07-27 PROCEDURE — 87899 AGENT NOS ASSAY W/OPTIC: CPT

## 2025-07-27 PROCEDURE — 25810000003 SODIUM CHLORIDE 0.9 % SOLUTION 250 ML FLEX CONT

## 2025-07-27 PROCEDURE — 73130 X-RAY EXAM OF HAND: CPT

## 2025-07-27 PROCEDURE — 25010000002 ENOXAPARIN PER 10 MG

## 2025-07-27 PROCEDURE — 92610 EVALUATE SWALLOWING FUNCTION: CPT

## 2025-07-27 PROCEDURE — 85025 COMPLETE CBC W/AUTO DIFF WBC: CPT

## 2025-07-27 PROCEDURE — 99232 SBSQ HOSP IP/OBS MODERATE 35: CPT | Performed by: INTERNAL MEDICINE

## 2025-07-27 PROCEDURE — 87581 M.PNEUMON DNA AMP PROBE: CPT

## 2025-07-27 PROCEDURE — 25810000003 LACTATED RINGERS PER 1000 ML

## 2025-07-27 PROCEDURE — 85652 RBC SED RATE AUTOMATED: CPT

## 2025-07-27 PROCEDURE — 25010000002 METHYLPREDNISOLONE PER 40 MG

## 2025-07-27 PROCEDURE — 25010000002 VANCOMYCIN 1 G RECONSTITUTED SOLUTION 1 EACH VIAL

## 2025-07-27 PROCEDURE — 80053 COMPREHEN METABOLIC PANEL: CPT

## 2025-07-27 PROCEDURE — 25010000002 CEFEPIME PER 500 MG

## 2025-07-27 PROCEDURE — 87449 NOS EACH ORGANISM AG IA: CPT

## 2025-07-27 RX ORDER — ACETAMINOPHEN 650 MG/1
650 SUPPOSITORY RECTAL EVERY 4 HOURS PRN
Status: DISCONTINUED | OUTPATIENT
Start: 2025-07-27 | End: 2025-08-02 | Stop reason: HOSPADM

## 2025-07-27 RX ORDER — AMANTADINE HYDROCHLORIDE 100 MG/1
100 CAPSULE, GELATIN COATED ORAL 2 TIMES DAILY
Status: DISCONTINUED | OUTPATIENT
Start: 2025-07-27 | End: 2025-08-02 | Stop reason: HOSPADM

## 2025-07-27 RX ORDER — METHYLPREDNISOLONE SODIUM SUCCINATE 40 MG/ML
40 INJECTION, POWDER, LYOPHILIZED, FOR SOLUTION INTRAMUSCULAR; INTRAVENOUS EVERY 12 HOURS
Status: DISCONTINUED | OUTPATIENT
Start: 2025-07-27 | End: 2025-07-30

## 2025-07-27 RX ORDER — CARBIDOPA AND LEVODOPA 25; 100 MG/1; MG/1
1 TABLET ORAL 4 TIMES DAILY
Status: DISCONTINUED | OUTPATIENT
Start: 2025-07-27 | End: 2025-08-02 | Stop reason: HOSPADM

## 2025-07-27 RX ORDER — IPRATROPIUM BROMIDE AND ALBUTEROL SULFATE 2.5; .5 MG/3ML; MG/3ML
3 SOLUTION RESPIRATORY (INHALATION) EVERY 4 HOURS PRN
Status: DISCONTINUED | OUTPATIENT
Start: 2025-07-27 | End: 2025-08-02 | Stop reason: HOSPADM

## 2025-07-27 RX ORDER — POLYETHYLENE GLYCOL 3350 17 G/17G
17 POWDER, FOR SOLUTION ORAL DAILY PRN
Status: DISCONTINUED | OUTPATIENT
Start: 2025-07-27 | End: 2025-08-02 | Stop reason: HOSPADM

## 2025-07-27 RX ORDER — PANTOPRAZOLE SODIUM 40 MG/10ML
40 INJECTION, POWDER, LYOPHILIZED, FOR SOLUTION INTRAVENOUS
Status: DISCONTINUED | OUTPATIENT
Start: 2025-07-27 | End: 2025-07-28

## 2025-07-27 RX ORDER — BISACODYL 5 MG/1
5 TABLET, DELAYED RELEASE ORAL DAILY PRN
Status: DISCONTINUED | OUTPATIENT
Start: 2025-07-27 | End: 2025-08-02 | Stop reason: HOSPADM

## 2025-07-27 RX ORDER — ONDANSETRON 2 MG/ML
4 INJECTION INTRAMUSCULAR; INTRAVENOUS EVERY 6 HOURS PRN
Status: DISCONTINUED | OUTPATIENT
Start: 2025-07-27 | End: 2025-08-02 | Stop reason: HOSPADM

## 2025-07-27 RX ORDER — ENOXAPARIN SODIUM 100 MG/ML
30 INJECTION SUBCUTANEOUS DAILY
Status: DISCONTINUED | OUTPATIENT
Start: 2025-07-27 | End: 2025-08-02 | Stop reason: HOSPADM

## 2025-07-27 RX ORDER — SODIUM CHLORIDE 0.9 % (FLUSH) 0.9 %
10 SYRINGE (ML) INJECTION EVERY 12 HOURS SCHEDULED
Status: DISCONTINUED | OUTPATIENT
Start: 2025-07-27 | End: 2025-08-02 | Stop reason: HOSPADM

## 2025-07-27 RX ORDER — AMOXICILLIN 250 MG
2 CAPSULE ORAL 2 TIMES DAILY
Status: DISCONTINUED | OUTPATIENT
Start: 2025-07-27 | End: 2025-08-02 | Stop reason: HOSPADM

## 2025-07-27 RX ORDER — CARBIDOPA AND LEVODOPA 50; 200 MG/1; MG/1
1 TABLET, EXTENDED RELEASE ORAL 2 TIMES DAILY
Status: DISCONTINUED | OUTPATIENT
Start: 2025-07-27 | End: 2025-08-02 | Stop reason: HOSPADM

## 2025-07-27 RX ORDER — NITROGLYCERIN 0.4 MG/1
0.4 TABLET SUBLINGUAL
Status: DISCONTINUED | OUTPATIENT
Start: 2025-07-27 | End: 2025-08-02 | Stop reason: HOSPADM

## 2025-07-27 RX ORDER — ACETAMINOPHEN 160 MG/5ML
650 SOLUTION ORAL EVERY 4 HOURS PRN
Status: DISCONTINUED | OUTPATIENT
Start: 2025-07-27 | End: 2025-08-02 | Stop reason: HOSPADM

## 2025-07-27 RX ORDER — IPRATROPIUM BROMIDE AND ALBUTEROL SULFATE 2.5; .5 MG/3ML; MG/3ML
3 SOLUTION RESPIRATORY (INHALATION)
Status: DISCONTINUED | OUTPATIENT
Start: 2025-07-27 | End: 2025-08-02 | Stop reason: HOSPADM

## 2025-07-27 RX ORDER — SODIUM CHLORIDE 0.9 % (FLUSH) 0.9 %
10 SYRINGE (ML) INJECTION AS NEEDED
Status: DISCONTINUED | OUTPATIENT
Start: 2025-07-27 | End: 2025-08-02 | Stop reason: HOSPADM

## 2025-07-27 RX ORDER — AMIODARONE HYDROCHLORIDE 200 MG/1
200 TABLET ORAL
Status: DISCONTINUED | OUTPATIENT
Start: 2025-07-27 | End: 2025-08-02 | Stop reason: HOSPADM

## 2025-07-27 RX ORDER — LIDOCAINE 4 G/G
2 PATCH TOPICAL
Status: DISCONTINUED | OUTPATIENT
Start: 2025-07-27 | End: 2025-08-02 | Stop reason: HOSPADM

## 2025-07-27 RX ORDER — ALBUTEROL SULFATE 0.83 MG/ML
2.5 SOLUTION RESPIRATORY (INHALATION) ONCE AS NEEDED
Status: DISCONTINUED | OUTPATIENT
Start: 2025-07-27 | End: 2025-08-02 | Stop reason: HOSPADM

## 2025-07-27 RX ORDER — FENTANYL 25 UG/1
1 PATCH TRANSDERMAL
Status: COMPLETED | OUTPATIENT
Start: 2025-07-27 | End: 2025-08-02

## 2025-07-27 RX ORDER — ACETAMINOPHEN 325 MG/1
650 TABLET ORAL EVERY 4 HOURS PRN
Status: DISCONTINUED | OUTPATIENT
Start: 2025-07-27 | End: 2025-08-02 | Stop reason: HOSPADM

## 2025-07-27 RX ORDER — MULTIPLE VITAMINS W/ MINERALS TAB 9MG-400MCG
1 TAB ORAL DAILY
Status: DISCONTINUED | OUTPATIENT
Start: 2025-07-27 | End: 2025-08-02 | Stop reason: HOSPADM

## 2025-07-27 RX ORDER — SODIUM CHLORIDE, SODIUM LACTATE, POTASSIUM CHLORIDE, CALCIUM CHLORIDE 600; 310; 30; 20 MG/100ML; MG/100ML; MG/100ML; MG/100ML
75 INJECTION, SOLUTION INTRAVENOUS CONTINUOUS
Status: DISCONTINUED | OUTPATIENT
Start: 2025-07-27 | End: 2025-07-27

## 2025-07-27 RX ORDER — SODIUM CHLORIDE 9 MG/ML
40 INJECTION, SOLUTION INTRAVENOUS AS NEEDED
Status: DISCONTINUED | OUTPATIENT
Start: 2025-07-27 | End: 2025-08-02 | Stop reason: HOSPADM

## 2025-07-27 RX ORDER — BISACODYL 10 MG
10 SUPPOSITORY, RECTAL RECTAL DAILY PRN
Status: DISCONTINUED | OUTPATIENT
Start: 2025-07-27 | End: 2025-08-02 | Stop reason: HOSPADM

## 2025-07-27 RX ADMIN — IPRATROPIUM BROMIDE AND ALBUTEROL SULFATE 3 ML: 2.5; .5 SOLUTION RESPIRATORY (INHALATION) at 03:23

## 2025-07-27 RX ADMIN — LIDOCAINE 2 PATCH: 4 PATCH TOPICAL at 09:23

## 2025-07-27 RX ADMIN — SODIUM CHLORIDE, POTASSIUM CHLORIDE, SODIUM LACTATE AND CALCIUM CHLORIDE 75 ML/HR: 600; 310; 30; 20 INJECTION, SOLUTION INTRAVENOUS at 02:24

## 2025-07-27 RX ADMIN — ENOXAPARIN SODIUM 30 MG: 100 INJECTION SUBCUTANEOUS at 09:24

## 2025-07-27 RX ADMIN — CARBIDOPA AND LEVODOPA 1 TABLET: 25; 100 TABLET ORAL at 18:27

## 2025-07-27 RX ADMIN — IPRATROPIUM BROMIDE AND ALBUTEROL SULFATE 3 ML: 2.5; .5 SOLUTION RESPIRATORY (INHALATION) at 23:48

## 2025-07-27 RX ADMIN — CARBIDOPA AND LEVODOPA 1 TABLET: 25; 100 TABLET ORAL at 21:16

## 2025-07-27 RX ADMIN — PANTOPRAZOLE SODIUM 40 MG: 40 INJECTION, POWDER, FOR SOLUTION INTRAVENOUS at 05:22

## 2025-07-27 RX ADMIN — VANCOMYCIN HYDROCHLORIDE 1000 MG: 1 INJECTION, POWDER, LYOPHILIZED, FOR SOLUTION INTRAVENOUS at 18:28

## 2025-07-27 RX ADMIN — IPRATROPIUM BROMIDE AND ALBUTEROL SULFATE 3 ML: 2.5; .5 SOLUTION RESPIRATORY (INHALATION) at 19:13

## 2025-07-27 RX ADMIN — IPRATROPIUM BROMIDE AND ALBUTEROL SULFATE 3 ML: 2.5; .5 SOLUTION RESPIRATORY (INHALATION) at 15:51

## 2025-07-27 RX ADMIN — METHYLPREDNISOLONE SODIUM SUCCINATE 40 MG: 40 INJECTION, POWDER, FOR SOLUTION INTRAMUSCULAR; INTRAVENOUS at 16:44

## 2025-07-27 RX ADMIN — FENTANYL 1 PATCH: 25 PATCH TRANSDERMAL at 02:23

## 2025-07-27 RX ADMIN — Medication 10 ML: at 09:45

## 2025-07-27 RX ADMIN — CEFEPIME 2000 MG: 2 INJECTION, POWDER, FOR SOLUTION INTRAVENOUS at 21:15

## 2025-07-27 RX ADMIN — SENNOSIDES AND DOCUSATE SODIUM 2 TABLET: 50; 8.6 TABLET ORAL at 21:16

## 2025-07-27 RX ADMIN — IPRATROPIUM BROMIDE AND ALBUTEROL SULFATE 3 ML: 2.5; .5 SOLUTION RESPIRATORY (INHALATION) at 07:25

## 2025-07-27 RX ADMIN — AMANTADINE HYDROCHLORIDE 100 MG: 100 CAPSULE ORAL at 21:16

## 2025-07-27 RX ADMIN — METHYLPREDNISOLONE SODIUM SUCCINATE 40 MG: 40 INJECTION, POWDER, FOR SOLUTION INTRAMUSCULAR; INTRAVENOUS at 02:24

## 2025-07-27 RX ADMIN — Medication 10 ML: at 21:16

## 2025-07-27 RX ADMIN — CARBIDOPA AND LEVODOPA 1 TABLET: 50; 200 TABLET, EXTENDED RELEASE ORAL at 21:16
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
## 2025-07-27 NOTE — PLAN OF CARE
Goal Outcome Evaluation:  Plan of Care Reviewed With: patient           Outcome Evaluation: FEES completed.  Conservative diet recommended. SLP to follow up re: diet tolerance and for treatment.               SLP Swallowing Diagnosis: mild-moderate, oral dysphagia, mild, pharyngeal dysphagia (07/27/25 5128)
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
## 2025-07-27 NOTE — PLAN OF CARE
Goal Outcome Evaluation:  Plan of Care Reviewed With: patient                           SLP Swallowing Diagnosis: suspected pharyngeal dysphagia (07/27/25 1100)

## 2025-07-27 NOTE — PROGRESS NOTES
"Pharmacy Consult-Vancomycin Dosing  Flaco Roque II is a  79 y.o. male receiving vancomycin therapy.     Indication: pneumonia  Consulting Provider: hospitalist  ID Consult: No    Goal AUC: 400 - 600 mg/L*hr    Current Antimicrobial Therapy  Anti-Infectives (From admission, onward)      Ordered     Dose/Rate Route Frequency Start Stop    07/27/25 0122  cefepime 2000 mg IVPB in 100 mL NS (MBP)        Ordering Provider: Shakila Lyle MD    2,000 mg  over 4 Hours Intravenous Every 24 Hours 07/27/25 2200 08/03/25 2159    07/27/25 0211  vancomycin (VANCOCIN) 1,000 mg in sodium chloride 0.9 % 250 mL IVPB-VTB        Ordering Provider: Cyrus Lemon, PharmD   Placed in \"And\" Linked Group    22 mg/kg × 45.4 kg  250 mL/hr over 60 Minutes Intravenous Every 18 Hours 07/27/25 1800 08/03/25 1159    07/27/25 0122  cefepime 2000 mg IVPB in 100 mL NS (MBP)  Status:  Discontinued        Ordering Provider: Shakila Lyle MD    2,000 mg  over 30 Minutes Intravenous Once 07/27/25 0215 07/27/25 0128    07/27/25 0122  Pharmacy to dose vancomycin        Ordering Provider: Shakila Lyle MD     Not Applicable Continuous PRN 07/27/25 0122 08/03/25 0121    07/26/25 2121  vancomycin (VANCOCIN) 1,000 mg in sodium chloride 0.9 % 250 mL IVPB-VTB        Ordering Provider: Jona Deal MD    20 mg/kg × 45.4 kg  250 mL/hr over 60 Minutes Intravenous Once 07/26/25 2137 07/27/25 0035    07/26/25 2121  cefepime 2000 mg IVPB in 100 mL NS (MBP)        Ordering Provider: Jona Deal MD    2,000 mg  over 30 Minutes Intravenous Once 07/26/25 2137 07/26/25 2336    07/26/25 2121  cefepime 2000 mg IVPB in 100 mL NS (MBP)  Status:  Discontinued        Ordering Provider: Jona Deal MD    2,000 mg  over 4 Hours Intravenous Once 07/26/25 2137 07/26/25 2125            Allergies  Allergies as of 07/26/2025    (No Known Allergies)       Labs  Results from last 7 days   Lab Units 07/26/25  2119 07/22/25  0549   BUN " "mg/dL 11.0 10.0   CREATININE mg/dL 0.65* 0.66*     Results from last 7 days   Lab Units 07/26/25  2119 07/22/25  0901   WBC 10*3/mm3 7.83 5.36       Evaluation of Dosing  Last Dose Received in the ED/Outside Facility:               Vancomycin 1000 mg IV 7/26 at 23:35  Is Patient on Dialysis or Renal Replacement:               No    Ht - 172.7 cm (68\")  Wt - 45.4 kg (100 lb)    Estimated Creatinine Clearance: 59.2 mL/min (A) (by C-G formula based on SCr of 0.65 mg/dL (L)).  Intake & Output (last 3 days)         07/24 0701 07/25 0700 07/25 0701 07/26 0700 07/26 0701 07/27 0700    IV Piggyback   500    Total Intake(mL/kg)   500 (11)    Net   +500                   Microbiology and Radiology  Microbiology Results (last 10 days)       Procedure Component Value - Date/Time    COVID PRE-OP / PRE-PROCEDURE SCREENING ORDER (NO ISOLATION) - Swab, Nasopharynx [[id number]]  (Normal) Collected: 07/26/25 2133    Lab Status: Final result Specimen: Swab from Nasopharynx Updated: 07/26/25 2228    Narrative:      The following orders were created for panel order COVID PRE-OP / PRE-PROCEDURE SCREENING ORDER (NO ISOLATION) - Swab, Nasopharynx.  Procedure                               Abnormality         Status                     ---------                               -----------         ------                     Respiratory Panel PCR w/...[[id number]]  Normal              Final result                 Please view results for these tests on the individual orders.    Respiratory Panel PCR w/COVID-19(SARS-CoV-2) [facility] In-House, NP Swab in UTM/VTM, 2 HR TAT - Swab, Nasopharynx [[id number]]  (Normal) Collected: 07/26/25 2133    Lab Status: Final result Specimen: Swab from Nasopharynx Updated: 07/26/25 2228     ADENOVIRUS, PCR Not Detected     Coronavirus 229E Not Detected     Coronavirus HKU1 Not Detected     Coronavirus NL63 Not Detected     Coronavirus OC43 Not Detected     COVID19 Not Detected     Human " Metapneumovirus Not Detected     Human Rhinovirus/Enterovirus Not Detected     Influenza A PCR Not Detected     Influenza B PCR Not Detected     Parainfluenza Virus 1 Not Detected     Parainfluenza Virus 2 Not Detected     Parainfluenza Virus 3 Not Detected     Parainfluenza Virus 4 Not Detected     RSV, PCR Not Detected     Bordetella pertussis pcr Not Detected     Bordetella parapertussis PCR Not Detected     Chlamydophila pneumoniae PCR Not Detected     Mycoplasma pneumo by PCR Not Detected    Narrative:      In the setting of a positive respiratory panel with a viral infection PLUS a negative procalcitonin without other underlying concern for bacterial infection, consider observing off antibiotics or discontinuation of antibiotics and continue supportive care. If the respiratory panel is positive for atypical bacterial infection (Bordetella pertussis, Chlamydophila pneumoniae, or Mycoplasma pneumoniae), consider antibiotic de-escalation to target atypical bacterial infection.            Reported Vancomycin Levels                   Insight[employer] AUC Calculation:  Current AUC:   -- mg/L*hr    Predicted Steady State AUC on Current Dose: -- mg/L*hr  _________________________________  Predicted Steady State AUC on New Dose:   520 mg/L*hr    Assessment/Plan:  1. Vancomycin 1000 mg IV q 18 hours    2. Vancomycin trough is scheduled 7/28 at 10:00 prior to the 3rd dose. Predicted Steady State AUC at current dose: 520 mg/L*hr.      Cyrus Lemon, PharmD, BCPS  7/27/2025  02:12 EDT

## 2025-07-27 NOTE — PROGRESS NOTES
[facility] Medicine Services  PROGRESS NOTE    Patient Name: Flaco Roque II  : 1945  MRN: [id number]    Date of Admission: 2025  Primary Care Physician: Ariadne Galloway PA    Subjective   Subjective     CC:  SOA    HPI:  Saw patient this AM.  Said that he feels ok.  Is confused.      Objective   Objective     Vital Signs:   Temp:  [97.5 °F (36.4 °C)-98.6 °F (37 °C)] 98.6 °F (37 °C)  Heart Rate:  [74-84] 74  Resp:  [17-22] 17  BP: (110-127)/() 124/64  Flow (L/min) (Oxygen Therapy):  [2-40] 40     Physical Exam:  Constitutional: No acute distress, awake, alert  HENT: NCAT, mucous membranes moist  Respiratory: Clear to auscultation bilaterally, respiratory effort normal 40LHFNC 50%  Cardiovascular: RRR, no murmurs, rubs, or gallops  Gastrointestinal: Positive bowel sounds, soft, nontender, nondistended  Musculoskeletal: No bilateral ankle edema  Psychiatric: Appropriate affect, cooperative  Neurologic: mild confused, WHITE, speech clear  Skin: No rashes      Results Reviewed:  LAB RESULTS:      Lab 25  0841 25  0135 25  2215 25  2119 25  0901   WBC 11.25*  --   --  7.83 5.36   HEMOGLOBIN 12.2*  --   --  11.7* 11.2*   HEMATOCRIT 38.3  --   --  36.5* 35.3*   PLATELETS 325  --   --  317 239   NEUTROS ABS 11.03*  --   --  6.96  --    IMMATURE GRANS (ABS) 0.05  --   --  0.04  --    LYMPHS ABS 0.11*  --   --  0.27*  --    MONOS ABS 0.05*  --   --  0.50  --    EOS ABS 0.00  --   --  0.03  --    MCV 95.0  --   --  95.1 95.7   SED RATE  --  20  --   --   --    CRP  --   --  3.63*  --   --    PROCALCITONIN  --   --   --  0.07  --    LACTATE  --   --   --  1.9  --    HSTROP T  --   --  26* 27*  --          Lab 25  0842 25  2119 25  0549   SODIUM 137 138 142   POTASSIUM 4.4 4.0 4.1   CHLORIDE 100 100 103   CO2 26.1 26.4 29.0   ANION GAP 10.9 11.6 10.0   BUN 9.3 11.0 10.0   CREATININE 0.55* 0.65* 0.66*   EGFR 100.8 95.8 95.4  What Type Of Note Output Would You Prefer (Optional)?: Standard Output   GLUCOSE 125* 139* 114*   CALCIUM 8.8 9.1 8.7         Lab 07/27/25  0842 07/26/25  2119   TOTAL PROTEIN 5.9* 6.3   ALBUMIN 3.4* 3.5   GLOBULIN 2.5 2.8   ALT (SGPT) 10 7   AST (SGOT) 21 22   BILIRUBIN 0.8 0.7   ALK PHOS 121* 121*         Lab 07/26/25  2215 07/26/25 2119   PROBNP  --  798.0   HSTROP T 26* 27*                 Lab 07/26/25 2118   PH, ARTERIAL 7.407   PCO2, ARTERIAL 46.2*   PO2 ART 66.2*   FIO2 36   HCO3 ART 29.1*   BASE EXCESS ART 3.7*   CARBOXYHEMOGLOBIN 1.7     Brief Urine Lab Results  (Last result in the past 365 days)        Color   Clarity   Blood   Leuk Est   Nitrite   Protein   CREAT   Urine HCG        07/18/25 1010 Yellow   Clear   Negative   Negative   Negative   Negative                   Microbiology Results Abnormal       None            XR Chest 1 View  Result Date: 7/26/2025  XR CHEST 1 VW Date of Exam: 7/26/2025 8:55 PM EDT Indication: CHF/COPD Protocol Comparison: 7/18/2025, 7/16/2025. Findings: Stable elevation of the left hemidiaphragm. New patchy airspace disease is seen within the left lower lobe. Chronic interstitial changes are present diffusely.. No pleural fluid. No pneumothorax. The pulmonary vasculature appears within normal limits. The cardiac and mediastinal silhouette appear unremarkable. No acute osseous abnormality identified.     Impression: Impression: New patchy airspace disease within the left lower lobe likely related to pneumonia. Electronically Signed: Adelita Rivas MD  7/26/2025 9:16 PM EDT  Workstation ID: [id number]      Results for orders placed during the hospital encounter of 04/22/25    Adult Transthoracic Echo Complete W/ Cont if Necessary Per Protocol 04/26/2025  4:58 PM    Interpretation Summary    Left ventricular systolic function is normal. Estimated left ventricular EF = 60%    Left ventricular wall thickness is consistent with hypertrophy.    No hemodynamically significant valvular heart disease      Current medications:  Scheduled Meds:amantadine,  What Is The Reason For Today's Visit?: Full Body Skin Examination 100 mg, Oral, BID  amiodarone, 200 mg, Oral, Q24H  carbidopa-levodopa, 1 tablet, Oral, 4x Daily  carbidopa-levodopa CR, 1 tablet, Oral, BID  cefepime, 2,000 mg, Intravenous, Q24H  fentaNYL, 1 patch, Transdermal, Q72H   And  Check Fentanyl Patch Placement, 1 each, Not Applicable, Q12H  enoxaparin sodium, 30 mg, Subcutaneous, Daily  ipratropium-albuterol, 3 mL, Nebulization, Q4H - RT  Lidocaine, 1 patch, Transdermal, Q24H  Lidocaine, 2 patch, Transdermal, Q24H  methylPREDNISolone sodium succinate, 40 mg, Intravenous, Q12H  multivitamin with minerals, 1 tablet, Oral, Daily  pantoprazole, 40 mg, Intravenous, Q AM  senna-docusate sodium, 2 tablet, Oral, BID  sodium chloride, 10 mL, Intravenous, Q12H  vancomycin, 22 mg/kg, Intravenous, Q18H      Continuous Infusions:lactated ringers, 75 mL/hr, Last Rate: 75 mL/hr (07/27/25 0224)  Pharmacy to dose vancomycin,       PRN Meds:.  acetaminophen **OR** acetaminophen **OR** acetaminophen    albuterol    albuterol    senna-docusate sodium **AND** polyethylene glycol **AND** bisacodyl **AND** bisacodyl    Calcium Replacement - Follow Nurse / BPA Driven Protocol    ipratropium-albuterol    Magnesium Standard Dose Replacement - Follow Nurse / BPA Driven Protocol    nitroglycerin    ondansetron    Pharmacy to dose vancomycin    Phosphorus Replacement - Follow Nurse / BPA Driven Protocol    Potassium Replacement - Follow Nurse / BPA Driven Protocol    sodium chloride    sodium chloride    sodium chloride    tiZANidine    Assessment & Plan   Assessment & Plan     Active Hospital Problems    Diagnosis  POA    **Acute on chronic hypoxic respiratory failure [J96.21]  Yes      Resolved Hospital Problems   No resolved problems to display.        Brief Hospital Course to date:  Flaco Roque II is a 79 y.o. male presented from acute rehab with acute hypoxic respiratory failure likely secondary to pneumonia concern for aspiration versus hospital-acquired.  Note was just admitted to [facility]   7/18/25 to 7/24/25 after a fall at home with multiple rib fractures.     Acute on chronic hypoxic respiratory failure(baseline 2 L nasal cannula)  Left lower lobe pneumonia (HAP versus aspiration)  Multiple rib fracture  COPD, with exacerbation likely secondary to pneumonia   - On high flow nasal cannula to keep SpO2 above 90%, titrate to return to baseline  - CXR shows new patchy airspace disease in LLL likely related to pna  - with the high amount of oxygen he is requiring will check a CTA chest  - DuoNebs scheduled and as needed  - continue vancomycin and cefepime  - MRSA, strep pneumo, Legionella all negative  - respiratory panel pcr negative  - Aggressive pulmonary toileting  - Due to the wheezing, and oxygen requirement will started Solu-Medrol 40 twice daily IV  - N.p.o. except for sip of water and medication for now until evaluated by speech consult     Acute/subacute nondisplaced Rib Fractures (right 9-11th; left 3-4th),   This limited his ability to take a deep breath which might causing him to develop pneumonia  - started lidocaine patches  - Fentanyl patches every 72 hours  - Will likely need rehab, case management consulted    Mild dehydration:  S/p IVF, will stop IVF for now with his respiratory status      Mildly elevated/flat troponins  Paroxysmal atrial fibrillation  Chronic, stable  Denied chest pain  - troponins flat  -  We are holding eliquis going forward (frequent/recurrent falls)  -patient would NOT want heart cath or interventions in event of acute cardiac event  -stop eliquis (frequent and significant falls), patient & family agree with this, understand risk of stroke and cardiac events  -continue amiodarone, not on beta-blocker anymore?     Parkinsons dz  Dysphagia  Severe protein malnutrition  ataxia, autonomic dysfunction due to parkinsons  -frequent falls  -not interested in tube feedings, only interested in regular/comfort diet (he understands risk as does family)  -continue sinemet,  amantadine  -dietition consult to maximize nutrition (not interested in tube feeds..he wants regular diet)  -pt/ot juanals   - Palliative care was seen him last visit no indication to consult at this time; not interested in Hospice at this time.      Chronic constipation  Has no bowel movement in the last 4 days  Scheduled MiraLAX, senna and bisacodyl     Gerd  -ppi     Chronic back pain  -fentanyl pain pump, recently refilled,  Fentanyl patches and lidocaine for chest pain      Called and updated patient's son, Bright Roque over the phone.        Expected Discharge Location and Transportation:   Expected Discharge   Expected discharge date/ time has not been documented.     VTE Prophylaxis:  Pharmacologic VTE prophylaxis orders are present.         AM-PAC 6 Clicks Score (PT): 6 (07/27/25 0059)    CODE STATUS:   Code Status and Medical Interventions: No CPR (Do Not Attempt to Resuscitate); Limited Support; No intubation (DNI), No artificial nutrition, No cardioversion, No dialysis, No vasopressors   Ordered at: 07/26/25 8719     Code Status (Patient has no pulse and is not breathing):    No CPR (Do Not Attempt to Resuscitate)     Medical Interventions (Patient has pulse or is breathing):    Limited Support     Medical Intervention Limits:    No intubation (DNI)       No artificial nutrition       No cardioversion       No dialysis       No vasopressors     Level Of Support Discussed With:    Patient       Randal Perez MD  07/27/25

## 2025-07-27 NOTE — PROGRESS NOTES
Malnutrition Severity Assessment    Patient Name:  Flaco Roque II  YOB: 1945  MRN: [id number]  Admit Date:  7/26/2025    Patient meets criteria for : Severe Malnutrition chronic     Malnutrition Severity Assessment  Malnutrition Type: Chronic Disease - Related Malnutrition  Malnutrition Type (Last 8 Hours)       Malnutrition Severity Assessment       Row Name 07/27/25 1401       Malnutrition Severity Assessment    Malnutrition Type Chronic Disease - Related Malnutrition      Row Name 07/27/25 1401       Insufficient Energy Intake     Insufficient Energy Intake Findings Severe    Insufficient Energy Intake  <75% of est. energy requirement for > or equal to 1 month      Row Name 07/27/25 1401       Muscle Loss    Loss of Muscle Mass Findings Severe    Hulbert Region Severe - deep hollowing/scooping, lack of muscle to touch, facial bones well defined    Clavicle Bone Region Severe - protruding prominent bone    Acromion Bone Region Severe - squared shoulders, bones, and acromion process protrusion prominent    Scapular Bone Region Severe - prominent bones, depressions easily visible between ribs, scapula, spine, shoulders    Dorsal Hand Region Severe - prominent depression    Patellar Region Severe - prominent bone, square looking, very little muscle definition    Anterior Thigh Region Severe - line/depression along thigh, obviously thin    Posterior Calf Region Severe - thin with very little definition/firmness      Row Name 07/27/25 1401       Fat Loss    Subcutaneous Fat Loss Findings Severe    Orbital Region  Severe - pronounced hollowness/depression, dark circles, loose saggy skin    Upper Arm Region Severe - mostly skin, very little space between folds, fingers touch    Thoracic & Lumbar Region Severe - ribs visible with prominent depressions, iliac crest very prominent      Row Name 07/27/25 1401       Criteria Met (Must meet criteria for severity in at least 2 of these categories: M  Wasting, Fat Loss, Fluid, Secondary Signs, Wt. Status, Intake)    Patient meets criteria for  Severe Malnutrition                    Electronically signed by:  Melissa Hoyt RD  07/27/25 14:01 EDT
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
## 2025-07-28 ENCOUNTER — APPOINTMENT (OUTPATIENT)
Dept: CT IMAGING | Facility: HOSPITAL | Age: 80
End: 2025-07-28
Payer: MEDICARE

## 2025-07-28 LAB
ANION GAP SERPL CALCULATED.3IONS-SCNC: 11 MMOL/L (ref 5–15)
BUN SERPL-MCNC: 14.7 MG/DL (ref 8–23)
BUN/CREAT SERPL: 23.3 (ref 7–25)
CALCIUM SPEC-SCNC: 8.7 MG/DL (ref 8.6–10.5)
CHLORIDE SERPL-SCNC: 100 MMOL/L (ref 98–107)
CO2 SERPL-SCNC: 28 MMOL/L (ref 22–29)
CREAT SERPL-MCNC: 0.63 MG/DL (ref 0.76–1.27)
DEPRECATED RDW RBC AUTO: 58.6 FL (ref 37–54)
EGFRCR SERPLBLD CKD-EPI 2021: 96.8 ML/MIN/1.73
ERYTHROCYTE [DISTWIDTH] IN BLOOD BY AUTOMATED COUNT: 16.8 % (ref 12.3–15.4)
GLUCOSE SERPL-MCNC: 138 MG/DL (ref 65–99)
HCT VFR BLD AUTO: 38.4 % (ref 37.5–51)
HGB BLD-MCNC: 11.9 G/DL (ref 13–17.7)
MCH RBC QN AUTO: 30 PG (ref 26.6–33)
MCHC RBC AUTO-ENTMCNC: 31 G/DL (ref 31.5–35.7)
MCV RBC AUTO: 96.7 FL (ref 79–97)
PLATELET # BLD AUTO: 401 10*3/MM3 (ref 140–450)
PMV BLD AUTO: 10.3 FL (ref 6–12)
POTASSIUM SERPL-SCNC: 4.2 MMOL/L (ref 3.5–5.2)
RBC # BLD AUTO: 3.97 10*6/MM3 (ref 4.14–5.8)
SODIUM SERPL-SCNC: 139 MMOL/L (ref 136–145)
VANCOMYCIN TROUGH SERPL-MCNC: 5.4 MCG/ML (ref 5–20)
WBC NRBC COR # BLD AUTO: 5.94 10*3/MM3 (ref 3.4–10.8)

## 2025-07-28 PROCEDURE — 25010000002 ENOXAPARIN PER 10 MG

## 2025-07-28 PROCEDURE — 80048 BASIC METABOLIC PNL TOTAL CA: CPT | Performed by: INTERNAL MEDICINE

## 2025-07-28 PROCEDURE — 71275 CT ANGIOGRAPHY CHEST: CPT

## 2025-07-28 PROCEDURE — 85027 COMPLETE CBC AUTOMATED: CPT | Performed by: INTERNAL MEDICINE

## 2025-07-28 PROCEDURE — 94799 UNLISTED PULMONARY SVC/PX: CPT

## 2025-07-28 PROCEDURE — 80202 ASSAY OF VANCOMYCIN: CPT

## 2025-07-28 PROCEDURE — 87070 CULTURE OTHR SPECIMN AEROBIC: CPT

## 2025-07-28 PROCEDURE — 25010000002 VANCOMYCIN 1 G RECONSTITUTED SOLUTION 1 EACH VIAL

## 2025-07-28 PROCEDURE — 25510000001 IOPAMIDOL PER 1 ML: Performed by: INTERNAL MEDICINE

## 2025-07-28 PROCEDURE — 99232 SBSQ HOSP IP/OBS MODERATE 35: CPT | Performed by: INTERNAL MEDICINE

## 2025-07-28 PROCEDURE — 25010000002 METHYLPREDNISOLONE PER 40 MG

## 2025-07-28 PROCEDURE — 94664 DEMO&/EVAL PT USE INHALER: CPT

## 2025-07-28 PROCEDURE — 25810000003 SODIUM CHLORIDE 0.9 % SOLUTION 250 ML FLEX CONT

## 2025-07-28 PROCEDURE — 94761 N-INVAS EAR/PLS OXIMETRY MLT: CPT

## 2025-07-28 PROCEDURE — 25010000002 CEFEPIME PER 500 MG

## 2025-07-28 PROCEDURE — 87205 SMEAR GRAM STAIN: CPT

## 2025-07-28 RX ORDER — PANTOPRAZOLE SODIUM 40 MG/1
40 TABLET, DELAYED RELEASE ORAL
Status: DISCONTINUED | OUTPATIENT
Start: 2025-07-29 | End: 2025-08-02 | Stop reason: HOSPADM

## 2025-07-28 RX ORDER — IOPAMIDOL 755 MG/ML
85 INJECTION, SOLUTION INTRAVASCULAR
Status: COMPLETED | OUTPATIENT
Start: 2025-07-28 | End: 2025-07-28

## 2025-07-28 RX ADMIN — VANCOMYCIN HYDROCHLORIDE 1000 MG: 1 INJECTION, POWDER, LYOPHILIZED, FOR SOLUTION INTRAVENOUS at 14:15

## 2025-07-28 RX ADMIN — IOPAMIDOL 85 ML: 755 INJECTION, SOLUTION INTRAVENOUS at 11:39

## 2025-07-28 RX ADMIN — CARBIDOPA AND LEVODOPA 1 TABLET: 25; 100 TABLET ORAL at 17:41

## 2025-07-28 RX ADMIN — CEFEPIME 1000 MG: 1 INJECTION, POWDER, FOR SOLUTION INTRAMUSCULAR; INTRAVENOUS at 21:33

## 2025-07-28 RX ADMIN — SENNOSIDES AND DOCUSATE SODIUM 2 TABLET: 50; 8.6 TABLET ORAL at 21:33

## 2025-07-28 RX ADMIN — Medication 1 TABLET: at 09:25

## 2025-07-28 RX ADMIN — METHYLPREDNISOLONE SODIUM SUCCINATE 40 MG: 40 INJECTION, POWDER, FOR SOLUTION INTRAMUSCULAR; INTRAVENOUS at 15:23

## 2025-07-28 RX ADMIN — Medication 10 ML: at 11:00

## 2025-07-28 RX ADMIN — IPRATROPIUM BROMIDE AND ALBUTEROL SULFATE 3 ML: 2.5; .5 SOLUTION RESPIRATORY (INHALATION) at 07:22

## 2025-07-28 RX ADMIN — CARBIDOPA AND LEVODOPA 1 TABLET: 50; 200 TABLET, EXTENDED RELEASE ORAL at 21:33

## 2025-07-28 RX ADMIN — PANTOPRAZOLE SODIUM 40 MG: 40 INJECTION, POWDER, FOR SOLUTION INTRAVENOUS at 05:52

## 2025-07-28 RX ADMIN — CEFEPIME 1000 MG: 1 INJECTION, POWDER, FOR SOLUTION INTRAMUSCULAR; INTRAVENOUS at 11:02

## 2025-07-28 RX ADMIN — ACETAMINOPHEN 650 MG: 325 TABLET ORAL at 22:49

## 2025-07-28 RX ADMIN — ENOXAPARIN SODIUM 30 MG: 100 INJECTION SUBCUTANEOUS at 09:25

## 2025-07-28 RX ADMIN — CARBIDOPA AND LEVODOPA 1 TABLET: 50; 200 TABLET, EXTENDED RELEASE ORAL at 09:24

## 2025-07-28 RX ADMIN — AMIODARONE HYDROCHLORIDE 200 MG: 200 TABLET ORAL at 09:25

## 2025-07-28 RX ADMIN — CARBIDOPA AND LEVODOPA 1 TABLET: 25; 100 TABLET ORAL at 12:54

## 2025-07-28 RX ADMIN — AMANTADINE HYDROCHLORIDE 100 MG: 100 CAPSULE ORAL at 21:33

## 2025-07-28 RX ADMIN — IPRATROPIUM BROMIDE AND ALBUTEROL SULFATE 3 ML: 2.5; .5 SOLUTION RESPIRATORY (INHALATION) at 04:01

## 2025-07-28 RX ADMIN — CARBIDOPA AND LEVODOPA 1 TABLET: 25; 100 TABLET ORAL at 21:33

## 2025-07-28 RX ADMIN — SENNOSIDES AND DOCUSATE SODIUM 2 TABLET: 50; 8.6 TABLET ORAL at 09:25

## 2025-07-28 RX ADMIN — Medication 10 ML: at 21:33

## 2025-07-28 RX ADMIN — IPRATROPIUM BROMIDE AND ALBUTEROL SULFATE 3 ML: 2.5; .5 SOLUTION RESPIRATORY (INHALATION) at 18:56

## 2025-07-28 RX ADMIN — CARBIDOPA AND LEVODOPA 1 TABLET: 25; 100 TABLET ORAL at 09:25

## 2025-07-28 RX ADMIN — METHYLPREDNISOLONE SODIUM SUCCINATE 40 MG: 40 INJECTION, POWDER, FOR SOLUTION INTRAMUSCULAR; INTRAVENOUS at 02:36

## 2025-07-28 RX ADMIN — VANCOMYCIN HYDROCHLORIDE 1000 MG: 1 INJECTION, POWDER, LYOPHILIZED, FOR SOLUTION INTRAVENOUS at 14:27

## 2025-07-28 RX ADMIN — AMANTADINE HYDROCHLORIDE 100 MG: 100 CAPSULE ORAL at 09:24

## 2025-07-28 NOTE — PROGRESS NOTES
"Pharmacy Consult-Vancomycin Dosing  Flaco Roque II is a  79 y.o. male receiving vancomycin therapy.     Indication: pneumonia  Consulting Provider: hospitalist  ID Consult: No    Goal AUC: 400 - 600 mg/L*hr    Current Antimicrobial Therapy  Anti-Infectives (From admission, onward)      Ordered     Dose/Rate Route Frequency Start Stop    07/28/25 0751  cefepime 1000 mg IVPB in 100 mL NS (MBP)        Ordering Provider: Luanne Hernandes RPH    1,000 mg  over 4 Hours Intravenous Every 12 Hours 07/28/25 1000 08/03/25 0959    07/27/25 0122  cefepime 2000 mg IVPB in 100 mL NS (MBP)  Status:  Discontinued        Ordering Provider: Shakila Lyle MD    2,000 mg  over 4 Hours Intravenous Every 24 Hours 07/27/25 2200 07/28/25 0751    07/27/25 0211  vancomycin (VANCOCIN) 1,000 mg in sodium chloride 0.9 % 250 mL IVPB-VTB        Ordering Provider: Cyrus Lemon, PharmD   Placed in \"And\" Linked Group    22 mg/kg × 45.4 kg  250 mL/hr over 60 Minutes Intravenous Every 18 Hours 07/27/25 1800 08/02/25 1959    07/27/25 0122  cefepime 2000 mg IVPB in 100 mL NS (MBP)  Status:  Discontinued        Ordering Provider: Shakila Lyle MD    2,000 mg  over 30 Minutes Intravenous Once 07/27/25 0215 07/27/25 0128    07/27/25 0122  Pharmacy to dose vancomycin        Ordering Provider: Shakila Lyle MD     Not Applicable Continuous PRN 07/27/25 0122 08/03/25 0121    07/26/25 2121  vancomycin (VANCOCIN) 1,000 mg in sodium chloride 0.9 % 250 mL IVPB-VTB        Ordering Provider: Jona Deal MD    20 mg/kg × 45.4 kg  250 mL/hr over 60 Minutes Intravenous Once 07/26/25 2137 07/27/25 0035    07/26/25 2121  cefepime 2000 mg IVPB in 100 mL NS (MBP)        Ordering Provider: Jona Deal MD    2,000 mg  over 30 Minutes Intravenous Once 07/26/25 2137 07/26/25 2336    07/26/25 2121  cefepime 2000 mg IVPB in 100 mL NS (MBP)  Status:  Discontinued        Ordering Provider: Jona Deal MD    2,000 mg  over 4 " "Hours Intravenous Once 07/26/25 2137 07/26/25 2125            Allergies  Allergies as of 07/26/2025    (No Known Allergies)       Labs  Results from last 7 days   Lab Units 07/28/25  0619 07/27/25  0842 07/26/25  2119   BUN mg/dL 14.7 9.3 11.0   CREATININE mg/dL 0.63* 0.55* 0.65*     Results from last 7 days   Lab Units 07/28/25  0619 07/27/25  0841 07/26/25 2119   WBC 10*3/mm3 5.94 11.25* 7.83       Evaluation of Dosing  Last Dose Received in the ED/Outside Facility:               Vancomycin 1000 mg IV 7/26 at 23:35  Is Patient on Dialysis or Renal Replacement:               No    Ht - 172.7 cm (68\")  Wt - 44.4 kg (97 lb 14.2 oz)    Estimated Creatinine Clearance: 59.7 mL/min (A) (by C-G formula based on SCr of 0.63 mg/dL (L)).  Intake & Output (last 3 days)         07/25 0701 07/26 0700 07/26 0701  07/27 0700 07/27 0701  07/28 0700 07/28 0701  07/29 0700    P.O.    240    IV Piggyback  500      Total Intake(mL/kg)  500 (11.3)  240 (5.4)    Urine (mL/kg/hr)   1400 (1.3) 100 (0.3)    Total Output   1400 100    Net  +500 -1400 +140            Urine Unmeasured Occurrence   1 x             Microbiology and Radiology  Microbiology Results (last 10 days)       Procedure Component Value - Date/Time    Respiratory Culture - Sputum, Oropharynx [[id number]] Collected: 07/28/25 0957    Lab Status: Preliminary result Specimen: Sputum from Oropharynx Updated: 07/28/25 1130     Gram Stain Moderate (3+) WBCs per low power field      Rare (1+) Epithelial cells per low power field      Few (2+) Mixed bacterial morphotypes seen on Gram Stain    Legionella Antigen, Urine - Urine, Urine, Clean Catch [[id number]]  (Normal) Collected: 07/27/25 0149    Lab Status: Final result Specimen: Urine, Clean Catch Updated: 07/27/25 1327     LEGIONELLA ANTIGEN, URINE Negative    S. Pneumo Ag Urine or CSF - Urine, Urine, Clean Catch [[id number]]  (Normal) Collected: 07/27/25 0149    Lab Status: Final result Specimen: Urine, Clean Catch Updated: " 07/27/25 1327     Strep Pneumo Ag Negative    Blood Culture - Blood, Arm, Right [[id number]]  (Normal) Collected: 07/26/25 2158    Lab Status: Preliminary result Specimen: Blood from Arm, Right Updated: 07/28/25 0645     Blood Culture No growth at 24 hours    Blood Culture - Blood, Arm, Right [[id number]]  (Normal) Collected: 07/26/25 2158    Lab Status: Preliminary result Specimen: Blood from Arm, Right Updated: 07/28/25 0645     Blood Culture No growth at 24 hours    COVID PRE-OP / PRE-PROCEDURE SCREENING ORDER (NO ISOLATION) - Swab, Nasopharynx [[id number]]  (Normal) Collected: 07/26/25 2133    Lab Status: Final result Specimen: Swab from Nasopharynx Updated: 07/26/25 2228    Narrative:      The following orders were created for panel order COVID PRE-OP / PRE-PROCEDURE SCREENING ORDER (NO ISOLATION) - Swab, Nasopharynx.  Procedure                               Abnormality         Status                     ---------                               -----------         ------                     Respiratory Panel PCR w/...[[id number]]  Normal              Final result                 Please view results for these tests on the individual orders.    Respiratory Panel PCR w/COVID-19(SARS-CoV-2) SHIRLEY/ALESSIO/LOUISA/PAD/COR/ANGELA In-House, NP Swab in UTM/VTM, 2 HR TAT - Swab, Nasopharynx [[id number]]  (Normal) Collected: 07/26/25 2133    Lab Status: Final result Specimen: Swab from Nasopharynx Updated: 07/26/25 2228     ADENOVIRUS, PCR Not Detected     Coronavirus 229E Not Detected     Coronavirus HKU1 Not Detected     Coronavirus NL63 Not Detected     Coronavirus OC43 Not Detected     COVID19 Not Detected     Human Metapneumovirus Not Detected     Human Rhinovirus/Enterovirus Not Detected     Influenza A PCR Not Detected     Influenza B PCR Not Detected     Parainfluenza Virus 1 Not Detected     Parainfluenza Virus 2 Not Detected     Parainfluenza Virus 3 Not Detected     Parainfluenza Virus 4 Not Detected     RSV, PCR Not Detected      Bordetella pertussis pcr Not Detected     Bordetella parapertussis PCR Not Detected     Chlamydophila pneumoniae PCR Not Detected     Mycoplasma pneumo by PCR Not Detected    Narrative:      In the setting of a positive respiratory panel with a viral infection PLUS a negative procalcitonin without other underlying concern for bacterial infection, consider observing off antibiotics or discontinuation of antibiotics and continue supportive care. If the respiratory panel is positive for atypical bacterial infection (Bordetella pertussis, Chlamydophila pneumoniae, or Mycoplasma pneumoniae), consider antibiotic de-escalation to target atypical bacterial infection.    MRSA Screen, PCR (Inpatient) - Swab, Nares [[id number]]  (Normal) Collected: 07/26/25 2133    Lab Status: Final result Specimen: Swab from Nares Updated: 07/27/25 9673     MRSA PCR Negative    Narrative:      The negative predictive value of this diagnostic test is high and should only be used to consider de-escalating anti-MRSA therapy. A positive result may indicate colonization with MRSA and must be correlated clinically.  MRSA Negative            Reported Vancomycin Levels          Results from last 7 days   Lab Units 07/28/25  1322   VANCOMYCIN TR mcg/mL 5.40          [facility] AUC Calculation:  Current AUC:   278mg/L*hr    Predicted Steady State AUC on Current Dose: 367 mg/L*hr  _________________________________  Predicted Steady State AUC on New Dose:   549 mg/L*hr    Assessment/Plan:  1. Vancomycin 1g IV q 12 hours    2. If Vanco duration is extended, will order an AM level on 8/1    Luanne Hernandes RPH  7/28/2025  14:11 EDT

## 2025-07-28 NOTE — CASE MANAGEMENT/SOCIAL WORK
Discharge Planning Assessment  [facility]     Patient Name: Flaco Roque II  MRN: [id number]  Today's Date: 7/28/2025    Admit Date: 7/26/2025    Plan: IPR?   Discharge Needs Assessment       Row Name 07/28/25 0926       Living Environment    People in Home spouse    Current Living Arrangements home    Potentially Unsafe Housing Conditions none    In the past 12 months has the electric, gas, oil, or water company threatened to shut off services in your home? No    Primary Care Provided by self    Provides Primary Care For no one, unable/limited ability to care for self    Family Caregiver if Needed spouse;child(pawel), adult    Quality of Family Relationships helpful;involved;supportive    Able to Return to Prior Arrangements yes       Resource/Environmental Concerns    Resource/Environmental Concerns none    Transportation Concerns none       Transportation Needs    In the past 12 months, has lack of transportation kept you from medical appointments or from getting medications? no    In the past 12 months, has lack of transportation kept you from meetings, work, or from getting things needed for daily living? No       Food Insecurity    Within the past 12 months, you worried that your food would run out before you got the money to buy more. Never true    Within the past 12 months, the food you bought just didn't last and you didn't have money to get more. Never true       Transition Planning    Patient/Family Anticipates Transition to inpatient rehabilitation facility    Patient/Family Anticipated Services at Transition skilled nursing    Transportation Anticipated family or friend will provide       Discharge Needs Assessment    Readmission Within the Last 30 Days current reason for admission unrelated to previous admission    Equipment Currently Used at Home oxygen;walker, rolling;shower chair;cane, straight    Concerns to be Addressed discharge planning    Do you want help finding or keeping work or a job? I  do not need or want help    Do you want help with school or training? For example, starting or completing job training or getting a high school diploma, GED or equivalent No    Anticipated Changes Related to Illness none    Equipment Needed After Discharge none    Discharge Facility/Level of Care Needs nursing facility, skilled                   Discharge Plan       Row Name 07/28/25 0949       Plan    Plan IPR?    Plan Comments Met with Mr. Roque at the bedside to initiate discharge planning. He shares a home in [place] with his wife. He came [facility] the hospital from [facility] and has until midnight to return without insurance precert. He is independent with self-care at baseline and grandson assists with medications and transportation. He uses a rolling walker at baseline and wears 2L oxygen. He reported he has other DME at home and does not need anything else. He confirmed that he has Medicare A&B, and primary care provider is SHARON Lazaro. He is not current with home health or outpatient services. He denies obstacles to getting medical care or obtaining medications. His discharge plan is back to [facility].  will continue to follow plan of care and assist with discharge planning needs as indicated.    Final Discharge Disposition Code 62 - inpatient rehab facility                  Continued Care and Services - Admitted Since 7/26/2025    No active coordination exists.       Selected Continued Care - Prior Encounters Includes continued care and service providers with selected services from prior encounters from 4/27/2025 to 7/28/2025      Discharged on 7/24/2025 Admission date: 7/18/2025 - Discharge disposition: Rehab Facility or Unit (DC - External)      Destination       Service Provider Services Address Phone Fax Patient Preferred    [facility] [address] [address] [phone] --                       Discharged on 5/22/2025 Admission date: 5/18/2025 - Discharge disposition: Home-Health Care [facility]      Home Medical Care       Service Provider Services Address Phone Fax Patient Preferred    [facility] Nursing [address] [phone] --                             Demographic Summary       Row Name 07/28/25 0920       General Information    Admission Type inpatient    Arrived From emergency department    Referral Source admission list    Reason for Consult discharge planning    Preferred Language English       Contact Information    Permission Granted to Share Info With family/designee    Contact Information Obtained for     Contact Information Comments Cheryl Roque Spouse [phone]  ALESSIO ROQUE Son [phone]      KRYSTYNA ROQUE Son   [phone]                   Functional Status       Row Name 07/28/25 0921       Functional Status    Usual Activity Tolerance good    Current Activity Tolerance moderate       Physical Activity    On average, how many days per week do you engage in moderate to strenuous exercise (like a brisk walk)? 3 days    On average, how many minutes do you engage in exercise at this level? 20 min    Number of minutes of exercise per week 60       Assessment of Health Literacy    How often do you have someone help you read hospital materials? Never    How often do you have problems learning about your medical condition because of difficulty understanding written information? Never    How often do you have a problem understanding what is told to you about your medical condition? Never    How confident are you filling out medical forms by yourself? Quite a bit    Health Literacy Good       Functional Status, IADL    Medications assistive person    Meal Preparation assistive person    Housekeeping assistive person    Laundry assistive person     Shopping assistive person    If for any reason you need help with day-to-day activities such as bathing, preparing meals, shopping, managing finances, etc., do you get the help you need? I don't need any help       Mental Status    General Appearance WDL WDL       Mental Status Summary    Recent Changes in Mental Status/Cognitive Functioning no changes                   Psychosocial    No documentation.                  Abuse/Neglect    No documentation.                  Legal    No documentation.                  Substance Abuse    No documentation.                  Patient Forms    No documentation.                     Melody Milan RN

## 2025-07-28 NOTE — PROGRESS NOTES
"    [facility] Medicine Services  PROGRESS NOTE    Patient Name: Flaco Roque II  : 1945  MRN: [id number]    Date of Admission: 2025  Primary Care Physician: Ariadne Galloway PA    Subjective   Subjective     CC:  SOA    HPI:  Saw patient this AM.  States that feels \"pretty good.\"  Says is is breathing ok.  S/p FEES yesterday and recommended for mechanical soft diet    Objective   Objective     Vital Signs:   Temp:  [98.2 °F (36.8 °C)-98.4 °F (36.9 °C)] 98.2 °F (36.8 °C)  Heart Rate:  [69-84] 70  Resp:  [16-18] 18  BP: (132-147)/(77-92) 147/89  Flow (L/min) (Oxygen Therapy):  [3-40] 3     Physical Exam:  Constitutional: No acute distress, awake, alert  HENT: NCAT, mucous membranes moist  Respiratory: Clear to auscultation bilaterally, respiratory effort normal 40LHFNC 40%  Cardiovascular: RRR, no murmurs, rubs, or gallops  Gastrointestinal: Positive bowel sounds, soft, nontender, nondistended  Musculoskeletal: No bilateral ankle edema  Psychiatric: Appropriate affect, cooperative  Neurologic: mild confused, WHITE, speech clear  Skin: No rashes      Results Reviewed:  LAB RESULTS:      Lab 25  0619 25  0841 25  0135 25  2215 25  2119 25  0901   WBC 5.94 11.25*  --   --  7.83 5.36   HEMOGLOBIN 11.9* 12.2*  --   --  11.7* 11.2*   HEMATOCRIT 38.4 38.3  --   --  36.5* 35.3*   PLATELETS 401 325  --   --  317 239   NEUTROS ABS  --  11.03*  --   --  6.96  --    IMMATURE GRANS (ABS)  --  0.05  --   --  0.04  --    LYMPHS ABS  --  0.11*  --   --  0.27*  --    MONOS ABS  --  0.05*  --   --  0.50  --    EOS ABS  --  0.00  --   --  0.03  --    MCV 96.7 95.0  --   --  95.1 95.7   SED RATE  --   --  20  --   --   --    CRP  --   --   --  3.63*  --   --    PROCALCITONIN  --   --   --   --  0.07  --    LACTATE  --   --   --   --  1.9  --    HSTROP T  --   --   --  26* 27*  --          Lab 25  0619 25  0842 259 25  0549   SODIUM " 139 137 138 142   POTASSIUM 4.2 4.4 4.0 4.1   CHLORIDE 100 100 100 103   CO2 28.0 26.1 26.4 29.0   ANION GAP 11.0 10.9 11.6 10.0   BUN 14.7 9.3 11.0 10.0   CREATININE 0.63* 0.55* 0.65* 0.66*   EGFR 96.8 100.8 95.8 95.4   GLUCOSE 138* 125* 139* 114*   CALCIUM 8.7 8.8 9.1 8.7         Lab 07/27/25  0842 07/26/25 2119   TOTAL PROTEIN 5.9* 6.3   ALBUMIN 3.4* 3.5   GLOBULIN 2.5 2.8   ALT (SGPT) 10 7   AST (SGOT) 21 22   BILIRUBIN 0.8 0.7   ALK PHOS 121* 121*         Lab 07/26/25 2215 07/26/25 2119   PROBNP  --  798.0   HSTROP T 26* 27*                 Lab 07/26/25 2118   PH, ARTERIAL 7.407   PCO2, ARTERIAL 46.2*   PO2 ART 66.2*   FIO2 36   HCO3 ART 29.1*   BASE EXCESS ART 3.7*   CARBOXYHEMOGLOBIN 1.7     Brief Urine Lab Results  (Last result in the past 365 days)        Color   Clarity   Blood   Leuk Est   Nitrite   Protein   CREAT   Urine HCG        07/18/25 1010 Yellow   Clear   Negative   Negative   Negative   Negative                   Microbiology Results Abnormal       None            CT Angiogram Chest Pulmonary Embolism  Result Date: 7/28/2025  CT ANGIOGRAM CHEST PULMONARY EMBOLISM Date of Exam: 7/28/2025 11:28 AM EDT Indication: acute respiratory failure. Comparison: CT chest 7/18/2025 Technique: Axial CT images were obtained of the chest after the uneventful intravenous administration of 85 mL Isovue-370 utilizing pulmonary embolism protocol.  In addition, a 3-D volume rendered image was created for interpretation.  Reconstructed coronal and sagittal images were also obtained. Automated exposure control and iterative construction methods were used. Findings: Normal appearance of the pulmonary arteries without evidence of pulmonary embolism. There is atherosclerosis and ectasia of the thoracic aorta. There are coronary artery calcifications. No pericardial effusion. There is beam hardening and streak artifact  relating to bilateral shoulder arthroplasties. There is elevation of the left hemidiaphragm with  segmental passive atelectasis in the left lower lobe. There is emphysema. There is a small right pleural effusion with passive subsegmental atelectasis in the right lower lobe.. No pneumothorax. The bones are demineralized. There is scoliosis. No acute or suspicious bony findings. Grossly unremarkable appearance of the partially imaged upper abdomen.     Impression: Impression: No evidence of pulmonary embolism. Elevation of the left hemidiaphragm with segmental passive atelectasis in the left lower lobe. Small right pleural effusion with passive subsegmental atelectasis at the right lung base. Emphysema. Correlate with patient history and risk factors and please assess if the patient meets criteria for routine low dose CT lung cancer screening. Electronically Signed: Jona Carrasquillo MD  7/28/2025 11:54 AM EDT  Workstation ID: [id number]    XR Hand 3+ View Left  Result Date: 7/27/2025  XR HAND 3+ VW LEFT Date of Exam: 7/27/2025 1:33 PM EDT Indication: left hand pain s/p fall Comparison: None available. Findings: There is no evidence of acute fracture. Well-corticated ossific fragments along the ulnar aspect of the fifth metacarpal head likely represents remote trauma. Normal joint alignment. There is mild base of thumb as well as radiocarpal, first MCP, and scattered IP joint arthritis. Scattered chondrocalcinosis. Peripheral IV noted. There is a soft tissue linear radiodensity measuring 4 mm in the radial aspect of the second digit at the level of the middle phalanx, which may represent a retained foreign body.     Impression: Impression: 1.No evidence of acute fracture or dislocation. 2.Possible 4 mm retained foreign body in the radial aspect of the second digit at the level of the middle phalanx. Electronically Signed: Camden Tapia MD  7/27/2025 4:43 PM EDT  Workstation ID: [id number]    SLP FEES - Fiberoptic Endo Eval Swallow  Result Date: 7/27/2025  This procedure was auto-finalized with no dictation  required.    XR Chest 1 View  Result Date: 7/26/2025  XR CHEST 1 VW Date of Exam: 7/26/2025 8:55 PM EDT Indication: CHF/COPD Protocol Comparison: 7/18/2025, 7/16/2025. Findings: Stable elevation of the left hemidiaphragm. New patchy airspace disease is seen within the left lower lobe. Chronic interstitial changes are present diffusely.. No pleural fluid. No pneumothorax. The pulmonary vasculature appears within normal limits. The cardiac and mediastinal silhouette appear unremarkable. No acute osseous abnormality identified.     Impression: Impression: New patchy airspace disease within the left lower lobe likely related to pneumonia. Electronically Signed: Adelita Rivas MD  7/26/2025 9:16 PM EDT  Workstation ID: [id number]      Results for orders placed during the hospital encounter of 04/22/25    Adult Transthoracic Echo Complete W/ Cont if Necessary Per Protocol 04/26/2025  4:58 PM    Interpretation Summary    Left ventricular systolic function is normal. Estimated left ventricular EF = 60%    Left ventricular wall thickness is consistent with hypertrophy.    No hemodynamically significant valvular heart disease      Current medications:  Scheduled Meds:amantadine, 100 mg, Oral, BID  amiodarone, 200 mg, Oral, Q24H  carbidopa-levodopa, 1 tablet, Oral, 4x Daily  carbidopa-levodopa CR, 1 tablet, Oral, BID  cefepime, 1,000 mg, Intravenous, Q12H  fentaNYL, 1 patch, Transdermal, Q72H   And  Check Fentanyl Patch Placement, 1 each, Not Applicable, Q12H  enoxaparin sodium, 30 mg, Subcutaneous, Daily  ipratropium-albuterol, 3 mL, Nebulization, Q4H - RT  Lidocaine, 1 patch, Transdermal, Q24H  Lidocaine, 2 patch, Transdermal, Q24H  methylPREDNISolone sodium succinate, 40 mg, Intravenous, Q12H  multivitamin with minerals, 1 tablet, Oral, Daily  [START ON 7/29/2025] pantoprazole, 40 mg, Oral, Q AM  senna-docusate sodium, 2 tablet, Oral, BID  sodium chloride, 10 mL, Intravenous, Q12H  vancomycin, 1,000 mg, Intravenous,  Q12H      Continuous Infusions:Pharmacy to dose vancomycin,       PRN Meds:.  acetaminophen **OR** acetaminophen **OR** acetaminophen    albuterol    albuterol    senna-docusate sodium **AND** polyethylene glycol **AND** bisacodyl **AND** bisacodyl    Calcium Replacement - Follow Nurse / BPA Driven Protocol    ipratropium-albuterol    Magnesium Standard Dose Replacement - Follow Nurse / BPA Driven Protocol    nitroglycerin    ondansetron    Pharmacy to dose vancomycin    Phosphorus Replacement - Follow Nurse / BPA Driven Protocol    Potassium Replacement - Follow Nurse / BPA Driven Protocol    sodium chloride    sodium chloride    sodium chloride    tiZANidine    Assessment & Plan   Assessment & Plan     Active Hospital Problems    Diagnosis  POA    **Acute on chronic hypoxic respiratory failure [J96.21]  Yes      Resolved Hospital Problems   No resolved problems to display.        Brief Hospital Course to date:  Flaco Roque II is a 79 y.o. male presented from acute rehab with acute hypoxic respiratory failure likely secondary to pneumonia concern for aspiration versus hospital-acquired.  Note was just admitted to [facility]  7/18/25 to 7/24/25 after a fall at home with multiple rib fractures.     Acute on chronic hypoxic respiratory failure(baseline 2 L nasal cannula)  Left lower lobe pneumonia (HAP versus aspiration)  Multiple rib fracture  COPD, with exacerbation likely secondary to pneumonia   - On high flow nasal cannula to keep SpO2 above 90%, titrate to return to baseline  - CXR shows new patchy airspace disease in LLL likely related to pna  - with the high amount of oxygen he is requiring will check a CTA chest  - DuoNebs scheduled and as needed  - continue vancomycin and cefepime  - MRSA, strep pneumo, Legionella all negative  - respiratory panel pcr negative  - Aggressive pulmonary toileting  - Due to the wheezing, and oxygen requirement started Solu-Medrol 40 twice daily IV     Acute/subacute nondisplaced  Rib Fractures (right 9-11th; left 3-4th),   This limited his ability to take a deep breath which might causing him to develop pneumonia  - started lidocaine patches  - Fentanyl patches every 72 hours  - Will likely need rehab, case management consulted    Mild dehydration:  S/p IVF, will stop IVF for now with his respiratory status      Mildly elevated/flat troponins  Paroxysmal atrial fibrillation  Chronic, stable  Denied chest pain  - troponins flat  -  We are holding eliquis going forward (frequent/recurrent falls)  -patient would NOT want heart cath or interventions in event of acute cardiac event  -stop eliquis (frequent and significant falls), patient & family agree with this, understand risk of stroke and cardiac events  -continue amiodarone, not on beta-blocker anymore?     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      Chronic constipation  Has no bowel movement in the last 4 days  Scheduled MiraLAX, senna and bisacodyl     Gerd  -ppi     Chronic back pain  -fentanyl pain pump, recently refilled,  Fentanyl patches and lidocaine for chest pain      Called and updated patient's son, Bright Roque over the phone on 7/27/25      Expected Discharge Location and Transportation:   Expected Discharge   Expected discharge date/ time has not been documented.     VTE Prophylaxis:  Pharmacologic VTE prophylaxis orders are present.         AM-PAC 6 Clicks Score (PT): 6 (07/27/25 2000)    CODE STATUS:   Code Status and Medical Interventions: No CPR (Do  Not Attempt to Resuscitate); Limited Support; No intubation (DNI), No artificial nutrition, No cardioversion, No dialysis, No vasopressors   Ordered at: 07/26/25 4885     Code Status (Patient has no pulse and is not breathing):    No CPR (Do Not Attempt to Resuscitate)     Medical Interventions (Patient has pulse or is breathing):    Limited Support     Medical Intervention Limits:    No intubation (DNI)       No artificial nutrition       No cardioversion       No dialysis       No vasopressors     Level Of Support Discussed With:    Patient       Randal Perez MD  07/28/25

## 2025-07-28 NOTE — PLAN OF CARE
Problem: Adult Inpatient Plan of Care  Goal: Plan of Care Review  Outcome: Progressing  Flowsheets (Taken 7/28/2025 0531)  Progress: no change  Plan of Care Reviewed With: patient  Goal: Patient-Specific Goal (Individualized)  Outcome: Progressing  Goal: Absence of Hospital-Acquired Illness or Injury  Outcome: Progressing  Intervention: Identify and Manage Fall Risk  Recent Flowsheet Documentation  Taken 7/28/2025 0400 by Yessi Oscar, ERENDIRA  Safety Promotion/Fall Prevention:   activity supervised   assistive device/personal items within reach   clutter free environment maintained   fall prevention program maintained   nonskid shoes/slippers when out of bed   room organization consistent   safety round/check completed  Taken 7/28/2025 0200 by Yessi Oscar, RN  Safety Promotion/Fall Prevention:   activity supervised   assistive device/personal items within reach   clutter free environment maintained   fall prevention program maintained   nonskid shoes/slippers when out of bed   room organization consistent   safety round/check completed  Taken 7/28/2025 0000 by Yessi Oscar, RN  Safety Promotion/Fall Prevention:   activity supervised   assistive device/personal items within reach   clutter free environment maintained   fall prevention program maintained   nonskid shoes/slippers when out of bed   room organization consistent   safety round/check completed  Taken 7/27/2025 2200 by Yessi Oscar, RN  Safety Promotion/Fall Prevention:   activity supervised   assistive device/personal items within reach   clutter free environment maintained   fall prevention program maintained   nonskid shoes/slippers when out of bed   room organization consistent   safety round/check completed  Taken 7/27/2025 2000 by Yessi Oscar, RN  Safety Promotion/Fall Prevention:   activity supervised   assistive device/personal items within reach   clutter free environment maintained   fall prevention program maintained   nonskid shoes/slippers when out of  bed   room organization consistent   safety round/check completed  Intervention: Prevent Skin Injury  Recent Flowsheet Documentation  Taken 7/28/2025 0400 by Yessi Oscar RN  Body Position:   left   turned  Taken 7/28/2025 0200 by Yessi Oscar RN  Body Position: position changed independently  Skin Protection:   incontinence pads utilized   silicone foam dressing in place   skin sealant/moisture barrier applied   transparent dressing maintained  Taken 7/28/2025 0000 by Yessi Oscar RN  Body Position:   left   turned  Skin Protection:   incontinence pads utilized   silicone foam dressing in place   skin sealant/moisture barrier applied   transparent dressing maintained  Taken 7/27/2025 2200 by Yessi Oscar RN  Body Position:   right   turned  Skin Protection:   incontinence pads utilized   silicone foam dressing in place   skin sealant/moisture barrier applied   transparent dressing maintained  Taken 7/27/2025 2000 by Yessi Oscar RN  Body Position: sitting up in bed  Skin Protection:   incontinence pads utilized   silicone foam dressing in place   skin sealant/moisture barrier applied   transparent dressing maintained  Intervention: Prevent Infection  Recent Flowsheet Documentation  Taken 7/28/2025 0400 by Yessi Oscar RN  Infection Prevention:   cohorting utilized   environmental surveillance performed   equipment surfaces disinfected   hand hygiene promoted   personal protective equipment utilized   rest/sleep promoted   single patient room provided  Taken 7/28/2025 0200 by Yessi Oscar RN  Infection Prevention:   cohorting utilized   environmental surveillance performed   equipment surfaces disinfected   hand hygiene promoted   personal protective equipment utilized   rest/sleep promoted   single patient room provided  Taken 7/28/2025 0000 by Yessi Oscar RN  Infection Prevention:   cohorting utilized   environmental surveillance performed   equipment surfaces disinfected   hand hygiene promoted   personal protective  equipment utilized   rest/sleep promoted   single patient room provided  Taken 7/27/2025 2200 by Yessi Oscar RN  Infection Prevention:   cohorting utilized   environmental surveillance performed   equipment surfaces disinfected   hand hygiene promoted   personal protective equipment utilized   rest/sleep promoted   single patient room provided  Taken 7/27/2025 2000 by Yessi Oscar RN  Infection Prevention:   cohorting utilized   environmental surveillance performed   equipment surfaces disinfected   hand hygiene promoted   personal protective equipment utilized   rest/sleep promoted   single patient room provided  Goal: Optimal Comfort and Wellbeing  Outcome: Progressing  Intervention: Monitor Pain and Promote Comfort  Recent Flowsheet Documentation  Taken 7/28/2025 0400 by Yessi Oscar RN  Pain Management Interventions:   care clustered   position adjusted   pillow support provided   quiet environment facilitated  Taken 7/27/2025 2200 by Yessi Oscar RN  Pain Management Interventions:   care clustered   position adjusted   pillow support provided   quiet environment facilitated  Taken 7/27/2025 2000 by Yessi Oscar RN  Pain Management Interventions:   care clustered   position adjusted   pillow support provided   quiet environment facilitated  Intervention: Provide Person-Centered Care  Recent Flowsheet Documentation  Taken 7/27/2025 2000 by Yessi Oscar RN  Trust Relationship/Rapport:   care explained   choices provided   emotional support provided   empathic listening provided   questions answered   questions encouraged   reassurance provided   thoughts/feelings acknowledged  Goal: Readiness for Transition of Care  Outcome: Progressing  Goal: Plan of Care Review  Outcome: Progressing  Flowsheets (Taken 7/28/2025 0531)  Progress: no change  Plan of Care Reviewed With: patient  Goal: Patient-Specific Goal (Individualized)  Outcome: Progressing  Goal: Absence of Hospital-Acquired Illness or Injury  Outcome:  Progressing  Intervention: Identify and Manage Fall Risk  Recent Flowsheet Documentation  Taken 7/28/2025 0400 by Yessi Oscar RN  Safety Promotion/Fall Prevention:   activity supervised   assistive device/personal items within reach   clutter free environment maintained   fall prevention program maintained   nonskid shoes/slippers when out of bed   room organization consistent   safety round/check completed  Taken 7/28/2025 0200 by Yessi Oscar RN  Safety Promotion/Fall Prevention:   activity supervised   assistive device/personal items within reach   clutter free environment maintained   fall prevention program maintained   nonskid shoes/slippers when out of bed   room organization consistent   safety round/check completed  Taken 7/28/2025 0000 by Yessi Oscar RN  Safety Promotion/Fall Prevention:   activity supervised   assistive device/personal items within reach   clutter free environment maintained   fall prevention program maintained   nonskid shoes/slippers when out of bed   room organization consistent   safety round/check completed  Taken 7/27/2025 2200 by Yessi Oscar RN  Safety Promotion/Fall Prevention:   activity supervised   assistive device/personal items within reach   clutter free environment maintained   fall prevention program maintained   nonskid shoes/slippers when out of bed   room organization consistent   safety round/check completed  Taken 7/27/2025 2000 by Yessi Oscar RN  Safety Promotion/Fall Prevention:   activity supervised   assistive device/personal items within reach   clutter free environment maintained   fall prevention program maintained   nonskid shoes/slippers when out of bed   room organization consistent   safety round/check completed  Intervention: Prevent Skin Injury  Recent Flowsheet Documentation  Taken 7/28/2025 0400 by Yessi Oscar RN  Body Position:   left   turned  Taken 7/28/2025 0200 by Yessi Oscar RN  Body Position: position changed independently  Skin Protection:    incontinence pads utilized   silicone foam dressing in place   skin sealant/moisture barrier applied   transparent dressing maintained  Taken 7/28/2025 0000 by Yessi Oscar RN  Body Position:   left   turned  Skin Protection:   incontinence pads utilized   silicone foam dressing in place   skin sealant/moisture barrier applied   transparent dressing maintained  Taken 7/27/2025 2200 by Yessi Oscar RN  Body Position:   right   turned  Skin Protection:   incontinence pads utilized   silicone foam dressing in place   skin sealant/moisture barrier applied   transparent dressing maintained  Taken 7/27/2025 2000 by Yessi Oscar RN  Body Position: sitting up in bed  Skin Protection:   incontinence pads utilized   silicone foam dressing in place   skin sealant/moisture barrier applied   transparent dressing maintained  Intervention: Prevent Infection  Recent Flowsheet Documentation  Taken 7/28/2025 0400 by Yessi Oscar RN  Infection Prevention:   cohorting utilized   environmental surveillance performed   equipment surfaces disinfected   hand hygiene promoted   personal protective equipment utilized   rest/sleep promoted   single patient room provided  Taken 7/28/2025 0200 by Yessi Oscar RN  Infection Prevention:   cohorting utilized   environmental surveillance performed   equipment surfaces disinfected   hand hygiene promoted   personal protective equipment utilized   rest/sleep promoted   single patient room provided  Taken 7/28/2025 0000 by Yessi Oscar RN  Infection Prevention:   cohorting utilized   environmental surveillance performed   equipment surfaces disinfected   hand hygiene promoted   personal protective equipment utilized   rest/sleep promoted   single patient room provided  Taken 7/27/2025 2200 by Yessi Oscar RN  Infection Prevention:   cohorting utilized   environmental surveillance performed   equipment surfaces disinfected   hand hygiene promoted   personal protective equipment utilized   rest/sleep  promoted   single patient room provided  Taken 7/27/2025 2000 by Yessi Oscar RN  Infection Prevention:   cohorting utilized   environmental surveillance performed   equipment surfaces disinfected   hand hygiene promoted   personal protective equipment utilized   rest/sleep promoted   single patient room provided  Goal: Optimal Comfort and Wellbeing  Outcome: Progressing  Intervention: Monitor Pain and Promote Comfort  Recent Flowsheet Documentation  Taken 7/28/2025 0400 by Yessi Oscar RN  Pain Management Interventions:   care clustered   position adjusted   pillow support provided   quiet environment facilitated  Taken 7/27/2025 2200 by Yessi Oscar RN  Pain Management Interventions:   care clustered   position adjusted   pillow support provided   quiet environment facilitated  Taken 7/27/2025 2000 by Yessi Oscar RN  Pain Management Interventions:   care clustered   position adjusted   pillow support provided   quiet environment facilitated  Intervention: Provide Person-Centered Care  Recent Flowsheet Documentation  Taken 7/27/2025 2000 by Yessi Oscar RN  Trust Relationship/Rapport:   care explained   choices provided   emotional support provided   empathic listening provided   questions answered   questions encouraged   reassurance provided   thoughts/feelings acknowledged  Goal: Readiness for Transition of Care  Outcome: Progressing     Problem: Skin Injury Risk Increased  Goal: Skin Health and Integrity  Outcome: Progressing  Intervention: Optimize Skin Protection  Recent Flowsheet Documentation  Taken 7/28/2025 0400 by Yessi Oscar RN  Activity Management: activity encouraged  Head of Bed (HOB) Positioning: HOB elevated  Taken 7/28/2025 0200 by Yessi Oscar RN  Activity Management: activity encouraged  Pressure Reduction Techniques:   frequent weight shift encouraged   heels elevated off bed   pressure points protected   weight shift assistance provided  Head of Bed (HOB) Positioning: HOB elevated  Pressure  Reduction Devices:   pressure-redistributing mattress utilized   heel offloading device utilized   foam padding utilized  Skin Protection:   incontinence pads utilized   silicone foam dressing in place   skin sealant/moisture barrier applied   transparent dressing maintained  Taken 7/28/2025 0000 by Yessi Oscar RN  Activity Management: activity encouraged  Pressure Reduction Techniques:   frequent weight shift encouraged   heels elevated off bed   pressure points protected   weight shift assistance provided  Head of Bed (HOB) Positioning: HOB elevated  Pressure Reduction Devices:   pressure-redistributing mattress utilized   heel offloading device utilized   foam padding utilized  Skin Protection:   incontinence pads utilized   silicone foam dressing in place   skin sealant/moisture barrier applied   transparent dressing maintained  Taken 7/27/2025 2200 by Yessi Oscar RN  Activity Management: activity encouraged  Pressure Reduction Techniques:   frequent weight shift encouraged   heels elevated off bed   pressure points protected   weight shift assistance provided  Head of Bed (HOB) Positioning: HOB elevated  Pressure Reduction Devices:   pressure-redistributing mattress utilized   heel offloading device utilized   foam padding utilized  Skin Protection:   incontinence pads utilized   silicone foam dressing in place   skin sealant/moisture barrier applied   transparent dressing maintained  Taken 7/27/2025 2000 by Yessi Oscar RN  Activity Management: activity encouraged  Pressure Reduction Techniques:   frequent weight shift encouraged   heels elevated off bed   pressure points protected   weight shift assistance provided  Head of Bed (HOB) Positioning: HOB elevated  Pressure Reduction Devices:   pressure-redistributing mattress utilized   heel offloading device utilized   foam padding utilized  Skin Protection:   incontinence pads utilized   silicone foam dressing in place   skin sealant/moisture barrier applied    transparent dressing maintained     Problem: Fall Injury Risk  Goal: Absence of Fall and Fall-Related Injury  Outcome: Progressing  Intervention: Identify and Manage Contributors  Recent Flowsheet Documentation  Taken 7/27/2025 2000 by Yessi Oscar RN  Medication Review/Management: medications reviewed  Self-Care Promotion:   independence encouraged   BADL personal objects within reach   BADL personal routines maintained  Intervention: Promote Injury-Free Environment  Recent Flowsheet Documentation  Taken 7/28/2025 0400 by Yessi Oscar, ERENDIRA  Safety Promotion/Fall Prevention:   activity supervised   assistive device/personal items within reach   clutter free environment maintained   fall prevention program maintained   nonskid shoes/slippers when out of bed   room organization consistent   safety round/check completed  Taken 7/28/2025 0200 by Yessi Oscar, ERENDIRA  Safety Promotion/Fall Prevention:   activity supervised   assistive device/personal items within reach   clutter free environment maintained   fall prevention program maintained   nonskid shoes/slippers when out of bed   room organization consistent   safety round/check completed  Taken 7/28/2025 0000 by Yessi Oscar, RN  Safety Promotion/Fall Prevention:   activity supervised   assistive device/personal items within reach   clutter free environment maintained   fall prevention program maintained   nonskid shoes/slippers when out of bed   room organization consistent   safety round/check completed  Taken 7/27/2025 2200 by Yessi Oscar, RN  Safety Promotion/Fall Prevention:   activity supervised   assistive device/personal items within reach   clutter free environment maintained   fall prevention program maintained   nonskid shoes/slippers when out of bed   room organization consistent   safety round/check completed  Taken 7/27/2025 2000 by Yessi Oscra, RN  Safety Promotion/Fall Prevention:   activity supervised   assistive device/personal items within reach   clutter free  environment maintained   fall prevention program maintained   nonskid shoes/slippers when out of bed   room organization consistent   safety round/check completed   Goal Outcome Evaluation:  Plan of Care Reviewed With: patient        Progress: no change

## 2025-07-29 PROCEDURE — 99232 SBSQ HOSP IP/OBS MODERATE 35: CPT | Performed by: INTERNAL MEDICINE

## 2025-07-29 PROCEDURE — 94761 N-INVAS EAR/PLS OXIMETRY MLT: CPT

## 2025-07-29 PROCEDURE — 25010000002 METHYLPREDNISOLONE PER 40 MG

## 2025-07-29 PROCEDURE — 97166 OT EVAL MOD COMPLEX 45 MIN: CPT

## 2025-07-29 PROCEDURE — 25010000002 CEFEPIME PER 500 MG

## 2025-07-29 PROCEDURE — 25010000002 VANCOMYCIN 1 G RECONSTITUTED SOLUTION 1 EACH VIAL

## 2025-07-29 PROCEDURE — 94664 DEMO&/EVAL PT USE INHALER: CPT

## 2025-07-29 PROCEDURE — 94799 UNLISTED PULMONARY SVC/PX: CPT

## 2025-07-29 PROCEDURE — 25010000002 ENOXAPARIN PER 10 MG

## 2025-07-29 PROCEDURE — 97162 PT EVAL MOD COMPLEX 30 MIN: CPT

## 2025-07-29 PROCEDURE — 25810000003 SODIUM CHLORIDE 0.9 % SOLUTION 250 ML FLEX CONT

## 2025-07-29 RX ORDER — QUETIAPINE FUMARATE 25 MG/1
25 TABLET, FILM COATED ORAL NIGHTLY
Status: DISCONTINUED | OUTPATIENT
Start: 2025-07-29 | End: 2025-07-31

## 2025-07-29 RX ADMIN — AMANTADINE HYDROCHLORIDE 100 MG: 100 CAPSULE ORAL at 08:21

## 2025-07-29 RX ADMIN — CARBIDOPA AND LEVODOPA 1 TABLET: 25; 100 TABLET ORAL at 20:27

## 2025-07-29 RX ADMIN — VANCOMYCIN HYDROCHLORIDE 1000 MG: 1 INJECTION, POWDER, LYOPHILIZED, FOR SOLUTION INTRAVENOUS at 03:19

## 2025-07-29 RX ADMIN — METHYLPREDNISOLONE SODIUM SUCCINATE 40 MG: 40 INJECTION, POWDER, FOR SOLUTION INTRAMUSCULAR; INTRAVENOUS at 03:19

## 2025-07-29 RX ADMIN — CARBIDOPA AND LEVODOPA 1 TABLET: 25; 100 TABLET ORAL at 08:23

## 2025-07-29 RX ADMIN — LIDOCAINE 1 PATCH: 4 PATCH TOPICAL at 08:34

## 2025-07-29 RX ADMIN — CARBIDOPA AND LEVODOPA 1 TABLET: 50; 200 TABLET, EXTENDED RELEASE ORAL at 20:27

## 2025-07-29 RX ADMIN — ENOXAPARIN SODIUM 30 MG: 100 INJECTION SUBCUTANEOUS at 08:22

## 2025-07-29 RX ADMIN — SENNOSIDES AND DOCUSATE SODIUM 2 TABLET: 50; 8.6 TABLET ORAL at 20:27

## 2025-07-29 RX ADMIN — IPRATROPIUM BROMIDE AND ALBUTEROL SULFATE 3 ML: 2.5; .5 SOLUTION RESPIRATORY (INHALATION) at 16:19

## 2025-07-29 RX ADMIN — CEFEPIME 1000 MG: 1 INJECTION, POWDER, FOR SOLUTION INTRAMUSCULAR; INTRAVENOUS at 11:37

## 2025-07-29 RX ADMIN — CEFEPIME 1000 MG: 1 INJECTION, POWDER, FOR SOLUTION INTRAMUSCULAR; INTRAVENOUS at 21:51

## 2025-07-29 RX ADMIN — IPRATROPIUM BROMIDE AND ALBUTEROL SULFATE 3 ML: 2.5; .5 SOLUTION RESPIRATORY (INHALATION) at 23:46

## 2025-07-29 RX ADMIN — CARBIDOPA AND LEVODOPA 1 TABLET: 25; 100 TABLET ORAL at 12:10

## 2025-07-29 RX ADMIN — AMANTADINE HYDROCHLORIDE 100 MG: 100 CAPSULE ORAL at 20:27

## 2025-07-29 RX ADMIN — Medication 1 TABLET: at 08:22

## 2025-07-29 RX ADMIN — CARBIDOPA AND LEVODOPA 1 TABLET: 25; 100 TABLET ORAL at 17:28

## 2025-07-29 RX ADMIN — IPRATROPIUM BROMIDE AND ALBUTEROL SULFATE 3 ML: 2.5; .5 SOLUTION RESPIRATORY (INHALATION) at 19:45

## 2025-07-29 RX ADMIN — Medication 10 ML: at 20:38

## 2025-07-29 RX ADMIN — PANTOPRAZOLE SODIUM 40 MG: 40 TABLET, DELAYED RELEASE ORAL at 04:53

## 2025-07-29 RX ADMIN — IPRATROPIUM BROMIDE AND ALBUTEROL SULFATE 3 ML: 2.5; .5 SOLUTION RESPIRATORY (INHALATION) at 02:27

## 2025-07-29 RX ADMIN — QUETIAPINE FUMARATE 25 MG: 25 TABLET ORAL at 20:27

## 2025-07-29 RX ADMIN — METHYLPREDNISOLONE SODIUM SUCCINATE 40 MG: 40 INJECTION, POWDER, FOR SOLUTION INTRAMUSCULAR; INTRAVENOUS at 15:02

## 2025-07-29 RX ADMIN — TIZANIDINE 4 MG: 4 TABLET ORAL at 20:27

## 2025-07-29 RX ADMIN — SENNOSIDES AND DOCUSATE SODIUM 2 TABLET: 50; 8.6 TABLET ORAL at 08:22

## 2025-07-29 RX ADMIN — VANCOMYCIN HYDROCHLORIDE 1000 MG: 1 INJECTION, POWDER, LYOPHILIZED, FOR SOLUTION INTRAVENOUS at 15:02

## 2025-07-29 RX ADMIN — CARBIDOPA AND LEVODOPA 1 TABLET: 50; 200 TABLET, EXTENDED RELEASE ORAL at 08:22

## 2025-07-29 RX ADMIN — Medication 10 ML: at 08:39

## 2025-07-29 RX ADMIN — AMIODARONE HYDROCHLORIDE 200 MG: 200 TABLET ORAL at 08:22

## 2025-07-29 RX ADMIN — IPRATROPIUM BROMIDE AND ALBUTEROL SULFATE 3 ML: 2.5; .5 SOLUTION RESPIRATORY (INHALATION) at 07:53

## 2025-07-29 RX ADMIN — IPRATROPIUM BROMIDE AND ALBUTEROL SULFATE 3 ML: 2.5; .5 SOLUTION RESPIRATORY (INHALATION) at 12:16

## 2025-07-29 RX ADMIN — ACETAMINOPHEN 650 MG: 650 SOLUTION ORAL at 20:27

## 2025-07-29 NOTE — PLAN OF CARE
Problem: Adult Inpatient Plan of Care  Goal: Plan of Care Review  Outcome: Progressing  Flowsheets (Taken 7/29/2025 0332)  Progress: no change  Plan of Care Reviewed With: patient  Goal: Patient-Specific Goal (Individualized)  Outcome: Progressing  Goal: Absence of Hospital-Acquired Illness or Injury  Outcome: Progressing  Intervention: Identify and Manage Fall Risk  Recent Flowsheet Documentation  Taken 7/29/2025 0200 by Yessi Oscar, ERENDIRA  Safety Promotion/Fall Prevention:   activity supervised   assistive device/personal items within reach   clutter free environment maintained   fall prevention program maintained   nonskid shoes/slippers when out of bed   room organization consistent   safety round/check completed  Taken 7/29/2025 0000 by Yessi Oscar, RN  Safety Promotion/Fall Prevention:   activity supervised   assistive device/personal items within reach   clutter free environment maintained   fall prevention program maintained   nonskid shoes/slippers when out of bed   room organization consistent   safety round/check completed  Taken 7/28/2025 2200 by Yessi Oscar, RN  Safety Promotion/Fall Prevention:   activity supervised   assistive device/personal items within reach   clutter free environment maintained   fall prevention program maintained   nonskid shoes/slippers when out of bed   room organization consistent   safety round/check completed  Taken 7/28/2025 2000 by Yessi Oscar, RN  Safety Promotion/Fall Prevention:   activity supervised   assistive device/personal items within reach   clutter free environment maintained   fall prevention program maintained   nonskid shoes/slippers when out of bed   room organization consistent   safety round/check completed  Intervention: Prevent Skin Injury  Recent Flowsheet Documentation  Taken 7/29/2025 0200 by Yessi Oscar, RN  Skin Protection:   incontinence pads utilized   silicone foam dressing in place   skin sealant/moisture barrier applied   transparent dressing  maintained  Taken 7/29/2025 0000 by Yessi Oscar RN  Skin Protection:   incontinence pads utilized   silicone foam dressing in place   skin sealant/moisture barrier applied   transparent dressing maintained  Taken 7/28/2025 2200 by Yessi Oscar RN  Body Position: (up in chair)   legs elevated   other (see comments)  Skin Protection:   incontinence pads utilized   silicone foam dressing in place   skin sealant/moisture barrier applied   transparent dressing maintained  Taken 7/28/2025 2000 by Yessi Oscar RN  Body Position: position changed independently  Skin Protection:   incontinence pads utilized   silicone foam dressing in place   skin sealant/moisture barrier applied   transparent dressing maintained  Intervention: Prevent Infection  Recent Flowsheet Documentation  Taken 7/29/2025 0200 by Yessi Oscar RN  Infection Prevention:   cohorting utilized   environmental surveillance performed   equipment surfaces disinfected   hand hygiene promoted   personal protective equipment utilized   rest/sleep promoted   single patient room provided  Taken 7/29/2025 0000 by Yessi Oscar RN  Infection Prevention:   cohorting utilized   environmental surveillance performed   equipment surfaces disinfected   hand hygiene promoted   personal protective equipment utilized   rest/sleep promoted   single patient room provided  Taken 7/28/2025 2200 by Yessi Oscar RN  Infection Prevention:   cohorting utilized   environmental surveillance performed   equipment surfaces disinfected   hand hygiene promoted   personal protective equipment utilized   rest/sleep promoted   single patient room provided  Taken 7/28/2025 2000 by Yessi Oscar RN  Infection Prevention:   cohorting utilized   environmental surveillance performed   equipment surfaces disinfected   hand hygiene promoted   personal protective equipment utilized   rest/sleep promoted   single patient room provided  Goal: Optimal Comfort and Wellbeing  Outcome: Progressing  Intervention:  Monitor Pain and Promote Comfort  Recent Flowsheet Documentation  Taken 7/29/2025 0200 by Yessi Oscar RN  Pain Management Interventions:   care clustered   position adjusted   pillow support provided   quiet environment facilitated  Taken 7/29/2025 0000 by Yessi Oscar RN  Pain Management Interventions:   care clustered   position adjusted   pillow support provided   quiet environment facilitated  Taken 7/28/2025 2319 by Yessi Oscar RN  Pain Management Interventions:   care clustered   position adjusted   pillow support provided   quiet environment facilitated  Taken 7/28/2025 2000 by Yessi Oscar RN  Pain Management Interventions:   pillow support provided   position adjusted   care clustered  Intervention: Provide Person-Centered Care  Recent Flowsheet Documentation  Taken 7/28/2025 2000 by Yessi Oscar RN  Trust Relationship/Rapport:   care explained   choices provided   emotional support provided   empathic listening provided   questions answered   questions encouraged   reassurance provided   thoughts/feelings acknowledged  Goal: Readiness for Transition of Care  Outcome: Progressing  Goal: Plan of Care Review  Outcome: Progressing  Flowsheets (Taken 7/29/2025 0332)  Progress: no change  Plan of Care Reviewed With: patient  Goal: Patient-Specific Goal (Individualized)  Outcome: Progressing  Goal: Absence of Hospital-Acquired Illness or Injury  Outcome: Progressing  Intervention: Identify and Manage Fall Risk  Recent Flowsheet Documentation  Taken 7/29/2025 0200 by Yessi Oscar RN  Safety Promotion/Fall Prevention:   activity supervised   assistive device/personal items within reach   clutter free environment maintained   fall prevention program maintained   nonskid shoes/slippers when out of bed   room organization consistent   safety round/check completed  Taken 7/29/2025 0000 by Yessi Oscar RN  Safety Promotion/Fall Prevention:   activity supervised   assistive device/personal items within reach   clutter free  environment maintained   fall prevention program maintained   nonskid shoes/slippers when out of bed   room organization consistent   safety round/check completed  Taken 7/28/2025 2200 by Yessi Oscar RN  Safety Promotion/Fall Prevention:   activity supervised   assistive device/personal items within reach   clutter free environment maintained   fall prevention program maintained   nonskid shoes/slippers when out of bed   room organization consistent   safety round/check completed  Taken 7/28/2025 2000 by Yessi Oscar RN  Safety Promotion/Fall Prevention:   activity supervised   assistive device/personal items within reach   clutter free environment maintained   fall prevention program maintained   nonskid shoes/slippers when out of bed   room organization consistent   safety round/check completed  Intervention: Prevent Skin Injury  Recent Flowsheet Documentation  Taken 7/29/2025 0200 by Yessi Oscar RN  Skin Protection:   incontinence pads utilized   silicone foam dressing in place   skin sealant/moisture barrier applied   transparent dressing maintained  Taken 7/29/2025 0000 by Yessi Oscar RN  Skin Protection:   incontinence pads utilized   silicone foam dressing in place   skin sealant/moisture barrier applied   transparent dressing maintained  Taken 7/28/2025 2200 by Yessi Oscar RN  Body Position: (up in chair)   legs elevated   other (see comments)  Skin Protection:   incontinence pads utilized   silicone foam dressing in place   skin sealant/moisture barrier applied   transparent dressing maintained  Taken 7/28/2025 2000 by Yessi Oscar RN  Body Position: position changed independently  Skin Protection:   incontinence pads utilized   silicone foam dressing in place   skin sealant/moisture barrier applied   transparent dressing maintained  Intervention: Prevent Infection  Recent Flowsheet Documentation  Taken 7/29/2025 0200 by Yessi Oscar RN  Infection Prevention:   cohorting utilized   environmental surveillance  performed   equipment surfaces disinfected   hand hygiene promoted   personal protective equipment utilized   rest/sleep promoted   single patient room provided  Taken 7/29/2025 0000 by Yessi Oscar RN  Infection Prevention:   cohorting utilized   environmental surveillance performed   equipment surfaces disinfected   hand hygiene promoted   personal protective equipment utilized   rest/sleep promoted   single patient room provided  Taken 7/28/2025 2200 by Yessi Oscar RN  Infection Prevention:   cohorting utilized   environmental surveillance performed   equipment surfaces disinfected   hand hygiene promoted   personal protective equipment utilized   rest/sleep promoted   single patient room provided  Taken 7/28/2025 2000 by Yessi Oscar RN  Infection Prevention:   cohorting utilized   environmental surveillance performed   equipment surfaces disinfected   hand hygiene promoted   personal protective equipment utilized   rest/sleep promoted   single patient room provided  Goal: Optimal Comfort and Wellbeing  Outcome: Progressing  Intervention: Monitor Pain and Promote Comfort  Recent Flowsheet Documentation  Taken 7/29/2025 0200 by Yessi Oscar RN  Pain Management Interventions:   care clustered   position adjusted   pillow support provided   quiet environment facilitated  Taken 7/29/2025 0000 by Yessi Oscar RN  Pain Management Interventions:   care clustered   position adjusted   pillow support provided   quiet environment facilitated  Taken 7/28/2025 2319 by Yessi Oscar RN  Pain Management Interventions:   care clustered   position adjusted   pillow support provided   quiet environment facilitated  Taken 7/28/2025 2000 by Yessi Oscar RN  Pain Management Interventions:   pillow support provided   position adjusted   care clustered  Intervention: Provide Person-Centered Care  Recent Flowsheet Documentation  Taken 7/28/2025 2000 by Yessi Oscar RN  Trust Relationship/Rapport:   care explained   choices provided    emotional support provided   empathic listening provided   questions answered   questions encouraged   reassurance provided   thoughts/feelings acknowledged  Goal: Readiness for Transition of Care  Outcome: Progressing     Problem: Skin Injury Risk Increased  Goal: Skin Health and Integrity  Outcome: Progressing  Intervention: Optimize Skin Protection  Recent Flowsheet Documentation  Taken 7/29/2025 0200 by Yessi Oscar RN  Activity Management: activity encouraged  Pressure Reduction Techniques:   frequent weight shift encouraged   heels elevated off bed   pressure points protected   weight shift assistance provided  Head of Bed (HOB) Positioning: HOB elevated  Pressure Reduction Devices:   pressure-redistributing mattress utilized   heel offloading device utilized   foam padding utilized  Skin Protection:   incontinence pads utilized   silicone foam dressing in place   skin sealant/moisture barrier applied   transparent dressing maintained  Taken 7/29/2025 0000 by Yessi Oscar RN  Pressure Reduction Techniques:   frequent weight shift encouraged   heels elevated off bed   pressure points protected   weight shift assistance provided  Pressure Reduction Devices:   pressure-redistributing mattress utilized   heel offloading device utilized   foam padding utilized  Skin Protection:   incontinence pads utilized   silicone foam dressing in place   skin sealant/moisture barrier applied   transparent dressing maintained  Taken 7/28/2025 2200 by Yessi Oscar RN  Activity Management: up in chair  Pressure Reduction Techniques:   frequent weight shift encouraged   heels elevated off bed   pressure points protected   weight shift assistance provided  Head of Bed (HOB) Positioning: HOB elevated  Pressure Reduction Devices:   pressure-redistributing mattress utilized   heel offloading device utilized   foam padding utilized  Skin Protection:   incontinence pads utilized   silicone foam dressing in place   skin sealant/moisture  barrier applied   transparent dressing maintained  Taken 7/28/2025 2000 by Yessi Oscar, RN  Activity Management: activity encouraged  Pressure Reduction Techniques:   frequent weight shift encouraged   heels elevated off bed   pressure points protected   weight shift assistance provided  Head of Bed (HOB) Positioning: HOB elevated  Pressure Reduction Devices:   pressure-redistributing mattress utilized   heel offloading device utilized   foam padding utilized  Skin Protection:   incontinence pads utilized   silicone foam dressing in place   skin sealant/moisture barrier applied   transparent dressing maintained     Problem: Fall Injury Risk  Goal: Absence of Fall and Fall-Related Injury  Outcome: Progressing  Intervention: Identify and Manage Contributors  Recent Flowsheet Documentation  Taken 7/28/2025 2000 by Yessi Oscar RN  Medication Review/Management: medications reviewed  Self-Care Promotion:   independence encouraged   BADL personal objects within reach   BADL personal routines maintained  Intervention: Promote Injury-Free Environment  Recent Flowsheet Documentation  Taken 7/29/2025 0200 by Yessi Oscar, RN  Safety Promotion/Fall Prevention:   activity supervised   assistive device/personal items within reach   clutter free environment maintained   fall prevention program maintained   nonskid shoes/slippers when out of bed   room organization consistent   safety round/check completed  Taken 7/29/2025 0000 by Yessi Oscar, RN  Safety Promotion/Fall Prevention:   activity supervised   assistive device/personal items within reach   clutter free environment maintained   fall prevention program maintained   nonskid shoes/slippers when out of bed   room organization consistent   safety round/check completed  Taken 7/28/2025 2200 by Yessi Oscar, RN  Safety Promotion/Fall Prevention:   activity supervised   assistive device/personal items within reach   clutter free environment maintained   fall prevention program  maintained   nonskid shoes/slippers when out of bed   room organization consistent   safety round/check completed  Taken 7/28/2025 2000 by Yessi Oscar RN  Safety Promotion/Fall Prevention:   activity supervised   assistive device/personal items within reach   clutter free environment maintained   fall prevention program maintained   nonskid shoes/slippers when out of bed   room organization consistent   safety round/check completed   Goal Outcome Evaluation:  Plan of Care Reviewed With: patient        Progress: no change

## 2025-07-29 NOTE — PROGRESS NOTES
[facility] Medicine Services  PROGRESS NOTE    Patient Name: Flaco Roque II  : 1945  MRN: [id number]    Date of Admission: 2025  Primary Care Physician: Ariadne Galloway PA    Subjective   Subjective     CC:  SOA    HPI:  Saw patient this AM.  Agitated overnight, required sitter. Today is much more calm.  Says he feels better    Objective   Objective     Vital Signs:   Temp:  [97.6 °F (36.4 °C)-98.7 °F (37.1 °C)] 98.1 °F (36.7 °C)  Heart Rate:  [] 106  Resp:  [16-18] 18  BP: (125-155)/(78-94) 125/78  Flow (L/min) (Oxygen Therapy):  [2-3] 2     Physical Exam:  Constitutional: No acute distress, awake, alert, sitting up in chair, sitter at bedside this AM  HENT: NCAT, mucous membranes moist  Respiratory: Clear to auscultation bilaterally, respiratory effort normal on 2LNC  Cardiovascular: RRR, no murmurs, rubs, or gallops  Gastrointestinal: Positive bowel sounds, soft, nontender, nondistended  Musculoskeletal: No bilateral ankle edema  Psychiatric: Appropriate affect, cooperative  Neurologic: mild confused, WHITE, speech clear  Skin: No rashes      Results Reviewed:  LAB RESULTS:      Lab 25  0619 25  0841 25  0135 25  2215 25  2119   WBC 5.94 11.25*  --   --  7.83   HEMOGLOBIN 11.9* 12.2*  --   --  11.7*   HEMATOCRIT 38.4 38.3  --   --  36.5*   PLATELETS 401 325  --   --  317   NEUTROS ABS  --  11.03*  --   --  6.96   IMMATURE GRANS (ABS)  --  0.05  --   --  0.04   LYMPHS ABS  --  0.11*  --   --  0.27*   MONOS ABS  --  0.05*  --   --  0.50   EOS ABS  --  0.00  --   --  0.03   MCV 96.7 95.0  --   --  95.1   SED RATE  --   --  20  --   --    CRP  --   --   --  3.63*  --    PROCALCITONIN  --   --   --   --  0.07   LACTATE  --   --   --   --  1.9   HSTROP T  --   --   --  26* 27*         Lab 25  0619 25  0842 25  2467   SODIUM 139 137 138   POTASSIUM 4.2 4.4 4.0   CHLORIDE 100 100 100   CO2 28.0 26.1 26.4   ANION GAP 11.0  10.9 11.6   BUN 14.7 9.3 11.0   CREATININE 0.63* 0.55* 0.65*   EGFR 96.8 100.8 95.8   GLUCOSE 138* 125* 139*   CALCIUM 8.7 8.8 9.1         Lab 07/27/25  0842 07/26/25 2119   TOTAL PROTEIN 5.9* 6.3   ALBUMIN 3.4* 3.5   GLOBULIN 2.5 2.8   ALT (SGPT) 10 7   AST (SGOT) 21 22   BILIRUBIN 0.8 0.7   ALK PHOS 121* 121*         Lab 07/26/25  2215 07/26/25 2119   PROBNP  --  798.0   HSTROP T 26* 27*                 Lab 07/26/25 2118   PH, ARTERIAL 7.407   PCO2, ARTERIAL 46.2*   PO2 ART 66.2*   FIO2 36   HCO3 ART 29.1*   BASE EXCESS ART 3.7*   CARBOXYHEMOGLOBIN 1.7     Brief Urine Lab Results  (Last result in the past 365 days)        Color   Clarity   Blood   Leuk Est   Nitrite   Protein   CREAT   Urine HCG        07/18/25 1010 Yellow   Clear   Negative   Negative   Negative   Negative                   Microbiology Results Abnormal       None            CT Angiogram Chest Pulmonary Embolism  Result Date: 7/28/2025  CT ANGIOGRAM CHEST PULMONARY EMBOLISM Date of Exam: 7/28/2025 11:28 AM EDT Indication: acute respiratory failure. Comparison: CT chest 7/18/2025 Technique: Axial CT images were obtained of the chest after the uneventful intravenous administration of 85 mL Isovue-[id number] utilizing pulmonary embolism protocol.  In addition, a 3-D volume rendered image was created for interpretation.  Reconstructed coronal and sagittal images were also obtained. Automated exposure control and iterative construction methods were used. Findings: Normal appearance of the pulmonary arteries without evidence of pulmonary embolism. There is atherosclerosis and ectasia of the thoracic aorta. There are coronary artery calcifications. No pericardial effusion. There is beam hardening and streak artifact  relating to bilateral shoulder arthroplasties. There is elevation of the left hemidiaphragm with segmental passive atelectasis in the left lower lobe. There is emphysema. There is a small right pleural effusion with passive subsegmental  atelectasis in the right lower lobe.. No pneumothorax. The bones are demineralized. There is scoliosis. No acute or suspicious bony findings. Grossly unremarkable appearance of the partially imaged upper abdomen.     Impression: Impression: No evidence of pulmonary embolism. Elevation of the left hemidiaphragm with segmental passive atelectasis in the left lower lobe. Small right pleural effusion with passive subsegmental atelectasis at the right lung base. Emphysema. Correlate with patient history and risk factors and please assess if the patient meets criteria for routine low dose CT lung cancer screening. Electronically Signed: Jona Carrasquillo MD  7/28/2025 11:54 AM EDT  Workstation ID: [id number]    SLP FEES - Fiberoptic Endo Eval Swallow  Result Date: 7/27/2025  This procedure was auto-finalized with no dictation required.      Results for orders placed during the hospital encounter of 04/22/25    Adult Transthoracic Echo Complete W/ Cont if Necessary Per Protocol 04/26/2025  4:58 PM    Interpretation Summary    Left ventricular systolic function is normal. Estimated left ventricular EF = 60%    Left ventricular wall thickness is consistent with hypertrophy.    No hemodynamically significant valvular heart disease      Current medications:  Scheduled Meds:amantadine, 100 mg, Oral, BID  amiodarone, 200 mg, Oral, Q24H  carbidopa-levodopa, 1 tablet, Oral, 4x Daily  carbidopa-levodopa CR, 1 tablet, Oral, BID  cefepime, 1,000 mg, Intravenous, Q12H  fentaNYL, 1 patch, Transdermal, Q72H   And  Check Fentanyl Patch Placement, 1 each, Not Applicable, Q12H  enoxaparin sodium, 30 mg, Subcutaneous, Daily  ipratropium-albuterol, 3 mL, Nebulization, Q4H - RT  Lidocaine, 1 patch, Transdermal, Q24H  Lidocaine, 2 patch, Transdermal, Q24H  methylPREDNISolone sodium succinate, 40 mg, Intravenous, Q12H  multivitamin with minerals, 1 tablet, Oral, Daily  pantoprazole, 40 mg, Oral, Q AM  QUEtiapine, 25 mg, Oral,  Nightly  senna-docusate sodium, 2 tablet, Oral, BID  sodium chloride, 10 mL, Intravenous, Q12H  vancomycin, 1,000 mg, Intravenous, Q12H      Continuous Infusions:Pharmacy to dose vancomycin,       PRN Meds:.  acetaminophen **OR** acetaminophen **OR** acetaminophen    albuterol    albuterol    senna-docusate sodium **AND** polyethylene glycol **AND** bisacodyl **AND** bisacodyl    Calcium Replacement - Follow Nurse / BPA Driven Protocol    ipratropium-albuterol    Magnesium Standard Dose Replacement - Follow Nurse / BPA Driven Protocol    nitroglycerin    ondansetron    Pharmacy to dose vancomycin    Phosphorus Replacement - Follow Nurse / BPA Driven Protocol    Potassium Replacement - Follow Nurse / BPA Driven Protocol    sodium chloride    sodium chloride    sodium chloride    tiZANidine    Assessment & Plan   Assessment & Plan     Active Hospital Problems    Diagnosis  POA    **Acute on chronic hypoxic respiratory failure [J96.21]  Yes      Resolved Hospital Problems   No resolved problems to display.        Brief Hospital Course to date:  Flaco Roque II is a 79 y.o. male presented from acute rehab with acute hypoxic respiratory failure likely secondary to pneumonia concern for aspiration versus hospital-acquired.  Note was just admitted to [facility]  7/18/25 to 7/24/25 after a fall at home with multiple rib fractures.     Acute on chronic hypoxic respiratory failure(baseline 2 L nasal cannula)  Left lower lobe pneumonia (HAP versus aspiration)  Multiple rib fracture  COPD, with exacerbation likely secondary to pneumonia   - On high flow nasal cannula to keep SpO2 above 90%, titrate to return to baseline  - CXR shows new patchy airspace disease in LLL likely related to pna  - CTA chest showed no PE, elevated hemidiaphragm/possible atelectasis LLL, emphysema  - DuoNebs scheduled and as needed  - continue cefepime.  Will stop vanc since MRSA negative  - MRSA, strep pneumo, Legionella all negative  - respiratory panel  pcr negative  - Aggressive pulmonary toileting  - Due to the wheezing, and oxygen requirement started Solu-Medrol 40 twice daily IV     Acute/subacute nondisplaced Rib Fractures (right 9-11th; left 3-4th),   This limited his ability to take a deep breath which might causing him to develop pneumonia  - started lidocaine patches  - Fentanyl patches every 72 hours  - Will likely need rehab, case management consulted    Confusion  --agitated overnight and required sitter.  Seems much better today  --restart his home seroquel.  Will try to start weaning steroids soon    Mild dehydration:  S/p IVF    Mildly elevated/flat troponins  Paroxysmal atrial fibrillation  Chronic, stable  Denied chest pain  - troponins flat  -  We are holding eliquis going forward (frequent/recurrent falls)  -patient would NOT want heart cath or interventions in event of acute cardiac event  -stop eliquis (frequent and significant falls), patient & family agree with this, understand risk of stroke and cardiac events  -continue amiodarone, not on beta-blocker anymore?     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      Chronic constipation  Has no bowel movement in the last 4 days  Scheduled MiraLAX, senna and bisacodyl     Gerd  -ppi     Chronic back pain  -fentanyl pain pump, recently refilled,  Fentanyl patches and lidocaine for chest pain      Called and updated patient's son, Bright Roque over the phone on 7/27/25      Expected Discharge Location and Transportation: was at  [facility], will need another precert.  PT recs IRF  Expected Discharge   Expected Discharge Date: 7/31/2025; Expected Discharge Time:      VTE Prophylaxis:  Pharmacologic VTE prophylaxis orders are present.         AM-PAC 6 Clicks Score (PT): 16 (07/29/25 1312)    CODE STATUS:   Code Status and Medical Interventions: No CPR (Do Not Attempt to Resuscitate); Limited Support; No intubation (DNI), No artificial nutrition, No cardioversion, No dialysis, No vasopressors   Ordered at: 07/26/25 8220     Code Status (Patient has no pulse and is not breathing):    No CPR (Do Not Attempt to Resuscitate)     Medical Interventions (Patient has pulse or is breathing):    Limited Support     Medical Intervention Limits:    No intubation (DNI)       No artificial nutrition       No cardioversion       No dialysis       No vasopressors     Level Of Support Discussed With:    Patient       Randal Perez MD  07/29/25
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
## 2025-07-29 NOTE — CONSULTS
Nutrition Services    Patient Name:  Flaco Roque II  YOB: 1945  MRN: [id number]  Admit Date:  7/26/2025    Consult received for unintentional wt loss. MD okay with patient receiving Boost even though on NTL. RD provided Boost Plus to pantry for RN to provide with every meals. RD will continue to follow per protocol.     Electronically signed by:  Jessi Baugh RD  07/29/25 15:31 EDT
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
## 2025-07-29 NOTE — PLAN OF CARE
Goal Outcome Evaluation:  Plan of Care Reviewed With: patient           Outcome Evaluation: OT eval completed. Pt presents below baseline for ADL performance, limited by significant weakness, decreased activity tolerance, and balance deficits. Pt donned socks with ModA, completed functional transfers with CG to Mechelle Crowell for dynamic standing tasks. IP OT services warranted. Recommend IRF at discharge.    Anticipated Discharge Disposition (OT): inpatient rehabilitation facility

## 2025-07-30 LAB
ANION GAP SERPL CALCULATED.3IONS-SCNC: 8 MMOL/L (ref 5–15)
BACTERIA SPEC RESP CULT: NORMAL
BUN SERPL-MCNC: 16.3 MG/DL (ref 8–23)
BUN/CREAT SERPL: 32.6 (ref 7–25)
CALCIUM SPEC-SCNC: 8.8 MG/DL (ref 8.6–10.5)
CHLORIDE SERPL-SCNC: 103 MMOL/L (ref 98–107)
CO2 SERPL-SCNC: 29 MMOL/L (ref 22–29)
CREAT SERPL-MCNC: 0.5 MG/DL (ref 0.76–1.27)
EGFRCR SERPLBLD CKD-EPI 2021: 103.8 ML/MIN/1.73
GLUCOSE SERPL-MCNC: 136 MG/DL (ref 65–99)
GRAM STN SPEC: NORMAL
M PNEUMO DNA SPEC QL NAA+PROBE: NEGATIVE
POTASSIUM SERPL-SCNC: 4.3 MMOL/L (ref 3.5–5.2)
SODIUM SERPL-SCNC: 140 MMOL/L (ref 136–145)

## 2025-07-30 PROCEDURE — 80048 BASIC METABOLIC PNL TOTAL CA: CPT

## 2025-07-30 PROCEDURE — 94664 DEMO&/EVAL PT USE INHALER: CPT

## 2025-07-30 PROCEDURE — 25010000002 METHYLPREDNISOLONE PER 40 MG

## 2025-07-30 PROCEDURE — 25010000002 ENOXAPARIN PER 10 MG

## 2025-07-30 PROCEDURE — 94761 N-INVAS EAR/PLS OXIMETRY MLT: CPT

## 2025-07-30 PROCEDURE — 94799 UNLISTED PULMONARY SVC/PX: CPT

## 2025-07-30 PROCEDURE — 25010000002 CEFEPIME PER 500 MG

## 2025-07-30 PROCEDURE — 99232 SBSQ HOSP IP/OBS MODERATE 35: CPT | Performed by: INTERNAL MEDICINE

## 2025-07-30 RX ORDER — PREDNISONE 20 MG/1
60 TABLET ORAL
Status: DISCONTINUED | OUTPATIENT
Start: 2025-07-31 | End: 2025-08-02 | Stop reason: HOSPADM

## 2025-07-30 RX ADMIN — Medication 10 ML: at 20:34

## 2025-07-30 RX ADMIN — METHYLPREDNISOLONE SODIUM SUCCINATE 40 MG: 40 INJECTION, POWDER, FOR SOLUTION INTRAMUSCULAR; INTRAVENOUS at 03:11

## 2025-07-30 RX ADMIN — METHYLPREDNISOLONE SODIUM SUCCINATE 40 MG: 40 INJECTION, POWDER, FOR SOLUTION INTRAMUSCULAR; INTRAVENOUS at 14:58

## 2025-07-30 RX ADMIN — AMANTADINE HYDROCHLORIDE 100 MG: 100 CAPSULE ORAL at 10:18

## 2025-07-30 RX ADMIN — LIDOCAINE 2 PATCH: 4 PATCH TOPICAL at 10:20

## 2025-07-30 RX ADMIN — CEFEPIME 1000 MG: 1 INJECTION, POWDER, FOR SOLUTION INTRAMUSCULAR; INTRAVENOUS at 10:59

## 2025-07-30 RX ADMIN — FENTANYL 1 PATCH: 25 PATCH TRANSDERMAL at 03:11

## 2025-07-30 RX ADMIN — IPRATROPIUM BROMIDE AND ALBUTEROL SULFATE 3 ML: 2.5; .5 SOLUTION RESPIRATORY (INHALATION) at 20:30

## 2025-07-30 RX ADMIN — QUETIAPINE FUMARATE 25 MG: 25 TABLET ORAL at 20:32

## 2025-07-30 RX ADMIN — LIDOCAINE 1 PATCH: 4 PATCH TOPICAL at 10:19

## 2025-07-30 RX ADMIN — AMANTADINE HYDROCHLORIDE 100 MG: 100 CAPSULE ORAL at 20:32

## 2025-07-30 RX ADMIN — Medication 10 ML: at 10:21

## 2025-07-30 RX ADMIN — CARBIDOPA AND LEVODOPA 1 TABLET: 25; 100 TABLET ORAL at 10:17

## 2025-07-30 RX ADMIN — Medication 1 TABLET: at 10:17

## 2025-07-30 RX ADMIN — IPRATROPIUM BROMIDE AND ALBUTEROL SULFATE 3 ML: 2.5; .5 SOLUTION RESPIRATORY (INHALATION) at 12:42

## 2025-07-30 RX ADMIN — IPRATROPIUM BROMIDE AND ALBUTEROL SULFATE 3 ML: 2.5; .5 SOLUTION RESPIRATORY (INHALATION) at 07:55

## 2025-07-30 RX ADMIN — CEFEPIME 1000 MG: 1 INJECTION, POWDER, FOR SOLUTION INTRAMUSCULAR; INTRAVENOUS at 20:32

## 2025-07-30 RX ADMIN — AMIODARONE HYDROCHLORIDE 200 MG: 200 TABLET ORAL at 10:17

## 2025-07-30 RX ADMIN — TIZANIDINE 4 MG: 4 TABLET ORAL at 14:57

## 2025-07-30 RX ADMIN — CARBIDOPA AND LEVODOPA 1 TABLET: 25; 100 TABLET ORAL at 18:21

## 2025-07-30 RX ADMIN — CARBIDOPA AND LEVODOPA 1 TABLET: 25; 100 TABLET ORAL at 13:27

## 2025-07-30 RX ADMIN — CARBIDOPA AND LEVODOPA 1 TABLET: 50; 200 TABLET, EXTENDED RELEASE ORAL at 20:32

## 2025-07-30 RX ADMIN — CARBIDOPA AND LEVODOPA 1 TABLET: 50; 200 TABLET, EXTENDED RELEASE ORAL at 10:17

## 2025-07-30 RX ADMIN — SENNOSIDES AND DOCUSATE SODIUM 2 TABLET: 50; 8.6 TABLET ORAL at 10:16

## 2025-07-30 RX ADMIN — ENOXAPARIN SODIUM 30 MG: 100 INJECTION SUBCUTANEOUS at 10:18

## 2025-07-30 RX ADMIN — IPRATROPIUM BROMIDE AND ALBUTEROL SULFATE 3 ML: 2.5; .5 SOLUTION RESPIRATORY (INHALATION) at 16:12

## 2025-07-30 RX ADMIN — CARBIDOPA AND LEVODOPA 1 TABLET: 25; 100 TABLET ORAL at 20:32

## 2025-07-30 NOTE — PLAN OF CARE
Problem: Fall Injury Risk  Goal: Absence of Fall and Fall-Related Injury  Outcome: Not Progressing  Intervention: Identify and Manage Contributors  Recent Flowsheet Documentation  Taken 7/30/2025 0400 by Vinita Liu RN  Medication Review/Management: medications reviewed  Taken 7/30/2025 0200 by Vinita Liu RN  Medication Review/Management: medications reviewed  Taken 7/29/2025 2345 by Vinita Liu RN  Medication Review/Management: medications reviewed  Taken 7/29/2025 2200 by Vinita Liu RN  Medication Review/Management: medications reviewed  Taken 7/29/2025 1900 by Vinita Liu RN  Medication Review/Management: medications reviewed  Intervention: Promote Injury-Free Environment  Recent Flowsheet Documentation  Taken 7/30/2025 0400 by Vinita Liu RN  Safety Promotion/Fall Prevention:   activity supervised   safety round/check completed  Taken 7/30/2025 0200 by Vinita Liu RN  Safety Promotion/Fall Prevention:   safety round/check completed   activity supervised  Taken 7/29/2025 2345 by Vinita Liu RN  Safety Promotion/Fall Prevention:   activity supervised   safety round/check completed  Taken 7/29/2025 2200 by Vinita Liu RN  Safety Promotion/Fall Prevention: safety round/check completed  Taken 7/29/2025 1900 by Vinita Liu RN  Safety Promotion/Fall Prevention: safety round/check completed   Goal Outcome Evaluation:

## 2025-07-30 NOTE — PROGRESS NOTES
Psychiatric Medicine Services  PROGRESS NOTE    Patient Name: Flaco Roque II  : 1945  MRN: [id number]    Date of Admission: 2025  Primary Care Physician: Ariadne Galloway PA    Subjective   Subjective     CC:  Dyspnea, rib fx    HPI:  Sitting in the chair, breathing improved.  Having difficulty with sleeping which has been an ongoing issue for him.      Objective   Objective     Vital Signs:   Temp:  [97.3 °F (36.3 °C)-98.3 °F (36.8 °C)] 97.3 °F (36.3 °C)  Heart Rate:  [57-77] 70  Resp:  [18] 18  BP: (124-148)/(61-85) 125/61  Flow (L/min) (Oxygen Therapy):  [2] 2     Physical Exam:  Constitutional: No acute distress, awake, frail, cachectic  HENT: NCAT, mucous membranes moist  Respiratory: Respiratory effort normal   Cardiovascular: RRR, no murmurs, rubs, or gallops  Gastrointestinal: Soft, thin, nondistended  Musculoskeletal: No bilateral ankle edema  Psychiatric: Appropriate affect, cooperative  Neurologic: Oriented x 2-3, speech clear  Skin: No rashes      Results Reviewed:  LAB RESULTS:      Lab 25  0619 25  0841 25  0135 25  2215 25  2119   WBC 5.94 11.25*  --   --  7.83   HEMOGLOBIN 11.9* 12.2*  --   --  11.7*   HEMATOCRIT 38.4 38.3  --   --  36.5*   PLATELETS 401 325  --   --  317   NEUTROS ABS  --  11.03*  --   --  6.96   IMMATURE GRANS (ABS)  --  0.05  --   --  0.04   LYMPHS ABS  --  0.11*  --   --  0.27*   MONOS ABS  --  0.05*  --   --  0.50   EOS ABS  --  0.00  --   --  0.03   MCV 96.7 95.0  --   --  95.1   SED RATE  --   --  20  --   --    CRP  --   --   --  3.63*  --    PROCALCITONIN  --   --   --   --  0.07   LACTATE  --   --   --   --  1.9   HSTROP T  --   --   --  26* 27*         Lab 25  0858 25  0619 25  0842 259   SODIUM 140 139 137 138   POTASSIUM 4.3 4.2 4.4 4.0   CHLORIDE 103 100 100 100   CO2 29.0 28.0 26.1 26.4   ANION GAP 8.0 11.0 10.9 11.6   BUN 16.3 14.7 9.3 11.0   CREATININE 0.50* 0.63*  0.55* 0.65*   EGFR 103.8 96.8 100.8 95.8   GLUCOSE 136* 138* 125* 139*   CALCIUM 8.8 8.7 8.8 9.1         Lab 07/27/25  0842 07/26/25 2119   TOTAL PROTEIN 5.9* 6.3   ALBUMIN 3.4* 3.5   GLOBULIN 2.5 2.8   ALT (SGPT) 10 7   AST (SGOT) 21 22   BILIRUBIN 0.8 0.7   ALK PHOS 121* 121*         Lab 07/26/25  2215 07/26/25 2119   PROBNP  --  798.0   HSTROP T 26* 27*                 Lab 07/26/25 2118   PH, ARTERIAL 7.407   PCO2, ARTERIAL 46.2*   PO2 ART 66.2*   FIO2 36   HCO3 ART 29.1*   BASE EXCESS ART 3.7*   CARBOXYHEMOGLOBIN 1.7     Brief Urine Lab Results  (Last result in the past 365 days)        Color   Clarity   Blood   Leuk Est   Nitrite   Protein   CREAT   Urine HCG        07/18/25 1010 Yellow   Clear   Negative   Negative   Negative   Negative                   Microbiology Results Abnormal       None            No radiology results from the last 24 hrs    Results for orders placed during the hospital encounter of 04/22/25    Adult Transthoracic Echo Complete W/ Cont if Necessary Per Protocol 04/26/2025  4:58 PM    Interpretation Summary    Left ventricular systolic function is normal. Estimated left ventricular EF = 60%    Left ventricular wall thickness is consistent with hypertrophy.    No hemodynamically significant valvular heart disease      Current medications:  Scheduled Meds:amantadine, 100 mg, Oral, BID  amiodarone, 200 mg, Oral, Q24H  carbidopa-levodopa, 1 tablet, Oral, 4x Daily  carbidopa-levodopa CR, 1 tablet, Oral, BID  cefTRIAXone, 1,000 mg, Intravenous, Q24H  fentaNYL, 1 patch, Transdermal, Q72H   And  Check Fentanyl Patch Placement, 1 each, Not Applicable, Q12H  [START ON 8/2/2025] fentaNYL, 1 patch, Transdermal, Q72H   And  [START ON 8/2/2025] Check Fentanyl Patch Placement, 1 each, Not Applicable, Q12H  enoxaparin sodium, 30 mg, Subcutaneous, Daily  ipratropium-albuterol, 3 mL, Nebulization, Q4H - RT  Lidocaine, 1 patch, Transdermal, Q24H  Lidocaine, 2 patch, Transdermal, Q24H  multivitamin with  minerals, 1 tablet, Oral, Daily  pantoprazole, 40 mg, Oral, Q AM  predniSONE, 60 mg, Oral, Daily With Breakfast  QUEtiapine, 50 mg, Oral, Nightly  senna-docusate sodium, 2 tablet, Oral, BID  sodium chloride, 10 mL, Intravenous, Q12H      Continuous Infusions:     PRN Meds:.  acetaminophen **OR** acetaminophen **OR** acetaminophen    albuterol    albuterol    senna-docusate sodium **AND** polyethylene glycol **AND** bisacodyl **AND** bisacodyl    Calcium Replacement - Follow Nurse / BPA Driven Protocol    ipratropium-albuterol    Magnesium Standard Dose Replacement - Follow Nurse / BPA Driven Protocol    nitroglycerin    ondansetron    Phosphorus Replacement - Follow Nurse / BPA Driven Protocol    Potassium Replacement - Follow Nurse / BPA Driven Protocol    sodium chloride    sodium chloride    sodium chloride    tiZANidine    Assessment & Plan   Assessment & Plan     Active Hospital Problems    Diagnosis  POA    **Acute on chronic hypoxic respiratory failure [J96.21]  Yes      Resolved Hospital Problems   No resolved problems to display.        Brief Hospital Course to date:  Flaco Roque II is a 79 y.o. male presented from acute rehab with acute hypoxic respiratory failure likely secondary to pneumonia, concern for aspiration versus hospital-acquired.    He was just admitted to [facility]  7/18/25 to 7/24/25 after a fall at home with multiple rib fractures.     Acute on chronic hypoxic respiratory failure(baseline 2 L nasal cannula)  Left lower lobe pneumonia (HAP versus aspiration)  Multiple rib fracture  COPD, with exacerbation likely secondary to pneumonia  - CTA chest showed no PE, elevated hemidiaphragm/possible atelectasis LLL, emphysema  - continue cefepime.  Discontinued vanc since MRSA negative  - MRSA, strep pneumo, Legionella all negative  - respiratory panel pcr negative  - Aggressive pulmonary toilet    - Change cefepime to Rocephin       Acute/subacute nondisplaced Rib Fractures (right 9-11th; left  3-4th),   - causing atelectasis  - started lidocaine patches  - Fentanyl patches every 72 hours  - Will likely need rehab, case management consulted     Confusion  --agitated at night and required sitter.    -- Increase Home Seroquel dose from 25 mg to 50 mg nightly     Paroxysmal atrial fibrillation  -- We are holding eliquis going forward (frequent/recurrent falls). Family in agreement. DO not restart eliquis at DC  -- patient would NOT want heart cath or interventions in event of acute cardiac event  -continue amiodarone, not on beta-blocker anymore?     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      Chronic constipation  - continue scheduled bowel regimen     Chronic back pain  -fentanyl pain pump, recently refilled  -Fentanyl patches and lidocaine for chest pain    Expected Discharge Location and Transportation: SNF  Expected Discharge   Expected Discharge Date: 7/31/2025; Expected Discharge Time:      VTE Prophylaxis:  Pharmacologic VTE prophylaxis orders are present.         AM-PAC 6 Clicks Score (PT): 12 (07/30/25 2000)    CODE STATUS:   Code Status and Medical Interventions: No CPR (Do Not Attempt to Resuscitate); Limited Support; No intubation (DNI), No artificial nutrition, No cardioversion, No dialysis, No vasopressors   Ordered at: 07/26/25 0900     Code Status (Patient has no pulse and is not breathing):    No CPR (Do Not Attempt to Resuscitate)     Medical Interventions (Patient has pulse or is breathing):    Limited Support     Medical Intervention Limits:    No  intubation (DNI)       No artificial nutrition       No cardioversion       No dialysis       No vasopressors     Level Of Support Discussed With:    Patient       Kyleigh Chen Lopez, DO  07/31/25

## 2025-07-30 NOTE — PROGRESS NOTES
"    [facility] Medicine Services  PROGRESS NOTE    Patient Name: Flaco Roque II  : 1945  MRN: [id number]    Date of Admission: 2025  Primary Care Physician: Ariadne Galloway PA    Subjective   Subjective     CC:  SOA    HPI:  Saw patient this AM.  Agitated again last night, required sitter.  Feels better, says he feels \"real good.\"      Objective   Objective     Vital Signs:   Temp:  [97.8 °F (36.6 °C)-99.2 °F (37.3 °C)] 98.7 °F (37.1 °C)  Heart Rate:  [55-76] 73  Resp:  [16-20] 18  BP: (137-163)/(69-88) 137/72  Flow (L/min) (Oxygen Therapy):  [2] 2     Physical Exam:  Constitutional: No acute distress, awake, alert, sitting up in bed, very thin  HENT: NCAT, mucous membranes moist  Respiratory: Clear to auscultation bilaterally, respiratory effort normal on 2LNC  Cardiovascular: RRR, no murmurs, rubs, or gallops  Gastrointestinal: Positive bowel sounds, soft, nontender, nondistended  Musculoskeletal: No bilateral ankle edema  Psychiatric: Appropriate affect, cooperative, rapid speech  Neurologic: mild confused, WHITE, speech clear  Skin: No rashes      Results Reviewed:  LAB RESULTS:      Lab 25  0619 25  0841 25  0135 25  2215 25  2119   WBC 5.94 11.25*  --   --  7.83   HEMOGLOBIN 11.9* 12.2*  --   --  11.7*   HEMATOCRIT 38.4 38.3  --   --  36.5*   PLATELETS 401 325  --   --  317   NEUTROS ABS  --  11.03*  --   --  6.96   IMMATURE GRANS (ABS)  --  0.05  --   --  0.04   LYMPHS ABS  --  0.11*  --   --  0.27*   MONOS ABS  --  0.05*  --   --  0.50   EOS ABS  --  0.00  --   --  0.03   MCV 96.7 95.0  --   --  95.1   SED RATE  --   --  20  --   --    CRP  --   --   --  3.63*  --    PROCALCITONIN  --   --   --   --  0.07   LACTATE  --   --   --   --  1.9   HSTROP T  --   --   --  26* 27*         Lab 25  0858 25  0619 25  0842 25  6763   SODIUM 140 139 137 138   POTASSIUM 4.3 4.2 4.4 4.0   CHLORIDE 103 100 100 100   CO2 29.0 28.0 " 26.1 26.4   ANION GAP 8.0 11.0 10.9 11.6   BUN 16.3 14.7 9.3 11.0   CREATININE 0.50* 0.63* 0.55* 0.65*   EGFR 103.8 96.8 100.8 95.8   GLUCOSE 136* 138* 125* 139*   CALCIUM 8.8 8.7 8.8 9.1         Lab 07/27/25  0842 07/26/25  2119   TOTAL PROTEIN 5.9* 6.3   ALBUMIN 3.4* 3.5   GLOBULIN 2.5 2.8   ALT (SGPT) 10 7   AST (SGOT) 21 22   BILIRUBIN 0.8 0.7   ALK PHOS 121* 121*         Lab 07/26/25  2215 07/26/25 2119   PROBNP  --  798.0   HSTROP T 26* 27*                 Lab 07/26/25 2118   PH, ARTERIAL 7.407   PCO2, ARTERIAL 46.2*   PO2 ART 66.2*   FIO2 36   HCO3 ART 29.1*   BASE EXCESS ART 3.7*   CARBOXYHEMOGLOBIN 1.7     Brief Urine Lab Results  (Last result in the past 365 days)        Color   Clarity   Blood   Leuk Est   Nitrite   Protein   CREAT   Urine HCG        07/18/25 1010 Yellow   Clear   Negative   Negative   Negative   Negative                   Microbiology Results Abnormal       None            No radiology results from the last 24 hrs      Results for orders placed during the hospital encounter of 04/22/25    Adult Transthoracic Echo Complete W/ Cont if Necessary Per Protocol 04/26/2025  4:58 PM    Interpretation Summary    Left ventricular systolic function is normal. Estimated left ventricular EF = 60%    Left ventricular wall thickness is consistent with hypertrophy.    No hemodynamically significant valvular heart disease      Current medications:  Scheduled Meds:amantadine, 100 mg, Oral, BID  amiodarone, 200 mg, Oral, Q24H  carbidopa-levodopa, 1 tablet, Oral, 4x Daily  carbidopa-levodopa CR, 1 tablet, Oral, BID  cefepime, 1,000 mg, Intravenous, Q12H  fentaNYL, 1 patch, Transdermal, Q72H   And  Check Fentanyl Patch Placement, 1 each, Not Applicable, Q12H  enoxaparin sodium, 30 mg, Subcutaneous, Daily  ipratropium-albuterol, 3 mL, Nebulization, Q4H - RT  Lidocaine, 1 patch, Transdermal, Q24H  Lidocaine, 2 patch, Transdermal, Q24H  methylPREDNISolone sodium succinate, 40 mg, Intravenous,  Q12H  multivitamin with minerals, 1 tablet, Oral, Daily  pantoprazole, 40 mg, Oral, Q AM  QUEtiapine, 25 mg, Oral, Nightly  senna-docusate sodium, 2 tablet, Oral, BID  sodium chloride, 10 mL, Intravenous, Q12H      Continuous Infusions:Pharmacy to dose vancomycin,       PRN Meds:.  acetaminophen **OR** acetaminophen **OR** acetaminophen    albuterol    albuterol    senna-docusate sodium **AND** polyethylene glycol **AND** bisacodyl **AND** bisacodyl    Calcium Replacement - Follow Nurse / BPA Driven Protocol    ipratropium-albuterol    Magnesium Standard Dose Replacement - Follow Nurse / BPA Driven Protocol    nitroglycerin    ondansetron    Pharmacy to dose vancomycin    Phosphorus Replacement - Follow Nurse / BPA Driven Protocol    Potassium Replacement - Follow Nurse / BPA Driven Protocol    sodium chloride    sodium chloride    sodium chloride    tiZANidine    Assessment & Plan   Assessment & Plan     Active Hospital Problems    Diagnosis  POA    **Acute on chronic hypoxic respiratory failure [J96.21]  Yes      Resolved Hospital Problems   No resolved problems to display.        Brief Hospital Course to date:  Flaco Roque II is a 79 y.o. male presented from acute rehab with acute hypoxic respiratory failure likely secondary to pneumonia concern for aspiration versus hospital-acquired.  Note was just admitted to [facility]  7/18/25 to 7/24/25 after a fall at home with multiple rib fractures.     Acute on chronic hypoxic respiratory failure(baseline 2 L nasal cannula)  Left lower lobe pneumonia (HAP versus aspiration)  Multiple rib fracture  COPD, with exacerbation likely secondary to pneumonia  - CXR shows new patchy airspace disease in LLL likely related to pna  - CTA chest showed no PE, elevated hemidiaphragm/possible atelectasis LLL, emphysema  - DuoNebs scheduled and as needed  - continue cefepime.  Will stop vanc since MRSA negative  - MRSA, strep pneumo, Legionella all negative  - respiratory panel pcr  negative  - Aggressive pulmonary toileting  - Due to the wheezing, and oxygen requirement started Solu-Medrol 40 twice daily IV but now lungs doing much better, initially required HFNC but now oxygen weaned to 2LNC and doing well the last couple of days.  ?if steroids may be contributing to his agitation.  Will wean to prednisone     Acute/subacute nondisplaced Rib Fractures (right 9-11th; left 3-4th),   This limited his ability to take a deep breath which might causing him to develop pneumonia  - started lidocaine patches  - Fentanyl patches every 72 hours  - Will likely need rehab, case management consulted    Confusion  --agitated overnight and required sitter.  Much better today but a little hyper with rapid speech so steroids may be contributing  --restarted his home seroquel.  Wean steroids    Mild dehydration:  S/p IVF    Mildly elevated/flat troponins  Paroxysmal atrial fibrillation  Chronic, stable  Denied chest pain  - troponins flat  -  We are holding eliquis going forward (frequent/recurrent falls)  -patient would NOT want heart cath or interventions in event of acute cardiac event  -stop eliquis (frequent and significant falls), patient & family agree with this, understand risk of stroke and cardiac events  -continue amiodarone, not on beta-blocker anymore?     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      Chronic constipation  Has no bowel movement in the last 4 days  Scheduled MiraLAX, senna and bisacodyl     Gerd  -ppi     Chronic  back pain  -fentanyl pain pump, recently refilled,  Fentanyl patches and lidocaine for chest pain      Called and updated patient's son, Bright Roque over the phone on 7/27/25      Expected Discharge Location and Transportation: was at [facility], will need another precert.  PT recs IRF  Expected Discharge   Expected Discharge Date: 7/31/2025; Expected Discharge Time:      VTE Prophylaxis:  Pharmacologic VTE prophylaxis orders are present.         AM-PAC 6 Clicks Score (PT): 11 (07/30/25 0800)    CODE STATUS:   Code Status and Medical Interventions: No CPR (Do Not Attempt to Resuscitate); Limited Support; No intubation (DNI), No artificial nutrition, No cardioversion, No dialysis, No vasopressors   Ordered at: 07/26/25 2240     Code Status (Patient has no pulse and is not breathing):    No CPR (Do Not Attempt to Resuscitate)     Medical Interventions (Patient has pulse or is breathing):    Limited Support     Medical Intervention Limits:    No intubation (DNI)       No artificial nutrition       No cardioversion       No dialysis       No vasopressors     Level Of Support Discussed With:    Patient       Randal Perez MD  07/30/25

## 2025-07-31 PROCEDURE — 97535 SELF CARE MNGMENT TRAINING: CPT

## 2025-07-31 PROCEDURE — 25010000002 ENOXAPARIN PER 10 MG

## 2025-07-31 PROCEDURE — 94640 AIRWAY INHALATION TREATMENT: CPT

## 2025-07-31 PROCEDURE — 63710000001 PREDNISONE PER 1 MG: Performed by: INTERNAL MEDICINE

## 2025-07-31 PROCEDURE — 25010000002 CEFTRIAXONE PER 250 MG: Performed by: INTERNAL MEDICINE

## 2025-07-31 PROCEDURE — 94664 DEMO&/EVAL PT USE INHALER: CPT

## 2025-07-31 PROCEDURE — 99232 SBSQ HOSP IP/OBS MODERATE 35: CPT | Performed by: INTERNAL MEDICINE

## 2025-07-31 PROCEDURE — 94799 UNLISTED PULMONARY SVC/PX: CPT

## 2025-07-31 PROCEDURE — 97530 THERAPEUTIC ACTIVITIES: CPT

## 2025-07-31 RX ORDER — FENTANYL 25 UG/1
1 PATCH TRANSDERMAL
Refills: 0 | Status: DISCONTINUED | OUTPATIENT
Start: 2025-08-02 | End: 2025-08-02 | Stop reason: HOSPADM

## 2025-07-31 RX ORDER — QUETIAPINE FUMARATE 25 MG/1
50 TABLET, FILM COATED ORAL NIGHTLY
Status: DISCONTINUED | OUTPATIENT
Start: 2025-07-31 | End: 2025-08-02 | Stop reason: HOSPADM

## 2025-07-31 RX ADMIN — CEFTRIAXONE SODIUM 1000 MG: 1 INJECTION, POWDER, FOR SOLUTION INTRAMUSCULAR; INTRAVENOUS at 08:18

## 2025-07-31 RX ADMIN — LIDOCAINE 1 PATCH: 4 PATCH TOPICAL at 08:23

## 2025-07-31 RX ADMIN — ENOXAPARIN SODIUM 30 MG: 100 INJECTION SUBCUTANEOUS at 08:18

## 2025-07-31 RX ADMIN — CARBIDOPA AND LEVODOPA 1 TABLET: 25; 100 TABLET ORAL at 17:49

## 2025-07-31 RX ADMIN — Medication 1 TABLET: at 08:19

## 2025-07-31 RX ADMIN — CARBIDOPA AND LEVODOPA 1 TABLET: 25; 100 TABLET ORAL at 08:19

## 2025-07-31 RX ADMIN — AMANTADINE HYDROCHLORIDE 100 MG: 100 CAPSULE ORAL at 20:04

## 2025-07-31 RX ADMIN — AMANTADINE HYDROCHLORIDE 100 MG: 100 CAPSULE ORAL at 08:19

## 2025-07-31 RX ADMIN — CARBIDOPA AND LEVODOPA 1 TABLET: 25; 100 TABLET ORAL at 11:47

## 2025-07-31 RX ADMIN — IPRATROPIUM BROMIDE AND ALBUTEROL SULFATE 3 ML: 2.5; .5 SOLUTION RESPIRATORY (INHALATION) at 19:17

## 2025-07-31 RX ADMIN — Medication 10 ML: at 20:05

## 2025-07-31 RX ADMIN — PREDNISONE 60 MG: 20 TABLET ORAL at 08:19

## 2025-07-31 RX ADMIN — TIZANIDINE 4 MG: 4 TABLET ORAL at 15:18

## 2025-07-31 RX ADMIN — Medication 10 ML: at 08:20

## 2025-07-31 RX ADMIN — LIDOCAINE 2 PATCH: 4 PATCH TOPICAL at 08:23

## 2025-07-31 RX ADMIN — AMIODARONE HYDROCHLORIDE 200 MG: 200 TABLET ORAL at 08:19

## 2025-07-31 RX ADMIN — CARBIDOPA AND LEVODOPA 1 TABLET: 50; 200 TABLET, EXTENDED RELEASE ORAL at 08:18

## 2025-07-31 RX ADMIN — IPRATROPIUM BROMIDE AND ALBUTEROL SULFATE 3 ML: 2.5; .5 SOLUTION RESPIRATORY (INHALATION) at 08:09

## 2025-07-31 RX ADMIN — CARBIDOPA AND LEVODOPA 1 TABLET: 50; 200 TABLET, EXTENDED RELEASE ORAL at 20:33

## 2025-07-31 RX ADMIN — CARBIDOPA AND LEVODOPA 1 TABLET: 25; 100 TABLET ORAL at 20:33

## 2025-07-31 RX ADMIN — QUETIAPINE FUMARATE 50 MG: 25 TABLET ORAL at 20:04

## 2025-07-31 RX ADMIN — PANTOPRAZOLE SODIUM 40 MG: 40 TABLET, DELAYED RELEASE ORAL at 05:22

## 2025-07-31 RX ADMIN — IPRATROPIUM BROMIDE AND ALBUTEROL SULFATE 3 ML: 2.5; .5 SOLUTION RESPIRATORY (INHALATION) at 12:10
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
## 2025-07-31 NOTE — CASE MANAGEMENT/SOCIAL WORK
Continued Stay Note  [facility]     Patient Name: Flaco Roque II  MRN: [id number]  Today's Date: 7/31/2025    Admit Date: 7/26/2025    Plan: SNF   Discharge Plan       Row Name 07/31/25 1502       Plan    Plan SNF    Patient/Family in Agreement with Plan yes    Plan Comments Met with Mr. Roque at the bedside to discuss discharge plan. He still hopes to go to [facility] for skilled rehab at discharge. [facility] is following but will not be able to offer a bed until he is sitter-free.  will continue to follow plan of care and assist with discharge planning needs as indicated.    Final Discharge Disposition Code 03 - skilled nursing facility (SNF)                   Discharge Codes    No documentation.                 Expected Discharge Date and Time       Expected Discharge Date Expected Discharge Time    Jul 31, 2025               Melody Milan RN

## 2025-07-31 NOTE — PLAN OF CARE
Goal Outcome Evaluation:  Plan of Care Reviewed With: patient        Progress: improving  Outcome Evaluation: Pt stood sinkside for grooming routine with CGA, ambulated HH distance with FWW and Agus for x2 LOB, and required modA for pericare. Pt ambulated w/narrow ALEXANDER and required frequent cues to widen with steps. Pt continues to be below fxl baseline, limited by weakness, balance deficits, and poor activity tolerance-warranting further skilled OT services. Rec d/c to IRF.    Anticipated Discharge Disposition (OT): inpatient rehabilitation facility

## 2025-08-01 LAB
BACTERIA SPEC AEROBE CULT: NORMAL
BACTERIA SPEC AEROBE CULT: NORMAL

## 2025-08-01 PROCEDURE — 94761 N-INVAS EAR/PLS OXIMETRY MLT: CPT

## 2025-08-01 PROCEDURE — 25010000002 ENOXAPARIN PER 10 MG

## 2025-08-01 PROCEDURE — 94664 DEMO&/EVAL PT USE INHALER: CPT

## 2025-08-01 PROCEDURE — 25010000002 CEFTRIAXONE PER 250 MG: Performed by: INTERNAL MEDICINE

## 2025-08-01 PROCEDURE — 94799 UNLISTED PULMONARY SVC/PX: CPT

## 2025-08-01 PROCEDURE — 92526 ORAL FUNCTION THERAPY: CPT

## 2025-08-01 PROCEDURE — 99232 SBSQ HOSP IP/OBS MODERATE 35: CPT | Performed by: NURSE PRACTITIONER

## 2025-08-01 PROCEDURE — 63710000001 PREDNISONE PER 1 MG: Performed by: INTERNAL MEDICINE

## 2025-08-01 RX ADMIN — IPRATROPIUM BROMIDE AND ALBUTEROL SULFATE 3 ML: 2.5; .5 SOLUTION RESPIRATORY (INHALATION) at 12:28

## 2025-08-01 RX ADMIN — AMANTADINE HYDROCHLORIDE 100 MG: 100 CAPSULE ORAL at 21:18

## 2025-08-01 RX ADMIN — PANTOPRAZOLE SODIUM 40 MG: 40 TABLET, DELAYED RELEASE ORAL at 05:50

## 2025-08-01 RX ADMIN — Medication 1 TABLET: at 09:20

## 2025-08-01 RX ADMIN — CARBIDOPA AND LEVODOPA 1 TABLET: 50; 200 TABLET, EXTENDED RELEASE ORAL at 09:20

## 2025-08-01 RX ADMIN — AMANTADINE HYDROCHLORIDE 100 MG: 100 CAPSULE ORAL at 09:20

## 2025-08-01 RX ADMIN — LIDOCAINE 1 PATCH: 4 PATCH TOPICAL at 09:11

## 2025-08-01 RX ADMIN — AMIODARONE HYDROCHLORIDE 200 MG: 200 TABLET ORAL at 09:19

## 2025-08-01 RX ADMIN — PREDNISONE 60 MG: 20 TABLET ORAL at 09:11

## 2025-08-01 RX ADMIN — CEFTRIAXONE SODIUM 1000 MG: 1 INJECTION, POWDER, FOR SOLUTION INTRAMUSCULAR; INTRAVENOUS at 09:19

## 2025-08-01 RX ADMIN — IPRATROPIUM BROMIDE AND ALBUTEROL SULFATE 3 ML: 2.5; .5 SOLUTION RESPIRATORY (INHALATION) at 19:22

## 2025-08-01 RX ADMIN — CARBIDOPA AND LEVODOPA 1 TABLET: 50; 200 TABLET, EXTENDED RELEASE ORAL at 21:18

## 2025-08-01 RX ADMIN — CARBIDOPA AND LEVODOPA 1 TABLET: 25; 100 TABLET ORAL at 21:18

## 2025-08-01 RX ADMIN — TIZANIDINE 4 MG: 4 TABLET ORAL at 21:18

## 2025-08-01 RX ADMIN — SENNOSIDES AND DOCUSATE SODIUM 2 TABLET: 50; 8.6 TABLET ORAL at 09:12

## 2025-08-01 RX ADMIN — Medication 10 ML: at 21:19

## 2025-08-01 RX ADMIN — ENOXAPARIN SODIUM 30 MG: 100 INJECTION SUBCUTANEOUS at 09:18

## 2025-08-01 RX ADMIN — CARBIDOPA AND LEVODOPA 1 TABLET: 25; 100 TABLET ORAL at 09:12

## 2025-08-01 RX ADMIN — QUETIAPINE FUMARATE 50 MG: 25 TABLET ORAL at 21:18

## 2025-08-01 RX ADMIN — IPRATROPIUM BROMIDE AND ALBUTEROL SULFATE 3 ML: 2.5; .5 SOLUTION RESPIRATORY (INHALATION) at 15:38

## 2025-08-01 RX ADMIN — CARBIDOPA AND LEVODOPA 1 TABLET: 25; 100 TABLET ORAL at 11:47

## 2025-08-01 RX ADMIN — CARBIDOPA AND LEVODOPA 1 TABLET: 25; 100 TABLET ORAL at 18:01

## 2025-08-01 RX ADMIN — TIZANIDINE 4 MG: 4 TABLET ORAL at 05:50

## 2025-08-01 RX ADMIN — SENNOSIDES AND DOCUSATE SODIUM 2 TABLET: 50; 8.6 TABLET ORAL at 21:18

## 2025-08-01 RX ADMIN — Medication 10 ML: at 09:21

## 2025-08-01 RX ADMIN — LIDOCAINE 2 PATCH: 4 PATCH TOPICAL at 09:20

## 2025-08-01 RX ADMIN — IPRATROPIUM BROMIDE AND ALBUTEROL SULFATE 3 ML: 2.5; .5 SOLUTION RESPIRATORY (INHALATION) at 08:15

## 2025-08-01 NOTE — CASE MANAGEMENT/SOCIAL WORK
Continued Stay Note  [facility]     Patient Name: Flaco Roque II  MRN: [id number]  Today's Date: 8/1/2025    Admit Date: 7/26/2025    Plan: SNF   Discharge Plan       Row Name 08/01/25 1344       Plan    Plan SNF    Patient/Family in Agreement with Plan yes    Plan Comments Met with Mr. Roque at the bedside to discuss discharge plan. He is hoping to go to [facility] for skilled rehab at discharge. [facility] will not accept him until he has 72 hours without a sitter. He has been without a sitter since last night.  will continue to follow plan of care and assist with discharge planning needs as indicated. IMM was delivered and signed.  15:27 EDT  Received call from [facility] liaison reporting that patient can come tomorrow if medically ready without having to wait for 72 hours without a sitter.  called wife, and she is also in agreement with this plan.     Final Discharge Disposition Code 03 - skilled nursing facility (SNF)                   Discharge Codes    No documentation.                 Expected Discharge Date and Time       Expected Discharge Date Expected Discharge Time    Jul 31, 2025               Melody Milan RN

## 2025-08-01 NOTE — PLAN OF CARE
Goal Outcome Evaluation:  Plan of Care Reviewed With: patient        Progress: no change                     Treatment Assessment (SLP): toleration of diet (08/01/25 0830)    Plan for Continued Treatment (SLP): continue treatment per plan of care (08/01/25 0830)
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
## 2025-08-01 NOTE — PLAN OF CARE
Uneventful night. Patient slept from ~7946-3753 onward. AOx4 even when sleep interrupted. Very pleasant and cooperative; no attempts OOB. Consider d/c 1:1.     Otherwise VSS. No pain., SOB. No other issues.     Problem: Adult Inpatient Plan of Care  Goal: Plan of Care Review  Outcome: Progressing   Goal Outcome Evaluation:
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
## 2025-08-01 NOTE — PROGRESS NOTES
"    [facility] Medicine Services  PROGRESS NOTE    Patient Name: Flaco Roque II  : 1945  MRN: [id number]    Date of Admission: 2025  Primary Care Physician: Ariadne Galloway PA    Subjective   Subjective     CC:  Dyspnea, rib fx    HPI:  Patient was seen resting in bed awake and alert. no visitors at bedside.  Per nurses report sitter was dc yest pm and patient has done well without sitter overnight. Patient reporting chronic nausea 3-4/10 scale, no nausea or vomiting.  Shortness of air \"the same\".  Tolerating modified diet.  Awaiting placement.      Objective   Objective     Vital Signs:   Temp:  [97.2 °F (36.2 °C)-98.2 °F (36.8 °C)] 97.5 °F (36.4 °C)  Heart Rate:  [53-84] 84  Resp:  [16-22] 22  BP: (105-156)/(63-90) 105/63  Flow (L/min) (Oxygen Therapy):  [1.5-3] 1.5     Physical Exam:  Constitutional: No acute distress, awake and alert.  Resting back in bed. Frail, chroincally ill and cachectic appearing male.   HENT: NCAT, mucous membranes moist  Respiratory: Respiratory effort normal on chronic 1-2 LNC with sats wnl.   Cardiovascular: RRR, no murmurs, rubs, or gallops  Gastrointestinal: Soft, thin, nondistended. Non tender.    Musculoskeletal: No bilateral ankle edema. WHITE spont   Psychiatric: flat affect, cooperative and calm   Neurologic: Oriented x person/hospital and year currently. Non focal. speech clear. Follows commands   Skin: Generalized fragile skin noted to exposed areas on upper and lower extremity.      Results Reviewed:  LAB RESULTS:      Lab 25  0619 25  0841 25  0135 25  2215 25  2119   WBC 5.94 11.25*  --   --  7.83   HEMOGLOBIN 11.9* 12.2*  --   --  11.7*   HEMATOCRIT 38.4 38.3  --   --  36.5*   PLATELETS 401 325  --   --  317   NEUTROS ABS  --  11.03*  --   --  6.96   IMMATURE GRANS (ABS)  --  0.05  --   --  0.04   LYMPHS ABS  --  0.11*  --   --  0.27*   MONOS ABS  --  0.05*  --   --  0.50   EOS ABS  --  0.00  --   --  " 0.03   MCV 96.7 95.0  --   --  95.1   SED RATE  --   --  20  --   --    CRP  --   --   --  3.63*  --    PROCALCITONIN  --   --   --   --  0.07   LACTATE  --   --   --   --  1.9   HSTROP T  --   --   --  26* 27*         Lab 07/30/25  0858 07/28/25  0619 07/27/25  0842 07/26/25 2119   SODIUM 140 139 137 138   POTASSIUM 4.3 4.2 4.4 4.0   CHLORIDE 103 100 100 100   CO2 29.0 28.0 26.1 26.4   ANION GAP 8.0 11.0 10.9 11.6   BUN 16.3 14.7 9.3 11.0   CREATININE 0.50* 0.63* 0.55* 0.65*   EGFR 103.8 96.8 100.8 95.8   GLUCOSE 136* 138* 125* 139*   CALCIUM 8.8 8.7 8.8 9.1         Lab 07/27/25  0842 07/26/25 2119   TOTAL PROTEIN 5.9* 6.3   ALBUMIN 3.4* 3.5   GLOBULIN 2.5 2.8   ALT (SGPT) 10 7   AST (SGOT) 21 22   BILIRUBIN 0.8 0.7   ALK PHOS 121* 121*         Lab 07/26/25 2215 07/26/25 2119   PROBNP  --  798.0   HSTROP T 26* 27*                 Lab 07/26/25 2118   PH, ARTERIAL 7.407   PCO2, ARTERIAL 46.2*   PO2 ART 66.2*   FIO2 36   HCO3 ART 29.1*   BASE EXCESS ART 3.7*   CARBOXYHEMOGLOBIN 1.7     Brief Urine Lab Results  (Last result in the past 365 days)        Color   Clarity   Blood   Leuk Est   Nitrite   Protein   CREAT   Urine HCG        07/18/25 1010 Yellow   Clear   Negative   Negative   Negative   Negative                   Microbiology Results Abnormal       None            No radiology results from the last 24 hrs    Results for orders placed during the hospital encounter of 04/22/25    Adult Transthoracic Echo Complete W/ Cont if Necessary Per Protocol 04/26/2025  4:58 PM    Interpretation Summary    Left ventricular systolic function is normal. Estimated left ventricular EF = 60%    Left ventricular wall thickness is consistent with hypertrophy.    No hemodynamically significant valvular heart disease      Current medications:  Scheduled Meds:amantadine, 100 mg, Oral, BID  amiodarone, 200 mg, Oral, Q24H  carbidopa-levodopa, 1 tablet, Oral, 4x Daily  carbidopa-levodopa CR, 1 tablet, Oral, BID  cefTRIAXone, 1,000  mg, Intravenous, Q24H  fentaNYL, 1 patch, Transdermal, Q72H   And  Check Fentanyl Patch Placement, 1 each, Not Applicable, Q12H  [START ON 8/2/2025] fentaNYL, 1 patch, Transdermal, Q72H   And  [START ON 8/2/2025] Check Fentanyl Patch Placement, 1 each, Not Applicable, Q12H  enoxaparin sodium, 30 mg, Subcutaneous, Daily  ipratropium-albuterol, 3 mL, Nebulization, Q4H - RT  Lidocaine, 1 patch, Transdermal, Q24H  Lidocaine, 2 patch, Transdermal, Q24H  multivitamin with minerals, 1 tablet, Oral, Daily  pantoprazole, 40 mg, Oral, Q AM  predniSONE, 60 mg, Oral, Daily With Breakfast  QUEtiapine, 50 mg, Oral, Nightly  senna-docusate sodium, 2 tablet, Oral, BID  sodium chloride, 10 mL, Intravenous, Q12H      Continuous Infusions:     PRN Meds:.  acetaminophen **OR** acetaminophen **OR** acetaminophen    albuterol    albuterol    senna-docusate sodium **AND** polyethylene glycol **AND** bisacodyl **AND** bisacodyl    Calcium Replacement - Follow Nurse / BPA Driven Protocol    ipratropium-albuterol    Magnesium Standard Dose Replacement - Follow Nurse / BPA Driven Protocol    nitroglycerin    ondansetron    Phosphorus Replacement - Follow Nurse / BPA Driven Protocol    Potassium Replacement - Follow Nurse / BPA Driven Protocol    sodium chloride    sodium chloride    tiZANidine    Assessment & Plan   Assessment & Plan     Active Hospital Problems    Diagnosis  POA    **Acute on chronic hypoxic respiratory failure [J96.21]  Yes      Resolved Hospital Problems   No resolved problems to display.        Brief Hospital Course to date:  Flaco Roque II is a 79 y.o. male presented from acute rehab with acute hypoxic respiratory failure likely secondary to pneumonia, concern for aspiration versus hospital-acquired.    He was just admitted to [facility]  7/18/25 to 7/24/25 after a fall at home with multiple rib fractures.    This patient's problems and plans were partially entered by my partner and updated as appropriate by me  08/01/25.    Assessment/plan:  Pt is new to me today      Acute on chronic hypoxic respiratory failure(baseline 2 L nasal cannula)  Left lower lobe pneumonia (HAP versus aspiration)  Multiple rib fracture  COPD, with exacerbation likely secondary to pneumonia  - CTA chest showed no PE, elevated hemidiaphragm/possible atelectasis LLL, emphysema  - prior diiscontinued vanc since MRSA negative  - MRSA, strep pneumo, Legionella all negative  - respiratory panel pcr negative  - Aggressive pulmonary toilet  - nebs   - prior changed cefepime to Rocephin  - on ch 1-2 LNC.  Sats wnl.  Monitor     Acute/subacute nondisplaced Rib Fractures (right 9-11th; left 3-4th),   - causing atelectasis  - started lidocaine patches  - Fentanyl patches every 72 hours  - Will likely need rehab, case management consulted.Awaiting placement   - pain better today      Confusion  --agitated at night and required sitter initially.  Sitter dcd yest evening. Stable without sitter since per staff.    -- Increase Home Seroquel dose from 25 mg to 50 mg nightly  - better today.      Paroxysmal atrial fibrillation  -- We are holding eliquis going forward (frequent/recurrent falls). Family in agreement. DO not restart eliquis at DC  -- patient would NOT want heart cath or interventions in event of acute cardiac event  -continue amiodarone, not on beta-blocker anymore?     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      Chronic constipation  - continue scheduled bowel regimen  - multiple BMs documented 7/30, monitor       Chronic back pain  -fentanyl pain pump, recently refilled  -Fentanyl patches and lidocaine for chest pain  - improved today.     Expected Discharge Location and Transportation: [facility]  Expected Discharge   Expected Discharge Date: 8/2/2025; Expected Discharge Time:      VTE Prophylaxis:  Pharmacologic VTE prophylaxis orders are present.         AM-PAC 6 Clicks Score (PT): 12 (08/01/25 0800)    CODE STATUS:   Code Status and Medical Interventions: No CPR (Do Not Attempt to Resuscitate); Limited Support; No intubation (DNI), No artificial nutrition, No cardioversion, No dialysis, No vasopressors   Ordered at: 07/26/25 2240     Code Status (Patient has no pulse and is not breathing):    No CPR (Do Not Attempt to Resuscitate)     Medical Interventions (Patient has pulse or is breathing):    Limited Support     Medical Intervention Limits:    No intubation (DNI)       No artificial nutrition       No cardioversion       No dialysis       No vasopressors     Level Of Support Discussed With:    Patient       Sophia Glaser Felipe, APRN  08/01/25

## 2025-08-02 VITALS
HEIGHT: 68 IN | WEIGHT: 101.85 LBS | DIASTOLIC BLOOD PRESSURE: 61 MMHG | OXYGEN SATURATION: 95 % | RESPIRATION RATE: 17 BRPM | SYSTOLIC BLOOD PRESSURE: 98 MMHG | HEART RATE: 71 BPM | BODY MASS INDEX: 15.44 KG/M2 | TEMPERATURE: 97.6 F

## 2025-08-02 PROCEDURE — 94664 DEMO&/EVAL PT USE INHALER: CPT

## 2025-08-02 PROCEDURE — 25010000002 CEFTRIAXONE PER 250 MG: Performed by: INTERNAL MEDICINE

## 2025-08-02 PROCEDURE — 99239 HOSP IP/OBS DSCHRG MGMT >30: CPT | Performed by: NURSE PRACTITIONER

## 2025-08-02 PROCEDURE — 94799 UNLISTED PULMONARY SVC/PX: CPT

## 2025-08-02 PROCEDURE — 25010000002 ENOXAPARIN PER 10 MG

## 2025-08-02 PROCEDURE — 63710000001 PREDNISONE PER 1 MG: Performed by: INTERNAL MEDICINE

## 2025-08-02 RX ORDER — PANTOPRAZOLE SODIUM 40 MG/1
40 TABLET, DELAYED RELEASE ORAL
Start: 2025-08-03

## 2025-08-02 RX ORDER — QUETIAPINE FUMARATE 50 MG/1
50 TABLET, FILM COATED ORAL NIGHTLY
Start: 2025-08-02

## 2025-08-02 RX ORDER — LIDOCAINE 4 G/G
2 PATCH TOPICAL
Start: 2025-08-03

## 2025-08-02 RX ORDER — ACETAMINOPHEN 325 MG/1
650 TABLET ORAL EVERY 4 HOURS PRN
Start: 2025-08-02

## 2025-08-02 RX ORDER — PREDNISONE 20 MG/1
TABLET ORAL
Qty: 7 TABLET | Refills: 0 | Status: SHIPPED | OUTPATIENT
Start: 2025-08-03 | End: 2025-08-02

## 2025-08-02 RX ORDER — PREDNISONE 20 MG/1
TABLET ORAL
Start: 2025-08-03 | End: 2025-08-09

## 2025-08-02 RX ADMIN — CARBIDOPA AND LEVODOPA 1 TABLET: 50; 200 TABLET, EXTENDED RELEASE ORAL at 08:55

## 2025-08-02 RX ADMIN — IPRATROPIUM BROMIDE AND ALBUTEROL SULFATE 3 ML: 2.5; .5 SOLUTION RESPIRATORY (INHALATION) at 11:31

## 2025-08-02 RX ADMIN — CARBIDOPA AND LEVODOPA 1 TABLET: 25; 100 TABLET ORAL at 08:58

## 2025-08-02 RX ADMIN — ENOXAPARIN SODIUM 30 MG: 100 INJECTION SUBCUTANEOUS at 08:54

## 2025-08-02 RX ADMIN — FENTANYL 1 PATCH: 25 PATCH TRANSDERMAL at 06:18

## 2025-08-02 RX ADMIN — LIDOCAINE 2 PATCH: 4 PATCH TOPICAL at 08:59

## 2025-08-02 RX ADMIN — AMIODARONE HYDROCHLORIDE 200 MG: 200 TABLET ORAL at 08:54

## 2025-08-02 RX ADMIN — PREDNISONE 60 MG: 20 TABLET ORAL at 08:55

## 2025-08-02 RX ADMIN — ACETAMINOPHEN 650 MG: 650 SOLUTION ORAL at 11:23

## 2025-08-02 RX ADMIN — Medication 10 ML: at 08:59

## 2025-08-02 RX ADMIN — AMANTADINE HYDROCHLORIDE 100 MG: 100 CAPSULE ORAL at 08:55

## 2025-08-02 RX ADMIN — PANTOPRAZOLE SODIUM 40 MG: 40 TABLET, DELAYED RELEASE ORAL at 05:58

## 2025-08-02 RX ADMIN — CARBIDOPA AND LEVODOPA 1 TABLET: 25; 100 TABLET ORAL at 11:25

## 2025-08-02 RX ADMIN — Medication 1 TABLET: at 08:55

## 2025-08-02 RX ADMIN — IPRATROPIUM BROMIDE AND ALBUTEROL SULFATE 3 ML: 2.5; .5 SOLUTION RESPIRATORY (INHALATION) at 07:33

## 2025-08-02 RX ADMIN — LIDOCAINE 1 PATCH: 4 PATCH TOPICAL at 08:58

## 2025-08-02 RX ADMIN — CEFTRIAXONE SODIUM 1000 MG: 1 INJECTION, POWDER, FOR SOLUTION INTRAMUSCULAR; INTRAVENOUS at 08:54

## 2025-08-02 NOTE — DISCHARGE PLACEMENT REQUEST
"Laurence Saleem II (79 y.o. Male)       Date of Birth   1945    Social Security Number       Address   [address] [id number]    Home Phone   [phone]    MRN   [id number]       [facility]    Marital Status                               Admission Date   7/26/2025    Admission Type   Emergency    Admitting Provider   Kyleigh Marroquin DO    Attending Provider   Kyleigh Marroquin DO    Department, Room/Bed   [address], S456/1       Discharge Date       Discharge Disposition   Rehab Facility or Unit (DC - External)    Discharge Destination                                 Attending Provider: Kyleigh Marroquin DO    Allergies: No Known Allergies    Isolation: None   Infection: None   Code Status: No CPR    Ht: 172.7 cm (68\")   Wt: 46.2 kg (101 lb 13.6 oz)    Admission Cmt: None   Principal Problem: Acute on chronic hypoxic respiratory failure [J96.21]                   Active Insurance as of 7/26/2025       Primary Coverage       Payor Plan Insurance Group Employer/Plan Group    MEDICARE MEDICARE A & B        Payor Plan Address Payor Plan Phone Number Payor Plan Fax Number Effective Dates    [address] [phone]  1/1/2004 - None Entered    [facility] [id number]         Subscriber Name Subscriber Birth Date Member ID       LAURENCE SALEEM II 1945 [id number]               Secondary Coverage       Payor Plan Insurance Group Employer/Plan Group    HUMANA HUMANA [facility]              [id number]       Payor Plan Address Payor Plan Phone Number Payor Plan Fax Number Effective Dates    [address] [address]   1/1/2011 - None Entered    [place] [address]         Subscriber Name Subscriber Birth Date Member ID       LAURENCE SALEEM II 1945 [id number]                     Emergency Contacts        (Rel.) Home Phone Work Phone Mobile Phone    MyaCheryl (Spouse) [phone] -- [phone]    MYAALESSIO (Son) [phone] " -- [phone]    KRYSTYNA ROQUE (Son) -- -- [phone]                   Discharge Summary        Sophia Wang, YAMILET at [phone]              [facility] Medicine Services  TRANSFER SUMMARY    Patient Name: Flaco Roque II  : 1945  MRN: [id number]    Date of Admission: 2025  Date of Discharge:  2025  Length of Stay: 7  Primary Care Physician: Ariadne Galloway PA    Consults       Date and Time Order Name Status Description    2025  1:40 PM Inpatient Palliative Care MD Consult Completed             Hospital Course     Presenting Problem:   Acute on chronic hypoxic respiratory failure [J96.21]    Active Hospital Problems    Diagnosis  POA    **Acute on chronic hypoxic respiratory failure [J96.21]  Yes      Resolved Hospital Problems   No resolved problems to display.          Hospital Course:  Flaco Roque II is a 79 y.o. male presented from acute rehab with acute hypoxic respiratory failure likely secondary to pneumonia, concern for aspiration versus hospital-acquired.    He was just admitted to [facility]  25 to 25 after a fall at home with multiple rib fractures.     Acute on chronic hypoxic respiratory failure(baseline 2 L nasal cannula)  Left lower lobe pneumonia (HAP versus aspiration)  Multiple rib fracture  COPD, with exacerbation likely secondary to pneumonia  - CTA chest showed no PE, elevated hemidiaphragm/possible atelectasis LLL, emphysema  - prior diiscontinued vanc since MRSA negative  - MRSA, strep pneumo, Legionella all negative  - respiratory panel pcr negative  - Aggressive pulmonary toilet, ambulate as able.  Encourage PT compliance.  - nebs   - prior changed cefepime to Rocephin.  Has now completed full antibiotic course.  - on ch 2 LNC.  Sats wnl.    -- Has been on po steroids.  Will taper over the next 6 days.     Acute/subacute nondisplaced Rib Fractures (right 9-11th; left 3-4th),   - causing atelectasis  - started  lidocaine patches  - s/p Fentanyl patches.  However chronic pain pump with fentanyl.  Transferring to rehab today.  Will discontinue fentanyl patches and defer further management to provider at [facility] and outpatient pain management.  Continue lidocaine patches as noted.  -Has now received a bed at [facility], transferring today.  --Follow-up with his primary pain management provider (Awilda Espitia at [facility] pain management clinic) for further management of his fentanyl pain pump.     Confusion  --agitated at night and required sitter initially.  Sitter dcd yest evening. Stable without sitter since per staff.    -- Prior increased Home Seroquel dose from 25 mg to 50 mg nightly.  Much improved.  Continue current dose on transfer.  --Would recommend provider at [facility] recheck an EKG in 3-4 days to further monitor QTc as he is on increased dose of Seroquel from his home as well as chronic amnio.  Currently QTc at last check WNL.     Paroxysmal atrial fibrillation  -- We are holding eliquis going forward (frequent/recurrent falls). Family in agreement. DO not restart eliquis at DC  -- patient would NOT want heart cath or interventions in event of acute cardiac event  -continue amiodarone, not on beta-blocker anymore?.  Follows with [facility].  Appointment scheduled for next month.  Encouraged to keep appointment for further management.     Parkinsons dz  Dysphagia  Severe protein malnutrition  ataxia, autonomic dysfunction due to parkinsons  -not interested in tube feedings, only interested in regular/comfort diet (he understands risk as does family)  -continue sinemet, amantadine  -S/p SLP eval, s/p FEES and now recommended for mechanical soft regular diet with thickened liquids.  Tolerating.  Continue on transfer.  -dietition consult to maximize nutrition  - Palliative care was seen him last visit no indication to consult at this time; not interested in Hospice at this time.      Chronic constipation  - continue scheduled  "bowel regimen  - multiple BMs 7/30, last BM yesterday 8/1.  Monitor.     Chronic back pain  -fentanyl pain pump, recently refilled  -s/p Fentanyl patches and lidocaine for chest pain  - improved.  As patient is transferring to rehab today we will continue his as needed Tylenol and his fentanyl pain pump as prior noted.  DC fentanyl patches at this time and defer to provider at facility if needed further.     Patient was seen resting up in the chair in no acute distress.  Chronically ill, debilitated, cachectic male.  Appears near baseline.  On chronic 2 LNC.  Pain controlled.  Hemodynamically stable and afebrile.  Transferring to [facility] for rehab today.  Medications and follow-ups as below.      Discharge Follow Up Recommendations for outpatient labs/diagnostics:  Patient is cleared for transfer to rehab today.  Going on medications as below with follow-up as noted.    --To be seen by provider at [facility] on arrival, PCP 1 week of discharge  --Follow-up with his primary pain management provider, Awilda Espitia at [facility] as scheduled 8/19/2025 at 2 PM  -- Encouraged patient to keep prior scheduled follow-ups with his primary urologist and cardiologist    Day of Discharge     HPI:   Patient was seen resting up in the chair awake and alert.  No acute distress.  Chronically ill and debilitated appearing.  States he feels \"much better\".  Currently denies pain, nausea or vomiting.  Had a good bowel movement yesterday.  Tolerating, although does not like, his modified diet.  No shortness of air at rest on chronic 2 LNC.  Mild intermittent exertional shortness of breath but quickly resolves.  Feels ready for rehab today.    Review of Systems  Gen- No fevers, chills  CV- No chest pain, palpitations  Resp- as above   GI- No N/V/D, abd pain       Vital Signs:   Temp:  [97.6 °F (36.4 °C)-98.5 °F (36.9 °C)] 97.6 °F (36.4 °C)  Heart Rate:  [54-84] 71  Resp:  [17-22] 17  BP: ()/(61-77) 98/61     Physical Exam:  Constitutional: No acute " "distress, awake and alert.  Resting up in the chair. Frail, chroincally ill, debilitated and cachectic appearing male.   HENT: NCAT, mucous membranes moist  Respiratory: Respiratory effort normal on chronic 2 LNC with sats wnl.  Barrel chested.  Cardiovascular: RRR, no murmurs, rubs, or gallops  Gastrointestinal: Soft, nondistended. +bs. Non tender.  Cachectic  Musculoskeletal: No bilateral ankle edema. WHITE spont   Psychiatric: flat affect, cooperative and calm   Neurologic: Oriented x person/hospital and year currently. Non focal. speech clear. Follows commands   Skin: Generalized fragile skin noted to exposed areas on upper and lower extremity.    Pertinent Results     Results from last 7 days   Lab Units 07/30/25  0858 07/28/25  0619 07/27/25  0842 07/27/25  0841 07/26/25  2119   WBC 10*3/mm3  --  5.94  --  11.25* 7.83   HEMOGLOBIN g/dL  --  11.9*  --  12.2* 11.7*   HEMATOCRIT %  --  38.4  --  38.3 36.5*   PLATELETS 10*3/mm3  --  401  --  325 317   SODIUM mmol/L 140 139 137  --  138   POTASSIUM mmol/L 4.3 4.2 4.4  --  4.0   CHLORIDE mmol/L 103 100 100  --  100   CO2 mmol/L 29.0 28.0 26.1  --  26.4   BUN mg/dL 16.3 14.7 9.3  --  11.0   CREATININE mg/dL 0.50* 0.63* 0.55*  --  0.65*   GLUCOSE mg/dL 136* 138* 125*  --  139*   CALCIUM mg/dL 8.8 8.7 8.8  --  9.1     Results from last 7 days   Lab Units 07/27/25  0842 07/26/25  2119   BILIRUBIN mg/dL 0.8 0.7   ALK PHOS U/L 121* 121*   ALT (SGPT) U/L 10 7   AST (SGOT) U/L 21 22           Invalid input(s): \"TG\", \"LDLCALC\", \"LDLREALC\"      Brief Urine Lab Results  (Last result in the past 365 days)        Color   Clarity   Blood   Leuk Est   Nitrite   Protein   CREAT   Urine HCG        07/18/25 1010 Yellow   Clear   Negative   Negative   Negative   Negative                   Microbiology Results Abnormal       None            Imaging Results (All)       Procedure Component Value Units Date/Time    CT Angiogram Chest Pulmonary Embolism [[id number]] Collected: 07/28/25 1148 "     Updated: 07/28/25 1158    Narrative:      CT ANGIOGRAM CHEST PULMONARY EMBOLISM    Date of Exam: 7/28/2025 11:28 AM EDT    Indication: acute respiratory failure.    Comparison: CT chest 7/18/2025    Technique: Axial CT images were obtained of the chest after the uneventful intravenous administration of 85 mL Isovue-370 utilizing pulmonary embolism protocol.  In addition, a 3-D volume rendered image was created for interpretation.  Reconstructed   coronal and sagittal images were also obtained. Automated exposure control and iterative construction methods were used.      Findings:  Normal appearance of the pulmonary arteries without evidence of pulmonary embolism. There is atherosclerosis and ectasia of the thoracic aorta. There are coronary artery calcifications. No pericardial effusion. There is beam hardening and streak artifact   relating to bilateral shoulder arthroplasties. There is elevation of the left hemidiaphragm with segmental passive atelectasis in the left lower lobe. There is emphysema. There is a small right pleural effusion with passive subsegmental atelectasis in   the right lower lobe.. No pneumothorax. The bones are demineralized. There is scoliosis. No acute or suspicious bony findings. Grossly unremarkable appearance of the partially imaged upper abdomen.      Impression:      Impression:  No evidence of pulmonary embolism.      Elevation of the left hemidiaphragm with segmental passive atelectasis in the left lower lobe.      Small right pleural effusion with passive subsegmental atelectasis at the right lung base.      Emphysema. Correlate with patient history and risk factors and please assess if the patient meets criteria for routine low dose CT lung cancer screening.        Electronically Signed: Jona Carrasquillo MD    7/28/2025 11:54 AM EDT    Workstation ID: [id number]    XR Hand 3+ View Left [[id number]] Collected: 07/27/25 1641     Updated: 07/27/25 1646    Narrative:      XR HAND 3+ VW  LEFT    Date of Exam: 7/27/2025 1:33 PM EDT    Indication: left hand pain s/p fall    Comparison: None available.    Findings:  There is no evidence of acute fracture. Well-corticated ossific fragments along the ulnar aspect of the fifth metacarpal head likely represents remote trauma. Normal joint alignment. There is mild base of thumb as well as radiocarpal, first MCP, and   scattered IP joint arthritis. Scattered chondrocalcinosis. Peripheral IV noted. There is a soft tissue linear radiodensity measuring 4 mm in the radial aspect of the second digit at the level of the middle phalanx, which may represent a retained foreign   body.      Impression:      Impression:  1.No evidence of acute fracture or dislocation.  2.Possible 4 mm retained foreign body in the radial aspect of the second digit at the level of the middle phalanx.        Electronically Signed: Camden Tapia MD    7/27/2025 4:43 PM EDT    Workstation ID: [id number]    SLP FEES - Fiberoptic Endo Eval Swallow [[id number]] Resulted: 07/27/25 1548     Updated: 07/27/25 1548    Narrative:      This procedure was auto-finalized with no dictation required.    XR Chest 1 View [[id number]] Collected: 07/26/25 2115     Updated: 07/26/25 2119    Narrative:      XR CHEST 1 VW    Date of Exam: 7/26/2025 8:55 PM EDT    Indication: CHF/COPD Protocol    Comparison: 7/18/2025, 7/16/2025.    Findings:  Stable elevation of the left hemidiaphragm. New patchy airspace disease is seen within the left lower lobe. Chronic interstitial changes are present diffusely.. No pleural fluid. No pneumothorax. The pulmonary vasculature appears within normal limits.   The cardiac and mediastinal silhouette appear unremarkable. No acute osseous abnormality identified.      Impression:      Impression:  New patchy airspace disease within the left lower lobe likely related to pneumonia.        Electronically Signed: Adelita Rivas MD    7/26/2025 9:16 PM EDT    Workstation ID: [id number]             Results for orders placed during the hospital encounter of 04/22/25    Adult Transthoracic Echo Complete W/ Cont if Necessary Per Protocol 04/26/2025  4:58 PM    Interpretation Summary    Left ventricular systolic function is normal. Estimated left ventricular EF = 60%    Left ventricular wall thickness is consistent with hypertrophy.    No hemodynamically significant valvular heart disease          Discharge Details        Discharge Medications        PAUSE taking these medications        Instructions Start Date   tadalafil 10 MG tablet  Wait to take this until your doctor or other care provider tells you to start again.  Defer to PCP to resume if/when indicated   Commonly known as: CIALIS   10 mg, Oral, Daily PRN             New Medications        Instructions Start Date   predniSONE 20 MG tablet  Commonly known as: DELTASONE   Take 2 tablets by mouth Daily With Breakfast for 2 days, THEN 1 tablet Daily With Breakfast for 2 days, THEN 0.5 tablets Daily With Breakfast for 2 days.   Start Date: August 3, 2025            Changes to Medications        Instructions Start Date   acetaminophen 325 MG tablet  Commonly known as: TYLENOL  What changed:   medication strength  how much to take  when to take this  reasons to take this  additional instructions   650 mg, Oral, Every 4 Hours PRN      Lidocaine 4 %  What changed: how much to take   2 patches, Transdermal, Every 24 Hours Scheduled, Remove & Discard patch within 12 hours or as directed by MD   Start Date: August 3, 2025     pantoprazole 40 MG EC tablet  Commonly known as: PROTONIX  What changed:   medication strength  how much to take  when to take this   40 mg, Oral, Every Early Morning   Start Date: August 3, 2025     QUEtiapine 50 MG tablet  Commonly known as: SEROquel  What changed:   medication strength  how much to take   50 mg, Oral, Nightly             Continue These Medications        Instructions Start Date   amantadine 100 MG capsule  Commonly known  as: SYMMETREL   100 mg, 2 Times Daily      amiodarone 200 MG tablet  Commonly known as: PACERONE   200 mg, Oral, Every 24 Hours Scheduled      bisacodyl 5 MG EC tablet  Commonly known as: DULCOLAX   5 mg, Oral, Daily PRN      bisacodyl 10 MG suppository  Commonly known as: DULCOLAX   10 mg, Rectal, Daily PRN      carbidopa-levodopa CR  MG per CR tablet  Commonly known as: SINEMET CR   1 tablet, 2 Times Daily      carbidopa-levodopa  MG per tablet  Commonly known as: SINEMET   1 tablet, 4 Times Daily      fentaNYL 0.05 MG/ML injection  Commonly known as: SUBLIMAZE   250 mcg      ipratropium-albuterol 0.5-2.5 mg/3 ml nebulizer  Commonly known as: DUO-NEB   3 mL, 3 Times Daily PRN      multivitamin with minerals tablet tablet   1 tablet, Daily      polyethylene glycol 17 g packet  Commonly known as: MIRALAX   17 g, Oral, Daily      sennosides-docusate 8.6-50 MG per tablet  Commonly known as: PERICOLACE   2 tablets, Oral, 2 Times Daily      tamsulosin 0.4 MG capsule 24 hr capsule  Commonly known as: FLOMAX   0.8 mg, Oral, Nightly      tiZANidine 4 MG tablet  Commonly known as: ZANAFLEX   4 mg, Every 8 Hours PRN      Trelegy Ellipta 100-62.5-25 MCG/ACT inhaler  Generic drug: Fluticasone-Umeclidin-Vilant   1 puff, Daily - RT             Stop These Medications      naloxone 4 MG/0.1ML nasal spray  Commonly known as: NARCAN              No Known Allergies      Discharge Disposition:  Rehab Facility or Unit (DC - External)    Discharge Diet:  Diet Order   Procedures    Diet: Regular/House; Texture: Mechanical Ground (NDD 2); Fluid Consistency: Nectar Thick       Discharge Activity:  Activity Instructions       Activity as Tolerated      Measure Blood Pressure                CODE STATUS:    Code Status and Medical Interventions: No CPR (Do Not Attempt to Resuscitate); Limited Support; No intubation (DNI), No artificial nutrition, No cardioversion, No dialysis, No vasopressors   Ordered at: 07/26/25 8070     Code  Status (Patient has no pulse and is not breathing):    No CPR (Do Not Attempt to Resuscitate)     Medical Interventions (Patient has pulse or is breathing):    Limited Support     Medical Intervention Limits:    No intubation (DNI)       No artificial nutrition       No cardioversion       No dialysis       No vasopressors     Level Of Support Discussed With:    Patient         Future Appointments   Date Time Provider Department Center   9/23/2025 11:45 AM Von Kilpatrick MD [facility]       Additional Instructions for the Follow-ups that You Need to Schedule       Discharge Follow-up with PCP   As directed       Currently Documented PCP:    Ariadne Galloway PA    PCP Phone Number:    [phone]     Follow Up Details: To be seen by provider at [facility] on arrival, PCP 1 week of discharge        Discharge Follow-up with Specialty: Keep follow-ups with his primary cardiologist and neurologist as prior scheduled   As directed      Specialty: Keep follow-ups with his primary cardiologist and neurologist as prior scheduled        Discharge Follow-up with Specified Provider: Follow-up with his primary pain management provider, Awilda Espitia with [facility] as per scheduled 8/19/2025 at 1400 p.m.   As directed      To: Follow-up with his primary pain management provider, Awilda Espitia with [facility] pain [facility] as per scheduled 8/19/2025 at 1400 p.m.                Electronically signed by YAMILET Garza, 08/02/25, 11:15 AM EDT.      Time Spent on Discharge: I spent 40 minutes on this discharge activity which included: face-to-face encounter with the patient, reviewing the data in the system, coordination of the care with the nursing staff as well as consultants, documentation, and entering orders.        Electronically signed by Sophia Wang APRN at 08/02/25 1129

## 2025-08-02 NOTE — DISCHARGE SUMMARY
[facility] Medicine Services  TRANSFER SUMMARY    Patient Name: Flaco Roque II  : 1945  MRN: [id number]    Date of Admission: 2025  Date of Discharge:  2025  Length of Stay: 7  Primary Care Physician: Ariadne Galloway PA    Consults       Date and Time Order Name Status Description    2025  1:40 PM Inpatient Palliative Care MD Consult Completed             Hospital Course     Presenting Problem:   Acute on chronic hypoxic respiratory failure [J96.21]    Active Hospital Problems    Diagnosis  POA    **Acute on chronic hypoxic respiratory failure [J96.21]  Yes      Resolved Hospital Problems   No resolved problems to display.          Hospital Course:  Flaco Roque II is a 79 y.o. male presented from acute rehab with acute hypoxic respiratory failure likely secondary to pneumonia, concern for aspiration versus hospital-acquired.    He was just admitted to [facility]  25 to 25 after a fall at home with multiple rib fractures.     Acute on chronic hypoxic respiratory failure(baseline 2 L nasal cannula)  Left lower lobe pneumonia (HAP versus aspiration)  Multiple rib fracture  COPD, with exacerbation likely secondary to pneumonia  - CTA chest showed no PE, elevated hemidiaphragm/possible atelectasis LLL, emphysema  - prior diiscontinued vanc since MRSA negative  - MRSA, strep pneumo, Legionella all negative  - respiratory panel pcr negative  - Aggressive pulmonary toilet, ambulate as able.  Encourage PT compliance.  - nebs   - prior changed cefepime to Rocephin.  Has now completed full antibiotic course.  - on ch 2 LNC.  Sats wnl.    -- Has been on po steroids.  Will taper over the next 6 days.     Acute/subacute nondisplaced Rib Fractures (right 9-11th; left 3-4th),   - causing atelectasis  - started lidocaine patches  - s/p Fentanyl patches.  However chronic pain pump with fentanyl.  Transferring to rehab today.  Will discontinue fentanyl patches and  defer further management to provider at [facility] and outpatient pain management.  Continue lidocaine patches as noted.  -Has now received a bed at [facility], transferring today.  --Follow-up with his primary pain management provider (Awilda Espitia at [facility] pain management clinic) for further management of his fentanyl pain pump.     Confusion  --agitated at night and required sitter initially.  Sitter dcd yest evening. Stable without sitter since per staff.    -- Prior increased Home Seroquel dose from 25 mg to 50 mg nightly.  Much improved.  Continue current dose on transfer.  --Would recommend provider at [facility] recheck an EKG in 3-4 days to further monitor QTc as he is on increased dose of Seroquel from his home as well as chronic amnio.  Currently QTc at last check WNL.     Paroxysmal atrial fibrillation  -- We are holding eliquis going forward (frequent/recurrent falls). Family in agreement. DO not restart eliquis at DC  -- patient would NOT want heart cath or interventions in event of acute cardiac event  -continue amiodarone, not on beta-blocker anymore?.  Follows with [facility].  Appointment scheduled for next month.  Encouraged to keep appointment for further management.     Parkinsons dz  Dysphagia  Severe protein malnutrition  ataxia, autonomic dysfunction due to parkinsons  -not interested in tube feedings, only interested in regular/comfort diet (he understands risk as does family)  -continue sinemet, amantadine  -S/p SLP eval, s/p FEES and now recommended for mechanical soft regular diet with thickened liquids.  Tolerating.  Continue on transfer.  -dietition consult to maximize nutrition  - Palliative care was seen him last visit no indication to consult at this time; not interested in Hospice at this time.      Chronic constipation  - continue scheduled bowel regimen  - multiple BMs 7/30, last BM yesterday 8/1.  Monitor.     Chronic back pain  -fentanyl pain pump, recently refilled  -s/p Fentanyl patches and  "lidocaine for chest pain  - improved.  As patient is transferring to rehab today we will continue his as needed Tylenol and his fentanyl pain pump as prior noted.  DC fentanyl patches at this time and defer to provider at facility if needed further.     Patient was seen resting up in the chair in no acute distress.  Chronically ill, debilitated, cachectic male.  Appears near baseline.  On chronic 2 LNC.  Pain controlled.  Hemodynamically stable and afebrile.  Transferring to [facility] for rehab today.  Medications and follow-ups as below.      Discharge Follow Up Recommendations for outpatient labs/diagnostics:  Patient is cleared for transfer to rehab today.  Going on medications as below with follow-up as noted.    --To be seen by provider at [facility] on arrival, PCP 1 week of discharge  --Follow-up with his primary pain management provider, Awilda Espitia at [facility] as scheduled 8/19/2025 at 2 PM  -- Encouraged patient to keep prior scheduled follow-ups with his primary urologist and cardiologist    Day of Discharge     HPI:   Patient was seen resting up in the chair awake and alert.  No acute distress.  Chronically ill and debilitated appearing.  States he feels \"much better\".  Currently denies pain, nausea or vomiting.  Had a good bowel movement yesterday.  Tolerating, although does not like, his modified diet.  No shortness of air at rest on chronic 2 LNC.  Mild intermittent exertional shortness of breath but quickly resolves.  Feels ready for rehab today.    Review of Systems  Gen- No fevers, chills  CV- No chest pain, palpitations  Resp- as above   GI- No N/V/D, abd pain       Vital Signs:   Temp:  [97.6 °F (36.4 °C)-98.5 °F (36.9 °C)] 97.6 °F (36.4 °C)  Heart Rate:  [54-84] 71  Resp:  [17-22] 17  BP: ()/(61-77) 98/61     Physical Exam:  Constitutional: No acute distress, awake and alert.  Resting up in the chair. Frail, chroincally ill, debilitated and cachectic appearing male.   HENT: NCAT, mucous membranes " "moist  Respiratory: Respiratory effort normal on chronic 2 LNC with sats wnl.  Barrel chested.  Cardiovascular: RRR, no murmurs, rubs, or gallops  Gastrointestinal: Soft, nondistended. +bs. Non tender.  Cachectic  Musculoskeletal: No bilateral ankle edema. WHITE spont   Psychiatric: flat affect, cooperative and calm   Neurologic: Oriented x person/hospital and year currently. Non focal. speech clear. Follows commands   Skin: Generalized fragile skin noted to exposed areas on upper and lower extremity.    Pertinent Results     Results from last 7 days   Lab Units 07/30/25  0858 07/28/25  0619 07/27/25  0842 07/27/25  0841 07/26/25  2119   WBC 10*3/mm3  --  5.94  --  11.25* 7.83   HEMOGLOBIN g/dL  --  11.9*  --  12.2* 11.7*   HEMATOCRIT %  --  38.4  --  38.3 36.5*   PLATELETS 10*3/mm3  --  401  --  325 317   SODIUM mmol/L 140 139 137  --  138   POTASSIUM mmol/L 4.3 4.2 4.4  --  4.0   CHLORIDE mmol/L 103 100 100  --  100   CO2 mmol/L 29.0 28.0 26.1  --  26.4   BUN mg/dL 16.3 14.7 9.3  --  11.0   CREATININE mg/dL 0.50* 0.63* 0.55*  --  0.65*   GLUCOSE mg/dL 136* 138* 125*  --  139*   CALCIUM mg/dL 8.8 8.7 8.8  --  9.1     Results from last 7 days   Lab Units 07/27/25  0842 07/26/25  2119   BILIRUBIN mg/dL 0.8 0.7   ALK PHOS U/L 121* 121*   ALT (SGPT) U/L 10 7   AST (SGOT) U/L 21 22           Invalid input(s): \"TG\", \"LDLCALC\", \"LDLREALC\"      Brief Urine Lab Results  (Last result in the past 365 days)        Color   Clarity   Blood   Leuk Est   Nitrite   Protein   CREAT   Urine HCG        07/18/25 1010 Yellow   Clear   Negative   Negative   Negative   Negative                   Microbiology Results Abnormal       None            Imaging Results (All)       Procedure Component Value Units Date/Time    CT Angiogram Chest Pulmonary Embolism [[id number]] Collected: 07/28/25 1148     Updated: 07/28/25 1158    Narrative:      CT ANGIOGRAM CHEST PULMONARY EMBOLISM    Date of Exam: 7/28/2025 11:28 AM EDT    Indication: acute " respiratory failure.    Comparison: CT chest 7/18/2025    Technique: Axial CT images were obtained of the chest after the uneventful intravenous administration of 85 mL Isovue-370 utilizing pulmonary embolism protocol.  In addition, a 3-D volume rendered image was created for interpretation.  Reconstructed   coronal and sagittal images were also obtained. Automated exposure control and iterative construction methods were used.      Findings:  Normal appearance of the pulmonary arteries without evidence of pulmonary embolism. There is atherosclerosis and ectasia of the thoracic aorta. There are coronary artery calcifications. No pericardial effusion. There is beam hardening and streak artifact   relating to bilateral shoulder arthroplasties. There is elevation of the left hemidiaphragm with segmental passive atelectasis in the left lower lobe. There is emphysema. There is a small right pleural effusion with passive subsegmental atelectasis in   the right lower lobe.. No pneumothorax. The bones are demineralized. There is scoliosis. No acute or suspicious bony findings. Grossly unremarkable appearance of the partially imaged upper abdomen.      Impression:      Impression:  No evidence of pulmonary embolism.      Elevation of the left hemidiaphragm with segmental passive atelectasis in the left lower lobe.      Small right pleural effusion with passive subsegmental atelectasis at the right lung base.      Emphysema. Correlate with patient history and risk factors and please assess if the patient meets criteria for routine low dose CT lung cancer screening.        Electronically Signed: Jona Carrasquillo MD    7/28/2025 11:54 AM EDT    Workstation ID: [id number]    XR Hand 3+ View Left [[id number]] Collected: 07/27/25 1641     Updated: 07/27/25 1646    Narrative:      XR HAND 3+ VW LEFT    Date of Exam: 7/27/2025 1:33 PM EDT    Indication: left hand pain s/p fall    Comparison: None available.    Findings:  There is no  evidence of acute fracture. Well-corticated ossific fragments along the ulnar aspect of the fifth metacarpal head likely represents remote trauma. Normal joint alignment. There is mild base of thumb as well as radiocarpal, first MCP, and   scattered IP joint arthritis. Scattered chondrocalcinosis. Peripheral IV noted. There is a soft tissue linear radiodensity measuring 4 mm in the radial aspect of the second digit at the level of the middle phalanx, which may represent a retained foreign   body.      Impression:      Impression:  1.No evidence of acute fracture or dislocation.  2.Possible 4 mm retained foreign body in the radial aspect of the second digit at the level of the middle phalanx.        Electronically Signed: Camden Tapia MD    7/27/2025 4:43 PM EDT    Workstation ID: [id number]    SLP FEES - Fiberoptic Endo Eval Swallow [[id number]] Resulted: 07/27/25 1548     Updated: 07/27/25 1548    Narrative:      This procedure was auto-finalized with no dictation required.    XR Chest 1 View [[id number]] Collected: 07/26/25 2115     Updated: 07/26/25 2119    Narrative:      XR CHEST 1 VW    Date of Exam: 7/26/2025 8:55 PM EDT    Indication: CHF/COPD Protocol    Comparison: 7/18/2025, 7/16/2025.    Findings:  Stable elevation of the left hemidiaphragm. New patchy airspace disease is seen within the left lower lobe. Chronic interstitial changes are present diffusely.. No pleural fluid. No pneumothorax. The pulmonary vasculature appears within normal limits.   The cardiac and mediastinal silhouette appear unremarkable. No acute osseous abnormality identified.      Impression:      Impression:  New patchy airspace disease within the left lower lobe likely related to pneumonia.        Electronically Signed: Adelita Rivas MD    7/26/2025 9:16 PM EDT    Workstation ID: [id number]            Results for orders placed during the hospital encounter of 04/22/25    Adult Transthoracic Echo Complete W/ Cont if Necessary Per Protocol  04/26/2025  4:58 PM    Interpretation Summary    Left ventricular systolic function is normal. Estimated left ventricular EF = 60%    Left ventricular wall thickness is consistent with hypertrophy.    No hemodynamically significant valvular heart disease          Discharge Details        Discharge Medications        PAUSE taking these medications        Instructions Start Date   tadalafil 10 MG tablet  Wait to take this until your doctor or other care provider tells you to start again.  Defer to PCP to resume if/when indicated   Commonly known as: CIALIS   10 mg, Oral, Daily PRN             New Medications        Instructions Start Date   predniSONE 20 MG tablet  Commonly known as: DELTASONE   Take 2 tablets by mouth Daily With Breakfast for 2 days, THEN 1 tablet Daily With Breakfast for 2 days, THEN 0.5 tablets Daily With Breakfast for 2 days.   Start Date: August 3, 2025            Changes to Medications        Instructions Start Date   acetaminophen 325 MG tablet  Commonly known as: TYLENOL  What changed:   medication strength  how much to take  when to take this  reasons to take this  additional instructions   650 mg, Oral, Every 4 Hours PRN      Lidocaine 4 %  What changed: how much to take   2 patches, Transdermal, Every 24 Hours Scheduled, Remove & Discard patch within 12 hours or as directed by MD   Start Date: August 3, 2025     pantoprazole 40 MG EC tablet  Commonly known as: PROTONIX  What changed:   medication strength  how much to take  when to take this   40 mg, Oral, Every Early Morning   Start Date: August 3, 2025     QUEtiapine 50 MG tablet  Commonly known as: SEROquel  What changed:   medication strength  how much to take   50 mg, Oral, Nightly             Continue These Medications        Instructions Start Date   amantadine 100 MG capsule  Commonly known as: SYMMETREL   100 mg, 2 Times Daily      amiodarone 200 MG tablet  Commonly known as: PACERONE   200 mg, Oral, Every 24 Hours Scheduled       bisacodyl 5 MG EC tablet  Commonly known as: DULCOLAX   5 mg, Oral, Daily PRN      bisacodyl 10 MG suppository  Commonly known as: DULCOLAX   10 mg, Rectal, Daily PRN      carbidopa-levodopa CR  MG per CR tablet  Commonly known as: SINEMET CR   1 tablet, 2 Times Daily      carbidopa-levodopa  MG per tablet  Commonly known as: SINEMET   1 tablet, 4 Times Daily      fentaNYL 0.05 MG/ML injection  Commonly known as: SUBLIMAZE   250 mcg      ipratropium-albuterol 0.5-2.5 mg/3 ml nebulizer  Commonly known as: DUO-NEB   3 mL, 3 Times Daily PRN      multivitamin with minerals tablet tablet   1 tablet, Daily      polyethylene glycol 17 g packet  Commonly known as: MIRALAX   17 g, Oral, Daily      sennosides-docusate 8.6-50 MG per tablet  Commonly known as: PERICOLACE   2 tablets, Oral, 2 Times Daily      tamsulosin 0.4 MG capsule 24 hr capsule  Commonly known as: FLOMAX   0.8 mg, Oral, Nightly      tiZANidine 4 MG tablet  Commonly known as: ZANAFLEX   4 mg, Every 8 Hours PRN      Trelegy Ellipta 100-62.5-25 MCG/ACT inhaler  Generic drug: Fluticasone-Umeclidin-Vilant   1 puff, Daily - RT             Stop These Medications      naloxone 4 MG/0.1ML nasal spray  Commonly known as: NARCAN              No Known Allergies      Discharge Disposition:  Rehab Facility or Unit (DC - External)    Discharge Diet:  Diet Order   Procedures    Diet: Regular/House; Texture: Mechanical Ground (NDD 2); Fluid Consistency: Nectar Thick       Discharge Activity:  Activity Instructions       Activity as Tolerated      Measure Blood Pressure                CODE STATUS:    Code Status and Medical Interventions: No CPR (Do Not Attempt to Resuscitate); Limited Support; No intubation (DNI), No artificial nutrition, No cardioversion, No dialysis, No vasopressors   Ordered at: 07/26/25 1490     Code Status (Patient has no pulse and is not breathing):    No CPR (Do Not Attempt to Resuscitate)     Medical Interventions (Patient has pulse or  is breathing):    Limited Support     Medical Intervention Limits:    No intubation (DNI)       No artificial nutrition       No cardioversion       No dialysis       No vasopressors     Level Of Support Discussed With:    Patient         Future Appointments   Date Time Provider Department Center   9/23/2025 11:45 AM Von Kilpatrick MD [facility]       Additional Instructions for the Follow-ups that You Need to Schedule       Discharge Follow-up with PCP   As directed       Currently Documented PCP:    Ariadne Galloway PA    PCP Phone Number:    [phone]     Follow Up Details: To be seen by provider at [facility] on arrival, PCP 1 week of discharge        Discharge Follow-up with Specialty: Keep follow-ups with his primary cardiologist and neurologist as prior scheduled   As directed      Specialty: Keep follow-ups with his primary cardiologist and neurologist as prior scheduled        Discharge Follow-up with Specified Provider: Follow-up with his primary pain management provider, Awilda Espitia with [facility] pain [facility] as per scheduled 8/19/2025 at 1400 p.m.   As directed      To: Follow-up with his primary pain management provider, Awilda Espitia with [facility] pain management as per scheduled 8/19/2025 at 1400 p.m.                Electronically signed by YAMILET Garza, 08/02/25, 11:15 AM EDT.      Time Spent on Discharge: I spent 40 minutes on this discharge activity which included: face-to-face encounter with the patient, reviewing the data in the system, coordination of the care with the nursing staff as well as consultants, documentation, and entering orders.

## 2025-08-02 NOTE — CASE MANAGEMENT/SOCIAL WORK
Case Management Discharge Note      Final Note: SONI spoke with [facility] weekend liaison, patient has bed available today on [facility] unit. CM spoke with providers and he is medically ready to discharge. CM arranged [facility] Wheelchair van for 2:15pm at the maternity entrance, patient will need to be down there by 2:10pm. RN to call report to [phone]. CM will fax discharge summary, when available,  to [phone]. All parties in agreement. No further discharge needs identified.         Selected Continued Care - Admitted Since 7/26/2025       Destination    No services have been selected for the patient.                Durable Medical Equipment    No services have been selected for the patient.                Dialysis/Infusion    No services have been selected for the patient.                Home Medical Care    No services have been selected for the patient.                Therapy    No services have been selected for the patient.                Community Resources    No services have been selected for the patient.                Community & DME    No services have been selected for the patient.                    Selected Continued Care - Prior Encounters Includes continued care and service providers with selected services from prior encounters from 4/27/2025 to 8/2/2025      Discharged on 7/24/2025 Admission date: 7/18/2025 - Discharge disposition: Rehab Facility or Unit (DC - External)      Destination       Service Provider Services Address Phone Fax Patient Preferred    [facility] [address] [facility] [address] [phone] --                      Discharged on 5/22/2025 Admission date: 5/18/2025 - Discharge disposition: Home-Health Care [facility]      Home Medical Care       Service Provider Services Address Phone Fax Patient Preferred    [facility] Nursing [address], [facility] [address],  [facility] [address] [phone] --                          Transportation Services  Transportation: W/C Van  W/C Van: Other ([facility] van at 2:15pm)    Final Discharge Disposition Code: 62 - inpatient rehab facility

## 2025-08-15 ENCOUNTER — HOSPITAL ENCOUNTER (INPATIENT)
Facility: HOSPITAL | Age: 80
LOS: 6 days | Discharge: NURSING, REHAB OR LONG-TERM CARE | End: 2025-08-21
Attending: EMERGENCY MEDICINE | Admitting: FAMILY MEDICINE
Payer: MEDICARE

## 2025-08-15 ENCOUNTER — APPOINTMENT (OUTPATIENT)
Dept: CT IMAGING | Facility: HOSPITAL | Age: 80
End: 2025-08-15
Payer: MEDICARE

## 2025-08-15 ENCOUNTER — APPOINTMENT (OUTPATIENT)
Dept: GENERAL RADIOLOGY | Facility: HOSPITAL | Age: 80
End: 2025-08-15
Payer: MEDICARE

## 2025-08-15 PROBLEM — M54.9 BACK PAIN: Status: RESOLVED | Noted: 2017-04-24 | Resolved: 2025-08-15

## 2025-08-15 PROBLEM — D69.9 BLEEDING TENDENCY: Status: RESOLVED | Noted: 2017-04-24 | Resolved: 2025-08-15

## 2025-08-15 PROBLEM — J18.9 RIGHT LOWER LOBE PNEUMONIA: Status: RESOLVED | Noted: 2025-04-22 | Resolved: 2025-08-15

## 2025-08-15 PROBLEM — J18.9 PNEUMONIA: Status: ACTIVE | Noted: 2025-08-15

## 2025-08-15 PROBLEM — M21.962 FOOT DEFORMITY, ACQUIRED, LEFT: Status: RESOLVED | Noted: 2018-04-06 | Resolved: 2025-08-15

## 2025-08-15 PROBLEM — J18.9 PNEUMONIA: Status: RESOLVED | Noted: 2025-08-15 | Resolved: 2025-08-15

## 2025-08-15 PROBLEM — K21.9 GERD WITHOUT ESOPHAGITIS: Status: RESOLVED | Noted: 2017-10-31 | Resolved: 2025-08-15

## 2025-08-15 PROBLEM — J18.9 PNA (PNEUMONIA): Status: RESOLVED | Noted: 2025-05-18 | Resolved: 2025-08-15

## 2025-08-17 ENCOUNTER — ANESTHESIA (OUTPATIENT)
Dept: GASTROENTEROLOGY | Facility: HOSPITAL | Age: 80
End: 2025-08-17
Payer: MEDICARE

## 2025-08-17 ENCOUNTER — ANESTHESIA EVENT (OUTPATIENT)
Dept: GASTROENTEROLOGY | Facility: HOSPITAL | Age: 80
End: 2025-08-17
Payer: MEDICARE

## 2025-08-17 PROCEDURE — 25010000002 PROPOFOL 10 MG/ML EMULSION: Performed by: NURSE ANESTHETIST, CERTIFIED REGISTERED

## 2025-08-17 PROCEDURE — 25010000002 LIDOCAINE PF 1% 1 % SOLUTION: Performed by: NURSE ANESTHETIST, CERTIFIED REGISTERED

## 2025-08-17 PROCEDURE — 25010000002 FENTANYL CITRATE (PF) 100 MCG/2ML SOLUTION: Performed by: NURSE ANESTHETIST, CERTIFIED REGISTERED

## 2025-08-17 PROCEDURE — 25810000003 SODIUM CHLORIDE 0.9 % SOLUTION: Performed by: NURSE ANESTHETIST, CERTIFIED REGISTERED

## 2025-08-17 RX ORDER — LIDOCAINE HYDROCHLORIDE 10 MG/ML
INJECTION, SOLUTION EPIDURAL; INFILTRATION; INTRACAUDAL; PERINEURAL AS NEEDED
Status: DISCONTINUED | OUTPATIENT
Start: 2025-08-17 | End: 2025-08-17 | Stop reason: SURG

## 2025-08-17 RX ORDER — FENTANYL CITRATE 50 UG/ML
INJECTION, SOLUTION INTRAMUSCULAR; INTRAVENOUS AS NEEDED
Status: DISCONTINUED | OUTPATIENT
Start: 2025-08-17 | End: 2025-08-17 | Stop reason: SURG

## 2025-08-17 RX ORDER — PROPOFOL 10 MG/ML
VIAL (ML) INTRAVENOUS AS NEEDED
Status: DISCONTINUED | OUTPATIENT
Start: 2025-08-17 | End: 2025-08-17 | Stop reason: SURG

## 2025-08-17 RX ORDER — SODIUM CHLORIDE 9 MG/ML
INJECTION, SOLUTION INTRAVENOUS CONTINUOUS PRN
Status: DISCONTINUED | OUTPATIENT
Start: 2025-08-17 | End: 2025-08-17 | Stop reason: SURG

## 2025-08-17 RX ADMIN — PROPOFOL 50 MG: 10 INJECTION, EMULSION INTRAVENOUS at 09:56

## 2025-08-17 RX ADMIN — LIDOCAINE HYDROCHLORIDE 50 MG: 10 INJECTION, SOLUTION EPIDURAL; INFILTRATION; INTRACAUDAL; PERINEURAL at 09:56

## 2025-08-17 RX ADMIN — SODIUM CHLORIDE: 9 INJECTION, SOLUTION INTRAVENOUS at 09:40

## 2025-08-17 RX ADMIN — PROPOFOL 20 MG: 10 INJECTION, EMULSION INTRAVENOUS at 10:04

## 2025-08-17 RX ADMIN — PROPOFOL 20 MG: 10 INJECTION, EMULSION INTRAVENOUS at 09:59

## 2025-08-17 RX ADMIN — PROPOFOL 20 MG: 10 INJECTION, EMULSION INTRAVENOUS at 10:02

## 2025-08-17 RX ADMIN — FENTANYL CITRATE 100 MCG: 50 INJECTION, SOLUTION INTRAMUSCULAR; INTRAVENOUS at 09:48

## 2025-08-18 ENCOUNTER — APPOINTMENT (OUTPATIENT)
Dept: GENERAL RADIOLOGY | Facility: HOSPITAL | Age: 80
End: 2025-08-18
Payer: MEDICARE

## 2025-08-20 ENCOUNTER — APPOINTMENT (OUTPATIENT)
Dept: GENERAL RADIOLOGY | Facility: HOSPITAL | Age: 80
End: 2025-08-20
Payer: MEDICARE

## 2025-08-20 PROBLEM — J69.0 ASPIRATION PNEUMONIA: Status: RESOLVED | Noted: 2025-02-15 | Resolved: 2025-08-20

## 2025-08-22 ENCOUNTER — DOCUMENTATION (OUTPATIENT)
Dept: INTERNAL MEDICINE | Facility: HOSPITAL | Age: 80
End: 2025-08-22
Payer: COMMERCIAL

## 2025-08-22 ENCOUNTER — TELEPHONE (OUTPATIENT)
Dept: CARDIOLOGY | Facility: CLINIC | Age: 80
End: 2025-08-22
Payer: COMMERCIAL

## 2025-08-25 ENCOUNTER — TELEPHONE (OUTPATIENT)
Dept: GASTROENTEROLOGY | Facility: CLINIC | Age: 80
End: 2025-08-25
Payer: COMMERCIAL

## 2025-08-25 ENCOUNTER — TELEPHONE (OUTPATIENT)
Dept: CARDIOLOGY | Facility: CLINIC | Age: 80
End: 2025-08-25
Payer: COMMERCIAL

## 2025-08-25 RX ORDER — METOPROLOL SUCCINATE 50 MG/1
50 TABLET, EXTENDED RELEASE ORAL DAILY
Qty: 90 TABLET | Refills: 2 | OUTPATIENT
Start: 2025-08-25

## 2025-08-27 RX ORDER — AMIODARONE HYDROCHLORIDE 200 MG/1
200 TABLET ORAL
Qty: 90 TABLET | Refills: 0 | Status: SHIPPED | OUTPATIENT
Start: 2025-08-27

## (undated) DEVICE — SUT ETHLN 2/0 PS 18IN 585H

## (undated) DEVICE — CVR HNDL LT SURG ACCSSRY BLU STRL

## (undated) DEVICE — CANN NASL CO2 DIVIDED A/

## (undated) DEVICE — UNDERGLV SURG BIOGEL INDICAT PF 61/2 GRN

## (undated) DEVICE — BNDG ELAS ELITE V/CLOSE 4IN 5YD LF STRL

## (undated) DEVICE — PAD ARMBRD SURG CONVOL 7.5X20X2IN

## (undated) DEVICE — APPL CHLORAPREP W/TINT 26ML BLU

## (undated) DEVICE — THE BITE BLOCK MAXI, LATEX FREE STRAP IS USED TO PROTECT THE ENDOSCOPE INSERTION TUBE FROM BEING BITTEN BY THE PATIENT.

## (undated) DEVICE — SPNG GZ WOVN 4X4IN 12PLY 10/BX STRL

## (undated) DEVICE — SNAP KOVER: Brand: UNBRANDED

## (undated) DEVICE — DRSNG TELFA PAD NONADH STR 1S 3X8IN

## (undated) DEVICE — CATH DIAG EXPO .045 FL3  5F 100CM

## (undated) DEVICE — TRAP FLD MINIVAC MEGADYNE 100ML

## (undated) DEVICE — SAFESECURE,SECUREMENT,FOLEY CATH,STERILE: Brand: MEDLINE

## (undated) DEVICE — SINGLE-USE BIOPSY FORCEPS: Brand: RADIAL JAW 4

## (undated) DEVICE — 1010 S-DRAPE TOWEL DRAPE 10/BX: Brand: STERI-DRAPE™

## (undated) DEVICE — SUT SILK 2/0 SH CR8 18IN CR8 C012D

## (undated) DEVICE — GLV SURG TRIUMPH ORTHO W/ALOE PF LTX 7.0

## (undated) DEVICE — KT PUMP PAIN ONQ CBLOC SELCTAFLO 400ML

## (undated) DEVICE — DRAPE,REIN 53X77,STERILE: Brand: MEDLINE

## (undated) DEVICE — SUT MNCRYL 2/0 SH 27IN UD MCP417H

## (undated) DEVICE — GW THRD 1.25X150MM FOR 3.5 4MM CANN SCRW

## (undated) DEVICE — BALL PIN JURGAN W SERIES .045 YEL

## (undated) DEVICE — SUT ETHLN 3/0 PC5 18IN 1893G

## (undated) DEVICE — UNDERCAST PADDING: Brand: DEROYAL

## (undated) DEVICE — CVR PROB ULTRASND/TRANSD W/GEL 7X11IN STRL

## (undated) DEVICE — SUT PROLN 2/0 PC3 8833H

## (undated) DEVICE — PK EXTREM LOWR 10

## (undated) DEVICE — INTENDED FOR TISSUE SEPARATION, AND OTHER PROCEDURES THAT REQUIRE A SHARP SURGICAL BLADE TO PUNCTURE OR CUT.: Brand: BARD-PARKER ® SAFETYLOCK CARBON RIB-BACK BLADES

## (undated) DEVICE — T-MAX DISPOSABLE FACE MASK 8 PER BOX

## (undated) DEVICE — GLV SURG SIGNATURE TOUCH PF LTX 7 STRL

## (undated) DEVICE — ST EXT IV SMRTSTE 2VLV FIX M LL 6ML 41

## (undated) DEVICE — ANTIBACTERIAL UNDYED BRAIDED (POLYGLACTIN 910), SYNTHETIC ABSORBABLE SUTURE: Brand: COATED VICRYL

## (undated) DEVICE — STRYKER PERFORMANCE SERIES SAGITTAL BLADE: Brand: STRYKER PERFORMANCE SERIES

## (undated) DEVICE — SUCTION CANISTER, 2500CC, RIGID: Brand: DEROYAL

## (undated) DEVICE — GLV SURG DERMASSURE GRN LF PF 7.0

## (undated) DEVICE — DELFI MATCHING LIMB PROTECTION SLEEVES (MLPS) HELP PROTECT THE PATIENT’S LIMB FROM POSSIBLE WRINKLING, PINCHING AND SHEARING OF SKIN AND SOFT TISSUES OF THE LIMB.EACH DELFI MATCHING LIMB PROTECTION SLEEVE IS INTENDED FOR USE WITH A SPECIFIC DELFI TOURNIQUET CUFF. THIS SLEEVE IS SPECIFICALLY FOR THIGH.: Brand: MATCHING LIMB PROTECTION SLEEVES - VARI-FIT

## (undated) DEVICE — GW PERIPH VASC ADX J/TP SS .035 150CM 3MM

## (undated) DEVICE — NDL HYPO ECLPS SFTY 18G 1 1/2IN

## (undated) DEVICE — PATIENT RETURN ELECTRODE, SINGLE-USE, CONTACT QUALITY MONITORING, ADULT, WITH 9FT CORD, FOR PATIENTS WEIGING OVER 33LBS. (15KG): Brand: MEGADYNE

## (undated) DEVICE — GLV SURG SIGNATURE TOUCH PF LTX 8 STRL BX/50

## (undated) DEVICE — SLNG ULTRSLING2 11TO13IN MD

## (undated) DEVICE — BLD SCLPL BEAVR MINI STR 2BVL 180D LF

## (undated) DEVICE — DRSNG WND GZ PAD BORDERED 4X8IN STRL

## (undated) DEVICE — NERVE BLOCK SUPPORT KIT/BLUE: Brand: MEDLINE INDUSTRIES, INC.

## (undated) DEVICE — CONTAINER,SPECIMEN,OR STERILE,4OZ: Brand: MEDLINE

## (undated) DEVICE — GLV SURG SENSICARE PI MIC PF SZ7 LF STRL

## (undated) DEVICE — SUT PDS LP 1 TP1 96IN VIO PDP880GA

## (undated) DEVICE — SHOULDER STABILIZATION KIT,                                    DISPOSABLE 12 PER BOX

## (undated) DEVICE — GLV SURG SENSICARE PI MIC PF SZ7.5 LF STRL

## (undated) DEVICE — PK CATH CARD 10

## (undated) DEVICE — DEV COMPR RADL PRELUDESYNCEZ 30ML 32CM

## (undated) DEVICE — ST ACC MICROPUNCTURE .018 TRANSLSS/SS/TP 5F/10CM 21G/7CM

## (undated) DEVICE — BLANKT WARM UPPR/BDY ARM/OUT 57X196CM

## (undated) DEVICE — PENCL SMOKE/EVAC MEGADYNE TELESCP 10FT

## (undated) DEVICE — GW PERIPH GUIDERIGHT STD/EXCHNG/J/TIP SS 0.035IN 5X260CM

## (undated) DEVICE — CATH DIAG EXPO M/ PK 5F FL4/FR4 PIG

## (undated) DEVICE — CONTN GRAD MEAS TRIANG 32OZ BLK

## (undated) DEVICE — NDL SPINE 18G 31/2IN PNK

## (undated) DEVICE — MODEL AT P65, P/N 701554-001KIT CONTENTS: HAND CONTROLLER, 3-WAY HIGH-PRESSURE STOPCOCK WITH ROTATING END AND PREMIUM HIGH-PRESSURE TUBING: Brand: ANGIOTOUCH® KIT

## (undated) DEVICE — SYS SKIN CLS DERMABOND PRINEO W/22CM MESH TP

## (undated) DEVICE — SOL NACL 0.9PCT 1000ML

## (undated) DEVICE — ST PIN FIX TEMP UNIVERS REVERS W/OSTEO/GUIDE/PIN 2.4MM STRL

## (undated) DEVICE — T4 PULLOVER TOGA, LARGE

## (undated) DEVICE — BNDG ELAS ELITE V/CLOSE 6IN 5YD LF STRL

## (undated) DEVICE — SUT SILK 2/0 TIES 18IN A185H

## (undated) DEVICE — SUT MONOCRYL PLS ANTIB UND 3/0  PS1 27IN

## (undated) DEVICE — MODEL BT2000 P/N 700287-012KIT CONTENTS: MANIFOLD WITH SALINE AND CONTRAST PORTS, SALINE TUBING WITH SPIKE AND HAND SYRINGE, TRANSDUCER: Brand: BT2000 AUTOMATED MANIFOLD KIT

## (undated) DEVICE — DISPOSABLE TOURNIQUET CUFF SINGLE BLADDER, DUAL PORT AND QUICK CONNECT CONNECTOR: Brand: COLOR CUFF

## (undated) DEVICE — PINNACLE INTRODUCER SHEATH: Brand: PINNACLE

## (undated) DEVICE — HANDPIECE SET WITH HIGH FLOW TIP AND SUCTION TUBE: Brand: INTERPULSE

## (undated) DEVICE — SUT SILK 3/0 SH CR8 18IN C013D

## (undated) DEVICE — SUT MNCRYL 2/0 SH 27IN Y417H

## (undated) DEVICE — ADULT, W/LG. BACK PAD, RADIOTRANSPARENT ELEMENT AND LEAD WIRE: Brand: DEFIBRILLATION ELECTRODES

## (undated) DEVICE — SUT PDS O CT1 CR/8 18IN Z740D

## (undated) DEVICE — GLV SURG TRIUMPH LT PF LTX 8 STRL

## (undated) DEVICE — SYR LUERLOK 50ML

## (undated) DEVICE — GLIDESHEATH SLENDER STAINLESS STEEL KIT: Brand: GLIDESHEATH SLENDER

## (undated) DEVICE — SPLNT ORTHOGLASS UNPAD P/C 4X38IN

## (undated) DEVICE — COVER,LIGHT HANDLE,FLX,1/PK: Brand: MEDLINE INDUSTRIES, INC.

## (undated) DEVICE — SPLNT ORTHOGLASS UNPAD P/C 4X24IN

## (undated) DEVICE — CANNULA,OXY,ADULT,SUPERSOFT,W/7'TUB,UC: Brand: MEDLINE

## (undated) DEVICE — MARYLAND JAW LAPAROSCOPIC SEALER/DIVIDER COATED: Brand: LIGASURE

## (undated) DEVICE — PUMP PAIN AUTOFUSER AUTO SELCT NOBOLUS 1TO14ML/HR 550ML DISP

## (undated) DEVICE — INTENDED USE FOR SURGICAL MARKING ON INTACT SKIN, ALSO PROVIDES A PERMANENT METHOD OF IDENTIFYING OBJECTS IN THE OPERATING ROOM: Brand: WRITESITE® REGULAR TIP SKIN MARKER

## (undated) DEVICE — LEX GENERAL ABDOMINAL SPLIT: Brand: MEDLINE INDUSTRIES, INC.

## (undated) DEVICE — GOWN SURG ORBIS LVL3 2XL STRL

## (undated) DEVICE — 3M™ IOBAN™ 2 ANTIMICROBIAL INCISE DRAPE 6650EZ: Brand: IOBAN™ 2

## (undated) DEVICE — ANGIO-SEAL VIP VASCULAR CLOSURE DEVICE: Brand: ANGIO-SEAL

## (undated) DEVICE — PK MAJ SHLDR SPLT 10

## (undated) DEVICE — DBD-DRAPE,LAP,CHOLE,W/TROUGHS,STERILE: Brand: MEDLINE

## (undated) DEVICE — 3M™ STERI-DRAPE™ INSTRUMENT POUCH 1018: Brand: STERI-DRAPE™

## (undated) DEVICE — DRAPE,TOP,102X53,STERILE: Brand: MEDLINE

## (undated) DEVICE — PAD UNDERCAST WYTEX 4IN 4YD LF STRL

## (undated) DEVICE — SUT SILK 3/0 TIES 18IN A184H